# Patient Record
Sex: FEMALE | Race: WHITE | Employment: OTHER | URBAN - NONMETROPOLITAN AREA
[De-identification: names, ages, dates, MRNs, and addresses within clinical notes are randomized per-mention and may not be internally consistent; named-entity substitution may affect disease eponyms.]

---

## 2017-01-09 ENCOUNTER — OFFICE VISIT (OUTPATIENT)
Dept: OTHER | Age: 72
End: 2017-01-09

## 2017-01-09 VITALS — BODY MASS INDEX: 35.03 KG/M2 | WEIGHT: 218 LBS | HEIGHT: 66 IN

## 2017-01-09 DIAGNOSIS — I50.9 CONGESTIVE HEART FAILURE, UNSPECIFIED CONGESTIVE HEART FAILURE CHRONICITY, UNSPECIFIED CONGESTIVE HEART FAILURE TYPE: ICD-10-CM

## 2017-01-09 DIAGNOSIS — E78.5 HYPERLIPIDEMIA, UNSPECIFIED HYPERLIPIDEMIA TYPE: ICD-10-CM

## 2017-01-09 DIAGNOSIS — E88.81 DYSMETABOLIC SYNDROME: ICD-10-CM

## 2017-01-09 DIAGNOSIS — K75.81 NASH (NONALCOHOLIC STEATOHEPATITIS): Primary | ICD-10-CM

## 2017-01-09 DIAGNOSIS — E66.9 OBESITY, UNSPECIFIED OBESITY SEVERITY, UNSPECIFIED OBESITY TYPE: ICD-10-CM

## 2017-01-09 PROCEDURE — 97803 MED NUTRITION INDIV SUBSEQ: CPT | Performed by: DIETITIAN, REGISTERED

## 2017-01-12 ENCOUNTER — TELEPHONE (OUTPATIENT)
Dept: FAMILY MEDICINE CLINIC | Age: 72
End: 2017-01-12

## 2017-01-17 ENCOUNTER — OFFICE VISIT (OUTPATIENT)
Dept: FAMILY MEDICINE CLINIC | Age: 72
End: 2017-01-17

## 2017-01-17 VITALS
RESPIRATION RATE: 22 BRPM | SYSTOLIC BLOOD PRESSURE: 142 MMHG | BODY MASS INDEX: 34.53 KG/M2 | HEIGHT: 67 IN | OXYGEN SATURATION: 95 % | TEMPERATURE: 98.2 F | WEIGHT: 220 LBS | HEART RATE: 88 BPM | DIASTOLIC BLOOD PRESSURE: 84 MMHG
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
## 2017-01-17 DIAGNOSIS — R06.02 SOB (SHORTNESS OF BREATH): Primary | ICD-10-CM

## 2017-01-17 DIAGNOSIS — R42 DIZZINESS: ICD-10-CM

## 2017-01-17 DIAGNOSIS — R29.6 FREQUENT FALLS: ICD-10-CM

## 2017-01-17 DIAGNOSIS — M25.512 ACUTE PAIN OF LEFT SHOULDER: ICD-10-CM

## 2017-01-17 PROCEDURE — 1123F ACP DISCUSS/DSCN MKR DOCD: CPT | Performed by: NURSE PRACTITIONER

## 2017-01-17 PROCEDURE — G8417 CALC BMI ABV UP PARAM F/U: HCPCS | Performed by: NURSE PRACTITIONER

## 2017-01-17 PROCEDURE — 1036F TOBACCO NON-USER: CPT | Performed by: NURSE PRACTITIONER

## 2017-01-17 PROCEDURE — G8399 PT W/DXA RESULTS DOCUMENT: HCPCS | Performed by: NURSE PRACTITIONER

## 2017-01-17 PROCEDURE — 3017F COLORECTAL CA SCREEN DOC REV: CPT | Performed by: NURSE PRACTITIONER

## 2017-01-17 PROCEDURE — 99213 OFFICE O/P EST LOW 20 MIN: CPT | Performed by: NURSE PRACTITIONER

## 2017-01-17 PROCEDURE — 4040F PNEUMOC VAC/ADMIN/RCVD: CPT | Performed by: NURSE PRACTITIONER

## 2017-01-17 PROCEDURE — 3014F SCREEN MAMMO DOC REV: CPT | Performed by: NURSE PRACTITIONER

## 2017-01-17 PROCEDURE — G8484 FLU IMMUNIZE NO ADMIN: HCPCS | Performed by: NURSE PRACTITIONER

## 2017-01-17 PROCEDURE — G8598 ASA/ANTIPLAT THER USED: HCPCS | Performed by: NURSE PRACTITIONER

## 2017-01-17 PROCEDURE — G8427 DOCREV CUR MEDS BY ELIG CLIN: HCPCS | Performed by: NURSE PRACTITIONER

## 2017-01-17 PROCEDURE — 1090F PRES/ABSN URINE INCON ASSESS: CPT | Performed by: NURSE PRACTITIONER

## 2017-01-17 RX ORDER — HYDROCODONE BITARTRATE AND ACETAMINOPHEN 5; 325 MG/1; MG/1
1 TABLET ORAL EVERY 6 HOURS PRN
Status: ON HOLD | COMMUNITY
End: 2017-11-15 | Stop reason: ALTCHOICE

## 2017-01-17 ASSESSMENT — ENCOUNTER SYMPTOMS
SHORTNESS OF BREATH: 1
ABDOMINAL PAIN: 0
COUGH: 0
COLOR CHANGE: 1
ABDOMINAL DISTENTION: 0
WHEEZING: 0
CHOKING: 0

## 2017-01-18 ENCOUNTER — TELEPHONE (OUTPATIENT)
Dept: FAMILY MEDICINE CLINIC | Age: 72
End: 2017-01-18

## 2017-01-19 ENCOUNTER — TELEPHONE (OUTPATIENT)
Dept: FAMILY MEDICINE CLINIC | Age: 72
End: 2017-01-19

## 2017-01-19 DIAGNOSIS — I65.02 VERTEBRAL ARTERY OCCLUSION, LEFT: ICD-10-CM

## 2017-01-19 DIAGNOSIS — R42 DIZZINESS: Primary | ICD-10-CM

## 2017-01-20 ENCOUNTER — TELEPHONE (OUTPATIENT)
Dept: FAMILY MEDICINE CLINIC | Age: 72
End: 2017-01-20

## 2017-02-28 ENCOUNTER — OFFICE VISIT (OUTPATIENT)
Dept: CARDIOLOGY | Age: 72
End: 2017-02-28

## 2017-02-28 VITALS
HEIGHT: 67 IN | HEART RATE: 97 BPM | SYSTOLIC BLOOD PRESSURE: 225 MMHG | BODY MASS INDEX: 34.69 KG/M2 | WEIGHT: 221 LBS | DIASTOLIC BLOOD PRESSURE: 82 MMHG

## 2017-02-28 DIAGNOSIS — R07.2 PRECORDIAL PAIN: ICD-10-CM

## 2017-02-28 DIAGNOSIS — Z98.61 S/P PTCA (PERCUTANEOUS TRANSLUMINAL CORONARY ANGIOPLASTY): ICD-10-CM

## 2017-02-28 DIAGNOSIS — I10 ESSENTIAL HYPERTENSION: Primary | ICD-10-CM

## 2017-02-28 DIAGNOSIS — E88.81 METABOLIC SYNDROME: ICD-10-CM

## 2017-02-28 DIAGNOSIS — Z98.890 S/P CARDIAC CATHETERIZATION: ICD-10-CM

## 2017-02-28 DIAGNOSIS — R01.1 SYSTOLIC MURMUR: ICD-10-CM

## 2017-02-28 PROCEDURE — G8484 FLU IMMUNIZE NO ADMIN: HCPCS | Performed by: INTERNAL MEDICINE

## 2017-02-28 PROCEDURE — 3017F COLORECTAL CA SCREEN DOC REV: CPT | Performed by: INTERNAL MEDICINE

## 2017-02-28 PROCEDURE — 3014F SCREEN MAMMO DOC REV: CPT | Performed by: INTERNAL MEDICINE

## 2017-02-28 PROCEDURE — 1036F TOBACCO NON-USER: CPT | Performed by: INTERNAL MEDICINE

## 2017-02-28 PROCEDURE — 1123F ACP DISCUSS/DSCN MKR DOCD: CPT | Performed by: INTERNAL MEDICINE

## 2017-02-28 PROCEDURE — 4040F PNEUMOC VAC/ADMIN/RCVD: CPT | Performed by: INTERNAL MEDICINE

## 2017-02-28 PROCEDURE — G8598 ASA/ANTIPLAT THER USED: HCPCS | Performed by: INTERNAL MEDICINE

## 2017-02-28 PROCEDURE — 1090F PRES/ABSN URINE INCON ASSESS: CPT | Performed by: INTERNAL MEDICINE

## 2017-02-28 PROCEDURE — G8399 PT W/DXA RESULTS DOCUMENT: HCPCS | Performed by: INTERNAL MEDICINE

## 2017-02-28 PROCEDURE — G8427 DOCREV CUR MEDS BY ELIG CLIN: HCPCS | Performed by: INTERNAL MEDICINE

## 2017-02-28 PROCEDURE — 99213 OFFICE O/P EST LOW 20 MIN: CPT | Performed by: INTERNAL MEDICINE

## 2017-02-28 PROCEDURE — G8417 CALC BMI ABV UP PARAM F/U: HCPCS | Performed by: INTERNAL MEDICINE

## 2017-02-28 RX ORDER — CLOPIDOGREL BISULFATE 75 MG/1
TABLET ORAL
Qty: 90 TABLET | Refills: 1 | Status: SHIPPED | OUTPATIENT
Start: 2017-02-28 | End: 2017-08-23 | Stop reason: SDUPTHER

## 2017-02-28 RX ORDER — LISINOPRIL 10 MG/1
TABLET ORAL
Qty: 180 TABLET | Refills: 1 | Status: SHIPPED | OUTPATIENT
Start: 2017-02-28 | End: 2017-05-11 | Stop reason: DRUGHIGH

## 2017-02-28 RX ORDER — NITROGLYCERIN 0.4 MG/1
0.4 TABLET SUBLINGUAL EVERY 5 MIN PRN
Qty: 25 TABLET | Refills: 3 | Status: ON HOLD | OUTPATIENT
Start: 2017-02-28 | End: 2018-04-18 | Stop reason: HOSPADM

## 2017-02-28 RX ORDER — RANOLAZINE 500 MG/1
500 TABLET, EXTENDED RELEASE ORAL 2 TIMES DAILY
Qty: 180 TABLET | Refills: 1 | Status: SHIPPED | OUTPATIENT
Start: 2017-02-28 | End: 2017-08-25 | Stop reason: SDUPTHER

## 2017-02-28 RX ORDER — AMLODIPINE BESYLATE 5 MG/1
5 TABLET ORAL DAILY
Qty: 90 TABLET | Refills: 1 | Status: SHIPPED | OUTPATIENT
Start: 2017-02-28 | End: 2017-04-26 | Stop reason: DRUGHIGH

## 2017-02-28 RX ORDER — NITROGLYCERIN 40 MG/1
PATCH TRANSDERMAL
Qty: 90 PATCH | Refills: 1 | Status: SHIPPED | OUTPATIENT
Start: 2017-02-28 | End: 2018-01-20 | Stop reason: SDUPTHER

## 2017-02-28 ASSESSMENT — ENCOUNTER SYMPTOMS
GASTROINTESTINAL NEGATIVE: 1
RESPIRATORY NEGATIVE: 1
EYES NEGATIVE: 1

## 2017-03-13 ENCOUNTER — TELEPHONE (OUTPATIENT)
Dept: FAMILY MEDICINE CLINIC | Age: 72
End: 2017-03-13

## 2017-03-13 RX ORDER — FLUTICASONE FUROATE AND VILANTEROL 100; 25 UG/1; UG/1
1 POWDER RESPIRATORY (INHALATION) DAILY
Qty: 1 EACH | Refills: 3 | Status: SHIPPED | OUTPATIENT
Start: 2017-03-13 | End: 2017-05-01 | Stop reason: ALTCHOICE

## 2017-03-27 ENCOUNTER — OFFICE VISIT (OUTPATIENT)
Dept: PULMONOLOGY | Age: 72
End: 2017-03-27

## 2017-03-27 ENCOUNTER — OFFICE VISIT (OUTPATIENT)
Dept: AUDIOLOGY | Age: 72
End: 2017-03-27

## 2017-03-27 VITALS
HEIGHT: 66 IN | TEMPERATURE: 98.1 F | BODY MASS INDEX: 35.49 KG/M2 | DIASTOLIC BLOOD PRESSURE: 78 MMHG | SYSTOLIC BLOOD PRESSURE: 149 MMHG | WEIGHT: 220.85 LBS | OXYGEN SATURATION: 94 % | HEART RATE: 96 BPM

## 2017-03-27 DIAGNOSIS — H90.3 SENSORINEURAL HEARING LOSS, BILATERAL: Primary | ICD-10-CM

## 2017-03-27 DIAGNOSIS — G47.10 HYPERSOMNIA: ICD-10-CM

## 2017-03-27 DIAGNOSIS — I10 ESSENTIAL HYPERTENSION: ICD-10-CM

## 2017-03-27 DIAGNOSIS — R06.02 SHORTNESS OF BREATH: Primary | ICD-10-CM

## 2017-03-27 DIAGNOSIS — Z87.891 PERSONAL HISTORY OF TOBACCO USE: ICD-10-CM

## 2017-03-27 DIAGNOSIS — G47.33 OSA (OBSTRUCTIVE SLEEP APNEA): ICD-10-CM

## 2017-03-27 DIAGNOSIS — J45.40 MODERATE PERSISTENT ASTHMA WITHOUT COMPLICATION: ICD-10-CM

## 2017-03-27 PROCEDURE — 3017F COLORECTAL CA SCREEN DOC REV: CPT | Performed by: INTERNAL MEDICINE

## 2017-03-27 PROCEDURE — 1123F ACP DISCUSS/DSCN MKR DOCD: CPT | Performed by: INTERNAL MEDICINE

## 2017-03-27 PROCEDURE — 1090F PRES/ABSN URINE INCON ASSESS: CPT | Performed by: INTERNAL MEDICINE

## 2017-03-27 PROCEDURE — 3014F SCREEN MAMMO DOC REV: CPT | Performed by: INTERNAL MEDICINE

## 2017-03-27 PROCEDURE — 1036F TOBACCO NON-USER: CPT | Performed by: INTERNAL MEDICINE

## 2017-03-27 PROCEDURE — G8484 FLU IMMUNIZE NO ADMIN: HCPCS | Performed by: INTERNAL MEDICINE

## 2017-03-27 PROCEDURE — G0296 VISIT TO DETERM LDCT ELIG: HCPCS | Performed by: INTERNAL MEDICINE

## 2017-03-27 PROCEDURE — G8598 ASA/ANTIPLAT THER USED: HCPCS | Performed by: INTERNAL MEDICINE

## 2017-03-27 PROCEDURE — G8417 CALC BMI ABV UP PARAM F/U: HCPCS | Performed by: INTERNAL MEDICINE

## 2017-03-27 PROCEDURE — G8399 PT W/DXA RESULTS DOCUMENT: HCPCS | Performed by: INTERNAL MEDICINE

## 2017-03-27 PROCEDURE — G8427 DOCREV CUR MEDS BY ELIG CLIN: HCPCS | Performed by: INTERNAL MEDICINE

## 2017-03-27 PROCEDURE — 4040F PNEUMOC VAC/ADMIN/RCVD: CPT | Performed by: INTERNAL MEDICINE

## 2017-03-27 PROCEDURE — 99214 OFFICE O/P EST MOD 30 MIN: CPT | Performed by: INTERNAL MEDICINE

## 2017-04-03 PROBLEM — I70.8 LEFT SUBCLAVIAN ARTERY OCCLUSION: Status: ACTIVE | Noted: 2017-04-03

## 2017-04-04 ENCOUNTER — TELEPHONE (OUTPATIENT)
Dept: CARDIOLOGY | Age: 72
End: 2017-04-04

## 2017-04-05 ENCOUNTER — TELEPHONE (OUTPATIENT)
Dept: FAMILY MEDICINE CLINIC | Age: 72
End: 2017-04-05

## 2017-04-17 ENCOUNTER — OFFICE VISIT (OUTPATIENT)
Dept: FAMILY MEDICINE CLINIC | Age: 72
End: 2017-04-17

## 2017-04-17 VITALS
SYSTOLIC BLOOD PRESSURE: 176 MMHG | HEIGHT: 66 IN | WEIGHT: 226 LBS | TEMPERATURE: 98 F | HEART RATE: 92 BPM | BODY MASS INDEX: 36.32 KG/M2 | RESPIRATION RATE: 28 BRPM | DIASTOLIC BLOOD PRESSURE: 74 MMHG

## 2017-04-17 DIAGNOSIS — E78.5 DYSLIPIDEMIA: ICD-10-CM

## 2017-04-17 DIAGNOSIS — H61.22 HEARING LOSS OF LEFT EAR DUE TO CERUMEN IMPACTION: ICD-10-CM

## 2017-04-17 DIAGNOSIS — E03.9 HYPOTHYROIDISM, UNSPECIFIED TYPE: ICD-10-CM

## 2017-04-17 DIAGNOSIS — I10 ESSENTIAL HYPERTENSION: Primary | ICD-10-CM

## 2017-04-17 DIAGNOSIS — E11.9 CONTROLLED TYPE 2 DIABETES MELLITUS WITHOUT COMPLICATION, WITHOUT LONG-TERM CURRENT USE OF INSULIN (HCC): ICD-10-CM

## 2017-04-17 LAB — HBA1C MFR BLD: 5.9 %

## 2017-04-17 PROCEDURE — 3044F HG A1C LEVEL LT 7.0%: CPT | Performed by: NURSE PRACTITIONER

## 2017-04-17 PROCEDURE — 69209 REMOVE IMPACTED EAR WAX UNI: CPT | Performed by: NURSE PRACTITIONER

## 2017-04-17 PROCEDURE — 3017F COLORECTAL CA SCREEN DOC REV: CPT | Performed by: NURSE PRACTITIONER

## 2017-04-17 PROCEDURE — G8598 ASA/ANTIPLAT THER USED: HCPCS | Performed by: NURSE PRACTITIONER

## 2017-04-17 PROCEDURE — G8427 DOCREV CUR MEDS BY ELIG CLIN: HCPCS | Performed by: NURSE PRACTITIONER

## 2017-04-17 PROCEDURE — 99214 OFFICE O/P EST MOD 30 MIN: CPT | Performed by: NURSE PRACTITIONER

## 2017-04-17 PROCEDURE — 3014F SCREEN MAMMO DOC REV: CPT | Performed by: NURSE PRACTITIONER

## 2017-04-17 PROCEDURE — 1036F TOBACCO NON-USER: CPT | Performed by: NURSE PRACTITIONER

## 2017-04-17 PROCEDURE — 1123F ACP DISCUSS/DSCN MKR DOCD: CPT | Performed by: NURSE PRACTITIONER

## 2017-04-17 PROCEDURE — 4040F PNEUMOC VAC/ADMIN/RCVD: CPT | Performed by: NURSE PRACTITIONER

## 2017-04-17 PROCEDURE — 1090F PRES/ABSN URINE INCON ASSESS: CPT | Performed by: NURSE PRACTITIONER

## 2017-04-17 PROCEDURE — G8417 CALC BMI ABV UP PARAM F/U: HCPCS | Performed by: NURSE PRACTITIONER

## 2017-04-17 PROCEDURE — 83036 HEMOGLOBIN GLYCOSYLATED A1C: CPT | Performed by: NURSE PRACTITIONER

## 2017-04-17 PROCEDURE — G8399 PT W/DXA RESULTS DOCUMENT: HCPCS | Performed by: NURSE PRACTITIONER

## 2017-04-17 RX ORDER — LEVOTHYROXINE SODIUM 0.1 MG/1
TABLET ORAL
Qty: 90 TABLET | Refills: 1 | Status: SHIPPED | OUTPATIENT
Start: 2017-04-17 | End: 2017-07-18 | Stop reason: SDUPTHER

## 2017-04-17 RX ORDER — ROSUVASTATIN CALCIUM 20 MG/1
TABLET, COATED ORAL
Qty: 30 TABLET | Refills: 6 | Status: SHIPPED | OUTPATIENT
Start: 2017-04-17 | End: 2017-07-18 | Stop reason: SDUPTHER

## 2017-04-17 ASSESSMENT — ENCOUNTER SYMPTOMS
TROUBLE SWALLOWING: 0
VOICE CHANGE: 0
SHORTNESS OF BREATH: 0
GASTROINTESTINAL NEGATIVE: 1
CHOKING: 0
COUGH: 1
CHEST TIGHTNESS: 0
WHEEZING: 0

## 2017-04-26 ENCOUNTER — TELEPHONE (OUTPATIENT)
Dept: FAMILY MEDICINE CLINIC | Age: 72
End: 2017-04-26

## 2017-04-26 ENCOUNTER — OFFICE VISIT (OUTPATIENT)
Dept: CARDIOLOGY | Age: 72
End: 2017-04-26

## 2017-04-26 VITALS
SYSTOLIC BLOOD PRESSURE: 154 MMHG | HEIGHT: 66 IN | DIASTOLIC BLOOD PRESSURE: 58 MMHG | HEART RATE: 104 BPM | BODY MASS INDEX: 36 KG/M2 | WEIGHT: 224 LBS

## 2017-04-26 DIAGNOSIS — Z98.61 S/P PTCA (PERCUTANEOUS TRANSLUMINAL CORONARY ANGIOPLASTY): Primary | ICD-10-CM

## 2017-04-26 DIAGNOSIS — Z98.890 S/P CARDIAC CATHETERIZATION: ICD-10-CM

## 2017-04-26 DIAGNOSIS — E78.5 DYSLIPIDEMIA: ICD-10-CM

## 2017-04-26 DIAGNOSIS — I70.8 LEFT SUBCLAVIAN ARTERY OCCLUSION: ICD-10-CM

## 2017-04-26 PROCEDURE — G8598 ASA/ANTIPLAT THER USED: HCPCS | Performed by: INTERNAL MEDICINE

## 2017-04-26 PROCEDURE — 1123F ACP DISCUSS/DSCN MKR DOCD: CPT | Performed by: INTERNAL MEDICINE

## 2017-04-26 PROCEDURE — 3014F SCREEN MAMMO DOC REV: CPT | Performed by: INTERNAL MEDICINE

## 2017-04-26 PROCEDURE — G8399 PT W/DXA RESULTS DOCUMENT: HCPCS | Performed by: INTERNAL MEDICINE

## 2017-04-26 PROCEDURE — 99213 OFFICE O/P EST LOW 20 MIN: CPT | Performed by: INTERNAL MEDICINE

## 2017-04-26 PROCEDURE — G8417 CALC BMI ABV UP PARAM F/U: HCPCS | Performed by: INTERNAL MEDICINE

## 2017-04-26 PROCEDURE — G8427 DOCREV CUR MEDS BY ELIG CLIN: HCPCS | Performed by: INTERNAL MEDICINE

## 2017-04-26 PROCEDURE — 1090F PRES/ABSN URINE INCON ASSESS: CPT | Performed by: INTERNAL MEDICINE

## 2017-04-26 PROCEDURE — 3017F COLORECTAL CA SCREEN DOC REV: CPT | Performed by: INTERNAL MEDICINE

## 2017-04-26 PROCEDURE — 4040F PNEUMOC VAC/ADMIN/RCVD: CPT | Performed by: INTERNAL MEDICINE

## 2017-04-26 PROCEDURE — 1036F TOBACCO NON-USER: CPT | Performed by: INTERNAL MEDICINE

## 2017-04-26 RX ORDER — AMLODIPINE BESYLATE 10 MG/1
10 TABLET ORAL DAILY
Qty: 90 TABLET | Refills: 0 | Status: SHIPPED | OUTPATIENT
Start: 2017-04-26 | End: 2017-09-01 | Stop reason: SDUPTHER

## 2017-04-26 RX ORDER — AMLODIPINE BESYLATE 10 MG/1
10 TABLET ORAL DAILY
COMMUNITY
End: 2017-04-26 | Stop reason: SDUPTHER

## 2017-04-26 ASSESSMENT — ENCOUNTER SYMPTOMS
RESPIRATORY NEGATIVE: 1
GASTROINTESTINAL NEGATIVE: 1
EYES NEGATIVE: 1

## 2017-05-01 ENCOUNTER — OFFICE VISIT (OUTPATIENT)
Dept: PULMONOLOGY | Age: 72
End: 2017-05-01

## 2017-05-01 VITALS
SYSTOLIC BLOOD PRESSURE: 154 MMHG | OXYGEN SATURATION: 94 % | DIASTOLIC BLOOD PRESSURE: 82 MMHG | TEMPERATURE: 97.4 F | WEIGHT: 223.6 LBS | HEIGHT: 66 IN | BODY MASS INDEX: 35.93 KG/M2 | HEART RATE: 97 BPM

## 2017-05-01 DIAGNOSIS — R91.8 LUNG NODULES: ICD-10-CM

## 2017-05-01 DIAGNOSIS — G47.33 OSA (OBSTRUCTIVE SLEEP APNEA): ICD-10-CM

## 2017-05-01 DIAGNOSIS — Z87.891 PERSONAL HISTORY OF TOBACCO USE: ICD-10-CM

## 2017-05-01 DIAGNOSIS — R93.89 ABNORMAL CT SCAN, CHEST: ICD-10-CM

## 2017-05-01 DIAGNOSIS — J45.40 MODERATE PERSISTENT ASTHMA WITHOUT COMPLICATION: Primary | ICD-10-CM

## 2017-05-01 DIAGNOSIS — I25.10 CORONARY ARTERY DISEASE INVOLVING NATIVE CORONARY ARTERY OF NATIVE HEART WITHOUT ANGINA PECTORIS: ICD-10-CM

## 2017-05-01 DIAGNOSIS — I10 ESSENTIAL HYPERTENSION: ICD-10-CM

## 2017-05-01 PROCEDURE — 1123F ACP DISCUSS/DSCN MKR DOCD: CPT | Performed by: INTERNAL MEDICINE

## 2017-05-01 PROCEDURE — 4040F PNEUMOC VAC/ADMIN/RCVD: CPT | Performed by: INTERNAL MEDICINE

## 2017-05-01 PROCEDURE — G8598 ASA/ANTIPLAT THER USED: HCPCS | Performed by: INTERNAL MEDICINE

## 2017-05-01 PROCEDURE — G0296 VISIT TO DETERM LDCT ELIG: HCPCS | Performed by: INTERNAL MEDICINE

## 2017-05-01 PROCEDURE — 99215 OFFICE O/P EST HI 40 MIN: CPT | Performed by: INTERNAL MEDICINE

## 2017-05-01 PROCEDURE — 1090F PRES/ABSN URINE INCON ASSESS: CPT | Performed by: INTERNAL MEDICINE

## 2017-05-01 PROCEDURE — 1036F TOBACCO NON-USER: CPT | Performed by: INTERNAL MEDICINE

## 2017-05-01 PROCEDURE — 3014F SCREEN MAMMO DOC REV: CPT | Performed by: INTERNAL MEDICINE

## 2017-05-01 PROCEDURE — G8427 DOCREV CUR MEDS BY ELIG CLIN: HCPCS | Performed by: INTERNAL MEDICINE

## 2017-05-01 PROCEDURE — G8417 CALC BMI ABV UP PARAM F/U: HCPCS | Performed by: INTERNAL MEDICINE

## 2017-05-01 PROCEDURE — G8399 PT W/DXA RESULTS DOCUMENT: HCPCS | Performed by: INTERNAL MEDICINE

## 2017-05-01 PROCEDURE — 3017F COLORECTAL CA SCREEN DOC REV: CPT | Performed by: INTERNAL MEDICINE

## 2017-05-01 RX ORDER — MONTELUKAST SODIUM 10 MG/1
10 TABLET, FILM COATED ORAL NIGHTLY
Qty: 30 TABLET | Refills: 11 | Status: ON HOLD
Start: 2017-05-01 | End: 2018-07-07

## 2017-05-03 ENCOUNTER — TELEPHONE (OUTPATIENT)
Dept: PULMONOLOGY | Age: 72
End: 2017-05-03

## 2017-05-08 RX ORDER — METOPROLOL TARTRATE 50 MG/1
TABLET, FILM COATED ORAL
Qty: 180 TABLET | Refills: 1 | Status: SHIPPED | OUTPATIENT
Start: 2017-05-08 | End: 2017-09-06 | Stop reason: SDUPTHER

## 2017-05-09 ENCOUNTER — TELEPHONE (OUTPATIENT)
Dept: CARDIOLOGY | Age: 72
End: 2017-05-09

## 2017-05-11 RX ORDER — LISINOPRIL 20 MG/1
20 TABLET ORAL 2 TIMES DAILY
COMMUNITY
End: 2017-06-07 | Stop reason: SDUPTHER

## 2017-05-16 ENCOUNTER — TELEPHONE (OUTPATIENT)
Dept: OTHER | Age: 72
End: 2017-05-16

## 2017-06-08 RX ORDER — LISINOPRIL 20 MG/1
20 TABLET ORAL 2 TIMES DAILY
Qty: 180 TABLET | Refills: 0 | Status: SHIPPED | OUTPATIENT
Start: 2017-06-08 | End: 2017-09-06 | Stop reason: SDUPTHER

## 2017-06-12 ENCOUNTER — OFFICE VISIT (OUTPATIENT)
Dept: OTHER | Age: 72
End: 2017-06-12

## 2017-06-12 VITALS — BODY MASS INDEX: 36.13 KG/M2 | WEIGHT: 224.8 LBS | HEIGHT: 66 IN

## 2017-06-12 DIAGNOSIS — Z71.3 DIETARY COUNSELING AND SURVEILLANCE: ICD-10-CM

## 2017-06-12 DIAGNOSIS — E78.2 COMBINED HYPERLIPIDEMIA: ICD-10-CM

## 2017-06-12 DIAGNOSIS — K75.81 NASH (NONALCOHOLIC STEATOHEPATITIS): Primary | ICD-10-CM

## 2017-06-12 DIAGNOSIS — E88.81 DYSMETABOLIC SYNDROME: ICD-10-CM

## 2017-06-12 DIAGNOSIS — E66.9 OBESITY (BMI 30.0-34.9): ICD-10-CM

## 2017-07-03 RX ORDER — ASPIRIN 81 MG
TABLET, DELAYED RELEASE (ENTERIC COATED) ORAL
Qty: 30 TABLET | Refills: 11 | Status: SHIPPED | OUTPATIENT
Start: 2017-07-03 | End: 2018-03-07 | Stop reason: SDUPTHER

## 2017-07-03 RX ORDER — B-COMPLEX WITH VITAMIN C
TABLET ORAL
Qty: 60 TABLET | Refills: 5 | Status: SHIPPED | OUTPATIENT
Start: 2017-07-03 | End: 2017-12-23 | Stop reason: SDUPTHER

## 2017-07-12 ENCOUNTER — OFFICE VISIT (OUTPATIENT)
Dept: PULMONOLOGY | Age: 72
End: 2017-07-12

## 2017-07-12 VITALS
OXYGEN SATURATION: 95 % | BODY MASS INDEX: 36.45 KG/M2 | DIASTOLIC BLOOD PRESSURE: 60 MMHG | WEIGHT: 226.8 LBS | SYSTOLIC BLOOD PRESSURE: 150 MMHG | HEIGHT: 66 IN | HEART RATE: 91 BPM

## 2017-07-12 DIAGNOSIS — Z99.89 OSA ON CPAP: ICD-10-CM

## 2017-07-12 DIAGNOSIS — G47.33 OSA ON CPAP: ICD-10-CM

## 2017-07-12 PROCEDURE — 4040F PNEUMOC VAC/ADMIN/RCVD: CPT | Performed by: PHYSICIAN ASSISTANT

## 2017-07-12 PROCEDURE — 3014F SCREEN MAMMO DOC REV: CPT | Performed by: PHYSICIAN ASSISTANT

## 2017-07-12 PROCEDURE — 1090F PRES/ABSN URINE INCON ASSESS: CPT | Performed by: PHYSICIAN ASSISTANT

## 2017-07-12 PROCEDURE — G8399 PT W/DXA RESULTS DOCUMENT: HCPCS | Performed by: PHYSICIAN ASSISTANT

## 2017-07-12 PROCEDURE — 1123F ACP DISCUSS/DSCN MKR DOCD: CPT | Performed by: PHYSICIAN ASSISTANT

## 2017-07-12 PROCEDURE — 1036F TOBACCO NON-USER: CPT | Performed by: PHYSICIAN ASSISTANT

## 2017-07-12 PROCEDURE — G8417 CALC BMI ABV UP PARAM F/U: HCPCS | Performed by: PHYSICIAN ASSISTANT

## 2017-07-12 PROCEDURE — 99213 OFFICE O/P EST LOW 20 MIN: CPT | Performed by: PHYSICIAN ASSISTANT

## 2017-07-12 PROCEDURE — G8427 DOCREV CUR MEDS BY ELIG CLIN: HCPCS | Performed by: PHYSICIAN ASSISTANT

## 2017-07-12 PROCEDURE — G8598 ASA/ANTIPLAT THER USED: HCPCS | Performed by: PHYSICIAN ASSISTANT

## 2017-07-12 PROCEDURE — 3017F COLORECTAL CA SCREEN DOC REV: CPT | Performed by: PHYSICIAN ASSISTANT

## 2017-07-18 ENCOUNTER — OFFICE VISIT (OUTPATIENT)
Dept: FAMILY MEDICINE CLINIC | Age: 72
End: 2017-07-18
Payer: MEDICARE

## 2017-07-18 VITALS
WEIGHT: 225 LBS | BODY MASS INDEX: 36.16 KG/M2 | HEIGHT: 66 IN | SYSTOLIC BLOOD PRESSURE: 158 MMHG | TEMPERATURE: 97.8 F | HEART RATE: 104 BPM | RESPIRATION RATE: 24 BRPM | DIASTOLIC BLOOD PRESSURE: 60 MMHG

## 2017-07-18 DIAGNOSIS — J41.0 SIMPLE CHRONIC BRONCHITIS (HCC): Primary | ICD-10-CM

## 2017-07-18 DIAGNOSIS — E78.5 DYSLIPIDEMIA: ICD-10-CM

## 2017-07-18 DIAGNOSIS — K08.89 ILL-FITTING DENTURES: ICD-10-CM

## 2017-07-18 DIAGNOSIS — K12.0 ORAL APHTHOUS ULCER: ICD-10-CM

## 2017-07-18 DIAGNOSIS — R01.1 SYSTOLIC MURMUR: ICD-10-CM

## 2017-07-18 DIAGNOSIS — Z97.2 ILL-FITTING DENTURES: ICD-10-CM

## 2017-07-18 DIAGNOSIS — E03.9 HYPOTHYROIDISM, UNSPECIFIED TYPE: ICD-10-CM

## 2017-07-18 DIAGNOSIS — J44.9 OBSTRUCTIVE CHRONIC BRONCHITIS WITHOUT EXACERBATION (HCC): ICD-10-CM

## 2017-07-18 PROCEDURE — 4040F PNEUMOC VAC/ADMIN/RCVD: CPT | Performed by: NURSE PRACTITIONER

## 2017-07-18 PROCEDURE — 1036F TOBACCO NON-USER: CPT | Performed by: NURSE PRACTITIONER

## 2017-07-18 PROCEDURE — 3023F SPIROM DOC REV: CPT | Performed by: NURSE PRACTITIONER

## 2017-07-18 PROCEDURE — G8427 DOCREV CUR MEDS BY ELIG CLIN: HCPCS | Performed by: NURSE PRACTITIONER

## 2017-07-18 PROCEDURE — G8399 PT W/DXA RESULTS DOCUMENT: HCPCS | Performed by: NURSE PRACTITIONER

## 2017-07-18 PROCEDURE — 1090F PRES/ABSN URINE INCON ASSESS: CPT | Performed by: NURSE PRACTITIONER

## 2017-07-18 PROCEDURE — 1123F ACP DISCUSS/DSCN MKR DOCD: CPT | Performed by: NURSE PRACTITIONER

## 2017-07-18 PROCEDURE — G8417 CALC BMI ABV UP PARAM F/U: HCPCS | Performed by: NURSE PRACTITIONER

## 2017-07-18 PROCEDURE — 99213 OFFICE O/P EST LOW 20 MIN: CPT | Performed by: NURSE PRACTITIONER

## 2017-07-18 PROCEDURE — 3017F COLORECTAL CA SCREEN DOC REV: CPT | Performed by: NURSE PRACTITIONER

## 2017-07-18 PROCEDURE — G8598 ASA/ANTIPLAT THER USED: HCPCS | Performed by: NURSE PRACTITIONER

## 2017-07-18 PROCEDURE — G8926 SPIRO NO PERF OR DOC: HCPCS | Performed by: NURSE PRACTITIONER

## 2017-07-18 PROCEDURE — 3014F SCREEN MAMMO DOC REV: CPT | Performed by: NURSE PRACTITIONER

## 2017-07-18 RX ORDER — LEVOTHYROXINE SODIUM 0.1 MG/1
TABLET ORAL
Qty: 90 TABLET | Refills: 1 | Status: SHIPPED | OUTPATIENT
Start: 2017-07-18 | End: 2017-12-27 | Stop reason: SDUPTHER

## 2017-07-18 RX ORDER — ROSUVASTATIN CALCIUM 20 MG/1
TABLET, COATED ORAL
Qty: 90 TABLET | Refills: 1 | Status: SHIPPED | OUTPATIENT
Start: 2017-07-18 | End: 2017-09-06 | Stop reason: SDUPTHER

## 2017-07-18 ASSESSMENT — ENCOUNTER SYMPTOMS
DIARRHEA: 0
NAUSEA: 0
VOICE CHANGE: 0
TROUBLE SWALLOWING: 0
VOMITING: 0
ABDOMINAL PAIN: 0
BACK PAIN: 0
RESPIRATORY NEGATIVE: 1
CONSTIPATION: 0
ABDOMINAL DISTENTION: 0
SINUS PRESSURE: 0

## 2017-07-18 ASSESSMENT — PATIENT HEALTH QUESTIONNAIRE - PHQ9
1. LITTLE INTEREST OR PLEASURE IN DOING THINGS: 0
2. FEELING DOWN, DEPRESSED OR HOPELESS: 1
SUM OF ALL RESPONSES TO PHQ QUESTIONS 1-9: 1
SUM OF ALL RESPONSES TO PHQ9 QUESTIONS 1 & 2: 1

## 2017-08-17 RX ORDER — NYSTATIN 100000 U/G
CREAM TOPICAL
Qty: 60 G | Refills: 2 | Status: SHIPPED | OUTPATIENT
Start: 2017-08-17 | End: 2019-01-01

## 2017-08-24 RX ORDER — CLOPIDOGREL BISULFATE 75 MG/1
TABLET ORAL
Qty: 90 TABLET | Refills: 1 | Status: SHIPPED | OUTPATIENT
Start: 2017-08-24 | End: 2017-09-06 | Stop reason: SDUPTHER

## 2017-08-25 RX ORDER — RANOLAZINE 500 MG/1
TABLET, FILM COATED, EXTENDED RELEASE ORAL
Qty: 180 TABLET | Refills: 0 | Status: SHIPPED | OUTPATIENT
Start: 2017-08-25 | End: 2017-09-06 | Stop reason: SDUPTHER

## 2017-09-01 RX ORDER — AMLODIPINE BESYLATE 10 MG/1
10 TABLET ORAL DAILY
Qty: 90 TABLET | Refills: 0 | Status: SHIPPED | OUTPATIENT
Start: 2017-09-01 | End: 2017-09-06 | Stop reason: SDUPTHER

## 2017-09-06 ENCOUNTER — OFFICE VISIT (OUTPATIENT)
Dept: CARDIOLOGY CLINIC | Age: 72
End: 2017-09-06
Payer: MEDICARE

## 2017-09-06 VITALS
BODY MASS INDEX: 37.09 KG/M2 | HEART RATE: 90 BPM | HEIGHT: 66 IN | SYSTOLIC BLOOD PRESSURE: 158 MMHG | DIASTOLIC BLOOD PRESSURE: 80 MMHG | WEIGHT: 230.8 LBS

## 2017-09-06 DIAGNOSIS — I35.0 NONRHEUMATIC AORTIC VALVE STENOSIS: ICD-10-CM

## 2017-09-06 DIAGNOSIS — Z98.890 S/P CARDIAC CATHETERIZATION: ICD-10-CM

## 2017-09-06 DIAGNOSIS — I25.10 ASCVD (ARTERIOSCLEROTIC CARDIOVASCULAR DISEASE): Primary | ICD-10-CM

## 2017-09-06 DIAGNOSIS — I10 ESSENTIAL HYPERTENSION: ICD-10-CM

## 2017-09-06 DIAGNOSIS — I70.8 LEFT SUBCLAVIAN ARTERY OCCLUSION: ICD-10-CM

## 2017-09-06 DIAGNOSIS — I25.10 CORONARY ARTERY DISEASE INVOLVING NATIVE CORONARY ARTERY OF NATIVE HEART WITHOUT ANGINA PECTORIS: ICD-10-CM

## 2017-09-06 DIAGNOSIS — E78.5 DYSLIPIDEMIA: ICD-10-CM

## 2017-09-06 DIAGNOSIS — Z98.61 S/P PTCA (PERCUTANEOUS TRANSLUMINAL CORONARY ANGIOPLASTY): ICD-10-CM

## 2017-09-06 PROCEDURE — 3017F COLORECTAL CA SCREEN DOC REV: CPT | Performed by: INTERNAL MEDICINE

## 2017-09-06 PROCEDURE — 4040F PNEUMOC VAC/ADMIN/RCVD: CPT | Performed by: INTERNAL MEDICINE

## 2017-09-06 PROCEDURE — 1123F ACP DISCUSS/DSCN MKR DOCD: CPT | Performed by: INTERNAL MEDICINE

## 2017-09-06 PROCEDURE — G8399 PT W/DXA RESULTS DOCUMENT: HCPCS | Performed by: INTERNAL MEDICINE

## 2017-09-06 PROCEDURE — G8598 ASA/ANTIPLAT THER USED: HCPCS | Performed by: INTERNAL MEDICINE

## 2017-09-06 PROCEDURE — 3014F SCREEN MAMMO DOC REV: CPT | Performed by: INTERNAL MEDICINE

## 2017-09-06 PROCEDURE — G8417 CALC BMI ABV UP PARAM F/U: HCPCS | Performed by: INTERNAL MEDICINE

## 2017-09-06 PROCEDURE — 1036F TOBACCO NON-USER: CPT | Performed by: INTERNAL MEDICINE

## 2017-09-06 PROCEDURE — 99213 OFFICE O/P EST LOW 20 MIN: CPT | Performed by: INTERNAL MEDICINE

## 2017-09-06 PROCEDURE — 1090F PRES/ABSN URINE INCON ASSESS: CPT | Performed by: INTERNAL MEDICINE

## 2017-09-06 PROCEDURE — G8427 DOCREV CUR MEDS BY ELIG CLIN: HCPCS | Performed by: INTERNAL MEDICINE

## 2017-09-06 PROCEDURE — 93000 ELECTROCARDIOGRAM COMPLETE: CPT | Performed by: INTERNAL MEDICINE

## 2017-09-06 RX ORDER — METOPROLOL TARTRATE 50 MG/1
TABLET, FILM COATED ORAL
Qty: 180 TABLET | Refills: 1 | Status: SHIPPED | OUTPATIENT
Start: 2017-09-06 | End: 2017-12-28 | Stop reason: SDUPTHER

## 2017-09-06 RX ORDER — RANOLAZINE 500 MG/1
TABLET, EXTENDED RELEASE ORAL
Qty: 180 TABLET | Refills: 1 | Status: SHIPPED | OUTPATIENT
Start: 2017-09-06 | End: 2018-03-07 | Stop reason: SDUPTHER

## 2017-09-06 RX ORDER — LISINOPRIL 20 MG/1
20 TABLET ORAL 2 TIMES DAILY
Qty: 180 TABLET | Refills: 1 | Status: SHIPPED | OUTPATIENT
Start: 2017-09-06 | End: 2018-01-16 | Stop reason: ALTCHOICE

## 2017-09-06 RX ORDER — CLOPIDOGREL BISULFATE 75 MG/1
TABLET ORAL
Qty: 90 TABLET | Refills: 1 | Status: SHIPPED | OUTPATIENT
Start: 2017-09-06 | End: 2018-01-01 | Stop reason: SDUPTHER

## 2017-09-06 RX ORDER — ROSUVASTATIN CALCIUM 20 MG/1
TABLET, COATED ORAL
Qty: 90 TABLET | Refills: 1 | Status: ON HOLD | OUTPATIENT
Start: 2017-09-06 | End: 2017-11-17 | Stop reason: SDUPTHER

## 2017-09-06 RX ORDER — AMLODIPINE BESYLATE 10 MG/1
10 TABLET ORAL DAILY
Qty: 90 TABLET | Refills: 1 | Status: SHIPPED | OUTPATIENT
Start: 2017-09-06 | End: 2018-03-07 | Stop reason: SDUPTHER

## 2017-09-06 ASSESSMENT — ENCOUNTER SYMPTOMS
RESPIRATORY NEGATIVE: 1
EYES NEGATIVE: 1
GASTROINTESTINAL NEGATIVE: 1

## 2017-09-21 ENCOUNTER — OFFICE VISIT (OUTPATIENT)
Dept: FAMILY MEDICINE CLINIC | Age: 72
End: 2017-09-21
Payer: MEDICARE

## 2017-09-21 ENCOUNTER — TELEPHONE (OUTPATIENT)
Dept: FAMILY MEDICINE CLINIC | Age: 72
End: 2017-09-21

## 2017-09-21 ENCOUNTER — TELEPHONE (OUTPATIENT)
Dept: CARDIOLOGY CLINIC | Age: 72
End: 2017-09-21

## 2017-09-21 VITALS
HEIGHT: 66 IN | BODY MASS INDEX: 36.32 KG/M2 | SYSTOLIC BLOOD PRESSURE: 146 MMHG | TEMPERATURE: 98 F | WEIGHT: 226 LBS | HEART RATE: 68 BPM | RESPIRATION RATE: 16 BRPM | DIASTOLIC BLOOD PRESSURE: 74 MMHG

## 2017-09-21 DIAGNOSIS — Z23 NEEDS FLU SHOT: ICD-10-CM

## 2017-09-21 DIAGNOSIS — Z01.818 PREOPERATIVE CLEARANCE: Primary | ICD-10-CM

## 2017-09-21 PROCEDURE — G8598 ASA/ANTIPLAT THER USED: HCPCS | Performed by: NURSE PRACTITIONER

## 2017-09-21 PROCEDURE — 4040F PNEUMOC VAC/ADMIN/RCVD: CPT | Performed by: NURSE PRACTITIONER

## 2017-09-21 PROCEDURE — 1036F TOBACCO NON-USER: CPT | Performed by: NURSE PRACTITIONER

## 2017-09-21 PROCEDURE — 99214 OFFICE O/P EST MOD 30 MIN: CPT | Performed by: NURSE PRACTITIONER

## 2017-09-21 PROCEDURE — 1090F PRES/ABSN URINE INCON ASSESS: CPT | Performed by: NURSE PRACTITIONER

## 2017-09-21 PROCEDURE — G8427 DOCREV CUR MEDS BY ELIG CLIN: HCPCS | Performed by: NURSE PRACTITIONER

## 2017-09-21 PROCEDURE — 3017F COLORECTAL CA SCREEN DOC REV: CPT | Performed by: NURSE PRACTITIONER

## 2017-09-21 PROCEDURE — 3014F SCREEN MAMMO DOC REV: CPT | Performed by: NURSE PRACTITIONER

## 2017-09-21 PROCEDURE — 90688 IIV4 VACCINE SPLT 0.5 ML IM: CPT | Performed by: NURSE PRACTITIONER

## 2017-09-21 PROCEDURE — G0008 ADMIN INFLUENZA VIRUS VAC: HCPCS | Performed by: NURSE PRACTITIONER

## 2017-09-21 PROCEDURE — G8417 CALC BMI ABV UP PARAM F/U: HCPCS | Performed by: NURSE PRACTITIONER

## 2017-09-22 ENCOUNTER — HOSPITAL ENCOUNTER (OUTPATIENT)
Dept: GENERAL RADIOLOGY | Age: 72
Discharge: HOME | End: 2017-09-22
Payer: MEDICARE

## 2017-09-22 ENCOUNTER — HOSPITAL ENCOUNTER (OUTPATIENT)
Age: 72
Discharge: HOME | End: 2017-09-22
Payer: MEDICARE

## 2017-09-22 ENCOUNTER — HOSPITAL ENCOUNTER (OUTPATIENT)
Dept: WOMENS IMAGING | Age: 72
Discharge: HOME | End: 2017-09-22
Payer: MEDICARE

## 2017-09-22 DIAGNOSIS — Z12.31 VISIT FOR SCREENING MAMMOGRAM: ICD-10-CM

## 2017-09-22 DIAGNOSIS — Z01.818 PREOPERATIVE CLEARANCE: ICD-10-CM

## 2017-09-22 LAB
ANION GAP SERPL CALCULATED.3IONS-SCNC: 13 MEQ/L (ref 8–16)
BASOPHILS # BLD: 1 %
BASOPHILS ABSOLUTE: 0 THOU/MM3 (ref 0–0.1)
BUN BLDV-MCNC: 10 MG/DL (ref 7–22)
CALCIUM SERPL-MCNC: 9.4 MG/DL (ref 8.5–10.5)
CHLORIDE BLD-SCNC: 93 MEQ/L (ref 98–111)
CO2: 25 MEQ/L (ref 23–33)
CREAT SERPL-MCNC: 0.8 MG/DL (ref 0.4–1.2)
EOSINOPHIL # BLD: 2.7 %
EOSINOPHILS ABSOLUTE: 0.1 THOU/MM3 (ref 0–0.4)
GFR SERPL CREATININE-BSD FRML MDRD: 70 ML/MIN/1.73M2
GLUCOSE BLD-MCNC: 124 MG/DL (ref 70–108)
HCT VFR BLD CALC: 38 % (ref 37–47)
HEMOGLOBIN: 12.6 GM/DL (ref 12–16)
LYMPHOCYTES # BLD: 25 %
LYMPHOCYTES ABSOLUTE: 1.2 THOU/MM3 (ref 1–4.8)
MCH RBC QN AUTO: 27.9 PG (ref 27–31)
MCHC RBC AUTO-ENTMCNC: 33.2 GM/DL (ref 33–37)
MCV RBC AUTO: 84 FL (ref 81–99)
MONOCYTES # BLD: 10.3 %
MONOCYTES ABSOLUTE: 0.5 THOU/MM3 (ref 0.4–1.3)
NUCLEATED RED BLOOD CELLS: 0 /100 WBC
PDW BLD-RTO: 13.9 % (ref 11.5–14.5)
PLATELET # BLD: 195 THOU/MM3 (ref 130–400)
PMV BLD AUTO: 7.7 MCM (ref 7.4–10.4)
POTASSIUM SERPL-SCNC: 4.6 MEQ/L (ref 3.5–5.2)
RBC # BLD: 4.53 MILL/MM3 (ref 4.2–5.4)
RBC # BLD: NORMAL 10*6/UL
SEG NEUTROPHILS: 61 %
SEGMENTED NEUTROPHILS ABSOLUTE COUNT: 2.9 THOU/MM3 (ref 1.8–7.7)
SODIUM BLD-SCNC: 131 MEQ/L (ref 135–145)
WBC # BLD: 4.8 THOU/MM3 (ref 4.8–10.8)

## 2017-09-22 PROCEDURE — G0202 SCR MAMMO BI INCL CAD: HCPCS

## 2017-09-22 PROCEDURE — 36415 COLL VENOUS BLD VENIPUNCTURE: CPT

## 2017-09-22 PROCEDURE — 80048 BASIC METABOLIC PNL TOTAL CA: CPT

## 2017-09-22 PROCEDURE — 85025 COMPLETE CBC W/AUTO DIFF WBC: CPT

## 2017-09-22 PROCEDURE — 71020 XR CHEST STANDARD TWO VW: CPT

## 2017-09-25 ENCOUNTER — HOSPITAL ENCOUNTER (OUTPATIENT)
Dept: CT IMAGING | Age: 72
Discharge: HOME | End: 2017-09-25
Payer: MEDICARE

## 2017-09-25 ENCOUNTER — OFFICE VISIT (OUTPATIENT)
Dept: PULMONOLOGY | Age: 72
End: 2017-09-25
Payer: MEDICARE

## 2017-09-25 VITALS
HEART RATE: 84 BPM | HEIGHT: 66 IN | TEMPERATURE: 98 F | BODY MASS INDEX: 36.83 KG/M2 | WEIGHT: 229.2 LBS | SYSTOLIC BLOOD PRESSURE: 155 MMHG | DIASTOLIC BLOOD PRESSURE: 78 MMHG | OXYGEN SATURATION: 94 %

## 2017-09-25 DIAGNOSIS — I10 ESSENTIAL HYPERTENSION: ICD-10-CM

## 2017-09-25 DIAGNOSIS — G47.33 OSA (OBSTRUCTIVE SLEEP APNEA): ICD-10-CM

## 2017-09-25 DIAGNOSIS — J45.40 MODERATE PERSISTENT ASTHMA WITHOUT COMPLICATION: ICD-10-CM

## 2017-09-25 DIAGNOSIS — Z01.818 PRE-OP EVALUATION: ICD-10-CM

## 2017-09-25 DIAGNOSIS — Z87.891 PERSONAL HISTORY OF TOBACCO USE: ICD-10-CM

## 2017-09-25 DIAGNOSIS — I25.10 CORONARY ARTERY DISEASE INVOLVING NATIVE CORONARY ARTERY OF NATIVE HEART WITHOUT ANGINA PECTORIS: ICD-10-CM

## 2017-09-25 DIAGNOSIS — R93.89 ABNORMAL CT SCAN, CHEST: ICD-10-CM

## 2017-09-25 DIAGNOSIS — R91.1 INCIDENTAL LUNG NODULE, > 3MM AND < 8MM: ICD-10-CM

## 2017-09-25 DIAGNOSIS — F32.A DEPRESSION, UNSPECIFIED DEPRESSION TYPE: ICD-10-CM

## 2017-09-25 DIAGNOSIS — R91.8 LUNG NODULES: ICD-10-CM

## 2017-09-25 DIAGNOSIS — Z87.891 PERSONAL HISTORY OF TOBACCO USE: Primary | ICD-10-CM

## 2017-09-25 PROCEDURE — G8427 DOCREV CUR MEDS BY ELIG CLIN: HCPCS | Performed by: INTERNAL MEDICINE

## 2017-09-25 PROCEDURE — 99999 PR VISIT TO DISCUSS LUNG CA SCREEN W LDCT: CPT | Performed by: INTERNAL MEDICINE

## 2017-09-25 PROCEDURE — 1036F TOBACCO NON-USER: CPT | Performed by: INTERNAL MEDICINE

## 2017-09-25 PROCEDURE — 4040F PNEUMOC VAC/ADMIN/RCVD: CPT | Performed by: INTERNAL MEDICINE

## 2017-09-25 PROCEDURE — 99215 OFFICE O/P EST HI 40 MIN: CPT | Performed by: INTERNAL MEDICINE

## 2017-09-25 PROCEDURE — G8417 CALC BMI ABV UP PARAM F/U: HCPCS | Performed by: INTERNAL MEDICINE

## 2017-09-25 PROCEDURE — 1123F ACP DISCUSS/DSCN MKR DOCD: CPT | Performed by: INTERNAL MEDICINE

## 2017-09-25 PROCEDURE — 3017F COLORECTAL CA SCREEN DOC REV: CPT | Performed by: INTERNAL MEDICINE

## 2017-09-25 PROCEDURE — 1090F PRES/ABSN URINE INCON ASSESS: CPT | Performed by: INTERNAL MEDICINE

## 2017-09-25 PROCEDURE — G8598 ASA/ANTIPLAT THER USED: HCPCS | Performed by: INTERNAL MEDICINE

## 2017-09-25 PROCEDURE — 3014F SCREEN MAMMO DOC REV: CPT | Performed by: INTERNAL MEDICINE

## 2017-09-25 PROCEDURE — G8399 PT W/DXA RESULTS DOCUMENT: HCPCS | Performed by: INTERNAL MEDICINE

## 2017-09-25 PROCEDURE — 71250 CT THORAX DX C-: CPT

## 2017-09-26 ENCOUNTER — TELEPHONE (OUTPATIENT)
Dept: FAMILY MEDICINE CLINIC | Age: 72
End: 2017-09-26

## 2017-09-27 RX ORDER — ASPIRIN 81 MG
TABLET, DELAYED RELEASE (ENTERIC COATED) ORAL
Qty: 90 TABLET | Refills: 3 | Status: SHIPPED | OUTPATIENT
Start: 2017-09-27 | End: 2018-01-01 | Stop reason: SDUPTHER

## 2017-09-28 ENCOUNTER — HOSPITAL ENCOUNTER (OUTPATIENT)
Dept: WOMENS IMAGING | Age: 72
Discharge: HOME | End: 2017-09-28
Payer: MEDICARE

## 2017-09-28 DIAGNOSIS — R92.1 BREAST CALCIFICATIONS: ICD-10-CM

## 2017-09-28 PROCEDURE — G0206 DX MAMMO INCL CAD UNI: HCPCS

## 2017-10-09 ENCOUNTER — TELEPHONE (OUTPATIENT)
Dept: FAMILY MEDICINE CLINIC | Age: 72
End: 2017-10-09

## 2017-10-09 NOTE — TELEPHONE ENCOUNTER
Pt called asking to speak to Mariah Beltran CNP immediately. Pt was told Rian is currently seeing pt's & was asked what she needed help with & the pt stated she needed to know ASAP why Rian canceled her surgery. Pt stated Dr Swanson's ofc called & told her [facility] would not clear her for surgery & told them they would need to cancel. Pt is very upset by this & wants to know ASAP why her surgery had to be canceled as she states she really \"needs to have the surgery done. \"  Please advise.

## 2017-10-09 NOTE — TELEPHONE ENCOUNTER
Please let pt know I did NOT cancel her surgery I cleared her medically. Cardiology needed to clear her cardiac wise, did they submit their information to Dr. Brandon Aguilar office? Also Cardiology stated she could not stop her blood thinner for surgery is that why Dr Brandon Aguilar cancelled? I would call Dr. Brandon Aguilar office to verify why it was cancelled. If they are still waiting on cardiac clearance then Cardiology needs contacted. I believe there is confusion on someone's part. I did not cancel her surgery. Thanks!

## 2017-10-10 ENCOUNTER — HOSPITAL ENCOUNTER (OUTPATIENT)
Dept: WOMENS IMAGING | Age: 72
Discharge: HOME | End: 2017-10-10
Payer: MEDICARE

## 2017-10-10 ENCOUNTER — TELEPHONE (OUTPATIENT)
Dept: PHARMACY | Facility: CLINIC | Age: 72
End: 2017-10-10

## 2017-10-10 VITALS
HEART RATE: 97 BPM | SYSTOLIC BLOOD PRESSURE: 156 MMHG | RESPIRATION RATE: 20 BRPM | TEMPERATURE: 98.7 F | DIASTOLIC BLOOD PRESSURE: 69 MMHG

## 2017-10-10 DIAGNOSIS — R92.1 BREAST CALCIFICATIONS: ICD-10-CM

## 2017-10-10 PROCEDURE — A4648 IMPLANTABLE TISSUE MARKER: HCPCS

## 2017-10-10 PROCEDURE — 88305 TISSUE EXAM BY PATHOLOGIST: CPT

## 2017-10-10 PROCEDURE — 19081 BX BREAST 1ST LESION STRTCTC: CPT

## 2017-10-10 PROCEDURE — G0206 DX MAMMO INCL CAD UNI: HCPCS

## 2017-10-10 PROCEDURE — 2720000010 HC SURG SUPPLY STERILE

## 2017-10-10 ASSESSMENT — PAIN DESCRIPTION - ORIENTATION: ORIENTATION: POSTERIOR

## 2017-10-10 ASSESSMENT — PAIN DESCRIPTION - PAIN TYPE: TYPE: ACUTE PAIN

## 2017-10-10 ASSESSMENT — PAIN SCALES - GENERAL: PAINLEVEL_OUTOF10: 8

## 2017-10-10 ASSESSMENT — PAIN DESCRIPTION - DESCRIPTORS: DESCRIPTORS: CONSTANT

## 2017-10-10 ASSESSMENT — PAIN DESCRIPTION - LOCATION: LOCATION: NECK

## 2017-10-10 NOTE — PROGRESS NOTES
Breast Biopsy Flowsheet/Post-Operative Care    Date of Procedure: 10/10/2017  Physician: Dr. Lionel Childress  Technologist: MARI Mclean RT (R)(M)    Biopsy:stereotactic breast biopsy  Lesion type: [] Palpable     [x] Non-palpable  Breast: right    Clock face position: Site #1:  Lower outer, Anterior depth       Primary Method of Detection: [] Palpation    [x] Mammogram    [] Ultrasound   [] Mass:      [x] Microcalcifications:   [] Pleomorphic   [] Increasing Number   Distribution:  [x] Grouped [] Linear [] Regional    Asymmetry: n/a    Biopsy Method:   Mammotome:    Site #1     Gauge:  10     # of Passes: 6     Clip:   [] \"Ribbon\"   [] \"O\"     [] \"M\"      [] \"X\"      [x] \"tribell\"       [] \"Bowtie\"  [] \"U\"        Pre-Op Assessment: (BI-RADS)   [] 3. Probably Benign   [x] 4. Suspicious Abnormality   [] 5. Highly Suggestive of Malignancy      Patient Tolerated Procedure: good  Complications: none  Comments: none    Post Operative Care  Steri strips: Yes  Dressing: [] Pressure Dressing   [x] Gauze. Tape   Ice Applied to Site:  Yes  Evidence of Bleeding:  No    Pain Verbalized: No      Written Discharge Instructions: Yes  Condition at Discharge: good  Time of Discharge: 1400    Electronically signed by Caitie Jade RN on 10/10/2017 at 4:38 PM

## 2017-10-10 NOTE — TELEPHONE ENCOUNTER
Spoke with Prasad Tierney @ Dr Swanson's office she states pt stopped the Plavix herself. Dr Jericho Mccormick was informed and went ahead and cleared the pt for surgery. Pt's surgery was cancelled but they had a cancellation  Her surgery is scheduled for Friday now.  Prasad Tierney is faxing the clearance form from Dr Gio Vasquez office

## 2017-10-10 NOTE — TELEPHONE ENCOUNTER
CLINICAL PHARMACY NOTE - Medication Review  Patient outreach to review medications - Spoke with patient. SUBJECTIVE/OBJECTIVE:   Jimbo Starks is a 66 y.o. female referred to a clinical pharmacy specialist given their history of COPD and/or HF and number of home medications. Jonathan Bernabe declines PharmD medication review at this time. She states that a nurse comes to he house and sets up her medications for her. She denies any questions or concerns at this time and states that she will bring her current medication list to her next appointment with Panchito Li. No further outreach planned by PharmD at this time.     Sakshi Howard, AriellaD, [address], [facility]  Direct: [phone]  Department, toll free: [phone], option 7   =========================================  For Pharmacy Admin Tracking Only    PHSO: Yes  Time Spent (min): 5

## 2017-10-10 NOTE — PROGRESS NOTES
[facility] [address]  Pre-Biopsy Assessment      Patient Education    Written information about procedure Yes   [] Left        [x] Right       [] Bilateral   Procedural steps explained Yes   [x] Stereotactic Biopsy      [] Ultrasound Biopsy   Post-op potential: bruising, hematoma, pain Yes    Self-care: activity, care of dressing Yes    Patient verbalized understanding Yes    Consent signed and witnessed Yes      Hormone Therapy Status: none    Recent Medication: [x] Aspirin [] Ibuprofen  [] Coumadin [x] plavix   Last Dose: 10/8/2017    Emotional Status: [] Calm   [x] Nervous   [] Emotionally Upset    Language or Physical Barriers: none    Comments: none        Electronically signed by Alexandra Howard RN on 10/10/2017 at 4:37 PM

## 2017-10-12 ENCOUNTER — HOSPITAL ENCOUNTER (OUTPATIENT)
Age: 72
Discharge: HOME | End: 2017-10-12
Payer: MEDICARE

## 2017-10-12 LAB
AVERAGE GLUCOSE: 132 MG/DL (ref 70–126)
HBA1C MFR BLD: 6.4 % (ref 4.4–6.4)

## 2017-10-12 PROCEDURE — 36415 COLL VENOUS BLD VENIPUNCTURE: CPT

## 2017-10-12 PROCEDURE — 83036 HEMOGLOBIN GLYCOSYLATED A1C: CPT

## 2017-10-13 ENCOUNTER — TELEPHONE (OUTPATIENT)
Dept: CARDIOLOGY CLINIC | Age: 72
End: 2017-10-13

## 2017-10-13 NOTE — TELEPHONE ENCOUNTER
Call from Dr. Eric Levy office patient was in for surgery BP was 216/102, surgery cancelled. Called patient said she restarted her Plavix. Retook BP at home 192/95, feels okay no symtoms. Patient instructed to go to ER if symtoms or BP is higher. Any changes?

## 2017-10-18 ENCOUNTER — OFFICE VISIT (OUTPATIENT)
Dept: PULMONOLOGY | Age: 72
End: 2017-10-18
Payer: MEDICARE

## 2017-10-18 VITALS
DIASTOLIC BLOOD PRESSURE: 74 MMHG | OXYGEN SATURATION: 96 % | HEIGHT: 66 IN | HEART RATE: 96 BPM | WEIGHT: 230 LBS | BODY MASS INDEX: 36.96 KG/M2 | RESPIRATION RATE: 20 BRPM | SYSTOLIC BLOOD PRESSURE: 162 MMHG

## 2017-10-18 DIAGNOSIS — J41.0 SIMPLE CHRONIC BRONCHITIS (HCC): ICD-10-CM

## 2017-10-18 DIAGNOSIS — G47.33 OSA ON CPAP: Primary | ICD-10-CM

## 2017-10-18 DIAGNOSIS — Z99.89 OSA ON CPAP: Primary | ICD-10-CM

## 2017-10-18 PROCEDURE — G8598 ASA/ANTIPLAT THER USED: HCPCS | Performed by: PHYSICIAN ASSISTANT

## 2017-10-18 PROCEDURE — G8484 FLU IMMUNIZE NO ADMIN: HCPCS | Performed by: PHYSICIAN ASSISTANT

## 2017-10-18 PROCEDURE — 3023F SPIROM DOC REV: CPT | Performed by: PHYSICIAN ASSISTANT

## 2017-10-18 PROCEDURE — G8427 DOCREV CUR MEDS BY ELIG CLIN: HCPCS | Performed by: PHYSICIAN ASSISTANT

## 2017-10-18 PROCEDURE — 1090F PRES/ABSN URINE INCON ASSESS: CPT | Performed by: PHYSICIAN ASSISTANT

## 2017-10-18 PROCEDURE — G8399 PT W/DXA RESULTS DOCUMENT: HCPCS | Performed by: PHYSICIAN ASSISTANT

## 2017-10-18 PROCEDURE — 1036F TOBACCO NON-USER: CPT | Performed by: PHYSICIAN ASSISTANT

## 2017-10-18 PROCEDURE — 3017F COLORECTAL CA SCREEN DOC REV: CPT | Performed by: PHYSICIAN ASSISTANT

## 2017-10-18 PROCEDURE — 1123F ACP DISCUSS/DSCN MKR DOCD: CPT | Performed by: PHYSICIAN ASSISTANT

## 2017-10-18 PROCEDURE — 99213 OFFICE O/P EST LOW 20 MIN: CPT | Performed by: PHYSICIAN ASSISTANT

## 2017-10-18 PROCEDURE — G8417 CALC BMI ABV UP PARAM F/U: HCPCS | Performed by: PHYSICIAN ASSISTANT

## 2017-10-18 PROCEDURE — 3014F SCREEN MAMMO DOC REV: CPT | Performed by: PHYSICIAN ASSISTANT

## 2017-10-18 PROCEDURE — 4040F PNEUMOC VAC/ADMIN/RCVD: CPT | Performed by: PHYSICIAN ASSISTANT

## 2017-10-18 PROCEDURE — G8926 SPIRO NO PERF OR DOC: HCPCS | Performed by: PHYSICIAN ASSISTANT

## 2017-10-18 NOTE — PROGRESS NOTES
(coronary artery disease)     Chest pain     COPD (chronic obstructive pulmonary disease) (HCC)     Depression     DM (diabetes mellitus) ([facility] [address]     Dyspnea     FH: CAD (coronary artery disease)     GERD (gastroesophageal reflux disease)     Heart burn     History of tobacco abuse: Quit in 2009 10/28/2014    HLD (hyperlipidemia)     HTN (hypertension)     Irritable bowel syndrome     States has not had problem with this for years    Liver disease     fatty liver    Metabolic syndrome 12/60/9266    MI (myocardial infarction)     Nausea & vomiting     Obesity     CHET (obstructive sleep apnea)     S/P cardiac catheterization: 11/6/2014: 99% in-stent restenosis mid-RCA. LCx moderate LI's. LAD 85% mid-segment lesion. 11/6/2014 11/6/2014: 99% in-stent restenosis mid-RCA. LCx moderate LI's. LAD 85% mid-segment lesion. Dr. Ej Gomez S/P PTCA:  5/11/2004: Proximal and mid RCA  Taxus 3.5 X 20 mm, and Taxus 3.0 X 32 mm. 10/28/2014    5/11/2004: Proximal and mid RCA  Taxus 3.5 X 20 mm, and Taxus 3.0 X 32 mm. Dr. Rachel Jacome Systolic murmur 71/93/2694    Thyroid disease        Past Surgical History:   Procedure Laterality Date    BACK SURGERY  08/2016        BLADDER SUSPENSION      BREAST BIOPSY  10/10/2017    BREAST LUMPECTOMY      CARDIAC CATHETERIZATION  8-11-06    Mild nonobstructive CAD w/ diffuse 10-20% proximal and mid LAD stenosis and 10-20% proximal/ostial RCA stenosis. No obstructive lesions. RCA stents are patent w/o evidence of restenosis. Normal LV systolic function. EF 65%. Trace MR - catheter induced. Minimally elevated LVEDP - 13mmHg. Mild to moderate aortoiliac PVD w/o obstructive lesions.  CARDIAC CATHETERIZATION  5-11-04    Successful drug-eluting stent x 2 of proximal and mid RCA.  CARDIAC CATHETERIZATION  5-11-04    70-80% proximal RCA stenosis w/ 50-70% mid RCA stenosis. There is 50-60% lesion in mid PDA. Mild proximal and mid LAD disease. Mild circumflex disease. 500-200 MG-UNIT TABS take 1 tablet twice a day 60 tablet 5    budesonide-formoterol (SYMBICORT) 160-4.5 MCG/ACT AERO Inhale 2 puffs into the lungs 2 times daily 1 Inhaler 11    SINGULAIR 10 MG tablet Take 1 tablet by mouth nightly 30 tablet 11    glucose blood VI test strips (ONE TOUCH ULTRA TEST) strip TEST once daily Dx. Code E11.9 100 each 11    OXYGEN Inhale 2 L into the lungs nightly      doxepin (SINEQUAN) 100 MG capsule Take 150 mg by mouth nightly      OXcarbazepine (TRILEPTAL) 150 MG tablet Take 150 mg by mouth 2 times daily One tab q am. And two tabs q pm      nitroGLYCERIN (NITRODUR) 0.2 MG/HR apply 1 patch once daily (Patient taking differently: apply 1 patch once daily, off at hs) 90 patch 1    nitroGLYCERIN (NITROSTAT) 0.4 MG SL tablet Place 1 tablet under the tongue every 5 minutes as needed for Chest pain 25 tablet 3    HYDROcodone-acetaminophen (NORCO) 5-325 MG per tablet Take 1 tablet by mouth every 6 hours as needed for Pain .  polyethylene glycol (GLYCOLAX) powder take 17GM (DISSOLVED IN WATER) by mouth once daily 1 Bottle 2    albuterol sulfate HFA (PROAIR HFA) 108 (90 BASE) MCG/ACT inhaler inhale 2 puffs by mouth every 6 hours if needed for wheezing 1 Inhaler 2    docusate sodium (COLACE) 100 MG capsule Take 100 mg by mouth daily One to two capsules once daily      citalopram (CELEXA) 40 MG tablet Take 40 mg by mouth daily.  polyvinyl alcohol-povidone (HYPOTEARS) 1.4-0.6 % ophthalmic solution 1-2 drops 2 times daily as needed.  ONE TOUCH ULTRASOFT LANCETS MISC use as directed BEFORE BREAKFAST AND SUPPER 100 each 11    pantoprazole (PROTONIX) 40 MG tablet Take 40 mg by mouth daily       risperiDONE (RISPERDAL) 1 MG tablet Take 1 mg by mouth 2 times daily. No current facility-administered medications for this visit.         Family History   Problem Relation Age of Onset    Heart Disease Mother     Heart Disease Father     Kidney Disease Sister     Cancer Sister     Heart Disease Brother     Heart Disease Brother     Heart Disease Brother     Breast Cancer Child 36    Cancer Sister 36     rectal    Ovarian Cancer Other 25        Review of Systems -   General ROS: stable / unchanged  ENT ROS: negative for - nasal congestion, oral lesions or sore throat  Hematological and Lymphatic ROS: negative  Endocrine ROS: negative  Respiratory ROS: no cough, shortness of breath, or wheezing  Cardiovascular ROS: no chest pain or dyspnea on exertion  Gastrointestinal ROS: no abdominal pain, change in bowel habits, or black or bloody stools  Musculoskeletal ROS: negative  Neurological ROS: negative    Physical Exam:    BMI:  Body mass index is 37.12 kg/m². Wt Readings from Last 3 Encounters:   10/18/17 230 lb (104.3 kg)   09/25/17 229 lb 3.2 oz (104 kg)   09/21/17 226 lb (102.5 kg)     Vitals: BP (!) 162/74   Pulse 96   Resp 20   Ht 5' 6\" (1.676 m)   Wt 230 lb (104.3 kg)   SpO2 96% Comment: R/A at rest  BMI 37.12 kg/m²       General appearance: alert and oriented to person, place and time, well-developed and well-nourished, in no acute distress  Nose: Nares normal. Septum midline. Mucosa normal. No drainage or sinus tenderness. Oropharynx:  negative  Lungs: clear to auscultation bilaterally  Heart: regular rate and rhythm, S1, S2 normal, no murmur, click, rub or gallop  Extremities: extremities normal, atraumatic, no cyanosis or edema  Neurologic: Mental status: Alert, oriented, thought content appropriate      ASSESSMENT/DIAGNOSIS    1. CHET on CPAP  Pulse oximetry, overnight   2. Simple chronic bronchitis (HCC)  Pulse oximetry, overnight            Plan   - she is going to return PAP AMA  - Will get nocturnal pulse ox to see if qualifies for O2  - She call my office for earlier appointment if needed for worsening of sleep symptoms.   - She was instructed on weight loss  - Dunia Dove was educated about my impression and plan. Patient verbalizes understanding.   We will see

## 2017-10-19 ENCOUNTER — HOSPITAL ENCOUNTER (OUTPATIENT)
Dept: RESPIRATORY THERAPY | Age: 72
Discharge: HOME | End: 2017-10-19
Payer: MEDICARE

## 2017-10-19 PROCEDURE — 94762 N-INVAS EAR/PLS OXIMTRY CONT: CPT

## 2017-10-19 RX ORDER — HYDRALAZINE HYDROCHLORIDE 50 MG/1
50 TABLET, FILM COATED ORAL 2 TIMES DAILY PRN
COMMUNITY
End: 2017-10-19 | Stop reason: SDUPTHER

## 2017-10-19 RX ORDER — HYDRALAZINE HYDROCHLORIDE 50 MG/1
TABLET, FILM COATED ORAL
Qty: 60 TABLET | Refills: 1 | Status: SHIPPED | OUTPATIENT
Start: 2017-10-19 | End: 2017-12-11 | Stop reason: SDUPTHER

## 2017-10-19 NOTE — PROGRESS NOTES
Instructions were given for an overnight nocturnal pulse oximetry study. The serial number of the pulse oximetry was [id number]. A log sheet was completed. Patient was instructed on documenting any events that occurred throughout the night on the log sheet. The procedure was explained to the learner(s). Patient understanding of the procedure was good. Patient does have a mean of transportation to bring back the study the next day. A patient task was placed in the patients chart for the  to download the nocturnal study and fax the results to the ordering provider for interpretation. The pulse oximetrys memory was cleared. Patient had no questions and was sent home with the pulse oximeter.

## 2017-10-23 ENCOUNTER — TELEPHONE (OUTPATIENT)
Dept: PULMONOLOGY | Age: 72
End: 2017-10-23

## 2017-10-23 DIAGNOSIS — G47.34 NOCTURNAL HYPOXEMIA: ICD-10-CM

## 2017-10-23 DIAGNOSIS — Z99.89 OSA ON CPAP: Primary | ICD-10-CM

## 2017-10-23 DIAGNOSIS — G47.33 OSA ON CPAP: Primary | ICD-10-CM

## 2017-10-23 NOTE — TELEPHONE ENCOUNTER
Her nocturnal pulse ox shows significant nocturnal hypoxemia.   Will arrange for home O2 at night at 2 lnc  Will repeat nocturnal pulse ox study in 1 month on 2lnc

## 2017-11-02 ENCOUNTER — TELEPHONE (OUTPATIENT)
Dept: FAMILY MEDICINE CLINIC | Age: 72
End: 2017-11-02

## 2017-11-02 ENCOUNTER — HOSPITAL ENCOUNTER (OUTPATIENT)
Dept: PHARMACY | Age: 72
Setting detail: THERAPIES SERIES
Discharge: HOME | End: 2017-11-02
Payer: MEDICARE

## 2017-11-02 VITALS — HEIGHT: 66 IN | BODY MASS INDEX: 35.84 KG/M2 | WEIGHT: 223 LBS

## 2017-11-02 PROCEDURE — 97803 MED NUTRITION INDIV SUBSEQ: CPT | Performed by: DIETITIAN, REGISTERED

## 2017-11-02 NOTE — PROGRESS NOTES
daily 90 tablet 1    lisinopril (PRINIVIL;ZESTRIL) 20 MG tablet Take 1 tablet by mouth 2 times daily 180 tablet 1    metoprolol tartrate (LOPRESSOR) 50 MG tablet take 1 tablet by mouth twice a day 180 tablet 1    ranolazine (RANEXA) 500 MG extended release tablet take 1 tablet by mouth twice a day 180 tablet 1    rosuvastatin (CRESTOR) 20 MG tablet 1 po once day 90 tablet 1    nystatin (MYCOSTATIN) 195813 UNIT/GM cream Apply topically 2 times daily. 60 g 2    levothyroxine (SYNTHROID) 100 MCG tablet take 1 tablet by mouth once daily 90 tablet 1    benzocaine (ANBESOL) 10 % mucosal gel Take by mouth as needed. 9 g 1    ASPIRIN LOW DOSE 81 MG EC tablet take 1 tablet by mouth once daily 30 tablet 11    Calcium Carbonate-Vitamin D (OYSTER SHELL CALCIUM/D) 500-200 MG-UNIT TABS take 1 tablet twice a day 60 tablet 5    budesonide-formoterol (SYMBICORT) 160-4.5 MCG/ACT AERO Inhale 2 puffs into the lungs 2 times daily 1 Inhaler 11    SINGULAIR 10 MG tablet Take 1 tablet by mouth nightly 30 tablet 11    glucose blood VI test strips (ONE TOUCH ULTRA TEST) strip TEST once daily Dx. Code E11.9 100 each 11    OXYGEN Inhale 2 L into the lungs nightly      doxepin (SINEQUAN) 100 MG capsule Take 150 mg by mouth nightly      OXcarbazepine (TRILEPTAL) 150 MG tablet Take 150 mg by mouth 2 times daily One tab q am. And two tabs q pm      nitroGLYCERIN (NITRODUR) 0.2 MG/HR apply 1 patch once daily (Patient taking differently: apply 1 patch once daily, off at hs) 90 patch 1    nitroGLYCERIN (NITROSTAT) 0.4 MG SL tablet Place 1 tablet under the tongue every 5 minutes as needed for Chest pain 25 tablet 3    HYDROcodone-acetaminophen (NORCO) 5-325 MG per tablet Take 1 tablet by mouth every 6 hours as needed for Pain .       polyethylene glycol (GLYCOLAX) powder take 17GM (DISSOLVED IN WATER) by mouth once daily 1 Bottle 2    albuterol sulfate HFA (PROAIR HFA) 108 (90 BASE) MCG/ACT inhaler inhale 2 puffs by mouth every 6 hours if needed for wheezing 1 Inhaler 2    docusate sodium (COLACE) 100 MG capsule Take 100 mg by mouth daily One to two capsules once daily      citalopram (CELEXA) 40 MG tablet Take 40 mg by mouth daily.  polyvinyl alcohol-povidone (HYPOTEARS) 1.4-0.6 % ophthalmic solution 1-2 drops 2 times daily as needed.  ONE TOUCH ULTRASOFT LANCETS MISC use as directed BEFORE BREAKFAST AND SUPPER 100 each 11    pantoprazole (PROTONIX) 40 MG tablet Take 40 mg by mouth daily       risperiDONE (RISPERDAL) 1 MG tablet Take 1 mg by mouth 2 times daily. No current facility-administered medications on file prior to encounter. Vitals from current and previous visits:  5'6\"  Wt. 223#   -Body mass index is 35.99 kg/m². 35-39.9 - Obesity Grade II.   - Patient maintained.  -Weight goal: lose weight. Nutrition Diagnosis:   Limited adherence to nutrition-related recommendations related to cognitive limitations and currently undergoing MNT as evidenced by need for reinforcement of healthy meal planning guidelines. Intervention:  -Impression: Pt. Explained that she needs neck sugery and is scheduled on Nov. 15th. Of this month. Encouraged healthy low salt eating, eat more veggies and add fruit to diet for better nutritional status and helps with healing. Pt expressed that her doctor put her on lisinopril for her BP and   -Instructed the patient on: Meal Planning for Regular, Balanced Meals & Snacks, Plate Method, low sodium    -Handouts given for: simplified low sodium ho., 1500 calorie 5 day sample menus, spring clean your kitchen - showing a visual of a refrigerator of fresh fruits and veggies. Patient Instructions/goals:  1.)  If you have orange juice - only drink a 1/2 cup and add water or ice.  2.)  Show your aide the sample menus so she prepares lower calorie, lower sodium meals.   Example - have plain whole wheat macaroni and add a little shredded cheese instead of making high salt boxed mac

## 2017-11-02 NOTE — COMMUNICATION BODY
Needs reinforcement.  -Readiness to change: precontemplative- avoiding luncheables, and eating more cooked veggies with meals, having a healthy brkf (brkf ideas on the sample menus given). -Expected compliance: fair. Thank you for your referral of this patient.

## 2017-11-02 NOTE — TELEPHONE ENCOUNTER
Patient was cleared for surgery by dr Alisson Hill but the surgery was cancelled due to high bp. Surgery is r/s for nov 15. Will autumn still clear or does need new appt? Please advise anibal at [facility]. Thanks!

## 2017-11-02 NOTE — TELEPHONE ENCOUNTER
A friend answered and will have pt call us when she gets home. Form out for dr. Nely Balderas to sign.

## 2017-11-08 ENCOUNTER — TELEPHONE (OUTPATIENT)
Dept: PULMONOLOGY | Age: 72
End: 2017-11-08

## 2017-11-08 ENCOUNTER — TELEPHONE (OUTPATIENT)
Dept: FAMILY MEDICINE CLINIC | Age: 72
End: 2017-11-08

## 2017-11-08 NOTE — LETTER
[address]  [address] [address]  Phone: [phone]  Fax: [phone]    Trina Davila CNP        November 8, 2017     Patient: Eliazar Reyes   YOB: 1945   Date of Visit: 11/8/2017       To Whom It May Concern: It is my medical opinion that Minh Daniels is OK to proceed with her surgery,  with Dr. Steph Warren, on 11/15/17. .    If you have any questions or concerns, please don't hesitate to call.     Sincerely,        Trina Davila CNP

## 2017-11-09 ENCOUNTER — TELEPHONE (OUTPATIENT)
Dept: FAMILY MEDICINE CLINIC | Age: 72
End: 2017-11-09

## 2017-11-09 DIAGNOSIS — E11.69 TYPE 2 DIABETES MELLITUS WITH OTHER SPECIFIED COMPLICATION, WITHOUT LONG-TERM CURRENT USE OF INSULIN (HCC): Primary | ICD-10-CM

## 2017-11-09 RX ORDER — LANCETS 30 GAUGE
EACH MISCELLANEOUS
Qty: 100 EACH | Refills: 2 | Status: SHIPPED | OUTPATIENT
Start: 2017-11-09

## 2017-11-09 NOTE — TELEPHONE ENCOUNTER
Andres See calls stating her diabetic machine is extremely old and is \"acting up\" a lot lately. It is not giving true readings any longer. (She does not know the name of this machine). She is asking if Rian could prescribe a new one and the supplies for it? Please advise. She currently gets supplies at [facility] on [address].     DOLV  9/21/17

## 2017-11-13 ENCOUNTER — TELEPHONE (OUTPATIENT)
Dept: FAMILY MEDICINE CLINIC | Age: 72
End: 2017-11-13

## 2017-11-15 ENCOUNTER — APPOINTMENT (OUTPATIENT)
Dept: GENERAL RADIOLOGY | Age: 72
DRG: 471 | End: 2017-11-15
Attending: ORTHOPAEDIC SURGERY
Payer: MEDICARE

## 2017-11-15 ENCOUNTER — ANESTHESIA EVENT (OUTPATIENT)
Dept: OPERATING ROOM | Age: 72
DRG: 471 | End: 2017-11-15
Payer: MEDICARE

## 2017-11-15 ENCOUNTER — HOSPITAL ENCOUNTER (INPATIENT)
Age: 72
LOS: 6 days | Discharge: HOME HEALTH | DRG: 471 | End: 2017-11-21
Attending: ORTHOPAEDIC SURGERY | Admitting: ORTHOPAEDIC SURGERY
Payer: MEDICARE

## 2017-11-15 ENCOUNTER — ANESTHESIA (OUTPATIENT)
Dept: OPERATING ROOM | Age: 72
DRG: 471 | End: 2017-11-15
Payer: MEDICARE

## 2017-11-15 VITALS — TEMPERATURE: 99.3 F | DIASTOLIC BLOOD PRESSURE: 76 MMHG | SYSTOLIC BLOOD PRESSURE: 165 MMHG | OXYGEN SATURATION: 87 %

## 2017-11-15 DIAGNOSIS — E88.81 METABOLIC SYNDROME: ICD-10-CM

## 2017-11-15 DIAGNOSIS — D64.9 ANEMIA, UNSPECIFIED TYPE: Primary | ICD-10-CM

## 2017-11-15 PROBLEM — M48.02 CERVICAL SPINAL STENOSIS: Status: ACTIVE | Noted: 2017-11-15

## 2017-11-15 PROBLEM — M50.10 HERNIATION OF CERVICAL INTERVERTEBRAL DISC WITH RADICULOPATHY: Status: ACTIVE | Noted: 2017-11-15

## 2017-11-15 LAB
ABO: NORMAL
ALBUMIN SERPL-MCNC: 3.8 G/DL (ref 3.5–5.1)
ALBUMIN SERPL-MCNC: 4.2 G/DL (ref 3.5–5.1)
ALP BLD-CCNC: 50 U/L (ref 38–126)
ALP BLD-CCNC: 58 U/L (ref 38–126)
ALT SERPL-CCNC: 85 U/L (ref 11–66)
ALT SERPL-CCNC: 99 U/L (ref 11–66)
ANION GAP SERPL CALCULATED.3IONS-SCNC: 13 MEQ/L (ref 8–16)
ANION GAP SERPL CALCULATED.3IONS-SCNC: 15 MEQ/L (ref 8–16)
ANTIBODY SCREEN: NORMAL
AST SERPL-CCNC: 122 U/L (ref 5–40)
AST SERPL-CCNC: 136 U/L (ref 5–40)
AVERAGE GLUCOSE: 141 MG/DL (ref 70–126)
BILIRUB SERPL-MCNC: 0.2 MG/DL (ref 0.3–1.2)
BILIRUB SERPL-MCNC: 0.2 MG/DL (ref 0.3–1.2)
BUN BLDV-MCNC: 10 MG/DL (ref 7–22)
BUN BLDV-MCNC: 10 MG/DL (ref 7–22)
CALCIUM SERPL-MCNC: 8.3 MG/DL (ref 8.5–10.5)
CALCIUM SERPL-MCNC: 8.6 MG/DL (ref 8.5–10.5)
CHLORIDE BLD-SCNC: 89 MEQ/L (ref 98–111)
CHLORIDE BLD-SCNC: 92 MEQ/L (ref 98–111)
CO2: 23 MEQ/L (ref 23–33)
CO2: 26 MEQ/L (ref 23–33)
CREAT SERPL-MCNC: 0.8 MG/DL (ref 0.4–1.2)
CREAT SERPL-MCNC: 0.8 MG/DL (ref 0.4–1.2)
FOLATE: > 20 NG/ML (ref 4.8–24.2)
GFR SERPL CREATININE-BSD FRML MDRD: 70 ML/MIN/1.73M2
GFR SERPL CREATININE-BSD FRML MDRD: 70 ML/MIN/1.73M2
GLUCOSE BLD-MCNC: 142 MG/DL (ref 70–108)
GLUCOSE BLD-MCNC: 155 MG/DL (ref 70–108)
GLUCOSE BLD-MCNC: 159 MG/DL (ref 70–108)
GLUCOSE BLD-MCNC: 169 MG/DL (ref 70–108)
HBA1C MFR BLD: 6.7 % (ref 4.4–6.4)
HCT VFR BLD CALC: 36 % (ref 37–47)
HEMOGLOBIN: 11.7 GM/DL (ref 12–16)
MCH RBC QN AUTO: 27.5 PG (ref 27–31)
MCHC RBC AUTO-ENTMCNC: 32.5 GM/DL (ref 33–37)
MCV RBC AUTO: 84.5 FL (ref 81–99)
OSMOLALITY: 280 MOSMOL/KG (ref 275–295)
PDW BLD-RTO: 14.4 % (ref 11.5–14.5)
PLATELET # BLD: 203 THOU/MM3 (ref 130–400)
PMV BLD AUTO: 8.2 MCM (ref 7.4–10.4)
POTASSIUM SERPL-SCNC: 4.2 MEQ/L (ref 3.5–5.2)
POTASSIUM SERPL-SCNC: 4.6 MEQ/L (ref 3.5–5.2)
POTASSIUM SERPL-SCNC: 4.7 MEQ/L (ref 3.5–5.2)
RBC # BLD: 4.26 MILL/MM3 (ref 4.2–5.4)
RH FACTOR: NORMAL
SODIUM BLD-SCNC: 127 MEQ/L (ref 135–145)
SODIUM BLD-SCNC: 131 MEQ/L (ref 135–145)
T4 FREE: 0.88 NG/DL (ref 0.93–1.76)
TOTAL PROTEIN: 6.2 G/DL (ref 6.1–8)
TOTAL PROTEIN: 6.8 G/DL (ref 6.1–8)
TSH SERPL DL<=0.05 MIU/L-ACNC: 10.02 UIU/ML (ref 0.4–4.2)
VITAMIN B-12: 410 PG/ML (ref 211–911)
WBC # BLD: 7.5 THOU/MM3 (ref 4.8–10.8)

## 2017-11-15 PROCEDURE — 86900 BLOOD TYPING SEROLOGIC ABO: CPT

## 2017-11-15 PROCEDURE — 2720000010 HC SURG SUPPLY STERILE: Performed by: ORTHOPAEDIC SURGERY

## 2017-11-15 PROCEDURE — 86901 BLOOD TYPING SEROLOGIC RH(D): CPT

## 2017-11-15 PROCEDURE — 0RT30ZZ RESECTION OF CERVICAL VERTEBRAL DISC, OPEN APPROACH: ICD-10-PCS | Performed by: ORTHOPAEDIC SURGERY

## 2017-11-15 PROCEDURE — 2580000003 HC RX 258: Performed by: ORTHOPAEDIC SURGERY

## 2017-11-15 PROCEDURE — 3600000015 HC SURGERY LEVEL 5 ADDTL 15MIN: Performed by: ORTHOPAEDIC SURGERY

## 2017-11-15 PROCEDURE — 6370000000 HC RX 637 (ALT 250 FOR IP): Performed by: ORTHOPAEDIC SURGERY

## 2017-11-15 PROCEDURE — 6360000002 HC RX W HCPCS: Performed by: ANESTHESIOLOGY

## 2017-11-15 PROCEDURE — 3700000001 HC ADD 15 MINUTES (ANESTHESIA): Performed by: ORTHOPAEDIC SURGERY

## 2017-11-15 PROCEDURE — 72020 X-RAY EXAM OF SPINE 1 VIEW: CPT

## 2017-11-15 PROCEDURE — 2580000003 HC RX 258: Performed by: PHYSICIAN ASSISTANT

## 2017-11-15 PROCEDURE — 3700000000 HC ANESTHESIA ATTENDED CARE: Performed by: ORTHOPAEDIC SURGERY

## 2017-11-15 PROCEDURE — 2500000003 HC RX 250 WO HCPCS: Performed by: NURSE ANESTHETIST, CERTIFIED REGISTERED

## 2017-11-15 PROCEDURE — 6360000002 HC RX W HCPCS: Performed by: ORTHOPAEDIC SURGERY

## 2017-11-15 PROCEDURE — 6360000002 HC RX W HCPCS: Performed by: NURSE ANESTHETIST, CERTIFIED REGISTERED

## 2017-11-15 PROCEDURE — 1210000002 HC MED SURG R&B - NEUROSCIENCE

## 2017-11-15 PROCEDURE — 82746 ASSAY OF FOLIC ACID SERUM: CPT

## 2017-11-15 PROCEDURE — 86923 COMPATIBILITY TEST ELECTRIC: CPT

## 2017-11-15 PROCEDURE — 94660 CPAP INITIATION&MGMT: CPT

## 2017-11-15 PROCEDURE — C1713 ANCHOR/SCREW BN/BN,TIS/BN: HCPCS | Performed by: ORTHOPAEDIC SURGERY

## 2017-11-15 PROCEDURE — 84132 ASSAY OF SERUM POTASSIUM: CPT

## 2017-11-15 PROCEDURE — 2700000000 HC OXYGEN THERAPY PER DAY

## 2017-11-15 PROCEDURE — A6258 TRANSPARENT FILM >16<=48 IN: HCPCS | Performed by: ORTHOPAEDIC SURGERY

## 2017-11-15 PROCEDURE — C9359 IMPLNT,BON VOID FILLER-PUTTY: HCPCS | Performed by: ORTHOPAEDIC SURGERY

## 2017-11-15 PROCEDURE — 95940 IONM IN OPERATNG ROOM 15 MIN: CPT | Performed by: ORTHOPAEDIC SURGERY

## 2017-11-15 PROCEDURE — 2500000003 HC RX 250 WO HCPCS

## 2017-11-15 PROCEDURE — 83036 HEMOGLOBIN GLYCOSYLATED A1C: CPT

## 2017-11-15 PROCEDURE — 99232 SBSQ HOSP IP/OBS MODERATE 35: CPT | Performed by: HOSPITALIST

## 2017-11-15 PROCEDURE — A4450 NON-WATERPROOF TAPE: HCPCS | Performed by: ORTHOPAEDIC SURGERY

## 2017-11-15 PROCEDURE — 82948 REAGENT STRIP/BLOOD GLUCOSE: CPT

## 2017-11-15 PROCEDURE — 83930 ASSAY OF BLOOD OSMOLALITY: CPT

## 2017-11-15 PROCEDURE — 84443 ASSAY THYROID STIM HORMONE: CPT

## 2017-11-15 PROCEDURE — 2500000003 HC RX 250 WO HCPCS: Performed by: ORTHOPAEDIC SURGERY

## 2017-11-15 PROCEDURE — 85027 COMPLETE CBC AUTOMATED: CPT

## 2017-11-15 PROCEDURE — 0PP304Z REMOVAL OF INTERNAL FIXATION DEVICE FROM CERVICAL VERTEBRA, OPEN APPROACH: ICD-10-PCS | Performed by: ORTHOPAEDIC SURGERY

## 2017-11-15 PROCEDURE — 84439 ASSAY OF FREE THYROXINE: CPT

## 2017-11-15 PROCEDURE — 0RG20K0 FUSION OF 2 OR MORE CERVICAL VERTEBRAL JOINTS WITH NONAUTOLOGOUS TISSUE SUBSTITUTE, ANTERIOR APPROACH, ANTERIOR COLUMN, OPEN APPROACH: ICD-10-PCS | Performed by: ORTHOPAEDIC SURGERY

## 2017-11-15 PROCEDURE — 0RG20A0 FUSION OF 2 OR MORE CERVICAL VERTEBRAL JOINTS WITH INTERBODY FUSION DEVICE, ANTERIOR APPROACH, ANTERIOR COLUMN, OPEN APPROACH: ICD-10-PCS | Performed by: ORTHOPAEDIC SURGERY

## 2017-11-15 PROCEDURE — 7100000000 HC PACU RECOVERY - FIRST 15 MIN: Performed by: ORTHOPAEDIC SURGERY

## 2017-11-15 PROCEDURE — 82607 VITAMIN B-12: CPT

## 2017-11-15 PROCEDURE — 6370000000 HC RX 637 (ALT 250 FOR IP): Performed by: PHYSICIAN ASSISTANT

## 2017-11-15 PROCEDURE — 7100000001 HC PACU RECOVERY - ADDTL 15 MIN: Performed by: ORTHOPAEDIC SURGERY

## 2017-11-15 PROCEDURE — 2580000003 HC RX 258

## 2017-11-15 PROCEDURE — 3600000005 HC SURGERY LEVEL 5 BASE: Performed by: ORTHOPAEDIC SURGERY

## 2017-11-15 PROCEDURE — 94640 AIRWAY INHALATION TREATMENT: CPT

## 2017-11-15 PROCEDURE — 80053 COMPREHEN METABOLIC PANEL: CPT

## 2017-11-15 PROCEDURE — 6370000000 HC RX 637 (ALT 250 FOR IP): Performed by: NURSE ANESTHETIST, CERTIFIED REGISTERED

## 2017-11-15 PROCEDURE — 36415 COLL VENOUS BLD VENIPUNCTURE: CPT

## 2017-11-15 PROCEDURE — 86850 RBC ANTIBODY SCREEN: CPT

## 2017-11-15 DEVICE — DBM 005001 PROGENIX DBM 1CC SRVC FEE
Type: IMPLANTABLE DEVICE | Site: NECK | Status: FUNCTIONAL
Brand: PROGENIX® PUTTY AND PROGENIX® PLUS

## 2017-11-15 DEVICE — SCREW 3120514 4.0 X 14 SELF DRILL VAR
Type: IMPLANTABLE DEVICE | Site: NECK | Status: FUNCTIONAL
Brand: ATLANTIS® ANTERIOR CERVICAL PLATE SYSTEM

## 2017-11-15 DEVICE — GRAFT HUM TISS W11XH7XL11MM CORT INTBDY FUS FRZ DRY BLK: Type: IMPLANTABLE DEVICE | Site: NECK | Status: FUNCTIONAL

## 2017-11-15 RX ORDER — SODIUM CHLORIDE 9 MG/ML
INJECTION, SOLUTION INTRAVENOUS CONTINUOUS
Status: DISCONTINUED | OUTPATIENT
Start: 2017-11-15 | End: 2017-11-15

## 2017-11-15 RX ORDER — CYCLOBENZAPRINE HCL 10 MG
10 TABLET ORAL 3 TIMES DAILY PRN
Status: DISCONTINUED | OUTPATIENT
Start: 2017-11-15 | End: 2017-11-21 | Stop reason: HOSPADM

## 2017-11-15 RX ORDER — HYDRALAZINE HYDROCHLORIDE 50 MG/1
100 TABLET, FILM COATED ORAL DAILY PRN
Status: DISCONTINUED | OUTPATIENT
Start: 2017-11-15 | End: 2017-11-18

## 2017-11-15 RX ORDER — OYSTER SHELL CALCIUM WITH VITAMIN D 500; 200 MG/1; [IU]/1
1 TABLET, FILM COATED ORAL DAILY
Status: DISCONTINUED | OUTPATIENT
Start: 2017-11-15 | End: 2017-11-21 | Stop reason: HOSPADM

## 2017-11-15 RX ORDER — OXYCODONE HYDROCHLORIDE AND ACETAMINOPHEN 5; 325 MG/1; MG/1
1 TABLET ORAL EVERY 4 HOURS PRN
Status: ON HOLD | COMMUNITY
End: 2017-11-20 | Stop reason: HOSPADM

## 2017-11-15 RX ORDER — ALBUTEROL SULFATE 90 UG/1
1 AEROSOL, METERED RESPIRATORY (INHALATION) EVERY 4 HOURS PRN
Status: DISCONTINUED | OUTPATIENT
Start: 2017-11-15 | End: 2017-11-21 | Stop reason: HOSPADM

## 2017-11-15 RX ORDER — MORPHINE SULFATE 2 MG/ML
2 INJECTION, SOLUTION INTRAMUSCULAR; INTRAVENOUS
Status: DISPENSED | OUTPATIENT
Start: 2017-11-15 | End: 2017-11-18

## 2017-11-15 RX ORDER — GLYCOPYRROLATE 0.2 MG/ML
INJECTION INTRAMUSCULAR; INTRAVENOUS PRN
Status: DISCONTINUED | OUTPATIENT
Start: 2017-11-15 | End: 2017-11-15 | Stop reason: SDUPTHER

## 2017-11-15 RX ORDER — RANOLAZINE 500 MG/1
500 TABLET, EXTENDED RELEASE ORAL 2 TIMES DAILY
Status: DISCONTINUED | OUTPATIENT
Start: 2017-11-15 | End: 2017-11-21 | Stop reason: HOSPADM

## 2017-11-15 RX ORDER — LEVOTHYROXINE SODIUM 0.1 MG/1
100 TABLET ORAL
Status: DISCONTINUED | OUTPATIENT
Start: 2017-11-16 | End: 2017-11-21 | Stop reason: HOSPADM

## 2017-11-15 RX ORDER — AMLODIPINE BESYLATE 10 MG/1
10 TABLET ORAL DAILY
Status: DISCONTINUED | OUTPATIENT
Start: 2017-11-16 | End: 2017-11-21 | Stop reason: HOSPADM

## 2017-11-15 RX ORDER — POLYVINYL ALCOHOL 14 MG/ML
1 SOLUTION/ DROPS OPHTHALMIC 2 TIMES DAILY
Status: DISCONTINUED | OUTPATIENT
Start: 2017-11-15 | End: 2017-11-21 | Stop reason: HOSPADM

## 2017-11-15 RX ORDER — DEXTROSE MONOHYDRATE 50 MG/ML
100 INJECTION, SOLUTION INTRAVENOUS PRN
Status: DISCONTINUED | OUTPATIENT
Start: 2017-11-15 | End: 2017-11-21 | Stop reason: HOSPADM

## 2017-11-15 RX ORDER — SODIUM CHLORIDE 0.9 % (FLUSH) 0.9 %
10 SYRINGE (ML) INJECTION PRN
Status: DISCONTINUED | OUTPATIENT
Start: 2017-11-15 | End: 2017-11-21 | Stop reason: HOSPADM

## 2017-11-15 RX ORDER — ONDANSETRON 2 MG/ML
4 INJECTION INTRAMUSCULAR; INTRAVENOUS EVERY 6 HOURS PRN
Status: DISCONTINUED | OUTPATIENT
Start: 2017-11-15 | End: 2017-11-21 | Stop reason: HOSPADM

## 2017-11-15 RX ORDER — ONDANSETRON 2 MG/ML
INJECTION INTRAMUSCULAR; INTRAVENOUS PRN
Status: DISCONTINUED | OUTPATIENT
Start: 2017-11-15 | End: 2017-11-15 | Stop reason: SDUPTHER

## 2017-11-15 RX ORDER — PROPOFOL 10 MG/ML
INJECTION, EMULSION INTRAVENOUS PRN
Status: DISCONTINUED | OUTPATIENT
Start: 2017-11-15 | End: 2017-11-15 | Stop reason: SDUPTHER

## 2017-11-15 RX ORDER — ACETAMINOPHEN 650 MG/1
650 SUPPOSITORY RECTAL EVERY 4 HOURS PRN
Status: DISCONTINUED | OUTPATIENT
Start: 2017-11-15 | End: 2017-11-18

## 2017-11-15 RX ORDER — NITROGLYCERIN 0.4 MG/1
0.4 TABLET SUBLINGUAL EVERY 5 MIN PRN
Status: DISCONTINUED | OUTPATIENT
Start: 2017-11-15 | End: 2017-11-21 | Stop reason: HOSPADM

## 2017-11-15 RX ORDER — MORPHINE SULFATE 4 MG/ML
4 INJECTION, SOLUTION INTRAMUSCULAR; INTRAVENOUS
Status: DISPENSED | OUTPATIENT
Start: 2017-11-15 | End: 2017-11-18

## 2017-11-15 RX ORDER — OXCARBAZEPINE 300 MG/1
300 TABLET, FILM COATED ORAL NIGHTLY
Status: DISCONTINUED | OUTPATIENT
Start: 2017-11-15 | End: 2017-11-21 | Stop reason: HOSPADM

## 2017-11-15 RX ORDER — DOCUSATE SODIUM 100 MG/1
100 CAPSULE, LIQUID FILLED ORAL 2 TIMES DAILY
Status: DISCONTINUED | OUTPATIENT
Start: 2017-11-15 | End: 2017-11-17

## 2017-11-15 RX ORDER — ROCURONIUM BROMIDE 10 MG/ML
INJECTION, SOLUTION INTRAVENOUS PRN
Status: DISCONTINUED | OUTPATIENT
Start: 2017-11-15 | End: 2017-11-15 | Stop reason: SDUPTHER

## 2017-11-15 RX ORDER — ACETAMINOPHEN 325 MG/1
650 TABLET ORAL EVERY 4 HOURS PRN
Status: DISCONTINUED | OUTPATIENT
Start: 2017-11-15 | End: 2017-11-18

## 2017-11-15 RX ORDER — RISPERIDONE 1 MG/1
1 TABLET, FILM COATED ORAL 2 TIMES DAILY
Status: DISCONTINUED | OUTPATIENT
Start: 2017-11-15 | End: 2017-11-21 | Stop reason: HOSPADM

## 2017-11-15 RX ORDER — METOPROLOL TARTRATE 50 MG/1
50 TABLET, FILM COATED ORAL 2 TIMES DAILY
Status: DISCONTINUED | OUTPATIENT
Start: 2017-11-15 | End: 2017-11-19

## 2017-11-15 RX ORDER — LABETALOL HYDROCHLORIDE 5 MG/ML
10 INJECTION, SOLUTION INTRAVENOUS EVERY 10 MIN PRN
Status: DISCONTINUED | OUTPATIENT
Start: 2017-11-15 | End: 2017-11-15 | Stop reason: HOSPADM

## 2017-11-15 RX ORDER — VASOPRESSIN 20 U/ML
INJECTION PARENTERAL PRN
Status: DISCONTINUED | OUTPATIENT
Start: 2017-11-15 | End: 2017-11-15 | Stop reason: SDUPTHER

## 2017-11-15 RX ORDER — PROMETHAZINE HYDROCHLORIDE 25 MG/ML
12.5 INJECTION, SOLUTION INTRAMUSCULAR; INTRAVENOUS
Status: COMPLETED | OUTPATIENT
Start: 2017-11-15 | End: 2017-11-15

## 2017-11-15 RX ORDER — LIDOCAINE HYDROCHLORIDE AND EPINEPHRINE 10; 10 MG/ML; UG/ML
INJECTION, SOLUTION INFILTRATION; PERINEURAL PRN
Status: DISCONTINUED | OUTPATIENT
Start: 2017-11-15 | End: 2017-11-15 | Stop reason: HOSPADM

## 2017-11-15 RX ORDER — LISINOPRIL 20 MG/1
20 TABLET ORAL 2 TIMES DAILY
Status: DISCONTINUED | OUTPATIENT
Start: 2017-11-15 | End: 2017-11-21 | Stop reason: HOSPADM

## 2017-11-15 RX ORDER — NEOSTIGMINE METHYLSULFATE 1 MG/ML
INJECTION, SOLUTION INTRAVENOUS PRN
Status: DISCONTINUED | OUTPATIENT
Start: 2017-11-15 | End: 2017-11-15 | Stop reason: SDUPTHER

## 2017-11-15 RX ORDER — ALBUTEROL SULFATE 90 UG/1
AEROSOL, METERED RESPIRATORY (INHALATION) PRN
Status: DISCONTINUED | OUTPATIENT
Start: 2017-11-15 | End: 2017-11-15 | Stop reason: SDUPTHER

## 2017-11-15 RX ORDER — SODIUM CHLORIDE 9 MG/ML
INJECTION, SOLUTION INTRAVENOUS CONTINUOUS
Status: DISCONTINUED | OUTPATIENT
Start: 2017-11-15 | End: 2017-11-18

## 2017-11-15 RX ORDER — ROSUVASTATIN CALCIUM 10 MG/1
20 TABLET, COATED ORAL NIGHTLY
Status: DISCONTINUED | OUTPATIENT
Start: 2017-11-15 | End: 2017-11-15 | Stop reason: CLARIF

## 2017-11-15 RX ORDER — PANTOPRAZOLE SODIUM 40 MG/1
40 TABLET, DELAYED RELEASE ORAL
Status: DISCONTINUED | OUTPATIENT
Start: 2017-11-15 | End: 2017-11-21 | Stop reason: HOSPADM

## 2017-11-15 RX ORDER — NOREPINEPHRINE BITARTRATE 1 MG/ML
INJECTION, SOLUTION INTRAVENOUS PRN
Status: DISCONTINUED | OUTPATIENT
Start: 2017-11-15 | End: 2017-11-15 | Stop reason: SDUPTHER

## 2017-11-15 RX ORDER — EPINEPHRINE 1 MG/ML
INJECTION, SOLUTION, CONCENTRATE INTRAVENOUS PRN
Status: DISCONTINUED | OUTPATIENT
Start: 2017-11-15 | End: 2017-11-15 | Stop reason: SDUPTHER

## 2017-11-15 RX ORDER — FENTANYL CITRATE 50 UG/ML
50 INJECTION, SOLUTION INTRAMUSCULAR; INTRAVENOUS EVERY 5 MIN PRN
Status: DISCONTINUED | OUTPATIENT
Start: 2017-11-15 | End: 2017-11-15 | Stop reason: HOSPADM

## 2017-11-15 RX ORDER — BUDESONIDE AND FORMOTEROL FUMARATE DIHYDRATE 160; 4.5 UG/1; UG/1
2 AEROSOL RESPIRATORY (INHALATION) 2 TIMES DAILY
Status: DISCONTINUED | OUTPATIENT
Start: 2017-11-15 | End: 2017-11-15 | Stop reason: CLARIF

## 2017-11-15 RX ORDER — OXYCODONE HYDROCHLORIDE AND ACETAMINOPHEN 5; 325 MG/1; MG/1
2 TABLET ORAL EVERY 4 HOURS PRN
Status: DISCONTINUED | OUTPATIENT
Start: 2017-11-15 | End: 2017-11-18

## 2017-11-15 RX ORDER — NICOTINE POLACRILEX 4 MG
15 LOZENGE BUCCAL PRN
Status: DISCONTINUED | OUTPATIENT
Start: 2017-11-15 | End: 2017-11-21 | Stop reason: HOSPADM

## 2017-11-15 RX ORDER — FENTANYL CITRATE 50 UG/ML
INJECTION, SOLUTION INTRAMUSCULAR; INTRAVENOUS PRN
Status: DISCONTINUED | OUTPATIENT
Start: 2017-11-15 | End: 2017-11-15 | Stop reason: SDUPTHER

## 2017-11-15 RX ORDER — NITROGLYCERIN 40 MG/1
1 PATCH TRANSDERMAL DAILY
Status: DISCONTINUED | OUTPATIENT
Start: 2017-11-16 | End: 2017-11-21 | Stop reason: HOSPADM

## 2017-11-15 RX ORDER — HYDRALAZINE HYDROCHLORIDE 50 MG/1
50 TABLET, FILM COATED ORAL EVERY 6 HOURS SCHEDULED
Status: DISCONTINUED | OUTPATIENT
Start: 2017-11-15 | End: 2017-11-15 | Stop reason: ALTCHOICE

## 2017-11-15 RX ORDER — ATORVASTATIN CALCIUM 80 MG/1
80 TABLET, FILM COATED ORAL NIGHTLY
Status: DISCONTINUED | OUTPATIENT
Start: 2017-11-15 | End: 2017-11-21 | Stop reason: HOSPADM

## 2017-11-15 RX ORDER — MONTELUKAST SODIUM 10 MG/1
10 TABLET ORAL NIGHTLY
Status: DISCONTINUED | OUTPATIENT
Start: 2017-11-15 | End: 2017-11-21 | Stop reason: HOSPADM

## 2017-11-15 RX ORDER — CITALOPRAM 40 MG/1
40 TABLET ORAL DAILY
Status: DISCONTINUED | OUTPATIENT
Start: 2017-11-16 | End: 2017-11-21 | Stop reason: HOSPADM

## 2017-11-15 RX ORDER — DOCUSATE SODIUM 100 MG/1
100 CAPSULE, LIQUID FILLED ORAL DAILY
Status: DISCONTINUED | OUTPATIENT
Start: 2017-11-15 | End: 2017-11-15 | Stop reason: SDUPTHER

## 2017-11-15 RX ORDER — OXYCODONE HYDROCHLORIDE AND ACETAMINOPHEN 5; 325 MG/1; MG/1
1 TABLET ORAL EVERY 4 HOURS PRN
Status: DISCONTINUED | OUTPATIENT
Start: 2017-11-15 | End: 2017-11-18

## 2017-11-15 RX ORDER — OXCARBAZEPINE 300 MG/1
150 TABLET, FILM COATED ORAL DAILY
Status: DISCONTINUED | OUTPATIENT
Start: 2017-11-16 | End: 2017-11-21 | Stop reason: HOSPADM

## 2017-11-15 RX ORDER — SODIUM CHLORIDE 0.9 % (FLUSH) 0.9 %
10 SYRINGE (ML) INJECTION EVERY 12 HOURS SCHEDULED
Status: DISCONTINUED | OUTPATIENT
Start: 2017-11-15 | End: 2017-11-21 | Stop reason: HOSPADM

## 2017-11-15 RX ORDER — DEXTROSE MONOHYDRATE 25 G/50ML
12.5 INJECTION, SOLUTION INTRAVENOUS PRN
Status: DISCONTINUED | OUTPATIENT
Start: 2017-11-15 | End: 2017-11-21 | Stop reason: HOSPADM

## 2017-11-15 RX ORDER — DEXAMETHASONE SODIUM PHOSPHATE 4 MG/ML
INJECTION, SOLUTION INTRA-ARTICULAR; INTRALESIONAL; INTRAMUSCULAR; INTRAVENOUS; SOFT TISSUE PRN
Status: DISCONTINUED | OUTPATIENT
Start: 2017-11-15 | End: 2017-11-15 | Stop reason: SDUPTHER

## 2017-11-15 RX ORDER — DOXEPIN HYDROCHLORIDE 50 MG/1
150 CAPSULE ORAL NIGHTLY
Status: DISCONTINUED | OUTPATIENT
Start: 2017-11-15 | End: 2017-11-21 | Stop reason: HOSPADM

## 2017-11-15 RX ADMIN — FENTANYL CITRATE 50 MCG: 50 INJECTION INTRAMUSCULAR; INTRAVENOUS at 08:32

## 2017-11-15 RX ADMIN — VASOPRESSIN 2 UNITS: 20 INJECTION INTRAVENOUS at 09:45

## 2017-11-15 RX ADMIN — PHENYLEPHRINE HYDROCHLORIDE 100 MCG: 10 INJECTION INTRAMUSCULAR; INTRAVENOUS; SUBCUTANEOUS at 08:13

## 2017-11-15 RX ADMIN — PROMETHAZINE HYDROCHLORIDE 12.5 MG: 25 INJECTION INTRAMUSCULAR; INTRAVENOUS at 11:15

## 2017-11-15 RX ADMIN — PROPOFOL 20 MG: 10 INJECTION, EMULSION INTRAVENOUS at 10:05

## 2017-11-15 RX ADMIN — METOPROLOL TARTRATE 50 MG: 50 TABLET, FILM COATED ORAL at 22:00

## 2017-11-15 RX ADMIN — Medication 30 MG: at 07:51

## 2017-11-15 RX ADMIN — RISPERIDONE 1 MG: 1 TABLET ORAL at 22:00

## 2017-11-15 RX ADMIN — VASOPRESSIN 2 UNITS: 20 INJECTION INTRAVENOUS at 10:03

## 2017-11-15 RX ADMIN — WATER 2 G: 1 INJECTION INTRAMUSCULAR; INTRAVENOUS; SUBCUTANEOUS at 07:51

## 2017-11-15 RX ADMIN — PHENYLEPHRINE HYDROCHLORIDE 200 MCG: 10 INJECTION INTRAMUSCULAR; INTRAVENOUS; SUBCUTANEOUS at 08:52

## 2017-11-15 RX ADMIN — NOREPINEPHRINE BITARTRATE 16 MCG: 1 INJECTION INTRAVENOUS at 09:11

## 2017-11-15 RX ADMIN — PHENYLEPHRINE HYDROCHLORIDE 100 MCG: 10 INJECTION INTRAMUSCULAR; INTRAVENOUS; SUBCUTANEOUS at 08:48

## 2017-11-15 RX ADMIN — ALBUTEROL SULFATE 6 PUFF: 90 AEROSOL, METERED RESPIRATORY (INHALATION) at 10:09

## 2017-11-15 RX ADMIN — POLYVINYL ALCOHOL 1 DROP: 14 SOLUTION/ DROPS OPHTHALMIC at 22:00

## 2017-11-15 RX ADMIN — SODIUM CHLORIDE: 9 INJECTION, SOLUTION INTRAVENOUS at 22:57

## 2017-11-15 RX ADMIN — SODIUM CHLORIDE: 9 INJECTION, SOLUTION INTRAVENOUS at 08:05

## 2017-11-15 RX ADMIN — FENTANYL CITRATE 100 MCG: 50 INJECTION INTRAMUSCULAR; INTRAVENOUS at 07:32

## 2017-11-15 RX ADMIN — OXYCODONE HYDROCHLORIDE AND ACETAMINOPHEN 2 TABLET: 5; 325 TABLET ORAL at 15:52

## 2017-11-15 RX ADMIN — OXCARBAZEPINE 300 MG: 300 TABLET ORAL at 22:00

## 2017-11-15 RX ADMIN — PHENYLEPHRINE HYDROCHLORIDE 200 MCG: 10 INJECTION INTRAMUSCULAR; INTRAVENOUS; SUBCUTANEOUS at 08:57

## 2017-11-15 RX ADMIN — LISINOPRIL 20 MG: 20 TABLET ORAL at 22:00

## 2017-11-15 RX ADMIN — Medication 20 MG: at 07:35

## 2017-11-15 RX ADMIN — EPINEPHRINE 0.08 MG: 1 INJECTION, SOLUTION, CONCENTRATE INTRAVENOUS at 09:05

## 2017-11-15 RX ADMIN — DEXAMETHASONE SODIUM PHOSPHATE 10 MG: 4 INJECTION, SOLUTION INTRAMUSCULAR; INTRAVENOUS at 07:48

## 2017-11-15 RX ADMIN — RANOLAZINE 500 MG: 500 TABLET, FILM COATED, EXTENDED RELEASE ORAL at 22:00

## 2017-11-15 RX ADMIN — NEOSTIGMINE METHYLSULFATE 2.5 MG: 1 INJECTION, SOLUTION INTRAVENOUS at 09:55

## 2017-11-15 RX ADMIN — PHENYLEPHRINE HYDROCHLORIDE 100 MCG: 10 INJECTION INTRAMUSCULAR; INTRAVENOUS; SUBCUTANEOUS at 07:54

## 2017-11-15 RX ADMIN — ONDANSETRON 4 MG: 2 INJECTION INTRAMUSCULAR; INTRAVENOUS at 09:55

## 2017-11-15 RX ADMIN — Medication 10 MG: at 08:26

## 2017-11-15 RX ADMIN — DOXEPIN HYDROCHLORIDE 150 MG: 50 CAPSULE ORAL at 22:01

## 2017-11-15 RX ADMIN — SODIUM CHLORIDE: 9 INJECTION, SOLUTION INTRAVENOUS at 07:24

## 2017-11-15 RX ADMIN — WATER 2 G: 1 INJECTION INTRAMUSCULAR; INTRAVENOUS; SUBCUTANEOUS at 17:00

## 2017-11-15 RX ADMIN — CYCLOBENZAPRINE 10 MG: 10 TABLET, FILM COATED ORAL at 17:00

## 2017-11-15 RX ADMIN — MONTELUKAST SODIUM 10 MG: 10 TABLET, FILM COATED ORAL at 22:00

## 2017-11-15 RX ADMIN — ATORVASTATIN CALCIUM 80 MG: 80 TABLET, FILM COATED ORAL at 22:01

## 2017-11-15 RX ADMIN — NOREPINEPHRINE BITARTRATE 16 MCG: 1 INJECTION INTRAVENOUS at 09:07

## 2017-11-15 RX ADMIN — SODIUM CHLORIDE: 9 INJECTION, SOLUTION INTRAVENOUS at 06:59

## 2017-11-15 RX ADMIN — PROPOFOL 120 MG: 10 INJECTION, EMULSION INTRAVENOUS at 07:34

## 2017-11-15 RX ADMIN — EPINEPHRINE 0.06 MG: 1 INJECTION, SOLUTION, CONCENTRATE INTRAVENOUS at 09:30

## 2017-11-15 RX ADMIN — DOCUSATE SODIUM 100 MG: 100 CAPSULE ORAL at 22:01

## 2017-11-15 RX ADMIN — VASOPRESSIN 2 UNITS: 20 INJECTION INTRAVENOUS at 09:11

## 2017-11-15 RX ADMIN — Medication 2 PUFF: at 19:59

## 2017-11-15 RX ADMIN — Medication 10 MG: at 09:14

## 2017-11-15 RX ADMIN — VASOPRESSIN 1 UNITS: 20 INJECTION INTRAVENOUS at 09:20

## 2017-11-15 RX ADMIN — SODIUM CHLORIDE: 9 INJECTION, SOLUTION INTRAVENOUS at 14:00

## 2017-11-15 RX ADMIN — OXYCODONE HYDROCHLORIDE AND ACETAMINOPHEN 2 TABLET: 5; 325 TABLET ORAL at 22:00

## 2017-11-15 RX ADMIN — VASOPRESSIN 2 UNITS: 20 INJECTION INTRAVENOUS at 09:24

## 2017-11-15 RX ADMIN — GLYCOPYRROLATE 0.5 MG: 0.2 INJECTION, SOLUTION INTRAMUSCULAR; INTRAVENOUS at 09:55

## 2017-11-15 RX ADMIN — Medication 10 ML: at 22:01

## 2017-11-15 RX ADMIN — PROPOFOL 80 MG: 10 INJECTION, EMULSION INTRAVENOUS at 07:37

## 2017-11-15 ASSESSMENT — PULMONARY FUNCTION TESTS
PIF_VALUE: 15
PIF_VALUE: 21
PIF_VALUE: 26
PIF_VALUE: 25
PIF_VALUE: 26
PIF_VALUE: 9
PIF_VALUE: 27
PIF_VALUE: 29
PIF_VALUE: 26
PIF_VALUE: 25
PIF_VALUE: 34
PIF_VALUE: 25
PIF_VALUE: 1
PIF_VALUE: 26
PIF_VALUE: 34
PIF_VALUE: 5
PIF_VALUE: 26
PIF_VALUE: 25
PIF_VALUE: 26
PIF_VALUE: 29
PIF_VALUE: 27
PIF_VALUE: 35
PIF_VALUE: 28
PIF_VALUE: 27
PIF_VALUE: 11
PIF_VALUE: 29
PIF_VALUE: 26
PIF_VALUE: 25
PIF_VALUE: 25
PIF_VALUE: 28
PIF_VALUE: 27
PIF_VALUE: 2
PIF_VALUE: 28
PIF_VALUE: 29
PIF_VALUE: 5
PIF_VALUE: 28
PIF_VALUE: 7
PIF_VALUE: 26
PIF_VALUE: 27
PIF_VALUE: 25
PIF_VALUE: 26
PIF_VALUE: 28
PIF_VALUE: 21
PIF_VALUE: 2
PIF_VALUE: 27
PIF_VALUE: 26
PIF_VALUE: 27
PIF_VALUE: 25
PIF_VALUE: 30
PIF_VALUE: 26
PIF_VALUE: 26
PIF_VALUE: 25
PIF_VALUE: 28
PIF_VALUE: 32
PIF_VALUE: 28
PIF_VALUE: 27
PIF_VALUE: 31
PIF_VALUE: 4
PIF_VALUE: 27
PIF_VALUE: 7
PIF_VALUE: 21
PIF_VALUE: 25
PIF_VALUE: 14
PIF_VALUE: 26
PIF_VALUE: 27
PIF_VALUE: 30
PIF_VALUE: 27
PIF_VALUE: 34
PIF_VALUE: 1
PIF_VALUE: 26
PIF_VALUE: 28
PIF_VALUE: 49
PIF_VALUE: 24
PIF_VALUE: 30
PIF_VALUE: 28
PIF_VALUE: 31
PIF_VALUE: 26
PIF_VALUE: 31
PIF_VALUE: 30
PIF_VALUE: 26
PIF_VALUE: 27
PIF_VALUE: 1
PIF_VALUE: 31
PIF_VALUE: 13
PIF_VALUE: 25
PIF_VALUE: 27
PIF_VALUE: 28
PIF_VALUE: 27
PIF_VALUE: 27
PIF_VALUE: 26
PIF_VALUE: 30
PIF_VALUE: 26
PIF_VALUE: 34
PIF_VALUE: 0
PIF_VALUE: 26
PIF_VALUE: 7
PIF_VALUE: 26
PIF_VALUE: 26
PIF_VALUE: 27
PIF_VALUE: 31
PIF_VALUE: 26
PIF_VALUE: 19
PIF_VALUE: 28
PIF_VALUE: 26
PIF_VALUE: 27
PIF_VALUE: 33
PIF_VALUE: 26
PIF_VALUE: 26
PIF_VALUE: 33
PIF_VALUE: 26
PIF_VALUE: 29
PIF_VALUE: 26
PIF_VALUE: 28
PIF_VALUE: 27
PIF_VALUE: 3
PIF_VALUE: 30
PIF_VALUE: 22
PIF_VALUE: 35
PIF_VALUE: 26
PIF_VALUE: 25
PIF_VALUE: 29
PIF_VALUE: 0
PIF_VALUE: 26
PIF_VALUE: 10
PIF_VALUE: 29
PIF_VALUE: 28
PIF_VALUE: 26
PIF_VALUE: 32
PIF_VALUE: 25
PIF_VALUE: 25
PIF_VALUE: 6
PIF_VALUE: 28
PIF_VALUE: 9
PIF_VALUE: 25
PIF_VALUE: 27
PIF_VALUE: 26
PIF_VALUE: 23
PIF_VALUE: 24
PIF_VALUE: 26
PIF_VALUE: 5
PIF_VALUE: 28
PIF_VALUE: 29
PIF_VALUE: 27
PIF_VALUE: 28
PIF_VALUE: 28
PIF_VALUE: 23
PIF_VALUE: 26
PIF_VALUE: 6
PIF_VALUE: 25
PIF_VALUE: 29
PIF_VALUE: 25
PIF_VALUE: 25
PIF_VALUE: 28
PIF_VALUE: 10
PIF_VALUE: 27
PIF_VALUE: 28
PIF_VALUE: 35
PIF_VALUE: 28
PIF_VALUE: 22
PIF_VALUE: 5
PIF_VALUE: 31
PIF_VALUE: 31
PIF_VALUE: 26
PIF_VALUE: 27
PIF_VALUE: 28
PIF_VALUE: 29
PIF_VALUE: 26
PIF_VALUE: 28
PIF_VALUE: 27
PIF_VALUE: 19
PIF_VALUE: 26
PIF_VALUE: 26
PIF_VALUE: 25
PIF_VALUE: 29
PIF_VALUE: 26
PIF_VALUE: 0
PIF_VALUE: 26
PIF_VALUE: 1
PIF_VALUE: 26
PIF_VALUE: 1
PIF_VALUE: 26
PIF_VALUE: 25
PIF_VALUE: 26
PIF_VALUE: 30
PIF_VALUE: 29

## 2017-11-15 ASSESSMENT — PAIN DESCRIPTION - ORIENTATION
ORIENTATION: RIGHT;LEFT;ANTERIOR;POSTERIOR
ORIENTATION: LEFT

## 2017-11-15 ASSESSMENT — PAIN SCALES - GENERAL
PAINLEVEL_OUTOF10: 8
PAINLEVEL_OUTOF10: 8
PAINLEVEL_OUTOF10: 0
PAINLEVEL_OUTOF10: 9
PAINLEVEL_OUTOF10: 9

## 2017-11-15 ASSESSMENT — PAIN DESCRIPTION - LOCATION
LOCATION: NECK
LOCATION: NECK

## 2017-11-15 ASSESSMENT — PAIN DESCRIPTION - PAIN TYPE
TYPE: SURGICAL PAIN
TYPE: ACUTE PAIN;SURGICAL PAIN

## 2017-11-15 ASSESSMENT — PAIN DESCRIPTION - PROGRESSION: CLINICAL_PROGRESSION: GRADUALLY WORSENING

## 2017-11-15 ASSESSMENT — ENCOUNTER SYMPTOMS
COUGH: 0
NAUSEA: 0
VOMITING: 0
SHORTNESS OF BREATH: 1
DIARRHEA: 0
SHORTNESS OF BREATH: 0

## 2017-11-15 ASSESSMENT — PAIN DESCRIPTION - DESCRIPTORS: DESCRIPTORS: ACHING

## 2017-11-15 ASSESSMENT — PAIN DESCRIPTION - ONSET: ONSET: ON-GOING

## 2017-11-15 ASSESSMENT — PAIN DESCRIPTION - FREQUENCY: FREQUENCY: CONTINUOUS

## 2017-11-15 NOTE — PROGRESS NOTES
 rosuvastatin (CRESTOR) tablet 20 mg  20 mg Oral Nightly Oswaldo Vera MD        hydrALAZINE (APRESOLINE) tablet 50 mg  50 mg Oral 4 times per day Oswaldo Vera MD        glucose blood VI test strips (ASCENSIA AUTODISC VI;ONE TOUCH ULTRA TEST VI) strip 1 each  1 each In Vitro Daily Oswaldo Vera MD        0.9 % sodium chloride infusion   Intravenous Continuous Oswaldo Vera MD        sodium chloride flush 0.9 % injection 10 mL  10 mL Intravenous 2 times per day Oswaldo Vera MD        sodium chloride flush 0.9 % injection 10 mL  10 mL Intravenous PRN Oswaldo Vera MD        acetaminophen (TYLENOL) tablet 650 mg  650 mg Oral Q4H PRN Oswaldo Vera MD        acetaminophen (TYLENOL) suppository 650 mg  650 mg Rectal Q4H PRN Oswaldo Vera MD        cyclobenzaprine (FLEXERIL) tablet 10 mg  10 mg Oral TID PRN Oswaldo Vera MD        docusate sodium (COLACE) capsule 100 mg  100 mg Oral BID Oswaldo Vera MD        magnesium hydroxide (MILK OF MAGNESIA) 400 MG/5ML suspension 30 mL  30 mL Oral Daily PRN Oswaldo Vera MD        ondansetron [facility]) injection 4 mg  4 mg Intravenous Q6H PRN Oswaldo Vera MD        ceFAZolin (ANCEF) 2 g in sterile water 20 mL IV syringe  2 g Intravenous Carrie Blank MD        oxyCODONE-acetaminophen (PERCOCET) 5-325 MG per tablet 1 tablet  1 tablet Oral Q4H PRN Oswaldo Vera MD        Or    oxyCODONE-acetaminophen (PERCOCET) 5-325 MG per tablet 2 tablet  2 tablet Oral Q4H PRN Oswaldo Vera MD        HYDROmorphone (DILAUDID) injection 0.25 mg  0.25 mg Intravenous Q3H PRN Oswaldo Vera MD        Or    HYDROmorphone (DILAUDID) injection 0.5 mg  0.5 mg Intravenous Q3H PRLATOYA Vera MD        insulin lispro (HUMALOG) injection vial 0-12 Units  0-12 Units Subcutaneous TID WC Oswaldo Vera MD        insulin lispro (HUMALOG) injection vial 0-6 Units  0-6 Units Subcutaneous Nightly Oswaldo Vera MD        glucose (GLUTOSE) 40 % oral gel 15 g  15 g Oral PRN Oswaldo Vera MD        dextrose 50 % solution 12.5 g  12.5 g Intravenous PRN Oswaldo Vera MD        glucagon (rDNA) injection 1 mg  1 mg Intramuscular PRN Oswaldo Vera MD        dextrose 5 % solution  100 mL/hr Intravenous PRN Oswaldo Vera MD         Allergies   Allergen Reactions    Codeine Itching    Lipitor [Atorvastatin] Diarrhea    Motrin [Ibuprofen Micronized] Nausea Only     Principal Problem:    Herniation of cervical intervertebral disc with radiculopathy  Active Problems:    Cervical spinal stenosis    Blood pressure (!) 160/82, pulse 92, temperature 98.1 °F (36.7 °C), temperature source Axillary, resp. rate 16, height 5' 6\" (1.676 m), weight 230 lb (104.3 kg), SpO2 95 %, not currently breastfeeding. Subjective:  Symptoms:  Stable. She reports weakness and anorexia. No shortness of breath, malaise, cough, chest pain, headache, chest pressure, diarrhea or anxiety. Diet:  Poor intake. No nausea or vomiting. Activity level: Impaired due to weakness. Pain:  She complains of pain that is moderate. She reports pain is improving. Pain is partially controlled and requiring pain medication. Objective:  General Appearance:  Comfortable, ill-appearing, in no acute distress and not in pain. Vital signs: (most recent): Blood pressure (!) 160/82, pulse 92, temperature 98.1 °F (36.7 °C), temperature source Axillary, resp. rate 16, height 5' 6\" (1.676 m), weight 230 lb (104.3 kg), SpO2 95 %, not currently breastfeeding. Vital signs are normal.  No fever. Output: Producing urine. HEENT: Normal HEENT exam.    Lungs:  Normal effort and normal respiratory rate. Breath sounds clear to auscultation. Heart: Normal rate. Regular rhythm. Abdomen: Abdomen is soft, flat and non-distended. There are no signs of ascites. Bowel sounds are normal.   There is no abdominal tenderness. Extremities: Normal range of motion.     Pulses: Distal pulses are

## 2017-11-15 NOTE — PROGRESS NOTES
Pt complains of left arm weakness and pain intermittently in both arms. Also note that left arm blood pressure is lower than right arm.

## 2017-11-15 NOTE — H&P
[address] [place] [address]                         PREOPERATIVE HISTORY AND PHYSICAL    PATIENT NAME: Fide Tillman                     :        1945  MED REC NO:   [id number]                           ROOM:  ACCOUNT NO:   [de-identified]                           ADMIT DATE: 11/15/2017  PROVIDER:     Jorge Castro Pa-C    She will be undergoing a removal of plate from R8-S2 with anterior cervical  diskectomy and fusion of C3-C4 and C4-C5 with allograft bone and plating at  [facility] at 7:30 a.m. on 11/15/2017. CHIEF COMPLAINT:  Cervical pain. HISTORY OF PRESENT ILLNESS:  The patient is 67years of age presented to  the office today with symptoms of neck pain, loss of upper extremity  strength and difficulty due to neck pain and radicular symptoms, dull pain  to the shoulders. She notes that there has been loss of hand strength as  well. These symptoms have been progressing. She previously had a C5-C7  anterior cervical diskectomy and fusion on 2016. She is now noting  that the neck symptoms are worsening as well as her balance and the use of  her hands due to loss of strength and dexterity. ALLERGIES:  LIPITOR, CODEINE, and MOTRIN. PAST MEDICAL HISTORY:  Significant for myocardial infarction,  arteriosclerotic heart disease, dyslipidemia, coronary artery disease,  hypertension, obesity, lung disease, emphysema, liver disease, diabetes,  thyroid disease, stomach ulcers, GERD, irritable bowel syndrome, and sleep  apnea. PAST SURGICAL HISTORY:  She has a history of cholecystectomy, bowel and  bladder lift, bladder sling, hernia repair surgery, ganglion cyst excision,  bilateral foot surgeries, stents x15, hysterectomy, hemorrhoidectomy,  breast lumpectomy, previously mentioned C5-C7 ACDF on 2016 and  2016 L4-L5 laminectomy PSF. SOCIAL HISTORY:  She is currently nonsmoker.     REVIEW

## 2017-11-15 NOTE — ANESTHESIA PRE PROCEDURE
Department of Anesthesiology  Preprocedure Note       Name:  Ciarra Monreal   Age:  67 y.o.  :  1945                                          MRN:  [id number]         Date:  11/15/2017      Surgeon: Minnie Cagle):  Genaro Salinas MD    Procedure: Procedure(s):  REMOVAL OF PLATE X6-2, ACDF W2-4 W/ATLANTIS CORNERSTONE    Medications prior to admission:   Prior to Admission medications    Medication Sig Start Date End Date Taking? Authorizing Provider   oxyCODONE-acetaminophen (PERCOCET) 5-325 MG per tablet Take 1 tablet by mouth every 4 hours as needed for Pain . Yes Historical Provider, MD   hydrALAZINE (APRESOLINE) 50 MG tablet Take 2 tab daily prn if BP Systolic is Above 848 81/65/76  Yes Azra Deleon CNP   Multiple Vitamins-Minerals (MULTIVITAMIN-MINERALS) TABS tablet take 1 tablet by mouth once daily 17  Yes Kemar Lopez CNP   amLODIPine (NORVASC) 10 MG tablet Take 1 tablet by mouth daily 17  Yes Apurva Mckenna MD   lisinopril (PRINIVIL;ZESTRIL) 20 MG tablet Take 1 tablet by mouth 2 times daily 17  Yes Apurva Mckenna MD   metoprolol tartrate (LOPRESSOR) 50 MG tablet take 1 tablet by mouth twice a day 17  Yes Apurva Mckenna MD   ranolazine (RANEXA) 500 MG extended release tablet take 1 tablet by mouth twice a day 17  Yes Apurva Mckenna MD   rosuvastatin (CRESTOR) 20 MG tablet 1 po once day 17  Yes Apurva Mckenna MD   nystatin (MYCOSTATIN) 122076 UNIT/GM cream Apply topically 2 times daily.  17  Yes Kemar Lopez CNP   levothyroxine (SYNTHROID) 100 MCG tablet take 1 tablet by mouth once daily 17  Yes Kemar Lopez CNP   Calcium Carbonate-Vitamin D (OYSTER SHELL CALCIUM/D) 500-200 MG-UNIT TABS take 1 tablet twice a day 7/3/17  Yes Kemar Lopez CNP   budesonide-formoterol [facility]) 160-4.5 MCG/ACT AERO Inhale 2 puffs into the lungs 2 times daily 17  Yes Dasha Tee MD   SINGULAIR 10 MG tablet Take 1 tablet by mouth nightly 5/1/17  Yes Zunilda Adams MD   OXYGEN Inhale 2 L into the lungs nightly   Yes Historical Provider, MD   doxepin (SINEQUAN) 100 MG capsule Take 150 mg by mouth nightly   Yes Historical Provider, MD   OXcarbazepine (TRILEPTAL) 150 MG tablet Take 150 mg by mouth 2 times daily One tab q am. And two tabs q pm   Yes Historical Provider, MD   nitroGLYCERIN (NITRODUR) 0.2 MG/HR apply 1 patch once daily  Patient taking differently: apply 1 patch once daily, off at hs 2/28/17  Yes Effie Herbert MD   docusate sodium (COLACE) 100 MG capsule Take 100 mg by mouth daily One to two capsules once daily   Yes Historical Provider, MD   citalopram (CELEXA) 40 MG tablet Take 40 mg by mouth daily. Yes Historical Provider, MD   polyvinyl alcohol-povidone (HYPOTEARS) 1.4-0.6 % ophthalmic solution 1-2 drops 2 times daily as needed. Yes Historical Provider, MD   pantoprazole (PROTONIX) 40 MG tablet Take 40 mg by mouth daily    Yes Historical Provider, MD   risperiDONE (RISPERDAL) 1 MG tablet Take 1 mg by mouth 2 times daily. Yes Historical Provider, MD   glucose blood VI test strips (GLUCOSE METER TEST) strip 1 each by In Vitro route daily Check blood sugar q daily Dx E11.69 11/9/17   Hannah Condon DO   Lancets MISC Check blood sugar q daily Dx E11.69 11/9/17   Hannah Condon DO   Blood Glucose Monitoring Suppl [facility] Check blood sugar q daily Dx E11.69 11/9/17   Hannah Condon DO   clopidogrel (PLAVIX) 75 MG tablet take 1 tablet by mouth once daily 9/6/17   Effie Herbert MD   benzocaine (ANBESOL) 10 % mucosal gel Take by mouth as needed.  7/18/17   Jorge Farnsworth CNP   ASPIRIN LOW DOSE 81 MG EC tablet take 1 tablet by mouth once daily 7/3/17   Jorge Farnsworth CNP   nitroGLYCERIN (NITROSTAT) 0.4 MG SL tablet Place 1 tablet under the tongue every 5 minutes as needed for Chest pain 2/28/17   Effie Herbert MD   polyethylene glycol (GLYCOLAX) powder take 17GM (DISSOLVED IN WATER) by mouth once daily 8/29/16 09/22/2017    RDW 13.9 09/22/2017     09/22/2017       CMP:   Lab Results   Component Value Date     09/22/2017    K 4.6 09/22/2017    CL 93 09/22/2017    CO2 25 09/22/2017    BUN 10 09/22/2017    CREATININE 0.8 09/22/2017    LABGLOM 70 09/22/2017    GLUCOSE 124 09/22/2017    GLUCOSE 101 07/25/2016    PROT 6.2 10/05/2017    PROT 6.5 11/25/2016    CALCIUM 9.4 09/22/2017    BILITOT 0.3 10/05/2017    ALKPHOS 60 10/05/2017    ALKPHOS 61 05/15/2017    AST 73 10/05/2017    ALT 60 10/05/2017       POC Tests:   Recent Labs      11/15/17   0655   POCGLU  142*       Coags:   Lab Results   Component Value Date    PROTIME 1.04 08/13/2011    INR 0.97 02/27/2017    APTT 36.4 03/02/2017       HCG (If Applicable): No results found for: PREGTESTUR, PREGSERUM, HCG, HCGQUANT     ABGs: No results found for: PHART, PO2ART, MAM6TRW, WHU1AHQ, BEART, A5MSSGAA     Type & Screen (If Applicable):  Lab Results   Component Value Date    [address] POS 03/02/2017       Anesthesia Evaluation  Patient summary reviewed  Airway: Mallampati: III  TM distance: >3 FB   Neck ROM: full  Mouth opening: > = 3 FB Dental:    (+) edentulous      Pulmonary:   (+) COPD:  shortness of breath:  sleep apnea:                             Cardiovascular:    (+) hypertension:, past MI:, CAD:, CABG/stent:,       ECG reviewed      Echocardiogram reviewed    Cleared by cardiology              Neuro/Psych:   (+) psychiatric history:            GI/Hepatic/Renal:   (+) GERD:, liver disease:,           Endo/Other:    (+) hypothyroidism::., .                 Abdominal:           Vascular:                                        Anesthesia Plan      general     ASA 3       Induction: intravenous. MIPS: Postoperative opioids intended and Prophylactic antiemetics administered. Anesthetic plan and risks discussed with patient and child/children. Plan discussed with CRNA. Gillian Nageotte.  DO Pasha   11/15/2017

## 2017-11-15 NOTE — OP NOTE
M.D. ASSISTANT:  Talita Driver PA-C. ANESTHESIA:  General.    BLOOD LOSS:  100 mL. DRAINS:  Bert-Cee. COMPLICATIONS:  Zero. INDICATIONS:  The patient presents, 67years of age, with symptoms of  profound neck and arm pain, left worse than right but bilateral with a loss  of hand strength as well coming from multilevel cervical spondylosis and  stenosis of levels of C3-C4 and C4-C5. As a result of these symptoms, the  patient is in preference of pursuing definitive management because of her  severe symptoms of neck and arm pain with weakness. She is also developing  clumsiness of her hands consistent with cervical myelopathy. As a result  of these continued symptoms, the patient is in preference of pursuing  surgery on today's date. I have discussed with her the risks and benefits  of this operation, postop care as well, and have answered all of her  questions in preparation for this operation. Informed consent was obtained  as well prior to proceeding. DESCRIPTION OF PROCEDURE:  We brought the patient to the operating room and  upon entrance, time-out was observed. Her anesthetic was delivered, airway  secured, Son catheter would not be used. She was positioned over the  chest wall with gentle extension of the head and neck area as we then  prepped and draped the anterior neck with use of a soap scrubbed solution,  sterile toweling, and a ChloraPrep solution. Sterile sheeting was applied  as well as Ioban. Skin marked in the midline of the anterior neck at the  manubrium sterni in a curvilinear manner to the left angle of the mandible  over a 4-inch length of the marked incision. The skin would then be  injected with 6 mL of 1% lidocaine with epinephrine with hemostasis  maintained.   The neck was approached in the midline dividing the skin and  platysmal layer and retracting the esophagus and trachea rightward,  sternocleidomastoid muscle leftward, as we exposed and divided through the disk space of level of C4-C5, maintain disk  space distraction for soft tissue protection, and complete the interbody  surface preparation for fusion as the disk was taken down working anterior  to posterior along the disk space margin of C4-C5. This interbody surface  was then thoroughly cleared bilaterally for complete relief of stenosis  within this interbody surface which would fit very well. This interbody  surface which was all decompressed and ball-tipped probe confirmed this  with complete clearance of the canal, having used the small Kerrison  rongeurs would then accept a 7 x 14 x 11 mm tricortical bone graft which  fit very well and was filled with use of the Pro[employer]ix DBM bone graft putty. We then relaxed the distraction pins, would plate levels of C3, C4, and C5  with a 40-mm plate attached to the bone with a total of 6 screws, each  measuring 14 mm in length. Each locking mechanism would be engaged as  well. We then irrigated very thoroughly, engaged the locking mechanisms,  closed in layers over the drain which was the Bert-Cee drain. There  was no evidence of any bleeding. We closed in layers over this drain which  was sewn in and took x-rays that showed well-placed anterior plating and  screw fixation and grafting of levels of C3 through C5. Upon closure of  the wound and review of x-rays, we then took the patient back to recovery  post extubation and my first assistant was also The AMMY Johnson, as well. Kristine Moss M.D. Ramiro Dobbs PA-C, assisted throughout the procedure with positioning,  draping, retraction, wound closure, dressing, and splint application.     D: 11/15/2017 10:30:14       T: 11/15/2017 12:29:00     [email or web address]  Job#: [id number]     Doc#: [id number]

## 2017-11-16 ENCOUNTER — TELEPHONE (OUTPATIENT)
Dept: FAMILY MEDICINE CLINIC | Age: 72
End: 2017-11-16

## 2017-11-16 LAB
ANION GAP SERPL CALCULATED.3IONS-SCNC: 14 MEQ/L (ref 8–16)
BASOPHILS # BLD: 0.3 %
BASOPHILS ABSOLUTE: 0 THOU/MM3 (ref 0–0.1)
BUN BLDV-MCNC: 13 MG/DL (ref 7–22)
CALCIUM SERPL-MCNC: 8.3 MG/DL (ref 8.5–10.5)
CHLORIDE BLD-SCNC: 94 MEQ/L (ref 98–111)
CHOLESTEROL, TOTAL: 215 MG/DL (ref 100–199)
CO2: 24 MEQ/L (ref 23–33)
CREAT SERPL-MCNC: 0.7 MG/DL (ref 0.4–1.2)
EOSINOPHIL # BLD: 0.7 %
EOSINOPHILS ABSOLUTE: 0 THOU/MM3 (ref 0–0.4)
GFR SERPL CREATININE-BSD FRML MDRD: 82 ML/MIN/1.73M2
GLUCOSE BLD-MCNC: 143 MG/DL (ref 70–108)
GLUCOSE BLD-MCNC: 150 MG/DL (ref 70–108)
GLUCOSE BLD-MCNC: 168 MG/DL (ref 70–108)
GLUCOSE BLD-MCNC: 209 MG/DL (ref 70–108)
GLUCOSE BLD-MCNC: 210 MG/DL (ref 70–108)
HCT VFR BLD CALC: 31.8 % (ref 37–47)
HDLC SERPL-MCNC: 25 MG/DL
HEMOGLOBIN: 10.8 GM/DL (ref 12–16)
LDL CHOLESTEROL CALCULATED: ABNORMAL MG/DL
LYMPHOCYTES # BLD: 18.9 %
LYMPHOCYTES ABSOLUTE: 1 THOU/MM3 (ref 1–4.8)
MCH RBC QN AUTO: 28.8 PG (ref 27–31)
MCHC RBC AUTO-ENTMCNC: 34.1 GM/DL (ref 33–37)
MCV RBC AUTO: 84.4 FL (ref 81–99)
MONOCYTES # BLD: 11.5 %
MONOCYTES ABSOLUTE: 0.6 THOU/MM3 (ref 0.4–1.3)
NUCLEATED RED BLOOD CELLS: 0 /100 WBC
OSMOLALITY URINE: 382 MOSMOL/KG (ref 250–750)
PDW BLD-RTO: 14.4 % (ref 11.5–14.5)
PLATELET # BLD: 167 THOU/MM3 (ref 130–400)
PMV BLD AUTO: 7.8 MCM (ref 7.4–10.4)
POTASSIUM SERPL-SCNC: 4.3 MEQ/L (ref 3.5–5.2)
RBC # BLD: 3.76 MILL/MM3 (ref 4.2–5.4)
SEG NEUTROPHILS: 68.6 %
SEGMENTED NEUTROPHILS ABSOLUTE COUNT: 3.8 THOU/MM3 (ref 1.8–7.7)
SODIUM BLD-SCNC: 132 MEQ/L (ref 135–145)
SODIUM URINE: 73 MEQ/L
TRIGL SERPL-MCNC: 540 MG/DL (ref 0–199)
WBC # BLD: 5.5 THOU/MM3 (ref 4.8–10.8)

## 2017-11-16 PROCEDURE — G8987 SELF CARE CURRENT STATUS: HCPCS

## 2017-11-16 PROCEDURE — 80048 BASIC METABOLIC PNL TOTAL CA: CPT

## 2017-11-16 PROCEDURE — 6360000002 HC RX W HCPCS: Performed by: ORTHOPAEDIC SURGERY

## 2017-11-16 PROCEDURE — G8978 MOBILITY CURRENT STATUS: HCPCS

## 2017-11-16 PROCEDURE — 94640 AIRWAY INHALATION TREATMENT: CPT

## 2017-11-16 PROCEDURE — 82948 REAGENT STRIP/BLOOD GLUCOSE: CPT

## 2017-11-16 PROCEDURE — 83935 ASSAY OF URINE OSMOLALITY: CPT

## 2017-11-16 PROCEDURE — 1200000000 HC SEMI PRIVATE

## 2017-11-16 PROCEDURE — 84300 ASSAY OF URINE SODIUM: CPT

## 2017-11-16 PROCEDURE — 97110 THERAPEUTIC EXERCISES: CPT

## 2017-11-16 PROCEDURE — 6370000000 HC RX 637 (ALT 250 FOR IP): Performed by: ORTHOPAEDIC SURGERY

## 2017-11-16 PROCEDURE — 6370000000 HC RX 637 (ALT 250 FOR IP): Performed by: PHYSICIAN ASSISTANT

## 2017-11-16 PROCEDURE — 85025 COMPLETE CBC W/AUTO DIFF WBC: CPT

## 2017-11-16 PROCEDURE — G8988 SELF CARE GOAL STATUS: HCPCS

## 2017-11-16 PROCEDURE — 97166 OT EVAL MOD COMPLEX 45 MIN: CPT

## 2017-11-16 PROCEDURE — 2580000003 HC RX 258: Performed by: ORTHOPAEDIC SURGERY

## 2017-11-16 PROCEDURE — 36415 COLL VENOUS BLD VENIPUNCTURE: CPT

## 2017-11-16 PROCEDURE — G8979 MOBILITY GOAL STATUS: HCPCS

## 2017-11-16 PROCEDURE — 80061 LIPID PANEL: CPT

## 2017-11-16 PROCEDURE — 97162 PT EVAL MOD COMPLEX 30 MIN: CPT

## 2017-11-16 PROCEDURE — 97530 THERAPEUTIC ACTIVITIES: CPT

## 2017-11-16 RX ADMIN — WATER 2 G: 1 INJECTION INTRAMUSCULAR; INTRAVENOUS; SUBCUTANEOUS at 00:33

## 2017-11-16 RX ADMIN — Medication 1 PUFF: at 20:38

## 2017-11-16 RX ADMIN — POLYVINYL ALCOHOL 1 DROP: 14 SOLUTION/ DROPS OPHTHALMIC at 20:55

## 2017-11-16 RX ADMIN — ATORVASTATIN CALCIUM 80 MG: 80 TABLET, FILM COATED ORAL at 20:55

## 2017-11-16 RX ADMIN — RISPERIDONE 1 MG: 1 TABLET ORAL at 08:19

## 2017-11-16 RX ADMIN — DOCUSATE SODIUM 100 MG: 100 CAPSULE ORAL at 08:20

## 2017-11-16 RX ADMIN — DOCUSATE SODIUM 100 MG: 100 CAPSULE ORAL at 20:55

## 2017-11-16 RX ADMIN — CALCIUM CARBONATE-VITAMIN D TAB 500 MG-200 UNIT 1 TABLET: 500-200 TAB at 08:20

## 2017-11-16 RX ADMIN — PANTOPRAZOLE SODIUM 40 MG: 40 TABLET, DELAYED RELEASE ORAL at 08:19

## 2017-11-16 RX ADMIN — Medication 2 PUFF: at 09:10

## 2017-11-16 RX ADMIN — RANOLAZINE 500 MG: 500 TABLET, FILM COATED, EXTENDED RELEASE ORAL at 08:19

## 2017-11-16 RX ADMIN — INSULIN LISPRO 2 UNITS: 100 INJECTION, SOLUTION INTRAVENOUS; SUBCUTANEOUS at 21:03

## 2017-11-16 RX ADMIN — LEVOTHYROXINE SODIUM 100 MCG: 100 TABLET ORAL at 08:20

## 2017-11-16 RX ADMIN — OXYCODONE HYDROCHLORIDE AND ACETAMINOPHEN 2 TABLET: 5; 325 TABLET ORAL at 12:21

## 2017-11-16 RX ADMIN — OXCARBAZEPINE 300 MG: 300 TABLET ORAL at 20:55

## 2017-11-16 RX ADMIN — AMLODIPINE BESYLATE 10 MG: 10 TABLET ORAL at 08:20

## 2017-11-16 RX ADMIN — OXYCODONE HYDROCHLORIDE AND ACETAMINOPHEN 2 TABLET: 5; 325 TABLET ORAL at 22:07

## 2017-11-16 RX ADMIN — OXYCODONE HYDROCHLORIDE AND ACETAMINOPHEN 2 TABLET: 5; 325 TABLET ORAL at 04:10

## 2017-11-16 RX ADMIN — OXCARBAZEPINE 150 MG: 300 TABLET ORAL at 08:19

## 2017-11-16 RX ADMIN — POLYVINYL ALCOHOL 1 DROP: 14 SOLUTION/ DROPS OPHTHALMIC at 08:20

## 2017-11-16 RX ADMIN — METOPROLOL TARTRATE 50 MG: 50 TABLET, FILM COATED ORAL at 08:19

## 2017-11-16 RX ADMIN — RANOLAZINE 500 MG: 500 TABLET, FILM COATED, EXTENDED RELEASE ORAL at 20:55

## 2017-11-16 RX ADMIN — Medication 4 UNITS: at 18:17

## 2017-11-16 RX ADMIN — MONTELUKAST SODIUM 10 MG: 10 TABLET, FILM COATED ORAL at 20:55

## 2017-11-16 RX ADMIN — OXYCODONE HYDROCHLORIDE AND ACETAMINOPHEN 2 TABLET: 5; 325 TABLET ORAL at 17:40

## 2017-11-16 RX ADMIN — RISPERIDONE 1 MG: 1 TABLET ORAL at 20:55

## 2017-11-16 RX ADMIN — METOPROLOL TARTRATE 50 MG: 50 TABLET, FILM COATED ORAL at 20:55

## 2017-11-16 RX ADMIN — DOXEPIN HYDROCHLORIDE 150 MG: 50 CAPSULE ORAL at 20:55

## 2017-11-16 RX ADMIN — CITALOPRAM 40 MG: 40 TABLET ORAL at 08:20

## 2017-11-16 RX ADMIN — LISINOPRIL 20 MG: 20 TABLET ORAL at 08:19

## 2017-11-16 RX ADMIN — Medication 2 PUFF: at 20:31

## 2017-11-16 RX ADMIN — LISINOPRIL 20 MG: 20 TABLET ORAL at 20:55

## 2017-11-16 RX ADMIN — SODIUM CHLORIDE: 9 INJECTION, SOLUTION INTRAVENOUS at 20:13

## 2017-11-16 RX ADMIN — Medication 2 UNITS: at 13:09

## 2017-11-16 RX ADMIN — OXYCODONE HYDROCHLORIDE AND ACETAMINOPHEN 2 TABLET: 5; 325 TABLET ORAL at 08:03

## 2017-11-16 ASSESSMENT — PAIN DESCRIPTION - FREQUENCY
FREQUENCY: CONTINUOUS
FREQUENCY: CONTINUOUS
FREQUENCY: INTERMITTENT

## 2017-11-16 ASSESSMENT — PAIN SCALES - GENERAL
PAINLEVEL_OUTOF10: 7
PAINLEVEL_OUTOF10: 8
PAINLEVEL_OUTOF10: 8
PAINLEVEL_OUTOF10: 5
PAINLEVEL_OUTOF10: 0
PAINLEVEL_OUTOF10: 8
PAINLEVEL_OUTOF10: 9
PAINLEVEL_OUTOF10: 10
PAINLEVEL_OUTOF10: 10
PAINLEVEL_OUTOF10: 8
PAINLEVEL_OUTOF10: 8

## 2017-11-16 ASSESSMENT — PAIN DESCRIPTION - LOCATION
LOCATION: NECK

## 2017-11-16 ASSESSMENT — PAIN DESCRIPTION - ORIENTATION
ORIENTATION: LEFT
ORIENTATION: RIGHT;LEFT;ANTERIOR;POSTERIOR
ORIENTATION: ANTERIOR
ORIENTATION: ANTERIOR

## 2017-11-16 ASSESSMENT — PAIN DESCRIPTION - PROGRESSION
CLINICAL_PROGRESSION: GRADUALLY WORSENING
CLINICAL_PROGRESSION: GRADUALLY WORSENING

## 2017-11-16 ASSESSMENT — PAIN DESCRIPTION - DESCRIPTORS
DESCRIPTORS: ACHING

## 2017-11-16 ASSESSMENT — PAIN DESCRIPTION - DIRECTION: RADIATING_TOWARDS: LEFT SHOULDER

## 2017-11-16 ASSESSMENT — PAIN DESCRIPTION - ONSET
ONSET: ON-GOING
ONSET: ON-GOING

## 2017-11-16 ASSESSMENT — PAIN DESCRIPTION - PAIN TYPE
TYPE: SURGICAL PAIN

## 2017-11-16 NOTE — CARE COORDINATION
11/16/17, 10:11 AM      Emerald Lebron       Admitted from: PACU 11/15/2017/ Alex Ericabrenden Wong 74 day: 1   Location: [address] [place] [address]- Reason for admit: Cervical spinal stenosis [M48.02]  Herniation of cervical intervertebral disc with radiculopathy [M50.10] Status: Inpt. Admit order signed?: no, note on chart to Dr. Fiordaliza Null. PMH:  has a past medical history of Anemia; Anxiety; Arthritis; AS (aortic stenosis): mild to moderate by echo 4/2017; ASHD (arteriosclerotic heart disease); CAD (coronary artery disease); Chest pain; COPD (chronic obstructive pulmonary disease) ([facility] [address] Depression; DM (diabetes mellitus) ([facility] [address] Dyspnea; FH: CAD (coronary artery disease); GERD (gastroesophageal reflux disease); Heart burn; History of tobacco abuse: Quit in 2009; HLD (hyperlipidemia); HTN (hypertension); Irritable bowel syndrome; Liver disease; Metabolic syndrome; MI (myocardial infarction); Nausea & vomiting; Obesity; CHET (obstructive sleep apnea); S/P cardiac catheterization: 11/6/2014: 99% in-stent restenosis mid-RCA. LCx moderate LI's. LAD 85% mid-segment lesion.; S/P PTCA:  5/11/2004: Proximal and mid RCA  Taxus 3.5 X 20 mm, and Taxus 3.0 X 32 mm.; Systolic murmur; and Thyroid disease.   Procedure: REMOVAL OF PLATE J7-8, ACDF T7-1 W/ATLANTIS CORNERSTONE  Pertinent abnormal Imaging:X-ray of cervical spine  Medications:  Scheduled Meds:   risperiDONE  1 mg Oral BID    pantoprazole  40 mg Oral QAM AC    polyvinyl alcohol  1 drop Both Eyes BID    citalopram  40 mg Oral Daily    nitroGLYCERIN  1 patch Transdermal Daily    OXcarbazepine  150 mg Oral Daily    doxepin  150 mg Oral Nightly    montelukast  10 mg Oral Nightly    calcium-vitamin D  1 tablet Oral Daily    levothyroxine  100 mcg Oral QAM AC    amLODIPine  10 mg Oral Daily    lisinopril  20 mg Oral BID    metoprolol tartrate  50 mg Oral BID    ranolazine  500 mg Oral BID    sodium chloride flush  10 mL Intravenous 2 times per day    docusate sodium  100 mg Oral BID    insulin lispro  0-12 Units Subcutaneous TID WC    insulin lispro  0-6 Units Subcutaneous Nightly    atorvastatin  80 mg Oral Nightly    mometasone-formoterol  2 puff Inhalation BID    OXcarbazepine  300 mg Oral Nightly     Continuous Infusions:   sodium chloride 100 mL/hr at 11/15/17 2257    dextrose        Pertinent Info/Orders/Treatment Plan:  Consult to Hospitalist, DM management, IV fluids, prn Morphine, Percocet or Tylenol for pain, daily labs, Bi-pap, incision and drain care, cervical soft collar, oxygen, incentive spirometry, PT/OT to evaluate and treat. Diet: DIET CARB CONTROL;   DVT Prophylaxis: SCD's ordered  Smoking status:  reports that she quit smoking about 10 years ago. Her smoking use included Cigarettes. She has a 38.00 pack-year smoking history. She has never used smokeless tobacco.   Influenza Vaccination Screening Completed: yes  Pneumonia Vaccination Screening Completed: yes  Core measures: scip  SCIP core measures:  Surgery stop time:   BB within 24 hours of start time AND POD 1 or 2-Metoprolol  Atb last dose prior to 24 hours post-op (48 for CABG)-N/A  Correct DVT prophylaxis within 24 hours post-op-SCD's  Harper removed or reordered with reason by:No harper to address. PCP: Ronaldo Freitas CNP  Readmission:   No  Risk Score: 23.5   Discharge Plan: Nathalie Don is from home alone. Met with Nathalie Don, she is current with [facility] services. She has a nurse that comes weekly and aides come for two hours Mon-Fri., on Sat. And Sun. They come for an hour. Nathalie Don states her neighbor is her best friend. She comes over everyday and they help each other out. Her friend is staying at her house watching her dog for her.     Evaluation: yes

## 2017-11-16 NOTE — PROGRESS NOTES
[address] [address]  INPATIENT PHYSICAL THERAPY  EVALUATION  [facility] 4A    Time In: 827  Time Out: 9115  Timed Code Treatment Minutes: 10 Minutes  Minutes: 27        Date: 2017  Patient Name: Malini Harkins,  Gender:  female        MRN: [id number]  : 1945  (67 y.o.)      Referring Practitioner: Patria Jacinto MD  Diagnosis: Cervical spinal stenosis  Additional Pertinent Hx: Pt is a 67 yr old female admitted 11/15 s/p REMOVAL OF PLATE B5-2, ACDF O1-8 W/ATLANTIS CORNERSTONE. Past Medical History:   Diagnosis Date    Anemia     Anxiety     Arthritis     general    AS (aortic stenosis): mild to moderate by echo 2017    ASHD (arteriosclerotic heart disease)     CAD (coronary artery disease)     Chest pain     COPD (chronic obstructive pulmonary disease) (HCC)     Depression     DM (diabetes mellitus) ([facility] [address]     Dyspnea     FH: CAD (coronary artery disease)     GERD (gastroesophageal reflux disease)     Heart burn     History of tobacco abuse: Quit in 2009 10/28/2014    HLD (hyperlipidemia)     HTN (hypertension)     Irritable bowel syndrome     States has not had problem with this for years    Liver disease     fatty liver    Metabolic syndrome     MI (myocardial infarction)     Nausea & vomiting     Obesity     CHET (obstructive sleep apnea)     S/P cardiac catheterization: 2014: 99% in-stent restenosis mid-RCA. LCx moderate LI's. LAD 85% mid-segment lesion. 2014: 99% in-stent restenosis mid-RCA. LCx moderate LI's. LAD 85% mid-segment lesion. Dr. Telma Boykin S/P PTCA:  2004: Proximal and mid RCA  Taxus 3.5 X 20 mm, and Taxus 3.0 X 32 mm. 10/28/2014    2004: Proximal and mid RCA  Taxus 3.5 X 20 mm, and Taxus 3.0 X 32 mm.  Dr. Kemi Lombardo Systolic murmur     Thyroid disease      Past Surgical History:   Procedure Laterality Date    BACK SURGERY  2016        BLADDER SUSPENSION      BREAST History:    Lives With: Alone  Type of Home: Apartment  Home Layout: One level  Home Access: Level entry  Home Equipment: 4 wheeled walker, [address]: Independent  Transfer Assistance: Independent  Active : Yes  Additional Comments: Pt reports use of rollator prior to admission, neighbor comes over every morning and spends all day with pt, pt has aides 7x/week for 1-2 hours, assists with dressing, cleaning, showers. Pt has MOW's daily. Pt states her neighbor will be staying through the night for awhile upon DC home. Objective:  RLE PROM: WFL     LLE PROM: WFL    B LE's 4/5     RLE Tone: Normotonic  LLE Tone: Normotonic     Balance  Sitting - Static: Good  Sitting - Dynamic: Good  Standing - Static: Fair  Standing - Dynamic: Fair, -    Supine to Sit: Stand by assistance (HOB elevated, log rolling technique, use of bed rail)  Scooting: Stand by assistance    Transfers  Sit to Stand: Contact guard assistance  Stand to sit: Contact guard assistance     Ambulation 1  Surface: level tile  Device: Rolling Walker  Other Apparatus: O2  Assistance: Contact guard assistance  Quality of Gait: Pt amb with decreased jossy and step length, slightly unsteady with mild path deviations. Distance: 50 feet  Comments: Min-mod SOB after gait     Exercises:  Comments: Pt performs supine B LE AROM: ankle pumps, heel slides, hip abd/add and seated B LAQ AROM while edge of bed x 10 reps to increase strength for improved gait. Activity Tolerance:  Activity Tolerance: Patient Tolerated treatment well;Patient limited by endurance; Patient limited by fatigue    Treatment Initiated: See above exercises. Assessment: Body structures, Functions, Activity limitations: Decreased functional mobility , Decreased endurance, Decreased balance, Decreased strength  Assessment: Pt admitted s/p cervical surgery. Pt tolerates session fair, limited by impaired endurance, currently on 3 L O2.   Pt demonstrates impaired 3: Pt to ambulate 100 feet with RW SBA for household ambulation. Long term goals  Time Frame for Long term goals : NA due to short length of stay. Evaluation Complexity: Based on the findings of patient history, examination, clinical presentation, and decision making during this evaluation, the evaluation of Jacinta Victor  is of medium complexity. PT G-Codes  Functional Limitation: Mobility: Walking and moving around  Mobility: Walking and Moving Around Current Status (): At least 40 percent but less than 60 percent impaired, limited or restricted  Mobility: Walking and Moving Around Goal Status ():  At least 1 percent but less than 20 percent impaired, limited or restricted    AM-PAC Inpatient Mobility without Stair Climbing Raw Score : 15  AM-PAC Inpatient without Stair Climbing T-Scale Score : 43.03  Mobility Inpatient CMS 0-100% Score: 47.43  Mobility Inpatient without Stair CMS G-Code Modifier : CK

## 2017-11-16 NOTE — PROGRESS NOTES
[address]  INPATIENT OCCUPATIONAL THERAPY  [facility] 4A  EVALUATION    Time:  Time In: 9  Time Out: 1028  Timed Code Treatment Minutes: 11 Minutes  Minutes: 26          Date: 2017  Patient Name: Maria Elena Jesus,   Gender: female      MRN: [id number]  : 1945  (67 y.o.)  Referring Practitioner: Dr. Pruitt Sender  Diagnosis: cervical spinal stenosis  Additional Pertinent Hx: 67 yr old female admitted 11/15 s/p REMOVAL OF PLATE M8-1, ACDF E9-4 W/ATLANTIS CORNERSTONE. Symptoms of neck pain, loss of upper extremity strength,  radicular symptoms, & dull pain prior to admission. Restrictions/Precautions:  Restrictions/Precautions: General Precautions, Fall Risk, Surgical Protocols            Position Activity Restriction  Other position/activity restrictions: cervical precautions    Required Braces or Orthoses  Cervical: soft    Past Medical History:   Diagnosis Date    Anemia     Anxiety     Arthritis     general    AS (aortic stenosis): mild to moderate by echo 2017    ASHD (arteriosclerotic heart disease)     CAD (coronary artery disease)     Chest pain     COPD (chronic obstructive pulmonary disease) ([address]     Depression     DM (diabetes mellitus) ([address]     Dyspnea     FH: CAD (coronary artery disease)     GERD (gastroesophageal reflux disease)     Heart burn     History of tobacco abuse: Quit in 2009 10/28/2014    HLD (hyperlipidemia)     HTN (hypertension)     Irritable bowel syndrome     States has not had problem with this for years    Liver disease     fatty liver    Metabolic syndrome     MI (myocardial infarction)     Nausea & vomiting     Obesity     CHET (obstructive sleep apnea)     S/P cardiac catheterization: 2014: 99% in-stent restenosis mid-RCA. LCx moderate LI's. LAD 85% mid-segment lesion. 2014: 99% in-stent restenosis mid-RCA. LCx moderate LI's. LAD 85% mid-segment lesion.  Dr. Ej Gomez S/P PTCA:  5/11/2004: Proximal and mid RCA  Taxus 3.5 X 20 mm, and Taxus 3.0 X 32 mm. 10/28/2014    5/11/2004: Proximal and mid RCA  Taxus 3.5 X 20 mm, and Taxus 3.0 X 32 mm. Dr. Faustino Marx Systolic murmur 43/26/8314    Thyroid disease      Past Surgical History:   Procedure Laterality Date    BACK SURGERY  08/2016        BLADDER SUSPENSION      BREAST BIOPSY  10/10/2017    BREAST LUMPECTOMY      CARDIAC CATHETERIZATION  8-11-06    Mild nonobstructive CAD w/ diffuse 10-20% proximal and mid LAD stenosis and 10-20% proximal/ostial RCA stenosis. No obstructive lesions. RCA stents are patent w/o evidence of restenosis. Normal LV systolic function. EF 65%. Trace MR - catheter induced. Minimally elevated LVEDP - 13mmHg. Mild to moderate aortoiliac PVD w/o obstructive lesions.  CARDIAC CATHETERIZATION  5-11-04    Successful drug-eluting stent x 2 of proximal and mid RCA.  CARDIAC CATHETERIZATION  5-11-04    70-80% proximal RCA stenosis w/ 50-70% mid RCA stenosis. There is 50-60% lesion in mid PDA. Mild proximal and mid LAD disease. Mild circumflex disease. Normal LV size and systolic function. EF 60%.  CARDIOVASCULAR STRESS TEST  5-10-11    No evidence of stress induced ischemia. EF 75%.  CARDIOVASCULAR STRESS TEST  5-10-10    No evidence of stress induced ischemia, infarct or scar. EF 74%.     CERVICAL FUSION N/A 11/15/2017    REMOVAL OF PLATE H6-7, ACDF Q3-3 W/ATLANTIS CORNERSTONE performed by Boris Da Silva MD at [address] [place] [address], DIAGNOSTIC      FOOT SURGERY      HERNIA REPAIR      NECK SURGERY  FEB 2016    PARTIAL HYSTERECTOMY      UPPER GASTROINTESTINAL ENDOSCOPY      UPPER GASTROINTESTINAL ENDOSCOPY             Subjective  Chart Reviewed: Yes (orders, progress notes)  Patient assessed for rehabilitation services?: Yes  Family / Caregiver Present: No    Subjective: cooperative, requesting to get back to bed    General:   Vision: Within

## 2017-11-16 NOTE — TELEPHONE ENCOUNTER
----- Message from Michelle Boyd CNP sent at 11/15/2017  5:32 PM EST -----  Pt currently admitted for surgery but she will  Need called to let her know her tsh is elevated over 10. HEr last level was 4. Please ask her what dose of thyroid med she has been taking and if she is taking it one hour apart form her other meds and food.

## 2017-11-16 NOTE — CARE COORDINATION
DISCHARGE BARRIERS  11/16/17, 2:10 PM    Reason for Referral: Concepción Alfaro is current with [facility]. Mental Status: Answered all questions appropriately. Decision Making: Independent with decision making. Family/Social/Home Environment: Lives at home alone. She has a very supportive friend who is with her from the time she wakes up until she goes to bed. Current Services: [facility] - aid services 2 hours a day Monday- Friday and one hour a day on Saturdays. She has a nurse once a week. SULY verified services with Liz at [facility]. SULY called and left a message for patient's Passport DEM[facility] [phone]  Current Equipment:walker, wheelchair, cane, shower bench, lift chair, ERS  Payment Source:Medicare and Medicaid  Concerns or Barriers to Discharge: Patient does not do much at home and may need encouragement to do any therapy. Collabrative List of ECF/HH were provided:Current with [facility]    Teach Back Method used with Concepción Alfaro regarding care plan and discharge planning. Patient verbalize understanding of the plan of care and contribute to goal setting. Anticipated Needs/Discharge Plan: Concepción Alfaro plans to return home with help from her friend/ neighbor. She is agreeable to [facility] resuming including nursing and aid services. She is agreeable to adding therapies if recommended. She is also current with [employer]. Update 3:26pm: Spoke with Mark Aleman from [employer]\[facility]\"". She told  that Concepción Alfaro has [address] for meals, home delivered incontinence supplies and [facility]. Will call  when Concepción Alfaro is discharged. Electronically signed by PAKO Acosta on 11/16/2017 at 2:10 PM

## 2017-11-17 ENCOUNTER — APPOINTMENT (OUTPATIENT)
Dept: INTERVENTIONAL RADIOLOGY/VASCULAR | Age: 72
DRG: 471 | End: 2017-11-17
Attending: ORTHOPAEDIC SURGERY
Payer: MEDICARE

## 2017-11-17 ENCOUNTER — APPOINTMENT (OUTPATIENT)
Dept: CT IMAGING | Age: 72
DRG: 471 | End: 2017-11-17
Attending: ORTHOPAEDIC SURGERY
Payer: MEDICARE

## 2017-11-17 LAB
ALLEN TEST: POSITIVE
ALLEN TEST: POSITIVE
ANION GAP SERPL CALCULATED.3IONS-SCNC: 13 MEQ/L (ref 8–16)
ANISOCYTOSIS: ABNORMAL
BASE EXCESS (CALCULATED): -1.2 MMOL/L (ref -2.5–2.5)
BASE EXCESS (CALCULATED): -1.4 MMOL/L (ref -2.5–2.5)
BASOPHILS # BLD: 0.2 %
BASOPHILS ABSOLUTE: 0 THOU/MM3 (ref 0–0.1)
BUN BLDV-MCNC: 11 MG/DL (ref 7–22)
CALCIUM SERPL-MCNC: 8.4 MG/DL (ref 8.5–10.5)
CHLORIDE BLD-SCNC: 92 MEQ/L (ref 98–111)
CO2: 22 MEQ/L (ref 23–33)
COLLECTED BY:: ABNORMAL
COLLECTED BY:: ABNORMAL
CREAT SERPL-MCNC: 0.6 MG/DL (ref 0.4–1.2)
D-DIMER QUANTITATIVE: 394 NG/ML FEU (ref 0–500)
DEVICE: ABNORMAL
DEVICE: ABNORMAL
EKG ATRIAL RATE: 94 BPM
EKG P AXIS: 61 DEGREES
EKG P-R INTERVAL: 180 MS
EKG Q-T INTERVAL: 350 MS
EKG QRS DURATION: 82 MS
EKG QTC CALCULATION (BAZETT): 437 MS
EKG R AXIS: 14 DEGREES
EKG T AXIS: 38 DEGREES
EKG VENTRICULAR RATE: 94 BPM
EOSINOPHIL # BLD: 2.1 %
EOSINOPHILS ABSOLUTE: 0.2 THOU/MM3 (ref 0–0.4)
GFR SERPL CREATININE-BSD FRML MDRD: > 90 ML/MIN/1.73M2
GLUCOSE BLD-MCNC: 127 MG/DL (ref 70–108)
GLUCOSE BLD-MCNC: 138 MG/DL (ref 70–108)
GLUCOSE BLD-MCNC: 143 MG/DL (ref 70–108)
GLUCOSE BLD-MCNC: 150 MG/DL (ref 70–108)
GLUCOSE BLD-MCNC: 181 MG/DL (ref 70–108)
HCO3: 25 MMOL/L (ref 23–28)
HCO3: 25 MMOL/L (ref 23–28)
HCT VFR BLD CALC: 31.9 % (ref 37–47)
HEMOGLOBIN: 10.4 GM/DL (ref 12–16)
IFIO2: 4
IFIO2: 40
INR BLD: 1.05 (ref 0.85–1.13)
LYMPHOCYTES # BLD: 12.1 %
LYMPHOCYTES ABSOLUTE: 0.9 THOU/MM3 (ref 1–4.8)
MCH RBC QN AUTO: 27.6 PG (ref 27–31)
MCHC RBC AUTO-ENTMCNC: 32.7 GM/DL (ref 33–37)
MCV RBC AUTO: 84.4 FL (ref 81–99)
MONOCYTES # BLD: 8.7 %
MONOCYTES ABSOLUTE: 0.7 THOU/MM3 (ref 0.4–1.3)
MRSA SCREEN RT-PCR: NEGATIVE
NUCLEATED RED BLOOD CELLS: 0 /100 WBC
O2 SATURATION: 86 %
O2 SATURATION: 92 %
PCO2: 45 MMHG (ref 35–45)
PCO2: 46 MMHG (ref 35–45)
PDW BLD-RTO: 15.3 % (ref 11.5–14.5)
PH BLOOD GAS: 7.33 (ref 7.35–7.45)
PH BLOOD GAS: 7.34 (ref 7.35–7.45)
PLATELET # BLD: 185 THOU/MM3 (ref 130–400)
PMV BLD AUTO: 7.7 MCM (ref 7.4–10.4)
PO2: 55 MMHG (ref 71–104)
PO2: 68 MMHG (ref 71–104)
POTASSIUM SERPL-SCNC: 4.7 MEQ/L (ref 3.5–5.2)
PRO-BNP: 262.6 PG/ML (ref 0–900)
PROCALCITONIN: 0.14 NG/ML (ref 0.01–0.09)
RBC # BLD: 3.77 MILL/MM3 (ref 4.2–5.4)
SEG NEUTROPHILS: 76.9 %
SEGMENTED NEUTROPHILS ABSOLUTE COUNT: 6 THOU/MM3 (ref 1.8–7.7)
SODIUM BLD-SCNC: 127 MEQ/L (ref 135–145)
SODIUM BLD-SCNC: 130 MEQ/L (ref 135–145)
SOURCE, BLOOD GAS: ABNORMAL
SOURCE, BLOOD GAS: ABNORMAL
TROPONIN T: 0.01 NG/ML
TROPONIN T: 0.03 NG/ML
TROPONIN T: 0.06 NG/ML
WBC # BLD: 7.8 THOU/MM3 (ref 4.8–10.8)

## 2017-11-17 PROCEDURE — 6370000000 HC RX 637 (ALT 250 FOR IP): Performed by: PHYSICIAN ASSISTANT

## 2017-11-17 PROCEDURE — 80048 BASIC METABOLIC PNL TOTAL CA: CPT

## 2017-11-17 PROCEDURE — 82948 REAGENT STRIP/BLOOD GLUCOSE: CPT

## 2017-11-17 PROCEDURE — 82803 BLOOD GASES ANY COMBINATION: CPT

## 2017-11-17 PROCEDURE — 99291 CRITICAL CARE FIRST HOUR: CPT | Performed by: INTERNAL MEDICINE

## 2017-11-17 PROCEDURE — 84295 ASSAY OF SERUM SODIUM: CPT

## 2017-11-17 PROCEDURE — 2500000003 HC RX 250 WO HCPCS

## 2017-11-17 PROCEDURE — C1894 INTRO/SHEATH, NON-LASER: HCPCS

## 2017-11-17 PROCEDURE — 94761 N-INVAS EAR/PLS OXIMETRY MLT: CPT

## 2017-11-17 PROCEDURE — 85379 FIBRIN DEGRADATION QUANT: CPT

## 2017-11-17 PROCEDURE — 93970 EXTREMITY STUDY: CPT

## 2017-11-17 PROCEDURE — 94640 AIRWAY INHALATION TREATMENT: CPT

## 2017-11-17 PROCEDURE — 6370000000 HC RX 637 (ALT 250 FOR IP): Performed by: ORTHOPAEDIC SURGERY

## 2017-11-17 PROCEDURE — 93005 ELECTROCARDIOGRAM TRACING: CPT

## 2017-11-17 PROCEDURE — 2000000000 HC ICU R&B

## 2017-11-17 PROCEDURE — 2580000003 HC RX 258: Performed by: ORTHOPAEDIC SURGERY

## 2017-11-17 PROCEDURE — 6360000004 HC RX CONTRAST MEDICATION: Performed by: FAMILY MEDICINE

## 2017-11-17 PROCEDURE — 6360000004 HC RX CONTRAST MEDICATION: Performed by: RADIOLOGY

## 2017-11-17 PROCEDURE — 36415 COLL VENOUS BLD VENIPUNCTURE: CPT

## 2017-11-17 PROCEDURE — 06H03DZ INSERTION OF INTRALUMINAL DEVICE INTO INFERIOR VENA CAVA, PERCUTANEOUS APPROACH: ICD-10-PCS | Performed by: RADIOLOGY

## 2017-11-17 PROCEDURE — B32S1ZZ COMPUTERIZED TOMOGRAPHY (CT SCAN) OF RIGHT PULMONARY ARTERY USING LOW OSMOLAR CONTRAST: ICD-10-PCS | Performed by: RADIOLOGY

## 2017-11-17 PROCEDURE — 37191 INS ENDOVAS VENA CAVA FILTR: CPT | Performed by: RADIOLOGY

## 2017-11-17 PROCEDURE — 85610 PROTHROMBIN TIME: CPT

## 2017-11-17 PROCEDURE — 84484 ASSAY OF TROPONIN QUANT: CPT

## 2017-11-17 PROCEDURE — 87081 CULTURE SCREEN ONLY: CPT

## 2017-11-17 PROCEDURE — 83880 ASSAY OF NATRIURETIC PEPTIDE: CPT

## 2017-11-17 PROCEDURE — 2700000000 HC OXYGEN THERAPY PER DAY

## 2017-11-17 PROCEDURE — 6360000002 HC RX W HCPCS: Performed by: ORTHOPAEDIC SURGERY

## 2017-11-17 PROCEDURE — B32T1ZZ COMPUTERIZED TOMOGRAPHY (CT SCAN) OF LEFT PULMONARY ARTERY USING LOW OSMOLAR CONTRAST: ICD-10-PCS | Performed by: RADIOLOGY

## 2017-11-17 PROCEDURE — 6370000000 HC RX 637 (ALT 250 FOR IP): Performed by: INTERNAL MEDICINE

## 2017-11-17 PROCEDURE — 93307 TTE W/O DOPPLER COMPLETE: CPT

## 2017-11-17 PROCEDURE — 71275 CT ANGIOGRAPHY CHEST: CPT

## 2017-11-17 PROCEDURE — 2500000003 HC RX 250 WO HCPCS: Performed by: INTERNAL MEDICINE

## 2017-11-17 PROCEDURE — B3201ZZ COMPUTERIZED TOMOGRAPHY (CT SCAN) OF THORACIC AORTA USING LOW OSMOLAR CONTRAST: ICD-10-PCS | Performed by: RADIOLOGY

## 2017-11-17 PROCEDURE — 36600 WITHDRAWAL OF ARTERIAL BLOOD: CPT

## 2017-11-17 PROCEDURE — C1769 GUIDE WIRE: HCPCS

## 2017-11-17 PROCEDURE — 6360000002 HC RX W HCPCS

## 2017-11-17 PROCEDURE — C1880 VENA CAVA FILTER: HCPCS

## 2017-11-17 PROCEDURE — 84145 PROCALCITONIN (PCT): CPT

## 2017-11-17 PROCEDURE — 85025 COMPLETE CBC W/AUTO DIFF WBC: CPT

## 2017-11-17 PROCEDURE — 87641 MR-STAPH DNA AMP PROBE: CPT

## 2017-11-17 RX ORDER — CYCLOSPORINE 0.5 MG/ML
1 EMULSION OPHTHALMIC 2 TIMES DAILY
COMMUNITY
End: 2018-01-01 | Stop reason: ALTCHOICE

## 2017-11-17 RX ORDER — ROSUVASTATIN CALCIUM 20 MG/1
20 TABLET, COATED ORAL DAILY
COMMUNITY
End: 2018-01-01

## 2017-11-17 RX ORDER — HYDROXYZINE HYDROCHLORIDE 25 MG/1
25 TABLET, FILM COATED ORAL 3 TIMES DAILY
COMMUNITY

## 2017-11-17 RX ORDER — DOCUSATE SODIUM 100 MG/1
300 CAPSULE, LIQUID FILLED ORAL DAILY
Status: DISCONTINUED | OUTPATIENT
Start: 2017-11-17 | End: 2017-11-21 | Stop reason: HOSPADM

## 2017-11-17 RX ORDER — POLYVINYL ALCOHOL 14 MG/ML
1 SOLUTION/ DROPS OPHTHALMIC PRN
COMMUNITY
End: 2019-01-01

## 2017-11-17 RX ORDER — LABETALOL HYDROCHLORIDE 5 MG/ML
20 INJECTION, SOLUTION INTRAVENOUS EVERY 4 HOURS PRN
Status: DISCONTINUED | OUTPATIENT
Start: 2017-11-17 | End: 2017-11-18

## 2017-11-17 RX ORDER — ALPRAZOLAM 0.5 MG/1
0.5 TABLET ORAL 2 TIMES DAILY PRN
Status: DISCONTINUED | OUTPATIENT
Start: 2017-11-17 | End: 2017-11-18

## 2017-11-17 RX ORDER — PROPOFOL 10 MG/ML
INJECTION, EMULSION INTRAVENOUS
Status: DISCONTINUED
Start: 2017-11-17 | End: 2017-11-18

## 2017-11-17 RX ADMIN — LEVOTHYROXINE SODIUM 100 MCG: 100 TABLET ORAL at 05:08

## 2017-11-17 RX ADMIN — Medication 2 UNITS: at 16:46

## 2017-11-17 RX ADMIN — MONTELUKAST SODIUM 10 MG: 10 TABLET, FILM COATED ORAL at 20:56

## 2017-11-17 RX ADMIN — ATORVASTATIN CALCIUM 80 MG: 80 TABLET, FILM COATED ORAL at 20:57

## 2017-11-17 RX ADMIN — CYCLOBENZAPRINE 10 MG: 10 TABLET, FILM COATED ORAL at 16:59

## 2017-11-17 RX ADMIN — OXYCODONE HYDROCHLORIDE AND ACETAMINOPHEN 1 TABLET: 5; 325 TABLET ORAL at 16:59

## 2017-11-17 RX ADMIN — DOXEPIN HYDROCHLORIDE 150 MG: 50 CAPSULE ORAL at 20:57

## 2017-11-17 RX ADMIN — OXCARBAZEPINE 150 MG: 300 TABLET ORAL at 09:16

## 2017-11-17 RX ADMIN — METOPROLOL TARTRATE 50 MG: 50 TABLET, FILM COATED ORAL at 20:56

## 2017-11-17 RX ADMIN — CYCLOBENZAPRINE 10 MG: 10 TABLET, FILM COATED ORAL at 05:09

## 2017-11-17 RX ADMIN — LABETALOL HYDROCHLORIDE 20 MG: 5 INJECTION INTRAVENOUS at 08:56

## 2017-11-17 RX ADMIN — DOCUSATE SODIUM 100 MG: 100 CAPSULE ORAL at 09:16

## 2017-11-17 RX ADMIN — Medication 10 ML: at 09:17

## 2017-11-17 RX ADMIN — LISINOPRIL 20 MG: 20 TABLET ORAL at 09:16

## 2017-11-17 RX ADMIN — OXYCODONE HYDROCHLORIDE AND ACETAMINOPHEN 2 TABLET: 5; 325 TABLET ORAL at 07:41

## 2017-11-17 RX ADMIN — MORPHINE SULFATE 2 MG: 2 INJECTION, SOLUTION INTRAMUSCULAR; INTRAVENOUS at 12:22

## 2017-11-17 RX ADMIN — CALCIUM CARBONATE-VITAMIN D TAB 500 MG-200 UNIT 1 TABLET: 500-200 TAB at 09:16

## 2017-11-17 RX ADMIN — TAZOBACTAM SODIUM AND PIPERACILLIN SODIUM 3.38 G: 375; 3 INJECTION, SOLUTION INTRAVENOUS at 13:24

## 2017-11-17 RX ADMIN — RANOLAZINE 500 MG: 500 TABLET, FILM COATED, EXTENDED RELEASE ORAL at 09:16

## 2017-11-17 RX ADMIN — POLYVINYL ALCOHOL 1 DROP: 14 SOLUTION/ DROPS OPHTHALMIC at 20:58

## 2017-11-17 RX ADMIN — RISPERIDONE 1 MG: 1 TABLET ORAL at 09:16

## 2017-11-17 RX ADMIN — Medication 10 ML: at 21:34

## 2017-11-17 RX ADMIN — PANTOPRAZOLE SODIUM 40 MG: 40 TABLET, DELAYED RELEASE ORAL at 05:08

## 2017-11-17 RX ADMIN — AMLODIPINE BESYLATE 10 MG: 10 TABLET ORAL at 09:16

## 2017-11-17 RX ADMIN — Medication 2 PUFF: at 07:43

## 2017-11-17 RX ADMIN — RISPERIDONE 1 MG: 1 TABLET ORAL at 21:36

## 2017-11-17 RX ADMIN — Medication 2 PUFF: at 21:39

## 2017-11-17 RX ADMIN — OXYCODONE HYDROCHLORIDE AND ACETAMINOPHEN 1 TABLET: 5; 325 TABLET ORAL at 03:10

## 2017-11-17 RX ADMIN — CITALOPRAM 40 MG: 40 TABLET ORAL at 09:16

## 2017-11-17 RX ADMIN — RANOLAZINE 500 MG: 500 TABLET, FILM COATED, EXTENDED RELEASE ORAL at 20:57

## 2017-11-17 RX ADMIN — IOVERSOL 30 ML: 678 INJECTION INTRA-ARTERIAL; INTRAVENOUS at 11:50

## 2017-11-17 RX ADMIN — IOPAMIDOL 80 ML: 755 INJECTION, SOLUTION INTRAVENOUS at 03:33

## 2017-11-17 RX ADMIN — INSULIN LISPRO 1 UNITS: 100 INJECTION, SOLUTION INTRAVENOUS; SUBCUTANEOUS at 21:10

## 2017-11-17 RX ADMIN — OXCARBAZEPINE 300 MG: 300 TABLET ORAL at 20:57

## 2017-11-17 RX ADMIN — POLYVINYL ALCOHOL 1 DROP: 14 SOLUTION/ DROPS OPHTHALMIC at 09:16

## 2017-11-17 RX ADMIN — LISINOPRIL 20 MG: 20 TABLET ORAL at 20:56

## 2017-11-17 RX ADMIN — TAZOBACTAM SODIUM AND PIPERACILLIN SODIUM 3.38 G: 375; 3 INJECTION, SOLUTION INTRAVENOUS at 21:33

## 2017-11-17 RX ADMIN — HYDRALAZINE HYDROCHLORIDE 100 MG: 50 TABLET, FILM COATED ORAL at 05:07

## 2017-11-17 RX ADMIN — DOCUSATE SODIUM 200 MG: 100 CAPSULE, LIQUID FILLED ORAL at 13:27

## 2017-11-17 ASSESSMENT — PAIN DESCRIPTION - DESCRIPTORS
DESCRIPTORS: ACHING;DISCOMFORT
DESCRIPTORS: ACHING

## 2017-11-17 ASSESSMENT — PAIN DESCRIPTION - LOCATION
LOCATION: NECK
LOCATION: NECK

## 2017-11-17 ASSESSMENT — PAIN DESCRIPTION - PAIN TYPE
TYPE: SURGICAL PAIN
TYPE: SURGICAL PAIN

## 2017-11-17 ASSESSMENT — PAIN SCALES - GENERAL
PAINLEVEL_OUTOF10: 10
PAINLEVEL_OUTOF10: 0
PAINLEVEL_OUTOF10: 10
PAINLEVEL_OUTOF10: 0
PAINLEVEL_OUTOF10: 4
PAINLEVEL_OUTOF10: 7
PAINLEVEL_OUTOF10: 10
PAINLEVEL_OUTOF10: 8
PAINLEVEL_OUTOF10: 0
PAINLEVEL_OUTOF10: 10
PAINLEVEL_OUTOF10: 7
PAINLEVEL_OUTOF10: 0

## 2017-11-17 ASSESSMENT — PAIN DESCRIPTION - ORIENTATION
ORIENTATION: ANTERIOR
ORIENTATION: MID

## 2017-11-17 ASSESSMENT — PAIN DESCRIPTION - PROGRESSION: CLINICAL_PROGRESSION: GRADUALLY WORSENING

## 2017-11-17 ASSESSMENT — PAIN DESCRIPTION - ONSET: ONSET: ON-GOING

## 2017-11-17 ASSESSMENT — PAIN DESCRIPTION - FREQUENCY: FREQUENCY: CONTINUOUS

## 2017-11-17 NOTE — PROGRESS NOTES
12 Pt in specials radiology for IVC filter insertion. Explained procedure to pt and pt verbalizes understanding. Consent signed. 1110 Moved to table and attached to monitor. 1128 Right groin prepped and draped. 307 [facility] Dr Loly Black to speak to pt.  [address] filter deployed in [facility] per Dr Loly Black. Pt tolerated well. 1150 Sheath in right femoral vein pulled and manual pressure applied per J Hi RT. NO bleeding or hematoma noted. 1156 Manual pressure removed. No bleeding noted. Band-aid applied. 1157 Pt positioned on bed for comfort. Report to RN. 1158 Transferred to  per bed. Condition unchanged.

## 2017-11-17 NOTE — PROGRESS NOTES
[address] [facility] THERAPY MISSED TREATMENT NOTE  [facility] ICU 4D  4D-17/017-A      Date: 2017  Patient Name: Erin Clements        CSN: [id number]   : 1945  (67 y.o.)  Gender: female   Referring Practitioner: Dr. Maricruz Hdz  Diagnosis: cervical spinal stenosis         REASON FOR MISSED TREATMENT:  Hold treatment per nursing request.  Per Rn report Pt has PE and is actively being transferred to ICU.

## 2017-11-17 NOTE — CARE COORDINATION
11/17/17, 7:16 AM    DISCHARGE BARRIERS        Patient transferred to [facility]. Report given to unit Kateryna Johnson, regarding discharge plan for this patient.

## 2017-11-17 NOTE — CONSULTS
Years: 38.00     Types: Cigarettes     Quit date: 1/31/2007    Smokeless tobacco: Never Used    Alcohol use No        Scheduled Meds:   risperiDONE  1 mg Oral BID    pantoprazole  40 mg Oral QAM AC    polyvinyl alcohol  1 drop Both Eyes BID    citalopram  40 mg Oral Daily    nitroGLYCERIN  1 patch Transdermal Daily    OXcarbazepine  150 mg Oral Daily    doxepin  150 mg Oral Nightly    montelukast  10 mg Oral Nightly    calcium-vitamin D  1 tablet Oral Daily    levothyroxine  100 mcg Oral QAM AC    amLODIPine  10 mg Oral Daily    lisinopril  20 mg Oral BID    metoprolol tartrate  50 mg Oral BID    ranolazine  500 mg Oral BID    sodium chloride flush  10 mL Intravenous 2 times per day    docusate sodium  100 mg Oral BID    insulin lispro  0-12 Units Subcutaneous TID WC    insulin lispro  0-6 Units Subcutaneous Nightly    atorvastatin  80 mg Oral Nightly    mometasone-formoterol  2 puff Inhalation BID    OXcarbazepine  300 mg Oral Nightly       Continuous Infusions:   sodium chloride Stopped (11/17/17 0458)    dextrose         PRN Meds:  ALPRAZolam, labetalol, albuterol sulfate HFA, nitroGLYCERIN, benzocaine, sodium chloride flush, acetaminophen, acetaminophen, cyclobenzaprine, magnesium hydroxide, ondansetron, oxyCODONE-acetaminophen **OR** oxyCODONE-acetaminophen, glucose, dextrose, glucagon (rDNA), dextrose, morphine **OR** morphine, hydrALAZINE    ALLERGIES:  Patient is allergic to codeine; lipitor [atorvastatin]; and motrin [ibuprofen micronized]. REVIEW OF SYSTEMS:  Review of Systems   Unable to perform ROS: Severe respiratory distress       Objective:   PHYSICAL EXAM:  BP (!) 163/78   Pulse 86   Temp 97.9 °F (36.6 °C) (Oral)   Resp 20   Ht 5' 6\" (1.676 m)   Wt 238 lb 12.1 oz (108.3 kg)   SpO2 93%   BMI 38.54 kg/m²    Physical Exam   Constitutional: She appears well-developed and well-nourished. She appears distressed. HENT:   Head: Normocephalic and atraumatic. Mouth/Throat: Oropharynx is clear and moist. No oropharyngeal exudate. Eyes: EOM are normal. Pupils are equal, round, and reactive to light. Cardiovascular: Normal heart sounds. Exam reveals no gallop and no friction rub. No murmur heard. Pulmonary/Chest: She is in respiratory distress. She has no wheezes. She has rales. Equal chest rise and expansion bilaterally   Abdominal: Soft. Bowel sounds are normal. She exhibits no distension. There is no tenderness. Musculoskeletal: Normal range of motion. She exhibits no edema. Neurological: She is alert. No cranial nerve deficit. CN 2-12 grossly intact   Skin: Skin is warm and dry. No rash noted. She is not diaphoretic. Data Reviewed:   LABS:  CBC:   Recent Labs      11/15/17   1425  11/16/17   0529  11/17/17   0315   WBC  7.5  5.5  7.8   HGB  11.7*  10.8*  10.4*   HCT  36.0*  31.8*  31.9*   MCV  84.5  84.4  84.4   PLT  203  167  185     BMP:   Recent Labs      11/15/17   2033  11/16/17   0529  11/17/17   0315  11/17/17   0629   NA  131*  132*  127*  130*   K  4.6  4.3  4.7   --    CL  92*  94*  92*   --    CO2  26  24  22*   --    BUN  10  13  11   --    CREATININE  0.8  0.7  0.6   --      LIVER PROFILE:   Recent Labs      11/15/17   1425  11/15/17   2033   AST  136*  122*   ALT  99*  85*   BILITOT  0.2*  0.2*   ALKPHOS  58  50     PT/INR:   Recent Labs      11/17/17 0629   INR  1.05     APTT: No results for input(s): APTT in the last 72 hours. UA:No results for input(s): NITRITE, COLORU, PHUR, LABCAST, WBCUA, RBCUA, MUCUS, TRICHOMONAS, YEAST, BACTERIA, CLARITYU, SPECGRAV, LEUKOCYTESUR, UROBILINOGEN, BILIRUBINUR, BLOODU, GLUCOSEU, AMORPHOUS in the last 72 hours. Invalid input(s): KETONESU  No results for input(s): PHART, TPA3NGJ, PO2ART in the last 72 hours.     Vent Information  Vt Exhaled: 836 mL  FiO2 : 70 %     CTA chest personally reviewed, basilar atelectasis and right sided filling defect compatible with acute PE          Assessment:     1. Acute PE, provoked in the setting of recent surgery, submassive  2. Acute hypoxic respiratory failure  3. Basilar atelectasis vs new infiltrate  4. HTN poorly controlled    Plan:      -Agree with IVC filter, once surgically cleared can start anticoagulation and remove filter  -Lower extremity duplex and echo pending  -Will switch to high flow nasal cannula since she is unable to tolerate full face venti mask.  -Start on empiric Zosyn since her procalcitonin is elevated. -Continue ICU monitoring until hypoxia improves.      I spent over 30 mins of critical care time involved in this patient's care    Damian Crews MD

## 2017-11-17 NOTE — CARE COORDINATION
11/17/17, 8:06 AM      Ethan Vasquez day: 2  Location: [address] [place] [address]-A Reason for admit: Cervical spinal stenosis [M48.02]  Herniation of cervical intervertebral disc with radiculopathy [M50.10]   Procedure: 11/15/17 surgery by Dr Brandon Lozano:   Removal of anterior cervical spine plate levels of C5, C6, and C7      including plate and fixed bone screws. 2.  Assessment of cervical fusion of levels of C5-C6 and C6-C7 with finding      of some adhesions at each of these two levels. 3.  Anterior cervical diskectomies of levels of C3-C4 and C4-C5 with      complete uncus decompression and clearance of canals of each of these 2      levels. 4.  Anterior cervical interbody fusion of levels of C3-C4 and C4-C5. 5.  Use of allograft bone graft, tricortical in structure, for interbody      fusion of each of these levels, Medtronic Cornerstone. 6.  Anterior cervical spine plating of levels of C3, C4, and C5 with use of      a 40-mm Medtronic Atlantis Elite plate attached to bone with a total of 6      screws.     Treatment Plan of Care: Being transferred to ICU per hospitalist. Has PE. Cervical collar. N/V checks. I&O. Monitor wound drain output. Pain management. Monitor labs and VS.    Core Measures: vte  PCP: Jorge Farnsworth CNP  Discharge Plan: Lisa Mena plans to return home with help from friend/ neighbor. She has Passport services and is agreeable to Interim resuming nursing and aid services and adding therapies if needed. Gets home delivered meals. SW on case. Report called to Liz ICU CM.

## 2017-11-17 NOTE — PROGRESS NOTES
Pharmacy Medication History Note      List of current medications patient is taking is complete. Source of information: RxNT    Changes made to medication list:  Medications removed (include reason, ex. therapy complete or physician discontinued):  · None    Medications added/doses adjusted:  · Added Hydroxyzine 25 mg TID  · Added Restasis 0.05% 1 drop both eyes BID  · Changed docusate to 100 mg TID    Other notes (ex. Recent course of antibiotics, Coumadin dosing):  · Denies use of other OTC or herbal medications.       Allergies reviewed      Electronically signed by Jl [facility] on 11/17/2017 at 4:26 PM

## 2017-11-18 ENCOUNTER — APPOINTMENT (OUTPATIENT)
Dept: GENERAL RADIOLOGY | Age: 72
DRG: 471 | End: 2017-11-18
Attending: ORTHOPAEDIC SURGERY
Payer: MEDICARE

## 2017-11-18 LAB
ALLEN TEST: POSITIVE
ANION GAP SERPL CALCULATED.3IONS-SCNC: 10 MEQ/L (ref 8–16)
BASE EXCESS (CALCULATED): 3.8 MMOL/L (ref -2.5–2.5)
BASOPHILS # BLD: 0.5 %
BASOPHILS ABSOLUTE: 0 THOU/MM3 (ref 0–0.1)
BUN BLDV-MCNC: 9 MG/DL (ref 7–22)
CALCIUM SERPL-MCNC: 8.8 MG/DL (ref 8.5–10.5)
CHLORIDE BLD-SCNC: 92 MEQ/L (ref 98–111)
CO2: 26 MEQ/L (ref 23–33)
COLLECTED BY:: ABNORMAL
CREAT SERPL-MCNC: 0.6 MG/DL (ref 0.4–1.2)
DEVICE: ABNORMAL
EOSINOPHIL # BLD: 3.3 %
EOSINOPHILS ABSOLUTE: 0.2 THOU/MM3 (ref 0–0.4)
GFR SERPL CREATININE-BSD FRML MDRD: > 90 ML/MIN/1.73M2
GLUCOSE BLD-MCNC: 141 MG/DL (ref 70–108)
GLUCOSE BLD-MCNC: 171 MG/DL (ref 70–108)
GLUCOSE BLD-MCNC: 183 MG/DL (ref 70–108)
GLUCOSE BLD-MCNC: 184 MG/DL (ref 70–108)
GLUCOSE BLD-MCNC: 197 MG/DL (ref 70–108)
HCO3: 28 MMOL/L (ref 23–28)
HCT VFR BLD CALC: 31.1 % (ref 37–47)
HEMOGLOBIN: 10.3 GM/DL (ref 12–16)
IFIO2: 70
INR BLD: 1.14 (ref 0.85–1.13)
LYMPHOCYTES # BLD: 17.8 %
LYMPHOCYTES ABSOLUTE: 1 THOU/MM3 (ref 1–4.8)
MCH RBC QN AUTO: 27.5 PG (ref 27–31)
MCHC RBC AUTO-ENTMCNC: 33 GM/DL (ref 33–37)
MCV RBC AUTO: 83.3 FL (ref 81–99)
MONOCYTES # BLD: 9.3 %
MONOCYTES ABSOLUTE: 0.5 THOU/MM3 (ref 0.4–1.3)
NUCLEATED RED BLOOD CELLS: 0 /100 WBC
O2 SATURATION: 96 %
PCO2: 42 MMHG (ref 35–45)
PDW BLD-RTO: 14.4 % (ref 11.5–14.5)
PH BLOOD GAS: 7.44 (ref 7.35–7.45)
PLATELET # BLD: 191 THOU/MM3 (ref 130–400)
PMV BLD AUTO: 7.6 MCM (ref 7.4–10.4)
PO2: 81 MMHG (ref 71–104)
POTASSIUM SERPL-SCNC: 4.2 MEQ/L (ref 3.5–5.2)
RBC # BLD: 3.74 MILL/MM3 (ref 4.2–5.4)
SEG NEUTROPHILS: 69.1 %
SEGMENTED NEUTROPHILS ABSOLUTE COUNT: 4 THOU/MM3 (ref 1.8–7.7)
SODIUM BLD-SCNC: 128 MEQ/L (ref 135–145)
SOURCE, BLOOD GAS: ABNORMAL
WBC # BLD: 5.8 THOU/MM3 (ref 4.8–10.8)

## 2017-11-18 PROCEDURE — 82948 REAGENT STRIP/BLOOD GLUCOSE: CPT

## 2017-11-18 PROCEDURE — 2500000003 HC RX 250 WO HCPCS: Performed by: INTERNAL MEDICINE

## 2017-11-18 PROCEDURE — 2000000000 HC ICU R&B

## 2017-11-18 PROCEDURE — 82803 BLOOD GASES ANY COMBINATION: CPT

## 2017-11-18 PROCEDURE — 36415 COLL VENOUS BLD VENIPUNCTURE: CPT

## 2017-11-18 PROCEDURE — 94640 AIRWAY INHALATION TREATMENT: CPT

## 2017-11-18 PROCEDURE — 6360000002 HC RX W HCPCS: Performed by: ORTHOPAEDIC SURGERY

## 2017-11-18 PROCEDURE — 85025 COMPLETE CBC W/AUTO DIFF WBC: CPT

## 2017-11-18 PROCEDURE — 36600 WITHDRAWAL OF ARTERIAL BLOOD: CPT

## 2017-11-18 PROCEDURE — 6370000000 HC RX 637 (ALT 250 FOR IP): Performed by: INTERNAL MEDICINE

## 2017-11-18 PROCEDURE — 6370000000 HC RX 637 (ALT 250 FOR IP): Performed by: PHYSICIAN ASSISTANT

## 2017-11-18 PROCEDURE — 97110 THERAPEUTIC EXERCISES: CPT

## 2017-11-18 PROCEDURE — 2700000000 HC OXYGEN THERAPY PER DAY

## 2017-11-18 PROCEDURE — 80048 BASIC METABOLIC PNL TOTAL CA: CPT

## 2017-11-18 PROCEDURE — 6370000000 HC RX 637 (ALT 250 FOR IP): Performed by: ORTHOPAEDIC SURGERY

## 2017-11-18 PROCEDURE — 85610 PROTHROMBIN TIME: CPT

## 2017-11-18 PROCEDURE — 71010 XR CHEST PORTABLE: CPT

## 2017-11-18 PROCEDURE — 2580000003 HC RX 258: Performed by: ORTHOPAEDIC SURGERY

## 2017-11-18 PROCEDURE — 99291 CRITICAL CARE FIRST HOUR: CPT | Performed by: INTERNAL MEDICINE

## 2017-11-18 PROCEDURE — 6360000002 HC RX W HCPCS: Performed by: INTERNAL MEDICINE

## 2017-11-18 RX ORDER — LABETALOL HYDROCHLORIDE 5 MG/ML
40 INJECTION, SOLUTION INTRAVENOUS EVERY 4 HOURS PRN
Status: DISCONTINUED | OUTPATIENT
Start: 2017-11-18 | End: 2017-11-19

## 2017-11-18 RX ORDER — OXYCODONE HYDROCHLORIDE AND ACETAMINOPHEN 5; 325 MG/1; MG/1
2 TABLET ORAL EVERY 4 HOURS PRN
Status: DISCONTINUED | OUTPATIENT
Start: 2017-11-18 | End: 2017-11-18 | Stop reason: SDUPTHER

## 2017-11-18 RX ORDER — POLYETHYLENE GLYCOL 3350 17 G/17G
17 POWDER, FOR SOLUTION ORAL DAILY
Status: DISCONTINUED | OUTPATIENT
Start: 2017-11-18 | End: 2017-11-21 | Stop reason: HOSPADM

## 2017-11-18 RX ORDER — OXYCODONE HYDROCHLORIDE AND ACETAMINOPHEN 5; 325 MG/1; MG/1
1 TABLET ORAL EVERY 4 HOURS PRN
Status: DISCONTINUED | OUTPATIENT
Start: 2017-11-18 | End: 2017-11-21 | Stop reason: HOSPADM

## 2017-11-18 RX ORDER — HYDROXYZINE HYDROCHLORIDE 25 MG/1
25 TABLET, FILM COATED ORAL 3 TIMES DAILY PRN
Status: DISCONTINUED | OUTPATIENT
Start: 2017-11-18 | End: 2017-11-21 | Stop reason: HOSPADM

## 2017-11-18 RX ORDER — FUROSEMIDE 10 MG/ML
40 INJECTION INTRAMUSCULAR; INTRAVENOUS ONCE
Status: COMPLETED | OUTPATIENT
Start: 2017-11-18 | End: 2017-11-18

## 2017-11-18 RX ORDER — OXYCODONE HYDROCHLORIDE AND ACETAMINOPHEN 5; 325 MG/1; MG/1
1 TABLET ORAL EVERY 4 HOURS PRN
Status: DISCONTINUED | OUTPATIENT
Start: 2017-11-18 | End: 2017-11-18 | Stop reason: SDUPTHER

## 2017-11-18 RX ORDER — DOCUSATE SODIUM 100 MG/1
300 CAPSULE, LIQUID FILLED ORAL NIGHTLY PRN
Status: DISCONTINUED | OUTPATIENT
Start: 2017-11-18 | End: 2017-11-21 | Stop reason: HOSPADM

## 2017-11-18 RX ORDER — HYDRALAZINE HYDROCHLORIDE 20 MG/ML
20 INJECTION INTRAMUSCULAR; INTRAVENOUS EVERY 6 HOURS PRN
Status: DISCONTINUED | OUTPATIENT
Start: 2017-11-18 | End: 2017-11-19

## 2017-11-18 RX ORDER — ACETAMINOPHEN 325 MG/1
650 TABLET ORAL EVERY 4 HOURS PRN
Status: DISCONTINUED | OUTPATIENT
Start: 2017-11-18 | End: 2017-11-21 | Stop reason: HOSPADM

## 2017-11-18 RX ORDER — OXYCODONE HYDROCHLORIDE AND ACETAMINOPHEN 5; 325 MG/1; MG/1
2 TABLET ORAL EVERY 4 HOURS PRN
Status: DISCONTINUED | OUTPATIENT
Start: 2017-11-18 | End: 2017-11-21 | Stop reason: HOSPADM

## 2017-11-18 RX ADMIN — HYDRALAZINE HYDROCHLORIDE 20 MG: 20 INJECTION INTRAMUSCULAR; INTRAVENOUS at 09:58

## 2017-11-18 RX ADMIN — ALPRAZOLAM 0.5 MG: 0.5 TABLET ORAL at 08:10

## 2017-11-18 RX ADMIN — OXYCODONE HYDROCHLORIDE AND ACETAMINOPHEN 1 TABLET: 5; 325 TABLET ORAL at 19:39

## 2017-11-18 RX ADMIN — Medication 2 UNITS: at 11:29

## 2017-11-18 RX ADMIN — INSULIN LISPRO 1 UNITS: 100 INJECTION, SOLUTION INTRAVENOUS; SUBCUTANEOUS at 19:42

## 2017-11-18 RX ADMIN — DOCUSATE SODIUM 300 MG: 100 CAPSULE, LIQUID FILLED ORAL at 20:16

## 2017-11-18 RX ADMIN — MONTELUKAST SODIUM 10 MG: 10 TABLET, FILM COATED ORAL at 20:16

## 2017-11-18 RX ADMIN — LEVOTHYROXINE SODIUM 100 MCG: 100 TABLET ORAL at 11:28

## 2017-11-18 RX ADMIN — ATORVASTATIN CALCIUM 80 MG: 80 TABLET, FILM COATED ORAL at 20:14

## 2017-11-18 RX ADMIN — MORPHINE SULFATE 2 MG: 2 INJECTION, SOLUTION INTRAMUSCULAR; INTRAVENOUS at 08:10

## 2017-11-18 RX ADMIN — RISPERIDONE 1 MG: 1 TABLET ORAL at 20:15

## 2017-11-18 RX ADMIN — MAGNESIUM HYDROXIDE 30 ML: 400 SUSPENSION ORAL at 08:10

## 2017-11-18 RX ADMIN — RISPERIDONE 1 MG: 1 TABLET ORAL at 08:44

## 2017-11-18 RX ADMIN — AMLODIPINE BESYLATE 10 MG: 10 TABLET ORAL at 08:41

## 2017-11-18 RX ADMIN — DOXEPIN HYDROCHLORIDE 150 MG: 50 CAPSULE ORAL at 20:14

## 2017-11-18 RX ADMIN — OXCARBAZEPINE 300 MG: 300 TABLET ORAL at 20:14

## 2017-11-18 RX ADMIN — RANOLAZINE 500 MG: 500 TABLET, FILM COATED, EXTENDED RELEASE ORAL at 20:14

## 2017-11-18 RX ADMIN — POLYETHYLENE GLYCOL 3350 17 G: 17 POWDER, FOR SOLUTION ORAL at 10:29

## 2017-11-18 RX ADMIN — RANOLAZINE 500 MG: 500 TABLET, FILM COATED, EXTENDED RELEASE ORAL at 08:41

## 2017-11-18 RX ADMIN — TAZOBACTAM SODIUM AND PIPERACILLIN SODIUM 3.38 G: 375; 3 INJECTION, SOLUTION INTRAVENOUS at 04:59

## 2017-11-18 RX ADMIN — OXYCODONE HYDROCHLORIDE AND ACETAMINOPHEN 1 TABLET: 5; 325 TABLET ORAL at 11:16

## 2017-11-18 RX ADMIN — Medication 2 UNITS: at 16:28

## 2017-11-18 RX ADMIN — POLYVINYL ALCOHOL 1 DROP: 14 SOLUTION/ DROPS OPHTHALMIC at 08:42

## 2017-11-18 RX ADMIN — Medication 10 ML: at 13:00

## 2017-11-18 RX ADMIN — METOPROLOL TARTRATE 50 MG: 50 TABLET, FILM COATED ORAL at 20:14

## 2017-11-18 RX ADMIN — OXCARBAZEPINE 150 MG: 300 TABLET ORAL at 08:45

## 2017-11-18 RX ADMIN — POLYVINYL ALCOHOL 1 DROP: 14 SOLUTION/ DROPS OPHTHALMIC at 20:15

## 2017-11-18 RX ADMIN — TAZOBACTAM SODIUM AND PIPERACILLIN SODIUM 3.38 G: 375; 3 INJECTION, SOLUTION INTRAVENOUS at 19:42

## 2017-11-18 RX ADMIN — LABETALOL HYDROCHLORIDE 20 MG: 5 INJECTION INTRAVENOUS at 04:51

## 2017-11-18 RX ADMIN — LABETALOL HYDROCHLORIDE 40 MG: 5 INJECTION, SOLUTION INTRAVENOUS at 11:21

## 2017-11-18 RX ADMIN — Medication 2 PUFF: at 20:29

## 2017-11-18 RX ADMIN — LISINOPRIL 20 MG: 20 TABLET ORAL at 08:44

## 2017-11-18 RX ADMIN — MORPHINE SULFATE 2 MG: 2 INJECTION, SOLUTION INTRAMUSCULAR; INTRAVENOUS at 04:51

## 2017-11-18 RX ADMIN — TAZOBACTAM SODIUM AND PIPERACILLIN SODIUM 3.38 G: 375; 3 INJECTION, SOLUTION INTRAVENOUS at 13:00

## 2017-11-18 RX ADMIN — Medication 2 UNITS: at 08:16

## 2017-11-18 RX ADMIN — CALCIUM CARBONATE-VITAMIN D TAB 500 MG-200 UNIT 1 TABLET: 500-200 TAB at 08:43

## 2017-11-18 RX ADMIN — CITALOPRAM 40 MG: 40 TABLET ORAL at 08:41

## 2017-11-18 RX ADMIN — Medication 10 ML: at 20:16

## 2017-11-18 RX ADMIN — METOPROLOL TARTRATE 50 MG: 50 TABLET, FILM COATED ORAL at 08:41

## 2017-11-18 RX ADMIN — FUROSEMIDE 40 MG: 10 INJECTION, SOLUTION INTRAMUSCULAR; INTRAVENOUS at 10:29

## 2017-11-18 RX ADMIN — LISINOPRIL 20 MG: 20 TABLET ORAL at 20:14

## 2017-11-18 RX ADMIN — HYDROXYZINE HYDROCHLORIDE 25 MG: 25 TABLET ORAL at 13:00

## 2017-11-18 RX ADMIN — Medication 2 PUFF: at 10:15

## 2017-11-18 RX ADMIN — LABETALOL HYDROCHLORIDE 20 MG: 5 INJECTION INTRAVENOUS at 08:10

## 2017-11-18 RX ADMIN — ACETAMINOPHEN 650 MG: 325 TABLET ORAL at 16:53

## 2017-11-18 ASSESSMENT — PAIN SCALES - GENERAL
PAINLEVEL_OUTOF10: 2
PAINLEVEL_OUTOF10: 6
PAINLEVEL_OUTOF10: 4
PAINLEVEL_OUTOF10: 2
PAINLEVEL_OUTOF10: 3
PAINLEVEL_OUTOF10: 5
PAINLEVEL_OUTOF10: 6
PAINLEVEL_OUTOF10: 0
PAINLEVEL_OUTOF10: 0

## 2017-11-18 ASSESSMENT — PAIN DESCRIPTION - LOCATION
LOCATION: HEAD
LOCATION: NECK

## 2017-11-18 ASSESSMENT — PAIN DESCRIPTION - PAIN TYPE
TYPE: ACUTE PAIN;SURGICAL PAIN
TYPE: ACUTE PAIN
TYPE: ACUTE PAIN
TYPE: ACUTE PAIN;SURGICAL PAIN

## 2017-11-18 ASSESSMENT — PAIN DESCRIPTION - FREQUENCY
FREQUENCY: CONTINUOUS
FREQUENCY: CONTINUOUS

## 2017-11-18 ASSESSMENT — PAIN DESCRIPTION - DIRECTION: RADIATING_TOWARDS: LEFT SHOULDER

## 2017-11-18 ASSESSMENT — PAIN DESCRIPTION - ORIENTATION: ORIENTATION: ANTERIOR

## 2017-11-18 ASSESSMENT — PAIN DESCRIPTION - PROGRESSION: CLINICAL_PROGRESSION: NOT CHANGED

## 2017-11-18 ASSESSMENT — PAIN DESCRIPTION - ONSET
ONSET: ON-GOING
ONSET: ON-GOING

## 2017-11-18 ASSESSMENT — PAIN DESCRIPTION - DESCRIPTORS
DESCRIPTORS: ACHING
DESCRIPTORS: ACHING;DISCOMFORT
DESCRIPTORS: ACHING

## 2017-11-18 NOTE — PROGRESS NOTES
Department of Orthopedic Surgery  Spine Service  Humaira Oliver PA-C Progress Note        Subjective:  Pt lying in bed with c collar feeling well. Reports no radicular pain in her arms. Had IVC filter placed yesterday. Will defer to medicine for anticoagulation. No bowel movement yet. Vitals  VITALS:  BP (!) 166/78   Pulse 87   Temp 98.5 °F (36.9 °C) (Oral)   Resp 16   Ht 5' 6\" (1.676 m)   Wt 238 lb 12.1 oz (108.3 kg)   SpO2 94%   BMI 38.54 kg/m²   24HR INTAKE/OUTPUT:    Intake/Output Summary (Last 24 hours) at 11/18/17 0853  Last data filed at 11/18/17 4236   Gross per 24 hour   Intake             1078 ml   Output             2208 ml   Net            -1130 ml     URINARY CATHETER OUTPUT (Son):     DRAIN/TUBE OUTPUT:  Closed/Suction Drain Left; Anterior Neck Bulb 10 Bel[place]ian-Output (ml): 3 ml      PHYSICAL EXAM:    Orientation:  alert and oriented to person, place and time    Incision:  dressing in place, clean, dry, intact    Upper Extremity Motor :   Deltoid:  5/5  Biceps:  5/5  Triceps:  5/5  Wrist Flexion:  5/5  Wrist Extension:  5/5  Finger Flexion:  5/5  Finger Extension:  5/5    Upper Motor Neuron Signs:  None  Upper Extremity Sensory:  Intact C3-T1 distribution    Flatus:  positive    ABNORMAL EXAM FINDINGS:  none    LABS:    HgB:    Lab Results   Component Value Date    HGB 10.3 11/18/2017         ASSESSMENT AND PLAN:    Post operative day 3    1:  Monitor labs and drain output  2:  Activity Level:  Rest currently until anticoagulation  3:  Pain Control:  Controlled. Will add PO percocet  4:  Defer to medicine for anticoagulation  5:  Discharge Planning: To transfer to [facility] when anticoagulated  6:  Adding hydroxyzine and removing xanax. Adding colace and miralax.

## 2017-11-18 NOTE — PROGRESS NOTES
[address] [address]  PHYSICAL THERAPY MISSED TREATMENT NOTE  ACUTE CARE  [facility] ICU 4D          checked with nurse early AM, pt still on strict bedrest due to PE , nurse planning to check with doctors today and pt eager to get OOB    Missed Treatment  Maribell Gillespie PTA [id number]

## 2017-11-18 NOTE — PROGRESS NOTES
Pulmonary & Critical Care Inpatient Progress Note   Buffy Hilliard MD     REASON FOR TODAY'S VISIT:  Acute respiratory failure    SUBJECTIVE:   Remains on 70% high flow nasal cannula, BP still elevated    Scheduled Meds:   polyethylene glycol  17 g Oral Daily    furosemide  40 mg Intravenous Once    docusate sodium  300 mg Oral Daily    piperacillin-tazobactam  3.375 g Intravenous Q8H    risperiDONE  1 mg Oral BID    pantoprazole  40 mg Oral QAM AC    polyvinyl alcohol  1 drop Both Eyes BID    citalopram  40 mg Oral Daily    nitroGLYCERIN  1 patch Transdermal Daily    OXcarbazepine  150 mg Oral Daily    doxepin  150 mg Oral Nightly    montelukast  10 mg Oral Nightly    calcium-vitamin D  1 tablet Oral Daily    levothyroxine  100 mcg Oral QAM AC    amLODIPine  10 mg Oral Daily    lisinopril  20 mg Oral BID    metoprolol tartrate  50 mg Oral BID    ranolazine  500 mg Oral BID    sodium chloride flush  10 mL Intravenous 2 times per day    insulin lispro  0-12 Units Subcutaneous TID WC    insulin lispro  0-6 Units Subcutaneous Nightly    atorvastatin  80 mg Oral Nightly    mometasone-formoterol  2 puff Inhalation BID    OXcarbazepine  300 mg Oral Nightly       Continuous Infusions:   dextrose         PRN Meds:  docusate sodium, hydrOXYzine, oxyCODONE-acetaminophen, oxyCODONE-acetaminophen, acetaminophen, labetalol, hydrALAZINE, albuterol sulfate HFA, nitroGLYCERIN, benzocaine, sodium chloride flush, cyclobenzaprine, magnesium hydroxide, ondansetron, glucose, dextrose, glucagon (rDNA), dextrose, morphine **OR** morphine    ALLERGIES:  Patient is allergic to codeine; lipitor [atorvastatin]; and motrin [ibuprofen micronized]. Objective:   PHYSICAL EXAM:  BP (!) 166/78   Pulse 87   Temp 98.5 °F (36.9 °C) (Oral)   Resp 16   Ht 5' 6\" (1.676 m)   Wt 238 lb 12.1 oz (108.3 kg)   SpO2 94%   BMI 38.54 kg/m²    Physical Exam   Constitutional: She appears well-developed and well-nourished.  No Exhaled: 836 mL  FiO2 : 70 %             Assessment:     1. Acute PE, provoked in the setting of recent surgery, submassive  2. Acute hypoxic respiratory failure  3. Basilar atelectasis vs new infiltrate  4.  HTN poorly controlled    Plan:      -Due to high FIO2 requirements and elevated BP will keep in ICU today until more stable  -Wean FIO2 as tolerated, will check ABG and CXR  -Net positive 6L, will give a dose of lasix   -Increase PRN BP meds, add IV hydralazine and stop oral  -Echo reviewed, some minor RV dilation but normal pressure.   -Continue empiric zosyn for now    I spent over 30 mins of critical care time involved in this patient's care    Kamille Lopez MD

## 2017-11-18 NOTE — PROGRESS NOTES
[address] ICU 4D    Time In: 8390  Time Out: 1350  Timed Code Treatment Minutes: 15 Minutes  Minutes: 15          Date: 2017  Patient Name: Jose Guadalupe Lovelace,  Gender:  female        MRN: [id number]  : 1945  (67 y.o.)     Referring Practitioner: Alfa Reilly MD  Diagnosis: Cervical spinal stenosis  Additional Pertinent Hx: Pt is a 67 yr old female admitted 11/15 s/p REMOVAL OF PLATE V2-2, ACDF M7-1 W/ATLANTIS CORNERSTONE. Past Medical History:   Diagnosis Date    Anemia     Anxiety     Arthritis     general    AS (aortic stenosis): mild to moderate by echo 2017    ASHD (arteriosclerotic heart disease)     CAD (coronary artery disease)     Chest pain     COPD (chronic obstructive pulmonary disease) (HCC)     Depression     DM (diabetes mellitus) ([facility] [address]     Dyspnea     FH: CAD (coronary artery disease)     GERD (gastroesophageal reflux disease)     Heart burn     History of tobacco abuse: Quit in 2009 10/28/2014    HLD (hyperlipidemia)     HTN (hypertension)     Irritable bowel syndrome     States has not had problem with this for years    Liver disease     fatty liver    Metabolic syndrome     MI (myocardial infarction)     Nausea & vomiting     Obesity     CHET (obstructive sleep apnea)     S/P cardiac catheterization: 2014: 99% in-stent restenosis mid-RCA. LCx moderate LI's. LAD 85% mid-segment lesion. 2014: 99% in-stent restenosis mid-RCA. LCx moderate LI's. LAD 85% mid-segment lesion. Dr. Marie Barber S/P PTCA:  2004: Proximal and mid RCA  Taxus 3.5 X 20 mm, and Taxus 3.0 X 32 mm. 10/28/2014    2004: Proximal and mid RCA  Taxus 3.5 X 20 mm, and Taxus 3.0 X 32 mm.  Dr. Yue Prakash Systolic murmur     Thyroid disease      Past Surgical History:   Procedure Laterality Date    BACK SURGERY  2016        BLADDER SUSPENSION      BREAST BIOPSY Pain:   .      denies    Social/Functional:  Lives With: Alone  Type of Home: Apartment  Home Layout: One level  Home Access: Level entry  Home Equipment: 4 wheeled walker, Cane     Objective:       Transfers  Sit to Stand: Contact guard assistance (to and from bs chair )  Stand to sit: Contact guard assistance       Ambulation 1  Surface: level tile  Device: Hand-Held Assist  Assistance: Contact guard assistance  Quality of Gait: narrow ANGIE, mildly hesitant, pt with episode of SOB and lightheadedness after this distance , which almost immediately resolved with sitting - nurse observed- no LOB occurred  Distance: approx 6 steps forward and 6 steps backward and a pause with inital standing and before sitting of 30 -45 seconds static standing each                 Exercises:  Exercises  Comments: seated and reclined toby LE therex; LAQ< marches, AP,  hip ABD/ADD , glute sets and quad sets all x 10 reps each ; pt getting fatigued by end of session; all done to imporve gait endurance           Activity Tolerance:  Activity Tolerance: Patient Tolerated treatment well;Patient limited by endurance;Treatment limited secondary to medical complications (free text)    Assessment:   Body structures, Functions, Activity limitations: Decreased functional mobility , Decreased endurance, Decreased balance, Decreased strength  Assessment: pt pleasant and motivated, follows instrutctions well; pt with decreased strength and endurance and limited by ICU lines this date; would benefit from continued skilled therapy to improve gait safety and endurance   Prognosis: Good  REQUIRES PT FOLLOW UP: Yes  Discharge Recommendations: Continue to assess pending progress, Patient would benefit from continued therapy after discharge, [address] , 24 hour supervision or assist, Home with Home health PT, IP Rehab    Patient Education:  Patient Education: gait pattern     Equipment Recommendations:  Equipment Needed:

## 2017-11-19 ENCOUNTER — APPOINTMENT (OUTPATIENT)
Dept: GENERAL RADIOLOGY | Age: 72
DRG: 471 | End: 2017-11-19
Attending: ORTHOPAEDIC SURGERY
Payer: MEDICARE

## 2017-11-19 LAB
ANION GAP SERPL CALCULATED.3IONS-SCNC: 11 MEQ/L (ref 8–16)
ANISOCYTOSIS: ABNORMAL
BASOPHILS # BLD: 0.6 %
BASOPHILS ABSOLUTE: 0 THOU/MM3 (ref 0–0.1)
BUN BLDV-MCNC: 13 MG/DL (ref 7–22)
CALCIUM SERPL-MCNC: 9 MG/DL (ref 8.5–10.5)
CHLORIDE BLD-SCNC: 92 MEQ/L (ref 98–111)
CO2: 27 MEQ/L (ref 23–33)
CREAT SERPL-MCNC: 0.6 MG/DL (ref 0.4–1.2)
EOSINOPHIL # BLD: 3.9 %
EOSINOPHILS ABSOLUTE: 0.2 THOU/MM3 (ref 0–0.4)
GFR SERPL CREATININE-BSD FRML MDRD: > 90 ML/MIN/1.73M2
GLUCOSE BLD-MCNC: 138 MG/DL (ref 70–108)
GLUCOSE BLD-MCNC: 154 MG/DL (ref 70–108)
GLUCOSE BLD-MCNC: 164 MG/DL (ref 70–108)
GLUCOSE BLD-MCNC: 169 MG/DL (ref 70–108)
GLUCOSE BLD-MCNC: 216 MG/DL (ref 70–108)
HCT VFR BLD CALC: 30.2 % (ref 37–47)
HEMOGLOBIN: 9.9 GM/DL (ref 12–16)
INR BLD: 1.12 (ref 0.85–1.13)
LYMPHOCYTES # BLD: 21.3 %
LYMPHOCYTES ABSOLUTE: 1.1 THOU/MM3 (ref 1–4.8)
MCH RBC QN AUTO: 27.4 PG (ref 27–31)
MCHC RBC AUTO-ENTMCNC: 32.7 GM/DL (ref 33–37)
MCV RBC AUTO: 83.9 FL (ref 81–99)
MONOCYTES # BLD: 9.1 %
MONOCYTES ABSOLUTE: 0.5 THOU/MM3 (ref 0.4–1.3)
MRSA SCREEN: NORMAL
NUCLEATED RED BLOOD CELLS: 0 /100 WBC
PDW BLD-RTO: 14.9 % (ref 11.5–14.5)
PLATELET # BLD: 198 THOU/MM3 (ref 130–400)
PMV BLD AUTO: 7.8 MCM (ref 7.4–10.4)
POTASSIUM SERPL-SCNC: 3.8 MEQ/L (ref 3.5–5.2)
RBC # BLD: 3.6 MILL/MM3 (ref 4.2–5.4)
SEG NEUTROPHILS: 65.1 %
SEGMENTED NEUTROPHILS ABSOLUTE COUNT: 3.4 THOU/MM3 (ref 1.8–7.7)
SODIUM BLD-SCNC: 130 MEQ/L (ref 135–145)
VRE CULTURE: NORMAL
WBC # BLD: 5.2 THOU/MM3 (ref 4.8–10.8)

## 2017-11-19 PROCEDURE — 99233 SBSQ HOSP IP/OBS HIGH 50: CPT | Performed by: INTERNAL MEDICINE

## 2017-11-19 PROCEDURE — 6370000000 HC RX 637 (ALT 250 FOR IP): Performed by: PHYSICIAN ASSISTANT

## 2017-11-19 PROCEDURE — 2700000000 HC OXYGEN THERAPY PER DAY

## 2017-11-19 PROCEDURE — 80048 BASIC METABOLIC PNL TOTAL CA: CPT

## 2017-11-19 PROCEDURE — 97530 THERAPEUTIC ACTIVITIES: CPT

## 2017-11-19 PROCEDURE — 71010 XR CHEST PORTABLE: CPT

## 2017-11-19 PROCEDURE — 2500000003 HC RX 250 WO HCPCS: Performed by: INTERNAL MEDICINE

## 2017-11-19 PROCEDURE — 6370000000 HC RX 637 (ALT 250 FOR IP): Performed by: ORTHOPAEDIC SURGERY

## 2017-11-19 PROCEDURE — 1200000000 HC SEMI PRIVATE

## 2017-11-19 PROCEDURE — 6370000000 HC RX 637 (ALT 250 FOR IP): Performed by: INTERNAL MEDICINE

## 2017-11-19 PROCEDURE — 97535 SELF CARE MNGMENT TRAINING: CPT

## 2017-11-19 PROCEDURE — 85025 COMPLETE CBC W/AUTO DIFF WBC: CPT

## 2017-11-19 PROCEDURE — 82948 REAGENT STRIP/BLOOD GLUCOSE: CPT

## 2017-11-19 PROCEDURE — 6360000002 HC RX W HCPCS: Performed by: INTERNAL MEDICINE

## 2017-11-19 PROCEDURE — 2580000003 HC RX 258: Performed by: ORTHOPAEDIC SURGERY

## 2017-11-19 PROCEDURE — 6360000002 HC RX W HCPCS: Performed by: HOSPITALIST

## 2017-11-19 PROCEDURE — 1200000003 HC TELEMETRY R&B

## 2017-11-19 PROCEDURE — 85610 PROTHROMBIN TIME: CPT

## 2017-11-19 PROCEDURE — 94640 AIRWAY INHALATION TREATMENT: CPT

## 2017-11-19 PROCEDURE — 36415 COLL VENOUS BLD VENIPUNCTURE: CPT

## 2017-11-19 RX ORDER — HYDRALAZINE HYDROCHLORIDE 20 MG/ML
INJECTION INTRAMUSCULAR; INTRAVENOUS
Status: DISPENSED
Start: 2017-11-19 | End: 2017-11-20

## 2017-11-19 RX ORDER — HYDRALAZINE HYDROCHLORIDE 25 MG/1
25 TABLET, FILM COATED ORAL EVERY 6 HOURS PRN
Status: DISCONTINUED | OUTPATIENT
Start: 2017-11-19 | End: 2017-11-21 | Stop reason: HOSPADM

## 2017-11-19 RX ORDER — HYDRALAZINE HYDROCHLORIDE 20 MG/ML
10 INJECTION INTRAMUSCULAR; INTRAVENOUS EVERY 6 HOURS PRN
Status: DISCONTINUED | OUTPATIENT
Start: 2017-11-19 | End: 2017-11-21 | Stop reason: HOSPADM

## 2017-11-19 RX ORDER — METOPROLOL TARTRATE 100 MG/1
100 TABLET ORAL 2 TIMES DAILY
Status: DISCONTINUED | OUTPATIENT
Start: 2017-11-19 | End: 2017-11-21 | Stop reason: HOSPADM

## 2017-11-19 RX ADMIN — RANOLAZINE 500 MG: 500 TABLET, FILM COATED, EXTENDED RELEASE ORAL at 08:27

## 2017-11-19 RX ADMIN — OXCARBAZEPINE 300 MG: 300 TABLET ORAL at 20:29

## 2017-11-19 RX ADMIN — Medication 2 PUFF: at 20:59

## 2017-11-19 RX ADMIN — RISPERIDONE 1 MG: 1 TABLET ORAL at 21:45

## 2017-11-19 RX ADMIN — OXYCODONE HYDROCHLORIDE AND ACETAMINOPHEN 2 TABLET: 5; 325 TABLET ORAL at 19:51

## 2017-11-19 RX ADMIN — CALCIUM CARBONATE-VITAMIN D TAB 500 MG-200 UNIT 1 TABLET: 500-200 TAB at 08:27

## 2017-11-19 RX ADMIN — OXYCODONE HYDROCHLORIDE AND ACETAMINOPHEN 1 TABLET: 5; 325 TABLET ORAL at 08:25

## 2017-11-19 RX ADMIN — INSULIN LISPRO 1 UNITS: 100 INJECTION, SOLUTION INTRAVENOUS; SUBCUTANEOUS at 20:31

## 2017-11-19 RX ADMIN — PANTOPRAZOLE SODIUM 40 MG: 40 TABLET, DELAYED RELEASE ORAL at 07:09

## 2017-11-19 RX ADMIN — TAZOBACTAM SODIUM AND PIPERACILLIN SODIUM 3.38 G: 375; 3 INJECTION, SOLUTION INTRAVENOUS at 12:52

## 2017-11-19 RX ADMIN — POLYVINYL ALCOHOL 1 DROP: 14 SOLUTION/ DROPS OPHTHALMIC at 08:31

## 2017-11-19 RX ADMIN — CITALOPRAM 40 MG: 40 TABLET ORAL at 08:28

## 2017-11-19 RX ADMIN — TAZOBACTAM SODIUM AND PIPERACILLIN SODIUM 3.38 G: 375; 3 INJECTION, SOLUTION INTRAVENOUS at 03:55

## 2017-11-19 RX ADMIN — AMLODIPINE BESYLATE 10 MG: 10 TABLET ORAL at 10:17

## 2017-11-19 RX ADMIN — OXCARBAZEPINE 150 MG: 300 TABLET ORAL at 08:28

## 2017-11-19 RX ADMIN — DOCUSATE SODIUM 300 MG: 100 CAPSULE, LIQUID FILLED ORAL at 20:29

## 2017-11-19 RX ADMIN — LISINOPRIL 20 MG: 20 TABLET ORAL at 20:29

## 2017-11-19 RX ADMIN — HYDRALAZINE HYDROCHLORIDE 10 MG: 20 INJECTION INTRAMUSCULAR; INTRAVENOUS at 18:57

## 2017-11-19 RX ADMIN — ENOXAPARIN SODIUM 100 MG: 100 INJECTION SUBCUTANEOUS at 22:45

## 2017-11-19 RX ADMIN — Medication 10 ML: at 08:38

## 2017-11-19 RX ADMIN — LABETALOL HYDROCHLORIDE 40 MG: 5 INJECTION, SOLUTION INTRAVENOUS at 07:09

## 2017-11-19 RX ADMIN — MONTELUKAST SODIUM 10 MG: 10 TABLET, FILM COATED ORAL at 20:29

## 2017-11-19 RX ADMIN — RANOLAZINE 500 MG: 500 TABLET, FILM COATED, EXTENDED RELEASE ORAL at 20:29

## 2017-11-19 RX ADMIN — POLYVINYL ALCOHOL 1 DROP: 14 SOLUTION/ DROPS OPHTHALMIC at 20:29

## 2017-11-19 RX ADMIN — RISPERIDONE 1 MG: 1 TABLET ORAL at 08:28

## 2017-11-19 RX ADMIN — Medication 2 PUFF: at 09:51

## 2017-11-19 RX ADMIN — ENOXAPARIN SODIUM 100 MG: 100 INJECTION SUBCUTANEOUS at 12:56

## 2017-11-19 RX ADMIN — LEVOTHYROXINE SODIUM 100 MCG: 100 TABLET ORAL at 07:09

## 2017-11-19 RX ADMIN — OXYCODONE HYDROCHLORIDE AND ACETAMINOPHEN 1 TABLET: 5; 325 TABLET ORAL at 03:55

## 2017-11-19 RX ADMIN — HYDROXYZINE HYDROCHLORIDE 25 MG: 25 TABLET ORAL at 17:45

## 2017-11-19 RX ADMIN — DOXEPIN HYDROCHLORIDE 150 MG: 50 CAPSULE ORAL at 20:29

## 2017-11-19 RX ADMIN — TAZOBACTAM SODIUM AND PIPERACILLIN SODIUM 3.38 G: 375; 3 INJECTION, SOLUTION INTRAVENOUS at 20:28

## 2017-11-19 RX ADMIN — Medication 10 ML: at 20:28

## 2017-11-19 RX ADMIN — HYDROXYZINE HYDROCHLORIDE 25 MG: 25 TABLET ORAL at 08:27

## 2017-11-19 RX ADMIN — LABETALOL HYDROCHLORIDE 40 MG: 5 INJECTION, SOLUTION INTRAVENOUS at 01:05

## 2017-11-19 RX ADMIN — HYDRALAZINE HYDROCHLORIDE 25 MG: 25 TABLET, FILM COATED ORAL at 17:45

## 2017-11-19 RX ADMIN — ATORVASTATIN CALCIUM 80 MG: 80 TABLET, FILM COATED ORAL at 20:30

## 2017-11-19 RX ADMIN — POLYETHYLENE GLYCOL 3350 17 G: 17 POWDER, FOR SOLUTION ORAL at 08:27

## 2017-11-19 RX ADMIN — METOPROLOL TARTRATE 100 MG: 100 TABLET, FILM COATED ORAL at 20:29

## 2017-11-19 RX ADMIN — LISINOPRIL 20 MG: 20 TABLET ORAL at 08:27

## 2017-11-19 ASSESSMENT — PAIN DESCRIPTION - LOCATION
LOCATION: NECK;SHOULDER
LOCATION: NECK

## 2017-11-19 ASSESSMENT — PAIN DESCRIPTION - ONSET
ONSET: ON-GOING
ONSET: PROGRESSIVE
ONSET: ON-GOING

## 2017-11-19 ASSESSMENT — PAIN SCALES - GENERAL
PAINLEVEL_OUTOF10: 6
PAINLEVEL_OUTOF10: 0
PAINLEVEL_OUTOF10: 8
PAINLEVEL_OUTOF10: 5
PAINLEVEL_OUTOF10: 8
PAINLEVEL_OUTOF10: 3
PAINLEVEL_OUTOF10: 0
PAINLEVEL_OUTOF10: 2
PAINLEVEL_OUTOF10: 8

## 2017-11-19 ASSESSMENT — PAIN DESCRIPTION - DESCRIPTORS
DESCRIPTORS: ACHING
DESCRIPTORS: ACHING

## 2017-11-19 ASSESSMENT — PAIN DESCRIPTION - PROGRESSION
CLINICAL_PROGRESSION: RAPIDLY WORSENING
CLINICAL_PROGRESSION: GRADUALLY WORSENING

## 2017-11-19 ASSESSMENT — PAIN DESCRIPTION - FREQUENCY
FREQUENCY: CONTINUOUS
FREQUENCY: CONTINUOUS
FREQUENCY: INTERMITTENT

## 2017-11-19 ASSESSMENT — PAIN DESCRIPTION - ORIENTATION
ORIENTATION: ANTERIOR
ORIENTATION: MID

## 2017-11-19 ASSESSMENT — PAIN DESCRIPTION - PAIN TYPE
TYPE: SURGICAL PAIN
TYPE: ACUTE PAIN
TYPE: ACUTE PAIN;SURGICAL PAIN
TYPE: SURGICAL PAIN

## 2017-11-19 NOTE — PROGRESS NOTES
Pulmonary & Critical Care Inpatient Progress Note   Joycelyn Juarez MD     REASON FOR TODAY'S VISIT:  Acute respiratory failure    SUBJECTIVE:   Improved oxygen requirement with mobilization out of bed    Scheduled Meds:   enoxaparin  1 mg/kg Subcutaneous BID    metoprolol tartrate  100 mg Oral BID    polyethylene glycol  17 g Oral Daily    docusate sodium  300 mg Oral Daily    piperacillin-tazobactam  3.375 g Intravenous Q8H    risperiDONE  1 mg Oral BID    pantoprazole  40 mg Oral QAM AC    polyvinyl alcohol  1 drop Both Eyes BID    citalopram  40 mg Oral Daily    nitroGLYCERIN  1 patch Transdermal Daily    OXcarbazepine  150 mg Oral Daily    doxepin  150 mg Oral Nightly    montelukast  10 mg Oral Nightly    calcium-vitamin D  1 tablet Oral Daily    levothyroxine  100 mcg Oral QAM AC    amLODIPine  10 mg Oral Daily    lisinopril  20 mg Oral BID    ranolazine  500 mg Oral BID    sodium chloride flush  10 mL Intravenous 2 times per day    insulin lispro  0-12 Units Subcutaneous TID WC    insulin lispro  0-6 Units Subcutaneous Nightly    atorvastatin  80 mg Oral Nightly    mometasone-formoterol  2 puff Inhalation BID    OXcarbazepine  300 mg Oral Nightly       Continuous Infusions:   dextrose         PRN Meds:  hydrALAZINE, docusate sodium, hydrOXYzine, acetaminophen, oxyCODONE-acetaminophen **OR** oxyCODONE-acetaminophen, albuterol sulfate HFA, nitroGLYCERIN, benzocaine, sodium chloride flush, cyclobenzaprine, magnesium hydroxide, ondansetron, glucose, dextrose, glucagon (rDNA), dextrose    ALLERGIES:  Patient is allergic to codeine; lipitor [atorvastatin]; and motrin [ibuprofen micronized]. Objective:   PHYSICAL EXAM:  BP (!) 145/67   Pulse 92   Temp 97.8 °F (36.6 °C) (Oral)   Resp 20   Ht 5' 6\" (1.676 m)   Wt 229 lb 4.5 oz (104 kg)   SpO2 95%   BMI 37.01 kg/m²    Physical Exam   Constitutional: She appears well-developed and well-nourished. No distress.    HENT:   Head: Normocephalic mL  FiO2 : 40 %             Assessment:     1. Acute PE, provoked in the setting of recent surgery, submassive  2. Acute hypoxic respiratory failure  3. Basilar atelectasis vs new infiltrate  4. HTN poorly controlled    Plan:      -Wean FIO2 as tolerated  -Will start lovenox, pharmacy to adjust dose. Can check factor level if there is concern for efficacy given her weight  -Encourage mobilization out of bed, incentive jose a use  -Increase lopressor, add PRN oral hydralazine.  Stop IV labetolol  -Can stop Zosyn, was placed empirically when she was hypoxic but now more likely related to PE and atelectasis  -Transfer out of ICU      Stella So MD

## 2017-11-20 LAB
ANION GAP SERPL CALCULATED.3IONS-SCNC: 13 MEQ/L (ref 8–16)
ANISOCYTOSIS: ABNORMAL
BASOPHILS # BLD: 0.6 %
BASOPHILS ABSOLUTE: 0 THOU/MM3 (ref 0–0.1)
BUN BLDV-MCNC: 11 MG/DL (ref 7–22)
CALCIUM SERPL-MCNC: 9.3 MG/DL (ref 8.5–10.5)
CHLORIDE BLD-SCNC: 88 MEQ/L (ref 98–111)
CO2: 30 MEQ/L (ref 23–33)
CREAT SERPL-MCNC: 0.6 MG/DL (ref 0.4–1.2)
EOSINOPHIL # BLD: 4.2 %
EOSINOPHILS ABSOLUTE: 0.2 THOU/MM3 (ref 0–0.4)
GFR SERPL CREATININE-BSD FRML MDRD: > 90 ML/MIN/1.73M2
GLUCOSE BLD-MCNC: 128 MG/DL (ref 70–108)
GLUCOSE BLD-MCNC: 129 MG/DL (ref 70–108)
GLUCOSE BLD-MCNC: 137 MG/DL (ref 70–108)
GLUCOSE BLD-MCNC: 140 MG/DL (ref 70–108)
GLUCOSE BLD-MCNC: 152 MG/DL (ref 70–108)
HCT VFR BLD CALC: 32 % (ref 37–47)
HEMOGLOBIN: 10.7 GM/DL (ref 12–16)
INR BLD: 1.19 (ref 0.85–1.13)
LYMPHOCYTES # BLD: 20.5 %
LYMPHOCYTES ABSOLUTE: 1.1 THOU/MM3 (ref 1–4.8)
MCH RBC QN AUTO: 27.8 PG (ref 27–31)
MCHC RBC AUTO-ENTMCNC: 33.3 GM/DL (ref 33–37)
MCV RBC AUTO: 83.3 FL (ref 81–99)
MONOCYTES # BLD: 10.1 %
MONOCYTES ABSOLUTE: 0.6 THOU/MM3 (ref 0.4–1.3)
NUCLEATED RED BLOOD CELLS: 0 /100 WBC
PDW BLD-RTO: 14.9 % (ref 11.5–14.5)
PLATELET # BLD: 248 THOU/MM3 (ref 130–400)
PMV BLD AUTO: 7.7 MCM (ref 7.4–10.4)
POTASSIUM SERPL-SCNC: 4.4 MEQ/L (ref 3.5–5.2)
RBC # BLD: 3.85 MILL/MM3 (ref 4.2–5.4)
SEG NEUTROPHILS: 64.6 %
SEGMENTED NEUTROPHILS ABSOLUTE COUNT: 3.6 THOU/MM3 (ref 1.8–7.7)
SODIUM BLD-SCNC: 131 MEQ/L (ref 135–145)
WBC # BLD: 5.6 THOU/MM3 (ref 4.8–10.8)

## 2017-11-20 PROCEDURE — 94640 AIRWAY INHALATION TREATMENT: CPT

## 2017-11-20 PROCEDURE — 2700000000 HC OXYGEN THERAPY PER DAY

## 2017-11-20 PROCEDURE — 6370000000 HC RX 637 (ALT 250 FOR IP): Performed by: PHYSICIAN ASSISTANT

## 2017-11-20 PROCEDURE — 1200000003 HC TELEMETRY R&B

## 2017-11-20 PROCEDURE — 97530 THERAPEUTIC ACTIVITIES: CPT

## 2017-11-20 PROCEDURE — 85610 PROTHROMBIN TIME: CPT

## 2017-11-20 PROCEDURE — 80048 BASIC METABOLIC PNL TOTAL CA: CPT

## 2017-11-20 PROCEDURE — 36415 COLL VENOUS BLD VENIPUNCTURE: CPT

## 2017-11-20 PROCEDURE — 2500000003 HC RX 250 WO HCPCS: Performed by: INTERNAL MEDICINE

## 2017-11-20 PROCEDURE — 6370000000 HC RX 637 (ALT 250 FOR IP): Performed by: HOSPITALIST

## 2017-11-20 PROCEDURE — 82948 REAGENT STRIP/BLOOD GLUCOSE: CPT

## 2017-11-20 PROCEDURE — 97535 SELF CARE MNGMENT TRAINING: CPT

## 2017-11-20 PROCEDURE — 6370000000 HC RX 637 (ALT 250 FOR IP): Performed by: INTERNAL MEDICINE

## 2017-11-20 PROCEDURE — 85025 COMPLETE CBC W/AUTO DIFF WBC: CPT

## 2017-11-20 PROCEDURE — 99233 SBSQ HOSP IP/OBS HIGH 50: CPT | Performed by: HOSPITALIST

## 2017-11-20 PROCEDURE — 97110 THERAPEUTIC EXERCISES: CPT

## 2017-11-20 PROCEDURE — 1200000000 HC SEMI PRIVATE

## 2017-11-20 PROCEDURE — 6370000000 HC RX 637 (ALT 250 FOR IP): Performed by: ORTHOPAEDIC SURGERY

## 2017-11-20 RX ORDER — FUROSEMIDE 40 MG/1
40 TABLET ORAL DAILY
Status: DISCONTINUED | OUTPATIENT
Start: 2017-11-20 | End: 2017-11-21 | Stop reason: HOSPADM

## 2017-11-20 RX ORDER — OXYCODONE HYDROCHLORIDE AND ACETAMINOPHEN 5; 325 MG/1; MG/1
1 TABLET ORAL EVERY 4 HOURS PRN
Qty: 50 TABLET | Refills: 0 | Status: SHIPPED | OUTPATIENT
Start: 2017-11-20 | End: 2017-11-27

## 2017-11-20 RX ORDER — CYCLOBENZAPRINE HCL 10 MG
10 TABLET ORAL 3 TIMES DAILY PRN
Qty: 50 TABLET | Refills: 0 | Status: SHIPPED | OUTPATIENT
Start: 2017-11-20 | End: 2017-11-22

## 2017-11-20 RX ADMIN — ATORVASTATIN CALCIUM 80 MG: 80 TABLET, FILM COATED ORAL at 20:35

## 2017-11-20 RX ADMIN — LEVOTHYROXINE SODIUM 100 MCG: 100 TABLET ORAL at 05:35

## 2017-11-20 RX ADMIN — TAZOBACTAM SODIUM AND PIPERACILLIN SODIUM 3.38 G: 375; 3 INJECTION, SOLUTION INTRAVENOUS at 22:42

## 2017-11-20 RX ADMIN — RANOLAZINE 500 MG: 500 TABLET, FILM COATED, EXTENDED RELEASE ORAL at 09:09

## 2017-11-20 RX ADMIN — AMLODIPINE BESYLATE 10 MG: 10 TABLET ORAL at 09:09

## 2017-11-20 RX ADMIN — RISPERIDONE 1 MG: 1 TABLET ORAL at 20:36

## 2017-11-20 RX ADMIN — RIVAROXABAN 15 MG: 15 TABLET, FILM COATED ORAL at 14:13

## 2017-11-20 RX ADMIN — PANTOPRAZOLE SODIUM 40 MG: 40 TABLET, DELAYED RELEASE ORAL at 05:35

## 2017-11-20 RX ADMIN — TAZOBACTAM SODIUM AND PIPERACILLIN SODIUM 3.38 G: 375; 3 INJECTION, SOLUTION INTRAVENOUS at 14:13

## 2017-11-20 RX ADMIN — CYCLOBENZAPRINE 10 MG: 10 TABLET, FILM COATED ORAL at 20:36

## 2017-11-20 RX ADMIN — INSULIN LISPRO 1 UNITS: 100 INJECTION, SOLUTION INTRAVENOUS; SUBCUTANEOUS at 20:39

## 2017-11-20 RX ADMIN — POLYVINYL ALCOHOL 1 DROP: 14 SOLUTION/ DROPS OPHTHALMIC at 09:09

## 2017-11-20 RX ADMIN — METOPROLOL TARTRATE 100 MG: 100 TABLET, FILM COATED ORAL at 09:09

## 2017-11-20 RX ADMIN — DOCUSATE SODIUM 300 MG: 100 CAPSULE, LIQUID FILLED ORAL at 20:35

## 2017-11-20 RX ADMIN — HYDROXYZINE HYDROCHLORIDE 25 MG: 25 TABLET ORAL at 10:17

## 2017-11-20 RX ADMIN — OXYCODONE HYDROCHLORIDE AND ACETAMINOPHEN 2 TABLET: 5; 325 TABLET ORAL at 23:33

## 2017-11-20 RX ADMIN — LISINOPRIL 20 MG: 20 TABLET ORAL at 20:35

## 2017-11-20 RX ADMIN — METOPROLOL TARTRATE 100 MG: 100 TABLET, FILM COATED ORAL at 20:35

## 2017-11-20 RX ADMIN — TAZOBACTAM SODIUM AND PIPERACILLIN SODIUM 3.38 G: 375; 3 INJECTION, SOLUTION INTRAVENOUS at 04:25

## 2017-11-20 RX ADMIN — OXYCODONE HYDROCHLORIDE AND ACETAMINOPHEN 2 TABLET: 5; 325 TABLET ORAL at 14:14

## 2017-11-20 RX ADMIN — OXCARBAZEPINE 150 MG: 300 TABLET ORAL at 09:09

## 2017-11-20 RX ADMIN — Medication 2 PUFF: at 09:06

## 2017-11-20 RX ADMIN — OXYCODONE HYDROCHLORIDE AND ACETAMINOPHEN 2 TABLET: 5; 325 TABLET ORAL at 05:35

## 2017-11-20 RX ADMIN — FUROSEMIDE 40 MG: 40 TABLET ORAL at 14:13

## 2017-11-20 RX ADMIN — RANOLAZINE 500 MG: 500 TABLET, FILM COATED, EXTENDED RELEASE ORAL at 20:36

## 2017-11-20 RX ADMIN — MONTELUKAST SODIUM 10 MG: 10 TABLET, FILM COATED ORAL at 20:36

## 2017-11-20 RX ADMIN — Medication 2 PUFF: at 20:32

## 2017-11-20 RX ADMIN — OXYCODONE HYDROCHLORIDE AND ACETAMINOPHEN 2 TABLET: 5; 325 TABLET ORAL at 10:17

## 2017-11-20 RX ADMIN — LISINOPRIL 20 MG: 20 TABLET ORAL at 09:09

## 2017-11-20 RX ADMIN — OXCARBAZEPINE 300 MG: 300 TABLET ORAL at 20:35

## 2017-11-20 RX ADMIN — RIVAROXABAN 15 MG: 15 TABLET, FILM COATED ORAL at 18:24

## 2017-11-20 RX ADMIN — POLYVINYL ALCOHOL 1 DROP: 14 SOLUTION/ DROPS OPHTHALMIC at 20:38

## 2017-11-20 RX ADMIN — HYDROXYZINE HYDROCHLORIDE 25 MG: 25 TABLET ORAL at 23:39

## 2017-11-20 RX ADMIN — CITALOPRAM 40 MG: 40 TABLET ORAL at 09:09

## 2017-11-20 RX ADMIN — OXYCODONE HYDROCHLORIDE AND ACETAMINOPHEN 1 TABLET: 5; 325 TABLET ORAL at 19:33

## 2017-11-20 RX ADMIN — DOXEPIN HYDROCHLORIDE 150 MG: 50 CAPSULE ORAL at 20:36

## 2017-11-20 RX ADMIN — RISPERIDONE 1 MG: 1 TABLET ORAL at 09:09

## 2017-11-20 RX ADMIN — CALCIUM CARBONATE-VITAMIN D TAB 500 MG-200 UNIT 1 TABLET: 500-200 TAB at 09:09

## 2017-11-20 RX ADMIN — POLYETHYLENE GLYCOL 3350 17 G: 17 POWDER, FOR SOLUTION ORAL at 09:09

## 2017-11-20 ASSESSMENT — PAIN SCALES - GENERAL
PAINLEVEL_OUTOF10: 6
PAINLEVEL_OUTOF10: 8
PAINLEVEL_OUTOF10: 3
PAINLEVEL_OUTOF10: 7
PAINLEVEL_OUTOF10: 7
PAINLEVEL_OUTOF10: 8
PAINLEVEL_OUTOF10: 3
PAINLEVEL_OUTOF10: 7
PAINLEVEL_OUTOF10: 8
PAINLEVEL_OUTOF10: 0

## 2017-11-20 ASSESSMENT — PAIN DESCRIPTION - DESCRIPTORS
DESCRIPTORS: ACHING;SORE
DESCRIPTORS: ACHING

## 2017-11-20 ASSESSMENT — PAIN DESCRIPTION - ORIENTATION
ORIENTATION: ANTERIOR
ORIENTATION: ANTERIOR

## 2017-11-20 ASSESSMENT — PAIN DESCRIPTION - PAIN TYPE
TYPE: SURGICAL PAIN
TYPE: SURGICAL PAIN

## 2017-11-20 ASSESSMENT — PAIN DESCRIPTION - LOCATION
LOCATION: NECK
LOCATION: NECK

## 2017-11-20 ASSESSMENT — PAIN DESCRIPTION - FREQUENCY: FREQUENCY: INTERMITTENT

## 2017-11-20 ASSESSMENT — PAIN DESCRIPTION - ONSET: ONSET: ON-GOING

## 2017-11-20 ASSESSMENT — PAIN DESCRIPTION - PROGRESSION: CLINICAL_PROGRESSION: GRADUALLY WORSENING

## 2017-11-20 NOTE — PROGRESS NOTES
[facility]    Time In: 3340  Time Out: 0933  Timed Code Treatment Minutes: 23 Minutes  Minutes: 23          Date: 2017  Patient Name: Iraida Hernandez,  Gender:  female        MRN: [id number]  : 1945  (67 y.o.)     Referring Practitioner: Ted Maharaj MD  Diagnosis: Cervical spinal stenosis  Additional Pertinent Hx: Pt is a 67 yr old female admitted 11/15 s/p REMOVAL OF PLATE M5-8, ACDF U0-3 W/ATLANTIS CORNERSTONE. Past Medical History:   Diagnosis Date    Anemia     Anxiety     Arthritis     general    AS (aortic stenosis): mild to moderate by echo 2017    ASHD (arteriosclerotic heart disease)     CAD (coronary artery disease)     Chest pain     COPD (chronic obstructive pulmonary disease) (HCC)     Depression     DM (diabetes mellitus) ([address]     Dyspnea     FH: CAD (coronary artery disease)     GERD (gastroesophageal reflux disease)     Heart burn     History of tobacco abuse: Quit in 2009 10/28/2014    HLD (hyperlipidemia)     HTN (hypertension)     Irritable bowel syndrome     States has not had problem with this for years    Liver disease     fatty liver    Metabolic syndrome     MI (myocardial infarction)     Nausea & vomiting     Obesity     CHET (obstructive sleep apnea)     S/P cardiac catheterization: 2014: 99% in-stent restenosis mid-RCA. LCx moderate LI's. LAD 85% mid-segment lesion. 2014: 99% in-stent restenosis mid-RCA. LCx moderate LI's. LAD 85% mid-segment lesion. Dr. Matthew Watts S/P PTCA:  2004: Proximal and mid RCA  Taxus 3.5 X 20 mm, and Taxus 3.0 X 32 mm. 10/28/2014    2004: Proximal and mid RCA  Taxus 3.5 X 20 mm, and Taxus 3.0 X 32 mm.  Dr. Bill Strickland Systolic murmur     Thyroid disease      Past Surgical History:   Procedure Laterality Date    BACK SURGERY  2016        BLADDER SUSPENSION      BREAST Alone  Type of Home: Apartment  Home Layout: One level  Home Access: Level entry  Home Equipment: 4 wheeled walker, Cane     Objective:       Transfers  Sit to Stand: Stand by assistance (from chair, good hand placement)  Stand to sit: Stand by assistance (to chair. verbal cues for hand placement. fair+ eccentric llowering)       Ambulation 1  Surface: level tile  Device: Rolling Walker  Other Apparatus: O2 (3L)  Assistance: Stand by assistance  Quality of Gait: slight decrease in velocity and jossy, decreased but equal step length B, slight increase in labor of breathing, slight forward flexed trunk, no LOB  Distance: 225ft         Balance  Sitting - Static: Good  Sitting - Dynamic: Good  Standing - Static: Fair;+  Standing - Dynamic: Fair;+                                        Exercises:  Exercises  Comments: seated in chair ankle pumps, long arc quads, hip ABD, and marches x12 reps each B with verbal and visual cues for performance to increase strength and improve mobility          Activity Tolerance:  Activity Tolerance: Patient Tolerated treatment well;Patient limited by endurance    Assessment: Body structures, Functions, Activity limitations: Decreased functional mobility , Decreased endurance, Decreased balance, Decreased strength  Assessment: patient continues to show improved mobility and stability. Able to walk furthest distance this stay with therapy. Increased labor of breathing during ambulation which resolves as soon as patient is seated.  Will continue to benefit from skilled PT to improve above factors and functional tolerance to activity to increase independence and return to prior level of function  Prognosis: Good  REQUIRES PT FOLLOW UP: Yes  Discharge Recommendations: Continue to assess pending progress, Home with Home health PT    Patient Education:  Patient Education: POC, breathing technique, gait training    Equipment Recommendations:  Equipment Needed: No    Safety:  Type of devices: Left in chair, All fall risk precautions in place, Call light within reach, Nurse notified, Chair alarm in place  Restraints  Initially in place: No    Plan:  Times per week: 6 X O  Times per day: Daily  Specific instructions for Next Treatment: B LE strengthening, bed mobility, transfer training, gait training. Current Treatment Recommendations: Strengthening, Neuromuscular Re-education, Home Exercise Program, Safety Education & Training, Balance Training, Endurance Training, Patient/Caregiver Education & Training, Functional Mobility Training, Transfer Training, Gait Training    Goals:  Patient goals : To go home. Short term goals  Time Frame for Short term goals: 2 weeks  Short term goal 1: Pt to transfer supine <--> sit S to enable pt to get in/out of bed. Short term goal 2: Pt to transfer sit <--> stand S for increased functional mobility. Short term goal 3: Pt to ambulate 100 feet with RW SBA for household ambulation. Long term goals  Time Frame for Long term goals : NA due to short length of stay.

## 2017-11-20 NOTE — PLAN OF CARE
Problem: DISCHARGE BARRIERS  Goal: Patient's continuum of care needs are met  Outcome: Ongoing  Return home with [facility]. See SW note for details.
Problem: Falls - Risk of  Goal: Absence of falls  Outcome: Ongoing  No falls this shift    Problem: Pain:  Goal: Pain level will decrease  Pain level will decrease    Outcome: Ongoing  Pt pain controlled with percocet and morphine    Problem: Risk for Impaired Skin Integrity  Goal: Tissue integrity - skin and mucous membranes  Structural intactness and normal physiological function of skin and  mucous membranes. Outcome: Ongoing  No new break in skin integrity     Problem: Impaired respiratory status  Goal: Clear lung sounds  Outcome: Ongoing  On 60 percent high flow NC, lungs rhonchi, diuresed today    Comments: Magen Santiago Care plan reviewed with patient and no family present. Patient verbalize understanding of the plan of care and contribute to goal setting.
Problem: Falls - Risk of  Goal: Absence of falls  Outcome: Ongoing  No falls-pt compliant with fall precautions    Problem: Pain:  Goal: Pain level will decrease  Pain level will decrease    Outcome: Ongoing  Down to 1/10 with ordered pain medications. Pt goal met. Problem: Risk for Impaired Skin Integrity  Goal: Tissue integrity - skin and mucous membranes  Structural intactness and normal physiological function of skin and  mucous membranes. Outcome: Ongoing  No new breakdown. Surgical incision no redness or drainage. Problem: Discharge Planning:  Goal: Discharged to appropriate level of care  Discharged to appropriate level of care   Outcome: Not Met This Shift  High flow oxygen continues to be weaned. Problem: DISCHARGE BARRIERS  Goal: Patient's continuum of care needs are met  Outcome: Ongoing  Has family planning on assistance when pt continues to recover at home.     Comments: .Macey Floyd
Problem: Falls - Risk of  Goal: Absence of falls  Outcome: Ongoing  Patient alert and remind her to use her call light before getting up. Bed alarm on. Problem: Pain:  Goal: Pain level will decrease  Pain level will decrease   Outcome: Ongoing  Pain goal is 3/10. Neck pain improved with oral pain medication. Problem: Risk for Impaired Skin Integrity  Goal: Tissue integrity - skin and mucous membranes  Structural intactness and normal physiological function of skin and  mucous membranes. Outcome: Ongoing  Patient moving self in bed. Anterior neck incision with dressing in place. Problem: Discharge Planning:  Goal: Discharged to appropriate level of care  Discharged to appropriate level of care   Outcome: Ongoing  Planning home at discharge. Problem: DISCHARGE BARRIERS  Goal: Patient's continuum of care needs are met  Outcome: Ongoing  Planning home at discharge. Comments: Care plan reviewed with patient. Patient verbalize understanding of the plan of care and contribute to goal setting.
Problem: Falls - Risk of  Goal: Absence of falls  Outcome: Ongoing  Patient alert and using call light before getting up. Problem: Pain:  Goal: Pain level will decrease  Pain level will decrease    Outcome: Ongoing  Pain goal is 3/10. Oral pain medication  Given for pain and patient states that it has helped her. Problem: Risk for Impaired Skin Integrity  Goal: Tissue integrity - skin and mucous membranes  Structural intactness and normal physiological function of skin and  mucous membranes. Outcome: Ongoing  Patient moving self in bed. Anterior neck incision with dressing in place. Problem: Discharge Planning:  Goal: Discharged to appropriate level of care  Discharged to appropriate level of care   Outcome: Ongoing  Patient states she is going home at discharge. Problem: DISCHARGE BARRIERS  Goal: Patient's continuum of care needs are met  Outcome: Ongoing  Patient wanting to go home at discharge. Comments: Care plan reviewed with patient. Patient verbalize understanding of the plan of care and contribute to goal setting.
deficits noted. Neuro checks at least every 4 hours. Will continue to monitor and assess.

## 2017-11-20 NOTE — PROGRESS NOTES
sodium chloride flush, cyclobenzaprine, magnesium hydroxide, ondansetron, glucose, dextrose, glucagon (rDNA), dextrose      Intake/Output Summary (Last 24 hours) at 11/20/17 1320  Last data filed at 11/20/17 0453   Gross per 24 hour   Intake             1316 ml   Output             1000 ml   Net              316 ml       Diet:  DIET CARB CONTROL; Dietary Nutrition Supplements: Other Oral Supplement (see comment)    Exam:  BP (!) 191/76   Pulse 92   Temp 97.4 °F (36.3 °C) (Oral)   Resp 20   Ht 5' 6\" (1.676 m)   Wt 229 lb 4.5 oz (104 kg)   SpO2 94%   BMI 37.01 kg/m²     Gen: Not in distress. Alert. Obese  Head: Normocephalic. Atraumatic. Eyes: Conjunctivae/corneas clear. ENT: Oral mucosa moist  Neck:  Soft collar  CVS: Nml R3E6,  Systolic murmur, RRR  Pulmomary:  Slight reduction in air entry in the bases. Gastrointestinal: Soft, non tender, non distend,  Positive bowel sounds. Musculoskeletal:  1+ edema bilaterally  Neuro: No focal deficit. Moves extremity spontaneously. Psychiatry: Appropriate affect. Not agitated. Labs:   Recent Labs      11/18/17 0434 11/19/17 0411 11/20/17   0620   WBC  5.8  5.2  5.6   HGB  10.3*  9.9*  10.7*   HCT  31.1*  30.2*  32.0*   PLT  191  198  248     Recent Labs      11/18/17 0434 11/19/17 0411 11/20/17   0620   NA  128*  130*  131*   K  4.2  3.8  4.4   CL  92*  92*  88*   CO2  26  27  30   BUN  9  13  11   CREATININE  0.6  0.6  0.6   CALCIUM  8.8  9.0  9.3     No results for input(s): AST, ALT, BILIDIR, BILITOT, ALKPHOS in the last 72 hours. Recent Labs      11/18/17 0434 11/19/17 0411 11/20/17   0620   INR  1.14*  1.12  1.19*     No results for input(s): CKTOTAL, TROPONINI in the last 72 hours.     Urinalysis:    Lab Results   Component Value Date    NITRU NEGATIVE 02/02/2016    WBCUA 50-75 02/02/2016    BACTERIA NONE 02/02/2016    RBCUA 3-5 02/02/2016    BLOODU trace-lysed 07/28/2016    BLOODU SMALL 02/02/2016    SPECGRAV 1.010 07/28/2016 Chau Araya 1.006 02/02/2016    GLUCOSEU neg 07/28/2016       Radiology:  XR Chest Portable   Final Result      Persistent interstitial pulmonary edema versus viral-type pneumonia. Improving left lower lobe airspace disease. Tiny left pleural effusion. **This report has been created using voice recognition software. It may contain minor errors which are inherent in voice recognition technology. **      Final report electronically signed by Dr. Geraldine Coker on 11/19/2017 4:11 AM      XR Chest Portable   Final Result   1. New hazy appearance to the interstitium of the mid and lower lung zones. This can represent mild pulmonary edema versus atelectasis. 2. Foreign body over the left neck soft tissues may represent a surgical drain. **This report has been created using voice recognition software. It may contain minor errors which are inherent in voice recognition technology. **      Final report electronically signed by Dr. Derick Kemp on 11/18/2017 10:19 AM      IR GUIDED IVC FILTER PLACEMENT   Final Result      VL DUP LOWER EXTREMITY VENOUS BILATERAL   Final Result   1. No sonographic evidence of bilateral lower extremity DVT. **This report has been created using voice recognition software. It may contain minor errors which are inherent in voice recognition technology. **      Final report electronically signed by Dr. Hilary Bajwa on 11/17/2017 11:25 AM      CTA CHEST [address]   Final Result      Probable posterior right upper lobe pulmonary embolism. .   Right and possible lower lobe pneumonia with bilateral lower lobe atelectasis. Small right pleural effusion. .   Additional findings as detailed above. Results were discussed with the patient's nurse on 11/17/2017 at 4:15 AM.            **This report has been created using voice recognition software. It may contain minor errors which are inherent in voice recognition technology. **      Final report electronically signed by Dr. Ollie Watson on 11/17/2017 4:14 AM      XR Cervical Spine 1 VW   Final Result   Intraoperative appearance. Please see the surgical report for further details. **This report has been created using voice recognition software. It may contain minor errors which are inherent in voice recognition technology. **      Final report electronically signed by Dr. Marcos Santana on 11/15/2017 10:31 AM                  Electronically signed by Sherry Vargas MD on 11/20/2017 at 1:20 PM

## 2017-11-20 NOTE — PROGRESS NOTES
Proximal and mid RCA  Taxus 3.5 X 20 mm, and Taxus 3.0 X 32 mm. 10/28/2014    5/11/2004: Proximal and mid RCA  Taxus 3.5 X 20 mm, and Taxus 3.0 X 32 mm. Dr. Faustino Marx Systolic murmur 45/73/8339    Thyroid disease      Past Surgical History:   Procedure Laterality Date    BACK SURGERY  08/2016        BLADDER SUSPENSION      BREAST BIOPSY  10/10/2017    BREAST LUMPECTOMY      CARDIAC CATHETERIZATION  8-11-06    Mild nonobstructive CAD w/ diffuse 10-20% proximal and mid LAD stenosis and 10-20% proximal/ostial RCA stenosis. No obstructive lesions. RCA stents are patent w/o evidence of restenosis. Normal LV systolic function. EF 65%. Trace MR - catheter induced. Minimally elevated LVEDP - 13mmHg. Mild to moderate aortoiliac PVD w/o obstructive lesions.  CARDIAC CATHETERIZATION  5-11-04    Successful drug-eluting stent x 2 of proximal and mid RCA.  CARDIAC CATHETERIZATION  5-11-04    70-80% proximal RCA stenosis w/ 50-70% mid RCA stenosis. There is 50-60% lesion in mid PDA. Mild proximal and mid LAD disease. Mild circumflex disease. Normal LV size and systolic function. EF 60%.  CARDIOVASCULAR STRESS TEST  5-10-11    No evidence of stress induced ischemia. EF 75%.  CARDIOVASCULAR STRESS TEST  5-10-10    No evidence of stress induced ischemia, infarct or scar. EF 74%.  CERVICAL FUSION N/A 11/15/2017    REMOVAL OF PLATE J5-7, ACDF O5-0 W/ATLANTIS CORNERSTONE performed by Boris Da Silva MD at [facility] COLONOSCOPY      ENDOSCOPY, COLON, DIAGNOSTIC      FOOT SURGERY      HERNIA REPAIR      NECK SURGERY  FEB 2016    PARTIAL HYSTERECTOMY      UPPER GASTROINTESTINAL ENDOSCOPY      UPPER GASTROINTESTINAL ENDOSCOPY             Subjective   Subjective: Pt received in bedside chair, pleasant and cooperative throughout OT session.  RN ok'ed session    Pain:  Pain Assessment  Patient Currently in Pain: Denies     Objective  Overall Cognitive Status: WNL    ADL  Grooming: with min A & min vcs for adaptative strategies  Short term goal 4: Complete various t/fs including toilet with S & 0 vcs for safety  Long term goals  Time Frame for Long term goals : No LTG set d/t short ELOS         AM-PAC Inpatient Daily Activity Raw Score: 13  AM-PAC Inpatient ADL T-Scale Score : 32.03  ADL Inpatient CMS 0-100% Score: 63.03  ADL Inpatient CMS G-Code Modifier : CL

## 2017-11-21 ENCOUNTER — TELEPHONE (OUTPATIENT)
Dept: PULMONOLOGY | Age: 72
End: 2017-11-21

## 2017-11-21 VITALS
SYSTOLIC BLOOD PRESSURE: 115 MMHG | HEIGHT: 66 IN | TEMPERATURE: 97.5 F | DIASTOLIC BLOOD PRESSURE: 74 MMHG | OXYGEN SATURATION: 94 % | BODY MASS INDEX: 36.85 KG/M2 | RESPIRATION RATE: 18 BRPM | HEART RATE: 80 BPM | WEIGHT: 229.28 LBS

## 2017-11-21 LAB
ANION GAP SERPL CALCULATED.3IONS-SCNC: 11 MEQ/L (ref 8–16)
BASOPHILS # BLD: 0.6 %
BASOPHILS ABSOLUTE: 0 THOU/MM3 (ref 0–0.1)
BUN BLDV-MCNC: 15 MG/DL (ref 7–22)
CALCIUM SERPL-MCNC: 8.5 MG/DL (ref 8.5–10.5)
CHLORIDE BLD-SCNC: 89 MEQ/L (ref 98–111)
CO2: 29 MEQ/L (ref 23–33)
CREAT SERPL-MCNC: 0.8 MG/DL (ref 0.4–1.2)
EOSINOPHIL # BLD: 4.8 %
EOSINOPHILS ABSOLUTE: 0.2 THOU/MM3 (ref 0–0.4)
GFR SERPL CREATININE-BSD FRML MDRD: 70 ML/MIN/1.73M2
GLUCOSE BLD-MCNC: 106 MG/DL (ref 70–108)
GLUCOSE BLD-MCNC: 113 MG/DL (ref 70–108)
GLUCOSE BLD-MCNC: 143 MG/DL (ref 70–108)
HCT VFR BLD CALC: 29.4 % (ref 37–47)
HEMOGLOBIN: 9.7 GM/DL (ref 12–16)
INR BLD: 1.54 (ref 0.85–1.13)
LYMPHOCYTES # BLD: 23.7 %
LYMPHOCYTES ABSOLUTE: 1.2 THOU/MM3 (ref 1–4.8)
MCH RBC QN AUTO: 27.7 PG (ref 27–31)
MCHC RBC AUTO-ENTMCNC: 33.1 GM/DL (ref 33–37)
MCV RBC AUTO: 83.7 FL (ref 81–99)
MONOCYTES # BLD: 11.7 %
MONOCYTES ABSOLUTE: 0.6 THOU/MM3 (ref 0.4–1.3)
NUCLEATED RED BLOOD CELLS: 0 /100 WBC
PDW BLD-RTO: 14.4 % (ref 11.5–14.5)
PLATELET # BLD: 244 THOU/MM3 (ref 130–400)
PMV BLD AUTO: 7.2 MCM (ref 7.4–10.4)
POTASSIUM SERPL-SCNC: 4.3 MEQ/L (ref 3.5–5.2)
RBC # BLD: 3.51 MILL/MM3 (ref 4.2–5.4)
SEG NEUTROPHILS: 59.2 %
SEGMENTED NEUTROPHILS ABSOLUTE COUNT: 3 THOU/MM3 (ref 1.8–7.7)
SODIUM BLD-SCNC: 129 MEQ/L (ref 135–145)
WBC # BLD: 5 THOU/MM3 (ref 4.8–10.8)

## 2017-11-21 PROCEDURE — 97535 SELF CARE MNGMENT TRAINING: CPT

## 2017-11-21 PROCEDURE — 85610 PROTHROMBIN TIME: CPT

## 2017-11-21 PROCEDURE — 80048 BASIC METABOLIC PNL TOTAL CA: CPT

## 2017-11-21 PROCEDURE — 2500000003 HC RX 250 WO HCPCS: Performed by: INTERNAL MEDICINE

## 2017-11-21 PROCEDURE — 85025 COMPLETE CBC W/AUTO DIFF WBC: CPT

## 2017-11-21 PROCEDURE — 97110 THERAPEUTIC EXERCISES: CPT

## 2017-11-21 PROCEDURE — 94640 AIRWAY INHALATION TREATMENT: CPT

## 2017-11-21 PROCEDURE — 6370000000 HC RX 637 (ALT 250 FOR IP): Performed by: INTERNAL MEDICINE

## 2017-11-21 PROCEDURE — 82948 REAGENT STRIP/BLOOD GLUCOSE: CPT

## 2017-11-21 PROCEDURE — 6370000000 HC RX 637 (ALT 250 FOR IP): Performed by: HOSPITALIST

## 2017-11-21 PROCEDURE — 97116 GAIT TRAINING THERAPY: CPT

## 2017-11-21 PROCEDURE — 2700000000 HC OXYGEN THERAPY PER DAY

## 2017-11-21 PROCEDURE — 6370000000 HC RX 637 (ALT 250 FOR IP): Performed by: ORTHOPAEDIC SURGERY

## 2017-11-21 PROCEDURE — 36415 COLL VENOUS BLD VENIPUNCTURE: CPT

## 2017-11-21 PROCEDURE — 6370000000 HC RX 637 (ALT 250 FOR IP): Performed by: PHYSICIAN ASSISTANT

## 2017-11-21 RX ORDER — FUROSEMIDE 20 MG/1
20 TABLET ORAL DAILY
Qty: 30 TABLET | Refills: 0 | Status: SHIPPED | OUTPATIENT
Start: 2017-11-22 | End: 2018-03-07 | Stop reason: SDUPTHER

## 2017-11-21 RX ADMIN — HYDROXYZINE HYDROCHLORIDE 25 MG: 25 TABLET ORAL at 11:17

## 2017-11-21 RX ADMIN — OXYCODONE HYDROCHLORIDE AND ACETAMINOPHEN 2 TABLET: 5; 325 TABLET ORAL at 15:28

## 2017-11-21 RX ADMIN — TAZOBACTAM SODIUM AND PIPERACILLIN SODIUM 3.38 G: 375; 3 INJECTION, SOLUTION INTRAVENOUS at 05:38

## 2017-11-21 RX ADMIN — Medication 2 PUFF: at 08:07

## 2017-11-21 RX ADMIN — OXYCODONE HYDROCHLORIDE AND ACETAMINOPHEN 2 TABLET: 5; 325 TABLET ORAL at 11:13

## 2017-11-21 RX ADMIN — METOPROLOL TARTRATE 100 MG: 100 TABLET, FILM COATED ORAL at 08:26

## 2017-11-21 RX ADMIN — OXYCODONE HYDROCHLORIDE AND ACETAMINOPHEN 2 TABLET: 5; 325 TABLET ORAL at 03:30

## 2017-11-21 RX ADMIN — PANTOPRAZOLE SODIUM 40 MG: 40 TABLET, DELAYED RELEASE ORAL at 08:26

## 2017-11-21 RX ADMIN — RIVAROXABAN 15 MG: 15 TABLET, FILM COATED ORAL at 08:26

## 2017-11-21 RX ADMIN — AMLODIPINE BESYLATE 10 MG: 10 TABLET ORAL at 08:28

## 2017-11-21 RX ADMIN — LEVOTHYROXINE SODIUM 100 MCG: 100 TABLET ORAL at 08:27

## 2017-11-21 RX ADMIN — LISINOPRIL 20 MG: 20 TABLET ORAL at 08:26

## 2017-11-21 RX ADMIN — POLYVINYL ALCOHOL 1 DROP: 14 SOLUTION/ DROPS OPHTHALMIC at 08:28

## 2017-11-21 RX ADMIN — RANOLAZINE 500 MG: 500 TABLET, FILM COATED, EXTENDED RELEASE ORAL at 08:27

## 2017-11-21 RX ADMIN — FUROSEMIDE 40 MG: 40 TABLET ORAL at 08:27

## 2017-11-21 RX ADMIN — RISPERIDONE 1 MG: 1 TABLET ORAL at 08:27

## 2017-11-21 RX ADMIN — POLYETHYLENE GLYCOL 3350 17 G: 17 POWDER, FOR SOLUTION ORAL at 08:26

## 2017-11-21 RX ADMIN — CITALOPRAM 40 MG: 40 TABLET ORAL at 08:27

## 2017-11-21 RX ADMIN — OXCARBAZEPINE 150 MG: 300 TABLET ORAL at 08:26

## 2017-11-21 RX ADMIN — CALCIUM CARBONATE-VITAMIN D TAB 500 MG-200 UNIT 1 TABLET: 500-200 TAB at 08:27

## 2017-11-21 ASSESSMENT — PAIN SCALES - GENERAL
PAINLEVEL_OUTOF10: 6
PAINLEVEL_OUTOF10: 5
PAINLEVEL_OUTOF10: 4
PAINLEVEL_OUTOF10: 8
PAINLEVEL_OUTOF10: 10
PAINLEVEL_OUTOF10: 4
PAINLEVEL_OUTOF10: 7

## 2017-11-21 ASSESSMENT — PAIN DESCRIPTION - ORIENTATION: ORIENTATION: ANTERIOR

## 2017-11-21 ASSESSMENT — PAIN DESCRIPTION - PROGRESSION: CLINICAL_PROGRESSION: GRADUALLY WORSENING

## 2017-11-21 ASSESSMENT — PAIN DESCRIPTION - PAIN TYPE: TYPE: SURGICAL PAIN

## 2017-11-21 ASSESSMENT — PAIN DESCRIPTION - DESCRIPTORS: DESCRIPTORS: ACHING

## 2017-11-21 ASSESSMENT — PAIN DESCRIPTION - LOCATION: LOCATION: NECK

## 2017-11-21 ASSESSMENT — PAIN DESCRIPTION - FREQUENCY: FREQUENCY: INTERMITTENT

## 2017-11-21 ASSESSMENT — PAIN DESCRIPTION - ONSET: ONSET: ON-GOING

## 2017-11-21 NOTE — CARE COORDINATION
11/21/17, 3:00 PM    Discharge plan discussed by  and . Discharge plan reviewed with patient/ family. Patient/ family verbalize understanding of discharge plan and are in agreement with plan. Understanding was demonstrated using the teach back method. IMM Letter  IMM Letter given to Patient/Family/Significant other/Guardian/POA/by[de-identified] cm  IMM Letter date given[de-identified] 11/21/17  IMM Letter time given[de-identified] 0845     Patient discharge home. [facility] notified of discharge for today. AVS to be faxed once completed. Home oxygen order and evaluation faxed to [address] [place] [address], physician note to be faxed once completed.

## 2017-11-21 NOTE — PROGRESS NOTES
5/11/2004: Proximal and mid RCA  Taxus 3.5 X 20 mm, and Taxus 3.0 X 32 mm. 10/28/2014    5/11/2004: Proximal and mid RCA  Taxus 3.5 X 20 mm, and Taxus 3.0 X 32 mm. Dr. Mark Petersen Systolic murmur 58/61/0909    Thyroid disease      Past Surgical History:   Procedure Laterality Date    BACK SURGERY  08/2016        BLADDER SUSPENSION      BREAST BIOPSY  10/10/2017    BREAST LUMPECTOMY      CARDIAC CATHETERIZATION  8-11-06    Mild nonobstructive CAD w/ diffuse 10-20% proximal and mid LAD stenosis and 10-20% proximal/ostial RCA stenosis. No obstructive lesions. RCA stents are patent w/o evidence of restenosis. Normal LV systolic function. EF 65%. Trace MR - catheter induced. Minimally elevated LVEDP - 13mmHg. Mild to moderate aortoiliac PVD w/o obstructive lesions.  CARDIAC CATHETERIZATION  5-11-04    Successful drug-eluting stent x 2 of proximal and mid RCA.  CARDIAC CATHETERIZATION  5-11-04    70-80% proximal RCA stenosis w/ 50-70% mid RCA stenosis. There is 50-60% lesion in mid PDA. Mild proximal and mid LAD disease. Mild circumflex disease. Normal LV size and systolic function. EF 60%.  CARDIOVASCULAR STRESS TEST  5-10-11    No evidence of stress induced ischemia. EF 75%.  CARDIOVASCULAR STRESS TEST  5-10-10    No evidence of stress induced ischemia, infarct or scar. EF 74%.  CERVICAL FUSION N/A 11/15/2017    REMOVAL OF PLATE Z4-4, ACDF H6-4 W/ATLANTIS CORNERSTONE performed by Maryam Welch MD at [address] [place] [address], DIAGNOSTIC      FOOT SURGERY      HERNIA REPAIR      NECK SURGERY  FEB 2016    PARTIAL HYSTERECTOMY      UPPER GASTROINTESTINAL ENDOSCOPY      UPPER GASTROINTESTINAL ENDOSCOPY             Subjective  Patient assessed for rehabilitation services?: Yes    Subjective: RN ok'd session.  Pt. supine in bed upon arrival, agreeable to OT treatment              Pain:  Pain Assessment  Patient Currently in Pain: Denies Complete UB dressing with min A & min vcs for adaptative strategies  Short term goal 4: Complete various t/fs including toilet with S & 0 vcs for safety  Long term goals  Time Frame for Long term goals : No LTG set d/t short ELOS         AM-PAC Inpatient Daily Activity Raw Score: 17  AM-PAC Inpatient ADL T-Scale Score : 37.26  ADL Inpatient CMS 0-100% Score: 50.11  ADL Inpatient CMS G-Code Modifier : CK

## 2017-11-21 NOTE — TELEPHONE ENCOUNTER
Sean [facility] Transitions Initial Follow Up Call    Call within 2 business days of discharge: Yes    Patient: Isaiah Shook Patient : 1945   MRN: [id number]  Reason for Admission: There are no discharge diagnoses documented for the most recent discharge. Discharge Date: 17 RARS: Rosalnia GARCIA([id number])@     Spoke with: [address]: [unfilled]    Non-face-to-face services provided:  Obtained and reviewed discharge summary and/or continuity of care documents   Pt states her daughter is picking up her prescriptions that were given at discharge. Pt states she is doing well right now. Pt did not have a follow up appt with Johny Francois, so I scheduled pt an appt on 17 for a Hospital follow up. Requested pt to call if needing anything prior to this appt. Pt verbalized understanding.      Follow Up  Future Appointments  Date Time Provider Micheline Conteh   3/7/2018 3:15 PM Tahir Wright MD  [facility] [address]   3/20/2018 2:00 PM David Dominguez RD, LD STR MED [facility] [address]   2018 1:15 PM Chula Layton PA-C Pulm Med [facility] [place]   2018 8:00 AM STR CT IMAGING RM1  [facility] STRZ OUT EXP STR Radiolog   2018 8:30 AM STR PULMONARY FUNCTION ROOM 1 STRZ PFT None   2018 1:00 PM Zaina Witt MD Pulm Med [facility] - Roxanna Sanchez LPN

## 2017-11-21 NOTE — DISCHARGE SUMMARY
[address] [place] [address]                                 DISCHARGE SUMMARY    PATIENT NAME: Ros Murcia                     :        1945  MED REC NO:   [id number]                           ROOM:       [id number]  ACCOUNT NO:   [de-identified]                           ADMIT DATE: 11/15/2017  PROVIDER:     Jodie Levi [place] DATE:    DISCHARGE DIAGNOSES:  Cervical spondylosis and stenosis, resultant  radiculopathy, myelopathy of level C3-C4 and C4-C5, and previous spine  surgery level of C5-C6 and C6-C7 including an ACD treatment received,  removal anterior cervical spine plate levels of C5, C6 and C7 including  plate and fixed bone screws, assessment of cervical fusion of C5-C6 and  C6-C7, anterior cervical diskectomies of level C3-C4 and C4-C5, and  anterior cervical interbody fusion of levels of C3-C4 and C4-C5, anterior  cervical spine plating of levels C3, C4 and C5. HOSPITAL COURSE:  The patient presented 67years of age with symptoms of  profound neck and arm pain left worse than the right, bilateral with the  loss of hand strength as well as coming from multiple levels cervical  spondylosis and stenosis at level C3-C4 and C4-C5. As the result of these  symptoms, the patient is in preference of pursuing definitive management  because of her severe symptoms of neck and arm pain with weakness. She is  also developing clumsiness of her hands, consistent with cervical  myelopathy. As a result of these continued symptoms, the patient is in  preference of pursuing surgery. She was admitted to [address] [place] [address] for care following surgery on 11/15/2017. Postoperative day 1, the  patient was feeling well with resolution of her neck pain and arm  radiculopathy. Postoperative day 2, she was developing increasing hypoxia  and was poorly adherent with the full face mask oxygen or BiPAP support.    CTA

## 2017-11-21 NOTE — PROGRESS NOTES
Department of Orthopedic Surgery  Spine Service  Jl Stone PA-C Progress Note        Subjective:  No acute changes overnight. Waiting on medicine for clearance to go home. BP trending downward @ 0530 and 0815    Vitals  VITALS:  /74   Pulse 80   Temp 97.5 °F (36.4 °C) (Oral)   Resp 18   Ht 5' 6\" (1.676 m)   Wt 229 lb 4.5 oz (104 kg)   SpO2 94%   BMI 37.01 kg/m²   24HR INTAKE/OUTPUT:    Intake/Output Summary (Last 24 hours) at 11/21/17 0826  Last data filed at 11/21/17 0335   Gross per 24 hour   Intake          1529.91 ml   Output             1000 ml   Net           529.91 ml     URINARY CATHETER OUTPUT (Son):     DRAIN/TUBE OUTPUT:  [REMOVED] Closed/Suction Drain Left; Anterior Neck Bulb 10 Persian-Output (ml): 3 ml      PHYSICAL EXAM:    Orientation:  alert and oriented to person, place and time    Incision:  dressing in place, clean, dry, intact    Upper Extremity Motor :   Deltoid:  5/5  Biceps:  5/5  Triceps:  5/5  Wrist Flexion:  5/5  Wrist Extension:  5/5  Finger Flexion:  5/5  Finger Extension:  5/5    Upper Motor Neuron Signs:  None  Upper Extremity Sensory:  Intact C3-T1 distribution    Flatus:  positive    ABNORMAL EXAM FINDINGS:  none    LABS:    HgB:    Lab Results   Component Value Date    HGB 9.7 11/21/2017         ASSESSMENT AND PLAN:    Post operative day 6    1:  Monitor vitals  2:  Activity Level:  OOB  3:  Pain Control:  Controlled  4:  Discharge Planning:  Good once cleared by medicine

## 2017-11-21 NOTE — PROGRESS NOTES
Physical Therapy   [facility]    Time In: 0621  Time Out: 1034  Timed Code Treatment Minutes: 25 Minutes  Minutes: 25          Date: 2017  Patient Name: Maria Elena Jesus,  Gender:  female        MRN: [id number]  : 1945  (67 y.o.)     Referring Practitioner: Britt Le MD  Diagnosis: Cervical spinal stenosis  Additional Pertinent Hx: Pt is a 67 yr old female admitted 11/15 s/p REMOVAL OF PLATE J5-7, ACDF U4-7 W/ATLANTIS CORNERSTONE. Past Medical History:   Diagnosis Date    Anemia     Anxiety     Arthritis     general    AS (aortic stenosis): mild to moderate by echo 2017    ASHD (arteriosclerotic heart disease)     CAD (coronary artery disease)     Chest pain     COPD (chronic obstructive pulmonary disease) (HCC)     Depression     DM [facility]diabetes mellitus) ([facility] [address]     Dyspnea     FH: CAD (coronary artery disease)     GERD (gastroesophageal reflux disease)     Heart burn     History of tobacco abuse: Quit in 2009 10/28/2014    HLD (hyperlipidemia)     HTN (hypertension)     Irritable bowel syndrome     States has not had problem with this for years    Liver disease     fatty liver    Metabolic syndrome     MI (myocardial infarction)     Nausea & vomiting     Obesity     CHET (obstructive sleep apnea)     S/P cardiac catheterization: 2014: 99% in-stent restenosis mid-RCA. LCx moderate LI's. LAD 85% mid-segment lesion. 2014: 99% in-stent restenosis mid-RCA. LCx moderate LI's. LAD 85% mid-segment lesion. Dr. Ej Gomez S/P PTCA:  2004: Proximal and mid RCA  Taxus 3.5 X 20 mm, and Taxus 3.0 X 32 mm. 10/28/2014    2004: Proximal and mid RCA  Taxus 3.5 X 20 mm, and Taxus 3.0 X 32 mm.  Dr. Rachel Jacome Systolic murmur     Thyroid disease      Past Surgical History:   Procedure Laterality Date    BACK SURGERY  2016    Dr.Fumich Ramon MENES[address] Apartment  Home Layout: One level  Home Access: Level entry  Home Equipment: 4 wheeled walker, Cane     Objective:  Sit to Supine: Stand by assistance  Scooting: Stand by assistance    Transfers  Sit to Stand: Stand by assistance (from bedside chair)  Stand to sit: Stand by assistance (onto EOB and benches in [place])       Ambulation 1  Surface: level tile  Device: Rolling Walker  Other Apparatus: O2  Assistance: Stand by assistance  Quality of Gait: slight decrease in velocity and jossy, decreased but equal step length B, slight increase in labor of breathing, slight forward flexed trunk, no LOB  Distance: [id number], 470zva5, 911dcn6  Comments: pt needing seated restbreaks d/t pain in hips            Exercises:  Exercises  Comments: pt completed BLE seated ex x10;heel/toe raises, glut set, marches, hip abd/add, and long arch quads all to increase LE strength for improved functional mobility. Activity Tolerance:  Activity Tolerance: Patient Tolerated treatment well;Patient limited by endurance; Patient limited by pain  Activity Tolerance: pt c/o hip pain    Assessment: Body structures, Functions, Activity limitations: Decreased functional mobility , Decreased endurance, Decreased balance, Decreased strength  Assessment: Pt tolerated session fairly well with minor c/o increase pain in hips. RT was present throughout session to perform home O2 eval. Pt had minor c/o SOB, pt was 91% thoughout session until RT removed O2 pt then dropped to ~86. Pt in bed at end of session with RT still present. Prognosis: Good  REQUIRES PT FOLLOW UP: Yes  Discharge Recommendations: Continue to assess pending progress, Home with Home health PT    Patient Education:  Patient Education: ambulation, breathing tech. , POC    Equipment Recommendations:  Equipment Needed: No    Safety:  Type of devices: Left in chair, All fall risk precautions in place, Call light within reach, Nurse notified, Chair alarm in

## 2017-11-22 ENCOUNTER — TELEPHONE (OUTPATIENT)
Dept: PHARMACY | Age: 72
End: 2017-11-22

## 2017-11-22 ENCOUNTER — CARE COORDINATION (OUTPATIENT)
Dept: CASE MANAGEMENT | Age: 72
End: 2017-11-22

## 2017-11-22 ENCOUNTER — TELEPHONE (OUTPATIENT)
Dept: FAMILY MEDICINE CLINIC | Age: 72
End: 2017-11-22

## 2017-11-22 DIAGNOSIS — E03.9 HYPOTHYROIDISM, UNSPECIFIED TYPE: Primary | ICD-10-CM

## 2017-11-22 NOTE — TELEPHONE ENCOUNTER
[facility] from Dr. Argueta Keep office called to let Rian know that patient's TSH from last week is over 10. Last TSH was 4.

## 2017-11-22 NOTE — TELEPHONE ENCOUNTER
Spoke with patient at [phone]. She asked that I call back around 2pm today.     Yoli Erwin [facility], BCPS  11/22/2017     9:57 AM

## 2017-11-24 ENCOUNTER — CARE COORDINATION (OUTPATIENT)
Dept: CASE MANAGEMENT | Age: 72
End: 2017-11-24

## 2017-11-27 ENCOUNTER — CARE COORDINATION (OUTPATIENT)
Dept: CASE MANAGEMENT | Age: 72
End: 2017-11-27

## 2017-11-28 ENCOUNTER — TELEPHONE (OUTPATIENT)
Dept: FAMILY MEDICINE CLINIC | Age: 72
End: 2017-11-28

## 2017-11-28 NOTE — TELEPHONE ENCOUNTER
Placed call to [employer]. She states that the order was placed by Dr Thien Morley so they will contact his office for him to sign orders. We can disregard the fax that was sent to our office.

## 2017-11-29 ENCOUNTER — TELEPHONE (OUTPATIENT)
Dept: CARDIOLOGY CLINIC | Age: 72
End: 2017-11-29

## 2017-11-29 ENCOUNTER — OFFICE VISIT (OUTPATIENT)
Dept: FAMILY MEDICINE CLINIC | Age: 72
End: 2017-11-29
Payer: MEDICARE

## 2017-11-29 VITALS
BODY MASS INDEX: 35.81 KG/M2 | HEIGHT: 66 IN | WEIGHT: 222.8 LBS | RESPIRATION RATE: 20 BRPM | OXYGEN SATURATION: 97 % | DIASTOLIC BLOOD PRESSURE: 86 MMHG | SYSTOLIC BLOOD PRESSURE: 180 MMHG | TEMPERATURE: 97.8 F | HEART RATE: 98 BPM

## 2017-11-29 DIAGNOSIS — Z09 HOSPITAL DISCHARGE FOLLOW-UP: Primary | ICD-10-CM

## 2017-11-29 DIAGNOSIS — I26.99 OTHER PULMONARY EMBOLISM WITHOUT ACUTE COR PULMONALE, UNSPECIFIED CHRONICITY (HCC): ICD-10-CM

## 2017-11-29 DIAGNOSIS — J34.89 DRY NOSE: ICD-10-CM

## 2017-11-29 DIAGNOSIS — Z99.81 OXYGEN DEPENDENT: ICD-10-CM

## 2017-11-29 PROCEDURE — 1111F DSCHRG MED/CURRENT MED MERGE: CPT | Performed by: NURSE PRACTITIONER

## 2017-11-29 PROCEDURE — 99496 TRANSJ CARE MGMT HIGH F2F 7D: CPT | Performed by: NURSE PRACTITIONER

## 2017-11-29 RX ORDER — ECHINACEA PURPUREA EXTRACT 125 MG
1 TABLET ORAL PRN
Qty: 1 BOTTLE | Refills: 3 | Status: SHIPPED | OUTPATIENT
Start: 2017-11-29 | End: 2019-01-01 | Stop reason: SDUPTHER

## 2017-11-29 ASSESSMENT — ENCOUNTER SYMPTOMS
SINUS PRESSURE: 0
SHORTNESS OF BREATH: 1
SINUS PAIN: 0
DIARRHEA: 0
COUGH: 0
ABDOMINAL PAIN: 0
CONSTIPATION: 0
CHOKING: 0
RHINORRHEA: 0
NAUSEA: 0
CHEST TIGHTNESS: 0
VOMITING: 0
ABDOMINAL DISTENTION: 0
WHEEZING: 0

## 2017-11-29 NOTE — TELEPHONE ENCOUNTER
Per the office of Dr Zaheer Rogers there calling stating the blood pressure readings for this pt is 182/84 and 180/86 can someone please contact there office at [phone] due to this pts blood pressure.

## 2017-11-29 NOTE — PROGRESS NOTES
Visit Information    Have you changed or started any medications since your last visit including any over-the-counter medicines, vitamins, or herbal medicines? no   Are you having any side effects from any of your medications? -  no  Have you stopped taking any of your medications? Is so, why? -  no    Have you seen any other physician or provider since your last visit? No  Have you had any other diagnostic tests since your last visit? No  Have you been seen in the emergency room and/or had an admission to a hospital since we last saw you? Yes - Records Obtained  Have you had your routine dental cleaning in the past 6 months? no    Have you activated your [employer] account? If not, what are your barriers?  Yes     Patient Care Team:  Milagro Cochran CNP as PCP - General  Milagro Cochran CNP as PCP - S Attributed Provider  Wing Vu MD as Physician (Interventional Cardiology)  Fredis Montes De Oca RN as Care Transition    Medical History Review  Past Medical, Family, and Social History reviewed and does contribute to the patient presenting condition    Health Maintenance   Topic Date Due    Diabetic foot exam  10/31/2017    Diabetic microalbuminuria test  10/31/2017    Diabetic retinal exam  06/28/2018    Breast cancer screen  10/10/2018    Diabetic hemoglobin A1C test  11/15/2018    Lipid screen  11/16/2018    Smoker: low dose lung CT screening  11/17/2018    Colon cancer screen colonoscopy  10/04/2022    DTaP/Tdap/Td vaccine (2 - Td) 12/16/2022    Zostavax vaccine  Completed    DEXA (modify frequency per FRAX score)  Completed    Flu vaccine  Completed    Pneumococcal low/med risk  Completed

## 2017-11-29 NOTE — PROGRESS NOTES
nitroGLYCERIN (NITRODUR) 0.2 MG/HR  apply 1 patch once daily             nitroGLYCERIN (NITROSTAT) 0.4 MG SL tablet  Place 1 tablet under the tongue every 5 minutes as needed for Chest pain             nystatin (MYCOSTATIN) 030343 UNIT/GM cream  Apply topically 2 times daily. ONE TOUCH ULTRASOFT LANCETS MISC  use as directed BEFORE BREAKFAST AND SUPPER             OXcarbazepine (TRILEPTAL) 150 MG tablet  Take 150 mg by mouth 2 times daily One tab q am. And two tabs q pm             OXYGEN  Inhale 2 L into the lungs nightly             pantoprazole (PROTONIX) 40 MG tablet  Take 40 mg by mouth every morning (before breakfast)              polyethylene glycol (GLYCOLAX) powder  take 17GM (DISSOLVED IN WATER) by mouth once daily             polyvinyl alcohol (LIQUIFILM TEARS) 1.4 % ophthalmic solution  Place 1 drop into both eyes as needed 2-4 times daily             ranolazine (RANEXA) 500 MG extended release tablet  take 1 tablet by mouth twice a day             risperiDONE (RISPERDAL) 1 MG tablet  Take 1 mg by mouth 2 times daily.              rivaroxaban (XARELTO STARTER PACK) 15 & 20 MG Starter Pack  Take as directed (15mg BID initially, then 20mg daily)             rosuvastatin (CRESTOR) 20 MG tablet  Take 20 mg by mouth daily             SINGULAIR 10 MG tablet  Take 1 tablet by mouth nightly                   Medications marked \"taking\" at this time  Outpatient Prescriptions Marked as Taking for the 11/29/17 encounter (Office Visit) with Lorena Gibbons CNP   Medication Sig Dispense Refill    rivaroxaban (XARELTO STARTER PACK) 15 & 20 MG Starter Pack Take as directed (15mg BID initially, then 20mg daily) 1 Package 0    furosemide (LASIX) 20 MG tablet Take 1 tablet by mouth daily 30 tablet 0    rosuvastatin (CRESTOR) 20 MG tablet Take 20 mg by mouth daily      polyvinyl alcohol (LIQUIFILM TEARS) 1.4 % ophthalmic solution Place 1 drop into both eyes as needed 2-4 times daily      cycloSPORINE (RESTASIS) 0.05 % ophthalmic emulsion Place 1 drop into both eyes 2 times daily      hydrOXYzine (ATARAX) 25 MG tablet Take 25 mg by mouth 3 times daily      glucose blood VI test strips (GLUCOSE METER TEST) strip 1 each by In Vitro route daily Check blood sugar q daily Dx E11.69 100 strip 5    Lancets MISC Check blood sugar q daily Dx E11.69 100 each 2    Blood Glucose Monitoring Suppl HARVINDER Check blood sugar q daily Dx E11.69 1 Device 0    hydrALAZINE (APRESOLINE) 50 MG tablet Take 2 tab daily prn if BP Systolic is Above 793 60 tablet 1    Multiple Vitamins-Minerals (MULTIVITAMIN-MINERALS) TABS tablet take 1 tablet by mouth once daily 90 tablet 3    amLODIPine (NORVASC) 10 MG tablet Take 1 tablet by mouth daily 90 tablet 1    clopidogrel (PLAVIX) 75 MG tablet take 1 tablet by mouth once daily 90 tablet 1    lisinopril (PRINIVIL;ZESTRIL) 20 MG tablet Take 1 tablet by mouth 2 times daily 180 tablet 1    metoprolol tartrate (LOPRESSOR) 50 MG tablet take 1 tablet by mouth twice a day 180 tablet 1    ranolazine (RANEXA) 500 MG extended release tablet take 1 tablet by mouth twice a day 180 tablet 1    nystatin (MYCOSTATIN) 707162 UNIT/GM cream Apply topically 2 times daily. 60 g 2    levothyroxine (SYNTHROID) 100 MCG tablet take 1 tablet by mouth once daily 90 tablet 1    benzocaine (ANBESOL) 10 % mucosal gel Take by mouth as needed.  9 g 1    ASPIRIN LOW DOSE 81 MG EC tablet take 1 tablet by mouth once daily 30 tablet 11    Calcium Carbonate-Vitamin D (OYSTER SHELL CALCIUM/D) 500-200 MG-UNIT TABS take 1 tablet twice a day 60 tablet 5    budesonide-formoterol (SYMBICORT) 160-4.5 MCG/ACT AERO Inhale 2 puffs into the lungs 2 times daily 1 Inhaler 11    SINGULAIR 10 MG tablet Take 1 tablet by mouth nightly 30 tablet 11    OXYGEN Inhale 2 L into the lungs nightly      doxepin (SINEQUAN) 150 MG capsule Take 150 mg by mouth nightly      OXcarbazepine (TRILEPTAL) 150 MG tablet Take 150 mg by mouth 2 times daily One tab q am. And two tabs q pm      nitroGLYCERIN (NITRODUR) 0.2 MG/HR apply 1 patch once daily (Patient taking differently: apply 1 patch once daily, off at hs) 90 patch 1    nitroGLYCERIN (NITROSTAT) 0.4 MG SL tablet Place 1 tablet under the tongue every 5 minutes as needed for Chest pain 25 tablet 3    polyethylene glycol (GLYCOLAX) powder take 17GM (DISSOLVED IN WATER) by mouth once daily 1 Bottle 2    albuterol sulfate HFA (PROAIR HFA) 108 (90 BASE) MCG/ACT inhaler inhale 2 puffs by mouth every 6 hours if needed for wheezing 1 Inhaler 2    docusate sodium (COLACE) 100 MG capsule Take 100 mg by mouth 3 times daily       citalopram (CELEXA) 40 MG tablet Take 40 mg by mouth daily.  ONE TOUCH ULTRASOFT LANCETS MISC use as directed BEFORE BREAKFAST AND SUPPER 100 each 11    pantoprazole (PROTONIX) 40 MG tablet Take 40 mg by mouth every morning (before breakfast)       risperiDONE (RISPERDAL) 1 MG tablet Take 1 mg by mouth 2 times daily. Medications patient taking as of now reconciled against medications ordered at time of hospital discharge xarelto, flexeril, lasix    Vitals:    11/29/17 1552   BP: (!) 182/84   Site: Right Arm   Position: Sitting   Cuff Size: Medium Adult   Pulse: 98   Resp: 20   Temp: 97.8 °F (36.6 °C)   TempSrc: Oral   SpO2: 97%   Weight: 222 lb 12.8 oz (101.1 kg)   Height: 5' 5.98\" (1.676 m)     Body mass index is 35.98 kg/m². Wt Readings from Last 3 Encounters:   11/29/17 222 lb 12.8 oz (101.1 kg)   11/19/17 229 lb 4.5 oz (104 kg)   11/02/17 223 lb (101.2 kg)     BP Readings from Last 3 Encounters:   11/29/17 (!) 182/84   11/21/17 115/74   11/15/17 (!) 165/76        Inpatient course: Discharge summary reviewed- see chart. Chief Complaint   Patient presents with    Follow-Up from Hospital     Patient states [facility]. Pt was admitted for ACDF and plating of anterior approach.  Following the surgery she developed a pulmonary embolism and was transferred to ICU. She had an IVC filter placed with Pulmonary consult. She has been anticoagulated with xarelto. She is on home oxygen at 3 liters per nasal canula. She is current with Cardiology. She denies chest pain or pedal edema. She states she is doing well and eating and drinking well. Review of Systems   Constitutional: Positive for fatigue. Negative for chills and fever. HENT: Negative for congestion, rhinorrhea, sinus pain, sinus pressure and sneezing. Respiratory: Positive for shortness of breath. Negative for cough, choking, chest tightness and wheezing. Cardiovascular: Negative. Gastrointestinal: Negative for abdominal distention, abdominal pain, constipation, diarrhea, nausea and vomiting. Genitourinary: Negative for difficulty urinating, dysuria, flank pain and frequency. Musculoskeletal: Positive for arthralgias and myalgias. Neurological: Positive for weakness. Negative for dizziness and headaches. Psychiatric/Behavioral: Negative for self-injury, sleep disturbance and suicidal ideas. The patient is nervous/anxious. Non face to face  following discharge, date last encounter closed (first attempt may have been earlier): 11/24/2017  1:32 PM 11/24/2017  1:32 PM    Call initiated 2 business days of discharge: Yes     Interval history/Current status: Stable      Physical Exam   Constitutional: She appears well-developed and well-nourished. No distress. HENT:   Right Ear: External ear normal.   Left Ear: External ear normal.   Nose: Nose normal.   Mouth/Throat: Oropharynx is clear and moist.   Eyes: Pupils are equal, round, and reactive to light. Cardiovascular: Normal rate, regular rhythm and intact distal pulses. Murmur heard. Pulses:       Dorsalis pedis pulses are 2+ on the right side, and 2+ on the left side. Posterior tibial pulses are 2+ on the right side, and 2+ on the left side.    No pedal edema noted

## 2017-11-30 ENCOUNTER — CARE COORDINATION (OUTPATIENT)
Dept: CASE MANAGEMENT | Age: 72
End: 2017-11-30

## 2017-11-30 NOTE — CARE COORDINATION
[facility] Transitions Follow Up Call    2017    Patient: Jacinta Victor  Patient : 1945   MRN: [id number]  Reason for Admission: There are no discharge diagnoses documented for the most recent discharge. Discharge Date: 17 RARS: Risk Score: 23.5     Spoke with: [facility] Transitions Subsequent and Final Call    Subsequent and Final Calls  Do you have any ongoing symptoms?:  No  Have your medications changed?:  No  Do you have any questions related to your medications?:  No  Do you currently have any active services?:  Yes  Are you currently active with any services?:  Home Health  Do you have any needs or concerns that I can assist you with?:  No  Identified Barriers:  None  Care Transitions Interventions  No Identified Needs  Other Interventions:        Called pt for the sub transition of care call. Pt stated she doing okay. Pt denied any drainage from the incision. Pain has decreased pain, pain meds control the post op pain. Pt stated she is up & about. Pt denied any bowel or bladder problems. The [facility] nurse \"should be out today\"  Reminded pt she was to call Dr Sy Arm office, note on chart from yesterday, pt stated she left a message already this AM.  Pt denied any needs or concerns.  CTC will continue to follow    Follow Up  Future Appointments  Date Time Provider Micheline Conteh   2017 2:20 PM [facility] [place]   3/7/2018 3:15 PM Cisco June  [place] [facility] - [place]   3/20/2018 2:00 PM Delfina Owen, RD, LD [facility] [place]   2018 1:15 PM Silvia Arenas PA-C Pulm Med P - [place]   2018 8:00 AM STR CT IMAGING RM1  [facility] STRZ OUT EXP STR Radiolog   2018 8:30 AM STR PULMONARY FUNCTION ROOM 1 STRZ PFT None   2018 1:00 PM Marcelino Kanner, MD Pulm Med [facility] - [place]orene Jenkins RN  Care Transition Coordinator  [phone]

## 2017-12-01 ENCOUNTER — OFFICE VISIT (OUTPATIENT)
Dept: CARDIOLOGY CLINIC | Age: 72
End: 2017-12-01
Payer: MEDICARE

## 2017-12-01 VITALS
HEART RATE: 86 BPM | WEIGHT: 200.8 LBS | HEIGHT: 66 IN | BODY MASS INDEX: 32.27 KG/M2 | DIASTOLIC BLOOD PRESSURE: 72 MMHG | SYSTOLIC BLOOD PRESSURE: 143 MMHG

## 2017-12-01 DIAGNOSIS — I25.10 CORONARY ARTERY DISEASE INVOLVING NATIVE CORONARY ARTERY OF NATIVE HEART WITHOUT ANGINA PECTORIS: ICD-10-CM

## 2017-12-01 DIAGNOSIS — I10 HYPERTENSION, UNSPECIFIED TYPE: Primary | ICD-10-CM

## 2017-12-01 PROCEDURE — 1090F PRES/ABSN URINE INCON ASSESS: CPT | Performed by: INTERNAL MEDICINE

## 2017-12-01 PROCEDURE — G8399 PT W/DXA RESULTS DOCUMENT: HCPCS | Performed by: INTERNAL MEDICINE

## 2017-12-01 PROCEDURE — 1111F DSCHRG MED/CURRENT MED MERGE: CPT | Performed by: INTERNAL MEDICINE

## 2017-12-01 PROCEDURE — 3017F COLORECTAL CA SCREEN DOC REV: CPT | Performed by: INTERNAL MEDICINE

## 2017-12-01 PROCEDURE — G8417 CALC BMI ABV UP PARAM F/U: HCPCS | Performed by: INTERNAL MEDICINE

## 2017-12-01 PROCEDURE — 3014F SCREEN MAMMO DOC REV: CPT | Performed by: INTERNAL MEDICINE

## 2017-12-01 PROCEDURE — G8484 FLU IMMUNIZE NO ADMIN: HCPCS | Performed by: INTERNAL MEDICINE

## 2017-12-01 PROCEDURE — 4040F PNEUMOC VAC/ADMIN/RCVD: CPT | Performed by: INTERNAL MEDICINE

## 2017-12-01 PROCEDURE — 99214 OFFICE O/P EST MOD 30 MIN: CPT | Performed by: INTERNAL MEDICINE

## 2017-12-01 PROCEDURE — G8427 DOCREV CUR MEDS BY ELIG CLIN: HCPCS | Performed by: INTERNAL MEDICINE

## 2017-12-01 PROCEDURE — 1036F TOBACCO NON-USER: CPT | Performed by: INTERNAL MEDICINE

## 2017-12-01 PROCEDURE — G8598 ASA/ANTIPLAT THER USED: HCPCS | Performed by: INTERNAL MEDICINE

## 2017-12-01 PROCEDURE — 1123F ACP DISCUSS/DSCN MKR DOCD: CPT | Performed by: INTERNAL MEDICINE

## 2017-12-01 RX ORDER — ISOSORBIDE MONONITRATE 60 MG/1
60 TABLET, EXTENDED RELEASE ORAL DAILY
Qty: 30 TABLET | Refills: 3 | Status: CANCELLED | OUTPATIENT
Start: 2017-12-01

## 2017-12-01 ASSESSMENT — ENCOUNTER SYMPTOMS
CONSTIPATION: 0
EYE DISCHARGE: 0
ABDOMINAL DISTENTION: 0
BLOOD IN STOOL: 0
DIARRHEA: 0
EYE PAIN: 0
CHEST TIGHTNESS: 0
NAUSEA: 0
EYE ITCHING: 0
EYE REDNESS: 0
COUGH: 0
APNEA: 0
SHORTNESS OF BREATH: 0
BACK PAIN: 0
SORE THROAT: 0
SINUS PRESSURE: 0
ABDOMINAL PAIN: 0

## 2017-12-01 NOTE — PROGRESS NOTES
[facility] PROFESSIONAL SERVS  [facility] [address]  [address] [address]  Dept: [phone]  Dept Fax: [phone]  Loc: [phone]    Visit Date: 12/1/2017    Ms. Eliseo Serna is a 67 y.o. female  who presented for:  Chief Complaint   Patient presents with    Hypertension     referal from Dr Amilcar Pierson COPD    Coronary Artery Disease    Gastroesophageal Reflux       HPI:   HPI   68 yo F s/p neck surgery for cervical spondylosis and stenosis with hx of CAD, COPD, DM II, and PCI in 2004 to the RCA and then RCA ISR in 11/2014 and PCI of the LAD in 12/2014. She presents for management of her HTN. She has had labile blood pressures over the last couple days. She is on Lasix, Hydralazine, Amlodipine, Lisinopril, Metoprolol for her BP control. She says she found it to be > 200 mmHg at home (systolic). She takes DAPT. Cr 0.8. Started Lasix and K 4.3. TTE shows EF 70-09% with diastolic dysfunction. PASP 35 mmHg. She feels \"hot\" when her BP rises. No headaches, chest pain, or sob. No syncope. Of note, developed PE post-op. Received IVC filter and is now on Xarelto. IVF filter placed 11/17/2017.     Current Outpatient Prescriptions:     sodium chloride (ALTAMIST SPRAY) 0.65 % nasal spray, 1 spray by Nasal route as needed for Congestion, Disp: 1 Bottle, Rfl: 3    rivaroxaban (XARELTO STARTER PACK) 15 & 20 MG Starter Pack, Take as directed (15mg BID initially, then 20mg daily), Disp: 1 Package, Rfl: 0    furosemide (LASIX) 20 MG tablet, Take 1 tablet by mouth daily, Disp: 30 tablet, Rfl: 0    rosuvastatin (CRESTOR) 20 MG tablet, Take 20 mg by mouth daily, Disp: , Rfl:     polyvinyl alcohol (LIQUIFILM TEARS) 1.4 % ophthalmic solution, Place 1 drop into both eyes as needed 2-4 times daily, Disp: , Rfl:     cycloSPORINE (RESTASIS) 0.05 % ophthalmic emulsion, Place 1 drop into both eyes 2 times daily, Disp: , Rfl:     hydrOXYzine (ATARAX) 25 MG tablet, Take 25 mg by mouth 3 times daily, Disp: , Rfl:     glucose blood VI test strips (GLUCOSE METER TEST) strip, 1 each by In Vitro route daily Check blood sugar q daily Dx E11.69, Disp: 100 strip, Rfl: 5    Lancets MISC, Check blood sugar q daily Dx E11.69, Disp: 100 each, Rfl: 2    Blood Glucose Monitoring Suppl HARVINDER, Check blood sugar q daily Dx E11.69, Disp: 1 Device, Rfl: 0    hydrALAZINE (APRESOLINE) 50 MG tablet, Take 2 tab daily prn if BP Systolic is Above 978, Disp: 60 tablet, Rfl: 1    Multiple Vitamins-Minerals (MULTIVITAMIN-MINERALS) TABS tablet, take 1 tablet by mouth once daily, Disp: 90 tablet, Rfl: 3    amLODIPine (NORVASC) 10 MG tablet, Take 1 tablet by mouth daily, Disp: 90 tablet, Rfl: 1    clopidogrel (PLAVIX) 75 MG tablet, take 1 tablet by mouth once daily, Disp: 90 tablet, Rfl: 1    lisinopril (PRINIVIL;ZESTRIL) 20 MG tablet, Take 1 tablet by mouth 2 times daily, Disp: 180 tablet, Rfl: 1    metoprolol tartrate (LOPRESSOR) 50 MG tablet, take 1 tablet by mouth twice a day, Disp: 180 tablet, Rfl: 1    ranolazine (RANEXA) 500 MG extended release tablet, take 1 tablet by mouth twice a day, Disp: 180 tablet, Rfl: 1    nystatin (MYCOSTATIN) 600060 UNIT/GM cream, Apply topically 2 times daily. , Disp: 60 g, Rfl: 2    levothyroxine (SYNTHROID) 100 MCG tablet, take 1 tablet by mouth once daily, Disp: 90 tablet, Rfl: 1    benzocaine (ANBESOL) 10 % mucosal gel, Take by mouth as needed. , Disp: 9 g, Rfl: 1    ASPIRIN LOW DOSE 81 MG EC tablet, take 1 tablet by mouth once daily, Disp: 30 tablet, Rfl: 11    Calcium Carbonate-Vitamin D (OYSTER SHELL CALCIUM/D) 500-200 MG-UNIT TABS, take 1 tablet twice a day, Disp: 60 tablet, Rfl: 5    budesonide-formoterol (SYMBICORT) 160-4.5 MCG/ACT AERO, Inhale 2 puffs into the lungs 2 times daily, Disp: 1 Inhaler, Rfl: 11    SINGULAIR 10 MG tablet, Take 1 tablet by mouth nightly, Disp: 30 tablet, Rfl: 11    OXYGEN, Inhale 2 L into the lungs nightly, Disp: , Rfl:     doxepin in-stent restenosis mid-RCA. LCx moderate LI's. LAD 85% mid-segment lesion.; S/P PTCA:  5/11/2004: Proximal and mid RCA  Taxus 3.5 X 20 mm, and Taxus 3.0 X 32 mm.; Systolic murmur; and Thyroid disease. Social History  Deb Mcgarry  reports that she quit smoking about 10 years ago. Her smoking use included Cigarettes. She has a 38.00 pack-year smoking history. She has never used smokeless tobacco. She reports that she does not drink alcohol or use drugs. Family History  Deb Mcgarry family history includes Breast Cancer (age of onset: 36) in her child; Cancer in her sister; Cancer (age of onset: 36) in her sister; Heart Disease in her brother, brother, brother, father, and mother; Kidney Disease in her sister; Ovarian Cancer (age of onset: 22) in an other family member. There is no family history of bicuspid aortic valve, aneurysms, heart transplant, pacemakers, defibrillators, or sudden cardiac death. Past Surgical History   Past Surgical History:   Procedure Laterality Date    BACK SURGERY  08/2016        BLADDER SUSPENSION      BREAST BIOPSY  10/10/2017    BREAST LUMPECTOMY      CARDIAC CATHETERIZATION  8-11-06    Mild nonobstructive CAD w/ diffuse 10-20% proximal and mid LAD stenosis and 10-20% proximal/ostial RCA stenosis. No obstructive lesions. RCA stents are patent w/o evidence of restenosis. Normal LV systolic function. EF 65%. Trace MR - catheter induced. Minimally elevated LVEDP - 13mmHg. Mild to moderate aortoiliac PVD w/o obstructive lesions.  CARDIAC CATHETERIZATION  5-11-04    Successful drug-eluting stent x 2 of proximal and mid RCA.  CARDIAC CATHETERIZATION  5-11-04    70-80% proximal RCA stenosis w/ 50-70% mid RCA stenosis. There is 50-60% lesion in mid PDA. Mild proximal and mid LAD disease. Mild circumflex disease. Normal LV size and systolic function. EF 60%.  CARDIOVASCULAR STRESS TEST  5-10-11    No evidence of stress induced ischemia. EF 75%.     CARDIOVASCULAR STRESS TEST  5-10-10    No evidence of stress induced ischemia, infarct or scar. EF 74%.  CERVICAL FUSION N/A 11/15/2017    REMOVAL OF PLATE S3-1, ACDF B1-8 W/ATLANTIS CORNERSTONE performed by Sergo Boone [facility] at [facility]      ENDOSCOPY, COLON, DIAGNOSTIC      FOOT SURGERY      HERNIA REPAIR      NECK SURGERY  FEB 2016    PARTIAL HYSTERECTOMY      UPPER GASTROINTESTINAL ENDOSCOPY      UPPER GASTROINTESTINAL ENDOSCOPY           Review of Systems   Constitutional: Negative for activity change, appetite change, chills and fatigue. HENT: Negative for congestion, sinus pressure, sneezing and sore throat. Eyes: Negative for pain, discharge, redness and itching. Respiratory: Negative for apnea, cough, chest tightness and shortness of breath. Gastrointestinal: Negative for abdominal distention, abdominal pain, blood in stool, constipation, diarrhea and nausea. Endocrine: Negative for cold intolerance, heat intolerance, polydipsia and polyphagia. Genitourinary: Negative for dysuria, enuresis, flank pain and hematuria. Musculoskeletal: Negative for arthralgias, back pain, joint swelling and neck pain. Neurological: Negative for dizziness, syncope, numbness and headaches. Psychiatric/Behavioral: Negative for agitation, confusion, decreased concentration and dysphoric mood. Objective:     BP (!) 143/72   Pulse 86   Ht 5' 6\" (1.676 m)   Wt 200 lb 12.8 oz (91.1 kg)   BMI 32.41 kg/m²     Wt Readings from Last 3 Encounters:   12/01/17 200 lb 12.8 oz (91.1 kg)   11/29/17 222 lb 12.8 oz (101.1 kg)   11/19/17 229 lb 4.5 oz (104 kg)     BP Readings from Last 3 Encounters:   12/01/17 (!) 143/72   11/29/17 (!) 180/86   11/21/17 115/74     Physical Exam   Constitutional: She is oriented to person, place, and time. HENT:   Head: Normocephalic and atraumatic. Eyes: EOM are normal. Pupils are equal, round, and reactive to light. Neck: Normal range of motion.  Neck

## 2017-12-01 NOTE — PROGRESS NOTES
Patient in as a referral from Dr Jericho Mccormick, [facility]     Patient denies chest pain, palpitations, dizziness, edema    Patient c/o SOB, lightheadedness

## 2017-12-04 RX ORDER — ISOSORBIDE MONONITRATE 60 MG/1
60 TABLET, EXTENDED RELEASE ORAL DAILY
Qty: 30 TABLET | Refills: 3 | Status: SHIPPED | OUTPATIENT
Start: 2017-12-04 | End: 2018-01-10 | Stop reason: SDUPTHER

## 2017-12-05 ENCOUNTER — CARE COORDINATION (OUTPATIENT)
Dept: CASE MANAGEMENT | Age: 72
End: 2017-12-05

## 2017-12-06 ENCOUNTER — CARE COORDINATION (OUTPATIENT)
Dept: CASE MANAGEMENT | Age: 72
End: 2017-12-06

## 2017-12-07 ENCOUNTER — CARE COORDINATION (OUTPATIENT)
Dept: CASE MANAGEMENT | Age: 72
End: 2017-12-07

## 2017-12-07 NOTE — CARE COORDINATION
Sean 45 Transitions Follow Up Call    2017    Patient: Jacinta Victor  Patient : 1945   MRN: [id number]  Reason for Admission: There are no discharge diagnoses documented for the most recent discharge. Discharge Date: 17 RARS: Risk Score: 23.5       Spoke with: [facility] Transitions Subsequent and Final Call    Subsequent and Final Calls  Do you have any ongoing symptoms?:  No  Have your medications changed?:  No  Do you have any questions related to your medications?:  No  Do you currently have any active services?:  Yes  Are you currently active with any services?:  Home Health  Do you have any needs or concerns that I can assist you with?:  No  Identified Barriers:  None  Care Transitions Interventions  No Identified Needs  Other Interventions:        Called pt for the final transition of care call. Pt stated she saw Dr Flor Jung yesterday, Alison Lopes told me the incision is A1\"  Pt stated the pain meds reduce her pain form 810 to 3/10. Pt denied any problems with bowels or bladder. Pt thinks she is doing okay. Denied any needs or concerns. Informed pt to call PCP with any needs, this is the final CTC call, pt voiced understanding. Pt may benefit from care coordination, ACC lead notified. CTC to sign off.     Follow Up  Future Appointments  Date Time Provider Micheline Conteh   2017 11:00 AM STR ULTRASOUND RM 2 STRZ US STR Radiolog   2017 2:20 PM Flor Collier CNP Lesli Setting [facility]   2018 4:00 PM Cisco June MD [facility] Heart [facility]   3/7/2018 3:15 PM Cisco June MD [facility] Heart [facility]   3/20/2018 2:00 PM Delfina Owen RD, LD STR MED MGMT [facility]   2018 1:15 PM Silvia Arenas PA-C Pulm Med [facility]   2018 8:00 AM STR CT IMAGING RM1  OP [facility] STRZ OUT EXP STR Radiolog   2018 8:30 AM STR PULMONARY FUNCTION ROOM 1 STRZ PFT None   2018 1:00 PM Marcelino Kanner, MD Pulm Med [facility] [place]

## 2017-12-11 ENCOUNTER — CARE COORDINATION (OUTPATIENT)
Dept: CARE COORDINATION | Age: 72
End: 2017-12-11

## 2017-12-11 DIAGNOSIS — G47.33 OSA (OBSTRUCTIVE SLEEP APNEA): Primary | ICD-10-CM

## 2017-12-11 RX ORDER — HYDRALAZINE HYDROCHLORIDE 50 MG/1
TABLET, FILM COATED ORAL
Qty: 60 TABLET | Refills: 1 | Status: SHIPPED | OUTPATIENT
Start: 2017-12-11 | End: 2018-01-16 | Stop reason: SDUPTHER

## 2017-12-11 ASSESSMENT — ENCOUNTER SYMPTOMS: DYSPNEA ASSOCIATED WITH: EXERTION

## 2017-12-11 NOTE — CARE COORDINATION
Ambulatory Care Coordination Note  12/11/2017  CM Risk Score: 4  Mahendra Mortality Risk Score: 20.93    ACC: Annabelle Fothergill, RN    Summary Note: spoke with Allyn Castañeda. Introduced myself and explained Care Coordination program.  She was a referral from [facility] due to recent hospital admission with chronic conditions. Allyn Castañeda states she had a recent surgery for her back and developed a PE and was sent to ICU. She had an IVC filter placed and is now on O2 continuously. She is active with [facility] [employer]. She is following up with PCP and cardiologist.  States she checks her blood sugars and they are running 200 which is high for her. States they are normally 120's. States her recent hospital stay and not being on track is causing her blood sugars to rise. She is not currently checking her blood pressures. States she has a [place] aide daily to assist her. Discussed and mailed out Zone Management tools to use as resources. Will continue to work with Allyn Castañeda to provide support and education to help her manage her chronic conditions at home and using PCP office versus seeking ED treatment. COPD Assessment       Shortness of breath (worse than baseline)         Symptoms:      Symptom course:  improving  Breathlessness:  exertion  Increase use of rapid acting/rescue inhaled medications?:  No  Change in chronic cough?:  No/At Baseline  Change in sputum?:  No/At Baseline  Have you had a recent diagnosis of pneumonia either by PCP or at a hospital?:  No         Diabetes Assessment    Medic Alert ID:  No  Meal Planning:  None   How often do you test your blood sugar?:  Daily   Do you have barriers with adherence to non-pharmacologic self-management interventions?  (Nutrition/Exercise/Self-Monitoring):  Yes   Have you ever had to go to the ED for symptoms of low blood sugar?:  No       Do you have hyperglycemia symptoms?:  No   Do you have hypoglycemia symptoms?:  No   Last Blood Sugar Value:  200   Blood Sugar Monitoring

## 2017-12-13 ENCOUNTER — TELEPHONE (OUTPATIENT)
Dept: FAMILY MEDICINE CLINIC | Age: 72
End: 2017-12-13

## 2017-12-13 RX ORDER — GUAIFENESIN 600 MG/1
600 TABLET, EXTENDED RELEASE ORAL 2 TIMES DAILY
Qty: 20 TABLET | Refills: 0 | Status: ON HOLD | OUTPATIENT
Start: 2017-12-13 | End: 2018-07-07

## 2017-12-13 NOTE — TELEPHONE ENCOUNTER
DOLV 11/29/17. Sophie, from [facility], called. Baldo Scales has had a cough (sometimes productive) and chest congestion for about a week. She's wheezing and has a sore throat. Pulse 79, /85, T 97.5 oral, resp 18, and O2 is 97% on 2L. Uses [facility], [address]   Call Arline's number

## 2017-12-14 ENCOUNTER — HOSPITAL ENCOUNTER (OUTPATIENT)
Dept: ULTRASOUND IMAGING | Age: 72
Discharge: HOME | End: 2017-12-14
Payer: MEDICARE

## 2017-12-14 DIAGNOSIS — I10 HYPERTENSION, UNSPECIFIED TYPE: ICD-10-CM

## 2017-12-14 DIAGNOSIS — I25.10 CORONARY ARTERY DISEASE INVOLVING NATIVE CORONARY ARTERY OF NATIVE HEART WITHOUT ANGINA PECTORIS: ICD-10-CM

## 2017-12-14 PROCEDURE — 93975 VASCULAR STUDY: CPT

## 2017-12-26 RX ORDER — B-COMPLEX WITH VITAMIN C
TABLET ORAL
Qty: 60 TABLET | Refills: 5 | Status: SHIPPED | OUTPATIENT
Start: 2017-12-26 | End: 2018-06-09 | Stop reason: SDUPTHER

## 2017-12-27 ENCOUNTER — OFFICE VISIT (OUTPATIENT)
Dept: FAMILY MEDICINE CLINIC | Age: 72
End: 2017-12-27
Payer: MEDICARE

## 2017-12-27 ENCOUNTER — TELEPHONE (OUTPATIENT)
Dept: FAMILY MEDICINE CLINIC | Age: 72
End: 2017-12-27

## 2017-12-27 VITALS
BODY MASS INDEX: 35.84 KG/M2 | HEIGHT: 66 IN | DIASTOLIC BLOOD PRESSURE: 88 MMHG | RESPIRATION RATE: 16 BRPM | WEIGHT: 223 LBS | TEMPERATURE: 97.9 F | HEART RATE: 92 BPM | SYSTOLIC BLOOD PRESSURE: 160 MMHG

## 2017-12-27 DIAGNOSIS — E11.9 DIET-CONTROLLED DIABETES MELLITUS (HCC): ICD-10-CM

## 2017-12-27 DIAGNOSIS — I10 HYPERTENSION, UNSPECIFIED TYPE: ICD-10-CM

## 2017-12-27 DIAGNOSIS — F33.42 RECURRENT MAJOR DEPRESSIVE DISORDER, IN FULL REMISSION (HCC): Primary | ICD-10-CM

## 2017-12-27 DIAGNOSIS — E03.9 HYPOTHYROIDISM, UNSPECIFIED TYPE: ICD-10-CM

## 2017-12-27 DIAGNOSIS — F41.9 ANXIETY: ICD-10-CM

## 2017-12-27 LAB
CREATININE URINE POCT: NORMAL
MICROALBUMIN/CREAT 24H UR: NORMAL MG/G{CREAT}
MICROALBUMIN/CREAT UR-RTO: NORMAL

## 2017-12-27 PROCEDURE — G8417 CALC BMI ABV UP PARAM F/U: HCPCS | Performed by: NURSE PRACTITIONER

## 2017-12-27 PROCEDURE — 3014F SCREEN MAMMO DOC REV: CPT | Performed by: NURSE PRACTITIONER

## 2017-12-27 PROCEDURE — 3044F HG A1C LEVEL LT 7.0%: CPT | Performed by: NURSE PRACTITIONER

## 2017-12-27 PROCEDURE — 3017F COLORECTAL CA SCREEN DOC REV: CPT | Performed by: NURSE PRACTITIONER

## 2017-12-27 PROCEDURE — G8427 DOCREV CUR MEDS BY ELIG CLIN: HCPCS | Performed by: NURSE PRACTITIONER

## 2017-12-27 PROCEDURE — 1090F PRES/ABSN URINE INCON ASSESS: CPT | Performed by: NURSE PRACTITIONER

## 2017-12-27 PROCEDURE — G8484 FLU IMMUNIZE NO ADMIN: HCPCS | Performed by: NURSE PRACTITIONER

## 2017-12-27 PROCEDURE — G8399 PT W/DXA RESULTS DOCUMENT: HCPCS | Performed by: NURSE PRACTITIONER

## 2017-12-27 PROCEDURE — 99214 OFFICE O/P EST MOD 30 MIN: CPT | Performed by: NURSE PRACTITIONER

## 2017-12-27 PROCEDURE — G8598 ASA/ANTIPLAT THER USED: HCPCS | Performed by: NURSE PRACTITIONER

## 2017-12-27 PROCEDURE — 4040F PNEUMOC VAC/ADMIN/RCVD: CPT | Performed by: NURSE PRACTITIONER

## 2017-12-27 PROCEDURE — 1036F TOBACCO NON-USER: CPT | Performed by: NURSE PRACTITIONER

## 2017-12-27 PROCEDURE — 1123F ACP DISCUSS/DSCN MKR DOCD: CPT | Performed by: NURSE PRACTITIONER

## 2017-12-27 PROCEDURE — 82044 UR ALBUMIN SEMIQUANTITATIVE: CPT | Performed by: NURSE PRACTITIONER

## 2017-12-27 RX ORDER — LEVOTHYROXINE SODIUM 0.1 MG/1
TABLET ORAL
Qty: 90 TABLET | Refills: 1 | Status: SHIPPED | OUTPATIENT
Start: 2017-12-27 | End: 2018-02-19 | Stop reason: SDUPTHER

## 2017-12-27 RX ORDER — CITALOPRAM 40 MG/1
40 TABLET ORAL DAILY
Qty: 90 TABLET | Refills: 1 | Status: ON HOLD | OUTPATIENT
Start: 2017-12-27 | End: 2018-03-29

## 2017-12-27 ASSESSMENT — ENCOUNTER SYMPTOMS
CHOKING: 0
RHINORRHEA: 0
COUGH: 1
SHORTNESS OF BREATH: 0
GASTROINTESTINAL NEGATIVE: 1
WHEEZING: 0
CHEST TIGHTNESS: 0

## 2017-12-27 NOTE — PROGRESS NOTES
[address]  [address] [address]  Dept: [phone]  Dept Fax: [phone]  Loc: [phone]    Mike Tierney is a 67 y.o. female who presents today for her medical conditions/complaints as noted below. Mike Tierney is c/o of 1 Month Follow-Up (no concerns)      HPI:     Pt here for a check up. Pt suffered a PE postop last month. She has an IVC filter in place and is being anticoagulated. She does follow with Pulmonary and is weaning her oxygen down. She is only wearing her oxygen when she is at home, and she wears it at night. She does still cough and get sob with activity. She is on her inhalers. Her bp continues to be elevated. She has seen Cardiology for this last month. She does get headaches on occasion but denies chest pain or pedal edema. I reviewed her med list with her but she does not set up her own pill boxes. The pharmacy verified they sent her imdur to her but home health nurse does not think it is on her med ist or in her pill box. Will await med list form [facility]. Her bp is elevated today. She continues to take her thyroid meds. She takes the celexa for her anxiety and depression, she states as long as she takes this her moods and anxiety are controlled. She has diet controlled diabetes. She tries to follow a diabetic diet she does not exercise. She is recovering form her neck surgery.          Current Outpatient Prescriptions   Medication Sig Dispense Refill    levothyroxine (SYNTHROID) 100 MCG tablet take 1 tablet by mouth once daily 90 tablet 1    citalopram (CELEXA) 40 MG tablet Take 1 tablet by mouth daily 90 tablet 1    Calcium Carbonate-Vitamin D (OYSTER SHELL CALCIUM/D) 500-200 MG-UNIT TABS take 1 tablet by mouth twice a day 60 tablet 5    guaiFENesin (MUCINEX) 600 MG extended release tablet Take 1 tablet by mouth 2 times daily 20 tablet 0    hydrALAZINE (APRESOLINE) 50 MG tablet take 2 (hypertension)     Irritable bowel syndrome     States has not had problem with this for years    Liver disease     fatty liver    Metabolic syndrome 07/29/7829    MI (myocardial infarction)     Nausea & vomiting     Obesity     CHET (obstructive sleep apnea)     S/P cardiac catheterization: 11/6/2014: 99% in-stent restenosis mid-RCA. LCx moderate LI's. LAD 85% mid-segment lesion. 11/6/2014 11/6/2014: 99% in-stent restenosis mid-RCA. LCx moderate LI's. LAD 85% mid-segment lesion. Dr. Chantell Jameson S/P PTCA:  5/11/2004: Proximal and mid RCA  Taxus 3.5 X 20 mm, and Taxus 3.0 X 32 mm. 10/28/2014    5/11/2004: Proximal and mid RCA  Taxus 3.5 X 20 mm, and Taxus 3.0 X 32 mm. Dr. Carmen Snider Systolic murmur 45/57/3465    Thyroid disease       Past Surgical History:   Procedure Laterality Date    BACK SURGERY  08/2016        BLADDER SUSPENSION      BREAST BIOPSY  10/10/2017    BREAST LUMPECTOMY      CARDIAC CATHETERIZATION  8-11-06    Mild nonobstructive CAD w/ diffuse 10-20% proximal and mid LAD stenosis and 10-20% proximal/ostial RCA stenosis. No obstructive lesions. RCA stents are patent w/o evidence of restenosis. Normal LV systolic function. EF 65%. Trace MR - catheter induced. Minimally elevated LVEDP - 13mmHg. Mild to moderate aortoiliac PVD w/o obstructive lesions.  CARDIAC CATHETERIZATION  5-11-04    Successful drug-eluting stent x 2 of proximal and mid RCA.  CARDIAC CATHETERIZATION  5-11-04    70-80% proximal RCA stenosis w/ 50-70% mid RCA stenosis. There is 50-60% lesion in mid PDA. Mild proximal and mid LAD disease. Mild circumflex disease. Normal LV size and systolic function. EF 60%.  CARDIOVASCULAR STRESS TEST  5-10-11    No evidence of stress induced ischemia. EF 75%.  CARDIOVASCULAR STRESS TEST  5-10-10    No evidence of stress induced ischemia, infarct or scar. EF 74%.     CERVICAL FUSION N/A 11/15/2017    REMOVAL OF PLATE I7-6, ACDF T2-6 W/ATLANTIS CORNERSTONE performed by Nabeel Ascencio MD at [facility] COLONOSCOPY      ENDOSCOPY, COLON, DIAGNOSTIC      FOOT SURGERY      HERNIA REPAIR      NECK SURGERY  FEB 2016    PARTIAL HYSTERECTOMY      UPPER GASTROINTESTINAL ENDOSCOPY      UPPER GASTROINTESTINAL ENDOSCOPY       Family History   Problem Relation Age of Onset    Heart Disease Mother     Heart Disease Father     Kidney Disease Sister     Cancer Sister     Heart Disease Brother     Heart Disease Brother     Heart Disease Brother     Breast Cancer Child 36    Cancer Sister 36     rectal    Ovarian Cancer Other 25     Social History   Substance Use Topics    Smoking status: Former Smoker     Packs/day: 1.00     Years: 38.00     Types: Cigarettes     Quit date: 1/31/2007    Smokeless tobacco: Never Used    Alcohol use No        Allergies   Allergen Reactions    Codeine Itching    Lipitor [Atorvastatin] Diarrhea    Motrin [Ibuprofen Micronized] Nausea Only       Health Maintenance   Topic Date Due    Diabetic foot exam  10/31/2017    Diabetic microalbuminuria test  10/31/2017    Diabetic retinal exam  06/28/2018    Breast cancer screen  10/10/2018    Diabetic hemoglobin A1C test  11/15/2018    Lipid screen  11/16/2018    Smoker: low dose lung CT screening  11/17/2018    Colon cancer screen colonoscopy  10/04/2022    DTaP/Tdap/Td vaccine (2 - Td) 12/16/2022    Zostavax vaccine  Completed    DEXA (modify frequency per FRAX score)  Completed    Flu vaccine  Completed    Pneumococcal low/med risk  Completed       Subjective:      Review of Systems   Constitutional: Negative for chills, fatigue and fever. HENT: Positive for congestion. Negative for postnasal drip and rhinorrhea. Respiratory: Positive for cough. Negative for choking, chest tightness, shortness of breath and wheezing. Cardiovascular: Negative. Gastrointestinal: Negative. Genitourinary: Negative for difficulty urinating and dysuria.    Musculoskeletal: Positive for myalgias. Skin: Negative. Neurological: Positive for headaches. Negative for dizziness, weakness and light-headedness. Psychiatric/Behavioral: Negative for self-injury, sleep disturbance and suicidal ideas. Objective:     BP (!) 160/88   Pulse 92   Temp 97.9 °F (36.6 °C) (Oral)   Resp 16   Ht 5' 5.5\" (1.664 m)   Wt 223 lb (101.2 kg)   BMI 36.54 kg/m²     Physical Exam   Constitutional: She is oriented to person, place, and time. She appears well-developed and well-nourished. No distress. Cardiovascular: Regular rhythm. Bradycardia present. Murmur heard. Systolic murmur is present with a grade of 2/6   Pulmonary/Chest: Effort normal and breath sounds normal. No respiratory distress. She has no wheezes. Pt is breathing heavy but breath sounds are clear and not diminished   Abdominal: Soft. Bowel sounds are normal. She exhibits no distension. There is no tenderness. Neurological: She is alert and oriented to person, place, and time. Skin: Skin is warm and dry. No rash noted. No erythema. Psychiatric: She has a normal mood and affect. Her behavior is normal. Judgment and thought content normal.   Nursing note and vitals reviewed. Assessment/Plan:          1. Hypothyroidism, unspecified type    - levothyroxine (SYNTHROID) 100 MCG tablet; take 1 tablet by mouth once daily  Dispense: 90 tablet; Refill: 1    2. Anxiety    - citalopram (CELEXA) 40 MG tablet; Take 1 tablet by mouth daily  Dispense: 90 tablet; Refill: 1    3. Recurrent major depressive disorder, in full remission ([facility] [address]    - citalopram (CELEXA) 40 MG tablet; Take 1 tablet by mouth daily  Dispense: 90 tablet; Refill: 1    4. Diet-controlled diabetes mellitus ([facility] [address]    - POCT microalbumin  - HM DIABETES FOOT EXAM    5. Hypertension  Await med list from home health to verify if pt is taking imdur. Return in about 6 months (around 6/27/2018) for check up and med refill.     Reccommended tobacco cessation options including pharmacologic methods, counseled great than 3 minutes during this visit:  Yes  []  No  []       Patient given educational materials - see patient instructions. Discussed use, benefit, and side effects of prescribed medications. All patient questions answered. Pt voiced understanding. Reviewed health maintenance. Instructed to continue current medications, diet and exercise. Patient agreed with treatment plan. Follow up as directed.        Electronically signed by Rachel Vick CNP on 12/27/2017 at 3:00 PM

## 2017-12-27 NOTE — TELEPHONE ENCOUNTER
Pt was seen by Oskar Zaman today in the office. Pt's blood pressure was elevated, and pt was unfamiliar with the medications she is taking for HTN. Called [facility] to get a current medication list since they fill pt's medication box. Dr Gela Yee has recently started pt on Imdur on 12/4/17. Rian called pt's pharmacy [facility], and Imdur has been delivered to pt's home, but is not being put into the pill box. Per request from Oskar Zaman, I left voice mail for nurse at [facility] informing her that the Imdur has been delivered to pt's home per the pharmacy. Requested that they look around pt's home for the medication Imdur and let us know if it is found at pt's home.

## 2017-12-27 NOTE — PROGRESS NOTES
Visit Information    Have you changed or started any medications since your last visit including any over-the-counter medicines, vitamins, or herbal medicines? no   Are you having any side effects from any of your medications? -  no  Have you stopped taking any of your medications? Is so, why? -  no    Have you seen any other physician or provider since your last visit? No  Have you had any other diagnostic tests since your last visit? Yes - Records Obtained  Have you been seen in the emergency room and/or had an admission to a hospital since we last saw you? No  Have you had your routine dental cleaning in the past 6 months? no    Have you activated your [employer] account? If not, what are your barriers? Yes     Patient Care Team:  Manas Vazquez CNP as PCP - General  Manas Vazquez CNP as PCP - S Attributed Provider  Cinda La MD as Physician (Interventional Cardiology)  Alfa Faria RN as Care Coordinator    Medical History Review  Past Medical, Family, and Social History     Defer to provider.

## 2017-12-28 RX ORDER — METOPROLOL TARTRATE 75 MG/1
TABLET, FILM COATED ORAL
Qty: 180 TABLET | Refills: 1 | Status: SHIPPED | OUTPATIENT
Start: 2017-12-28 | End: 2018-01-11 | Stop reason: SDUPTHER

## 2017-12-28 NOTE — TELEPHONE ENCOUNTER
OK thanks, I am going to increase her metoprolol dose to 75 mg bid as her blood pressure continues to be elevated. I will send the increase dose to her pharmacy please let [facility] know this. Thanks! I would also like her to come in in two weeks for a nurse visit for a bp check.

## 2017-12-28 NOTE — TELEPHONE ENCOUNTER
John Paul with [facility] informed of increase dosage of Metoprolol to 75 mg BID. Called pt to schedule nurse B/P check. [place] Left [facility] with friend, Kevin Davis, requesting she have pt call our office at earliest convenience. Need to schedule pt a 2 week follow up nurse visit for b/p check.

## 2018-01-01 ENCOUNTER — HOSPITAL ENCOUNTER (OUTPATIENT)
Age: 73
Discharge: HOME | End: 2018-09-27
Payer: MEDICARE

## 2018-01-01 ENCOUNTER — APPOINTMENT (OUTPATIENT)
Dept: GENERAL RADIOLOGY | Age: 73
DRG: 291 | End: 2018-01-01
Payer: MEDICARE

## 2018-01-01 ENCOUNTER — TELEPHONE (OUTPATIENT)
Dept: FAMILY MEDICINE CLINIC | Age: 73
End: 2018-01-01

## 2018-01-01 ENCOUNTER — HOSPITAL ENCOUNTER (OUTPATIENT)
Dept: CT IMAGING | Age: 73
Discharge: HOME | DRG: 683 | End: 2018-12-19
Payer: MEDICARE

## 2018-01-01 ENCOUNTER — CARE COORDINATION (OUTPATIENT)
Dept: CASE MANAGEMENT | Age: 73
End: 2018-01-01

## 2018-01-01 ENCOUNTER — HOSPITAL ENCOUNTER (OUTPATIENT)
Dept: CT IMAGING | Age: 73
Discharge: HOME | End: 2018-11-15
Payer: MEDICARE

## 2018-01-01 ENCOUNTER — NURSE ONLY (OUTPATIENT)
Dept: LAB | Age: 73
End: 2018-01-01

## 2018-01-01 ENCOUNTER — HOSPITAL ENCOUNTER (OUTPATIENT)
Age: 73
Discharge: HOME | DRG: 683 | End: 2018-12-21
Payer: MEDICARE

## 2018-01-01 ENCOUNTER — APPOINTMENT (OUTPATIENT)
Dept: CT IMAGING | Age: 73
DRG: 689 | End: 2018-01-01
Payer: MEDICARE

## 2018-01-01 ENCOUNTER — TELEPHONE (OUTPATIENT)
Dept: CARDIOLOGY CLINIC | Age: 73
End: 2018-01-01

## 2018-01-01 ENCOUNTER — OFFICE VISIT (OUTPATIENT)
Dept: FAMILY MEDICINE CLINIC | Age: 73
End: 2018-01-01
Payer: MEDICARE

## 2018-01-01 ENCOUNTER — APPOINTMENT (OUTPATIENT)
Dept: MRI IMAGING | Age: 73
DRG: 689 | End: 2018-01-01
Payer: MEDICARE

## 2018-01-01 ENCOUNTER — TELEPHONE (OUTPATIENT)
Dept: PULMONOLOGY | Age: 73
End: 2018-01-01

## 2018-01-01 ENCOUNTER — OFFICE VISIT (OUTPATIENT)
Dept: NEPHROLOGY | Age: 73
End: 2018-01-01
Payer: MEDICARE

## 2018-01-01 ENCOUNTER — APPOINTMENT (OUTPATIENT)
Dept: GENERAL RADIOLOGY | Age: 73
DRG: 689 | End: 2018-01-01
Payer: MEDICARE

## 2018-01-01 ENCOUNTER — CARE COORDINATION (OUTPATIENT)
Dept: CARE COORDINATION | Age: 73
End: 2018-01-01

## 2018-01-01 ENCOUNTER — HOSPITAL ENCOUNTER (OUTPATIENT)
Dept: PULMONOLOGY | Age: 73
Discharge: HOME | End: 2018-10-23
Payer: MEDICARE

## 2018-01-01 ENCOUNTER — HOSPITAL ENCOUNTER (OUTPATIENT)
Dept: INTERVENTIONAL RADIOLOGY/VASCULAR | Age: 73
Discharge: HOME | End: 2018-12-27
Payer: MEDICARE

## 2018-01-01 ENCOUNTER — HOSPITAL ENCOUNTER (OUTPATIENT)
Dept: GENERAL RADIOLOGY | Age: 73
Discharge: HOME | End: 2018-09-21
Payer: MEDICARE

## 2018-01-01 ENCOUNTER — APPOINTMENT (OUTPATIENT)
Dept: GENERAL RADIOLOGY | Age: 73
DRG: 683 | End: 2018-01-01
Payer: MEDICARE

## 2018-01-01 ENCOUNTER — HOSPITAL ENCOUNTER (OUTPATIENT)
Age: 73
Discharge: HOME | End: 2018-11-15
Payer: MEDICARE

## 2018-01-01 ENCOUNTER — TELEPHONE (OUTPATIENT)
Dept: NEPHROLOGY | Age: 73
End: 2018-01-01

## 2018-01-01 ENCOUNTER — HOSPITAL ENCOUNTER (OUTPATIENT)
Dept: INTERVENTIONAL RADIOLOGY/VASCULAR | Age: 73
Discharge: HOME | End: 2018-09-05
Payer: MEDICARE

## 2018-01-01 ENCOUNTER — HOSPITAL ENCOUNTER (OUTPATIENT)
Dept: CT IMAGING | Age: 73
Discharge: HOME | End: 2018-09-21
Payer: MEDICARE

## 2018-01-01 ENCOUNTER — OFFICE VISIT (OUTPATIENT)
Dept: PULMONOLOGY | Age: 73
End: 2018-01-01
Payer: MEDICARE

## 2018-01-01 ENCOUNTER — APPOINTMENT (OUTPATIENT)
Dept: CT IMAGING | Age: 73
End: 2018-01-01
Payer: MEDICARE

## 2018-01-01 ENCOUNTER — HOSPITAL ENCOUNTER (OUTPATIENT)
Age: 73
Setting detail: OBSERVATION
Discharge: HOME | End: 2018-08-28
Attending: EMERGENCY MEDICINE | Admitting: FAMILY MEDICINE
Payer: MEDICARE

## 2018-01-01 ENCOUNTER — OFFICE VISIT (OUTPATIENT)
Dept: CARDIOLOGY CLINIC | Age: 73
End: 2018-01-01
Payer: MEDICARE

## 2018-01-01 ENCOUNTER — TELEPHONE (OUTPATIENT)
Dept: CARDIAC REHAB | Age: 73
End: 2018-01-01

## 2018-01-01 ENCOUNTER — HOSPITAL ENCOUNTER (OUTPATIENT)
Age: 73
Discharge: HOME | End: 2018-07-31
Payer: MEDICARE

## 2018-01-01 ENCOUNTER — OFFICE VISIT (OUTPATIENT)
Dept: NEUROLOGY | Age: 73
End: 2018-01-01
Payer: MEDICARE

## 2018-01-01 ENCOUNTER — OFFICE VISIT (OUTPATIENT)
Dept: INTERNAL MEDICINE CLINIC | Age: 73
End: 2018-01-01
Payer: MEDICARE

## 2018-01-01 ENCOUNTER — APPOINTMENT (OUTPATIENT)
Dept: NON INVASIVE DIAGNOSTICS | Age: 73
DRG: 689 | End: 2018-01-01
Payer: MEDICARE

## 2018-01-01 ENCOUNTER — APPOINTMENT (OUTPATIENT)
Dept: INTERVENTIONAL RADIOLOGY/VASCULAR | Age: 73
DRG: 291 | End: 2018-01-01
Payer: MEDICARE

## 2018-01-01 ENCOUNTER — HOSPITAL ENCOUNTER (OUTPATIENT)
Age: 73
Discharge: HOME | End: 2018-09-24
Payer: MEDICARE

## 2018-01-01 ENCOUNTER — HOSPITAL ENCOUNTER (OUTPATIENT)
Age: 73
Discharge: HOME | End: 2018-07-18
Payer: MEDICARE

## 2018-01-01 ENCOUNTER — TELEPHONE (OUTPATIENT)
Dept: PHARMACY | Facility: CLINIC | Age: 73
End: 2018-01-01

## 2018-01-01 ENCOUNTER — HOSPITAL ENCOUNTER (OUTPATIENT)
Age: 73
Discharge: HOME | End: 2018-10-25
Payer: MEDICARE

## 2018-01-01 ENCOUNTER — HOSPITAL ENCOUNTER (INPATIENT)
Age: 73
LOS: 4 days | Discharge: HOME | DRG: 291 | End: 2018-12-29
Attending: INTERNAL MEDICINE | Admitting: INTERNAL MEDICINE
Payer: MEDICARE

## 2018-01-01 ENCOUNTER — APPOINTMENT (OUTPATIENT)
Dept: GENERAL RADIOLOGY | Age: 73
End: 2018-01-01
Payer: MEDICARE

## 2018-01-01 ENCOUNTER — HOSPITAL ENCOUNTER (INPATIENT)
Age: 73
LOS: 3 days | Discharge: HOME HEALTH | DRG: 683 | End: 2018-12-24
Attending: INTERNAL MEDICINE | Admitting: INTERNAL MEDICINE
Payer: MEDICARE

## 2018-01-01 ENCOUNTER — APPOINTMENT (OUTPATIENT)
Dept: GENERAL RADIOLOGY | Age: 73
DRG: 389 | End: 2018-01-01
Payer: MEDICARE

## 2018-01-01 ENCOUNTER — HOSPITAL ENCOUNTER (INPATIENT)
Age: 73
LOS: 6 days | Discharge: HOME | DRG: 689 | End: 2018-12-04
Attending: EMERGENCY MEDICINE | Admitting: INTERNAL MEDICINE
Payer: MEDICARE

## 2018-01-01 ENCOUNTER — HOSPITAL ENCOUNTER (INPATIENT)
Age: 73
LOS: 3 days | Discharge: HOME | DRG: 291 | End: 2018-11-06
Attending: INTERNAL MEDICINE | Admitting: INTERNAL MEDICINE
Payer: MEDICARE

## 2018-01-01 ENCOUNTER — HOSPITAL ENCOUNTER (EMERGENCY)
Age: 73
Discharge: HOME | DRG: 689 | End: 2018-11-26
Attending: EMERGENCY MEDICINE
Payer: MEDICARE

## 2018-01-01 VITALS
BODY MASS INDEX: 35.4 KG/M2 | WEIGHT: 216 LBS | HEART RATE: 73 BPM | DIASTOLIC BLOOD PRESSURE: 70 MMHG | SYSTOLIC BLOOD PRESSURE: 110 MMHG | OXYGEN SATURATION: 95 %

## 2018-01-01 VITALS
BODY MASS INDEX: 38.35 KG/M2 | SYSTOLIC BLOOD PRESSURE: 146 MMHG | WEIGHT: 234 LBS | HEART RATE: 80 BPM | OXYGEN SATURATION: 93 % | DIASTOLIC BLOOD PRESSURE: 78 MMHG

## 2018-01-01 VITALS
SYSTOLIC BLOOD PRESSURE: 150 MMHG | HEIGHT: 66 IN | BODY MASS INDEX: 38.32 KG/M2 | HEIGHT: 65 IN | DIASTOLIC BLOOD PRESSURE: 78 MMHG | BODY MASS INDEX: 36.79 KG/M2 | TEMPERATURE: 98.6 F | DIASTOLIC BLOOD PRESSURE: 65 MMHG | OXYGEN SATURATION: 96 % | HEART RATE: 85 BPM | WEIGHT: 228.9 LBS | RESPIRATION RATE: 20 BRPM | SYSTOLIC BLOOD PRESSURE: 132 MMHG | WEIGHT: 230 LBS | HEART RATE: 84 BPM

## 2018-01-01 VITALS
WEIGHT: 224 LBS | RESPIRATION RATE: 16 BRPM | SYSTOLIC BLOOD PRESSURE: 122 MMHG | BODY MASS INDEX: 37.28 KG/M2 | TEMPERATURE: 98 F | DIASTOLIC BLOOD PRESSURE: 80 MMHG | HEART RATE: 83 BPM

## 2018-01-01 VITALS
OXYGEN SATURATION: 92 % | HEART RATE: 81 BPM | BODY MASS INDEX: 37.65 KG/M2 | SYSTOLIC BLOOD PRESSURE: 136 MMHG | TEMPERATURE: 97.5 F | DIASTOLIC BLOOD PRESSURE: 68 MMHG | HEIGHT: 65 IN | WEIGHT: 226 LBS

## 2018-01-01 VITALS
DIASTOLIC BLOOD PRESSURE: 68 MMHG | BODY MASS INDEX: 36.64 KG/M2 | WEIGHT: 228 LBS | RESPIRATION RATE: 22 BRPM | HEIGHT: 66 IN | SYSTOLIC BLOOD PRESSURE: 136 MMHG | OXYGEN SATURATION: 93 % | TEMPERATURE: 98.3 F | HEART RATE: 96 BPM

## 2018-01-01 VITALS
HEART RATE: 88 BPM | HEIGHT: 65 IN | BODY MASS INDEX: 38.99 KG/M2 | OXYGEN SATURATION: 91 % | DIASTOLIC BLOOD PRESSURE: 84 MMHG | SYSTOLIC BLOOD PRESSURE: 140 MMHG | WEIGHT: 234 LBS

## 2018-01-01 VITALS — BODY MASS INDEX: 38.25 KG/M2 | HEIGHT: 66 IN | WEIGHT: 238 LBS

## 2018-01-01 VITALS
HEIGHT: 66 IN | RESPIRATION RATE: 22 BRPM | OXYGEN SATURATION: 89 % | BODY MASS INDEX: 38.41 KG/M2 | HEART RATE: 76 BPM | SYSTOLIC BLOOD PRESSURE: 124 MMHG | WEIGHT: 239 LBS | TEMPERATURE: 97.8 F | DIASTOLIC BLOOD PRESSURE: 88 MMHG

## 2018-01-01 VITALS
SYSTOLIC BLOOD PRESSURE: 112 MMHG | OXYGEN SATURATION: 95 % | TEMPERATURE: 97.6 F | WEIGHT: 228 LBS | BODY MASS INDEX: 36.64 KG/M2 | HEART RATE: 76 BPM | DIASTOLIC BLOOD PRESSURE: 88 MMHG | HEIGHT: 66 IN | RESPIRATION RATE: 16 BRPM

## 2018-01-01 VITALS
SYSTOLIC BLOOD PRESSURE: 130 MMHG | WEIGHT: 232 LBS | BODY MASS INDEX: 38.65 KG/M2 | HEIGHT: 65 IN | OXYGEN SATURATION: 91 % | HEART RATE: 91 BPM | DIASTOLIC BLOOD PRESSURE: 64 MMHG

## 2018-01-01 VITALS
WEIGHT: 228.3 LBS | HEIGHT: 66 IN | OXYGEN SATURATION: 96 % | RESPIRATION RATE: 18 BRPM | BODY MASS INDEX: 36.69 KG/M2 | SYSTOLIC BLOOD PRESSURE: 116 MMHG | TEMPERATURE: 97.9 F | DIASTOLIC BLOOD PRESSURE: 56 MMHG | HEART RATE: 82 BPM

## 2018-01-01 VITALS
OXYGEN SATURATION: 93 % | WEIGHT: 228 LBS | SYSTOLIC BLOOD PRESSURE: 138 MMHG | HEART RATE: 94 BPM | RESPIRATION RATE: 20 BRPM | DIASTOLIC BLOOD PRESSURE: 80 MMHG | TEMPERATURE: 97.8 F | HEIGHT: 66 IN | BODY MASS INDEX: 36.64 KG/M2

## 2018-01-01 VITALS
SYSTOLIC BLOOD PRESSURE: 166 MMHG | BODY MASS INDEX: 36.29 KG/M2 | HEART RATE: 79 BPM | DIASTOLIC BLOOD PRESSURE: 71 MMHG | TEMPERATURE: 98.3 F | OXYGEN SATURATION: 93 % | RESPIRATION RATE: 18 BRPM | WEIGHT: 225.8 LBS | HEIGHT: 66 IN

## 2018-01-01 VITALS
SYSTOLIC BLOOD PRESSURE: 140 MMHG | RESPIRATION RATE: 18 BRPM | HEART RATE: 89 BPM | TEMPERATURE: 98.3 F | DIASTOLIC BLOOD PRESSURE: 84 MMHG | OXYGEN SATURATION: 94 %

## 2018-01-01 VITALS
WEIGHT: 235.2 LBS | BODY MASS INDEX: 39.18 KG/M2 | HEART RATE: 71 BPM | DIASTOLIC BLOOD PRESSURE: 70 MMHG | OXYGEN SATURATION: 90 % | RESPIRATION RATE: 16 BRPM | SYSTOLIC BLOOD PRESSURE: 138 MMHG | HEIGHT: 65 IN | TEMPERATURE: 98.1 F

## 2018-01-01 VITALS
DIASTOLIC BLOOD PRESSURE: 67 MMHG | WEIGHT: 225 LBS | HEIGHT: 66 IN | BODY MASS INDEX: 36.16 KG/M2 | HEART RATE: 76 BPM | SYSTOLIC BLOOD PRESSURE: 111 MMHG

## 2018-01-01 VITALS
TEMPERATURE: 98.3 F | BODY MASS INDEX: 37.55 KG/M2 | OXYGEN SATURATION: 97 % | SYSTOLIC BLOOD PRESSURE: 180 MMHG | RESPIRATION RATE: 18 BRPM | DIASTOLIC BLOOD PRESSURE: 80 MMHG | WEIGHT: 225.4 LBS | HEIGHT: 65 IN | HEART RATE: 84 BPM

## 2018-01-01 VITALS
HEIGHT: 66 IN | OXYGEN SATURATION: 96 % | TEMPERATURE: 98 F | SYSTOLIC BLOOD PRESSURE: 144 MMHG | BODY MASS INDEX: 38.44 KG/M2 | WEIGHT: 239.2 LBS | RESPIRATION RATE: 17 BRPM | DIASTOLIC BLOOD PRESSURE: 67 MMHG | HEART RATE: 65 BPM

## 2018-01-01 VITALS
TEMPERATURE: 96.5 F | OXYGEN SATURATION: 94 % | HEART RATE: 76 BPM | RESPIRATION RATE: 16 BRPM | HEIGHT: 66 IN | SYSTOLIC BLOOD PRESSURE: 136 MMHG | DIASTOLIC BLOOD PRESSURE: 60 MMHG | BODY MASS INDEX: 37.45 KG/M2 | WEIGHT: 233 LBS

## 2018-01-01 VITALS — HEART RATE: 84 BPM | DIASTOLIC BLOOD PRESSURE: 62 MMHG | RESPIRATION RATE: 18 BRPM | SYSTOLIC BLOOD PRESSURE: 138 MMHG

## 2018-01-01 VITALS
HEART RATE: 80 BPM | DIASTOLIC BLOOD PRESSURE: 60 MMHG | HEIGHT: 66 IN | WEIGHT: 233 LBS | SYSTOLIC BLOOD PRESSURE: 96 MMHG | BODY MASS INDEX: 37.45 KG/M2

## 2018-01-01 VITALS — SYSTOLIC BLOOD PRESSURE: 136 MMHG | DIASTOLIC BLOOD PRESSURE: 64 MMHG

## 2018-01-01 DIAGNOSIS — I50.32 CHF (CONGESTIVE HEART FAILURE), NYHA CLASS III, CHRONIC, DIASTOLIC (HCC): Primary | ICD-10-CM

## 2018-01-01 DIAGNOSIS — J44.1 COPD EXACERBATION (HCC): Primary | ICD-10-CM

## 2018-01-01 DIAGNOSIS — D64.9 ANEMIA, UNSPECIFIED TYPE: ICD-10-CM

## 2018-01-01 DIAGNOSIS — M53.2X2 CERVICAL SPINE INSTABILITY: Primary | ICD-10-CM

## 2018-01-01 DIAGNOSIS — E87.1 HYPONATREMIA: ICD-10-CM

## 2018-01-01 DIAGNOSIS — R41.0 CONFUSION: Primary | ICD-10-CM

## 2018-01-01 DIAGNOSIS — R60.0 PEDAL EDEMA: ICD-10-CM

## 2018-01-01 DIAGNOSIS — I65.23 BILATERAL CAROTID ARTERY STENOSIS: ICD-10-CM

## 2018-01-01 DIAGNOSIS — E78.2 MIXED HYPERLIPIDEMIA: ICD-10-CM

## 2018-01-01 DIAGNOSIS — R55 NEAR SYNCOPE: ICD-10-CM

## 2018-01-01 DIAGNOSIS — B37.2 CANDIDAL SKIN INFECTION: ICD-10-CM

## 2018-01-01 DIAGNOSIS — Z87.891 HISTORY OF TOBACCO ABUSE: ICD-10-CM

## 2018-01-01 DIAGNOSIS — K56.609 SMALL BOWEL OBSTRUCTION (HCC): ICD-10-CM

## 2018-01-01 DIAGNOSIS — I73.9 PVD (PERIPHERAL VASCULAR DISEASE) (HCC): ICD-10-CM

## 2018-01-01 DIAGNOSIS — M54.2 CERVICAL PAIN: ICD-10-CM

## 2018-01-01 DIAGNOSIS — R30.0 DYSURIA: Primary | ICD-10-CM

## 2018-01-01 DIAGNOSIS — I50.32 CHRONIC DIASTOLIC HEART FAILURE (HCC): ICD-10-CM

## 2018-01-01 DIAGNOSIS — R91.1 PULMONARY NODULE: ICD-10-CM

## 2018-01-01 DIAGNOSIS — R63.5 WEIGHT GAIN: ICD-10-CM

## 2018-01-01 DIAGNOSIS — K56.600 PARTIAL INTESTINAL OBSTRUCTION, UNSPECIFIED CAUSE (HCC): ICD-10-CM

## 2018-01-01 DIAGNOSIS — I10 ESSENTIAL HYPERTENSION: ICD-10-CM

## 2018-01-01 DIAGNOSIS — I95.9 HYPOTENSION, UNSPECIFIED HYPOTENSION TYPE: ICD-10-CM

## 2018-01-01 DIAGNOSIS — G47.34 NOCTURNAL HYPOXEMIA: ICD-10-CM

## 2018-01-01 DIAGNOSIS — F41.9 ANXIETY: ICD-10-CM

## 2018-01-01 DIAGNOSIS — Z98.61 S/P PTCA (PERCUTANEOUS TRANSLUMINAL CORONARY ANGIOPLASTY): ICD-10-CM

## 2018-01-01 DIAGNOSIS — J43.2 CENTRILOBULAR EMPHYSEMA (HCC): ICD-10-CM

## 2018-01-01 DIAGNOSIS — Z09 HOSPITAL DISCHARGE FOLLOW-UP: Primary | ICD-10-CM

## 2018-01-01 DIAGNOSIS — K56.600 PARTIAL SMALL BOWEL OBSTRUCTION (HCC): ICD-10-CM

## 2018-01-01 DIAGNOSIS — J44.9 STAGE 3 SEVERE COPD BY GOLD CLASSIFICATION (HCC): ICD-10-CM

## 2018-01-01 DIAGNOSIS — F07.81 POST CONCUSSIVE SYNDROME: Primary | ICD-10-CM

## 2018-01-01 DIAGNOSIS — I77.1 SUBCLAVIAN ARTERIAL STENOSIS (HCC): ICD-10-CM

## 2018-01-01 DIAGNOSIS — J44.9 CHRONIC OBSTRUCTIVE PULMONARY DISEASE, UNSPECIFIED COPD TYPE (HCC): Primary | ICD-10-CM

## 2018-01-01 DIAGNOSIS — I50.32 CHF (CONGESTIVE HEART FAILURE), NYHA CLASS II, CHRONIC, DIASTOLIC (HCC): Primary | ICD-10-CM

## 2018-01-01 DIAGNOSIS — E87.1 HYPONATREMIA: Primary | ICD-10-CM

## 2018-01-01 DIAGNOSIS — R60.0 PEDAL EDEMA: Primary | ICD-10-CM

## 2018-01-01 DIAGNOSIS — I50.9 CONGESTIVE HEART FAILURE, UNSPECIFIED HF CHRONICITY, UNSPECIFIED HEART FAILURE TYPE (HCC): ICD-10-CM

## 2018-01-01 DIAGNOSIS — J96.11 CHRONIC RESPIRATORY FAILURE WITH HYPOXIA (HCC): Primary | ICD-10-CM

## 2018-01-01 DIAGNOSIS — J42 CHRONIC BRONCHITIS, UNSPECIFIED CHRONIC BRONCHITIS TYPE (HCC): ICD-10-CM

## 2018-01-01 DIAGNOSIS — J96.02 ACUTE RESPIRATORY FAILURE WITH HYPOXIA AND HYPERCAPNIA (HCC): ICD-10-CM

## 2018-01-01 DIAGNOSIS — I10 ESSENTIAL HYPERTENSION: Primary | ICD-10-CM

## 2018-01-01 DIAGNOSIS — F07.81 POSTCONCUSSIVE SYNDROME: ICD-10-CM

## 2018-01-01 DIAGNOSIS — R41.0 CONFUSION: ICD-10-CM

## 2018-01-01 DIAGNOSIS — I50.43 ACUTE ON CHRONIC COMBINED SYSTOLIC AND DIASTOLIC CONGESTIVE HEART FAILURE (HCC): ICD-10-CM

## 2018-01-01 DIAGNOSIS — N39.0 URINARY TRACT INFECTION WITHOUT HEMATURIA, SITE UNSPECIFIED: ICD-10-CM

## 2018-01-01 DIAGNOSIS — K59.00 CONSTIPATION, UNSPECIFIED CONSTIPATION TYPE: ICD-10-CM

## 2018-01-01 DIAGNOSIS — I70.8 LEFT SUBCLAVIAN ARTERY OCCLUSION: ICD-10-CM

## 2018-01-01 DIAGNOSIS — J44.1 CHRONIC OBSTRUCTIVE PULMONARY DISEASE WITH ACUTE EXACERBATION (HCC): ICD-10-CM

## 2018-01-01 DIAGNOSIS — S00.03XA CONTUSION OF SCALP, INITIAL ENCOUNTER: Primary | ICD-10-CM

## 2018-01-01 DIAGNOSIS — M53.2X2 CERVICAL SPINE INSTABILITY: ICD-10-CM

## 2018-01-01 DIAGNOSIS — J44.9 COPD WITHOUT EXACERBATION (HCC): ICD-10-CM

## 2018-01-01 DIAGNOSIS — J44.1 COPD EXACERBATION (HCC): ICD-10-CM

## 2018-01-01 DIAGNOSIS — I25.83 CORONARY ARTERY DISEASE DUE TO LIPID RICH PLAQUE: Primary | ICD-10-CM

## 2018-01-01 DIAGNOSIS — I50.32 CHF (CONGESTIVE HEART FAILURE), NYHA CLASS II, CHRONIC, DIASTOLIC (HCC): ICD-10-CM

## 2018-01-01 DIAGNOSIS — D64.9 NORMOCYTIC ANEMIA: Primary | ICD-10-CM

## 2018-01-01 DIAGNOSIS — D50.9 IRON DEFICIENCY ANEMIA, UNSPECIFIED IRON DEFICIENCY ANEMIA TYPE: Primary | ICD-10-CM

## 2018-01-01 DIAGNOSIS — N17.9 AKI (ACUTE KIDNEY INJURY) (HCC): Primary | ICD-10-CM

## 2018-01-01 DIAGNOSIS — I65.23 BILATERAL CAROTID ARTERY STENOSIS: Primary | ICD-10-CM

## 2018-01-01 DIAGNOSIS — R53.1 WEAKNESS: Primary | ICD-10-CM

## 2018-01-01 DIAGNOSIS — R06.02 SOB (SHORTNESS OF BREATH): ICD-10-CM

## 2018-01-01 DIAGNOSIS — I25.10 CORONARY ARTERY DISEASE DUE TO LIPID RICH PLAQUE: Primary | ICD-10-CM

## 2018-01-01 DIAGNOSIS — E11.21 CONTROLLED TYPE 2 DIABETES MELLITUS WITH DIABETIC NEPHROPATHY, WITHOUT LONG-TERM CURRENT USE OF INSULIN (HCC): ICD-10-CM

## 2018-01-01 DIAGNOSIS — I35.0 MODERATE AORTIC STENOSIS: ICD-10-CM

## 2018-01-01 DIAGNOSIS — I31.39 PERICARDIAL EFFUSION: Primary | ICD-10-CM

## 2018-01-01 DIAGNOSIS — Z99.89 OSA ON CPAP: ICD-10-CM

## 2018-01-01 DIAGNOSIS — E78.5 DYSLIPIDEMIA: ICD-10-CM
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
## 2018-01-01 DIAGNOSIS — G47.33 OSA ON CPAP: ICD-10-CM

## 2018-01-01 DIAGNOSIS — I35.0 SEVERE AORTIC STENOSIS: ICD-10-CM

## 2018-01-01 DIAGNOSIS — G47.33 OSA (OBSTRUCTIVE SLEEP APNEA): Primary | ICD-10-CM

## 2018-01-01 DIAGNOSIS — J96.11 CHRONIC HYPOXEMIC RESPIRATORY FAILURE (HCC): ICD-10-CM

## 2018-01-01 DIAGNOSIS — E66.9 OBESITY (BMI 30-39.9): ICD-10-CM

## 2018-01-01 DIAGNOSIS — E88.81 DYSMETABOLIC SYNDROME: ICD-10-CM

## 2018-01-01 DIAGNOSIS — R11.0 NAUSEA: ICD-10-CM

## 2018-01-01 DIAGNOSIS — G47.33 OSA (OBSTRUCTIVE SLEEP APNEA): ICD-10-CM

## 2018-01-01 DIAGNOSIS — K75.81 NASH (NONALCOHOLIC STEATOHEPATITIS): ICD-10-CM

## 2018-01-01 DIAGNOSIS — J43.2 CENTRILOBULAR EMPHYSEMA (HCC): Primary | ICD-10-CM

## 2018-01-01 DIAGNOSIS — F41.9 ANXIETY: Primary | ICD-10-CM

## 2018-01-01 DIAGNOSIS — E03.9 HYPOTHYROIDISM, UNSPECIFIED TYPE: ICD-10-CM

## 2018-01-01 DIAGNOSIS — E66.9 OBESITY (BMI 35.0-39.9 WITHOUT COMORBIDITY): ICD-10-CM

## 2018-01-01 DIAGNOSIS — J96.01 ACUTE RESPIRATORY FAILURE WITH HYPOXIA AND HYPERCAPNIA (HCC): ICD-10-CM

## 2018-01-01 DIAGNOSIS — D64.9 NORMOCYTIC ANEMIA: ICD-10-CM

## 2018-01-01 DIAGNOSIS — E11.9 TYPE 2 DIABETES MELLITUS TREATED WITHOUT INSULIN (HCC): ICD-10-CM

## 2018-01-01 DIAGNOSIS — I50.32 CHF (CONGESTIVE HEART FAILURE), NYHA CLASS III, CHRONIC, DIASTOLIC (HCC): ICD-10-CM

## 2018-01-01 LAB
ABO: NORMAL
ALBUMIN SERPL-MCNC: 3.6 G/DL (ref 3.5–5.1)
ALBUMIN SERPL-MCNC: 3.9 G/DL (ref 3.5–5.1)
ALBUMIN SERPL-MCNC: 4.1 G/DL (ref 3.5–5.1)
ALBUMIN SERPL-MCNC: 4.1 G/DL (ref 3.5–5.1)
ALBUMIN SERPL-MCNC: 4.3 G/DL (ref 3.5–5.1)
ALLEN TEST: ABNORMAL
ALLEN TEST: POSITIVE
ALP BLD-CCNC: 51 U/L (ref 38–126)
ALP BLD-CCNC: 53 U/L (ref 38–126)
ALP BLD-CCNC: 59 U/L (ref 38–126)
ALP BLD-CCNC: 61 U/L (ref 38–126)
ALP BLD-CCNC: 61 U/L (ref 38–126)
ALT SERPL-CCNC: 20 U/L (ref 11–66)
ALT SERPL-CCNC: 20 U/L (ref 11–66)
ALT SERPL-CCNC: 22 U/L (ref 11–66)
ALT SERPL-CCNC: 27 U/L (ref 11–66)
ALT SERPL-CCNC: 27 U/L (ref 11–66)
AMORPHOUS: ABNORMAL
AMPHETAMINE+METHAMPHETAMINE URINE SCREEN: NEGATIVE
ANION GAP SERPL CALCULATED.3IONS-SCNC: 10 MEQ/L (ref 8–16)
ANION GAP SERPL CALCULATED.3IONS-SCNC: 10 MEQ/L (ref 8–16)
ANION GAP SERPL CALCULATED.3IONS-SCNC: 11 MEQ/L (ref 8–16)
ANION GAP SERPL CALCULATED.3IONS-SCNC: 12 MEQ/L (ref 8–16)
ANION GAP SERPL CALCULATED.3IONS-SCNC: 13 MEQ/L (ref 8–16)
ANION GAP SERPL CALCULATED.3IONS-SCNC: 13 MEQ/L (ref 8–16)
ANION GAP SERPL CALCULATED.3IONS-SCNC: 14 MEQ/L (ref 8–16)
ANION GAP SERPL CALCULATED.3IONS-SCNC: 15 MEQ/L (ref 8–16)
ANION GAP SERPL CALCULATED.3IONS-SCNC: 15 MEQ/L (ref 8–16)
ANION GAP SERPL CALCULATED.3IONS-SCNC: 16 MEQ/L (ref 8–16)
ANION GAP SERPL CALCULATED.3IONS-SCNC: 17 MEQ/L (ref 8–16)
ANION GAP SERPL CALCULATED.3IONS-SCNC: 17 MEQ/L (ref 8–16)
ANION GAP SERPL CALCULATED.3IONS-SCNC: 18 MEQ/L (ref 8–16)
ANTIBODY SCREEN: NORMAL
APTT: 40.8 SECONDS (ref 22–38)
AST SERPL-CCNC: 18 U/L (ref 5–40)
AST SERPL-CCNC: 20 U/L (ref 5–40)
AST SERPL-CCNC: 23 U/L (ref 5–40)
AST SERPL-CCNC: 24 U/L (ref 5–40)
AST SERPL-CCNC: 25 U/L (ref 5–40)
AVERAGE GLUCOSE: 102 MG/DL (ref 70–126)
AVERAGE GLUCOSE: 114 MG/DL (ref 70–126)
AVERAGE GLUCOSE: 120 MG/DL (ref 70–126)
AVERAGE GLUCOSE: 123 MG/DL (ref 70–126)
BACTERIA: ABNORMAL /HPF
BARBITURATE QUANTITATIVE URINE: NEGATIVE
BASE EXCESS (CALCULATED): 2.2 MMOL/L (ref -2.5–2.5)
BASE EXCESS (CALCULATED): 4.6 MMOL/L (ref -2.5–2.5)
BASE EXCESS (CALCULATED): 5.2 MMOL/L (ref -2.5–2.5)
BASOPHILS # BLD: 0.2 %
BASOPHILS # BLD: 0.2 %
BASOPHILS # BLD: 0.3 %
BASOPHILS # BLD: 0.3 %
BASOPHILS # BLD: 0.4 %
BASOPHILS # BLD: 0.5 %
BASOPHILS # BLD: 0.6 %
BASOPHILS # BLD: 0.6 %
BASOPHILS # BLD: 0.8 %
BASOPHILS # BLD: 0.8 %
BASOPHILS ABSOLUTE: 0 THOU/MM3 (ref 0–0.1)
BENZODIAZEPINE QUANTITATIVE URINE: NEGATIVE
BILIRUB SERPL-MCNC: 0.2 MG/DL (ref 0.3–1.2)
BILIRUB SERPL-MCNC: < 0.2 MG/DL (ref 0.3–1.2)
BILIRUBIN DIRECT: < 0.2 MG/DL (ref 0–0.3)
BILIRUBIN URINE: NEGATIVE
BLOOD CULTURE, ROUTINE: NORMAL
BLOOD CULTURE, ROUTINE: NORMAL
BLOOD, URINE: ABNORMAL
BUN BLDV-MCNC: 10 MG/DL (ref 7–22)
BUN BLDV-MCNC: 11 MG/DL (ref 7–22)
BUN BLDV-MCNC: 11 MG/DL (ref 7–22)
BUN BLDV-MCNC: 12 MG/DL (ref 7–22)
BUN BLDV-MCNC: 13 MG/DL (ref 7–22)
BUN BLDV-MCNC: 14 MG/DL (ref 7–22)
BUN BLDV-MCNC: 15 MG/DL (ref 7–22)
BUN BLDV-MCNC: 16 MG/DL (ref 7–22)
BUN BLDV-MCNC: 17 MG/DL (ref 7–22)
BUN BLDV-MCNC: 18 MG/DL (ref 7–22)
BUN BLDV-MCNC: 19 MG/DL (ref 7–22)
BUN BLDV-MCNC: 19 MG/DL (ref 7–22)
BUN BLDV-MCNC: 23 MG/DL (ref 7–22)
BUN BLDV-MCNC: 44 MG/DL (ref 7–22)
BUN BLDV-MCNC: 52 MG/DL (ref 7–22)
BUN BLDV-MCNC: 54 MG/DL (ref 7–22)
BUN BLDV-MCNC: 55 MG/DL (ref 7–22)
BUN BLDV-MCNC: 63 MG/DL (ref 7–22)
BUN BLDV-MCNC: 7 MG/DL (ref 7–22)
CALCIUM IONIZED: 0.89 MMOL/L (ref 1.12–1.32)
CALCIUM IONIZED: 1 MMOL/L (ref 1.12–1.32)
CALCIUM SERPL-MCNC: 10 MG/DL (ref 8.5–10.5)
CALCIUM SERPL-MCNC: 10 MG/DL (ref 8.5–10.5)
CALCIUM SERPL-MCNC: 11 MG/DL (ref 8.5–10.5)
CALCIUM SERPL-MCNC: 11.5 MG/DL (ref 8.5–10.5)
CALCIUM SERPL-MCNC: 7.4 MG/DL (ref 8.5–10.5)
CALCIUM SERPL-MCNC: 7.6 MG/DL (ref 8.5–10.5)
CALCIUM SERPL-MCNC: 8 MG/DL (ref 8.5–10.5)
CALCIUM SERPL-MCNC: 8.3 MG/DL (ref 8.5–10.5)
CALCIUM SERPL-MCNC: 8.8 MG/DL (ref 8.5–10.5)
CALCIUM SERPL-MCNC: 8.9 MG/DL (ref 8.5–10.5)
CALCIUM SERPL-MCNC: 9 MG/DL (ref 8.5–10.5)
CALCIUM SERPL-MCNC: 9 MG/DL (ref 8.5–10.5)
CALCIUM SERPL-MCNC: 9.1 MG/DL (ref 8.5–10.5)
CALCIUM SERPL-MCNC: 9.3 MG/DL (ref 8.5–10.5)
CALCIUM SERPL-MCNC: 9.4 MG/DL (ref 8.5–10.5)
CALCIUM SERPL-MCNC: 9.5 MG/DL (ref 8.5–10.5)
CALCIUM SERPL-MCNC: 9.6 MG/DL (ref 8.5–10.5)
CALCIUM SERPL-MCNC: 9.7 MG/DL (ref 8.5–10.5)
CALCIUM SERPL-MCNC: 9.7 MG/DL (ref 8.5–10.5)
CALCIUM SERPL-MCNC: 9.8 MG/DL (ref 8.5–10.5)
CALCIUM SERPL-MCNC: 9.9 MG/DL (ref 8.5–10.5)
CALCIUM SERPL-MCNC: 9.9 MG/DL (ref 8.5–10.5)
CANNABINOID QUANTITATIVE URINE: NEGATIVE
CASTS 2: ABNORMAL /LPF
CASTS 2: ABNORMAL /LPF
CASTS UA: ABNORMAL /LPF
CHARACTER, URINE: ABNORMAL
CHARACTER, URINE: ABNORMAL
CHARACTER, URINE: CLEAR
CHLORIDE BLD-SCNC: 82 MEQ/L (ref 98–111)
CHLORIDE BLD-SCNC: 82 MEQ/L (ref 98–111)
CHLORIDE BLD-SCNC: 83 MEQ/L (ref 98–111)
CHLORIDE BLD-SCNC: 83 MEQ/L (ref 98–111)
CHLORIDE BLD-SCNC: 84 MEQ/L (ref 98–111)
CHLORIDE BLD-SCNC: 86 MEQ/L (ref 98–111)
CHLORIDE BLD-SCNC: 86 MEQ/L (ref 98–111)
CHLORIDE BLD-SCNC: 87 MEQ/L (ref 98–111)
CHLORIDE BLD-SCNC: 87 MEQ/L (ref 98–111)
CHLORIDE BLD-SCNC: 88 MEQ/L (ref 98–111)
CHLORIDE BLD-SCNC: 89 MEQ/L (ref 98–111)
CHLORIDE BLD-SCNC: 89 MEQ/L (ref 98–111)
CHLORIDE BLD-SCNC: 90 MEQ/L (ref 98–111)
CHLORIDE BLD-SCNC: 91 MEQ/L (ref 98–111)
CHLORIDE BLD-SCNC: 92 MEQ/L (ref 98–111)
CHLORIDE BLD-SCNC: 93 MEQ/L (ref 98–111)
CHLORIDE BLD-SCNC: 95 MEQ/L (ref 98–111)
CHLORIDE BLD-SCNC: 96 MEQ/L (ref 98–111)
CHOLESTEROL, TOTAL: 120 MG/DL (ref 100–199)
CO2: 20 MEQ/L (ref 23–33)
CO2: 24 MEQ/L (ref 23–33)
CO2: 25 MEQ/L (ref 23–33)
CO2: 26 MEQ/L (ref 23–33)
CO2: 26 MEQ/L (ref 23–33)
CO2: 27 MEQ/L (ref 23–33)
CO2: 28 MEQ/L (ref 23–33)
CO2: 29 MEQ/L (ref 23–33)
CO2: 30 MEQ/L (ref 23–33)
CO2: 31 MEQ/L (ref 23–33)
CO2: 31 MEQ/L (ref 23–33)
CO2: 32 MEQ/L (ref 23–33)
CO2: 33 MEQ/L (ref 23–33)
CO2: 34 MEQ/L (ref 23–33)
COCAINE METABOLITE QUANTITATIVE URINE: NEGATIVE
COLLECTED BY:: ABNORMAL
COLOR: YELLOW
CREAT SERPL-MCNC: 0.7 MG/DL (ref 0.4–1.2)
CREAT SERPL-MCNC: 0.8 MG/DL (ref 0.4–1.2)
CREAT SERPL-MCNC: 0.9 MG/DL (ref 0.4–1.2)
CREAT SERPL-MCNC: 1.4 MG/DL (ref 0.4–1.2)
CREAT SERPL-MCNC: 1.5 MG/DL (ref 0.4–1.2)
CREAT SERPL-MCNC: 1.6 MG/DL (ref 0.4–1.2)
CREAT SERPL-MCNC: 1.7 MG/DL (ref 0.4–1.2)
CREAT SERPL-MCNC: 1.9 MG/DL (ref 0.4–1.2)
CRYSTALS, UA: ABNORMAL
DEVICE: ABNORMAL
EKG ATRIAL RATE: 110 BPM
EKG ATRIAL RATE: 70 BPM
EKG ATRIAL RATE: 72 BPM
EKG ATRIAL RATE: 86 BPM
EKG ATRIAL RATE: 96 BPM
EKG ATRIAL RATE: 98 BPM
EKG ATRIAL RATE: 98 BPM
EKG P AXIS: 51 DEGREES
EKG P AXIS: 56 DEGREES
EKG P AXIS: 57 DEGREES
EKG P AXIS: 58 DEGREES
EKG P AXIS: 61 DEGREES
EKG P AXIS: 63 DEGREES
EKG P AXIS: 67 DEGREES
EKG P-R INTERVAL: 168 MS
EKG P-R INTERVAL: 172 MS
EKG P-R INTERVAL: 184 MS
EKG P-R INTERVAL: 186 MS
EKG P-R INTERVAL: 192 MS
EKG P-R INTERVAL: 196 MS
EKG P-R INTERVAL: 208 MS
EKG Q-T INTERVAL: 338 MS
EKG Q-T INTERVAL: 342 MS
EKG Q-T INTERVAL: 348 MS
EKG Q-T INTERVAL: 364 MS
EKG Q-T INTERVAL: 386 MS
EKG Q-T INTERVAL: 406 MS
EKG Q-T INTERVAL: 408 MS
EKG QRS DURATION: 74 MS
EKG QRS DURATION: 78 MS
EKG QRS DURATION: 78 MS
EKG QRS DURATION: 80 MS
EKG QRS DURATION: 82 MS
EKG QRS DURATION: 82 MS
EKG QRS DURATION: 92 MS
EKG QTC CALCULATION (BAZETT): 436 MS
EKG QTC CALCULATION (BAZETT): 438 MS
EKG QTC CALCULATION (BAZETT): 439 MS
EKG QTC CALCULATION (BAZETT): 446 MS
EKG QTC CALCULATION (BAZETT): 457 MS
EKG QTC CALCULATION (BAZETT): 461 MS
EKG QTC CALCULATION (BAZETT): 464 MS
EKG R AXIS: -2 DEGREES
EKG R AXIS: 0 DEGREES
EKG R AXIS: 13 DEGREES
EKG R AXIS: 17 DEGREES
EKG R AXIS: 18 DEGREES
EKG R AXIS: 24 DEGREES
EKG R AXIS: 6 DEGREES
EKG T AXIS: 20 DEGREES
EKG T AXIS: 25 DEGREES
EKG T AXIS: 27 DEGREES
EKG T AXIS: 29 DEGREES
EKG T AXIS: 33 DEGREES
EKG T AXIS: 40 DEGREES
EKG T AXIS: 48 DEGREES
EKG VENTRICULAR RATE: 110 BPM
EKG VENTRICULAR RATE: 70 BPM
EKG VENTRICULAR RATE: 72 BPM
EKG VENTRICULAR RATE: 86 BPM
EKG VENTRICULAR RATE: 96 BPM
EKG VENTRICULAR RATE: 98 BPM
EKG VENTRICULAR RATE: 98 BPM
EOSINOPHIL # BLD: 0.3 %
EOSINOPHIL # BLD: 1.1 %
EOSINOPHIL # BLD: 1.2 %
EOSINOPHIL # BLD: 2.5 %
EOSINOPHIL # BLD: 2.5 %
EOSINOPHIL # BLD: 3.1 %
EOSINOPHIL # BLD: 3.4 %
EOSINOPHIL # BLD: 3.4 %
EOSINOPHIL # BLD: 3.7 %
EOSINOPHIL # BLD: 4.1 %
EOSINOPHIL # BLD: 4.3 %
EOSINOPHIL # BLD: 4.4 %
EOSINOPHIL # BLD: 4.4 %
EOSINOPHILS ABSOLUTE: 0 THOU/MM3 (ref 0–0.4)
EOSINOPHILS ABSOLUTE: 0.1 THOU/MM3 (ref 0–0.4)
EOSINOPHILS ABSOLUTE: 0.2 THOU/MM3 (ref 0–0.4)
EOSINOPHILS ABSOLUTE: 0.3 THOU/MM3 (ref 0–0.4)
EPITHELIAL CELLS, UA: ABNORMAL /HPF
ERYTHROCYTE [DISTWIDTH] IN BLOOD BY AUTOMATED COUNT: 13.3 % (ref 11.5–14.5)
ERYTHROCYTE [DISTWIDTH] IN BLOOD BY AUTOMATED COUNT: 13.4 % (ref 11.5–14.5)
ERYTHROCYTE [DISTWIDTH] IN BLOOD BY AUTOMATED COUNT: 13.5 % (ref 11.5–14.5)
ERYTHROCYTE [DISTWIDTH] IN BLOOD BY AUTOMATED COUNT: 13.6 % (ref 11.5–14.5)
ERYTHROCYTE [DISTWIDTH] IN BLOOD BY AUTOMATED COUNT: 13.7 % (ref 11.5–14.5)
ERYTHROCYTE [DISTWIDTH] IN BLOOD BY AUTOMATED COUNT: 13.8 % (ref 11.5–14.5)
ERYTHROCYTE [DISTWIDTH] IN BLOOD BY AUTOMATED COUNT: 13.8 % (ref 11.5–14.5)
ERYTHROCYTE [DISTWIDTH] IN BLOOD BY AUTOMATED COUNT: 13.9 % (ref 11.5–14.5)
ERYTHROCYTE [DISTWIDTH] IN BLOOD BY AUTOMATED COUNT: 14 % (ref 11.5–14.5)
ERYTHROCYTE [DISTWIDTH] IN BLOOD BY AUTOMATED COUNT: 14.6 % (ref 11.5–14.5)
ERYTHROCYTE [DISTWIDTH] IN BLOOD BY AUTOMATED COUNT: 14.8 % (ref 11.5–14.5)
ERYTHROCYTE [DISTWIDTH] IN BLOOD BY AUTOMATED COUNT: 16 % (ref 11.5–14.5)
ERYTHROCYTE [DISTWIDTH] IN BLOOD BY AUTOMATED COUNT: 16.2 % (ref 11.5–14.5)
ERYTHROCYTE [DISTWIDTH] IN BLOOD BY AUTOMATED COUNT: 17.1 % (ref 11.5–14.5)
ERYTHROCYTE [DISTWIDTH] IN BLOOD BY AUTOMATED COUNT: 17.2 % (ref 11.5–14.5)
ERYTHROCYTE [DISTWIDTH] IN BLOOD BY AUTOMATED COUNT: 41.6 FL (ref 35–45)
ERYTHROCYTE [DISTWIDTH] IN BLOOD BY AUTOMATED COUNT: 41.7 FL (ref 35–45)
ERYTHROCYTE [DISTWIDTH] IN BLOOD BY AUTOMATED COUNT: 42.3 FL (ref 35–45)
ERYTHROCYTE [DISTWIDTH] IN BLOOD BY AUTOMATED COUNT: 42.4 FL (ref 35–45)
ERYTHROCYTE [DISTWIDTH] IN BLOOD BY AUTOMATED COUNT: 42.5 FL (ref 35–45)
ERYTHROCYTE [DISTWIDTH] IN BLOOD BY AUTOMATED COUNT: 42.6 FL (ref 35–45)
ERYTHROCYTE [DISTWIDTH] IN BLOOD BY AUTOMATED COUNT: 42.8 FL (ref 35–45)
ERYTHROCYTE [DISTWIDTH] IN BLOOD BY AUTOMATED COUNT: 43.4 FL (ref 35–45)
ERYTHROCYTE [DISTWIDTH] IN BLOOD BY AUTOMATED COUNT: 43.6 FL (ref 35–45)
ERYTHROCYTE [DISTWIDTH] IN BLOOD BY AUTOMATED COUNT: 43.8 FL (ref 35–45)
ERYTHROCYTE [DISTWIDTH] IN BLOOD BY AUTOMATED COUNT: 43.9 FL (ref 35–45)
ERYTHROCYTE [DISTWIDTH] IN BLOOD BY AUTOMATED COUNT: 45.1 FL (ref 35–45)
ERYTHROCYTE [DISTWIDTH] IN BLOOD BY AUTOMATED COUNT: 45.6 FL (ref 35–45)
ERYTHROCYTE [DISTWIDTH] IN BLOOD BY AUTOMATED COUNT: 46.6 FL (ref 35–45)
ERYTHROCYTE [DISTWIDTH] IN BLOOD BY AUTOMATED COUNT: 49.1 FL (ref 35–45)
ERYTHROCYTE [DISTWIDTH] IN BLOOD BY AUTOMATED COUNT: 49.4 FL (ref 35–45)
ERYTHROCYTE [DISTWIDTH] IN BLOOD BY AUTOMATED COUNT: 50.4 FL (ref 35–45)
ERYTHROCYTE [DISTWIDTH] IN BLOOD BY AUTOMATED COUNT: 51 FL (ref 35–45)
FERRITIN: 116 NG/ML (ref 10–291)
FERRITIN: 51 NG/ML (ref 10–291)
FERRITIN: 78 NG/ML (ref 10–291)
FLU A ANTIGEN: NEGATIVE
FLU B ANTIGEN: NEGATIVE
FOLATE: > 20 NG/ML (ref 4.8–24.2)
GFR SERPL CREATININE-BSD FRML MDRD: 26 ML/MIN/1.73M2
GFR SERPL CREATININE-BSD FRML MDRD: 29 ML/MIN/1.73M2
GFR SERPL CREATININE-BSD FRML MDRD: 31 ML/MIN/1.73M2
GFR SERPL CREATININE-BSD FRML MDRD: 34 ML/MIN/1.73M2
GFR SERPL CREATININE-BSD FRML MDRD: 37 ML/MIN/1.73M2
GFR SERPL CREATININE-BSD FRML MDRD: 61 ML/MIN/1.73M2
GFR SERPL CREATININE-BSD FRML MDRD: 70 ML/MIN/1.73M2
GFR SERPL CREATININE-BSD FRML MDRD: 82 ML/MIN/1.73M2
GLUCOSE BLD-MCNC: 105 MG/DL (ref 70–108)
GLUCOSE BLD-MCNC: 106 MG/DL (ref 70–108)
GLUCOSE BLD-MCNC: 106 MG/DL (ref 70–108)
GLUCOSE BLD-MCNC: 108 MG/DL (ref 70–108)
GLUCOSE BLD-MCNC: 110 MG/DL (ref 70–108)
GLUCOSE BLD-MCNC: 111 MG/DL (ref 70–108)
GLUCOSE BLD-MCNC: 112 MG/DL (ref 70–108)
GLUCOSE BLD-MCNC: 112 MG/DL (ref 70–108)
GLUCOSE BLD-MCNC: 113 MG/DL (ref 70–108)
GLUCOSE BLD-MCNC: 114 MG/DL (ref 70–108)
GLUCOSE BLD-MCNC: 115 MG/DL (ref 70–108)
GLUCOSE BLD-MCNC: 116 MG/DL (ref 70–108)
GLUCOSE BLD-MCNC: 117 MG/DL (ref 70–108)
GLUCOSE BLD-MCNC: 117 MG/DL (ref 70–108)
GLUCOSE BLD-MCNC: 118 MG/DL (ref 70–108)
GLUCOSE BLD-MCNC: 118 MG/DL (ref 70–108)
GLUCOSE BLD-MCNC: 120 MG/DL (ref 70–108)
GLUCOSE BLD-MCNC: 120 MG/DL (ref 70–108)
GLUCOSE BLD-MCNC: 121 MG/DL (ref 70–108)
GLUCOSE BLD-MCNC: 121 MG/DL (ref 70–108)
GLUCOSE BLD-MCNC: 123 MG/DL (ref 70–108)
GLUCOSE BLD-MCNC: 123 MG/DL (ref 70–108)
GLUCOSE BLD-MCNC: 124 MG/DL (ref 70–108)
GLUCOSE BLD-MCNC: 125 MG/DL (ref 70–108)
GLUCOSE BLD-MCNC: 125 MG/DL (ref 70–108)
GLUCOSE BLD-MCNC: 126 MG/DL (ref 70–108)
GLUCOSE BLD-MCNC: 126 MG/DL (ref 70–108)
GLUCOSE BLD-MCNC: 127 MG/DL (ref 70–108)
GLUCOSE BLD-MCNC: 127 MG/DL (ref 70–108)
GLUCOSE BLD-MCNC: 129 MG/DL (ref 70–108)
GLUCOSE BLD-MCNC: 130 MG/DL (ref 70–108)
GLUCOSE BLD-MCNC: 130 MG/DL (ref 70–108)
GLUCOSE BLD-MCNC: 131 MG/DL (ref 70–108)
GLUCOSE BLD-MCNC: 131 MG/DL (ref 70–108)
GLUCOSE BLD-MCNC: 132 MG/DL (ref 70–108)
GLUCOSE BLD-MCNC: 133 MG/DL (ref 70–108)
GLUCOSE BLD-MCNC: 134 MG/DL (ref 70–108)
GLUCOSE BLD-MCNC: 135 MG/DL (ref 70–108)
GLUCOSE BLD-MCNC: 136 MG/DL (ref 70–108)
GLUCOSE BLD-MCNC: 136 MG/DL (ref 70–108)
GLUCOSE BLD-MCNC: 139 MG/DL (ref 70–108)
GLUCOSE BLD-MCNC: 141 MG/DL (ref 70–108)
GLUCOSE BLD-MCNC: 142 MG/DL (ref 70–108)
GLUCOSE BLD-MCNC: 143 MG/DL (ref 70–108)
GLUCOSE BLD-MCNC: 144 MG/DL (ref 70–108)
GLUCOSE BLD-MCNC: 145 MG/DL (ref 70–108)
GLUCOSE BLD-MCNC: 145 MG/DL (ref 70–108)
GLUCOSE BLD-MCNC: 146 MG/DL (ref 70–108)
GLUCOSE BLD-MCNC: 146 MG/DL (ref 70–108)
GLUCOSE BLD-MCNC: 147 MG/DL (ref 70–108)
GLUCOSE BLD-MCNC: 148 MG/DL (ref 70–108)
GLUCOSE BLD-MCNC: 148 MG/DL (ref 70–108)
GLUCOSE BLD-MCNC: 150 MG/DL (ref 70–108)
GLUCOSE BLD-MCNC: 155 MG/DL (ref 70–108)
GLUCOSE BLD-MCNC: 156 MG/DL (ref 70–108)
GLUCOSE BLD-MCNC: 157 MG/DL (ref 70–108)
GLUCOSE BLD-MCNC: 157 MG/DL (ref 70–108)
GLUCOSE BLD-MCNC: 158 MG/DL (ref 70–108)
GLUCOSE BLD-MCNC: 158 MG/DL (ref 70–108)
GLUCOSE BLD-MCNC: 160 MG/DL (ref 70–108)
GLUCOSE BLD-MCNC: 163 MG/DL (ref 70–108)
GLUCOSE BLD-MCNC: 164 MG/DL (ref 70–108)
GLUCOSE BLD-MCNC: 164 MG/DL (ref 70–108)
GLUCOSE BLD-MCNC: 165 MG/DL (ref 70–108)
GLUCOSE BLD-MCNC: 170 MG/DL (ref 70–108)
GLUCOSE BLD-MCNC: 172 MG/DL (ref 70–108)
GLUCOSE BLD-MCNC: 173 MG/DL (ref 70–108)
GLUCOSE BLD-MCNC: 175 MG/DL (ref 70–108)
GLUCOSE BLD-MCNC: 178 MG/DL (ref 70–108)
GLUCOSE BLD-MCNC: 178 MG/DL (ref 70–108)
GLUCOSE BLD-MCNC: 180 MG/DL (ref 70–108)
GLUCOSE BLD-MCNC: 181 MG/DL (ref 70–108)
GLUCOSE BLD-MCNC: 181 MG/DL (ref 70–108)
GLUCOSE BLD-MCNC: 183 MG/DL (ref 70–108)
GLUCOSE BLD-MCNC: 187 MG/DL (ref 70–108)
GLUCOSE BLD-MCNC: 207 MG/DL (ref 70–108)
GLUCOSE BLD-MCNC: 207 MG/DL (ref 70–108)
GLUCOSE BLD-MCNC: 216 MG/DL (ref 70–108)
GLUCOSE BLD-MCNC: 219 MG/DL (ref 70–108)
GLUCOSE BLD-MCNC: 227 MG/DL (ref 70–108)
GLUCOSE BLD-MCNC: 232 MG/DL (ref 70–108)
GLUCOSE BLD-MCNC: 250 MG/DL (ref 70–108)
GLUCOSE BLD-MCNC: 254 MG/DL (ref 70–108)
GLUCOSE URINE: NEGATIVE MG/DL
HAPTOGLOBIN: 312 MG/DL (ref 30–200)
HBA1C MFR BLD: 5.4 % (ref 4.4–6.4)
HBA1C MFR BLD: 5.8 % (ref 4.4–6.4)
HBA1C MFR BLD: 6 % (ref 4.4–6.4)
HBA1C MFR BLD: 6.1 % (ref 4.4–6.4)
HCO3: 29 MMOL/L (ref 23–28)
HCO3: 31 MMOL/L (ref 23–28)
HCO3: 31 MMOL/L (ref 23–28)
HCT VFR BLD CALC: 21 % (ref 37–47)
HCT VFR BLD CALC: 22.9 % (ref 37–47)
HCT VFR BLD CALC: 23.1 % (ref 37–47)
HCT VFR BLD CALC: 23.8 % (ref 37–47)
HCT VFR BLD CALC: 24 % (ref 37–47)
HCT VFR BLD CALC: 24.3 % (ref 37–47)
HCT VFR BLD CALC: 24.6 % (ref 37–47)
HCT VFR BLD CALC: 25.9 % (ref 37–47)
HCT VFR BLD CALC: 26.7 % (ref 37–47)
HCT VFR BLD CALC: 27 % (ref 37–47)
HCT VFR BLD CALC: 27.2 % (ref 37–47)
HCT VFR BLD CALC: 27.4 % (ref 37–47)
HCT VFR BLD CALC: 27.4 % (ref 37–47)
HCT VFR BLD CALC: 27.7 % (ref 37–47)
HCT VFR BLD CALC: 28.1 % (ref 37–47)
HCT VFR BLD CALC: 28.3 % (ref 37–47)
HCT VFR BLD CALC: 28.5 % (ref 37–47)
HCT VFR BLD CALC: 28.5 % (ref 37–47)
HCT VFR BLD CALC: 29.3 % (ref 37–47)
HCT VFR BLD CALC: 29.4 % (ref 37–47)
HCT VFR BLD CALC: 29.5 % (ref 37–47)
HCT VFR BLD CALC: 30.5 % (ref 37–47)
HCT VFR BLD CALC: 30.6 % (ref 37–47)
HCT VFR BLD CALC: 31.4 % (ref 37–47)
HCT VFR BLD CALC: 33.1 % (ref 37–47)
HCT VFR BLD CALC: 33.4 % (ref 37–47)
HCT VFR BLD CALC: 38.4 % (ref 37–47)
HDLC SERPL-MCNC: 29 MG/DL
HEMOCCULT STL QL: NEGATIVE
HEMOCCULT STL QL: NEGATIVE
HEMOGLOBIN: 10.4 GM/DL (ref 12–16)
HEMOGLOBIN: 10.5 GM/DL (ref 12–16)
HEMOGLOBIN: 12.2 GM/DL (ref 12–16)
HEMOGLOBIN: 6.6 GM/DL (ref 12–16)
HEMOGLOBIN: 7.1 GM/DL (ref 12–16)
HEMOGLOBIN: 7.2 GM/DL (ref 12–16)
HEMOGLOBIN: 7.3 GM/DL (ref 12–16)
HEMOGLOBIN: 7.4 GM/DL (ref 12–16)
HEMOGLOBIN: 7.4 GM/DL (ref 12–16)
HEMOGLOBIN: 7.6 GM/DL (ref 12–16)
HEMOGLOBIN: 7.7 GM/DL (ref 12–16)
HEMOGLOBIN: 8 GM/DL (ref 12–16)
HEMOGLOBIN: 8.2 GM/DL (ref 12–16)
HEMOGLOBIN: 8.4 GM/DL (ref 12–16)
HEMOGLOBIN: 8.5 GM/DL (ref 12–16)
HEMOGLOBIN: 8.5 GM/DL (ref 12–16)
HEMOGLOBIN: 8.6 GM/DL (ref 12–16)
HEMOGLOBIN: 8.7 GM/DL (ref 12–16)
HEMOGLOBIN: 8.8 GM/DL (ref 12–16)
HEMOGLOBIN: 8.9 GM/DL (ref 12–16)
HEMOGLOBIN: 9 GM/DL (ref 12–16)
HEMOGLOBIN: 9.1 GM/DL (ref 12–16)
HEMOGLOBIN: 9.2 GM/DL (ref 12–16)
HEMOGLOBIN: 9.4 GM/DL (ref 12–16)
HEMOGLOBIN: 9.6 GM/DL (ref 12–16)
HEMOGLOBIN: 9.8 GM/DL (ref 12–16)
IFIO2: 100
IFIO2: 2
IFIO2: 45
IMMATURE GRANS (ABS): 0.01 THOU/MM3 (ref 0–0.07)
IMMATURE GRANS (ABS): 0.02 THOU/MM3 (ref 0–0.07)
IMMATURE GRANS (ABS): 0.03 THOU/MM3 (ref 0–0.07)
IMMATURE GRANS (ABS): 0.04 THOU/MM3 (ref 0–0.07)
IMMATURE GRANS (ABS): 0.04 THOU/MM3 (ref 0–0.07)
IMMATURE GRANS (ABS): 0.05 THOU/MM3 (ref 0–0.07)
IMMATURE GRANS (ABS): 0.05 THOU/MM3 (ref 0–0.07)
IMMATURE GRANS (ABS): 0.06 THOU/MM3 (ref 0–0.07)
IMMATURE GRANULOCYTES: 0.2 %
IMMATURE GRANULOCYTES: 0.3 %
IMMATURE GRANULOCYTES: 0.4 %
IMMATURE GRANULOCYTES: 0.5 %
IMMATURE GRANULOCYTES: 0.6 %
IMMATURE GRANULOCYTES: 0.8 %
IMMATURE GRANULOCYTES: 0.8 %
IMMATURE GRANULOCYTES: 0.9 %
INR BLD: 1.08 (ref 0.85–1.13)
IRON SATURATION: 7 % (ref 20–50)
IRON: 18 UG/DL (ref 50–170)
IRON: 25 UG/DL (ref 50–170)
IRON: 41 UG/DL (ref 50–170)
IRON: 68 UG/DL (ref 50–170)
KETONES, URINE: NEGATIVE
LDL CHOLESTEROL CALCULATED: 55 MG/DL
LEUKOCYTE ESTERASE, URINE: ABNORMAL
LIPASE: 21.8 U/L (ref 5.6–51.3)
LIPASE: 25.2 U/L (ref 5.6–51.3)
LV EF: 63 %
LV EF: 70 %
LVEF MODALITY: NORMAL
LVEF MODALITY: NORMAL
LYMPHOCYTES # BLD: 12 %
LYMPHOCYTES # BLD: 12.5 %
LYMPHOCYTES # BLD: 13.4 %
LYMPHOCYTES # BLD: 14.4 %
LYMPHOCYTES # BLD: 15.2 %
LYMPHOCYTES # BLD: 16.9 %
LYMPHOCYTES # BLD: 17.3 %
LYMPHOCYTES # BLD: 18 %
LYMPHOCYTES # BLD: 18.3 %
LYMPHOCYTES # BLD: 21 %
LYMPHOCYTES # BLD: 22 %
LYMPHOCYTES # BLD: 25.4 %
LYMPHOCYTES # BLD: 7.9 %
LYMPHOCYTES # BLD: 9.1 %
LYMPHOCYTES # BLD: 9.2 %
LYMPHOCYTES ABSOLUTE: 0.5 THOU/MM3 (ref 1–4.8)
LYMPHOCYTES ABSOLUTE: 0.5 THOU/MM3 (ref 1–4.8)
LYMPHOCYTES ABSOLUTE: 0.6 THOU/MM3 (ref 1–4.8)
LYMPHOCYTES ABSOLUTE: 0.7 THOU/MM3 (ref 1–4.8)
LYMPHOCYTES ABSOLUTE: 0.8 THOU/MM3 (ref 1–4.8)
LYMPHOCYTES ABSOLUTE: 0.8 THOU/MM3 (ref 1–4.8)
LYMPHOCYTES ABSOLUTE: 1 THOU/MM3 (ref 1–4.8)
LYMPHOCYTES ABSOLUTE: 1.1 THOU/MM3 (ref 1–4.8)
LYMPHOCYTES ABSOLUTE: 1.2 THOU/MM3 (ref 1–4.8)
MAGNESIUM: 1.2 MG/DL (ref 1.6–2.4)
MAGNESIUM: 1.2 MG/DL (ref 1.6–2.4)
MAGNESIUM: 1.4 MG/DL (ref 1.6–2.4)
MAGNESIUM: 1.6 MG/DL (ref 1.6–2.4)
MAGNESIUM: 1.6 MG/DL (ref 1.6–2.4)
MAGNESIUM: 1.8 MG/DL (ref 1.6–2.4)
MAGNESIUM: 1.9 MG/DL (ref 1.6–2.4)
MCH RBC QN AUTO: 25.4 PG (ref 26–33)
MCH RBC QN AUTO: 25.8 PG (ref 26–33)
MCH RBC QN AUTO: 26.6 PG (ref 26–33)
MCH RBC QN AUTO: 26.7 PG (ref 26–33)
MCH RBC QN AUTO: 26.8 PG (ref 26–33)
MCH RBC QN AUTO: 26.9 PG (ref 26–33)
MCH RBC QN AUTO: 26.9 PG (ref 26–33)
MCH RBC QN AUTO: 27.1 PG (ref 26–33)
MCH RBC QN AUTO: 27.1 PG (ref 26–33)
MCH RBC QN AUTO: 27.2 PG (ref 26–33)
MCH RBC QN AUTO: 27.2 PG (ref 26–33)
MCH RBC QN AUTO: 27.3 PG (ref 26–33)
MCH RBC QN AUTO: 27.3 PG (ref 26–33)
MCH RBC QN AUTO: 27.4 PG (ref 26–33)
MCH RBC QN AUTO: 27.4 PG (ref 26–33)
MCH RBC QN AUTO: 27.5 PG (ref 26–33)
MCH RBC QN AUTO: 27.6 PG (ref 26–33)
MCH RBC QN AUTO: 27.7 PG (ref 26–33)
MCH RBC QN AUTO: 27.7 PG (ref 26–33)
MCH RBC QN AUTO: 27.8 PG (ref 26–33)
MCHC RBC AUTO-ENTMCNC: 30 GM/DL (ref 32.2–35.5)
MCHC RBC AUTO-ENTMCNC: 30.3 GM/DL (ref 32.2–35.5)
MCHC RBC AUTO-ENTMCNC: 30.5 GM/DL (ref 32.2–35.5)
MCHC RBC AUTO-ENTMCNC: 30.6 GM/DL (ref 32.2–35.5)
MCHC RBC AUTO-ENTMCNC: 30.7 GM/DL (ref 32.2–35.5)
MCHC RBC AUTO-ENTMCNC: 31 GM/DL (ref 32.2–35.5)
MCHC RBC AUTO-ENTMCNC: 31.1 GM/DL (ref 32.2–35.5)
MCHC RBC AUTO-ENTMCNC: 31.3 GM/DL (ref 32.2–35.5)
MCHC RBC AUTO-ENTMCNC: 31.4 GM/DL (ref 32.2–35.5)
MCHC RBC AUTO-ENTMCNC: 31.4 GM/DL (ref 32.2–35.5)
MCHC RBC AUTO-ENTMCNC: 31.5 GM/DL (ref 32.2–35.5)
MCHC RBC AUTO-ENTMCNC: 31.6 GM/DL (ref 32.2–35.5)
MCHC RBC AUTO-ENTMCNC: 31.7 GM/DL (ref 32.2–35.5)
MCHC RBC AUTO-ENTMCNC: 31.8 GM/DL (ref 32.2–35.5)
MCHC RBC AUTO-ENTMCNC: 32.1 GM/DL (ref 32.2–35.5)
MCHC RBC AUTO-ENTMCNC: 32.3 GM/DL (ref 32.2–35.5)
MCV RBC AUTO: 80.3 FL (ref 81–99)
MCV RBC AUTO: 81.7 FL (ref 81–99)
MCV RBC AUTO: 84.9 FL (ref 81–99)
MCV RBC AUTO: 85.1 FL (ref 81–99)
MCV RBC AUTO: 85.7 FL (ref 81–99)
MCV RBC AUTO: 85.8 FL (ref 81–99)
MCV RBC AUTO: 85.9 FL (ref 81–99)
MCV RBC AUTO: 86.3 FL (ref 81–99)
MCV RBC AUTO: 86.4 FL (ref 81–99)
MCV RBC AUTO: 86.6 FL (ref 81–99)
MCV RBC AUTO: 86.7 FL (ref 81–99)
MCV RBC AUTO: 86.9 FL (ref 81–99)
MCV RBC AUTO: 87 FL (ref 81–99)
MCV RBC AUTO: 87.1 FL (ref 81–99)
MCV RBC AUTO: 87.4 FL (ref 81–99)
MCV RBC AUTO: 87.5 FL (ref 81–99)
MCV RBC AUTO: 88.7 FL (ref 81–99)
MCV RBC AUTO: 88.8 FL (ref 81–99)
MCV RBC AUTO: 90.2 FL (ref 81–99)
MISCELLANEOUS 2: ABNORMAL
MISCELLANEOUS 2: ABNORMAL
MISCELLANEOUS LAB TEST RESULT: ABNORMAL
MONOCYTES # BLD: 1.2 %
MONOCYTES # BLD: 11 %
MONOCYTES # BLD: 6 %
MONOCYTES # BLD: 6.8 %
MONOCYTES # BLD: 7.6 %
MONOCYTES # BLD: 7.6 %
MONOCYTES # BLD: 8.1 %
MONOCYTES # BLD: 8.5 %
MONOCYTES # BLD: 8.9 %
MONOCYTES # BLD: 9 %
MONOCYTES # BLD: 9.2 %
MONOCYTES # BLD: 9.3 %
MONOCYTES # BLD: 9.3 %
MONOCYTES # BLD: 9.6 %
MONOCYTES # BLD: 9.8 %
MONOCYTES ABSOLUTE: 0.1 THOU/MM3 (ref 0.4–1.3)
MONOCYTES ABSOLUTE: 0.3 THOU/MM3 (ref 0.4–1.3)
MONOCYTES ABSOLUTE: 0.3 THOU/MM3 (ref 0.4–1.3)
MONOCYTES ABSOLUTE: 0.4 THOU/MM3 (ref 0.4–1.3)
MONOCYTES ABSOLUTE: 0.5 THOU/MM3 (ref 0.4–1.3)
MONOCYTES ABSOLUTE: 0.5 THOU/MM3 (ref 0.4–1.3)
MONOCYTES ABSOLUTE: 0.6 THOU/MM3 (ref 0.4–1.3)
MONOCYTES ABSOLUTE: 0.7 THOU/MM3 (ref 0.4–1.3)
NITRITE, URINE: NEGATIVE
NUCLEATED RED BLOOD CELLS: 0 /100 WBC
O2 SATURATION: 93 %
O2 SATURATION: 96 %
O2 SATURATION: 98 %
OPIATES, URINE: POSITIVE
ORGANISM: ABNORMAL
OSMOLALITY CALCULATION: 257.7 MOSMOL/KG (ref 275–300)
OSMOLALITY CALCULATION: 265.6 MOSMOL/KG (ref 275–300)
OSMOLALITY CALCULATION: 267.9 MOSMOL/KG (ref 275–300)
OSMOLALITY CALCULATION: 280.8 MOSMOL/KG (ref 275–300)
OSMOLALITY CALCULATION: 285.4 MOSMOL/KG (ref 275–300)
OXYCODONE: NEGATIVE
PATHOLOGIST REVIEW: ABNORMAL
PCO2: 48 MMHG (ref 35–45)
PCO2: 51 MMHG (ref 35–45)
PCO2: 52 MMHG (ref 35–45)
PH BLOOD GAS: 7.34 (ref 7.35–7.45)
PH BLOOD GAS: 7.39 (ref 7.35–7.45)
PH BLOOD GAS: 7.41 (ref 7.35–7.45)
PH UA: 6
PH UA: 6
PH UA: 6.5
PHENCYCLIDINE QUANTITATIVE URINE: NEGATIVE
PHOSPHORUS: 2 MG/DL (ref 2.4–4.7)
PHOSPHORUS: 3.9 MG/DL (ref 2.4–4.7)
PLATELET # BLD: 134 THOU/MM3 (ref 130–400)
PLATELET # BLD: 141 THOU/MM3 (ref 130–400)
PLATELET # BLD: 144 THOU/MM3 (ref 130–400)
PLATELET # BLD: 146 THOU/MM3 (ref 130–400)
PLATELET # BLD: 154 THOU/MM3 (ref 130–400)
PLATELET # BLD: 156 THOU/MM3 (ref 130–400)
PLATELET # BLD: 158 THOU/MM3 (ref 130–400)
PLATELET # BLD: 161 THOU/MM3 (ref 130–400)
PLATELET # BLD: 162 THOU/MM3 (ref 130–400)
PLATELET # BLD: 163 THOU/MM3 (ref 130–400)
PLATELET # BLD: 168 THOU/MM3 (ref 130–400)
PLATELET # BLD: 171 THOU/MM3 (ref 130–400)
PLATELET # BLD: 171 THOU/MM3 (ref 130–400)
PLATELET # BLD: 173 THOU/MM3 (ref 130–400)
PLATELET # BLD: 174 THOU/MM3 (ref 130–400)
PLATELET # BLD: 183 THOU/MM3 (ref 130–400)
PLATELET # BLD: 190 THOU/MM3 (ref 130–400)
PLATELET # BLD: 191 THOU/MM3 (ref 130–400)
PLATELET # BLD: 200 THOU/MM3 (ref 130–400)
PLATELET # BLD: 206 THOU/MM3 (ref 130–400)
PLATELET # BLD: 225 THOU/MM3 (ref 130–400)
PMV BLD AUTO: 8.8 FL (ref 9.4–12.4)
PMV BLD AUTO: 8.9 FL (ref 9.4–12.4)
PMV BLD AUTO: 9.1 FL (ref 9.4–12.4)
PMV BLD AUTO: 9.2 FL (ref 9.4–12.4)
PMV BLD AUTO: 9.3 FL (ref 9.4–12.4)
PMV BLD AUTO: 9.4 FL (ref 9.4–12.4)
PMV BLD AUTO: 9.4 FL (ref 9.4–12.4)
PMV BLD AUTO: 9.5 FL (ref 9.4–12.4)
PMV BLD AUTO: 9.6 FL (ref 9.4–12.4)
PMV BLD AUTO: 9.7 FL (ref 9.4–12.4)
PMV BLD AUTO: 9.8 FL (ref 9.4–12.4)
PMV BLD AUTO: 9.8 FL (ref 9.4–12.4)
PMV BLD AUTO: 9.9 FL (ref 9.4–12.4)
PMV BLD AUTO: 9.9 FL (ref 9.4–12.4)
PO2: 119 MMHG (ref 71–104)
PO2: 67 MMHG (ref 71–104)
PO2: 81 MMHG (ref 71–104)
POC CREATININE WHOLE BLOOD: 0.8 MG/DL (ref 0.5–1.2)
POC CREATININE WHOLE BLOOD: 1 MG/DL (ref 0.5–1.2)
POTASSIUM REFLEX MAGNESIUM: 3.4 MEQ/L (ref 3.5–5.2)
POTASSIUM REFLEX MAGNESIUM: 3.7 MEQ/L (ref 3.5–5.2)
POTASSIUM REFLEX MAGNESIUM: 3.7 MEQ/L (ref 3.5–5.2)
POTASSIUM REFLEX MAGNESIUM: 3.8 MEQ/L (ref 3.5–5.2)
POTASSIUM REFLEX MAGNESIUM: 4 MEQ/L (ref 3.5–5.2)
POTASSIUM REFLEX MAGNESIUM: 4.2 MEQ/L (ref 3.5–5.2)
POTASSIUM SERPL-SCNC: 3.5 MEQ/L (ref 3.5–5.2)
POTASSIUM SERPL-SCNC: 3.6 MEQ/L (ref 3.5–5.2)
POTASSIUM SERPL-SCNC: 3.7 MEQ/L (ref 3.5–5.2)
POTASSIUM SERPL-SCNC: 3.7 MEQ/L (ref 3.5–5.2)
POTASSIUM SERPL-SCNC: 3.8 MEQ/L (ref 3.5–5.2)
POTASSIUM SERPL-SCNC: 3.9 MEQ/L (ref 3.5–5.2)
POTASSIUM SERPL-SCNC: 4 MEQ/L (ref 3.5–5.2)
POTASSIUM SERPL-SCNC: 4 MEQ/L (ref 3.5–5.2)
POTASSIUM SERPL-SCNC: 4.1 MEQ/L (ref 3.5–5.2)
POTASSIUM SERPL-SCNC: 4.2 MEQ/L (ref 3.5–5.2)
POTASSIUM SERPL-SCNC: 4.2 MEQ/L (ref 3.5–5.2)
POTASSIUM SERPL-SCNC: 4.3 MEQ/L (ref 3.5–5.2)
POTASSIUM SERPL-SCNC: 4.3 MEQ/L (ref 3.5–5.2)
POTASSIUM SERPL-SCNC: 4.4 MEQ/L (ref 3.5–5.2)
POTASSIUM SERPL-SCNC: 4.4 MEQ/L (ref 3.5–5.2)
POTASSIUM SERPL-SCNC: 4.5 MEQ/L (ref 3.5–5.2)
POTASSIUM SERPL-SCNC: 4.5 MEQ/L (ref 3.5–5.2)
PRO-BNP: 189.8 PG/ML (ref 0–900)
PRO-BNP: 2053 PG/ML (ref 0–900)
PRO-BNP: 389.1 PG/ML (ref 0–900)
PRO-BNP: 506.8 PG/ML (ref 0–900)
PROCALCITONIN: 0.05 NG/ML (ref 0.01–0.09)
PROCALCITONIN: 0.06 NG/ML (ref 0.01–0.09)
PROCALCITONIN: 0.08 NG/ML (ref 0.01–0.09)
PROCALCITONIN: 0.08 NG/ML (ref 0.01–0.09)
PROTEIN UA: 30
PROTEIN UA: 30
PROTEIN UA: 300
RBC # BLD: 2.45 MILL/MM3 (ref 4.2–5.4)
RBC # BLD: 2.64 MILL/MM3 (ref 4.2–5.4)
RBC # BLD: 2.67 MILL/MM3 (ref 4.2–5.4)
RBC # BLD: 2.77 MILL/MM3 (ref 4.2–5.4)
RBC # BLD: 2.78 MILL/MM3 (ref 4.2–5.4)
RBC # BLD: 2.83 MILL/MM3 (ref 4.2–5.4)
RBC # BLD: 3.01 MILL/MM3 (ref 4.2–5.4)
RBC # BLD: 3.05 MILL/MM3 (ref 4.2–5.4)
RBC # BLD: 3.13 MILL/MM3 (ref 4.2–5.4)
RBC # BLD: 3.16 MILL/MM3 (ref 4.2–5.4)
RBC # BLD: 3.17 MILL/MM3 (ref 4.2–5.4)
RBC # BLD: 3.18 MILL/MM3 (ref 4.2–5.4)
RBC # BLD: 3.19 MILL/MM3 (ref 4.2–5.4)
RBC # BLD: 3.23 MILL/MM3 (ref 4.2–5.4)
RBC # BLD: 3.29 MILL/MM3 (ref 4.2–5.4)
RBC # BLD: 3.31 MILL/MM3 (ref 4.2–5.4)
RBC # BLD: 3.32 MILL/MM3 (ref 4.2–5.4)
RBC # BLD: 3.39 MILL/MM3 (ref 4.2–5.4)
RBC # BLD: 3.59 MILL/MM3 (ref 4.2–5.4)
RBC # BLD: 3.8 MILL/MM3 (ref 4.2–5.4)
RBC # BLD: 3.82 MILL/MM3 (ref 4.2–5.4)
RBC # BLD: 4.09 MILL/MM3 (ref 4.2–5.4)
RBC # BLD: 4.51 MILL/MM3 (ref 4.2–5.4)
RBC URINE: ABNORMAL /HPF
RENAL EPITHELIAL, UA: ABNORMAL
RH FACTOR: NORMAL
SEG NEUTROPHILS: 59.8 %
SEG NEUTROPHILS: 65.1 %
SEG NEUTROPHILS: 65.8 %
SEG NEUTROPHILS: 67.2 %
SEG NEUTROPHILS: 69 %
SEG NEUTROPHILS: 69.1 %
SEG NEUTROPHILS: 69.5 %
SEG NEUTROPHILS: 71.8 %
SEG NEUTROPHILS: 72.1 %
SEG NEUTROPHILS: 73.4 %
SEG NEUTROPHILS: 73.8 %
SEG NEUTROPHILS: 78.5 %
SEG NEUTROPHILS: 81.1 %
SEG NEUTROPHILS: 82 %
SEG NEUTROPHILS: 88.2 %
SEGMENTED NEUTROPHILS ABSOLUTE COUNT: 2.9 THOU/MM3 (ref 1.8–7.7)
SEGMENTED NEUTROPHILS ABSOLUTE COUNT: 3.1 THOU/MM3 (ref 1.8–7.7)
SEGMENTED NEUTROPHILS ABSOLUTE COUNT: 3.1 THOU/MM3 (ref 1.8–7.7)
SEGMENTED NEUTROPHILS ABSOLUTE COUNT: 3.2 THOU/MM3 (ref 1.8–7.7)
SEGMENTED NEUTROPHILS ABSOLUTE COUNT: 3.9 THOU/MM3 (ref 1.8–7.7)
SEGMENTED NEUTROPHILS ABSOLUTE COUNT: 4 THOU/MM3 (ref 1.8–7.7)
SEGMENTED NEUTROPHILS ABSOLUTE COUNT: 4.5 THOU/MM3 (ref 1.8–7.7)
SEGMENTED NEUTROPHILS ABSOLUTE COUNT: 4.6 THOU/MM3 (ref 1.8–7.7)
SEGMENTED NEUTROPHILS ABSOLUTE COUNT: 4.7 THOU/MM3 (ref 1.8–7.7)
SEGMENTED NEUTROPHILS ABSOLUTE COUNT: 4.9 THOU/MM3 (ref 1.8–7.7)
SEGMENTED NEUTROPHILS ABSOLUTE COUNT: 5 THOU/MM3 (ref 1.8–7.7)
SEGMENTED NEUTROPHILS ABSOLUTE COUNT: 5.2 THOU/MM3 (ref 1.8–7.7)
SEGMENTED NEUTROPHILS ABSOLUTE COUNT: 5.4 THOU/MM3 (ref 1.8–7.7)
SEGMENTED NEUTROPHILS ABSOLUTE COUNT: 5.5 THOU/MM3 (ref 1.8–7.7)
SEGMENTED NEUTROPHILS ABSOLUTE COUNT: 7.5 THOU/MM3 (ref 1.8–7.7)
SET PEEP: 5 MMHG
SODIUM BLD-SCNC: 125 MEQ/L (ref 135–145)
SODIUM BLD-SCNC: 126 MEQ/L (ref 135–145)
SODIUM BLD-SCNC: 127 MEQ/L (ref 135–145)
SODIUM BLD-SCNC: 129 MEQ/L (ref 135–145)
SODIUM BLD-SCNC: 132 MEQ/L (ref 135–145)
SODIUM BLD-SCNC: 133 MEQ/L (ref 135–145)
SODIUM BLD-SCNC: 134 MEQ/L (ref 135–145)
SODIUM BLD-SCNC: 135 MEQ/L (ref 135–145)
SODIUM BLD-SCNC: 136 MEQ/L (ref 135–145)
SODIUM BLD-SCNC: 137 MEQ/L (ref 135–145)
SODIUM BLD-SCNC: 137 MEQ/L (ref 135–145)
SOURCE, BLOOD GAS: ABNORMAL
SPECIFIC GRAVITY, URINE: 1.01 (ref 1–1.03)
T4 FREE: 0.83 NG/DL (ref 0.93–1.76)
TOTAL CK: 144 U/L (ref 30–135)
TOTAL IRON BINDING CAPACITY: 243 UG/DL (ref 171–450)
TOTAL IRON BINDING CAPACITY: 319 UG/DL (ref 171–450)
TOTAL IRON BINDING CAPACITY: 364 UG/DL (ref 171–450)
TOTAL PROTEIN: 6.1 G/DL (ref 6.1–8)
TOTAL PROTEIN: 6.2 G/DL (ref 6.1–8)
TOTAL PROTEIN: 6.4 G/DL (ref 6.1–8)
TOTAL PROTEIN: 6.5 G/DL (ref 6.1–8)
TOTAL PROTEIN: 6.8 G/DL (ref 6.1–8)
TRIGL SERPL-MCNC: 179 MG/DL (ref 0–199)
TROPONIN T: < 0.01 NG/ML
TSH SERPL DL<=0.05 MIU/L-ACNC: 7.58 UIU/ML (ref 0.4–4.2)
URINE CULTURE REFLEX: ABNORMAL
URINE CULTURE, ROUTINE: ABNORMAL
URINE CULTURE, ROUTINE: ABNORMAL
UROBILINOGEN, URINE: 0.2 EU/DL
VITAMIN B-12: 281 PG/ML (ref 211–911)
VITAMIN B-12: 347 PG/ML (ref 211–911)
VITAMIN B-12: 380 PG/ML (ref 211–911)
VITAMIN B-12: 423 PG/ML (ref 211–911)
WBC # BLD: 4.4 THOU/MM3 (ref 4.8–10.8)
WBC # BLD: 4.5 THOU/MM3 (ref 4.8–10.8)
WBC # BLD: 4.5 THOU/MM3 (ref 4.8–10.8)
WBC # BLD: 4.7 THOU/MM3 (ref 4.8–10.8)
WBC # BLD: 4.8 THOU/MM3 (ref 4.8–10.8)
WBC # BLD: 5 THOU/MM3 (ref 4.8–10.8)
WBC # BLD: 5.5 THOU/MM3 (ref 4.8–10.8)
WBC # BLD: 5.7 THOU/MM3 (ref 4.8–10.8)
WBC # BLD: 5.8 THOU/MM3 (ref 4.8–10.8)
WBC # BLD: 6 THOU/MM3 (ref 4.8–10.8)
WBC # BLD: 6.2 THOU/MM3 (ref 4.8–10.8)
WBC # BLD: 6.3 THOU/MM3 (ref 4.8–10.8)
WBC # BLD: 6.3 THOU/MM3 (ref 4.8–10.8)
WBC # BLD: 6.5 THOU/MM3 (ref 4.8–10.8)
WBC # BLD: 6.7 THOU/MM3 (ref 4.8–10.8)
WBC # BLD: 6.8 THOU/MM3 (ref 4.8–10.8)
WBC # BLD: 6.8 THOU/MM3 (ref 4.8–10.8)
WBC # BLD: 7.7 THOU/MM3 (ref 4.8–10.8)
WBC # BLD: 9.6 THOU/MM3 (ref 4.8–10.8)
WBC UA: > 200 /HPF
WBC UA: ABNORMAL /HPF
WBC UA: ABNORMAL /HPF
YEAST: ABNORMAL

## 2018-01-01 PROCEDURE — 82565 ASSAY OF CREATININE: CPT

## 2018-01-01 PROCEDURE — 97110 THERAPEUTIC EXERCISES: CPT

## 2018-01-01 PROCEDURE — 97162 PT EVAL MOD COMPLEX 30 MIN: CPT

## 2018-01-01 PROCEDURE — 94640 AIRWAY INHALATION TREATMENT: CPT

## 2018-01-01 PROCEDURE — 6370000000 HC RX 637 (ALT 250 FOR IP): Performed by: FAMILY MEDICINE

## 2018-01-01 PROCEDURE — 72125 CT NECK SPINE W/O DYE: CPT

## 2018-01-01 PROCEDURE — 83550 IRON BINDING TEST: CPT

## 2018-01-01 PROCEDURE — 80061 LIPID PANEL: CPT

## 2018-01-01 PROCEDURE — 85025 COMPLETE CBC W/AUTO DIFF WBC: CPT

## 2018-01-01 PROCEDURE — 81001 URINALYSIS AUTO W/SCOPE: CPT

## 2018-01-01 PROCEDURE — 4040F PNEUMOC VAC/ADMIN/RCVD: CPT | Performed by: INTERNAL MEDICINE

## 2018-01-01 PROCEDURE — 36415 COLL VENOUS BLD VENIPUNCTURE: CPT

## 2018-01-01 PROCEDURE — 80048 BASIC METABOLIC PNL TOTAL CA: CPT

## 2018-01-01 PROCEDURE — 1090F PRES/ABSN URINE INCON ASSESS: CPT | Performed by: NURSE PRACTITIONER

## 2018-01-01 PROCEDURE — 6370000000 HC RX 637 (ALT 250 FOR IP): Performed by: INTERNAL MEDICINE

## 2018-01-01 PROCEDURE — G8987 SELF CARE CURRENT STATUS: HCPCS

## 2018-01-01 PROCEDURE — 2700000000 HC OXYGEN THERAPY PER DAY

## 2018-01-01 PROCEDURE — APPSS60 APP SPLIT SHARED TIME 46-60 MINUTES: Performed by: PHYSICIAN ASSISTANT

## 2018-01-01 PROCEDURE — 1101F PT FALLS ASSESS-DOCD LE1/YR: CPT | Performed by: NURSE PRACTITIONER

## 2018-01-01 PROCEDURE — 1090F PRES/ABSN URINE INCON ASSESS: CPT | Performed by: PHYSICIAN ASSISTANT

## 2018-01-01 PROCEDURE — 6360000002 HC RX W HCPCS: Performed by: INTERNAL MEDICINE

## 2018-01-01 PROCEDURE — 2580000003 HC RX 258: Performed by: PHYSICIAN ASSISTANT

## 2018-01-01 PROCEDURE — G8988 SELF CARE GOAL STATUS: HCPCS

## 2018-01-01 PROCEDURE — 2580000003 HC RX 258: Performed by: FAMILY MEDICINE

## 2018-01-01 PROCEDURE — G8417 CALC BMI ABV UP PARAM F/U: HCPCS | Performed by: NURSE PRACTITIONER

## 2018-01-01 PROCEDURE — P9016 RBC LEUKOCYTES REDUCED: HCPCS

## 2018-01-01 PROCEDURE — 2580000003 HC RX 258: Performed by: HOSPITALIST

## 2018-01-01 PROCEDURE — 99213 OFFICE O/P EST LOW 20 MIN: CPT | Performed by: NURSE PRACTITIONER

## 2018-01-01 PROCEDURE — G8926 SPIRO NO PERF OR DOC: HCPCS | Performed by: NURSE PRACTITIONER

## 2018-01-01 PROCEDURE — 82948 REAGENT STRIP/BLOOD GLUCOSE: CPT

## 2018-01-01 PROCEDURE — 84484 ASSAY OF TROPONIN QUANT: CPT

## 2018-01-01 PROCEDURE — 82272 OCCULT BLD FECES 1-3 TESTS: CPT

## 2018-01-01 PROCEDURE — 6360000002 HC RX W HCPCS: Performed by: NURSE PRACTITIONER

## 2018-01-01 PROCEDURE — 2709999900 HC NON-CHARGEABLE SUPPLY

## 2018-01-01 PROCEDURE — 82607 VITAMIN B-12: CPT

## 2018-01-01 PROCEDURE — G8598 ASA/ANTIPLAT THER USED: HCPCS | Performed by: NURSE PRACTITIONER

## 2018-01-01 PROCEDURE — 4040F PNEUMOC VAC/ADMIN/RCVD: CPT | Performed by: NURSE PRACTITIONER

## 2018-01-01 PROCEDURE — 99285 EMERGENCY DEPT VISIT HI MDM: CPT

## 2018-01-01 PROCEDURE — 97530 THERAPEUTIC ACTIVITIES: CPT

## 2018-01-01 PROCEDURE — 93005 ELECTROCARDIOGRAM TRACING: CPT | Performed by: INTERNAL MEDICINE

## 2018-01-01 PROCEDURE — 2500000003 HC RX 250 WO HCPCS: Performed by: FAMILY MEDICINE

## 2018-01-01 PROCEDURE — 74018 RADEX ABDOMEN 1 VIEW: CPT

## 2018-01-01 PROCEDURE — 71045 X-RAY EXAM CHEST 1 VIEW: CPT

## 2018-01-01 PROCEDURE — 3017F COLORECTAL CA SCREEN DOC REV: CPT | Performed by: NURSE PRACTITIONER

## 2018-01-01 PROCEDURE — 6370000000 HC RX 637 (ALT 250 FOR IP): Performed by: PHYSICIAN ASSISTANT

## 2018-01-01 PROCEDURE — G8399 PT W/DXA RESULTS DOCUMENT: HCPCS | Performed by: PHYSICIAN ASSISTANT

## 2018-01-01 PROCEDURE — G8399 PT W/DXA RESULTS DOCUMENT: HCPCS | Performed by: INTERNAL MEDICINE

## 2018-01-01 PROCEDURE — 85014 HEMATOCRIT: CPT

## 2018-01-01 PROCEDURE — 3023F SPIROM DOC REV: CPT | Performed by: PHYSICIAN ASSISTANT

## 2018-01-01 PROCEDURE — 85027 COMPLETE CBC AUTOMATED: CPT

## 2018-01-01 PROCEDURE — 1036F TOBACCO NON-USER: CPT | Performed by: NURSE PRACTITIONER

## 2018-01-01 PROCEDURE — 99233 SBSQ HOSP IP/OBS HIGH 50: CPT | Performed by: INTERNAL MEDICINE

## 2018-01-01 PROCEDURE — 87086 URINE CULTURE/COLONY COUNT: CPT

## 2018-01-01 PROCEDURE — 94760 N-INVAS EAR/PLS OXIMETRY 1: CPT

## 2018-01-01 PROCEDURE — 93010 ELECTROCARDIOGRAM REPORT: CPT | Performed by: NUCLEAR MEDICINE

## 2018-01-01 PROCEDURE — 1123F ACP DISCUSS/DSCN MKR DOCD: CPT | Performed by: INTERNAL MEDICINE

## 2018-01-01 PROCEDURE — G8417 CALC BMI ABV UP PARAM F/U: HCPCS | Performed by: INTERNAL MEDICINE

## 2018-01-01 PROCEDURE — 36600 WITHDRAWAL OF ARTERIAL BLOOD: CPT

## 2018-01-01 PROCEDURE — 94660 CPAP INITIATION&MGMT: CPT

## 2018-01-01 PROCEDURE — 99231 SBSQ HOSP IP/OBS SF/LOW 25: CPT | Performed by: NURSE PRACTITIONER

## 2018-01-01 PROCEDURE — G8427 DOCREV CUR MEDS BY ELIG CLIN: HCPCS | Performed by: NURSE PRACTITIONER

## 2018-01-01 PROCEDURE — 97166 OT EVAL MOD COMPLEX 45 MIN: CPT

## 2018-01-01 PROCEDURE — 97116 GAIT TRAINING THERAPY: CPT

## 2018-01-01 PROCEDURE — 1200000003 HC TELEMETRY R&B

## 2018-01-01 PROCEDURE — 95819 EEG AWAKE AND ASLEEP: CPT

## 2018-01-01 PROCEDURE — 84295 ASSAY OF SERUM SODIUM: CPT

## 2018-01-01 PROCEDURE — G8399 PT W/DXA RESULTS DOCUMENT: HCPCS | Performed by: NURSE PRACTITIONER

## 2018-01-01 PROCEDURE — 96361 HYDRATE IV INFUSION ADD-ON: CPT

## 2018-01-01 PROCEDURE — 94060 EVALUATION OF WHEEZING: CPT

## 2018-01-01 PROCEDURE — 2580000003 HC RX 258: Performed by: INTERNAL MEDICINE

## 2018-01-01 PROCEDURE — 1111F DSCHRG MED/CURRENT MED MERGE: CPT | Performed by: NURSE PRACTITIONER

## 2018-01-01 PROCEDURE — 85018 HEMOGLOBIN: CPT

## 2018-01-01 PROCEDURE — 99223 1ST HOSP IP/OBS HIGH 75: CPT | Performed by: NUCLEAR MEDICINE

## 2018-01-01 PROCEDURE — 6370000000 HC RX 637 (ALT 250 FOR IP): Performed by: NURSE PRACTITIONER

## 2018-01-01 PROCEDURE — G8979 MOBILITY GOAL STATUS: HCPCS

## 2018-01-01 PROCEDURE — G8428 CUR MEDS NOT DOCUMENT: HCPCS | Performed by: PHYSICIAN ASSISTANT

## 2018-01-01 PROCEDURE — G8482 FLU IMMUNIZE ORDER/ADMIN: HCPCS | Performed by: NURSE PRACTITIONER

## 2018-01-01 PROCEDURE — 3017F COLORECTAL CA SCREEN DOC REV: CPT | Performed by: PHYSICIAN ASSISTANT

## 2018-01-01 PROCEDURE — 93005 ELECTROCARDIOGRAM TRACING: CPT | Performed by: EMERGENCY MEDICINE

## 2018-01-01 PROCEDURE — 97161 PT EVAL LOW COMPLEX 20 MIN: CPT

## 2018-01-01 PROCEDURE — 99214 OFFICE O/P EST MOD 30 MIN: CPT | Performed by: NURSE PRACTITIONER

## 2018-01-01 PROCEDURE — 83036 HEMOGLOBIN GLYCOSYLATED A1C: CPT

## 2018-01-01 PROCEDURE — 99232 SBSQ HOSP IP/OBS MODERATE 35: CPT | Performed by: NURSE PRACTITIONER

## 2018-01-01 PROCEDURE — 36430 TRANSFUSION BLD/BLD COMPNT: CPT

## 2018-01-01 PROCEDURE — 93010 ELECTROCARDIOGRAM REPORT: CPT | Performed by: INTERNAL MEDICINE

## 2018-01-01 PROCEDURE — 84100 ASSAY OF PHOSPHORUS: CPT

## 2018-01-01 PROCEDURE — 96374 THER/PROPH/DIAG INJ IV PUSH: CPT

## 2018-01-01 PROCEDURE — 80307 DRUG TEST PRSMV CHEM ANLYZR: CPT

## 2018-01-01 PROCEDURE — G8598 ASA/ANTIPLAT THER USED: HCPCS | Performed by: INTERNAL MEDICINE

## 2018-01-01 PROCEDURE — 82803 BLOOD GASES ANY COMBINATION: CPT

## 2018-01-01 PROCEDURE — G8427 DOCREV CUR MEDS BY ELIG CLIN: HCPCS | Performed by: PHYSICIAN ASSISTANT

## 2018-01-01 PROCEDURE — 83690 ASSAY OF LIPASE: CPT

## 2018-01-01 PROCEDURE — 99213 OFFICE O/P EST LOW 20 MIN: CPT | Performed by: PHYSICIAN ASSISTANT

## 2018-01-01 PROCEDURE — 99223 1ST HOSP IP/OBS HIGH 75: CPT | Performed by: INTERNAL MEDICINE

## 2018-01-01 PROCEDURE — 76376 3D RENDER W/INTRP POSTPROCES: CPT

## 2018-01-01 PROCEDURE — 1123F ACP DISCUSS/DSCN MKR DOCD: CPT | Performed by: PHYSICIAN ASSISTANT

## 2018-01-01 PROCEDURE — 87040 BLOOD CULTURE FOR BACTERIA: CPT

## 2018-01-01 PROCEDURE — 99222 1ST HOSP IP/OBS MODERATE 55: CPT | Performed by: INTERNAL MEDICINE

## 2018-01-01 PROCEDURE — 93005 ELECTROCARDIOGRAM TRACING: CPT | Performed by: NURSE PRACTITIONER

## 2018-01-01 PROCEDURE — 87804 INFLUENZA ASSAY W/OPTIC: CPT

## 2018-01-01 PROCEDURE — 99226 PR SBSQ OBSERVATION CARE/DAY 35 MINUTES: CPT | Performed by: INTERNAL MEDICINE

## 2018-01-01 PROCEDURE — 85610 PROTHROMBIN TIME: CPT

## 2018-01-01 PROCEDURE — G8978 MOBILITY CURRENT STATUS: HCPCS

## 2018-01-01 PROCEDURE — 86900 BLOOD TYPING SEROLOGIC ABO: CPT

## 2018-01-01 PROCEDURE — 6360000002 HC RX W HCPCS: Performed by: PHYSICIAN ASSISTANT

## 2018-01-01 PROCEDURE — 1101F PT FALLS ASSESS-DOCD LE1/YR: CPT | Performed by: INTERNAL MEDICINE

## 2018-01-01 PROCEDURE — 84145 PROCALCITONIN (PCT): CPT

## 2018-01-01 PROCEDURE — 74019 RADEX ABDOMEN 2 VIEWS: CPT

## 2018-01-01 PROCEDURE — 99239 HOSP IP/OBS DSCHRG MGMT >30: CPT | Performed by: INTERNAL MEDICINE

## 2018-01-01 PROCEDURE — 4A03XB1 MEASUREMENT OF ARTERIAL PRESSURE, PERIPHERAL, EXTERNAL APPROACH: ICD-10-PCS | Performed by: INTERNAL MEDICINE

## 2018-01-01 PROCEDURE — 73130 X-RAY EXAM OF HAND: CPT

## 2018-01-01 PROCEDURE — 86923 COMPATIBILITY TEST ELECTRIC: CPT

## 2018-01-01 PROCEDURE — 93880 EXTRACRANIAL BILAT STUDY: CPT

## 2018-01-01 PROCEDURE — 99213 OFFICE O/P EST LOW 20 MIN: CPT | Performed by: INTERNAL MEDICINE

## 2018-01-01 PROCEDURE — 3209999900

## 2018-01-01 PROCEDURE — 83880 ASSAY OF NATRIURETIC PEPTIDE: CPT

## 2018-01-01 PROCEDURE — 1036F TOBACCO NON-USER: CPT | Performed by: PHYSICIAN ASSISTANT

## 2018-01-01 PROCEDURE — 99496 TRANSJ CARE MGMT HIGH F2F 7D: CPT | Performed by: NURSE PRACTITIONER

## 2018-01-01 PROCEDURE — 93306 TTE W/DOPPLER COMPLETE: CPT

## 2018-01-01 PROCEDURE — 4040F PNEUMOC VAC/ADMIN/RCVD: CPT | Performed by: PHYSICIAN ASSISTANT

## 2018-01-01 PROCEDURE — L0120 CERV FLEX N/ADJ FOAM PRE OTS: HCPCS

## 2018-01-01 PROCEDURE — 99232 SBSQ HOSP IP/OBS MODERATE 35: CPT | Performed by: INTERNAL MEDICINE

## 2018-01-01 PROCEDURE — 94761 N-INVAS EAR/PLS OXIMETRY MLT: CPT

## 2018-01-01 PROCEDURE — 99495 TRANSJ CARE MGMT MOD F2F 14D: CPT | Performed by: NURSE PRACTITIONER

## 2018-01-01 PROCEDURE — 96360 HYDRATION IV INFUSION INIT: CPT

## 2018-01-01 PROCEDURE — 6360000002 HC RX W HCPCS: Performed by: HOSPITALIST

## 2018-01-01 PROCEDURE — G0378 HOSPITAL OBSERVATION PER HR: HCPCS

## 2018-01-01 PROCEDURE — G8926 SPIRO NO PERF OR DOC: HCPCS | Performed by: PHYSICIAN ASSISTANT

## 2018-01-01 PROCEDURE — G8484 FLU IMMUNIZE NO ADMIN: HCPCS | Performed by: INTERNAL MEDICINE

## 2018-01-01 PROCEDURE — G8980 MOBILITY D/C STATUS: HCPCS

## 2018-01-01 PROCEDURE — 1101F PT FALLS ASSESS-DOCD LE1/YR: CPT | Performed by: PHYSICIAN ASSISTANT

## 2018-01-01 PROCEDURE — 82746 ASSAY OF FOLIC ACID SERUM: CPT

## 2018-01-01 PROCEDURE — 82550 ASSAY OF CK (CPK): CPT

## 2018-01-01 PROCEDURE — 2060000000 HC ICU INTERMEDIATE R&B

## 2018-01-01 PROCEDURE — 80053 COMPREHEN METABOLIC PANEL: CPT

## 2018-01-01 PROCEDURE — 74177 CT ABD & PELVIS W/CONTRAST: CPT

## 2018-01-01 PROCEDURE — 1123F ACP DISCUSS/DSCN MKR DOCD: CPT | Performed by: NURSE PRACTITIONER

## 2018-01-01 PROCEDURE — 99221 1ST HOSP IP/OBS SF/LOW 40: CPT | Performed by: INTERNAL MEDICINE

## 2018-01-01 PROCEDURE — 82728 ASSAY OF FERRITIN: CPT

## 2018-01-01 PROCEDURE — 70496 CT ANGIOGRAPHY HEAD: CPT

## 2018-01-01 PROCEDURE — 4A02XFZ MEASUREMENT OF CARDIAC RHYTHM, EXTERNAL APPROACH: ICD-10-PCS | Performed by: INTERNAL MEDICINE

## 2018-01-01 PROCEDURE — 6370000000 HC RX 637 (ALT 250 FOR IP): Performed by: HOSPITALIST

## 2018-01-01 PROCEDURE — 99214 OFFICE O/P EST MOD 30 MIN: CPT | Performed by: PHYSICIAN ASSISTANT

## 2018-01-01 PROCEDURE — 99233 SBSQ HOSP IP/OBS HIGH 50: CPT | Performed by: FAMILY MEDICINE

## 2018-01-01 PROCEDURE — 99220 PR INITIAL OBSERVATION CARE/DAY 70 MINUTES: CPT | Performed by: INTERNAL MEDICINE

## 2018-01-01 PROCEDURE — 84443 ASSAY THYROID STIM HORMONE: CPT

## 2018-01-01 PROCEDURE — 99214 OFFICE O/P EST MOD 30 MIN: CPT | Performed by: INTERNAL MEDICINE

## 2018-01-01 PROCEDURE — 84520 ASSAY OF UREA NITROGEN: CPT

## 2018-01-01 PROCEDURE — 99232 SBSQ HOSP IP/OBS MODERATE 35: CPT | Performed by: SURGERY

## 2018-01-01 PROCEDURE — 83735 ASSAY OF MAGNESIUM: CPT

## 2018-01-01 PROCEDURE — 99239 HOSP IP/OBS DSCHRG MGMT >30: CPT | Performed by: FAMILY MEDICINE

## 2018-01-01 PROCEDURE — G8427 DOCREV CUR MEDS BY ELIG CLIN: HCPCS | Performed by: INTERNAL MEDICINE

## 2018-01-01 PROCEDURE — 97803 MED NUTRITION INDIV SUBSEQ: CPT | Performed by: DIETITIAN, REGISTERED

## 2018-01-01 PROCEDURE — 70450 CT HEAD/BRAIN W/O DYE: CPT

## 2018-01-01 PROCEDURE — 99215 OFFICE O/P EST HI 40 MIN: CPT | Performed by: NURSE PRACTITIONER

## 2018-01-01 PROCEDURE — 82248 BILIRUBIN DIRECT: CPT

## 2018-01-01 PROCEDURE — 71046 X-RAY EXAM CHEST 2 VIEWS: CPT

## 2018-01-01 PROCEDURE — 83540 ASSAY OF IRON: CPT

## 2018-01-01 PROCEDURE — 6360000002 HC RX W HCPCS: Performed by: EMERGENCY MEDICINE

## 2018-01-01 PROCEDURE — 3023F SPIROM DOC REV: CPT | Performed by: NURSE PRACTITIONER

## 2018-01-01 PROCEDURE — 3017F COLORECTAL CA SCREEN DOC REV: CPT | Performed by: INTERNAL MEDICINE

## 2018-01-01 PROCEDURE — 6360000004 HC RX CONTRAST MEDICATION: Performed by: NURSE PRACTITIONER

## 2018-01-01 PROCEDURE — 99284 EMERGENCY DEPT VISIT MOD MDM: CPT

## 2018-01-01 PROCEDURE — 93923 UPR/LXTR ART STDY 3+ LVLS: CPT

## 2018-01-01 PROCEDURE — 70498 CT ANGIOGRAPHY NECK: CPT

## 2018-01-01 PROCEDURE — 86850 RBC ANTIBODY SCREEN: CPT

## 2018-01-01 PROCEDURE — 82330 ASSAY OF CALCIUM: CPT

## 2018-01-01 PROCEDURE — 1200000000 HC SEMI PRIVATE

## 2018-01-01 PROCEDURE — 99222 1ST HOSP IP/OBS MODERATE 55: CPT | Performed by: NURSE PRACTITIONER

## 2018-01-01 PROCEDURE — 99217 PR OBSERVATION CARE DISCHARGE MANAGEMENT: CPT | Performed by: INTERNAL MEDICINE

## 2018-01-01 PROCEDURE — 1090F PRES/ABSN URINE INCON ASSESS: CPT | Performed by: INTERNAL MEDICINE

## 2018-01-01 PROCEDURE — 86901 BLOOD TYPING SEROLOGIC RH(D): CPT

## 2018-01-01 PROCEDURE — 1036F TOBACCO NON-USER: CPT | Performed by: INTERNAL MEDICINE

## 2018-01-01 PROCEDURE — 87077 CULTURE AEROBIC IDENTIFY: CPT

## 2018-01-01 PROCEDURE — 99232 SBSQ HOSP IP/OBS MODERATE 35: CPT | Performed by: FAMILY MEDICINE

## 2018-01-01 PROCEDURE — 99222 1ST HOSP IP/OBS MODERATE 55: CPT | Performed by: PSYCHIATRY & NEUROLOGY

## 2018-01-01 PROCEDURE — 6360000002 HC RX W HCPCS: Performed by: FAMILY MEDICINE

## 2018-01-01 PROCEDURE — 6360000004 HC RX CONTRAST MEDICATION: Performed by: EMERGENCY MEDICINE

## 2018-01-01 PROCEDURE — 90686 IIV4 VACC NO PRSV 0.5 ML IM: CPT | Performed by: INTERNAL MEDICINE

## 2018-01-01 PROCEDURE — G8417 CALC BMI ABV UP PARAM F/U: HCPCS | Performed by: PHYSICIAN ASSISTANT

## 2018-01-01 PROCEDURE — 1111F DSCHRG MED/CURRENT MED MERGE: CPT | Performed by: INTERNAL MEDICINE

## 2018-01-01 PROCEDURE — G8598 ASA/ANTIPLAT THER USED: HCPCS | Performed by: PHYSICIAN ASSISTANT

## 2018-01-01 PROCEDURE — 97535 SELF CARE MNGMENT TRAINING: CPT

## 2018-01-01 PROCEDURE — 85730 THROMBOPLASTIN TIME PARTIAL: CPT

## 2018-01-01 PROCEDURE — 99239 HOSP IP/OBS DSCHRG MGMT >30: CPT | Performed by: NURSE PRACTITIONER

## 2018-01-01 PROCEDURE — 83010 ASSAY OF HAPTOGLOBIN QUANT: CPT

## 2018-01-01 PROCEDURE — 99203 OFFICE O/P NEW LOW 30 MIN: CPT | Performed by: NEUROLOGICAL SURGERY

## 2018-01-01 PROCEDURE — 6370000000 HC RX 637 (ALT 250 FOR IP): Performed by: EMERGENCY MEDICINE

## 2018-01-01 PROCEDURE — 99999 PR OFFICE/OUTPT VISIT,PROCEDURE ONLY: CPT | Performed by: DIETITIAN, REGISTERED

## 2018-01-01 PROCEDURE — 80076 HEPATIC FUNCTION PANEL: CPT

## 2018-01-01 PROCEDURE — 71275 CT ANGIOGRAPHY CHEST: CPT

## 2018-01-01 PROCEDURE — 76705 ECHO EXAM OF ABDOMEN: CPT

## 2018-01-01 PROCEDURE — 70551 MRI BRAIN STEM W/O DYE: CPT

## 2018-01-01 PROCEDURE — 84439 ASSAY OF FREE THYROXINE: CPT

## 2018-01-01 PROCEDURE — 71260 CT THORAX DX C+: CPT

## 2018-01-01 PROCEDURE — 93005 ELECTROCARDIOGRAM TRACING: CPT | Performed by: PHYSICIAN ASSISTANT

## 2018-01-01 PROCEDURE — 94640 AIRWAY INHALATION TREATMENT: CPT | Performed by: NURSE PRACTITIONER

## 2018-01-01 PROCEDURE — 87186 SC STD MICRODIL/AGAR DIL: CPT

## 2018-01-01 PROCEDURE — 87184 SC STD DISK METHOD PER PLATE: CPT

## 2018-01-01 PROCEDURE — G8484 FLU IMMUNIZE NO ADMIN: HCPCS | Performed by: NURSE PRACTITIONER

## 2018-01-01 PROCEDURE — 99225 PR SBSQ OBSERVATION CARE/DAY 25 MINUTES: CPT | Performed by: INTERNAL MEDICINE

## 2018-01-01 PROCEDURE — 99220 PR INITIAL OBSERVATION CARE/DAY 70 MINUTES: CPT | Performed by: FAMILY MEDICINE

## 2018-01-01 PROCEDURE — 93660 TILT TABLE EVALUATION: CPT

## 2018-01-01 PROCEDURE — G0008 ADMIN INFLUENZA VIRUS VAC: HCPCS | Performed by: INTERNAL MEDICINE

## 2018-01-01 PROCEDURE — 99231 SBSQ HOSP IP/OBS SF/LOW 25: CPT | Performed by: PHYSICIAN ASSISTANT

## 2018-01-01 PROCEDURE — 99233 SBSQ HOSP IP/OBS HIGH 50: CPT | Performed by: SURGERY

## 2018-01-01 RX ORDER — CANDESARTAN 16 MG/1
32 TABLET ORAL DAILY
Status: DISCONTINUED | OUTPATIENT
Start: 2018-01-01 | End: 2018-01-01 | Stop reason: HOSPADM

## 2018-01-01 RX ORDER — DOCUSATE SODIUM 100 MG/1
100 CAPSULE, LIQUID FILLED ORAL 2 TIMES DAILY
Status: DISCONTINUED | OUTPATIENT
Start: 2018-01-01 | End: 2018-01-01 | Stop reason: HOSPADM

## 2018-01-01 RX ORDER — RANOLAZINE 500 MG/1
500 TABLET, EXTENDED RELEASE ORAL 2 TIMES DAILY
Status: DISCONTINUED | OUTPATIENT
Start: 2018-01-01 | End: 2018-01-01 | Stop reason: HOSPADM

## 2018-01-01 RX ORDER — RISPERIDONE 1 MG/1
1.5 TABLET, FILM COATED ORAL EVERY EVENING
Status: ON HOLD | COMMUNITY
End: 2018-01-01 | Stop reason: HOSPADM

## 2018-01-01 RX ORDER — HYDRALAZINE HYDROCHLORIDE 25 MG/1
25 TABLET, FILM COATED ORAL EVERY 8 HOURS SCHEDULED
Status: DISCONTINUED | OUTPATIENT
Start: 2018-01-01 | End: 2018-01-01 | Stop reason: ALTCHOICE

## 2018-01-01 RX ORDER — POTASSIUM CHLORIDE 7.45 MG/ML
10 INJECTION INTRAVENOUS PRN
Status: DISCONTINUED | OUTPATIENT
Start: 2018-01-01 | End: 2018-01-01 | Stop reason: HOSPADM

## 2018-01-01 RX ORDER — SPIRONOLACTONE 25 MG/1
25 TABLET ORAL DAILY
Status: DISCONTINUED | OUTPATIENT
Start: 2018-01-01 | End: 2018-01-01 | Stop reason: HOSPADM

## 2018-01-01 RX ORDER — HYDROCODONE BITARTRATE AND ACETAMINOPHEN 5; 325 MG/1; MG/1
1 TABLET ORAL EVERY 8 HOURS PRN
Status: DISCONTINUED | OUTPATIENT
Start: 2018-01-01 | End: 2018-01-01 | Stop reason: HOSPADM

## 2018-01-01 RX ORDER — POTASSIUM CHLORIDE 7.45 MG/ML
10 INJECTION INTRAVENOUS PRN
Status: DISCONTINUED | OUTPATIENT
Start: 2018-01-01 | End: 2018-01-01 | Stop reason: SDUPTHER

## 2018-01-01 RX ORDER — IPRATROPIUM BROMIDE AND ALBUTEROL SULFATE 2.5; .5 MG/3ML; MG/3ML
1 SOLUTION RESPIRATORY (INHALATION)
Status: DISCONTINUED | OUTPATIENT
Start: 2018-01-01 | End: 2018-01-01

## 2018-01-01 RX ORDER — METOPROLOL TARTRATE 5 MG/5ML
2.5 INJECTION INTRAVENOUS ONCE
Status: COMPLETED | OUTPATIENT
Start: 2018-01-01 | End: 2018-01-01

## 2018-01-01 RX ORDER — OXCARBAZEPINE 300 MG/1
150 TABLET, FILM COATED ORAL EVERY MORNING
Status: DISCONTINUED | OUTPATIENT
Start: 2018-01-01 | End: 2018-01-01 | Stop reason: HOSPADM

## 2018-01-01 RX ORDER — 0.9 % SODIUM CHLORIDE 0.9 %
250 INTRAVENOUS SOLUTION INTRAVENOUS ONCE
Status: DISCONTINUED | OUTPATIENT
Start: 2018-01-01 | End: 2018-01-01

## 2018-01-01 RX ORDER — AMLODIPINE BESYLATE 5 MG/1
5 TABLET ORAL DAILY
Qty: 30 TABLET | Refills: 3 | Status: ON HOLD | OUTPATIENT
Start: 2018-01-01 | End: 2018-01-01 | Stop reason: HOSPADM

## 2018-01-01 RX ORDER — ISOSORBIDE MONONITRATE 60 MG/1
60 TABLET, EXTENDED RELEASE ORAL 2 TIMES DAILY
Status: DISCONTINUED | OUTPATIENT
Start: 2018-01-01 | End: 2018-01-01

## 2018-01-01 RX ORDER — POLYVINYL ALCOHOL 14 MG/ML
1 SOLUTION/ DROPS OPHTHALMIC EVERY 6 HOURS PRN
Status: DISCONTINUED | OUTPATIENT
Start: 2018-01-01 | End: 2018-01-01 | Stop reason: HOSPADM

## 2018-01-01 RX ORDER — FLUTICASONE PROPIONATE 50 MCG
1 SPRAY, SUSPENSION (ML) NASAL DAILY
Status: DISCONTINUED | OUTPATIENT
Start: 2018-01-01 | End: 2018-01-01 | Stop reason: HOSPADM

## 2018-01-01 RX ORDER — METOPROLOL TARTRATE 50 MG/1
50 TABLET, FILM COATED ORAL 2 TIMES DAILY
Status: DISCONTINUED | OUTPATIENT
Start: 2018-01-01 | End: 2018-01-01

## 2018-01-01 RX ORDER — ESCITALOPRAM OXALATE 10 MG/1
10 TABLET ORAL EVERY MORNING
Status: DISCONTINUED | OUTPATIENT
Start: 2018-01-01 | End: 2018-01-01 | Stop reason: HOSPADM

## 2018-01-01 RX ORDER — POLYVINYL ALCOHOL 14 MG/ML
1 SOLUTION/ DROPS OPHTHALMIC PRN
Status: DISCONTINUED | OUTPATIENT
Start: 2018-01-01 | End: 2018-01-01 | Stop reason: HOSPADM

## 2018-01-01 RX ORDER — OXCARBAZEPINE 300 MG/1
300 TABLET, FILM COATED ORAL NIGHTLY
Status: DISCONTINUED | OUTPATIENT
Start: 2018-01-01 | End: 2018-01-01 | Stop reason: HOSPADM

## 2018-01-01 RX ORDER — DEXTROSE MONOHYDRATE 25 G/50ML
12.5 INJECTION, SOLUTION INTRAVENOUS PRN
Status: DISCONTINUED | OUTPATIENT
Start: 2018-01-01 | End: 2018-01-01 | Stop reason: HOSPADM

## 2018-01-01 RX ORDER — ONDANSETRON 4 MG/1
4 TABLET, FILM COATED ORAL EVERY 6 HOURS PRN
Status: DISCONTINUED | OUTPATIENT
Start: 2018-01-01 | End: 2018-01-01 | Stop reason: HOSPADM

## 2018-01-01 RX ORDER — HYDROCODONE BITARTRATE AND ACETAMINOPHEN 5; 325 MG/1; MG/1
1 TABLET ORAL EVERY 4 HOURS PRN
Status: DISCONTINUED | OUTPATIENT
Start: 2018-01-01 | End: 2018-01-01 | Stop reason: HOSPADM

## 2018-01-01 RX ORDER — FUROSEMIDE 20 MG/1
TABLET ORAL
Qty: 60 TABLET | Refills: 3 | OUTPATIENT
Start: 2018-01-01

## 2018-01-01 RX ORDER — ESCITALOPRAM OXALATE 10 MG/1
10 TABLET ORAL EVERY MORNING
Qty: 30 TABLET | Refills: 0 | Status: ON HOLD | OUTPATIENT
Start: 2018-01-01 | End: 2019-01-01 | Stop reason: HOSPADM

## 2018-01-01 RX ORDER — DOCUSATE SODIUM 100 MG/1
CAPSULE, LIQUID FILLED ORAL
Qty: 90 CAPSULE | Refills: 1 | Status: SHIPPED | OUTPATIENT
Start: 2018-01-01 | End: 2018-01-01 | Stop reason: SDUPTHER

## 2018-01-01 RX ORDER — FERROUS SULFATE 325(65) MG
325 TABLET ORAL 3 TIMES DAILY
COMMUNITY
End: 2018-01-01 | Stop reason: ALTCHOICE

## 2018-01-01 RX ORDER — HYDRALAZINE HYDROCHLORIDE 50 MG/1
100 TABLET, FILM COATED ORAL EVERY 8 HOURS
Status: DISCONTINUED | OUTPATIENT
Start: 2018-01-01 | End: 2018-01-01 | Stop reason: HOSPADM

## 2018-01-01 RX ORDER — CLOPIDOGREL BISULFATE 75 MG/1
75 TABLET ORAL DAILY
Status: DISCONTINUED | OUTPATIENT
Start: 2018-01-01 | End: 2018-01-01 | Stop reason: HOSPADM

## 2018-01-01 RX ORDER — HYDRALAZINE HYDROCHLORIDE 100 MG/1
100 TABLET, FILM COATED ORAL 3 TIMES DAILY
Qty: 90 TABLET | Refills: 3 | Status: SHIPPED | OUTPATIENT
Start: 2018-01-01 | End: 2018-01-01 | Stop reason: DRUGHIGH

## 2018-01-01 RX ORDER — FUROSEMIDE 20 MG/1
20 TABLET ORAL 2 TIMES DAILY
Status: DISCONTINUED | OUTPATIENT
Start: 2018-01-01 | End: 2018-01-01 | Stop reason: HOSPADM

## 2018-01-01 RX ORDER — IPRATROPIUM BROMIDE AND ALBUTEROL SULFATE 2.5; .5 MG/3ML; MG/3ML
1 SOLUTION RESPIRATORY (INHALATION) EVERY 4 HOURS PRN
Status: DISCONTINUED | OUTPATIENT
Start: 2018-01-01 | End: 2018-01-01 | Stop reason: HOSPADM

## 2018-01-01 RX ORDER — ALBUTEROL SULFATE 90 UG/1
2 AEROSOL, METERED RESPIRATORY (INHALATION) EVERY 6 HOURS PRN
Qty: 1 INHALER | Refills: 3 | Status: ON HOLD | OUTPATIENT
Start: 2018-01-01 | End: 2019-01-01 | Stop reason: SDUPTHER

## 2018-01-01 RX ORDER — HYDROCODONE BITARTRATE AND ACETAMINOPHEN 5; 325 MG/1; MG/1
1 TABLET ORAL EVERY 6 HOURS PRN
Status: DISCONTINUED | OUTPATIENT
Start: 2018-01-01 | End: 2018-01-01 | Stop reason: HOSPADM

## 2018-01-01 RX ORDER — PANTOPRAZOLE SODIUM 40 MG/1
40 TABLET, DELAYED RELEASE ORAL
Status: DISCONTINUED | OUTPATIENT
Start: 2018-01-01 | End: 2018-01-01 | Stop reason: HOSPADM

## 2018-01-01 RX ORDER — HYDROCORTISONE ACETATE 25 MG/1
25 SUPPOSITORY RECTAL 2 TIMES DAILY
Status: DISCONTINUED | OUTPATIENT
Start: 2018-01-01 | End: 2018-01-01 | Stop reason: HOSPADM

## 2018-01-01 RX ORDER — ALBUTEROL SULFATE 90 UG/1
2 AEROSOL, METERED RESPIRATORY (INHALATION) EVERY 6 HOURS PRN
Status: DISCONTINUED | OUTPATIENT
Start: 2018-01-01 | End: 2018-01-01 | Stop reason: HOSPADM

## 2018-01-01 RX ORDER — LANOLIN ALCOHOL/MO/W.PET/CERES
325 CREAM (GRAM) TOPICAL
Qty: 90 TABLET | Refills: 1 | Status: SHIPPED | OUTPATIENT
Start: 2018-01-01 | End: 2019-01-01 | Stop reason: ALTCHOICE

## 2018-01-01 RX ORDER — AMLODIPINE BESYLATE 10 MG/1
10 TABLET ORAL DAILY
Status: DISCONTINUED | OUTPATIENT
Start: 2018-01-01 | End: 2018-01-01 | Stop reason: HOSPADM

## 2018-01-01 RX ORDER — CLOTRIMAZOLE AND BETAMETHASONE DIPROPIONATE 10; .64 MG/G; MG/G
CREAM TOPICAL 3 TIMES DAILY
Status: DISCONTINUED | OUTPATIENT
Start: 2018-01-01 | End: 2018-01-01 | Stop reason: HOSPADM

## 2018-01-01 RX ORDER — LEVOTHYROXINE SODIUM 0.1 MG/1
TABLET ORAL
Qty: 90 TABLET | Refills: 1 | Status: ON HOLD | OUTPATIENT
Start: 2018-01-01 | End: 2019-01-01 | Stop reason: HOSPADM

## 2018-01-01 RX ORDER — LOSARTAN POTASSIUM 100 MG/1
100 TABLET ORAL DAILY
Status: DISCONTINUED | OUTPATIENT
Start: 2018-01-01 | End: 2018-01-01 | Stop reason: HOSPADM

## 2018-01-01 RX ORDER — DEXTROSE MONOHYDRATE 50 MG/ML
100 INJECTION, SOLUTION INTRAVENOUS PRN
Status: DISCONTINUED | OUTPATIENT
Start: 2018-01-01 | End: 2018-01-01 | Stop reason: HOSPADM

## 2018-01-01 RX ORDER — LACTULOSE 10 G/15ML
30 SOLUTION ORAL DAILY
Status: DISCONTINUED | OUTPATIENT
Start: 2018-01-01 | End: 2018-01-01 | Stop reason: HOSPADM

## 2018-01-01 RX ORDER — POLYVINYL ALCOHOL 14 MG/ML
1 SOLUTION/ DROPS OPHTHALMIC PRN
Status: DISCONTINUED | OUTPATIENT
Start: 2018-01-01 | End: 2018-01-01 | Stop reason: SDUPTHER

## 2018-01-01 RX ORDER — RANOLAZINE 500 MG/1
TABLET, EXTENDED RELEASE ORAL
Qty: 180 TABLET | Refills: 3 | Status: SHIPPED | OUTPATIENT
Start: 2018-01-01

## 2018-01-01 RX ORDER — HYDRALAZINE HYDROCHLORIDE 50 MG/1
100 TABLET, FILM COATED ORAL 3 TIMES DAILY
Qty: 135 TABLET | Refills: 2
Start: 2018-01-01 | End: 2018-01-01 | Stop reason: SDUPTHER

## 2018-01-01 RX ORDER — BACLOFEN 10 MG/1
10 TABLET ORAL 2 TIMES DAILY
Status: DISCONTINUED | OUTPATIENT
Start: 2018-01-01 | End: 2018-01-01 | Stop reason: HOSPADM

## 2018-01-01 RX ORDER — SODIUM CHLORIDE 0.9 % (FLUSH) 0.9 %
10 SYRINGE (ML) INJECTION EVERY 12 HOURS SCHEDULED
Status: DISCONTINUED | OUTPATIENT
Start: 2018-01-01 | End: 2018-01-01 | Stop reason: HOSPADM

## 2018-01-01 RX ORDER — SODIUM PHOSPHATE,MONO-DIBASIC 19G-7G/118
1 ENEMA (ML) RECTAL DAILY PRN
Status: DISCONTINUED | OUTPATIENT
Start: 2018-01-01 | End: 2018-01-01 | Stop reason: HOSPADM

## 2018-01-01 RX ORDER — LEVOTHYROXINE SODIUM 0.1 MG/1
100 TABLET ORAL DAILY
Status: DISCONTINUED | OUTPATIENT
Start: 2018-01-01 | End: 2018-01-01

## 2018-01-01 RX ORDER — ONDANSETRON 2 MG/ML
4 INJECTION INTRAMUSCULAR; INTRAVENOUS EVERY 6 HOURS PRN
Status: DISCONTINUED | OUTPATIENT
Start: 2018-01-01 | End: 2018-01-01 | Stop reason: CLARIF

## 2018-01-01 RX ORDER — LEVOTHYROXINE SODIUM 0.1 MG/1
100 TABLET ORAL DAILY
Status: DISCONTINUED | OUTPATIENT
Start: 2018-01-01 | End: 2018-01-01 | Stop reason: HOSPADM

## 2018-01-01 RX ORDER — FUROSEMIDE 20 MG/1
20 TABLET ORAL DAILY
Status: DISCONTINUED | OUTPATIENT
Start: 2018-01-01 | End: 2018-01-01

## 2018-01-01 RX ORDER — ASPIRIN 81 MG/1
81 TABLET ORAL DAILY
Status: DISCONTINUED | OUTPATIENT
Start: 2018-01-01 | End: 2018-01-01 | Stop reason: HOSPADM

## 2018-01-01 RX ORDER — OXCARBAZEPINE 300 MG/1
300 TABLET, FILM COATED ORAL EVERY EVENING
Status: DISCONTINUED | OUTPATIENT
Start: 2018-01-01 | End: 2018-01-01 | Stop reason: HOSPADM

## 2018-01-01 RX ORDER — DOXEPIN HYDROCHLORIDE 50 MG/1
150 CAPSULE ORAL NIGHTLY
Status: DISCONTINUED | OUTPATIENT
Start: 2018-01-01 | End: 2018-01-01 | Stop reason: HOSPADM

## 2018-01-01 RX ORDER — ALBUTEROL SULFATE 2.5 MG/3ML
2.5 SOLUTION RESPIRATORY (INHALATION) ONCE
Status: COMPLETED | OUTPATIENT
Start: 2018-01-01 | End: 2018-01-01

## 2018-01-01 RX ORDER — OYSTER SHELL CALCIUM WITH VITAMIN D 500; 200 MG/1; [IU]/1
1 TABLET, FILM COATED ORAL DAILY
Status: DISCONTINUED | OUTPATIENT
Start: 2018-01-01 | End: 2018-01-01

## 2018-01-01 RX ORDER — CLOTRIMAZOLE AND BETAMETHASONE DIPROPIONATE 10; .64 MG/G; MG/G
CREAM TOPICAL 2 TIMES DAILY
Status: DISCONTINUED | OUTPATIENT
Start: 2018-01-01 | End: 2018-01-01 | Stop reason: HOSPADM

## 2018-01-01 RX ORDER — PREDNISONE 20 MG/1
40 TABLET ORAL DAILY
Qty: 10 TABLET | Refills: 0 | Status: SHIPPED | OUTPATIENT
Start: 2018-01-01 | End: 2018-01-01

## 2018-01-01 RX ORDER — ONDANSETRON 2 MG/ML
4 INJECTION INTRAMUSCULAR; INTRAVENOUS EVERY 4 HOURS PRN
Status: DISCONTINUED | OUTPATIENT
Start: 2018-01-01 | End: 2018-01-01 | Stop reason: HOSPADM

## 2018-01-01 RX ORDER — ONDANSETRON 2 MG/ML
4 INJECTION INTRAMUSCULAR; INTRAVENOUS EVERY 6 HOURS PRN
Status: DISCONTINUED | OUTPATIENT
Start: 2018-01-01 | End: 2018-01-01 | Stop reason: HOSPADM

## 2018-01-01 RX ORDER — FERROUS SULFATE 325(65) MG
325 TABLET ORAL 3 TIMES DAILY
Status: DISCONTINUED | OUTPATIENT
Start: 2018-01-01 | End: 2018-01-01

## 2018-01-01 RX ORDER — LOSARTAN POTASSIUM 25 MG/1
25 TABLET ORAL DAILY
Status: DISCONTINUED | OUTPATIENT
Start: 2018-01-01 | End: 2018-01-01

## 2018-01-01 RX ORDER — CALCIUM CARBONATE 500(1250)
1000 TABLET ORAL EVERY 4 HOURS
Status: DISCONTINUED | OUTPATIENT
Start: 2018-01-01 | End: 2018-01-01

## 2018-01-01 RX ORDER — HYDRALAZINE HYDROCHLORIDE 50 MG/1
50 TABLET, FILM COATED ORAL 3 TIMES DAILY
Status: ON HOLD | COMMUNITY
End: 2018-01-01 | Stop reason: HOSPADM

## 2018-01-01 RX ORDER — M-VIT,TX,IRON,MINS/CALC/FOLIC 27MG-0.4MG
1 TABLET ORAL DAILY
Status: DISCONTINUED | OUTPATIENT
Start: 2018-01-01 | End: 2018-01-01 | Stop reason: HOSPADM

## 2018-01-01 RX ORDER — LEVOTHYROXINE SODIUM 0.1 MG/1
100 TABLET ORAL
Status: DISCONTINUED | OUTPATIENT
Start: 2018-01-01 | End: 2018-01-01 | Stop reason: HOSPADM

## 2018-01-01 RX ORDER — CYCLOSPORINE 0.5 MG/ML
1 EMULSION OPHTHALMIC 2 TIMES DAILY
Status: DISCONTINUED | OUTPATIENT
Start: 2018-01-01 | End: 2018-01-01 | Stop reason: CLARIF

## 2018-01-01 RX ORDER — NICOTINE POLACRILEX 4 MG
15 LOZENGE BUCCAL PRN
Status: DISCONTINUED | OUTPATIENT
Start: 2018-01-01 | End: 2018-01-01 | Stop reason: HOSPADM

## 2018-01-01 RX ORDER — SODIUM CHLORIDE 0.9 % (FLUSH) 0.9 %
10 SYRINGE (ML) INJECTION PRN
Status: DISCONTINUED | OUTPATIENT
Start: 2018-01-01 | End: 2018-01-01 | Stop reason: HOSPADM

## 2018-01-01 RX ORDER — LEVOTHYROXINE SODIUM 0.1 MG/1
100 TABLET ORAL DAILY
Status: COMPLETED | OUTPATIENT
Start: 2018-01-01 | End: 2018-01-01

## 2018-01-01 RX ORDER — HYDROXYZINE HYDROCHLORIDE 25 MG/1
25 TABLET, FILM COATED ORAL 3 TIMES DAILY
Status: DISCONTINUED | OUTPATIENT
Start: 2018-01-01 | End: 2018-01-01 | Stop reason: HOSPADM

## 2018-01-01 RX ORDER — SODIUM CHLORIDE 9 MG/ML
INJECTION, SOLUTION INTRAVENOUS CONTINUOUS
Status: DISCONTINUED | OUTPATIENT
Start: 2018-01-01 | End: 2018-01-01 | Stop reason: HOSPADM

## 2018-01-01 RX ORDER — FUROSEMIDE 10 MG/ML
40 INJECTION INTRAMUSCULAR; INTRAVENOUS 2 TIMES DAILY
Status: DISCONTINUED | OUTPATIENT
Start: 2018-01-01 | End: 2018-01-01

## 2018-01-01 RX ORDER — SENNA PLUS 8.6 MG/1
2 TABLET ORAL NIGHTLY
Status: DISCONTINUED | OUTPATIENT
Start: 2018-01-01 | End: 2018-01-01 | Stop reason: HOSPADM

## 2018-01-01 RX ORDER — ACETAMINOPHEN 500 MG
500 TABLET ORAL EVERY 6 HOURS PRN
Status: DISCONTINUED | OUTPATIENT
Start: 2018-01-01 | End: 2018-01-01 | Stop reason: HOSPADM

## 2018-01-01 RX ORDER — IPRATROPIUM BROMIDE AND ALBUTEROL SULFATE 2.5; .5 MG/3ML; MG/3ML
1 SOLUTION RESPIRATORY (INHALATION) ONCE
Status: COMPLETED | OUTPATIENT
Start: 2018-01-01 | End: 2018-01-01

## 2018-01-01 RX ORDER — CLOPIDOGREL BISULFATE 75 MG/1
TABLET ORAL
Qty: 90 TABLET | Refills: 3 | Status: ON HOLD | OUTPATIENT
Start: 2018-01-01 | End: 2019-01-01 | Stop reason: SDUPTHER

## 2018-01-01 RX ORDER — HYDROCODONE BITARTRATE AND ACETAMINOPHEN 5; 325 MG/1; MG/1
2 TABLET ORAL EVERY 4 HOURS PRN
Status: DISCONTINUED | OUTPATIENT
Start: 2018-01-01 | End: 2018-01-01 | Stop reason: HOSPADM

## 2018-01-01 RX ORDER — ACETAMINOPHEN AND CODEINE PHOSPHATE 300; 30 MG/1; MG/1
1 TABLET ORAL EVERY 4 HOURS PRN
Status: DISCONTINUED | OUTPATIENT
Start: 2018-01-01 | End: 2018-01-01 | Stop reason: ALTCHOICE

## 2018-01-01 RX ORDER — ALBUTEROL SULFATE 2.5 MG/3ML
2.5 SOLUTION RESPIRATORY (INHALATION)
Status: DISCONTINUED | OUTPATIENT
Start: 2018-01-01 | End: 2018-01-01 | Stop reason: HOSPADM

## 2018-01-01 RX ORDER — RANOLAZINE 500 MG/1
TABLET, EXTENDED RELEASE ORAL
Qty: 180 TABLET | Refills: 1 | Status: CANCELLED | OUTPATIENT
Start: 2018-01-01

## 2018-01-01 RX ORDER — OXCARBAZEPINE 300 MG/1
150 TABLET, FILM COATED ORAL 2 TIMES DAILY
Status: DISCONTINUED | OUTPATIENT
Start: 2018-01-01 | End: 2018-01-01

## 2018-01-01 RX ORDER — SUCRALFATE 1 G/1
1 TABLET ORAL 4 TIMES DAILY
Status: DISCONTINUED | OUTPATIENT
Start: 2018-01-01 | End: 2018-01-01 | Stop reason: HOSPADM

## 2018-01-01 RX ORDER — PREDNISONE 20 MG/1
40 TABLET ORAL DAILY
Status: DISCONTINUED | OUTPATIENT
Start: 2018-01-01 | End: 2018-01-01 | Stop reason: HOSPADM

## 2018-01-01 RX ORDER — POTASSIUM CHLORIDE 20 MEQ/1
40 TABLET, EXTENDED RELEASE ORAL PRN
Status: DISCONTINUED | OUTPATIENT
Start: 2018-01-01 | End: 2018-01-01 | Stop reason: HOSPADM

## 2018-01-01 RX ORDER — NYSTATIN 100000 U/G
CREAM TOPICAL 2 TIMES DAILY
Status: DISCONTINUED | OUTPATIENT
Start: 2018-01-01 | End: 2018-01-01 | Stop reason: HOSPADM

## 2018-01-01 RX ORDER — HYDRALAZINE HYDROCHLORIDE 50 MG/1
50 TABLET, FILM COATED ORAL 3 TIMES DAILY
Status: CANCELLED | OUTPATIENT
Start: 2018-01-01

## 2018-01-01 RX ORDER — METOLAZONE 2.5 MG/1
2.5 TABLET ORAL DAILY
Status: DISCONTINUED | OUTPATIENT
Start: 2018-01-01 | End: 2018-01-01

## 2018-01-01 RX ORDER — HYDROCODONE BITARTRATE AND ACETAMINOPHEN 5; 325 MG/1; MG/1
1 TABLET ORAL EVERY 4 HOURS PRN
Qty: 20 TABLET | Refills: 0 | Status: SHIPPED | OUTPATIENT
Start: 2018-01-01 | End: 2018-01-01

## 2018-01-01 RX ORDER — METOLAZONE 5 MG/1
2.5 TABLET ORAL
Qty: 30 TABLET | Refills: 0 | Status: ON HOLD | OUTPATIENT
Start: 2018-01-01 | End: 2018-01-01 | Stop reason: HOSPADM

## 2018-01-01 RX ORDER — LACTULOSE 10 G/15ML
20 SOLUTION ORAL ONCE
Status: COMPLETED | OUTPATIENT
Start: 2018-01-01 | End: 2018-01-01

## 2018-01-01 RX ORDER — CIPROFLOXACIN 500 MG/1
500 TABLET, FILM COATED ORAL EVERY 12 HOURS SCHEDULED
Status: COMPLETED | OUTPATIENT
Start: 2018-01-01 | End: 2018-01-01

## 2018-01-01 RX ORDER — FERROUS SULFATE 325(65) MG
325 TABLET ORAL 2 TIMES DAILY
Status: DISCONTINUED | OUTPATIENT
Start: 2018-01-01 | End: 2018-01-01

## 2018-01-01 RX ORDER — CLOPIDOGREL BISULFATE 75 MG/1
75 TABLET ORAL DAILY
Status: DISCONTINUED | OUTPATIENT
Start: 2018-01-01 | End: 2018-01-01

## 2018-01-01 RX ORDER — DOCUSATE SODIUM 100 MG/1
300 CAPSULE, LIQUID FILLED ORAL NIGHTLY
Status: DISCONTINUED | OUTPATIENT
Start: 2018-01-01 | End: 2018-01-01

## 2018-01-01 RX ORDER — METOPROLOL TARTRATE 100 MG/1
100 TABLET ORAL 2 TIMES DAILY
Status: DISCONTINUED | OUTPATIENT
Start: 2018-01-01 | End: 2018-01-01 | Stop reason: HOSPADM

## 2018-01-01 RX ORDER — ROSUVASTATIN CALCIUM 20 MG/1
TABLET, COATED ORAL
Qty: 90 TABLET | Refills: 0 | Status: SHIPPED | OUTPATIENT
Start: 2018-01-01

## 2018-01-01 RX ORDER — AMLODIPINE BESYLATE 10 MG/1
5 TABLET ORAL DAILY
Qty: 30 TABLET | Refills: 3
Start: 2018-01-01 | End: 2018-01-01

## 2018-01-01 RX ORDER — ISOSORBIDE DINITRATE 20 MG/1
40 TABLET ORAL 3 TIMES DAILY
Status: DISCONTINUED | OUTPATIENT
Start: 2018-01-01 | End: 2018-01-01 | Stop reason: HOSPADM

## 2018-01-01 RX ORDER — FUROSEMIDE 10 MG/ML
20 INJECTION INTRAMUSCULAR; INTRAVENOUS DAILY
Status: DISCONTINUED | OUTPATIENT
Start: 2018-01-01 | End: 2018-01-01

## 2018-01-01 RX ORDER — POLYETHYLENE GLYCOL 3350 17 G/17G
17 POWDER, FOR SOLUTION ORAL DAILY
Status: DISCONTINUED | OUTPATIENT
Start: 2018-01-01 | End: 2018-01-01 | Stop reason: HOSPADM

## 2018-01-01 RX ORDER — CARBOXYMETHYLCELLULOSE SODIUM 5 MG/ML
1 SOLUTION/ DROPS OPHTHALMIC 2 TIMES DAILY
Status: DISCONTINUED | OUTPATIENT
Start: 2018-01-01 | End: 2018-01-01 | Stop reason: HOSPADM

## 2018-01-01 RX ORDER — NITROGLYCERIN 40 MG/1
1 PATCH TRANSDERMAL DAILY PRN
Status: ON HOLD | COMMUNITY
End: 2018-01-01 | Stop reason: HOSPADM

## 2018-01-01 RX ORDER — FUROSEMIDE 10 MG/ML
40 INJECTION INTRAMUSCULAR; INTRAVENOUS ONCE
Status: COMPLETED | OUTPATIENT
Start: 2018-01-01 | End: 2018-01-01

## 2018-01-01 RX ORDER — LEVOTHYROXINE SODIUM 0.1 MG/1
100 TABLET ORAL DAILY
Status: DISCONTINUED | OUTPATIENT
Start: 2018-01-01 | End: 2018-01-01 | Stop reason: SDUPTHER

## 2018-01-01 RX ORDER — BUDESONIDE AND FORMOTEROL FUMARATE DIHYDRATE 160; 4.5 UG/1; UG/1
2 AEROSOL RESPIRATORY (INHALATION) 2 TIMES DAILY
Status: DISCONTINUED | OUTPATIENT
Start: 2018-01-01 | End: 2018-01-01 | Stop reason: CLARIF

## 2018-01-01 RX ORDER — METOLAZONE 5 MG/1
5 TABLET ORAL
Qty: 30 TABLET | Refills: 0 | Status: SHIPPED | OUTPATIENT
Start: 2018-01-01 | End: 2018-01-01 | Stop reason: SDUPTHER

## 2018-01-01 RX ORDER — ASPIRIN 81 MG/1
TABLET ORAL
Qty: 90 TABLET | Refills: 1 | Status: SHIPPED | OUTPATIENT
Start: 2018-01-01 | End: 2019-01-01 | Stop reason: SDUPTHER

## 2018-01-01 RX ORDER — ACETAMINOPHEN 325 MG/1
650 TABLET ORAL EVERY 4 HOURS PRN
Status: DISCONTINUED | OUTPATIENT
Start: 2018-01-01 | End: 2018-01-01 | Stop reason: HOSPADM

## 2018-01-01 RX ORDER — METOLAZONE 5 MG/1
5 TABLET ORAL
Status: DISCONTINUED | OUTPATIENT
Start: 2018-01-01 | End: 2018-01-01 | Stop reason: HOSPADM

## 2018-01-01 RX ORDER — ESCITALOPRAM OXALATE 20 MG/1
20 TABLET ORAL EVERY MORNING
Status: DISCONTINUED | OUTPATIENT
Start: 2018-01-01 | End: 2018-01-01 | Stop reason: HOSPADM

## 2018-01-01 RX ORDER — FUROSEMIDE 20 MG/1
TABLET ORAL
Qty: 60 TABLET | Refills: 3 | Status: ON HOLD | OUTPATIENT
Start: 2018-01-01 | End: 2019-01-01 | Stop reason: HOSPADM

## 2018-01-01 RX ORDER — ROSUVASTATIN CALCIUM 20 MG/1
20 TABLET, COATED ORAL NIGHTLY
Status: DISCONTINUED | OUTPATIENT
Start: 2018-01-01 | End: 2018-01-01 | Stop reason: HOSPADM

## 2018-01-01 RX ORDER — ROSUVASTATIN CALCIUM 10 MG/1
20 TABLET, COATED ORAL DAILY
Status: DISCONTINUED | OUTPATIENT
Start: 2018-01-01 | End: 2018-01-01 | Stop reason: HOSPADM

## 2018-01-01 RX ORDER — AMLODIPINE BESYLATE 5 MG/1
5 TABLET ORAL DAILY
Status: DISCONTINUED | OUTPATIENT
Start: 2018-01-01 | End: 2018-01-01

## 2018-01-01 RX ORDER — CLOTRIMAZOLE AND BETAMETHASONE DIPROPIONATE 10; .64 MG/G; MG/G
CREAM TOPICAL
Qty: 45 G | Refills: 1 | Status: SHIPPED | OUTPATIENT
Start: 2018-01-01 | End: 2019-01-01

## 2018-01-01 RX ORDER — POTASSIUM CHLORIDE 20 MEQ/1
20 TABLET, EXTENDED RELEASE ORAL DAILY
Qty: 3 TABLET | Refills: 0 | Status: SHIPPED | OUTPATIENT
Start: 2018-01-01 | End: 2019-01-01

## 2018-01-01 RX ORDER — LEVOTHYROXINE SODIUM 0.12 MG/1
125 TABLET ORAL
Status: DISCONTINUED | OUTPATIENT
Start: 2018-01-01 | End: 2018-01-01 | Stop reason: HOSPADM

## 2018-01-01 RX ORDER — METOPROLOL TARTRATE 50 MG/1
50 TABLET, FILM COATED ORAL 2 TIMES DAILY
Status: DISCONTINUED | OUTPATIENT
Start: 2018-01-01 | End: 2018-01-01 | Stop reason: ALTCHOICE

## 2018-01-01 RX ORDER — SENNA PLUS 8.6 MG/1
1 TABLET ORAL NIGHTLY
Status: DISCONTINUED | OUTPATIENT
Start: 2018-01-01 | End: 2018-01-01

## 2018-01-01 RX ORDER — FUROSEMIDE 10 MG/ML
20 INJECTION INTRAMUSCULAR; INTRAVENOUS 2 TIMES DAILY
Status: DISCONTINUED | OUTPATIENT
Start: 2018-01-01 | End: 2018-01-01 | Stop reason: HOSPADM

## 2018-01-01 RX ORDER — FERROUS SULFATE 325(65) MG
325 TABLET ORAL
Status: DISCONTINUED | OUTPATIENT
Start: 2018-01-01 | End: 2018-01-01 | Stop reason: HOSPADM

## 2018-01-01 RX ORDER — MAGNESIUM SULFATE IN WATER 40 MG/ML
4 INJECTION, SOLUTION INTRAVENOUS ONCE
Status: COMPLETED | OUTPATIENT
Start: 2018-01-01 | End: 2018-01-01

## 2018-01-01 RX ORDER — LOSARTAN POTASSIUM 50 MG/1
50 TABLET ORAL DAILY
Status: DISCONTINUED | OUTPATIENT
Start: 2018-01-01 | End: 2018-01-01

## 2018-01-01 RX ORDER — LORAZEPAM 2 MG/ML
0.5 INJECTION INTRAMUSCULAR ONCE
Status: DISCONTINUED | OUTPATIENT
Start: 2018-01-01 | End: 2018-01-01

## 2018-01-01 RX ORDER — HYDRALAZINE HYDROCHLORIDE 50 MG/1
100 TABLET, FILM COATED ORAL 3 TIMES DAILY
Status: DISCONTINUED | OUTPATIENT
Start: 2018-01-01 | End: 2018-01-01 | Stop reason: HOSPADM

## 2018-01-01 RX ORDER — LABETALOL HYDROCHLORIDE 5 MG/ML
10 INJECTION, SOLUTION INTRAVENOUS EVERY 4 HOURS PRN
Status: DISCONTINUED | OUTPATIENT
Start: 2018-01-01 | End: 2018-01-01 | Stop reason: HOSPADM

## 2018-01-01 RX ORDER — METOPROLOL TARTRATE 100 MG/1
100 TABLET ORAL 2 TIMES DAILY
Qty: 60 TABLET | Refills: 0 | Status: ON HOLD | OUTPATIENT
Start: 2018-01-01 | End: 2019-01-01 | Stop reason: SDUPTHER

## 2018-01-01 RX ORDER — FERROUS SULFATE 325(65) MG
325 TABLET ORAL
Status: DISCONTINUED | OUTPATIENT
Start: 2018-01-01 | End: 2018-01-01 | Stop reason: SDUPTHER

## 2018-01-01 RX ORDER — POLYETHYLENE GLYCOL 3350 17 G/17G
17 POWDER, FOR SOLUTION ORAL DAILY PRN
Status: DISCONTINUED | OUTPATIENT
Start: 2018-01-01 | End: 2018-01-01 | Stop reason: CLARIF

## 2018-01-01 RX ORDER — FUROSEMIDE 40 MG/1
40 TABLET ORAL DAILY
Status: DISCONTINUED | OUTPATIENT
Start: 2018-01-01 | End: 2018-01-01 | Stop reason: HOSPADM

## 2018-01-01 RX ORDER — OYSTER SHELL CALCIUM WITH VITAMIN D 500; 200 MG/1; [IU]/1
1 TABLET, FILM COATED ORAL 2 TIMES DAILY
Status: DISCONTINUED | OUTPATIENT
Start: 2018-01-01 | End: 2018-01-01 | Stop reason: HOSPADM

## 2018-01-01 RX ORDER — AMLODIPINE BESYLATE 5 MG/1
5 TABLET ORAL DAILY
Status: DISCONTINUED | OUTPATIENT
Start: 2018-01-01 | End: 2018-01-01 | Stop reason: HOSPADM

## 2018-01-01 RX ORDER — ESCITALOPRAM OXALATE 20 MG/1
20 TABLET ORAL EVERY MORNING
Status: DISCONTINUED | OUTPATIENT
Start: 2018-01-01 | End: 2018-01-01

## 2018-01-01 RX ORDER — NITROGLYCERIN 40 MG/1
PATCH TRANSDERMAL
Qty: 90 PATCH | Refills: 1 | Status: SHIPPED | OUTPATIENT
Start: 2018-01-01 | End: 2018-01-01 | Stop reason: ALTCHOICE

## 2018-01-01 RX ORDER — ACETAMINOPHEN AND CODEINE PHOSPHATE 300; 30 MG/1; MG/1
1 TABLET ORAL EVERY 6 HOURS PRN
Status: ON HOLD | COMMUNITY
End: 2019-01-01 | Stop reason: HOSPADM

## 2018-01-01 RX ORDER — VALSARTAN 320 MG/1
320 TABLET ORAL DAILY
Status: DISCONTINUED | OUTPATIENT
Start: 2018-01-01 | End: 2018-01-01 | Stop reason: CLARIF

## 2018-01-01 RX ORDER — LOSARTAN POTASSIUM 50 MG/1
50 TABLET ORAL DAILY
Status: DISCONTINUED | OUTPATIENT
Start: 2018-01-01 | End: 2018-01-01 | Stop reason: ALTCHOICE

## 2018-01-01 RX ORDER — SODIUM CHLORIDE 1000 MG
1 TABLET, SOLUBLE MISCELLANEOUS
Status: DISCONTINUED | OUTPATIENT
Start: 2018-01-01 | End: 2018-01-01

## 2018-01-01 RX ORDER — FERROUS SULFATE 325(65) MG
325 TABLET ORAL
Qty: 180 TABLET | Refills: 1 | Status: CANCELLED | OUTPATIENT
Start: 2018-01-01 | End: 2019-01-01

## 2018-01-01 RX ORDER — VALSARTAN 160 MG/1
320 TABLET ORAL DAILY
Status: DISCONTINUED | OUTPATIENT
Start: 2018-01-01 | End: 2018-01-01 | Stop reason: CLARIF

## 2018-01-01 RX ORDER — POLYETHYLENE GLYCOL 3350 17 G/17G
17 POWDER, FOR SOLUTION ORAL DAILY PRN
Status: DISCONTINUED | OUTPATIENT
Start: 2018-01-01 | End: 2018-01-01 | Stop reason: HOSPADM

## 2018-01-01 RX ORDER — FUROSEMIDE 20 MG/1
TABLET ORAL
Qty: 60 TABLET | Refills: 3 | Status: SHIPPED | OUTPATIENT
Start: 2018-01-01 | End: 2018-01-01 | Stop reason: SDUPTHER

## 2018-01-01 RX ORDER — CALCIUM CARBONATE 200(500)MG
500 TABLET,CHEWABLE ORAL 3 TIMES DAILY PRN
Status: DISCONTINUED | OUTPATIENT
Start: 2018-01-01 | End: 2018-01-01 | Stop reason: HOSPADM

## 2018-01-01 RX ORDER — RISPERIDONE 1 MG/1
1 TABLET, FILM COATED ORAL 2 TIMES DAILY
Status: DISCONTINUED | OUTPATIENT
Start: 2018-01-01 | End: 2018-01-01 | Stop reason: HOSPADM

## 2018-01-01 RX ORDER — LEVOFLOXACIN 5 MG/ML
500 INJECTION, SOLUTION INTRAVENOUS ONCE
Status: COMPLETED | OUTPATIENT
Start: 2018-01-01 | End: 2018-01-01

## 2018-01-01 RX ORDER — RISPERIDONE 1 MG/1
1 TABLET, FILM COATED ORAL EVERY MORNING
Status: DISCONTINUED | OUTPATIENT
Start: 2018-01-01 | End: 2018-01-01 | Stop reason: HOSPADM

## 2018-01-01 RX ORDER — ASPIRIN 81 MG/1
81 TABLET ORAL DAILY
Status: DISCONTINUED | OUTPATIENT
Start: 2018-01-01 | End: 2018-01-01

## 2018-01-01 RX ORDER — ASPIRIN 81 MG
TABLET, DELAYED RELEASE (ENTERIC COATED) ORAL
Qty: 90 TABLET | Refills: 3 | Status: ON HOLD | OUTPATIENT
Start: 2018-01-01 | End: 2019-01-01 | Stop reason: HOSPADM

## 2018-01-01 RX ORDER — CYANOCOBALAMIN 1000 UG/ML
1000 INJECTION INTRAMUSCULAR; SUBCUTANEOUS ONCE
Status: COMPLETED | OUTPATIENT
Start: 2018-01-01 | End: 2018-01-01

## 2018-01-01 RX ORDER — POTASSIUM CHLORIDE 750 MG/1
10 TABLET, FILM COATED, EXTENDED RELEASE ORAL DAILY
Qty: 60 TABLET | Refills: 3 | Status: ON HOLD | OUTPATIENT
Start: 2018-01-01 | End: 2018-01-01 | Stop reason: HOSPADM

## 2018-01-01 RX ORDER — ACETAMINOPHEN 325 MG/1
650 TABLET ORAL ONCE
Status: COMPLETED | OUTPATIENT
Start: 2018-01-01 | End: 2018-01-01

## 2018-01-01 RX ORDER — SODIUM CHLORIDE 1000 MG
1 TABLET, SOLUBLE MISCELLANEOUS 2 TIMES DAILY WITH MEALS
Status: DISCONTINUED | OUTPATIENT
Start: 2018-01-01 | End: 2018-01-01 | Stop reason: HOSPADM

## 2018-01-01 RX ORDER — SODIUM CHLORIDE 1000 MG
1 TABLET, SOLUBLE MISCELLANEOUS 2 TIMES DAILY WITH MEALS
Status: DISCONTINUED | OUTPATIENT
Start: 2018-01-01 | End: 2018-01-01

## 2018-01-01 RX ORDER — ROSUVASTATIN CALCIUM 10 MG/1
20 TABLET, COATED ORAL NIGHTLY
Status: DISCONTINUED | OUTPATIENT
Start: 2018-01-01 | End: 2018-01-01 | Stop reason: HOSPADM

## 2018-01-01 RX ORDER — POTASSIUM CHLORIDE 20MEQ/15ML
40 LIQUID (ML) ORAL PRN
Status: DISCONTINUED | OUTPATIENT
Start: 2018-01-01 | End: 2018-01-01 | Stop reason: HOSPADM

## 2018-01-01 RX ORDER — CYCLOSPORINE 0.5 MG/ML
1 EMULSION OPHTHALMIC 2 TIMES DAILY
COMMUNITY
End: 2019-01-01

## 2018-01-01 RX ORDER — FERROUS SULFATE 325(65) MG
325 TABLET ORAL 2 TIMES DAILY
Qty: 180 TABLET | Refills: 1 | Status: SHIPPED | OUTPATIENT
Start: 2018-01-01 | End: 2019-01-01 | Stop reason: SDUPTHER

## 2018-01-01 RX ORDER — 0.9 % SODIUM CHLORIDE 0.9 %
1000 INTRAVENOUS SOLUTION INTRAVENOUS ONCE
Status: COMPLETED | OUTPATIENT
Start: 2018-01-01 | End: 2018-01-01

## 2018-01-01 RX ORDER — AMLODIPINE BESYLATE 10 MG/1
10 TABLET ORAL DAILY
Qty: 30 TABLET | Refills: 3 | Status: SHIPPED | OUTPATIENT
Start: 2018-01-01 | End: 2018-01-01 | Stop reason: SDUPTHER

## 2018-01-01 RX ORDER — ISOSORBIDE DINITRATE 40 MG/1
40 TABLET ORAL 3 TIMES DAILY
Qty: 90 TABLET | Refills: 3 | Status: ON HOLD | OUTPATIENT
Start: 2018-01-01 | End: 2019-01-01 | Stop reason: HOSPADM

## 2018-01-01 RX ORDER — DOCUSATE SODIUM 100 MG/1
CAPSULE, LIQUID FILLED ORAL
Qty: 90 CAPSULE | Refills: 1 | Status: SHIPPED | OUTPATIENT
Start: 2018-01-01 | End: 2019-01-01 | Stop reason: SDUPTHER

## 2018-01-01 RX ORDER — OXCARBAZEPINE 150 MG/1
300 TABLET, FILM COATED ORAL EVERY EVENING
COMMUNITY
End: 2019-01-01 | Stop reason: SDUPTHER

## 2018-01-01 RX ORDER — RISPERIDONE 1 MG/1
1 TABLET, FILM COATED ORAL DAILY
Status: DISCONTINUED | OUTPATIENT
Start: 2018-01-01 | End: 2018-01-01 | Stop reason: HOSPADM

## 2018-01-01 RX ORDER — VALSARTAN 80 MG/1
40 TABLET ORAL DAILY
Status: DISCONTINUED | OUTPATIENT
Start: 2018-01-01 | End: 2018-01-01 | Stop reason: CLARIF

## 2018-01-01 RX ORDER — POTASSIUM CHLORIDE 750 MG/1
10 TABLET, FILM COATED, EXTENDED RELEASE ORAL DAILY
Status: DISCONTINUED | OUTPATIENT
Start: 2018-01-01 | End: 2018-01-01 | Stop reason: HOSPADM

## 2018-01-01 RX ORDER — SPIRONOLACTONE 25 MG/1
25 TABLET ORAL DAILY
Qty: 30 TABLET | Refills: 0 | Status: SHIPPED | OUTPATIENT
Start: 2018-01-01 | End: 2019-01-01 | Stop reason: DRUGHIGH

## 2018-01-01 RX ORDER — HYDRALAZINE HYDROCHLORIDE 25 MG/1
25 TABLET, FILM COATED ORAL ONCE
Status: COMPLETED | OUTPATIENT
Start: 2018-01-01 | End: 2018-01-01

## 2018-01-01 RX ORDER — B-COMPLEX WITH VITAMIN C
TABLET ORAL
Qty: 60 TABLET | Refills: 5 | Status: SHIPPED | OUTPATIENT
Start: 2018-01-01

## 2018-01-01 RX ORDER — POLYVINYL ALCOHOL 14 MG/ML
1 SOLUTION/ DROPS OPHTHALMIC EVERY 6 HOURS PRN
Status: DISCONTINUED | OUTPATIENT
Start: 2018-01-01 | End: 2018-01-01 | Stop reason: SDUPTHER

## 2018-01-01 RX ORDER — SPIRONOLACTONE 25 MG/1
25 TABLET ORAL DAILY
Status: DISCONTINUED | OUTPATIENT
Start: 2018-01-01 | End: 2018-01-01

## 2018-01-01 RX ORDER — METHYLPREDNISOLONE SODIUM SUCCINATE 125 MG/2ML
125 INJECTION, POWDER, LYOPHILIZED, FOR SOLUTION INTRAMUSCULAR; INTRAVENOUS ONCE
Status: COMPLETED | OUTPATIENT
Start: 2018-01-01 | End: 2018-01-01

## 2018-01-01 RX ORDER — FUROSEMIDE 40 MG/1
40 TABLET ORAL 2 TIMES DAILY
Status: DISCONTINUED | OUTPATIENT
Start: 2018-01-01 | End: 2018-01-01 | Stop reason: HOSPADM

## 2018-01-01 RX ORDER — PENICILLIN V POTASSIUM 500 MG/1
500 TABLET ORAL 4 TIMES DAILY
Qty: 40 TABLET | Refills: 0 | Status: SHIPPED | OUTPATIENT
Start: 2018-01-01 | End: 2018-01-01

## 2018-01-01 RX ORDER — ACETAMINOPHEN AND CODEINE PHOSPHATE 300; 30 MG/1; MG/1
1 TABLET ORAL EVERY 6 HOURS PRN
Status: DISCONTINUED | OUTPATIENT
Start: 2018-01-01 | End: 2018-01-01 | Stop reason: RX

## 2018-01-01 RX ORDER — RANOLAZINE 500 MG/1
1000 TABLET, EXTENDED RELEASE ORAL 2 TIMES DAILY
Status: DISCONTINUED | OUTPATIENT
Start: 2018-01-01 | End: 2018-01-01 | Stop reason: HOSPADM

## 2018-01-01 RX ORDER — HYDROXYZINE HYDROCHLORIDE 25 MG/1
25 TABLET, FILM COATED ORAL 3 TIMES DAILY PRN
Status: DISCONTINUED | OUTPATIENT
Start: 2018-01-01 | End: 2018-01-01 | Stop reason: HOSPADM

## 2018-01-01 RX ORDER — POLYETHYLENE GLYCOL 3350 17 G/17G
17 POWDER, FOR SOLUTION ORAL DAILY PRN
Status: ON HOLD | COMMUNITY
End: 2019-01-01 | Stop reason: HOSPADM

## 2018-01-01 RX ORDER — ISOSORBIDE MONONITRATE 60 MG/1
60 TABLET, EXTENDED RELEASE ORAL 2 TIMES DAILY
Status: DISCONTINUED | OUTPATIENT
Start: 2018-01-01 | End: 2018-01-01 | Stop reason: HOSPADM

## 2018-01-01 RX ORDER — FUROSEMIDE 10 MG/ML
40 INJECTION INTRAMUSCULAR; INTRAVENOUS ONCE
Status: DISCONTINUED | OUTPATIENT
Start: 2018-01-01 | End: 2018-01-01

## 2018-01-01 RX ORDER — HYDROCODONE BITARTRATE AND ACETAMINOPHEN 5; 325 MG/1; MG/1
1 TABLET ORAL ONCE
Status: DISCONTINUED | OUTPATIENT
Start: 2018-01-01 | End: 2018-01-01 | Stop reason: HOSPADM

## 2018-01-01 RX ORDER — BUDESONIDE AND FORMOTEROL FUMARATE DIHYDRATE 160; 4.5 UG/1; UG/1
1 AEROSOL RESPIRATORY (INHALATION) 2 TIMES DAILY
Status: DISCONTINUED | OUTPATIENT
Start: 2018-01-01 | End: 2018-01-01 | Stop reason: CLARIF

## 2018-01-01 RX ORDER — METOPROLOL TARTRATE 50 MG/1
75 TABLET, FILM COATED ORAL 2 TIMES DAILY
Qty: 60 TABLET | Refills: 5 | Status: ON HOLD | OUTPATIENT
Start: 2018-01-01 | End: 2018-01-01 | Stop reason: HOSPADM

## 2018-01-01 RX ORDER — VALSARTAN 320 MG/1
TABLET ORAL
Qty: 90 TABLET | Refills: 0 | Status: ON HOLD | OUTPATIENT
Start: 2018-01-01 | End: 2018-01-01 | Stop reason: HOSPADM

## 2018-01-01 RX ORDER — POTASSIUM CHLORIDE 20 MEQ/1
40 TABLET, EXTENDED RELEASE ORAL ONCE
Status: COMPLETED | OUTPATIENT
Start: 2018-01-01 | End: 2018-01-01

## 2018-01-01 RX ORDER — FUROSEMIDE 10 MG/ML
20 INJECTION INTRAMUSCULAR; INTRAVENOUS 2 TIMES DAILY
Status: DISCONTINUED | OUTPATIENT
Start: 2018-01-01 | End: 2018-01-01

## 2018-01-01 RX ORDER — HYDROCODONE BITARTRATE AND ACETAMINOPHEN 5; 325 MG/1; MG/1
1 TABLET ORAL EVERY 6 HOURS PRN
Status: DISCONTINUED | OUTPATIENT
Start: 2018-01-01 | End: 2018-01-01

## 2018-01-01 RX ADMIN — MICONAZOLE NITRATE: 2 POWDER TOPICAL at 08:06

## 2018-01-01 RX ADMIN — ISOSORBIDE MONONITRATE 60 MG: 60 TABLET ORAL at 09:56

## 2018-01-01 RX ADMIN — MORPHINE SULFATE 2 MG: 2 INJECTION, SOLUTION INTRAMUSCULAR; INTRAVENOUS at 07:57

## 2018-01-01 RX ADMIN — RISPERIDONE 1 MG: 1 TABLET ORAL at 09:47

## 2018-01-01 RX ADMIN — LEVOTHYROXINE SODIUM 100 MCG: 100 TABLET ORAL at 06:36

## 2018-01-01 RX ADMIN — FLUTICASONE PROPIONATE 1 SPRAY: 50 SPRAY, METERED NASAL at 09:30

## 2018-01-01 RX ADMIN — HYDRALAZINE HYDROCHLORIDE 50 MG: 50 TABLET, FILM COATED ORAL at 13:24

## 2018-01-01 RX ADMIN — Medication 2 PUFF: at 10:27

## 2018-01-01 RX ADMIN — BACLOFEN 10 MG: 10 TABLET ORAL at 20:35

## 2018-01-01 RX ADMIN — HYDROCODONE BITARTRATE AND ACETAMINOPHEN 2 TABLET: 5; 325 TABLET ORAL at 20:24

## 2018-01-01 RX ADMIN — OXCARBAZEPINE 300 MG: 300 TABLET, FILM COATED ORAL at 17:34

## 2018-01-01 RX ADMIN — Medication 2 PUFF: at 17:46

## 2018-01-01 RX ADMIN — METOPROLOL TARTRATE 2.5 MG: 1 INJECTION, SOLUTION INTRAVENOUS at 05:29

## 2018-01-01 RX ADMIN — DOXEPIN HYDROCHLORIDE 150 MG: 50 CAPSULE ORAL at 00:34

## 2018-01-01 RX ADMIN — FUROSEMIDE 20 MG: 10 INJECTION, SOLUTION INTRAMUSCULAR; INTRAVENOUS at 09:03

## 2018-01-01 RX ADMIN — HYDROCODONE BITARTRATE AND ACETAMINOPHEN 1 TABLET: 5; 325 TABLET ORAL at 02:37

## 2018-01-01 RX ADMIN — Medication 2 PUFF: at 07:45

## 2018-01-01 RX ADMIN — LEVOTHYROXINE SODIUM 100 MCG: 100 TABLET ORAL at 06:06

## 2018-01-01 RX ADMIN — Medication 2 PUFF: at 19:40

## 2018-01-01 RX ADMIN — ROSUVASTATIN CALCIUM 20 MG: 20 TABLET, FILM COATED ORAL at 20:22

## 2018-01-01 RX ADMIN — ESCITALOPRAM OXALATE 10 MG: 10 TABLET, FILM COATED ORAL at 11:22

## 2018-01-01 RX ADMIN — FUROSEMIDE 20 MG: 20 TABLET ORAL at 16:51

## 2018-01-01 RX ADMIN — ALBUTEROL SULFATE 2.5 MG: 2.5 SOLUTION RESPIRATORY (INHALATION) at 19:52

## 2018-01-01 RX ADMIN — POLYETHYLENE GLYCOL 3350 17 G: 17 POWDER, FOR SOLUTION ORAL at 09:56

## 2018-01-01 RX ADMIN — HYDRALAZINE HYDROCHLORIDE 100 MG: 50 TABLET, FILM COATED ORAL at 08:09

## 2018-01-01 RX ADMIN — CLOPIDOGREL BISULFATE 75 MG: 75 TABLET ORAL at 09:47

## 2018-01-01 RX ADMIN — RANOLAZINE 500 MG: 500 TABLET, FILM COATED, EXTENDED RELEASE ORAL at 00:35

## 2018-01-01 RX ADMIN — HYDROCODONE BITARTRATE AND ACETAMINOPHEN 1 TABLET: 5; 325 TABLET ORAL at 13:55

## 2018-01-01 RX ADMIN — HYDROXYZINE HYDROCHLORIDE 25 MG: 25 TABLET ORAL at 21:40

## 2018-01-01 RX ADMIN — HYDRALAZINE HYDROCHLORIDE 100 MG: 50 TABLET, FILM COATED ORAL at 20:14

## 2018-01-01 RX ADMIN — DOCUSATE SODIUM 100 MG: 100 CAPSULE, LIQUID FILLED ORAL at 08:18

## 2018-01-01 RX ADMIN — DOCUSATE SODIUM 100 MG: 100 CAPSULE, LIQUID FILLED ORAL at 08:09

## 2018-01-01 RX ADMIN — SODIUM CHLORIDE: 9 INJECTION, SOLUTION INTRAVENOUS at 00:44

## 2018-01-01 RX ADMIN — NITROGLYCERIN 0.5 INCH: 20 OINTMENT TOPICAL at 06:40

## 2018-01-01 RX ADMIN — LEVOTHYROXINE SODIUM 100 MCG: 100 TABLET ORAL at 05:34

## 2018-01-01 RX ADMIN — HYDROCODONE BITARTRATE AND ACETAMINOPHEN 2 TABLET: 5; 325 TABLET ORAL at 04:51

## 2018-01-01 RX ADMIN — RANOLAZINE 500 MG: 500 TABLET, FILM COATED, EXTENDED RELEASE ORAL at 20:48

## 2018-01-01 RX ADMIN — SODIUM CHLORIDE TAB 1 GM 1 G: 1 TAB at 08:24

## 2018-01-01 RX ADMIN — ENOXAPARIN SODIUM 40 MG: 40 INJECTION SUBCUTANEOUS at 09:24

## 2018-01-01 RX ADMIN — ASPIRIN 81 MG: 81 TABLET, COATED ORAL at 08:09

## 2018-01-01 RX ADMIN — ISOSORBIDE DINITRATE 40 MG: 20 TABLET ORAL at 13:09

## 2018-01-01 RX ADMIN — ESCITALOPRAM OXALATE 10 MG: 10 TABLET, FILM COATED ORAL at 13:53

## 2018-01-01 RX ADMIN — SUCRALFATE 1 G: 1 TABLET ORAL at 09:56

## 2018-01-01 RX ADMIN — HYDRALAZINE HYDROCHLORIDE 100 MG: 50 TABLET, FILM COATED ORAL at 09:31

## 2018-01-01 RX ADMIN — ESCITALOPRAM OXALATE 10 MG: 10 TABLET, FILM COATED ORAL at 08:06

## 2018-01-01 RX ADMIN — ISOSORBIDE DINITRATE 40 MG: 20 TABLET ORAL at 13:14

## 2018-01-01 RX ADMIN — LOSARTAN POTASSIUM 50 MG: 50 TABLET, FILM COATED ORAL at 03:55

## 2018-01-01 RX ADMIN — Medication 1 UNITS: at 13:19

## 2018-01-01 RX ADMIN — Medication 10 ML: at 20:12

## 2018-01-01 RX ADMIN — RANOLAZINE 1000 MG: 500 TABLET, FILM COATED, EXTENDED RELEASE ORAL at 09:06

## 2018-01-01 RX ADMIN — HYDRALAZINE HYDROCHLORIDE 100 MG: 50 TABLET, FILM COATED ORAL at 09:04

## 2018-01-01 RX ADMIN — Medication 10 ML: at 20:58

## 2018-01-01 RX ADMIN — ASPIRIN 81 MG: 81 TABLET, COATED ORAL at 13:52

## 2018-01-01 RX ADMIN — RISPERIDONE 1 MG: 1 TABLET ORAL at 20:01

## 2018-01-01 RX ADMIN — HYDRALAZINE HYDROCHLORIDE 75 MG: 25 TABLET ORAL at 20:01

## 2018-01-01 RX ADMIN — Medication 2 PUFF: at 20:18

## 2018-01-01 RX ADMIN — HYDROXYZINE HYDROCHLORIDE 25 MG: 25 TABLET ORAL at 13:55

## 2018-01-01 RX ADMIN — POLYETHYLENE GLYCOL 3350 17 G: 17 POWDER, FOR SOLUTION ORAL at 09:30

## 2018-01-01 RX ADMIN — HYDRALAZINE HYDROCHLORIDE 100 MG: 50 TABLET, FILM COATED ORAL at 16:52

## 2018-01-01 RX ADMIN — BACLOFEN 10 MG: 10 TABLET ORAL at 20:26

## 2018-01-01 RX ADMIN — Medication 2 PUFF: at 21:02

## 2018-01-01 RX ADMIN — ENOXAPARIN SODIUM 40 MG: 40 INJECTION SUBCUTANEOUS at 09:21

## 2018-01-01 RX ADMIN — HYDROCODONE BITARTRATE AND ACETAMINOPHEN 2 TABLET: 5; 325 TABLET ORAL at 16:52

## 2018-01-01 RX ADMIN — BACLOFEN 10 MG: 10 TABLET ORAL at 09:36

## 2018-01-01 RX ADMIN — MICONAZOLE NITRATE: 2 POWDER TOPICAL at 08:18

## 2018-01-01 RX ADMIN — LEVOTHYROXINE SODIUM 100 MCG: 100 TABLET ORAL at 05:20

## 2018-01-01 RX ADMIN — LEVOTHYROXINE SODIUM 100 MCG: 100 TABLET ORAL at 07:01

## 2018-01-01 RX ADMIN — AMLODIPINE BESYLATE 10 MG: 10 TABLET ORAL at 16:52

## 2018-01-01 RX ADMIN — PANTOPRAZOLE SODIUM 40 MG: 40 TABLET, DELAYED RELEASE ORAL at 06:30

## 2018-01-01 RX ADMIN — HYDROCODONE BITARTRATE AND ACETAMINOPHEN 1 TABLET: 5; 325 TABLET ORAL at 03:55

## 2018-01-01 RX ADMIN — ESCITALOPRAM OXALATE 10 MG: 10 TABLET, FILM COATED ORAL at 09:26

## 2018-01-01 RX ADMIN — SODIUM CHLORIDE: 9 INJECTION, SOLUTION INTRAVENOUS at 17:47

## 2018-01-01 RX ADMIN — FLUTICASONE PROPIONATE 1 SPRAY: 50 SPRAY, METERED NASAL at 08:10

## 2018-01-01 RX ADMIN — DOCUSATE SODIUM 100 MG: 100 CAPSULE, LIQUID FILLED ORAL at 09:37

## 2018-01-01 RX ADMIN — FUROSEMIDE 40 MG: 40 TABLET ORAL at 13:03

## 2018-01-01 RX ADMIN — OXCARBAZEPINE 150 MG: 300 TABLET, FILM COATED ORAL at 09:20

## 2018-01-01 RX ADMIN — HYDROXYZINE HYDROCHLORIDE 25 MG: 25 TABLET ORAL at 08:10

## 2018-01-01 RX ADMIN — CLOPIDOGREL BISULFATE 75 MG: 75 TABLET ORAL at 11:23

## 2018-01-01 RX ADMIN — SUCRALFATE 1 G: 1 TABLET ORAL at 18:31

## 2018-01-01 RX ADMIN — BACLOFEN 10 MG: 10 TABLET ORAL at 21:40

## 2018-01-01 RX ADMIN — RANOLAZINE 500 MG: 500 TABLET, FILM COATED, EXTENDED RELEASE ORAL at 09:29

## 2018-01-01 RX ADMIN — POTASSIUM CHLORIDE 10 MEQ: 750 TABLET, EXTENDED RELEASE ORAL at 09:57

## 2018-01-01 RX ADMIN — POTASSIUM CHLORIDE 10 MEQ: 750 TABLET, FILM COATED, EXTENDED RELEASE ORAL at 08:33

## 2018-01-01 RX ADMIN — OXCARBAZEPINE 150 MG: 300 TABLET ORAL at 08:13

## 2018-01-01 RX ADMIN — HYDROCODONE BITARTRATE AND ACETAMINOPHEN 2 TABLET: 5; 325 TABLET ORAL at 21:04

## 2018-01-01 RX ADMIN — Medication 2 PUFF: at 09:07

## 2018-01-01 RX ADMIN — HYDROXYZINE HYDROCHLORIDE 25 MG: 25 TABLET ORAL at 20:14

## 2018-01-01 RX ADMIN — MICONAZOLE NITRATE: 2 POWDER TOPICAL at 22:05

## 2018-01-01 RX ADMIN — ISOSORBIDE DINITRATE 40 MG: 20 TABLET ORAL at 20:39

## 2018-01-01 RX ADMIN — RISPERIDONE 1 MG: 1 TABLET ORAL at 09:55

## 2018-01-01 RX ADMIN — HYDROCODONE BITARTRATE AND ACETAMINOPHEN 1 TABLET: 5; 325 TABLET ORAL at 20:35

## 2018-01-01 RX ADMIN — OXCARBAZEPINE 300 MG: 300 TABLET, FILM COATED ORAL at 18:08

## 2018-01-01 RX ADMIN — CANDESARTAN CILEXETIL 32 MG: 16 TABLET ORAL at 09:26

## 2018-01-01 RX ADMIN — AMLODIPINE BESYLATE 10 MG: 10 TABLET ORAL at 08:09

## 2018-01-01 RX ADMIN — ASPIRIN 81 MG: 81 TABLET, COATED ORAL at 08:08

## 2018-01-01 RX ADMIN — INSULIN LISPRO 1 UNITS: 100 INJECTION, SOLUTION INTRAVENOUS; SUBCUTANEOUS at 20:26

## 2018-01-01 RX ADMIN — HYDROXYZINE HYDROCHLORIDE 25 MG: 25 TABLET ORAL at 00:16

## 2018-01-01 RX ADMIN — ROSUVASTATIN CALCIUM 20 MG: 10 TABLET, FILM COATED ORAL at 20:01

## 2018-01-01 RX ADMIN — METOPROLOL TARTRATE 100 MG: 100 TABLET, FILM COATED ORAL at 06:39

## 2018-01-01 RX ADMIN — CLOPIDOGREL BISULFATE 75 MG: 75 TABLET ORAL at 09:23

## 2018-01-01 RX ADMIN — BACLOFEN 10 MG: 10 TABLET ORAL at 20:28

## 2018-01-01 RX ADMIN — HYDROCODONE BITARTRATE AND ACETAMINOPHEN 2 TABLET: 5; 325 TABLET ORAL at 12:41

## 2018-01-01 RX ADMIN — ISOSORBIDE DINITRATE 40 MG: 20 TABLET ORAL at 20:48

## 2018-01-01 RX ADMIN — RANOLAZINE 500 MG: 500 TABLET, FILM COATED, EXTENDED RELEASE ORAL at 08:23

## 2018-01-01 RX ADMIN — Medication 10 ML: at 20:27

## 2018-01-01 RX ADMIN — DOXEPIN HYDROCHLORIDE 150 MG: 50 CAPSULE ORAL at 20:40

## 2018-01-01 RX ADMIN — CLOPIDOGREL BISULFATE 75 MG: 75 TABLET ORAL at 09:43

## 2018-01-01 RX ADMIN — OXCARBAZEPINE 300 MG: 300 TABLET, FILM COATED ORAL at 17:36

## 2018-01-01 RX ADMIN — FUROSEMIDE 40 MG: 10 INJECTION, SOLUTION INTRAMUSCULAR; INTRAVENOUS at 17:41

## 2018-01-01 RX ADMIN — RANOLAZINE 500 MG: 500 TABLET, FILM COATED, EXTENDED RELEASE ORAL at 20:09

## 2018-01-01 RX ADMIN — HYDROXYZINE HYDROCHLORIDE 25 MG: 25 TABLET ORAL at 13:09

## 2018-01-01 RX ADMIN — ISOSORBIDE DINITRATE 40 MG: 20 TABLET ORAL at 16:52

## 2018-01-01 RX ADMIN — DOCUSATE SODIUM 100 MG: 100 CAPSULE, LIQUID FILLED ORAL at 20:01

## 2018-01-01 RX ADMIN — RANOLAZINE 500 MG: 500 TABLET, FILM COATED, EXTENDED RELEASE ORAL at 21:39

## 2018-01-01 RX ADMIN — ESCITALOPRAM OXALATE 20 MG: 20 TABLET, FILM COATED ORAL at 09:55

## 2018-01-01 RX ADMIN — Medication 10 ML: at 20:52

## 2018-01-01 RX ADMIN — ISOSORBIDE DINITRATE 40 MG: 20 TABLET ORAL at 09:26

## 2018-01-01 RX ADMIN — Medication 2 PUFF: at 08:43

## 2018-01-01 RX ADMIN — SODIUM CHLORIDE TAB 1 GM 1 G: 1 TAB at 17:01

## 2018-01-01 RX ADMIN — HYDROCODONE BITARTRATE AND ACETAMINOPHEN 2 TABLET: 5; 325 TABLET ORAL at 01:27

## 2018-01-01 RX ADMIN — NALOXEGOL OXALATE 12.5 MG: 12.5 TABLET, FILM COATED ORAL at 09:28

## 2018-01-01 RX ADMIN — LEVOTHYROXINE SODIUM 100 MCG: 100 TABLET ORAL at 05:15

## 2018-01-01 RX ADMIN — SODIUM CHLORIDE: 9 INJECTION, SOLUTION INTRAVENOUS at 13:47

## 2018-01-01 RX ADMIN — ACETAMINOPHEN 650 MG: 325 TABLET ORAL at 06:27

## 2018-01-01 RX ADMIN — OXCARBAZEPINE 150 MG: 300 TABLET, FILM COATED ORAL at 09:22

## 2018-01-01 RX ADMIN — HYDROXYZINE HYDROCHLORIDE 25 MG: 25 TABLET ORAL at 13:47

## 2018-01-01 RX ADMIN — FERROUS SULFATE TAB 325 MG (65 MG ELEMENTAL FE) 325 MG: 325 (65 FE) TAB at 20:37

## 2018-01-01 RX ADMIN — METOPROLOL TARTRATE 100 MG: 100 TABLET, FILM COATED ORAL at 08:17

## 2018-01-01 RX ADMIN — DOCUSATE SODIUM 300 MG: 100 CAPSULE, LIQUID FILLED ORAL at 22:08

## 2018-01-01 RX ADMIN — RANOLAZINE 500 MG: 500 TABLET, FILM COATED, EXTENDED RELEASE ORAL at 08:33

## 2018-01-01 RX ADMIN — RANOLAZINE 500 MG: 500 TABLET, FILM COATED, EXTENDED RELEASE ORAL at 09:21

## 2018-01-01 RX ADMIN — HYDROXYZINE HYDROCHLORIDE 25 MG: 25 TABLET ORAL at 09:47

## 2018-01-01 RX ADMIN — OXCARBAZEPINE 150 MG: 300 TABLET ORAL at 20:13

## 2018-01-01 RX ADMIN — NITROGLYCERIN 0.5 INCH: 20 OINTMENT TOPICAL at 12:56

## 2018-01-01 RX ADMIN — PANTOPRAZOLE SODIUM 40 MG: 40 TABLET, DELAYED RELEASE ORAL at 06:26

## 2018-01-01 RX ADMIN — RISPERIDONE 1 MG: 1 TABLET ORAL at 10:42

## 2018-01-01 RX ADMIN — OXCARBAZEPINE 150 MG: 300 TABLET ORAL at 08:09

## 2018-01-01 RX ADMIN — ACETAMINOPHEN 650 MG: 325 TABLET ORAL at 04:27

## 2018-01-01 RX ADMIN — HYDROCODONE BITARTRATE AND ACETAMINOPHEN 2 TABLET: 5; 325 TABLET ORAL at 11:11

## 2018-01-01 RX ADMIN — ISOSORBIDE DINITRATE 40 MG: 20 TABLET ORAL at 09:28

## 2018-01-01 RX ADMIN — SUCRALFATE 1 G: 1 TABLET ORAL at 16:51

## 2018-01-01 RX ADMIN — METOPROLOL TARTRATE 75 MG: 25 TABLET ORAL at 20:12

## 2018-01-01 RX ADMIN — CANDESARTAN CILEXETIL 32 MG: 16 TABLET ORAL at 09:03

## 2018-01-01 RX ADMIN — METOPROLOL TARTRATE 100 MG: 100 TABLET, FILM COATED ORAL at 09:26

## 2018-01-01 RX ADMIN — RISPERIDONE 1 MG: 1 TABLET ORAL at 13:52

## 2018-01-01 RX ADMIN — HYDROCODONE BITARTRATE AND ACETAMINOPHEN 1 TABLET: 5; 325 TABLET ORAL at 22:43

## 2018-01-01 RX ADMIN — HYDROXYZINE HYDROCHLORIDE 25 MG: 25 TABLET ORAL at 14:25

## 2018-01-01 RX ADMIN — DILTIAZEM HYDROCHLORIDE 5 MG/HR: 5 INJECTION INTRAVENOUS at 07:01

## 2018-01-01 RX ADMIN — SPIRONOLACTONE 25 MG: 25 TABLET, FILM COATED ORAL at 09:29

## 2018-01-01 RX ADMIN — PANTOPRAZOLE SODIUM 40 MG: 40 TABLET, DELAYED RELEASE ORAL at 05:34

## 2018-01-01 RX ADMIN — FERROUS SULFATE TAB 325 MG (65 MG ELEMENTAL FE) 325 MG: 325 (65 FE) TAB at 22:08

## 2018-01-01 RX ADMIN — FUROSEMIDE 20 MG: 10 INJECTION, SOLUTION INTRAMUSCULAR; INTRAVENOUS at 21:41

## 2018-01-01 RX ADMIN — METOPROLOL TARTRATE 100 MG: 100 TABLET, FILM COATED ORAL at 20:52

## 2018-01-01 RX ADMIN — CLOPIDOGREL BISULFATE 75 MG: 75 TABLET ORAL at 09:57

## 2018-01-01 RX ADMIN — SPIRONOLACTONE 25 MG: 25 TABLET, FILM COATED ORAL at 09:20

## 2018-01-01 RX ADMIN — ENOXAPARIN SODIUM 40 MG: 40 INJECTION SUBCUTANEOUS at 13:53

## 2018-01-01 RX ADMIN — HYDROXYZINE HYDROCHLORIDE 25 MG: 25 TABLET ORAL at 10:42

## 2018-01-01 RX ADMIN — ROSUVASTATIN CALCIUM 20 MG: 20 TABLET, FILM COATED ORAL at 20:09

## 2018-01-01 RX ADMIN — Medication 1 UNITS: at 20:14

## 2018-01-01 RX ADMIN — FLUTICASONE PROPIONATE 1 SPRAY: 50 SPRAY, METERED NASAL at 09:21

## 2018-01-01 RX ADMIN — DOCUSATE SODIUM 100 MG: 100 CAPSULE, LIQUID FILLED ORAL at 20:14

## 2018-01-01 RX ADMIN — RANOLAZINE 500 MG: 500 TABLET, FILM COATED, EXTENDED RELEASE ORAL at 20:39

## 2018-01-01 RX ADMIN — NYSTATIN: 100000 CREAM TOPICAL at 09:29

## 2018-01-01 RX ADMIN — NITROGLYCERIN 0.5 INCH: 20 OINTMENT TOPICAL at 00:54

## 2018-01-01 RX ADMIN — ACETAMINOPHEN 500 MG: 500 TABLET, FILM COATED ORAL at 23:34

## 2018-01-01 RX ADMIN — ISOSORBIDE DINITRATE 40 MG: 20 TABLET ORAL at 13:53

## 2018-01-01 RX ADMIN — Medication 10 ML: at 11:27

## 2018-01-01 RX ADMIN — OXCARBAZEPINE 300 MG: 300 TABLET, FILM COATED ORAL at 22:50

## 2018-01-01 RX ADMIN — Medication 2 PUFF: at 10:24

## 2018-01-01 RX ADMIN — Medication 2 PUFF: at 09:09

## 2018-01-01 RX ADMIN — RANOLAZINE 500 MG: 500 TABLET, FILM COATED, EXTENDED RELEASE ORAL at 08:29

## 2018-01-01 RX ADMIN — METOPROLOL TARTRATE 75 MG: 25 TABLET ORAL at 20:14

## 2018-01-01 RX ADMIN — METOPROLOL TARTRATE 75 MG: 25 TABLET ORAL at 09:05

## 2018-01-01 RX ADMIN — NYSTATIN: 100000 CREAM TOPICAL at 20:35

## 2018-01-01 RX ADMIN — NITROGLYCERIN 0.5 INCH: 20 OINTMENT TOPICAL at 13:15

## 2018-01-01 RX ADMIN — PANTOPRAZOLE SODIUM 40 MG: 40 TABLET, DELAYED RELEASE ORAL at 06:36

## 2018-01-01 RX ADMIN — HYDROXYZINE HYDROCHLORIDE 25 MG: 25 TABLET ORAL at 14:00

## 2018-01-01 RX ADMIN — Medication 1 UNITS: at 12:41

## 2018-01-01 RX ADMIN — Medication 2 PUFF: at 10:02

## 2018-01-01 RX ADMIN — ESCITALOPRAM OXALATE 10 MG: 10 TABLET, FILM COATED ORAL at 09:36

## 2018-01-01 RX ADMIN — METOPROLOL TARTRATE 100 MG: 100 TABLET, FILM COATED ORAL at 20:23

## 2018-01-01 RX ADMIN — NITROGLYCERIN 0.5 INCH: 20 OINTMENT TOPICAL at 18:40

## 2018-01-01 RX ADMIN — ROSUVASTATIN CALCIUM 20 MG: 20 TABLET, FILM COATED ORAL at 20:36

## 2018-01-01 RX ADMIN — IPRATROPIUM BROMIDE AND ALBUTEROL SULFATE 1 AMPULE: .5; 3 SOLUTION RESPIRATORY (INHALATION) at 16:50

## 2018-01-01 RX ADMIN — HYDROCODONE BITARTRATE AND ACETAMINOPHEN 2 TABLET: 5; 325 TABLET ORAL at 05:15

## 2018-01-01 RX ADMIN — OXCARBAZEPINE 150 MG: 300 TABLET, FILM COATED ORAL at 09:27

## 2018-01-01 RX ADMIN — CYANOCOBALAMIN 1000 MCG: 1000 INJECTION, SOLUTION INTRAMUSCULAR; SUBCUTANEOUS at 09:43

## 2018-01-01 RX ADMIN — CLOTRIMAZOLE AND BETAMETHASONE DIPROPIONATE: 10; .5 CREAM TOPICAL at 09:37

## 2018-01-01 RX ADMIN — HYDROCODONE BITARTRATE AND ACETAMINOPHEN 2 TABLET: 5; 325 TABLET ORAL at 08:18

## 2018-01-01 RX ADMIN — NYSTATIN: 100000 CREAM TOPICAL at 08:10

## 2018-01-01 RX ADMIN — HYDROXYZINE HYDROCHLORIDE 25 MG: 25 TABLET ORAL at 01:27

## 2018-01-01 RX ADMIN — RANOLAZINE 500 MG: 500 TABLET, FILM COATED, EXTENDED RELEASE ORAL at 08:10

## 2018-01-01 RX ADMIN — OXCARBAZEPINE 150 MG: 300 TABLET ORAL at 20:25

## 2018-01-01 RX ADMIN — DOCUSATE SODIUM 100 MG: 100 CAPSULE, LIQUID FILLED ORAL at 21:36

## 2018-01-01 RX ADMIN — ISOSORBIDE DINITRATE 40 MG: 20 TABLET ORAL at 13:47

## 2018-01-01 RX ADMIN — POTASSIUM CHLORIDE 10 MEQ: 750 TABLET, FILM COATED, EXTENDED RELEASE ORAL at 08:16

## 2018-01-01 RX ADMIN — HYDROXYZINE HYDROCHLORIDE 25 MG: 25 TABLET ORAL at 09:24

## 2018-01-01 RX ADMIN — LEVOTHYROXINE SODIUM 100 MCG: 100 TABLET ORAL at 05:51

## 2018-01-01 RX ADMIN — SODIUM CHLORIDE TAB 1 GM 1 G: 1 TAB at 08:04

## 2018-01-01 RX ADMIN — FERROUS SULFATE TAB 325 MG (65 MG ELEMENTAL FE) 325 MG: 325 (65 FE) TAB at 20:40

## 2018-01-01 RX ADMIN — INSULIN LISPRO 1 UNITS: 100 INJECTION, SOLUTION INTRAVENOUS; SUBCUTANEOUS at 22:06

## 2018-01-01 RX ADMIN — ISOSORBIDE DINITRATE 40 MG: 20 TABLET ORAL at 08:15

## 2018-01-01 RX ADMIN — OXCARBAZEPINE 150 MG: 300 TABLET ORAL at 08:11

## 2018-01-01 RX ADMIN — CARBOXYMETHYLCELLULOSE SODIUM 1 DROP: 5 SOLUTION/ DROPS OPHTHALMIC at 20:10

## 2018-01-01 RX ADMIN — CLOTRIMAZOLE AND BETAMETHASONE DIPROPIONATE: 10; .5 CREAM TOPICAL at 21:41

## 2018-01-01 RX ADMIN — CANDESARTAN CILEXETIL 32 MG: 16 TABLET ORAL at 08:17

## 2018-01-01 RX ADMIN — RANOLAZINE 500 MG: 500 TABLET, FILM COATED, EXTENDED RELEASE ORAL at 22:04

## 2018-01-01 RX ADMIN — LOSARTAN POTASSIUM 50 MG: 50 TABLET, FILM COATED ORAL at 09:05

## 2018-01-01 RX ADMIN — METOPROLOL TARTRATE 75 MG: 25 TABLET ORAL at 08:34

## 2018-01-01 RX ADMIN — HYDRALAZINE HYDROCHLORIDE 100 MG: 50 TABLET, FILM COATED ORAL at 20:28

## 2018-01-01 RX ADMIN — DOCUSATE SODIUM 100 MG: 100 CAPSULE, LIQUID FILLED ORAL at 22:24

## 2018-01-01 RX ADMIN — CALCIUM CARBONATE-VITAMIN D TAB 500 MG-200 UNIT 1 TABLET: 500-200 TAB at 09:33

## 2018-01-01 RX ADMIN — HYDRALAZINE HYDROCHLORIDE 100 MG: 50 TABLET, FILM COATED ORAL at 03:56

## 2018-01-01 RX ADMIN — ENOXAPARIN SODIUM 40 MG: 40 INJECTION SUBCUTANEOUS at 08:17

## 2018-01-01 RX ADMIN — RISPERIDONE 1 MG: 1 TABLET ORAL at 09:33

## 2018-01-01 RX ADMIN — Medication 10 ML: at 20:41

## 2018-01-01 RX ADMIN — ASPIRIN 81 MG: 81 TABLET ORAL at 08:16

## 2018-01-01 RX ADMIN — HYDROXYZINE HYDROCHLORIDE 25 MG: 25 TABLET ORAL at 09:21

## 2018-01-01 RX ADMIN — HYDROXYZINE HYDROCHLORIDE 25 MG: 25 TABLET ORAL at 14:32

## 2018-01-01 RX ADMIN — DOCUSATE SODIUM 300 MG: 100 CAPSULE, LIQUID FILLED ORAL at 20:36

## 2018-01-01 RX ADMIN — ATORVASTATIN CALCIUM 80 MG: 80 TABLET, FILM COATED ORAL at 20:12

## 2018-01-01 RX ADMIN — IOPAMIDOL 100 ML: 755 INJECTION, SOLUTION INTRAVENOUS at 15:24

## 2018-01-01 RX ADMIN — Medication 2 UNITS: at 09:30

## 2018-01-01 RX ADMIN — ISOSORBIDE DINITRATE 40 MG: 20 TABLET ORAL at 20:22

## 2018-01-01 RX ADMIN — DOCUSATE SODIUM 100 MG: 100 CAPSULE, LIQUID FILLED ORAL at 00:34

## 2018-01-01 RX ADMIN — MULTIPLE VITAMINS W/ MINERALS TAB 1 TABLET: TAB at 08:13

## 2018-01-01 RX ADMIN — SODIUM CHLORIDE: 9 INJECTION, SOLUTION INTRAVENOUS at 18:09

## 2018-01-01 RX ADMIN — ISOSORBIDE DINITRATE 40 MG: 20 TABLET ORAL at 09:49

## 2018-01-01 RX ADMIN — IOPAMIDOL 80 ML: 755 INJECTION, SOLUTION INTRAVENOUS at 18:00

## 2018-01-01 RX ADMIN — METOPROLOL TARTRATE 100 MG: 100 TABLET, FILM COATED ORAL at 09:21

## 2018-01-01 RX ADMIN — DOCUSATE SODIUM 100 MG: 100 CAPSULE, LIQUID FILLED ORAL at 09:02

## 2018-01-01 RX ADMIN — ASPIRIN 81 MG: 81 TABLET, COATED ORAL at 08:10

## 2018-01-01 RX ADMIN — NYSTATIN: 100000 CREAM TOPICAL at 20:29

## 2018-01-01 RX ADMIN — HYDRALAZINE HYDROCHLORIDE 100 MG: 50 TABLET, FILM COATED ORAL at 00:21

## 2018-01-01 RX ADMIN — Medication 1 UNITS: at 09:05

## 2018-01-01 RX ADMIN — FUROSEMIDE 40 MG: 10 INJECTION, SOLUTION INTRAMUSCULAR; INTRAVENOUS at 17:46

## 2018-01-01 RX ADMIN — INSULIN LISPRO 1 UNITS: 100 INJECTION, SOLUTION INTRAVENOUS; SUBCUTANEOUS at 22:15

## 2018-01-01 RX ADMIN — RISPERIDONE 1 MG: 1 TABLET ORAL at 09:20

## 2018-01-01 RX ADMIN — ASPIRIN 81 MG: 81 TABLET, COATED ORAL at 09:23

## 2018-01-01 RX ADMIN — ALBUTEROL SULFATE 2.5 MG: 2.5 SOLUTION RESPIRATORY (INHALATION) at 08:56

## 2018-01-01 RX ADMIN — HYDROXYZINE HYDROCHLORIDE 25 MG: 25 TABLET ORAL at 08:09

## 2018-01-01 RX ADMIN — DOCUSATE SODIUM 100 MG: 100 CAPSULE, LIQUID FILLED ORAL at 21:40

## 2018-01-01 RX ADMIN — ACETAMINOPHEN 650 MG: 325 TABLET ORAL at 17:33

## 2018-01-01 RX ADMIN — MICONAZOLE NITRATE: 2 POWDER TOPICAL at 08:13

## 2018-01-01 RX ADMIN — BACLOFEN 10 MG: 10 TABLET ORAL at 08:10

## 2018-01-01 RX ADMIN — HYDROCODONE BITARTRATE AND ACETAMINOPHEN 1 TABLET: 5; 325 TABLET ORAL at 09:49

## 2018-01-01 RX ADMIN — Medication 2 PUFF: at 20:14

## 2018-01-01 RX ADMIN — SODIUM CHLORIDE TAB 1 GM 1 G: 1 TAB at 08:48

## 2018-01-01 RX ADMIN — FLUTICASONE PROPIONATE 1 SPRAY: 50 SPRAY, METERED NASAL at 08:35

## 2018-01-01 RX ADMIN — PANTOPRAZOLE SODIUM 40 MG: 40 TABLET, DELAYED RELEASE ORAL at 08:54

## 2018-01-01 RX ADMIN — FERROUS SULFATE TAB 325 MG (65 MG ELEMENTAL FE) 325 MG: 325 (65 FE) TAB at 20:22

## 2018-01-01 RX ADMIN — ROSUVASTATIN CALCIUM 20 MG: 20 TABLET, FILM COATED ORAL at 22:23

## 2018-01-01 RX ADMIN — METOPROLOL TARTRATE 100 MG: 100 TABLET, FILM COATED ORAL at 09:04

## 2018-01-01 RX ADMIN — HYDROCODONE BITARTRATE AND ACETAMINOPHEN 2 TABLET: 5; 325 TABLET ORAL at 13:41

## 2018-01-01 RX ADMIN — SODIUM CHLORIDE TAB 1 GM 1 G: 1 TAB at 09:26

## 2018-01-01 RX ADMIN — FUROSEMIDE 20 MG: 20 TABLET ORAL at 10:41

## 2018-01-01 RX ADMIN — ESCITALOPRAM 20 MG: 20 TABLET, FILM COATED ORAL at 09:33

## 2018-01-01 RX ADMIN — ROSUVASTATIN CALCIUM 20 MG: 20 TABLET, FILM COATED ORAL at 20:40

## 2018-01-01 RX ADMIN — ASPIRIN 81 MG: 81 TABLET, COATED ORAL at 09:21

## 2018-01-01 RX ADMIN — NYSTATIN: 100000 CREAM TOPICAL at 20:51

## 2018-01-01 RX ADMIN — CIPROFLOXACIN 500 MG: 500 TABLET, FILM COATED ORAL at 08:29

## 2018-01-01 RX ADMIN — ENOXAPARIN SODIUM 40 MG: 40 INJECTION SUBCUTANEOUS at 09:29

## 2018-01-01 RX ADMIN — ESCITALOPRAM OXALATE 10 MG: 10 TABLET, FILM COATED ORAL at 09:28

## 2018-01-01 RX ADMIN — ACETAMINOPHEN 650 MG: 325 TABLET ORAL at 04:49

## 2018-01-01 RX ADMIN — OXCARBAZEPINE 150 MG: 300 TABLET, FILM COATED ORAL at 09:48

## 2018-01-01 RX ADMIN — MULTIPLE VITAMINS W/ MINERALS TAB 1 TABLET: TAB at 09:47

## 2018-01-01 RX ADMIN — CALCIUM GLUCONATE 2 G: 98 INJECTION, SOLUTION INTRAVENOUS at 12:07

## 2018-01-01 RX ADMIN — RISPERIDONE 1 MG: 1 TABLET ORAL at 08:23

## 2018-01-01 RX ADMIN — RANOLAZINE 500 MG: 500 TABLET, FILM COATED, EXTENDED RELEASE ORAL at 10:42

## 2018-01-01 RX ADMIN — BACLOFEN 10 MG: 10 TABLET ORAL at 09:21

## 2018-01-01 RX ADMIN — PANTOPRAZOLE SODIUM 40 MG: 40 TABLET, DELAYED RELEASE ORAL at 05:51

## 2018-01-01 RX ADMIN — HYDROCODONE BITARTRATE AND ACETAMINOPHEN 2 TABLET: 5; 325 TABLET ORAL at 09:29

## 2018-01-01 RX ADMIN — NITROGLYCERIN 0.5 INCH: 20 OINTMENT TOPICAL at 12:42

## 2018-01-01 RX ADMIN — FUROSEMIDE 40 MG: 10 INJECTION, SOLUTION INTRAMUSCULAR; INTRAVENOUS at 08:09

## 2018-01-01 RX ADMIN — RANOLAZINE 500 MG: 500 TABLET, FILM COATED, EXTENDED RELEASE ORAL at 20:52

## 2018-01-01 RX ADMIN — HYDROCODONE BITARTRATE AND ACETAMINOPHEN 1 TABLET: 5; 325 TABLET ORAL at 10:56

## 2018-01-01 RX ADMIN — PANTOPRAZOLE SODIUM 40 MG: 40 TABLET, DELAYED RELEASE ORAL at 05:15

## 2018-01-01 RX ADMIN — ISOSORBIDE DINITRATE 40 MG: 20 TABLET ORAL at 00:16

## 2018-01-01 RX ADMIN — Medication 2 PUFF: at 21:09

## 2018-01-01 RX ADMIN — ISOSORBIDE DINITRATE 40 MG: 20 TABLET ORAL at 13:02

## 2018-01-01 RX ADMIN — Medication 2 PUFF: at 20:49

## 2018-01-01 RX ADMIN — AMLODIPINE BESYLATE 10 MG: 10 TABLET ORAL at 08:48

## 2018-01-01 RX ADMIN — METOPROLOL TARTRATE 2.5 MG: 5 INJECTION, SOLUTION INTRAVENOUS at 01:21

## 2018-01-01 RX ADMIN — OXCARBAZEPINE 300 MG: 300 TABLET ORAL at 20:01

## 2018-01-01 RX ADMIN — CARBOXYMETHYLCELLULOSE SODIUM 1 DROP: 5 SOLUTION/ DROPS OPHTHALMIC at 20:18

## 2018-01-01 RX ADMIN — OXCARBAZEPINE 150 MG: 300 TABLET, FILM COATED ORAL at 08:24

## 2018-01-01 RX ADMIN — ISOSORBIDE DINITRATE 40 MG: 20 TABLET ORAL at 09:21

## 2018-01-01 RX ADMIN — CLOTRIMAZOLE AND BETAMETHASONE DIPROPIONATE: 10; .5 CREAM TOPICAL at 20:35

## 2018-01-01 RX ADMIN — Medication 2 PUFF: at 08:01

## 2018-01-01 RX ADMIN — CANDESARTAN CILEXETIL 32 MG: 16 TABLET ORAL at 08:23

## 2018-01-01 RX ADMIN — DOCUSATE SODIUM 100 MG: 100 CAPSULE, LIQUID FILLED ORAL at 20:19

## 2018-01-01 RX ADMIN — ISOSORBIDE MONONITRATE 60 MG: 60 TABLET ORAL at 20:26

## 2018-01-01 RX ADMIN — Medication 1 UNITS: at 18:13

## 2018-01-01 RX ADMIN — HYDROXYZINE HYDROCHLORIDE 25 MG: 25 TABLET ORAL at 03:55

## 2018-01-01 RX ADMIN — Medication 1 UNITS: at 09:12

## 2018-01-01 RX ADMIN — METOPROLOL TARTRATE 100 MG: 100 TABLET, FILM COATED ORAL at 09:36

## 2018-01-01 RX ADMIN — Medication 10 ML: at 22:23

## 2018-01-01 RX ADMIN — ISOSORBIDE MONONITRATE 60 MG: 60 TABLET ORAL at 20:14

## 2018-01-01 RX ADMIN — CALCIUM 1000 MG: 500 TABLET ORAL at 04:44

## 2018-01-01 RX ADMIN — MULTIPLE VITAMINS W/ MINERALS TAB 1 TABLET: TAB at 09:21

## 2018-01-01 RX ADMIN — HYDROXYZINE HYDROCHLORIDE 25 MG: 25 TABLET ORAL at 14:33

## 2018-01-01 RX ADMIN — NITROGLYCERIN 0.5 INCH: 20 OINTMENT TOPICAL at 01:20

## 2018-01-01 RX ADMIN — HYDRALAZINE HYDROCHLORIDE 100 MG: 50 TABLET, FILM COATED ORAL at 16:38

## 2018-01-01 RX ADMIN — HYDROXYZINE HYDROCHLORIDE 25 MG: 25 TABLET ORAL at 11:56

## 2018-01-01 RX ADMIN — OXCARBAZEPINE 300 MG: 300 TABLET, FILM COATED ORAL at 00:16

## 2018-01-01 RX ADMIN — HYDROCODONE BITARTRATE AND ACETAMINOPHEN 1 TABLET: 5; 325 TABLET ORAL at 14:17

## 2018-01-01 RX ADMIN — RISPERIDONE 1.5 MG: 1 TABLET ORAL at 20:12

## 2018-01-01 RX ADMIN — FLUTICASONE PROPIONATE 1 SPRAY: 50 SPRAY, METERED NASAL at 10:40

## 2018-01-01 RX ADMIN — DOCUSATE SODIUM 100 MG: 100 CAPSULE, LIQUID FILLED ORAL at 11:22

## 2018-01-01 RX ADMIN — Medication 2 PUFF: at 10:04

## 2018-01-01 RX ADMIN — IRON SUCROSE 300 MG: 20 INJECTION, SOLUTION INTRAVENOUS at 09:28

## 2018-01-01 RX ADMIN — HYDROXYZINE HYDROCHLORIDE 25 MG: 25 TABLET ORAL at 20:39

## 2018-01-01 RX ADMIN — HYDROCODONE BITARTRATE AND ACETAMINOPHEN 2 TABLET: 5; 325 TABLET ORAL at 15:30

## 2018-01-01 RX ADMIN — PANTOPRAZOLE SODIUM 40 MG: 40 TABLET, DELAYED RELEASE ORAL at 05:20

## 2018-01-01 RX ADMIN — ESCITALOPRAM OXALATE 10 MG: 10 TABLET, FILM COATED ORAL at 10:41

## 2018-01-01 RX ADMIN — Medication 2 PUFF: at 08:44

## 2018-01-01 RX ADMIN — CARBOXYMETHYLCELLULOSE SODIUM 1 DROP: 5 SOLUTION/ DROPS OPHTHALMIC at 08:04

## 2018-01-01 RX ADMIN — BACLOFEN 10 MG: 10 TABLET ORAL at 22:08

## 2018-01-01 RX ADMIN — HYDROCODONE BITARTRATE AND ACETAMINOPHEN 2 TABLET: 5; 325 TABLET ORAL at 10:31

## 2018-01-01 RX ADMIN — RISPERIDONE 1 MG: 1 TABLET ORAL at 08:33

## 2018-01-01 RX ADMIN — HYDRALAZINE HYDROCHLORIDE 100 MG: 50 TABLET, FILM COATED ORAL at 01:20

## 2018-01-01 RX ADMIN — SPIRONOLACTONE 25 MG: 25 TABLET, FILM COATED ORAL at 09:35

## 2018-01-01 RX ADMIN — METOPROLOL TARTRATE 75 MG: 25 TABLET ORAL at 20:30

## 2018-01-01 RX ADMIN — DOCUSATE SODIUM 100 MG: 100 CAPSULE, LIQUID FILLED ORAL at 20:23

## 2018-01-01 RX ADMIN — MICONAZOLE NITRATE: 2 POWDER TOPICAL at 22:24

## 2018-01-01 RX ADMIN — METOPROLOL TARTRATE 100 MG: 100 TABLET, FILM COATED ORAL at 09:49

## 2018-01-01 RX ADMIN — CLOPIDOGREL BISULFATE 75 MG: 75 TABLET ORAL at 09:28

## 2018-01-01 RX ADMIN — ACETAMINOPHEN 650 MG: 325 TABLET ORAL at 20:29

## 2018-01-01 RX ADMIN — MICONAZOLE NITRATE: 2 POWDER TOPICAL at 20:52

## 2018-01-01 RX ADMIN — AMLODIPINE BESYLATE 5 MG: 5 TABLET ORAL at 15:50

## 2018-01-01 RX ADMIN — RANOLAZINE 500 MG: 500 TABLET, FILM COATED, EXTENDED RELEASE ORAL at 22:09

## 2018-01-01 RX ADMIN — OXCARBAZEPINE 150 MG: 300 TABLET ORAL at 09:08

## 2018-01-01 RX ADMIN — RANOLAZINE 1000 MG: 500 TABLET, FILM COATED, EXTENDED RELEASE ORAL at 20:28

## 2018-01-01 RX ADMIN — INSULIN LISPRO 1 UNITS: 100 INJECTION, SOLUTION INTRAVENOUS; SUBCUTANEOUS at 09:54

## 2018-01-01 RX ADMIN — Medication 10 ML: at 09:24

## 2018-01-01 RX ADMIN — SUCRALFATE 1 G: 1 TABLET ORAL at 00:35

## 2018-01-01 RX ADMIN — IPRATROPIUM BROMIDE AND ALBUTEROL SULFATE 1 AMPULE: .5; 3 SOLUTION RESPIRATORY (INHALATION) at 17:04

## 2018-01-01 RX ADMIN — SODIUM CHLORIDE: 4.5 INJECTION, SOLUTION INTRAVENOUS at 04:01

## 2018-01-01 RX ADMIN — DOCUSATE SODIUM 100 MG: 100 CAPSULE, LIQUID FILLED ORAL at 20:35

## 2018-01-01 RX ADMIN — Medication 2 PUFF: at 21:07

## 2018-01-01 RX ADMIN — BACLOFEN 10 MG: 10 TABLET ORAL at 13:52

## 2018-01-01 RX ADMIN — PANTOPRAZOLE SODIUM 40 MG: 40 TABLET, DELAYED RELEASE ORAL at 06:04

## 2018-01-01 RX ADMIN — NYSTATIN: 100000 CREAM TOPICAL at 09:37

## 2018-01-01 RX ADMIN — FERROUS SULFATE TAB 325 MG (65 MG ELEMENTAL FE) 325 MG: 325 (65 FE) TAB at 11:38

## 2018-01-01 RX ADMIN — OXCARBAZEPINE 150 MG: 300 TABLET, FILM COATED ORAL at 08:48

## 2018-01-01 RX ADMIN — ISOSORBIDE DINITRATE 40 MG: 20 TABLET ORAL at 13:41

## 2018-01-01 RX ADMIN — HYDROCODONE BITARTRATE AND ACETAMINOPHEN 2 TABLET: 5; 325 TABLET ORAL at 16:08

## 2018-01-01 RX ADMIN — Medication 2 UNITS: at 09:11

## 2018-01-01 RX ADMIN — LEVOTHYROXINE SODIUM 100 MCG: 100 TABLET ORAL at 03:56

## 2018-01-01 RX ADMIN — MULTIPLE VITAMINS W/ MINERALS TAB 1 TABLET: TAB at 08:09

## 2018-01-01 RX ADMIN — RANOLAZINE 500 MG: 500 TABLET, FILM COATED, EXTENDED RELEASE ORAL at 09:36

## 2018-01-01 RX ADMIN — CIPROFLOXACIN 500 MG: 500 TABLET, FILM COATED ORAL at 11:22

## 2018-01-01 RX ADMIN — CALCIUM CARBONATE-VITAMIN D TAB 500 MG-200 UNIT 1 TABLET: 500-200 TAB at 20:28

## 2018-01-01 RX ADMIN — RISPERIDONE 1 MG: 1 TABLET ORAL at 09:35

## 2018-01-01 RX ADMIN — NYSTATIN: 100000 CREAM TOPICAL at 10:41

## 2018-01-01 RX ADMIN — ISOSORBIDE DINITRATE 40 MG: 20 TABLET ORAL at 10:41

## 2018-01-01 RX ADMIN — Medication 10 ML: at 09:54

## 2018-01-01 RX ADMIN — NYSTATIN: 100000 CREAM TOPICAL at 09:43

## 2018-01-01 RX ADMIN — HYDRALAZINE HYDROCHLORIDE 100 MG: 50 TABLET, FILM COATED ORAL at 09:05

## 2018-01-01 RX ADMIN — Medication 2 UNITS: at 17:30

## 2018-01-01 RX ADMIN — DOCUSATE SODIUM 100 MG: 100 CAPSULE, LIQUID FILLED ORAL at 08:04

## 2018-01-01 RX ADMIN — FLUTICASONE PROPIONATE 1 SPRAY: 50 SPRAY, METERED NASAL at 09:48

## 2018-01-01 RX ADMIN — ROSUVASTATIN CALCIUM 20 MG: 20 TABLET, FILM COATED ORAL at 22:04

## 2018-01-01 RX ADMIN — FERROUS SULFATE TAB 325 MG (65 MG ELEMENTAL FE) 325 MG: 325 (65 FE) TAB at 13:52

## 2018-01-01 RX ADMIN — RISPERIDONE 1 MG: 1 TABLET ORAL at 09:29

## 2018-01-01 RX ADMIN — OXCARBAZEPINE 300 MG: 300 TABLET, FILM COATED ORAL at 17:01

## 2018-01-01 RX ADMIN — HYDROCODONE BITARTRATE AND ACETAMINOPHEN 2 TABLET: 5; 325 TABLET ORAL at 18:23

## 2018-01-01 RX ADMIN — NYSTATIN: 100000 CREAM TOPICAL at 22:08

## 2018-01-01 RX ADMIN — PANTOPRAZOLE SODIUM 40 MG: 40 TABLET, DELAYED RELEASE ORAL at 09:58

## 2018-01-01 RX ADMIN — RISPERIDONE 1 MG: 1 TABLET ORAL at 08:04

## 2018-01-01 RX ADMIN — CALCIUM CARBONATE-VITAMIN D TAB 500 MG-200 UNIT 1 TABLET: 500-200 TAB at 08:09

## 2018-01-01 RX ADMIN — CARBOXYMETHYLCELLULOSE SODIUM 1 DROP: 10 GEL OPHTHALMIC at 09:37

## 2018-01-01 RX ADMIN — NITROGLYCERIN 0.5 INCH: 20 OINTMENT TOPICAL at 18:23

## 2018-01-01 RX ADMIN — Medication 10 ML: at 20:29

## 2018-01-01 RX ADMIN — METOPROLOL TARTRATE 100 MG: 100 TABLET, FILM COATED ORAL at 21:39

## 2018-01-01 RX ADMIN — SODIUM CHLORIDE TAB 1 GM 1 G: 1 TAB at 18:21

## 2018-01-01 RX ADMIN — CLOTRIMAZOLE AND BETAMETHASONE DIPROPIONATE: 10; .5 CREAM TOPICAL at 14:27

## 2018-01-01 RX ADMIN — POTASSIUM CHLORIDE 10 MEQ: 750 TABLET, FILM COATED, EXTENDED RELEASE ORAL at 09:43

## 2018-01-01 RX ADMIN — RANOLAZINE 500 MG: 500 TABLET, FILM COATED, EXTENDED RELEASE ORAL at 20:23

## 2018-01-01 RX ADMIN — NITROGLYCERIN 0.5 INCH: 20 OINTMENT TOPICAL at 14:18

## 2018-01-01 RX ADMIN — BACLOFEN 10 MG: 10 TABLET ORAL at 10:41

## 2018-01-01 RX ADMIN — HYDROCODONE BITARTRATE AND ACETAMINOPHEN 1 TABLET: 5; 325 TABLET ORAL at 11:22

## 2018-01-01 RX ADMIN — Medication 10 ML: at 08:24

## 2018-01-01 RX ADMIN — INSULIN LISPRO 2 UNITS: 100 INJECTION, SOLUTION INTRAVENOUS; SUBCUTANEOUS at 13:43

## 2018-01-01 RX ADMIN — FUROSEMIDE 20 MG: 10 INJECTION, SOLUTION INTRAMUSCULAR; INTRAVENOUS at 17:36

## 2018-01-01 RX ADMIN — HYDROCODONE BITARTRATE AND ACETAMINOPHEN 1 TABLET: 5; 325 TABLET ORAL at 21:49

## 2018-01-01 RX ADMIN — MONTELUKAST SODIUM 10 MG: 10 TABLET, FILM COATED ORAL at 20:12

## 2018-01-01 RX ADMIN — IRON SUCROSE 300 MG: 20 INJECTION, SOLUTION INTRAVENOUS at 09:48

## 2018-01-01 RX ADMIN — MAGNESIUM SULFATE HEPTAHYDRATE 1 G: 500 INJECTION, SOLUTION INTRAMUSCULAR; INTRAVENOUS at 20:38

## 2018-01-01 RX ADMIN — RANOLAZINE 500 MG: 500 TABLET, FILM COATED, EXTENDED RELEASE ORAL at 20:01

## 2018-01-01 RX ADMIN — LEVOTHYROXINE SODIUM 100 MCG: 100 TABLET ORAL at 05:49

## 2018-01-01 RX ADMIN — HYDRALAZINE HYDROCHLORIDE 100 MG: 50 TABLET, FILM COATED ORAL at 13:54

## 2018-01-01 RX ADMIN — HYDROCODONE BITARTRATE AND ACETAMINOPHEN 1 TABLET: 5; 325 TABLET ORAL at 10:24

## 2018-01-01 RX ADMIN — OXCARBAZEPINE 150 MG: 300 TABLET, FILM COATED ORAL at 09:35

## 2018-01-01 RX ADMIN — ROSUVASTATIN CALCIUM 20 MG: 20 TABLET, FILM COATED ORAL at 20:23

## 2018-01-01 RX ADMIN — METOPROLOL TARTRATE 100 MG: 100 TABLET, FILM COATED ORAL at 00:16

## 2018-01-01 RX ADMIN — OXCARBAZEPINE 150 MG: 300 TABLET, FILM COATED ORAL at 13:52

## 2018-01-01 RX ADMIN — HYDROCODONE BITARTRATE AND ACETAMINOPHEN 1 TABLET: 5; 325 TABLET ORAL at 09:21

## 2018-01-01 RX ADMIN — BACLOFEN 10 MG: 10 TABLET ORAL at 20:14

## 2018-01-01 RX ADMIN — FLUTICASONE PROPIONATE 1 SPRAY: 50 SPRAY, METERED NASAL at 08:11

## 2018-01-01 RX ADMIN — ASPIRIN 81 MG: 81 TABLET, COATED ORAL at 09:56

## 2018-01-01 RX ADMIN — HYDROCORTISONE ACETATE 25 MG: 25 SUPPOSITORY RECTAL at 08:22

## 2018-01-01 RX ADMIN — Medication 2 PUFF: at 08:13

## 2018-01-01 RX ADMIN — NITROGLYCERIN 0.5 INCH: 20 OINTMENT TOPICAL at 06:37

## 2018-01-01 RX ADMIN — RANOLAZINE 1000 MG: 500 TABLET, FILM COATED, EXTENDED RELEASE ORAL at 20:13

## 2018-01-01 RX ADMIN — PANTOPRAZOLE SODIUM 40 MG: 40 TABLET, DELAYED RELEASE ORAL at 06:27

## 2018-01-01 RX ADMIN — METOPROLOL TARTRATE 100 MG: 100 TABLET, FILM COATED ORAL at 20:09

## 2018-01-01 RX ADMIN — NITROGLYCERIN 0.5 INCH: 20 OINTMENT TOPICAL at 20:53

## 2018-01-01 RX ADMIN — HYDROCORTISONE ACETATE 25 MG: 25 SUPPOSITORY RECTAL at 20:07

## 2018-01-01 RX ADMIN — ASPIRIN 81 MG: 81 TABLET ORAL at 08:33

## 2018-01-01 RX ADMIN — IPRATROPIUM BROMIDE AND ALBUTEROL SULFATE 1 AMPULE: .5; 3 SOLUTION RESPIRATORY (INHALATION) at 07:45

## 2018-01-01 RX ADMIN — IOPAMIDOL 80 ML: 755 INJECTION, SOLUTION INTRAVENOUS at 14:38

## 2018-01-01 RX ADMIN — OXCARBAZEPINE 300 MG: 300 TABLET, FILM COATED ORAL at 20:12

## 2018-01-01 RX ADMIN — ROSUVASTATIN CALCIUM 20 MG: 20 TABLET, FILM COATED ORAL at 20:11

## 2018-01-01 RX ADMIN — NYSTATIN: 100000 CREAM TOPICAL at 20:14

## 2018-01-01 RX ADMIN — BACLOFEN 10 MG: 10 TABLET ORAL at 09:47

## 2018-01-01 RX ADMIN — INSULIN LISPRO 1 UNITS: 100 INJECTION, SOLUTION INTRAVENOUS; SUBCUTANEOUS at 20:46

## 2018-01-01 RX ADMIN — PANTOPRAZOLE SODIUM 40 MG: 40 TABLET, DELAYED RELEASE ORAL at 07:00

## 2018-01-01 RX ADMIN — HYDROXYZINE HYDROCHLORIDE 25 MG: 25 TABLET ORAL at 13:53

## 2018-01-01 RX ADMIN — HYDROCORTISONE ACETATE 25 MG: 25 SUPPOSITORY RECTAL at 22:03

## 2018-01-01 RX ADMIN — HYDRALAZINE HYDROCHLORIDE 50 MG: 50 TABLET, FILM COATED ORAL at 20:11

## 2018-01-01 RX ADMIN — ESCITALOPRAM OXALATE 10 MG: 10 TABLET, FILM COATED ORAL at 08:09

## 2018-01-01 RX ADMIN — IOPAMIDOL 80 ML: 755 INJECTION, SOLUTION INTRAVENOUS at 07:51

## 2018-01-01 RX ADMIN — FUROSEMIDE 20 MG: 10 INJECTION, SOLUTION INTRAMUSCULAR; INTRAVENOUS at 09:27

## 2018-01-01 RX ADMIN — Medication 2 UNITS: at 17:43

## 2018-01-01 RX ADMIN — CLOTRIMAZOLE AND BETAMETHASONE DIPROPIONATE: 10; .5 CREAM TOPICAL at 13:47

## 2018-01-01 RX ADMIN — HYDROCODONE BITARTRATE AND ACETAMINOPHEN 2 TABLET: 5; 325 TABLET ORAL at 12:49

## 2018-01-01 RX ADMIN — INSULIN LISPRO 1 UNITS: 100 INJECTION, SOLUTION INTRAVENOUS; SUBCUTANEOUS at 21:10

## 2018-01-01 RX ADMIN — CEFTRIAXONE SODIUM 2 G: 2 INJECTION, POWDER, FOR SOLUTION INTRAMUSCULAR; INTRAVENOUS at 15:14

## 2018-01-01 RX ADMIN — RANOLAZINE 500 MG: 500 TABLET, FILM COATED, EXTENDED RELEASE ORAL at 09:26

## 2018-01-01 RX ADMIN — Medication 2 PUFF: at 10:15

## 2018-01-01 RX ADMIN — HYDRALAZINE HYDROCHLORIDE 50 MG: 50 TABLET, FILM COATED ORAL at 15:07

## 2018-01-01 RX ADMIN — IPRATROPIUM BROMIDE AND ALBUTEROL SULFATE 1 AMPULE: .5; 3 SOLUTION RESPIRATORY (INHALATION) at 15:49

## 2018-01-01 RX ADMIN — CLOTRIMAZOLE AND BETAMETHASONE DIPROPIONATE: 10; .5 CREAM TOPICAL at 09:08

## 2018-01-01 RX ADMIN — Medication 2 PUFF: at 21:27

## 2018-01-01 RX ADMIN — CLOPIDOGREL BISULFATE 75 MG: 75 TABLET ORAL at 09:36

## 2018-01-01 RX ADMIN — RISPERIDONE 1 MG: 1 TABLET ORAL at 09:23

## 2018-01-01 RX ADMIN — HYDROXYZINE HYDROCHLORIDE 25 MG: 25 TABLET ORAL at 09:36

## 2018-01-01 RX ADMIN — HYDROCODONE BITARTRATE AND ACETAMINOPHEN 1 TABLET: 5; 325 TABLET ORAL at 04:18

## 2018-01-01 RX ADMIN — MULTIPLE VITAMINS W/ MINERALS TAB 1 TABLET: TAB at 09:23

## 2018-01-01 RX ADMIN — Medication 2 PUFF: at 08:38

## 2018-01-01 RX ADMIN — VALSARTAN 320 MG: 320 TABLET ORAL at 08:35

## 2018-01-01 RX ADMIN — Medication 10 ML: at 09:28

## 2018-01-01 RX ADMIN — DOCUSATE SODIUM 100 MG: 100 CAPSULE, LIQUID FILLED ORAL at 22:04

## 2018-01-01 RX ADMIN — HYDROXYZINE HYDROCHLORIDE 25 MG: 25 TABLET ORAL at 09:08

## 2018-01-01 RX ADMIN — ISOSORBIDE DINITRATE 40 MG: 20 TABLET ORAL at 15:05

## 2018-01-01 RX ADMIN — HYDROCODONE BITARTRATE AND ACETAMINOPHEN 1 TABLET: 5; 325 TABLET ORAL at 17:34

## 2018-01-01 RX ADMIN — MICONAZOLE NITRATE: 2 POWDER TOPICAL at 08:24

## 2018-01-01 RX ADMIN — METOPROLOL TARTRATE 75 MG: 25 TABLET ORAL at 08:12

## 2018-01-01 RX ADMIN — ENOXAPARIN SODIUM 40 MG: 40 INJECTION SUBCUTANEOUS at 08:10

## 2018-01-01 RX ADMIN — ISOSORBIDE MONONITRATE 60 MG: 60 TABLET ORAL at 20:01

## 2018-01-01 RX ADMIN — MULTIPLE VITAMINS W/ MINERALS TAB 1 TABLET: TAB at 09:56

## 2018-01-01 RX ADMIN — MICONAZOLE NITRATE: 2 POWDER TOPICAL at 08:49

## 2018-01-01 RX ADMIN — FUROSEMIDE 40 MG: 10 INJECTION, SOLUTION INTRAMUSCULAR; INTRAVENOUS at 09:43

## 2018-01-01 RX ADMIN — RANOLAZINE 500 MG: 500 TABLET, FILM COATED, EXTENDED RELEASE ORAL at 22:25

## 2018-01-01 RX ADMIN — FUROSEMIDE 40 MG: 10 INJECTION, SOLUTION INTRAMUSCULAR; INTRAVENOUS at 12:30

## 2018-01-01 RX ADMIN — HYDROCODONE BITARTRATE AND ACETAMINOPHEN 1 TABLET: 5; 325 TABLET ORAL at 22:24

## 2018-01-01 RX ADMIN — SODIUM CHLORIDE TAB 1 GM 1 G: 1 TAB at 18:43

## 2018-01-01 RX ADMIN — Medication 2 PUFF: at 08:32

## 2018-01-01 RX ADMIN — DOCUSATE SODIUM 100 MG: 100 CAPSULE, LIQUID FILLED ORAL at 08:48

## 2018-01-01 RX ADMIN — FUROSEMIDE 20 MG: 20 TABLET ORAL at 09:36

## 2018-01-01 RX ADMIN — CALCIUM CARBONATE-VITAMIN D TAB 500 MG-200 UNIT 1 TABLET: 500-200 TAB at 09:05

## 2018-01-01 RX ADMIN — HYDROCODONE BITARTRATE AND ACETAMINOPHEN 1 TABLET: 5; 325 TABLET ORAL at 10:32

## 2018-01-01 RX ADMIN — Medication 2 PUFF: at 08:41

## 2018-01-01 RX ADMIN — ISOSORBIDE DINITRATE 40 MG: 20 TABLET ORAL at 20:51

## 2018-01-01 RX ADMIN — LEVOTHYROXINE SODIUM 100 MCG: 100 TABLET ORAL at 09:47

## 2018-01-01 RX ADMIN — HYDRALAZINE HYDROCHLORIDE 100 MG: 50 TABLET, FILM COATED ORAL at 17:01

## 2018-01-01 RX ADMIN — LACTULOSE 30 G: 20 SOLUTION ORAL at 08:48

## 2018-01-01 RX ADMIN — ESCITALOPRAM OXALATE 10 MG: 10 TABLET, FILM COATED ORAL at 09:47

## 2018-01-01 RX ADMIN — IPRATROPIUM BROMIDE AND ALBUTEROL SULFATE 1 AMPULE: .5; 3 SOLUTION RESPIRATORY (INHALATION) at 11:57

## 2018-01-01 RX ADMIN — RANOLAZINE 1000 MG: 500 TABLET, FILM COATED, EXTENDED RELEASE ORAL at 20:25

## 2018-01-01 RX ADMIN — BACLOFEN 10 MG: 10 TABLET ORAL at 09:05

## 2018-01-01 RX ADMIN — CLOTRIMAZOLE AND BETAMETHASONE DIPROPIONATE: 10; .5 CREAM TOPICAL at 20:29

## 2018-01-01 RX ADMIN — OXCARBAZEPINE 300 MG: 300 TABLET, FILM COATED ORAL at 21:39

## 2018-01-01 RX ADMIN — CLOPIDOGREL BISULFATE 75 MG: 75 TABLET ORAL at 13:52

## 2018-01-01 RX ADMIN — INFLUENZA A VIRUS A/MICHIGAN/45/2015 X-275 (H1N1) ANTIGEN (FORMALDEHYDE INACTIVATED), INFLUENZA A VIRUS A/SINGAPORE/INFIMH-16-0019/2016 IVR-186 (H3N2) ANTIGEN (FORMALDEHYDE INACTIVATED), INFLUENZA B VIRUS B/PHUKET/3073/2013 ANTIGEN (FORMALDEHYDE INACTIVATED), AND INFLUENZA B VIRUS B/MARYLAND/15/2016 BX-69A ANTIGEN (FORMALDEHYDE INACTIVATED) 0.5 ML: 15; 15; 15; 15 INJECTION, SUSPENSION INTRAMUSCULAR at 09:29

## 2018-01-01 RX ADMIN — RANOLAZINE 500 MG: 500 TABLET, FILM COATED, EXTENDED RELEASE ORAL at 08:48

## 2018-01-01 RX ADMIN — RISPERIDONE 1 MG: 1 TABLET ORAL at 08:10

## 2018-01-01 RX ADMIN — HYDROXYZINE HYDROCHLORIDE 25 MG: 25 TABLET ORAL at 16:39

## 2018-01-01 RX ADMIN — ASPIRIN 81 MG: 81 TABLET, COATED ORAL at 09:43

## 2018-01-01 RX ADMIN — CLOTRIMAZOLE AND BETAMETHASONE DIPROPIONATE: 10; .5 CREAM TOPICAL at 10:41

## 2018-01-01 RX ADMIN — HYDRALAZINE HYDROCHLORIDE 75 MG: 25 TABLET ORAL at 14:37

## 2018-01-01 RX ADMIN — Medication 10 ML: at 20:24

## 2018-01-01 RX ADMIN — FUROSEMIDE 20 MG: 10 INJECTION, SOLUTION INTRAMUSCULAR; INTRAVENOUS at 18:23

## 2018-01-01 RX ADMIN — Medication 2 PUFF: at 09:17

## 2018-01-01 RX ADMIN — HYDROXYZINE HYDROCHLORIDE 25 MG: 25 TABLET ORAL at 20:26

## 2018-01-01 RX ADMIN — ROSUVASTATIN CALCIUM 20 MG: 20 TABLET, FILM COATED ORAL at 22:09

## 2018-01-01 RX ADMIN — DOXEPIN HYDROCHLORIDE 150 MG: 50 CAPSULE ORAL at 21:40

## 2018-01-01 RX ADMIN — ACETAMINOPHEN 650 MG: 325 TABLET ORAL at 00:22

## 2018-01-01 RX ADMIN — IPRATROPIUM BROMIDE AND ALBUTEROL SULFATE 1 AMPULE: .5; 3 SOLUTION RESPIRATORY (INHALATION) at 08:32

## 2018-01-01 RX ADMIN — DOCUSATE SODIUM 100 MG: 100 CAPSULE, LIQUID FILLED ORAL at 09:21

## 2018-01-01 RX ADMIN — CARBOXYMETHYLCELLULOSE SODIUM 1 DROP: 5 SOLUTION/ DROPS OPHTHALMIC at 21:36

## 2018-01-01 RX ADMIN — DOXEPIN HYDROCHLORIDE 150 MG: 50 CAPSULE ORAL at 00:15

## 2018-01-01 RX ADMIN — HYDRALAZINE HYDROCHLORIDE 25 MG: 25 TABLET, FILM COATED ORAL at 01:58

## 2018-01-01 RX ADMIN — DOXEPIN HYDROCHLORIDE 150 MG: 50 CAPSULE ORAL at 22:08

## 2018-01-01 RX ADMIN — PANTOPRAZOLE SODIUM 40 MG: 40 TABLET, DELAYED RELEASE ORAL at 05:59

## 2018-01-01 RX ADMIN — OXCARBAZEPINE 150 MG: 300 TABLET, FILM COATED ORAL at 10:42

## 2018-01-01 RX ADMIN — OXCARBAZEPINE 150 MG: 300 TABLET ORAL at 09:34

## 2018-01-01 RX ADMIN — PANTOPRAZOLE SODIUM 40 MG: 40 TABLET, DELAYED RELEASE ORAL at 03:56

## 2018-01-01 RX ADMIN — POTASSIUM CHLORIDE 40 MEQ: 20 TABLET, EXTENDED RELEASE ORAL at 11:23

## 2018-01-01 RX ADMIN — IPRATROPIUM BROMIDE AND ALBUTEROL SULFATE 1 AMPULE: .5; 3 SOLUTION RESPIRATORY (INHALATION) at 12:32

## 2018-01-01 RX ADMIN — OXCARBAZEPINE 150 MG: 300 TABLET, FILM COATED ORAL at 08:10

## 2018-01-01 RX ADMIN — ISOSORBIDE DINITRATE 40 MG: 20 TABLET ORAL at 08:22

## 2018-01-01 RX ADMIN — CLOTRIMAZOLE AND BETAMETHASONE DIPROPIONATE: 10; .5 CREAM TOPICAL at 09:43

## 2018-01-01 RX ADMIN — Medication 10 ML: at 09:43

## 2018-01-01 RX ADMIN — METOPROLOL TARTRATE 100 MG: 100 TABLET, FILM COATED ORAL at 22:07

## 2018-01-01 RX ADMIN — ACETAMINOPHEN 650 MG: 325 TABLET ORAL at 11:23

## 2018-01-01 RX ADMIN — NYSTATIN: 100000 CREAM TOPICAL at 09:11

## 2018-01-01 RX ADMIN — ASPIRIN 81 MG: 81 TABLET, COATED ORAL at 11:22

## 2018-01-01 RX ADMIN — CLOTRIMAZOLE AND BETAMETHASONE DIPROPIONATE: 10; .5 CREAM TOPICAL at 22:08

## 2018-01-01 RX ADMIN — HYDRALAZINE HYDROCHLORIDE 100 MG: 50 TABLET, FILM COATED ORAL at 18:21

## 2018-01-01 RX ADMIN — METOPROLOL TARTRATE 100 MG: 100 TABLET, FILM COATED ORAL at 22:24

## 2018-01-01 RX ADMIN — LEVOTHYROXINE SODIUM 100 MCG: 100 TABLET ORAL at 06:04

## 2018-01-01 RX ADMIN — CLOTRIMAZOLE AND BETAMETHASONE DIPROPIONATE: 10; .5 CREAM TOPICAL at 08:10

## 2018-01-01 RX ADMIN — ESCITALOPRAM 20 MG: 20 TABLET, FILM COATED ORAL at 08:35

## 2018-01-01 RX ADMIN — FUROSEMIDE 20 MG: 20 TABLET ORAL at 09:55

## 2018-01-01 RX ADMIN — Medication 2 PUFF: at 20:58

## 2018-01-01 RX ADMIN — HYDROCODONE BITARTRATE AND ACETAMINOPHEN 1 TABLET: 5; 325 TABLET ORAL at 20:40

## 2018-01-01 RX ADMIN — FLUTICASONE PROPIONATE 1 SPRAY: 50 SPRAY, METERED NASAL at 09:44

## 2018-01-01 RX ADMIN — CLOTRIMAZOLE AND BETAMETHASONE DIPROPIONATE: 10; .5 CREAM TOPICAL at 20:14

## 2018-01-01 RX ADMIN — DOXEPIN HYDROCHLORIDE 150 MG: 50 CAPSULE ORAL at 20:28

## 2018-01-01 RX ADMIN — INSULIN LISPRO 1 UNITS: 100 INJECTION, SOLUTION INTRAVENOUS; SUBCUTANEOUS at 20:45

## 2018-01-01 RX ADMIN — ISOSORBIDE DINITRATE 40 MG: 20 TABLET ORAL at 21:39

## 2018-01-01 RX ADMIN — Medication 2 UNITS: at 11:48

## 2018-01-01 RX ADMIN — LEVOTHYROXINE SODIUM 100 MCG: 100 TABLET ORAL at 13:56

## 2018-01-01 RX ADMIN — Medication 10 ML: at 08:49

## 2018-01-01 RX ADMIN — METOPROLOL TARTRATE 100 MG: 100 TABLET, FILM COATED ORAL at 20:48

## 2018-01-01 RX ADMIN — HYDROCODONE BITARTRATE AND ACETAMINOPHEN 1 TABLET: 5; 325 TABLET ORAL at 16:49

## 2018-01-01 RX ADMIN — INSULIN LISPRO 1 UNITS: 100 INJECTION, SOLUTION INTRAVENOUS; SUBCUTANEOUS at 20:42

## 2018-01-01 RX ADMIN — SODIUM CHLORIDE 250 ML: 9 INJECTION, SOLUTION INTRAVENOUS at 06:29

## 2018-01-01 RX ADMIN — HYDRALAZINE HYDROCHLORIDE 75 MG: 25 TABLET ORAL at 08:10

## 2018-01-01 RX ADMIN — Medication 2 PUFF: at 19:45

## 2018-01-01 RX ADMIN — CIPROFLOXACIN 500 MG: 500 TABLET, FILM COATED ORAL at 22:05

## 2018-01-01 RX ADMIN — ENOXAPARIN SODIUM 40 MG: 40 INJECTION SUBCUTANEOUS at 09:48

## 2018-01-01 RX ADMIN — OXCARBAZEPINE 300 MG: 300 TABLET, FILM COATED ORAL at 18:40

## 2018-01-01 RX ADMIN — CLOPIDOGREL BISULFATE 75 MG: 75 TABLET ORAL at 08:09

## 2018-01-01 RX ADMIN — MICONAZOLE NITRATE: 2 POWDER TOPICAL at 20:24

## 2018-01-01 RX ADMIN — Medication 2 PUFF: at 08:55

## 2018-01-01 RX ADMIN — ENOXAPARIN SODIUM 40 MG: 40 INJECTION SUBCUTANEOUS at 10:14

## 2018-01-01 RX ADMIN — RANOLAZINE 500 MG: 500 TABLET, FILM COATED, EXTENDED RELEASE ORAL at 20:12

## 2018-01-01 RX ADMIN — RANOLAZINE 500 MG: 500 TABLET, FILM COATED, EXTENDED RELEASE ORAL at 08:05

## 2018-01-01 RX ADMIN — ISOSORBIDE DINITRATE 40 MG: 20 TABLET ORAL at 14:18

## 2018-01-01 RX ADMIN — ESCITALOPRAM OXALATE 10 MG: 10 TABLET, FILM COATED ORAL at 09:20

## 2018-01-01 RX ADMIN — OXCARBAZEPINE 150 MG: 300 TABLET, FILM COATED ORAL at 08:28

## 2018-01-01 RX ADMIN — MORPHINE SULFATE 2 MG: 2 INJECTION, SOLUTION INTRAMUSCULAR; INTRAVENOUS at 20:12

## 2018-01-01 RX ADMIN — RANOLAZINE 1000 MG: 500 TABLET, FILM COATED, EXTENDED RELEASE ORAL at 09:31

## 2018-01-01 RX ADMIN — AMLODIPINE BESYLATE 10 MG: 10 TABLET ORAL at 09:31

## 2018-01-01 RX ADMIN — CIPROFLOXACIN 500 MG: 500 TABLET, FILM COATED ORAL at 08:06

## 2018-01-01 RX ADMIN — ISOSORBIDE DINITRATE 40 MG: 20 TABLET ORAL at 22:07

## 2018-01-01 RX ADMIN — RANOLAZINE 500 MG: 500 TABLET, FILM COATED, EXTENDED RELEASE ORAL at 08:16

## 2018-01-01 RX ADMIN — MULTIPLE VITAMINS W/ MINERALS TAB 1 TABLET: TAB at 09:35

## 2018-01-01 RX ADMIN — METOPROLOL TARTRATE 100 MG: 100 TABLET, FILM COATED ORAL at 13:52

## 2018-01-01 RX ADMIN — POTASSIUM CHLORIDE 10 MEQ: 750 TABLET, EXTENDED RELEASE ORAL at 08:10

## 2018-01-01 RX ADMIN — FUROSEMIDE 40 MG: 10 INJECTION, SOLUTION INTRAMUSCULAR; INTRAVENOUS at 13:50

## 2018-01-01 RX ADMIN — ISOSORBIDE MONONITRATE 60 MG: 60 TABLET ORAL at 09:05

## 2018-01-01 RX ADMIN — LEVOFLOXACIN 500 MG: 5 INJECTION, SOLUTION INTRAVENOUS at 11:19

## 2018-01-01 RX ADMIN — CLOPIDOGREL BISULFATE 75 MG: 75 TABLET ORAL at 08:04

## 2018-01-01 RX ADMIN — ISOSORBIDE DINITRATE 40 MG: 20 TABLET ORAL at 22:05

## 2018-01-01 RX ADMIN — AMLODIPINE BESYLATE 10 MG: 10 TABLET ORAL at 08:15

## 2018-01-01 RX ADMIN — INSULIN LISPRO 1 UNITS: 100 INJECTION, SOLUTION INTRAVENOUS; SUBCUTANEOUS at 20:22

## 2018-01-01 RX ADMIN — MORPHINE SULFATE 2 MG: 2 INJECTION, SOLUTION INTRAMUSCULAR; INTRAVENOUS at 13:33

## 2018-01-01 RX ADMIN — AMLODIPINE BESYLATE 5 MG: 5 TABLET ORAL at 11:22

## 2018-01-01 RX ADMIN — INSULIN LISPRO 1 UNITS: 100 INJECTION, SOLUTION INTRAVENOUS; SUBCUTANEOUS at 18:31

## 2018-01-01 RX ADMIN — HYDROXYZINE HYDROCHLORIDE 25 MG: 25 TABLET ORAL at 20:22

## 2018-01-01 RX ADMIN — Medication 10 ML: at 20:20

## 2018-01-01 RX ADMIN — DOCUSATE SODIUM 100 MG: 100 CAPSULE, LIQUID FILLED ORAL at 09:27

## 2018-01-01 RX ADMIN — ISOSORBIDE DINITRATE 40 MG: 20 TABLET ORAL at 09:22

## 2018-01-01 RX ADMIN — METOPROLOL TARTRATE 2.5 MG: 1 INJECTION, SOLUTION INTRAVENOUS at 03:36

## 2018-01-01 RX ADMIN — HYDROCODONE BITARTRATE AND ACETAMINOPHEN 1 TABLET: 5; 325 TABLET ORAL at 02:36

## 2018-01-01 RX ADMIN — HYDROCODONE BITARTRATE AND ACETAMINOPHEN 1 TABLET: 5; 325 TABLET ORAL at 03:42

## 2018-01-01 RX ADMIN — OXCARBAZEPINE 300 MG: 300 TABLET, FILM COATED ORAL at 21:47

## 2018-01-01 RX ADMIN — PREDNISONE 40 MG: 20 TABLET ORAL at 08:13

## 2018-01-01 RX ADMIN — MULTIPLE VITAMINS W/ MINERALS TAB 1 TABLET: TAB at 09:33

## 2018-01-01 RX ADMIN — OXCARBAZEPINE 300 MG: 300 TABLET ORAL at 20:28

## 2018-01-01 RX ADMIN — SODIUM CHLORIDE: 9 INJECTION, SOLUTION INTRAVENOUS at 08:18

## 2018-01-01 RX ADMIN — SENNOSIDES 17.2 MG: 8.6 TABLET, FILM COATED ORAL at 20:49

## 2018-01-01 RX ADMIN — ALBUTEROL SULFATE 2.5 MG: 2.5 SOLUTION RESPIRATORY (INHALATION) at 17:47

## 2018-01-01 RX ADMIN — CLOTRIMAZOLE AND BETAMETHASONE DIPROPIONATE: 10; .5 CREAM TOPICAL at 09:29

## 2018-01-01 RX ADMIN — Medication 10 ML: at 08:11

## 2018-01-01 RX ADMIN — HYDRALAZINE HYDROCHLORIDE 100 MG: 50 TABLET, FILM COATED ORAL at 08:23

## 2018-01-01 RX ADMIN — PANTOPRAZOLE SODIUM 40 MG: 40 TABLET, DELAYED RELEASE ORAL at 08:09

## 2018-01-01 RX ADMIN — POLYETHYLENE GLYCOL 3350 17 G: 17 POWDER, FOR SOLUTION ORAL at 09:20

## 2018-01-01 RX ADMIN — HYDROXYZINE HYDROCHLORIDE 25 MG: 25 TABLET ORAL at 22:46

## 2018-01-01 RX ADMIN — HYDRALAZINE HYDROCHLORIDE 50 MG: 50 TABLET, FILM COATED ORAL at 08:35

## 2018-01-01 RX ADMIN — HYDRALAZINE HYDROCHLORIDE 100 MG: 50 TABLET, FILM COATED ORAL at 00:54

## 2018-01-01 RX ADMIN — Medication 2 PUFF: at 09:25

## 2018-01-01 RX ADMIN — ACETAMINOPHEN 650 MG: 325 TABLET ORAL at 07:01

## 2018-01-01 RX ADMIN — PANTOPRAZOLE SODIUM 40 MG: 40 TABLET, DELAYED RELEASE ORAL at 03:55

## 2018-01-01 RX ADMIN — HYDRALAZINE HYDROCHLORIDE 100 MG: 50 TABLET, FILM COATED ORAL at 20:26

## 2018-01-01 RX ADMIN — HYDROCODONE BITARTRATE AND ACETAMINOPHEN 1 TABLET: 5; 325 TABLET ORAL at 03:54

## 2018-01-01 RX ADMIN — CEFTRIAXONE SODIUM 2 G: 2 INJECTION, POWDER, FOR SOLUTION INTRAMUSCULAR; INTRAVENOUS at 13:52

## 2018-01-01 RX ADMIN — FUROSEMIDE 20 MG: 10 INJECTION, SOLUTION INTRAMUSCULAR; INTRAVENOUS at 13:42

## 2018-01-01 RX ADMIN — MULTIPLE VITAMINS W/ MINERALS TAB 1 TABLET: TAB at 08:10

## 2018-01-01 RX ADMIN — HYDRALAZINE HYDROCHLORIDE 100 MG: 50 TABLET, FILM COATED ORAL at 16:08

## 2018-01-01 RX ADMIN — METOPROLOL TARTRATE 75 MG: 25 TABLET ORAL at 08:10

## 2018-01-01 RX ADMIN — ACETAMINOPHEN 650 MG: 325 TABLET ORAL at 00:59

## 2018-01-01 RX ADMIN — LOSARTAN POTASSIUM 100 MG: 100 TABLET, FILM COATED ORAL at 09:30

## 2018-01-01 RX ADMIN — OXCARBAZEPINE 150 MG: 300 TABLET ORAL at 08:33

## 2018-01-01 RX ADMIN — HYDROCODONE BITARTRATE AND ACETAMINOPHEN 1 TABLET: 5; 325 TABLET ORAL at 18:07

## 2018-01-01 RX ADMIN — LACTULOSE 30 G: 20 SOLUTION ORAL at 18:08

## 2018-01-01 RX ADMIN — MULTIPLE VITAMINS W/ MINERALS TAB 1 TABLET: TAB at 09:29

## 2018-01-01 RX ADMIN — Medication 2 PUFF: at 21:14

## 2018-01-01 RX ADMIN — ESCITALOPRAM OXALATE 10 MG: 10 TABLET, FILM COATED ORAL at 08:29

## 2018-01-01 RX ADMIN — NITROGLYCERIN 0.5 INCH: 20 OINTMENT TOPICAL at 01:30

## 2018-01-01 RX ADMIN — SODIUM CHLORIDE TAB 1 GM 1 G: 1 TAB at 08:29

## 2018-01-01 RX ADMIN — METHYLPREDNISOLONE SODIUM SUCCINATE 125 MG: 125 INJECTION, POWDER, FOR SOLUTION INTRAMUSCULAR; INTRAVENOUS at 19:05

## 2018-01-01 RX ADMIN — CARBOXYMETHYLCELLULOSE SODIUM 1 DROP: 10 GEL OPHTHALMIC at 13:03

## 2018-01-01 RX ADMIN — ACETAMINOPHEN 650 MG: 325 TABLET ORAL at 15:16

## 2018-01-01 RX ADMIN — ESCITALOPRAM OXALATE 10 MG: 10 TABLET, FILM COATED ORAL at 08:16

## 2018-01-01 RX ADMIN — FERROUS SULFATE TAB 325 MG (65 MG ELEMENTAL FE) 325 MG: 325 (65 FE) TAB at 09:47

## 2018-01-01 RX ADMIN — RANOLAZINE 500 MG: 500 TABLET, FILM COATED, EXTENDED RELEASE ORAL at 20:11

## 2018-01-01 RX ADMIN — Medication 10 ML: at 14:00

## 2018-01-01 RX ADMIN — PANTOPRAZOLE SODIUM 40 MG: 40 TABLET, DELAYED RELEASE ORAL at 06:51

## 2018-01-01 RX ADMIN — ESCITALOPRAM 20 MG: 20 TABLET, FILM COATED ORAL at 09:05

## 2018-01-01 RX ADMIN — SODIUM CHLORIDE: 9 INJECTION, SOLUTION INTRAVENOUS at 00:05

## 2018-01-01 RX ADMIN — NITROGLYCERIN 0.5 INCH: 20 OINTMENT TOPICAL at 08:48

## 2018-01-01 RX ADMIN — OXCARBAZEPINE 150 MG: 300 TABLET, FILM COATED ORAL at 08:05

## 2018-01-01 RX ADMIN — ACETAMINOPHEN 650 MG: 325 TABLET ORAL at 08:06

## 2018-01-01 RX ADMIN — RANOLAZINE 500 MG: 500 TABLET, FILM COATED, EXTENDED RELEASE ORAL at 20:43

## 2018-01-01 RX ADMIN — RANOLAZINE 1000 MG: 500 TABLET, FILM COATED, EXTENDED RELEASE ORAL at 08:10

## 2018-01-01 RX ADMIN — Medication 10 ML: at 08:18

## 2018-01-01 RX ADMIN — PANTOPRAZOLE SODIUM 40 MG: 40 TABLET, DELAYED RELEASE ORAL at 04:50

## 2018-01-01 RX ADMIN — BACLOFEN 10 MG: 10 TABLET ORAL at 20:49

## 2018-01-01 RX ADMIN — METOPROLOL TARTRATE 2.5 MG: 5 INJECTION, SOLUTION INTRAVENOUS at 22:46

## 2018-01-01 RX ADMIN — LEVOTHYROXINE SODIUM 100 MCG: 100 TABLET ORAL at 06:48

## 2018-01-01 RX ADMIN — HYDRALAZINE HYDROCHLORIDE 100 MG: 50 TABLET, FILM COATED ORAL at 08:48

## 2018-01-01 RX ADMIN — RANOLAZINE 500 MG: 500 TABLET, FILM COATED, EXTENDED RELEASE ORAL at 20:22

## 2018-01-01 RX ADMIN — AMLODIPINE BESYLATE 10 MG: 10 TABLET ORAL at 09:26

## 2018-01-01 RX ADMIN — Medication 2 PUFF: at 20:01

## 2018-01-01 RX ADMIN — RISPERIDONE 1 MG: 1 TABLET ORAL at 08:48

## 2018-01-01 RX ADMIN — CLOPIDOGREL BISULFATE 75 MG: 75 TABLET ORAL at 09:20

## 2018-01-01 RX ADMIN — RANOLAZINE 500 MG: 500 TABLET, FILM COATED, EXTENDED RELEASE ORAL at 09:56

## 2018-01-01 RX ADMIN — HYDROCODONE BITARTRATE AND ACETAMINOPHEN 2 TABLET: 5; 325 TABLET ORAL at 01:12

## 2018-01-01 RX ADMIN — HYDROCODONE BITARTRATE AND ACETAMINOPHEN 1 TABLET: 5; 325 TABLET ORAL at 04:44

## 2018-01-01 RX ADMIN — DOCUSATE SODIUM 100 MG: 100 CAPSULE, LIQUID FILLED ORAL at 20:31

## 2018-01-01 RX ADMIN — ESCITALOPRAM OXALATE 10 MG: 10 TABLET, FILM COATED ORAL at 08:48

## 2018-01-01 RX ADMIN — INSULIN LISPRO 1 UNITS: 100 INJECTION, SOLUTION INTRAVENOUS; SUBCUTANEOUS at 22:22

## 2018-01-01 RX ADMIN — Medication 10 ML: at 09:10

## 2018-01-01 RX ADMIN — PANTOPRAZOLE SODIUM 40 MG: 40 TABLET, DELAYED RELEASE ORAL at 06:48

## 2018-01-01 RX ADMIN — HYDROCODONE BITARTRATE AND ACETAMINOPHEN 1 TABLET: 5; 325 TABLET ORAL at 12:11

## 2018-01-01 RX ADMIN — RANOLAZINE 500 MG: 500 TABLET, FILM COATED, EXTENDED RELEASE ORAL at 08:12

## 2018-01-01 RX ADMIN — FERROUS SULFATE TAB 325 MG (65 MG ELEMENTAL FE) 325 MG: 325 (65 FE) TAB at 08:09

## 2018-01-01 RX ADMIN — MICONAZOLE NITRATE: 2 POWDER TOPICAL at 20:12

## 2018-01-01 RX ADMIN — HYDRALAZINE HYDROCHLORIDE 75 MG: 25 TABLET ORAL at 14:00

## 2018-01-01 RX ADMIN — SODIUM CHLORIDE TAB 1 GM 1 G: 1 TAB at 11:49

## 2018-01-01 RX ADMIN — HYDRALAZINE HYDROCHLORIDE 100 MG: 50 TABLET, FILM COATED ORAL at 06:39

## 2018-01-01 RX ADMIN — CEFTRIAXONE SODIUM 2 G: 2 INJECTION, POWDER, FOR SOLUTION INTRAMUSCULAR; INTRAVENOUS at 13:29

## 2018-01-01 RX ADMIN — BACLOFEN 10 MG: 10 TABLET ORAL at 09:20

## 2018-01-01 RX ADMIN — METOPROLOL TARTRATE 75 MG: 25 TABLET ORAL at 20:28

## 2018-01-01 RX ADMIN — ESCITALOPRAM OXALATE 20 MG: 20 TABLET, FILM COATED ORAL at 08:10

## 2018-01-01 RX ADMIN — INSULIN LISPRO 2 UNITS: 100 INJECTION, SOLUTION INTRAVENOUS; SUBCUTANEOUS at 13:53

## 2018-01-01 RX ADMIN — HYDROXYZINE HYDROCHLORIDE 25 MG: 25 TABLET ORAL at 20:49

## 2018-01-01 RX ADMIN — BACLOFEN 10 MG: 10 TABLET ORAL at 00:16

## 2018-01-01 RX ADMIN — ISOSORBIDE DINITRATE 40 MG: 20 TABLET ORAL at 14:00

## 2018-01-01 RX ADMIN — SENNOSIDES 17.2 MG: 8.6 TABLET, FILM COATED ORAL at 21:38

## 2018-01-01 RX ADMIN — ACETAMINOPHEN 650 MG: 325 TABLET ORAL at 23:51

## 2018-01-01 RX ADMIN — Medication 2 PUFF: at 21:34

## 2018-01-01 RX ADMIN — DOCUSATE SODIUM 100 MG: 100 CAPSULE, LIQUID FILLED ORAL at 09:57

## 2018-01-01 RX ADMIN — Medication 10 ML: at 09:44

## 2018-01-01 RX ADMIN — SODIUM CHLORIDE 1000 ML: 9 INJECTION, SOLUTION INTRAVENOUS at 22:44

## 2018-01-01 RX ADMIN — DOXEPIN HYDROCHLORIDE 150 MG: 50 CAPSULE ORAL at 20:11

## 2018-01-01 RX ADMIN — PANTOPRAZOLE SODIUM 40 MG: 40 TABLET, DELAYED RELEASE ORAL at 06:06

## 2018-01-01 RX ADMIN — NITROGLYCERIN 0.5 INCH: 20 OINTMENT TOPICAL at 08:18

## 2018-01-01 RX ADMIN — CLOTRIMAZOLE AND BETAMETHASONE DIPROPIONATE: 10; .5 CREAM TOPICAL at 13:03

## 2018-01-01 RX ADMIN — MORPHINE SULFATE 2 MG: 2 INJECTION, SOLUTION INTRAMUSCULAR; INTRAVENOUS at 01:54

## 2018-01-01 RX ADMIN — HYDROCORTISONE ACETATE 25 MG: 25 SUPPOSITORY RECTAL at 08:05

## 2018-01-01 RX ADMIN — RANOLAZINE 500 MG: 500 TABLET, FILM COATED, EXTENDED RELEASE ORAL at 08:13

## 2018-01-01 RX ADMIN — BACLOFEN 10 MG: 10 TABLET ORAL at 09:28

## 2018-01-01 RX ADMIN — METOPROLOL TARTRATE 100 MG: 100 TABLET, FILM COATED ORAL at 08:48

## 2018-01-01 RX ADMIN — METOPROLOL TARTRATE 75 MG: 25 TABLET ORAL at 09:31

## 2018-01-01 RX ADMIN — ISOSORBIDE DINITRATE 40 MG: 20 TABLET ORAL at 14:25

## 2018-01-01 RX ADMIN — LACTULOSE 20 G: 20 SOLUTION ORAL at 00:15

## 2018-01-01 RX ADMIN — SODIUM CHLORIDE: 9 INJECTION, SOLUTION INTRAVENOUS at 06:54

## 2018-01-01 RX ADMIN — Medication 10 ML: at 22:05

## 2018-01-01 RX ADMIN — DOXEPIN HYDROCHLORIDE 150 MG: 50 CAPSULE ORAL at 20:14

## 2018-01-01 RX ADMIN — SALINE NASAL SPRAY 1 SPRAY: 1.5 SOLUTION NASAL at 17:02

## 2018-01-01 RX ADMIN — ISOSORBIDE MONONITRATE 60 MG: 60 TABLET ORAL at 08:35

## 2018-01-01 RX ADMIN — RISPERIDONE 1 MG: 1 TABLET ORAL at 08:13

## 2018-01-01 RX ADMIN — Medication 2 UNITS: at 16:39

## 2018-01-01 RX ADMIN — HYDROCODONE BITARTRATE AND ACETAMINOPHEN 1 TABLET: 5; 325 TABLET ORAL at 01:35

## 2018-01-01 RX ADMIN — ACETAMINOPHEN 650 MG: 325 TABLET ORAL at 16:34

## 2018-01-01 RX ADMIN — ISOSORBIDE DINITRATE 40 MG: 20 TABLET ORAL at 09:36

## 2018-01-01 RX ADMIN — Medication 2 PUFF: at 09:20

## 2018-01-01 RX ADMIN — DOXEPIN HYDROCHLORIDE 150 MG: 50 CAPSULE ORAL at 20:50

## 2018-01-01 RX ADMIN — DOCUSATE SODIUM 100 MG: 100 CAPSULE, LIQUID FILLED ORAL at 08:22

## 2018-01-01 RX ADMIN — HYDROCODONE BITARTRATE AND ACETAMINOPHEN 2 TABLET: 5; 325 TABLET ORAL at 08:28

## 2018-01-01 RX ADMIN — Medication 6 UNITS: at 16:58

## 2018-01-01 RX ADMIN — RANOLAZINE 500 MG: 500 TABLET, FILM COATED, EXTENDED RELEASE ORAL at 09:48

## 2018-01-01 RX ADMIN — RANOLAZINE 500 MG: 500 TABLET, FILM COATED, EXTENDED RELEASE ORAL at 00:16

## 2018-01-01 RX ADMIN — ASPIRIN 81 MG: 81 TABLET, COATED ORAL at 09:02

## 2018-01-01 RX ADMIN — LEVOTHYROXINE SODIUM 100 MCG: 100 TABLET ORAL at 06:00

## 2018-01-01 RX ADMIN — SODIUM CHLORIDE: 9 INJECTION, SOLUTION INTRAVENOUS at 18:01

## 2018-01-01 RX ADMIN — VALSARTAN 320 MG: 320 TABLET ORAL at 08:12

## 2018-01-01 RX ADMIN — MAGNESIUM SULFATE HEPTAHYDRATE 4 G: 40 INJECTION, SOLUTION INTRAVENOUS at 15:06

## 2018-01-01 RX ADMIN — Medication 10 ML: at 09:08

## 2018-01-01 RX ADMIN — LEVOTHYROXINE SODIUM 100 MCG: 100 TABLET ORAL at 08:54

## 2018-01-01 RX ADMIN — RISPERIDONE 1 MG: 1 TABLET ORAL at 08:28

## 2018-01-01 RX ADMIN — CALCIUM CARBONATE-VITAMIN D TAB 500 MG-200 UNIT 1 TABLET: 500-200 TAB at 20:14

## 2018-01-01 RX ADMIN — DOCUSATE SODIUM 100 MG: 100 CAPSULE, LIQUID FILLED ORAL at 20:51

## 2018-01-01 RX ADMIN — LEVOTHYROXINE SODIUM 100 MCG: 100 TABLET ORAL at 06:26

## 2018-01-01 RX ADMIN — BACLOFEN 10 MG: 10 TABLET ORAL at 20:40

## 2018-01-01 RX ADMIN — METOPROLOL TARTRATE 100 MG: 100 TABLET, FILM COATED ORAL at 09:22

## 2018-01-01 RX ADMIN — RISPERIDONE 1 MG: 1 TABLET ORAL at 00:35

## 2018-01-01 RX ADMIN — HYDROXYZINE HYDROCHLORIDE 25 MG: 25 TABLET ORAL at 00:24

## 2018-01-01 RX ADMIN — HYDROXYZINE HYDROCHLORIDE 25 MG: 25 TABLET ORAL at 14:57

## 2018-01-01 RX ADMIN — HYDROCODONE BITARTRATE AND ACETAMINOPHEN 1 TABLET: 5; 325 TABLET ORAL at 05:51

## 2018-01-01 RX ADMIN — METOPROLOL TARTRATE 100 MG: 100 TABLET, FILM COATED ORAL at 09:28

## 2018-01-01 RX ADMIN — POTASSIUM CHLORIDE 10 MEQ: 750 TABLET, FILM COATED, EXTENDED RELEASE ORAL at 08:09

## 2018-01-01 RX ADMIN — FUROSEMIDE 20 MG: 10 INJECTION, SOLUTION INTRAMUSCULAR; INTRAVENOUS at 13:43

## 2018-01-01 RX ADMIN — SPIRONOLACTONE 25 MG: 25 TABLET ORAL at 08:09

## 2018-01-01 RX ADMIN — RANOLAZINE 500 MG: 500 TABLET, FILM COATED, EXTENDED RELEASE ORAL at 09:22

## 2018-01-01 RX ADMIN — SUCRALFATE 1 G: 1 TABLET ORAL at 08:10

## 2018-01-01 RX ADMIN — SPIRONOLACTONE 25 MG: 25 TABLET, FILM COATED ORAL at 10:42

## 2018-01-01 RX ADMIN — HYDROXYZINE HYDROCHLORIDE 25 MG: 25 TABLET ORAL at 10:41

## 2018-01-01 RX ADMIN — ISOSORBIDE DINITRATE 40 MG: 20 TABLET ORAL at 14:36

## 2018-01-01 RX ADMIN — CARBOXYMETHYLCELLULOSE SODIUM 1 DROP: 10 GEL OPHTHALMIC at 21:42

## 2018-01-01 RX ADMIN — RANOLAZINE 500 MG: 500 TABLET, FILM COATED, EXTENDED RELEASE ORAL at 13:52

## 2018-01-01 RX ADMIN — ISOSORBIDE DINITRATE 40 MG: 20 TABLET ORAL at 20:23

## 2018-01-01 RX ADMIN — ROSUVASTATIN CALCIUM 20 MG: 20 TABLET, FILM COATED ORAL at 00:15

## 2018-01-01 RX ADMIN — FLUTICASONE PROPIONATE 1 SPRAY: 50 SPRAY, METERED NASAL at 09:08

## 2018-01-01 RX ADMIN — NYSTATIN: 100000 CREAM TOPICAL at 21:41

## 2018-01-01 RX ADMIN — Medication 10 ML: at 08:28

## 2018-01-01 RX ADMIN — RISPERIDONE 1 MG: 1 TABLET ORAL at 09:11

## 2018-01-01 RX ADMIN — Medication 10 ML: at 20:18

## 2018-01-01 RX ADMIN — HYDROXYZINE HYDROCHLORIDE 25 MG: 25 TABLET ORAL at 20:40

## 2018-01-01 RX ADMIN — HYDROXYZINE HYDROCHLORIDE 25 MG: 25 TABLET ORAL at 20:31

## 2018-01-01 RX ADMIN — Medication 10 ML: at 22:09

## 2018-01-01 RX ADMIN — HYDROCODONE BITARTRATE AND ACETAMINOPHEN 2 TABLET: 5; 325 TABLET ORAL at 06:44

## 2018-01-01 RX ADMIN — CIPROFLOXACIN 500 MG: 500 TABLET, FILM COATED ORAL at 20:11

## 2018-01-01 RX ADMIN — SUCRALFATE 1 G: 1 TABLET ORAL at 14:00

## 2018-01-01 RX ADMIN — NYSTATIN: 100000 CREAM TOPICAL at 09:22

## 2018-01-01 RX ADMIN — SODIUM CHLORIDE: 9 INJECTION, SOLUTION INTRAVENOUS at 21:33

## 2018-01-01 RX ADMIN — CANDESARTAN CILEXETIL 32 MG: 16 TABLET ORAL at 08:48

## 2018-01-01 RX ADMIN — MICONAZOLE NITRATE: 2 POWDER TOPICAL at 08:35

## 2018-01-01 RX ADMIN — ISOSORBIDE DINITRATE 40 MG: 20 TABLET ORAL at 22:24

## 2018-01-01 RX ADMIN — CANDESARTAN CILEXETIL 32 MG: 16 TABLET ORAL at 08:28

## 2018-01-01 RX ADMIN — LOSARTAN POTASSIUM 100 MG: 100 TABLET, FILM COATED ORAL at 08:09

## 2018-01-01 RX ADMIN — CLOTRIMAZOLE AND BETAMETHASONE DIPROPIONATE: 10; .5 CREAM TOPICAL at 09:21

## 2018-01-01 RX ADMIN — HYDRALAZINE HYDROCHLORIDE 100 MG: 50 TABLET, FILM COATED ORAL at 14:32

## 2018-01-01 RX ADMIN — CALCIUM CARBONATE-VITAMIN D TAB 500 MG-200 UNIT 1 TABLET: 500-200 TAB at 20:26

## 2018-01-01 RX ADMIN — ALBUTEROL SULFATE 2.5 MG: 2.5 SOLUTION RESPIRATORY (INHALATION) at 15:02

## 2018-01-01 RX ADMIN — HYDRALAZINE HYDROCHLORIDE 75 MG: 25 TABLET ORAL at 00:35

## 2018-01-01 RX ADMIN — NITROGLYCERIN 0.5 INCH: 20 OINTMENT TOPICAL at 22:50

## 2018-01-01 RX ADMIN — HYDRALAZINE HYDROCHLORIDE 100 MG: 50 TABLET, FILM COATED ORAL at 09:26

## 2018-01-01 RX ADMIN — SODIUM CHLORIDE TAB 1 GM 1 G: 1 TAB at 16:10

## 2018-01-01 RX ADMIN — DOXEPIN HYDROCHLORIDE 150 MG: 50 CAPSULE ORAL at 20:22

## 2018-01-01 RX ADMIN — HYDROCODONE BITARTRATE AND ACETAMINOPHEN 1 TABLET: 5; 325 TABLET ORAL at 20:45

## 2018-01-01 RX ADMIN — MICONAZOLE NITRATE: 2 POWDER TOPICAL at 08:29

## 2018-01-01 RX ADMIN — MULTIPLE VITAMINS W/ MINERALS TAB 1 TABLET: TAB at 09:05

## 2018-01-01 RX ADMIN — Medication 10 ML: at 09:25

## 2018-01-01 RX ADMIN — HYDROXYZINE HYDROCHLORIDE 25 MG: 25 TABLET ORAL at 22:08

## 2018-01-01 RX ADMIN — MICONAZOLE NITRATE: 2 POWDER TOPICAL at 20:09

## 2018-01-01 RX ADMIN — OXCARBAZEPINE 150 MG: 300 TABLET, FILM COATED ORAL at 09:29

## 2018-01-01 RX ADMIN — OXCARBAZEPINE 300 MG: 300 TABLET ORAL at 20:12

## 2018-01-01 RX ADMIN — HYDROCODONE BITARTRATE AND ACETAMINOPHEN 1 TABLET: 5; 325 TABLET ORAL at 06:55

## 2018-01-01 RX ADMIN — HYDROCODONE BITARTRATE AND ACETAMINOPHEN 1 TABLET: 5; 325 TABLET ORAL at 15:12

## 2018-01-01 RX ADMIN — ESCITALOPRAM OXALATE 10 MG: 10 TABLET, FILM COATED ORAL at 09:22

## 2018-01-01 RX ADMIN — ASPIRIN 81 MG: 81 TABLET, COATED ORAL at 08:06

## 2018-01-01 RX ADMIN — HYDROXYZINE HYDROCHLORIDE 25 MG: 25 TABLET ORAL at 15:05

## 2018-01-01 RX ADMIN — MULTIPLE VITAMINS W/ MINERALS TAB 1 TABLET: TAB at 13:53

## 2018-01-01 RX ADMIN — BACLOFEN 10 MG: 10 TABLET ORAL at 08:09

## 2018-01-01 RX ADMIN — NITROGLYCERIN 0.5 INCH: 20 OINTMENT TOPICAL at 08:28

## 2018-01-01 RX ADMIN — CANDESARTAN CILEXETIL 32 MG: 16 TABLET ORAL at 08:13

## 2018-01-01 RX ADMIN — ROSUVASTATIN CALCIUM 20 MG: 20 TABLET, FILM COATED ORAL at 20:52

## 2018-01-01 RX ADMIN — ALBUTEROL SULFATE 2.5 MG: 2.5 SOLUTION RESPIRATORY (INHALATION) at 13:52

## 2018-01-01 RX ADMIN — Medication 10 ML: at 21:33

## 2018-01-01 RX ADMIN — Medication 10 ML: at 20:13

## 2018-01-01 RX ADMIN — ESCITALOPRAM OXALATE 10 MG: 10 TABLET, FILM COATED ORAL at 08:24

## 2018-01-01 RX ADMIN — METOPROLOL TARTRATE 100 MG: 100 TABLET, FILM COATED ORAL at 20:39

## 2018-01-01 RX ADMIN — LEVOTHYROXINE SODIUM 125 MCG: 125 TABLET ORAL at 06:51

## 2018-01-01 RX ADMIN — SUCRALFATE 1 G: 1 TABLET ORAL at 12:38

## 2018-01-01 RX ADMIN — Medication 2 PUFF: at 21:16

## 2018-01-01 RX ADMIN — HYDROXYZINE HYDROCHLORIDE 25 MG: 25 TABLET ORAL at 09:28

## 2018-01-01 RX ADMIN — ONDANSETRON 4 MG: 2 INJECTION INTRAMUSCULAR; INTRAVENOUS at 06:37

## 2018-01-01 RX ADMIN — ROSUVASTATIN CALCIUM 20 MG: 20 TABLET, FILM COATED ORAL at 21:38

## 2018-01-01 RX ADMIN — NITROGLYCERIN 0.5 INCH: 20 OINTMENT TOPICAL at 13:41

## 2018-01-01 RX ADMIN — HYDROCODONE BITARTRATE AND ACETAMINOPHEN 2 TABLET: 5; 325 TABLET ORAL at 22:50

## 2018-01-01 RX ADMIN — DOCUSATE SODIUM 100 MG: 100 CAPSULE, LIQUID FILLED ORAL at 21:04

## 2018-01-01 RX ADMIN — HYDRALAZINE HYDROCHLORIDE 75 MG: 25 TABLET ORAL at 09:56

## 2018-01-01 RX ADMIN — RANOLAZINE 500 MG: 500 TABLET, FILM COATED, EXTENDED RELEASE ORAL at 11:22

## 2018-01-01 RX ADMIN — HYDROCODONE BITARTRATE AND ACETAMINOPHEN 1 TABLET: 5; 325 TABLET ORAL at 09:58

## 2018-01-01 RX ADMIN — HYDROCORTISONE ACETATE 25 MG: 25 SUPPOSITORY RECTAL at 15:08

## 2018-01-01 RX ADMIN — RISPERIDONE 1 MG: 1 TABLET ORAL at 09:26

## 2018-01-01 RX ADMIN — POTASSIUM CHLORIDE 10 MEQ: 750 TABLET, FILM COATED, EXTENDED RELEASE ORAL at 09:02

## 2018-01-01 RX ADMIN — SODIUM CHLORIDE TAB 1 GM 1 G: 1 TAB at 16:52

## 2018-01-01 RX ADMIN — BACLOFEN 10 MG: 10 TABLET ORAL at 09:31

## 2018-01-01 RX ADMIN — ASPIRIN 81 MG: 81 TABLET, COATED ORAL at 12:00

## 2018-01-01 RX ADMIN — RANOLAZINE 500 MG: 500 TABLET, FILM COATED, EXTENDED RELEASE ORAL at 21:47

## 2018-01-01 RX ADMIN — Medication 10 ML: at 09:31

## 2018-01-01 RX ADMIN — LEVOTHYROXINE SODIUM 100 MCG: 100 TABLET ORAL at 06:27

## 2018-01-01 RX ADMIN — OXCARBAZEPINE 150 MG: 300 TABLET, FILM COATED ORAL at 11:21

## 2018-01-01 RX ADMIN — SODIUM CHLORIDE TAB 1 GM 1 G: 1 TAB at 08:18

## 2018-01-01 RX ADMIN — LEVOTHYROXINE SODIUM 100 MCG: 100 TABLET ORAL at 08:10

## 2018-01-01 RX ADMIN — ISOSORBIDE DINITRATE 40 MG: 20 TABLET ORAL at 20:40

## 2018-01-01 RX ADMIN — ISOSORBIDE MONONITRATE 60 MG: 60 TABLET ORAL at 08:12

## 2018-01-01 RX ADMIN — METOPROLOL TARTRATE 100 MG: 100 TABLET, FILM COATED ORAL at 20:40

## 2018-01-01 RX ADMIN — OXCARBAZEPINE 150 MG: 300 TABLET ORAL at 13:50

## 2018-01-01 RX ADMIN — ROSUVASTATIN CALCIUM 20 MG: 20 TABLET, FILM COATED ORAL at 21:47

## 2018-01-01 RX ADMIN — CLOPIDOGREL BISULFATE 75 MG: 75 TABLET ORAL at 09:02

## 2018-01-01 RX ADMIN — BACLOFEN 10 MG: 10 TABLET ORAL at 20:22

## 2018-01-01 RX ADMIN — DOCUSATE SODIUM 100 MG: 100 CAPSULE, LIQUID FILLED ORAL at 09:28

## 2018-01-01 RX ADMIN — LEVOTHYROXINE SODIUM 100 MCG: 100 TABLET ORAL at 04:49

## 2018-01-01 RX ADMIN — HYDRALAZINE HYDROCHLORIDE 100 MG: 50 TABLET, FILM COATED ORAL at 08:16

## 2018-01-01 RX ADMIN — SODIUM CHLORIDE TAB 1 GM 1 G: 1 TAB at 16:08

## 2018-01-01 RX ADMIN — CIPROFLOXACIN 500 MG: 500 TABLET, FILM COATED ORAL at 20:18

## 2018-01-01 RX ADMIN — DOXEPIN HYDROCHLORIDE 150 MG: 50 CAPSULE ORAL at 20:01

## 2018-01-01 RX ADMIN — OXCARBAZEPINE 150 MG: 300 TABLET, FILM COATED ORAL at 08:17

## 2018-01-01 RX ADMIN — HYDROCODONE BITARTRATE AND ACETAMINOPHEN 1 TABLET: 5; 325 TABLET ORAL at 13:50

## 2018-01-01 RX ADMIN — ASPIRIN 81 MG: 81 TABLET, COATED ORAL at 09:47

## 2018-01-01 RX ADMIN — ASPIRIN 81 MG: 81 TABLET, COATED ORAL at 09:36

## 2018-01-01 RX ADMIN — HYDRALAZINE HYDROCHLORIDE 100 MG: 50 TABLET, FILM COATED ORAL at 05:20

## 2018-01-01 RX ADMIN — ASPIRIN 81 MG: 81 TABLET, COATED ORAL at 09:28

## 2018-01-01 RX ADMIN — ISOSORBIDE MONONITRATE 60 MG: 60 TABLET ORAL at 00:35

## 2018-01-01 RX ADMIN — HYDROCODONE BITARTRATE AND ACETAMINOPHEN 1 TABLET: 5; 325 TABLET ORAL at 03:52

## 2018-01-01 RX ADMIN — Medication 2 PUFF: at 20:00

## 2018-01-01 RX ADMIN — Medication 10 ML: at 08:14

## 2018-01-01 RX ADMIN — SODIUM CHLORIDE: 9 INJECTION, SOLUTION INTRAVENOUS at 08:09

## 2018-01-01 RX ADMIN — FUROSEMIDE 20 MG: 20 TABLET ORAL at 08:10

## 2018-01-01 RX ADMIN — ISOSORBIDE DINITRATE 40 MG: 20 TABLET ORAL at 08:48

## 2018-01-01 RX ADMIN — ENOXAPARIN SODIUM 40 MG: 40 INJECTION SUBCUTANEOUS at 08:28

## 2018-01-01 RX ADMIN — CARBOXYMETHYLCELLULOSE SODIUM 1 DROP: 10 GEL OPHTHALMIC at 09:21

## 2018-01-01 RX ADMIN — Medication 1 UNITS: at 18:44

## 2018-01-01 RX ADMIN — ISOSORBIDE MONONITRATE 60 MG: 60 TABLET ORAL at 08:10

## 2018-01-01 RX ADMIN — METOPROLOL TARTRATE 100 MG: 100 TABLET, FILM COATED ORAL at 10:41

## 2018-01-01 RX ADMIN — SODIUM CHLORIDE: 9 INJECTION, SOLUTION INTRAVENOUS at 03:37

## 2018-01-01 RX ADMIN — ESCITALOPRAM 20 MG: 20 TABLET, FILM COATED ORAL at 08:11

## 2018-01-01 RX ADMIN — SODIUM CHLORIDE: 9 INJECTION, SOLUTION INTRAVENOUS at 22:48

## 2018-01-01 RX ADMIN — METOPROLOL TARTRATE 100 MG: 100 TABLET, FILM COATED ORAL at 22:05

## 2018-01-01 RX ADMIN — OXCARBAZEPINE 300 MG: 300 TABLET, FILM COATED ORAL at 18:42

## 2018-01-01 RX ADMIN — RISPERIDONE 1 MG: 1 TABLET ORAL at 08:17

## 2018-01-01 RX ADMIN — CLOTRIMAZOLE AND BETAMETHASONE DIPROPIONATE: 10; .5 CREAM TOPICAL at 20:51

## 2018-01-01 RX ADMIN — RISPERIDONE 1 MG: 1 TABLET ORAL at 11:21

## 2018-01-01 RX ADMIN — OXCARBAZEPINE 300 MG: 300 TABLET, FILM COATED ORAL at 16:49

## 2018-01-01 RX ADMIN — CLOPIDOGREL BISULFATE 75 MG: 75 TABLET ORAL at 08:10

## 2018-01-01 RX ADMIN — Medication 2 PUFF: at 09:46

## 2018-01-01 RX ADMIN — FLUTICASONE PROPIONATE 1 SPRAY: 50 SPRAY, METERED NASAL at 09:37

## 2018-01-01 RX ADMIN — SPIRONOLACTONE 25 MG: 25 TABLET ORAL at 09:33

## 2018-01-01 RX ADMIN — Medication 10 ML: at 21:41

## 2018-01-01 RX ADMIN — Medication 10 ML: at 00:37

## 2018-01-01 RX ADMIN — HYDRALAZINE HYDROCHLORIDE 25 MG: 50 TABLET, FILM COATED ORAL at 15:52

## 2018-01-01 RX ADMIN — HYDROXYZINE HYDROCHLORIDE 25 MG: 25 TABLET ORAL at 13:03

## 2018-01-01 RX ADMIN — METOPROLOL TARTRATE 100 MG: 100 TABLET, FILM COATED ORAL at 08:23

## 2018-01-01 RX ADMIN — Medication 10 ML: at 20:01

## 2018-01-01 RX ADMIN — ROSUVASTATIN CALCIUM 20 MG: 20 TABLET, FILM COATED ORAL at 20:49

## 2018-01-01 RX ADMIN — Medication 2 PUFF: at 22:12

## 2018-01-01 RX ADMIN — AMLODIPINE BESYLATE 5 MG: 5 TABLET ORAL at 09:10

## 2018-01-01 RX ADMIN — LEVOTHYROXINE SODIUM 100 MCG: 100 TABLET ORAL at 06:30

## 2018-01-01 RX ADMIN — METOPROLOL TARTRATE 75 MG: 50 TABLET, FILM COATED ORAL at 12:37

## 2018-01-01 RX ADMIN — POLYETHYLENE GLYCOL 3350 17 G: 17 POWDER, FOR SOLUTION ORAL at 11:23

## 2018-01-01 RX ADMIN — HYDRALAZINE HYDROCHLORIDE 50 MG: 50 TABLET, FILM COATED ORAL at 08:12

## 2018-01-01 RX ADMIN — HYDROXYZINE HYDROCHLORIDE 25 MG: 25 TABLET ORAL at 09:31

## 2018-01-01 RX ADMIN — DOCUSATE SODIUM 100 MG: 100 CAPSULE, LIQUID FILLED ORAL at 09:43

## 2018-01-01 RX ADMIN — LEVOTHYROXINE SODIUM 100 MCG: 100 TABLET ORAL at 13:53

## 2018-01-01 RX ADMIN — HYDROCODONE BITARTRATE AND ACETAMINOPHEN 1 TABLET: 5; 325 TABLET ORAL at 10:44

## 2018-01-01 RX ADMIN — MICONAZOLE NITRATE: 2 POWDER TOPICAL at 09:27

## 2018-01-01 RX ADMIN — AMLODIPINE BESYLATE 10 MG: 10 TABLET ORAL at 08:22

## 2018-01-01 RX ADMIN — HYDROXYZINE HYDROCHLORIDE 25 MG: 25 TABLET ORAL at 22:49

## 2018-01-01 RX ADMIN — OXCARBAZEPINE 300 MG: 300 TABLET, FILM COATED ORAL at 18:21

## 2018-01-01 RX ADMIN — FERROUS SULFATE TAB 325 MG (65 MG ELEMENTAL FE) 325 MG: 325 (65 FE) TAB at 10:41

## 2018-01-01 RX ADMIN — LEVOTHYROXINE SODIUM 100 MCG: 100 TABLET ORAL at 03:55

## 2018-01-01 RX ADMIN — CANDESARTAN CILEXETIL 32 MG: 16 TABLET ORAL at 09:55

## 2018-01-01 RX ADMIN — PANTOPRAZOLE SODIUM 40 MG: 40 TABLET, DELAYED RELEASE ORAL at 09:23

## 2018-01-01 RX ADMIN — HYDROCODONE BITARTRATE AND ACETAMINOPHEN 2 TABLET: 5; 325 TABLET ORAL at 17:01

## 2018-01-01 RX ADMIN — INSULIN LISPRO 1 UNITS: 100 INJECTION, SOLUTION INTRAVENOUS; SUBCUTANEOUS at 21:37

## 2018-01-01 RX ADMIN — DOXEPIN HYDROCHLORIDE 150 MG: 50 CAPSULE ORAL at 20:37

## 2018-01-01 RX ADMIN — METHYLPREDNISOLONE SODIUM SUCCINATE 125 MG: 125 INJECTION, POWDER, FOR SOLUTION INTRAMUSCULAR; INTRAVENOUS at 07:21

## 2018-01-01 RX ADMIN — ENOXAPARIN SODIUM 40 MG: 40 INJECTION SUBCUTANEOUS at 09:36

## 2018-01-01 RX ADMIN — Medication 2 PUFF: at 22:41

## 2018-01-01 RX ADMIN — HYDRALAZINE HYDROCHLORIDE 100 MG: 50 TABLET, FILM COATED ORAL at 17:52

## 2018-01-01 RX ADMIN — MULTIPLE VITAMINS W/ MINERALS TAB 1 TABLET: TAB at 10:42

## 2018-01-01 RX ADMIN — HYDROCORTISONE ACETATE 25 MG: 25 SUPPOSITORY RECTAL at 20:10

## 2018-01-01 RX ADMIN — IPRATROPIUM BROMIDE AND ALBUTEROL SULFATE 1 AMPULE: .5; 3 SOLUTION RESPIRATORY (INHALATION) at 08:12

## 2018-01-01 RX ADMIN — HYDROCODONE BITARTRATE AND ACETAMINOPHEN 1 TABLET: 5; 325 TABLET ORAL at 12:30

## 2018-01-01 ASSESSMENT — PAIN DESCRIPTION - ORIENTATION
ORIENTATION: LEFT
ORIENTATION: POSTERIOR
ORIENTATION: POSTERIOR
ORIENTATION: LOWER;OTHER (COMMENT)
ORIENTATION: LOWER;UPPER
ORIENTATION: POSTERIOR
ORIENTATION: LEFT
ORIENTATION: LOWER
ORIENTATION: POSTERIOR
ORIENTATION: LOWER;UPPER
ORIENTATION: POSTERIOR
ORIENTATION: LEFT
ORIENTATION: POSTERIOR
ORIENTATION: POSTERIOR
ORIENTATION: LOWER
ORIENTATION: LEFT
ORIENTATION: POSTERIOR
ORIENTATION: LOWER
ORIENTATION: POSTERIOR
ORIENTATION: LEFT
ORIENTATION: LEFT
ORIENTATION: POSTERIOR
ORIENTATION: LEFT
ORIENTATION: POSTERIOR

## 2018-01-01 ASSESSMENT — PAIN SCALES - GENERAL
PAINLEVEL_OUTOF10: 0
PAINLEVEL_OUTOF10: 10
PAINLEVEL_OUTOF10: 9
PAINLEVEL_OUTOF10: 9
PAINLEVEL_OUTOF10: 3
PAINLEVEL_OUTOF10: 6
PAINLEVEL_OUTOF10: 6
PAINLEVEL_OUTOF10: 0
PAINLEVEL_OUTOF10: 7
PAINLEVEL_OUTOF10: 9
PAINLEVEL_OUTOF10: 10
PAINLEVEL_OUTOF10: 8
PAINLEVEL_OUTOF10: 10
PAINLEVEL_OUTOF10: 6
PAINLEVEL_OUTOF10: 8
PAINLEVEL_OUTOF10: 3
PAINLEVEL_OUTOF10: 0
PAINLEVEL_OUTOF10: 8
PAINLEVEL_OUTOF10: 7
PAINLEVEL_OUTOF10: 6
PAINLEVEL_OUTOF10: 0
PAINLEVEL_OUTOF10: 6
PAINLEVEL_OUTOF10: 0
PAINLEVEL_OUTOF10: 8
PAINLEVEL_OUTOF10: 3
PAINLEVEL_OUTOF10: 0
PAINLEVEL_OUTOF10: 8
PAINLEVEL_OUTOF10: 6
PAINLEVEL_OUTOF10: 8
PAINLEVEL_OUTOF10: 10
PAINLEVEL_OUTOF10: 6
PAINLEVEL_OUTOF10: 8
PAINLEVEL_OUTOF10: 9
PAINLEVEL_OUTOF10: 8
PAINLEVEL_OUTOF10: 8
PAINLEVEL_OUTOF10: 9
PAINLEVEL_OUTOF10: 10
PAINLEVEL_OUTOF10: 5
PAINLEVEL_OUTOF10: 0
PAINLEVEL_OUTOF10: 8
PAINLEVEL_OUTOF10: 4
PAINLEVEL_OUTOF10: 6
PAINLEVEL_OUTOF10: 6
PAINLEVEL_OUTOF10: 7
PAINLEVEL_OUTOF10: 10
PAINLEVEL_OUTOF10: 6
PAINLEVEL_OUTOF10: 9
PAINLEVEL_OUTOF10: 6
PAINLEVEL_OUTOF10: 0
PAINLEVEL_OUTOF10: 8
PAINLEVEL_OUTOF10: 7
PAINLEVEL_OUTOF10: 6
PAINLEVEL_OUTOF10: 7
PAINLEVEL_OUTOF10: 10
PAINLEVEL_OUTOF10: 10
PAINLEVEL_OUTOF10: 0
PAINLEVEL_OUTOF10: 6
PAINLEVEL_OUTOF10: 8
PAINLEVEL_OUTOF10: 8
PAINLEVEL_OUTOF10: 9
PAINLEVEL_OUTOF10: 8
PAINLEVEL_OUTOF10: 9
PAINLEVEL_OUTOF10: 0
PAINLEVEL_OUTOF10: 8
PAINLEVEL_OUTOF10: 7
PAINLEVEL_OUTOF10: 8
PAINLEVEL_OUTOF10: 8
PAINLEVEL_OUTOF10: 5
PAINLEVEL_OUTOF10: 8
PAINLEVEL_OUTOF10: 3
PAINLEVEL_OUTOF10: 8
PAINLEVEL_OUTOF10: 6
PAINLEVEL_OUTOF10: 10
PAINLEVEL_OUTOF10: 8
PAINLEVEL_OUTOF10: 2
PAINLEVEL_OUTOF10: 0
PAINLEVEL_OUTOF10: 3
PAINLEVEL_OUTOF10: 5
PAINLEVEL_OUTOF10: 7
PAINLEVEL_OUTOF10: 8
PAINLEVEL_OUTOF10: 0
PAINLEVEL_OUTOF10: 8
PAINLEVEL_OUTOF10: 8
PAINLEVEL_OUTOF10: 6
PAINLEVEL_OUTOF10: 10
PAINLEVEL_OUTOF10: 4
PAINLEVEL_OUTOF10: 7
PAINLEVEL_OUTOF10: 4
PAINLEVEL_OUTOF10: 7
PAINLEVEL_OUTOF10: 10
PAINLEVEL_OUTOF10: 2
PAINLEVEL_OUTOF10: 6
PAINLEVEL_OUTOF10: 8
PAINLEVEL_OUTOF10: 0
PAINLEVEL_OUTOF10: 0
PAINLEVEL_OUTOF10: 8
PAINLEVEL_OUTOF10: 0
PAINLEVEL_OUTOF10: 10
PAINLEVEL_OUTOF10: 6
PAINLEVEL_OUTOF10: 0
PAINLEVEL_OUTOF10: 0
PAINLEVEL_OUTOF10: 7
PAINLEVEL_OUTOF10: 9
PAINLEVEL_OUTOF10: 6
PAINLEVEL_OUTOF10: 8
PAINLEVEL_OUTOF10: 0
PAINLEVEL_OUTOF10: 6
PAINLEVEL_OUTOF10: 8
PAINLEVEL_OUTOF10: 10
PAINLEVEL_OUTOF10: 8
PAINLEVEL_OUTOF10: 8
PAINLEVEL_OUTOF10: 6
PAINLEVEL_OUTOF10: 8
PAINLEVEL_OUTOF10: 6
PAINLEVEL_OUTOF10: 8
PAINLEVEL_OUTOF10: 0
PAINLEVEL_OUTOF10: 3
PAINLEVEL_OUTOF10: 8
PAINLEVEL_OUTOF10: 8
PAINLEVEL_OUTOF10: 0
PAINLEVEL_OUTOF10: 8
PAINLEVEL_OUTOF10: 10
PAINLEVEL_OUTOF10: 6
PAINLEVEL_OUTOF10: 0
PAINLEVEL_OUTOF10: 9
PAINLEVEL_OUTOF10: 6
PAINLEVEL_OUTOF10: 6
PAINLEVEL_OUTOF10: 10
PAINLEVEL_OUTOF10: 10
PAINLEVEL_OUTOF10: 6
PAINLEVEL_OUTOF10: 8
PAINLEVEL_OUTOF10: 6
PAINLEVEL_OUTOF10: 0
PAINLEVEL_OUTOF10: 8
PAINLEVEL_OUTOF10: 0
PAINLEVEL_OUTOF10: 10
PAINLEVEL_OUTOF10: 7
PAINLEVEL_OUTOF10: 10
PAINLEVEL_OUTOF10: 4
PAINLEVEL_OUTOF10: 10
PAINLEVEL_OUTOF10: 7
PAINLEVEL_OUTOF10: 8
PAINLEVEL_OUTOF10: 2
PAINLEVEL_OUTOF10: 0
PAINLEVEL_OUTOF10: 3
PAINLEVEL_OUTOF10: 10
PAINLEVEL_OUTOF10: 5
PAINLEVEL_OUTOF10: 8
PAINLEVEL_OUTOF10: 8
PAINLEVEL_OUTOF10: 5
PAINLEVEL_OUTOF10: 10
PAINLEVEL_OUTOF10: 9
PAINLEVEL_OUTOF10: 8
PAINLEVEL_OUTOF10: 6
PAINLEVEL_OUTOF10: 9
PAINLEVEL_OUTOF10: 10
PAINLEVEL_OUTOF10: 8
PAINLEVEL_OUTOF10: 5
PAINLEVEL_OUTOF10: 10
PAINLEVEL_OUTOF10: 7
PAINLEVEL_OUTOF10: 0
PAINLEVEL_OUTOF10: 10
PAINLEVEL_OUTOF10: 8
PAINLEVEL_OUTOF10: 9
PAINLEVEL_OUTOF10: 10

## 2018-01-01 ASSESSMENT — PAIN DESCRIPTION - LOCATION
LOCATION: BACK;NECK
LOCATION: BACK;NECK
LOCATION: BACK;ABDOMEN
LOCATION: BACK;NECK
LOCATION: BACK;SHOULDER
LOCATION: BACK
LOCATION: BACK;NECK
LOCATION: SHOULDER
LOCATION: BACK;NECK
LOCATION: BACK;NECK
LOCATION: ABDOMEN
LOCATION: SHOULDER
LOCATION: NECK;BACK
LOCATION: BACK;NECK
LOCATION: BACK
LOCATION: BACK;NECK
LOCATION: BACK
LOCATION: BACK
LOCATION: BACK;NECK
LOCATION: BACK;NECK
LOCATION: SHOULDER
LOCATION: NECK
LOCATION: NECK
LOCATION: BACK
LOCATION: NECK
LOCATION: BACK;NECK
LOCATION: NECK;BACK
LOCATION: BACK
LOCATION: NECK;BACK
LOCATION: HEAD
LOCATION: BACK;NECK
LOCATION: BACK;NECK
LOCATION: BACK;HEAD
LOCATION: BACK;NECK
LOCATION: BACK;NECK
LOCATION: BACK
LOCATION: BACK;NECK
LOCATION: ABDOMEN
LOCATION: BACK
LOCATION: SHOULDER
LOCATION: ABDOMEN;BACK
LOCATION: BACK;NECK
LOCATION: BACK
LOCATION: NECK
LOCATION: BACK
LOCATION: BACK;NECK
LOCATION: SHOULDER
LOCATION: NECK;BACK
LOCATION: NECK
LOCATION: BACK;NECK
LOCATION: BACK;NECK
LOCATION: ABDOMEN;BACK
LOCATION: BACK
LOCATION: ABDOMEN;BACK
LOCATION: RECTUM
LOCATION: SHOULDER
LOCATION: NECK

## 2018-01-01 ASSESSMENT — PAIN DESCRIPTION - DIRECTION
RADIATING_TOWARDS: BACK NECK
RADIATING_TOWARDS: BACK NECK
RADIATING_TOWARDS: LUE
RADIATING_TOWARDS: BACK

## 2018-01-01 ASSESSMENT — PAIN DESCRIPTION - ONSET
ONSET: ON-GOING
ONSET: GRADUAL
ONSET: ON-GOING

## 2018-01-01 ASSESSMENT — PAIN DESCRIPTION - PROGRESSION
CLINICAL_PROGRESSION: NOT CHANGED
CLINICAL_PROGRESSION: NOT CHANGED
CLINICAL_PROGRESSION: GRADUALLY WORSENING
CLINICAL_PROGRESSION: GRADUALLY IMPROVING
CLINICAL_PROGRESSION: GRADUALLY IMPROVING
CLINICAL_PROGRESSION: GRADUALLY WORSENING
CLINICAL_PROGRESSION: NOT CHANGED
CLINICAL_PROGRESSION: GRADUALLY WORSENING
CLINICAL_PROGRESSION: NOT CHANGED
CLINICAL_PROGRESSION: NOT CHANGED
CLINICAL_PROGRESSION: GRADUALLY IMPROVING
CLINICAL_PROGRESSION: GRADUALLY WORSENING
CLINICAL_PROGRESSION: NOT CHANGED
CLINICAL_PROGRESSION: GRADUALLY WORSENING
CLINICAL_PROGRESSION: NOT CHANGED
CLINICAL_PROGRESSION: GRADUALLY WORSENING
CLINICAL_PROGRESSION: NOT CHANGED

## 2018-01-01 ASSESSMENT — PAIN DESCRIPTION - PAIN TYPE
TYPE: CHRONIC PAIN
TYPE: ACUTE PAIN
TYPE: CHRONIC PAIN
TYPE: ACUTE PAIN
TYPE: CHRONIC PAIN
TYPE: CHRONIC PAIN
TYPE: ACUTE PAIN
TYPE: CHRONIC PAIN
TYPE: ACUTE PAIN;CHRONIC PAIN
TYPE: CHRONIC PAIN
TYPE: ACUTE PAIN
TYPE: CHRONIC PAIN
TYPE: ACUTE PAIN
TYPE: CHRONIC PAIN
TYPE: ACUTE PAIN
TYPE: CHRONIC PAIN
TYPE: ACUTE PAIN
TYPE: CHRONIC PAIN
TYPE: ACUTE PAIN
TYPE: CHRONIC PAIN
TYPE: CHRONIC PAIN
TYPE: ACUTE PAIN
TYPE: ACUTE PAIN
TYPE: CHRONIC PAIN
TYPE: ACUTE PAIN
TYPE: CHRONIC PAIN

## 2018-01-01 ASSESSMENT — PAIN DESCRIPTION - DESCRIPTORS
DESCRIPTORS: ACHING
DESCRIPTORS: SHARP
DESCRIPTORS: SHARP
DESCRIPTORS: SORE
DESCRIPTORS: ACHING
DESCRIPTORS: SHARP
DESCRIPTORS: ACHING
DESCRIPTORS: SHARP
DESCRIPTORS: SHARP
DESCRIPTORS: ACHING
DESCRIPTORS: SORE
DESCRIPTORS: ACHING
DESCRIPTORS: ACHING;HEADACHE
DESCRIPTORS: SORE
DESCRIPTORS: SHARP
DESCRIPTORS: ACHING
DESCRIPTORS: SORE
DESCRIPTORS: SORE
DESCRIPTORS: ACHING
DESCRIPTORS: SHARP;ACHING
DESCRIPTORS: SHARP;ACHING
DESCRIPTORS: CONSTANT;DISCOMFORT
DESCRIPTORS: ACHING
DESCRIPTORS: SORE
DESCRIPTORS: ACHING
DESCRIPTORS: SHARP
DESCRIPTORS: CONSTANT;CRAMPING;DISCOMFORT
DESCRIPTORS: SHARP
DESCRIPTORS: ACHING
DESCRIPTORS: SHARP
DESCRIPTORS: ACHING
DESCRIPTORS: DISCOMFORT;SHARP
DESCRIPTORS: ACHING
DESCRIPTORS: SHARP
DESCRIPTORS: CONSTANT;DISCOMFORT
DESCRIPTORS: ACHING

## 2018-01-01 ASSESSMENT — ENCOUNTER SYMPTOMS
BLOOD IN STOOL: 1
CONSTIPATION: 0
NAUSEA: 0
COLOR CHANGE: 0
WHEEZING: 0
CONSTIPATION: 0
DYSPNEA ASSOCIATED WITH: MINIMAL EXERTION
COUGH: 0
APNEA: 0
SHORTNESS OF BREATH: 0
EYES NEGATIVE: 1
GASTROINTESTINAL NEGATIVE: 1
COLOR CHANGE: 1
ORTHOPNEA: 0
NAUSEA: 0
COUGH: 1
COLOR CHANGE: 0
CHOKING: 0
CHEST TIGHTNESS: 0
COUGH: 1
EYE ITCHING: 0
BLOOD IN STOOL: 0
BACK PAIN: 0
SORE THROAT: 0
RESPIRATORY NEGATIVE: 1
HEARTBURN: 0
CONSTIPATION: 0
DIARRHEA: 0
FACIAL SWELLING: 0
COUGH: 1
ABDOMINAL DISTENTION: 0
COUGH: 1
ABDOMINAL PAIN: 0
CHEST TIGHTNESS: 0
APNEA: 0
WHEEZING: 0
SHORTNESS OF BREATH: 0
EYE ITCHING: 0
CONSTIPATION: 0
PHOTOPHOBIA: 0
SORE THROAT: 0
VOMITING: 0
SHORTNESS OF BREATH: 0
VOMITING: 0
COUGH: 0
COUGH: 1
VOMITING: 0
SINUS PAIN: 0
EYE PAIN: 0
STRIDOR: 0
COLOR CHANGE: 0
RHINORRHEA: 0
VOMITING: 0
WHEEZING: 0
ABDOMINAL DISTENTION: 0
GASTROINTESTINAL NEGATIVE: 1
NAUSEA: 0
APNEA: 0
EYE ITCHING: 0
BACK PAIN: 1
COUGH: 0
VOMITING: 0
DIARRHEA: 1
DYSPNEA ASSOCIATED WITH: EXERTION
WHEEZING: 0
WHEEZING: 0
VOICE CHANGE: 0
SINUS PRESSURE: 0
ABDOMINAL PAIN: 0
CHOKING: 0
BACK PAIN: 0
CONSTIPATION: 0
ABDOMINAL PAIN: 0
SHORTNESS OF BREATH: 0
SHORTNESS OF BREATH: 1
CHEST TIGHTNESS: 0
ABDOMINAL PAIN: 0
NAUSEA: 0
SHORTNESS OF BREATH: 1
ALLERGIC/IMMUNOLOGIC NEGATIVE: 1
CHEST TIGHTNESS: 0
EYES NEGATIVE: 1
WHEEZING: 0
WHEEZING: 0
COUGH: 0
RHINORRHEA: 0
CHEST TIGHTNESS: 0
VOMITING: 0
ABDOMINAL PAIN: 0
SORE THROAT: 0
SORE THROAT: 0
DIARRHEA: 0
NAUSEA: 0
SORE THROAT: 0
COUGH: 0
ABDOMINAL DISTENTION: 0
RHINORRHEA: 0
WHEEZING: 0
EYE DISCHARGE: 0
VOMITING: 0
NAUSEA: 0
RESPIRATORY NEGATIVE: 1
ABDOMINAL PAIN: 0
NAUSEA: 0
BACK PAIN: 0
PHOTOPHOBIA: 0
DIARRHEA: 0
BACK PAIN: 0
ABDOMINAL DISTENTION: 0
SHORTNESS OF BREATH: 1
SORE THROAT: 0
CHOKING: 0
NAUSEA: 0
COLOR CHANGE: 1
COLOR CHANGE: 0
ABDOMINAL PAIN: 0
COLOR CHANGE: 0
SINUS PAIN: 0
NAUSEA: 0
APNEA: 0
SHORTNESS OF BREATH: 1
ABDOMINAL PAIN: 0
ABDOMINAL PAIN: 1
NAUSEA: 0
RHINORRHEA: 0
DIARRHEA: 0
ABDOMINAL PAIN: 0
CHOKING: 0
EYE DISCHARGE: 0
CHOKING: 0
RHINORRHEA: 0
COUGH: 0
ABDOMINAL PAIN: 0
ABDOMINAL PAIN: 0
WHEEZING: 0
WHEEZING: 0
EYE REDNESS: 0
DIARRHEA: 0
ANAL BLEEDING: 0
EYE DISCHARGE: 0
ABDOMINAL DISTENTION: 0
VOMITING: 0
BACK PAIN: 1
GASTROINTESTINAL NEGATIVE: 1
TROUBLE SWALLOWING: 0
ABDOMINAL DISTENTION: 0
EYE REDNESS: 0
COUGH: 0
COUGH: 0
SHORTNESS OF BREATH: 1
DIARRHEA: 0
CHOKING: 0
BLOOD IN STOOL: 0
TROUBLE SWALLOWING: 0
DYSPNEA ASSOCIATED WITH: EXERTION
SORE THROAT: 0
SHORTNESS OF BREATH: 1
EYE REDNESS: 0
VOMITING: 0
RESPIRATORY NEGATIVE: 1
RHINORRHEA: 0
RECTAL PAIN: 0
ABDOMINAL DISTENTION: 1
BACK PAIN: 0
ABDOMINAL PAIN: 0
BACK PAIN: 1
EYE REDNESS: 0
WHEEZING: 0
SHORTNESS OF BREATH: 1
SHORTNESS OF BREATH: 0
NAUSEA: 0
DIARRHEA: 0
SHORTNESS OF BREATH: 0
DIARRHEA: 0
NAUSEA: 0
TROUBLE SWALLOWING: 0
DIARRHEA: 0
COUGH: 1
SORE THROAT: 0
SHORTNESS OF BREATH: 1
SHORTNESS OF BREATH: 0
CHEST TIGHTNESS: 0
SPUTUM PRODUCTION: 0
SHORTNESS OF BREATH: 1
EYE PAIN: 0
BACK PAIN: 0
ABDOMINAL DISTENTION: 0
WHEEZING: 0
COUGH: 0
ABDOMINAL PAIN: 0
SHORTNESS OF BREATH: 1
COUGH: 0
SHORTNESS OF BREATH: 1

## 2018-01-01 ASSESSMENT — PAIN DESCRIPTION - FREQUENCY
FREQUENCY: CONTINUOUS
FREQUENCY: INTERMITTENT
FREQUENCY: CONTINUOUS
FREQUENCY: INTERMITTENT
FREQUENCY: CONTINUOUS
FREQUENCY: INTERMITTENT
FREQUENCY: INTERMITTENT
FREQUENCY: CONTINUOUS
FREQUENCY: INTERMITTENT
FREQUENCY: INTERMITTENT
FREQUENCY: CONTINUOUS
FREQUENCY: INTERMITTENT
FREQUENCY: CONTINUOUS

## 2018-01-01 ASSESSMENT — PAIN SCALES - WONG BAKER
WONGBAKER_NUMERICALRESPONSE: 2
WONGBAKER_NUMERICALRESPONSE: 2

## 2018-01-04 ENCOUNTER — CARE COORDINATION (OUTPATIENT)
Dept: CARE COORDINATION | Age: 73
End: 2018-01-04

## 2018-01-04 NOTE — CARE COORDINATION
Ambulatory Care Coordination Note  1/4/2018  CM Risk Score: 4  Mahendra Mortality Risk Score: 20.93    ACC: David Martinez, JOHANNA    Summary Note: spoke with Stephanie Edgar today. States she is feeling better and stable. States she is taking her medications as ordered and [facility] sets them up for her. States they are monitoring her blood pressure but was not able to report readings during phone call. States blood sugars are improving with readings in the 160's which was down from the 200's she reported during last phone call. Discussed and educated on the importance of early symptom recognition and reporting to prevent hospital admissions and ED visits. Confirmed follow up nurse appt for next week for blood pressure check. COPD Assessment    Does the patient understand envrionmental exposure?:  No  Is the patient able to verbalize Rescue vs. Long Acting medications?:  No  Does the patient have a nebulizer?:  No  Does the patient use a space with inhaled medications?:  No     No patient-reported symptoms         Symptoms:      Have you had a recent diagnosis of pneumonia either by PCP or at a hospital?:  No         Diabetes Assessment    Medic Alert ID:  No  Meal Planning:  None   How often do you test your blood sugar?:  Daily   Do you have barriers with adherence to non-pharmacologic self-management interventions? (Nutrition/Exercise/Self-Monitoring):  Yes   Have you ever had to go to the ED for symptoms of low blood sugar?:  No       No patient-reported symptoms                Care Coordination Interventions    Program Enrollment:  Complex Care  Referral from Primary Care Provider:  Yes  Suggested Interventions and [facility] [address]:  Completed  Transportation Support:  Completed  Zone Management Tools:   In Process         Goals Addressed             Most Recent     Conditions and Symptoms   No change (1/4/2018)             I will follow my Zone Management tool to seek urgent or emergent [facility]

## 2018-01-10 DIAGNOSIS — I10 HYPERTENSION, UNSPECIFIED TYPE: ICD-10-CM

## 2018-01-11 ENCOUNTER — TELEPHONE (OUTPATIENT)
Dept: FAMILY MEDICINE CLINIC | Age: 73
End: 2018-01-11

## 2018-01-11 ENCOUNTER — NURSE ONLY (OUTPATIENT)
Dept: FAMILY MEDICINE CLINIC | Age: 73
End: 2018-01-11

## 2018-01-11 VITALS — SYSTOLIC BLOOD PRESSURE: 190 MMHG | DIASTOLIC BLOOD PRESSURE: 82 MMHG

## 2018-01-11 DIAGNOSIS — I10 HYPERTENSION, UNSPECIFIED TYPE: Primary | ICD-10-CM

## 2018-01-11 RX ORDER — METOPROLOL TARTRATE 100 MG/1
TABLET ORAL
Qty: 90 TABLET | Refills: 1 | Status: ON HOLD | OUTPATIENT
Start: 2018-01-11 | End: 2018-03-29

## 2018-01-11 RX ORDER — METOPROLOL TARTRATE 75 MG/1
TABLET, FILM COATED ORAL
Qty: 90 TABLET | Refills: 1 | Status: ON HOLD
Start: 2018-01-11 | End: 2018-03-29 | Stop reason: HOSPADM

## 2018-01-11 NOTE — TELEPHONE ENCOUNTER
Pt presents to office for blood pressure check. Blood pressure taken in right arm with large cuff. Reading was 190/82 after sitting for 5 minutes. Pt states she started taking Metoprolol tart 75 mg BID instead of 50 mg BID a week ago. Marla Rivas called from [facility] and requested that we fax an order to her from Kalvin Scheuermann with dosing changes. She states this note with Rian's order can be faxed to [phone].

## 2018-01-12 RX ORDER — ISOSORBIDE MONONITRATE 60 MG/1
TABLET, EXTENDED RELEASE ORAL
Qty: 90 TABLET | Refills: 0 | Status: SHIPPED | OUTPATIENT
Start: 2018-01-12 | End: 2018-01-18 | Stop reason: SDUPTHER

## 2018-01-16 ENCOUNTER — OFFICE VISIT (OUTPATIENT)
Dept: CARDIOLOGY CLINIC | Age: 73
End: 2018-01-16
Payer: MEDICARE

## 2018-01-16 ENCOUNTER — TELEPHONE (OUTPATIENT)
Dept: PULMONOLOGY | Age: 73
End: 2018-01-16

## 2018-01-16 VITALS
DIASTOLIC BLOOD PRESSURE: 90 MMHG | WEIGHT: 226 LBS | HEART RATE: 100 BPM | HEIGHT: 66 IN | BODY MASS INDEX: 36.32 KG/M2 | SYSTOLIC BLOOD PRESSURE: 192 MMHG

## 2018-01-16 DIAGNOSIS — Z09 FOLLOW-UP EXAM, 3-6 MONTHS SINCE PREVIOUS EXAM: Primary | ICD-10-CM

## 2018-01-16 DIAGNOSIS — F33.9 EPISODE OF RECURRENT MAJOR DEPRESSIVE DISORDER, UNSPECIFIED DEPRESSION EPISODE SEVERITY (HCC): ICD-10-CM

## 2018-01-16 PROCEDURE — G8598 ASA/ANTIPLAT THER USED: HCPCS | Performed by: INTERNAL MEDICINE

## 2018-01-16 PROCEDURE — 1123F ACP DISCUSS/DSCN MKR DOCD: CPT | Performed by: INTERNAL MEDICINE

## 2018-01-16 PROCEDURE — 1090F PRES/ABSN URINE INCON ASSESS: CPT | Performed by: INTERNAL MEDICINE

## 2018-01-16 PROCEDURE — 3017F COLORECTAL CA SCREEN DOC REV: CPT | Performed by: INTERNAL MEDICINE

## 2018-01-16 PROCEDURE — G8484 FLU IMMUNIZE NO ADMIN: HCPCS | Performed by: INTERNAL MEDICINE

## 2018-01-16 PROCEDURE — 1036F TOBACCO NON-USER: CPT | Performed by: INTERNAL MEDICINE

## 2018-01-16 PROCEDURE — G8427 DOCREV CUR MEDS BY ELIG CLIN: HCPCS | Performed by: INTERNAL MEDICINE

## 2018-01-16 PROCEDURE — 99213 OFFICE O/P EST LOW 20 MIN: CPT | Performed by: INTERNAL MEDICINE

## 2018-01-16 PROCEDURE — G8399 PT W/DXA RESULTS DOCUMENT: HCPCS | Performed by: INTERNAL MEDICINE

## 2018-01-16 PROCEDURE — 4040F PNEUMOC VAC/ADMIN/RCVD: CPT | Performed by: INTERNAL MEDICINE

## 2018-01-16 PROCEDURE — G8417 CALC BMI ABV UP PARAM F/U: HCPCS | Performed by: INTERNAL MEDICINE

## 2018-01-16 PROCEDURE — 3014F SCREEN MAMMO DOC REV: CPT | Performed by: INTERNAL MEDICINE

## 2018-01-16 RX ORDER — VALSARTAN 320 MG/1
320 TABLET ORAL DAILY
Qty: 30 TABLET | Refills: 3 | Status: SHIPPED | OUTPATIENT
Start: 2018-01-16 | End: 2018-03-07 | Stop reason: SDUPTHER

## 2018-01-16 RX ORDER — HYDRALAZINE HYDROCHLORIDE 50 MG/1
50 TABLET, FILM COATED ORAL 3 TIMES DAILY
Qty: 60 TABLET | Refills: 1 | Status: SHIPPED | OUTPATIENT
Start: 2018-01-16 | End: 2018-02-27 | Stop reason: SDUPTHER

## 2018-01-16 ASSESSMENT — ENCOUNTER SYMPTOMS
GASTROINTESTINAL NEGATIVE: 1
EYES NEGATIVE: 1
RESPIRATORY NEGATIVE: 1

## 2018-01-16 NOTE — PROGRESS NOTES
[facility]S  [facility] [place]  [address]  [address]  [address] [address]  Dept: [phone]  Dept Fax: [phone]  Loc: [phone]    Visit Date: 1/16/2018    Ms. Tash Kurtz A 67 y.o. who is here for follow up on CAD and hypertension. She is known to us with history of CAD, tobacco abuse, COPD, DM, and s/p PCI in 2004 (RCA stents). She is S/P PCI to the RCA for in-stent restenosis on 11/6/2014 and then PCI/stenting of the LAD in 12/2014. HPI:   Ms. Ermelinda Holly 67 y.o. female who is seen today for follow up on the cardiac problems mentioned below. MEDICAL DIAGNOSES  Patient Active Problem List    Diagnosis Date Noted    Herniation of cervical intervertebral disc with radiculopathy 11/15/2017     Priority: High    Stricture of artery (HCC)      Priority: High    Hypertension 01/29/2015     Priority: Medium    Obesity (BMI 30.0-34.9) 01/29/2015     Priority: Medium    COPD (chronic obstructive pulmonary disease) ([facility] [address]      Priority: Medium    S/P PTCA: 3/2/2017: PTCA RCA ISR. 12/11/2014: LAD Resolute 3.0X26. 11/6/2014: Stenting RCA Minnie Mustache 4.7Y64 and 3.5X18. 5/11/2004: Proximal & mid RCA Taxus 3.5X20 mm, and Taxus 3.0X32 mm. 10/28/2014     Priority: Low     Overview Note:     3/2/2017: PTCA RCA for ISR, using cutting balloon and 5.0 x 15 balloon. Radial.   Dr. Yosvany Millard    12/11/2014: Stenting 85% LAD using Resolute 3.0 X 26 mm, post-dilated to 3.78 mm. Dr. Yosvany Millard    11/6/2014: Stenting of 99% in-stent restenosis in the mid RCA using Xiance 3.5 X 38 mm and Xience 3.5 X 18 mm, post-dilated to 3.85 mm. Dr. Yosvany Millard    5/11/2004: Proximal and mid RCA  Taxus 3.5 X 20 mm, and Taxus 3.0 X 32 mm.   Dr. Eva Fulton Hypothyroid 01/15/2014     Priority: Low    Cervical spinal stenosis 11/15/2017    Nocturnal hypoxemia 10/23/2017    AS (aortic stenosis): mild to moderate by echo 4/2017 09/06/2017    CHET (obstructive sleep apnea) 07/12/2017    Left subclavian artery occlusion ([address] on the right side, and 2+ on the left side. Popliteal pulses are 2+ on the right side, and 2+ on the left side. Dorsalis pedis pulses are 2+ on the right side, and 2+ on the left side. Posterior tibial pulses are 2+ on the right side, and 2+ on the left side. Pulmonary/Chest: Effort normal and breath sounds normal.   Abdominal: Soft. Bowel sounds are normal. She exhibits no mass. There is no tenderness. Musculoskeletal: She exhibits no edema. Lymphadenopathy:     She has no cervical adenopathy. Neurological: She is alert and oriented to person, place, and time. She has normal reflexes. No cranial nerve deficit. Skin: Skin is warm. No rash noted. Psychiatric: She has a normal mood and affect.        BP (!) 210/92   Pulse 100   Ht 5' 6\" (1.676 m)   Wt 226 lb (102.5 kg)   BMI 36.48 kg/m²   Wt Readings from Last 3 Encounters:   01/16/18 226 lb (102.5 kg)   12/27/17 223 lb (101.2 kg)   12/01/17 200 lb 12.8 oz (91.1 kg)     BP Readings from Last 3 Encounters:   01/16/18 (!) 210/92   01/11/18 (!) 190/82   12/27/17 (!) 160/88       Lab Data  CBC:   Lab Results   Component Value Date    WBC 5.0 11/21/2017    RBC 3.51 11/21/2017    RBC 3.64 08/15/2011    HGB 9.7 11/21/2017    HGB 10.7 11/20/2017    HGB 9.9 11/19/2017    HCT 29.4 11/21/2017    MCV 83.7 11/21/2017    MCH 27.7 11/21/2017    MCHC 33.1 11/21/2017    RDW 14.4 11/21/2017     11/21/2017     11/20/2017     11/19/2017    MPV 7.2 11/21/2017     CMP:    Lab Results   Component Value Date     11/21/2017    K 4.3 11/21/2017    CL 89 11/21/2017    CO2 29 11/21/2017    BUN 15 11/21/2017    CREATININE 0.8 11/21/2017    CREATININE 0.6 11/20/2017    CREATININE 0.6 11/19/2017    LABGLOM 70 11/21/2017    GLUCOSE 113 11/21/2017    GLUCOSE 101 07/25/2016    PROT 6.2 11/15/2017    PROT 6.5 11/25/2016    LABALBU 3.8 11/15/2017    CALCIUM 8.5 11/21/2017    BILITOT 0.2 11/15/2017    ALKPHOS 50 11/15/2017     11/15/2017    ALT 85 11/15/2017     Hepatic Function Panel:    Lab Results   Component Value Date    ALKPHOS 50 11/15/2017    ALT 85 11/15/2017     11/15/2017    PROT 6.2 11/15/2017    PROT 6.5 11/25/2016    BILITOT 0.2 11/15/2017    BILIDIR 0.1 10/05/2017    LABALBU 3.8 11/15/2017     Magnesium:    Lab Results   Component Value Date    MG 1.8 04/20/2015     PT/INR:    Lab Results   Component Value Date    PROTIME 1.04 08/13/2011    INR 1.54 11/21/2017    INR 1.19 11/20/2017    INR 1.12 11/19/2017     HgBA1c:    Lab Results   Component Value Date    LABA1C 6.7 11/15/2017     FLP:    Lab Results   Component Value Date    TRIG 540 11/16/2017    TRIG 244 03/02/2017    TRIG 347 11/25/2016    HDL 25 11/16/2017    HDL 35 03/02/2017    HDL 36 11/25/2016    LDLCALC SEE BELOW 11/16/2017    LDLCALC 56 03/02/2017    LDLCALC 41 11/25/2016    LABVLDL 69 11/25/2016     TSH:    Lab Results   Component Value Date    TSH 10.020 11/15/2017     No results for input(s): CKTOTAL, CKMB, CKMBINDEX, TROPONINI in the last 72 hours. Assessment:   Wisam Garcia is a 67 y.o. female a former [facility] patient with known h/o CAD, tobacco abuse, COPD, DM, and s/p PCI in 2004 (RCA stents). She is S/P PCI to the RCA for in-stent restenosis on 11/6/2014 and then PCI/stenting of the LAD. Pt here for 1 month follow up  She is doing fine following PCI to RCA. EKG showed NSR. She had her neck surgery  And had DVT/PE in 11/2017. She had IVC filter placed. Quit smoking in 2007      Echo 4/2017:  Normal left ventricle size and systolic function. Ejection fraction was   estimated at 60 to 65 %. There were no regional left ventricular wall   motion abnormalities and wall thickness was within normal limits.  E/A flow reversal noted.  Suggestive of diastolic dysfunction.   Left atrial size was normal.   Leaflets exhibited mildly increased thickness and mildly reduced cuspal   separation of the aortic valve.   Mild to moderate aortic stenosis is present.  Samule Fern annular calcification.   No mitral regurgitation is present.   Trivial tricuspid regurgitation visualized.   Pulmonary systolic pressure At the upper normal limit. and is estimated at   35 mmHg. Cardiac cath 11/2014 showed:  1. Successful but very difficult percutaneous coronary  intervention/stenting of 99 percent in stent restenosis in the mid  right coronary artery as described above. 2. Left main with no significant disease. 3. Left anterior descending with mid segment eccentric lesion estimated  at 80-85 percent. 4. Nondominant left circumflex with moderate luminal irregularities in a  large OM branch. 5. Left anterior descending lesion will be addressed later on in a  staged percutaneous coronary intervention fashion and the importance  of compliance with dual antiplatelet therapy was emphasized. Echocardiogram 11/18/2014 showed:  Left ventricle:  Size was normal.  Systolic function was normal by visual assessment. Ejection fraction was   estimated in the range of 60 % to 65 %. There were no regional wall motion abnormalities. Doppler parameters were consistent with abnormal left ventricular   relaxation (grade 1 diastolic dysfunction). Left atrium:  Size was normal.    Mitral valve:  Valve structure was normal.  There was trivial regurgitation. Aortic valve: There was very mild stenosis. There was no regurgitation. Peak velocity estimated: 2.26 m/s. Mean gradient estimated: 10 mmHg. Valve area lower range: 1.83 cm2. Valve area upper range: 2 cm2. Peak gradient estimated: 20 mmHg. Pericardium:  There was no pericardial effusion. Pulmonary arteries:  Systolic pressure was within the normal range. The following diagnoses were addressed during this visit:  No diagnosis found. Plan:   Did fine after elective staged PCI to RCA. BP is very high. I am increasing hydralazine to 50 mg TID, and switching from lisinopril to diovan 320 mg daily.     I emphasized

## 2018-01-18 ENCOUNTER — TELEPHONE (OUTPATIENT)
Dept: FAMILY MEDICINE CLINIC | Age: 73
End: 2018-01-18

## 2018-01-18 DIAGNOSIS — I10 HYPERTENSION, UNSPECIFIED TYPE: ICD-10-CM

## 2018-01-18 RX ORDER — ISOSORBIDE MONONITRATE 60 MG/1
TABLET, EXTENDED RELEASE ORAL
Qty: 90 TABLET | Refills: 0 | Status: SHIPPED | OUTPATIENT
Start: 2018-01-18 | End: 2018-03-07 | Stop reason: DRUGHIGH

## 2018-01-22 RX ORDER — NITROGLYCERIN 40 MG/1
PATCH TRANSDERMAL
Qty: 90 PATCH | Refills: 1 | Status: ON HOLD | OUTPATIENT
Start: 2018-01-22 | End: 2018-04-18 | Stop reason: HOSPADM

## 2018-01-23 ENCOUNTER — HOSPITAL ENCOUNTER (OUTPATIENT)
Dept: GENERAL RADIOLOGY | Age: 73
Discharge: HOME | End: 2018-01-23
Payer: MEDICARE

## 2018-01-23 ENCOUNTER — HOSPITAL ENCOUNTER (OUTPATIENT)
Age: 73
Discharge: HOME | End: 2018-01-23
Payer: MEDICARE

## 2018-01-23 ENCOUNTER — OFFICE VISIT (OUTPATIENT)
Dept: PULMONOLOGY | Age: 73
End: 2018-01-23
Payer: MEDICARE

## 2018-01-23 VITALS
HEIGHT: 66 IN | WEIGHT: 228 LBS | HEART RATE: 90 BPM | SYSTOLIC BLOOD PRESSURE: 118 MMHG | DIASTOLIC BLOOD PRESSURE: 72 MMHG | TEMPERATURE: 98.1 F | OXYGEN SATURATION: 98 % | BODY MASS INDEX: 36.64 KG/M2

## 2018-01-23 DIAGNOSIS — J44.9 CHRONIC OBSTRUCTIVE PULMONARY DISEASE, UNSPECIFIED COPD TYPE (HCC): ICD-10-CM

## 2018-01-23 DIAGNOSIS — R91.1 LUNG NODULE: ICD-10-CM

## 2018-01-23 DIAGNOSIS — J96.11 CHRONIC RESPIRATORY FAILURE WITH HYPOXIA (HCC): Primary | ICD-10-CM

## 2018-01-23 DIAGNOSIS — J43.2 CENTRILOBULAR EMPHYSEMA (HCC): ICD-10-CM

## 2018-01-23 DIAGNOSIS — R06.02 SHORTNESS OF BREATH: ICD-10-CM

## 2018-01-23 PROCEDURE — 71046 X-RAY EXAM CHEST 2 VIEWS: CPT

## 2018-01-23 PROCEDURE — 99215 OFFICE O/P EST HI 40 MIN: CPT | Performed by: NURSE PRACTITIONER

## 2018-01-23 RX ORDER — OXYCODONE HYDROCHLORIDE AND ACETAMINOPHEN 5; 325 MG/1; MG/1
1 TABLET ORAL EVERY 4 HOURS PRN
Status: ON HOLD | COMMUNITY
End: 2018-03-29

## 2018-01-23 RX ORDER — FLUTICASONE PROPIONATE 50 MCG
1 SPRAY, SUSPENSION (ML) NASAL DAILY
Qty: 1 BOTTLE | Refills: 0 | Status: ON HOLD | OUTPATIENT
Start: 2018-01-23 | End: 2019-01-01 | Stop reason: SDUPTHER

## 2018-01-23 ASSESSMENT — ENCOUNTER SYMPTOMS
SINUS PAIN: 0
SORE THROAT: 1
SHORTNESS OF BREATH: 1
VOMITING: 0
WHEEZING: 1
ORTHOPNEA: 1
DIARRHEA: 0
SPUTUM PRODUCTION: 1
HEMOPTYSIS: 0
NAUSEA: 0
DOUBLE VISION: 0
BLURRED VISION: 0
COUGH: 1

## 2018-01-23 NOTE — PROGRESS NOTES
Take 20 mg by mouth daily      polyvinyl alcohol (LIQUIFILM TEARS) 1.4 % ophthalmic solution Place 1 drop into both eyes as needed 2-4 times daily      cycloSPORINE (RESTASIS) 0.05 % ophthalmic emulsion Place 1 drop into both eyes 2 times daily      hydrOXYzine (ATARAX) 25 MG tablet Take 25 mg by mouth 3 times daily      glucose blood VI test strips (GLUCOSE METER TEST) strip 1 each by In Vitro route daily Check blood sugar q daily Dx E11.69 100 strip 5    Lancets MISC Check blood sugar q daily Dx E11.69 100 each 2    Blood Glucose Monitoring Suppl HARVINDER Check blood sugar q daily Dx E11.69 1 Device 0    Multiple Vitamins-Minerals (MULTIVITAMIN-MINERALS) TABS tablet take 1 tablet by mouth once daily 90 tablet 3    amLODIPine (NORVASC) 10 MG tablet Take 1 tablet by mouth daily 90 tablet 1    clopidogrel (PLAVIX) 75 MG tablet take 1 tablet by mouth once daily 90 tablet 1    ranolazine (RANEXA) 500 MG extended release tablet take 1 tablet by mouth twice a day 180 tablet 1    nystatin (MYCOSTATIN) 217979 UNIT/GM cream Apply topically 2 times daily. 60 g 2    benzocaine (ANBESOL) 10 % mucosal gel Take by mouth as needed.  9 g 1    ASPIRIN LOW DOSE 81 MG EC tablet take 1 tablet by mouth once daily 30 tablet 11    budesonide-formoterol (SYMBICORT) 160-4.5 MCG/ACT AERO Inhale 2 puffs into the lungs 2 times daily 1 Inhaler 11    SINGULAIR 10 MG tablet Take 1 tablet by mouth nightly 30 tablet 11    OXYGEN Inhale 2 L into the lungs nightly      doxepin (SINEQUAN) 150 MG capsule Take 150 mg by mouth nightly      OXcarbazepine (TRILEPTAL) 150 MG tablet Take 150 mg by mouth 2 times daily One tab q am. And two tabs q pm      nitroGLYCERIN (NITROSTAT) 0.4 MG SL tablet Place 1 tablet under the tongue every 5 minutes as needed for Chest pain 25 tablet 3    polyethylene glycol (GLYCOLAX) powder take 17GM (DISSOLVED IN WATER) by mouth once daily 1 Bottle 2    albuterol sulfate HFA (PROAIR HFA) 108 (90 BASE) MCG/ACT normal and breath sounds normal. No respiratory distress. She has no wheezes. She has no rales. She exhibits no tenderness. Diminished bases   Abdominal: Soft. Bowel sounds are normal.   Musculoskeletal: Normal range of motion. Neurological: She is alert and oriented to person, place, and time. Gait normal. GCS score is 15. Skin: Skin is warm and dry. Psychiatric: Mood and affect normal.        Test results   Lung Nodule Screening     [x] Qualifies    [] Does not qualify   [] Declined    [x] Completed    CTA chest from 11/17/2017:  Lungs: There are mild centrilobular emphysematous changes in the lungs. There is bilateral upper and lower lobe dependent atelectasis. There is right and possibly left lower lobe pneumonia as well.     Pleura: There is a small right pleural effusion. There is no pneumothorax.     Heart: There is mild cardiomegaly. There are coronary artery calcifications and possible stents. There is a small pericardial effusion.     Pulmonary vasculature: There is adequate opacification of the pulmonary arteries. There is abrupt change in the density of the posterior segmental right upper lobe pulmonary artery (axial image 59, series 4, image 71, series 8), worrisome for pulmonary   embolism. Remainder of the pulmonary vessels appear patent without evidence of additional pulmonary emboli.     Nay and mediastinum: There is no hilar or mediastinal mass or adenopathy. There are small probably reactive mediastinal lymph nodes     Thoracic aorta/vascular:? There is no thoracic aortic aneurysm or dissection. 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 It is probably reconstituted. .     Imaged upper abdomen: There are calcified hepatic and splenic granulomas. There is possible fatty infiltration of the liver.  The wall the stomach appears thickened probably due to incomplete distention.     Musculoskeletal system: Unremarkable     Chest/body wall soft tissues: pulse ox Dec 2017:                                    6 minute walk test from 1/23/2018:           Assessment   1. Moderate persistent bronchial asthma  2. Chronic Hypoxic Respiratory Failure  3. Pulmonary Embolism probably 2nd to neck surgery   4. Exertional dyspnea due to Valvular heart disease Vs asthma  5. Mild obstructive sleep apnea on CPAP with pressure of 16cm H20-not on as mask would not fit  6.  Nocturnal Hypoxemia on 2L 02     Plan     -6 minute walk test~pt unable to complete test after 1 minute and 51 seconds  -add Flonase  -may need Zyrtec/Claritin OTC  -CXR PA and lat    Will see Maximilian Jason back in: 4 weeks    Electronically signed by Garett Hernandez CNP on 1/23/2018 at 3:07 PM  1/23/2018

## 2018-02-06 ENCOUNTER — CARE COORDINATION (OUTPATIENT)
Dept: CARE COORDINATION | Age: 73
End: 2018-02-06

## 2018-02-08 ENCOUNTER — CARE COORDINATION (OUTPATIENT)
Dept: CARE COORDINATION | Age: 73
End: 2018-02-08

## 2018-02-08 ASSESSMENT — ENCOUNTER SYMPTOMS: DYSPNEA ASSOCIATED WITH: EXERTION

## 2018-02-08 NOTE — CARE COORDINATION
Ambulatory Care Coordination Note  2/8/2018  CM Risk Score: 7  Mahendra Mortality Risk Score: 12.69    ACC: Cristal Wilkerson, RN    Summary Note: spoke with Harperkevin Hanna. States she has been feeling about the same. States she has been trying to get over a head cold for a few weeks. Offered to make her appt with PCP which she declined. Denies fever. Denies chest congestion. Denies changes in her breathing or sputum. States cough is still present. Discussed that she continues to have [address] and encouraged her to have [facility] listen to her lungs and assess her condition. Informed her that appt can be made with PCP office to prevent exacerbation of her condition. She verbalized understanding and states if it gets any worse, or does not continue to improve, she will seek PCP treatment. Educated her on the importance of early symptom recognition, reporting and treatment to prevent ED visits and hospital admissions. COPD Assessment    Does the patient understand envrionmental exposure?:  No  Is the patient able to verbalize Rescue vs. Long Acting medications?:  No  Does the patient have a nebulizer?:  No  Does the patient use a space with inhaled medications?:  No            Symptoms:   None:  Yes      Symptom course:  improving  Breathlessness:  exertion  Increase use of rapid acting/rescue inhaled medications?:  No  Change in chronic cough?:  Increased  Change in sputum?:  No/At Baseline  Sputum characteristics: Thin  Self Monitoring - SaO2:  No  Have you had a recent diagnosis of pneumonia either by PCP or at a hospital?:  No         Diabetes Assessment    Medic Alert ID:  No  Meal Planning:  None   How often do you test your blood sugar?:  Daily   Do you have barriers with adherence to non-pharmacologic self-management interventions?  (Nutrition/Exercise/Self-Monitoring):  Yes   Have you ever had to go to the ED for symptoms of low blood sugar?:  No                    Care Coordination Interventions    Program the am 1/11/18  Yes Leela Clemons CNP   levothyroxine (SYNTHROID) 100 MCG tablet take 1 tablet by mouth once daily 12/27/17  Yes Leela DeonteZIGGY hansen   citalopram (CELEXA) 40 MG tablet Take 1 tablet by mouth daily 12/27/17  Yes Leela DeonteZIGGY hansen   Calcium Carbonate-Vitamin D (OYSTER SHELL CALCIUM/D) 500-200 MG-UNIT TABS take 1 tablet by mouth twice a day 12/26/17  Yes Lelea DeonteZIGGY hansen   guaiFENesin [facility]) 600 MG extended release tablet Take 1 tablet by mouth 2 times daily 12/13/17  Yes Leela Clemons CNP   sodium chloride (ALTAMIST SPRAY) 0.65 % nasal spray 1 spray by Nasal route as needed for Congestion 11/29/17  Yes Leela Clemons CNP   rivaroxaban (XARELTO STARTER PACK) 15 & 20 MG Starter Pack Take as directed (15mg BID initially, then 20mg daily) 11/21/17  Yes Patrick Tillman DO   furosemide (LASIX) 20 MG tablet Take 1 tablet by mouth daily 11/22/17  Yes Patrick Tillman DO   rosuvastatin (CRESTOR) 20 MG tablet Take 20 mg by mouth daily   Yes Historical Provider, MD   polyvinyl alcohol (LIQUIFILM TEARS) 1.4 % ophthalmic solution Place 1 drop into both eyes as needed 2-4 times daily   Yes Historical Provider, MD   cycloSPORINE (RESTASIS) 0.05 % ophthalmic emulsion Place 1 drop into both eyes 2 times daily   Yes Historical Provider, MD   hydrOXYzine (ATARAX) 25 MG tablet Take 25 mg by mouth 3 times daily   Yes Historical Provider, MD   glucose blood VI test strips (GLUCOSE METER TEST) strip 1 each by In Vitro route daily Check blood sugar q daily Dx E11.69 11/9/17  Yes Valeria Tafoya, DO   Lancets MISC Check blood sugar q daily Dx E11.69 11/9/17  Yes Valeria Tafoya, DO   Blood Glucose Monitoring Suppl HARVINDER Check blood sugar q daily Dx E11.69 11/9/17  Yes Valeria Tafoya, DO   Multiple Vitamins-Minerals (MULTIVITAMIN-MINERALS) TABS tablet take 1 tablet by mouth once daily 9/27/17  Yes Leela Clemons CNP   amLODIPine (NORVASC) 10 MG tablet Take 1 tablet by mouth daily 9/6/17  Yes Rickie Gonzalez MD   clopidogrel (PLAVIX) 75 MG tablet take 1 tablet by mouth once daily 9/6/17  Yes Keyur Cantu MD   ranolazine (RANEXA) 500 MG extended release tablet take 1 tablet by mouth twice a day 9/6/17  Yes Keyur Cantu MD   nystatin (MYCOSTATIN) 210747 UNIT/GM cream Apply topically 2 times daily. 8/17/17  Yes Nelly Carmen CNP   benzocaine (ANBESOL) 10 % mucosal gel Take by mouth as needed. 7/18/17  Yes Nelly Carmen CNP   ASPIRIN LOW DOSE 81 MG EC tablet take 1 tablet by mouth once daily 7/3/17  Yes Nelly Carmen CNP   budesonide-formoterol [facility]) 160-4.5 MCG/ACT AERO Inhale 2 puffs into the lungs 2 times daily 5/2/17  Yes Linsey Glover MD   SINGULAIR 10 MG tablet Take 1 tablet by mouth nightly 5/1/17  Yes Linsey Glover MD   OXYGEN Inhale 2 L into the lungs nightly   Yes Historical Provider, MD   doxepin (SINEQUAN) 150 MG capsule Take 150 mg by mouth nightly   Yes Historical Provider, MD   OXcarbazepine (TRILEPTAL) 150 MG tablet Take 150 mg by mouth 2 times daily One tab q am. And two tabs q pm   Yes Historical Provider, MD   nitroGLYCERIN (NITROSTAT) 0.4 MG SL tablet Place 1 tablet under the tongue every 5 minutes as needed for Chest pain 2/28/17  Yes Keyur Cantu MD   polyethylene glycol (GLYCOLAX) powder take 17GM (DISSOLVED IN WATER) by mouth once daily 8/29/16  Yes Nelly Carmen CNP   albuterol sulfate HFA (PROAIR HFA) 108 (90 BASE) MCG/ACT inhaler inhale 2 puffs by mouth every 6 hours if needed for wheezing 4/4/16  Yes Nelly Carmen CNP   docusate sodium (COLACE) 100 MG capsule Take 100 mg by mouth 3 times daily    Yes Historical Provider, MD   ONE TOUCH ULTRASOFT LANCETS MISC use as directed BEFORE BREAKFAST AND SUPPER 10/15/14  Yes Nelly Carmen CNP   pantoprazole (PROTONIX) 40 MG tablet Take 40 mg by mouth every morning (before breakfast)    Yes Historical Provider, MD   risperiDONE (RISPERDAL) 1 MG tablet Take 1 mg by mouth 2 times daily.    Yes Historical Provider, MD

## 2018-02-16 ENCOUNTER — HOSPITAL ENCOUNTER (OUTPATIENT)
Dept: CT IMAGING | Age: 73
Discharge: HOME | End: 2018-02-16
Payer: MEDICARE

## 2018-02-16 ENCOUNTER — HOSPITAL ENCOUNTER (OUTPATIENT)
Age: 73
Discharge: HOME | End: 2018-02-16
Payer: MEDICARE

## 2018-02-16 DIAGNOSIS — M54.2 CERVICAL PAIN: ICD-10-CM

## 2018-02-16 LAB
T4 FREE: 1.05 NG/DL (ref 0.93–1.76)
TSH SERPL DL<=0.05 MIU/L-ACNC: 6.11 UIU/ML (ref 0.4–4.2)

## 2018-02-16 PROCEDURE — 36415 COLL VENOUS BLD VENIPUNCTURE: CPT

## 2018-02-16 PROCEDURE — 84443 ASSAY THYROID STIM HORMONE: CPT

## 2018-02-16 PROCEDURE — 72125 CT NECK SPINE W/O DYE: CPT

## 2018-02-16 PROCEDURE — 84439 ASSAY OF FREE THYROXINE: CPT

## 2018-02-19 ENCOUNTER — TELEPHONE (OUTPATIENT)
Dept: FAMILY MEDICINE CLINIC | Age: 73
End: 2018-02-19

## 2018-02-19 DIAGNOSIS — E03.9 HYPOTHYROIDISM, UNSPECIFIED TYPE: ICD-10-CM

## 2018-02-19 RX ORDER — LEVOTHYROXINE SODIUM 112 UG/1
TABLET ORAL
Qty: 90 TABLET | Refills: 1 | Status: SHIPPED | OUTPATIENT
Start: 2018-02-19 | End: 2018-06-22 | Stop reason: SDUPTHER

## 2018-02-19 NOTE — TELEPHONE ENCOUNTER
Pt informed. She requested to have the lab order mailed to her. Verified pt's address and mailed lab order to pt.

## 2018-02-19 NOTE — TELEPHONE ENCOUNTER
----- Message from Shan Oleary CNP sent at 2/19/2018  1:21 PM EST -----  Please let pt know her tsh has improved but still remains too high. I will increase her dose to 100 mcg per day and she will need it rechecked in 8 weeks.  I will place the orders

## 2018-02-21 ENCOUNTER — OFFICE VISIT (OUTPATIENT)
Dept: PULMONOLOGY | Age: 73
End: 2018-02-21
Payer: MEDICARE

## 2018-02-21 VITALS
TEMPERATURE: 97.9 F | OXYGEN SATURATION: 93 % | BODY MASS INDEX: 37.77 KG/M2 | HEIGHT: 66 IN | SYSTOLIC BLOOD PRESSURE: 132 MMHG | WEIGHT: 235 LBS | DIASTOLIC BLOOD PRESSURE: 70 MMHG | HEART RATE: 90 BPM

## 2018-02-21 DIAGNOSIS — J96.11 CHRONIC RESPIRATORY FAILURE WITH HYPOXIA (HCC): ICD-10-CM

## 2018-02-21 DIAGNOSIS — J44.9 CHRONIC OBSTRUCTIVE PULMONARY DISEASE, UNSPECIFIED COPD TYPE (HCC): Primary | ICD-10-CM

## 2018-02-21 PROCEDURE — 99214 OFFICE O/P EST MOD 30 MIN: CPT | Performed by: NURSE PRACTITIONER

## 2018-02-21 RX ORDER — ALBUTEROL SULFATE 90 UG/1
2 AEROSOL, METERED RESPIRATORY (INHALATION) EVERY 6 HOURS PRN
Qty: 3 INHALER | Refills: 4 | Status: ON HOLD | OUTPATIENT
Start: 2018-02-21 | End: 2018-07-07

## 2018-02-21 ASSESSMENT — ENCOUNTER SYMPTOMS
COUGH: 0
ORTHOPNEA: 0
BLURRED VISION: 0
SPUTUM PRODUCTION: 0
SHORTNESS OF BREATH: 1
WHEEZING: 0
SINUS PAIN: 0
DOUBLE VISION: 0
SORE THROAT: 0
VOMITING: 0
NAUSEA: 0
DIARRHEA: 0
HEMOPTYSIS: 0

## 2018-02-21 NOTE — PROGRESS NOTES
GASTROINTESTINAL ENDOSCOPY       SOCIAL HISTORY:  Social History   Substance Use Topics    Smoking status: Former Smoker     Packs/day: 1.00     Years: 38.00     Types: Cigarettes     Quit date: 1/31/2007    Smokeless tobacco: Never Used    Alcohol use No     ALLERGIES:  Allergies   Allergen Reactions    Codeine Itching    Lipitor [Atorvastatin] Diarrhea    Motrin [Ibuprofen Micronized] Nausea Only     FAMILY HISTORY:  Family History   Problem Relation Age of Onset    Heart Disease Mother     Heart Disease Father     Kidney Disease Sister     Cancer Sister     Heart Disease Brother     Heart Disease Brother     Heart Disease Brother     Breast Cancer Child 36    Cancer Sister 36     rectal    Ovarian Cancer Other 25     CURRENT MEDICATIONS:  Current Outpatient Prescriptions   Medication Sig Dispense Refill    levothyroxine (SYNTHROID) 112 MCG tablet take 1 tablet by mouth once daily 90 tablet 1    oxyCODONE-acetaminophen (PERCOCET) 5-325 MG per tablet Take 1 tablet by mouth every 4 hours as needed for Pain.       fluticasone (FLONASE) 50 MCG/ACT nasal spray 1 spray by Nasal route daily 1 Bottle 0    nitroGLYCERIN (NITRODUR) 0.2 MG/HR apply 1 patch once daily 90 patch 1    isosorbide mononitrate (IMDUR) 60 MG extended release tablet take 1 tablet by mouth once daily 90 tablet 0    hydrALAZINE (APRESOLINE) 50 MG tablet Take 1 tablet by mouth 3 times daily 60 tablet 1    valsartan (DIOVAN) 320 MG tablet Take 1 tablet by mouth daily 30 tablet 3    Metoprolol Tartrate 75 MG TABS take 1 tablet by mouth in the PM 90 tablet 1    metoprolol (LOPRESSOR) 100 MG tablet take 1 tablet by mouth in the am 90 tablet 1    citalopram (CELEXA) 40 MG tablet Take 1 tablet by mouth daily 90 tablet 1    Calcium Carbonate-Vitamin D (OYSTER SHELL CALCIUM/D) 500-200 MG-UNIT TABS take 1 tablet by mouth twice a day 60 tablet 5    guaiFENesin (MUCINEX) 600 MG extended release tablet Take 1 tablet by mouth 2 times daily tablet Take 150 mg by mouth 2 times daily One tab q am. And two tabs q pm      nitroGLYCERIN (NITROSTAT) 0.4 MG SL tablet Place 1 tablet under the tongue every 5 minutes as needed for Chest pain 25 tablet 3    polyethylene glycol (GLYCOLAX) powder take 17GM (DISSOLVED IN WATER) by mouth once daily 1 Bottle 2    albuterol sulfate HFA (PROAIR HFA) 108 (90 BASE) MCG/ACT inhaler inhale 2 puffs by mouth every 6 hours if needed for wheezing 1 Inhaler 2    docusate sodium (COLACE) 100 MG capsule Take 100 mg by mouth 3 times daily       ONE TOUCH ULTRASOFT LANCETS MISC use as directed BEFORE BREAKFAST AND SUPPER 100 each 11    pantoprazole (PROTONIX) 40 MG tablet Take 40 mg by mouth every morning (before breakfast)       risperiDONE (RISPERDAL) 1 MG tablet Take 1 mg by mouth 2 times daily. No current facility-administered medications for this visit. Iain GEE   Review of Systems   Constitutional: Negative for chills, diaphoresis, fever, malaise/fatigue and weight loss. HENT: Negative for congestion, sinus pain, sore throat and tinnitus. Eyes: Negative for blurred vision and double vision. Respiratory: Positive for shortness of breath. Negative for cough, hemoptysis, sputum production and wheezing. Cardiovascular: Negative for chest pain, palpitations, orthopnea, leg swelling and PND. Gastrointestinal: Negative for diarrhea, nausea and vomiting. Genitourinary: Negative for dysuria. Neurological: Negative for dizziness, weakness and headaches. Psychiatric/Behavioral: Negative for depression. Physical exam   Pulse 90   Ht 5' 6\" (1.676 m)   Wt 235 lb (106.6 kg)   SpO2 93% Comment: RA at rest  BMI 37.93 kg/m²    Wt Readings from Last 3 Encounters:   02/21/18 235 lb (106.6 kg)   01/23/18 228 lb (103.4 kg)   01/16/18 226 lb (102.5 kg)       Physical Exam   Constitutional: She is oriented to person, place, and time and well-developed, well-nourished, and in no distress.    HENT:   Head:

## 2018-02-28 RX ORDER — HYDRALAZINE HYDROCHLORIDE 50 MG/1
TABLET, FILM COATED ORAL
Qty: 60 TABLET | Refills: 0 | Status: SHIPPED | OUTPATIENT
Start: 2018-02-28 | End: 2018-03-07 | Stop reason: SDUPTHER

## 2018-02-28 RX ORDER — LISINOPRIL 20 MG/1
TABLET ORAL
Qty: 180 TABLET | Refills: 0 | Status: SHIPPED | OUTPATIENT
Start: 2018-02-28 | End: 2018-03-07 | Stop reason: SDUPTHER

## 2018-03-07 ENCOUNTER — OFFICE VISIT (OUTPATIENT)
Dept: CARDIOLOGY CLINIC | Age: 73
End: 2018-03-07
Payer: MEDICARE

## 2018-03-07 VITALS
HEART RATE: 90 BPM | BODY MASS INDEX: 37.77 KG/M2 | SYSTOLIC BLOOD PRESSURE: 156 MMHG | HEIGHT: 66 IN | DIASTOLIC BLOOD PRESSURE: 78 MMHG | WEIGHT: 235 LBS

## 2018-03-07 DIAGNOSIS — I70.8 LEFT SUBCLAVIAN ARTERY OCCLUSION: ICD-10-CM

## 2018-03-07 DIAGNOSIS — Z98.61 S/P PTCA (PERCUTANEOUS TRANSLUMINAL CORONARY ANGIOPLASTY): ICD-10-CM

## 2018-03-07 DIAGNOSIS — I10 HYPERTENSION, UNSPECIFIED TYPE: ICD-10-CM

## 2018-03-07 DIAGNOSIS — I35.0 NONRHEUMATIC AORTIC VALVE STENOSIS: Primary | ICD-10-CM

## 2018-03-07 DIAGNOSIS — Z98.890 S/P CARDIAC CATHETERIZATION: ICD-10-CM

## 2018-03-07 DIAGNOSIS — R07.2 PRECORDIAL PAIN: ICD-10-CM

## 2018-03-07 PROCEDURE — 1123F ACP DISCUSS/DSCN MKR DOCD: CPT | Performed by: INTERNAL MEDICINE

## 2018-03-07 PROCEDURE — G8399 PT W/DXA RESULTS DOCUMENT: HCPCS | Performed by: INTERNAL MEDICINE

## 2018-03-07 PROCEDURE — 99213 OFFICE O/P EST LOW 20 MIN: CPT | Performed by: INTERNAL MEDICINE

## 2018-03-07 PROCEDURE — 4040F PNEUMOC VAC/ADMIN/RCVD: CPT | Performed by: INTERNAL MEDICINE

## 2018-03-07 PROCEDURE — 1036F TOBACCO NON-USER: CPT | Performed by: INTERNAL MEDICINE

## 2018-03-07 PROCEDURE — 3017F COLORECTAL CA SCREEN DOC REV: CPT | Performed by: INTERNAL MEDICINE

## 2018-03-07 PROCEDURE — G8428 CUR MEDS NOT DOCUMENT: HCPCS | Performed by: INTERNAL MEDICINE

## 2018-03-07 PROCEDURE — G8417 CALC BMI ABV UP PARAM F/U: HCPCS | Performed by: INTERNAL MEDICINE

## 2018-03-07 PROCEDURE — G8482 FLU IMMUNIZE ORDER/ADMIN: HCPCS | Performed by: INTERNAL MEDICINE

## 2018-03-07 PROCEDURE — G8598 ASA/ANTIPLAT THER USED: HCPCS | Performed by: INTERNAL MEDICINE

## 2018-03-07 PROCEDURE — 3014F SCREEN MAMMO DOC REV: CPT | Performed by: INTERNAL MEDICINE

## 2018-03-07 PROCEDURE — 1090F PRES/ABSN URINE INCON ASSESS: CPT | Performed by: INTERNAL MEDICINE

## 2018-03-07 RX ORDER — RANOLAZINE 500 MG/1
TABLET, EXTENDED RELEASE ORAL
Qty: 180 TABLET | Refills: 1 | Status: SHIPPED | OUTPATIENT
Start: 2018-03-07 | End: 2018-01-01 | Stop reason: SDUPTHER

## 2018-03-07 RX ORDER — LISINOPRIL 20 MG/1
TABLET ORAL
Qty: 180 TABLET | Refills: 1 | Status: SHIPPED | OUTPATIENT
Start: 2018-03-07 | End: 2018-03-14 | Stop reason: ALTCHOICE

## 2018-03-07 RX ORDER — AMLODIPINE BESYLATE 10 MG/1
10 TABLET ORAL DAILY
Qty: 90 TABLET | Refills: 1 | Status: SHIPPED | OUTPATIENT
Start: 2018-03-07 | End: 2018-04-05 | Stop reason: ALTCHOICE

## 2018-03-07 RX ORDER — VALSARTAN 320 MG/1
320 TABLET ORAL DAILY
Qty: 90 TABLET | Refills: 1 | Status: SHIPPED | OUTPATIENT
Start: 2018-03-07 | End: 2018-01-01 | Stop reason: SDUPTHER

## 2018-03-07 RX ORDER — ASPIRIN 81 MG/1
TABLET ORAL
Qty: 90 TABLET | Refills: 1 | Status: SHIPPED | OUTPATIENT
Start: 2018-03-07 | End: 2018-01-01 | Stop reason: SDUPTHER

## 2018-03-07 RX ORDER — HYDRALAZINE HYDROCHLORIDE 50 MG/1
TABLET, FILM COATED ORAL
Qty: 270 TABLET | Refills: 1 | Status: SHIPPED | OUTPATIENT
Start: 2018-03-07 | End: 2018-05-16 | Stop reason: SDUPTHER

## 2018-03-07 RX ORDER — FUROSEMIDE 20 MG/1
20 TABLET ORAL DAILY
Qty: 90 TABLET | Refills: 1 | Status: SHIPPED | OUTPATIENT
Start: 2018-03-07 | End: 2018-03-14 | Stop reason: ALTCHOICE

## 2018-03-07 RX ORDER — ISOSORBIDE MONONITRATE 60 MG/1
TABLET, EXTENDED RELEASE ORAL
Qty: 60 TABLET | Refills: 3 | Status: SHIPPED | OUTPATIENT
Start: 2018-03-07 | End: 2018-03-15 | Stop reason: SDUPTHER

## 2018-03-07 ASSESSMENT — ENCOUNTER SYMPTOMS
EYES NEGATIVE: 1
GASTROINTESTINAL NEGATIVE: 1
RESPIRATORY NEGATIVE: 1

## 2018-03-07 NOTE — PROGRESS NOTES
Patient here for 6 month follow up. Patient states she has a panic attacked yesterday. Patient states she does get chest pain. Patient feel's it's from anxiety and hernia.

## 2018-03-07 NOTE — PROGRESS NOTES
[facility] PROFESSIONAL SERVS  [facility] OF [address] [address]  Dept: [phone]  Dept Fax: [phone]  Loc: [phone]    Visit Date: 3/7/2018    Ms. Lenny VIEYRA 67 y.o. who is here for follow up on CAD and hypertension. She is known to us with history of CAD, tobacco abuse, COPD, DM, and s/p PCI in 2004 (RCA stents). She is S/P PCI to the RCA for in-stent restenosis on 11/6/2014 and then PCI/stenting of the LAD in 12/2014. HPI:   Ms. Marcell Gooden 67 y.o.  who is seen today for follow up on the cardiac problems mentioned below. MEDICAL DIAGNOSES  Patient Active Problem List    Diagnosis Date Noted    Herniation of cervical intervertebral disc with radiculopathy 11/15/2017     Priority: High    Stricture of artery (HCC)      Priority: High    Hypertension 01/29/2015     Priority: Medium    Obesity (BMI 30.0-34.9) 01/29/2015     Priority: Medium    COPD (chronic obstructive pulmonary disease) ([facility] [address]      Priority: Medium    S/P PTCA: 3/2/2017: PTCA RCA ISR. 12/11/2014: LAD Resolute 3.0X26. 11/6/2014: Stenting RCA Linwood Motdimas 1.3N73 and 3.5X18. 5/11/2004: Proximal & mid RCA Taxus 3.5X20 mm, and Taxus 3.0X32 mm. 10/28/2014     Priority: Low     Overview Note:     3/2/2017: PTCA RCA for ISR, using cutting balloon and 5.0 x 15 balloon. Radial.   Dr. Bharat North    12/11/2014: Stenting 85% LAD using Resolute 3.0 X 26 mm, post-dilated to 3.78 mm. Dr. Bharat North    11/6/2014: Stenting of 99% in-stent restenosis in the mid RCA using Xiance 3.5 X 38 mm and Xience 3.5 X 18 mm, post-dilated to 3.85 mm. Dr. Bharat North    5/11/2004: Proximal and mid RCA  Taxus 3.5 X 20 mm, and Taxus 3.0 X 32 mm.   Dr. Oscar Jimenez Hypothyroid 01/15/2014     Priority: Low    Cervical spinal stenosis 11/15/2017    Nocturnal hypoxemia 10/23/2017    AS (aortic stenosis): mild to moderate by echo 4/2017 09/06/2017    CHET (obstructive sleep apnea) 07/12/2017    Left subclavian artery occlusion ([facility] [address] 04/03/2017    Combined hyperlipidemia 75/72/2525    Dysmetabolic syndrome 20/78/3413    Oxygen dependent 08/10/2015     Overview Note:     On oxygen while sleeping at night. Started July 2015      ASCVD (arteriosclerotic cardiovascular disease)     POND (nonalcoholic steatohepatitis) 11/18/2014    S/P cardiac catheterization: 11/6/2014: 99% in-stent restenosis mid-RCA. LCx moderate LI's. LAD 85% mid-segment lesion. 11/06/2014     Overview Note:     11/6/2014: 99% in-stent restenosis mid-RCA. LCx moderate LI's. LAD 85% mid-segment lesion. Dr. Morris Cabrera History of tobacco abuse: Quit in 2009 40/92/1887    Metabolic syndrome 85/07/2894    GERD (gastroesophageal reflux disease)     Anxiety     Depression     MI (myocardial infarction)     Dyslipidemia     CAD (coronary artery disease)     Anemia        Allergies   Allergen Reactions    Codeine Itching    Lipitor [Atorvastatin] Diarrhea    Motrin [Ibuprofen Micronized] Nausea Only       Current Outpatient Prescriptions   Medication Sig Dispense Refill    hydrALAZINE (APRESOLINE) 50 MG tablet take 1 tablet by mouth three times a day 60 tablet 0    lisinopril (PRINIVIL;ZESTRIL) 20 MG tablet take 1 tablet by mouth twice a day 180 tablet 0    albuterol sulfate HFA (VENTOLIN HFA) 108 (90 Base) MCG/ACT inhaler Inhale 2 puffs into the lungs every 6 hours as needed for Wheezing 3 Inhaler 4    levothyroxine (SYNTHROID) 112 MCG tablet take 1 tablet by mouth once daily 90 tablet 1    oxyCODONE-acetaminophen (PERCOCET) 5-325 MG per tablet Take 1 tablet by mouth every 4 hours as needed for Pain.       fluticasone (FLONASE) 50 MCG/ACT nasal spray 1 spray by Nasal route daily 1 Bottle 0    nitroGLYCERIN (NITRODUR) 0.2 MG/HR apply 1 patch once daily 90 patch 1    isosorbide mononitrate (IMDUR) 60 MG extended release tablet take 1 tablet by mouth once daily 90 tablet 0    valsartan (DIOVAN) 320 MG tablet Take 1 tablet by mouth daily 30 tablet 3    budesonide-formoterol (SYMBICORT) 160-4.5 MCG/ACT AERO Inhale 2 puffs into the lungs 2 times daily 1 Inhaler 11    SINGULAIR 10 MG tablet Take 1 tablet by mouth nightly 30 tablet 11    OXYGEN Inhale 2 L into the lungs nightly      doxepin (SINEQUAN) 150 MG capsule Take 150 mg by mouth nightly      OXcarbazepine (TRILEPTAL) 150 MG tablet Take 150 mg by mouth 2 times daily One tab q am. And two tabs q pm      nitroGLYCERIN (NITROSTAT) 0.4 MG SL tablet Place 1 tablet under the tongue every 5 minutes as needed for Chest pain 25 tablet 3    polyethylene glycol (GLYCOLAX) powder take 17GM (DISSOLVED IN WATER) by mouth once daily 1 Bottle 2    albuterol sulfate HFA (PROAIR HFA) 108 (90 BASE) MCG/ACT inhaler inhale 2 puffs by mouth every 6 hours if needed for wheezing 1 Inhaler 2    docusate sodium (COLACE) 100 MG capsule Take 100 mg by mouth 3 times daily       ONE TOUCH ULTRASOFT LANCETS MISC use as directed BEFORE BREAKFAST AND SUPPER 100 each 11    pantoprazole (PROTONIX) 40 MG tablet Take 40 mg by mouth every morning (before breakfast)       risperiDONE (RISPERDAL) 1 MG tablet Take 1 mg by mouth 2 times daily. No current facility-administered medications for this visit. EKG: NSR. Subjective:      Review of Systems   Constitutional: Negative. HENT: Negative. Eyes: Negative. Respiratory: Negative. Cardiovascular: Negative. Gastrointestinal: Negative. Genitourinary: Negative. Musculoskeletal: Negative. Skin: Negative. Neurological: Negative. Psychiatric/Behavioral: Negative. Objective:     Physical Exam   Constitutional: She is oriented to person, place, and time. She appears well-developed and well-nourished. No distress. HENT:   Head: Normocephalic and atraumatic. Mouth/Throat: Oropharynx is clear and moist.   Eyes: Conjunctivae and EOM are normal. Pupils are equal, round, and reactive to light. No scleral icterus. Neck: Normal range of motion.  Neck 11/19/2017    LABGLOM 70 11/21/2017    GLUCOSE 113 11/21/2017    GLUCOSE 101 07/25/2016    PROT 6.2 11/15/2017    PROT 6.5 11/25/2016    LABALBU 3.8 11/15/2017    CALCIUM 8.5 11/21/2017    BILITOT 0.2 11/15/2017    ALKPHOS 50 11/15/2017     11/15/2017    ALT 85 11/15/2017     Hepatic Function Panel:    Lab Results   Component Value Date    ALKPHOS 50 11/15/2017    ALT 85 11/15/2017     11/15/2017    PROT 6.2 11/15/2017    PROT 6.5 11/25/2016    BILITOT 0.2 11/15/2017    BILIDIR 0.1 10/05/2017    LABALBU 3.8 11/15/2017     Magnesium:    Lab Results   Component Value Date    MG 1.8 04/20/2015     PT/INR:    Lab Results   Component Value Date    PROTIME 1.04 08/13/2011    INR 1.54 11/21/2017    INR 1.19 11/20/2017    INR 1.12 11/19/2017     HgBA1c:    Lab Results   Component Value Date    LABA1C 6.7 11/15/2017     FLP:    Lab Results   Component Value Date    TRIG 540 11/16/2017    TRIG 244 03/02/2017    TRIG 347 11/25/2016    HDL 25 11/16/2017    HDL 35 03/02/2017    HDL 36 11/25/2016    LDLCALC SEE BELOW 11/16/2017    LDLCALC 56 03/02/2017    LDLCALC 41 11/25/2016    LABVLDL 69 11/25/2016     TSH:    Lab Results   Component Value Date    TSH 6.110 02/16/2018     No results for input(s): CKTOTAL, CKMB, CKMBINDEX, TROPONINI in the last 72 hours. Assessment:   Alcides Gallegos is a 67 y.o. female a former [facility] patient with known h/o CAD, tobacco abuse, COPD, DM, and s/p PCI in 2004 (RCA stents). She is S/P PCI to the RCA for in-stent restenosis on 11/6/2014 and then PCI/stenting of the LAD. Pt here for follow up  She is doing fine following PCI to RCA. EKG showed NSR. She had her neck surgery  And had DVT/PE in 11/2017. She had IVC filter placed. Quit smoking in 2007      Echo 4/2017:  Normal left ventricle size and systolic function. Ejection fraction was   estimated at 60 to 65 %.  There were no regional left ventricular wall   motion abnormalities and wall thickness was within normal limits.  E/A flow reversal noted. Suggestive of diastolic dysfunction.   Left atrial size was normal.   Leaflets exhibited mildly increased thickness and mildly reduced cuspal   separation of the aortic valve.   Mild to moderate aortic stenosis is present.   Mild annular calcification.   No mitral regurgitation is present.   Trivial tricuspid regurgitation visualized.   Pulmonary systolic pressure At the upper normal limit. and is estimated at   35 mmHg. Cardiac cath 11/2014 showed:  1. Successful but very difficult percutaneous coronary  intervention/stenting of 99 percent in stent restenosis in the mid  right coronary artery as described above. 2. Left main with no significant disease. 3. Left anterior descending with mid segment eccentric lesion estimated  at 80-85 percent. 4. Nondominant left circumflex with moderate luminal irregularities in a  large OM branch. 5. Left anterior descending lesion will be addressed later on in a  staged percutaneous coronary intervention fashion and the importance  of compliance with dual antiplatelet therapy was emphasized. Echocardiogram 11/18/2014 showed:  Left ventricle:  Size was normal.  Systolic function was normal by visual assessment. Ejection fraction was   estimated in the range of 60 % to 65 %. There were no regional wall motion abnormalities. Doppler parameters were consistent with abnormal left ventricular   relaxation (grade 1 diastolic dysfunction). Left atrium:  Size was normal.    Mitral valve:  Valve structure was normal.  There was trivial regurgitation. Aortic valve: There was very mild stenosis. There was no regurgitation. Peak velocity estimated: 2.26 m/s. Mean gradient estimated: 10 mmHg. Valve area lower range: 1.83 cm2. Valve area upper range: 2 cm2. Peak gradient estimated: 20 mmHg. Pericardium:  There was no pericardial effusion. Pulmonary arteries:  Systolic pressure was within the normal range.     The following

## 2018-03-08 ENCOUNTER — TELEPHONE (OUTPATIENT)
Dept: CARDIOLOGY CLINIC | Age: 73
End: 2018-03-08

## 2018-03-09 DIAGNOSIS — I10 HYPERTENSION, UNSPECIFIED TYPE: ICD-10-CM

## 2018-03-12 ENCOUNTER — CARE COORDINATION (OUTPATIENT)
Dept: CARE COORDINATION | Age: 73
End: 2018-03-12

## 2018-03-12 ASSESSMENT — ENCOUNTER SYMPTOMS: DYSPNEA ASSOCIATED WITH: EXERTION

## 2018-03-12 NOTE — CARE COORDINATION
Ambulatory Care Coordination Note  3/12/2018  CM Risk Score: 7  Mahendra Mortality Risk Score: 12.69    ACC: Nneka Rosales, RN    Summary Note: Spoke with Feliciano Bella. She remains stable for this review. States she is over her head cold and feeling better. Has had several follow up appts with specialist including pulmonology and cardiology. States she is scheduled to meet with dietician regarding possible weight loss and diabetic options. States her blood sugars are running slightly higher than normal with her highest reading of 210. Does admit to not following her diet as closely as she should. Denies changes in her breathing, cough or sputum. Educated on the importance of early symptom recognition and reporting to prevent hospital admissions and ED visits. COPD Assessment    Does the patient understand envrionmental exposure?:  No  Is the patient able to verbalize Rescue vs. Long Acting medications?:  No  Does the patient have a nebulizer?:  No  Does the patient use a space with inhaled medications?:  No            Symptoms:   None:  Yes      Symptom course:  stable  Breathlessness:  exertion  Increase use of rapid acting/rescue inhaled medications?:  No  Change in chronic cough?:  No/At Baseline  Change in sputum?:  No/At Baseline  Sputum characteristics: Thin  Self Monitoring - SaO2:  No  Have you had a recent diagnosis of pneumonia either by PCP or at a hospital?:  No         Diabetes Assessment    Medic Alert ID:  No  Meal Planning:  None   How often do you test your blood sugar?:  Daily   Do you have barriers with adherence to non-pharmacologic self-management interventions?  (Nutrition/Exercise/Self-Monitoring):  Yes   Have you ever had to go to the ED for symptoms of low blood sugar?:  No       Do you have hyperglycemia symptoms?:  No   Do you have hypoglycemia symptoms?:  No   Last Blood Sugar Value:  210                Care Coordination Interventions    Program Enrollment:  Complex Care  Referral from

## 2018-03-14 DIAGNOSIS — I10 HYPERTENSION, UNSPECIFIED TYPE: ICD-10-CM

## 2018-03-15 RX ORDER — ISOSORBIDE MONONITRATE 60 MG/1
60 TABLET, EXTENDED RELEASE ORAL 2 TIMES DAILY
Qty: 60 TABLET | Refills: 11 | Status: SHIPPED | OUTPATIENT
Start: 2018-03-15 | End: 2018-01-01 | Stop reason: HOSPADM

## 2018-03-20 ENCOUNTER — OFFICE VISIT (OUTPATIENT)
Dept: INTERNAL MEDICINE CLINIC | Age: 73
End: 2018-03-20
Payer: MEDICARE

## 2018-03-20 VITALS — WEIGHT: 234 LBS | HEIGHT: 66 IN | BODY MASS INDEX: 37.61 KG/M2

## 2018-03-20 DIAGNOSIS — K75.81 NONALCOHOLIC STEATOHEPATITIS (NASH): ICD-10-CM

## 2018-03-20 DIAGNOSIS — E78.2 COMBINED HYPERLIPIDEMIA: ICD-10-CM

## 2018-03-20 DIAGNOSIS — E88.81 DYSMETABOLIC SYNDROME: ICD-10-CM

## 2018-03-20 PROCEDURE — 99999 PR OFFICE/OUTPT VISIT,PROCEDURE ONLY: CPT | Performed by: DIETITIAN, REGISTERED

## 2018-03-20 PROCEDURE — 97803 MED NUTRITION INDIV SUBSEQ: CPT | Performed by: DIETITIAN, REGISTERED

## 2018-03-20 NOTE — PROGRESS NOTES
mouth 3 times daily       ONE TOUCH ULTRASOFT LANCETS MISC use as directed BEFORE BREAKFAST AND SUPPER 100 each 11    pantoprazole (PROTONIX) 40 MG tablet Take 40 mg by mouth every morning (before breakfast)       risperiDONE (RISPERDAL) 1 MG tablet Take 1 mg by mouth 2 times daily.  oxyCODONE-acetaminophen (PERCOCET) 5-325 MG per tablet Take 1 tablet by mouth every 4 hours as needed for Pain.  Metoprolol Tartrate 75 MG TABS take 1 tablet by mouth in the PM 90 tablet 1    polyethylene glycol (GLYCOLAX) powder take 17GM (DISSOLVED IN WATER) by mouth once daily 1 Bottle 2     No current facility-administered medications on file prior to visit. Vitals from current and previous visits:  5'6\"  Wt. 234#   -Body mass index is 37.77 kg/m². 35-39.9 - Obesity Grade II.   - Patient gained and 11 pounds over the last 3 mo. .  Pt c/o weight gain d/t water retention.  -Weight goal: lose weight. Nutrition Diagnosis:   Obesity related to Lack of social support for implementing changes as evidenced by Body mass index is 37.77 kg/m². .         Intervention:  -Impression: Pt no longer eats lunchables, which was one of her goals. Pt still eats other high sodium foods. Pt does not eat out or bring home take out as often. Pt arrived with oxygen tank and wearing cannula. Pt somewhat out of breath when arrived to dietitian office. Pt recovered and was able to continue the session without difficulty.      -Instructed the patient on: Meal Planning for Regular, Balanced Meals & Snacks and low sodium, strategies for excessive hunger.    -Handouts given for: healthy snacks and 1500 calorie 5 day sample menu. Patient Instructions   1.)  When still hungry - Fill up with small bites of raw veggies (avoid cauliflower) and small bites of fresh fruit (ex. slices of apple). 2.)  Instead of kraft mac & cheese - make plain macaroni and spinkel a little shredded cheddar cheese.   3.)  Good job staying away from

## 2018-03-20 NOTE — LETTER
[address] [place] [address].  Phone: [phone]  Fax: [phone] [facility], , [place]        March 20, 2018     No referring provider defined for this encounter. Patient: Gilberto Vaca   MR Number: [id number]   YOB: 1945   Date of Visit: 3/20/2018       Dear Dr. Kaylene Archibald ref. provider found: Thank you for referring Cuauhtemoc Barrera to me for evaluation. Below are the relevant portions of my assessment and plan of care. Assessment:     Vitals from current and previous visits:  5'6\"  Wt. 234#   -Body mass index is 37.77 kg/m². 35-39.9 - Obesity Grade II.   - Patient gained and 11 pounds over the last 3 mo. .  Pt c/o weight gain d/t water retention.  -Weight goal: lose weight. Nutrition Diagnosis:   Obesity related to Lack of social support for implementing changes as evidenced by Body mass index is 37.77 kg/m². Luisa Marianne Plan:     Intervention:  -Impression: Pt no longer eats lunchables, which was one of her goals. Pt still eats other high sodium foods. Pt does not eat out or bring home take out as often. Pt arrived with oxygen tank and wearing cannula. Pt somewhat out of breath when arrived to dietitian office. Pt recovered and was able to continue the session without difficulty.      -Instructed the patient on: Meal Planning for Regular, Balanced Meals & Snacks and low sodium, strategies for excessive hunger.    -Handouts given for: healthy snacks and 1500 calorie 5 day sample menu. Patient Instructions   1.)  When still hungry - Fill up with small bites of raw veggies (avoid cauliflower) and small bites of fresh fruit (ex. slices of apple). 2.)  Instead of kraft mac & cheese - make plain macaroni and spinkel a little shredded cheddar cheese. 3.)  Good job staying away from [employer]. Also I want to avoid canned meats - like linh sausage.     4.)  Refer to your \"Healthy snack\" handout for snack ideas in place of

## 2018-03-21 ENCOUNTER — HOSPITAL ENCOUNTER (OUTPATIENT)
Dept: NON INVASIVE DIAGNOSTICS | Age: 73
Discharge: HOME | End: 2018-03-21
Payer: MEDICARE

## 2018-03-21 DIAGNOSIS — R07.2 PRECORDIAL PAIN: ICD-10-CM

## 2018-03-21 PROCEDURE — 6360000002 HC RX W HCPCS

## 2018-03-21 PROCEDURE — 78452 HT MUSCLE IMAGE SPECT MULT: CPT

## 2018-03-21 PROCEDURE — 93017 CV STRESS TEST TRACING ONLY: CPT

## 2018-03-21 PROCEDURE — 3430000000 HC RX DIAGNOSTIC RADIOPHARMACEUTICAL: Performed by: INTERNAL MEDICINE

## 2018-03-21 PROCEDURE — A9500 TC99M SESTAMIBI: HCPCS | Performed by: INTERNAL MEDICINE

## 2018-03-21 RX ADMIN — Medication 34.1 MILLICURIE: at 12:19

## 2018-03-21 RX ADMIN — Medication 10 MILLICURIE: at 11:30

## 2018-03-26 ENCOUNTER — TELEPHONE (OUTPATIENT)
Dept: CARDIOLOGY CLINIC | Age: 73
End: 2018-03-26

## 2018-03-26 ENCOUNTER — APPOINTMENT (OUTPATIENT)
Dept: GENERAL RADIOLOGY | Age: 73
DRG: 292 | End: 2018-03-26
Payer: MEDICARE

## 2018-03-26 ENCOUNTER — HOSPITAL ENCOUNTER (INPATIENT)
Age: 73
LOS: 3 days | Discharge: HOME | DRG: 292 | End: 2018-03-29
Attending: EMERGENCY MEDICINE | Admitting: INTERNAL MEDICINE
Payer: MEDICARE

## 2018-03-26 ENCOUNTER — APPOINTMENT (OUTPATIENT)
Dept: INTERVENTIONAL RADIOLOGY/VASCULAR | Age: 73
DRG: 292 | End: 2018-03-26
Payer: MEDICARE

## 2018-03-26 ENCOUNTER — APPOINTMENT (OUTPATIENT)
Dept: CT IMAGING | Age: 73
DRG: 292 | End: 2018-03-26
Payer: MEDICARE

## 2018-03-26 DIAGNOSIS — F41.9 ANXIETY: ICD-10-CM

## 2018-03-26 DIAGNOSIS — I10 PRIMARY HYPERTENSION: ICD-10-CM

## 2018-03-26 DIAGNOSIS — E87.1 HYPONATREMIA: Primary | ICD-10-CM

## 2018-03-26 DIAGNOSIS — J44.1 COPD EXACERBATION (HCC): ICD-10-CM

## 2018-03-26 DIAGNOSIS — F33.42 RECURRENT MAJOR DEPRESSIVE DISORDER, IN FULL REMISSION (HCC): ICD-10-CM

## 2018-03-26 PROBLEM — R06.00 DYSPNEA: Status: ACTIVE | Noted: 2018-03-26

## 2018-03-26 LAB
ALBUMIN SERPL-MCNC: 4 G/DL (ref 3.5–5.1)
ALLEN TEST: POSITIVE
ALP BLD-CCNC: 50 U/L (ref 38–126)
ALT SERPL-CCNC: 28 U/L (ref 11–66)
ANION GAP SERPL CALCULATED.3IONS-SCNC: 9 MEQ/L (ref 8–16)
ANISOCYTOSIS: ABNORMAL
APTT: 34.5 SECONDS (ref 22–38)
AST SERPL-CCNC: 27 U/L (ref 5–40)
BASE EXCESS (CALCULATED): 5.1 MMOL/L (ref -2.5–2.5)
BASOPHILS # BLD: 0.5 %
BASOPHILS ABSOLUTE: 0 THOU/MM3 (ref 0–0.1)
BILIRUB SERPL-MCNC: 0.3 MG/DL (ref 0.3–1.2)
BILIRUBIN DIRECT: < 0.2 MG/DL (ref 0–0.3)
BUN BLDV-MCNC: 10 MG/DL (ref 7–22)
CALCIUM SERPL-MCNC: 9 MG/DL (ref 8.5–10.5)
CHLORIDE BLD-SCNC: 77 MEQ/L (ref 98–111)
CO2: 31 MEQ/L (ref 23–33)
COLLECTED BY:: ABNORMAL
CREAT SERPL-MCNC: 0.6 MG/DL (ref 0.4–1.2)
DEVICE: ABNORMAL
EKG ATRIAL RATE: 72 BPM
EKG P AXIS: 62 DEGREES
EKG P-R INTERVAL: 190 MS
EKG Q-T INTERVAL: 420 MS
EKG QRS DURATION: 80 MS
EKG QTC CALCULATION (BAZETT): 459 MS
EKG R AXIS: 5 DEGREES
EKG T AXIS: 33 DEGREES
EKG VENTRICULAR RATE: 72 BPM
EOSINOPHIL # BLD: 1.7 %
EOSINOPHILS ABSOLUTE: 0.1 THOU/MM3 (ref 0–0.4)
FERRITIN: 27 NG/ML (ref 10–291)
FLU A ANTIGEN: NEGATIVE
FLU B ANTIGEN: NEGATIVE
GFR SERPL CREATININE-BSD FRML MDRD: > 90 ML/MIN/1.73M2
GLUCOSE BLD-MCNC: 121 MG/DL (ref 70–108)
HCO3: 30 MMOL/L (ref 23–28)
HCT VFR BLD CALC: 25.6 % (ref 37–47)
HEMOGLOBIN: 8.2 GM/DL (ref 12–16)
HYPOCHROMIA: ABNORMAL
IFIO2: 4
INR BLD: 1.17 (ref 0.85–1.13)
IRON: 25 UG/DL (ref 50–170)
LACTIC ACID: 1.3 MMOL/L (ref 0.5–2.2)
LIPASE: 19.7 U/L (ref 5.6–51.3)
LYMPHOCYTES # BLD: 14.9 %
LYMPHOCYTES ABSOLUTE: 0.9 THOU/MM3 (ref 1–4.8)
MCH RBC QN AUTO: 24.1 PG (ref 27–31)
MCHC RBC AUTO-ENTMCNC: 32.1 GM/DL (ref 33–37)
MCV RBC AUTO: 75.2 FL (ref 81–99)
MICROCYTES: ABNORMAL
MONOCYTES # BLD: 10.3 %
MONOCYTES ABSOLUTE: 0.6 THOU/MM3 (ref 0.4–1.3)
NUCLEATED RED BLOOD CELLS: 0 /100 WBC
O2 SATURATION: 98 %
OSMOLALITY CALCULATION: 236.9 MOSMOL/KG (ref 275–300)
OSMOLALITY URINE: 250 MOSMOL/KG (ref 250–750)
OSMOLALITY: 251 MOSMOL/KG (ref 275–295)
PCO2: 47 MMHG (ref 35–45)
PDW BLD-RTO: 15 % (ref 11.5–14.5)
PH BLOOD GAS: 7.42 (ref 7.35–7.45)
PLATELET # BLD: 184 THOU/MM3 (ref 130–400)
PMV BLD AUTO: 7.3 FL (ref 7.4–10.4)
PO2: 100 MMHG (ref 71–104)
POTASSIUM SERPL-SCNC: 4.5 MEQ/L (ref 3.5–5.2)
PRO-BNP: 354.2 PG/ML (ref 0–900)
PROCALCITONIN: 0.06 NG/ML (ref 0.01–0.09)
RBC # BLD: 3.4 MILL/MM3 (ref 4.2–5.4)
SEG NEUTROPHILS: 72.6 %
SEGMENTED NEUTROPHILS ABSOLUTE COUNT: 4.3 THOU/MM3 (ref 1.8–7.7)
SODIUM BLD-SCNC: 117 MEQ/L (ref 135–145)
SODIUM BLD-SCNC: 121 MEQ/L (ref 135–145)
SODIUM URINE: 30 MEQ/L
SOURCE, BLOOD GAS: ABNORMAL
TOTAL IRON BINDING CAPACITY: 442 UG/DL (ref 171–450)
TOTAL PROTEIN: 6.6 G/DL (ref 6.1–8)
TROPONIN T: < 0.01 NG/ML
TSH SERPL DL<=0.05 MIU/L-ACNC: 4.6 UIU/ML (ref 0.4–4.2)
WBC # BLD: 5.9 THOU/MM3 (ref 4.8–10.8)

## 2018-03-26 PROCEDURE — 96374 THER/PROPH/DIAG INJ IV PUSH: CPT

## 2018-03-26 PROCEDURE — 93970 EXTREMITY STUDY: CPT

## 2018-03-26 PROCEDURE — 85610 PROTHROMBIN TIME: CPT

## 2018-03-26 PROCEDURE — 83690 ASSAY OF LIPASE: CPT

## 2018-03-26 PROCEDURE — 93005 ELECTROCARDIOGRAM TRACING: CPT | Performed by: EMERGENCY MEDICINE

## 2018-03-26 PROCEDURE — 6360000002 HC RX W HCPCS: Performed by: INTERNAL MEDICINE

## 2018-03-26 PROCEDURE — 85025 COMPLETE CBC W/AUTO DIFF WBC: CPT

## 2018-03-26 PROCEDURE — 36600 WITHDRAWAL OF ARTERIAL BLOOD: CPT

## 2018-03-26 PROCEDURE — 83550 IRON BINDING TEST: CPT

## 2018-03-26 PROCEDURE — 84295 ASSAY OF SERUM SODIUM: CPT

## 2018-03-26 PROCEDURE — 80053 COMPREHEN METABOLIC PANEL: CPT

## 2018-03-26 PROCEDURE — 2580000003 HC RX 258: Performed by: INTERNAL MEDICINE

## 2018-03-26 PROCEDURE — 6370000000 HC RX 637 (ALT 250 FOR IP): Performed by: INTERNAL MEDICINE

## 2018-03-26 PROCEDURE — 36415 COLL VENOUS BLD VENIPUNCTURE: CPT

## 2018-03-26 PROCEDURE — 85730 THROMBOPLASTIN TIME PARTIAL: CPT

## 2018-03-26 PROCEDURE — 82803 BLOOD GASES ANY COMBINATION: CPT

## 2018-03-26 PROCEDURE — 83880 ASSAY OF NATRIURETIC PEPTIDE: CPT

## 2018-03-26 PROCEDURE — 84443 ASSAY THYROID STIM HORMONE: CPT

## 2018-03-26 PROCEDURE — 83540 ASSAY OF IRON: CPT

## 2018-03-26 PROCEDURE — 84145 PROCALCITONIN (PCT): CPT

## 2018-03-26 PROCEDURE — 83935 ASSAY OF URINE OSMOLALITY: CPT

## 2018-03-26 PROCEDURE — 84300 ASSAY OF URINE SODIUM: CPT

## 2018-03-26 PROCEDURE — 99285 EMERGENCY DEPT VISIT HI MDM: CPT

## 2018-03-26 PROCEDURE — 83930 ASSAY OF BLOOD OSMOLALITY: CPT

## 2018-03-26 PROCEDURE — 87040 BLOOD CULTURE FOR BACTERIA: CPT

## 2018-03-26 PROCEDURE — 71275 CT ANGIOGRAPHY CHEST: CPT

## 2018-03-26 PROCEDURE — 6360000002 HC RX W HCPCS: Performed by: EMERGENCY MEDICINE

## 2018-03-26 PROCEDURE — 6360000004 HC RX CONTRAST MEDICATION: Performed by: INTERNAL MEDICINE

## 2018-03-26 PROCEDURE — 71045 X-RAY EXAM CHEST 1 VIEW: CPT

## 2018-03-26 PROCEDURE — 94640 AIRWAY INHALATION TREATMENT: CPT

## 2018-03-26 PROCEDURE — 84484 ASSAY OF TROPONIN QUANT: CPT

## 2018-03-26 PROCEDURE — A4614 HAND-HELD PEFR METER: HCPCS

## 2018-03-26 PROCEDURE — 82728 ASSAY OF FERRITIN: CPT

## 2018-03-26 PROCEDURE — 99221 1ST HOSP IP/OBS SF/LOW 40: CPT | Performed by: INTERNAL MEDICINE

## 2018-03-26 PROCEDURE — 83605 ASSAY OF LACTIC ACID: CPT

## 2018-03-26 PROCEDURE — 1200000003 HC TELEMETRY R&B

## 2018-03-26 PROCEDURE — 82248 BILIRUBIN DIRECT: CPT

## 2018-03-26 PROCEDURE — 87804 INFLUENZA ASSAY W/OPTIC: CPT

## 2018-03-26 PROCEDURE — 99223 1ST HOSP IP/OBS HIGH 75: CPT | Performed by: INTERNAL MEDICINE

## 2018-03-26 PROCEDURE — 6370000000 HC RX 637 (ALT 250 FOR IP): Performed by: EMERGENCY MEDICINE

## 2018-03-26 RX ORDER — IPRATROPIUM BROMIDE AND ALBUTEROL SULFATE 2.5; .5 MG/3ML; MG/3ML
1 SOLUTION RESPIRATORY (INHALATION) ONCE
Status: COMPLETED | OUTPATIENT
Start: 2018-03-26 | End: 2018-03-26

## 2018-03-26 RX ORDER — MONTELUKAST SODIUM 10 MG/1
10 TABLET ORAL NIGHTLY
Status: DISCONTINUED | OUTPATIENT
Start: 2018-03-26 | End: 2018-03-29 | Stop reason: HOSPADM

## 2018-03-26 RX ORDER — ASPIRIN 81 MG/1
81 TABLET ORAL DAILY
Status: DISCONTINUED | OUTPATIENT
Start: 2018-03-27 | End: 2018-03-29 | Stop reason: HOSPADM

## 2018-03-26 RX ORDER — OXYCODONE HYDROCHLORIDE AND ACETAMINOPHEN 5; 325 MG/1; MG/1
1 TABLET ORAL EVERY 4 HOURS PRN
Status: DISCONTINUED | OUTPATIENT
Start: 2018-03-26 | End: 2018-03-29 | Stop reason: HOSPADM

## 2018-03-26 RX ORDER — CITALOPRAM 20 MG/1
20 TABLET ORAL DAILY
Status: DISCONTINUED | OUTPATIENT
Start: 2018-03-27 | End: 2018-03-29 | Stop reason: HOSPADM

## 2018-03-26 RX ORDER — CITALOPRAM 40 MG/1
40 TABLET ORAL DAILY
Status: DISCONTINUED | OUTPATIENT
Start: 2018-03-27 | End: 2018-03-26

## 2018-03-26 RX ORDER — VALSARTAN 320 MG/1
320 TABLET ORAL DAILY
Status: DISCONTINUED | OUTPATIENT
Start: 2018-03-27 | End: 2018-03-29 | Stop reason: HOSPADM

## 2018-03-26 RX ORDER — METHYLPREDNISOLONE SODIUM SUCCINATE 125 MG/2ML
80 INJECTION, POWDER, LYOPHILIZED, FOR SOLUTION INTRAMUSCULAR; INTRAVENOUS ONCE
Status: COMPLETED | OUTPATIENT
Start: 2018-03-26 | End: 2018-03-26

## 2018-03-26 RX ORDER — ONDANSETRON 4 MG/1
4 TABLET, ORALLY DISINTEGRATING ORAL EVERY 6 HOURS PRN
Status: DISCONTINUED | OUTPATIENT
Start: 2018-03-26 | End: 2018-03-29 | Stop reason: HOSPADM

## 2018-03-26 RX ORDER — PANTOPRAZOLE SODIUM 40 MG/1
40 TABLET, DELAYED RELEASE ORAL
Status: DISCONTINUED | OUTPATIENT
Start: 2018-03-27 | End: 2018-03-29 | Stop reason: HOSPADM

## 2018-03-26 RX ORDER — OXCARBAZEPINE 300 MG/1
300 TABLET, FILM COATED ORAL 2 TIMES DAILY
Status: DISCONTINUED | OUTPATIENT
Start: 2018-03-26 | End: 2018-03-27

## 2018-03-26 RX ORDER — FLUTICASONE PROPIONATE 50 MCG
1 SPRAY, SUSPENSION (ML) NASAL DAILY
Status: DISCONTINUED | OUTPATIENT
Start: 2018-03-26 | End: 2018-03-29 | Stop reason: HOSPADM

## 2018-03-26 RX ORDER — SODIUM CHLORIDE 0.9 % (FLUSH) 0.9 %
10 SYRINGE (ML) INJECTION EVERY 12 HOURS SCHEDULED
Status: DISCONTINUED | OUTPATIENT
Start: 2018-03-26 | End: 2018-03-29 | Stop reason: HOSPADM

## 2018-03-26 RX ORDER — FUROSEMIDE 10 MG/ML
40 INJECTION INTRAMUSCULAR; INTRAVENOUS 2 TIMES DAILY
Status: DISCONTINUED | OUTPATIENT
Start: 2018-03-26 | End: 2018-03-28

## 2018-03-26 RX ORDER — HYDROXYZINE HYDROCHLORIDE 25 MG/1
25 TABLET, FILM COATED ORAL 3 TIMES DAILY
Status: DISCONTINUED | OUTPATIENT
Start: 2018-03-26 | End: 2018-03-29 | Stop reason: HOSPADM

## 2018-03-26 RX ORDER — CLOPIDOGREL BISULFATE 75 MG/1
75 TABLET ORAL DAILY
Status: DISCONTINUED | OUTPATIENT
Start: 2018-03-26 | End: 2018-03-29 | Stop reason: HOSPADM

## 2018-03-26 RX ORDER — OYSTER SHELL CALCIUM WITH VITAMIN D 500; 200 MG/1; [IU]/1
1 TABLET, FILM COATED ORAL 2 TIMES DAILY
Status: DISCONTINUED | OUTPATIENT
Start: 2018-03-26 | End: 2018-03-29 | Stop reason: HOSPADM

## 2018-03-26 RX ORDER — SODIUM CHLORIDE 0.9 % (FLUSH) 0.9 %
10 SYRINGE (ML) INJECTION PRN
Status: DISCONTINUED | OUTPATIENT
Start: 2018-03-26 | End: 2018-03-29 | Stop reason: HOSPADM

## 2018-03-26 RX ORDER — ISOSORBIDE MONONITRATE 60 MG/1
60 TABLET, EXTENDED RELEASE ORAL 2 TIMES DAILY
Status: DISCONTINUED | OUTPATIENT
Start: 2018-03-26 | End: 2018-03-29 | Stop reason: HOSPADM

## 2018-03-26 RX ORDER — OXCARBAZEPINE 300 MG/1
150 TABLET, FILM COATED ORAL DAILY
Status: DISCONTINUED | OUTPATIENT
Start: 2018-03-27 | End: 2018-03-29 | Stop reason: HOSPADM

## 2018-03-26 RX ORDER — AMLODIPINE BESYLATE 10 MG/1
10 TABLET ORAL DAILY
Status: DISCONTINUED | OUTPATIENT
Start: 2018-03-27 | End: 2018-03-29 | Stop reason: HOSPADM

## 2018-03-26 RX ORDER — NITROGLYCERIN 0.4 MG/1
0.4 TABLET SUBLINGUAL EVERY 5 MIN PRN
Status: DISCONTINUED | OUTPATIENT
Start: 2018-03-26 | End: 2018-03-29 | Stop reason: HOSPADM

## 2018-03-26 RX ORDER — LEVOTHYROXINE SODIUM 112 UG/1
112 TABLET ORAL DAILY
Status: DISCONTINUED | OUTPATIENT
Start: 2018-03-27 | End: 2018-03-29 | Stop reason: HOSPADM

## 2018-03-26 RX ORDER — DOCUSATE SODIUM 100 MG/1
100 CAPSULE, LIQUID FILLED ORAL NIGHTLY
Status: DISCONTINUED | OUTPATIENT
Start: 2018-03-26 | End: 2018-03-28

## 2018-03-26 RX ORDER — METOPROLOL TARTRATE 50 MG/1
50 TABLET, FILM COATED ORAL 2 TIMES DAILY
Status: DISCONTINUED | OUTPATIENT
Start: 2018-03-26 | End: 2018-03-29

## 2018-03-26 RX ORDER — POLYVINYL ALCOHOL 14 MG/ML
1 SOLUTION/ DROPS OPHTHALMIC PRN
Status: DISCONTINUED | OUTPATIENT
Start: 2018-03-26 | End: 2018-03-29 | Stop reason: HOSPADM

## 2018-03-26 RX ORDER — ACETAMINOPHEN 325 MG/1
650 TABLET ORAL EVERY 4 HOURS PRN
Status: DISCONTINUED | OUTPATIENT
Start: 2018-03-26 | End: 2018-03-29 | Stop reason: HOSPADM

## 2018-03-26 RX ORDER — DOXEPIN HYDROCHLORIDE 50 MG/1
150 CAPSULE ORAL NIGHTLY
Status: DISCONTINUED | OUTPATIENT
Start: 2018-03-26 | End: 2018-03-29 | Stop reason: HOSPADM

## 2018-03-26 RX ORDER — HYDRALAZINE HYDROCHLORIDE 50 MG/1
50 TABLET, FILM COATED ORAL EVERY 8 HOURS SCHEDULED
Status: DISCONTINUED | OUTPATIENT
Start: 2018-03-26 | End: 2018-03-29 | Stop reason: HOSPADM

## 2018-03-26 RX ORDER — ONDANSETRON 2 MG/ML
4 INJECTION INTRAMUSCULAR; INTRAVENOUS EVERY 6 HOURS PRN
Status: DISCONTINUED | OUTPATIENT
Start: 2018-03-26 | End: 2018-03-26

## 2018-03-26 RX ADMIN — FUROSEMIDE 40 MG: 10 INJECTION, SOLUTION INTRAMUSCULAR; INTRAVENOUS at 20:06

## 2018-03-26 RX ADMIN — METHYLPREDNISOLONE SODIUM SUCCINATE 80 MG: 125 INJECTION, POWDER, FOR SOLUTION INTRAMUSCULAR; INTRAVENOUS at 11:23

## 2018-03-26 RX ADMIN — OXYCODONE HYDROCHLORIDE AND ACETAMINOPHEN 1 TABLET: 5; 325 TABLET ORAL at 18:58

## 2018-03-26 RX ADMIN — HYDROXYZINE HYDROCHLORIDE 25 MG: 25 TABLET ORAL at 18:59

## 2018-03-26 RX ADMIN — HYDROXYZINE HYDROCHLORIDE 25 MG: 25 TABLET ORAL at 20:05

## 2018-03-26 RX ADMIN — OXCARBAZEPINE 300 MG: 300 TABLET ORAL at 20:05

## 2018-03-26 RX ADMIN — Medication 2 PUFF: at 21:47

## 2018-03-26 RX ADMIN — ISOSORBIDE MONONITRATE 60 MG: 60 TABLET ORAL at 20:05

## 2018-03-26 RX ADMIN — ENOXAPARIN SODIUM 40 MG: 40 INJECTION SUBCUTANEOUS at 20:11

## 2018-03-26 RX ADMIN — Medication 10 ML: at 20:15

## 2018-03-26 RX ADMIN — DOCUSATE SODIUM 100 MG: 100 CAPSULE ORAL at 20:06

## 2018-03-26 RX ADMIN — MONTELUKAST SODIUM 10 MG: 10 TABLET, FILM COATED ORAL at 20:05

## 2018-03-26 RX ADMIN — IPRATROPIUM BROMIDE AND ALBUTEROL SULFATE 1 AMPULE: .5; 3 SOLUTION RESPIRATORY (INHALATION) at 11:46

## 2018-03-26 RX ADMIN — IOPAMIDOL 80 ML: 755 INJECTION, SOLUTION INTRAVENOUS at 15:25

## 2018-03-26 RX ADMIN — CALCIUM CARBONATE-VITAMIN D TAB 500 MG-200 UNIT 1 TABLET: 500-200 TAB at 20:06

## 2018-03-26 RX ADMIN — HYDRALAZINE HYDROCHLORIDE 50 MG: 50 TABLET, FILM COATED ORAL at 23:14

## 2018-03-26 RX ADMIN — DOXEPIN HYDROCHLORIDE 150 MG: 50 CAPSULE ORAL at 20:06

## 2018-03-26 RX ADMIN — CLOPIDOGREL 75 MG: 75 TABLET, FILM COATED ORAL at 18:59

## 2018-03-26 RX ADMIN — FLUTICASONE PROPIONATE 1 SPRAY: 50 SPRAY, METERED NASAL at 18:59

## 2018-03-26 RX ADMIN — METOPROLOL TARTRATE 50 MG: 50 TABLET, FILM COATED ORAL at 20:05

## 2018-03-26 ASSESSMENT — ENCOUNTER SYMPTOMS
VOMITING: 0
RHINORRHEA: 0
ABDOMINAL PAIN: 0
EYE ITCHING: 0
SHORTNESS OF BREATH: 0
WHEEZING: 0
NAUSEA: 0
EYE DISCHARGE: 0
ABDOMINAL DISTENTION: 0
COUGH: 0
DIARRHEA: 0

## 2018-03-26 ASSESSMENT — PAIN DESCRIPTION - PAIN TYPE
TYPE: CHRONIC PAIN
TYPE_2: CHRONIC PAIN

## 2018-03-26 ASSESSMENT — PAIN DESCRIPTION - DESCRIPTORS
DESCRIPTORS_2: ACHING
DESCRIPTORS: ACHING

## 2018-03-26 ASSESSMENT — PAIN SCALES - GENERAL
PAINLEVEL_OUTOF10: 8

## 2018-03-26 ASSESSMENT — PAIN DESCRIPTION - ORIENTATION
ORIENTATION: POSTERIOR
ORIENTATION_2: LOWER

## 2018-03-26 ASSESSMENT — PAIN DESCRIPTION - LOCATION
LOCATION_2: BACK
LOCATION: NECK

## 2018-03-26 ASSESSMENT — PAIN DESCRIPTION - INTENSITY: RATING_2: 7

## 2018-03-26 NOTE — TELEPHONE ENCOUNTER
Stress test results. Anything needed before next appointment on 9-12-18? Summary  Joshua Knight was a small sized, mild in intensity, reversible myocardial   perfusion defect of the inferior wall.   Calculated gated LVEF 60 %.       Recommendation   Clinical correlation is recommended.      Signatures      ----------------------------------------------------------------   Electronically signed by Heidi Torres MD (Interpreting   Cardiologist) on 03/21/2018 at 18:23

## 2018-03-27 PROBLEM — I35.0 SEVERE AORTIC STENOSIS: Status: ACTIVE | Noted: 2018-03-26

## 2018-03-27 PROBLEM — I50.33 ACUTE ON CHRONIC DIASTOLIC HEART FAILURE (HCC): Status: ACTIVE | Noted: 2018-03-27

## 2018-03-27 LAB
ANION GAP SERPL CALCULATED.3IONS-SCNC: 11 MEQ/L (ref 8–16)
ANISOCYTOSIS: ABNORMAL
BASOPHILS # BLD: 0.4 %
BASOPHILS ABSOLUTE: 0 THOU/MM3 (ref 0–0.1)
BUN BLDV-MCNC: 10 MG/DL (ref 7–22)
CALCIUM SERPL-MCNC: 9.1 MG/DL (ref 8.5–10.5)
CHLORIDE BLD-SCNC: 81 MEQ/L (ref 98–111)
CO2: 33 MEQ/L (ref 23–33)
CREAT SERPL-MCNC: 0.8 MG/DL (ref 0.4–1.2)
EOSINOPHIL # BLD: 0.9 %
EOSINOPHILS ABSOLUTE: 0.1 THOU/MM3 (ref 0–0.4)
GFR SERPL CREATININE-BSD FRML MDRD: 70 ML/MIN/1.73M2
GLUCOSE BLD-MCNC: 112 MG/DL (ref 70–108)
HCT VFR BLD CALC: 26 % (ref 37–47)
HEMOGLOBIN: 8.6 GM/DL (ref 12–16)
HYPOCHROMIA: ABNORMAL
LYMPHOCYTES # BLD: 17.1 %
LYMPHOCYTES ABSOLUTE: 1 THOU/MM3 (ref 1–4.8)
MCH RBC QN AUTO: 24.7 PG (ref 27–31)
MCHC RBC AUTO-ENTMCNC: 33 GM/DL (ref 33–37)
MCV RBC AUTO: 74.8 FL (ref 81–99)
MICROCYTES: ABNORMAL
MONOCYTES # BLD: 11.6 %
MONOCYTES ABSOLUTE: 0.7 THOU/MM3 (ref 0.4–1.3)
NUCLEATED RED BLOOD CELLS: 0 /100 WBC
PDW BLD-RTO: 15.1 % (ref 11.5–14.5)
PLATELET # BLD: 209 THOU/MM3 (ref 130–400)
PMV BLD AUTO: 7.4 FL (ref 7.4–10.4)
POTASSIUM SERPL-SCNC: 3.9 MEQ/L (ref 3.5–5.2)
RBC # BLD: 3.47 MILL/MM3 (ref 4.2–5.4)
SEG NEUTROPHILS: 70 %
SEGMENTED NEUTROPHILS ABSOLUTE COUNT: 4 THOU/MM3 (ref 1.8–7.7)
SODIUM BLD-SCNC: 125 MEQ/L (ref 135–145)
SODIUM BLD-SCNC: 125 MEQ/L (ref 135–145)
SODIUM BLD-SCNC: 127 MEQ/L (ref 135–145)
WBC # BLD: 5.7 THOU/MM3 (ref 4.8–10.8)

## 2018-03-27 PROCEDURE — 6370000000 HC RX 637 (ALT 250 FOR IP): Performed by: INTERNAL MEDICINE

## 2018-03-27 PROCEDURE — 99232 SBSQ HOSP IP/OBS MODERATE 35: CPT | Performed by: NURSE PRACTITIONER

## 2018-03-27 PROCEDURE — 2580000003 HC RX 258: Performed by: INTERNAL MEDICINE

## 2018-03-27 PROCEDURE — 6360000002 HC RX W HCPCS: Performed by: INTERNAL MEDICINE

## 2018-03-27 PROCEDURE — G8987 SELF CARE CURRENT STATUS: HCPCS

## 2018-03-27 PROCEDURE — 80048 BASIC METABOLIC PNL TOTAL CA: CPT

## 2018-03-27 PROCEDURE — 97530 THERAPEUTIC ACTIVITIES: CPT

## 2018-03-27 PROCEDURE — 97110 THERAPEUTIC EXERCISES: CPT

## 2018-03-27 PROCEDURE — 84295 ASSAY OF SERUM SODIUM: CPT

## 2018-03-27 PROCEDURE — 94640 AIRWAY INHALATION TREATMENT: CPT

## 2018-03-27 PROCEDURE — 1200000003 HC TELEMETRY R&B

## 2018-03-27 PROCEDURE — G8978 MOBILITY CURRENT STATUS: HCPCS

## 2018-03-27 PROCEDURE — 85025 COMPLETE CBC W/AUTO DIFF WBC: CPT

## 2018-03-27 PROCEDURE — 99233 SBSQ HOSP IP/OBS HIGH 50: CPT | Performed by: INTERNAL MEDICINE

## 2018-03-27 PROCEDURE — 2700000000 HC OXYGEN THERAPY PER DAY

## 2018-03-27 PROCEDURE — 97161 PT EVAL LOW COMPLEX 20 MIN: CPT

## 2018-03-27 PROCEDURE — 36415 COLL VENOUS BLD VENIPUNCTURE: CPT

## 2018-03-27 PROCEDURE — G8979 MOBILITY GOAL STATUS: HCPCS

## 2018-03-27 PROCEDURE — G8988 SELF CARE GOAL STATUS: HCPCS

## 2018-03-27 PROCEDURE — 97165 OT EVAL LOW COMPLEX 30 MIN: CPT

## 2018-03-27 RX ORDER — POTASSIUM CHLORIDE 7.45 MG/ML
10 INJECTION INTRAVENOUS PRN
Status: DISCONTINUED | OUTPATIENT
Start: 2018-03-27 | End: 2018-03-29 | Stop reason: HOSPADM

## 2018-03-27 RX ORDER — DOCUSATE SODIUM 100 MG/1
100 CAPSULE, LIQUID FILLED ORAL 2 TIMES DAILY PRN
Status: DISCONTINUED | OUTPATIENT
Start: 2018-03-27 | End: 2018-03-29 | Stop reason: HOSPADM

## 2018-03-27 RX ORDER — OXCARBAZEPINE 300 MG/1
300 TABLET, FILM COATED ORAL NIGHTLY
Status: DISCONTINUED | OUTPATIENT
Start: 2018-03-28 | End: 2018-03-29 | Stop reason: HOSPADM

## 2018-03-27 RX ORDER — POLYETHYLENE GLYCOL 3350 17 G/17G
17 POWDER, FOR SOLUTION ORAL DAILY PRN
Status: DISCONTINUED | OUTPATIENT
Start: 2018-03-27 | End: 2018-03-28

## 2018-03-27 RX ORDER — POTASSIUM CHLORIDE 20MEQ/15ML
40 LIQUID (ML) ORAL PRN
Status: DISCONTINUED | OUTPATIENT
Start: 2018-03-27 | End: 2018-03-29 | Stop reason: HOSPADM

## 2018-03-27 RX ORDER — POTASSIUM CHLORIDE 20 MEQ/1
40 TABLET, EXTENDED RELEASE ORAL PRN
Status: DISCONTINUED | OUTPATIENT
Start: 2018-03-27 | End: 2018-03-29 | Stop reason: HOSPADM

## 2018-03-27 RX ORDER — LABETALOL HYDROCHLORIDE 5 MG/ML
10 INJECTION, SOLUTION INTRAVENOUS EVERY 4 HOURS PRN
Status: DISCONTINUED | OUTPATIENT
Start: 2018-03-27 | End: 2018-03-29 | Stop reason: HOSPADM

## 2018-03-27 RX ADMIN — HYDROXYZINE HYDROCHLORIDE 25 MG: 25 TABLET ORAL at 09:33

## 2018-03-27 RX ADMIN — FUROSEMIDE 40 MG: 10 INJECTION, SOLUTION INTRAMUSCULAR; INTRAVENOUS at 21:12

## 2018-03-27 RX ADMIN — CALCIUM CARBONATE-VITAMIN D TAB 500 MG-200 UNIT 1 TABLET: 500-200 TAB at 21:11

## 2018-03-27 RX ADMIN — DOCUSATE SODIUM 100 MG: 100 CAPSULE ORAL at 21:11

## 2018-03-27 RX ADMIN — Medication 2 PUFF: at 21:57

## 2018-03-27 RX ADMIN — FLUTICASONE PROPIONATE 1 SPRAY: 50 SPRAY, METERED NASAL at 09:35

## 2018-03-27 RX ADMIN — CALCIUM CARBONATE-VITAMIN D TAB 500 MG-200 UNIT 1 TABLET: 500-200 TAB at 09:32

## 2018-03-27 RX ADMIN — METOPROLOL TARTRATE 50 MG: 50 TABLET, FILM COATED ORAL at 21:11

## 2018-03-27 RX ADMIN — METOPROLOL TARTRATE 50 MG: 50 TABLET, FILM COATED ORAL at 09:34

## 2018-03-27 RX ADMIN — FUROSEMIDE 40 MG: 10 INJECTION, SOLUTION INTRAMUSCULAR; INTRAVENOUS at 09:33

## 2018-03-27 RX ADMIN — OXYCODONE HYDROCHLORIDE AND ACETAMINOPHEN 1 TABLET: 5; 325 TABLET ORAL at 04:13

## 2018-03-27 RX ADMIN — ENOXAPARIN SODIUM 40 MG: 40 INJECTION SUBCUTANEOUS at 18:01

## 2018-03-27 RX ADMIN — OXCARBAZEPINE 300 MG: 300 TABLET ORAL at 09:33

## 2018-03-27 RX ADMIN — Medication 10 ML: at 21:14

## 2018-03-27 RX ADMIN — MONTELUKAST SODIUM 10 MG: 10 TABLET, FILM COATED ORAL at 21:11

## 2018-03-27 RX ADMIN — POLYETHYLENE GLYCOL 3350 17 G: 17 POWDER, FOR SOLUTION ORAL at 12:40

## 2018-03-27 RX ADMIN — OXYCODONE HYDROCHLORIDE AND ACETAMINOPHEN 1 TABLET: 5; 325 TABLET ORAL at 13:53

## 2018-03-27 RX ADMIN — HYDRALAZINE HYDROCHLORIDE 50 MG: 50 TABLET, FILM COATED ORAL at 13:28

## 2018-03-27 RX ADMIN — LEVOTHYROXINE SODIUM 112 MCG: 112 TABLET ORAL at 05:39

## 2018-03-27 RX ADMIN — OXCARBAZEPINE 150 MG: 300 TABLET ORAL at 21:13

## 2018-03-27 RX ADMIN — ISOSORBIDE MONONITRATE 60 MG: 60 TABLET ORAL at 09:34

## 2018-03-27 RX ADMIN — HYDROXYZINE HYDROCHLORIDE 25 MG: 25 TABLET ORAL at 13:28

## 2018-03-27 RX ADMIN — POLYVINYL ALCOHOL 1 DROP: 14 SOLUTION/ DROPS OPHTHALMIC at 10:30

## 2018-03-27 RX ADMIN — OXYCODONE HYDROCHLORIDE AND ACETAMINOPHEN 1 TABLET: 5; 325 TABLET ORAL at 18:05

## 2018-03-27 RX ADMIN — HYDROXYZINE HYDROCHLORIDE 25 MG: 25 TABLET ORAL at 21:11

## 2018-03-27 RX ADMIN — CITALOPRAM 20 MG: 20 TABLET, FILM COATED ORAL at 09:32

## 2018-03-27 RX ADMIN — AMLODIPINE BESYLATE 10 MG: 10 TABLET ORAL at 09:31

## 2018-03-27 RX ADMIN — Medication 10 ML: at 09:36

## 2018-03-27 RX ADMIN — PANTOPRAZOLE SODIUM 40 MG: 40 TABLET, DELAYED RELEASE ORAL at 05:39

## 2018-03-27 RX ADMIN — VALSARTAN 320 MG: 320 TABLET ORAL at 09:34

## 2018-03-27 RX ADMIN — CLOPIDOGREL 75 MG: 75 TABLET, FILM COATED ORAL at 09:32

## 2018-03-27 RX ADMIN — HYDRALAZINE HYDROCHLORIDE 50 MG: 50 TABLET, FILM COATED ORAL at 05:39

## 2018-03-27 RX ADMIN — OXYCODONE HYDROCHLORIDE AND ACETAMINOPHEN 1 TABLET: 5; 325 TABLET ORAL at 09:36

## 2018-03-27 RX ADMIN — ISOSORBIDE MONONITRATE 60 MG: 60 TABLET ORAL at 21:11

## 2018-03-27 RX ADMIN — HYDRALAZINE HYDROCHLORIDE 50 MG: 50 TABLET, FILM COATED ORAL at 21:11

## 2018-03-27 RX ADMIN — Medication 2 PUFF: at 08:45

## 2018-03-27 RX ADMIN — ASPIRIN 81 MG: 81 TABLET, COATED ORAL at 09:31

## 2018-03-27 RX ADMIN — DOXEPIN HYDROCHLORIDE 150 MG: 50 CAPSULE ORAL at 21:11

## 2018-03-27 ASSESSMENT — PAIN SCALES - GENERAL
PAINLEVEL_OUTOF10: 3
PAINLEVEL_OUTOF10: 8
PAINLEVEL_OUTOF10: 7
PAINLEVEL_OUTOF10: 7
PAINLEVEL_OUTOF10: 9
PAINLEVEL_OUTOF10: 3
PAINLEVEL_OUTOF10: 7
PAINLEVEL_OUTOF10: 8
PAINLEVEL_OUTOF10: 3
PAINLEVEL_OUTOF10: 5

## 2018-03-27 ASSESSMENT — PAIN DESCRIPTION - LOCATION
LOCATION: BACK;NECK

## 2018-03-27 ASSESSMENT — PAIN DESCRIPTION - DESCRIPTORS
DESCRIPTORS: ACHING

## 2018-03-27 ASSESSMENT — PAIN DESCRIPTION - PAIN TYPE
TYPE: CHRONIC PAIN

## 2018-03-28 LAB
ANION GAP SERPL CALCULATED.3IONS-SCNC: 12 MEQ/L (ref 8–16)
BUN BLDV-MCNC: 13 MG/DL (ref 7–22)
CALCIUM SERPL-MCNC: 9 MG/DL (ref 8.5–10.5)
CHLORIDE BLD-SCNC: 83 MEQ/L (ref 98–111)
CO2: 32 MEQ/L (ref 23–33)
CREAT SERPL-MCNC: 0.8 MG/DL (ref 0.4–1.2)
GFR SERPL CREATININE-BSD FRML MDRD: 70 ML/MIN/1.73M2
GLUCOSE BLD-MCNC: 114 MG/DL (ref 70–108)
HCT VFR BLD CALC: 26.1 % (ref 37–47)
HCT VFR BLD CALC: 28.1 % (ref 37–47)
HEMOGLOBIN: 8.6 GM/DL (ref 12–16)
HEMOGLOBIN: 8.9 GM/DL (ref 12–16)
MAGNESIUM: 1.6 MG/DL (ref 1.6–2.4)
PHOSPHORUS: 3.9 MG/DL (ref 2.4–4.7)
POTASSIUM SERPL-SCNC: 3.7 MEQ/L (ref 3.5–5.2)
SODIUM BLD-SCNC: 126 MEQ/L (ref 135–145)
SODIUM BLD-SCNC: 127 MEQ/L (ref 135–145)

## 2018-03-28 PROCEDURE — 97110 THERAPEUTIC EXERCISES: CPT

## 2018-03-28 PROCEDURE — 6370000000 HC RX 637 (ALT 250 FOR IP): Performed by: INTERNAL MEDICINE

## 2018-03-28 PROCEDURE — 83735 ASSAY OF MAGNESIUM: CPT

## 2018-03-28 PROCEDURE — 85018 HEMOGLOBIN: CPT

## 2018-03-28 PROCEDURE — 1200000003 HC TELEMETRY R&B

## 2018-03-28 PROCEDURE — 84295 ASSAY OF SERUM SODIUM: CPT

## 2018-03-28 PROCEDURE — 2580000003 HC RX 258: Performed by: INTERNAL MEDICINE

## 2018-03-28 PROCEDURE — 80048 BASIC METABOLIC PNL TOTAL CA: CPT

## 2018-03-28 PROCEDURE — 2700000000 HC OXYGEN THERAPY PER DAY

## 2018-03-28 PROCEDURE — 85014 HEMATOCRIT: CPT

## 2018-03-28 PROCEDURE — 99232 SBSQ HOSP IP/OBS MODERATE 35: CPT | Performed by: INTERNAL MEDICINE

## 2018-03-28 PROCEDURE — 99233 SBSQ HOSP IP/OBS HIGH 50: CPT | Performed by: INTERNAL MEDICINE

## 2018-03-28 PROCEDURE — 84100 ASSAY OF PHOSPHORUS: CPT

## 2018-03-28 PROCEDURE — 94640 AIRWAY INHALATION TREATMENT: CPT

## 2018-03-28 PROCEDURE — 36415 COLL VENOUS BLD VENIPUNCTURE: CPT

## 2018-03-28 RX ORDER — BISACODYL 10 MG
10 SUPPOSITORY, RECTAL RECTAL ONCE
Status: COMPLETED | OUTPATIENT
Start: 2018-03-28 | End: 2018-03-28

## 2018-03-28 RX ORDER — POLYETHYLENE GLYCOL 3350 17 G/17G
17 POWDER, FOR SOLUTION ORAL DAILY
Status: DISCONTINUED | OUTPATIENT
Start: 2018-03-28 | End: 2018-03-29 | Stop reason: HOSPADM

## 2018-03-28 RX ORDER — DOCUSATE SODIUM 100 MG/1
200 CAPSULE, LIQUID FILLED ORAL NIGHTLY
Status: DISCONTINUED | OUTPATIENT
Start: 2018-03-28 | End: 2018-03-29 | Stop reason: HOSPADM

## 2018-03-28 RX ORDER — FUROSEMIDE 40 MG/1
40 TABLET ORAL 2 TIMES DAILY
Status: DISCONTINUED | OUTPATIENT
Start: 2018-03-28 | End: 2018-03-29

## 2018-03-28 RX ADMIN — OXYCODONE HYDROCHLORIDE AND ACETAMINOPHEN 1 TABLET: 5; 325 TABLET ORAL at 23:08

## 2018-03-28 RX ADMIN — OXCARBAZEPINE 300 MG: 300 TABLET ORAL at 21:47

## 2018-03-28 RX ADMIN — DOXEPIN HYDROCHLORIDE 150 MG: 50 CAPSULE ORAL at 21:47

## 2018-03-28 RX ADMIN — OXYCODONE HYDROCHLORIDE AND ACETAMINOPHEN 1 TABLET: 5; 325 TABLET ORAL at 02:29

## 2018-03-28 RX ADMIN — HYDRALAZINE HYDROCHLORIDE 50 MG: 50 TABLET, FILM COATED ORAL at 13:21

## 2018-03-28 RX ADMIN — Medication 2 PUFF: at 20:06

## 2018-03-28 RX ADMIN — BISACODYL 10 MG: 10 SUPPOSITORY RECTAL at 11:17

## 2018-03-28 RX ADMIN — CLOPIDOGREL 75 MG: 75 TABLET, FILM COATED ORAL at 09:04

## 2018-03-28 RX ADMIN — HYDROXYZINE HYDROCHLORIDE 25 MG: 25 TABLET ORAL at 09:04

## 2018-03-28 RX ADMIN — VALSARTAN 320 MG: 320 TABLET ORAL at 09:04

## 2018-03-28 RX ADMIN — LEVOTHYROXINE SODIUM 112 MCG: 112 TABLET ORAL at 05:10

## 2018-03-28 RX ADMIN — DOCUSATE SODIUM 200 MG: 100 CAPSULE ORAL at 21:46

## 2018-03-28 RX ADMIN — AMLODIPINE BESYLATE 10 MG: 10 TABLET ORAL at 09:04

## 2018-03-28 RX ADMIN — OXYCODONE HYDROCHLORIDE AND ACETAMINOPHEN 1 TABLET: 5; 325 TABLET ORAL at 19:17

## 2018-03-28 RX ADMIN — CALCIUM CARBONATE-VITAMIN D TAB 500 MG-200 UNIT 1 TABLET: 500-200 TAB at 21:46

## 2018-03-28 RX ADMIN — Medication 2 PUFF: at 10:16

## 2018-03-28 RX ADMIN — METOPROLOL TARTRATE 50 MG: 50 TABLET, FILM COATED ORAL at 21:46

## 2018-03-28 RX ADMIN — ISOSORBIDE MONONITRATE 60 MG: 60 TABLET ORAL at 21:47

## 2018-03-28 RX ADMIN — ISOSORBIDE MONONITRATE 60 MG: 60 TABLET ORAL at 09:04

## 2018-03-28 RX ADMIN — FUROSEMIDE 40 MG: 40 TABLET ORAL at 16:56

## 2018-03-28 RX ADMIN — HYDRALAZINE HYDROCHLORIDE 50 MG: 50 TABLET, FILM COATED ORAL at 05:10

## 2018-03-28 RX ADMIN — METOPROLOL TARTRATE 50 MG: 50 TABLET, FILM COATED ORAL at 09:04

## 2018-03-28 RX ADMIN — POLYVINYL ALCOHOL 1 DROP: 14 SOLUTION/ DROPS OPHTHALMIC at 12:11

## 2018-03-28 RX ADMIN — HYDRALAZINE HYDROCHLORIDE 50 MG: 50 TABLET, FILM COATED ORAL at 21:47

## 2018-03-28 RX ADMIN — POLYETHYLENE GLYCOL 3350 17 G: 17 POWDER, FOR SOLUTION ORAL at 09:04

## 2018-03-28 RX ADMIN — Medication 10 ML: at 09:11

## 2018-03-28 RX ADMIN — FLUTICASONE PROPIONATE 1 SPRAY: 50 SPRAY, METERED NASAL at 09:09

## 2018-03-28 RX ADMIN — Medication 10 ML: at 22:01

## 2018-03-28 RX ADMIN — MONTELUKAST SODIUM 10 MG: 10 TABLET, FILM COATED ORAL at 21:46

## 2018-03-28 RX ADMIN — CALCIUM CARBONATE-VITAMIN D TAB 500 MG-200 UNIT 1 TABLET: 500-200 TAB at 09:04

## 2018-03-28 RX ADMIN — PANTOPRAZOLE SODIUM 40 MG: 40 TABLET, DELAYED RELEASE ORAL at 05:10

## 2018-03-28 RX ADMIN — CITALOPRAM 20 MG: 20 TABLET, FILM COATED ORAL at 09:04

## 2018-03-28 RX ADMIN — ASPIRIN 81 MG: 81 TABLET, COATED ORAL at 09:04

## 2018-03-28 RX ADMIN — FUROSEMIDE 40 MG: 40 TABLET ORAL at 09:08

## 2018-03-28 RX ADMIN — HYDROXYZINE HYDROCHLORIDE 25 MG: 25 TABLET ORAL at 13:21

## 2018-03-28 RX ADMIN — OXYCODONE HYDROCHLORIDE AND ACETAMINOPHEN 1 TABLET: 5; 325 TABLET ORAL at 07:25

## 2018-03-28 RX ADMIN — HYDROXYZINE HYDROCHLORIDE 25 MG: 25 TABLET ORAL at 21:46

## 2018-03-28 RX ADMIN — OXCARBAZEPINE 150 MG: 300 TABLET ORAL at 09:04

## 2018-03-28 ASSESSMENT — PAIN SCALES - GENERAL
PAINLEVEL_OUTOF10: 9
PAINLEVEL_OUTOF10: 6
PAINLEVEL_OUTOF10: 8
PAINLEVEL_OUTOF10: 4
PAINLEVEL_OUTOF10: 4
PAINLEVEL_OUTOF10: 6
PAINLEVEL_OUTOF10: 9

## 2018-03-28 ASSESSMENT — PAIN DESCRIPTION - LOCATION
LOCATION: BACK;NECK
LOCATION: BACK;NECK

## 2018-03-28 ASSESSMENT — PAIN DESCRIPTION - PAIN TYPE: TYPE: CHRONIC PAIN

## 2018-03-28 ASSESSMENT — PAIN DESCRIPTION - DESCRIPTORS: DESCRIPTORS: ACHING

## 2018-03-29 ENCOUNTER — TELEPHONE (OUTPATIENT)
Dept: FAMILY MEDICINE CLINIC | Age: 73
End: 2018-03-29

## 2018-03-29 VITALS
OXYGEN SATURATION: 93 % | DIASTOLIC BLOOD PRESSURE: 64 MMHG | SYSTOLIC BLOOD PRESSURE: 142 MMHG | BODY MASS INDEX: 36.96 KG/M2 | RESPIRATION RATE: 18 BRPM | HEIGHT: 66 IN | WEIGHT: 230 LBS | TEMPERATURE: 97.8 F | HEART RATE: 92 BPM

## 2018-03-29 LAB
ANION GAP SERPL CALCULATED.3IONS-SCNC: 13 MEQ/L (ref 8–16)
BUN BLDV-MCNC: 16 MG/DL (ref 7–22)
CALCIUM SERPL-MCNC: 9.1 MG/DL (ref 8.5–10.5)
CHLORIDE BLD-SCNC: 84 MEQ/L (ref 98–111)
CO2: 31 MEQ/L (ref 23–33)
CREAT SERPL-MCNC: 1.1 MG/DL (ref 0.4–1.2)
GFR SERPL CREATININE-BSD FRML MDRD: 49 ML/MIN/1.73M2
GLUCOSE BLD-MCNC: 120 MG/DL (ref 70–108)
GLUCOSE BLD-MCNC: 136 MG/DL (ref 70–108)
HCT VFR BLD CALC: 27.7 % (ref 37–47)
HEMOGLOBIN: 8.9 GM/DL (ref 12–16)
MAGNESIUM: 1.7 MG/DL (ref 1.6–2.4)
PHOSPHORUS: 4.6 MG/DL (ref 2.4–4.7)
POTASSIUM SERPL-SCNC: 3.9 MEQ/L (ref 3.5–5.2)
SODIUM BLD-SCNC: 128 MEQ/L (ref 135–145)

## 2018-03-29 PROCEDURE — 85014 HEMATOCRIT: CPT

## 2018-03-29 PROCEDURE — 84100 ASSAY OF PHOSPHORUS: CPT

## 2018-03-29 PROCEDURE — 99232 SBSQ HOSP IP/OBS MODERATE 35: CPT | Performed by: NURSE PRACTITIONER

## 2018-03-29 PROCEDURE — 83735 ASSAY OF MAGNESIUM: CPT

## 2018-03-29 PROCEDURE — 6370000000 HC RX 637 (ALT 250 FOR IP): Performed by: INTERNAL MEDICINE

## 2018-03-29 PROCEDURE — 85018 HEMOGLOBIN: CPT

## 2018-03-29 PROCEDURE — 6370000000 HC RX 637 (ALT 250 FOR IP): Performed by: NURSE PRACTITIONER

## 2018-03-29 PROCEDURE — 80048 BASIC METABOLIC PNL TOTAL CA: CPT

## 2018-03-29 PROCEDURE — 36415 COLL VENOUS BLD VENIPUNCTURE: CPT

## 2018-03-29 PROCEDURE — 99239 HOSP IP/OBS DSCHRG MGMT >30: CPT | Performed by: INTERNAL MEDICINE

## 2018-03-29 PROCEDURE — 82948 REAGENT STRIP/BLOOD GLUCOSE: CPT

## 2018-03-29 PROCEDURE — 94640 AIRWAY INHALATION TREATMENT: CPT

## 2018-03-29 RX ORDER — FUROSEMIDE 40 MG/1
40 TABLET ORAL DAILY
Status: DISCONTINUED | OUTPATIENT
Start: 2018-03-30 | End: 2018-03-29

## 2018-03-29 RX ORDER — FUROSEMIDE 40 MG/1
40 TABLET ORAL DAILY
Status: DISCONTINUED | OUTPATIENT
Start: 2018-03-29 | End: 2018-03-29 | Stop reason: HOSPADM

## 2018-03-29 RX ORDER — FUROSEMIDE 40 MG/1
40 TABLET ORAL DAILY
Qty: 60 TABLET | Refills: 0 | Status: ON HOLD | OUTPATIENT
Start: 2018-03-29 | End: 2018-04-18 | Stop reason: HOSPADM

## 2018-03-29 RX ORDER — CITALOPRAM 40 MG/1
20 TABLET ORAL DAILY
Qty: 30 TABLET | Refills: 0
Start: 2018-03-29 | End: 2018-04-26 | Stop reason: ALTCHOICE

## 2018-03-29 RX ORDER — METOPROLOL TARTRATE 75 MG/1
75 TABLET, FILM COATED ORAL 2 TIMES DAILY
Qty: 60 TABLET | Refills: 0 | Status: ON HOLD | OUTPATIENT
Start: 2018-03-29 | End: 2018-04-18 | Stop reason: HOSPADM

## 2018-03-29 RX ORDER — POTASSIUM CHLORIDE 750 MG/1
20 TABLET, FILM COATED, EXTENDED RELEASE ORAL ONCE
Status: COMPLETED | OUTPATIENT
Start: 2018-03-29 | End: 2018-03-29

## 2018-03-29 RX ADMIN — AMLODIPINE BESYLATE 10 MG: 10 TABLET ORAL at 09:44

## 2018-03-29 RX ADMIN — LEVOTHYROXINE SODIUM 112 MCG: 112 TABLET ORAL at 05:37

## 2018-03-29 RX ADMIN — OXYCODONE HYDROCHLORIDE AND ACETAMINOPHEN 1 TABLET: 5; 325 TABLET ORAL at 09:44

## 2018-03-29 RX ADMIN — CLOPIDOGREL 75 MG: 75 TABLET, FILM COATED ORAL at 09:44

## 2018-03-29 RX ADMIN — PANTOPRAZOLE SODIUM 40 MG: 40 TABLET, DELAYED RELEASE ORAL at 05:37

## 2018-03-29 RX ADMIN — HYDRALAZINE HYDROCHLORIDE 50 MG: 50 TABLET, FILM COATED ORAL at 05:37

## 2018-03-29 RX ADMIN — Medication 2 PUFF: at 10:34

## 2018-03-29 RX ADMIN — FLUTICASONE PROPIONATE 1 SPRAY: 50 SPRAY, METERED NASAL at 09:49

## 2018-03-29 RX ADMIN — CITALOPRAM 20 MG: 20 TABLET, FILM COATED ORAL at 09:44

## 2018-03-29 RX ADMIN — CALCIUM CARBONATE-VITAMIN D TAB 500 MG-200 UNIT 1 TABLET: 500-200 TAB at 09:44

## 2018-03-29 RX ADMIN — POTASSIUM CHLORIDE 20 MEQ: 750 TABLET, FILM COATED, EXTENDED RELEASE ORAL at 09:49

## 2018-03-29 RX ADMIN — ISOSORBIDE MONONITRATE 60 MG: 60 TABLET ORAL at 09:44

## 2018-03-29 RX ADMIN — OXYCODONE HYDROCHLORIDE AND ACETAMINOPHEN 1 TABLET: 5; 325 TABLET ORAL at 02:59

## 2018-03-29 RX ADMIN — HYDROXYZINE HYDROCHLORIDE 25 MG: 25 TABLET ORAL at 09:44

## 2018-03-29 RX ADMIN — METOPROLOL TARTRATE 75 MG: 50 TABLET, FILM COATED ORAL at 09:44

## 2018-03-29 RX ADMIN — POLYVINYL ALCOHOL 1 DROP: 14 SOLUTION/ DROPS OPHTHALMIC at 09:49

## 2018-03-29 RX ADMIN — FUROSEMIDE 40 MG: 40 TABLET ORAL at 09:43

## 2018-03-29 RX ADMIN — OXCARBAZEPINE 150 MG: 300 TABLET ORAL at 09:43

## 2018-03-29 RX ADMIN — ASPIRIN 81 MG: 81 TABLET, COATED ORAL at 09:44

## 2018-03-29 RX ADMIN — VALSARTAN 320 MG: 320 TABLET ORAL at 09:44

## 2018-03-29 ASSESSMENT — PAIN SCALES - GENERAL
PAINLEVEL_OUTOF10: 6
PAINLEVEL_OUTOF10: 6
PAINLEVEL_OUTOF10: 4

## 2018-03-30 ENCOUNTER — CARE COORDINATION (OUTPATIENT)
Dept: CASE MANAGEMENT | Age: 73
End: 2018-03-30

## 2018-04-01 LAB
BLOOD CULTURE, ROUTINE: NORMAL
BLOOD CULTURE, ROUTINE: NORMAL

## 2018-04-02 ENCOUNTER — CARE COORDINATION (OUTPATIENT)
Dept: CASE MANAGEMENT | Age: 73
End: 2018-04-02

## 2018-04-05 ENCOUNTER — OFFICE VISIT (OUTPATIENT)
Dept: FAMILY MEDICINE CLINIC | Age: 73
End: 2018-04-05
Payer: MEDICARE

## 2018-04-05 ENCOUNTER — CARE COORDINATION (OUTPATIENT)
Dept: CASE MANAGEMENT | Age: 73
End: 2018-04-05

## 2018-04-05 VITALS
SYSTOLIC BLOOD PRESSURE: 138 MMHG | TEMPERATURE: 98.4 F | RESPIRATION RATE: 20 BRPM | WEIGHT: 228 LBS | HEIGHT: 67 IN | DIASTOLIC BLOOD PRESSURE: 60 MMHG | HEART RATE: 83 BPM | BODY MASS INDEX: 35.79 KG/M2

## 2018-04-05 DIAGNOSIS — F41.9 ANXIETY: ICD-10-CM

## 2018-04-05 DIAGNOSIS — R53.81 PHYSICAL DECONDITIONING: ICD-10-CM

## 2018-04-05 DIAGNOSIS — F33.42 RECURRENT MAJOR DEPRESSIVE DISORDER, IN FULL REMISSION (HCC): ICD-10-CM

## 2018-04-05 DIAGNOSIS — I50.33 ACUTE ON CHRONIC DIASTOLIC HEART FAILURE (HCC): Primary | ICD-10-CM

## 2018-04-05 DIAGNOSIS — K59.00 CONSTIPATION, UNSPECIFIED CONSTIPATION TYPE: ICD-10-CM

## 2018-04-05 DIAGNOSIS — F32.5 MAJOR DEPRESSIVE DISORDER WITH SINGLE EPISODE, IN FULL REMISSION (HCC): ICD-10-CM

## 2018-04-05 PROBLEM — M48.02 CERVICAL SPINAL STENOSIS: Status: RESOLVED | Noted: 2017-11-15 | Resolved: 2018-04-05

## 2018-04-05 PROBLEM — M50.10 HERNIATION OF CERVICAL INTERVERTEBRAL DISC WITH RADICULOPATHY: Status: RESOLVED | Noted: 2017-11-15 | Resolved: 2018-04-05

## 2018-04-05 PROCEDURE — 99496 TRANSJ CARE MGMT HIGH F2F 7D: CPT | Performed by: NURSE PRACTITIONER

## 2018-04-05 RX ORDER — ACETAMINOPHEN 500 MG
500 TABLET ORAL EVERY 6 HOURS PRN
Status: ON HOLD | COMMUNITY
End: 2018-01-01 | Stop reason: HOSPADM

## 2018-04-05 RX ORDER — POLYETHYLENE GLYCOL 3350 17 G/17G
17 POWDER, FOR SOLUTION ORAL DAILY PRN
Qty: 527 G | Refills: 1 | Status: ON HOLD | OUTPATIENT
Start: 2018-04-05 | End: 2018-04-16 | Stop reason: SDUPTHER

## 2018-04-05 RX ORDER — CITALOPRAM 20 MG/1
20 TABLET ORAL DAILY
Qty: 90 TABLET | Refills: 1 | Status: SHIPPED | OUTPATIENT
Start: 2018-04-05 | End: 2018-04-16 | Stop reason: SDUPTHER

## 2018-04-05 RX ORDER — DOCUSATE SODIUM 100 MG/1
300 CAPSULE, LIQUID FILLED ORAL NIGHTLY
Qty: 90 CAPSULE | Refills: 1 | Status: SHIPPED | OUTPATIENT
Start: 2018-04-05 | End: 2018-05-28 | Stop reason: SDUPTHER

## 2018-04-05 ASSESSMENT — ENCOUNTER SYMPTOMS
CHOKING: 0
COUGH: 1
ABDOMINAL PAIN: 0
SHORTNESS OF BREATH: 1
SINUS PAIN: 0
WHEEZING: 0
ABDOMINAL DISTENTION: 0
CHEST TIGHTNESS: 0
RHINORRHEA: 0
CONSTIPATION: 1

## 2018-04-09 ENCOUNTER — CARE COORDINATION (OUTPATIENT)
Dept: CASE MANAGEMENT | Age: 73
End: 2018-04-09

## 2018-04-12 ENCOUNTER — CARE COORDINATION (OUTPATIENT)
Dept: CASE MANAGEMENT | Age: 73
End: 2018-04-12

## 2018-04-13 LAB
BUN BLDV-MCNC: 11 MG/DL
CALCIUM SERPL-MCNC: 9.8 MG/DL
CHLORIDE BLD-SCNC: 83 MMOL/L
CO2: 27 MMOL/L
CREAT SERPL-MCNC: 0.8 MG/DL
GFR CALCULATED: 73.1
GLUCOSE BLD-MCNC: 142 MG/DL
POTASSIUM SERPL-SCNC: 4.3 MMOL/L
SODIUM BLD-SCNC: 128 MMOL/L

## 2018-04-16 ENCOUNTER — APPOINTMENT (OUTPATIENT)
Dept: GENERAL RADIOLOGY | Age: 73
DRG: 312 | End: 2018-04-16
Payer: MEDICARE

## 2018-04-16 ENCOUNTER — OFFICE VISIT (OUTPATIENT)
Dept: NEPHROLOGY | Age: 73
End: 2018-04-16
Payer: MEDICARE

## 2018-04-16 ENCOUNTER — HOSPITAL ENCOUNTER (INPATIENT)
Age: 73
LOS: 1 days | Discharge: HOME | DRG: 312 | End: 2018-04-18
Attending: FAMILY MEDICINE | Admitting: INTERNAL MEDICINE
Payer: MEDICARE

## 2018-04-16 VITALS — SYSTOLIC BLOOD PRESSURE: 80 MMHG | BODY MASS INDEX: 36.88 KG/M2 | WEIGHT: 232 LBS | DIASTOLIC BLOOD PRESSURE: 58 MMHG

## 2018-04-16 DIAGNOSIS — D50.8 OTHER IRON DEFICIENCY ANEMIA: ICD-10-CM

## 2018-04-16 DIAGNOSIS — K75.81 NASH (NONALCOHOLIC STEATOHEPATITIS): ICD-10-CM

## 2018-04-16 DIAGNOSIS — E87.1 HYPONATREMIA: ICD-10-CM

## 2018-04-16 DIAGNOSIS — I10 ESSENTIAL HYPERTENSION: ICD-10-CM

## 2018-04-16 DIAGNOSIS — I95.1 ORTHOSTATIC HYPOTENSION: Primary | ICD-10-CM

## 2018-04-16 DIAGNOSIS — E87.1 HYPONATREMIA: Primary | ICD-10-CM

## 2018-04-16 DIAGNOSIS — F41.9 ANXIETY: ICD-10-CM

## 2018-04-16 LAB
ALBUMIN SERPL-MCNC: 4.1 G/DL (ref 3.5–5.1)
ALP BLD-CCNC: 53 U/L (ref 38–126)
ALT SERPL-CCNC: 26 U/L (ref 11–66)
ANION GAP SERPL CALCULATED.3IONS-SCNC: 14 MEQ/L (ref 8–16)
ANISOCYTOSIS: ABNORMAL
APTT: 22.6 SECONDS (ref 22–38)
AST SERPL-CCNC: 35 U/L (ref 5–40)
BACTERIA: ABNORMAL /HPF
BASOPHILS # BLD: 0.9 %
BASOPHILS ABSOLUTE: 0 THOU/MM3 (ref 0–0.1)
BILIRUB SERPL-MCNC: 0.2 MG/DL (ref 0.3–1.2)
BILIRUBIN URINE: NEGATIVE
BLOOD, URINE: NEGATIVE
BUN BLDV-MCNC: 13 MG/DL (ref 7–22)
CALCIUM SERPL-MCNC: 10 MG/DL (ref 8.5–10.5)
CASTS 2: ABNORMAL /LPF
CASTS UA: ABNORMAL /LPF
CHARACTER, URINE: ABNORMAL
CHLORIDE BLD-SCNC: 87 MEQ/L (ref 98–111)
CO2: 28 MEQ/L (ref 23–33)
COLOR: YELLOW
CREAT SERPL-MCNC: 0.8 MG/DL (ref 0.4–1.2)
CRYSTALS, UA: ABNORMAL
EKG ATRIAL RATE: 73 BPM
EKG P AXIS: 57 DEGREES
EKG P-R INTERVAL: 190 MS
EKG Q-T INTERVAL: 410 MS
EKG QRS DURATION: 78 MS
EKG QTC CALCULATION (BAZETT): 451 MS
EKG R AXIS: 8 DEGREES
EKG T AXIS: 34 DEGREES
EKG VENTRICULAR RATE: 73 BPM
EOSINOPHIL # BLD: 3.4 %
EOSINOPHILS ABSOLUTE: 0.2 THOU/MM3 (ref 0–0.4)
EPITHELIAL CELLS, UA: ABNORMAL /HPF
GFR SERPL CREATININE-BSD FRML MDRD: 70 ML/MIN/1.73M2
GLUCOSE BLD-MCNC: 129 MG/DL (ref 70–108)
GLUCOSE URINE: NEGATIVE MG/DL
HCT VFR BLD CALC: 25.6 % (ref 37–47)
HEMOGLOBIN: 8.2 GM/DL (ref 12–16)
HYPOCHROMIA: ABNORMAL
INR BLD: 1.11 (ref 0.85–1.13)
KETONES, URINE: NEGATIVE
LACTIC ACID: 1.8 MMOL/L (ref 0.5–2.2)
LEUKOCYTE ESTERASE, URINE: ABNORMAL
LIPASE: 23.4 U/L (ref 5.6–51.3)
LV EF: 65 %
LVEF MODALITY: NORMAL
LYMPHOCYTES # BLD: 16.6 %
LYMPHOCYTES ABSOLUTE: 0.9 THOU/MM3 (ref 1–4.8)
MCH RBC QN AUTO: 23.8 PG (ref 27–31)
MCHC RBC AUTO-ENTMCNC: 32.1 GM/DL (ref 33–37)
MCV RBC AUTO: 74 FL (ref 81–99)
MICROCYTES: ABNORMAL
MISCELLANEOUS 2: ABNORMAL
MONOCYTES # BLD: 10.7 %
MONOCYTES ABSOLUTE: 0.6 THOU/MM3 (ref 0.4–1.3)
NITRITE, URINE: POSITIVE
NUCLEATED RED BLOOD CELLS: 0 /100 WBC
OSMOLALITY CALCULATION: 260.7 MOSMOL/KG (ref 275–300)
OVALOCYTES: ABNORMAL
PDW BLD-RTO: 16 % (ref 11.5–14.5)
PH UA: 6.5
PLATELET # BLD: 211 THOU/MM3 (ref 130–400)
PLATELET ESTIMATE: ADEQUATE
PMV BLD AUTO: 7.5 FL (ref 7.4–10.4)
POTASSIUM REFLEX MAGNESIUM: 4.3 MEQ/L (ref 3.5–5.2)
PRO-BNP: 269.7 PG/ML (ref 0–900)
PROTEIN UA: 30
RBC # BLD: 3.46 MILL/MM3 (ref 4.2–5.4)
RBC URINE: ABNORMAL /HPF
RENAL EPITHELIAL, UA: ABNORMAL
SCAN OF BLOOD SMEAR: NORMAL
SEG NEUTROPHILS: 68.4 %
SEGMENTED NEUTROPHILS ABSOLUTE COUNT: 3.6 THOU/MM3 (ref 1.8–7.7)
SODIUM BLD-SCNC: 129 MEQ/L (ref 135–145)
SPECIFIC GRAVITY, URINE: 1 (ref 1–1.03)
TOTAL PROTEIN: 6.7 G/DL (ref 6.1–8)
TROPONIN T: < 0.01 NG/ML
UROBILINOGEN, URINE: 0.2 EU/DL
WBC # BLD: 5.3 THOU/MM3 (ref 4.8–10.8)
WBC UA: > 100 /HPF
YEAST: ABNORMAL

## 2018-04-16 PROCEDURE — 2580000003 HC RX 258: Performed by: INTERNAL MEDICINE

## 2018-04-16 PROCEDURE — 6370000000 HC RX 637 (ALT 250 FOR IP): Performed by: INTERNAL MEDICINE

## 2018-04-16 PROCEDURE — 96360 HYDRATION IV INFUSION INIT: CPT

## 2018-04-16 PROCEDURE — 83605 ASSAY OF LACTIC ACID: CPT

## 2018-04-16 PROCEDURE — G8417 CALC BMI ABV UP PARAM F/U: HCPCS | Performed by: INTERNAL MEDICINE

## 2018-04-16 PROCEDURE — 1111F DSCHRG MED/CURRENT MED MERGE: CPT | Performed by: INTERNAL MEDICINE

## 2018-04-16 PROCEDURE — 2580000003 HC RX 258: Performed by: FAMILY MEDICINE

## 2018-04-16 PROCEDURE — 6360000002 HC RX W HCPCS: Performed by: INTERNAL MEDICINE

## 2018-04-16 PROCEDURE — G0378 HOSPITAL OBSERVATION PER HR: HCPCS

## 2018-04-16 PROCEDURE — 85730 THROMBOPLASTIN TIME PARTIAL: CPT

## 2018-04-16 PROCEDURE — 83880 ASSAY OF NATRIURETIC PEPTIDE: CPT

## 2018-04-16 PROCEDURE — 93306 TTE W/DOPPLER COMPLETE: CPT

## 2018-04-16 PROCEDURE — G8427 DOCREV CUR MEDS BY ELIG CLIN: HCPCS | Performed by: INTERNAL MEDICINE

## 2018-04-16 PROCEDURE — 99214 OFFICE O/P EST MOD 30 MIN: CPT | Performed by: INTERNAL MEDICINE

## 2018-04-16 PROCEDURE — 85025 COMPLETE CBC W/AUTO DIFF WBC: CPT

## 2018-04-16 PROCEDURE — 99285 EMERGENCY DEPT VISIT HI MDM: CPT

## 2018-04-16 PROCEDURE — 87086 URINE CULTURE/COLONY COUNT: CPT

## 2018-04-16 PROCEDURE — 94640 AIRWAY INHALATION TREATMENT: CPT

## 2018-04-16 PROCEDURE — 96366 THER/PROPH/DIAG IV INF ADDON: CPT

## 2018-04-16 PROCEDURE — 71046 X-RAY EXAM CHEST 2 VIEWS: CPT

## 2018-04-16 PROCEDURE — G8598 ASA/ANTIPLAT THER USED: HCPCS | Performed by: INTERNAL MEDICINE

## 2018-04-16 PROCEDURE — 93005 ELECTROCARDIOGRAM TRACING: CPT | Performed by: FAMILY MEDICINE

## 2018-04-16 PROCEDURE — 4040F PNEUMOC VAC/ADMIN/RCVD: CPT | Performed by: INTERNAL MEDICINE

## 2018-04-16 PROCEDURE — G8399 PT W/DXA RESULTS DOCUMENT: HCPCS | Performed by: INTERNAL MEDICINE

## 2018-04-16 PROCEDURE — 85610 PROTHROMBIN TIME: CPT

## 2018-04-16 PROCEDURE — 87077 CULTURE AEROBIC IDENTIFY: CPT

## 2018-04-16 PROCEDURE — 96361 HYDRATE IV INFUSION ADD-ON: CPT

## 2018-04-16 PROCEDURE — 81001 URINALYSIS AUTO W/SCOPE: CPT

## 2018-04-16 PROCEDURE — 1036F TOBACCO NON-USER: CPT | Performed by: INTERNAL MEDICINE

## 2018-04-16 PROCEDURE — 80053 COMPREHEN METABOLIC PANEL: CPT

## 2018-04-16 PROCEDURE — 3017F COLORECTAL CA SCREEN DOC REV: CPT | Performed by: INTERNAL MEDICINE

## 2018-04-16 PROCEDURE — 36415 COLL VENOUS BLD VENIPUNCTURE: CPT

## 2018-04-16 PROCEDURE — 87184 SC STD DISK METHOD PER PLATE: CPT

## 2018-04-16 PROCEDURE — 83690 ASSAY OF LIPASE: CPT

## 2018-04-16 PROCEDURE — 96365 THER/PROPH/DIAG IV INF INIT: CPT

## 2018-04-16 PROCEDURE — 2500000003 HC RX 250 WO HCPCS: Performed by: INTERNAL MEDICINE

## 2018-04-16 PROCEDURE — 99220 PR INITIAL OBSERVATION CARE/DAY 70 MINUTES: CPT | Performed by: INTERNAL MEDICINE

## 2018-04-16 PROCEDURE — 87186 SC STD MICRODIL/AGAR DIL: CPT

## 2018-04-16 PROCEDURE — 3014F SCREEN MAMMO DOC REV: CPT | Performed by: INTERNAL MEDICINE

## 2018-04-16 PROCEDURE — 1090F PRES/ABSN URINE INCON ASSESS: CPT | Performed by: INTERNAL MEDICINE

## 2018-04-16 PROCEDURE — 1123F ACP DISCUSS/DSCN MKR DOCD: CPT | Performed by: INTERNAL MEDICINE

## 2018-04-16 PROCEDURE — 84484 ASSAY OF TROPONIN QUANT: CPT

## 2018-04-16 RX ORDER — HYDROXYZINE HYDROCHLORIDE 25 MG/1
25 TABLET, FILM COATED ORAL 3 TIMES DAILY
Status: DISCONTINUED | OUTPATIENT
Start: 2018-04-16 | End: 2018-04-18 | Stop reason: HOSPADM

## 2018-04-16 RX ORDER — ONDANSETRON 2 MG/ML
4 INJECTION INTRAMUSCULAR; INTRAVENOUS EVERY 6 HOURS PRN
Status: DISCONTINUED | OUTPATIENT
Start: 2018-04-16 | End: 2018-04-18 | Stop reason: HOSPADM

## 2018-04-16 RX ORDER — SODIUM CHLORIDE 9 MG/ML
INJECTION, SOLUTION INTRAVENOUS CONTINUOUS
Status: DISCONTINUED | OUTPATIENT
Start: 2018-04-16 | End: 2018-04-16

## 2018-04-16 RX ORDER — POLYETHYLENE GLYCOL 3350 17 G/17G
17 POWDER, FOR SOLUTION ORAL DAILY
Status: DISCONTINUED | OUTPATIENT
Start: 2018-04-16 | End: 2018-04-18 | Stop reason: HOSPADM

## 2018-04-16 RX ORDER — OYSTER SHELL CALCIUM WITH VITAMIN D 500; 200 MG/1; [IU]/1
1 TABLET, FILM COATED ORAL 2 TIMES DAILY
Status: DISCONTINUED | OUTPATIENT
Start: 2018-04-16 | End: 2018-04-18 | Stop reason: HOSPADM

## 2018-04-16 RX ORDER — NITROGLYCERIN 0.4 MG/1
0.4 TABLET SUBLINGUAL EVERY 5 MIN PRN
Status: DISCONTINUED | OUTPATIENT
Start: 2018-04-16 | End: 2018-04-18 | Stop reason: HOSPADM

## 2018-04-16 RX ORDER — FLUTICASONE PROPIONATE 50 MCG
1 SPRAY, SUSPENSION (ML) NASAL DAILY
Status: DISCONTINUED | OUTPATIENT
Start: 2018-04-17 | End: 2018-04-18 | Stop reason: HOSPADM

## 2018-04-16 RX ORDER — POTASSIUM CHLORIDE 7.45 MG/ML
10 INJECTION INTRAVENOUS PRN
Status: DISCONTINUED | OUTPATIENT
Start: 2018-04-16 | End: 2018-04-18 | Stop reason: HOSPADM

## 2018-04-16 RX ORDER — TRAMADOL HYDROCHLORIDE 50 MG/1
50 TABLET ORAL EVERY 6 HOURS PRN
Status: DISCONTINUED | OUTPATIENT
Start: 2018-04-16 | End: 2018-04-18 | Stop reason: HOSPADM

## 2018-04-16 RX ORDER — SODIUM CHLORIDE 0.9 % (FLUSH) 0.9 %
10 SYRINGE (ML) INJECTION PRN
Status: DISCONTINUED | OUTPATIENT
Start: 2018-04-16 | End: 2018-04-18 | Stop reason: HOSPADM

## 2018-04-16 RX ORDER — 0.9 % SODIUM CHLORIDE 0.9 %
1000 INTRAVENOUS SOLUTION INTRAVENOUS ONCE
Status: COMPLETED | OUTPATIENT
Start: 2018-04-16 | End: 2018-04-16

## 2018-04-16 RX ORDER — GUAIFENESIN 600 MG/1
600 TABLET, EXTENDED RELEASE ORAL 2 TIMES DAILY
Status: DISCONTINUED | OUTPATIENT
Start: 2018-04-16 | End: 2018-04-18 | Stop reason: HOSPADM

## 2018-04-16 RX ORDER — SODIUM CHLORIDE 9 MG/ML
INJECTION, SOLUTION INTRAVENOUS CONTINUOUS
Status: ACTIVE | OUTPATIENT
Start: 2018-04-16 | End: 2018-04-17

## 2018-04-16 RX ORDER — FUROSEMIDE 40 MG/1
40 TABLET ORAL DAILY
Status: DISCONTINUED | OUTPATIENT
Start: 2018-04-16 | End: 2018-04-16

## 2018-04-16 RX ORDER — LEVOTHYROXINE SODIUM 112 UG/1
112 TABLET ORAL DAILY
Status: DISCONTINUED | OUTPATIENT
Start: 2018-04-17 | End: 2018-04-18 | Stop reason: HOSPADM

## 2018-04-16 RX ORDER — ACETAMINOPHEN 325 MG/1
650 TABLET ORAL EVERY 4 HOURS PRN
Status: DISCONTINUED | OUTPATIENT
Start: 2018-04-16 | End: 2018-04-18 | Stop reason: HOSPADM

## 2018-04-16 RX ORDER — PANTOPRAZOLE SODIUM 40 MG/1
40 TABLET, DELAYED RELEASE ORAL
Status: DISCONTINUED | OUTPATIENT
Start: 2018-04-17 | End: 2018-04-18 | Stop reason: HOSPADM

## 2018-04-16 RX ORDER — ISOSORBIDE MONONITRATE 60 MG/1
60 TABLET, EXTENDED RELEASE ORAL 2 TIMES DAILY
Status: DISCONTINUED | OUTPATIENT
Start: 2018-04-16 | End: 2018-04-18 | Stop reason: HOSPADM

## 2018-04-16 RX ORDER — VALSARTAN 320 MG/1
160 TABLET ORAL DAILY
Status: DISCONTINUED | OUTPATIENT
Start: 2018-04-17 | End: 2018-04-18 | Stop reason: HOSPADM

## 2018-04-16 RX ORDER — CITALOPRAM 20 MG/1
20 TABLET ORAL DAILY
Status: DISCONTINUED | OUTPATIENT
Start: 2018-04-17 | End: 2018-04-18 | Stop reason: HOSPADM

## 2018-04-16 RX ORDER — POLYVINYL ALCOHOL 14 MG/ML
1 SOLUTION/ DROPS OPHTHALMIC PRN
Status: DISCONTINUED | OUTPATIENT
Start: 2018-04-16 | End: 2018-04-18 | Stop reason: HOSPADM

## 2018-04-16 RX ORDER — POTASSIUM CHLORIDE 20MEQ/15ML
40 LIQUID (ML) ORAL PRN
Status: DISCONTINUED | OUTPATIENT
Start: 2018-04-16 | End: 2018-04-18 | Stop reason: HOSPADM

## 2018-04-16 RX ORDER — DOCUSATE SODIUM 100 MG/1
300 CAPSULE, LIQUID FILLED ORAL NIGHTLY
Status: DISCONTINUED | OUTPATIENT
Start: 2018-04-16 | End: 2018-04-18 | Stop reason: HOSPADM

## 2018-04-16 RX ORDER — RANOLAZINE 500 MG/1
500 TABLET, EXTENDED RELEASE ORAL 2 TIMES DAILY
Status: DISCONTINUED | OUTPATIENT
Start: 2018-04-16 | End: 2018-04-18 | Stop reason: HOSPADM

## 2018-04-16 RX ORDER — NYSTATIN 100000 U/G
CREAM TOPICAL 2 TIMES DAILY
Status: DISCONTINUED | OUTPATIENT
Start: 2018-04-16 | End: 2018-04-18 | Stop reason: HOSPADM

## 2018-04-16 RX ORDER — DOXEPIN HYDROCHLORIDE 50 MG/1
150 CAPSULE ORAL NIGHTLY
Status: DISCONTINUED | OUTPATIENT
Start: 2018-04-16 | End: 2018-04-18 | Stop reason: HOSPADM

## 2018-04-16 RX ORDER — SODIUM CHLORIDE 0.9 % (FLUSH) 0.9 %
10 SYRINGE (ML) INJECTION EVERY 12 HOURS SCHEDULED
Status: DISCONTINUED | OUTPATIENT
Start: 2018-04-16 | End: 2018-04-18 | Stop reason: HOSPADM

## 2018-04-16 RX ORDER — TRAMADOL HYDROCHLORIDE 50 MG/1
TABLET ORAL
Status: DISPENSED
Start: 2018-04-16 | End: 2018-04-17

## 2018-04-16 RX ORDER — MONTELUKAST SODIUM 10 MG/1
10 TABLET ORAL NIGHTLY
Status: DISCONTINUED | OUTPATIENT
Start: 2018-04-16 | End: 2018-04-18 | Stop reason: HOSPADM

## 2018-04-16 RX ORDER — M-VIT,TX,IRON,MINS/CALC/FOLIC 27MG-0.4MG
1 TABLET ORAL DAILY
Status: DISCONTINUED | OUTPATIENT
Start: 2018-04-17 | End: 2018-04-18 | Stop reason: HOSPADM

## 2018-04-16 RX ORDER — TRAMADOL HYDROCHLORIDE 50 MG/1
50 TABLET ORAL EVERY 6 HOURS PRN
Status: ON HOLD | COMMUNITY
End: 2018-07-07

## 2018-04-16 RX ORDER — CLOPIDOGREL BISULFATE 75 MG/1
75 TABLET ORAL DAILY
Status: DISCONTINUED | OUTPATIENT
Start: 2018-04-17 | End: 2018-04-18 | Stop reason: HOSPADM

## 2018-04-16 RX ORDER — FUROSEMIDE 20 MG/1
20 TABLET ORAL DAILY
Status: DISCONTINUED | OUTPATIENT
Start: 2018-04-17 | End: 2018-04-18 | Stop reason: HOSPADM

## 2018-04-16 RX ORDER — RISPERIDONE 1 MG/1
1 TABLET, FILM COATED ORAL 2 TIMES DAILY
Status: DISCONTINUED | OUTPATIENT
Start: 2018-04-16 | End: 2018-04-18 | Stop reason: HOSPADM

## 2018-04-16 RX ORDER — ALBUTEROL SULFATE 90 UG/1
2 AEROSOL, METERED RESPIRATORY (INHALATION) EVERY 6 HOURS PRN
Status: DISCONTINUED | OUTPATIENT
Start: 2018-04-16 | End: 2018-04-18 | Stop reason: HOSPADM

## 2018-04-16 RX ORDER — OXCARBAZEPINE 300 MG/1
150 TABLET, FILM COATED ORAL 2 TIMES DAILY
Status: DISCONTINUED | OUTPATIENT
Start: 2018-04-16 | End: 2018-04-17

## 2018-04-16 RX ORDER — ASPIRIN 81 MG/1
81 TABLET ORAL DAILY
Status: DISCONTINUED | OUTPATIENT
Start: 2018-04-17 | End: 2018-04-18 | Stop reason: HOSPADM

## 2018-04-16 RX ORDER — HYDRALAZINE HYDROCHLORIDE 50 MG/1
50 TABLET, FILM COATED ORAL EVERY 8 HOURS SCHEDULED
Status: DISCONTINUED | OUTPATIENT
Start: 2018-04-16 | End: 2018-04-18 | Stop reason: HOSPADM

## 2018-04-16 RX ORDER — POTASSIUM CHLORIDE 20 MEQ/1
40 TABLET, EXTENDED RELEASE ORAL PRN
Status: DISCONTINUED | OUTPATIENT
Start: 2018-04-16 | End: 2018-04-18 | Stop reason: HOSPADM

## 2018-04-16 RX ORDER — ATORVASTATIN CALCIUM 80 MG/1
80 TABLET, FILM COATED ORAL NIGHTLY
Status: DISCONTINUED | OUTPATIENT
Start: 2018-04-16 | End: 2018-04-18 | Stop reason: HOSPADM

## 2018-04-16 RX ADMIN — MICONAZOLE NITRATE: 2 POWDER TOPICAL at 20:31

## 2018-04-16 RX ADMIN — TRAMADOL HYDROCHLORIDE 50 MG: 50 TABLET, FILM COATED ORAL at 12:24

## 2018-04-16 RX ADMIN — POLYETHYLENE GLYCOL 3350 17 G: 17 POWDER, FOR SOLUTION ORAL at 15:38

## 2018-04-16 RX ADMIN — ACETAMINOPHEN 650 MG: 325 TABLET ORAL at 18:13

## 2018-04-16 RX ADMIN — HYDROXYZINE HYDROCHLORIDE 25 MG: 25 TABLET ORAL at 20:25

## 2018-04-16 RX ADMIN — METOPROLOL TARTRATE 25 MG: 25 TABLET ORAL at 20:25

## 2018-04-16 RX ADMIN — NYSTATIN: 100000 CREAM TOPICAL at 20:26

## 2018-04-16 RX ADMIN — SODIUM CHLORIDE: 9 INJECTION, SOLUTION INTRAVENOUS at 10:57

## 2018-04-16 RX ADMIN — DOXEPIN HYDROCHLORIDE 150 MG: 50 CAPSULE ORAL at 20:30

## 2018-04-16 RX ADMIN — TRAMADOL HYDROCHLORIDE 50 MG: 50 TABLET, FILM COATED ORAL at 20:26

## 2018-04-16 RX ADMIN — RISPERIDONE 1 MG: 1 TABLET ORAL at 20:25

## 2018-04-16 RX ADMIN — OXCARBAZEPINE 150 MG: 300 TABLET ORAL at 22:16

## 2018-04-16 RX ADMIN — ATORVASTATIN CALCIUM 80 MG: 80 TABLET, FILM COATED ORAL at 20:24

## 2018-04-16 RX ADMIN — RANOLAZINE 500 MG: 500 TABLET, FILM COATED, EXTENDED RELEASE ORAL at 20:25

## 2018-04-16 RX ADMIN — MONTELUKAST SODIUM 10 MG: 10 TABLET, FILM COATED ORAL at 20:25

## 2018-04-16 RX ADMIN — Medication 2 PUFF: at 20:22

## 2018-04-16 RX ADMIN — CALCIUM CARBONATE-VITAMIN D TAB 500 MG-200 UNIT 1 TABLET: 500-200 TAB at 20:25

## 2018-04-16 RX ADMIN — HYDROXYZINE HYDROCHLORIDE 25 MG: 25 TABLET ORAL at 15:38

## 2018-04-16 RX ADMIN — DOCUSATE SODIUM 300 MG: 100 CAPSULE ORAL at 20:25

## 2018-04-16 RX ADMIN — ISOSORBIDE MONONITRATE 60 MG: 60 TABLET ORAL at 20:25

## 2018-04-16 RX ADMIN — GUAIFENESIN 600 MG: 600 TABLET, EXTENDED RELEASE ORAL at 20:25

## 2018-04-16 RX ADMIN — HYDRALAZINE HYDROCHLORIDE 50 MG: 50 TABLET, FILM COATED ORAL at 15:38

## 2018-04-16 RX ADMIN — CEFTRIAXONE SODIUM 1 G: 1 INJECTION, POWDER, FOR SOLUTION INTRAMUSCULAR; INTRAVENOUS at 20:30

## 2018-04-16 RX ADMIN — HYDRALAZINE HYDROCHLORIDE 50 MG: 50 TABLET, FILM COATED ORAL at 22:11

## 2018-04-16 RX ADMIN — SODIUM CHLORIDE 1000 ML: 9 INJECTION, SOLUTION INTRAVENOUS at 09:40

## 2018-04-16 ASSESSMENT — PAIN DESCRIPTION - LOCATION
LOCATION: BACK;NECK
LOCATION: NECK
LOCATION: BACK;NECK
LOCATION: BACK;NECK
LOCATION: NECK

## 2018-04-16 ASSESSMENT — PAIN DESCRIPTION - PAIN TYPE
TYPE: CHRONIC PAIN

## 2018-04-16 ASSESSMENT — PAIN DESCRIPTION - PROGRESSION
CLINICAL_PROGRESSION: NOT CHANGED

## 2018-04-16 ASSESSMENT — ENCOUNTER SYMPTOMS
EYE DISCHARGE: 0
NAUSEA: 0
SHORTNESS OF BREATH: 0
VOMITING: 0
RHINORRHEA: 0
EYE PAIN: 0
ABDOMINAL PAIN: 1
WHEEZING: 0
BACK PAIN: 0
DIARRHEA: 0
SORE THROAT: 0
COUGH: 0

## 2018-04-16 ASSESSMENT — PAIN SCALES - GENERAL
PAINLEVEL_OUTOF10: 7
PAINLEVEL_OUTOF10: 7
PAINLEVEL_OUTOF10: 6
PAINLEVEL_OUTOF10: 4
PAINLEVEL_OUTOF10: 7
PAINLEVEL_OUTOF10: 6
PAINLEVEL_OUTOF10: 7
PAINLEVEL_OUTOF10: 8

## 2018-04-16 ASSESSMENT — PAIN DESCRIPTION - ONSET
ONSET: ON-GOING

## 2018-04-16 ASSESSMENT — PAIN DESCRIPTION - DESCRIPTORS
DESCRIPTORS: ACHING

## 2018-04-16 ASSESSMENT — PAIN DESCRIPTION - FREQUENCY
FREQUENCY: CONTINUOUS

## 2018-04-17 PROBLEM — N39.0 UTI (URINARY TRACT INFECTION): Status: ACTIVE | Noted: 2018-04-17

## 2018-04-17 PROBLEM — F41.9 ANXIETY AND DEPRESSION: Status: ACTIVE | Noted: 2018-04-17

## 2018-04-17 PROBLEM — F32.A ANXIETY AND DEPRESSION: Status: ACTIVE | Noted: 2018-04-17

## 2018-04-17 LAB
ANION GAP SERPL CALCULATED.3IONS-SCNC: 13 MEQ/L (ref 8–16)
BUN BLDV-MCNC: 11 MG/DL (ref 7–22)
CALCIUM SERPL-MCNC: 9.4 MG/DL (ref 8.5–10.5)
CHLORIDE BLD-SCNC: 93 MEQ/L (ref 98–111)
CO2: 25 MEQ/L (ref 23–33)
CREAT SERPL-MCNC: 0.8 MG/DL (ref 0.4–1.2)
FOLATE: > 20 NG/ML (ref 4.8–24.2)
GFR SERPL CREATININE-BSD FRML MDRD: 70 ML/MIN/1.73M2
GLUCOSE BLD-MCNC: 114 MG/DL (ref 70–108)
HCT VFR BLD CALC: 24.5 % (ref 37–47)
HEMOCCULT STL QL: NEGATIVE
HEMOGLOBIN: 7.8 GM/DL (ref 12–16)
IRON: 20 UG/DL (ref 50–170)
MCH RBC QN AUTO: 23.5 PG (ref 27–31)
MCHC RBC AUTO-ENTMCNC: 31.7 GM/DL (ref 33–37)
MCV RBC AUTO: 74.2 FL (ref 81–99)
PDW BLD-RTO: 16 % (ref 11.5–14.5)
PLATELET # BLD: 197 THOU/MM3 (ref 130–400)
PMV BLD AUTO: 7.4 FL (ref 7.4–10.4)
POTASSIUM REFLEX MAGNESIUM: 4 MEQ/L (ref 3.5–5.2)
RBC # BLD: 3.3 MILL/MM3 (ref 4.2–5.4)
SODIUM BLD-SCNC: 131 MEQ/L (ref 135–145)
TSH SERPL DL<=0.05 MIU/L-ACNC: 7.64 UIU/ML (ref 0.4–4.2)
VITAMIN B-12: 431 PG/ML (ref 211–911)
WBC # BLD: 3.8 THOU/MM3 (ref 4.8–10.8)

## 2018-04-17 PROCEDURE — 94640 AIRWAY INHALATION TREATMENT: CPT

## 2018-04-17 PROCEDURE — 6370000000 HC RX 637 (ALT 250 FOR IP): Performed by: INTERNAL MEDICINE

## 2018-04-17 PROCEDURE — 6360000002 HC RX W HCPCS: Performed by: INTERNAL MEDICINE

## 2018-04-17 PROCEDURE — G8979 MOBILITY GOAL STATUS: HCPCS

## 2018-04-17 PROCEDURE — 1200000003 HC TELEMETRY R&B

## 2018-04-17 PROCEDURE — 36415 COLL VENOUS BLD VENIPUNCTURE: CPT

## 2018-04-17 PROCEDURE — 83540 ASSAY OF IRON: CPT

## 2018-04-17 PROCEDURE — G8978 MOBILITY CURRENT STATUS: HCPCS

## 2018-04-17 PROCEDURE — 2580000003 HC RX 258: Performed by: INTERNAL MEDICINE

## 2018-04-17 PROCEDURE — 84443 ASSAY THYROID STIM HORMONE: CPT

## 2018-04-17 PROCEDURE — 80048 BASIC METABOLIC PNL TOTAL CA: CPT

## 2018-04-17 PROCEDURE — 82272 OCCULT BLD FECES 1-3 TESTS: CPT

## 2018-04-17 PROCEDURE — 97162 PT EVAL MOD COMPLEX 30 MIN: CPT

## 2018-04-17 PROCEDURE — 97110 THERAPEUTIC EXERCISES: CPT

## 2018-04-17 PROCEDURE — 99232 SBSQ HOSP IP/OBS MODERATE 35: CPT | Performed by: INTERNAL MEDICINE

## 2018-04-17 PROCEDURE — 85027 COMPLETE CBC AUTOMATED: CPT

## 2018-04-17 PROCEDURE — 82746 ASSAY OF FOLIC ACID SERUM: CPT

## 2018-04-17 PROCEDURE — 82607 VITAMIN B-12: CPT

## 2018-04-17 RX ORDER — OXCARBAZEPINE 300 MG/1
150 TABLET, FILM COATED ORAL EVERY MORNING
Status: DISCONTINUED | OUTPATIENT
Start: 2018-04-18 | End: 2018-04-18 | Stop reason: HOSPADM

## 2018-04-17 RX ORDER — OXCARBAZEPINE 300 MG/1
300 TABLET, FILM COATED ORAL NIGHTLY
Status: DISCONTINUED | OUTPATIENT
Start: 2018-04-17 | End: 2018-04-18 | Stop reason: HOSPADM

## 2018-04-17 RX ORDER — HYDRALAZINE HYDROCHLORIDE 50 MG/1
50 TABLET, FILM COATED ORAL ONCE
Status: DISCONTINUED | OUTPATIENT
Start: 2018-04-17 | End: 2018-04-18 | Stop reason: HOSPADM

## 2018-04-17 RX ORDER — HYDRALAZINE HYDROCHLORIDE 20 MG/ML
10 INJECTION INTRAMUSCULAR; INTRAVENOUS
Status: DISCONTINUED | OUTPATIENT
Start: 2018-04-17 | End: 2018-04-18 | Stop reason: HOSPADM

## 2018-04-17 RX ADMIN — OXCARBAZEPINE 150 MG: 300 TABLET ORAL at 11:08

## 2018-04-17 RX ADMIN — ISOSORBIDE MONONITRATE 60 MG: 60 TABLET ORAL at 11:09

## 2018-04-17 RX ADMIN — TRAMADOL HYDROCHLORIDE 50 MG: 50 TABLET, FILM COATED ORAL at 11:13

## 2018-04-17 RX ADMIN — HYDROXYZINE HYDROCHLORIDE 25 MG: 25 TABLET ORAL at 11:09

## 2018-04-17 RX ADMIN — MICONAZOLE NITRATE: 2 POWDER TOPICAL at 11:11

## 2018-04-17 RX ADMIN — CEFTRIAXONE SODIUM 1 G: 1 INJECTION, POWDER, FOR SOLUTION INTRAMUSCULAR; INTRAVENOUS at 20:09

## 2018-04-17 RX ADMIN — CALCIUM CARBONATE-VITAMIN D TAB 500 MG-200 UNIT 1 TABLET: 500-200 TAB at 20:08

## 2018-04-17 RX ADMIN — ASPIRIN 81 MG: 81 TABLET, COATED ORAL at 07:54

## 2018-04-17 RX ADMIN — RANOLAZINE 500 MG: 500 TABLET, FILM COATED, EXTENDED RELEASE ORAL at 11:10

## 2018-04-17 RX ADMIN — FLUTICASONE PROPIONATE 1 SPRAY: 50 SPRAY, METERED NASAL at 07:55

## 2018-04-17 RX ADMIN — LEVOTHYROXINE SODIUM 112 MCG: 112 TABLET ORAL at 06:09

## 2018-04-17 RX ADMIN — NYSTATIN: 100000 CREAM TOPICAL at 20:08

## 2018-04-17 RX ADMIN — METOPROLOL TARTRATE 25 MG: 25 TABLET ORAL at 20:08

## 2018-04-17 RX ADMIN — MULTIPLE VITAMINS W/ MINERALS TAB 1 TABLET: TAB at 07:54

## 2018-04-17 RX ADMIN — ACETAMINOPHEN 650 MG: 325 TABLET ORAL at 21:24

## 2018-04-17 RX ADMIN — DOXEPIN HYDROCHLORIDE 150 MG: 50 CAPSULE ORAL at 20:08

## 2018-04-17 RX ADMIN — ACETAMINOPHEN 650 MG: 325 TABLET ORAL at 07:57

## 2018-04-17 RX ADMIN — MONTELUKAST SODIUM 10 MG: 10 TABLET, FILM COATED ORAL at 20:08

## 2018-04-17 RX ADMIN — CALCIUM CARBONATE-VITAMIN D TAB 500 MG-200 UNIT 1 TABLET: 500-200 TAB at 11:09

## 2018-04-17 RX ADMIN — HYDRALAZINE HYDROCHLORIDE 50 MG: 50 TABLET, FILM COATED ORAL at 20:08

## 2018-04-17 RX ADMIN — Medication 2 PUFF: at 19:46

## 2018-04-17 RX ADMIN — GUAIFENESIN 600 MG: 600 TABLET, EXTENDED RELEASE ORAL at 11:09

## 2018-04-17 RX ADMIN — ACETAMINOPHEN 650 MG: 325 TABLET ORAL at 13:15

## 2018-04-17 RX ADMIN — IRON SUCROSE 300 MG: 20 INJECTION, SOLUTION INTRAVENOUS at 23:07

## 2018-04-17 RX ADMIN — SODIUM CHLORIDE: 9 INJECTION, SOLUTION INTRAVENOUS at 11:06

## 2018-04-17 RX ADMIN — VALSARTAN 160 MG: 320 TABLET ORAL at 07:54

## 2018-04-17 RX ADMIN — CITALOPRAM 20 MG: 20 TABLET, FILM COATED ORAL at 07:54

## 2018-04-17 RX ADMIN — TRAMADOL HYDROCHLORIDE 50 MG: 50 TABLET, FILM COATED ORAL at 17:33

## 2018-04-17 RX ADMIN — Medication 2 PUFF: at 09:48

## 2018-04-17 RX ADMIN — ATORVASTATIN CALCIUM 80 MG: 80 TABLET, FILM COATED ORAL at 20:08

## 2018-04-17 RX ADMIN — HYDRALAZINE HYDROCHLORIDE 50 MG: 50 TABLET, FILM COATED ORAL at 16:25

## 2018-04-17 RX ADMIN — DOCUSATE SODIUM 300 MG: 100 CAPSULE ORAL at 20:08

## 2018-04-17 RX ADMIN — HYDROXYZINE HYDROCHLORIDE 25 MG: 25 TABLET ORAL at 14:55

## 2018-04-17 RX ADMIN — RANOLAZINE 500 MG: 500 TABLET, FILM COATED, EXTENDED RELEASE ORAL at 20:08

## 2018-04-17 RX ADMIN — PANTOPRAZOLE SODIUM 40 MG: 40 TABLET, DELAYED RELEASE ORAL at 06:09

## 2018-04-17 RX ADMIN — METOPROLOL TARTRATE 25 MG: 25 TABLET ORAL at 11:09

## 2018-04-17 RX ADMIN — NYSTATIN: 100000 CREAM TOPICAL at 11:12

## 2018-04-17 RX ADMIN — GUAIFENESIN 600 MG: 600 TABLET, EXTENDED RELEASE ORAL at 20:08

## 2018-04-17 RX ADMIN — HYDROXYZINE HYDROCHLORIDE 25 MG: 25 TABLET ORAL at 20:08

## 2018-04-17 RX ADMIN — OXCARBAZEPINE 300 MG: 300 TABLET ORAL at 20:08

## 2018-04-17 RX ADMIN — MICONAZOLE NITRATE: 2 POWDER TOPICAL at 20:09

## 2018-04-17 RX ADMIN — FUROSEMIDE 20 MG: 20 TABLET ORAL at 07:54

## 2018-04-17 RX ADMIN — RISPERIDONE 1 MG: 1 TABLET ORAL at 11:08

## 2018-04-17 RX ADMIN — RISPERIDONE 1 MG: 1 TABLET ORAL at 20:08

## 2018-04-17 RX ADMIN — CLOPIDOGREL BISULFATE 75 MG: 75 TABLET ORAL at 07:54

## 2018-04-17 RX ADMIN — TRAMADOL HYDROCHLORIDE 50 MG: 50 TABLET, FILM COATED ORAL at 05:05

## 2018-04-17 RX ADMIN — ISOSORBIDE MONONITRATE 60 MG: 60 TABLET ORAL at 20:08

## 2018-04-17 RX ADMIN — HYDRALAZINE HYDROCHLORIDE 50 MG: 50 TABLET, FILM COATED ORAL at 14:55

## 2018-04-17 ASSESSMENT — ENCOUNTER SYMPTOMS
NAUSEA: 0
VOICE CHANGE: 0
FACIAL SWELLING: 0
SORE THROAT: 0
EYE ITCHING: 0
ABDOMINAL DISTENTION: 0
RHINORRHEA: 0
COLOR CHANGE: 0
TROUBLE SWALLOWING: 0
ANAL BLEEDING: 0
RECTAL PAIN: 0
VOMITING: 0
WHEEZING: 0
COUGH: 0
CONSTIPATION: 0
STRIDOR: 0
EYE REDNESS: 0
CHEST TIGHTNESS: 0
SINUS PRESSURE: 0

## 2018-04-17 ASSESSMENT — PAIN DESCRIPTION - PROGRESSION

## 2018-04-17 ASSESSMENT — PAIN DESCRIPTION - LOCATION
LOCATION: BACK;NECK
LOCATION: NECK
LOCATION: BACK;NECK

## 2018-04-17 ASSESSMENT — PAIN SCALES - GENERAL
PAINLEVEL_OUTOF10: 7
PAINLEVEL_OUTOF10: 4
PAINLEVEL_OUTOF10: 4
PAINLEVEL_OUTOF10: 0
PAINLEVEL_OUTOF10: 5
PAINLEVEL_OUTOF10: 3
PAINLEVEL_OUTOF10: 2
PAINLEVEL_OUTOF10: 8
PAINLEVEL_OUTOF10: 8
PAINLEVEL_OUTOF10: 0
PAINLEVEL_OUTOF10: 5
PAINLEVEL_OUTOF10: 5

## 2018-04-17 ASSESSMENT — PAIN DESCRIPTION - DESCRIPTORS
DESCRIPTORS: ACHING
DESCRIPTORS: ACHING

## 2018-04-17 ASSESSMENT — PAIN DESCRIPTION - PAIN TYPE
TYPE: CHRONIC PAIN

## 2018-04-17 ASSESSMENT — PAIN DESCRIPTION - FREQUENCY
FREQUENCY: CONTINUOUS
FREQUENCY: CONTINUOUS

## 2018-04-17 ASSESSMENT — PAIN DESCRIPTION - ONSET
ONSET: ON-GOING
ONSET: ON-GOING

## 2018-04-17 ASSESSMENT — PAIN DESCRIPTION - INTENSITY: RATING_2: 6

## 2018-04-18 ENCOUNTER — TELEPHONE (OUTPATIENT)
Dept: FAMILY MEDICINE CLINIC | Age: 73
End: 2018-04-18

## 2018-04-18 VITALS
RESPIRATION RATE: 16 BRPM | OXYGEN SATURATION: 96 % | SYSTOLIC BLOOD PRESSURE: 181 MMHG | BODY MASS INDEX: 36.77 KG/M2 | TEMPERATURE: 97.4 F | WEIGHT: 228.8 LBS | HEART RATE: 94 BPM | DIASTOLIC BLOOD PRESSURE: 79 MMHG | HEIGHT: 66 IN

## 2018-04-18 PROBLEM — N39.0 UTI DUE TO KLEBSIELLA SPECIES: Status: ACTIVE | Noted: 2018-04-18

## 2018-04-18 PROBLEM — I50.32 CHRONIC DIASTOLIC HEART FAILURE (HCC): Status: ACTIVE | Noted: 2018-03-27

## 2018-04-18 PROBLEM — B96.89 UTI DUE TO KLEBSIELLA SPECIES: Status: ACTIVE | Noted: 2018-04-18

## 2018-04-18 PROBLEM — R42 ORTHOSTATIC DIZZINESS: Status: ACTIVE | Noted: 2018-04-18

## 2018-04-18 LAB
ORGANISM: ABNORMAL
URINE CULTURE REFLEX: ABNORMAL
URINE CULTURE REFLEX: ABNORMAL

## 2018-04-18 PROCEDURE — G8987 SELF CARE CURRENT STATUS: HCPCS

## 2018-04-18 PROCEDURE — 2580000003 HC RX 258: Performed by: INTERNAL MEDICINE

## 2018-04-18 PROCEDURE — G8989 SELF CARE D/C STATUS: HCPCS

## 2018-04-18 PROCEDURE — 6370000000 HC RX 637 (ALT 250 FOR IP): Performed by: INTERNAL MEDICINE

## 2018-04-18 PROCEDURE — 2700000000 HC OXYGEN THERAPY PER DAY

## 2018-04-18 PROCEDURE — 97165 OT EVAL LOW COMPLEX 30 MIN: CPT

## 2018-04-18 PROCEDURE — 94640 AIRWAY INHALATION TREATMENT: CPT

## 2018-04-18 PROCEDURE — 99239 HOSP IP/OBS DSCHRG MGMT >30: CPT | Performed by: INTERNAL MEDICINE

## 2018-04-18 RX ORDER — SULFAMETHOXAZOLE AND TRIMETHOPRIM 400; 80 MG/1; MG/1
1 TABLET ORAL DAILY
Qty: 3 TABLET | Refills: 0 | Status: SHIPPED | OUTPATIENT
Start: 2018-04-18 | End: 2018-04-18 | Stop reason: HOSPADM

## 2018-04-18 RX ORDER — SULFAMETHOXAZOLE AND TRIMETHOPRIM 800; 160 MG/1; MG/1
1 TABLET ORAL 2 TIMES DAILY
Qty: 6 TABLET | Refills: 0 | Status: SHIPPED | OUTPATIENT
Start: 2018-04-18 | End: 2018-04-21

## 2018-04-18 RX ORDER — METOPROLOL TARTRATE 50 MG/1
50 TABLET, FILM COATED ORAL 2 TIMES DAILY
Qty: 60 TABLET | Refills: 2 | Status: SHIPPED | OUTPATIENT
Start: 2018-04-18 | End: 2018-01-01 | Stop reason: SDUPTHER

## 2018-04-18 RX ORDER — FUROSEMIDE 20 MG/1
20 TABLET ORAL DAILY
Qty: 60 TABLET | Refills: 3 | Status: SHIPPED | OUTPATIENT
Start: 2018-04-19 | End: 2018-04-26 | Stop reason: SDUPTHER

## 2018-04-18 RX ADMIN — CITALOPRAM 20 MG: 20 TABLET, FILM COATED ORAL at 08:05

## 2018-04-18 RX ADMIN — RANOLAZINE 500 MG: 500 TABLET, FILM COATED, EXTENDED RELEASE ORAL at 08:06

## 2018-04-18 RX ADMIN — HYDROXYZINE HYDROCHLORIDE 25 MG: 25 TABLET ORAL at 08:05

## 2018-04-18 RX ADMIN — TRAMADOL HYDROCHLORIDE 50 MG: 50 TABLET, FILM COATED ORAL at 10:24

## 2018-04-18 RX ADMIN — CALCIUM CARBONATE-VITAMIN D TAB 500 MG-200 UNIT 1 TABLET: 500-200 TAB at 08:05

## 2018-04-18 RX ADMIN — Medication 10 ML: at 08:08

## 2018-04-18 RX ADMIN — RISPERIDONE 1 MG: 1 TABLET ORAL at 08:05

## 2018-04-18 RX ADMIN — ISOSORBIDE MONONITRATE 60 MG: 60 TABLET ORAL at 08:05

## 2018-04-18 RX ADMIN — METOPROLOL TARTRATE 25 MG: 25 TABLET ORAL at 08:05

## 2018-04-18 RX ADMIN — FLUTICASONE PROPIONATE 1 SPRAY: 50 SPRAY, METERED NASAL at 08:06

## 2018-04-18 RX ADMIN — MULTIPLE VITAMINS W/ MINERALS TAB 1 TABLET: TAB at 08:05

## 2018-04-18 RX ADMIN — OXCARBAZEPINE 150 MG: 300 TABLET ORAL at 08:06

## 2018-04-18 RX ADMIN — ACETAMINOPHEN 650 MG: 325 TABLET ORAL at 08:14

## 2018-04-18 RX ADMIN — PANTOPRAZOLE SODIUM 40 MG: 40 TABLET, DELAYED RELEASE ORAL at 04:03

## 2018-04-18 RX ADMIN — FUROSEMIDE 20 MG: 20 TABLET ORAL at 08:05

## 2018-04-18 RX ADMIN — Medication 2 PUFF: at 09:01

## 2018-04-18 RX ADMIN — GUAIFENESIN 600 MG: 600 TABLET, EXTENDED RELEASE ORAL at 08:05

## 2018-04-18 RX ADMIN — CLOPIDOGREL BISULFATE 75 MG: 75 TABLET ORAL at 08:10

## 2018-04-18 RX ADMIN — LEVOTHYROXINE SODIUM 112 MCG: 112 TABLET ORAL at 04:03

## 2018-04-18 RX ADMIN — TRAMADOL HYDROCHLORIDE 50 MG: 50 TABLET, FILM COATED ORAL at 04:03

## 2018-04-18 RX ADMIN — ASPIRIN 81 MG: 81 TABLET, COATED ORAL at 08:05

## 2018-04-18 RX ADMIN — NYSTATIN: 100000 CREAM TOPICAL at 08:06

## 2018-04-18 RX ADMIN — VALSARTAN 160 MG: 320 TABLET ORAL at 08:06

## 2018-04-18 RX ADMIN — MICONAZOLE NITRATE: 2 POWDER TOPICAL at 08:06

## 2018-04-18 RX ADMIN — HYDRALAZINE HYDROCHLORIDE 50 MG: 50 TABLET, FILM COATED ORAL at 04:07

## 2018-04-18 ASSESSMENT — PAIN SCALES - GENERAL
PAINLEVEL_OUTOF10: 8
PAINLEVEL_OUTOF10: 5
PAINLEVEL_OUTOF10: 7
PAINLEVEL_OUTOF10: 5
PAINLEVEL_OUTOF10: 5
PAINLEVEL_OUTOF10: 3
PAINLEVEL_OUTOF10: 3
PAINLEVEL_OUTOF10: 5

## 2018-04-18 ASSESSMENT — PAIN DESCRIPTION - PROGRESSION: CLINICAL_PROGRESSION: NOT CHANGED

## 2018-04-18 ASSESSMENT — PAIN DESCRIPTION - FREQUENCY: FREQUENCY: CONTINUOUS

## 2018-04-18 ASSESSMENT — PAIN DESCRIPTION - LOCATION: LOCATION: NECK

## 2018-04-18 ASSESSMENT — PAIN DESCRIPTION - PAIN TYPE: TYPE: CHRONIC PAIN

## 2018-04-18 ASSESSMENT — PAIN DESCRIPTION - DESCRIPTORS: DESCRIPTORS: ACHING

## 2018-04-19 ENCOUNTER — CARE COORDINATION (OUTPATIENT)
Dept: CASE MANAGEMENT | Age: 73
End: 2018-04-19

## 2018-04-20 ENCOUNTER — TELEPHONE (OUTPATIENT)
Dept: FAMILY MEDICINE CLINIC | Age: 73
End: 2018-04-20

## 2018-04-20 DIAGNOSIS — J45.40 MODERATE PERSISTENT ASTHMA WITHOUT COMPLICATION: ICD-10-CM

## 2018-04-21 RX ORDER — BUDESONIDE AND FORMOTEROL FUMARATE DIHYDRATE 160; 4.5 UG/1; UG/1
AEROSOL RESPIRATORY (INHALATION)
Qty: 10.2 G | Refills: 11 | Status: ON HOLD | OUTPATIENT
Start: 2018-04-21 | End: 2019-01-01 | Stop reason: SDUPTHER

## 2018-04-23 ENCOUNTER — CARE COORDINATION (OUTPATIENT)
Dept: CASE MANAGEMENT | Age: 73
End: 2018-04-23

## 2018-04-24 ENCOUNTER — TELEPHONE (OUTPATIENT)
Dept: FAMILY MEDICINE CLINIC | Age: 73
End: 2018-04-24

## 2018-04-25 ENCOUNTER — TELEPHONE (OUTPATIENT)
Dept: FAMILY MEDICINE CLINIC | Age: 73
End: 2018-04-25

## 2018-04-25 ENCOUNTER — OFFICE VISIT (OUTPATIENT)
Dept: FAMILY MEDICINE CLINIC | Age: 73
End: 2018-04-25
Payer: MEDICARE

## 2018-04-25 VITALS
HEIGHT: 66 IN | SYSTOLIC BLOOD PRESSURE: 156 MMHG | BODY MASS INDEX: 36.96 KG/M2 | HEART RATE: 81 BPM | OXYGEN SATURATION: 99 % | DIASTOLIC BLOOD PRESSURE: 60 MMHG | WEIGHT: 230 LBS | TEMPERATURE: 98.5 F | RESPIRATION RATE: 18 BRPM

## 2018-04-25 DIAGNOSIS — I50.32 CHRONIC DIASTOLIC HEART FAILURE (HCC): ICD-10-CM

## 2018-04-25 DIAGNOSIS — E87.1 HYPONATREMIA: ICD-10-CM

## 2018-04-25 DIAGNOSIS — I95.1 ORTHOSTATIC HYPOTENSION: Primary | ICD-10-CM

## 2018-04-25 DIAGNOSIS — N30.00 ACUTE CYSTITIS WITHOUT HEMATURIA: ICD-10-CM

## 2018-04-25 DIAGNOSIS — F41.9 ANXIETY: ICD-10-CM

## 2018-04-25 PROCEDURE — 99495 TRANSJ CARE MGMT MOD F2F 14D: CPT | Performed by: NURSE PRACTITIONER

## 2018-04-26 ENCOUNTER — OFFICE VISIT (OUTPATIENT)
Dept: CARDIOLOGY CLINIC | Age: 73
End: 2018-04-26
Payer: MEDICARE

## 2018-04-26 VITALS
HEIGHT: 66 IN | BODY MASS INDEX: 36 KG/M2 | OXYGEN SATURATION: 92 % | SYSTOLIC BLOOD PRESSURE: 149 MMHG | DIASTOLIC BLOOD PRESSURE: 82 MMHG | WEIGHT: 224 LBS | HEART RATE: 82 BPM

## 2018-04-26 DIAGNOSIS — I50.32 CHF (CONGESTIVE HEART FAILURE), NYHA CLASS III, CHRONIC, DIASTOLIC (HCC): Primary | ICD-10-CM

## 2018-04-26 PROCEDURE — G8598 ASA/ANTIPLAT THER USED: HCPCS | Performed by: NURSE PRACTITIONER

## 2018-04-26 PROCEDURE — 1111F DSCHRG MED/CURRENT MED MERGE: CPT | Performed by: NURSE PRACTITIONER

## 2018-04-26 PROCEDURE — 1036F TOBACCO NON-USER: CPT | Performed by: NURSE PRACTITIONER

## 2018-04-26 PROCEDURE — 1090F PRES/ABSN URINE INCON ASSESS: CPT | Performed by: NURSE PRACTITIONER

## 2018-04-26 PROCEDURE — 99213 OFFICE O/P EST LOW 20 MIN: CPT | Performed by: NURSE PRACTITIONER

## 2018-04-26 PROCEDURE — G8417 CALC BMI ABV UP PARAM F/U: HCPCS | Performed by: NURSE PRACTITIONER

## 2018-04-26 PROCEDURE — 4040F PNEUMOC VAC/ADMIN/RCVD: CPT | Performed by: NURSE PRACTITIONER

## 2018-04-26 PROCEDURE — G8427 DOCREV CUR MEDS BY ELIG CLIN: HCPCS | Performed by: NURSE PRACTITIONER

## 2018-04-26 PROCEDURE — 3017F COLORECTAL CA SCREEN DOC REV: CPT | Performed by: NURSE PRACTITIONER

## 2018-04-26 PROCEDURE — G8399 PT W/DXA RESULTS DOCUMENT: HCPCS | Performed by: NURSE PRACTITIONER

## 2018-04-26 PROCEDURE — 1123F ACP DISCUSS/DSCN MKR DOCD: CPT | Performed by: NURSE PRACTITIONER

## 2018-04-26 RX ORDER — FUROSEMIDE 20 MG/1
20 TABLET ORAL 2 TIMES DAILY
Qty: 60 TABLET | Refills: 3 | Status: SHIPPED | OUTPATIENT
Start: 2018-04-26 | End: 2018-01-01 | Stop reason: SDUPTHER

## 2018-04-26 RX ORDER — ESCITALOPRAM OXALATE 20 MG/1
20 TABLET ORAL EVERY MORNING
Status: ON HOLD | COMMUNITY
End: 2018-01-01 | Stop reason: HOSPADM

## 2018-04-26 RX ORDER — POTASSIUM CHLORIDE 750 MG/1
10 TABLET, FILM COATED, EXTENDED RELEASE ORAL DAILY
Qty: 60 TABLET | Refills: 3 | Status: SHIPPED | OUTPATIENT
Start: 2018-04-26 | End: 2018-01-01 | Stop reason: SDUPTHER

## 2018-04-26 ASSESSMENT — ENCOUNTER SYMPTOMS
NAUSEA: 0
COLOR CHANGE: 0
COUGH: 0
SHORTNESS OF BREATH: 1
APNEA: 0
WHEEZING: 0
ABDOMINAL PAIN: 0
ABDOMINAL DISTENTION: 1
CHEST TIGHTNESS: 0

## 2018-04-27 ENCOUNTER — CARE COORDINATION (OUTPATIENT)
Dept: CASE MANAGEMENT | Age: 73
End: 2018-04-27

## 2018-04-30 ENCOUNTER — HOSPITAL ENCOUNTER (OUTPATIENT)
Dept: GENERAL RADIOLOGY | Age: 73
Discharge: HOME | End: 2018-04-30
Payer: COMMERCIAL

## 2018-04-30 DIAGNOSIS — D64.9 ANEMIA DUE TO UNKNOWN MECHANISM: ICD-10-CM

## 2018-04-30 DIAGNOSIS — R13.10 DYSPHAGIA, UNSPECIFIED TYPE: ICD-10-CM

## 2018-04-30 PROCEDURE — 74230 X-RAY XM SWLNG FUNCJ C+: CPT

## 2018-04-30 PROCEDURE — G8997 SWALLOW GOAL STATUS: HCPCS | Performed by: SPEECH-LANGUAGE PATHOLOGIST

## 2018-04-30 PROCEDURE — 92611 MOTION FLUOROSCOPY/SWALLOW: CPT | Performed by: SPEECH-LANGUAGE PATHOLOGIST

## 2018-04-30 PROCEDURE — 2500000003 HC RX 250 WO HCPCS: Performed by: NURSE PRACTITIONER

## 2018-04-30 PROCEDURE — G8996 SWALLOW CURRENT STATUS: HCPCS | Performed by: SPEECH-LANGUAGE PATHOLOGIST

## 2018-04-30 PROCEDURE — G8998 SWALLOW D/C STATUS: HCPCS | Performed by: SPEECH-LANGUAGE PATHOLOGIST

## 2018-04-30 RX ADMIN — BARIUM SULFATE 70 ML: 0.81 POWDER, FOR SUSPENSION ORAL at 08:29

## 2018-04-30 RX ADMIN — BARIUM SULFATE 20 ML: 400 PASTE ORAL at 08:29

## 2018-05-01 ENCOUNTER — OFFICE VISIT (OUTPATIENT)
Dept: NEPHROLOGY | Age: 73
End: 2018-05-01
Payer: MEDICARE

## 2018-05-01 VITALS — BODY MASS INDEX: 36.8 KG/M2 | WEIGHT: 228 LBS | SYSTOLIC BLOOD PRESSURE: 98 MMHG | DIASTOLIC BLOOD PRESSURE: 70 MMHG

## 2018-05-01 DIAGNOSIS — E87.1 HYPONATREMIA: Primary | ICD-10-CM

## 2018-05-01 DIAGNOSIS — F41.9 ANXIETY AND DEPRESSION: ICD-10-CM

## 2018-05-01 DIAGNOSIS — I10 ESSENTIAL HYPERTENSION: ICD-10-CM

## 2018-05-01 DIAGNOSIS — F32.A ANXIETY AND DEPRESSION: ICD-10-CM

## 2018-05-01 PROCEDURE — 1123F ACP DISCUSS/DSCN MKR DOCD: CPT | Performed by: INTERNAL MEDICINE

## 2018-05-01 PROCEDURE — G8417 CALC BMI ABV UP PARAM F/U: HCPCS | Performed by: INTERNAL MEDICINE

## 2018-05-01 PROCEDURE — 1111F DSCHRG MED/CURRENT MED MERGE: CPT | Performed by: INTERNAL MEDICINE

## 2018-05-01 PROCEDURE — 3017F COLORECTAL CA SCREEN DOC REV: CPT | Performed by: INTERNAL MEDICINE

## 2018-05-01 PROCEDURE — G8598 ASA/ANTIPLAT THER USED: HCPCS | Performed by: INTERNAL MEDICINE

## 2018-05-01 PROCEDURE — G8427 DOCREV CUR MEDS BY ELIG CLIN: HCPCS | Performed by: INTERNAL MEDICINE

## 2018-05-01 PROCEDURE — 4040F PNEUMOC VAC/ADMIN/RCVD: CPT | Performed by: INTERNAL MEDICINE

## 2018-05-01 PROCEDURE — G8399 PT W/DXA RESULTS DOCUMENT: HCPCS | Performed by: INTERNAL MEDICINE

## 2018-05-01 PROCEDURE — 1090F PRES/ABSN URINE INCON ASSESS: CPT | Performed by: INTERNAL MEDICINE

## 2018-05-01 PROCEDURE — 99213 OFFICE O/P EST LOW 20 MIN: CPT | Performed by: INTERNAL MEDICINE

## 2018-05-01 PROCEDURE — 1036F TOBACCO NON-USER: CPT | Performed by: INTERNAL MEDICINE

## 2018-05-03 ENCOUNTER — CARE COORDINATION (OUTPATIENT)
Dept: CASE MANAGEMENT | Age: 73
End: 2018-05-03

## 2018-05-07 ENCOUNTER — CARE COORDINATION (OUTPATIENT)
Dept: CASE MANAGEMENT | Age: 73
End: 2018-05-07

## 2018-05-07 LAB
BUN BLDV-MCNC: 20 MG/DL
CALCIUM SERPL-MCNC: 9.7 MG/DL
CHLORIDE BLD-SCNC: 87 MMOL/L
CO2: 27 MMOL/L
CREAT SERPL-MCNC: 0.8 MG/DL
GFR CALCULATED: 73.1
GLUCOSE BLD-MCNC: 126 MG/DL
POTASSIUM SERPL-SCNC: 4.8 MMOL/L
SODIUM BLD-SCNC: 130 MMOL/L

## 2018-05-08 ENCOUNTER — TELEPHONE (OUTPATIENT)
Dept: NEPHROLOGY | Age: 73
End: 2018-05-08

## 2018-05-08 ENCOUNTER — TELEPHONE (OUTPATIENT)
Dept: FAMILY MEDICINE CLINIC | Age: 73
End: 2018-05-08

## 2018-05-16 ENCOUNTER — OFFICE VISIT (OUTPATIENT)
Dept: CARDIOLOGY CLINIC | Age: 73
End: 2018-05-16
Payer: MEDICARE

## 2018-05-16 VITALS
HEART RATE: 91 BPM | HEIGHT: 66 IN | SYSTOLIC BLOOD PRESSURE: 158 MMHG | DIASTOLIC BLOOD PRESSURE: 72 MMHG | OXYGEN SATURATION: 94 % | BODY MASS INDEX: 37.12 KG/M2 | WEIGHT: 231 LBS

## 2018-05-16 DIAGNOSIS — I50.32 CHF (CONGESTIVE HEART FAILURE), NYHA CLASS III, CHRONIC, DIASTOLIC (HCC): Primary | ICD-10-CM

## 2018-05-16 PROCEDURE — G8399 PT W/DXA RESULTS DOCUMENT: HCPCS | Performed by: NURSE PRACTITIONER

## 2018-05-16 PROCEDURE — 99213 OFFICE O/P EST LOW 20 MIN: CPT | Performed by: NURSE PRACTITIONER

## 2018-05-16 PROCEDURE — 1090F PRES/ABSN URINE INCON ASSESS: CPT | Performed by: NURSE PRACTITIONER

## 2018-05-16 PROCEDURE — G8427 DOCREV CUR MEDS BY ELIG CLIN: HCPCS | Performed by: NURSE PRACTITIONER

## 2018-05-16 PROCEDURE — 4040F PNEUMOC VAC/ADMIN/RCVD: CPT | Performed by: NURSE PRACTITIONER

## 2018-05-16 PROCEDURE — 1123F ACP DISCUSS/DSCN MKR DOCD: CPT | Performed by: NURSE PRACTITIONER

## 2018-05-16 PROCEDURE — G8417 CALC BMI ABV UP PARAM F/U: HCPCS | Performed by: NURSE PRACTITIONER

## 2018-05-16 PROCEDURE — 1036F TOBACCO NON-USER: CPT | Performed by: NURSE PRACTITIONER

## 2018-05-16 PROCEDURE — 1111F DSCHRG MED/CURRENT MED MERGE: CPT | Performed by: NURSE PRACTITIONER

## 2018-05-16 PROCEDURE — 3017F COLORECTAL CA SCREEN DOC REV: CPT | Performed by: NURSE PRACTITIONER

## 2018-05-16 PROCEDURE — G8598 ASA/ANTIPLAT THER USED: HCPCS | Performed by: NURSE PRACTITIONER

## 2018-05-16 RX ORDER — HYDRALAZINE HYDROCHLORIDE 50 MG/1
75 TABLET, FILM COATED ORAL 3 TIMES DAILY
Qty: 135 TABLET | Refills: 2 | Status: ON HOLD | OUTPATIENT
Start: 2018-05-16 | End: 2018-01-01

## 2018-05-16 ASSESSMENT — ENCOUNTER SYMPTOMS
ABDOMINAL PAIN: 0
WHEEZING: 0
APNEA: 0
NAUSEA: 0
SHORTNESS OF BREATH: 1
COLOR CHANGE: 0
ABDOMINAL DISTENTION: 0
COUGH: 0
CHEST TIGHTNESS: 0

## 2018-05-18 ENCOUNTER — TELEPHONE (OUTPATIENT)
Dept: FAMILY MEDICINE CLINIC | Age: 73
End: 2018-05-18

## 2018-05-21 ENCOUNTER — OFFICE VISIT (OUTPATIENT)
Dept: PULMONOLOGY | Age: 73
End: 2018-05-21
Payer: MEDICARE

## 2018-05-21 VITALS
DIASTOLIC BLOOD PRESSURE: 76 MMHG | OXYGEN SATURATION: 92 % | WEIGHT: 226 LBS | BODY MASS INDEX: 35.47 KG/M2 | HEART RATE: 87 BPM | SYSTOLIC BLOOD PRESSURE: 132 MMHG | HEIGHT: 67 IN

## 2018-05-21 DIAGNOSIS — J96.11 CHRONIC RESPIRATORY FAILURE WITH HYPOXIA (HCC): ICD-10-CM

## 2018-05-21 DIAGNOSIS — J42 CHRONIC BRONCHITIS, UNSPECIFIED CHRONIC BRONCHITIS TYPE (HCC): ICD-10-CM

## 2018-05-21 DIAGNOSIS — G47.33 OSA (OBSTRUCTIVE SLEEP APNEA): Primary | ICD-10-CM

## 2018-05-21 DIAGNOSIS — E66.9 OBESITY (BMI 30-39.9): ICD-10-CM

## 2018-05-21 PROCEDURE — 3023F SPIROM DOC REV: CPT | Performed by: PHYSICIAN ASSISTANT

## 2018-05-21 PROCEDURE — 3017F COLORECTAL CA SCREEN DOC REV: CPT | Performed by: PHYSICIAN ASSISTANT

## 2018-05-21 PROCEDURE — G8399 PT W/DXA RESULTS DOCUMENT: HCPCS | Performed by: PHYSICIAN ASSISTANT

## 2018-05-21 PROCEDURE — 1123F ACP DISCUSS/DSCN MKR DOCD: CPT | Performed by: PHYSICIAN ASSISTANT

## 2018-05-21 PROCEDURE — G8926 SPIRO NO PERF OR DOC: HCPCS | Performed by: PHYSICIAN ASSISTANT

## 2018-05-21 PROCEDURE — 1090F PRES/ABSN URINE INCON ASSESS: CPT | Performed by: PHYSICIAN ASSISTANT

## 2018-05-21 PROCEDURE — G8417 CALC BMI ABV UP PARAM F/U: HCPCS | Performed by: PHYSICIAN ASSISTANT

## 2018-05-21 PROCEDURE — G8427 DOCREV CUR MEDS BY ELIG CLIN: HCPCS | Performed by: PHYSICIAN ASSISTANT

## 2018-05-21 PROCEDURE — 99214 OFFICE O/P EST MOD 30 MIN: CPT | Performed by: PHYSICIAN ASSISTANT

## 2018-05-21 PROCEDURE — 1036F TOBACCO NON-USER: CPT | Performed by: PHYSICIAN ASSISTANT

## 2018-05-21 PROCEDURE — G8598 ASA/ANTIPLAT THER USED: HCPCS | Performed by: PHYSICIAN ASSISTANT

## 2018-05-21 PROCEDURE — 4040F PNEUMOC VAC/ADMIN/RCVD: CPT | Performed by: PHYSICIAN ASSISTANT

## 2018-05-21 ASSESSMENT — ENCOUNTER SYMPTOMS
SHORTNESS OF BREATH: 1
NAUSEA: 0
SORE THROAT: 0
EYES NEGATIVE: 1
SPUTUM PRODUCTION: 0
HEARTBURN: 0
WHEEZING: 1
SINUS PAIN: 0
GASTROINTESTINAL NEGATIVE: 1
ORTHOPNEA: 0
COUGH: 0

## 2018-05-23 ENCOUNTER — CARE COORDINATION (OUTPATIENT)
Dept: CARE COORDINATION | Age: 73
End: 2018-05-23

## 2018-05-23 RX ORDER — HYDROCODONE BITARTRATE AND ACETAMINOPHEN 5; 325 MG/1; MG/1
1 TABLET ORAL EVERY 8 HOURS PRN
Status: ON HOLD | COMMUNITY
End: 2018-01-01 | Stop reason: ALTCHOICE

## 2018-05-23 ASSESSMENT — ENCOUNTER SYMPTOMS: DYSPNEA ASSOCIATED WITH: EXERTION

## 2018-05-28 DIAGNOSIS — K59.00 CONSTIPATION, UNSPECIFIED CONSTIPATION TYPE: ICD-10-CM

## 2018-05-29 RX ORDER — DOCUSATE SODIUM 100 MG/1
CAPSULE, LIQUID FILLED ORAL
Qty: 90 CAPSULE | Refills: 1 | Status: SHIPPED | OUTPATIENT
Start: 2018-05-29 | End: 2018-01-01 | Stop reason: SDUPTHER

## 2018-06-04 ENCOUNTER — TELEPHONE (OUTPATIENT)
Dept: PULMONOLOGY | Age: 73
End: 2018-06-04

## 2018-06-04 DIAGNOSIS — G47.33 OSA (OBSTRUCTIVE SLEEP APNEA): Primary | ICD-10-CM

## 2018-06-07 ENCOUNTER — HOSPITAL ENCOUNTER (OUTPATIENT)
Dept: GENERAL RADIOLOGY | Age: 73
Discharge: HOME | End: 2018-06-07
Payer: MEDICARE

## 2018-06-07 DIAGNOSIS — K21.00 GASTROESOPHAGEAL REFLUX DISEASE WITH ESOPHAGITIS: ICD-10-CM

## 2018-06-07 DIAGNOSIS — D50.0 IRON DEFICIENCY ANEMIA DUE TO CHRONIC BLOOD LOSS: ICD-10-CM

## 2018-06-07 PROCEDURE — 6370000000 HC RX 637 (ALT 250 FOR IP): Performed by: NURSE PRACTITIONER

## 2018-06-07 PROCEDURE — A4641 RADIOPHARM DX AGENT NOC: HCPCS | Performed by: NURSE PRACTITIONER

## 2018-06-07 PROCEDURE — 2500000003 HC RX 250 WO HCPCS: Performed by: NURSE PRACTITIONER

## 2018-06-07 PROCEDURE — 74249 FL UGI W SMALL BOWEL W DOUBLE CONTRAST: CPT

## 2018-06-07 PROCEDURE — 6360000004 HC RX CONTRAST MEDICATION: Performed by: NURSE PRACTITIONER

## 2018-06-07 RX ADMIN — BARIUM SULFATE 140 ML: 0.6 SUSPENSION ORAL at 09:06

## 2018-06-07 RX ADMIN — ANTACID/ANTIFLATULENT 1 EACH: 380; 550; 10; 10 GRANULE, EFFERVESCENT ORAL at 09:06

## 2018-06-07 RX ADMIN — BARIUM SULFATE 100 ML: 980 POWDER, FOR SUSPENSION ORAL at 09:06

## 2018-06-11 RX ORDER — B-COMPLEX WITH VITAMIN C
TABLET ORAL
Qty: 60 TABLET | Refills: 5 | Status: SHIPPED | OUTPATIENT
Start: 2018-06-11 | End: 2018-01-01 | Stop reason: SDUPTHER

## 2018-06-17 ENCOUNTER — APPOINTMENT (OUTPATIENT)
Dept: GENERAL RADIOLOGY | Age: 73
End: 2018-06-17
Payer: MEDICARE

## 2018-06-17 ENCOUNTER — HOSPITAL ENCOUNTER (EMERGENCY)
Age: 73
Discharge: HOME | End: 2018-06-17
Payer: MEDICARE

## 2018-06-17 VITALS
WEIGHT: 226 LBS | BODY MASS INDEX: 36.32 KG/M2 | DIASTOLIC BLOOD PRESSURE: 70 MMHG | RESPIRATION RATE: 24 BRPM | HEIGHT: 66 IN | TEMPERATURE: 98.1 F | OXYGEN SATURATION: 97 % | SYSTOLIC BLOOD PRESSURE: 146 MMHG | HEART RATE: 84 BPM

## 2018-06-17 DIAGNOSIS — E87.1 CHRONIC HYPONATREMIA: ICD-10-CM

## 2018-06-17 DIAGNOSIS — T14.8XXA BLEEDING FROM WOUND: Primary | ICD-10-CM

## 2018-06-17 DIAGNOSIS — J44.9 CHRONIC OBSTRUCTIVE PULMONARY DISEASE, UNSPECIFIED COPD TYPE (HCC): ICD-10-CM

## 2018-06-17 LAB
ANION GAP SERPL CALCULATED.3IONS-SCNC: 13 MEQ/L (ref 8–16)
ANISOCYTOSIS: ABNORMAL
BASOPHILS # BLD: 1 %
BASOPHILS ABSOLUTE: 0.1 THOU/MM3 (ref 0–0.1)
BUN BLDV-MCNC: 16 MG/DL (ref 7–22)
CALCIUM SERPL-MCNC: 8.8 MG/DL (ref 8.5–10.5)
CHLORIDE BLD-SCNC: 85 MEQ/L (ref 98–111)
CO2: 27 MEQ/L (ref 23–33)
CREAT SERPL-MCNC: 0.7 MG/DL (ref 0.4–1.2)
EOSINOPHIL # BLD: 2.9 %
EOSINOPHILS ABSOLUTE: 0.1 THOU/MM3 (ref 0–0.4)
GFR SERPL CREATININE-BSD FRML MDRD: 82 ML/MIN/1.73M2
GLUCOSE BLD-MCNC: 117 MG/DL (ref 70–108)
HCT VFR BLD CALC: 31.6 % (ref 37–47)
HEMOGLOBIN: 9.8 GM/DL (ref 12–16)
HYPOCHROMIA: ABNORMAL
LYMPHOCYTES # BLD: 28.3 %
LYMPHOCYTES ABSOLUTE: 1.4 THOU/MM3 (ref 1–4.8)
MCH RBC QN AUTO: 23.7 PG (ref 27–31)
MCHC RBC AUTO-ENTMCNC: 31 GM/DL (ref 33–37)
MCV RBC AUTO: 76.4 FL (ref 81–99)
MICROCYTES: ABNORMAL
MONOCYTES # BLD: 9.1 %
MONOCYTES ABSOLUTE: 0.5 THOU/MM3 (ref 0.4–1.3)
NUCLEATED RED BLOOD CELLS: 0 /100 WBC
OSMOLALITY CALCULATION: 253.7 MOSMOL/KG (ref 275–300)
PDW BLD-RTO: 18.4 % (ref 11.5–14.5)
PLATELET # BLD: 180 THOU/MM3 (ref 130–400)
PLATELET ESTIMATE: ADEQUATE
PMV BLD AUTO: 7.5 FL (ref 7.4–10.4)
POTASSIUM SERPL-SCNC: 4.5 MEQ/L (ref 3.5–5.2)
PRO-BNP: 245.8 PG/ML (ref 0–900)
RBC # BLD: 4.14 MILL/MM3 (ref 4.2–5.4)
SCAN OF BLOOD SMEAR: NORMAL
SEG NEUTROPHILS: 58.7 %
SEGMENTED NEUTROPHILS ABSOLUTE COUNT: 2.9 THOU/MM3 (ref 1.8–7.7)
SODIUM BLD-SCNC: 125 MEQ/L (ref 135–145)
WBC # BLD: 5 THOU/MM3 (ref 4.8–10.8)

## 2018-06-17 PROCEDURE — 99284 EMERGENCY DEPT VISIT MOD MDM: CPT

## 2018-06-17 PROCEDURE — 85025 COMPLETE CBC W/AUTO DIFF WBC: CPT

## 2018-06-17 PROCEDURE — 83880 ASSAY OF NATRIURETIC PEPTIDE: CPT

## 2018-06-17 PROCEDURE — 80048 BASIC METABOLIC PNL TOTAL CA: CPT

## 2018-06-17 PROCEDURE — 71046 X-RAY EXAM CHEST 2 VIEWS: CPT

## 2018-06-17 PROCEDURE — 6370000000 HC RX 637 (ALT 250 FOR IP): Performed by: PHYSICIAN ASSISTANT

## 2018-06-17 PROCEDURE — 36415 COLL VENOUS BLD VENIPUNCTURE: CPT

## 2018-06-17 RX ORDER — SODIUM CHLORIDE 1000 MG
1 TABLET, SOLUBLE MISCELLANEOUS ONCE
Status: COMPLETED | OUTPATIENT
Start: 2018-06-17 | End: 2018-06-17

## 2018-06-17 RX ORDER — SODIUM CHLORIDE 1000 MG
1 TABLET, SOLUBLE MISCELLANEOUS 3 TIMES DAILY
Qty: 15 TABLET | Refills: 0 | Status: SHIPPED | OUTPATIENT
Start: 2018-06-17 | End: 2018-01-01 | Stop reason: ALTCHOICE

## 2018-06-17 RX ADMIN — SODIUM CHLORIDE TAB 1 GM 1 G: 1 TAB at 15:23

## 2018-06-17 RX ADMIN — GELATIN ABSORBABLE SPONGE 12-7 MM 1 EACH: 12-7 MISC at 15:26

## 2018-06-17 ASSESSMENT — ENCOUNTER SYMPTOMS
COUGH: 0
RHINORRHEA: 0
VOMITING: 0
DIARRHEA: 0
WHEEZING: 0
SORE THROAT: 0
BACK PAIN: 0
ABDOMINAL PAIN: 0
SHORTNESS OF BREATH: 1
NAUSEA: 0
EYE PAIN: 0
EYE DISCHARGE: 0

## 2018-06-18 ENCOUNTER — CARE COORDINATION (OUTPATIENT)
Dept: CARE COORDINATION | Age: 73
End: 2018-06-18

## 2018-06-20 ENCOUNTER — TELEPHONE (OUTPATIENT)
Dept: FAMILY MEDICINE CLINIC | Age: 73
End: 2018-06-20

## 2018-06-20 ENCOUNTER — OFFICE VISIT (OUTPATIENT)
Dept: FAMILY MEDICINE CLINIC | Age: 73
End: 2018-06-20
Payer: MEDICARE

## 2018-06-20 VITALS
RESPIRATION RATE: 22 BRPM | SYSTOLIC BLOOD PRESSURE: 150 MMHG | DIASTOLIC BLOOD PRESSURE: 70 MMHG | HEART RATE: 92 BPM | TEMPERATURE: 98.2 F

## 2018-06-20 DIAGNOSIS — R73.01 ELEVATED FASTING BLOOD SUGAR: ICD-10-CM

## 2018-06-20 DIAGNOSIS — J44.9 CHRONIC OBSTRUCTIVE PULMONARY DISEASE, UNSPECIFIED COPD TYPE (HCC): Primary | ICD-10-CM

## 2018-06-20 DIAGNOSIS — Z91.81 AT HIGH RISK FOR FALLS: ICD-10-CM

## 2018-06-20 DIAGNOSIS — E03.9 HYPOTHYROIDISM, UNSPECIFIED TYPE: ICD-10-CM

## 2018-06-20 DIAGNOSIS — E87.1 HYPONATREMIA: Primary | ICD-10-CM

## 2018-06-20 DIAGNOSIS — R42 VERTIGO: ICD-10-CM

## 2018-06-20 DIAGNOSIS — S41.101D WOUND OF RIGHT UPPER EXTREMITY, SUBSEQUENT ENCOUNTER: ICD-10-CM

## 2018-06-20 LAB — HBA1C MFR BLD: 6 %

## 2018-06-20 PROCEDURE — G8427 DOCREV CUR MEDS BY ELIG CLIN: HCPCS | Performed by: NURSE PRACTITIONER

## 2018-06-20 PROCEDURE — 1123F ACP DISCUSS/DSCN MKR DOCD: CPT | Performed by: NURSE PRACTITIONER

## 2018-06-20 PROCEDURE — G8598 ASA/ANTIPLAT THER USED: HCPCS | Performed by: NURSE PRACTITIONER

## 2018-06-20 PROCEDURE — G8417 CALC BMI ABV UP PARAM F/U: HCPCS | Performed by: NURSE PRACTITIONER

## 2018-06-20 PROCEDURE — G8399 PT W/DXA RESULTS DOCUMENT: HCPCS | Performed by: NURSE PRACTITIONER

## 2018-06-20 PROCEDURE — 1036F TOBACCO NON-USER: CPT | Performed by: NURSE PRACTITIONER

## 2018-06-20 PROCEDURE — 4040F PNEUMOC VAC/ADMIN/RCVD: CPT | Performed by: NURSE PRACTITIONER

## 2018-06-20 PROCEDURE — 99214 OFFICE O/P EST MOD 30 MIN: CPT | Performed by: NURSE PRACTITIONER

## 2018-06-20 PROCEDURE — 3017F COLORECTAL CA SCREEN DOC REV: CPT | Performed by: NURSE PRACTITIONER

## 2018-06-20 PROCEDURE — 83036 HEMOGLOBIN GLYCOSYLATED A1C: CPT | Performed by: NURSE PRACTITIONER

## 2018-06-20 PROCEDURE — 1090F PRES/ABSN URINE INCON ASSESS: CPT | Performed by: NURSE PRACTITIONER

## 2018-06-20 ASSESSMENT — ENCOUNTER SYMPTOMS
ABDOMINAL PAIN: 0
SINUS PRESSURE: 0
ABDOMINAL DISTENTION: 0
SINUS PAIN: 0
WHEEZING: 0
BACK PAIN: 0
SHORTNESS OF BREATH: 1
CHOKING: 0
CHEST TIGHTNESS: 0
STRIDOR: 0
RHINORRHEA: 0
COUGH: 0

## 2018-06-21 ENCOUNTER — HOSPITAL ENCOUNTER (OUTPATIENT)
Age: 73
Discharge: HOME | End: 2018-06-21
Payer: MEDICARE

## 2018-06-21 DIAGNOSIS — E87.1 CHRONIC HYPONATREMIA: ICD-10-CM

## 2018-06-21 LAB
ANION GAP SERPL CALCULATED.3IONS-SCNC: 15 MEQ/L (ref 8–16)
BUN BLDV-MCNC: 16 MG/DL (ref 7–22)
CALCIUM SERPL-MCNC: 10.1 MG/DL (ref 8.5–10.5)
CHLORIDE BLD-SCNC: 89 MEQ/L (ref 98–111)
CO2: 25 MEQ/L (ref 23–33)
CREAT SERPL-MCNC: 0.9 MG/DL (ref 0.4–1.2)
GFR SERPL CREATININE-BSD FRML MDRD: 61 ML/MIN/1.73M2
GLUCOSE BLD-MCNC: 108 MG/DL (ref 70–108)
POTASSIUM SERPL-SCNC: 4.5 MEQ/L (ref 3.5–5.2)
SODIUM BLD-SCNC: 129 MEQ/L (ref 135–145)

## 2018-06-21 PROCEDURE — 80048 BASIC METABOLIC PNL TOTAL CA: CPT

## 2018-06-21 PROCEDURE — 36415 COLL VENOUS BLD VENIPUNCTURE: CPT

## 2018-06-22 ENCOUNTER — TELEPHONE (OUTPATIENT)
Dept: FAMILY MEDICINE CLINIC | Age: 73
End: 2018-06-22

## 2018-06-22 DIAGNOSIS — E03.9 HYPOTHYROIDISM, UNSPECIFIED TYPE: ICD-10-CM

## 2018-06-22 DIAGNOSIS — D64.9 ANEMIA, UNSPECIFIED TYPE: Primary | ICD-10-CM

## 2018-06-22 DIAGNOSIS — T14.8XXA BLEEDING FROM WOUND: ICD-10-CM

## 2018-06-22 RX ORDER — LANOLIN ALCOHOL/MO/W.PET/CERES
325 CREAM (GRAM) TOPICAL
Qty: 90 TABLET | Refills: 3 | Status: ON HOLD | OUTPATIENT
Start: 2018-06-22 | End: 2018-07-07

## 2018-06-22 RX ORDER — LEVOTHYROXINE SODIUM 0.12 MG/1
TABLET ORAL
Qty: 90 TABLET | Refills: 1 | Status: ON HOLD | OUTPATIENT
Start: 2018-06-22 | End: 2018-07-07 | Stop reason: SDUPTHER

## 2018-06-25 ENCOUNTER — TELEPHONE (OUTPATIENT)
Dept: FAMILY MEDICINE CLINIC | Age: 73
End: 2018-06-25

## 2018-06-26 ENCOUNTER — CARE COORDINATION (OUTPATIENT)
Dept: CARE COORDINATION | Age: 73
End: 2018-06-26

## 2018-06-26 ASSESSMENT — ENCOUNTER SYMPTOMS: DYSPNEA ASSOCIATED WITH: EXERTION

## 2018-06-28 DIAGNOSIS — E03.9 HYPOTHYROIDISM, UNSPECIFIED TYPE: ICD-10-CM

## 2018-06-28 RX ORDER — LEVOTHYROXINE SODIUM 112 UG/1
TABLET ORAL
Qty: 90 TABLET | Refills: 1 | OUTPATIENT
Start: 2018-06-28

## 2018-07-02 ENCOUNTER — TELEPHONE (OUTPATIENT)
Dept: FAMILY MEDICINE CLINIC | Age: 73
End: 2018-07-02

## 2018-07-02 RX ORDER — METOPROLOL TARTRATE 100 MG/1
TABLET ORAL
Qty: 90 TABLET | Refills: 1 | OUTPATIENT
Start: 2018-07-02

## 2018-07-02 NOTE — TELEPHONE ENCOUNTER
Spoke with [facility]. They state pt was seen on 6/29/18 by Tonya Goodwin, Dr Mcclure's NP. They state the dictation is not available yet, but they will fax the note once available. Gabi Flores CNP informed.

## 2018-07-02 NOTE — TELEPHONE ENCOUNTER
Pt asking for refills of this medication. She needs to check with Cardiology as they fill her heart meds.  Thanks

## 2018-07-03 RX ORDER — LEVOTHYROXINE SODIUM 0.1 MG/1
TABLET ORAL
Qty: 90 TABLET | Refills: 1 | Status: SHIPPED | OUTPATIENT
Start: 2018-07-03 | End: 2018-01-01 | Stop reason: SDUPTHER

## 2018-07-07 ENCOUNTER — APPOINTMENT (OUTPATIENT)
Dept: GENERAL RADIOLOGY | Age: 73
DRG: 389 | End: 2018-07-07
Payer: MEDICARE

## 2018-07-07 ENCOUNTER — APPOINTMENT (OUTPATIENT)
Dept: CT IMAGING | Age: 73
DRG: 389 | End: 2018-07-07
Payer: MEDICARE

## 2018-07-07 ENCOUNTER — HOSPITAL ENCOUNTER (INPATIENT)
Age: 73
LOS: 2 days | Discharge: HOME | DRG: 389 | End: 2018-07-09
Attending: INTERNAL MEDICINE | Admitting: INTERNAL MEDICINE
Payer: MEDICARE

## 2018-07-07 DIAGNOSIS — K56.609 SBO (SMALL BOWEL OBSTRUCTION) (HCC): Primary | ICD-10-CM

## 2018-07-07 DIAGNOSIS — E87.1 HYPONATREMIA: ICD-10-CM

## 2018-07-07 LAB
ALBUMIN SERPL-MCNC: 4.9 G/DL (ref 3.5–5.1)
ALP BLD-CCNC: 57 U/L (ref 38–126)
ALT SERPL-CCNC: 46 U/L (ref 11–66)
ANION GAP SERPL CALCULATED.3IONS-SCNC: 16 MEQ/L (ref 8–16)
ANISOCYTOSIS: PRESENT
AST SERPL-CCNC: 39 U/L (ref 5–40)
BACTERIA: ABNORMAL /HPF
BASOPHILS # BLD: 0.2 %
BASOPHILS ABSOLUTE: 0 THOU/MM3 (ref 0–0.1)
BILIRUB SERPL-MCNC: 0.3 MG/DL (ref 0.3–1.2)
BILIRUBIN URINE: NEGATIVE
BLOOD, URINE: NEGATIVE
BUN BLDV-MCNC: 12 MG/DL (ref 7–22)
CALCIUM SERPL-MCNC: 10.3 MG/DL (ref 8.5–10.5)
CASTS 2: ABNORMAL /LPF
CASTS UA: ABNORMAL /LPF
CHARACTER, URINE: CLEAR
CHLORIDE BLD-SCNC: 81 MEQ/L (ref 98–111)
CO2: 25 MEQ/L (ref 23–33)
COLOR: YELLOW
CREAT SERPL-MCNC: 0.7 MG/DL (ref 0.4–1.2)
CRYSTALS, UA: ABNORMAL
EKG ATRIAL RATE: 92 BPM
EKG P AXIS: 60 DEGREES
EKG P-R INTERVAL: 182 MS
EKG Q-T INTERVAL: 364 MS
EKG QRS DURATION: 84 MS
EKG QTC CALCULATION (BAZETT): 450 MS
EKG R AXIS: 5 DEGREES
EKG T AXIS: 40 DEGREES
EKG VENTRICULAR RATE: 92 BPM
EOSINOPHIL # BLD: 0.4 %
EOSINOPHILS ABSOLUTE: 0 THOU/MM3 (ref 0–0.4)
EPITHELIAL CELLS, UA: ABNORMAL /HPF
ERYTHROCYTE [DISTWIDTH] IN BLOOD BY AUTOMATED COUNT: 16.5 % (ref 11.5–14.5)
ERYTHROCYTE [DISTWIDTH] IN BLOOD BY AUTOMATED COUNT: 45.4 FL (ref 35–45)
GFR SERPL CREATININE-BSD FRML MDRD: 82 ML/MIN/1.73M2
GLUCOSE BLD-MCNC: 129 MG/DL (ref 70–108)
GLUCOSE BLD-MCNC: 168 MG/DL (ref 70–108)
GLUCOSE URINE: NEGATIVE MG/DL
HCT VFR BLD CALC: 35.7 % (ref 37–47)
HEMOGLOBIN: 11.5 GM/DL (ref 12–16)
IMMATURE GRANS (ABS): 0.07 THOU/MM3 (ref 0–0.07)
IMMATURE GRANULOCYTES: 0.7 %
KETONES, URINE: NEGATIVE
LEUKOCYTE ESTERASE, URINE: NEGATIVE
LIPASE: 34.7 U/L (ref 5.6–51.3)
LYMPHOCYTES # BLD: 5.4 %
LYMPHOCYTES ABSOLUTE: 0.5 THOU/MM3 (ref 1–4.8)
MCH RBC QN AUTO: 24.8 PG (ref 26–33)
MCHC RBC AUTO-ENTMCNC: 32.2 GM/DL (ref 32.2–35.5)
MCV RBC AUTO: 76.9 FL (ref 81–99)
MISCELLANEOUS 2: ABNORMAL
MONOCYTES # BLD: 4.8 %
MONOCYTES ABSOLUTE: 0.5 THOU/MM3 (ref 0.4–1.3)
NITRITE, URINE: NEGATIVE
NUCLEATED RED BLOOD CELLS: 0 /100 WBC
OSMOLALITY CALCULATION: 249.5 MOSMOL/KG (ref 275–300)
PH UA: 6
PLATELET # BLD: 201 THOU/MM3 (ref 130–400)
PLATELET ESTIMATE: ADEQUATE
PMV BLD AUTO: 9 FL (ref 9.4–12.4)
POTASSIUM SERPL-SCNC: 4.5 MEQ/L (ref 3.5–5.2)
PROTEIN UA: 300
RBC # BLD: 4.64 MILL/MM3 (ref 4.2–5.4)
RBC URINE: ABNORMAL /HPF
RENAL EPITHELIAL, UA: ABNORMAL
SCAN OF BLOOD SMEAR: NORMAL
SEG NEUTROPHILS: 88.5 %
SEGMENTED NEUTROPHILS ABSOLUTE COUNT: 8.5 THOU/MM3 (ref 1.8–7.7)
SODIUM BLD-SCNC: 122 MEQ/L (ref 135–145)
SODIUM BLD-SCNC: 124 MEQ/L (ref 135–145)
SPECIFIC GRAVITY, URINE: 1.02 (ref 1–1.03)
TOTAL PROTEIN: 7.7 G/DL (ref 6.1–8)
TROPONIN T: < 0.01 NG/ML
TSH SERPL DL<=0.05 MIU/L-ACNC: 8.54 UIU/ML (ref 0.4–4.2)
UROBILINOGEN, URINE: 0.2 EU/DL
WBC # BLD: 9.6 THOU/MM3 (ref 4.8–10.8)
WBC UA: ABNORMAL /HPF
YEAST: ABNORMAL

## 2018-07-07 PROCEDURE — 85025 COMPLETE CBC W/AUTO DIFF WBC: CPT

## 2018-07-07 PROCEDURE — 74177 CT ABD & PELVIS W/CONTRAST: CPT

## 2018-07-07 PROCEDURE — 80053 COMPREHEN METABOLIC PANEL: CPT

## 2018-07-07 PROCEDURE — 84443 ASSAY THYROID STIM HORMONE: CPT

## 2018-07-07 PROCEDURE — 99222 1ST HOSP IP/OBS MODERATE 55: CPT | Performed by: INTERNAL MEDICINE

## 2018-07-07 PROCEDURE — 2700000000 HC OXYGEN THERAPY PER DAY

## 2018-07-07 PROCEDURE — 83690 ASSAY OF LIPASE: CPT

## 2018-07-07 PROCEDURE — 6370000000 HC RX 637 (ALT 250 FOR IP): Performed by: INTERNAL MEDICINE

## 2018-07-07 PROCEDURE — 84295 ASSAY OF SERUM SODIUM: CPT

## 2018-07-07 PROCEDURE — 6360000002 HC RX W HCPCS: Performed by: EMERGENCY MEDICINE

## 2018-07-07 PROCEDURE — 84484 ASSAY OF TROPONIN QUANT: CPT

## 2018-07-07 PROCEDURE — 1200000000 HC SEMI PRIVATE

## 2018-07-07 PROCEDURE — 94640 AIRWAY INHALATION TREATMENT: CPT

## 2018-07-07 PROCEDURE — 2500000003 HC RX 250 WO HCPCS: Performed by: NURSE PRACTITIONER

## 2018-07-07 PROCEDURE — 93005 ELECTROCARDIOGRAM TRACING: CPT | Performed by: EMERGENCY MEDICINE

## 2018-07-07 PROCEDURE — 82948 REAGENT STRIP/BLOOD GLUCOSE: CPT

## 2018-07-07 PROCEDURE — 36415 COLL VENOUS BLD VENIPUNCTURE: CPT

## 2018-07-07 PROCEDURE — 99223 1ST HOSP IP/OBS HIGH 75: CPT | Performed by: SURGERY

## 2018-07-07 PROCEDURE — 81001 URINALYSIS AUTO W/SCOPE: CPT

## 2018-07-07 PROCEDURE — 74018 RADEX ABDOMEN 1 VIEW: CPT

## 2018-07-07 PROCEDURE — 6360000002 HC RX W HCPCS: Performed by: INTERNAL MEDICINE

## 2018-07-07 PROCEDURE — 2580000003 HC RX 258: Performed by: INTERNAL MEDICINE

## 2018-07-07 PROCEDURE — 6360000004 HC RX CONTRAST MEDICATION: Performed by: EMERGENCY MEDICINE

## 2018-07-07 PROCEDURE — 96374 THER/PROPH/DIAG INJ IV PUSH: CPT

## 2018-07-07 PROCEDURE — 99285 EMERGENCY DEPT VISIT HI MDM: CPT

## 2018-07-07 PROCEDURE — 96375 TX/PRO/DX INJ NEW DRUG ADDON: CPT

## 2018-07-07 RX ORDER — POLYVINYL ALCOHOL 14 MG/ML
1 SOLUTION/ DROPS OPHTHALMIC PRN
Status: DISCONTINUED | OUTPATIENT
Start: 2018-07-07 | End: 2018-01-01 | Stop reason: HOSPADM

## 2018-07-07 RX ORDER — POTASSIUM CHLORIDE 7.45 MG/ML
10 INJECTION INTRAVENOUS PRN
Status: DISCONTINUED | OUTPATIENT
Start: 2018-07-07 | End: 2018-01-01 | Stop reason: HOSPADM

## 2018-07-07 RX ORDER — SODIUM CHLORIDE 0.9 % (FLUSH) 0.9 %
10 SYRINGE (ML) INJECTION PRN
Status: DISCONTINUED | OUTPATIENT
Start: 2018-07-07 | End: 2018-01-01 | Stop reason: HOSPADM

## 2018-07-07 RX ORDER — DOXEPIN HYDROCHLORIDE 50 MG/1
150 CAPSULE ORAL NIGHTLY
Status: DISCONTINUED | OUTPATIENT
Start: 2018-07-07 | End: 2018-01-01 | Stop reason: HOSPADM

## 2018-07-07 RX ORDER — OXCARBAZEPINE 300 MG/1
300 TABLET, FILM COATED ORAL NIGHTLY
Status: DISCONTINUED | OUTPATIENT
Start: 2018-07-07 | End: 2018-01-01 | Stop reason: HOSPADM

## 2018-07-07 RX ORDER — MONTELUKAST SODIUM 10 MG/1
10 TABLET ORAL NIGHTLY
Status: DISCONTINUED | OUTPATIENT
Start: 2018-07-07 | End: 2018-01-01 | Stop reason: HOSPADM

## 2018-07-07 RX ORDER — MORPHINE SULFATE 2 MG/ML
2 INJECTION, SOLUTION INTRAMUSCULAR; INTRAVENOUS EVERY 4 HOURS PRN
Status: DISCONTINUED | OUTPATIENT
Start: 2018-07-07 | End: 2018-01-01 | Stop reason: HOSPADM

## 2018-07-07 RX ORDER — DEXTROSE MONOHYDRATE 50 MG/ML
100 INJECTION, SOLUTION INTRAVENOUS PRN
Status: DISCONTINUED | OUTPATIENT
Start: 2018-07-07 | End: 2018-01-01 | Stop reason: HOSPADM

## 2018-07-07 RX ORDER — OXCARBAZEPINE 300 MG/1
150 TABLET, FILM COATED ORAL DAILY
Status: DISCONTINUED | OUTPATIENT
Start: 2018-07-07 | End: 2018-01-01 | Stop reason: HOSPADM

## 2018-07-07 RX ORDER — RANOLAZINE 500 MG/1
500 TABLET, EXTENDED RELEASE ORAL 2 TIMES DAILY
Status: DISCONTINUED | OUTPATIENT
Start: 2018-07-07 | End: 2018-01-01 | Stop reason: HOSPADM

## 2018-07-07 RX ORDER — RISPERIDONE 1 MG/1
1 TABLET, FILM COATED ORAL DAILY
Status: DISCONTINUED | OUTPATIENT
Start: 2018-07-07 | End: 2018-01-01 | Stop reason: HOSPADM

## 2018-07-07 RX ORDER — POLYVINYL ALCOHOL 14 MG/ML
1 SOLUTION/ DROPS OPHTHALMIC PRN
Status: DISCONTINUED | OUTPATIENT
Start: 2018-07-07 | End: 2018-07-07

## 2018-07-07 RX ORDER — VALSARTAN 320 MG/1
320 TABLET ORAL DAILY
Status: DISCONTINUED | OUTPATIENT
Start: 2018-07-07 | End: 2018-01-01 | Stop reason: HOSPADM

## 2018-07-07 RX ORDER — ONDANSETRON 2 MG/ML
4 INJECTION INTRAMUSCULAR; INTRAVENOUS EVERY 6 HOURS PRN
Status: DISCONTINUED | OUTPATIENT
Start: 2018-07-07 | End: 2018-01-01 | Stop reason: HOSPADM

## 2018-07-07 RX ORDER — POTASSIUM CHLORIDE 20MEQ/15ML
40 LIQUID (ML) ORAL PRN
Status: DISCONTINUED | OUTPATIENT
Start: 2018-07-07 | End: 2018-01-01 | Stop reason: HOSPADM

## 2018-07-07 RX ORDER — POTASSIUM CHLORIDE 750 MG/1
10 TABLET, FILM COATED, EXTENDED RELEASE ORAL DAILY
Status: DISCONTINUED | OUTPATIENT
Start: 2018-01-01 | End: 2018-01-01 | Stop reason: HOSPADM

## 2018-07-07 RX ORDER — ACETAMINOPHEN 500 MG
500 TABLET ORAL EVERY 6 HOURS PRN
Status: DISCONTINUED | OUTPATIENT
Start: 2018-07-07 | End: 2018-01-01 | Stop reason: HOSPADM

## 2018-07-07 RX ORDER — LEVOTHYROXINE SODIUM 0.1 MG/1
100 TABLET ORAL DAILY
Status: DISCONTINUED | OUTPATIENT
Start: 2018-07-07 | End: 2018-01-01 | Stop reason: HOSPADM

## 2018-07-07 RX ORDER — NICOTINE POLACRILEX 4 MG
15 LOZENGE BUCCAL PRN
Status: DISCONTINUED | OUTPATIENT
Start: 2018-07-07 | End: 2018-01-01 | Stop reason: HOSPADM

## 2018-07-07 RX ORDER — SODIUM CHLORIDE 0.9 % (FLUSH) 0.9 %
10 SYRINGE (ML) INJECTION EVERY 12 HOURS SCHEDULED
Status: DISCONTINUED | OUTPATIENT
Start: 2018-07-07 | End: 2018-01-01 | Stop reason: HOSPADM

## 2018-07-07 RX ORDER — ALBUTEROL SULFATE 90 UG/1
2 AEROSOL, METERED RESPIRATORY (INHALATION) EVERY 6 HOURS PRN
Status: DISCONTINUED | OUTPATIENT
Start: 2018-07-07 | End: 2018-01-01 | Stop reason: HOSPADM

## 2018-07-07 RX ORDER — MORPHINE SULFATE 2 MG/ML
2 INJECTION, SOLUTION INTRAMUSCULAR; INTRAVENOUS ONCE
Status: COMPLETED | OUTPATIENT
Start: 2018-07-07 | End: 2018-07-07

## 2018-07-07 RX ORDER — BACLOFEN 10 MG/1
10 TABLET ORAL 2 TIMES DAILY
Status: ON HOLD | COMMUNITY
End: 2019-01-01 | Stop reason: SDUPTHER

## 2018-07-07 RX ORDER — HYDRALAZINE HYDROCHLORIDE 50 MG/1
50 TABLET, FILM COATED ORAL 3 TIMES DAILY
Status: DISCONTINUED | OUTPATIENT
Start: 2018-07-07 | End: 2018-01-01 | Stop reason: HOSPADM

## 2018-07-07 RX ORDER — ASPIRIN 81 MG/1
81 TABLET ORAL DAILY
Status: DISCONTINUED | OUTPATIENT
Start: 2018-01-01 | End: 2018-01-01 | Stop reason: HOSPADM

## 2018-07-07 RX ORDER — ONDANSETRON 2 MG/ML
4 INJECTION INTRAMUSCULAR; INTRAVENOUS ONCE
Status: COMPLETED | OUTPATIENT
Start: 2018-07-07 | End: 2018-07-07

## 2018-07-07 RX ORDER — ISOSORBIDE MONONITRATE 60 MG/1
60 TABLET, EXTENDED RELEASE ORAL DAILY
Status: DISCONTINUED | OUTPATIENT
Start: 2018-01-01 | End: 2018-01-01 | Stop reason: HOSPADM

## 2018-07-07 RX ORDER — SUCRALFATE 1 G/1
1 TABLET ORAL 4 TIMES DAILY
COMMUNITY
End: 2018-01-01 | Stop reason: ALTCHOICE

## 2018-07-07 RX ORDER — ATORVASTATIN CALCIUM 80 MG/1
80 TABLET, FILM COATED ORAL NIGHTLY
Status: DISCONTINUED | OUTPATIENT
Start: 2018-01-01 | End: 2018-01-01 | Stop reason: HOSPADM

## 2018-07-07 RX ORDER — POTASSIUM CHLORIDE 20 MEQ/1
40 TABLET, EXTENDED RELEASE ORAL PRN
Status: DISCONTINUED | OUTPATIENT
Start: 2018-07-07 | End: 2018-01-01 | Stop reason: HOSPADM

## 2018-07-07 RX ORDER — FLUTICASONE PROPIONATE 50 MCG
1 SPRAY, SUSPENSION (ML) NASAL DAILY
Status: DISCONTINUED | OUTPATIENT
Start: 2018-07-07 | End: 2018-01-01 | Stop reason: HOSPADM

## 2018-07-07 RX ORDER — SODIUM CHLORIDE 450 MG/100ML
INJECTION, SOLUTION INTRAVENOUS CONTINUOUS
Status: DISCONTINUED | OUTPATIENT
Start: 2018-07-07 | End: 2018-01-01

## 2018-07-07 RX ORDER — DEXTROSE MONOHYDRATE 25 G/50ML
12.5 INJECTION, SOLUTION INTRAVENOUS PRN
Status: DISCONTINUED | OUTPATIENT
Start: 2018-07-07 | End: 2018-01-01 | Stop reason: HOSPADM

## 2018-07-07 RX ORDER — PANTOPRAZOLE SODIUM 40 MG/1
40 TABLET, DELAYED RELEASE ORAL
Status: DISCONTINUED | OUTPATIENT
Start: 2018-01-01 | End: 2018-01-01 | Stop reason: HOSPADM

## 2018-07-07 RX ORDER — ESCITALOPRAM OXALATE 20 MG/1
20 TABLET ORAL EVERY MORNING
Status: DISCONTINUED | OUTPATIENT
Start: 2018-01-01 | End: 2018-01-01 | Stop reason: HOSPADM

## 2018-07-07 RX ORDER — ACETAMINOPHEN AND CODEINE PHOSPHATE 300; 30 MG/1; MG/1
1 TABLET ORAL 2 TIMES DAILY PRN
Status: ON HOLD | COMMUNITY
End: 2018-01-01 | Stop reason: HOSPADM

## 2018-07-07 RX ADMIN — DOXEPIN HYDROCHLORIDE 150 MG: 50 CAPSULE ORAL at 20:38

## 2018-07-07 RX ADMIN — RISPERIDONE 1 MG: 1 TABLET ORAL at 15:54

## 2018-07-07 RX ADMIN — RANOLAZINE 500 MG: 500 TABLET, FILM COATED, EXTENDED RELEASE ORAL at 20:38

## 2018-07-07 RX ADMIN — METOPROLOL TARTRATE 75 MG: 25 TABLET ORAL at 20:38

## 2018-07-07 RX ADMIN — Medication 2 PUFF: at 19:53

## 2018-07-07 RX ADMIN — MORPHINE SULFATE 2 MG: 2 INJECTION, SOLUTION INTRAMUSCULAR; INTRAVENOUS at 15:55

## 2018-07-07 RX ADMIN — LEVOTHYROXINE SODIUM 100 MCG: 100 TABLET ORAL at 15:55

## 2018-07-07 RX ADMIN — MICONAZOLE NITRATE: 2 POWDER TOPICAL at 23:13

## 2018-07-07 RX ADMIN — MORPHINE SULFATE 2 MG: 2 INJECTION, SOLUTION INTRAMUSCULAR; INTRAVENOUS at 06:44

## 2018-07-07 RX ADMIN — OXCARBAZEPINE 300 MG: 300 TABLET ORAL at 20:38

## 2018-07-07 RX ADMIN — SODIUM CHLORIDE: 4.5 INJECTION, SOLUTION INTRAVENOUS at 15:55

## 2018-07-07 RX ADMIN — ONDANSETRON 4 MG: 2 INJECTION INTRAMUSCULAR; INTRAVENOUS at 06:44

## 2018-07-07 RX ADMIN — RISPERIDONE 1.5 MG: 1 TABLET ORAL at 20:38

## 2018-07-07 RX ADMIN — HYDRALAZINE HYDROCHLORIDE 50 MG: 50 TABLET, FILM COATED ORAL at 20:38

## 2018-07-07 RX ADMIN — MORPHINE SULFATE 2 MG: 2 INJECTION, SOLUTION INTRAMUSCULAR; INTRAVENOUS at 15:58

## 2018-07-07 RX ADMIN — MONTELUKAST SODIUM 10 MG: 10 TABLET, FILM COATED ORAL at 20:38

## 2018-07-07 RX ADMIN — OXCARBAZEPINE 150 MG: 300 TABLET ORAL at 15:38

## 2018-07-07 RX ADMIN — FLUTICASONE PROPIONATE 1 SPRAY: 50 SPRAY, METERED NASAL at 15:55

## 2018-07-07 RX ADMIN — VALSARTAN 320 MG: 320 TABLET ORAL at 15:54

## 2018-07-07 RX ADMIN — IOPAMIDOL 80 ML: 755 INJECTION, SOLUTION INTRAVENOUS at 09:35

## 2018-07-07 ASSESSMENT — ENCOUNTER SYMPTOMS
ABDOMINAL PAIN: 1
ABDOMINAL DISTENTION: 1
CHOKING: 0
SHORTNESS OF BREATH: 1
COLOR CHANGE: 0
EYE REDNESS: 0
CHEST TIGHTNESS: 0
VOMITING: 1
RHINORRHEA: 0
BLOOD IN STOOL: 0
SINUS PRESSURE: 0
DIARRHEA: 0
COUGH: 0
SHORTNESS OF BREATH: 0
EYE DISCHARGE: 0
NAUSEA: 1
TROUBLE SWALLOWING: 0
SINUS PAIN: 0
EYE PAIN: 0
BACK PAIN: 1
BACK PAIN: 0
WHEEZING: 0
SORE THROAT: 0
CONSTIPATION: 0

## 2018-07-07 ASSESSMENT — PAIN DESCRIPTION - PAIN TYPE
TYPE: ACUTE PAIN
TYPE: ACUTE PAIN

## 2018-07-07 ASSESSMENT — PAIN DESCRIPTION - LOCATION
LOCATION: ABDOMEN
LOCATION: ABDOMEN

## 2018-07-07 ASSESSMENT — PAIN DESCRIPTION - ONSET: ONSET: ON-GOING

## 2018-07-07 ASSESSMENT — PAIN SCALES - GENERAL
PAINLEVEL_OUTOF10: 7
PAINLEVEL_OUTOF10: 0
PAINLEVEL_OUTOF10: 7
PAINLEVEL_OUTOF10: 8
PAINLEVEL_OUTOF10: 3
PAINLEVEL_OUTOF10: 9
PAINLEVEL_OUTOF10: 7
PAINLEVEL_OUTOF10: 6

## 2018-07-07 ASSESSMENT — PAIN DESCRIPTION - DESCRIPTORS
DESCRIPTORS: PRESSURE
DESCRIPTORS: ACHING;CRAMPING

## 2018-07-07 ASSESSMENT — PAIN DESCRIPTION - FREQUENCY
FREQUENCY: INTERMITTENT
FREQUENCY: CONTINUOUS

## 2018-07-07 ASSESSMENT — PAIN DESCRIPTION - ORIENTATION: ORIENTATION: LOWER;MID

## 2018-07-07 NOTE — PROGRESS NOTES
Intake vs Estimated Needs: Intake Less Than Needs    Nutrition Risk Level: High    Nutrition Interventions:   Continue NPO  Continued Inpatient Monitoring, Education Not Indicated    Nutrition Evaluation:   · Evaluation: Goals set   · Goals: Pt will receive adequate nutrition in 1-4 days. · Monitoring: NPO Status, Diet Progression, Skin Integrity, Ascites/Edema, Weight, Pertinent Labs, Nausea or Vomiting, Diarrhea, Chewing/Swallowing    See Adult Nutrition Doc Flowsheet for more detail.      Electronically signed by Tanner Garcia RD, LD on 7/7/18 at 2:04 PM    Contact Number: [phone]

## 2018-07-07 NOTE — H&P
History & Physical      PCP: ASHLEY Maurice Ra, CNP    Date of Admission: 7/7/2018    Date of Service: 7/7/18    Chief Complaint:  Abd pain    History Of Present Illness:    History obtained from chart review and the patient. 67 y.o. female who presented to [facility] with complaint of abd pain with nausea and vomiting that began last night around 2200. She felt fine until after dinner when she started to feel sick. She cited the pain as being lower abd constant and achy. When seen she notes feeling better, says she is hungry and hoping to get to eat soon. Past Medical History:          Diagnosis Date    Anemia     Anxiety     Arthritis     general    AS (aortic stenosis): mild to moderate by echo 4/2017 9/6/2017    ASHD (arteriosclerotic heart disease)     Asthma     CAD (coronary artery disease)     Chest pain     CHF (congestive heart failure) (HCC)     COPD (chronic obstructive pulmonary disease) (HCC)     Depression     DM (diabetes mellitus) ([address]     Dyspnea     FH: CAD (coronary artery disease)     GERD (gastroesophageal reflux disease)     Heart burn     History of blood transfusion     1973    History of tobacco abuse: Quit in 2009 10/28/2014    HLD (hyperlipidemia)     HTN (hypertension)     Irritable bowel syndrome     States has not had problem with this for years    Liver disease     fatty liver    Metabolic syndrome 22/01/2017    MI (myocardial infarction)     Nausea & vomiting     Obesity     CHET (obstructive sleep apnea)     S/P cardiac catheterization: 11/6/2014: 99% in-stent restenosis mid-RCA. LCx moderate LI's. LAD 85% mid-segment lesion. 11/6/2014 11/6/2014: 99% in-stent restenosis mid-RCA. LCx moderate LI's. LAD 85% mid-segment lesion. Dr. Sumi Guzman S/P PTCA:  5/11/2004: Proximal and mid RCA  Taxus 3.5 X 20 mm, and Taxus 3.0 X 32 mm. 10/28/2014    5/11/2004: Proximal and mid RCA  Taxus 3.5 X 20 mm, and Taxus 3.0 X 32 mm.  Dr. Pipo Tipton  Systolic murmur 50/55/3355    Thyroid disease        Past Surgical History:          Procedure Laterality Date    BACK SURGERY  08/2016        BLADDER SUSPENSION      BREAST BIOPSY  10/10/2017    BREAST LUMPECTOMY      CARDIAC CATHETERIZATION  8-11-06    Mild nonobstructive CAD w/ diffuse 10-20% proximal and mid LAD stenosis and 10-20% proximal/ostial RCA stenosis. No obstructive lesions. RCA stents are patent w/o evidence of restenosis. Normal LV systolic function. EF 65%. Trace MR - catheter induced. Minimally elevated LVEDP - 13mmHg. Mild to moderate aortoiliac PVD w/o obstructive lesions.  CARDIAC CATHETERIZATION  5-11-04    Successful drug-eluting stent x 2 of proximal and mid RCA.  CARDIAC CATHETERIZATION  5-11-04    70-80% proximal RCA stenosis w/ 50-70% mid RCA stenosis. There is 50-60% lesion in mid PDA. Mild proximal and mid LAD disease. Mild circumflex disease. Normal LV size and systolic function. EF 60%.  CARDIOVASCULAR STRESS TEST  5-10-11    No evidence of stress induced ischemia. EF 75%.  CARDIOVASCULAR STRESS TEST  5-10-10    No evidence of stress induced ischemia, infarct or scar. EF 74%.  CERVICAL FUSION N/A 11/15/2017    REMOVAL OF PLATE O4-3, ACDF H7-8 W/ATLANTIS CORNERSTONE performed by Kike Lamas MD at [facility]      ENDOSCOPY, COLON, DIAGNOSTIC      FOOT SURGERY      HERNIA REPAIR      HYSTERECTOMY      NECK SURGERY  FEB 2016    PARTIAL HYSTERECTOMY      UPPER GASTROINTESTINAL ENDOSCOPY      UPPER GASTROINTESTINAL ENDOSCOPY         Medications Prior to Admission:      Prior to Admission medications    Medication Sig Start Date End Date Taking?  Authorizing Provider   levothyroxine (SYNTHROID) 100 MCG tablet take 1 tablet by mouth once daily 7/3/18   Verner Few, APRN - CNP   ferrous sulfate (FE TABS) 325 (65 Fe) MG EC tablet Take 1 tablet by mouth 3 times daily (with meals) 6/22/18   Verner Few, APRN - CNP levothyroxine (SYNTHROID) 125 MCG tablet take 1 tablet by mouth once daily 6/22/18   ASHLEY Eli CNP   Misc. Devices MISC Pt needs an oxygen tank carrier for her wheelchair 6/20/18   ASHLEY Eli CNP   sodium chloride 1 g tablet Take 1 tablet by mouth 3 times daily for 5 days 6/17/18 6/22/18  Jamie Ellis PA-C   Calcium Carbonate-Vitamin D (OYSTER SHELL CALCIUM/D) 500-200 MG-UNIT TABS take 1 tablet by mouth twice a day 6/11/18   ASHLEY Eli CNP   RA COL-RITE 100 MG capsule take 3 capsules by mouth every evening 5/29/18   ASHLEY Eli CNP   HYDROcodone-acetaminophen (NORCO) 5-325 MG per tablet Take 1 tablet by mouth every 6 hours as needed for Pain. Meli Joyce Historical Provider, MD   hydrALAZINE (APRESOLINE) 50 MG tablet Take 1.5 tablets by mouth 3 times daily take 1.5 tablets by mouth three times a day 5/16/18   ASHLEY Prado CNP   escitalopram (LEXAPRO) 10 MG tablet Take 10 mg by mouth every morning    Historical Provider, MD   furosemide (LASIX) 20 MG tablet Take 1 tablet by mouth 2 times daily 4/26/18   ASHLEY Prado CNP   potassium chloride (KLOR-CON 10) 10 MEQ extended release tablet Take 1 tablet by mouth daily 4/26/18   ASHLEY Prado CNP   SYMBICORT 160-4.5 MCG/ACT AERO inhale 2 puffs by mouth twice a day 4/21/18   Hazle Litten, APRN - CNP   metoprolol tartrate (LOPRESSOR) 50 MG tablet Take 1 tablet by mouth 2 times daily 4/18/18 5/18/18  Kwaku Hough MD   traMADol (ULTRAM) 50 MG tablet Take 50 mg by mouth every 6 hours as needed for Pain. Meli Joyce     Historical Provider, MD   acetaminophen (TYLENOL) 500 MG tablet Take 500 mg by mouth every 6 hours as needed for Pain    Historical Provider, MD   isosorbide mononitrate (IMDUR) 60 MG extended release tablet Take 1 tablet by mouth 2 times daily 3/15/18   Cami Nesbitt MD   aspirin (ASPIRIN LOW DOSE) 81 MG EC tablet take 1 tablet by mouth once daily 3/7/18   Cami Nesbitt MD ranolazine (RANEXA) 500 MG extended release tablet take 1 tablet by mouth twice a day 3/7/18   Soledad Oliveira MD   valsartan (DIOVAN) 320 MG tablet Take 1 tablet by mouth daily 3/7/18   Soledad Oliveira MD   albuterol sulfate HFA (VENTOLIN HFA) 108 (90 Base) MCG/ACT inhaler Inhale 2 puffs into the lungs every 6 hours as needed for Wheezing 2/21/18   ASHLEY Zapata CNP   fluticasone (FLONASE) 50 MCG/ACT nasal spray 1 spray by Nasal route daily 1/23/18   ASHLEY Zapata CNP   guaiFENesin (MUCINEX) 600 MG extended release tablet Take 1 tablet by mouth 2 times daily 12/13/17   ASHLEY Nicholson CNP   sodium chloride (ALTAMIST SPRAY) 0.65 % nasal spray 1 spray by Nasal route as needed for Congestion 11/29/17   ASHLEY Nicholson CNP   rosuvastatin (CRESTOR) 20 MG tablet Take 20 mg by mouth daily    Historical Provider, MD   polyvinyl alcohol (LIQUIFILM TEARS) 1.4 % ophthalmic solution Place 1 drop into both eyes as needed 2-4 times daily    Historical Provider, MD   cycloSPORINE (RESTASIS) 0.05 % ophthalmic emulsion Place 1 drop into both eyes 2 times daily    Historical Provider, MD   hydrOXYzine (ATARAX) 25 MG tablet Take 25 mg by mouth 3 times daily    Historical Provider, MD   glucose blood VI test strips (GLUCOSE METER TEST) strip 1 each by In Vitro route daily Check blood sugar q daily Dx E11.69 11/9/17   Casandra Arboleda DO   Lancets MISC Check blood sugar q daily Dx E11.69 11/9/17   Casandra Arboleda DO   Blood Glucose Monitoring Suppl HARVINDER Check blood sugar q daily Dx E11.69 11/9/17   Casandra Arboleda,    Multiple Vitamins-Minerals (MULTIVITAMIN-MINERALS) TABS tablet take 1 tablet by mouth once daily 9/27/17   ASHLEY Nicholson CNP   clopidogrel (PLAVIX) 75 MG tablet take 1 tablet by mouth once daily 9/6/17   Soledad Oliveira MD   nystatin (MYCOSTATIN) 200065 UNIT/GM cream Apply topically 2 times daily.  8/17/17   Ramer Ryan, APRN - CNP   benzocaine (ANBESOL) 10 % mucosal gel Take by mouth as needed. 7/18/17   ASHLEY Taveras CNP   SINGULAIR 10 MG tablet Take 1 tablet by mouth nightly 5/1/17   Barry Lorenz MD   OXYGEN Inhale 2 L into the lungs continuous     Historical Provider, MD   doxepin (SINEQUAN) 150 MG capsule Take 150 mg by mouth nightly    Historical Provider, MD   OXcarbazepine (TRILEPTAL) 150 MG tablet Take 150 mg by mouth 2 times daily One tab q am. And two tabs q pm    Historical Provider, MD   polyethylene glycol (GLYCOLAX) powder take 17GM (DISSOLVED IN WATER) by mouth once daily 8/29/16   ASHLEY Taveras CNP   ONE TOUCH ULTRASOFT LANCETS MISC use as directed BEFORE BREAKFAST AND SUPPER 10/15/14   ASHLEY Taveras CNP   pantoprazole (PROTONIX) 40 MG tablet Take 20 mg by mouth every morning (before breakfast)     Historical Provider, MD   risperiDONE (RISPERDAL) 1 MG tablet Take 1 mg by mouth 2 times daily Take 1 tab every morning and 1.5 tabs every night    Historical Provider, MD       Allergies:  Codeine; Lipitor [atorvastatin]; and Motrin [ibuprofen micronized]    Social History:      The patient currently lives at home    TOBACCO:   reports that she quit smoking about 11 years ago. Her smoking use included Cigarettes. She has a 38.00 pack-year smoking history. She has never used smokeless tobacco.  ETOH:   reports that she does not drink alcohol. Family History:      Reviewed in detail. Positive as follows:    Family History   Problem Relation Age of Onset    Heart Disease Mother     Heart Disease Father     Kidney Disease Sister     Cancer Sister     Heart Disease Brother     Heart Disease Brother     Heart Disease Brother     Breast Cancer Child 36    Cancer Sister 36        rectal    Ovarian Cancer Other 25       REVIEW OF SYSTEMS:   10 point review of system performed, pertinent positives as noted in the HPI, all other systems negative/at baseline.     PHYSICAL EXAM:    BP (!) 123/56   Pulse 100   Temp 98.1 °F (36.7 °C) (Oral)   Resp 17   Ht 5' 6\" (1.676 m)   Wt 230 lb (104.3 kg)   SpO2 95%   BMI 37.12 kg/m²       General appearance:  No apparent distress, appears stated age and cooperative. HEENT:  Normal cephalic, atraumatic without obvious deformity. Pupils equal, round, and reactive to light. Extra ocular muscles intact. Conjunctivae/corneas clear. Neck: Supple, with full range of motion. No jugular venous distention. Trachea midline. Respiratory:  Normal respiratory effort. Clear to auscultation, bilaterally without Rales/Wheezes/Rhonchi. Cardiovascular:  Regular rate and rhythm with normal S1/S2 without  rubs or gallops. + 3/6 sys murmur  Abdomen: Soft, non-tender, non-distended with NGT to LIWS. Musculoskeletal:  No clubbing, cyanosis or edema bilaterally. Full range of motion without deformity. Skin: Skin color, texture, turgor normal.  No rashes or lesions. Neurologic:  Neurovascularly intact without any focal sensory/motor deficits. Cranial nerves: II-XII intact, grossly non-focal.  Psychiatric:  Alert and oriented, thought content appropriate, normal insight  Capillary Refill: Brisk,< 3 seconds   Peripheral Pulses: +2 palpable, equal bilaterally       Labs:     Recent Labs      07/07/18   0607   WBC  9.6   HGB  11.5*   HCT  35.7*   PLT  201     Recent Labs      07/07/18   0607   NA  122*   K  4.5   CL  81*   CO2  25   BUN  12   CREATININE  0.7   CALCIUM  10.3     Recent Labs      07/07/18   0607   AST  39   ALT  46   BILITOT  0.3   ALKPHOS  57     No results for input(s): INR in the last 72 hours. No results for input(s): Ellender Titus in the last 72 hours.     Urinalysis:      Lab Results   Component Value Date    NITRU NEGATIVE 07/07/2018    WBCUA NONE SEEN 07/07/2018    BACTERIA NONE 07/07/2018    RBCUA 0-2 07/07/2018    BLOODU NEGATIVE 07/07/2018    SPECGRAV 1.010 07/28/2016    SPECGRAV 1.006 02/02/2016    GLUCOSEU NEGATIVE 07/07/2018       Radiology:   I have reviewed the imaging studies with the following interpretation:  CT ABDOMEN PELVIS W IV CONTRAST Additional Contrast? Oral (4 dose if tolerates)   Final Result       1. Small bowel obstruction. The stomach and proximal small bowel loops are distended with gas. The transition point is difficult to identify. 2. Diverticulosis. 3. Small pericardial effusion. 4. Stable enlargement of the left ovary. **This report has been created using voice recognition software. It may contain minor errors which are inherent in voice recognition technology. **      Final report electronically signed by Dr. Gianni Turcios on 7/7/2018 10:38 AM      XR ABDOMEN (KUB) (SINGLE AP VIEW)    (Results Pending)       EKG:  I have reviewed the EKG with the following interpretation: Normal sinus rhythm  Biatrial enlargement  Cannot rule out Anterior infarct , age undetermined  Abnormal ECG  When compared with ECG of 16-APR-2018 11:05,  No significant change was found  Confirmed by Mukesh Jones MD, Stevie Cogan ([id number]) on 7/8/2018 12:56:41 AM    Diet:  Diet NPO Effective Now    DVT prophylaxis: [] Lovenox                                 [x] SCDs                                 [] SQ Heparin                                 [] Encourage ambulation           [] Already on Anticoagulation    Code Status: Full Code      PT/OT Eval Status: no    Disposition:    [x] Home       [] TCU       [] Rehab       [] Psych       [] SNF       [] [place]       [] Other-       Assessment and Plan:    Principal Problem:    SBO (small bowel obstruction)  Active Problems:    COPD (chronic obstructive pulmonary disease) ([facility] [address]    Essential hypertension    Hypothyroid    CAD (coronary artery disease)    GERD (gastroesophageal reflux disease)    S/P PTCA: 3/2/2017: PTCA RCA ISR. 12/11/2014: LAD Resolute 3.0X26. 11/6/2014: Stenting RCA Brenda Aw 1.3J86 and 3.5X18. 5/11/2004: Proximal & mid RCA Taxus 3.5X20 mm, and Taxus 3.0X32 mm.     ASCVD (arteriosclerotic cardiovascular disease)    AS (aortic stenosis): mild to moderate by echo 4/2017    Chronic diastolic heart failure (HCC)    Anxiety and depression  Resolved Problems:    * No resolved hospital problems. *        · Admit for SBO  · NGT to LIWS  · Repeat KUB in the AM  · IVF 1/2NS at 75mL/hr, recheck sodium level at 1500 - patient has noted chronic hyponatremia so not trying to rapidly change this, can consider salt tablets once tolerating PO again  · Continue some home medications as able to tolerate, can consider stopping PO medications if KUB worse in the AM      Thank you ASHLEY Hunter CNP for the opportunity to be involved in this patient's care.     Electronically signed by Kortney Burch DO on 7/7/2018 at 11:02 AM

## 2018-07-07 NOTE — ED NOTES
NG was placed. Pt tolerated it OK. Pt request to call family and let them know she is getting admitted.  Will attempt     Saadia Bella RN  07/07/18 [id number]

## 2018-07-07 NOTE — PROGRESS NOTES
Patient stated she was told in the past that she has a blockage in her left arm. She stated no blood pressures are aloud to be taken. Limb alert band placed on left arm.

## 2018-07-07 NOTE — ED PROVIDER NOTES
[facility]  eMERGENCY dEPARTMENT eNCOUnter          CHIEF COMPLAINT       Chief Complaint   Patient presents with    Abdominal Pain    Emesis       Nurses Notes reviewed and I agree except as noted in the HPI. HISTORY OF PRESENT ILLNESS    Xavi Pretty is a 67 y.o. female who presents to the Emergency Department via EMS for the evaluation of abdominal pain. The patient states she started having nausea and vomiting last night around 10:00PM. She states she felt fine yesterday afternoon, and started feeling sick after dinner. She states her abdominal pain is in her lower abdomen. Patient rates her pain as a 9/10 in severity. She describes her constant pain as achy in character. Patient states her pain occasionally radiates to her back, but denies it radiating now. Patient states she called EMS last night, but decided not to go hoping it would pass. She denies diarrhea, dysuria, or flank pain. The patient states she wears depends due to being incontinence, which is normal. She states she takes Tylenol with Codeine for her chronic neck and back pain. She has had surgery on both, and states her last one was in November of 2017. The patient has a history of fatty liver disease. She is ion blood thinners due to her heart disease. Patient states she has 5 stents. She states she has had a hysterectomy, but denies an appendectomy. No further complaints at the time of the initial encounter. The HPI was provided by the patient. REVIEW OF SYSTEMS     Review of Systems   Constitutional: Negative for appetite change, chills, fatigue and fever. HENT: Negative for congestion, ear pain, rhinorrhea and sore throat. Eyes: Negative for pain, discharge and visual disturbance. Respiratory: Negative for cough, shortness of breath and wheezing. Cardiovascular: Negative for chest pain, palpitations and leg swelling. Gastrointestinal: Positive for abdominal pain, nausea and vomiting.  Negative for diarrhea. Genitourinary: Negative for difficulty urinating, dysuria, hematuria and vaginal discharge. Musculoskeletal: Negative for arthralgias, back pain, joint swelling and neck pain. Skin: Negative for pallor and rash. Neurological: Negative for dizziness, syncope, weakness, light-headedness, numbness and headaches. Hematological: Negative for adenopathy. Psychiatric/Behavioral: Negative for confusion and suicidal ideas. The patient is not nervous/anxious. PAST MEDICAL HISTORY    has a past medical history of Anemia; Anxiety; Arthritis; AS (aortic stenosis): mild to moderate by echo 4/2017; ASHD (arteriosclerotic heart disease); Asthma; CAD (coronary artery disease); Chest pain; CHF (congestive heart failure) ([facility] [address] COPD (chronic obstructive pulmonary disease) ([facility] [address] Depression; DM (diabetes mellitus) ([facility] [address] Dyspnea; FH: CAD (coronary artery disease); GERD (gastroesophageal reflux disease); Heart burn; History of blood transfusion; History of tobacco abuse: Quit in 2009; HLD (hyperlipidemia); HTN (hypertension); Irritable bowel syndrome; Liver disease; Metabolic syndrome; MI (myocardial infarction); Nausea & vomiting; Obesity; CHET (obstructive sleep apnea); S/P cardiac catheterization: 11/6/2014: 99% in-stent restenosis mid-RCA. LCx moderate LI's. LAD 85% mid-segment lesion.; S/P PTCA:  5/11/2004: Proximal and mid RCA  Taxus 3.5 X 20 mm, and Taxus 3.0 X 32 mm.; Systolic murmur; and Thyroid disease. SURGICAL HISTORY      has a past surgical history that includes cardiovascular stress test (5-10-11); cardiovascular stress test (5-10-10); Cardiac catheterization (8-11-06); Cardiac catheterization (5-11-04); Cardiac catheterization (5-11-04); bladder suspension; hernia repair; Breast lumpectomy; Foot surgery; Cholecystectomy; Colonoscopy; Upper gastrointestinal endoscopy; Endoscopy, colon, diagnostic; partial hysterectomy (cervix not removed); Upper gastrointestinal endoscopy;  Neck surgery (FEB 2016); back surgery (08/2016); Breast biopsy (10/10/2017); cervical fusion (N/A, 11/15/2017); and Hysterectomy. CURRENT MEDICATIONS       Previous Medications    ACETAMINOPHEN (TYLENOL) 500 MG TABLET    Take 500 mg by mouth every 6 hours as needed for Pain    ALBUTEROL SULFATE HFA (VENTOLIN HFA) 108 (90 BASE) MCG/ACT INHALER    Inhale 2 puffs into the lungs every 6 hours as needed for Wheezing    ASPIRIN (ASPIRIN LOW DOSE) 81 MG EC TABLET    take 1 tablet by mouth once daily    BENZOCAINE (ANBESOL) 10 % MUCOSAL GEL    Take by mouth as needed. BLOOD GLUCOSE MONITORING SUPPL HARVINDER    Check blood sugar q daily Dx E11.69    CALCIUM CARBONATE-VITAMIN D (OYSTER SHELL CALCIUM/D) 500-200 MG-UNIT TABS    take 1 tablet by mouth twice a day    CLOPIDOGREL (PLAVIX) 75 MG TABLET    take 1 tablet by mouth once daily    CYCLOSPORINE (RESTASIS) 0.05 % OPHTHALMIC EMULSION    Place 1 drop into both eyes 2 times daily    DOXEPIN (SINEQUAN) 150 MG CAPSULE    Take 150 mg by mouth nightly    ESCITALOPRAM (LEXAPRO) 10 MG TABLET    Take 10 mg by mouth every morning    FERROUS SULFATE (FE TABS) 325 (65 FE) MG EC TABLET    Take 1 tablet by mouth 3 times daily (with meals)    FLUTICASONE (FLONASE) 50 MCG/ACT NASAL SPRAY    1 spray by Nasal route daily    FUROSEMIDE (LASIX) 20 MG TABLET    Take 1 tablet by mouth 2 times daily    GLUCOSE BLOOD VI TEST STRIPS (GLUCOSE METER TEST) STRIP    1 each by In Vitro route daily Check blood sugar q daily Dx E11.69    GUAIFENESIN (MUCINEX) 600 MG EXTENDED RELEASE TABLET    Take 1 tablet by mouth 2 times daily    HYDRALAZINE (APRESOLINE) 50 MG TABLET    Take 1.5 tablets by mouth 3 times daily take 1.5 tablets by mouth three times a day    HYDROCODONE-ACETAMINOPHEN (NORCO) 5-325 MG PER TABLET    Take 1 tablet by mouth every 6 hours as needed for Pain. Dewane Newness     HYDROXYZINE (ATARAX) 25 MG TABLET    Take 25 mg by mouth 3 times daily    ISOSORBIDE MONONITRATE (IMDUR) 60 MG EXTENDED RELEASE TABLET    Take 1 tablet by mouth 2 times daily    LANCETS MISC    Check blood sugar q daily Dx E11.69    LEVOTHYROXINE (SYNTHROID) 100 MCG TABLET    take 1 tablet by mouth once daily    LEVOTHYROXINE (SYNTHROID) 125 MCG TABLET    take 1 tablet by mouth once daily    METOPROLOL TARTRATE (LOPRESSOR) 50 MG TABLET    Take 1 tablet by mouth 2 times daily    MISC. DEVICES MISC    Pt needs an oxygen tank carrier for her wheelchair    MULTIPLE VITAMINS-MINERALS (MULTIVITAMIN-MINERALS) TABS TABLET    take 1 tablet by mouth once daily    NYSTATIN (MYCOSTATIN) 183390 UNIT/GM CREAM    Apply topically 2 times daily.     ONE TOUCH ULTRASOFT LANCETS MISC    use as directed BEFORE BREAKFAST AND SUPPER    OXCARBAZEPINE (TRILEPTAL) 150 MG TABLET    Take 150 mg by mouth 2 times daily One tab q am. And two tabs q pm    OXYGEN    Inhale 2 L into the lungs continuous     PANTOPRAZOLE (PROTONIX) 40 MG TABLET    Take 20 mg by mouth every morning (before breakfast)     POLYETHYLENE GLYCOL (GLYCOLAX) POWDER    take 17GM (DISSOLVED IN WATER) by mouth once daily    POLYVINYL ALCOHOL (LIQUIFILM TEARS) 1.4 % OPHTHALMIC SOLUTION    Place 1 drop into both eyes as needed 2-4 times daily    POTASSIUM CHLORIDE (KLOR-CON 10) 10 MEQ EXTENDED RELEASE TABLET    Take 1 tablet by mouth daily    RA COL-RITE 100 MG CAPSULE    take 3 capsules by mouth every evening    RANOLAZINE (RANEXA) 500 MG EXTENDED RELEASE TABLET    take 1 tablet by mouth twice a day    RISPERIDONE (RISPERDAL) 1 MG TABLET    Take 1 mg by mouth 2 times daily Take 1 tab every morning and 1.5 tabs every night    ROSUVASTATIN (CRESTOR) 20 MG TABLET    Take 20 mg by mouth daily    SINGULAIR 10 MG TABLET    Take 1 tablet by mouth nightly    SODIUM CHLORIDE (ALTAMIST SPRAY) 0.65 % NASAL SPRAY    1 spray by Nasal route as needed for Congestion    SODIUM CHLORIDE 1 G TABLET    Take 1 tablet by mouth 3 times daily for 5 days    SYMBICORT 160-4.5 MCG/ACT AERO    inhale 2 puffs by mouth twice a day    TRAMADOL (ULTRAM) 50 MG TABLET    Take 50 mg by mouth every 6 hours as needed for Pain.  VALSARTAN (DIOVAN) 320 MG TABLET    Take 1 tablet by mouth daily       ALLERGIES     is allergic to codeine; lipitor [atorvastatin]; and motrin [ibuprofen micronized]. FAMILY HISTORY     indicated that her mother is . She indicated that her father is . She indicated that one of her two sisters is . She indicated that only one of her five brothers is alive. She indicated that the status of her child is unknown. She indicated that her other is alive. family history includes Breast Cancer (age of onset: 36) in her child; Cancer in her sister; Cancer (age of onset: 36) in her sister; Heart Disease in her brother, brother, brother, father, and mother; Kidney Disease in her sister; Ovarian Cancer (age of onset: 22) in an other family member. SOCIAL HISTORY      reports that she quit smoking about 11 years ago. Her smoking use included Cigarettes. She has a 38.00 pack-year smoking history. She has never used smokeless tobacco. She reports that she does not drink alcohol or use drugs. PHYSICAL EXAM     INITIAL VITALS:  height is 5' 6\" (1.676 m) and weight is 230 lb (104.3 kg). Her oral temperature is 98.1 °F (36.7 °C). Her blood pressure is 134/71 and her pulse is 101. Her respiration is 16 and oxygen saturation is 96%. Physical Exam   Constitutional: She is oriented to person, place, and time. She appears well-developed and well-nourished. No distress. HENT:   Head: Normocephalic and atraumatic. Right Ear: External ear normal.   Left Ear: External ear normal.   Eyes: Conjunctivae are normal. Pupils are equal, round, and reactive to light. Neck: Normal range of motion. Neck supple. No thyromegaly present. Cardiovascular: Normal rate and regular rhythm. Murmur heard. Systolic murmur is present with a grade of 5/6   Pulmonary/Chest: Effort normal. No respiratory distress.  She has decreased breath sounds. She has no wheezes. She has no rhonchi. She has no rales. She exhibits no tenderness. Diminished lung sounds. Abdominal: Soft. Bowel sounds are normal. She exhibits distension. There is no tenderness. There is no rebound and no guarding. Active bowel sounds in the upper abdomen, and hypoactive bowel sounds in the lower abdomen. No tenderness to palpation. Musculoskeletal: Normal range of motion. She exhibits no edema. Neurological: She is alert and oriented to person, place, and time. She has normal strength. No cranial nerve deficit or sensory deficit. Skin: Skin is warm and dry. No rash noted. She is not diaphoretic. No erythema. No pallor. Psychiatric: She has a normal mood and affect. Her behavior is normal. Judgment and thought content normal.   Nursing note and vitals reviewed. DIFFERENTIAL DIAGNOSIS:   UTI, bowel obstruction, constipation, hernia, appendicitis    DIAGNOSTIC RESULTS     EKG: All EKG's are interpreted by the Emergency Department Physician who either signs or Co-signs this chart in the absence of a cardiologist.    Kaz Hotter. Rate: 92 bpm  NY interval: 182 ms  QRS duration: 84 ms  QTc: 450 ms  P-R-T axes: 60, 5, 40  Normal sinus rhythm  Bilateral enlargement   No STEMI  Compared to old EKG on 16-APR-2018    RADIOLOGY: non-plain film images(s) such as CT, Ultrasound and MRI are read by the radiologist.    CT ABDOMEN PELVIS W IV CONTRAST Additional Contrast? Oral (4 dose if tolerates)   Final Result       1. Small bowel obstruction. The stomach and proximal small bowel loops are distended with gas. The transition point is difficult to identify. 2. Diverticulosis. 3. Small pericardial effusion. 4. Stable enlargement of the left ovary. **This report has been created using voice recognition software. It may contain minor errors which are inherent in voice recognition technology. **      Final report electronically signed by Dr. Vicky Giles on 7/7/2018 10:38 AM      XR ABDOMEN (KUB) (SINGLE AP VIEW)    (Results Pending)       LABS:     Labs Reviewed   CBC WITH AUTO DIFFERENTIAL - Abnormal; Notable for the following:        Result Value    Hemoglobin 11.5 (*)     Hematocrit 35.7 (*)     MCV 76.9 (*)     MCH 24.8 (*)     RDW-CV 16.5 (*)     RDW-SD 45.4 (*)     MPV 9.0 (*)     Segs Absolute 8.5 (*)     Lymphocytes # 0.5 (*)     All other components within normal limits   COMPREHENSIVE METABOLIC PANEL - Abnormal; Notable for the following:     Glucose 168 (*)     Sodium 122 (*)     Chloride 81 (*)     All other components within normal limits   TSH WITHOUT REFLEX - Abnormal; Notable for the following:     TSH 8.540 (*)     All other components within normal limits   GLOMERULAR FILTRATION RATE, ESTIMATED - Abnormal; Notable for the following:     Est, Glom Filt Rate 82 (*)     All other components within normal limits   OSMOLALITY - Abnormal; Notable for the following:     Osmolality Calc 249.5 (*)     All other components within normal limits   URINE WITH REFLEXED MICRO - Abnormal; Notable for the following:     Protein,  (*)     All other components within normal limits   LIPASE   TROPONIN   ANION GAP   SCAN OF BLOOD SMEAR       EMERGENCY DEPARTMENT COURSE:   Vitals:    Vitals:    07/07/18 0802 07/07/18 0845 07/07/18 0941 07/07/18 1026   BP: 115/63 119/64 (!) 123/56 134/71   Pulse: 94 95 100 101   Resp: 16 17 17 16   Temp:       TempSrc:       SpO2:   95% 96%   Weight:       Height:           6:34 AM: The patient was seen and evaluated. MDM:  The patient presents to the ED with complaints of abdominal pain. The patient was not in any acute distress. The patient's physical exam showed active bowel sounds in the upper abdomen, and hypoactive bowel sounds in the lower abdomen. No tenderness to palpation. Distended abdomen. Grade 5/6 systolic murmur and decreased breath sounds. Within the department, the patient was treated with morphine and Zofran.  Patient's [address] [facility]

## 2018-07-07 NOTE — PLAN OF CARE
Problem: RESPIRATORY  Goal: STG - patient can administer MDI's  Outcome: Ongoing  M in the box started with patient.  Told her we are substitiuting her home symbicort with our dulera

## 2018-07-07 NOTE — ED NOTES
Spoke Austen Ko about mom and being admitted      [facility], [facility]  07/07/18 2399

## 2018-07-07 NOTE — ED TRIAGE NOTES
Pt presents to the ED room 3 by EMS for lower abdominal pain and vomiting that started last evening. Pt states that she had a \"hamburger and have been burping up onions all night\" Pt denies any chest pain and does wear oxygen at home. PT's abdomen appears distended and is semi firm, pt denies tenderness and has active bowel sounds throughout.

## 2018-07-07 NOTE — PROGRESS NOTES
Patient admitted to [address] [place] [address] room 56. Fall and allergy bands placed. Patient oriented to room and call light. NG tube hooked up immed to LIWS. 500ml brown fluid drained out. Bed alarm on.

## 2018-07-07 NOTE — ED NOTES
Bed: 003A  Expected date: 7/7/18  Expected time:   Means of arrival: [employer] Dept  Comments:      Graciela Quintanilla RN  07/07/18 0600

## 2018-07-07 NOTE — PLAN OF CARE
Problem: Nutrition  Goal: Optimal nutrition therapy  Outcome: Ongoing  Nutrition Problem: Inadequate oral intake  Intervention: Food and/or Nutrient Delivery: Continue NPO  Nutritional Goals: Pt will receive adequate nutrition in 1-4 days.

## 2018-07-08 NOTE — PROGRESS NOTES
Galileo Purvis  Daily Progress Note  Pt Name: [facility] Record Number: [id number]  Date of Birth 1945   Today's Date: 7/8/2018    ASSESSMENT  1. Partial SBO  2. Clinically improved, denies abdominal pain    PLAN  1. Continue NG for now on bowel rest.  2. Check follow-up abdominal x-rays. Consider small bowel study. Ramy Aguilera has improved from yesterday. She denies any abdominal pain. However, she states she is passing no flatus. NG minimal out since placement and initial output of 500 mL's. REVIEW OF SYSTEMS:    Review of Systems   Constitutional: Negative for chills and fever. HENT: Negative for congestion and sore throat. Eyes: Negative for redness and itching. Respiratory: Positive for shortness of breath. Negative for cough and choking. Cardiovascular: Negative for chest pain. Gastrointestinal: Positive for abdominal distention. Negative for abdominal pain, constipation and diarrhea. Endocrine: Negative for polydipsia and polyuria. Genitourinary: Negative for dysuria, flank pain and hematuria. Musculoskeletal: Positive for arthralgias, back pain and gait problem. Skin: Negative for pallor and rash. Neurological: Negative for light-headedness and headaches. Hematological: Negative for adenopathy. Bruises/bleeds easily. Psychiatric/Behavioral: Negative for agitation. OBJECTIVE  VITALS:  height is 5' 6\" (1.676 m) and weight is 233 lb (105.7 kg). Her oral temperature is 96.8 °F (36 °C). Her blood pressure is 126/57 (abnormal) and her pulse is 75. Her respiration is 20 and oxygen saturation is 96%. INTAKE/OUTPUT:    Intake/Output Summary (Last 24 hours) at 07/08/18 0554  Last data filed at 07/07/18 1600   Gross per 24 hour   Intake                0 ml   Output              500 ml   Net             -500 ml     Physical Exam   Constitutional: She is oriented to person, place, and time.  She appears Component Value Date     07/08/2018    K 4.2 07/08/2018    CL 87 07/08/2018    CO2 25 07/08/2018    BUN 16 07/08/2018    CREATININE 0.9 07/08/2018    LABGLOM 61 07/08/2018    GLUCOSE 121 07/08/2018    GLUCOSE 101 07/25/2016    PROT 7.7 07/07/2018    PROT 6.5 11/25/2016    LABALBU 4.9 07/07/2018    CALCIUM 9.0 07/08/2018    BILITOT 0.3 07/07/2018    ALKPHOS 57 07/07/2018    AST 39 07/07/2018    ALT 46 07/07/2018         Ebony Clarke MD  Electronically signed 7/8/2018 at 3:17 AM

## 2018-07-08 NOTE — PROGRESS NOTES
Hospitalist Progress Note    Patient:  Nura Lorenzo      Unit/Bed:5E-56/056-A    YOB: 1945    MRN: [id number]       Acct: [de-identified]     PCP: ASHLEY Salmeron CNP    Date of Admission: 7/7/2018    Chief Complaint: abdominal pain    Hospital Course: 67 y.o. female who presented to [address] [place] [address] with complaint of abd pain with nausea and vomiting that began last night around 2200. She felt fine until after dinner when she started to feel sick. She cited the pain as being lower abd constant and achy. She was admitted for further management of SBO. Subjective: patient seen and examined, no complaints.       Medications:  Reviewed    Infusion Medications    dextrose       Scheduled Medications    enoxaparin  40 mg Subcutaneous Daily    valsartan  320 mg Oral Daily    mometasone-formoterol  2 puff Inhalation BID    montelukast  10 mg Oral Nightly    atorvastatin  80 mg Oral Nightly    risperiDONE  1 mg Oral Daily    ranolazine  500 mg Oral BID    potassium chloride  10 mEq Oral Daily    pantoprazole  40 mg Oral QAM AC    OXcarbazepine  150 mg Oral Daily    OXcarbazepine  300 mg Oral Nightly    risperiDONE  1.5 mg Oral Nightly    aspirin  81 mg Oral Daily    sodium chloride flush  10 mL Intravenous 2 times per day    metoprolol tartrate  75 mg Oral BID    levothyroxine  100 mcg Oral Daily    isosorbide mononitrate  60 mg Oral Daily    hydrALAZINE  50 mg Oral TID    fluticasone  1 spray Nasal Daily    escitalopram  20 mg Oral QAM    doxepin  150 mg Oral Nightly    insulin lispro  0-3 Units Subcutaneous Q6H    miconazole   Topical BID     PRN Meds: sodium chloride, polyvinyl alcohol, albuterol sulfate HFA, acetaminophen, morphine, sodium chloride flush, potassium chloride **OR** potassium chloride **OR** potassium chloride, magnesium hydroxide, ondansetron, glucose, dextrose, glucagon (rDNA), dextrose      Intake/Output Summary (Last 24 hours) at 07/08/18 [address] [facility] filed at 07/08/18 0600   Gross per 24 hour   Intake             1148 ml   Output              875 ml   Net              273 ml       Diet:  DIET CLEAR LIQUID;    Exam:  BP (!) 128/59   Pulse 77   Temp 98.9 °F (37.2 °C) (Oral)   Resp 20   Ht 5' 6\" (1.676 m)   Wt 233 lb (105.7 kg)   SpO2 94%   BMI 37.61 kg/m²     General appearance: No apparent distress, appears stated age and cooperative. HEENT: Pupils equal, round, and reactive to light. Conjunctivae/corneas clear. Neck: Supple, with full range of motion. No jugular venous distention. Trachea midline. Respiratory:  Normal respiratory effort. Clear to auscultation, bilaterally without Rales/Wheezes/Rhonchi. Cardiovascular: Regular rate and rhythm with normal S1/S2 without murmurs, rubs or gallops. Abdomen: Soft, non-tender, non-distended with normal bowel sounds. Musculoskeletal: passive and active ROM x 4 extremities. Skin: Skin color, texture, turgor normal.  No rashes or lesions. Neurologic:  Neurovascularly intact without any focal sensory/motor deficits. Cranial nerves: II-XII intact, grossly non-focal.  Psychiatric: Alert and oriented, thought content appropriate, normal insight  Capillary Refill: Brisk,< 3 seconds   Peripheral Pulses: +2 palpable, equal bilaterally       Labs:   Recent Labs      07/07/18   0607  07/08/18   0505   WBC  9.6  6.3   HGB  11.5*  9.8*   HCT  35.7*  30.5*   PLT  201  154     Recent Labs      07/07/18   0607  07/07/18   1508  07/08/18   0505   NA  122*  124*  125*   K  4.5   --   4.2   CL  81*   --   87*   CO2  25   --   25   BUN  12   --   16   CREATININE  0.7   --   0.9   CALCIUM  10.3   --   9.0     Recent Labs      07/07/18   0607   AST  39   ALT  46   BILITOT  0.3   ALKPHOS  57     No results for input(s): INR in the last 72 hours. No results for input(s): [place] in the last 72 hours.     Urinalysis:    Lab Results   Component Value Date    NITRU NEGATIVE 07/07/2018    WBCUA NONE SEEN 07/07/2018    BACTERIA NONE 07/07/2018    RBCUA 0-2 07/07/2018    BLOODU NEGATIVE 07/07/2018    SPECGRAV 1.010 07/28/2016    SPECGRAV 1.006 02/02/2016    GLUCOSEU NEGATIVE 07/07/2018       Radiology:  XR ABDOMEN (2 VIEWS)   Final Result   1. Oral contrast has passed to the rectum. 2. Esophageal route tube in place. The tip is in the stomach. 3. No dilated small bowel loops. **This report has been created using voice recognition software. It may contain minor errors which are inherent in voice recognition technology. **      Final report electronically signed by Dr. Gumaro Glass on 7/8/2018 7:48 AM      XR Abdomen G Tube Placement   Final Result   1. Esophageal route tube tip in the stomach. **This report has been created using voice recognition software. It may contain minor errors which are inherent in voice recognition technology. **      Final report electronically signed by Dr. Gumaro Glass on 7/7/2018 12:09 PM      CT ABDOMEN PELVIS W IV CONTRAST Additional Contrast? Oral (4 dose if tolerates)   Final Result       1. Small bowel obstruction. The stomach and proximal small bowel loops are distended with gas. The transition point is difficult to identify. 2. Diverticulosis. 3. Small pericardial effusion. 4. Stable enlargement of the left ovary. **This report has been created using voice recognition software. It may contain minor errors which are inherent in voice recognition technology. **      Final report electronically signed by Dr. Gumaro Glass on 7/7/2018 10:38 AM          Diet: DIET CLEAR LIQUID;    DVT prophylaxis: [x] Lovenox                                 [] SCDs                                 [] SQ Heparin                                 [] Encourage ambulation           [] Already on Anticoagulation     Disposition:    [x] Home       [] TCU       [] Rehab       [] Psych       [] SNF       [] [facility]       [] Other-    Code Status: Full Code      Assessment/Plan:    Anticipated Discharge in : in 24 hours    C/Camryn Brown 1106 Problems    Diagnosis Date Noted    Essential hypertension [I10] 01/29/2015     Priority: Medium    COPD (chronic obstructive pulmonary disease) ([address] [J44.9]      Priority: Medium    Hypothyroid [E03.9] 01/15/2014     Priority: Low    SBO (small bowel obstruction) [K56.609] 07/07/2018    Anxiety and depression [F41.8] 04/17/2018    Chronic diastolic heart failure (HCC) [I50.32] 03/27/2018    AS (aortic stenosis): mild to moderate by echo 4/2017 [I35.0] 09/06/2017    ASCVD (arteriosclerotic cardiovascular disease) [I25.10]     S/P PTCA: 3/2/2017: PTCA RCA ISR. 12/11/2014: LAD Resolute 3.0X26. 11/6/2014: Stenting RCA Dillon Oliveira 7.6Z44 and 3.5X18. 5/11/2004: Proximal & mid RCA Taxus 3.5X20 mm, and Taxus 3.0X32 mm. [Z98.61] 10/28/2014    GERD (gastroesophageal reflux disease) [K21.9]     CAD (coronary artery disease) [I25.10]        1. SBO  -Resolved  -Will D/C NGT ans start clears    2. Hyponatremia  -Chronic, probably in part due to Lexapro? will d/c IVF's with D5   -Repeat BMP in am    3. Chronic medical problems: HTN, Anxiety/depression, CAD  -Resume home meds    4.  Anemia  -H/H down from initial lab work done in ED, no overt bleeding  -repeat CBC in am      Electronically signed by Cesar Lucia MD on 7/8/2018 at 11:43 AM

## 2018-07-08 NOTE — PLAN OF CARE
Problem: Falls - Risk of:  Goal: Will remain free from falls  Will remain free from falls   Outcome: Ongoing  Up with one assist and walker    Problem: Pain:  Goal: Pain level will decrease  Pain level will decrease   Outcome: Ongoing  Pain controlled with morphine    Problem: Risk for Impaired Skin Integrity  Goal: Tissue integrity - skin and mucous membranes  Structural intactness and normal physiological function of skin and  mucous membranes. Outcome: Ongoing  No skin breakdown    Problem: Nutrition  Goal: Optimal nutrition therapy  Outcome: Ongoing  Taking and retaining cardiac diet, loose stools    Comments: Care plan reviewed with patient. Patient  verbalizes understanding of the plan of care and contribute to goal setting.

## 2018-07-08 NOTE — PLAN OF CARE
Problem: RESPIRATORY  Goal: STG - patient can administer MDI's  Outcome: Ongoing  Dulera MDI continued BID to help maintain open airways.

## 2018-07-08 NOTE — DISCHARGE SUMMARY
Hospital Medicine Discharge Summary      Patient Identification:   Sarah Weiss   :   MRN: [id number]   Account: [de-identified]      Patient's PCP: ASHLEY Medina CNP    Admit Date: 2018     Discharge Date:       Admitting Physician: Mary Reis DO     Discharge Physician: Tej Schultz MD     Discharge Diagnoses: Active Hospital Problems    Diagnosis Date Noted    Essential hypertension [I10] 2015     Priority: Medium    COPD (chronic obstructive pulmonary disease) ([address] [J44.9]      Priority: Medium    Hypothyroid [E03.9] 01/15/2014     Priority: Low    SBO (small bowel obstruction) [K56.609] 2018    Anxiety and depression [F41.8] 2018    Chronic diastolic heart failure (HCC) [I50.32] 2018    AS (aortic stenosis): mild to moderate by echo 2017 [I35.0] 2017    ASCVD (arteriosclerotic cardiovascular disease) [I25.10]     S/P PTCA: 3/2/2017: PTCA RCA ISR. 2014: LAD Resolute 3.0X26. 2014: Stenting RCA Simi Grist 7.6N12 and 3.5X18. 2004: Proximal & mid RCA Taxus 3.5X20 mm, and Taxus 3.0X32 mm. [Z98.61] 10/28/2014    GERD (gastroesophageal reflux disease) [K21.9]     CAD (coronary artery disease) [I25.10]        The patient was seen and examined on day of discharge and this discharge summary is in conjunction with any daily progress note from day of discharge. Hospital Course:     67 y. o. female who presented to [facility] with complaint of abd pain with nausea and vomiting that began last night around 2200.  She felt fine until after dinner when she started to feel sick. Mamadou Morelos cited the pain as being lower abd constant and achy. She was admitted for further management of SBO. KUB showed passing of contrast material to rectum, diet was advanced and she tolerated it well.  Sodium levels were decreased on admission, and this corrected some, I believe this is chronic, asymptomatic and probably secondary to medications (Lexapro). She is to f/u with nephrology for possible further work up. A BMP was ordered. Exam:     Vitals:  Vitals:    07/09/18 0000 07/09/18 0400 07/09/18 0804 07/09/18 1151   BP: (!) 145/73 109/60 (!) 191/79 136/60   Pulse: 88 73 82 76   Resp: 20 20 20 16   Temp: 98.5 °F (36.9 °C) 98.8 °F (37.1 °C) 97.9 °F (36.6 °C) 96.5 °F (35.8 °C)   TempSrc: Oral Oral Oral Oral   SpO2: 95% 95% 97% 94%   Weight:       Height:         Weight: Weight: 233 lb (105.7 kg)     24 hour intake/output:    Intake/Output Summary (Last 24 hours) at 07/09/18 1343  Last data filed at 07/09/18 1322   Gross per 24 hour   Intake             2466 ml   Output                0 ml   Net             2466 ml         General appearance:  No apparent distress, appears stated age and cooperative. HEENT:  Normal cephalic, atraumatic without obvious deformity. Pupils equal, round, and reactive to light. Extra ocular muscles intact. Conjunctivae/corneas clear. Neck: Supple, with full range of motion. No jugular venous distention. Trachea midline. Respiratory:  Normal respiratory effort. Clear to auscultation, bilaterally without Rales/Wheezes/Rhonchi. Cardiovascular:  Regular rate and rhythm with normal S1/S2 without murmurs, rubs or gallops. Abdomen: Soft, non-tender, non-distended with normal bowel sounds. Musculoskeletal:  No clubbing, cyanosis or edema bilaterally. Full range of motion without deformity. Skin: Skin color, texture, turgor normal.  No rashes or lesions. Neurologic:  Neurovascularly intact without any focal sensory/motor deficits. Cranial nerves: II-XII intact, grossly non-focal.  Psychiatric:  Alert and oriented, thought content appropriate, normal insight  Capillary Refill: Brisk,< 3 seconds   Peripheral Pulses: +2 palpable, equal bilaterally       Labs:  For convenience and continuity at follow-up the following most recent labs are provided:      CBC:    Lab Results   Component Value Date    WBC 6.3 07/08/2018    HGB 9.8 07/08/2018    HCT 30.5 07/08/2018     07/08/2018       Renal:    Lab Results   Component Value Date     07/09/2018    K 3.9 07/09/2018    K 4.2 07/08/2018    CL 87 07/09/2018    CO2 27 07/09/2018    BUN 12 07/09/2018    CREATININE 0.7 07/09/2018    CALCIUM 9.1 07/09/2018    PHOS 4.6 03/29/2018         Significant Diagnostic Studies    Radiology:   XR ABDOMEN (2 VIEWS)   Final Result   1. Oral contrast has passed to the rectum. 2. Esophageal route tube in place. The tip is in the stomach. 3. No dilated small bowel loops. **This report has been created using voice recognition software. It may contain minor errors which are inherent in voice recognition technology. **      Final report electronically signed by Dr. Gianni Turcios on 7/8/2018 7:48 AM      XR Abdomen G Tube Placement   Final Result   1. Esophageal route tube tip in the stomach. **This report has been created using voice recognition software. It may contain minor errors which are inherent in voice recognition technology. **      Final report electronically signed by Dr. Gianni Turcios on 7/7/2018 12:09 PM      CT ABDOMEN PELVIS W IV CONTRAST Additional Contrast? Oral (4 dose if tolerates)   Final Result       1. Small bowel obstruction. The stomach and proximal small bowel loops are distended with gas. The transition point is difficult to identify. 2. Diverticulosis. 3. Small pericardial effusion. 4. Stable enlargement of the left ovary. **This report has been created using voice recognition software. It may contain minor errors which are inherent in voice recognition technology. **      Final report electronically signed by Dr. Gianni Turcios on 7/7/2018 10:38 AM             Consults:     IP CONSULT TO RESPIRATORY CARE  IP CONSULT TO SOCIAL WORK    Disposition:    [x] Home       [] TCU       [] Rehab       [] Psych       [] SNF       [] [facility]       [] Other-    Condition at Discharge: Stable    Code Status:  Full Code     Patient Instructions:    Discharge lab work: None  Activity: activity as tolerated  Diet: DIET CARDIAC; Follow-up visits:   ASHLEY Blanchard - CNP  [address] [address]          Regis Pearson MD  [address] [facility]  [phone]    In 1 week  chronic hyponatremia, asymptomatic         Discharge Medications:      Chanel Johansen   Home Medication Instructions OIS:[id number]    Printed on:07/09/18 6640   Medication Information                      acetaminophen (TYLENOL) 500 MG tablet  Take 500 mg by mouth every 6 hours as needed for Pain             acetaminophen-codeine (TYLENOL #3) 300-30 MG per tablet  Take 1 tablet by mouth every 8 hours as needed for Pain. Dewane Newness aspirin (ASPIRIN LOW DOSE) 81 MG EC tablet  take 1 tablet by mouth once daily             baclofen (LIORESAL) 10 MG tablet  Take 10 mg by mouth 2 times daily             benzocaine (ANBESOL) 10 % mucosal gel  Take by mouth as needed.              Blood Glucose Monitoring Suppl HARVINDER  Check blood sugar q daily Dx E11.69             Calcium Carbonate-Vitamin D (OYSTER SHELL CALCIUM/D) 500-200 MG-UNIT TABS  take 1 tablet by mouth twice a day             clopidogrel (PLAVIX) 75 MG tablet  take 1 tablet by mouth once daily             cycloSPORINE (RESTASIS) 0.05 % ophthalmic emulsion  Place 1 drop into both eyes 2 times daily             doxepin (SINEQUAN) 150 MG capsule  Take 150 mg by mouth nightly             escitalopram (LEXAPRO) 10 MG tablet  Take 20 mg by mouth every morning              fluticasone (FLONASE) 50 MCG/ACT nasal spray  1 spray by Nasal route daily             furosemide (LASIX) 20 MG tablet  Take 1 tablet by mouth 2 times daily             glucose blood VI test strips (GLUCOSE METER TEST) strip  1 each by In Vitro route daily Check blood sugar q daily Dx E11.69             hydrALAZINE (APRESOLINE) 50 MG tablet  Take 1.5 tablets by mouth 3 times daily take 1.5 tablets by mouth three times a day             HYDROcodone-acetaminophen (NORCO) 5-325 MG per tablet  Take 1 tablet by mouth every 8 hours as needed for Pain. .             hydrOXYzine (ATARAX) 25 MG tablet  Take 25 mg by mouth 3 times daily             isosorbide mononitrate (IMDUR) 60 MG extended release tablet  Take 1 tablet by mouth 2 times daily             Lancets MISC  Check blood sugar q daily Dx E11.69             levothyroxine (SYNTHROID) 100 MCG tablet  take 1 tablet by mouth once daily             metoprolol tartrate (LOPRESSOR) 50 MG tablet  Take 1 tablet by mouth 2 times daily             Misc. Devices MISC  Pt needs an oxygen tank carrier for her wheelchair             Multiple Vitamins-Minerals (MULTIVITAMIN-MINERALS) TABS tablet  take 1 tablet by mouth once daily             nystatin (MYCOSTATIN) 680907 UNIT/GM cream  Apply topically 2 times daily.              ONE TOUCH ULTRASOFT LANCETS MISC  use as directed BEFORE BREAKFAST AND SUPPER             OXcarbazepine (TRILEPTAL) 150 MG tablet  Take 150 mg by mouth 2 times daily One tab q am. And two tabs q pm             OXYGEN  Inhale 2 L into the lungs continuous              pantoprazole (PROTONIX) 40 MG tablet  Take 40 mg by mouth every morning (before breakfast)              polyethylene glycol (GLYCOLAX) powder  take 17GM (DISSOLVED IN WATER) by mouth once daily             polyvinyl alcohol (LIQUIFILM TEARS) 1.4 % ophthalmic solution  Place 1 drop into both eyes as needed 2-4 times daily             potassium chloride (KLOR-CON 10) 10 MEQ extended release tablet  Take 1 tablet by mouth daily             RA COL-RITE 100 MG capsule  take 3 capsules by mouth every evening             ranolazine (RANEXA) 500 MG extended release tablet  take 1 tablet by mouth twice a day             risperiDONE (RISPERDAL) 1 MG tablet  Take 1 mg by mouth 2 times daily Take 1 tab every morning and 1.5 tabs every night             rosuvastatin (CRESTOR) 20 MG tablet  Take 20 mg by mouth daily             sodium chloride (ALTAMIST SPRAY) 0.65 % nasal spray  1 spray by Nasal route as needed for Congestion             sodium chloride 1 g tablet  Take 1 tablet by mouth 3 times daily for 5 days             sucralfate (CARAFATE) 1 GM tablet  Take 1 g by mouth 4 times daily             SYMBICORT 160-4.5 MCG/ACT AERO  inhale 2 puffs by mouth twice a day             valsartan (DIOVAN) 320 MG tablet  Take 1 tablet by mouth daily                 Time Spent on discharge is more than 30 minutes in the examination, evaluation, counseling and review of medications and discharge plan. Signed: Thank you ASHLEY Colvin - CNP for the opportunity to be involved in this patient's care.     Electronically signed by Asha Luna MD on 7/9/2018 at 1:43 PM

## 2018-07-08 NOTE — PLAN OF CARE
Problem: Falls - Risk of:  Goal: Will remain free from falls  Will remain free from falls   Outcome: Ongoing  Patient free from falls this shift, up with skid proof footwear and walker, pivot to bedside commode, oxygen in place  Goal: Absence of physical injury  Absence of physical injury   Outcome: Ongoing  Patient free of physical injury, up with assistance, skid proof footwear on, bed alarm on, bed in lowest position    Problem: Pain:  Goal: Pain level will decrease  Pain level will decrease   Outcome: Ongoing  Patient denies pain currently  Goal: Control of acute pain  Control of acute pain   Outcome: Ongoing  Patient is resting with eyes closed, denies pain  Goal: Control of chronic pain  Control of chronic pain   Outcome: Completed Date Met: 07/08/18      Problem: Risk for Impaired Skin Integrity  Goal: Tissue integrity - skin and mucous membranes  Structural intactness and normal physiological function of skin and  mucous membranes. Outcome: Ongoing  Patient has abrasions on left cheek and left leg, scattered bruising, and excoriation in the skin folds    Problem: Nutrition  Goal: Optimal nutrition therapy  Outcome: Ongoing  Patient is currently NPO    Comments: Care plan reviewed with patient. Patient verbalize understanding of the plan of care and contribute to goal setting.

## 2018-07-09 NOTE — CARE COORDINATION
7/9/18, 3:21 PM    Discharge plan discussed by  and . Discharge plan reviewed with patient/ family. Patient/ family verbalize understanding of discharge plan and are in agreement with plan. Understanding was demonstrated using the teach back method. IMM Letter  IMM Letter date given[de-identified] 07/07/18  IMM Letter time given[de-identified] 2006       Patient discharged to home alone and resume [facility] nursing and aide. Nj at [facility] notified of patient discharge, RN will fax AVS when completed.

## 2018-07-09 NOTE — PROGRESS NOTES
Ramonita Apple  Daily Progress Note  Pt Name: Saran [facility] Record Number: [id number]  Date of Birth 1945   Today's Date: 7/9/2018    ASSESSMENT  1. Partial SBO clinically resolved  2. Tolerating oral intake    PLAN  1. Diet as tolerated  2. Called surgery as needed    SUBJECTIVE  Yesy Sine has improved from yesterday. NG out yesterday. She is tolerating liquid diet and his ordered a regular diet for breakfast.  She denies any abdominal pain. She states she has had at least 3 bowel movements. REVIEW OF SYSTEMS:    Review of Systems   Constitutional: Negative for chills and fever. HENT: Negative for congestion and sore throat. Eyes: Negative for redness and itching. Respiratory: Negative for cough, choking and shortness of breath. Cardiovascular: Negative for chest pain. Gastrointestinal: Negative for abdominal distention, abdominal pain, constipation and diarrhea. Endocrine: Negative for polydipsia and polyuria. Genitourinary: Negative for dysuria, flank pain and hematuria. Musculoskeletal: Positive for arthralgias, back pain and gait problem. Skin: Negative for pallor and rash. Neurological: Negative for light-headedness and headaches. Hematological: Negative for adenopathy. Bruises/bleeds easily. Psychiatric/Behavioral: Negative for agitation. OBJECTIVE  VITALS:  height is 5' 6\" (1.676 m) and weight is 233 lb (105.7 kg). Her oral temperature is 98.8 °F (37.1 °C). Her blood pressure is 109/60 and her pulse is 73. Her respiration is 20 and oxygen saturation is 95%. INTAKE/OUTPUT:      Intake/Output Summary (Last 24 hours) at 07/09/18 0745  Last data filed at 07/08/18 1707   Gross per 24 hour   Intake             1695 ml   Output              750 ml   Net              945 ml     Physical Exam   Constitutional: She is oriented to person, place, and time. She appears well-nourished. No distress.    HENT:   Head: Normocephalic and atraumatic. Eyes: Left eye exhibits no discharge. Cardiovascular: Normal rate. Pulmonary/Chest: Effort normal and breath sounds normal. No respiratory distress. She has no wheezes. Abdominal: Soft. She exhibits no distension. There is no tenderness. There is no rebound. Obese   Musculoskeletal: She exhibits no edema or deformity. Lymphadenopathy:     She has no cervical adenopathy. Neurological: She is alert and oriented to person, place, and time. Skin: Skin is warm. She is not diaphoretic. No erythema. No pallor. Psychiatric: She has a normal mood and affect. Her behavior is normal.   Nursing note and vitals reviewed. I/O last 3 completed shifts: In: 2572 [P.O.:1050; I.V.:645]  Out: 750 [Urine:500; Emesis/NG output:250]  No intake/output data recorded. LABS  Recent Labs      07/07/18   0607  07/07/18   0908   07/08/18   0505  07/09/18   0544   WBC  9.6   --    --   6.3   --    HGB  11.5*   --    --   9.8*   --    HCT  35.7*   --    --   30.5*   --    PLT  201   --    --   154   --    NA  122*   --    < >  125*  126*   K  4.5   --    --   4.2  3.9   CL  81*   --    --   87*  87*   CO2  25   --    --   25  27   BUN  12   --    --   16  12   CREATININE  0.7   --    --   0.9  0.7   CALCIUM  10.3   --    --   9.0  9.1   AST  39   --    --    --    --    ALT  46   --    --    --    --    BILITOT  0.3   --    --    --    --    NITRU   --   NEGATIVE   --    --    --    COLORU   --   YELLOW   --    --    --    BACTERIA   --   NONE   --    --    --     < > = values in this interval not displayed.      CBC with Differential:    Lab Results   Component Value Date    WBC 6.3 07/08/2018    RBC 3.80 07/08/2018    RBC 3.64 08/15/2011    HGB 9.8 07/08/2018    HCT 30.5 07/08/2018     07/08/2018    MCV 80.3 07/08/2018    MCH 25.8 07/08/2018    MCHC 32.1 07/08/2018    RDW 18.4 06/17/2018    NRBC 0 07/07/2018    NRBC 0 08/15/2011    SEGSPCT 88.5 07/07/2018    LYMPHOPCT 42.3 07/25/2016    MONOPCT 4.8 07/07/2018    MONOPCT 8.8 07/25/2016    EOSPCT 3.4 07/25/2016    BASOPCT 1.3 07/25/2016    MONOSABS 0.5 07/07/2018    LYMPHSABS 0.5 07/07/2018    EOSABS 0.0 07/07/2018    BASOSABS 0.0 07/07/2018     CMP:    Lab Results   Component Value Date     07/09/2018    K 3.9 07/09/2018    K 4.2 07/08/2018    CL 87 07/09/2018    CO2 27 07/09/2018    BUN 12 07/09/2018    CREATININE 0.7 07/09/2018    LABGLOM 82 07/09/2018    GLUCOSE 121 07/09/2018    GLUCOSE 101 07/25/2016    PROT 7.7 07/07/2018    PROT 6.5 11/25/2016    LABALBU 4.9 07/07/2018    CALCIUM 9.1 07/09/2018    BILITOT 0.3 07/07/2018    ALKPHOS 57 07/07/2018    AST 39 07/07/2018    ALT 46 07/07/2018        Narrative   PROCEDURE: XR ABDOMEN (2 VIEWS)       CLINICAL INFORMATION: sbo, . Small bowel obstruction.       COMPARISON: Abdominal x-ray 7/7/2018.       TECHNIQUE: AP supine and upright views of the abdomen were obtained.  3 films total.       FINDINGS:       There is an esophageal route tube in place. The tip is in the stomach. There is an IVC filter. The patient has had prior lumbar laminectomy and fusion at the L4-5 level.        There are no distended bowel loops. There are no displaced bowel loops. No air-fluid levels are present. There is some oral contrast in the rectum. There is no free air under the hemidiaphragms.   The lung bases are clear. There are surgical clips from    prior cholecystectomy. There are calcified granulomas in the spleen. The lung bases are clear.  No suspicious osseous lesions are present.                   Impression   1. Oral contrast has passed to the rectum. 2. Esophageal route tube in place. The tip is in the stomach.    3. No dilated small bowel loops.                 Anu Clarke MD  Electronically signed 7/9/2018 at 7:45 AM

## 2018-07-09 NOTE — PLAN OF CARE
Problem: RESPIRATORY  Goal: STG - patient can administer MDI's  Outcome: Ongoing  Pt had no questions on purpose or side effects of medication  Will continue with maintenance medication

## 2018-07-09 NOTE — PLAN OF CARE
Problem: Falls - Risk of:  Goal: Will remain free from falls  Will remain free from falls   Outcome: Ongoing  Patient free from falls this shift, up with skid proof footwear and walker, pivot to bedside commode, oxygen in place  Goal: Absence of physical injury  Absence of physical injury   Outcome: Ongoing  Patient free of physical injury, up with assistance, skid proof footwear on, bed alarm on, bed in lowest position     Problem: Pain:  Goal: Pain level will decrease  Pain level will decrease   Outcome: Ongoing  Patient states pain is 8 out of 10 on chip scale. Pain medication given, patient resting with eyes closed  Goal: Control of acute pain  Control of acute pain   Outcome: Ongoing  Patient is resting with eyes closed, has neck and back pain  Goal: Control of chronic pain  Control of chronic pain   Outcome: Completed Date Met: 07/08/18        Problem: Risk for Impaired Skin Integrity  Goal: Tissue integrity - skin and mucous membranes  Structural intactness and normal physiological function of skin and  mucous membranes. Outcome: Ongoing  Patient has abrasions on left cheek and left leg, scattered bruising, and excoriation in the skin folds     Problem: Nutrition  Goal: Optimal nutrition therapy  Outcome: Ongoing  Patient is currently taking in general diet without difficulty     Comments: Care plan reviewed with patient. Patient verbalize understanding of the plan of care and contribute to goal setting.

## 2018-07-09 NOTE — PLAN OF CARE
Problem: DISCHARGE BARRIERS  Goal: Patient's continuum of care needs are met  Outcome: Ongoing  Patient plans to return home alone [facility] and aide, see sw note dated 7/9.

## 2018-07-09 NOTE — PROGRESS NOTES
[address] [address]  INPATIENT PHYSICAL THERAPY  EVALUATION  [facility] SURGICAL 5E - 5E-56/056-A    Time In: 6663  Time Out: 9731  Timed Code Treatment Minutes: 8 Minutes  Minutes: 23          Date: 2018  Patient Name: Cindy Hernandez,  Gender:  female        MRN: [id number]  : 1945  (67 y.o.)      Referring Practitioner: Kami Calderon MD  Diagnosis: SBO  Additional Pertinent Hx: Per admitting notes, pt is a 76-year-old female with multiple medical issues. She describes acute onset of abdominal pain last evening. She stated she is passing flatus. She had a normal bowel movement 2 days ago. Past Medical History:   Diagnosis Date    Anemia     Anxiety     Arthritis     general    AS (aortic stenosis): mild to moderate by echo 2017    ASHD (arteriosclerotic heart disease)     Asthma     CAD (coronary artery disease)     Chest pain     CHF (congestive heart failure) (HCC)     COPD (chronic obstructive pulmonary disease) (HCC)     Depression     DM (diabetes mellitus) ([facility] [address]     Dyspnea     FH: CAD (coronary artery disease)     GERD (gastroesophageal reflux disease)     Heart burn     History of blood transfusion         History of tobacco abuse: Quit in 2009 10/28/2014    HLD (hyperlipidemia)     HTN (hypertension)     Irritable bowel syndrome     States has not had problem with this for years    Liver disease     fatty liver    Metabolic syndrome     MI (myocardial infarction)     Nausea & vomiting     Obesity     CHET (obstructive sleep apnea)     S/P cardiac catheterization: 2014: 99% in-stent restenosis mid-RCA. LCx moderate LI's. LAD 85% mid-segment lesion. 2014: 99% in-stent restenosis mid-RCA. LCx moderate LI's. LAD 85% mid-segment lesion. Dr. Fartun Ball S/P PTCA:  2004: Proximal and mid RCA  Taxus 3.5 X 20 mm, and Taxus 3.0 X 32 mm.  10/28/2014    2004: Proximal and mid RCA  Taxus 3.5 X 20 mm, and Taxus 3.0 X 32 mm. Dr. Gerhard Zee Systolic murmur 75/31/2051    Thyroid disease      Past Surgical History:   Procedure Laterality Date    BACK SURGERY  08/2016        BLADDER SUSPENSION      BREAST BIOPSY  10/10/2017    BREAST LUMPECTOMY      CARDIAC CATHETERIZATION  8-11-06    Mild nonobstructive CAD w/ diffuse 10-20% proximal and mid LAD stenosis and 10-20% proximal/ostial RCA stenosis. No obstructive lesions. RCA stents are patent w/o evidence of restenosis. Normal LV systolic function. EF 65%. Trace MR - catheter induced. Minimally elevated LVEDP - 13mmHg. Mild to moderate aortoiliac PVD w/o obstructive lesions.  CARDIAC CATHETERIZATION  5-11-04    Successful drug-eluting stent x 2 of proximal and mid RCA.  CARDIAC CATHETERIZATION  5-11-04    70-80% proximal RCA stenosis w/ 50-70% mid RCA stenosis. There is 50-60% lesion in mid PDA. Mild proximal and mid LAD disease. Mild circumflex disease. Normal LV size and systolic function. EF 60%.  CARDIOVASCULAR STRESS TEST  5-10-11    No evidence of stress induced ischemia. EF 75%.  CARDIOVASCULAR STRESS TEST  5-10-10    No evidence of stress induced ischemia, infarct or scar. EF 74%.  CERVICAL FUSION N/A 11/15/2017    REMOVAL OF PLATE N1-8, ACDF X4-3 W/ATLANTIS CORNERSTONE performed by Tj Lee MD at [address] [place] [address], DIAGNOSTIC      FOOT SURGERY      HERNIA REPAIR      HYSTERECTOMY      NECK SURGERY  FEB 2016    PARTIAL HYSTERECTOMY      UPPER GASTROINTESTINAL ENDOSCOPY      UPPER GASTROINTESTINAL ENDOSCOPY         Restrictions/Precautions:  General Precautions, Fall Risk         Other position/activity restrictions: uses O2 all the time       Subjective:  Chart Reviewed: Yes  Patient assessed for rehabilitation services?: Yes  Subjective: Pt resting in bed and agrees to therapy and to return to bed when done.     General:  Overall Orientation Status: Within Functional Activity Tolerance:  Activity Tolerance: Patient Tolerated treatment well;Patient limited by endurance    Treatment Initiated: See exercises above     Assessment: Body structures, Functions, Activity limitations: Decreased functional mobility , Decreased endurance, Decreased strength  Assessment: Pt is a 67 y.o. female that was having small bowel issues and is cooperative with therapy for session. Pt is hoping to go home soon. Pt is mildly deconditioned and would benefit from continued skilled PT to address deficits noted. Prognosis: Excellent    Clinical Presentation: Low - Stable and Uncomplicated:      Decision Making: Moderate Complexity based on patient assessment and decision making process of determining plan of care and establishing reasonable expectations for measurable functional outcomes    REQUIRES PT FOLLOW UP: Yes  Discharge Recommendations: Continue to assess pending progress, Home with Home health PT, Patient would benefit from continued therapy after discharge    Patient Education:  Patient Education: plan of care and initial LE exercises    Equipment Recommendations:  Equipment Needed: No    Safety:  Type of devices:  All fall risk precautions in place, Gait belt, Call light within reach, Left in bed, Nurse notified, Bed alarm in place    Plan:  Times per week: 5x GM  Times per day: Daily  Specific instructions for Next Treatment: ther ex, ther act, gait trng  Current Treatment Recommendations: Strengthening, Functional Mobility Training, Gait Training, Endurance Training, Home Exercise Program, Safety Education & Training, Patient/Caregiver Education & Training    Goals:  Patient goals : go home  Short term goals  Time Frame for Short term goals: 3 days  Short term goal 1: Pt to be Mod I for supine <> sit to get in/out of bed  Short term goal 2: Pt to be Mod I for sit <> stand to get up to ambulate  Short term goal 3: Pt to ambulate > 80 ft with RW with supervision for household distances  Long term goals  Time Frame for Long term goals : not set due to short ELOS    Evaluation Complexity: Based on the findings of patient history, examination, clinical presentation, and decision making during this evaluation, the evaluation of Rupesh Coffey is of low complexity. PT G-Codes  Functional Limitation: Mobility: Walking and moving around  Mobility: Walking and Moving Around Current Status (): At least 40 percent but less than 60 percent impaired, limited or restricted  Mobility: Walking and Moving Around Goal Status (): At least 40 percent but less than 60 percent impaired, limited or restricted       AM-PAC Inpatient Mobility without Stair Climbing Raw Score : 16  AM-PAC Inpatient without Stair Climbing T-Scale Score : 45.54  Mobility Inpatient CMS 0-100% Score: 40.64  Mobility Inpatient without Stair CMS G-Code Modifier : CK Rebecca R. Merlin Kingfisher, [place] [address]

## 2018-07-09 NOTE — PROGRESS NOTES
Contacted home health agency to notify of discharge today. They will set up for nurse to see tomorrow.

## 2018-07-09 NOTE — CARE COORDINATION
OXcarbazepine  300 mg Oral Nightly    risperiDONE  1.5 mg Oral Nightly    aspirin  81 mg Oral Daily    sodium chloride flush  10 mL Intravenous 2 times per day    metoprolol tartrate  75 mg Oral BID    levothyroxine  100 mcg Oral Daily    isosorbide mononitrate  60 mg Oral Daily    hydrALAZINE  50 mg Oral TID    fluticasone  1 spray Nasal Daily    escitalopram  20 mg Oral QAM    doxepin  150 mg Oral Nightly    miconazole   Topical BID     Continuous Infusions:   sodium chloride      dextrose        Pertinent Info/Orders/Treatment Plan:  VS. I&O. IVF. Follow labs. Manage blood glucose. Monitor and manage NG. Follow imaging. Resp Care consulted. PT/OT following. Gen Surgery following. Diet: DIET CARDIAC;   DVT Prophylaxis: Lovenox with SCD's ordered  Smoking status:  reports that she quit smoking about 11 years ago. Her smoking use included Cigarettes. She has a 38.00 pack-year smoking history. She has never used smokeless tobacco.   Influenza Vaccination Screening Completed: n/a  Pneumonia Vaccination Screening Completed: yes  PCP: ASHLEY Sanchez CNP  Readmission: No  Readmission Risk Score: 37%    Discharge Planning  Current Residence:  Private Residence  Living Arrangements:  Alone   Support Systems:  Family Members  Current Services PTA:     Potential Assistance Needed:  N/A  Potential Assistance Purchasing Medications:  Yes  Does patient want to participate in local refill/ meds to beds program?  No  Type of Home Care Services:  [address] [facility]  Patient expects to be discharged to:  7-9-18  Expected Discharge date:  07/09/18  Follow Up Appointment: Best Day/ Time:      Discharge Plan: Visited with pt this morning. From home alone but has current services of [facility] . She has a crutch, a walker and a wheelchair. She also has O2 that she wears at 2L/NC around the clock. She has a PCP, no issues getting meds and she uses [facility] for transportation.  SW is consulted to follow for continuity of services.      Evaluation: yes

## 2018-07-09 NOTE — PROGRESS NOTES
[address]  INPATIENT OCCUPATIONAL THERAPY  [facility] SURGICAL 5E  EVALUATION    Time:  Time In: 8438  Time Out: 3316  Timed Code Treatment Minutes: 15 Minutes  Minutes: 27          Date: 2018  Patient Name: June Jaeger,   Gender: female      MRN: [id number]  : 1945  (67 y.o.)  Referring Practitioner: Yariel Cevallos MD  Diagnosis: SBO  Additional Pertinent Hx: Kojo Sanchez is a 76-year-old female with multiple medical issues. She describes acute onset of abdominal pain last evening. She stated she is passing flatus. She had a normal bowel movement 2 days ago. Restrictions/Precautions:  General Precautions, Fall Risk                    Other position/activity restrictions: NG Tube       Past Medical History:   Diagnosis Date    Anemia     Anxiety     Arthritis     general    AS (aortic stenosis): mild to moderate by echo 2017    ASHD (arteriosclerotic heart disease)     Asthma     CAD (coronary artery disease)     Chest pain     CHF (congestive heart failure) (HCC)     COPD (chronic obstructive pulmonary disease) (HCC)     Depression     DM (diabetes mellitus) ([facility] [address]     Dyspnea     FH: CAD (coronary artery disease)     GERD (gastroesophageal reflux disease)     Heart burn     History of blood transfusion         History of tobacco abuse: Quit in 2009 10/28/2014    HLD (hyperlipidemia)     HTN (hypertension)     Irritable bowel syndrome     States has not had problem with this for years    Liver disease     fatty liver    Metabolic syndrome     MI (myocardial infarction)     Nausea & vomiting     Obesity     CHET (obstructive sleep apnea)     S/P cardiac catheterization: 2014: 99% in-stent restenosis mid-RCA. LCx moderate LI's. LAD 85% mid-segment lesion. 2014: 99% in-stent restenosis mid-RCA. LCx moderate LI's. LAD 85% mid-segment lesion.  Dr. Devi Michaud S/P PTCA:  2004: Proximal and mid RCA  Taxus 3.5 X 20 Wears glasses at all times    Hearing: Exceptions to [facility]  Hearing Exceptions: Hard of hearing/hearing concerns         Pain:  Pain Assessment  Patient Currently in Pain: Yes  Pain Assessment: 0-10  Pain Level: 8  Pain Type: Chronic pain  Pain Location: Back;Neck       Social/Functional History:  Lives With: Alone (Dog)  Type of Home: House  Home Layout: Able to Live on Main level with bedroom/bathroom, One level (laundry upstairs, with elevator access)  Home Access: Level entry, Elevator  Home Equipment: 4 wheeled walker     Bathroom Shower/Tub: Tub/Shower unit  Bathroom Toilet: Handicap height  Bathroom Equipment: Tub transfer bench, Grab bars in shower, Grab bars around toilet  IADL Comments: Has lock [employer] services for 2 meals/day. Receives Help From: Personal care attendant  ADL Assistance: Needs assistance  Homemaking Assistance: Needs assistance  Homemaking Responsibilities: Yes    Ambulation Assistance: Independent  Transfer Assistance: Independent    Active : No     Additional Comments: [facility] assists with homemaking, bathing and dressing.  Ambulates with 4WW    Objective        Overall Cognitive Status: WFL                                     LUE AROM (degrees)  LUE AROM : WFL  LUE General AROM: reports that she will pass out if she completes ROM over 90 degrees          RUE AROM (degrees)  RUE AROM : WFL       LUE Strength  LUE Strength Comment: 4/5                RUE Strength  RUE Strength Comment: 4/5              ADL  Toileting: Contact guard assistance          Transfers  Sit to stand: Contact guard assistance  Stand to sit: Contact guard assistance  Toilet Transfers  Toilet - Technique: Ambulating  Equipment Used: Raised toilet seat with rails  Toilet Transfer: Contact guard assistance    Balance  Sitting Balance: Supervision  Standing Balance: Contact guard assistance     Time: x 1 min during hygiene     Functional Mobility  Functional - Mobility Device: Rolling Walker  Activity: To/from bathroom, Other  Assist Level: Contact guard assistance  Functional Mobility Comments: short distance in unit, limited d/t pt report of fatigue and weakness  Apparatus Needs: O2 (2L)     Type of ROM/Therapeutic Exercise  Type of ROM/Therapeutic Exercise: AROM  Comment: Pt completed B UE exs to increase strength required to complete ADL routine, x 1 set, x 10 reps, no resistance: shoulder flexion (within tolerable ROM), chest press, bicep curls, horizontal AB/ADduction. Fair pace, exhibits min fatigue, requires min RBs during exs. Exs written on board for increased carry-over to complete during the day. Activity Tolerance:  Activity Tolerance: Patient Tolerated treatment well, Patient limited by fatigue  Activity Tolerance: Treatment initiated: Pt completed toileting and exs as detailed above. Fatigue reported with activity. Assessment:  Assessment: Pt exhibiting above deficits requiring additional OT intervention to increase indep with self care. Performance deficits / Impairments: Decreased functional mobility , Decreased strength, Decreased endurance, Decreased ADL status, Decreased balance  Prognosis: Good  Discharge Recommendations: Continue to assess pending progress ([facility] services)    Clinical Decision Making: Clinical Decision making was of Moderate Complexity as the result of analysis of data from a detailed assessment, a consideration of several treatment options, the presence of comorbidities affecting the plan of care and the need for minimal to moderate modifications or assistance required to complete the evaluation.     Patient Education:  Patient Education: OT POC, importance of therapy, HEP    Equipment Recommendations:  Equipment Needed: No    Safety:  Safety Devices in place: Yes  Type of devices: Patient at risk for falls, Call light within reach, Left in chair, Chair alarm in place    Plan:  Times per week: 3-5x  Current Treatment Recommendations: Strengthening, Balance Training, Safety Education & Training, Self-Care / ADL    Goals:       Short term goals  Time Frame for Short term goals: 2 weeks  Short term goal 1: Pt will complete functional mobility to/from BR and around obstacles with S to increase indep with accessing environment during ADLs. Short term goal 2: Pt will complete LB ADL with SBA to increase indep with self care. Short term goal 3: Pt will complete UB HEP with min cues tech to increase strength required during UB ADLs. Short term goal 4: Pt will complete dynamic standing task x 5 min with B hand release with S to increase balance required for grooming. Long term goals  Time Frame for Long term goals : No LTG d/t short ELOS. Evaluation Complexity: Based on the findings of patient history, examination, clinical presentation, and decision making during this evaluation, this patient is of medium complexity. OT G-codes  Functional Limitation: Self care  Self Care Current Status (): At least 40 percent but less than 60 percent impaired, limited or restricted  Self Care Goal Status ():  At least 20 percent but less than 40 percent impaired, limited or restricted  AM-[facility] Inpatient Daily Activity Raw Score: 19  AM-PAC Inpatient ADL T-Scale Score : 40.22  ADL Inpatient CMS 0-100% Score: 42.8  ADL Inpatient CMS G-Code Modifier : CK

## 2018-07-09 NOTE — CARE COORDINATION
DISCHARGE BARRIERS  7/9/18, 2:10 PM    Reason for Referral: \"Current with [facility] and [facility]\"  Mental Status: Patient is alert and oriented  Decision Making: Patient makes own decisions  Family/Social/Home Environment:  Spoke with patient, assessment completed. Patient lives alone in a one story apartment. Patient has [facility], nurse and aide 2 hours 5 days per week and I hour on Saturday and Sunday. Aide assist with personal care and housekeeping. Patient states she also has a friend that assists with cooking at times. Patient has home delivered meals 2 X 7 days. Patient is current with [facility]. Current Services: see above  Current Equipment:walker, cane and wheel chair  Payment Source:Medicare and Medicarid  Concerns or Barriers to Discharge: none at this time  Collabrative List of ECF/HH were provided:no, current with [facility]    Teach Back Method used with patient regarding care plan and discharge planning. Patient verbalize understanding of the plan of care and contribute to goal setting. Anticipated Needs/Discharge Plan: Patient plans to return home alone and resume [facility] nursing and aide. Spoke with mal Kebede cm, would like notified at discharge. Spoke with Liz at [facility], notified of patient admission, confirmed above stated services.     Electronically signed by PAKO Aguirre on 7/9/2018 at 2:10 PM

## 2018-07-10 NOTE — CARE COORDINATION
Sean 45 Transitions Initial Follow Up Call    Call within 2 business days of discharge: Yes    Patient: Joseph Ba Patient : 1945   MRN: [id number]  Reason for Admission: There are no discharge diagnoses documented for the most recent discharge. Discharge Date: 18 RARS: Readmission Risk Score: 45     Spoke with: Josue Conti [facility]: [facility]    Non-face-to-face services provided:  Obtained and reviewed discharge summary and/or continuity of care documents  Communication with home health agencies or other community services the patient is currently using-notitifed [facility], left message @ [facility] for [employer]    Care Transitions 24 Hour Call    Do you have any ongoing symptoms?:  No  Do you have a copy of your discharge instructions?:  Yes  Do you have all of your prescriptions and are they filled?:  Yes  Have you scheduled your follow up appointment?:  Yes  How are you going to get to your appointment?:  Other (Comment: CMT)  Were you discharged with any Home Care or Post Acute Services:  Yes  Post Acute Services:  Home Health, Transportation Services (Comment: [facility])  Patient DME:  Lee Ask  Patient Home Equipment:  CPAP, Oxygen  Do you have support at home?:  Alone  Do you feel like you have everything you need to keep you well at home?:  Yes  Are you an active caregiver in your home?:  No  Care Transitions Interventions  No Identified Needs     Called for the care transition initial call. Pt was admitted through the ER on 18 for a SBO. Pt stated she is feeling much better. Pt stated she is eating without any nausea and vomiting, appetite not back to her usual yet. Denied any abdominal pain. Pt denied any diarrhea or constipation. Notified [facility] & [facility] of discharge to home. Reminded pt of future appt, pt stated she is able to go to both appt on 18. Pt stated she will call [facility] today for transportation.   Unable to review meds with the pt, pt stated she does not know

## 2018-07-16 NOTE — TELEPHONE ENCOUNTER
Patient called RD back and stated [facility] with the dietitian and does not desire to attend the nutrition workshops at this time. Encouraged to call back if changes her mind in the future as classes will be on going every Monday. Also encouraged to attend her appointment with the [facility].

## 2018-07-17 NOTE — PROGRESS NOTES
benzocaine (ANBESOL) 10 % mucosal gel Take by mouth as needed. 9 g 1    OXYGEN Inhale 2 L into the lungs continuous       doxepin (SINEQUAN) 150 MG capsule Take 150 mg by mouth nightly      OXcarbazepine (TRILEPTAL) 150 MG tablet Take 150 mg by mouth 2 times daily One tab q am. And two tabs q pm      polyethylene glycol (GLYCOLAX) powder take 17GM (DISSOLVED IN WATER) by mouth once daily 1 Bottle 2    ONE TOUCH ULTRASOFT LANCETS MISC use as directed BEFORE BREAKFAST AND SUPPER 100 each 11    pantoprazole (PROTONIX) 40 MG tablet Take 40 mg by mouth every morning (before breakfast)       risperiDONE (RISPERDAL) 1 MG tablet Take 1 mg by mouth 2 times daily Take 1 tab every morning and 1.5 tabs every night      sodium chloride 1 g tablet Take 1 tablet by mouth 3 times daily for 5 days 15 tablet 0     No current facility-administered medications for this visit.         Lab Results:    CBC:   Lab Results   Component Value Date    WBC 6.3 07/08/2018    HGB 9.8 (L) 07/08/2018    HCT 30.5 (L) 07/08/2018    MCV 80.3 (L) 07/08/2018     07/08/2018     BMP:    Lab Results   Component Value Date     (L) 07/09/2018     (L) 07/09/2018     (L) 07/08/2018    K 3.9 07/09/2018    K 4.2 07/08/2018    K 4.5 07/07/2018    CL 87 (L) 07/09/2018    CL 87 (L) 07/08/2018    CL 81 (L) 07/07/2018    CO2 27 07/09/2018    CO2 25 07/08/2018    CO2 25 07/07/2018    BUN 12 07/09/2018    BUN 16 07/08/2018    BUN 12 07/07/2018    CREATININE 0.7 07/09/2018    CREATININE 0.9 07/08/2018    CREATININE 0.7 07/07/2018    GLUCOSE 121 (H) 07/09/2018    GLUCOSE 121 (H) 07/08/2018    GLUCOSE 168 (H) 07/07/2018      Hepatic:   Lab Results   Component Value Date    AST 39 07/07/2018    AST 35 04/16/2018    AST 27 03/26/2018    ALT 46 07/07/2018    ALT 26 04/16/2018    ALT 28 03/26/2018    BILITOT 0.3 07/07/2018    BILITOT 0.2 (L) 04/16/2018    BILITOT 0.3 03/26/2018    ALKPHOS 57 07/07/2018    ALKPHOS 53 04/16/2018    ALKPHOS 50

## 2018-07-17 NOTE — PROGRESS NOTES
Visit Information    Have you changed or started any medications since your last visit including any over-the-counter medicines, vitamins, or herbal medicines? no   Are you having any side effects from any of your medications? -  no  Have you stopped taking any of your medications? Is so, why? -  yes - see med list    Have you seen any other physician or provider since your last visit? Yes - Records Obtained  Have you had any other diagnostic tests since your last visit? Yes - Records Obtained  Have you been seen in the emergency room and/or had an admission to a hospital since we last saw you? Yes - Records Obtained  Have you had your routine dental cleaning in the past 6 months? no    Have you activated your [employer] account? If not, what are your barriers?  Yes     Patient Care Team:  ASHLEY Harris CNP as PCP - General  ASHLEY Harris CNP as PCP - S Attributed Provider  Vincent Tony MD as Physician (Interventional Cardiology)  Carson Crawley RN as Care Coordinator    Medical History Review  Past Medical, Family, and Social History reviewed and does contribute to the patient presenting condition    Health Maintenance   Topic Date Due    Shingles Vaccine (1 of 2 - 2 Dose Series) 10/15/1994    Flu vaccine (1) 09/01/2018    Breast cancer screen  10/10/2018    Lipid screen  11/16/2018    A1C test (Diabetic or Prediabetic)  12/20/2018    Diabetic foot exam  12/27/2018    Diabetic microalbuminuria test  12/27/2018    Diabetic retinal exam  01/24/2019    Low dose CT lung screening  03/26/2019    TSH testing  07/07/2019    Potassium monitoring  07/09/2019    Creatinine monitoring  07/09/2019    Colon cancer screen colonoscopy  10/04/2022    DTaP/Tdap/Td vaccine (2 - Td) 12/16/2022    DEXA (modify frequency per FRAX score)  Completed    Pneumococcal low/med risk  Completed
follow-up from hospital and rash. Diagnoses and all orders for this visit:    Hospital discharge follow-up    Small bowel obstruction  monitor  Candidal skin infection  -     clotrimazole-betamethasone (LOTRISONE) 1-0.05 % cream; Apply topically 3 times daily.   Keep area clean and dry  Anemia, unspecified type  -     CBC Auto Differential; Future  Monitor  Continue to take iron tablets bid  May need to consider Hematology referral if no improvement        Diagnostic test results reviewed: inpatient labs, EKG, CT-abdomen and pelvis and kub    Patient risk of morbidity and mortality: moderate    Medical Decision Making: moderate complexity

## 2018-07-18 NOTE — TELEPHONE ENCOUNTER
Pt informed. Pt states she will go to [facility] ([facility]) to have labs completed in 3 months. Orders in EPIC for them.

## 2018-07-30 NOTE — PROGRESS NOTES
syndrome 10/28/2014    MI (myocardial infarction)     Nausea & vomiting     Obesity     CHET (obstructive sleep apnea)     S/P cardiac catheterization: 11/6/2014: 99% in-stent restenosis mid-RCA. LCx moderate LI's. LAD 85% mid-segment lesion. 11/6/2014 11/6/2014: 99% in-stent restenosis mid-RCA. LCx moderate LI's. LAD 85% mid-segment lesion. Dr. Charan Degroot S/P PTCA:  5/11/2004: Proximal and mid RCA  Taxus 3.5 X 20 mm, and Taxus 3.0 X 32 mm. 10/28/2014    5/11/2004: Proximal and mid RCA  Taxus 3.5 X 20 mm, and Taxus 3.0 X 32 mm. Dr. Oriana Killian Systolic murmur 67/19/1978    Thyroid disease      Past Surgical History:   Procedure Laterality Date    BACK SURGERY  08/2016        BLADDER SUSPENSION      BREAST BIOPSY  10/10/2017    BREAST LUMPECTOMY      CARDIAC CATHETERIZATION  8-11-06    Mild nonobstructive CAD w/ diffuse 10-20% proximal and mid LAD stenosis and 10-20% proximal/ostial RCA stenosis. No obstructive lesions. RCA stents are patent w/o evidence of restenosis. Normal LV systolic function. EF 65%. Trace MR - catheter induced. Minimally elevated LVEDP - 13mmHg. Mild to moderate aortoiliac PVD w/o obstructive lesions.  CARDIAC CATHETERIZATION  5-11-04    Successful drug-eluting stent x 2 of proximal and mid RCA.  CARDIAC CATHETERIZATION  5-11-04    70-80% proximal RCA stenosis w/ 50-70% mid RCA stenosis. There is 50-60% lesion in mid PDA. Mild proximal and mid LAD disease. Mild circumflex disease. Normal LV size and systolic function. EF 60%.  CARDIOVASCULAR STRESS TEST  5-10-11    No evidence of stress induced ischemia. EF 75%.  CARDIOVASCULAR STRESS TEST  5-10-10    No evidence of stress induced ischemia, infarct or scar. EF 74%.     CERVICAL FUSION N/A 11/15/2017    REMOVAL OF PLATE C2-6, ACDF N9-3 W/ATLANTIS CORNERSTONE performed by Miguel Mclean MD at [facility] COLONOSCOPY      ENDOSCOPY, COLON, DIAGNOSTIC      FOOT SURGERY      HERNIA REPAIR  HYSTERECTOMY      NECK SURGERY  FEB 2016    PARTIAL HYSTERECTOMY      UPPER GASTROINTESTINAL ENDOSCOPY      UPPER GASTROINTESTINAL ENDOSCOPY       Family History   Problem Relation Age of Onset    Heart Disease Mother     Heart Disease Father     Kidney Disease Sister     Cancer Sister     Heart Disease Brother     Heart Disease Brother     Heart Disease Brother     Breast Cancer Child 36    Cancer Sister 36        rectal    Ovarian Cancer Other 25     Social History   Substance Use Topics    Smoking status: Former Smoker     Packs/day: 1.00     Years: 38.00     Types: Cigarettes     Quit date: 1/31/2007    Smokeless tobacco: Never Used    Alcohol use No     Current Outpatient Prescriptions   Medication Sig Dispense Refill    clopidogrel (PLAVIX) 75 MG tablet take 1 tablet by mouth once daily 90 tablet 3    ferrous sulfate 325 (65 Fe) MG tablet Take 325 mg by mouth 3 times daily      clotrimazole-betamethasone (LOTRISONE) 1-0.05 % cream Apply topically 3 times daily. 45 g 1    metoprolol tartrate (LOPRESSOR) 50 MG tablet Take 1.5 tablets by mouth 2 times daily (Patient taking differently: Take 50 mg by mouth 2 times daily ) 60 tablet 5    nitroGLYCERIN (NITRODUR) 0.2 MG/HR apply 1 patch once daily 90 patch 1    acetaminophen-codeine (TYLENOL #3) 300-30 MG per tablet Take 1 tablet by mouth every 8 hours as needed for Pain. Brice Devoid baclofen (LIORESAL) 10 MG tablet Take 10 mg by mouth 2 times daily      sucralfate (CARAFATE) 1 GM tablet Take 1 g by mouth 4 times daily      levothyroxine (SYNTHROID) 100 MCG tablet take 1 tablet by mouth once daily 90 tablet 1    Misc.  Devices MISC Pt needs an oxygen tank carrier for her wheelchair 1 Device 0    Calcium Carbonate-Vitamin D (OYSTER SHELL CALCIUM/D) 500-200 MG-UNIT TABS take 1 tablet by mouth twice a day 60 tablet 5    RA COL-RITE 100 MG capsule take 3 capsules by mouth every evening 90 capsule 1    hydrALAZINE (APRESOLINE) 50 MG tablet Take separation of the aortic valve. No evidence of   aortic valve regurgitation . There is moderate aortic stenosis with valve   area of 1.2 sq cm. The maximum aortic valve gradient is 42 mmHg, the mean   gradient is 24 mmHg, and the peak velocity is 3.3 m/s.  Faye Elizabeth is a small anterior, posterior pericardial effusion with no evidence   of hemodynamic compromise.      Signature      ----------------------------------------------------------------   Electronically signed by Stefan Forrester MD (Interpreting   physician) on 04/17/2018 at 07:22 PM   ----------------------------------------------------------------      Findings      Mitral Valve   Moderate annular calcification. Mild calcification of the anterior,   posterior leaflet of the mitral valve. No evidence of mitral   regurgitation. Mild mitral stenosis. Mitral valve area by by other method   1.8 Volumetric method .      Aortic Valve   Aortic valve appears tricuspid. Aortic valve leaflets are Moderately   calcified. Leaflets exhibited moderately increased thickness and   moderately reduced cuspal separation of the aortic valve. No evidence of   aortic valve regurgitation . There is moderate aortic stenosis with valve   area of 1.2 sq cm. The maximum aortic valve gradient is 42 mmHg, the mean   gradient is 24 mmHg, and the peak velocity is 3.3 m/s.      Tricuspid Valve   The tricuspid valve structure was normal with normal leaflet separation.   DOPPLER: There was no evidence of tricuspid stenosis. There was no   evidence of tricuspid regurgitation.      Pulmonic Valve   The pulmonic valve leaflets exhibited normal thickness, no calcification,   and normal cuspal separation. DOPPLER: The transpulmonic velocity was   within the normal range with no evidence for regurgitation.      Left Atrium   The left atrium is Mildly dilated.      Left Ventricle   Normal left ventricle size and systolic function. Ejection fraction was   estimated at 65 %.  There were no regional left ventricular wall motion   abnormalities and wall thickness was within normal limits.   Doppler parameters were consistent with abnormal left ventricular   relaxation (grade 1 diastolic dysfunction).      Right Atrium   Right atrial size was normal.      Right Ventricle   The right ventricular size was normal with normal systolic function and   wall thickness.      Pericardial Effusion   There is a small anterior, posterior pericardial effusion with no evidence   of hemodynamic compromise.      Pleural Effusion   No evidence of pleural effusion.      Aorta / Great Vessels   -Aortic root dimension within normal limits.   -The Pulmonary artery is within normal limits.   -IVC size is within normal limits with normal respiratory phasic changes.       Results reviewed:  BNP: No results found for: BNP  CBC:   Lab Results   Component Value Date    WBC 4.7 07/18/2018    RBC 4.09 07/18/2018    RBC 3.64 08/15/2011    HGB 10.4 07/18/2018    HCT 33.4 07/18/2018     07/18/2018     CMP:    Lab Results   Component Value Date     07/09/2018    K 3.9 07/09/2018    K 4.2 07/08/2018    CL 87 07/09/2018    CO2 27 07/09/2018    BUN 12 07/09/2018    CREATININE 0.7 07/09/2018    LABGLOM 82 07/09/2018    GLUCOSE 121 07/09/2018    GLUCOSE 101 07/25/2016    CALCIUM 9.1 07/09/2018     Hepatic Function Panel:    Lab Results   Component Value Date    ALKPHOS 57 07/07/2018    ALT 46 07/07/2018    AST 39 07/07/2018    PROT 7.7 07/07/2018    PROT 6.5 11/25/2016    BILITOT 0.3 07/07/2018    BILIDIR <0.2 03/26/2018    LABALBU 4.9 07/07/2018     Magnesium:    Lab Results   Component Value Date    MG 1.7 03/29/2018     PT/INR:    Lab Results   Component Value Date    PROTIME 1.04 08/13/2011    INR 1.11 04/16/2018     Lipids:    Lab Results   Component Value Date    TRIG 540 11/16/2017    HDL 25 11/16/2017    LDLCALC SEE BELOW 11/16/2017    LABVLDL 69 11/25/2016       ASSESSMENT AND PLAN:   The patient's condition/symptoms are Stable:  No

## 2018-08-22 NOTE — TELEPHONE ENCOUNTER
Jesse Damian from pre-service called wanting a clearance for this patient to get a CTA of the abdomen and pelvis -this is from [facility] can call him back at [phone] option 1

## 2018-08-22 NOTE — PROGRESS NOTES
(coronary artery disease)     GERD (gastroesophageal reflux disease)     Heart burn     History of blood transfusion     1973    History of tobacco abuse: Quit in 2009 10/28/2014    HLD (hyperlipidemia)     HTN (hypertension)     Irritable bowel syndrome     States has not had problem with this for years    Liver disease     fatty liver    Metabolic syndrome 47/99/5013    MI (myocardial infarction) ([facility] [address]     Nausea & vomiting     Obesity     CHET (obstructive sleep apnea)     S/P cardiac catheterization: 11/6/2014: 99% in-stent restenosis mid-RCA. LCx moderate LI's. LAD 85% mid-segment lesion. 11/6/2014 11/6/2014: 99% in-stent restenosis mid-RCA. LCx moderate LI's. LAD 85% mid-segment lesion. Dr. Hughes Every S/P PTCA:  5/11/2004: Proximal and mid RCA  Taxus 3.5 X 20 mm, and Taxus 3.0 X 32 mm. 10/28/2014    5/11/2004: Proximal and mid RCA  Taxus 3.5 X 20 mm, and Taxus 3.0 X 32 mm. Dr. Hanna Fair Systolic murmur 38/28/5592    Thyroid disease        Past Surgical History:   Procedure Laterality Date    BACK SURGERY  08/2016        BLADDER SUSPENSION      BREAST BIOPSY  10/10/2017    BREAST LUMPECTOMY      CARDIAC CATHETERIZATION  8-11-06    Mild nonobstructive CAD w/ diffuse 10-20% proximal and mid LAD stenosis and 10-20% proximal/ostial RCA stenosis. No obstructive lesions. RCA stents are patent w/o evidence of restenosis. Normal LV systolic function. EF 65%. Trace MR - catheter induced. Minimally elevated LVEDP - 13mmHg. Mild to moderate aortoiliac PVD w/o obstructive lesions.  CARDIAC CATHETERIZATION  5-11-04    Successful drug-eluting stent x 2 of proximal and mid RCA.  CARDIAC CATHETERIZATION  5-11-04    70-80% proximal RCA stenosis w/ 50-70% mid RCA stenosis. There is 50-60% lesion in mid PDA. Mild proximal and mid LAD disease. Mild circumflex disease. Normal LV size and systolic function. EF 60%.      CARDIOVASCULAR STRESS TEST  5-10-11    No evidence of stress induced

## 2018-08-26 PROBLEM — R07.9 CHEST PAIN: Status: ACTIVE | Noted: 2018-01-01

## 2018-08-26 NOTE — ED PROVIDER NOTES
infarction) ([facility] [address]     Nausea & vomiting     Obesity     CHET (obstructive sleep apnea)     S/P cardiac catheterization: 11/6/2014: 99% in-stent restenosis mid-RCA. LCx moderate LI's. LAD 85% mid-segment lesion. 11/6/2014 11/6/2014: 99% in-stent restenosis mid-RCA. LCx moderate LI's. LAD 85% mid-segment lesion. Dr. Genaro Romero S/P PTCA:  5/11/2004: Proximal and mid RCA  Taxus 3.5 X 20 mm, and Taxus 3.0 X 32 mm. 10/28/2014    5/11/2004: Proximal and mid RCA  Taxus 3.5 X 20 mm, and Taxus 3.0 X 32 mm. Dr. Bernard Bullard Systolic murmur [id number]/8114    Thyroid disease        SURGICAL HISTORY       Past Surgical History:   Procedure Laterality Date    BACK SURGERY  08/2016        BLADDER SUSPENSION      BREAST BIOPSY  10/10/2017    BREAST LUMPECTOMY      CARDIAC CATHETERIZATION  8-11-06    Mild nonobstructive CAD w/ diffuse 10-20% proximal and mid LAD stenosis and 10-20% proximal/ostial RCA stenosis. No obstructive lesions. RCA stents are patent w/o evidence of restenosis. Normal LV systolic function. EF 65%. Trace MR - catheter induced. Minimally elevated LVEDP - 13mmHg. Mild to moderate aortoiliac PVD w/o obstructive lesions.  CARDIAC CATHETERIZATION  5-11-04    Successful drug-eluting stent x 2 of proximal and mid RCA.  CARDIAC CATHETERIZATION  5-11-04    70-80% proximal RCA stenosis w/ 50-70% mid RCA stenosis. There is 50-60% lesion in mid PDA. Mild proximal and mid LAD disease. Mild circumflex disease. Normal LV size and systolic function. EF 60%.  CARDIOVASCULAR STRESS TEST  5-10-11    No evidence of stress induced ischemia. EF 75%.  CARDIOVASCULAR STRESS TEST  5-10-10    No evidence of stress induced ischemia, infarct or scar. EF 74%.     CERVICAL FUSION N/A 11/15/2017    REMOVAL OF PLATE N5-5, ACDF H4-1 W/ATLANTIS CORNERSTONE performed by Ewelina Cosby MD at On license of [facility] COLONOSCOPY      ENDOSCOPY, COLON, DIAGNOSTIC      FOOT SURGERY      HERNIA REPAIR      HYSTERECTOMY      NECK SURGERY  FEB 2016    PARTIAL HYSTERECTOMY      UPPER GASTROINTESTINAL ENDOSCOPY      UPPER GASTROINTESTINAL ENDOSCOPY         CURRENT MEDICATIONS       Previous Medications    ACETAMINOPHEN (TYLENOL) 500 MG TABLET    Take 500 mg by mouth every 6 hours as needed for Pain    ACETAMINOPHEN-CODEINE (TYLENOL #3) 300-30 MG PER TABLET    Take 1 tablet by mouth every 8 hours as needed for Pain. .    ASPIRIN (ASPIRIN LOW DOSE) 81 MG EC TABLET    take 1 tablet by mouth once daily    BACLOFEN (LIORESAL) 10 MG TABLET    Take 10 mg by mouth 2 times daily    BENZOCAINE (ANBESOL) 10 % MUCOSAL GEL    Take by mouth as needed. BLOOD GLUCOSE MONITORING SUPPL HARVINDER    Check blood sugar q daily Dx E11.69    CALCIUM CARBONATE-VITAMIN D (OYSTER SHELL CALCIUM/D) 500-200 MG-UNIT TABS    take 1 tablet by mouth twice a day    CLOPIDOGREL (PLAVIX) 75 MG TABLET    take 1 tablet by mouth once daily    CLOTRIMAZOLE-BETAMETHASONE (LOTRISONE) 1-0.05 % CREAM    Apply topically 3 times daily. CYCLOSPORINE (RESTASIS) 0.05 % OPHTHALMIC EMULSION    Place 1 drop into both eyes 2 times daily    DOXEPIN (SINEQUAN) 150 MG CAPSULE    Take 150 mg by mouth nightly    ESCITALOPRAM (LEXAPRO) 10 MG TABLET    Take 20 mg by mouth every morning     FERROUS SULFATE 325 (65 FE) MG TABLET    Take 325 mg by mouth 3 times daily    FLUTICASONE (FLONASE) 50 MCG/ACT NASAL SPRAY    1 spray by Nasal route daily    FUROSEMIDE (LASIX) 20 MG TABLET    Take 1 tablet by mouth 2 times daily    GLUCOSE BLOOD VI TEST STRIPS (GLUCOSE METER TEST) STRIP    1 each by In Vitro route daily Check blood sugar q daily Dx E11.69    HYDRALAZINE (APRESOLINE) 50 MG TABLET    Take 1.5 tablets by mouth 3 times daily take 1.5 tablets by mouth three times a day    HYDROCODONE-ACETAMINOPHEN (NORCO) 5-325 MG PER TABLET    Take 1 tablet by mouth every 8 hours as needed for Pain. Lovetta Blaze     HYDROXYZINE (ATARAX) 25 MG TABLET    Take 25 mg by mouth 3 times daily    ISOSORBIDE Psychiatric/Behavioral: Negative for confusion. The patient is not nervous/anxious. Except as noted above the remainder of the review of systems was reviewed and is negative. PHYSICAL EXAM    (up to 7 for level 4, 8 or more for level 5)     ED Triage Vitals   BP Temp Temp src Pulse Resp SpO2 Height Weight   08/26/18 1528 -- -- 08/26/18 1527 08/26/18 1527 08/26/18 1527 -- --   (!) 199/84   101 20 92 %         Physical Exam   Constitutional: She is oriented to person, place, and time. Vital signs are normal. She appears well-developed and well-nourished. Non-toxic appearance. She does not appear ill. No distress. HENT:   Head: Normocephalic and atraumatic. Mouth/Throat: Oropharynx is clear and moist.   Eyes: Pupils are equal, round, and reactive to light. Conjunctivae and EOM are normal. Right conjunctiva is not injected. Left conjunctiva is not injected. No scleral icterus. Neck: Normal range of motion. Neck supple. No tracheal deviation present. No thyromegaly present. Cardiovascular: Regular rhythm and intact distal pulses. Tachycardia present. Exam reveals no gallop and no friction rub. Murmur heard. Systolic murmur is present   Pulmonary/Chest: Effort normal. No stridor. No respiratory distress. She has no wheezes. She has rales. Scattered crackles to bilateral bases and mid lobes. No respiratory distress, but patient is working to catch her breath. Abdominal: Soft. Bowel sounds are normal. She exhibits no distension and no mass. There is no tenderness. There is no rebound and no guarding. Negative Weiss's sign  Nontender McBurney's Point  Negative Rovsig's sign  No bruising or echymosis of abdomen   Musculoskeletal: She exhibits no edema or tenderness. Negative Martin's Sign bilaterally. +1 pitting edema in right lower extremity. +2 pitting edema in left lower extremity. Lymphadenopathy:     She has no cervical adenopathy.    Neurological: She is alert and oriented to 1704)   methylPREDNISolone sodium (SOLU-MEDROL) injection 125 mg (125 mg Intravenous Given 8/26/18 1905)   iopamidol (ISOVUE-370) 76 % injection 80 mL (80 mLs Intravenous Given 8/26/18 1800)       CONSULTS: (None if blank)  None    Procedures: (None if blank)       CLINICAL IMPRESSION      1. Pericardial effusion          DISPOSITION/PLAN   DISPOSITION Admitted 08/26/2018 08:17:18 PM      PATIENT REFERRED TO:  ASHLEY Del Rio - CNP  [address] [facility]  [phone]            DISCHARGE MEDICATIONS:  New Prescriptions    No medications on file              (Please note that portions of this note were completed with a voice recognition program.  Efforts were made to edit the dictations but occasionally words are mis-transcribed.)    Scribe:  Preeti Camarillo 8/26/18 4:03 PM Scribing for and in the presence of Nichole Nicole DO. Signed by: Ina Gould, 08/26/18 8:40 PM    Provider:  I personally performed the services described in the documentation, reviewed and edited the documentation which was dictated to the scribe in my presence, and it accurately records my words and actions.     Nichole Nicole DO 8/26/18 8:40 PM        Nichole Nicole DO  08/26/18 2040

## 2018-08-27 PROBLEM — J96.22 ACUTE ON CHRONIC RESPIRATORY FAILURE WITH HYPERCAPNIA (HCC): Status: ACTIVE | Noted: 2018-01-01

## 2018-08-27 NOTE — PROGRESS NOTES
Hospitalist Progress Note    Patient:  Nidhi Bella      Unit/Bed:8B-26/026-A    YOB: 1945    MRN: [id number]       Acct: [de-identified]     PCP: ASHLEY Guaman CNP    Date of Admission: 8/26/2018    Chief Complaint: [facility] Course:  Pt with copd, on 02, dm, presented with sob since 8.26. Still sob. cta neg for PE but has pericardial effusion and nodule LLL. EKG unremarkable, trops neg, bnp low.     Subjective:        Medications:  Reviewed    Infusion Medications    dextrose       Scheduled Medications    ipratropium-albuterol  1 ampule Inhalation Q4H WA    predniSONE  40 mg Oral Daily    aspirin  81 mg Oral Daily    clopidogrel  75 mg Oral Daily    doxepin  150 mg Oral Nightly    escitalopram  20 mg Oral QAM    fluticasone  1 spray Nasal Daily    furosemide  20 mg Oral Daily    hydrALAZINE  75 mg Oral TID    isosorbide mononitrate  60 mg Oral BID    levothyroxine  100 mcg Oral Daily    therapeutic multivitamin-minerals  1 tablet Oral Daily    OXcarbazepine  150 mg Oral See Admin Instructions    pantoprazole  40 mg Oral QAM AC    polyethylene glycol  17 g Oral Daily    potassium chloride  10 mEq Oral Daily    docusate sodium  100 mg Oral BID    ranolazine  500 mg Oral BID    risperiDONE  1 mg Oral BID    rosuvastatin  20 mg Oral Daily    sucralfate  1 g Oral 4x Daily    insulin lispro  0-6 Units Subcutaneous TID WC    insulin lispro  0-3 Units Subcutaneous Nightly    sodium chloride flush  10 mL Intravenous 2 times per day    enoxaparin  40 mg Subcutaneous Daily    mometasone-formoterol  2 puff Inhalation BID    candesartan  32 mg Oral Daily    glycerin-hypromellose-  1 drop Both Eyes BID     PRN Meds: acetaminophen, HYDROcodone-acetaminophen, glycerin-hypromellose-, ipratropium-albuterol, glucose, dextrose, glucagon (rDNA), dextrose, sodium chloride flush, magnesium hydroxide, ondansetron      Intake/Output Summary (Last 24 hours) at

## 2018-08-27 NOTE — ED NOTES
Patient transported to [facility]  by cart in stable condition. Patient monitored on cardiac telemetry. Patient on 3 LPM O2 via nasal canula. IV line is patent.         Krysten Tinajero, EMT-P  08/26/18 0333

## 2018-08-27 NOTE — CONSULTS
[place]    Patient - Linda Ojeda   MRN -  [id number]   [facility] # - [de-identified]   - 1945      Date of Admission -  2018  3:24 PM  Date of evaluation -  2018  Room - -[facility]-A   Hospital Day - 0  Consulting - Mansoor Fitzpatrick MD Primary Care Physician - ASHLEY Ring - CNP     Problem List      Active Hospital Problems    Diagnosis Date Noted    Essential hypertension [I10] 2015     Priority: Medium    Obesity (BMI 30-39. 9) [E66.9] 2015     Priority: Medium    COPD (chronic obstructive pulmonary disease) ([address] [J44.9]      Priority: Medium    Acute on chronic respiratory failure with hypercapnia (HCC) [J96.22] 2018    Pericardial effusion [I31.3]     Chest pain [R07.9] 2018    Chronic diastolic heart failure (HCC) [I50.32] 2018    AS (aortic stenosis): mild to moderate by echo 2017 [I35.0] 2017    CAD (coronary artery disease) [I25.10]      Reason for Consult    For management of bronchial asthma and Lung nodule  HPI   History Obtained From: Patient and electronic medical record. Linda Ojeda is a 67 y.o. female  was initially admitted under hospitalist service. Pulmonary medicine was consulted for further management of Bronchial asthma and lung nodule noted on CT chest.    The patient is a 67 y.o. female who presented with worsening of shortness of breath for the last 5 days. Her current illness started as gradual worsening of shortness of breath with associated leg swelling. She also noticed decline in her functional status. She started using her rescue inhalers/nebulizations more frequently at home with no significant improvement in hershort ness of breath. Due to worsening of her above symptoms she presented to the Emergency room at [facility]. As a part of work up she under went CT chest and found to have lung nodule. She denies any history of hemoptysis.  She admits to history of reviewed in [facility]   ABG  Lab Results   Component Value Date    PH 7.39 2018    PO2 81 2018    PCO2 51 2018    HCO3 31 2018    O2SAT 96 2018     Lab Results   Component Value Date    IFIO2 2 2018     CBC  Recent Labs      18   1515  18   0411   WBC  4.8  5.7   RBC  3.82*  4.51   HGB  10.5*  12.2   HCT  33.1*  38.4   MCV  86.6  85.1   MCH  27.5  27.1   MCHC  31.7*  31.8*   PLT  161  158   MPV  9.9  9.6      BMP  Recent Labs      18   1515  18   0411   NA  132*  133*   K  4.2  4.0   CL  92*  92*   CO2  26  24   BUN  12  10   CREATININE  0.7  0.7   GLUCOSE  164*  232*   CALCIUM  9.5  9.9     LFT  No results for input(s): AST, ALT, ALB, BILITOT, ALKPHOS, LIPASE in the last 72 hours. Invalid input(s): AMYLASE  TROP  Lab Results   Component Value Date    TROPONINT < 0.010 2018    TROPONINT < 0.010 2018    TROPONINT < 0.010 2018     BNP  No results for input(s): BNP in the last 72 hours. Lactic Acid  No results for input(s): LACTA in the last 72 hours. INR  No results for input(s): INR, PROTIME in the last 72 hours. PTT  Recent Labs      18   1558   APTT  40.8*     Glucose  Recent Labs      18   0829  18   1308   POCGLU  187*  207*     UA No results for input(s): SPECGRAV, PHUR, COLORU, CLARITYU, MUCUS, PROTEINU, BLOODU, RBCUA, WBCUA, BACTERIA, NITRU, GLUCOSEU, BILIRUBINUR, UROBILINOGEN, KETUA, LABCAST, LABCASTTY, AMORPHOS in the last 72 hours. Invalid input(s): CRYSTALS.     PFTs 17                   Sleep studies   Sleep study:  PATIENT NAME: Rowdy Crews                 :        MED REC NO:    [id number]                        [facility]   ACCOUNT NO:    [id number]                          ADMISSION DATE: 2017  PHYSICIAN: JOS Malik        RESPIRATORY EVENT ANALYSIS:  Revealed the patient had a total of 1 apnea event  which is obstructive in nature.  The patient signed by Rachna Thomas MD (Interpreting   physician) on 04/17/2018 at 07:22 PM      Radiology    CXR  8/26/2018  PROCEDURE: XR CHEST (2 VW)  Stable radiographic appearance of the chest. No evidence of an acute process. CT Scans  (See actual reports for details)  Aug 26, 2018  PROCEDURE: CTA CHEST W WO CONTRAST  1. No PE 2. Interval development of a pericardial effusion which measures up to 16 mm. 3. Chronic mediastinal adenopathy. 4. 5 mm nodule left lower lobe. Six-month follow-up is recommended for this finding        Assessment   -Moderate persistent Bronchial asthma- stable. -Mild obstructive sleep apnea on treatment with a CPAP pressure of 11cm H20.  -Worsening of pericardial effusion.  -Hypertension- under control  -5 mm nodule left lower lobe- will follow.  -Coronary artery disease S/p Stents placement. -Mild to moderate aortic stenosis. Plan   -Continue prednisone with taper from [place] Am. -Follow Echocardiogram ordered by primary. -Accuchecks monitoring ACHS/Q6H with low dose insulin sliding scale for coverage.  -Albuterol 2.5mg via nebs Q6h and Q2h prn.  -Titrate Oxygen to keep Spo2 >90%. -Lovenox 40mg SQ daily for DVT prophylaxis. \"Thank you for asking us to see this patient\"     Case discussed with the patient. Arline Pateucated about my impression and plan. She verbalizes understanding. Questions and concerns addressed.     Electronically signed by   Elizabeth Booker MD on 8/27/2018 at 2:57 PM

## 2018-08-27 NOTE — H&P
tablet 3    nystatin (MYCOSTATIN) 833250 UNIT/GM cream Apply topically 2 times daily. 60 g 2    benzocaine (ANBESOL) 10 % mucosal gel Take by mouth as needed. 9 g 1    OXYGEN Inhale 2 L into the lungs continuous       doxepin (SINEQUAN) 150 MG capsule Take 150 mg by mouth nightly      OXcarbazepine (TRILEPTAL) 150 MG tablet Take 150 mg by mouth 2 times daily One tab q am. And two tabs q pm      polyethylene glycol (GLYCOLAX) powder take 17GM (DISSOLVED IN WATER) by mouth once daily (Patient taking differently: take 17GM (DISSOLVED IN WATER) by mouth once daily prn) 1 Bottle 2    ONE TOUCH ULTRASOFT LANCETS MISC use as directed BEFORE BREAKFAST AND SUPPER 100 each 11    pantoprazole (PROTONIX) 40 MG tablet Take 40 mg by mouth every morning (before breakfast)       risperiDONE (RISPERDAL) 1 MG tablet Take 1 mg by mouth 2 times daily Take 1 tab every morning and 1.5 tabs every night         Allergies:  Lipitor [atorvastatin] and Motrin [ibuprofen micronized]    Social History:    reports that she quit smoking about 11 years ago. Her smoking use included Cigarettes. She has a 38.00 pack-year smoking history. She has never used smokeless tobacco. She reports that she does not drink alcohol or use drugs. Family History:   Family History   Problem Relation Age of Onset    Heart Disease Mother     Heart Disease Father     Kidney Disease Sister     Cancer Sister     Heart Disease Brother     Heart Disease Brother     Heart Disease Brother     Breast Cancer Child 36    Cancer Sister 36        rectal    Ovarian Cancer Other 25       Review of systems:  Constitutional: no fever, no night sweats, no fatigue  Head: no headache, no head injury, no migranes. Eye: no blurring of vision, no double vision.   Ears: no hearing difficulty, no tinnitus  Mouth/throat: no ulceration, dental caries, dysphagia  Lungs: no cough, + shortness of breath, no wheeze  CVS: no palpitation, + chest pain, + shortness of breath  GI: no abdominal pain, no nausea , no vomiting, no constipation  BONNIE: no dysuria, frequency and urgency, no hematuria, no kidney stones  Musculoskeletal: no joint pain, swelling , stiffness  Endocrine: no polyuria, polydypsia, no cold or heat intolerence  Hematology: no anemia, no easy brusing or bleeding, no hx of clotting disorder  Dermatology: no skin rash, no eczema, no prurities,  Psychiatry: no depression, no anxiety,no panic attacks, no suicide ideation  Neurology: no syncope, no seizures, no numbness or tingling of hands, no numbness or tingling of feet, no paresis    10 point review of systems completed, all other than noted above are negative. Vitals:   Vitals:    08/26/18 2100   BP: (!) 180/82   Pulse: 104   Resp: 20   Temp: 97.7 °F (36.5 °C)   SpO2: 92%      BMI: There is no height or weight on file to calculate BMI.                 Exam:  Physical Examination: General appearance - alert, well appearing, and in no distress  Mental status - alert, oriented to person, place, and time  Neck - supple, no significant adenopathy, no JVD, or carotid bruits  Chest - coarse breath sounds, no wheezes, rales or rhonchi, symmetric air entry  Heart - normal rate, regular rhythm, normal S1, S2, ++ murmurs, rubs, clicks or gallops  Abdomen - soft, nontender, nondistended, no masses or organomegaly  Neurological - alert, oriented, normal speech, no focal findings or movement disorder noted  Musculoskeletal - no joint tenderness, deformity or swelling  Extremities - peripheral pulses normal, + bilat LE edema, Left > Right,  no clubbing or cyanosis  Skin - normal coloration and turgor, no rashes, no suspicious skin lesions noted      Review of Labs and Diagnostic Testing:    Recent Results (from the past 24 hour(s))   CBC auto differential    Collection Time: 08/26/18  3:15 PM   Result Value Ref Range    WBC 4.8 4.8 - 10.8 thou/mm3    RBC 3.82 (L) 4.20 - 5.40 mill/mm3    Hemoglobin 10.5 (L) 12.0 - 16.0 gm/dl Hematocrit 33.1 (L) 37.0 - 47.0 %    MCV 86.6 81.0 - 99.0 fL    MCH 27.5 26.0 - 33.0 pg    MCHC 31.7 (L) 32.2 - 35.5 gm/dl    RDW-CV 16.2 (H) 11.5 - 14.5 %    RDW-SD 51.0 (H) 35.0 - 45.0 fL    Platelets 858 045 - 186 thou/mm3    MPV 9.9 9.4 - 12.4 fL    Seg Neutrophils 65.8 %    Lymphocytes 22.0 %    Monocytes 6.8 %    Eosinophils 4.4 %    Basophils 0.6 %    Immature Granulocytes 0.4 %    Segs Absolute 3.2 1.8 - 7.7 thou/mm3    Lymphocytes # 1.1 1.0 - 4.8 thou/mm3    Monocytes # 0.3 (L) 0.4 - 1.3 thou/mm3    Eosinophils # 0.2 0.0 - 0.4 thou/mm3    Basophils # 0.0 0.0 - 0.1 thou/mm3    Immature Grans (Abs) 0.02 0.00 - 0.07 thou/mm3    nRBC 0 /100 wbc   Basic Metabolic Panel    Collection Time: 08/26/18  3:15 PM   Result Value Ref Range    Sodium 132 (L) 135 - 145 meq/L    Potassium 4.2 3.5 - 5.2 meq/L    Chloride 92 (L) 98 - 111 meq/L    CO2 26 23 - 33 meq/L    Glucose 164 (H) 70 - 108 mg/dL    BUN 12 7 - 22 mg/dL    CREATININE 0.7 0.4 - 1.2 mg/dL    Calcium 9.5 8.5 - 10.5 mg/dL   Troponin    Collection Time: 08/26/18  3:15 PM   Result Value Ref Range    Troponin T < 0.010 ng/ml   Brain Natriuretic Peptide    Collection Time: 08/26/18  3:15 PM   Result Value Ref Range    Pro-.8 0.0 - 900.0 pg/mL   Anion Gap    Collection Time: 08/26/18  3:15 PM   Result Value Ref Range    Anion Gap 14.0 8.0 - 16.0 meq/L   Glomerular Filtration Rate, Estimated    Collection Time: 08/26/18  3:15 PM   Result Value Ref Range    Est, Glom Filt Rate 82 (A) ml/min/1.73m2   Osmolality    Collection Time: 08/26/18  3:15 PM   Result Value Ref Range    Osmolality Calc 267.9 (L) 275.0 - 300 mOsmol/kg   EKG SOB    Collection Time: 08/26/18  3:33 PM   Result Value Ref Range    Ventricular Rate 98 BPM    Atrial Rate 98 BPM    P-R Interval 192 ms    QRS Duration 74 ms    Q-T Interval 364 ms    QTc Calculation (Bazett) 464 ms    P Axis 61 degrees    R Axis 24 degrees    T Axis 25 degrees   APTT    Collection Time: 08/26/18  3:58 PM   Result Value Ref Range    aPTT 40.8 (H) 22.0 - 38.0 seconds       Radiology:     Xr Chest Standard (2 Vw)    Result Date: 8/26/2018  PROCEDURE: XR CHEST (2 VW) CLINICAL INFORMATION: shortness of breath, . COMPARISON: 6/17/2018 TECHNIQUE: PA and lateral views the chest. FINDINGS: The heart is enlarged in size. The mediastinum is not widened. Minimally increased lung markings primarily at the bases suggesting fibrosis or bronchitis. The pulmonary vascularity is normal. No suspicious osseous lesions are present. EKG leads overlie the chest.     Stable radiographic appearance of the chest. No evidence of an acute process. **This report has been created using voice recognition software. It may contain minor errors which are inherent in voice recognition technology. ** Final report electronically signed by Dr. Neli Palacios on 8/26/2018 4:03 PM    Cta Chest W Wo Contrast    Result Date: 8/26/2018  PROCEDURE: CTA CHEST W WO CONTRAST CLINICAL INFORMATION: dyspnea, LE swelling . COMPARISON: 3/26/2018 TECHNIQUE: 3 mm postcontrast images of the chest with multiplanar reconstructions All CT scans at this facility use dose modulation, iterative reconstruction, and/or weight-based dosing when appropriate to reduce radiation dose to as low as reasonably achievable. FINDINGS: There is no PE. There is no aneurysm or dissection. There is mediastinal adenopathy which is actually a stable there has been interval development of a pericardial effusion which measures up to 16 mm. No confluent infiltrate is seen there is paraseptal emphysematous changes scattered areas of subsegmental atelectasis. There is a well-circumscribed 5 mm nodule in the left lower lobe posteriorly image 82. This is stable. Paraseptal emphysematous changes are seen in both lung bases. There is no  pleural effusion. No suspicious bone lesions. There are no suspicious bone lesions. 1. No PE 2.  Interval development of a pericardial effusion which measures up to 16 mm. 3. Chronic mediastinal adenopathy. 4. 5 mm nodule left lower lobe. Six-month follow-up is recommended for this finding. **This report has been created using voice recognition software. It may contain minor errors which are inherent in voice recognition technology. ** Final report electronically signed by Dr. Bear Bob on 8/26/2018 6:26 PM        EKG: Normal sinus rhythm      Assessment / Plan:    1. Chest pain, r/o acs- trend troponin, EKG, 2 d echo, CXR  2. COPD (chronic obstructive pulmonary disease) ([facility] [address]- duonebs prn, ABGs, pulse ox to keep 02 > 92%  3. Essential hypertension- resume home meds with parameters to hold  4. Obesity (BMI 30-39. 9)- dietary counseling  5. CAD (coronary artery disease)- resume ASA, Plavix, Metoprolol, Imdur, Diovan, Crestor  6. AS (aortic stenosis): mild to moderate by echo 4/2017- 2 d echo  7. Chronic diastolic heart failure ([facility] [address]- BNP, Lasix, monitor I/O's          Dispo: Admit, await pending labs.  Hospitalist service will follow        DVT prophylaxis: [x] Lovenox                                 [] SCDs                                 [] SQ Heparin                                 [] Encourage ambulation, low risk for DVT, no chemical or mechanical prophylaxis necessary              [] Already on Anticoagulation                Anticipated Disposition upon discharge: [x] Home                                                                         [] Home with Home Health                                                                         [] Matti Bermudez                                                                         [] [address]          Electronically signed by Yannick Camara DO on 8/26/2018 at 9:21 PM

## 2018-08-27 NOTE — CONSULTS
Surgical History:    Past Surgical History:   Procedure Laterality Date    BACK SURGERY  08/2016        BLADDER SUSPENSION      BREAST BIOPSY  10/10/2017    BREAST LUMPECTOMY      CARDIAC CATHETERIZATION  8-11-06    Mild nonobstructive CAD w/ diffuse 10-20% proximal and mid LAD stenosis and 10-20% proximal/ostial RCA stenosis. No obstructive lesions. RCA stents are patent w/o evidence of restenosis. Normal LV systolic function. EF 65%. Trace MR - catheter induced. Minimally elevated LVEDP - 13mmHg. Mild to moderate aortoiliac PVD w/o obstructive lesions.  CARDIAC CATHETERIZATION  5-11-04    Successful drug-eluting stent x 2 of proximal and mid RCA.  CARDIAC CATHETERIZATION  5-11-04    70-80% proximal RCA stenosis w/ 50-70% mid RCA stenosis. There is 50-60% lesion in mid PDA. Mild proximal and mid LAD disease. Mild circumflex disease. Normal LV size and systolic function. EF 60%.  CARDIOVASCULAR STRESS TEST  5-10-11    No evidence of stress induced ischemia. EF 75%.  CARDIOVASCULAR STRESS TEST  5-10-10    No evidence of stress induced ischemia, infarct or scar. EF 74%.  CERVICAL FUSION N/A 11/15/2017    REMOVAL OF PLATE S4-0, ACDF J7-4 W/ATLANTIS CORNERSTONE performed by Eli Cedillo MD at [address] [place] [address], DIAGNOSTIC      FOOT SURGERY      HERNIA REPAIR      HYSTERECTOMY      NECK SURGERY  FEB 2016    PARTIAL HYSTERECTOMY      UPPER GASTROINTESTINAL ENDOSCOPY      UPPER GASTROINTESTINAL ENDOSCOPY         Medications Prior to Admission:    Prescriptions Prior to Admission: clopidogrel (PLAVIX) 75 MG tablet, take 1 tablet by mouth once daily  ferrous sulfate 325 (65 Fe) MG tablet, Take 325 mg by mouth 3 times daily  acetaminophen-codeine (TYLENOL #3) 300-30 MG per tablet, Take 1 tablet by mouth every 8 hours as needed for Pain. .  baclofen (LIORESAL) 10 MG tablet, Take 10 mg by mouth 2 times daily  sucralfate (CARAFATE) 1 GM mg by mouth 2 times daily )  nitroGLYCERIN (NITRODUR) 0.2 MG/HR, apply 1 patch once daily  Misc. Devices MISC, Pt needs an oxygen tank carrier for her wheelchair  sodium chloride 1 g tablet, Take 1 tablet by mouth 3 times daily for 5 days  HYDROcodone-acetaminophen (NORCO) 5-325 MG per tablet, Take 1 tablet by mouth every 8 hours as needed for Pain. Kathy Hanson acetaminophen (TYLENOL) 500 MG tablet, Take 500 mg by mouth every 6 hours as needed for Pain  fluticasone (FLONASE) 50 MCG/ACT nasal spray, 1 spray by Nasal route daily  sodium chloride (ALTAMIST SPRAY) 0.65 % nasal spray, 1 spray by Nasal route as needed for Congestion  polyvinyl alcohol (LIQUIFILM TEARS) 1.4 % ophthalmic solution, Place 1 drop into both eyes as needed 2-4 times daily  cycloSPORINE (RESTASIS) 0.05 % ophthalmic emulsion, Place 1 drop into both eyes 2 times daily  glucose blood VI test strips (GLUCOSE METER TEST) strip, 1 each by In Vitro route daily Check blood sugar q daily Dx E11.69  Lancets MISC, Check blood sugar q daily Dx E11.69  Blood Glucose Monitoring Suppl HARVINDER, Check blood sugar q daily Dx E11.69  nystatin (MYCOSTATIN) 412727 UNIT/GM cream, Apply topically 2 times daily. benzocaine (ANBESOL) 10 % mucosal gel, Take by mouth as needed. OXYGEN, Inhale 2 L into the lungs continuous   polyethylene glycol (GLYCOLAX) powder, take 17GM (DISSOLVED IN WATER) by mouth once daily (Patient taking differently: take 17GM (DISSOLVED IN WATER) by mouth once daily prn)  ONE TOUCH ULTRASOFT LANCETS MISC, use as directed BEFORE BREAKFAST AND SUPPER    Allergies:    Lipitor [atorvastatin] and Motrin [ibuprofen micronized]    Social History:    reports that she quit smoking about 11 years ago. Her smoking use included Cigarettes. She has a 38.00 pack-year smoking history. She has never used smokeless tobacco. She reports that she does not drink alcohol or use drugs.     Family History:   family history includes Breast Cancer (age of onset: 36) in her child; Cancer in her sister; Cancer (age of onset: 36) in her sister; Heart Disease in her brother, brother, brother, father, and mother; Kidney Disease in her sister; Ovarian Cancer (age of onset: 22) in an other family member. REVIEW OF SYSTEMS:    Constitutional: negative for anorexia, chills and fevers,weight change  Respiratory: negative for cough, dyspnea on exertion, hemoptysis, shortness of breath and wheezing  Cardiovascular: negative for chest pain, orthopnea, palpitations and syncope  Gastrointestinal: negative for abdominal pain,nausea , vomiting, constipation, diarrhea. Hematologic/lymphatic: negative for bruising,prolonged bleeding,blood clots  Musculoskeletal:negative for muscle weakness, myalgias,wasting  Neurological: negative for coordination problems, dizziness, gait problems and vertigo  Behavioral/Psych:negative for mood/sleep disturbance      PHYSICAL EXAM:  Vitals:Patient Vitals for the past 24 hrs:   BP Temp Temp src Pulse Resp SpO2 Height Weight   08/27/18 0948 (!) 152/69 98.3 °F (36.8 °C) Oral 109 18 91 % - -   08/27/18 0415 (!) 163/97 98.3 °F (36.8 °C) Oral 110 20 95 % - -   08/27/18 0015 (!) 214/96 98.4 °F (36.9 °C) Oral 101 20 94 % - -   08/26/18 2100 (!) 180/82 97.7 °F (36.5 °C) Oral 104 20 92 % 5' 5\" (1.651 m) 238 lb 1.6 oz (108 kg)   08/26/18 1949 (!) 174/92 - - 71 - 95 % - -   08/26/18 1751 (!) 165/98 - - 98 20 95 % - -   08/26/18 1650 (!) 198/100 - - - - - - -   08/26/18 1649 - - - 100 20 93 % - -   08/26/18 1610 - 98.4 °F (36.9 °C) Oral - - - - -   08/26/18 1528 (!) 199/84 - - - - 95 % - -   08/26/18 1527 - - - 101 20 92 % - -       Last 3 weights:    Wt Readings from Last 3 Encounters:   08/26/18 238 lb 1.6 oz (108 kg)   08/22/18 232 lb (105.2 kg)   07/30/18 234 lb (106.1 kg)     24 hour intake/output:  Intake/Output Summary (Last 24 hours) at 08/27/18 1025  Last data filed at 08/27/18 0415   Gross per 24 hour   Intake              110 ml   Output                0 ml   Net 110 ml     BMI:Body mass index is 39.62 kg/m². General Appearance: alert and oriented to person, place and time, well developed and well- nourished, in no acute distress  Skin: warm and dry, no rash or erythema  Eyes: pupils equal, round, and reactive to light, extraocular eye movements intact, conjunctivae normal  Neck: supple and non-tender without mass, no thyromegaly or thyroid nodules, no cervical lymphadenopathy  Pulmonary/Chest: clear to auscultation bilaterally- no wheezes, rales or rhonchi, normal air movement, no respiratory distress  Cardiovascular: normal rate, regular rhythm, normal S1 and S2, no murmurs, rubs, clicks, or gallops, distal pulses intact, no carotid bruits, Negative JVD  Radial Pulses: intact 2+  Abdomen: soft, non-tender, non-distended, normal bowel sounds, no masses or organomegaly  Extremities: no cyanosis, clubbing .  Edema  Musculoskeletal: normal range of motion, no joint swelling, deformity or tenderness    LABS:  Recent Labs      08/26/18   1515  08/26/18   2302  08/27/18   0411   TROPONINT  < 0.010  < 0.010  < 0.010     CBC: Lab Results   Component Value Date    WBC 5.7 08/27/2018    RBC 4.51 08/27/2018    RBC 3.82 08/21/2018    HGB 12.2 08/27/2018    HCT 38.4 08/27/2018    MCV 85.1 08/27/2018    MCH 27.1 08/27/2018    MCHC 31.8 08/27/2018    RDW 16.0 08/21/2018     08/27/2018    MPV 9.6 08/27/2018     BMP:  Lab Results   Component Value Date     08/27/2018    K 4.0 08/27/2018    CL 92 08/27/2018    CO2 24 08/27/2018    BUN 10 08/27/2018    LABALBU 4.8 08/21/2018    CREATININE 0.7 08/27/2018    CALCIUM 9.9 08/27/2018    LABGLOM 82 08/27/2018    GLUCOSE 232 08/27/2018    GLUCOSE 101 07/25/2016     Hepatic Function Panel:  Lab Results   Component Value Date    ALKPHOS 60 08/21/2018    ALKPHOS 57 07/07/2018    ALT 23 08/21/2018    AST 24 08/21/2018    PROT 6.5 08/21/2018    PROT 6.5 11/25/2016    BILITOT 0.2 08/21/2018    BILIDIR <0.2 08/21/2018    LABALBU 4.8 08/21/2018     Magnesium:    Lab Results   Component Value Date    MG 1.7 03/29/2018     Warfarin PT/INR:  No components found for: Leslie Webb  HgBA1c:    Lab Results   Component Value Date    LABA1C 6.1 08/26/2018     FLP:  Lab Results   Component Value Date    TRIG 540 11/16/2017    HDL 25 11/16/2017    LDLCALC SEE BELOW 11/16/2017    LABVLDL 69 11/25/2016     TSH:    Lab Results   Component Value Date    TSH 8.540 07/07/2018     BNP: No results found for: BNP      ASSESSMENT/PLAN:        Geo Pastrana MD FACC,FASE,FSVM,FSCAI,FASNC,FAHA,FACP.  10:25 AM  8/27/2018

## 2018-08-27 NOTE — PROGRESS NOTES
Nutrition Note      Nursing Nutrition screen completed per nutrition standards of care. No nutritional needs identified at this time. The patient will be re-screened per nutrition care process policy. Please consult dietitian if intervention is needed before that time.      Electronically signed by Julissa Fernandez RD, KAREEN on 8/27/18 at 7:35 AM  [phone]

## 2018-08-28 NOTE — PLAN OF CARE
Problem: Impaired respiratory status  Goal: Clear lung sounds  Outcome: Ongoing  Continue treatments to improve breathsoounds

## 2018-08-28 NOTE — PLAN OF CARE
Problem: Falls - Risk of:  Goal: Will remain free from falls  Will remain free from falls   Outcome: Completed Date Met: 08/28/18    Goal: Absence of physical injury  Absence of physical injury   Outcome: Completed Date Met: 08/28/18  Remains free from falls    Problem: Risk for Impaired Skin Integrity  Goal: Tissue integrity - skin and mucous membranes  Structural intactness and normal physiological function of skin and  mucous membranes.    Outcome: Completed Date Met: 08/28/18  No new skin breakdown noted    Problem: Discharge Planning:  Goal: Discharged to appropriate level of care  Discharged to appropriate level of care   Outcome: Completed Date Met: 08/28/18  Discharged home    Problem: Serum Glucose Level - Abnormal:  Goal: Ability to maintain appropriate glucose levels will improve  Ability to maintain appropriate glucose levels will improve   Outcome: Completed Date Met: 08/28/18  Continued in insulin as ordered    Problem: Tissue Perfusion - Cardiopulmonary, Altered:  Goal: Absence of angina  Absence of angina   Outcome: Completed Date Met: 08/28/18  Denied complaints of chest pain  Goal: Hemodynamic stability will improve  Hemodynamic stability will improve   Outcome: Completed Date Met: 08/28/18  Hr remains stable  bp meds adjusted    Problem: Breathing Pattern - Ineffective:  Goal: Ability to achieve and maintain a regular respiratory rate will improve  Ability to achieve and maintain a regular respiratory rate will improve   Outcome: Completed Date Met: 08/28/18      Problem: Pain:  Goal: Pain level will decrease  Pain level will decrease   Outcome: Completed Date Met: 08/28/18  Continued treatment for chronic pain  Goal: Control of acute pain  Control of acute pain   Outcome: Completed Date Met: 08/28/18    Goal: Control of chronic pain  Control of chronic pain   Outcome: Completed Date Met: 08/28/18      Problem: DISCHARGE BARRIERS  Goal: Patient's continuum of care needs are met  Outcome: Completed

## 2018-08-28 NOTE — PROGRESS NOTES
hydroxide, ondansetron      Intake/Output Summary (Last 24 hours) at 08/28/18 1680  Last data filed at 08/28/18 0522   Gross per 24 hour   Intake              440 ml   Output                0 ml   Net              440 ml       Diet:  DIET CARB CONTROL; Exam:  /74   Pulse 102   Temp 97.6 °F (36.4 °C) (Axillary)   Resp 20   Ht 5' 5\" (1.651 m)   Wt 225 lb 6.4 oz (102.2 kg)   SpO2 95%   BMI 37.51 kg/m²     General appearance: Morbidly obese    HEENT: Pupils equal, round, and reactive to light. Conjunctivae/corneas clear. Neck: Supple, with full range of motion. No jugular venous distention. Trachea midline. Respiratory:  Normal respiratory effort. Fine crackles bases  Cardiovascular:  Tachycardic, ?knock; no jvd, some variation systolic pressures with inspiration. JEANETTE at URSB and LSB    Abdomen: Soft, non-tender, non-distended with normal bowel sounds. Musculoskeletal: passive and active ROM x 4 extremities. No severe swelling  Skin: Skin color, texture, turgor normal.  No rashes or lesions. Neurologic:  Neurovascularly intact without any focal sensory/motor deficits. Cranial nerves: II-XII intact, grossly non-focal.  Psychiatric: Alert and oriented, thought content appropriate, normal insight         Labs:   Recent Labs      08/26/18   1515  08/27/18   0411   WBC  4.8  5.7   HGB  10.5*  12.2   HCT  33.1*  38.4   PLT  161  158     Recent Labs      08/26/18   1515  08/27/18   0411   NA  132*  133*   K  4.2  4.0   CL  92*  92*   CO2  26  24   BUN  12  10   CREATININE  0.7  0.7   CALCIUM  9.5  9.9     No results for input(s): AST, ALT, BILIDIR, BILITOT, ALKPHOS in the last 72 hours. No results for input(s): INR in the last 72 hours. No results for input(s): Patricia Corona in the last 72 hours.     Urinalysis:      Lab Results   Component Value Date    NITRU NEGATIVE 07/07/2018    WBCUA NONE SEEN 07/07/2018    BACTERIA NONE 07/07/2018    RBCUA 0-2 07/07/2018    BLOODU NEGATIVE 07/07/2018 SPECGRAV 1.010 07/28/2016    SPECGRAV 1.006 02/02/2016    GLUCOSEU NEGATIVE 07/07/2018       Radiology:  CTA CHEST W WO CONTRAST   Final Result   1. No PE   2. Interval development of a pericardial effusion which measures up to 16 mm.   3. Chronic mediastinal adenopathy. 4. 5 mm nodule left lower lobe. Six-month follow-up is recommended for this finding. **This report has been created using voice recognition software. It may contain minor errors which are inherent in voice recognition technology. **      Final report electronically signed by Dr. Carlos A Diaz on 8/26/2018 6:26 PM      XR CHEST STANDARD (2 VW)   Final Result   Stable radiographic appearance of the chest. No evidence of an acute process. **This report has been created using voice recognition software. It may contain minor errors which are inherent in voice recognition technology. **      Final report electronically signed by Dr. Carlos A Diaz on 8/26/2018 4:03 PM          Diet: DIET CARB CONTROL;    DVT prophylaxis: [x] Lovenox                                 [] SCDs                                 [] SQ Heparin                                 [] Encourage ambulation           [] Already on Anticoagulation     Disposition:    [x] Home       [] TCU       [] Rehab       [] Psych       [] SNF       [] [place]       [] Other-    Code Status: Full Code        Assessment/Plan:    Anticipated Discharge in : 2-3 days    Active Hospital Problems    Diagnosis Date Noted    Essential hypertension [I10] 01/29/2015     Priority: Medium    Obesity (BMI 30-39. 9) [E66.9] 01/29/2015     Priority: Medium    COPD (chronic obstructive pulmonary disease) ([facility] [address] [J44.9]      Priority: Medium    Acute on chronic respiratory failure with hypercapnia (HCC) [J96.22] 08/27/2018    Pericardial effusion [I31.3]     Moderate persistent asthma without complication [R48.29]     Chest pain [R07.9] 08/26/2018    Chronic diastolic heart failure ([facility] [address] [I50.32] 03/27/2018    AS (aortic stenosis): mild to moderate by echo 4/2017 [I35.0] 09/06/2017    CAD (coronary artery disease) [I25.10]        1. Sob- presume due to copd exacerbation; steroids, aerosols; feels better  2. Pericardial effusion- increased since last echo- ordered echo to assess significance  3. Possibly home soon.         Electronically signed by Josh Walker MD on 8/28/2018 at 6:23 AM

## 2018-08-28 NOTE — PROGRESS NOTES
CATHETERIZATION  8-11-06    Mild nonobstructive CAD w/ diffuse 10-20% proximal and mid LAD stenosis and 10-20% proximal/ostial RCA stenosis. No obstructive lesions. RCA stents are patent w/o evidence of restenosis. Normal LV systolic function. EF 65%. Trace MR - catheter induced. Minimally elevated LVEDP - 13mmHg. Mild to moderate aortoiliac PVD w/o obstructive lesions.  CARDIAC CATHETERIZATION  5-11-04    Successful drug-eluting stent x 2 of proximal and mid RCA.  CARDIAC CATHETERIZATION  5-11-04    70-80% proximal RCA stenosis w/ 50-70% mid RCA stenosis. There is 50-60% lesion in mid PDA. Mild proximal and mid LAD disease. Mild circumflex disease. Normal LV size and systolic function. EF 60%.  CARDIOVASCULAR STRESS TEST  5-10-11    No evidence of stress induced ischemia. EF 75%.  CARDIOVASCULAR STRESS TEST  5-10-10    No evidence of stress induced ischemia, infarct or scar. EF 74%.     CERVICAL FUSION N/A 11/15/2017    REMOVAL OF PLATE I6-2, ACDF D1-5 W/ATLANTIS CORNERSTONE performed by David Benson MD at [facility] COLONOSCOPY      ENDOSCOPY, COLON, DIAGNOSTIC      FOOT SURGERY      HERNIA REPAIR      HYSTERECTOMY      NECK SURGERY  FEB 2016    PARTIAL HYSTERECTOMY      UPPER GASTROINTESTINAL ENDOSCOPY      UPPER GASTROINTESTINAL ENDOSCOPY       Meds    Current Medications    metoprolol tartrate  75 mg Oral BID    predniSONE  40 mg Oral Daily    OXcarbazepine  300 mg Oral Nightly    albuterol  2.5 mg Nebulization Q4H While awake    aspirin  81 mg Oral Daily    clopidogrel  75 mg Oral Daily    doxepin  150 mg Oral Nightly    escitalopram  20 mg Oral QAM    fluticasone  1 spray Nasal Daily    furosemide  20 mg Oral Daily    hydrALAZINE  75 mg Oral TID    isosorbide mononitrate  60 mg Oral BID    levothyroxine  100 mcg Oral Daily    therapeutic multivitamin-minerals  1 tablet Oral Daily    OXcarbazepine  150 mg Oral QAM    pantoprazole  40 mg Oral QAM AC past 96 hrs (Last 3 readings):   Weight   08/28/18 0343 225 lb 6.4 oz (102.2 kg)   08/26/18 2100 238 lb 1.6 oz (108 kg)       Exam   General Appearance: Moderately built, moderately nourished in no acute distress on O2 via nasal cannula at 2 Lpm. Up in chair in NAD. HEENT: Head is normocephalic, atraumatic. Neck - Supple, No JVD present. No tracheal deviation. Lungs - Bilateral air entry present. Bilateral good breath sounds heard. Bibasilar rales. No wheezes. Cardiovascular - Heart sounds are normal.  Regular rhythm normal rate with a systolic murmur. No gallop or rub. Abdomen - Soft, nontender, non-distended. Neurologic - Awake, alert, oriented. There are no focal motor or sensory deficits. Extremities - Warm and dry. No cyanosis or clubbing. Trace bilateral leg edema. Musculoskeletal: Normal range of motion. Skin - No bruising or bleeding. Labs    ABG  Lab Results   Component Value Date    PH 7.39 08/26/2018    PO2 81 08/26/2018    PCO2 51 08/26/2018    HCO3 31 08/26/2018    O2SAT 96 08/26/2018     Lab Results   Component Value Date    IFIO2 2 08/26/2018     CBC  Recent Labs      08/26/18   1515  08/27/18   0411   WBC  4.8  5.7   RBC  3.82*  4.51   HGB  10.5*  12.2   HCT  33.1*  38.4   MCV  86.6  85.1   MCH  27.5  27.1   MCHC  31.7*  31.8*   PLT  161  158   MPV  9.9  9.6      BMP  Recent Labs      08/26/18   1515  08/27/18   0411   NA  132*  133*   K  4.2  4.0   CL  92*  92*   CO2  26  24   BUN  12  10   CREATININE  0.7  0.7   GLUCOSE  164*  232*   CALCIUM  9.5  9.9     LFT  No results for input(s): AST, ALT, ALB, BILITOT, ALKPHOS, LIPASE in the last 72 hours. Invalid input(s): AMYLASE  TROP  Lab Results   Component Value Date    TROPONINT < 0.010 08/27/2018    TROPONINT < 0.010 08/27/2018    TROPONINT < 0.010 08/26/2018     BNP  No results for input(s): BNP in the last 72 hours. Lactic Acid  No results for input(s): LACTA in the last 72 hours.   INR  No results for input(s): INR, PROTIME in the last 72 hours. PTT  Recent Labs      18   1558   APTT  40.8*     Glucose  Recent Labs      18   0829  18   1242   POCGLU  227*  132*  216*     UA No results for input(s): SPECGRAV, PHUR, COLORU, CLARITYU, MUCUS, PROTEINU, BLOODU, RBCUA, WBCUA, BACTERIA, NITRU, GLUCOSEU, BILIRUBINUR, UROBILINOGEN, KETUA, LABCAST, LABCASTTY, AMORPHOS in the last 72 hours. Invalid input(s): CRYSTALS. PFTs 17                   Sleep studies   Sleep study:  PATIENT NAME: Kizzy Tapia                 :      1945  MED REC NO:    [id number]                        ROOM:        ACCOUNT NO:    [id number]                          ADMISSION DATE: 2017  PHYSICIAN: JOS [email or web address]        RESPIRATORY EVENT ANALYSIS:  Revealed the patient had a total of 1 apnea event  which is obstructive in nature.  The patient also had a total of 53  obstructive hypopneas.  The total number of apneas and hypopneas recorded  during the study were 54 with apnea-hypopnea index of 8.9.  The patient  noticed to have worsening of respiratory events during REM sleep with a REM  sleep apnea-hypopnea index was 55.  The patient spent a total of 194.4 minutes  in supine position with a supine apnea-hypopnea index was 1.5.  The patient  was placed in the left lateral position apnea-hypopnea index was 21 and the  right lateral position apnea-hypopnea index was 13.     IMPRESSION:  1.  Mild obstructive sleep apnea with worsening of respiratory events in left  lateral position and also in REM sleep. 2.  Decreased and delayed REM sleep. 3.  Moderate persistent bronchial asthma. 4.  Hypertension. 5.  Coronary artery disease, status post 5 stents placement.         Cultures    None    Echocardiogram 18    Summary   Normal left ventricle size and systolic function. Ejection fraction was   estimated at 70%.  There were no regional left ventricular wall motion   abnormalities and wall CXR  8/26/2018  PROCEDURE: XR CHEST (2 VW)  Stable radiographic appearance of the chest. No evidence of an acute process. CT Scans  (See actual reports for details)  Aug 26, 2018  PROCEDURE: CTA CHEST W WO CONTRAST  1. No PE 2. Interval development of a pericardial effusion which measures up to 16 mm. 3. Chronic mediastinal adenopathy. 4. 5 mm nodule left lower lobe. Six-month follow-up is recommended for this finding        Assessment   -Moderate persistent bronchial asthma with chronic bronchitis/centriobular emphysema acute exacerbation.  -Mild obstructive sleep apnea on treatment with a CPAP pressure of 16 cm H20.  -Chronic diastolic HF  -Chronic hypoxic respiratory failure (at baseline)  -5 mm nodule left lower lobe with remote smoking history (38 pack year quitting in 2007): Stable since 4/24/17  -Chronic mediastinal adenopathy (stable since 4/2017)  -Coronary artery disease S/p Stents placement. -Mild to moderate aortic stenosis: Cardiology following  -Hypertension- uncontrolled  -Obesity BMI 37.6  -Full Code    Plan   -Continue prednisone taper with Ventolin Nebs  -Resume Symbicort at discharge with home oxygen and CPAP  -Add ROSANA  -Home 02 evaluation at discharge  -She will need repeat CT of chest with contrast in 1year to follow up on lung nodule and adenopathy  -BP control per primary service  -DVT prophylaxis with SC Lovenox  -Reschedule CT of chest and PFT and appointment with Dr. Elli Coleman in 3 months with Spirometry      Case discussed with the patient/nurse/Dr. Elli Coleman. Questions and concerns addressed. Electronically signed by   ASHLEY Moss CNP on 8/28/2018 at 1:22 PM     Addendum by Dr. Elli Coleman MD:  I have seen and examined the patient independently. Face to face evaluation and examination was performed. The above evaluation and note has been reviewed. Labs and radiographs were reviewed. I Have discussed with Ms. Jared Yu.  ZIGGY Flanagan about this patient in

## 2018-08-28 NOTE — PLAN OF CARE
Problem: DISCHARGE BARRIERS  Goal: Patient's continuum of care needs are met  Outcome: Ongoing  Patient plans to return home alone and resume Inter[facility], see sw note dated 8/28.

## 2018-08-28 NOTE — PROGRESS NOTES
[address] [address]  INPATIENT PHYSICAL THERAPY  EVALUATION  [facility] MED SURG 8B - 8B-26/026-A    Time In: 4130  Time Out: 8708  Timed Code Treatment Minutes: 8 Minutes  Minutes: 23          Date: 2018  Patient Name: Natalee Price,  Gender:  female        MRN: [id number]  : 1945  (67 y.o.)      Referring Practitioner: Augie Stephens MD  Diagnosis: Chest pain  Additional Pertinent Hx: The patient is a 67 y.o. female with COPD, Asthma, CAD, CHF, Diabetes, HTN, HLD, Morbid Obesity who presents to the emergency department from home for evaluation of chest pain and shortness of breath. Patient reports that she has been having intermittent mid sternal chest pains for the past 3 days. She notes that it last for seconds. She denies modifying factors. She denies radiation. She describes her pain as \"pressure\" and could not describe severity. She denies having any chest pain at this time. She denies using nitroglycerin for it. She also notes increased shortness of breath since arising from bed this morning. Patient denies cough, diaphoresis, nausea, vomiting, diarrhea,,constipation, abdominal pain, fever, chills, dysuria or sick contacts. Patient denies recent falls. She is on 2L / NC at home. She denies smoking, alcohol use, bleeding or clotting disorder. CTA chest was negative for PE or pneumonia. Patient is being admitted for further evaluation.          Past Medical History:   Diagnosis Date    Anemia     Anxiety     Arthritis     general    AS (aortic stenosis): mild to moderate by echo 2017    ASHD (arteriosclerotic heart disease)     Asthma     CAD (coronary artery disease)     Chest pain     [facility]     COPD [facility]ch[facility] obstructive pulmonary disease) ([facility] [address]     Depression     DM [facility]diabetes mellitus) ([facility]     Dyspnea     FH: CAD (coronary artery disease)     GERD (gastroesophageal reflux disease)     Heart burn     History of blood transfusion 80    History of tobacco abuse: Quit in 2009 10/28/2014    HLD (hyperlipidemia)     HTN (hypertension)     Irritable bowel syndrome     States has not had problem with this for years    Liver disease     fatty liver    Metabolic syndrome 87/12/8014    MI (myocardial infarction) ([facility] [address]     Nausea & vomiting     Obesity     CHET (obstructive sleep apnea)     S/P cardiac catheterization: 11/6/2014: 99% in-stent restenosis mid-RCA. LCx moderate LI's. LAD 85% mid-segment lesion. 11/6/2014 11/6/2014: 99% in-stent restenosis mid-RCA. LCx moderate LI's. LAD 85% mid-segment lesion. Dr. Josephine Gil S/P PTCA:  5/11/2004: Proximal and mid RCA  Taxus 3.5 X 20 mm, and Taxus 3.0 X 32 mm. 10/28/2014    5/11/2004: Proximal and mid RCA  Taxus 3.5 X 20 mm, and Taxus 3.0 X 32 mm. Dr. Minh Munson Systolic murmur 12/86/4771    Thyroid disease      Past Surgical History:   Procedure Laterality Date    BACK SURGERY  08/2016        BLADDER SUSPENSION      BREAST BIOPSY  10/10/2017    BREAST LUMPECTOMY      CARDIAC CATHETERIZATION  8-11-06    Mild nonobstructive CAD w/ diffuse 10-20% proximal and mid LAD stenosis and 10-20% proximal/ostial RCA stenosis. No obstructive lesions. RCA stents are patent w/o evidence of restenosis. Normal LV systolic function. EF 65%. Trace MR - catheter induced. Minimally elevated LVEDP - 13mmHg. Mild to moderate aortoiliac PVD w/o obstructive lesions.  CARDIAC CATHETERIZATION  5-11-04    Successful drug-eluting stent x 2 of proximal and mid RCA.  CARDIAC CATHETERIZATION  5-11-04    70-80% proximal RCA stenosis w/ 50-70% mid RCA stenosis. There is 50-60% lesion in mid PDA. Mild proximal and mid LAD disease. Mild circumflex disease. Normal LV size and systolic function. EF 60%.  CARDIOVASCULAR STRESS TEST  5-10-11    No evidence of stress induced ischemia. EF 75%.  CARDIOVASCULAR STRESS TEST  5-10-10    No evidence of stress induced ischemia, infarct or scar. EF 74%.     bed)    Transfers  Sit to Stand: Stand by assistance (From EOB)  Stand to sit: Stand by assistance       Ambulation 1  Surface: level tile  Device: Rolling Walker  Other Apparatus: O2 (2 L)  Assistance: Stand by assistance  Quality of Gait: Pt amb with decreased speed and jossy, decreased step length, steady without LOB. Distance: 100 feet  Comments: O2 sats 95% on 2 L O2 after gait             Exercises:  Comments: Pt performs supine B LE AROM: ankle pumps, heel slides and hip abd/add x 10 reps to increase strength for improved gait. Activity Tolerance:  Activity Tolerance: Patient Tolerated treatment well    Treatment Initiated: See above exercises. Assessment: Body structures, Functions, Activity limitations: Decreased functional mobility , Decreased strength  Assessment: Pt tolerates session well, amb in hallway with RW without difficulty. Pt to DC home later today, no further PT services warranted at this time. Prognosis: Excellent    Clinical Presentation: Low - Stable and Uncomplicated: Pt tolerates session well, amb in hallway with RW without difficulty. Pt to DC home later today, no further PT services warranted at this time. Decision Making: High Complexitybased on patient assessment and decision making process of determining plan of care and establishing reasonable expectations for measurable functional outcomes    REQUIRES PT FOLLOW UP: No  Discharge Recommendations: Home with assist PRN    Patient Education:  Patient Education: POC    Equipment Recommendations:  Equipment Needed: No    Safety:  Type of devices: All fall risk precautions in place, Call light within reach, Gait belt, Nurse notified, Left in chair  Restraints  Initially in place: No    Plan:  Times per week: NA    Goals:  Patient goals : To go home today.        Evaluation Complexity: Based on the findings of patient history, examination, clinical presentation, and decision making during this evaluation, the evaluation

## 2018-08-28 NOTE — PROGRESS NOTES
Cardiology Progress Note      Patient:  Lottie Ugarte  YOB: 1945  MRN: [id number]   Acct: [de-identified]  [facility] Date:  8/26/2018  Primary Cardiologist: Ana Almeida MD  Seen by dr Darius Mcfarland: sob/pericardial effusion    Subjective (Events in last 24 hours): pt awake and alert. NAD. No cp or sob.   Pt states she feels much better as far as her breathing and hoping to go home  BB was not resumed upon admission and  at rest    Objective:   BP (!) 216/100   Pulse 101   Temp 98.3 °F (36.8 °C) (Oral)   Resp 20   Ht 5' 5\" (1.651 m)   Wt 225 lb 6.4 oz (102.2 kg)   SpO2 98%   BMI 37.51 kg/m²        TELEMETRY: s.t. 113 at rest    Physical Exam:  General Appearance: alert and oriented to person, place and time, in no acute distress  Cardiovascular: normal rate, regular rhythm, normal S1 and S2, +murmur  Pulmonary/Chest:bibasilar crackles  Abdomen: soft, non-tender, non-distended, normal bowel sounds, no masses Extremities: no cyanosis, clubbing or edema, pulse   Skin: warm and dry, no rash or erythema  Head: normocephalic and atraumatic  Eyes: pupils equal, round, and reactive to light  Neck: supple and non-tender without mass, no thyromegaly   Musculoskeletal: normal range of motion, no joint swelling, deformity or tenderness  Neurological: alert, oriented, normal speech, no focal findings or movement disorder noted    Medications:    predniSONE  40 mg Oral Daily    OXcarbazepine  300 mg Oral Nightly    albuterol  2.5 mg Nebulization Q4H While awake    aspirin  81 mg Oral Daily    clopidogrel  75 mg Oral Daily    doxepin  150 mg Oral Nightly    escitalopram  20 mg Oral QAM    fluticasone  1 spray Nasal Daily    furosemide  20 mg Oral Daily    hydrALAZINE  75 mg Oral TID    isosorbide mononitrate  60 mg Oral BID    levothyroxine  100 mcg Oral Daily    therapeutic multivitamin-minerals  1 tablet Oral Daily    OXcarbazepine  150 mg Oral QAM    pantoprazole  40 mg Oral 08/21/2018    HGB 12.2 08/27/2018    HCT 38.4 08/27/2018     08/27/2018       CMP:  Lab Results   Component Value Date     08/27/2018    K 4.0 08/27/2018    CL 92 08/27/2018    CO2 24 08/27/2018    BUN 10 08/27/2018    CREATININE 0.7 08/27/2018    LABGLOM 82 08/27/2018    GLUCOSE 232 08/27/2018    GLUCOSE 101 07/25/2016    CALCIUM 9.9 08/27/2018       Hepatic Function Panel:  Lab Results   Component Value Date    ALKPHOS 60 08/21/2018    ALKPHOS 57 07/07/2018    ALT 23 08/21/2018    AST 24 08/21/2018    PROT 6.5 08/21/2018    PROT 6.5 11/25/2016    BILITOT 0.2 08/21/2018    BILIDIR <0.2 08/21/2018    LABALBU 4.8 08/21/2018       Magnesium:    Lab Results   Component Value Date    MG 1.7 03/29/2018       PT/INR:    Lab Results   Component Value Date    PROTIME 1.04 08/13/2011    INR 1.11 04/16/2018       HgBA1c:    Lab Results   Component Value Date    LABA1C 6.1 08/26/2018       FLP:  Lab Results   Component Value Date    TRIG 540 11/16/2017    HDL 25 11/16/2017    LDLCALC SEE BELOW 11/16/2017    LABVLDL 69 11/25/2016       TSH:    Lab Results   Component Value Date    TSH 8.540 07/07/2018         Assessment:    Asthma - pulmonary following  Mod AS with BANDAR 1.0 cm2 per echo 8/27/18  Ef 70  No pericardial effusion on echo 8/27/18  Hx CAD with prior stents - trop neg x3 - pt denies any cp.   Sob has improved  HTN  DM    Plan:  · Cont asa/plavix/statin/imdur/ranexa/hydralazine/arb  · Restart home BB  · Will see prn  · F/up dr renteria 2-4 weeks         Electronically signed by Kerri Hartmann PA-C on 8/28/2018 at 9:12 AM

## 2018-08-28 NOTE — PLAN OF CARE
Problem: Impaired respiratory status  Goal: Able to breathe comfortably  Able to breathe comfortably     Outcome: Ongoing  Pt remains on cpap at this time, seems to be tolerating okay.

## 2018-08-28 NOTE — DISCHARGE SUMMARY
daily             isosorbide mononitrate (IMDUR) 60 MG extended release tablet  Take 1 tablet by mouth 2 times daily             Lancets MISC  Check blood sugar q daily Dx E11.69             levothyroxine (SYNTHROID) 100 MCG tablet  take 1 tablet by mouth once daily             metoprolol tartrate (LOPRESSOR) 50 MG tablet  Take 1.5 tablets by mouth 2 times daily             Misc. Devices MISC  Pt needs an oxygen tank carrier for her wheelchair             Multiple Vitamins-Minerals (MULTIVITAMIN-MINERALS) TABS tablet  take 1 tablet by mouth once daily             nitroGLYCERIN (NITRODUR) 0.2 MG/HR  apply 1 patch once daily             nystatin (MYCOSTATIN) 780821 UNIT/GM cream  Apply topically 2 times daily.              ONE TOUCH ULTRASOFT LANCETS MISC  use as directed BEFORE BREAKFAST AND SUPPER             OXcarbazepine (TRILEPTAL) 150 MG tablet  Take 150 mg by mouth 2 times daily One tab q am. And two tabs q pm             OXYGEN  Inhale 2 L into the lungs continuous              pantoprazole (PROTONIX) 40 MG tablet  Take 40 mg by mouth every morning (before breakfast)              polyethylene glycol (GLYCOLAX) powder  take 17GM (DISSOLVED IN WATER) by mouth once daily             polyvinyl alcohol (LIQUIFILM TEARS) 1.4 % ophthalmic solution  Place 1 drop into both eyes as needed 2-4 times daily             potassium chloride (KLOR-CON 10) 10 MEQ extended release tablet  Take 1 tablet by mouth daily             predniSONE (DELTASONE) 20 MG tablet  Take 2 tablets by mouth daily for 5 days             RA COL-RITE 100 MG capsule  take 3 capsules by mouth every evening             ranolazine (RANEXA) 500 MG extended release tablet  take 1 tablet by mouth twice a day             risperiDONE (RISPERDAL) 1 MG tablet  Take 1 mg by mouth 2 times daily Take 1 tab every morning and 1.5 tabs every night             rosuvastatin (CRESTOR) 20 MG tablet  Take 20 mg by mouth daily             sodium chloride (ALTAMIST SPRAY)

## 2018-08-29 NOTE — TELEPHONE ENCOUNTER
CLINICAL PHARMACY CONSULTATION: Transition of Care (JOSE)  -----------------------------------------------------------------------------------------------  Lizzy Rivera is a 67 y.o. female  who was discharged from [address] [place] [address] on 8/28/18 with a diagnosis of acute on chronic respiratory failure with hypercapnia. Attempt made to contact pt. When first tried calling the line was busy. Called back later and it rings a couple times and sounds as if the phone is being picked up and disconnected (hung up?) before I get a chance to speak. This happened twice. Will continue to attempt to contact pt as appropriate.     Tatiana Montero, PharmD, [facility] Pharmacist  Cell: [phone]  Toll free: [phone], option 7    For Pharmacy Admin Tracking Only  TCM Call Made?: Yes

## 2018-08-29 NOTE — CARE COORDINATION
call was ended shortly after. Since Carisa Fails has expressed no concerns today, care transition signing off, will provide warm handoff to [facility], [address], as she is active with this patient. Updated Alondra Nugent, Pharmacist, that CTC reached Carisa Fails but meds were not reviewed at this time.      Follow Up  Future Appointments  Date Time Provider Micheline Yady   8/30/2018 2:40 PM ASHLEY Herrera CNP [facility] [place]   9/4/2018 3:40 PM Dale Jimenez MD [facility] [place]   9/5/2018 10:00 AM STR VASCULAR LAB ROOM 1 STRZ VAS LAB STR Radiolog   9/5/2018 11:00 AM STR VASCULAR LAB ROOM 1 STRZ VAS LAB STR Radiolog   9/18/2018 8:30 AM STR PULMONARY FUNCTION ROOM 1 STRZ PFT None   9/18/2018 10:30 AM ASHLEY Hayden CNP [facility] [place]   9/19/2018 2:40 PM ASHLEY Herrera CNP [facility] [place]   10/2/2018 11:00 AM ASHLEY Soler CNP [facility] [place]   10/8/2018 1:00 PM Nicky Quinones RD, LD [facility]ic [facility] [place]   10/12/2018 10:00 AM Abner Aleman MD [address] [facility] [place]   11/20/2018 11:30 AM STR PULMONARY FUNCTION ROOM 1 STRZ PFT None   11/27/2018 10:00 AM ASHLEY Seaman CNP Pulm Med [facility] - [place]   2/18/2019 2:30 PM ASHLEY Seaman CNP Pulm Med [facility] [place]   2/27/2019 1:45 PM Velvet Javier, [address] Suggs RN

## 2018-08-29 NOTE — TELEPHONE ENCOUNTER
CLINICAL PHARMACY NOTE  Post-Discharge Transitions of Care (JOSE)    Subjective/Objective:  Linda Beach is a 67 y.o. female. Patient was discharged from [address] [place] [address] on 8/28/18 with a diagnosis of acute on chronic respiratory failure. Patient outreach to review discharge medications and provide medication review and management. Spoke with patient. Patient declines medication review. When asked she states that she received her new medications today, they were delivered from [facility]. Reviewed how and when to take prednisone and albuterol. I noticed that hydralazine is listed on patient's medication list twice. Medication Sig    hydrALAZINE (APRESOLINE) 50 MG tablet Take 2 tablets by mouth 3 times daily take 1.5 tablets by mouth three times a day    hydrALAZINE (APRESOLINE) 100 MG tablet Take 1 tablet by mouth 3 times daily     Patient used to be prescribed the 50 mg tablet and she took 1.5 tablets TID. I Spoke with pharmacist at [facility] who states they received prescription for 100 mg tablets, 100 mg TID. They did not get a new script for hydralazine 50 mg.  I have removed hydralazine 50 mg from the medication list.  I also called [facility] and left message for patient's nurse Tara Pascual to call back in case she has any questions about the medication. No further outreach planned at this time.      Kevin Kocher, PharmD, [facility], [facility] Pharmacist  Direct: [phone]  Department, toll free: [phone], option 7   ===============================================  For Pharmacy Admin Tracking Only  TCM Call Made?: Yes  [facility] Select Patient?: Yes  Total # of Interventions Recommended: 1 -   - Updated Order #: 1  Total # Interventions Accepted: 1  Intervention Severity:   - Level 1 Intervention Present?: No   - Level 2 #: 0   - Level 3 #: 1  Outreach Status: Patient Refused  Care Coordinator Outreach to Patient?: Yes  Provider Contacted?: No  Time Spent (min): 21

## 2018-08-30 PROBLEM — R91.1 PULMONARY NODULE: Status: ACTIVE | Noted: 2018-01-01

## 2018-08-30 NOTE — PROGRESS NOTES
Visit Information    Have you changed or started any medications since your last visit including any over-the-counter medicines, vitamins, or herbal medicines? no   Are you having any side effects from any of your medications? -  no  Have you stopped taking any of your medications? Is so, why? -  no    Have you seen any other physician or provider since your last visit? Yes - Records Obtained  Have you had any other diagnostic tests since your last visit? Yes - Records Obtained  Have you been seen in the emergency room and/or had an admission to a hospital since we last saw you? Yes - Records Obtained  Have you had your routine dental cleaning in the past 6 months? yes     Have you activated your [employer] account? If not, what are your barriers? Yes     Patient Care Team:  ASHLEY Jimenes CNP as PCP - General  ASHLEY Jimenes CNP as PCP - S Attributed Provider  Ruben Cueva MD as Physician (Interventional Cardiology)  Eri Lackey, RN as Care Coordinator    Medical History Review  Past Medical, Family, and Social History     Defer to provider.

## 2018-09-04 NOTE — PROGRESS NOTES
Mrs. Star Fraser who is here today for post hospital discharge appointment. Recently she was hospitalized at [address] [address] for shortness of breath. It was thought to be due to acute exacerbation of asthma. She was seen by pulmonary. She was discharged in good condition subsequently. Serum sodium was 133 mEq per liter on discharge. Doing well otherwise with no complaints. ROS:Constitutional: negative  Eyes: negative  Ears, nose, mouth, throat, and face: negative  Respiratory: negative  Cardiovascular: negative  Gastrointestinal: negative  Genitourinary:negative  Integument/breast: negative  Hematologic/lymphatic: negative  Musculoskeletal:positive for arthralgias and stiff joints  Neurological: positive for coordination problems and gait problems  Behavioral/Psych: negative  Endocrine: negative  Allergic/Immunologic: negative     Medications:     Current Outpatient Prescriptions   Medication Sig Dispense Refill    Multiple Vitamins-Minerals (MULTIVITAMIN-MINERALS) TABS tablet take 1 tablet by mouth once daily 90 tablet 3     MG capsule take 3 capsules by mouth every evening 90 capsule 1    albuterol sulfate HFA (VENTOLIN HFA) 108 (90 Base) MCG/ACT inhaler Inhale 2 puffs into the lungs every 6 hours as needed for Wheezing 1 Inhaler 3    hydrALAZINE (APRESOLINE) 100 MG tablet Take 1 tablet by mouth 3 times daily 90 tablet 3    amLODIPine (NORVASC) 5 MG tablet Take 1 tablet by mouth daily 30 tablet 3    clopidogrel (PLAVIX) 75 MG tablet take 1 tablet by mouth once daily 90 tablet 3    clotrimazole-betamethasone (LOTRISONE) 1-0.05 % cream Apply topically 3 times daily. 45 g 1    nitroGLYCERIN (NITRODUR) 0.2 MG/HR apply 1 patch once daily 90 patch 1    acetaminophen-codeine (TYLENOL #3) 300-30 MG per tablet Take 1 tablet by mouth every 8 hours as needed for Pain. Bedelia Sol baclofen (LIORESAL) 10 MG tablet Take 10 mg by mouth 2 times daily      sucralfate (CARAFATE) 1 GM tablet Take 1 g METER TEST) strip 1 each by In Vitro route daily Check blood sugar q daily Dx E11.69 100 strip 5    Lancets MISC Check blood sugar q daily Dx E11.69 100 each 2    Blood Glucose Monitoring Suppl HARVINDER Check blood sugar q daily Dx E11.69 1 Device 0    nystatin (MYCOSTATIN) 284532 UNIT/GM cream Apply topically 2 times daily. 60 g 2    benzocaine (ANBESOL) 10 % mucosal gel Take by mouth as needed. 9 g 1    OXYGEN Inhale 2 L into the lungs continuous       doxepin (SINEQUAN) 150 MG capsule Take 150 mg by mouth nightly      OXcarbazepine (TRILEPTAL) 150 MG tablet Take 150 mg by mouth 2 times daily One tab q am. And two tabs q pm      polyethylene glycol (GLYCOLAX) powder take 17GM (DISSOLVED IN WATER) by mouth once daily (Patient taking differently: take 17GM (DISSOLVED IN WATER) by mouth once daily prn) 1 Bottle 2    ONE TOUCH ULTRASOFT LANCETS MISC use as directed BEFORE BREAKFAST AND SUPPER 100 each 11    pantoprazole (PROTONIX) 40 MG tablet Take 40 mg by mouth every morning (before breakfast)       risperiDONE (RISPERDAL) 1 MG tablet Take 1 mg by mouth 2 times daily Take 1 tab every morning and 1.5 tabs every night      metoprolol tartrate (LOPRESSOR) 50 MG tablet Take 1.5 tablets by mouth 2 times daily (Patient taking differently: Take 50 mg by mouth 2 times daily ) 60 tablet 5    sodium chloride 1 g tablet Take 1 tablet by mouth 3 times daily for 5 days 15 tablet 0     No current facility-administered medications for this visit.         Lab Results:    CBC:   Lab Results   Component Value Date    WBC 5.7 08/27/2018    HGB 12.2 08/27/2018    HCT 38.4 08/27/2018    MCV 85.1 08/27/2018     08/27/2018     BMP:    Lab Results   Component Value Date     (L) 08/27/2018     (L) 08/26/2018     (L) 07/31/2018    K 4.0 08/27/2018    K 4.2 08/26/2018    K 4.3 07/31/2018    CL 92 (L) 08/27/2018    CL 92 (L) 08/26/2018    CL 88 (L) 07/31/2018    CO2 24 08/27/2018    CO2 26 08/26/2018    CO2 29

## 2018-09-06 NOTE — CARE COORDINATION
Ambulatory Care Coordination Note  9/6/2018  CM Risk Score: 12  Mahendra Mortality Risk Score: 20.93    ACC: Camila Mcfarland, RN    Summary Note: Spoke with Madhu Donahue. She states she is feeling better since being home from hospital.  States breathing is back at baseline. Madhu Donahue is able to answer questions regarding her chronic conditions but it not one for much education or information on how to identify possible exacerbation of her conditions. I have mailed out zone management tools but she doesn't know what they are, she is unable to report blood sugars or daily weights. [facility] coming in daily to help her out with her adl's. Confirmed date and time for PCP appt and offered to arrange transportation but she states she has it arranged. She has Meals on Wheels and Passport services. Was unable to complete Med Rec during phone call. Will continue to reach out to provide support due to high risk for admissions and ED visits.   Congestive Heart Failure Assessment    Are you currently restricting fluids?:  No Restriction  Do you understand a low sodium diet?:  Yes  Do you understand how to read food labels?:  Yes  How many restaurant meals do you eat per week?:  1-2  Do you salt your food before tasting it?:  No         Symptoms:   CHF associated angina: Neg, CHF associated dyspnea on exertion: Pos, CHF associated fatigue: Neg, CHF associated leg swelling: Neg, CHF associated orthostatic hypotension: Neg, CHF associated PND: Neg, CHF associated shortness of breath: Neg, CHF associated weakness: Neg      Symptom course:  stable  Salt intake watch compared to last visit:  stable         COPD Assessment    Does the patient understand envrionmental exposure?:  No  Is the patient able to verbalize Rescue vs. Long Acting medications?:  No  Does the patient have a nebulizer?:  No  Does the patient use a space with inhaled medications?:  No            Symptoms:   None:  Yes      Symptom course: Taking? Authorizing Provider   Multiple Vitamins-Minerals (MULTIVITAMIN-MINERALS) TABS tablet take 1 tablet by mouth once daily 9/4/18   ASHLEY Gill CNP    MG capsule take 3 capsules by mouth every evening 9/4/18   ASHLEY Gill CNP   albuterol sulfate HFA (VENTOLIN HFA) 108 (90 Base) MCG/ACT inhaler Inhale 2 puffs into the lungs every 6 hours as needed for Wheezing 8/28/18   ASHLEY Corona CNP   hydrALAZINE (APRESOLINE) 100 MG tablet Take 1 tablet by mouth 3 times daily 8/28/18   La Nena Welch MD   amLODIPine (NORVASC) 5 MG tablet Take 1 tablet by mouth daily 8/28/18   La Nena Welch MD   clopidogrel (PLAVIX) 75 MG tablet take 1 tablet by mouth once daily 7/30/18   Sam Velez PA-C   clotrimazole-betamethasone (LOTRISONE) 1-0.05 % cream Apply topically 3 times daily. 7/17/18   ASHLEY Gill CNP   metoprolol tartrate (LOPRESSOR) 50 MG tablet Take 1.5 tablets by mouth 2 times daily  Patient taking differently: Take 50 mg by mouth 2 times daily  7/11/18 8/10/18  Nae Chavez MD   nitroGLYCERIN (NITRODUR) 0.2 MG/HR apply 1 patch once daily 7/11/18   Norberto Closs, MD   acetaminophen-codeine (TYLENOL #3) 300-30 MG per tablet Take 1 tablet by mouth every 8 hours as needed for Pain. Keely Edwards Historical Provider, MD   baclofen (LIORESAL) 10 MG tablet Take 10 mg by mouth 2 times daily    Historical Provider, MD   sucralfate (CARAFATE) 1 GM tablet Take 1 g by mouth 4 times daily    Historical Provider, MD   levothyroxine (SYNTHROID) 100 MCG tablet take 1 tablet by mouth once daily 7/3/18   ASHLEY Gill CNP   Misc.  Devices MISC Pt needs an oxygen tank carrier for her wheelchair 6/20/18   ASHLEY Gill CNP   sodium chloride 1 g tablet Take 1 tablet by mouth 3 times daily for 5 days 6/17/18 6/22/18  Linford Fender, PA-C   Calcium Carbonate-Vitamin D (OYSTER SHELL CALCIUM/D) 500-200 MG-UNIT TABS take 1 tablet by mouth twice a day 6/11/18   Jenniffer Haney, APRN - CNP   HYDROcodone-acetaminophen (NORCO) 5-325 MG per tablet Take 1 tablet by mouth every 8 hours as needed for Pain. Fide Hall     Historical Provider, MD   escitalopram (LEXAPRO) 10 MG tablet Take 20 mg by mouth every morning     Historical Provider, MD   furosemide (LASIX) 20 MG tablet Take 1 tablet by mouth 2 times daily  Patient taking differently: Take 20 mg by mouth daily  4/26/18   ASHLEY Prado CNP   potassium chloride (KLOR-CON 10) 10 MEQ extended release tablet Take 1 tablet by mouth daily 4/26/18   ASHLEY Prado CNP   SYMBICORT 160-4.5 MCG/ACT AERO inhale 2 puffs by mouth twice a day 4/21/18   ASHLEY Mijares CNP   acetaminophen (TYLENOL) 500 MG tablet Take 500 mg by mouth every 6 hours as needed for Pain    Historical Provider, MD   isosorbide mononitrate (IMDUR) 60 MG extended release tablet Take 1 tablet by mouth 2 times daily 3/15/18   Zahira Whitmore MD   aspirin (ASPIRIN LOW DOSE) 81 MG EC tablet take 1 tablet by mouth once daily 3/7/18   Zahira Whitmore MD   ranolazine (RANEXA) 500 MG extended release tablet take 1 tablet by mouth twice a day 3/7/18   Zahira Whitmore MD   valsartan (DIOVAN) 320 MG tablet Take 1 tablet by mouth daily 3/7/18   Zahira Whitmore MD   fluticasone (FLONASE) 50 MCG/ACT nasal spray 1 spray by Nasal route daily 1/23/18   ASHLEY Mijares CNP   sodium chloride (ALTAMIST SPRAY) 0.65 % nasal spray 1 spray by Nasal route as needed for Congestion 11/29/17   ASHLEY Jain CNP   rosuvastatin (CRESTOR) 20 MG tablet Take 20 mg by mouth daily    Historical Provider, MD   polyvinyl alcohol (LIQUIFILM TEARS) 1.4 % ophthalmic solution Place 1 drop into both eyes as needed 2-4 times daily    Historical Provider, MD   cycloSPORINE (RESTASIS) 0.05 % ophthalmic emulsion Place 1 drop into both eyes 2 times daily    Historical Provider, MD   hydrOXYzine (ATARAX) 25 MG tablet Take 25 mg by mouth 3 times daily    Historical Provider, MD glucose blood VI test strips (GLUCOSE METER TEST) strip 1 each by In Vitro route daily Check blood sugar q daily Dx E11.69 11/9/17   Conchetta Spray, DO   Lancets MISC Check blood sugar q daily Dx E11.69 11/9/17   Conchetta Spray, DO   Blood Glucose Monitoring Suppl HARVINDER Check blood sugar q daily Dx E11.69 11/9/17   Conchetta Spray, DO   nystatin (MYCOSTATIN) 277046 UNIT/GM cream Apply topically 2 times daily. 8/17/17   ASHLEY Pulido CNP   benzocaine (ANBESOL) 10 % mucosal gel Take by mouth as needed.  7/18/17   ASHLEY Pulido CNP   OXYGEN Inhale 2 L into the lungs continuous     Historical Provider, MD   doxepin (SINEQUAN) 150 MG capsule Take 150 mg by mouth nightly    Historical Provider, MD   OXcarbazepine (TRILEPTAL) 150 MG tablet Take 150 mg by mouth 2 times daily One tab q am. And two tabs q pm    Historical Provider, MD   polyethylene glycol (GLYCOLAX) powder take 17GM (DISSOLVED IN WATER) by mouth once daily  Patient taking differently: take 17GM (DISSOLVED IN WATER) by mouth once daily prn 8/29/16   ASHLEY Pulido CNP   ONE TOUCH ULTRASOFT LANCETS MISC use as directed BEFORE BREAKFAST AND SUPPER 10/15/14   ASHLEY Pulido CNP   pantoprazole (PROTONIX) 40 MG tablet Take 40 mg by mouth every morning (before breakfast)     Historical Provider, MD   risperiDONE (RISPERDAL) 1 MG tablet Take 1 mg by mouth 2 times daily Take 1 tab every morning and 1.5 tabs every night    Historical Provider, MD       Future Appointments  Date Time Provider Micheline Conteh   9/18/2018 8:30 AM STR PULMONARY FUNCTION ROOM 1 STRZ PFT None   9/18/2018 10:30 AM ASHLEY Chapman CNP [address]   9/19/2018 2:40 PM ASHLEY Pulido - [id number] [address]   10/2/2018 11:00 AM ASHLEY Crowell CNP [facility] Heart [address]   10/8/2018 1:00 PM Verner Roes, RD, LD [facility] Physic P - [place]   10/12/2018 10:00 AM Breana Chandra MD [facility] Heart [facility] - [address]   11/20/2018 11:30 AM STR PULMONARY

## 2018-09-10 NOTE — TELEPHONE ENCOUNTER
Mikey Huddleston called [facility] to refill her oxygen tanks at home and her ryan tanks and they told her she needs a new prescription from someone who is certified. They told her [facility] is not certified so it needs to come from someone else that is. They are planning to bring her more oxygen on Friday 9/14/18 provided they have the prescription first. Patient is asking if someone can take care of this because she is getting low. Please advise.     Airam David  [phone]

## 2018-09-12 NOTE — TELEPHONE ENCOUNTER
[address] WANTS TO DO A CT SCAN ON INTESTINES WITH CONTRAST. NEEDS THIS APPROVED BY NEPHROLOGIST.  IS THIS OK WITH DR Boyer Hixton? [phone]

## 2018-09-14 NOTE — TELEPHONE ENCOUNTER
Patient called in she has had an 8 lb wt gain in the past week  Patient states she does not have increase in swelling but does have increase in SOB    Was recently in hospital 8/26-8/28 for   Acute on chronic respiratory [facility]

## 2018-09-17 NOTE — TELEPHONE ENCOUNTER
I  ( Dr. Eric Beck ) also Did not see patient .     Recommend patient should follow up ASAP with usual doctors and CHF clinic  - IF ENROLLED

## 2018-09-19 PROBLEM — J96.22 ACUTE ON CHRONIC RESPIRATORY FAILURE WITH HYPERCAPNIA (HCC): Status: RESOLVED | Noted: 2018-01-01 | Resolved: 2018-01-01

## 2018-09-19 NOTE — PROGRESS NOTES
tablet by mouth once daily 90 tablet 3     MG capsule take 3 capsules by mouth every evening 90 capsule 1    albuterol sulfate HFA (VENTOLIN HFA) 108 (90 Base) MCG/ACT inhaler Inhale 2 puffs into the lungs every 6 hours as needed for Wheezing 1 Inhaler 3    hydrALAZINE (APRESOLINE) 100 MG tablet Take 1 tablet by mouth 3 times daily 90 tablet 3    amLODIPine (NORVASC) 5 MG tablet Take 1 tablet by mouth daily 30 tablet 3    clopidogrel (PLAVIX) 75 MG tablet take 1 tablet by mouth once daily 90 tablet 3    clotrimazole-betamethasone (LOTRISONE) 1-0.05 % cream Apply topically 3 times daily. 45 g 1    nitroGLYCERIN (NITRODUR) 0.2 MG/HR apply 1 patch once daily 90 patch 1    acetaminophen-codeine (TYLENOL #3) 300-30 MG per tablet Take 1 tablet by mouth every 8 hours as needed for Pain. [place] baclofen (LIORESAL) 10 MG tablet Take 10 mg by mouth 2 times daily      Misc. Devices MISC Pt needs an oxygen tank carrier for her wheelchair 1 Device 0    HYDROcodone-acetaminophen (NORCO) 5-325 MG per tablet Take 1 tablet by mouth every 8 hours as needed for Pain.  [place] escitalopram (LEXAPRO) 10 MG tablet Take 20 mg by mouth every morning       furosemide (LASIX) 20 MG tablet Take 1 tablet by mouth 2 times daily 60 tablet 3    potassium chloride (KLOR-CON 10) 10 MEQ extended release tablet Take 1 tablet by mouth daily 60 tablet 3    SYMBICORT 160-4.5 MCG/ACT AERO inhale 2 puffs by mouth twice a day 10.2 g 11    acetaminophen (TYLENOL) 500 MG tablet Take 500 mg by mouth every 6 hours as needed for Pain      isosorbide mononitrate (IMDUR) 60 MG extended release tablet Take 1 tablet by mouth 2 times daily 60 tablet 11    ranolazine (RANEXA) 500 MG extended release tablet take 1 tablet by mouth twice a day 180 tablet 1    valsartan (DIOVAN) 320 MG tablet Take 1 tablet by mouth daily 90 tablet 1    fluticasone (FLONASE) 50 MCG/ACT nasal spray 1 spray by Nasal route daily 1 Bottle 0    sodium chloride albuterol (PROVENTIL) nebulizer solution 2.5 mg  2.5 mg Nebulization Once Rian Martinez, APRN - CNP           Past Medical History:   Diagnosis Date    Anemia     Anxiety     Arthritis     general    AS (aortic stenosis): mild to moderate by echo 4/2017 9/6/2017    ASHD (arteriosclerotic heart disease)     Asthma     CAD (coronary artery disease)     Chest pain     CHF (congestive heart failure) (HCC)     COPD (chronic obstructive pulmonary disease) (HCC)     Depression     [facility] [address]     Dyspnea     FH: CAD (coronary artery disease)     GERD (gastroesophageal reflux disease)     Heart burn     History of blood transfusion     1973    History of tobacco abuse: Quit in 2009 10/28/2014    HLD (hyperlipidemia)     HTN (hypertension)     Irritable bowel syndrome     States has not had problem with this for years    Liver disease     fatty liver    Metabolic syndrome 45/98/0428    MI [facility]myocardial infarction) [facility] [address]     Nausea & vomiting     Obesity     CHET (obstructive sleep apnea)     S/P cardiac catheterization: 11/6/2014: 99% in-stent restenosis mid-RCA. LCx moderate LI's. LAD 85% mid-segment lesion. 11/6/2014 11/6/2014: 99% in-stent restenosis mid-RCA. LCx moderate LI's. LAD 85% mid-segment lesion. Dr. Gage Madrid S/P PTCA:  5/11/2004: Proximal and mid RCA  Taxus 3.5 X 20 mm, and Taxus 3.0 X 32 mm. 10/28/2014    5/11/2004: Proximal and mid RCA  Taxus 3.5 X 20 mm, and Taxus 3.0 X 32 mm. Dr. Akin Cates Systolic murmur 10/42/7872    Thyroid disease       Past Surgical History:   Procedure Laterality Date    BACK SURGERY  08/2016        BLADDER SUSPENSION      BREAST BIOPSY  10/10/2017    BREAST LUMPECTOMY      CARDIAC CATHETERIZATION  8-11-06    Mild nonobstructive CAD w/ diffuse 10-20% proximal and mid LAD stenosis and 10-20% proximal/ostial RCA stenosis. No obstructive lesions. RCA stents are patent w/o evidence of restenosis. Normal LV systolic function.  EF  Diabetic microalbuminuria test  12/27/2018    Diabetic retinal exam  01/24/2019    A1C test (Diabetic or Prediabetic)  02/26/2019    TSH testing  07/07/2019    Low dose CT lung screening  08/26/2019    Potassium monitoring  08/27/2019    Creatinine monitoring  08/27/2019    Colon cancer screen colonoscopy  05/18/2022    DTaP/Tdap/Td vaccine (2 - Td) 12/16/2022    DEXA (modify frequency per FRAX score)  Completed    Pneumococcal low/med risk  Completed       Subjective:      Review of Systems   Constitutional: Negative for chills, fatigue and fever. HENT: Positive for congestion (chest). Negative for postnasal drip and rhinorrhea. Respiratory: Positive for cough. Negative for choking, shortness of breath and wheezing. Cardiovascular: Positive for leg swelling (left leg). Gastrointestinal: Negative. Genitourinary: Negative for difficulty urinating and dysuria. Skin: Negative. Neurological: Negative for dizziness, weakness and headaches. Psychiatric/Behavioral: Negative for self-injury, sleep disturbance and suicidal ideas. Objective:     /68   Pulse 96   Temp 98.3 °F (36.8 °C) (Oral)   Resp 22   Ht 5' 5.5\" (1.664 m)   Wt 228 lb (103.4 kg)   SpO2 92%   BMI 37.36 kg/m²     Physical Exam   Constitutional: She appears well-developed and well-nourished. Cardiovascular: Normal rate, regular rhythm and intact distal pulses. Murmur heard. Pulses:       Dorsalis pedis pulses are 2+ on the right side, and 2+ on the left side. Posterior tibial pulses are 2+ on the right side, and 2+ on the left side. Left lower leg with edema to mid calf 2+ pitting, no warmth with palpation, left lower leg with tight skin and shiny   Pulmonary/Chest: No accessory muscle usage. No respiratory distress. She has decreased breath sounds in the right lower field and the left lower field. She has no wheezes.    Oxygen in place at 2 liters per nasal canula    After albuterol aerosol

## 2018-09-19 NOTE — PROGRESS NOTES
Administrations This Visit     albuterol (PROVENTIL) nebulizer solution 2.5 mg     Admin Date  09/19/2018  15:02 Action  Given Dose  2.5 mg Route  Nebulization Site   Administered By  Josiane Mclean CMA ([address] [place] [address])    Ordering Provider:  ASHLEY Marie Cap, CNP    NDC:  8435-9619-03    Lot#:  [id number]    :  [id number] [facility]. Patient Supplied?:  No    Comments:  Exp.12/18                   Advised pt to report any adverse reactions.

## 2018-09-19 NOTE — PROGRESS NOTES
Pt here for check up     C/O increased SOB at rest and activity    C/O increased lower leg edema an abd edema    C/O \"a little\" chest pain. [facility]  [facility] [address] [place]  [place] .[facility] [address]   Dept: [phone]   Dept Fax: [phone]: [phone]      Chief Complaint   Patient presents with   Lamond Euler    Shortness of Breath     Patient presents with increasing shortness of breath. Patient was recently hospitalized with a asthma exacerbation. She also has a history of diastolic congestive heart failure. She states she's had an 8 pound weight gain over the last week or 2. She has some peripheral edema. She has some nonspecific chest discomfort. She denies any palpitations.   Cardiologist:  Dr. Lux Hanna:   No fever, no chills, No fatigue or weight loss  Pulmonary:   Intermittent shortness of breath  Cardiac:    Denies recent chest pain   GI:     No nausea or vomiting, no abdominal pain  Neuro:    No dizziness or light headedness  Musculoskeletal:  No recent active issues  Extremities:   + edema, good peripheral pulses      Past Medical History:   Diagnosis Date    Anemia     Anxiety     Arthritis     general    AS (aortic stenosis): mild to moderate by echo 4/2017 9/6/2017    ASHD (arteriosclerotic heart disease)     Asthma     CAD (coronary artery disease)     Chest pain     CHF (congestive heart failure) (HCC)     COPD (chronic obstructive pulmonary disease) (HCC)     Depression     [facility] [address]     Dyspnea     FH: CAD (coronary artery disease)     GERD (gastroesophageal reflux disease)     Heart burn     History of blood transfusion     1973    History of tobacco abuse: Quit in 2009 10/28/2014    HLD (hyperlipidemia)     HTN (hypertension)     Irritable bowel syndrome     States has not had problem with this for years    Liver disease     fatty liver    Metabolic syndrome 10/28/2014    MI ([facility] infarction) ([facility] [address]     Nausea & vomiting     Obesity     CHET (obstructive sleep apnea)     S/P cardiac catheterization: 11/6/2014: 99% in-stent restenosis mid-RCA. LCx moderate LI's. LAD 85% mid-segment lesion. 11/6/2014 11/6/2014: 99% in-stent restenosis mid-RCA. LCx moderate LI's. LAD 85% mid-segment lesion. Dr. Tan Luke S/P PTCA:  5/11/2004: Proximal and mid RCA  Taxus 3.5 X 20 mm, and Taxus 3.0 X 32 mm. 10/28/2014    5/11/2004: Proximal and mid RCA  Taxus 3.5 X 20 mm, and Taxus 3.0 X 32 mm. Dr. Georgie Amaral Systolic murmur 35/31/6453    Thyroid disease        Allergies   Allergen Reactions    Lipitor [Atorvastatin] Diarrhea    Motrin [Ibuprofen Micronized] Nausea Only       Current Outpatient Prescriptions   Medication Sig Dispense Refill    rosuvastatin (CRESTOR) 20 MG tablet take 1 tablet by mouth once daily 90 tablet 0    Multiple Vitamins-Minerals (MULTIVITAMIN-MINERALS) TABS tablet take 1 tablet by mouth once daily 90 tablet 3     MG capsule take 3 capsules by mouth every evening 90 capsule 1    albuterol sulfate HFA (VENTOLIN HFA) 108 (90 Base) MCG/ACT inhaler Inhale 2 puffs into the lungs every 6 hours as needed for Wheezing 1 Inhaler 3    hydrALAZINE (APRESOLINE) 100 MG tablet Take 1 tablet by mouth 3 times daily 90 tablet 3    amLODIPine (NORVASC) 5 MG tablet Take 1 tablet by mouth daily 30 tablet 3    clopidogrel (PLAVIX) 75 MG tablet take 1 tablet by mouth once daily 90 tablet 3    clotrimazole-betamethasone (LOTRISONE) 1-0.05 % cream Apply topically 3 times daily. 45 g 1    nitroGLYCERIN (NITRODUR) 0.2 MG/HR apply 1 patch once daily 90 patch 1    acetaminophen-codeine (TYLENOL #3) 300-30 MG per tablet Take 1 tablet by mouth every 8 hours as needed for Pain. Stanmarissa [place] baclofen (LIORESAL) 10 MG tablet Take 10 mg by mouth 2 times daily      sucralfate (CARAFATE) 1 GM tablet Take 1 g by mouth 4 times daily      levothyroxine (SYNTHROID) 100 MCG  Heart Disease Brother     Breast Cancer Child 36    Cancer Sister 36        rectal    Ovarian Cancer Other 25       Blood pressure 96/60, pulse 80, height 5' 5.5\" (1.664 m), weight 233 lb (105.7 kg), not currently breastfeeding. General:   Well developed, well nourished  Lungs:   Clear to auscultation  Heart:    Normal S1 S2, No murmur, rubs, or gallops  Abdomen:   Soft, non tender, no organomegalies, positive bowel sounds  Extremities:   No edema, no cyanosis, good peripheral pulses  Neurological:   Awake, alert, oriented. No obvious focal deficits  Musculoskeletal:  No obvious deformities    EKG:     Echo 8/28/2018  Conclusions      Summary   Normal left ventricle size and systolic function. Ejection fraction was   estimated at 70%. There were no regional left ventricular wall motion   abnormalities and wall thickness was moderate LVH.   The aortic valve was trileaflet with moderate calcification and reduced   separation. DOPPLER: Transaortic velocity was ~3.2 m/s with evidence of   moderate aortic stenosis. There was no evidence of aortic regurgitation.  BANDAR ~ 1.0 cm2 ; Max./mean PG ~ 40/24 mm Hg.   PREVIOUS 4/17/2018 : BANDAR 1.2 cm2 ; Max./mean PG 42/24 mm Hg     Diagnosis Orders   1. Essential hypertension  Basic Metabolic Panel   2. CHF (congestive heart failure), NYHA class III, chronic, diastolic (HCC)     3. Dyslipidemia     4. S/P PTCA: 3/2/2017: PTCA RCA ISR. 12/11/2014: LAD Resolute 3.0X26. 11/6/2014: Stenting RCA Sanchez Simlazaro 4.3R65 and 3.5X18. 5/11/2004: Proximal & mid RCA Taxus 3.5X20 mm, and Taxus 3.0X32 mm. 5. Hyponatremia         Orders Placed This Encounter   Procedures    Basic Metabolic Panel     Standing Status:   Future     Standing Expiration Date:   9/19/2019     Continue Dr David Love current treatment plan    Recommended we increase her Lasix to 40 mg in the morning and 20 mg in the evening. I would like her to do this for one week and then go back to 20 mg twice a day.     She

## 2018-10-02 NOTE — PROGRESS NOTES
(coronary artery disease)     GERD (gastroesophageal reflux disease)     Heart burn     History of blood transfusion     1973    History of tobacco abuse: Quit in 2009 10/28/2014    HLD (hyperlipidemia)     HTN (hypertension)     Irritable bowel syndrome     States has not had problem with this for years    Liver disease     fatty liver    Metabolic syndrome 68/29/9824    [facility] [address]     Nausea & vomiting     Obesity     CHET (obstructive sleep apnea)     S/P cardiac catheterization: 11/6/2014: 99% in-stent restenosis mid-RCA. LCx moderate LI's. LAD 85% mid-segment lesion. 11/6/2014 11/6/2014: 99% in-stent restenosis mid-RCA. LCx moderate LI's. LAD 85% mid-segment lesion. Dr. Laila Santana S/P PTCA:  5/11/2004: Proximal and mid RCA  Taxus 3.5 X 20 mm, and Taxus 3.0 X 32 mm. 10/28/2014    5/11/2004: Proximal and mid RCA  Taxus 3.5 X 20 mm, and Taxus 3.0 X 32 mm. Dr. Marina aMyen Systolic murmur 97/22/4378    Thyroid disease      Past Surgical History:   Procedure Laterality Date    BACK SURGERY  08/2016        BLADDER SUSPENSION      BREAST BIOPSY  10/10/2017    BREAST LUMPECTOMY      CARDIAC CATHETERIZATION  8-11-06    Mild nonobstructive CAD w/ diffuse 10-20% proximal and mid LAD stenosis and 10-20% proximal/ostial RCA stenosis. No obstructive lesions. RCA stents are patent w/o evidence of restenosis. Normal LV systolic function. EF 65%. Trace MR - catheter induced. Minimally elevated LVEDP - 13mmHg. Mild to moderate aortoiliac PVD w/o obstructive lesions.  CARDIAC CATHETERIZATION  5-11-04    Successful drug-eluting stent x 2 of proximal and mid RCA.  CARDIAC CATHETERIZATION  5-11-04    70-80% proximal RCA stenosis w/ 50-70% mid RCA stenosis. There is 50-60% lesion in mid PDA. Mild proximal and mid LAD disease. Mild circumflex disease. Normal LV size and systolic function. EF 60%.  CARDIOVASCULAR STRESS TEST  5-10-11    No evidence of stress induced ischemia. Genitourinary: Negative for difficulty urinating and dysuria. Musculoskeletal: Positive for gait problem (walker and WC). Negative for arthralgias. Skin: Negative for color change. Neurological: Negative for dizziness, numbness and headaches. Psychiatric/Behavioral: Negative for agitation, confusion and sleep disturbance. The patient is not nervous/anxious. OBJECTIVE:   Today's Vitals:  BP (!) 146/78   Pulse 80   Wt 234 lb (106.1 kg)   SpO2 93%   BMI 38.35 kg/m²     Physical Exam   Constitutional: She is oriented to person, place, and time. She appears well-developed and well-nourished. HENT:   Head: Normocephalic and atraumatic. Eyes: Pupils are equal, round, and reactive to light. Neck: Normal range of motion. No JVD present. Cardiovascular: Normal rate. Murmur heard. Pulmonary/Chest: Effort normal. No respiratory distress. She has no rales. Abdominal: Soft. She exhibits no distension. There is no tenderness. Musculoskeletal: She exhibits no edema or tenderness. Neurological: She is alert and oriented to person, place, and time. Skin: Skin is warm and dry. Psychiatric: She has a normal mood and affect. Her behavior is normal.   Vitals reviewed. Wt Readings from Last 3 Encounters:   10/02/18 234 lb (106.1 kg)   09/19/18 228 lb (103.4 kg)   09/19/18 233 lb (105.7 kg)     BP Readings from Last 3 Encounters:   10/02/18 (!) 146/78   09/19/18 136/68   09/19/18 96/60     Pulse Readings from Last 3 Encounters:   10/02/18 80   09/19/18 96   09/19/18 80     Body mass index is 38.35 kg/m². ECHO:   8/27/18   Summary   Normal left ventricle size and systolic function. Ejection fraction was   estimated at 70%. There were no regional left ventricular wall motion   abnormalities and wall thickness was moderate LVH.   The aortic valve was trileaflet with moderate calcification and reduced   separation.  DOPPLER: Transaortic velocity was ~3.2 m/s with evidence of   moderate aortic Effusion   No evidence of pleural effusion.      Aorta / Great Vessels   -Aortic root dimension within normal limits.   -The Pulmonary artery is within normal limits.   -IVC size is within normal limits with normal respiratory phasic changes. Results reviewed:  BNP: No results found for: BNP  CBC:   Lab Results   Component Value Date    WBC 5.7 08/27/2018    RBC 4.51 08/27/2018    RBC 3.82 08/21/2018    HGB 12.2 08/27/2018    HCT 38.4 08/27/2018     08/27/2018     CMP:    Lab Results   Component Value Date     09/27/2018    K 4.5 09/27/2018    K 4.0 08/27/2018    CL 93 09/27/2018    CO2 29 09/27/2018    BUN 15 09/27/2018    CREATININE 0.8 09/27/2018    LABGLOM 70 09/27/2018    GLUCOSE 143 09/27/2018    GLUCOSE 101 07/25/2016    CALCIUM 9.8 09/27/2018     Hepatic Function Panel:    Lab Results   Component Value Date    ALKPHOS 60 08/21/2018    ALKPHOS 57 07/07/2018    ALT 23 08/21/2018    AST 24 08/21/2018    PROT 6.5 08/21/2018    PROT 6.5 11/25/2016    BILITOT 0.2 08/21/2018    BILIDIR <0.2 08/21/2018    LABALBU 4.8 08/21/2018     Magnesium:    Lab Results   Component Value Date    MG 1.7 03/29/2018     PT/INR:    Lab Results   Component Value Date    PROTIME 1.04 08/13/2011    INR 1.11 04/16/2018     Lipids:    Lab Results   Component Value Date    TRIG 540 11/16/2017    HDL 25 11/16/2017    LDLCALC SEE BELOW 11/16/2017    LABVLDL 69 11/25/2016       ASSESSMENT AND PLAN:   The patient's condition/symptoms are Stable: No clinical evidence of fluid overload today. Continue current medical regimen without changes at present time.      Diagnosis Orders   1. CHF (congestive heart failure), NYHA class II, chronic, diastolic (HCC)  Basic Metabolic Panel       Continue:  · Continue current medications - Increase Lasix to 40 mg am-20 mg pm, Norvasc 5 mg daily, Imdur 60 mg bid, Potassium 10 mEq daily, Metoprolol 50 mg bid, Hydralazine 100  Mg tid, on Plavix. Check BMP in 2 weeks.   · Daily weights  · Fluid

## 2018-10-08 NOTE — COMMUNICATION BODY
Assessment:     Vitals from current and previous visits:  Ht 5' 6\" (1.676 m)   Wt 238 lb (108 kg)   BMI 38.41 kg/m²      -Body mass index is 38.41 kg/m². 35-39.9 - Obesity Grade II.   - Patient gained and 4 pounds over the last 7 mo. .  -Weight goal: lose weight. Nutrition Diagnosis:   Limited adherence to nutrition-related recommendations related to Lack of value or competing values for behavior change as evidenced by Conditions associated with a diagnosis or treatment: POND, combined hyperlipidemia, dysmetabolic syndrome, obesity, unspecified. Plan:     Intervention:  -Impression: Pt here in wheelchair. Pt states she gets SOB easily. Pt has oxygen and cannula with her. Pt with 2 hospitalizations since last dietitian appt. Pt is resistant from giving up her high sodium foods - such as [employer] nachos Bellgrande, hot dog with chili once a week, [employer] chicken meal (1-2 drumsticks, mac and cheese, mashed pot and gravy, coleslaw, biscuit and [employer] American Sub with cheesecake and cherries. Pt is measuring her water and diet pepsi consumption by using the water jugs from the hospital.    -Instructed the patient on: low sodium guidelines and fluid allowance guidelines. -Handouts given for:  Simplified sodium, sodium free seasoning blends, lower sodium snack choices. Patient Instructions   1.)  Do not eat salty snack chips. Instead of chips in the morning:     - 1/4 cup - 1/2 cup Cottage cheese & Fruit   - 3 nam crackers   2.)  Grocery list:  Lower sugar or plain oatmeal packets, low fat cottage cheese, no salt added canned beans. 3.)  Read the nutrition facts label and look for no salt added or reduced sodium foods. - if has sodium in the food, choose the foods with sodium content <300 mg/item.     4.)  When having a food that is high in sodium or salt - only have a small portion.  - Add fruit and vegetable to this meal.    Good job with staying away from [employer] and [employer]

## 2018-10-16 NOTE — CARE COORDINATION
Ambulatory Care Coordination Note  10/16/2018  CM Risk Score: 12  Mahendra Mortality Risk Score: 26.33    ACC: Shavon Mahmood, RN    Summary Note: spoke with Becky Castillo. CHF education on symptom management given. States she does have [facility] [place] [facility] checking her weight daily and it has been stable. States her edema in her left leg is still present but has improved. Educated her on Rian's instructions that were given during last PCP office visit. States she has not called CHF clinic yet because she hasn't had to. Instructed her again on monitoring for changes in her weight and when to report those changes to CHF clinic. [facility] is taking care of this for her. States her shortness of breath has also improved slightly. She has met with a registered dietician. States blood sugars are running around 140's. Continues to be active with [employer] services. Educated on the importance of early symptom recognition and reporting. Reminded several times during this phone call to call CHF clinic with any changes in her weight for adjustment of her medications. Unable to complete Med Rec with her during phone call but does states that she knows she is taking the diuretic medication as Rian ordered. Reminded of lab work needed. Encouraged her to call with any questions or changes in her condition. COPD Assessment    Does the patient understand envrionmental exposure?:  No  Is the patient able to verbalize Rescue vs. Long Acting medications?:  No  Does the patient have a nebulizer?:  No  Does the patient use a space with inhaled medications?:  No            Symptoms:   None:  Yes      Symptom course:  stable  Breathlessness:  minimal exertion  Increase use of rapid acting/rescue inhaled medications?:  No  Change in chronic cough?:  No/At Baseline  Change in sputum?:  No/At Baseline  Sputum characteristics:   Thin  Self Monitoring - SaO2:  Yes  Have you had a recent diagnosis of pneumonia either by PCP or at a hospital?:  No [place]   10/23/2018 9:00 AM STR PULMONARY FUNCTION ROOM 1 STR[facility] PFT None   10/26/2018 12:30 PM Shawn Gama MD [facility] [place]   11/20/2018 11:30 AM STR PULMONARY FUNCTION ROOM 1 STRZ PFT None   11/27/2018 10:00 AM Elizabeth Gaona APRN - CNP Pulm Med [facility] [place]   12/4/2018 1:40 PM Alize Bautista MD [facility] [place]   12/12/2018 10:30 AM ASHLEY Valadez - CNP [facility] [place]   12/19/2018 2:40 PM ASHLEY Blackburn - CNP [facility] [place]   2/11/2019 1:00 PM Anitha Moreno RD, LD [facility] Physic [facility] [place]   2/18/2019 2:30 PM ASHLEY Melendez - CNP Pulm Med [facility] [place]   2/27/2019 1:45 PM Christopher Patel, Tawanda Ortiz

## 2018-10-26 NOTE — PROGRESS NOTES
3 capsules by mouth every evening, Disp: 90 capsule, Rfl: 1    albuterol sulfate HFA (VENTOLIN HFA) 108 (90 Base) MCG/ACT inhaler, Inhale 2 puffs into the lungs every 6 hours as needed for Wheezing, Disp: 1 Inhaler, Rfl: 3    hydrALAZINE (APRESOLINE) 100 MG tablet, Take 1 tablet by mouth 3 times daily, Disp: 90 tablet, Rfl: 3    amLODIPine (NORVASC) 5 MG tablet, Take 1 tablet by mouth daily, Disp: 30 tablet, Rfl: 3    clopidogrel (PLAVIX) 75 MG tablet, take 1 tablet by mouth once daily, Disp: 90 tablet, Rfl: 3    clotrimazole-betamethasone (LOTRISONE) 1-0.05 % cream, Apply topically 3 times daily. , Disp: 45 g, Rfl: 1    acetaminophen-codeine (TYLENOL #3) 300-30 MG per tablet, Take 1 tablet by mouth every 8 hours as needed for Pain. ., Disp: , Rfl:     baclofen (LIORESAL) 10 MG tablet, Take 10 mg by mouth 2 times daily, Disp: , Rfl:     Misc. Devices MISC, Pt needs an oxygen tank carrier for her wheelchair, Disp: 1 Device, Rfl: 0    HYDROcodone-acetaminophen (NORCO) 5-325 MG per tablet, Take 1 tablet by mouth every 8 hours as needed for Pain.  ., Disp: , Rfl:     escitalopram (LEXAPRO) 10 MG tablet, Take 20 mg by mouth every morning , Disp: , Rfl:     SYMBICORT 160-4.5 MCG/ACT AERO, inhale 2 puffs by mouth twice a day, Disp: 10.2 g, Rfl: 11    acetaminophen (TYLENOL) 500 MG tablet, Take 500 mg by mouth every 6 hours as needed for Pain, Disp: , Rfl:     isosorbide mononitrate (IMDUR) 60 MG extended release tablet, Take 1 tablet by mouth 2 times daily, Disp: 60 tablet, Rfl: 11    ranolazine (RANEXA) 500 MG extended release tablet, take 1 tablet by mouth twice a day, Disp: 180 tablet, Rfl: 1    fluticasone (FLONASE) 50 MCG/ACT nasal spray, 1 spray by Nasal route daily, Disp: 1 Bottle, Rfl: 0    sodium chloride (ALTAMIST SPRAY) 0.65 % nasal spray, 1 spray by Nasal route as needed for Congestion, Disp: 1 Bottle, Rfl: 3    polyvinyl alcohol (LIQUIFILM TEARS) 1.4 % ophthalmic solution, Place 1 drop into both eyes as needed 2-4 times daily, Disp: , Rfl:     cycloSPORINE (RESTASIS) 0.05 % ophthalmic emulsion, Place 1 drop into both eyes 2 times daily, Disp: , Rfl:     hydrOXYzine (ATARAX) 25 MG tablet, Take 25 mg by mouth 3 times daily, Disp: , Rfl:     glucose blood VI test strips (GLUCOSE METER TEST) strip, 1 each by In Vitro route daily Check blood sugar q daily Dx E11.69, Disp: 100 strip, Rfl: 5    Lancets MISC, Check blood sugar q daily Dx E11.69, Disp: 100 each, Rfl: 2    Blood Glucose Monitoring Suppl HARVINDER, Check blood sugar q daily Dx E11.69, Disp: 1 Device, Rfl: 0    nystatin (MYCOSTATIN) 452655 UNIT/GM cream, Apply topically 2 times daily. , Disp: 60 g, Rfl: 2    benzocaine (ANBESOL) 10 % mucosal gel, Take by mouth as needed. , Disp: 9 g, Rfl: 1    OXYGEN, Inhale 2 L into the lungs continuous , Disp: , Rfl:     doxepin (SINEQUAN) 150 MG capsule, Take 150 mg by mouth nightly, Disp: , Rfl:     OXcarbazepine (TRILEPTAL) 150 MG tablet, Take 150 mg by mouth 2 times daily One tab q am. And two tabs q pm, Disp: , Rfl:     polyethylene glycol (GLYCOLAX) powder, take 17GM (DISSOLVED IN WATER) by mouth once daily (Patient taking differently: take 17GM (DISSOLVED IN WATER) by mouth once daily prn), Disp: 1 Bottle, Rfl: 2    ONE TOUCH ULTRASOFT LANCETS MISC, use as directed BEFORE BREAKFAST AND SUPPER, Disp: 100 each, Rfl: 11    pantoprazole (PROTONIX) 40 MG tablet, Take 40 mg by mouth every morning (before breakfast) , Disp: , Rfl:     risperiDONE (RISPERDAL) 1 MG tablet, Take 1 mg by mouth Take 1 tab every morning and 1.5 tabs every night, Disp: , Rfl:     metoprolol tartrate (LOPRESSOR) 50 MG tablet, Take 1.5 tablets by mouth 2 times daily, Disp: 60 tablet, Rfl: 5    Past Medical History  Kellen Chew  has a past medical history of Anemia; Anxiety; Arthritis; AS (aortic stenosis): mild to moderate by echo 4/2017; ASHD (arteriosclerotic heart disease); Asthma; CAD (coronary artery disease);  Chest pain; CHF 101 07/25/2016       Lab Results   Component Value Date    ALKPHOS 60 08/21/2018    ALKPHOS 57 07/07/2018    ALT 23 08/21/2018    AST 24 08/21/2018    PROT 6.5 08/21/2018    PROT 6.5 11/25/2016    BILITOT 0.2 08/21/2018    BILIDIR <0.2 08/21/2018    LABALBU 4.8 08/21/2018       Lab Results   Component Value Date    MG 1.7 03/29/2018       Lab Results   Component Value Date    INR 1.08 10/25/2018    INR 1.11 04/16/2018    INR 1.17 (H) 03/26/2018    PROTIME 1.04 08/13/2011         Lab Results   Component Value Date    LABA1C 6.1 08/26/2018       Lab Results   Component Value Date    TRIG 540 11/16/2017    HDL 25 11/16/2017    LDLCALC SEE BELOW 11/16/2017    LABVLDL 69 11/25/2016       Lab Results   Component Value Date    TSH 8.540 07/07/2018         Testing Reviewed:      I haveindividually reviewed the below cardiac tests    EKG:    ECHO:   Results for orders placed during the hospital encounter of 08/26/18   Echocardiogram 2D/ M-Mode/ Colorflow/ Do    Narrative Transthoracic Echocardiography Report (TTE)     Demographics      Patient Name  Geraldine Mari  Gender             Female                 C      MR #          [id number]     Race                                                 Ethnicity      Account #     [de-identified]     Room Number        5341      Accession     [id number]     Date of Study      08/27/2018   Number      Date of Birth 1945    Referring          Madeleine Velez MD                               Physician      Age           67 year(s)    Sonographer        SITA Vyas, RDCS,                                                  RDMS, RVT                                  Interpreting       Katlin Kaur MD                               Physician     Procedure    Type of Study      TTE procedure:ECHOCARDIOGRAM COMPLETE 2D W DOPPLER W COLOR.      Procedure Date  Date: 08/27/2018 Start: 11:15 AM    Study Location: Bedside  Technical Quality: Limited visualization due to body

## 2018-11-03 PROBLEM — J96.21 ACUTE ON CHRONIC RESPIRATORY FAILURE WITH HYPOXIA (HCC): Status: ACTIVE | Noted: 2018-01-01

## 2018-11-03 PROBLEM — I50.30 HEART FAILURE WITH PRESERVED EJECTION FRACTION (HCC): Status: ACTIVE | Noted: 2018-01-01

## 2018-11-03 NOTE — PROGRESS NOTES
Hospitalist note    Noted that Lasix was not given. Called to nurse and she stated that someone in the ED had instructed the nurse not to give it. Lasix is to be administered.     Electronically signed by Devora Santoro MD on 11/3/18 at 1:06 PM

## 2018-11-03 NOTE — PROGRESS NOTES
[facility]  INPATIENT PHYSICAL THERAPY  EVALUATION  STRZ RENAL TELEMETRY 6K - 6K-01/001-A    Time In: 0005  Time Out: 1443  Timed Code Treatment Minutes: 8 Minutes  Minutes: 23          Date: 11/3/2018  Patient Name: Marquis Bender,  Gender:  female        MRN: [id number]  : 1945  (75 y.o.)      Referring Practitioner: Moustapha Bishop MD  Diagnosis: Heart failure with preserved ejection fraction  Additional Pertinent Hx: The patient is a 75 y.o.  female with a history of chronic respiratory failure on 3 liters of oxygen, acquired hypothyroidism, heart failure with preserved EF with last EF of 70% and moderate aortic stenosis who presented to the hospital ssecondary to acute onset progressively worsening shortness of breath this morning. An echocardiogram was reviewed from  and showed an EF of 70% with no regional wall motion abnormalities and moderate LVH with moderate aortic stenosis with BANDAR of approximately 1.0 cm2. She states that she recently established with a cardiologist.  She is currently requiring up to 4 liters of oxygen for an O2 saturation  to 89% on arrival to the emergency room. A CXR showed increased pulmonary vascularity and bilateral opacities consistent with heart failure. At the moment, the patient is to be admitted as an inpatient with acute on chronic heart failure with preserved EF in the setting of moderate aortic stenosis.      Past Medical History:   Diagnosis Date    Anemia     Anxiety     Arthritis     general    AS (aortic stenosis): mild to moderate by echo 2017    ASHD (arteriosclerotic heart disease)     Asthma     CAD (coronary artery disease)     Chest pain     [facility]estive [facility] failure) [facility])     COPD [facility]ch[facility] obstructive [facility] [address]     Depression     DM (diabetes mellitus) ([facility])     Dyspnea     FH: CAD (coronary artery disease)     GERD (gastroesophageal reflux disease)     Heart burn     History of blood transfusion     1973    History of tobacco abuse: Quit in 2009 10/28/2014    HLD (hyperlipidemia)     HTN (hypertension)     Irritable bowel syndrome     States has not had problem with this for years    Liver disease     fatty liver    Metabolic syndrome 79/12/1684    MI (myocardial infarction) ([facility] [address]     Nausea & vomiting     Obesity     CHET (obstructive sleep apnea)     S/P cardiac catheterization: 11/6/2014: 99% in-stent restenosis mid-RCA. LCx moderate LI's. LAD 85% mid-segment lesion. 11/6/2014 11/6/2014: 99% in-stent restenosis mid-RCA. LCx moderate LI's. LAD 85% mid-segment lesion. Dr. Mahsa Lopez S/P PTCA:  5/11/2004: Proximal and mid RCA  Taxus 3.5 X 20 mm, and Taxus 3.0 X 32 mm. 10/28/2014    5/11/2004: Proximal and mid RCA  Taxus 3.5 X 20 mm, and Taxus 3.0 X 32 mm. Dr. Lizzy Tomlin Systolic murmur 09/89/8835    Thyroid disease      Past Surgical History:   Procedure Laterality Date    BACK SURGERY  08/2016        BLADDER SUSPENSION      BREAST BIOPSY  10/10/2017    BREAST LUMPECTOMY      CARDIAC CATHETERIZATION  8-11-06    Mild nonobstructive CAD w/ diffuse 10-20% proximal and mid LAD stenosis and 10-20% proximal/ostial RCA stenosis. No obstructive lesions. RCA stents are patent w/o evidence of restenosis. Normal LV systolic function. EF 65%. Trace MR - catheter induced. Minimally elevated LVEDP - 13mmHg. Mild to moderate aortoiliac PVD w/o obstructive lesions.  CARDIAC CATHETERIZATION  5-11-04    Successful drug-eluting stent x 2 of proximal and mid RCA.  CARDIAC CATHETERIZATION  5-11-04    70-80% proximal RCA stenosis w/ 50-70% mid RCA stenosis. There is 50-60% lesion in mid PDA. Mild proximal and mid LAD disease. Mild circumflex disease. Normal LV size and systolic function. EF 60%.  CARDIOVASCULAR STRESS TEST  5-10-11    No evidence of stress induced ischemia. EF 75%.  CARDIOVASCULAR STRESS TEST  5-10-10    No evidence of stress induced ischemia, infarct or scar. EF 74%.  CERVICAL FUSION N/A 11/15/2017    REMOVAL OF PLATE M2-3, ACDF I5-7 W/ATLANTIS CORNERSTONE performed by Jorge Cabrera MD at [address] [place] [address], DIAGNOSTIC      FOOT SURGERY      HERNIA REPAIR      HYSTERECTOMY      NECK SURGERY  FEB 2016    PARTIAL HYSTERECTOMY      UPPER GASTROINTESTINAL ENDOSCOPY      UPPER GASTROINTESTINAL ENDOSCOPY         Restrictions/Precautions:  General Precautions, Fall Risk           Subjective:  Chart Reviewed: Yes  Patient assessed for rehabilitation services?: Yes  Family / Caregiver Present: No  Subjective: RN approved session, pt is pleasant and agreeable, on 6 L O2 currently. General:  Overall Orientation Status: Within Functional Limits  Follows Commands: Within Functional Limits    Vision: Impaired  Vision Exceptions: Wears glasses at all times    Hearing: Within functional limits         Pain:   .      Pre Treatment Pain Screening  Pain at present: 7  Scale Used: Numeric Score  Intervention List: Patient able to continue with treatment  Comments / Details: Chronic neck and back pain    Social/Functional History:    Lives With: Alone  Type of Home: Apartment  Home Layout: One level (1st floor)  Home Access: Level entry  Home Equipment: Rolling walker, 4 wheeled walker, Lana Flight (3 L)        Receives Help From: Friend(s), Neighbor        Ambulation Assistance: Independent  Transfer Assistance: Independent          Additional Comments: Pt amb with RW or rollator depending on the day, w/c in community; have aides that come 7x/week, 2 hours during week, 1 hour on weekends; assist with laundry, showers, dishes, housekeeping; has MOW's; friend assists with groceries; has neighbor thats over most of the day      Objective:       RLE AROM: WFL       LLE AROM : WFL        B LE's grossly 4/5         RLE Tone: Normotonic  LLE Tone: Normotonic       Balance  Sitting - Static: Good  Standing - Static: Fair, +  Standing - Dynamic: Fair    Supine to Sit: Stand by assistance (HOB elevated, use of bed rail)  Sit to Supine: Stand by assistance    Transfers  Sit to Stand: Stand by assistance  Stand to sit: Stand by assistance       Ambulation 1  Surface: level tile  Device: Rolling Walker  Other Apparatus: O2 (6 L)  Assistance: Contact guard assistance  Quality of Gait: Pt amb with decreased speed and jossy, decreased step length, steady without LOB. Distance: 20 feet x 2              Exercises:  Comments: Pt performs B LE AROM while seated EOB: ankle pumps, marches, LAQ and hip abd/add x 10 reps to increase strength for improved gait. Activity Tolerance:  Activity Tolerance: Patient Tolerated treatment well;Patient limited by endurance  Activity Tolerance: On 6 L O2    Treatment Initiated: See above exercises. Assessment: Body structures, Functions, Activity limitations: Decreased functional mobility , Decreased endurance, Decreased balance, Decreased strength  Assessment: Pt tolerates session fair, limited by weakness and impaired endurance, currently on 6 L O2.  PT to continue to progress strength and functional mobility to ensure safe DC home. Prognosis: Good    Clinical Presentation: Low - Stable and Uncomplicated: Pt tolerates session fair, limited by weakness and impaired endurance, currently on 6 L O2.  PT to continue to progress strength and functional mobility to ensure safe DC home.     Decision Making: High Complexitybased on patient assessment and decision making process of determining plan of care and establishing reasonable expectations for measurable functional outcomes    REQUIRES PT FOLLOW UP: Yes    Discharge Recommendations:  Discharge Recommendations: Continue to assess pending progress, Patient would benefit from continued therapy after discharge (Anticipate home with [facility] PT)    Patient Education:  Patient Education: POC    Equipment Recommendations:  Equipment Needed: No    Safety:  Type of devices: All fall risk precautions in place, Call light within reach, Bed alarm in place, Gait belt, Patient at risk for falls, Left in bed  Restraints  Initially in place: No    Plan:  Times per week: 3-5 X GM  Times per day: Daily  Current Treatment Recommendations: Strengthening, Home Exercise Program, Safety Education & Training, Balance Training, Endurance Training, Functional Mobility Training, Transfer Training, Gait Training, Equipment Evaluation, Education, & procurement, Patient/Caregiver Education & Training    Goals:  Patient goals : To go home. Short term goals  Time Frame for Short term goals: 2 weeks  Short term goal 1: Pt to transfer supine <--> sit S to enable pt to get in/out of bed. Short term goal 2: Pt to transfer sit <--> stand S for increased functional mobility. Short term goal 3: Pt to ambulate >100 feet with RW SBA for household ambulation. Long term goals  Time Frame for Long term goals : NA due to short length of stay. Evaluation Complexity: Based on the findings of patient history, examination, clinical presentation, and decision making during this evaluation, the evaluation of Rupesh Coffey  is of low complexity. PT G-Codes  Functional Limitation: Mobility: Walking and moving around  Mobility: Walking and Moving Around Current Status (): At least 40 percent but less than 60 percent impaired, limited or restricted  Mobility: Walking and Moving Around Goal Status ():  At least 20 percent but less than 40 percent impaired, limited or restricted       AM-PAC Inpatient Mobility without Stair Climbing Raw Score : 15  AM-PAC Inpatient without Stair Climbing T-Scale Score : 43.03  Mobility Inpatient CMS 0-100% Score: 47.43  Mobility Inpatient without Stair CMS G-Code Modifier : CK

## 2018-11-03 NOTE — ED NOTES
Patient resting quietly watching television, audible wheezing noted.  Will continue to monitor for any changes     Cherri Berg RN  11/03/18 [id number]

## 2018-11-03 NOTE — ED NOTES
Patient transported to 01  in stable condition. Patient monitored on telemetry.            Jalil Beltran LPN  65/07/18 [id number]

## 2018-11-03 NOTE — H&P
History & Physical        Patient:  Azalee Buerger  YOB: 1945  MRN: [id number]   PCP: ASHLEY Fair CNP        Acct: [de-identified]    Date of Admission: 11/3/2018  Date of Service: Patient seenand examined on 11/3/2018      Chief Complaint     Shortness of breath    History of PresentIllness     The patient is a 75 y.o.  female with a history of chronic respiratory failure on 3 liters of oxygen, acquired hypothyroidism, heart failure with preserved EF with last EF of 70% and moderate aortic stenosis who presented to the hospital ssecondary to acute onset progressively worsening shortness of breath this morning. The patient otherwise denied fevers, chills, rashes, itching, vision changes, problems urinating, changes in her appetite, nausea, constipation, diarrhea, vomiting or lightheadedness. An echocardiogram was reviewed from 8/27 and showed an EF of 70% with no regional wall motion abnormalities and moderate LVH with moderate aortic stenosis with BANDAR of approximately 1.0 cm2. She states that she recently established with a cardiologist.    She is currently requiring up to 4 liters of oxygen for an O2 saturation  to 89% on arrival to the emergency room. A CXR showed increased pulmonary vascularity and bilateral opacities consistent with heart failure. EKG showed normal sinus rate and rhythm without signs of ischemia. At the moment, the patient is to be admitted as an inpatient with acute on chronic heart failure with preserved EF in the setting of moderate aortic stenosis.           Past Medical History         Diagnosis Date    Anemia     Anxiety     Arthritis     general    AS (aortic stenosis): mild to moderate by echo 4/2017 9/6/2017    ASHD (arteriosclerotic heart disease)     Asthma     CAD (coronary artery disease)     Chest pain     CHF (congestive heart failure) (HCC)     COPD (chronic obstructive pulmonary disease) (HCC)     Depression     [facility]di[facility] [address]     Dyspnea     FH: CAD (coronary artery disease)     GERD (gastroesophageal reflux disease)     Heart burn     History of blood transfusion     1973    History of tobacco abuse: Quit in 2009 10/28/2014    HLD (hyperlipidemia)     HTN (hypertension)     Irritable bowel syndrome     States has not had problem with this for years    Liver disease     fatty liver    Metabolic syndrome 22/32/6998    MI [facility]myocardial infarction) [facility] [address]     Nausea & vomiting     Obesity     CHET (obstructive sleep apnea)     S/P cardiac catheterization: 11/6/2014: 99% in-stent restenosis mid-RCA. LCx moderate LI's. LAD 85% mid-segment lesion. 11/6/2014 11/6/2014: 99% in-stent restenosis mid-RCA. LCx moderate LI's. LAD 85% mid-segment lesion. Dr. David Beatty S/P PTCA:  5/11/2004: Proximal and mid RCA  Taxus 3.5 X 20 mm, and Taxus 3.0 X 32 mm. 10/28/2014    5/11/2004: Proximal and mid RCA  Taxus 3.5 X 20 mm, and Taxus 3.0 X 32 mm. Dr. Mckeon Spittle Systolic murmur 36/88/4693    Thyroid disease          Past Surgical History         Procedure Laterality Date    BACK SURGERY  08/2016        BLADDER SUSPENSION      BREAST BIOPSY  10/10/2017    BREAST LUMPECTOMY      CARDIAC CATHETERIZATION  8-11-06    Mild nonobstructive CAD w/ diffuse 10-20% proximal and mid LAD stenosis and 10-20% proximal/ostial RCA stenosis. No obstructive lesions. RCA stents are patent w/o evidence of restenosis. Normal LV systolic function. EF 65%. Trace MR - catheter induced. Minimally elevated LVEDP - 13mmHg. Mild to moderate aortoiliac PVD w/o obstructive lesions.  CARDIAC CATHETERIZATION  5-11-04    Successful drug-eluting stent x 2 of proximal and mid RCA.  CARDIAC CATHETERIZATION  5-11-04    70-80% proximal RCA stenosis w/ 50-70% mid RCA stenosis. There is 50-60% lesion in mid PDA. Mild proximal and mid LAD disease. Mild circumflex disease. Normal LV size and systolic function. EF 60%.      CARDIOVASCULAR STRESS TEST 5-10-11    No evidence of stress induced ischemia. EF 75%.  CARDIOVASCULAR STRESS TEST  5-10-10    No evidence of stress induced ischemia, infarct or scar. EF 74%.  CERVICAL FUSION N/A 11/15/2017    REMOVAL OF PLATE V9-5, ACDF B0-3 W/ATLANTIS CORNERSTONE performed by Scott Wilson MD at [facility]      ENDOSCOPY, COLON, DIAGNOSTIC      FOOT SURGERY      HERNIA REPAIR      HYSTERECTOMY      NECK SURGERY  FEB 2016    PARTIAL HYSTERECTOMY      UPPER GASTROINTESTINAL ENDOSCOPY      UPPER GASTROINTESTINAL ENDOSCOPY           Medications prior to admission      Prior to Admission medications    Medication Sig Start Date End Date Taking?  Authorizing Provider   valsartan (DIOVAN) 320 MG tablet take 1 tablet by mouth once daily 10/22/18   Don Richardson MD   furosemide (LASIX) 20 MG tablet Take 40 mg (2 tabs) am and 40 mg (2 tab) pm 10/10/18   ASHLEY Call CNP   potassium chloride (KLOR-CON 10) 10 MEQ extended release tablet Take 1 tablet by mouth daily 10/10/18   ASHLEY Call CNP   levothyroxine (SYNTHROID) 100 MCG tablet take 1 tablet by mouth once daily 9/19/18   ASHLEY Call CNP   Calcium Carbonate-Vitamin D (OYSTER SHELL CALCIUM/D) 500-200 MG-UNIT TABS take 1 tablet by mouth twice a day 9/19/18   ASHLEY Call CNP   aspirin (ASPIRIN LOW DOSE) 81 MG EC tablet take 1 tablet by mouth once daily 9/19/18   ASHLEY Call CNP   rosuvastatin (CRESTOR) 20 MG tablet take 1 tablet by mouth once daily 9/18/18   Lee Graham PA-C   Multiple Vitamins-Minerals (MULTIVITAMIN-MINERALS) TABS tablet take 1 tablet by mouth once daily 9/4/18   ASHLEY Call CNP    MG capsule take 3 capsules by mouth every evening 9/4/18   ASHLEY Call CNP   albuterol sulfate HFA (VENTOLIN HFA) 108 (90 Base) MCG/ACT inhaler Inhale 2 puffs into the lungs every 6 hours as needed for Wheezing 8/28/18   ASHLEY Blair CNP hydrALAZINE (APRESOLINE) 100 MG tablet Take 1 tablet by mouth 3 times daily 8/28/18   Issa Love MD   [facility]) 5 MG tablet Take 1 tablet by mouth daily 8/28/18   Issa Love MD   clopidogrel (PLAVIX) 75 MG tablet take 1 tablet by mouth once daily 7/30/18   Winnie Velez PA-C   clotrimazole-betamethasone (LOTRISONE) 1-0.05 % cream Apply topically 3 times daily. 7/17/18   ASHLEY Velasquez CNP   metoprolol tartrate (LOPRESSOR) 50 MG tablet Take 1.5 tablets by mouth 2 times daily 7/11/18 10/2/18  Dennis Joya MD   acetaminophen-codeine (TYLENOL #3) 300-30 MG per tablet Take 1 tablet by mouth every 8 hours as needed for Pain. Kalani Rodríguez Historical Provider, MD   baclofen (LIORESAL) 10 MG tablet Take 10 mg by mouth 2 times daily    Historical Provider, MD   Misc. Devices MISC Pt needs an oxygen tank carrier for her wheelchair 6/20/18   ASHLEY Velasquez CNP   HYDROcodone-acetaminophen (NORCO) 5-325 MG per tablet Take 1 tablet by mouth every 8 hours as needed for Pain. Kalani Rodríguez     Historical Provider, MD   escitalopram (LEXAPRO) 10 MG tablet Take 20 mg by mouth every morning     Historical Provider, MD   SYMBICORT 160-4.5 MCG/ACT AERO inhale 2 puffs by mouth twice a day 4/21/18   ASHLEY Mcginnis CNP   acetaminophen (TYLENOL) 500 MG tablet Take 500 mg by mouth every 6 hours as needed for Pain    Historical Provider, MD   isosorbide mononitrate (IMDUR) 60 MG extended release tablet Take 1 tablet by mouth 2 times daily 3/15/18   Arik Man MD   ranolazine (RANEXA) 500 MG extended release tablet take 1 tablet by mouth twice a day 3/7/18   Arik Man MD   fluticasone (FLONASE) 50 MCG/ACT nasal spray 1 spray by Nasal route daily 1/23/18   ASHLEY Mcginnis CNP   sodium chloride (ALTAMIST SPRAY) 0.65 % nasal spray 1 spray by Nasal route as needed for Congestion 11/29/17   ASHLEY Velasquez CNP   polyvinyl alcohol (LIQUIFILM TEARS) 1.4 % ophthalmic solution Place 1 drop nerves: II-XII intact. Lymphatic: No cervical lymphadenopathy. Psychiatric:  Alert and oriented. Vascular: Dorsalis pedis and radial pulses palpable bilaterally 2+. Mild bilateral lower extremity edema. Genitourinary: Deferred. Skin: Lesions at the bilateral lower extremities at varying levels of healing    Labs and imaging     Recent Labs      11/03/18   0705   WBC  6.7   HGB  9.2*   HCT  28.5*   PLT  154     Recent Labs      11/03/18   0705   NA  126*   K  3.8   CL  83*   CO2  31   BUN  16   CREATININE  0.7   CALCIUM  9.3     Recent Labs      11/03/18   0705   AST  25   ALT  27   BILIDIR  <0.2   BILITOT  0.2*   ALKPHOS  61     No results for input(s): INR in the last 72 hours. No results for input(s): Sudhir Gal in the last 72 hours. Urinalysis:   Lab Results   Component Value Date    NITRU NEGATIVE 11/03/2018    WBCUA 0-2 11/03/2018    BACTERIA MODERATE 11/03/2018    RBCUA 3-5 11/03/2018    BLOODU TRACE 11/03/2018    SPECGRAV 1.010 07/28/2016    SPECGRAV 1.006 02/02/2016    GLUCOSEU NEGATIVE 11/03/2018       Radiology:     CXR: I have reviewed the CXR with the following interpretation: Increased pulmonary vascularity. Cardiomegaly. EKG:  I have reviewed the EKG with the following interpretation: Normal sinus rate and rhythm    Xr Chest Portable    Result Date: 11/3/2018  PROCEDURE: XR CHEST PORTABLE CLINICAL INFORMATION: SOB, . COMPARISON: Chest x-ray dated 8/26/2018 TECHNIQUE: AP Portable upright chest xray FINDINGS: Lungs/pleura: There are diffuse bilateral predominantly interstitial filtrates most consistent with pulmonary edema with patchy alveolar confluence at the right base consistent with moderate CHF or fluid overload. No pleural effusion. No pneumothorax. Heart: There is stable moderate cardiomegaly. Mediastinum/liz: No obvious mass or adenopathy. Skeleton: Patient is again status post lower cervical anterior fusion. There is mild bilateral AC joint DJD.  Lines/tubes/devices: none     Moderate cardiomegaly with moderate pulmonary edema consistent with CHF or fluid overload. **This report has been created using voice recognition software. It may contain minor errors which are inherent in voice recognition technology. ** Final report electronically signed by Dr. Shayla Tate on 11/3/2018 7:08 AM     Assessment/plan     ASSESSMENT:  1. Acute on chronic respiratory failure with hypoxia secondary to acute on chronic heart failure with preserved EF in the setting of moderate aortic stenosis (EF 70%)  2. Hyponatremia  3. Chronic co-morbidities  1. CAD s/p stent and MI  2. Essential hypertension  3. Hyperlipidemia  4. GERD  5. Diabetes mellitus Type 2  6. COPD  7. Chronic respiratory failure with hypoxia  8. Coronary artery disease  9. Moderate aortic stenosis  10. Osteoarthritis  11. Chronic anxiety and depression  12. Obesity    PLAN:  1. Lasix 40 mg IV to be administered in the emergency room. Continue to monitor for diuresis  2. Trend troponin  3. Monitor sodium levels. Seizure precautions. Salt tabs bid  4. Check TSH, has been trending up recently and TSH level needs to be checked again. 5. Inpatient consult to Cardiology to comment on whether patient needs aortic valve replacement in the setting of modearte aortic stenosis and heart failure  6. ISS  7. Continue to monitor. May need albumin as she also seems dry  8. Please note substituted valsartan with losartan as it has been recalled  9. Seizure precautions  10. Fall precautions  11.  Wound care consult for skin lesions      DVT prophylaxis: [x] Lovenox                                 [x] SCDs                                 [] SQ Heparin                                 [] Encourage ambulation           [] Already on Anticoagulation    Disposition:    [x] Home       [] TCU       [] Rehab       [] Psych       [] SNF       [] [place]       [] Other-    ADDENDUM: Patient's TSH was abnormal therefore her levothyroxine dose was increased as follows: 100 mcg every day from Monday to Saturday and 125 mcg on Sundays. Recommended recheck of TSH and free T4 in 3 weeks. PT/OT Eval Status: PT/OT ordered    Code Status: FULL    Admitted to Inpatient with expected LOS greater than two midnights due to medical therapy. Thank you ASHLEY Salguero CNP for theopportunity to be involved in this patient's care.       Electronically signed by Emmy Daniel MD on 11/3/2018 at 9:31 AM

## 2018-11-03 NOTE — ED PROVIDER NOTES
[facility] Renal Telemetry 6K      CHIEF COMPLAINT       Chief Complaint   Patient presents with    Anxiety       Nurses Notes reviewed and I agreeexcept as noted in the HPI. HISTORY OF PRESENT ILLNESS    Cindy Hernandez is a 75 y.o. female who presents to the emergency department for evaluation of shortness of breath. The patient has a history of COPD and anxiety which she states she believes both of these are causing her shortness of breath. She is on oxygen at home. She reports that last night she was unable to sleep. She was watching television this morning and that is when her shortness of breath began. She is diabetic and is on medications for anxiety which she took this morning. Patient denies any fevers, chills, nausea, vomiting, diarrhea, urinary symptoms, or chest pain. She also has a history of CAD, depression, GERD, CHF, asthma, and thyroid disease. Patient has no other pertinent past medical history and she is allergic to lipitor and motrin. She has no further complaints during initial evaluation. REVIEW OF SYSTEMS     Review of Systems   Constitutional: Negative for appetite change, chills, fatigue and fever. HENT: Negative for congestion, ear pain, rhinorrhea and sore throat. Eyes: Negative for pain, discharge and visual disturbance. Respiratory: Positive for shortness of breath. Negative for cough and wheezing. Cardiovascular: Negative for chest pain, palpitations and leg swelling. Gastrointestinal: Negative for abdominal pain, diarrhea, nausea and vomiting. Genitourinary: Negative for difficulty urinating, dysuria, hematuria and vaginal discharge. Musculoskeletal: Negative for arthralgias, back pain, joint swelling and neck pain. Skin: Negative for pallor and rash. Neurological: Negative for dizziness, syncope, weakness, light-headedness, numbness and headaches. Hematological: Negative for adenopathy. Psychiatric/Behavioral: Negative for confusion and suicidal ideas.  The patient is nervous/anxious. PAST MEDICAL HISTORY    has a past medical history of Anemia; Anxiety; Arthritis; AS (aortic stenosis): mild to moderate by echo 4/2017; ASHD (arteriosclerotic heart disease); Asthma; CAD (coronary artery disease); Chest pain; CHF (congestive heart failure) ([facility] COPD (chronic obstructive pulmonary disease) ([facility] Depression; [facility]diabetes mellitus[facility] Dyspnea; FH: CAD (coronary artery disease); GERD (gastroesophageal reflux disease); Heart burn; History of blood transfusion; History of tobacco abuse: Quit in 2009; HLD (hyperlipidemia); HTN (hypertension); Irritable bowel syndrome; Liver disease; Metabolic syndrome; MI (myocardial infarction) ([facility] Nausea & vomiting; Obesity; CHET (obstructive sleep apnea); S/P cardiac catheterization: 11/6/2014: 99% in-stent restenosis mid-RCA. LCx moderate LI's. LAD 85% mid-segment lesion.; S/P PTCA:  5/11/2004: Proximal and mid RCA  Taxus 3.5 X 20 mm, and Taxus 3.0 X 32 mm.; Systolic murmur; and Thyroid disease. SURGICAL HISTORY      has a past surgical history that includes cardiovascular stress test (5-10-11); cardiovascular stress test (5-10-10); Cardiac catheterization (8-11-06); Cardiac catheterization (5-11-04); Cardiac catheterization (5-11-04); bladder suspension; hernia repair; Breast lumpectomy; Foot surgery; Cholecystectomy; Colonoscopy; Upper gastrointestinal endoscopy; Endoscopy, colon, diagnostic; partial hysterectomy (cervix not removed); Upper gastrointestinal endoscopy; Neck surgery (FEB 2016); back surgery (08/2016); Breast biopsy (10/10/2017); cervical fusion (N/A, 11/15/2017); and Hysterectomy.     CURRENT MEDICATIONS       Current Discharge Medication List      CONTINUE these medications which have NOT CHANGED    Details   valsartan (DIOVAN) 320 MG tablet take 1 tablet by mouth once daily  Qty: 90 tablet, Refills: 0      furosemide (LASIX) 20 MG tablet Take 40 mg (2 tabs) am and 40 mg (2 tab) pm  Qty: 60 tablet, Refills: 3    Associated Diagnoses: Pedal edema; Weight gain      potassium chloride (KLOR-CON 10) 10 MEQ extended release tablet Take 1 tablet by mouth daily  Qty: 60 tablet, Refills: 3      levothyroxine (SYNTHROID) 100 MCG tablet take 1 tablet by mouth once daily  Qty: 90 tablet, Refills: 1    Associated Diagnoses: Hypothyroidism, unspecified type      Calcium Carbonate-Vitamin D (OYSTER SHELL CALCIUM/D) 500-200 MG-UNIT TABS take 1 tablet by mouth twice a day  Qty: 60 tablet, Refills: 5      aspirin (ASPIRIN LOW DOSE) 81 MG EC tablet take 1 tablet by mouth once daily  Qty: 90 tablet, Refills: 1      rosuvastatin (CRESTOR) 20 MG tablet take 1 tablet by mouth once daily  Qty: 90 tablet, Refills: 0    Associated Diagnoses: Dyslipidemia      Multiple Vitamins-Minerals (MULTIVITAMIN-MINERALS) TABS tablet take 1 tablet by mouth once daily  Qty: 90 tablet, Refills: 3       MG capsule take 3 capsules by mouth every evening  Qty: 90 capsule, Refills: 1    Associated Diagnoses: Constipation, unspecified constipation type      albuterol sulfate HFA (VENTOLIN HFA) 108 (90 Base) MCG/ACT inhaler Inhale 2 puffs into the lungs every 6 hours as needed for Wheezing  Qty: 1 Inhaler, Refills: 3      hydrALAZINE (APRESOLINE) 100 MG tablet Take 1 tablet by mouth 3 times daily  Qty: 90 tablet, Refills: 3      amLODIPine (NORVASC) 5 MG tablet Take 1 tablet by mouth daily  Qty: 30 tablet, Refills: 3      clopidogrel (PLAVIX) 75 MG tablet take 1 tablet by mouth once daily  Qty: 90 tablet, Refills: 3      clotrimazole-betamethasone (LOTRISONE) 1-0.05 % cream Apply topically 3 times daily. Qty: 45 g, Refills: 1    Associated Diagnoses: Candidal skin infection      metoprolol tartrate (LOPRESSOR) 50 MG tablet Take 1.5 tablets by mouth 2 times daily  Qty: 60 tablet, Refills: 5      acetaminophen-codeine (TYLENOL #3) 300-30 MG per tablet Take 1 tablet by mouth every 8 hours as needed for Pain. .      baclofen (LIORESAL) 10 MG tablet with other specified complication, without long-term current use of insulin ([facility])      Blood Glucose Monitoring Suppl HARVINDER Check blood sugar q daily Dx E11.69  Qty: 1 Device, Refills: 0    Associated Diagnoses: Type 2 diabetes mellitus with other specified complication, without long-term current use of insulin ([facility])      nystatin (MYCOSTATIN) 712751 UNIT/GM cream Apply topically 2 times daily. Qty: 60 g, Refills: 2      benzocaine (ANBESOL) 10 % mucosal gel Take by mouth as needed. Qty: 9 g, Refills: 1    Associated Diagnoses: Oral aphthous ulcer      OXYGEN Inhale 2 L into the lungs continuous       doxepin (SINEQUAN) 150 MG capsule Take 150 mg by mouth nightly      OXcarbazepine (TRILEPTAL) 150 MG tablet Take 150 mg by mouth 2 times daily One tab q am. And two tabs q pm      polyethylene glycol (GLYCOLAX) powder take 17GM (DISSOLVED IN WATER) by mouth once daily  Qty: 1 Bottle, Refills: 2      !! ONE TOUCH ULTRASOFT LANCETS MISC use as directed BEFORE BREAKFAST AND SUPPER  Qty: 100 each, Refills: 11    Comments: Dx code 250.00      pantoprazole (PROTONIX) 40 MG tablet Take 40 mg by mouth every morning (before breakfast)       risperiDONE (RISPERDAL) 1 MG tablet Take 1 mg by mouth Take 1 tab every morning and 1.5 tabs every night       !! - Potential duplicate medications found. Please discuss with provider. ALLERGIES     is allergic to lipitor [atorvastatin] and motrin [ibuprofen micronized]. FAMILY HISTORY     indicated that her mother is . She indicated that her father is . She indicated that one of her two sisters is . She indicated that only one of her five brothers is alive. She indicated that the status of her child is unknown. She indicated that her other is alive. family history includes Breast Cancer (age of onset: 36) in her child; Cancer in her sister; Cancer (age of onset: 36) in her sister;  Heart Disease in her brother, brother, brother, father, and mother; Kidney Disease in her sister; Ovarian Cancer (age of onset: 22) in an other family member. SOCIAL HISTORY    reports that she quit smoking about 11 years ago. Her smoking use included Cigarettes. She has a 38.00 pack-year smoking history. She has never used smokeless tobacco. She reports that she does not drink alcohol or use drugs. PHYSICAL EXAM     INITIAL VITALS:  height is 5' 5.5\" (1.664 m) and weight is 237 lb (107.5 kg). Her oral temperature is 97.7 °F (36.5 °C). Her blood pressure is 177/77 (abnormal) and her pulse is 88. Her respiration is 20 and oxygen saturation is 92%. Physical Exam   Constitutional: She is oriented to person, place, and time. Vital signs are normal. She appears well-developed and well-nourished. Non-toxic appearance. No distress. HENT:   Head: Normocephalic and atraumatic. Right Ear: Hearing normal.   Left Ear: Hearing normal.   Nose: Nose normal. No rhinorrhea. Mouth/Throat: Uvula is midline, oropharynx is clear and moist and mucous membranes are normal. No oropharyngeal exudate. Eyes: Pupils are equal, round, and reactive to light. Conjunctivae, EOM and lids are normal. No scleral icterus. Neck: Normal range of motion. Neck supple. No neck rigidity. No tracheal deviation present. Cardiovascular: Normal rate, regular rhythm and normal heart sounds. No murmur heard. Pulmonary/Chest: Effort normal. No stridor. No respiratory distress. She has decreased breath sounds (throughout). She has wheezes (expiratory wheezes throughout). Abdominal: Soft. She exhibits no distension. There is no tenderness. There is no rigidity. Musculoskeletal: Normal range of motion. She exhibits no edema. Movement normal as observed   Lymphadenopathy:     She has no cervical adenopathy. Neurological: She is alert and oriented to person, place, and time. She has normal strength. Gait normal.   No gross deficits observed   Skin: Skin is warm, dry and intact. Rash noted.  She is not Bilirubin 0.2 (*)     All other components within normal limits   OSMOLALITY - Abnormal; Notable for the following:     Osmolality Calc 257.7 (*)     All other components within normal limits   GLOMERULAR FILTRATION RATE, ESTIMATED - Abnormal; Notable for the following:     Est, Glom Filt Rate 82 (*)     All other components within normal limits   TSH WITH REFLEX - Abnormal; Notable for the following:     TSH 7.580 (*)     All other components within normal limits   T4, FREE - Abnormal; Notable for the following:     T4 Free 0.83 (*)     All other components within normal limits   URINE CULTURE, REFLEXED    Narrative:     Source: urine, clean catch       Site:           Current Antibiotics: not stated   BRAIN NATRIURETIC PEPTIDE   TROPONIN   ANION GAP   BASIC METABOLIC PANEL   TROPONIN   TROPONIN       EMERGENCY DEPARTMENT COURSE:   Vitals:    Vitals:    11/03/18 0724 11/03/18 0814 11/03/18 1000 11/03/18 1217   BP: (!) 129/93  (!) 177/77    Pulse: 81  88    Resp: 20  22 20   Temp:   97.7 °F (36.5 °C)    TempSrc:   Oral    SpO2: 94% 92% 92% 92%   Weight: 237 lb (107.5 kg)      Height: 5' 5.5\" (1.664 m)        Patient was seen and evaluated by me at bedside for shortness of breath that she believed was brought on by her COPD and anxiety. History and physical exam were obtained which resulted in decrease breath sounds and expiratory wheezes throughout. Patient did not appear to be in any distress. Old records were reviewed. I ordered labs which showed a mildly low RBC count of 3.3, low hemoglobin of 9.2, and hematocrit of 28.5. Hyponatremia of 126 was noted. Her TSH was at 7.58, T4 at 0.83, but her ECG was normal. I then ordered a CXR which showed moderate cardiomegaly with moderate pulmonary edema consistent with CHF or fluid overload. I gave the patient lasix, duoneb, and solu-medrol here in the ED. The patient states she feels improved. All results were discussed with patient.  Based on the patient's presentation and workup here in the department, it was decided to bring the patient into the hospital for admission. We did discuss this plan with the patient and available family and they did agree to the plan of admission. The hospitalist service was consulted regarding this patient and did graciously accept this patient in the hospital for admission. Patient was admitted to the hospital in fair condition. CRITICAL CARE:   None    CONSULTS:  Dr. Mireya Nunn:  None    FINAL IMPRESSION      1. Anxiety    2. Chronic obstructive pulmonary disease with acute exacerbation ([address]    3. Congestive heart failure, unspecified HF chronicity, unspecified heart failure type ([address]    4. Hyponatremia          DISPOSITION/PLAN     1. Anxiety    2. Chronic obstructive pulmonary disease with acute exacerbation ([address]    3. Congestive heart failure, unspecified HF chronicity, unspecified heart failure type ([address]    4. Hyponatremia      Admit    (Please note that portions of this note were completed with a voice recognition program.  Efforts were made to edit the dictations but occasionally words aremis-transcribed.)    Scribe:  Mattie Dorman 11/3/18 7:11 AM Scribing for and in the presence of AMMY Clay. Signed by: Jose Enrique Phelan 11/03/18 12:45 PM    Provider:  I personally performed the services described in the documentation, reviewed and edited the documentation which was dictated to the scribe in my presence, and it accuratelyrecords my words and actions.     AMMY Clay 11/03/18 12:45 PM    AMMY Clay PA-C  11/03/18 1294

## 2018-11-03 NOTE — ED NOTES
Bed: 011A  Expected date: 11/3/18  Expected time: 6:20 AM  Means of arrival: [facility]  Comments:     Jennifer Martinez RN  11/03/18 5915

## 2018-11-04 NOTE — PROGRESS NOTES
Darylene Hotter RN spoke with Js Adams RN from [facility] services. Js Adams states that she is the pt's [facility] nurse and that she or another nurse or aide visits the pt once/day to assess her, take her vitals, and daily weight. Js Aadms states pt is non-compliant with her diet and activity. She also states that the pt is typically on 3 L NC at home, that her normal BP runs 654'[id number]-628'O systolic, and normal HR is mid 80's. Js Adams states that the pt has had the sores on her body and continually picks at them despite the RN's educational attempt. Js Adams stated that pt lives home alone with her dog and allows the dog to lick the open sores once she picks at them. Js Adams also stated that the pt does not seem interested in any education that she attempts to provide the pt. Js Adams states that she is on-call the rest of the weekend and if the hospital needs to get a hold of her they can do so by calling the [facility] number or by calling her personal cell phone at [phone].

## 2018-11-04 NOTE — PROGRESS NOTES
Hospitalist Progress Note      Patient:  Priscila All      Unit/Bed:6K-01/001-A    YOB: 1945    MRN: [id number]       Acct: [de-identified]     PCP: ASHLEY Taveras CNP    Date of Admission: 11/3/2018    Assessment/Plan:    1. Acute on chronic diastolic CHF -- ? Aortic stenosis contrib -- cardio c/s (p) -- cont IV lasix 20 mg IV bid and cont monitor I/O, wts - cont fluid and salt restriction;    -- Last echo 8/27/18 showed an EF of 70% with no regional wall motion abnormalities and moderate LVH with moderate aortic stenosis with BANDAR of approximately 1.0 cm2  2. Moderate aortic stenosis -- area per echo 8/2018 = 1.0 cm2 -- cardio c/s (p)  -- ?contrib to #1  3. Acute on chronic hypoxic respiratory failure -- due to CHF - no signs of COPD exac -- weaned to home 3L O2 as of 11/4 -- cont monitor   4. Hyponatremia -- acute on chronic -- down to 126 on admission - up to 132 on 11/4 --> was 137 9/27 -- due to hypervolemia from #1 -- improving with diuresis -- started on salt tabs 11/3 by Dr. Baptiste Records --> stopped 11/4 and monitor Na closely - if cont with issues c/s her nephrologist Dr. Lola Chamberlain --   5. CAD s/p stents and angioplasties -- cardio c/s (p) 11/4 -- trop (-) x 3  -- cont ASA, Plavix, ranexa, lopressor, cozaar, statin -- imdur held with AS  -- Last echo 8/27/18 showed an EF of 70% with no regional wall motion abnormalities and moderate LVH with moderate aortic stenosis with BANDAR of approximately 1.0 cm2  -- last cath 3/2017 Successful PTCA of the right coronary artery using cutting balloon and plain balloon angioplasty for ISR lesion estimated at 75-80%; LAD distal to stent 40-50% stenosis  -- last stress 3/2018 = There was a small sized, mild in intensity, reversible myocardial   perfusion defect of the inferior wall. EF 60%  6. Essential hypertension -- cont hydralazine, cozaar, lopressor, norvasc -- imdur held for AS -- cont monitor and adjust prn  7.  Type 2 DM -- cont SSI -- no meds - A1C BANDAR of approximately 1.0 cm2.  -- Na low on admission -> acute on chronic -> started on salt tabs on admission by Dr. Moses Jimenez:   -- 11/4 --> pt states she is feeling much better - breathing improved. Able to get up and get bath in bathroom today with less sob. Feels close to breathing baseline. Denies cp. Denies abd pain, n/v.  Denies f/c.  Bronson po. On 3L O2 which is her chronic at home. Afebrile. Ambulating without difficulty. Last BM 11/4. No weight or accurate I/O.       Medications:  Reviewed    Infusion Medications    dextrose       Scheduled Medications    furosemide  40 mg Intravenous Once    risperiDONE  1 mg Oral Daily    pantoprazole  40 mg Oral QAM AC    polyethylene glycol  17 g Oral Daily    OXcarbazepine  150 mg Oral BID    doxepin  150 mg Oral Nightly    nystatin   Topical BID    hydrOXYzine  25 mg Oral TID    fluticasone  1 spray Nasal Daily    ranolazine  1,000 mg Oral BID    isosorbide mononitrate  60 mg Oral BID    mometasone-formoterol  2 puff Inhalation BID    escitalopram  20 mg Oral QAM    baclofen  10 mg Oral BID    metoprolol tartrate  75 mg Oral BID    clotrimazole-betamethasone   Topical BID    clopidogrel  75 mg Oral Daily    hydrALAZINE  100 mg Oral TID    amLODIPine  5 mg Oral Daily    therapeutic multivitamin-minerals  1 tablet Oral Daily    docusate sodium  100 mg Oral BID    rosuvastatin  20 mg Oral Nightly    calcium-vitamin D  1 tablet Oral BID    aspirin  81 mg Oral Daily    potassium chloride  10 mEq Oral Daily    losartan  50 mg Oral Daily    sodium chloride flush  10 mL Intravenous 2 times per day    enoxaparin  40 mg Subcutaneous Daily    levothyroxine  125 mcg Oral Once per day on Sun    [START ON 11/5/2018] levothyroxine  100 mcg Oral Once per day on Mon Tue Wed Thu Fri Sat    insulin lispro  0-12 Units Subcutaneous TID WC    insulin lispro  0-6 Units Subcutaneous Nightly    furosemide  20 mg Intravenous Daily    influenza virus vaccine  0.5 mL Intramuscular Once     PRN Meds: benzocaine, polyvinyl alcohol, sodium chloride, acetaminophen, HYDROcodone-acetaminophen, albuterol sulfate HFA, sodium chloride flush, magnesium hydroxide, ondansetron, potassium chloride **OR** potassium chloride **OR** potassium chloride, magnesium sulfate, glucose, dextrose, glucagon (rDNA), dextrose      Intake/Output Summary (Last 24 hours) at 11/04/18 0816  Last data filed at 11/03/18 2245   Gross per 24 hour   Intake              540 ml   Output                0 ml   Net              540 ml       Diet:  DIET GENERAL; Carb Control: 5 carb choices (75 gms)/meal; No Added Salt (3-4 GM); Daily Fluid Restriction: 2000 ml    Exam:  BP (!) 185/81   Pulse 92   Temp 98 °F (36.7 °C) (Axillary)   Resp 20   Ht 5' 5.5\" (1.664 m)   Wt 237 lb (107.5 kg)   SpO2 94%   BMI 38.84 kg/m²     General appearance: No apparent distress, appears stated age and cooperative. HEENT: Pupils equal, round, and reactive to light. Conjunctivae/corneas clear. Neck: Supple, with full range of motion. Trachea midline. Respiratory:  Normal respiratory effort. +crackles bilateral bases, no wheezes, no rhonchi. No respiratory distress or accessory muscle use. Cardiovascular: Regular rate and rhythm with normal S1/S2 3/6 JEANETTE RUSB/LUSB, No rubs or gallops. Abdomen: Soft, non-tender, non-distended with normal bowel sounds. No rebound or guarding  Musculoskeletal: No clubbing, cyanosis or edema bilaterally. Full range of motion without deformity. No calf tenderness palpation  Skin: multiple old scabs on BLE and scarring - no induration/erythema or drainage  Neurologic:  Neurovascularly intact without any focal sensory/motor deficits.  Cranial nerves: II-XII intact, grossly non-focal.  Psychiatric: Alert and oriented, thought content appropriate, normal insight  Capillary Refill: Brisk,< 3 seconds   Peripheral Pulses: +1 palpable, equal bilaterally       Labs:   Recent Labs 11/03/18   0705  11/04/18   0540   WBC  6.7  6.2   HGB  9.2*  9.2*   HCT  28.5*  29.4*   PLT  154  174     Recent Labs      11/03/18   1236  11/03/18   1754  11/04/18   0540   NA  126*  129*  132*   K  3.7  3.6  3.7   CL  82*  83*  86*   CO2  30  34*  34*   BUN  14  14  15   CREATININE  0.8  0.8  0.7   CALCIUM  9.3  9.3  9.9     Recent Labs      11/03/18   0705   AST  25   ALT  27   BILIDIR  <0.2   BILITOT  0.2*   ALKPHOS  61     No results for input(s): INR in the last 72 hours. No results for input(s): Eward Pong in the last 72 hours. Urinalysis:    Lab Results   Component Value Date    NITRU NEGATIVE 11/03/2018    WBCUA 0-2 11/03/2018    BACTERIA MODERATE 11/03/2018    RBCUA 3-5 11/03/2018    BLOODU TRACE 11/03/2018    SPECGRAV 1.010 07/28/2016    SPECGRAV 1.006 02/02/2016    GLUCOSEU NEGATIVE 11/03/2018       Radiology:  XR CHEST PORTABLE   Final Result      Moderate cardiomegaly with moderate pulmonary edema consistent with CHF or fluid overload. **This report has been created using voice recognition software. It may contain minor errors which are inherent in voice recognition technology. **      Final report electronically signed by Dr. Mahogany Conn on 11/3/2018 7:08 AM          Diet: DIET GENERAL; Carb Control: 5 carb choices (75 gms)/meal; No Added Salt (3-4 GM);  Daily Fluid Restriction: 2000 ml    Son: no    Microbiology:  Urine cx 11/3 = mixed growth    Tele:  SR, no arrhythmias    DVT prophylaxis: [x] Lovenox                                 [] SCDs                                 [] SQ Heparin                                 [] Encourage ambulation           [] Already on Anticoagulation     Disposition:    [x] Home with [place]       [] TCU       [] Rehab       [] Psych       [] SNF       [] [place]       [] Other-    Code Status: Full Code    PT/OT Eval Status: consulted        Electronically signed by CIARA FRANKEL MD on 11/4/2018 at 8:16 AM when pt evaluated - final note filed late

## 2018-11-05 NOTE — CONSULTS
CT/CV Surgery Consult Note    2018 1:51 PM  Surgeon:  Dr. Abiel Cueva     Reason for Consult: Aortic Stenosis    HPI: This is a pleasant 75 y.o. WF with sxs of SOB/SHAH/orthopnea which has progressed over the last couple weeks. She is on chronic home O2 at 3 L. Hx of normal EF and moderate AS, BANDAR ~1.0 cm2. No fevers or cough. She denies CP, pressure, or tightness, but states her activity has been significantly limited due to her SHAH. CXR shows moderate pulmonary edema. ECG without acute changes. 3/2017 RCA ISR s/p cutting balloon angioplasty without any significant left system disease. No palpitations. Vital Signs: /60   Pulse 76   Temp 98 °F (36.7 °C) (Oral)   Resp 16   Ht 5' 5.5\" (1.664 m)   Wt 229 lb 12.8 oz (104.2 kg)   SpO2 96%   BMI 37.66 kg/m²    Temp (24hrs), Av.2 °F (36.8 °C), Min:98 °F (36.7 °C), Max:98.7 °F (37.1 °C)      PULSE OXIMETRY RANGE: SpO2  Av.9 %  Min: 94 %  Max: 96 %  SUPPLEMENTAL O2: O2 Flow Rate (L/min): 3 L/min     Labs:   CBC:     Recent Labs      18   0705  18   0540  18   0551   WBC  6.7  6.2   --    HGB  9.2*  9.2*  9.1*   HCT  28.5*  29.4*  29.5*   MCV  85.8  88.8   --    PLT  154  174   --      BMP: Recent Labs      18   1754  18   0540  18   1115  18   0551   NA  129*  132*  132*  129*   K  3.6  3.7   --   4.2   CL  83*  86*   --   84*   CO2  34*  34*   --   34*   BUN  14  15   --   18   CREATININE  0.8  0.7   --   0.8   MG   --    --    --   1.6     Last HgA1C:    Lab Results   Component Value Date    LABA1C 6.0 2018     Imaging:  Echo:   Summary   Ejection fraction was estimated at 60-65%.   Moderate biatrial dilation.   The aortic valve was trileaflet, thickened, calcified, and restricted in   mobility.  Transaortic velocity was 3.9 m/s, mean gradient of 33 mmHg, and   estimated BANDAR 0.8 cm2, consistent with severe aortic stenosis.   There was no evidence of aortic regurgitation.   The mitral valve structure demonstrated predominantly posterior leaflet   calcification.   The transmitral velocity was mildly elevated at 2.0 m/s, with a mean   gradient of 8 mmHg, and estimated MVA 2.7 cm2. There was trace mitral   regurgitation.   Ascending aorta = 3.4 cm.   IVC size is dilated with reduced respiratory phasic changes (CVP~5-10   mmHg). Intake/Output Summary (Last 24 hours) at 11/05/18 1351  Last data filed at 11/05/18 1211   Gross per 24 hour   Intake              490 ml   Output              400 ml   Net               90 ml       Scheduled Meds:    losartan  100 mg Oral Daily    furosemide  40 mg Intravenous BID    amLODIPine  10 mg Oral Daily    spironolactone  25 mg Oral Daily    risperiDONE  1 mg Oral Daily    pantoprazole  40 mg Oral QAM AC    polyethylene glycol  17 g Oral Daily    OXcarbazepine  150 mg Oral BID    doxepin  150 mg Oral Nightly    nystatin   Topical BID    hydrOXYzine  25 mg Oral TID    fluticasone  1 spray Nasal Daily    ranolazine  1,000 mg Oral BID    mometasone-formoterol  2 puff Inhalation BID    escitalopram  20 mg Oral QAM    baclofen  10 mg Oral BID    metoprolol tartrate  75 mg Oral BID    clotrimazole-betamethasone   Topical BID    clopidogrel  75 mg Oral Daily    hydrALAZINE  100 mg Oral TID    therapeutic multivitamin-minerals  1 tablet Oral Daily    docusate sodium  100 mg Oral BID    rosuvastatin  20 mg Oral Nightly    calcium-vitamin D  1 tablet Oral BID    aspirin  81 mg Oral Daily    potassium chloride  10 mEq Oral Daily    sodium chloride flush  10 mL Intravenous 2 times per day    enoxaparin  40 mg Subcutaneous Daily    levothyroxine  125 mcg Oral Once per day on Sun    levothyroxine  100 mcg Oral Once per day on Mon Tue Wed Thu Fri Sat    insulin lispro  0-12 Units Subcutaneous TID WC    insulin lispro  0-6 Units Subcutaneous Nightly       ROS:  Constitutional: Negative for chills, fatigue, fever and unexpected weight change.    HENT: Negative for congestion, drooling, facial swelling, nosebleeds, rhinorrhea, sinus pressure, sore throat, trouble swallowing and voice change. Eyes: Negative for photophobia, redness, itching and visual disturbance. Respiratory: Negative for apnea, choking, shortness of breath, wheezing and stridor. Cardiovascular: Negative for chest pain.  + BILAT LE EDEMA   Gastrointestinal: Negative for abdominal distention, anal bleeding, constipation, nausea and vomiting. Endocrine: Negative for cold intolerance, heat intolerance, polyphagia and polyuria. Genitourinary: Negative for decreased urine volume, difficulty urinating, enuresis, frequency, hematuria. Musculoskeletal: Negative for arthralgias, gait problem, myalgias, neck pain and neck stiffness. Skin: Negative for color change, rash and wound. Allergic/Immunologic: Negative for food allergies and immunocompromised state. Neurological: Mild LH with position change  Hematological: Negative for adenopathy. Does not bruise/bleed easily. Psychiatric/Behavioral: Negative for agitation, confusion, dysphoric mood, self-injury, sleep disturbance and suicidal ideas. The patient is not hyperactive. Physical Exam:   General appearance:  No apparent distress. HEENT:  Normal cephalic, atraumatic without obvious deformity. Pupils equal, round, and reactive to light. Extra ocular muscles intact. Conjunctivae/corneas clear. Neck: Supple, with full range of motion. No jugular venous distention. Trachea midline. Respiratory:  Normal respiratory effort. Clear to auscultation, bilaterally without Rales/Wheezes/Rhonch  Cardiovascular:  Regular rate and rhythm with grade III/VI JEANETTE heard best at LSB  Abdomen: Soft, non-tender, non-distended with normal bowel sounds. Musculoskeletal:  No clubbing, cyanosis or edema bilaterally. Full range of motion without deformity. Skin: Skin color, texture, turgor normal.  No rashes or lesions.   Neurologic:  Neurovascularly intact without any focal sensory/motor deficits. Cranial nerves: II-XII intact, grossly non-focal.  Psychiatric:  Alert and oriented, thought content appropriate, normal insight  Capillary Refill: Brisk,< 3 seconds   Peripheral Pulses: +2 palpable, equal bilaterally      Assessment:   Patient Active Problem List   Diagnosis    Anxiety    Depression    Type 2 diabetes mellitus with complication ([facility] [address]    MI (myocardial infarction) ([facility] [address]    Dyslipidemia    CAD (coronary artery disease)    COPD (chronic obstructive pulmonary disease) ([facility] [address]    GERD (gastroesophageal reflux disease)    Hypothyroid    History of tobacco abuse: Quit in 2009    S/P PTCA: 3/2/2017: PTCA RCA ISR. 12/11/2014: LAD Resolute 3.0X26. 11/6/2014: Stenting RCA Brenda Aw 4.8M16 and 3.5X18. 5/11/2004: Proximal & mid RCA Taxus 3.5X20 mm, and Taxus 3.0X32 mm.  Metabolic syndrome    S/P cardiac catheterization: 11/6/2014: 99% in-stent restenosis mid-RCA. LCx moderate LI's. LAD 85% mid-segment lesion.  POND (nonalcoholic steatohepatitis)    Essential hypertension    Obesity (BMI 30-39. 9)    ASCVD (arteriosclerotic cardiovascular disease)    Other hyperlipidemia    Dysmetabolic syndrome    Left subclavian artery occlusion    CHET on CPAP    Moderate aortic stenosis    Nocturnal hypoxemia    Hyponatremia    Chronic diastolic heart failure (HCC)    Sympathotonic orthostatic hypotension    Urinary tract infection without hematuria    Anxiety and depression    UTI due to Klebsiella species    Orthostatic dizziness    [facility]    Chest pain    Acute on chronic respiratory failure with hypoxia (HCC)    Pericardial effusion    Moderate persistent asthma without complication    Pulmonary nodule    Heart failure with preserved ejection fraction (HCC)    Osteoarthritis of multiple joints    Class 2 obesity due to excess calories with body mass index (BMI) of 38.0 to 38.9 in adult       Plan: 11/5/18  1. Due to her multiple comorbidities, age, and deconditioned status, she is at high risk of traditional AVR with sternotomy approach and would be recommended to be considered for TAVR procedure for her severe AS. The plan of care was discussed in detail with Dr. Monika Donahue. Issues discussed in detail with the patient, who understands and has no further questions. Time spent with patient: 60 minutes, of which more than 50% was spent counseling/coordinating the patient's care. Payton Hastings PA-C     ATTENDING ADDENDUM    I performed an independent history and physical examination, and personally reviewed all relevant imaging. I agree with the  physician assistant's findings, with the following comments. 75y.o. year-old female with severe symptomatic aortic stenosis. Patient clearly is at high risk for surgical aortic valve replacement, for reasons of severe peripheral vascular disease and advanced COPD. Recommend proceed with evaluation for TAVR.

## 2018-11-05 NOTE — FLOWSHEET NOTE
11/05/18 1400   Encounter Summary   Services provided to: Patient   Referral/Consult From: [address] [facility] [employer] [facility]   Continue Visiting No  (11/5)   Complexity of Encounter Moderate   Length of Encounter 30 minutes   Spiritual Assessment Completed Yes   Grief and Life Adjustment   Type Adjustment to illness   Assessment Approachable   Intervention Discussed illness/injury and it's impact; Discussed relationship with God;End of life care;Prayer;Explored feelings, thoughts, concerns   Outcome Receptive;Engaged in conversation;Expressed gratitude     Subjective:  Patient was approachable and laying in her bed when this staff entered her room. She stated that she was \"doing much better with the amount of Lasix they are giving me. \" She shared that she had to come in because she couldn't breathe. Objective: Per her report, patient had CHF and has been dealing with it for several years. She admitted not taking care of herself like she has been told to do. Assessment: Patient was receptive to the  and engaged in our conversation during our visit. She explained what her life is like now as compared to a while back. - Patient is sustained by support from her rodolfo community. She wasn't sure if they knew she was here so she asked this staff to call the [employer] and let them know. This call was placed to [facility]) let hem know she was here. She was very appreciative of the assistance. - Patient is not under 80 therefore her AD was not addressed during this visit. This staff did answer the questions that were answered. - Patient appears to be coping well with her situation. She has aftercare set up at home for daily visits from [facility]. Pt commented Onel Lopez is too lazy to do this work on her own so relies on the aides for assistance, which works out so much better. \"    Plan: No follow-up will be necessary since the patient will probably be discharged tomorrow.

## 2018-11-05 NOTE — CARE COORDINATION
11/5/18, 7:20 AM      Jasmina Jackson       Admitted from: ER 11/3/2018/ 6543 Hospital day: 2   Location: -01/001-A Reason for admit: Heart failure with preserved ejection fraction ([facility] [address] [I50.30] Status: IP   Admit order signed?: yes  PMH:  has a past medical history of Anemia; Anxiety; Arthritis; AS (aortic stenosis): mild to moderate by echo 4/2017; ASHD (arteriosclerotic heart disease); Asthma; CAD (coronary artery disease); Chest pain; CHF (congestive heart failure) ([address] COPD (chronic obstructive pulmonary disease) ([facility] [address] Depression; DM (diabetes mellitus) ([facility] [address] Dyspnea; FH: CAD (coronary artery disease); GERD (gastroesophageal reflux disease); Heart burn; History of blood transfusion; History of tobacco abuse: Quit in 2009; HLD (hyperlipidemia); HTN (hypertension); Irritable bowel syndrome; Liver disease; Metabolic syndrome; MI (myocardial infarction) ([address] Nausea & vomiting; Obesity; CHET (obstructive sleep apnea); S/P cardiac catheterization: 11/6/2014: 99% in-stent restenosis mid-RCA. LCx moderate LI's. LAD 85% mid-segment lesion.; S/P PTCA:  5/11/2004: Proximal and mid RCA  Taxus 3.5 X 20 mm, and Taxus 3.0 X 32 mm.; Systolic murmur; and Thyroid disease.   Medications:  Scheduled Meds:   losartan  100 mg Oral Daily    furosemide  40 mg Intravenous BID    amLODIPine  10 mg Oral Daily    spironolactone  25 mg Oral Daily    risperiDONE  1 mg Oral Daily    pantoprazole  40 mg Oral QAM AC    polyethylene glycol  17 g Oral Daily    OXcarbazepine  150 mg Oral BID    doxepin  150 mg Oral Nightly    nystatin   Topical BID    hydrOXYzine  25 mg Oral TID    fluticasone  1 spray Nasal Daily    ranolazine  1,000 mg Oral BID    mometasone-formoterol  2 puff Inhalation BID    escitalopram  20 mg Oral QAM    baclofen  10 mg Oral BID    metoprolol tartrate  75 mg Oral BID    clotrimazole-betamethasone   Topical BID    clopidogrel  75 mg Oral Daily    hydrALAZINE  100 mg Oral TID    therapeutic multivitamin-minerals  1 tablet Oral Daily    docusate sodium  100 mg Oral BID    rosuvastatin  20 mg Oral Nightly    calcium-vitamin D  1 tablet Oral BID    aspirin  81 mg Oral Daily    potassium chloride  10 mEq Oral Daily    sodium chloride flush  10 mL Intravenous 2 times per day    enoxaparin  40 mg Subcutaneous Daily    levothyroxine  125 mcg Oral Once per day on Sun    levothyroxine  100 mcg Oral Once per day on Mon Tue Wed Thu Fri Sat    insulin lispro  0-12 Units Subcutaneous TID WC    insulin lispro  0-6 Units Subcutaneous Nightly     Continuous Infusions:   dextrose        Pertinent Info/Orders/Treatment Plan:  Na+ 129. Cardiology consulted- IV lasix bid, echo ordered, Na+ restriction. Working with PT/OT, amb 20 ft x2. BP elev, 545 systolic. Diet: DIET GENERAL; Carb Control: 5 carb choices (75 gms)/meal; No Added Salt (3-4 GM); Daily Fluid Restriction: 2000 ml   Smoking status:  reports that she quit smoking about 11 years ago. Her smoking use included Cigarettes. She has a 38.00 pack-year smoking history. She has never used smokeless tobacco.   PCP: ASHLEY Salinas CNP  Readmission: no  Readmission Risk Score: 44%    Discharge Planning  Current Residence:  Private Residence  Living Arrangements:  Alone   Support Systems:  Children, Friends/Neighbors, Meals on Wheels, Home Care Staff  Current Services PTA:     Potential Assistance Needed:  Home Care, Meals On Wheels  Potential Assistance Purchasing Medications:  No  Does patient want to participate in local refill/ meds to beds program?  No  Type of Home Care Services:  Meals on Wheels, Nursing Services  Patient expects to be discharged to:  Home with home health  Expected Discharge date:  11/05/18  Follow Up Appointment: Best Day/ Time: Wednesday AM    Discharge Plan: Home with Interim HH services and home oxygen at 3L.       Evaluation: yes

## 2018-11-05 NOTE — PROGRESS NOTES
CHF core measure documentation:  1. Best Practice BB: metoprolol tartrate 75 mg twice daily  3. ACEI/ARB: losartan 100 mg once daily  4. EF: 70% (from Echo on 8/27/18)  5.   Best Practice aldosterone receptor antagonist: spironolactone 25 mg once daily    Ariella NaikD, BCPS  11/5/2018  7:05 AM

## 2018-11-05 NOTE — CONSULTS
Nephrology Consult Note  Patient's Name: Barney Carty  11:48 AM  11/5/2018    Nephrologist: Neena Guan    Reason for Consult:  Hyponatremia    Requesting Physician:  Tamara Parkinson MD  PCP: ASHLEY Cordero CNP    Chief Complaint:  Shortness of breath   Assessment  1. Hyponatremia dilutional from CHF and chronic  2. 2.CHF  3. DM 2  4. Hypomagnesemia   5. Normocytic anemia     Plan    1. I discussed my thought processes with the patient and she understood  2. I addressed her questions   3. Continue furosemide   4. Serum sodium will improve with  improvement of her fluid status. 5. No need for sodium chloride tablet   6. Continue furosemide 40 mg IV every 12 hours for now  7. Decrease the escitalopram from 20 mg a day to 10 mg a day. 8. AM labs  9. Magnesium sulfate 1000 mg IV piggyback once  10. Will follow  11. Discussed with the primary service      History Obtained From:  Staff,medical record  History of Present Ilness:    Barney Carty is a 75 y.o. female with history of chronic kidney disease, congestive heart failure, COPD, mental depression, chronic anxiety among other multiple medical entities was admitted through the emergency department after the patient presented there with shortness of breath. She was found to have  pulmonary edema. She was admitted for evaluation and management. I was asked to see her for fluid management and hyponatremia. .  She is awake and alert. She is breathing much better now. No chest pain. She has chronic hyponatremia going back to 2014 with serum sodium ranging between 122 mEq per liter of to 132 mEq per liter. It was 126 mEq per liter on admission. Today it is 129 mEq per liter.     Past Medical History:   Diagnosis Date    Anemia     Anxiety     Arthritis     general    AS (aortic stenosis): mild to moderate by echo 4/2017 9/6/2017    ASHD (arteriosclerotic heart disease)     Asthma     CAD (coronary artery disease)     Chest pain     CHF (congestive heart solution PRN       Review of Systems:   Pertinent positives stated above in HPI. All other systems were reviewed and were negative. ROS:Constitutional: negative  Eyes: negative  Ears, nose, mouth, throat, and face: negative  Respiratory: positive for dyspnea on exertion and shortness of breath  Cardiovascular: positive for lower extremity edema  Gastrointestinal: negative  Genitourinary:negative  Integument/breast: negative  Hematologic/lymphatic: negative  Musculoskeletal:positive for arthralgias and stiff joints  Neurological: negative  Behavioral/Psych: positive for anxiety and depression  Endocrine: negative  Allergic/Immunologic: negative    Physical exam:   Constitutional:  Well-developed but chronically ill-looking elderly lady in no distress.   Vitals:   Vitals:    11/05/18 0940   BP: (!) 182/84   Pulse: 94   Resp: 16   Temp: 98.7 °F (37.1 °C)   SpO2: 94%       Skin: no rash, turgor normal  Heent:  Pupils are reactive with moist oral mucosa  Neck: no bruits or jvd noted  Cardiovascular:  S1, S2 with 3/6 murmur  Respiratory: Bibasilar crackles  Abdomen:  +bs, soft, non tender  Ext: ++ lower extremity edema  Psychiatric: mood and affect appropriate  Musculoskeletal:  Rom, muscular strength intact  CNS:Very awake and alert,wellm oriented ,normal speech and non focal    Data:   Labs:  Lab Results   Component Value Date     (L) 11/05/2018     (L) 11/04/2018     (L) 11/04/2018    K 4.2 11/05/2018    K 3.7 11/04/2018    K 3.6 11/03/2018    CL 84 (L) 11/05/2018    CO2 34 (H) 11/05/2018    CO2 34 (H) 11/04/2018    CO2 34 (H) 11/03/2018    CREATININE 0.8 11/05/2018    CREATININE 0.7 11/04/2018    CREATININE 0.8 11/03/2018    BUN 18 11/05/2018    BUN 15 11/04/2018    BUN 14 11/03/2018    GLUCOSE 123 (H) 11/05/2018    GLUCOSE 131 (H) 11/04/2018    GLUCOSE 219 (H) 11/03/2018    PHOS 4.6 03/29/2018    PHOS 3.9 03/28/2018    WBC 6.2 11/04/2018    WBC 6.7 11/03/2018    WBC 4.8 10/25/2018    HGB 9.1 (L) 11/05/2018    HGB 9.2 (L) 11/04/2018    HGB 9.2 (L) 11/03/2018    HCT 29.5 (L) 11/05/2018    HCT 29.4 (L) 11/04/2018    HCT 28.5 (L) 11/03/2018    MCV 88.8 11/04/2018     11/04/2018     {Labs reviewed    Imaging:  CXR results: Report and images reviewed        Thank you Dr. Zunilda Palomino, APRN - CNP for allowing us to participate in care of Carey Mcdermott       Electronically signed by Marta Barreto MD on 11/5/2018 at 11:48 AM

## 2018-11-05 NOTE — PROGRESS NOTES
Hospitalist Progress Note      Patient:  Cindy Hernandez      Unit/Bed:6K-01/001-A    YOB: 1945    MRN: [id number]       Acct: [de-identified]     PCP: ASHLEY Velasquez CNP    Date of Admission: 11/3/2018    Assessment/Plan:    1. Acute on chronic diastolic CHF -- ? Aortic stenosis contrib -- cardio c/s apprec -- IV lasix increased to 40 mg IV bid 11/4 and aldactone added 11/5 per cardiology -- and cont monitor I/O, wts - cont fluid and salt restriction;    -- c/s renal Dr. Laureen Danielle with hyponatremia to assist with diuresis  -- CXR 11/3 with mod pulm edema  -- Last echo 8/27/18 showed an EF of 70% with no regional wall motion abnormalities and moderate LVH with moderate aortic stenosis with BANDAR of approximately 1.0 cm2  -- already follows at CHF clinic  2. severe aortic stenosis -- apprec cardio assist --> repeat echo 11/5/18 with prelim per Dr. Lexis Wright with worsening valve area to 0.8 cm2 from area per echo 8/2018 = 1.0 cm2  -- ?contrib to #1 -- per Dr. Elva Palma consulted for opinion surgical AVR vs TAVR  3. Acute on chronic hypoxic respiratory failure -- due to CHF, ? AS - no signs of COPD exac -- weaned to home 3L O2 as of 11/4 -- cont monitor   4. Hyponatremia -- acute on chronic -- due to hypervolemia --  down to 126 on admission - up to 132 on 11/4 and back down to 129 on 11/5 --> up and down again 11/5 thus c/s renal 11/5  -- on IV lasix 40 mg IV bid and aldactone 25 mg daily added by cardio 11/4   --> was 137 9/27 but has been 129 - 130's in past  --started on salt tabs 11/3 by Dr. Kristen Hardne --> stopped 11/4 and consult Dr. Laureen Danielle 11/5 --   5.  CAD s/p stents and angioplasties -- cardio c/s apprec -- trop (-) x 3  -- cont ASA, Plavix, ranexa, lopressor, cozaar, statin -- imdur held with AS  -- Last echo 8/27/18 showed an EF of 70% with no regional wall motion abnormalities and moderate LVH with moderate aortic stenosis with BANDAR of approximately 1.0 cm2  -- last cath 3/2017 Successful PTCA of the right nephrologist to see -- cont monitor lytes, BP, BS, h/h. Chief Complaint: sob    Hospital Course: Deny Nagel is a 75 y.o. female with chronic diastolic CHF, moderate aortic stenosis, DM2, HTN, CAD with hx stents, COPD on chronic 3L O2, GERD, HLD, fatty liver, hypothyroidism, anxiety/depression, CHET presenting with worsening sob. She normally wears 3L O2 at home and She was placed on 4 liters of oxygen for an O2 saturation  to 89% on arrival to the emergency room and was increased to 5L. A CXR showed increased pulmonary vascularity and bilateral opacities consistent with heart failure with normal BNP. She was admitted for CHF exac. -- started on IV lasix 20 mg IV bid and cardiology consulted (also follows at CHF clinic) --> aldactone added 11/4 and increased lasix to 40 mg IV bid 11/4. Last echo 8/27/18 showed an EF of 70% with no regional wall motion abnormalities and moderate LVH with moderate aortic stenosis with BANDAR of approximately 1.0 cm2. --> repeat echo ordered per Dr. Burke Bernheim cardio to re-eval valve  -- Na low on admission -> acute on chronic -> started on salt tabs on admission by Dr. Carpenter Ran -> stopped 11/4 - Na trending back down 11/5       Subjective:   -- 11/5 --> pt states she is feeling much better and wants to go home. Denies cp. Denies abd pain, n/v.  Denies f/c.  Bronson po. On 3L O2 which is her chronic at home. Afebrile. Ambulating without difficulty. Last BM 11/4. No weight or accurate I/O.       Medications:  Reviewed    Infusion Medications    dextrose       Scheduled Medications    [START ON 11/6/2018] OXcarbazepine  150 mg Oral QAM    OXcarbazepine  300 mg Oral QPM    losartan  100 mg Oral Daily    furosemide  40 mg Intravenous BID    amLODIPine  10 mg Oral Daily    spironolactone  25 mg Oral Daily    risperiDONE  1 mg Oral Daily    pantoprazole  40 mg Oral QAM AC    polyethylene glycol  17 g Oral Daily    doxepin  150 mg Oral Nightly    nystatin   Topical BID    hydrOXYzine  25 mg Oral TID    fluticasone  1 spray Nasal Daily    ranolazine  1,000 mg Oral BID    mometasone-formoterol  2 puff Inhalation BID    escitalopram  20 mg Oral QAM    baclofen  10 mg Oral BID    metoprolol tartrate  75 mg Oral BID    clotrimazole-betamethasone   Topical BID    clopidogrel  75 mg Oral Daily    hydrALAZINE  100 mg Oral TID    therapeutic multivitamin-minerals  1 tablet Oral Daily    docusate sodium  100 mg Oral BID    rosuvastatin  20 mg Oral Nightly    calcium-vitamin D  1 tablet Oral BID    aspirin  81 mg Oral Daily    potassium chloride  10 mEq Oral Daily    sodium chloride flush  10 mL Intravenous 2 times per day    enoxaparin  40 mg Subcutaneous Daily    levothyroxine  125 mcg Oral Once per day on Sun    levothyroxine  100 mcg Oral Once per day on Mon Tue Wed Thu Fri Sat    insulin lispro  0-12 Units Subcutaneous TID WC    insulin lispro  0-6 Units Subcutaneous Nightly     PRN Meds: benzocaine, polyvinyl alcohol, sodium chloride, acetaminophen, HYDROcodone-acetaminophen, albuterol sulfate HFA, sodium chloride flush, magnesium hydroxide, ondansetron, potassium chloride **OR** potassium chloride **OR** potassium chloride, magnesium sulfate, glucose, dextrose, glucagon (rDNA), dextrose      Intake/Output Summary (Last 24 hours) at 11/05/18 1614  Last data filed at 11/05/18 1405   Gross per 24 hour   Intake              850 ml   Output             1400 ml   Net             -550 ml       Diet:  DIET GENERAL; Carb Control: 5 carb choices (75 gms)/meal; No Added Salt (3-4 GM); Daily Fluid Restriction: 2000 ml    Exam:  BP (!) 157/68   Pulse 76   Temp 97.9 °F (36.6 °C) (Oral)   Resp 16   Ht 5' 5.5\" (1.664 m)   Wt 229 lb 12.8 oz (104.2 kg)   SpO2 93%   BMI 37.66 kg/m²     General appearance: No apparent distress, appears stated age and cooperative. HEENT: Pupils equal, round, and reactive to light. Conjunctivae/corneas clear.   Neck: Supple, with full range of cardiomegaly with moderate pulmonary edema consistent with CHF or fluid overload. **This report has been created using voice recognition software. It may contain minor errors which are inherent in voice recognition technology. **      Final report electronically signed by Dr. Manuel Currie on 11/3/2018 7:08 AM      VL DUP CAROTID BILATERAL    (Results Pending)       Diet: DIET GENERAL; Carb Control: 5 carb choices (75 gms)/meal; No Added Salt (3-4 GM);  Daily Fluid Restriction: 2000 ml    Son: no    Microbiology:  Urine 11/3 mixed growth    Tele:  SR, no arrhyhtmias    DVT prophylaxis: [x] Lovenox                                 [] SCDs                                 [] SQ Heparin                                 [] Encourage ambulation           [] Already on Anticoagulation     Disposition:    [x] Home with [facility]       [] TCU       [] Rehab       [] Psych       [] SNF       [] [place]       [] Other-    Code Status: Full Code    PT/OT Eval Status: following      Electronically signed by Aimee Bran MD on 11/5/2018 at 11:14AM

## 2018-11-05 NOTE — PROGRESS NOTES
[facility]  INPATIENT OCCUPATIONAL THERAPY  [facility] RENAL TELEMETRY 6K  EVALUATION    Time:  Time In: 6658  Time Out: 1416  Timed Code Treatment Minutes: 10 Minutes  Minutes: 23          Date: 2018  Patient Name: Deny Nagel,   Gender: female      MRN: [id number]  : 1945  (75 y.o.)  Referring Practitioner: Johanna Glover MD  Diagnosis: heart failure with preserved ejection fraction  Additional Pertinent Hx: The patient is a 75 y.o.  female with a history of chronic respiratory failure on 3 liters of oxygen, acquired hypothyroidism, heart failure with preserved EF with last EF of 70% and moderate aortic stenosis who presented to the hospital ssecondary to acute onset progressively worsening shortness of breath this morning. An echocardiogram was reviewed from  and showed an EF of 70% with no regional wall motion abnormalities and moderate LVH with moderate aortic stenosis with BANDAR of approximately 1.0 cm2. She states that she recently established with a cardiologist.  She is currently requiring up to 4 liters of oxygen for an O2 saturation  to 89% on arrival to the emergency room. A CXR showed increased pulmonary vascularity and bilateral opacities consistent with heart failure. At the moment, the patient is to be admitted as an inpatient with acute on chronic heart failure with preserved EF in the setting of moderate aortic stenosis.      Restrictions/Precautions:  General Precautions, Fall Risk                    Other position/activity restrictions: O2       Past Medical History:   Diagnosis Date    Anemia     Anxiety     Arthritis     general    AS (aortic stenosis): mild to moderate by echo 2017    ASHD (arteriosclerotic heart disease)     Asthma     CAD (coronary artery disease)     Chest pain     [facility]     COPD (chronic obstructive pulmonary disease) [facility]     Depression     DM (diabetes mellitus) ([facility])     Dyspnea     FH: CAD (coronary artery disease)     GERD (gastroesophageal reflux disease)     Heart burn     History of blood transfusion     1973    History of tobacco abuse: Quit in 2009 10/28/2014    HLD (hyperlipidemia)     HTN (hypertension)     Irritable bowel syndrome     States has not had problem with this for years    Liver disease     fatty liver    Metabolic syndrome 25/96/4817    MI [facility] infarction[facility] ([facility] [address]     Nausea & vomiting     Obesity     CHET (obstructive sleep apnea)     S/P cardiac catheterization: 11/6/2014: 99% in-stent restenosis mid-RCA. LCx moderate LI's. LAD 85% mid-segment lesion. 11/6/2014 11/6/2014: 99% in-stent restenosis mid-RCA. LCx moderate LI's. LAD 85% mid-segment lesion. Dr. Violeta Meyer S/P PTCA:  5/11/2004: Proximal and mid RCA  Taxus 3.5 X 20 mm, and Taxus 3.0 X 32 mm. 10/28/2014    5/11/2004: Proximal and mid RCA  Taxus 3.5 X 20 mm, and Taxus 3.0 X 32 mm. Dr. Celso Oswald Systolic murmur 03/97/7596    Thyroid disease      Past Surgical History:   Procedure Laterality Date    BACK SURGERY  08/2016        BLADDER SUSPENSION      BREAST BIOPSY  10/10/2017    BREAST LUMPECTOMY      CARDIAC CATHETERIZATION  8-11-06    Mild nonobstructive CAD w/ diffuse 10-20% proximal and mid LAD stenosis and 10-20% proximal/ostial RCA stenosis. No obstructive lesions. RCA stents are patent w/o evidence of restenosis. Normal LV systolic function. EF 65%. Trace MR - catheter induced. Minimally elevated LVEDP - 13mmHg. Mild to moderate aortoiliac PVD w/o obstructive lesions.  CARDIAC CATHETERIZATION  5-11-04    Successful drug-eluting stent x 2 of proximal and mid RCA.  CARDIAC CATHETERIZATION  5-11-04    70-80% proximal RCA stenosis w/ 50-70% mid RCA stenosis. There is 50-60% lesion in mid PDA. Mild proximal and mid LAD disease. Mild circumflex disease. Normal LV size and systolic function. EF 60%.      CARDIOVASCULAR STRESS TEST  5-10-11    No evidence of stress induced these tasks to facilitate return to PLOF. Performance deficits / Impairments: Decreased functional mobility , Decreased ADL status, Decreased strength, Decreased endurance, Decreased balance  Prognosis: Fair    Clinical Decision Making: Clinical Decision making was of Moderate Complexity as the result of analysis of data from a detailed assessment, a consideration of several treatment options, the presence of comorbidities affecting the plan of care and the need for minimal to moderate modifications or assistance required to complete the evaluation. Discharge Recommendations:  Discharge Recommendations: Home with assist PRN, Home with Home health OT    Patient Education:  Patient Education: OT role, POC, discharge recommendations/options, increasing activity    Equipment Recommendations:  Equipment Needed: No    Safety:  Safety Devices in place: Yes  Type of devices: Call light within reach, Bed alarm in place, Gait belt, Left in chair, Nurse notified    Plan:  Times per week: 3-5x  Current Treatment Recommendations: Strengthening, Functional Mobility Training, Endurance Training, Safety Education & Training, Patient/Caregiver Education & Training, Equipment Evaluation, Education, & procurement, Self-Care / ADL    Goals:  Patient goals : \"go home\"    Short term goals  Time Frame for Short term goals: 2 weeks  Short term goal 1: Pt will complete functional mobility to/from bathroom and household distances with MI to increase activity tolerance needed for ADL completion. Short term goal 2: Pt will complete all functional transfers with MI in prep for toilet transfers. Short term goal 3: Pt will complete LB ADLs with minimal assistance with LHAE and ECT prn to increase independence with self care tasks.    Long term goals  Time Frame for Long term goals : not set due to ELOS    Evaluation Complexity: Based on the findings of patient history, examination, clinical presentation, and decision making during this evaluation, this patient is of medium complexity. OT G-codes  Functional Assessment Tool Used: [facility]  Functional Limitation: Self care  Self Care Current Status (): At least 40 percent but less than 60 percent impaired, limited or restricted  Self Care Goal Status ():  At least 20 percent but less than 40 percent impaired, limited or restricted  AM-PAC Inpatient Daily Activity Raw Score: 17  AM-PAC Inpatient ADL T-Scale Score : 37.26  ADL Inpatient CMS 0-100% Score: 50.11  ADL Inpatient CMS G-Code Modifier : CK

## 2018-11-05 NOTE — PROGRESS NOTES
Consulted to see patient for lesions to the lower extremities. Patient resting in bed. Patient states she develops bumps on her skin which she then picks and they then scab over. Multiple small white round raised lesions noted to the lower legs. Perhaps the lesions are warts. Patient also has dry flaky skin. Patient would benefit from a consult with dermatologist.  Wound ostomy signing off.

## 2018-11-05 NOTE — CARE COORDINATION
DISCHARGE BARRIERS  11/5/18, 10:34 AM    Reason for Referral: has [facility]  Mental Status: Patient is alert and oriented  Decision Making: Patient is making her own decisions. Family/Social/Home Environment: Assessment completed with Patient. She is from home alone in a one story apartment and has [facility] and [facility] [address] [facility] [address], [phone]. SW spoke with Mary Dudley - [facility], nurse and aide 2 hours 5 days per week and I hour on Saturday and Sunday. Mary Dudley stated that Patient is not home bound and they are only open for non-skilled services at this time. Per Patient aide assists with personal care and housekeeping. Patient states she also has a friend that assists with cooking at times. Patient has home delivered meals 2 X 7 days. Current Services: [facility], [facility], [employer] for [facility]. Current Equipment: walker, wheelchair, cane. Payment Source: Medicare, Medicaid  Concerns or Barriers to Discharge: not at this time  Collabrative List of ECF/HH were provided: current with [facility] and prefers to continue. Teach Back Method used with Patient regarding care plan and discharge plan. Patient verbalize understanding of the plan of care and contribute to goal setting. Anticipated Needs/Discharge Plan: Patient discharge home and is current [facility] for non-skilled services.      Electronically signed by SERA Barber LSW on 11/5/2018 at 10:34 AM

## 2018-11-05 NOTE — CONSULTS
regurgitation. Pulmonic Valve   The pulmonic valve leaflets exhibited normal thickness, no calcification,   and normal cuspal separation. DOPPLER: The transpulmonic velocity was   within the normal range with no evidence for regurgitation. Left Atrium   Left atrial size was normal.      Left Ventricle   Normal left ventricle size and systolic function. Ejection fraction was   estimated at 70%. There were no regional left ventricular wall motion   abnormalities and wall thickness was moderate LVH. Right Atrium   Right atrial size was normal.      Right Ventricle   The right ventricular size was normal with normal systolic function and   wall thickness. Pericardial Effusion   The pericardium was normal in appearance with no evidence of a pericardial   effusion. Pleural Effusion   No evidence of pleural effusion. Aorta / Great Vessels   -Aortic root dimension within normal limits.   -The Pulmonary artery is within normal limits. -IVC size is within normal limits with normal respiratory phasic changes.      M-Mode/2D Measurements & Calculations      LV Diastolic    LV Systolic Dimension: 3.2  AV Cusp Separation: 1.1 cmLA   Dimension: 4.5  cm                          Dimension: 4.3 cmAO Root   cm              LV Volume Diastolic: 82.8   Dimension: 3.3 cm   LV FS:28.9 %    ml   LV PW           LV Volume Systolic: 41 ml   Diastolic: 1.5  LV EDV/LV EDV Index: 92.4   cm              ml/43 m^2LV ESV/LV ESV      RV Diastolic Dimension: 2.7 cm   Septum          Index: 41 YR/15 m^2   Diastolic: 1.2  EF Calculated: 55.6 %       LA/Aorta: 1.3   cm                                          Ascending Aorta: 3.2 cm                      LVOT: 1.9 cm     Doppler Measurements & Calculations      MV Peak E-Wave: 174 AV Peak Velocity: 317    LVOT Peak Velocity: 128 cm/s   cm/s                cm/s                     LVOT Mean Velocity: 97.9 cm/s                       AV Peak Gradient: 40.2   LVOT Peak cardiac catheterization: 11/6/2014: 99% in-stent restenosis mid-RCA. LCx moderate LI's. LAD 85% mid-segment lesion. 11/6/2014 11/6/2014: 99% in-stent restenosis mid-RCA. LCx moderate LI's. LAD 85% mid-segment lesion. Dr. Katie Contreras S/P PTCA:  5/11/2004: Proximal and mid RCA  Taxus 3.5 X 20 mm, and Taxus 3.0 X 32 mm. 10/28/2014    5/11/2004: Proximal and mid RCA  Taxus 3.5 X 20 mm, and Taxus 3.0 X 32 mm. Dr. Gerhard Zee Systolic murmur 24/28/6663    Thyroid disease      Past Surgical History:   Procedure Laterality Date    BACK SURGERY  08/2016        BLADDER SUSPENSION      BREAST BIOPSY  10/10/2017    BREAST LUMPECTOMY      CARDIAC CATHETERIZATION  8-11-06    Mild nonobstructive CAD w/ diffuse 10-20% proximal and mid LAD stenosis and 10-20% proximal/ostial RCA stenosis. No obstructive lesions. RCA stents are patent w/o evidence of restenosis. Normal LV systolic function. EF 65%. Trace MR - catheter induced. Minimally elevated LVEDP - 13mmHg. Mild to moderate aortoiliac PVD w/o obstructive lesions.  CARDIAC CATHETERIZATION  5-11-04    Successful drug-eluting stent x 2 of proximal and mid RCA.  CARDIAC CATHETERIZATION  5-11-04    70-80% proximal RCA stenosis w/ 50-70% mid RCA stenosis. There is 50-60% lesion in mid PDA. Mild proximal and mid LAD disease. Mild circumflex disease. Normal LV size and systolic function. EF 60%.  CARDIOVASCULAR STRESS TEST  5-10-11    No evidence of stress induced ischemia. EF 75%.  CARDIOVASCULAR STRESS TEST  5-10-10    No evidence of stress induced ischemia, infarct or scar. EF 74%.     CERVICAL FUSION N/A 11/15/2017    REMOVAL OF PLATE X1-7, ACDF T3-2 W/ATLANTIS CORNERSTONE performed by Tj Lee MD at [facility] COLONOSCOPY      ENDOSCOPY, COLON, DIAGNOSTIC      FOOT SURGERY      HERNIA REPAIR      HYSTERECTOMY      NECK SURGERY  FEB 2016    PARTIAL HYSTERECTOMY      UPPER GASTROINTESTINAL ENDOSCOPY      UPPER GASTROINTESTINAL ENDOSCOPY       Current Facility-Administered Medications   Medication Dose Route Frequency Provider Last Rate Last Dose    furosemide (LASIX) injection 40 mg  40 mg Intravenous Once AMMY Clay        risperiDONE (RISPERDAL) tablet 1 mg  1 mg Oral Daily Addison Jo MD   1 mg at 11/04/18 0911    pantoprazole (PROTONIX) tablet 40 mg  40 mg Oral QAM AC Addison Jo MD   40 mg at 11/04/18 0651    polyethylene glycol (GLYCOLAX) powder 17 g  17 g Oral Daily Addison Jo MD        OXcarbazepine (TRILEPTAL) tablet 150 mg  150 mg Oral BID Addison Jo MD   150 mg at 11/04/18 2025    doxepin (SINEQUAN) capsule 150 mg  150 mg Oral Nightly Addison Jo MD   150 mg at 11/04/18 2028    benzocaine (ORAJEL) 10 % mucosal gel   Mouth/Throat BID PRN Addison Jo MD        nystatin (MYCOSTATIN) cream   Topical BID Addison Jo MD        polyvinyl alcohol (LIQUIFILM TEARS) 1.4 % ophthalmic solution 1 drop  1 drop Both Eyes PRN Addison Jo MD        hydrOXYzine (ATARAX) tablet 25 mg  25 mg Oral TID Addison Jo MD   25 mg at 11/04/18 2026    sodium chloride (OCEAN, BABY AYR) 0.65 % nasal spray 1 spray  1 spray Nasal PRN Addison Jo MD        fluticasone (FLONASE) 50 MCG/ACT nasal spray 1 spray  1 spray Nasal Daily Addison Jo MD   1 spray at 11/04/18 0908    ranolazine (RANEXA) extended release tablet 1,000 mg  1,000 mg Oral BID Addison Jo MD   1,000 mg at 11/04/18 2025    isosorbide mononitrate (IMDUR) extended release tablet 60 mg  60 mg Oral BID Addison Jo MD   60 mg at 11/04/18 2026    acetaminophen (TYLENOL) tablet 500 mg  500 mg Oral Q6H PRN Addison Jo MD        mometasone-formoterol (DULERA) 100-5 MCG/ACT inhaler 2 puff  2 puff Inhalation BID Addison Jo MD   2 puff at 11/04/18 2109    escitalopram (LEXAPRO) tablet 20 mg  20 mg Oral QAM Addison Nicholason, MD   20 mg at 11/04/18 0905    HYDROcodone-acetaminophen (NORCO) 5-325 MG per tablet 1 tablet  1 tablet Oral Q6H PRN Tamara Fernandes MD   1 tablet at 11/04/18 1807    baclofen (LIORESAL) tablet 10 mg  10 mg Oral BID Tamara Fernandes MD   10 mg at 11/04/18 2026    metoprolol tartrate (LOPRESSOR) tablet 75 mg  75 mg Oral BID Tamara Fernandes MD   75 mg at 11/04/18 2030    clotrimazole-betamethasone (LOTRISONE) cream   Topical BID Tamara Fernandes MD        clopidogrel (PLAVIX) tablet 75 mg  75 mg Oral Daily Tamara Fernandes MD   75 mg at 11/04/18 0902    albuterol sulfate  (90 Base) MCG/ACT inhaler 2 puff  2 puff Inhalation Q6H PRN Tamara Fernandes MD        hydrALAZINE (APRESOLINE) tablet 100 mg  100 mg Oral TID Tamara Fernandes MD   100 mg at 11/04/18 2026    amLODIPine (NORVASC) tablet 5 mg  5 mg Oral Daily Tamara Fernandes MD   5 mg at 11/04/18 3457    therapeutic multivitamin-minerals 1 tablet  1 tablet Oral Daily Tamara Fernandes MD   1 tablet at 11/04/18 0905    docusate sodium (COLACE) capsule 100 mg  100 mg Oral BID Tamara Fernandes MD   100 mg at 11/04/18 2031    rosuvastatin (CRESTOR) tablet 20 mg  20 mg Oral Nightly Tamara Fernandes MD        calcium-vitamin D (OSCAL-500) 500-200 MG-UNIT per tablet 1 tablet  1 tablet Oral BID Tamara Fernandes MD   1 tablet at 11/04/18 2026    aspirin EC tablet 81 mg  81 mg Oral Daily Tamara Fernandes MD   81 mg at 11/04/18 0902    potassium chloride (KLOR-CON) extended release tablet 10 mEq  10 mEq Oral Daily Tamara Fernandes MD   10 mEq at 11/04/18 0902    losartan (COZAAR) tablet 50 mg  50 mg Oral Daily Tamara Fernandes MD   50 mg at 11/04/18 0905    sodium chloride flush 0.9 % injection 10 mL  10 mL Intravenous 2 times per day Tamara Fernandes MD   10 mL at 11/04/18 2027    sodium chloride flush 0.9 % injection 10 mL  10 mL Intravenous PRN Tamara Pounds, MD        magnesium hydroxide (MILK OF MAGNESIA) 400 MG/5ML suspension 30 mL  30 mL Oral Daily PRN Tamara Fernandes MD        ondansetron [facility]) injection 4 mg  4 mg Intravenous Q4H PRN Sage Greco MD        potassium chloride (KLOR-CON M) extended release tablet 40 mEq  40 mEq Oral PRN Sage Greco MD        Or    potassium chloride 20 MEQ/15ML (10%) oral solution 40 mEq  40 mEq Oral PRN Sage Greco MD        Or   [facility] potassium chloride 10 mEq/100 mL IVPB (Peripheral Line)  10 mEq Intravenous PRN Sage Greco MD        magnesium sulfate 1 g in dextrose 5 % 100 mL IVPB  1 g Intravenous PRN Sage Greco MD        enoxaparin (LOVENOX) injection 40 mg  40 mg Subcutaneous Daily Sage Greco MD        levothyroxine (SYNTHROID) tablet 125 mcg  125 mcg Oral Once per day on Roxana Montez MD   125 mcg at 11/04/18 0651    [START ON 11/5/2018] levothyroxine (SYNTHROID) tablet 100 mcg  100 mcg Oral Once per day on Mon Tue Wed Thu Fri Sat Sage Greco MD        insulin lispro (HUMALOG) injection vial 0-12 Units  0-12 Units Subcutaneous TID WC Sage Greco MD   2 Units at 11/04/18 1639    insulin lispro (HUMALOG) injection vial 0-6 Units  0-6 Units Subcutaneous Nightly Sage Greco MD   1 Units at 11/03/18 2110    glucose (GLUTOSE) 40 % oral gel 15 g  15 g Oral PRN Sage Greco MD        dextrose 50 % solution 12.5 g  12.5 g Intravenous PRN Sage Greco MD        glucagon (rDNA) injection 1 mg  1 mg Intramuscular PRN Sage Greco MD        dextrose 5 % solution  100 mL/hr Intravenous PRN Sage Greco MD        furosemide (LASIX) injection 20 mg  20 mg Intravenous Daily Sage Greco MD   20 mg at 11/04/18 5622     Prior to Admission medications    Medication Sig Start Date End Date Taking?  Authorizing Provider   valsartan (DIOVAN) 320 MG tablet take 1 tablet by mouth once daily 10/22/18  Yes Maricruz Lund MD   furosemide (LASIX) 20 MG tablet Take 40 mg (2 tabs) am and 40 mg (2 tab) pm 10/10/18  Yes ASHLEY eMad CNP   potassium chloride (KLOR-CON 10) 10 MEQ extended release tablet Take 1 tablet by mouth daily 10/10/18  Yes Rian David Hemphill CNP   levothyroxine (SYNTHROID) 100 MCG tablet take 1 tablet by mouth once daily 9/19/18  Yes ASHLEY Wan CNP   Calcium Carbonate-Vitamin D (OYSTER SHELL CALCIUM/D) 500-200 MG-UNIT TABS take 1 tablet by mouth twice a day 9/19/18  Yes ASHLEY Wan CNP   aspirin (ASPIRIN LOW DOSE) 81 MG EC tablet take 1 tablet by mouth once daily 9/19/18  Yes ASHLEY Wan CNP   rosuvastatin (CRESTOR) 20 MG tablet take 1 tablet by mouth once daily 9/18/18  Yes Alverto Conrad PA-C   Multiple Vitamins-Minerals (MULTIVITAMIN-MINERALS) TABS tablet take 1 tablet by mouth once daily 9/4/18  Yes ASHLEY Wan CNP    MG capsule take 3 capsules by mouth every evening 9/4/18  Yes ASHLEY Wan CNP   albuterol sulfate HFA (VENTOLIN HFA) 108 (90 Base) MCG/ACT inhaler Inhale 2 puffs into the lungs every 6 hours as needed for Wheezing 8/28/18  Yes Jetty ButterASHLEY CNP   hydrALAZINE (APRESOLINE) 100 MG tablet Take 1 tablet by mouth 3 times daily 8/28/18  Yes Meron Schaefer MD   amLODIPine (NORVASC) 5 MG tablet Take 1 tablet by mouth daily 8/28/18  Yes Meron Schaefer MD   clopidogrel (PLAVIX) 75 MG tablet take 1 tablet by mouth once daily 7/30/18  Yes Demarco Velez PA-C   clotrimazole-betamethasone (LOTRISONE) 1-0.05 % cream Apply topically 3 times daily. 7/17/18  Yes ASHLEY Wan CNP   metoprolol tartrate (LOPRESSOR) 50 MG tablet Take 1.5 tablets by mouth 2 times daily 7/11/18 11/3/18 Yes Corbin Lund MD   acetaminophen-codeine (TYLENOL #3) 300-30 MG per tablet Take 1 tablet by mouth every 8 hours as needed for Pain. Quyen Heard Historical MD Martha   Misc. Devices MISC Pt needs an oxygen tank carrier for her wheelchair 6/20/18  Yes ASHLEY Wan - CNP   HYDROcodone-acetaminophen (NORCO) 5-325 MG per tablet Take 1 tablet by mouth every 8 hours as needed for Pain.  .   Yes Historical Provider, MD   escitalopram (LEXAPRO) 10 MG tablet Take 20 mg by mouth every morning    Yes Historical Provider, MD   SYMBICORT 160-4.5 MCG/ACT AERO inhale 2 puffs by mouth twice a day 4/21/18  Yes ASHLEY Correa CNP   acetaminophen (TYLENOL) 500 MG tablet Take 500 mg by mouth every 6 hours as needed for Pain   Yes Historical Provider, MD   isosorbide mononitrate (IMDUR) 60 MG extended release tablet Take 1 tablet by mouth 2 times daily 3/15/18  Yes Agusto Hunter MD   ranolazine (RANEXA) 500 MG extended release tablet take 1 tablet by mouth twice a day 3/7/18  Yes Agusto Hunter MD   fluticasone (FLONASE) 50 MCG/ACT nasal spray 1 spray by Nasal route daily 1/23/18  Yes ASHLEY Correa CNP   sodium chloride (ALTAMIST SPRAY) 0.65 % nasal spray 1 spray by Nasal route as needed for Congestion 11/29/17  Yes ASHLEY Mohamud CNP   polyvinyl alcohol (LIQUIFILM TEARS) 1.4 % ophthalmic solution Place 1 drop into both eyes as needed 2-4 times daily   Yes Historical Provider, MD   cycloSPORINE (RESTASIS) 0.05 % ophthalmic emulsion Place 1 drop into both eyes 2 times daily   Yes Historical Provider, MD   hydrOXYzine (ATARAX) 25 MG tablet Take 25 mg by mouth 3 times daily   Yes Historical Provider, MD   glucose blood VI test strips (GLUCOSE METER TEST) strip 1 each by In Vitro route daily Check blood sugar q daily Dx E11.69 11/9/17  Yes Tiny Height, DO   Lancets MISC Check blood sugar q daily Dx E11.69 11/9/17  Yes Tiny Height, DO   Blood Glucose Monitoring Suppl [facility] Check blood sugar q daily Dx E11.69 11/9/17  Yes Demarco Ellison, DO   nystatin (MYCOSTATIN) 493292 UNIT/GM cream Apply topically 2 times daily. 8/17/17  Yes ASHLEY Mohamud CNP   benzocaine (ANBESOL) 10 % mucosal gel Take by mouth as needed.  7/18/17  Yes ASHLEY Mohamud CNP   OXYGEN Inhale 2 L into the lungs continuous    Yes Historical Provider, MD   doxepin (SINEQUAN) 150 MG capsule Take 150 mg by mouth nightly   Yes Historical Provider, MD   OXcarbazepine (TRILEPTAL) 150 MG tablet Take 150 mg by mouth 2 times daily One tab q am. And two tabs q pm   Yes Historical Provider, MD   polyethylene glycol (GLYCOLAX) powder take 17GM (DISSOLVED IN WATER) by mouth once daily  Patient taking differently: take 17GM (DISSOLVED IN WATER) by mouth once daily prn 8/29/16  Yes ASHLEY Deluna CNP   ONE TOUCH ULTRASOFT LANCETS MISC use as directed BEFORE BREAKFAST AND SUPPER 10/15/14  Yes ASHLEY Deluna CNP   pantoprazole (PROTONIX) 40 MG tablet Take 40 mg by mouth every morning (before breakfast)    Yes Historical Provider, MD   risperiDONE (RISPERDAL) 1 MG tablet Take 1 mg by mouth Take 1 tab every morning and 1.5 tabs every night   Yes Historical Provider, MD   baclofen (LIORESAL) 10 MG tablet Take 10 mg by mouth 2 times daily    Historical Provider, MD   Scheduled Meds:   furosemide  40 mg Intravenous Once    risperiDONE  1 mg Oral Daily    pantoprazole  40 mg Oral QAM AC    polyethylene glycol  17 g Oral Daily    OXcarbazepine  150 mg Oral BID    doxepin  150 mg Oral Nightly    nystatin   Topical BID    hydrOXYzine  25 mg Oral TID    fluticasone  1 spray Nasal Daily    ranolazine  1,000 mg Oral BID    isosorbide mononitrate  60 mg Oral BID    mometasone-formoterol  2 puff Inhalation BID    escitalopram  20 mg Oral QAM    baclofen  10 mg Oral BID    metoprolol tartrate  75 mg Oral BID    clotrimazole-betamethasone   Topical BID    clopidogrel  75 mg Oral Daily    hydrALAZINE  100 mg Oral TID    amLODIPine  5 mg Oral Daily    therapeutic multivitamin-minerals  1 tablet Oral Daily    docusate sodium  100 mg Oral BID    rosuvastatin  20 mg Oral Nightly    calcium-vitamin D  1 tablet Oral BID    aspirin  81 mg Oral Daily    potassium chloride  10 mEq Oral Daily    losartan  50 mg Oral Daily    sodium chloride flush  10 mL Intravenous 2 times per day    enoxaparin  40 mg Subcutaneous Daily    levothyroxine  125 mcg Oral Once per day on Sun    [START ON 11/5/2018] levothyroxine  100 mcg Oral Once per day on Mon Tue Wed Thu Fri Sat    insulin lispro  0-12 Units Subcutaneous TID WC    insulin lispro  0-6 Units Subcutaneous Nightly    furosemide  20 mg Intravenous Daily     Continuous Infusions:   dextrose       PRN Meds:.benzocaine, polyvinyl alcohol, sodium chloride, acetaminophen, HYDROcodone-acetaminophen, albuterol sulfate HFA, sodium chloride flush, magnesium hydroxide, ondansetron, potassium chloride **OR** potassium chloride **OR** potassium chloride, magnesium sulfate, glucose, dextrose, glucagon (rDNA), dextrose    Allergies   Allergen Reactions    Lipitor [Atorvastatin] Diarrhea    Motrin [Ibuprofen Micronized] Nausea Only     Family History   Problem Relation Age of Onset    Heart Disease Mother     Heart Disease Father     Kidney Disease Sister     Cancer Sister     Heart Disease Brother     Heart Disease Brother     Heart Disease Brother     Breast Cancer Child 36    Cancer Sister 36        rectal    Ovarian Cancer Other 25     Social History     Social History    Marital status:      Spouse name: N/A    Number of children: 5    Years of education: N/A     Occupational History    Not on file. Social History Main Topics    Smoking status: Former Smoker     Packs/day: 1.00     Years: 38.00     Types: Cigarettes     Quit date: 1/31/2007    Smokeless tobacco: Never Used    Alcohol use No    Drug use: No    Sexual activity: No     Other Topics Concern    Not on file     Social History Narrative    No narrative on file     ROS:   Constitutional: Denies any recent wt change. Eyes:  Denies any blurring or double vision, no glaucoma  Ears/Nose/Mouth/Throat:  Denies any chronic sinus/rhinitis, bleeding gums  Cardiovascular:  As described above.     Respiratory:  Denies any frequent cough, wheezing or coughing up blood  Genitourinary:  Denies difficulty with urination and kidney stones  Gastrointestinal:  Denies any chronic problems with BILIRUBINUR neg 07/28/2016    BLOODU TRACE 11/03/2018    GLUCOSEU NEGATIVE 11/03/2018         Physical Exam:  Vitals:    11/04/18 2015   BP: (!) 186/79   Pulse: 88   Resp: 18   Temp: 98 °F (36.7 °C)   SpO2: 94%      Intake/Output Summary (Last 24 hours) at 11/04/18 2158  Last data filed at 11/04/18 2024   Gross per 24 hour   Intake             1030 ml   Output                0 ml   Net             1030 ml      General:  No acute distress  Neck: Supple, no JVD, no carotid bruits  Heart: Regular rhythm normal S1, no rubs, 3/6 JEANETTE, soft S2  Lungs: clear to ascultation no rales, wheezes, or rhonchi  Abdomen: positive bowel sounds, soft, non-tender, non-distended, no bruits, no masses  Extremities:no clubbing, cyanosis or edema  Neurologic: alert and oriented x 3, cranial nerves 2-12 grossly intact, motor and sensory intact, moving all extremities  Skin: No rashes    Assessment:  SHAH  Acute on chronic diastolic failure, NYHA III  Preserved EF  Moderate Aortic Stenosis  Uncontrolled HTN  CAD s/p RCA ISR treated with cutting balloon angioplasty, 3/2017    Plan:  1. Lasix 40 mg IV BID  2. Wrap legs, salt restrict, fluid restrict, raise legs, daily weights. CHF KARSON 1 week post discharge. Follow labs. 3. Repeat TTE for AoV progression  4. Increase Amlodipine to 10 mg q day  5. Continue DAPT/Ranolazine/Crestor  6. Increase Losartan to 100 mg q day  7. Continue BB  8. Add Spironolactone 25 mg q day  9. Continue Hydralazine, await TTE to add nitrates    Thank you for allowing us to participate in the care of this patient. Please do not hesitate to call us with questions.     Electronically signed by Prashant Valentine MD on 11/4/2018 at 9:58 PM    Interventional Cardiology - [facility]

## 2018-11-05 NOTE — CONSULTS
Eyes: Negative for photophobia, redness, itching and visual disturbance. Respiratory: Negative for apnea, choking, shortness of breath, wheezing and stridor. Cardiovascular: Negative for chest pain, palpitations and leg swelling. Gastrointestinal: Negative for abdominal distention, anal bleeding, constipation, nausea and vomiting. Endocrine: Negative for cold intolerance, heat intolerance, polyphagia and polyuria. Musculoskeletal: Negative for arthralgias, gait problem, myalgias, neck pain and neck stiffness. Skin: Negative for color change, rash and wound. Allergic/Immunologic: Negative for food allergies and immunocompromised state. Neurological: Negative for dizziness, tremors, speech difficulty, weakness, numbness and headaches. Hematological: Negative for adenopathy. Does not bruise/bleed easily. Psychiatric/Behavioral: Negative for agitation, confusion, dysphoric mood, self-injury, sleep disturbance and suicidal ideas. The patient is not hyperactive. Physical Exam:   General appearance:  No apparent distress, appears stated age and cooperative. HEENT:  Normal cephalic, atraumatic without obvious deformity. Pupils equal, round, and reactive to light. Extra ocular muscles intact. Conjunctivae/corneas clear. Neck: Supple, with full range of motion. No jugular venous distention. Trachea midline. Respiratory:  Normal respiratory effort. Clear to auscultation, bilaterally without Rales/Wheezes/Rhonch  Cardiovascular:  Regular rate and rhythm with normal S1. 3/6 JEANETTE right sternal border. Abdomen: Soft, non-tender, non-distended with normal bowel sounds. Musculoskeletal:  No clubbing, cyanosis or edema bilaterally. Full range of motion without deformity. Skin: Skin color, texture, turgor normal.  No rashes or lesions. Neurologic:  Neurovascularly intact without any focal sensory/motor deficits. Cranial pairs 2-12 no gross deficit. Cerebellar intact  Psychiatric:  Alert and oriented, care was discussed in detail with Dr. Hubert Burton. Issues discussed in detail with the patient, who understands and has no further questions. Time spent with patient: 60 minutes, of which more than 50% was spent counseling/coordinating the patient's care. I examined patient independently from Tai Santos PA-C and spent 80 minutes evaluating and coordinating patient care. Explained Mrs. Annalise Rodarte about her need to undergo aortic valve replacement as a life saving procedure as well as potential risks benefits and alternatives and she is agreeable.     REINIER LindC

## 2018-11-05 NOTE — PROGRESS NOTES
Cardiology Progress Note      Patient:  Sarah Ross  YOB: 1945  MRN: [id number]   Acct: [de-identified]  Admit Date:  11/3/2018  Primary Cardiologist: Ashely  Seen by Dr. Stephanie Le     Per prior cardiology consult note-  CHIEF COMPLAINT:  SOB        HPI: This is a pleasant 75 y.o. female presents with worsening sob, cuevas, and orthopnea that has been getting worse over the last couple weeks. She is on chronic home O2 at 3 L. Hx of normal EF and moderate AS, BANDAR ~1.0 cm2. No fevers or cough. No sick contacts. She has worsening LE edema as well. No chest pain or syncope. Difficult for her to talk throughout the entire interview without gasping for air. She is now requiring 4-5L to maintain saturations up to 90%. CXR shows moderate pulmonary edema. ECG without acute changes. 3/2017 RCA ISR s/p cutting balloon angioplasty without any significant left system disease. No palpitations.       Subjective (Events in last 24 hours):     Pt without cardiac c/o  Denies chest pain or SOB   Sleeping well - eating well   She has HH to follow her CHF and follows with CHF clinic    She states her normal home weight is 230#  Today weight 229#        She asked about \"Dr. Jean Jerry tried to fix my arm - I have a blockage - I want a second opinion\"      Objective:   BP (!) 182/84   Pulse 94   Temp 98.7 °F (37.1 °C) (Oral)   Resp 16   Ht 5' 5.5\" (1.664 m)   Wt 229 lb 12.8 oz (104.2 kg)   SpO2 94%   BMI 37.66 kg/m²        TELEMETRY: SR    Physical Exam:  General Appearance: alert and oriented to person, place and time, in no acute distress  Cardiovascular: normal rate, regular rhythm, normal S1 and S2, no murmurs, rubs, clicks, or gallops, distal pulses intact,   Pulmonary/Chest: clear to auscultation bilaterally- no wheezes, rales or rhonchi, normal air movement, no respiratory distress  Abdomen: soft, non-tender, non-distended, normal bowel sounds, no masses Extremities: no cyanosis, clubbing or edema, pulses present  Skin: warm and dry, no rash or erythema  Musculoskeletal: normal range of motion, no joint swelling, deformity or tenderness  Neurological: alert, oriented, normal speech, no focal findings or movement disorder noted    Medications:    losartan  100 mg Oral Daily    furosemide  40 mg Intravenous BID    amLODIPine  10 mg Oral Daily    spironolactone  25 mg Oral Daily    risperiDONE  1 mg Oral Daily    pantoprazole  40 mg Oral QAM AC    polyethylene glycol  17 g Oral Daily    OXcarbazepine  150 mg Oral BID    doxepin  150 mg Oral Nightly    nystatin   Topical BID    hydrOXYzine  25 mg Oral TID    fluticasone  1 spray Nasal Daily    ranolazine  1,000 mg Oral BID    mometasone-formoterol  2 puff Inhalation BID    escitalopram  20 mg Oral QAM    baclofen  10 mg Oral BID    metoprolol tartrate  75 mg Oral BID    clotrimazole-betamethasone   Topical BID    clopidogrel  75 mg Oral Daily    hydrALAZINE  100 mg Oral TID    therapeutic multivitamin-minerals  1 tablet Oral Daily    docusate sodium  100 mg Oral BID    rosuvastatin  20 mg Oral Nightly    calcium-vitamin D  1 tablet Oral BID    aspirin  81 mg Oral Daily    potassium chloride  10 mEq Oral Daily    sodium chloride flush  10 mL Intravenous 2 times per day    enoxaparin  40 mg Subcutaneous Daily    levothyroxine  125 mcg Oral Once per day on Sun    levothyroxine  100 mcg Oral Once per day on Mon Tue Wed Thu Fri Sat    insulin lispro  0-12 Units Subcutaneous TID     insulin lispro  0-6 Units Subcutaneous Nightly      dextrose         benzocaine  BID PRN   polyvinyl alcohol 1 drop PRN   sodium chloride 1 spray PRN   acetaminophen 500 mg Q6H PRN   HYDROcodone-acetaminophen 1 tablet Q6H PRN   albuterol sulfate HFA 2 puff Q6H PRN   sodium chloride flush 10 mL PRN   magnesium hydroxide 30 mL Daily PRN   ondansetron 4 mg Q4H PRN   potassium chloride 40 mEq PRN   Or     potassium chloride 40 mEq PRN   Or     potassium chloride 10 mEq PRN   magnesium sulfate 1 g PRN   glucose 15 g PRN   dextrose 12.5 g PRN   glucagon (rDNA) 1 mg PRN   dextrose 100 mL/hr PRN       Diagnostics:  EK2018 06:46:44 [facility] ROUTINE RETRIEVAL  Normal sinus rhythm  Cannot rule out Anterior infarct , age undetermined  Abnormal ECG  When compared with ECG of 26-AUG-2018 15:33,  No significant change was found  Confirmed by Veronica Jay MD, Frank Gardner ([id number]) on 2018 1:27:28 AM    Echo: pending    Electronically signed by Anila Lay MD (Interpreting   physician) on 2018 at 07:02 AM   ----------------------------------------------------------------      Findings      Mitral Valve   The mitral valve structure was normal with normal leaflet separation.   DOPPLER: The transmitral velocity was within the normal range with no   evidence for mitral stenosis. There was no evidence of mitral   regurgitation.      Aortic Valve   The aortic valve was trileaflet with moderate calcification and reduced   separation. DOPPLER: Transaortic velocity was ~3.2 m/s with evidence of   moderate aortic stenosis. There was no evidence of aortic regurgitation.  BANDAR ~ 1.0 cm2 ; Max./mean PG ~ 40/24 mm Hg.      Tricuspid Valve   The tricuspid valve structure was normal with normal leaflet separation.   DOPPLER: There was no evidence of tricuspid stenosis. There was no   evidence of tricuspid regurgitation.      Pulmonic Valve   The pulmonic valve leaflets exhibited normal thickness, no calcification,   and normal cuspal separation. DOPPLER: The transpulmonic velocity was   within the normal range with no evidence for regurgitation.      Left Atrium   Left atrial size was normal.      Left Ventricle   Normal left ventricle size and systolic function. Ejection fraction was   estimated at 70%. There were no regional left ventricular wall motion   abnormalities and wall thickness was moderate LVH.       Right Atrium   Right atrial size was normal.      Right Ventricle   The right ventricular size was normal with normal systolic function and   wall thickness.      Pericardial Effusion   The pericardium was normal in appearance with no evidence of a pericardial   effusion.      Pleural Effusion   No evidence of pleural effusion.      Aorta / Great Vessels   -Aortic root dimension within normal limits.   -The Pulmonary artery is within normal limits.   -IVC size is within normal limits with normal respiratory phasic changes    Stress: Summary   There was a small sized, mild in intensity, reversible myocardial   perfusion defect of the inferior wall.   Calculated gated LVEF 60 %.     Recommendation   Clinical correlation is recommended.      Signatures      ----------------------------------------------------------------   Electronically signed by Hoda Bonilla MD (Interpreting   Cardiologist) on 03/21/2018       Left Heart Cath:   Procedure Summary  Successful PTCA of the right coronary artery using cutting balloon and plain balloon angioplasty for ISR lesion  estimated at 75-80%. Left main coronary artery is a 5.5 mm caliber vessel. It produces the LAD, the left circumflex artery. It shows No  angiographic atherosclerosis. The LAD is a 4 mm caliber vessel that courses along the anterior intraventricular groove all the way to the apex and  wraps around it . It shows patent stent in the proximal segment with 40-50% lesion in the mid-segment distal to the  stent that has not changed compared to previous cardiac cath. The left circumflex artery is a 3 mm , non-dominant vessel which shows no significant lesions. The RCA is dominant, 4.5 mm caliber vessel, which patent stents with significant ISR in the mid-segment estimated  at 75-80%. LVEF was estimated at 65%. Recommendations  Patient and family were informed about the findings and their questions were answered to their satisfaction.   The importance of lifestyle changes and cardiovascular risk factors modification was emphasized. Smoking cessation was emphasized. The importance of compliance with medications was emphasized. The patient will be on dual antiplatelet therapy for at least one year. The patient will be on optimal medical therapy for atherosclerotic disease, including beta blockers as tolerated,  statins, and ace inhibitors. Lab Data:    Cardiac Enzymes:  No results for input(s): CKTOTAL, CKMB, CKMBINDEX, TROPONINI in the last 72 hours.     CBC:   Lab Results   Component Value Date    WBC 6.2 11/04/2018    RBC 3.31 11/04/2018    RBC 3.82 08/21/2018    HGB 9.1 11/05/2018    HCT 29.5 11/05/2018     11/04/2018       CMP:  Lab Results   Component Value Date     11/05/2018    K 4.2 11/05/2018    K 3.7 11/04/2018    CL 84 11/05/2018    CO2 34 11/05/2018    BUN 18 11/05/2018    CREATININE 0.8 11/05/2018    LABGLOM 70 11/05/2018    GLUCOSE 123 11/05/2018    GLUCOSE 101 07/25/2016    CALCIUM 9.7 11/05/2018       Hepatic Function Panel:  Lab Results   Component Value Date    ALKPHOS 61 11/03/2018    ALT 27 11/03/2018    AST 25 11/03/2018    PROT 6.8 11/03/2018    PROT 6.5 11/25/2016    BILITOT 0.2 11/03/2018    BILIDIR <0.2 11/03/2018    LABALBU 4.3 11/03/2018       Magnesium:    Lab Results   Component Value Date    MG 1.6 11/05/2018       PT/INR:    Lab Results   Component Value Date    PROTIME 1.04 08/13/2011    INR 1.08 10/25/2018       HgBA1c:    Lab Results   Component Value Date    LABA1C 6.0 11/03/2018       FLP:  Lab Results   Component Value Date    TRIG 540 11/16/2017    HDL 25 11/16/2017    LDLCALC SEE BELOW 11/16/2017    LABVLDL 69 11/25/2016       TSH:    Lab Results   Component Value Date    TSH 7.580 11/03/2018         Assessment:  Uncontrolled HTN   Renal duplex without LUIS 12/2017    Acute on chronic diastolic chf with LE swelling     UTI    Hyponatremia    Chronic O2 use    Severe AS - valve area 0.8    Hx CAD with prior PCI  Last cath 2017:  Successful PTCA of the right

## 2018-11-05 NOTE — PROGRESS NOTES
Physical Therapy   [address] [address]  INPATIENT PHYSICAL THERAPY  DAILYNOTE  [facility] RENAL TELEMETRY 6K - 6K-01/001-A    Time In: 5236  Time Out: 1105  Timed Code Treatment Minutes: 23 Minutes  Minutes: 23          Date: 2018  Patient Name: Azalee Buerger,  Gender:  female        MRN: [id number]  : 1945  (75 y.o.)     Referring Practitioner: Kajal Matthews MD  Diagnosis: Heart failure with preserved ejection fraction  Additional Pertinent Hx: The patient is a 75 y.o.  female with a history of chronic respiratory failure on 3 liters of oxygen, acquired hypothyroidism, heart failure with preserved EF with last EF of 70% and moderate aortic stenosis who presented to the hospital ssecondary to acute onset progressively worsening shortness of breath this morning. An echocardiogram was reviewed from  and showed an EF of 70% with no regional wall motion abnormalities and moderate LVH with moderate aortic stenosis with BANDAR of approximately 1.0 cm2. She states that she recently established with a cardiologist.  She is currently requiring up to 4 liters of oxygen for an O2 saturation  to 89% on arrival to the emergency room. A CXR showed increased pulmonary vascularity and bilateral opacities consistent with heart failure. At the moment, the patient is to be admitted as an inpatient with acute on chronic heart failure with preserved EF in the setting of moderate aortic stenosis.      Past Medical History:   Diagnosis Date    Anemia     Anxiety     Arthritis     general    AS (aortic stenosis): mild to moderate by echo 2017    ASHD (arteriosclerotic heart disease)     Asthma     CAD (coronary artery disease)     Chest pain     [facility]     COPD [facility]chronic obstructive pulmonary disease[facility]     Depression     DM (diabetes mellitus) ([facility])     Dyspnea     FH: CAD (coronary artery disease)     GERD (gastroesophageal reflux disease)     Heart burn     infarct or scar. EF 74%.  CERVICAL FUSION N/A 11/15/2017    REMOVAL OF PLATE W1-2, ACDF K6-9 W/ATLANTIS CORNERSTONE performed by Juli Patel MD at [facility], COLON, DIAGNOSTIC      FOOT SURGERY      HERNIA REPAIR      HYSTERECTOMY      NECK SURGERY  FEB 2016    PARTIAL HYSTERECTOMY      UPPER GASTROINTESTINAL ENDOSCOPY      UPPER GASTROINTESTINAL ENDOSCOPY         Restrictions/Precautions:  General Precautions, Fall Risk                            Prior Level of Function:  Ambulation Assistance: Independent  Transfer Assistance: Independent  Additional Comments: Pt amb with RW or rollator depending on the day, w/c in community; have aides that come 7x/week, 2 hours during week, 1 hour on weekends; assist with laundry, showers, dishes, housekeeping; has MOW's; friend assists with groceries; has neighbor thats over most of the day    Subjective:  Chart Reviewed: Yes  Family / Caregiver Present: No  Subjective: RN approved session. Pt resting in bedside chair upon arrival, pleasant and agreeable to therapy. Requests to get into bed at end of session. Pain:   .  Pain Assessment  Pain Level: 6  Pain Type: Chronic pain  Pain Location: Back       Social/Functional:  Lives With: Alone  Type of Home: Apartment  Home Layout: One level (1st floor)  Home Access: Level entry  Home Equipment: Rolling walker, 4 wheeled walker, Wheelchair-manual, Oxygen (3 L)     Objective:  Sit to Supine: Stand by assistance    Transfers  Sit to Stand: Stand by assistance (From EOB)  Stand to sit: Stand by assistance       Ambulation 1  Surface: level tile  Device: Rolling Walker  Other Apparatus: O2  Assistance: Contact guard assistance  Quality of Gait: Slow jossy. Decreased B step lengths. Steady with no LOB  Distance: 65 feet x1          Balance  Comments: Dynamic sit at EOB to complete there ex at Stand by Assist for safety.        Exercises:  Exercises  Comments: Seated at EOB pt completed BLE ankle pumps, LAQ, marches, hip abd/add, glut set all x12 reps to increase strength for improved functional mobility. Activity Tolerance:  Activity Tolerance: Patient Tolerated treatment well;Patient limited by endurance    Assessment: Body structures, Functions, Activity limitations: Decreased functional mobility , Decreased endurance, Decreased balance, Decreased strength  Assessment: Pt tolerated session well. Limited by decreased strength, decreased endurance, decreased mobility. Would benefit from conitnued therapy at discharge. Prognosis: Good     REQUIRES PT FOLLOW UP: Yes    Discharge Recommendations:  Discharge Recommendations: Continue to assess pending progress, Patient would benefit from continued therapy after discharge    Patient Education:  Patient Education: Gait technique POC    Equipment Recommendations:  Equipment Needed: No    Safety:  Type of devices: All fall risk precautions in place, Call light within reach, Bed alarm in place, Gait belt, Patient at risk for falls, Left in bed  Restraints  Initially in place: No    Plan:  Times per week: 3-5 X GM  Times per day: Daily  Current Treatment Recommendations: Strengthening, Home Exercise Program, Safety Education & Training, Balance Training, Endurance Training, Functional Mobility Training, Transfer Training, Gait Training, Equipment Evaluation, Education, & procurement, Patient/Caregiver Education & Training    Goals:  Patient goals : To go home. Short term goals  Time Frame for Short term goals: 2 weeks  Short term goal 1: Pt to transfer supine <--> sit S to enable pt to get in/out of bed. Short term goal 2: Pt to transfer sit <--> stand S for increased functional mobility. Short term goal 3: Pt to ambulate >100 feet with RW SBA for household ambulation. Long term goals  Time Frame for Long term goals : NA due to short length of stay.

## 2018-11-06 NOTE — CARE COORDINATION
11/6/18, 1:17 PM    Discharge plan discussed by  and . Discharge plan reviewed with patient/ family. Patient/ family verbalize understanding of discharge plan and are in agreement with plan. Understanding was demonstrated using the teach back method. Services After Discharge  Services At/After Discharge: Nursing Services, [address], Meals on Wheels ([employer], [employer])         SW spoke with pt regarding discharge POC. Pts friend is here to transport. Pt denies any additional needs. SW notified pts [facility] Grayson of discharge. [facility], also advised of discharge, no need to fax, they are able to pull off of epic. RN Shelley Simmons aware.

## 2018-11-06 NOTE — CARE COORDINATION
11/6/18, 1:34 PM     IMM Letter  IMM Letter given to Patient/Family/Significant other/Guardian/POA/by[de-identified] Alysia Prado CM   IMM Letter date given[de-identified] 11/06/18  IMM Letter time given[de-identified] 9957

## 2018-11-06 NOTE — DISCHARGE SUMMARY
Hospital Medicine Discharge Summary      Patient Identification:   Xavi Pretty   :   MRN: [id number]   Account: [de-identified]      Patient's PCP: ASHLEY Colvin - CNP    Admit Date: 11/3/2018     Discharge Date: 2018      Admitting Physician: Mars Conte MD     Discharge Physician: Risa Morales MD     Discharge Diagnoses:    1. Acute on Chronic diastolic CHF, EF 66-04%  2. Severe aortic stenosis  3. Acute on chronic respiratory failure possibly from CHF exacerbation on top of severe aortic stenosis, improved  4. Acute on chronic hyponatremia, due to hypervolemia  5. Hx of CAD s/p stents and angioplasties   6. Hypertension  7. DM type 2  8. Hyperlipidemia  9. Chronic normocytic anemia  10. COPD, stable  11. Obesity  12. Hypothyroidism     The patient was seen and examined on day of discharge and this discharge summary is in conjunction with any daily progress note from day of discharge. Hospital Course:       Please see H/P for details. In summary, this is a 75 y.o. female with chronic diastolic CHF, moderate aortic stenosis, DM2, HTN, CAD with hx stents, COPD on chronic 3L O2, GERD, HLD, fatty liver, hypothyroidism, anxiety/depression, CHET presenting with worsening sob, admitted at [address] [place] [address] on 11/3/2018 for acute on chronic diastolic Heart Failure.  She normally wears 3L O2 at home and she was placed on 4 liters of oxygen for an O2 saturation  to 89% on arrival to the emergency room and was increased to 5L. A CXR showed increased pulmonary vascularity and bilateral opacities consistent with heart failure with normal BNP.  She was admitted for CHF exacerbation. She was started on IV lasix 20 mg IV bid and cardiology consulted (also follows at CHF clinic).  Pt was seen by Dr. Ryan Mcclendon ( Cardiology) who added aldactone 25 mg po q daily and increased lasix to 40 mg IV BID.  Dr. Ryan Mcclendon also increase Losartan to 100 mg po daily and amlodipine 10 mg po daily, continue hydralazine and await TTE, Last echo 8/27/18 showed an EF of 70% with no regional wall motion abnormalities and moderate LVH with moderate aortic stenosis with BANDAR of approximately 1.0 cm2. Repeat echo ordered on 11/5/18 per Dr. Holley Phelan cardio to re-eval valve. Repeat echo showed EF 60-65%, severe aortic stenosis. Cardiothoracic surgery was consulted for severe aortic stenosis evaluation. Pt was seen by Maryam Winchester PA-C/Dr. Kyle Montemayor, who recommend \"the pt is high risk to undergo a SAVR due to her many comorbities.  Therefore recommend to continue with TAVR evaluation\"  Pt also has hyponatremia on admission, sodium of 126, acute on chronic, her baseline looks like 130s, although on 9/27/18, her sodium was 137. She was started on salt tabs on admission by Dr. Kemar Smallwood, was stopped 11/4/18, sodium trending back down 11/5/18, Nephrology was consulted. Pt was seen by Dr. Darrell Tao ( nephrology), who said that hyponatremia dilutional from CHF, Dr. Darrell Tao recommend to continue lasix, decrease escitalopram from 20 mg to 10 mg po daily, no need for sodium tab, and serum sodium will improve with improvement of pt's fluid status. Cardiology recommend outpatient CTA neck, head, chest, abd for subclavian stenosis and TAVR work up as outpatient. Nerissa recommend 4 weeks follow-up w Dr. Chanel Dietz ( patient's Cardiologist). Pt was instructed to resume lasix 40 mg po BID, and Nephrology added Zaroxolyn 5 mg three times per week, and follow-up appt with Dr Darrell Tao ( Nephrology) in 4 weeks with BMP. Please see below for details for patient's instructions and patient's home medications.      Exam:     Vitals:  Vitals:    11/05/18 2015 11/06/18 0304 11/06/18 0745 11/06/18 1018   BP: (!) 154/69 (!) 151/66 (!) 166/71    Pulse: 74 78 79    Resp: 18 18 18    Temp: 97.9 °F (36.6 °C) 97.8 °F (36.6 °C) 98.3 °F (36.8 °C)    TempSrc: Oral Oral Oral    SpO2: 96% 92% 94% 93%   Weight:  225 lb 12.8 oz (102.4 kg)     Height:         Weight: Weight: 225 lb 12.8 oz (102.4 kg)     24 hour intake/output:    Intake/Output Summary (Last 24 hours) at 11/06/18 1752  Last data filed at 11/06/18 0304   Gross per 24 hour   Intake              480 ml   Output             1550 ml   Net            -1070 ml       General appearance: alert, not in acute distress, on 3L O2 via nasal cannula   HEENT: Pupils equal, round, and reactive to light. Conjunctivae clear. Clear oral mucosa, no oropharyngeal erythema   Neck: Supple, with full range of motion. No jugular venous distention. Trachea midline. Respiratory:  Normal respiratory effort. Clear to auscultation, bilaterally without Rales/Wheezes/Rhonchi. Cardiovascular: normal rate, regular rhythm, (+) grade 3/6 murmur best heard on aortic area   Abdomen: Soft, non-tender, non-distended with normal bowel sounds. Exam of extremities: peripheral pulses normal, no pedal edema, (+) trace pitting edema on distal legs, no clubbing or cyanosis      Labs: For convenience and continuity at follow-up the following most recent labs are provided:      CBC:    Lab Results   Component Value Date    WBC 6.2 11/04/2018    HGB 9.4 11/06/2018    HCT 30.6 11/06/2018     11/04/2018       Renal:    Lab Results   Component Value Date     11/06/2018    K 4.0 11/06/2018    K 3.7 11/04/2018    CL 82 11/06/2018    CO2 33 11/06/2018    BUN 19 11/06/2018    CREATININE 0.9 11/06/2018    CALCIUM 9.8 11/06/2018    PHOS 4.6 03/29/2018         Significant Diagnostic Studies    Radiology:    DUP CAROTID BILATERAL   Final Result   1. 50-69% stenosis by velocity criteria within the proximal right internal carotid artery. 2. There is moderate to marked partially calcified atherosclerotic disease demonstrated within the proximal left internal carotid artery. There is also mild elevation of peak systolic velocity within the proximal left internal carotid artery.  However the    left ICA to CCA ratio is 1.9 which is within the upper limits of normal. This may represent stenosis of slightly less than 50%. 3. Antegrade flow within the right vertebral artery. 4. Retrograde flow within the left vertebral artery suggesting possible proximal left subclavian artery stenosis. 5. Recommend further characterization with CTA of the neck. **This report has been created using voice recognition software. It may contain minor errors which are inherent in voice recognition technology. **      Final report electronically signed by Dr. Sheila Montez on 11/5/2018 5:32 PM      XR CHEST PORTABLE   Final Result      Moderate cardiomegaly with moderate pulmonary edema consistent with CHF or fluid overload. **This report has been created using voice recognition software. It may contain minor errors which are inherent in voice recognition technology. **      Final report electronically signed by Dr. Ramirez Riley on 11/3/2018 7:08 AM      CTA NECK W [address]    (Results Pending)   CTA CHEST W [address]    (Results Pending)   CTA HEAD W WO CONTRAST    (Results Pending)          Consults:     IP CONSULT TO SOCIAL WORK  IP CONSULT TO CARDIOTHORACIC SURGERY    Disposition:    [x] Home       [] TCU       [] Rehab       [] Psych       [] SNF       [] [facility]       [] Other-    Condition at Discharge: Stable    Code Status:  Prior     Patient Instructions:    Discharge lab work: BMP  Activity: activity as tolerated  Diet:        Follow-up visits:   Noemi Queen, APRN - CNP  [address] [place] [address]  Enoch Baldwin   [phone]    On 11/8/2018  [address], PA-C  [facility], [address]  [phone]    On 12/3/2018  11:15    [place] [id number] [phone]             Discharge Medications:      Donjulye Side   Home Medication Instructions [id number]    Printed on:11/06/18 1545   Medication Information                      albuterol sulfate HFA (VENTOLIN

## 2018-11-06 NOTE — DISCHARGE INSTR - OTHER ORDERS
Scheduled for November 15th, 2018 arrive at 7:10. Nothing to eat or drink four hours prior. Bring insurance cards, medication list, and ID. [facility].

## 2018-11-06 NOTE — PROGRESS NOTES
Hospitalist Progress Note    Patient:  Janice Grier      Unit/Bed:6K-01/001-A    YOB: 1945    MRN: [id number]       Acct: [de-identified]     PCP: ASHLEY Carrera CNP    Date of Admission: 11/3/2018    Chief Complaint: [facility] Course:     Please see H/P for details. In summary, this is a 75 y.o. female with chronic diastolic CHF, moderate aortic stenosis, DM2, HTN, CAD with hx stents, COPD on chronic 3L O2, GERD, HLD, fatty liver, hypothyroidism, anxiety/depression, CHET presenting with worsening sob. She normally wears 3L O2 at home and she was placed on 4 liters of oxygen for an O2 saturation  to 89% on arrival to the emergency room and was increased to 5L. A CXR showed increased pulmonary vascularity and bilateral opacities consistent with heart failure with normal BNP. She was admitted for CHF exacerbation. She was started on IV lasix 20 mg IV bid and cardiology consulted (also follows at CHF clinic). Pt was seen by Dr. Adeel Aranda ( Cardiology) who added aldactone 25 mg po q daily and increased lasix to 40 mg IV BID. Dr. Adeel Aranda also increase Losartan to 100 mg po daily and amlodipine 10 10 mg po daily, continue hydralazine and await TTE, Last echo 8/27/18 showed an EF of 70% with no regional wall motion abnormalities and moderate LVH with moderate aortic stenosis with BANDAR of approximately 1.0 cm2. Repeat echo ordered on 11/5/18 per Dr. Adeel Aranda cardio to re-eval valve. Repeat echo showed EF 60-65%, severe aortic stenosis. Cardiothoracic surgery was consulted for severe aortic stenosis evaluation. Pt was seen by Fransisca Clark PA-C/Dr. Kelley Grayson, who recommend \"the pt is high risk to undergo a SAVR due to her many comorbities. Therefore recommend to continue with TAVR evaluation\"  Pt also has hyponatremia on admission, sodium of 126, acute on chronic, her baseline looks like 130s, although on 9/27/18, her sodium was 137.  She was started on salt tabs on admission by Dr. Dottie Oh, was stopped 11/4/18 - Na trending back down 11/5/18, Nephrology was consulted. Pt was seen by Dr. Liliana Gaviria ( nephrology), who said that hyponatremia dilutional from CHF, Dr. Liliana Gaviria recommend to continue lasix, decrease escitalopram from 20 mg to 10 mg po daily, no need for sodium tab, and serum sodium will improve with improvement of pt's fluid status.        Subjective:     Pt seen and examined. Pt reports she feels 100% better today. Pt wants to go home today. She denies chest pain, sob, palpitations, HA, blurry vision, focal weakness, numbness, abd pain, leg swelling, orthopnea, melena, hematemesis, hematochezia. Had BM yesterday and was normal per pt.        Medications:  Reviewed    Infusion Medications    dextrose       Scheduled Medications    furosemide  40 mg Oral BID    OXcarbazepine  150 mg Oral QAM    OXcarbazepine  300 mg Oral QPM    escitalopram  10 mg Oral QAM    losartan  100 mg Oral Daily    amLODIPine  10 mg Oral Daily    spironolactone  25 mg Oral Daily    risperiDONE  1 mg Oral Daily    pantoprazole  40 mg Oral QAM AC    polyethylene glycol  17 g Oral Daily    doxepin  150 mg Oral Nightly    nystatin   Topical BID    hydrOXYzine  25 mg Oral TID    fluticasone  1 spray Nasal Daily    ranolazine  1,000 mg Oral BID    mometasone-formoterol  2 puff Inhalation BID    baclofen  10 mg Oral BID    metoprolol tartrate  75 mg Oral BID    clotrimazole-betamethasone   Topical BID    clopidogrel  75 mg Oral Daily    hydrALAZINE  100 mg Oral TID    therapeutic multivitamin-minerals  1 tablet Oral Daily    docusate sodium  100 mg Oral BID    rosuvastatin  20 mg Oral Nightly    calcium-vitamin D  1 tablet Oral BID    aspirin  81 mg Oral Daily    potassium chloride  10 mEq Oral Daily    sodium chloride flush  10 mL Intravenous 2 times per day    enoxaparin  40 mg Subcutaneous Daily    levothyroxine  125 mcg Oral Once per day on Sun    levothyroxine  100 mcg Oral Once per day on Mon Tue Wed Thu Fri Sat    insulin lispro  0-12 Units Subcutaneous TID WC    insulin lispro  0-6 Units Subcutaneous Nightly     PRN Meds: benzocaine, polyvinyl alcohol, sodium chloride, acetaminophen, HYDROcodone-acetaminophen, albuterol sulfate HFA, sodium chloride flush, magnesium hydroxide, ondansetron, potassium chloride **OR** potassium chloride **OR** potassium chloride, magnesium sulfate, glucose, dextrose, glucagon (rDNA), dextrose      Intake/Output Summary (Last 24 hours) at 11/06/18 1017  Last data filed at 11/06/18 0304   Gross per 24 hour   Intake              600 ml   Output             2950 ml   Net            -2350 ml       Diet:  DIET GENERAL; Carb Control: 5 carb choices (75 gms)/meal; No Added Salt (3-4 GM); Daily Fluid Restriction: 2000 ml    Exam:  BP (!) 166/71   Pulse 79   Temp 98.3 °F (36.8 °C) (Oral)   Resp 18   Ht 5' 5.5\" (1.664 m)   Wt 225 lb 12.8 oz (102.4 kg)   SpO2 94%   BMI 37.00 kg/m²     General appearance: alert, not in acute distress, on 3L O2 via nasal cannula   HEENT: Pupils equal, round, and reactive to light. Conjunctivae clear. Clear oral mucosa, no oropharyngeal erythema   Neck: Supple, with full range of motion. No jugular venous distention. Trachea midline. Respiratory:  Normal respiratory effort. Clear to auscultation, bilaterally without Rales/Wheezes/Rhonchi. Cardiovascular: normal rate, regular rhythm, (+) grade 3/6 murmur best heard on aortic area   Abdomen: Soft, non-tender, non-distended with normal bowel sounds.   Exam of extremities: peripheral pulses normal, no pedal edema, (+) trace pitting edema on distal legs, no clubbing or cyanosis        Labs:   Recent Labs      11/04/18   0540  11/05/18   0551  11/06/18   0502   WBC  6.2   --    --    HGB  9.2*  9.1*  9.4*   HCT  29.4*  29.5*  30.6*   PLT  174   --    --      Recent Labs      11/04/18   0540  11/04/18   1115  11/05/18   0551  11/06/18   0502   NA  132*  132*  129*  129*   K  3.7   --   4.2  4.0   CL  86*   -- signed by Dr. Abiodun Gibbs on 11/3/2018 7:08 AM          Diet: DIET GENERAL; Carb Control: 5 carb choices (75 gms)/meal; No Added Salt (3-4 GM); Daily Fluid Restriction: 2000 ml    DVT prophylaxis: [x] Lovenox                                 [] SCDs                                 [] SQ Heparin                                 [] Encourage ambulation           [] Already on Anticoagulation     Disposition:    [x] Home ( case discussed w Myles Estebans CNP)       [] TCU       [] Rehab       [] Psych       [] SNF       [] [facility]       [] Other-     Code Status: Full Code      Assessment/Plan: This is a 75 y.o. Female admitted for acute on chronic diastolic CHF exacerbation    1. Acute on chronic CHF exacerbation, improved    -pt back to baseline, she said she feels 100% better today.   -I called Dr. Violette Solis through [employer] and I spoke w Myles Richey CNP over the phone today 11/6/2018 around 10:26 am, who said okay for DC today per cardio stand point, Nerissa recommend outpatient CTA neck, head, chest, abd for subclavian stenosis and TAVR work up as outpatient. Nerissa recommend 4 weeks f/u w Dr. Darcy Greenberg ( pt's Cardiologist), asked nurse Nichelle Pedersen to schedule appointment w Dr. Darcy Greenberg in 4 weeks. Appreciate Cardio input.   -switch lasix 40 mg IV to po bid ( pt's home med)  -f/u     2. Severe aortic stenosis    -echo 11/5/18 EF 60-65%, severe aortic stenosis. -cardiothoracic on-board, appreciate Kristina Storm PA-C/Dr. Shah's input, who recommend TAVR eval as OP    3. Acute on chronic respiratory failure possibly from CHF exacerbation on top of severe aortic stenosis, improved  -saturating 92-96% on 3L O2 via nasal cannula  -cont O2 at 3 L via nasal cannula     4. Acute on chronic hyponatremia, due to hypervolemia     -sodium stable at 129  -pt was seen by ASHLEY Lewis CNP ( Nephrology). Appreciate Nephro input.    -Nephro okay for DC home today, cont lasix 40 mg po bid, add Zaroxolyn 5 mg three times per week, and FU appt with Dr Lopez Clark in 4 weeks with BMP. I asked nurse Kay to schedule appointment w Nephro. 5. CAD s/p stents and angioplasties      -cardio on-board  -cont ASA, Plavix, ranexa, lopressor, cozaar, statin     6. HTN    -cont hydralazine, cozaar, lopressor, norvasc, aldactone    7. DM type 2    -A1C 11/3/18: 6.0  -cont SSI  -cont accu-check     8. Hyperlipidemia    -cont statin     9. Chronic normocytic anemia    -Hgb stable at 9.4, better compare yesterday Hgb 9.1 on 11/5/18  -pt is asymptomatic     10. COPD, stable    -cont bronchodilator tx     11.  Obesity    Hypothyroidism    -cont levothyroxine        Electronically signed by Davey Catalan MD on 11/6/2018 at 10:17 AM

## 2018-11-06 NOTE — PLAN OF CARE
Problem: DISCHARGE BARRIERS  Goal: Patient's continuum of care needs are met  Outcome: Ongoing  Patient return home with home health. See SW notes 11/5/18.
Problem: Falls - Risk of:  Goal: Will remain free from falls  Will remain free from falls   Outcome: Ongoing  Falling star program in place. Bed alarm intact and functioning properly. Non-skid slippers on when out of bed. Side rails up x 2. Goal: Absence of physical injury  Absence of physical injury   Outcome: Ongoing  Patient free from injury so far this shift. Problem: Pain:  Goal: Pain level will decrease  Pain level will decrease   Outcome: Ongoing  Patient c/o pain rated 10/10. PRN pain medication given. Comments: Care plan reviewed with patient. Patient verbalizes understanding of the plan of care and contribute to goal setting.
Problem: Falls - Risk of:  Goal: Will remain free from falls  Will remain free from falls   Outcome: Ongoing  Falling star program in place. Call light within reach. Side rails up x2. Bed alarm on. Non-skid slippers on. Goal: Absence of physical injury  Absence of physical injury   Outcome: Ongoing  Patient free from injury this shift. Comments: Care plan reviewed with patient. Patient verbalizes understanding of the plan of care and contribute to goal setting.
Problem: Falls - Risk of:  Goal: Will remain free from falls  Will remain free from falls   Outcome: Ongoing  No falls this shift. Call light within reach. Bed and chair alarm in use. Up with walker, assist, and gaitbelt. Problem: Musculor/Skeletal Functional Status  Goal: Highest potential functional level  Outcome: Ongoing  Working with therapy. Attempting to be as independent as able. Problem: Pain:  Goal: Pain level will decrease  Pain level will decrease   Outcome: Ongoing  Complains of chronic continuous pain. Problem: Discharge Planning:  Goal: Discharged to appropriate level of care  Discharged to appropriate level of care   Outcome: Ongoing  Plans for discharge home with home health. Patient hopes to be discharged tomorrow. Problem: OXYGENATION/RESPIRATORY FUNCTION  Goal: Patient will maintain patent airway  Outcome: Ongoing  No complications. Remains on patient's baseline supplemental oxygen of 3 liters via nasal cannula. Problem: HEMODYNAMIC STATUS  Goal: Patient has stable vital signs and fluid balance  Outcome: Ongoing  BP elevated at initial check this morning then brought down nicely with medications. Problem: DISCHARGE BARRIERS  Goal: Patient's continuum of care needs are met  Outcome: Ongoing  Needs are met.  on case. Comments: Care plan reviewed with patient. Patient verbalize understanding of the plan of care and contribute to goal setting.
Problem: Impaired respiratory status  Goal: Clear lung sounds  Outcome: Ongoing
Problem: Impaired respiratory status  Goal: Clear lung sounds  Outcome: Ongoing  Pt had no questions on purpose or side effects of medication
Problem: Impaired respiratory status  Goal: Clear lung sounds  Outcome: Ongoing  mdi to maintain open airways
Zone management tool for CHF Diagnosis given to the patient as an education reference. Patient verbalizes understanding that the care team will be referencing this tool throughout their hospital stay and again on discharge. Nurse Manny Hicks notified of the reference tool being received by the patient.
with slightly elevated BP this shift. VSS otherwise. Telemetry: NSR. No edema noted. Diminished/clear lung sounds. No s/s of fluid overload or dehydration. Will continue to monitor VSS Q4H and PRN. Comments: Patient participated in plan of care and contributed to goal setting.

## 2018-11-06 NOTE — PROGRESS NOTES
History of blood transfusion     1973    History of tobacco abuse: Quit in 2009 10/28/2014    HLD (hyperlipidemia)     HTN (hypertension)     Irritable bowel syndrome     States has not had problem with this for years    Liver disease     fatty liver    Metabolic syndrome 64/80/8238    MI (myocardial infarction) ([facility] [address]     Nausea & vomiting     Obesity     CHET (obstructive sleep apnea)     S/P cardiac catheterization: 11/6/2014: 99% in-stent restenosis mid-RCA. LCx moderate LI's. LAD 85% mid-segment lesion. 11/6/2014 11/6/2014: 99% in-stent restenosis mid-RCA. LCx moderate LI's. LAD 85% mid-segment lesion. Dr. Michael Tony S/P PTCA:  5/11/2004: Proximal and mid RCA  Taxus 3.5 X 20 mm, and Taxus 3.0 X 32 mm. 10/28/2014    5/11/2004: Proximal and mid RCA  Taxus 3.5 X 20 mm, and Taxus 3.0 X 32 mm. Dr. Rodriguez Fleeting Systolic murmur 87/39/7046    Thyroid disease      Past Surgical History:   Procedure Laterality Date    BACK SURGERY  08/2016        BLADDER SUSPENSION      BREAST BIOPSY  10/10/2017    BREAST LUMPECTOMY      CARDIAC CATHETERIZATION  8-11-06    Mild nonobstructive CAD w/ diffuse 10-20% proximal and mid LAD stenosis and 10-20% proximal/ostial RCA stenosis. No obstructive lesions. RCA stents are patent w/o evidence of restenosis. Normal LV systolic function. EF 65%. Trace MR - catheter induced. Minimally elevated LVEDP - 13mmHg. Mild to moderate aortoiliac PVD w/o obstructive lesions.  CARDIAC CATHETERIZATION  5-11-04    Successful drug-eluting stent x 2 of proximal and mid RCA.  CARDIAC CATHETERIZATION  5-11-04    70-80% proximal RCA stenosis w/ 50-70% mid RCA stenosis. There is 50-60% lesion in mid PDA. Mild proximal and mid LAD disease. Mild circumflex disease. Normal LV size and systolic function. EF 60%.  CARDIOVASCULAR STRESS TEST  5-10-11    No evidence of stress induced ischemia. EF 75%.     CARDIOVASCULAR STRESS TEST  5-10-10    No evidence of stress induced ischemia, infarct or scar. EF 74%.  CERVICAL FUSION N/A 11/15/2017    REMOVAL OF PLATE L3-5, ACDF U2-1 W/ATLANTIS CORNERSTONE performed by Melly Saucedo MD at [facility], COLON, DIAGNOSTIC      FOOT SURGERY      HERNIA REPAIR      HYSTERECTOMY      NECK SURGERY  FEB 2016    PARTIAL HYSTERECTOMY      UPPER GASTROINTESTINAL ENDOSCOPY      UPPER GASTROINTESTINAL ENDOSCOPY         Restrictions/Precautions:  General Precautions, Fall Risk         Other position/activity restrictions: O2       Prior Level of Function:  ADL Assistance: Needs assistance  Homemaking Assistance: Needs assistance (HHA completes)  Ambulation Assistance: Independent  Transfer Assistance: Independent  Additional Comments: Pt amb with RW or rollator depending on the day, w/c in community; have aides that come 7x/week, 2 hours during week, 1 hour on weekends; assist with laundry, showers, dishes, housekeeping; has MOW's; friend assists with groceries; has neighbor thats over most of the day    Subjective:  Chart Reviewed: Yes  Family / Caregiver Present: No  Subjective: RN approved session. Pt resting in bed upon arrival, agreeable to therapy. Pt hoping to be dishcarged home later today. Pain:  Denies.           Social/Functional:  Lives With: Alone  Type of Home: Apartment  Home Layout: One level (1st floor)  Home Access: Level entry  Home Equipment: Rolling walker, 4 wheeled walker, Wheelchair-manual, Oxygen (3 L)     Objective:  Supine to Sit: Supervision    Transfers  Sit to Stand: Stand by assistance (From EOB)  Stand to sit: Stand by assistance       Ambulation 1  Surface: level tile  Device: Rolling Walker  Other Apparatus: O2  Assistance: Contact guard assistance  Quality of Gait: Slow jossy, decreased B step lengths, steady with no LOB  Distance: 125 feet x1      Exercises:  Exercises  Comments: Seated in bedside chair pt completed BLE ankle pumps, LAQ, marches, hip abd/add, glut set all x15 reps to increase strength for improved functional mobility. Activity Tolerance:  Activity Tolerance: Patient Tolerated treatment well;Patient limited by endurance    Assessment: Body structures, Functions, Activity limitations: Decreased functional mobility , Decreased endurance, Decreased balance, Decreased strength  Assessment: Pt tolerated session well. Limited by decreased strength and decreased endurance. Would benefit from continued therapy at discharge. Prognosis: Good     REQUIRES PT FOLLOW UP: Yes    Discharge Recommendations:  Discharge Recommendations: Continue to assess pending progress, Patient would benefit from continued therapy after discharge    Patient Education:  Patient Education: POC    Equipment Recommendations:  Equipment Needed: No    Safety:  Type of devices: All fall risk precautions in place, Call light within reach, Chair alarm in place, Left in chair, Gait belt  Restraints  Initially in place: No    Plan:  Times per week: 3-5 X GM  Times per day: Daily  Current Treatment Recommendations: Strengthening, Home Exercise Program, Safety Education & Training, Balance Training, Endurance Training, Functional Mobility Training, Transfer Training, Gait Training, Equipment Evaluation, Education, & procurement, Patient/Caregiver Education & Training    Goals:  Patient goals : To go home. Short term goals  Time Frame for Short term goals: 2 weeks  Short term goal 1: Pt to transfer supine <--> sit S to enable pt to get in/out of bed. Short term goal 2: Pt to transfer sit <--> stand S for increased functional mobility. Short term goal 3: Pt to ambulate >100 feet with RW SBA for household ambulation. Long term goals  Time Frame for Long term goals : NA due to short length of stay.

## 2018-11-06 NOTE — PROGRESS NOTES
Discharge teaching and instructions for diagnosis/procedure of Aortic stenosis/CHF completed with patient using teachback method. AVS reviewed. Printed prescriptions given to patient. Patient voiced understanding regarding prescriptions, follow up appointments, and care of self at home. Discharged ambulatory and in a wheelchair to  Home health with support per friend.

## 2018-11-06 NOTE — PROGRESS NOTES
Nephrology Progress Note    Patient - Libertad Garner   MRN -  [id number]   Acct # - [de-identified]      - 1945    75 y.o. Admit Date: 11/3/2018  Hospital Day: 3  Location: --A  Date of evaluation -  2018    Subjective:   CC: shortness of breath   Denies sob. 2 lpm at her home level  Feels good  Wants to go home to vote  Good UOP    Objective:   VITALS:  BP (!) 166/71   Pulse 79   Temp 98.3 °F (36.8 °C) (Oral)   Resp 18   Ht 5' 5.5\" (1.664 m)   Wt 225 lb 12.8 oz (102.4 kg)   SpO2 93%   BMI 37.00 kg/m²    Patient Vitals for the past 24 hrs:   BP Temp Temp src Pulse Resp SpO2 Weight   18 1018 - - - - - 93 % -   18 0745 (!) 166/71 98.3 °F (36.8 °C) Oral 79 18 94 % -   18 0304 (!) 151/66 97.8 °F (36.6 °C) Oral 78 18 92 % 225 lb 12.8 oz (102.4 kg)   18 2015 (!) 154/69 97.9 °F (36.6 °C) Oral 74 18 96 % -   18 1600 (!) 157/68 97.9 °F (36.6 °C) Oral 76 16 93 % -   18 1157 129/60 98 °F (36.7 °C) Oral 76 16 96 % -     3 L/min  Patient Vitals for the past 96 hrs (Last 3 readings):   Weight   18 0304 225 lb 12.8 oz (102.4 kg)   18 0350 229 lb 12.8 oz (104.2 kg)   18 0724 237 lb (107.5 kg)       Intake/Output Summary (Last 24 hours) at 18 1109  Last data filed at 18 0304   Gross per 24 hour   Intake              600 ml   Output             2950 ml   Net            -2350 ml       EXAM:  CONSTITUTIONAL:  No acute distress. Lying comfortable in bed. Pleasant  HEENT:  Head is normocephalic, Extraocular movement intact. Neck is supple. Voice is clear. CARDIOVASCULAR:  S1, S2  regular rate and rhythm. RESPIRATORY: Clear to ausculation bilaterally. Equal breath sounds. No wheezes. No shortness of breath noted at rest.  ABDOMEN: soft, non tender  NEUROLOGICAL: Patient is alert and oriented to person, place, and time. Recent and remote memory intact. Thought is coherant.   SKIN: no rash, No significant bruises on exposed

## 2018-11-07 NOTE — CARE COORDINATION
[facility] Transitions Initial Follow Up Call    Call within 2 business days of discharge: Yes    Patient: Ky Barth Patient : 1945   MRN: [id number]  Reason for Admission: There are no discharge diagnoses documented for the most recent discharge. Discharge Date: 18 RARS: Readmission Risk Score: 52     Spoke with: 2020  from [facility]    Facility: [facility]    Non-face-to-face services provided:  Obtained and reviewed discharge summary and/or continuity of care documents    Care Transitions 24 Hour Call    Do you have any ongoing symptoms?:  No  Do you have a copy of your discharge instructions?:  Yes  Do you have all of your prescriptions and are they filled?:  Yes  Have you scheduled your follow up appointment?:  Yes  How are you going to get to your appointment?:  Other (Comment: family or [place])  Were you discharged with any Home Care or Post Acute Services:  Yes  Post Acute Services:  Home Health, Transportation Services (Comment: [facility], [place])  Patient DME:  Walker, Straight cane, Wheelchair  Patient Home Equipment:  CPAP, Oxygen  Do you have support at home?:  Alone  Do you feel like you have everything you need to keep you well at home?:  Yes  Are you an active caregiver in your home?:  No  Care Transitions Interventions  No Identified Needs     Called pt for the care transition initial follow up call. Spoke with the pt & [facility] nurse from [facility]. Pt denied any SOB today, she has the O2 on @ 3L/M. Chelseamarissa Shree has not weighed the pt or checked her sugar yet this AM.  Pt will have a nurse daily for awhile after discharge. The pt meds are locked in a box at home, the nurse gave the pt a pain pill this AM, pt had C/O neck & back pain 10/10. In reviewing meds changes with the nurse, the Tylenol #3 was stopped, the Risperdal dose was decreased. Chelseamarissa Shree stated she will call [facility] about the pain med & Sakshi Boateng @ [facility] for the Risperdal dose decrease.   Reviewed the

## 2018-11-08 PROBLEM — B96.89 UTI DUE TO KLEBSIELLA SPECIES: Status: RESOLVED | Noted: 2018-04-18 | Resolved: 2018-01-01

## 2018-11-08 PROBLEM — K56.609 SBO (SMALL BOWEL OBSTRUCTION) (HCC): Status: RESOLVED | Noted: 2018-07-07 | Resolved: 2018-01-01

## 2018-11-08 PROBLEM — N39.0 UTI DUE TO KLEBSIELLA SPECIES: Status: RESOLVED | Noted: 2018-04-18 | Resolved: 2018-01-01

## 2018-11-08 PROBLEM — N39.0 URINARY TRACT INFECTION WITHOUT HEMATURIA: Status: RESOLVED | Noted: 2018-04-17 | Resolved: 2018-01-01

## 2018-11-08 PROBLEM — J96.21 ACUTE ON CHRONIC RESPIRATORY FAILURE WITH HYPOXIA (HCC): Status: RESOLVED | Noted: 2018-01-01 | Resolved: 2018-01-01

## 2018-11-08 NOTE — PROGRESS NOTES
[address]  [address] [address]  Dept: [phone]  Dept Fax: [phone]  Loc: [facility]      Patient Name:  Brian Delcid   YOB: 1945    Date of Visit:  11/8/2018  30 Day Post-Discharge Date: 12/6/18    Allergies   Allergen Reactions    Lipitor [Atorvastatin] Diarrhea    Motrin [Ibuprofen Micronized] Nausea Only     Outpatient Prescriptions Marked as Taking for the 11/8/18 encounter (Office Visit) with ASHLEY Rice CNP   Medication Sig Dispense Refill     MG capsule take 3 capsules by mouth every evening 90 capsule 1    escitalopram (LEXAPRO) 10 MG tablet Take 1 tablet by mouth every morning 30 tablet 0    metoprolol (LOPRESSOR) 100 MG tablet Take 1 tablet by mouth 2 times daily 60 tablet 0    metolazone (ZAROXOLYN) 5 MG tablet Take 1 tablet by mouth three times a week 30 tablet 0    spironolactone (ALDACTONE) 25 MG tablet Take 1 tablet by mouth daily 30 tablet 0    OXcarbazepine (TRILEPTAL) 150 MG tablet Take 300 mg by mouth every evening      polyethylene glycol (GLYCOLAX) powder Take 17 g by mouth daily as needed (Constipation)      nitroGLYCERIN (NITRODUR) 0.2 MG/HR Place 1 patch onto the skin daily as needed      valsartan (DIOVAN) 320 MG tablet take 1 tablet by mouth once daily 90 tablet 0    furosemide (LASIX) 20 MG tablet Take 40 mg (2 tabs) am and 40 mg (2 tab) pm 60 tablet 3    levothyroxine (SYNTHROID) 100 MCG tablet take 1 tablet by mouth once daily 90 tablet 1    Calcium Carbonate-Vitamin D (OYSTER SHELL CALCIUM/D) 500-200 MG-UNIT TABS take 1 tablet by mouth twice a day 60 tablet 5    aspirin (ASPIRIN LOW DOSE) 81 MG EC tablet take 1 tablet by mouth once daily 90 tablet 1    rosuvastatin (CRESTOR) 20 MG tablet take 1 tablet by mouth once daily 90 tablet 0    Multiple Vitamins-Minerals (MULTIVITAMIN-MINERALS) TABS mouth every morning       ONE TOUCH ULTRASOFT LANCETS MISC use as directed BEFORE BREAKFAST AND SUPPER 100 each 11    pantoprazole (PROTONIX) 40 MG tablet Take 40 mg by mouth every morning (before breakfast)       risperiDONE (RISPERDAL) 1 MG tablet Take 1 mg by mouth every morning            Vitals:    11/08/18 1006 11/08/18 1040 11/08/18 1042   BP: (!) 144/68 138/70    Pulse: 71     Resp: 16     Temp: 98.1 °F (36.7 °C)     TempSrc: Oral     SpO2:   90%   Weight: 235 lb 3.2 oz (106.7 kg)     Height: 5' 5\" (1.651 m)       Body mass index is 39.14 kg/m². Wt Readings from Last 3 Encounters:   11/08/18 235 lb 3.2 oz (106.7 kg)   11/06/18 225 lb 12.8 oz (102.4 kg)   10/26/18 230 lb (104.3 kg)     BP Readings from Last 3 Encounters:   11/08/18 138/70   11/06/18 (!) 166/71   10/26/18 132/65        Patient was admitted to [facility] from 11/3/18 to 11/6/18 for chf exacerbation. Inpatient course: Discharge summary reviewed- see chart. Current status: Good    HPI:     Please see H/P for details. In summary, this is a 75 y.o. female with chronic diastolic CHF, moderate aortic stenosis, DM2, HTN, CAD with hx stents, COPD on chronic 3L O2, GERD, HLD, fatty liver, hypothyroidism, anxiety/depression, CHET presenting with worsening sob, admitted at [address] [place] [address] on 11/3/2018 for acute on chronic diastolic Heart Failure.  She normally wears 3L O2 at home and she was placed on 4 liters of oxygen for an O2 saturation  to 89% on arrival to the emergency room and was increased to 5L. A CXR showed increased pulmonary vascularity and bilateral opacities consistent with heart failure with normal BNP.  She was admitted for CHF exacerbation. She was started on IV lasix 20 mg IV bid and cardiology consulted (also follows at CHF clinic).  Pt was seen by Dr. Kenna Homans ( Cardiology) who added aldactone 25 mg po q daily and increased lasix to 40 mg IV BID.  Dr. Kenna Homans also increase Losartan to 100 mg po daily and amlodipine 10 mg

## 2018-11-13 NOTE — CARE COORDINATION
Pos, CHF associated fatigue: Neg, CHF associated leg swelling: Neg, CHF associated orthostatic hypotension: Neg, CHF associated PND: Neg, CHF associated shortness of breath: Neg, CHF associated weakness: Neg      Symptom course:  improving  Salt intake watch compared to last visit:  stable         Diabetes Assessment    Medic Alert ID:  No  Meal Planning:  None   How often do you test your blood sugar?:  Daily   Do you have barriers with adherence to non-pharmacologic self-management interventions? (Nutrition/Exercise/Self-Monitoring):  Yes   Have you ever had to go to the ED for symptoms of low blood sugar?:  No       Do you have hyperglycemia symptoms?:  No   Do you have hypoglycemia symptoms?:  No   Last Blood Sugar Value:  178   Blood Sugar Monitoring Regimen:  Once a Day   Blood Sugar Trends:  No Change                Care Coordination Interventions    Program Enrollment:  Complex Care  Referral from Primary Care Provider:  Yes  Suggested Interventions and Community Resources  Diabetes Education:  Completed  Fall Risk Prevention:  Completed  Home Health Services:  Completed  Meals on Wheels:  Completed  Registered Dietician:  Completed  Other Services:  Completed  Transportation Support:  Completed  Zone Management Tools:  Completed  Other Services or Interventions:  DieticianLizzy         Goals Addressed             Most Recent     Conditions and Symptoms   On track (11/13/2018)             I will follow my Zone Management tool to seek urgent or emergent care. I will notify my provider of any symptoms that indicate a worsening of my condition. Barriers: impairment:  physical:   Plan for overcoming my barriers:  support  Confidence: 8/10  Anticipated Goal Completion Date: 3-11-18         Self Monitoring   On track (11/13/2018)             Self-Monitored Blood Glucose - I will notify my provider of any trends of increasing or decreasing blood sugars over a 1 month period.     None Recently APRN - CNP   albuterol sulfate HFA (VENTOLIN HFA) 108 (90 Base) MCG/ACT inhaler Inhale 2 puffs into the lungs every 6 hours as needed for Wheezing 8/28/18  Yes ASHLEY Fang CNP   hydrALAZINE (APRESOLINE) 100 MG tablet Take 1 tablet by mouth 3 times daily 8/28/18  Yes Patience Manning MD   amLODIPine (NORVASC) 5 MG tablet Take 1 tablet by mouth daily 8/28/18  Yes Patience Manning MD   clopidogrel (PLAVIX) 75 MG tablet take 1 tablet by mouth once daily 7/30/18  Yes Demarco Velez PA-C   clotrimazole-betamethasone (LOTRISONE) 1-0.05 % cream Apply topically 3 times daily. 7/17/18  Yes ASHLEY Melgar CNP   baclofen (LIORESAL) 10 MG tablet Take 10 mg by mouth 2 times daily   Yes Historical Provider, MD   Misc.  Devices MISC Pt needs an oxygen tank carrier for her wheelchair 6/20/18  Yes ASHLEY Melgar CNP   SYMBICORT 160-4.5 MCG/ACT AERO inhale 2 puffs by mouth twice a day 4/21/18  Yes ASHLEY Correa CNP   ranolazine (RANEXA) 500 MG extended release tablet take 1 tablet by mouth twice a day 3/7/18  Yes Pia Heard MD   fluticasone (FLONASE) 50 MCG/ACT nasal spray 1 spray by Nasal route daily 1/23/18  Yes ASHLEY Correa CNP   sodium chloride (ALTAMIST SPRAY) 0.65 % nasal spray 1 spray by Nasal route as needed for Congestion 11/29/17  Yes ASHLEY Melgar CNP   polyvinyl alcohol (LIQUIFILM TEARS) 1.4 % ophthalmic solution Place 1 drop into both eyes as needed 2-4 times daily   Yes Historical Provider, MD   cycloSPORINE (RESTASIS) 0.05 % ophthalmic emulsion Place 1 drop into both eyes 2 times daily   Yes Historical Provider, MD   hydrOXYzine (ATARAX) 25 MG tablet Take 25 mg by mouth 3 times daily   Yes Historical Provider, MD   glucose blood VI test strips (GLUCOSE METER TEST) strip 1 each by In Vitro route daily Check blood sugar q daily Dx E11.69 11/9/17  Yes Klarissa Public, DO   Lancets MISC Check blood sugar q daily Dx E11.69 11/9/17  Yes Klarissa Public, DO   Blood Glucose Monitoring Suppl HARVINDER Check blood sugar q daily Dx E11.69 11/9/17  Yes Demarco Ellison,    nystatin (MYCOSTATIN) 518200 UNIT/GM cream Apply topically 2 times daily.  8/17/17  Yes ASHLEY Godinez CNP   OXYGEN Inhale 2 L into the lungs continuous    Yes Historical Provider, MD   doxepin (SINEQUAN) 150 MG capsule Take 150 mg by mouth nightly   Yes Historical Provider, MD   OXcarbazepine (TRILEPTAL) 150 MG tablet Take 150 mg by mouth every morning    Yes Historical Provider, MD   ONE TOUCH ULTRASOFT LANCETS MISC use as directed BEFORE BREAKFAST AND SUPPER 10/15/14  Yes ASHLEY Godinez CNP   pantoprazole (PROTONIX) 40 MG tablet Take 40 mg by mouth every morning (before breakfast)    Yes Historical Provider, MD   risperiDONE (RISPERDAL) 1 MG tablet Take 1 mg by mouth every morning    Yes Historical Provider, MD       Future Appointments  Date Time Provider Michelnie Yady   11/15/2018 7:40 AM STR CT IMAGING RM1  [facility] EXP STR Radiolog   11/15/2018 8:00 AM STR CT IMAGING RM1  [facility] EXP STR Radiolog   11/15/2018 8:20 AM STR CT IMAGING RM1  [facility] EXP STR Radiolog   11/20/2018 11:30 AM STR PULMONARY FUNCTION ROOM 1 STRZ PFT None   11/27/2018 10:00 AM ASHLEY Padgett CNP Pulm Med [facility]   12/3/2018 11:15 AM Jonathan Norton PA-C [facility] Heart [facility]   12/4/2018 1:40 PM MD IFRAH Mackey KIDNEY [facility]   12/12/2018 10:30 AM ASHLEY Chavez CNP SRPNEGIN Heart [facility]   12/19/2018 2:40 PM ASHLEY Godinez - [address] [address] [facility]   2/11/2019 1:00 PM Lois Coyne RD, KAREEN SRPX Physic [facility]   2/18/2019 2:30 PM ASHLEY Padgett CNP Pulm Med [facility]   2/27/2019 1:45 PM Bulmaro Rudd, [address]

## 2018-11-15 NOTE — TELEPHONE ENCOUNTER
----- Message from ASHLEY Whittington CNP sent at 11/15/2018 11:25 AM EST -----  Please let pt know her  hgb and hct are lower than previous. I want her to take iron tablets bid, I will send to pharmacy. Let her know they may constipate her so she will need to increase her fiber in her diet. Also I want her to have repeat labs in 6 weeks to see if they iron is helping her numbers. Also ask if she has been having any stomach burning or pain or blood in her stools or black tarry stools thank you let her know her cholesterol is much better and to continue her cholesterol meds.

## 2018-11-26 NOTE — ED NOTES
Patient presents to the ED via EMS after falling at home. Patient states that she slipped off of her commode and struck her head on the bath tub. Patient states that she does not believe she lost consciousness. Patient states that she is having posterior head pain at a 9/10 in severity. Patient on telemetry. SR up x2. Respirations are easy and unlabored. Denies further complaints. Will monitor.       Ev Yesy  11/26/18 8675

## 2018-11-26 NOTE — ED NOTES
Bed: 004A  Expected date: 11/26/18  Expected time:   Means of arrival: [employer] Dept  Comments:      Araseli Scott RN  11/26/18 9549

## 2018-11-26 NOTE — ED PROVIDER NOTES
(LIORESAL) 10 MG TABLET    Take 10 mg by mouth 2 times daily    BLOOD GLUCOSE MONITORING SUPPL HARVINDER    Check blood sugar q daily Dx E11.69    CALCIUM CARBONATE-VITAMIN D (OYSTER SHELL CALCIUM/D) 500-200 MG-UNIT TABS    take 1 tablet by mouth twice a day    CLOPIDOGREL (PLAVIX) 75 MG TABLET    take 1 tablet by mouth once daily    CLOTRIMAZOLE-BETAMETHASONE (LOTRISONE) 1-0.05 % CREAM    Apply topically 3 times daily. CYCLOSPORINE (RESTASIS) 0.05 % OPHTHALMIC EMULSION    Place 1 drop into both eyes 2 times daily     MG CAPSULE    take 3 capsules by mouth every evening    DOXEPIN (SINEQUAN) 150 MG CAPSULE    Take 150 mg by mouth nightly    ESCITALOPRAM (LEXAPRO) 10 MG TABLET    Take 1 tablet by mouth every morning    FERROUS SULFATE 325 (65 FE) MG TABLET    Take 1 tablet by mouth 2 times daily    FLUTICASONE (FLONASE) 50 MCG/ACT NASAL SPRAY    1 spray by Nasal route daily    FUROSEMIDE (LASIX) 20 MG TABLET    Take 40 mg (2 tabs) am and 40 mg (2 tab) pm    GLUCOSE BLOOD VI TEST STRIPS (GLUCOSE METER TEST) STRIP    1 each by In Vitro route daily Check blood sugar q daily Dx E11.69    HYDRALAZINE (APRESOLINE) 100 MG TABLET    Take 1 tablet by mouth 3 times daily    HYDROXYZINE (ATARAX) 25 MG TABLET    Take 25 mg by mouth 3 times daily    LANCETS MISC    Check blood sugar q daily Dx E11.69    LEVOTHYROXINE (SYNTHROID) 100 MCG TABLET    take 1 tablet by mouth once daily    METOLAZONE (ZAROXOLYN) 5 MG TABLET    Take 1 tablet by mouth three times a week    METOPROLOL (LOPRESSOR) 100 MG TABLET    Take 1 tablet by mouth 2 times daily    MISC. DEVICES MISC    Pt needs an oxygen tank carrier for her wheelchair    MULTIPLE VITAMINS-MINERALS (MULTIVITAMIN-MINERALS) TABS TABLET    take 1 tablet by mouth once daily    NITROGLYCERIN (NITRODUR) 0.2 MG/HR    Place 1 patch onto the skin daily as needed    NYSTATIN (MYCOSTATIN) 339293 UNIT/GM CREAM    Apply topically 2 times daily.     ONE TOUCH ULTRASOFT LANCETS MISC    use as

## 2018-11-26 NOTE — ED NOTES
LAURYN called. States that it will be about 45 minutes to one hour before  for transporting pt home.      Katelynn Tan RN  11/26/18 [id number]

## 2018-11-27 PROBLEM — N18.4 CRF (CHRONIC RENAL FAILURE), STAGE 4 (SEVERE) (HCC): Status: ACTIVE | Noted: 2018-01-01

## 2018-11-27 PROBLEM — F07.81 POST CONCUSSION SYNDROME: Status: ACTIVE | Noted: 2018-01-01

## 2018-11-27 PROBLEM — I95.9 HYPOTENSION: Status: ACTIVE | Noted: 2018-01-01

## 2018-11-27 NOTE — TELEPHONE ENCOUNTER
I placed a urinalysis order. Please fax to where they would like it to go. Also have them keep me updated on her mental condition. If they feel her confusion is worsening or she is having a headache she should go back to ER.

## 2018-11-27 NOTE — TELEPHONE ENCOUNTER
Arnulfo Le from [address] [place] [address] calling in on this patient who she is currently with. She said the patient had a fall yesterday and she was seen at [address] [place] [address] and discharged home. Arnulfo Le said the patient seems to have increased confusion and altered mental status today and she was asking for an order for a urinalysis from [facility]. Please call Arnulfo Le back at [phone] to advise.

## 2018-11-27 NOTE — ED NOTES
Pt transported to [facility] on cart in stable condition. Floor contacted before transport. Spoke with Kirby Diego.      Angela Patterson  11/27/18 1957

## 2018-11-27 NOTE — ED NOTES
Bed: 024A  Expected date:   Expected time:   Means of arrival: [facility]  Comments:     Sammy Mckinnon RN  11/27/18 1157

## 2018-11-27 NOTE — ED PROVIDER NOTES
glucose blood VI test strips (GLUCOSE METER TEST) strip 1 each by In Vitro route daily Check blood sugar q daily Dx E11.69  Qty: 100 strip, Refills: 5    Associated Diagnoses: Type 2 diabetes mellitus with other specified complication, without long-term current use of insulin ([address]      ! ! Lancets MISC Check blood sugar q daily Dx E11.69  Qty: 100 each, Refills: 2    Associated Diagnoses: Type 2 diabetes mellitus with other specified complication, without long-term current use of insulin ([facility])      Blood Glucose Monitoring Suppl HARVINDER Check blood sugar q daily Dx E11.69  Qty: 1 Device, Refills: 0    Associated Diagnoses: Type 2 diabetes mellitus with other specified complication, without long-term current use of insulin ([facility])      nystatin (MYCOSTATIN) 549671 UNIT/GM cream Apply topically 2 times daily. Qty: 60 g, Refills: 2      OXYGEN Inhale 2 L into the lungs continuous       doxepin (SINEQUAN) 150 MG capsule Take 150 mg by mouth nightly      !! OXcarbazepine (TRILEPTAL) 150 MG tablet Take 150 mg by mouth every morning       !! ONE TOUCH ULTRASOFT LANCETS MISC use as directed BEFORE BREAKFAST AND SUPPER  Qty: 100 each, Refills: 11    Comments: Dx code 250.00      pantoprazole (PROTONIX) 40 MG tablet Take 40 mg by mouth every morning (before breakfast)       risperiDONE (RISPERDAL) 1 MG tablet Take 1 mg by mouth every morning        !! - Potential duplicate medications found. Please discuss with provider. ALLERGIES     is allergic to lipitor [atorvastatin] and motrin [ibuprofen micronized]. FAMILY HISTORY     indicated that her mother is . She indicated that her father is . She indicated that one of her two sisters is . She indicated that only one of her five brothers is alive. She indicated that the status of her child is unknown. She indicated that her other is alive. [unfilled]    SOCIAL HISTORY      reports that she quit smoking about 11 years ago.  Her smoking use 11/27/2018 3:36 PM      CT Cervical Spine WO Contrast   Final Result   Postsurgical changes with anterior fusion C3-4 and C5 with multilevel intervertebral disc spacers. There is lucency through the vertebral freighted screws at C3. There is slight lucency around the threaded screw at C4 as well. Findings could represent    loosening of surgical hardware in the setting of trauma This is seen on prior most recent study however is new or progressed from prior remote study dated February 16, 2018 correlation and follow-up as clinically warranted. These results were communicated directly with Alejandra Alejo on 11/27/2018 3:25 PM,  [ ]          **This report has been created using voice recognition software. It may contain minor errors which are inherent in voice recognition technology. **      Final report electronically signed by Dr. Anaya Griffiths on 11/27/2018 3:27 PM      CT Head WO Contrast   Final Result   No acute intracranial findings. **This report has been created using voice recognition software. It may contain minor errors which are inherent in voice recognition technology. **      Final report electronically signed by Dr. Anaya Griffiths on 11/27/2018 3:19 PM      US Ed Fast Abdomen Limited   Final Result      XR CHEST PORTABLE   Final Result   Cardiomegaly. No acute process otherwise            **This report has been created using voice recognition software. It may contain minor errors which are inherent in voice recognition technology. **      Final report electronically signed by Dr. Andrey Neves on 11/27/2018 1:47 PM      XR HAND RIGHT (MIN 3 VIEWS)   Final Result   No acute fracture or dislocation. **This report has been created using voice recognition software. It may contain minor errors which are inherent in voice recognition technology. **      Final report electronically signed by Dr. Anaya Griffiths on 11/27/2018 1:44 PM      XR HAND LEFT (MIN 3 VIEWS)   Final Result   No acute fracture or dislocation. **This report has been created using voice recognition software. It may contain minor errors which are inherent in voice recognition technology. **      Final report electronically signed by Dr. Luis Enrique Clifford on 11/27/2018 1:28 PM      US Ed Fast Abdomen Limited   Final Result      MRI BRAIN WO CONTRAST    (Results Pending)       LABS:   Labs Reviewed   CBC WITH AUTO DIFFERENTIAL - Abnormal; Notable for the following:        Result Value    RBC 3.19 (*)     Hemoglobin 8.7 (*)     Hematocrit 27.4 (*)     MCHC 31.8 (*)     All other components within normal limits   BASIC METABOLIC PANEL - Abnormal; Notable for the following:     Sodium 133 (*)     Chloride 89 (*)     Glucose 118 (*)     BUN 63 (*)     CREATININE 1.7 (*)     All other components within normal limits   HEPATIC FUNCTION PANEL - Abnormal; Notable for the following: Total Bilirubin 0.2 (*)     All other components within normal limits   URINE WITH REFLEXED MICRO - Abnormal; Notable for the following:     Blood, Urine TRACE (*)     Protein, UA 30 (*)     Leukocyte Esterase, Urine MODERATE (*)     Character, Urine TURBID (*)     All other components within normal limits   ANION GAP - Abnormal; Notable for the following: Anion Gap 18.0 (*)     All other components within normal limits   GLOMERULAR FILTRATION RATE, ESTIMATED - Abnormal; Notable for the following:     Est, Glom Filt Rate 29 (*)     All other components within normal limits   CK - Abnormal; Notable for the following:      Total  (*)     All other components within normal limits   URINE CULTURE, REFLEXED   URINE CULTURE   BRAIN NATRIURETIC PEPTIDE   LIPASE   TROPONIN   URINE DRUG SCREEN   OSMOLALITY   HEMOGLOBIN A1C       EMERGENCY DEPARTMENT COURSE:   Vitals:    Vitals:    11/27/18 1202 11/27/18 1239 11/27/18 1307 11/27/18 1517   BP: (!) 71/47 (!) 84/45 (!) 95/56 (!) 104/53   Pulse: 63 66 69 74   Resp: 16 16 17 16   SpO2: 100% 96% 97% 96%

## 2018-11-27 NOTE — ED PROVIDER NOTES
7:11 AM      CT ABDOMEN PELVIS W IV CONTRAST Additional Contrast? None   Final Result      1. Moderate stool in the colon with diverticulosis. No evidence for diverticulitis at this time. Correlate for constipation. 2. 1 cm left lower lobe pulmonary nodule which could represent a poorly calcified granuloma. Follow-up in    3 months time is advised. 3. Dominant left adnexa measuring 4.8 cm. Correlate with surgical history and pelvic ultrasound is clinically warranted. 4. No acute findings in the abdomen or pelvis. IMPRESSION:   No acute lumbar spine fracture or subluxation. **This report has been created using voice recognition software. It may contain minor errors which are inherent in voice recognition technology. **      Final report electronically signed by Dr. Linda Riddle on 11/27/2018 3:45 PM      CT LUMBAR RECONSTRUCTION WO POST PROCESS   Final Result      1. Moderate stool in the colon with diverticulosis. No evidence for diverticulitis at this time. Correlate for constipation. 2. 1 cm left lower lobe pulmonary nodule which could represent a poorly calcified granuloma. Follow-up in    3 months time is advised. 3. Dominant left adnexa measuring 4.8 cm. Correlate with surgical history and pelvic ultrasound is clinically warranted. 4. No acute findings in the abdomen or pelvis. IMPRESSION:   No acute lumbar spine fracture or subluxation. **This report has been created using voice recognition software. It may contain minor errors which are inherent in voice recognition technology. **      Final report electronically signed by Dr. Linda Riddle on 11/27/2018 3:45 PM      CT THORACIC RECONSTRUCTION WO POST PROCESS   Final Result    No acute intrathoracic findings. Cardiomegaly. Small pericardial effusion. IMPRESSION:   No acute fracture or subluxation thoracic spine         **This report has been created using voice recognition software.  It may contain minor errors which are normal limits   ANION GAP - Abnormal; Notable for the following: Anion Gap 18.0 (*)     All other components within normal limits   GLOMERULAR FILTRATION RATE, ESTIMATED - Abnormal; Notable for the following:     Est, Glom Filt Rate 29 (*)     All other components within normal limits   CK - Abnormal; Notable for the following:      Total  (*)     All other components within normal limits   BASIC METABOLIC PANEL W/ REFLEX TO MG FOR LOW K - Abnormal; Notable for the following:     Sodium 127 (*)     Potassium reflex Magnesium 3.4 (*)     Chloride 86 (*)     Glucose 147 (*)     BUN 52 (*)     CREATININE 1.5 (*)     All other components within normal limits   CBC WITH AUTO DIFFERENTIAL - Abnormal; Notable for the following:     RBC 3.17 (*)     Hemoglobin 8.5 (*)     Hematocrit 27.2 (*)     MCHC 31.3 (*)     All other components within normal limits   GLOMERULAR FILTRATION RATE, ESTIMATED - Abnormal; Notable for the following:     Est, Glom Filt Rate 34 (*)     All other components within normal limits   BASIC METABOLIC PANEL - Abnormal; Notable for the following:     Chloride 95 (*)     Glucose 129 (*)     All other components within normal limits   CBC WITH AUTO DIFFERENTIAL - Abnormal; Notable for the following:     RBC 3.39 (*)     Hemoglobin 9.2 (*)     Hematocrit 29.3 (*)     MCHC 31.4 (*)     MPV 9.2 (*)     Lymphocytes # 0.7 (*)     All other components within normal limits   GLOMERULAR FILTRATION RATE, ESTIMATED - Abnormal; Notable for the following:     Est, Glom Filt Rate 70 (*)     All other components within normal limits   POCT GLUCOSE - Abnormal; Notable for the following:     POC Glucose 175 (*)     All other components within normal limits   POCT GLUCOSE - Abnormal; Notable for the following:     POC Glucose 117 (*)     All other components within normal limits   POCT GLUCOSE - Abnormal; Notable for the following:     POC Glucose 125 (*)     All other components within normal limits   POCT

## 2018-11-27 NOTE — H&P
calcified atherosclerosis of the right carotid bulb. Using NASCET criteria and the distal right internal carotid artery as the reference, the stenosis is 65-70%. . There is no stenosis of the distal right internal carotid artery. Left common carotid artery/ICA: There is some mild scattered calcified atherosclerosis of the left common carotid artery. There is no stenosis. There is heavy calcified atherosclerosis of the left carotid bulb. Using NASCET criteria and the distal left internal carotid artery as the reference, there is an 80% stenosis at the origin the left internal carotid artery. There is no distal stenosis. Vertebral arteries: The vertebral arteries are codominant. There is decreased attenuation of the contrast in the left vertebral artery compared to the right. The flow in the left vertebral artery is most likely retrograde. The proximal contrast is less intense. There is no stenosis of either vertebral artery. CTA HEAD: Internal carotid arteries: There is calcified atherosclerosis of the cavernous segments of the internal carotid arteries bilaterally. There is no stenosis. The ophthalmic artery origins are normal. Middle cerebral arteries: Normal. The proximal branches are also normal. Anterior cerebral arteries: Normal. The proximal branches are normal. There is a normal small anterior communicating artery. Vertebral arteries: The vertebral arteries are normal. Basilar artery: Normal. Superior cerebellar arteries: Normal. Posterior cerebral arteries: Normal. The proximal branches are also normal. No aneurysms, stenoses or occlusions are noted. The superior sagittal sinus, vein of Yasmani, internal cerebral veins, straight sinus, transverse sinuses and sigmoid sinuses are patent. Axial source data: There are no suspicious findings in the lung apices. There is no cervical adenopathy. There are no gross abnormalities in the brain parenchyma. The paranasal sinuses are normally aerated.       1. Occluded

## 2018-11-28 PROBLEM — G93.41 METABOLIC ENCEPHALOPATHY: Status: ACTIVE | Noted: 2018-01-01

## 2018-11-28 PROBLEM — R55 SYNCOPE AND COLLAPSE: Status: ACTIVE | Noted: 2018-01-01

## 2018-11-28 NOTE — CONSULTS
BID   rosuvastatin (CRESTOR) tablet 20 mg Nightly   risperiDONE (RISPERDAL) tablet 1 mg QAM   glucose (GLUTOSE) 40 % oral gel 15 g PRN   dextrose 50 % solution 12.5 g PRN   glucagon (rDNA) injection 1 mg PRN   dextrose 5 % solution PRN   insulin lispro (HUMALOG) injection vial 0-6 Units TID WC   insulin lispro (HUMALOG) injection vial 0-3 Units Nightly   carboxymethylcellulose (REFRESH PLUS) 0.5 % ophthalmic solution 1 drop BID   mometasone-formoterol (DULERA) 200-5 MCG/ACT inhaler 2 puff BID        Social History:  History   Smoking Status    Former Smoker    Packs/day: 1.00    Years: 38.00    Types: Cigarettes    Quit date: 1/31/2007   Smokeless Tobacco    Never Used     History   Alcohol Use No     History   Drug Use No         Family History:   Family History   Problem Relation Age of Onset    Heart Disease Mother     Heart Disease Father     Kidney Disease Sister     Cancer Sister     Heart Disease Brother     Heart Disease Brother     Heart Disease Brother     Breast Cancer Child 36    Cancer Sister 36        rectal    Ovarian Cancer Other 25       Review of Systems:  All systems reviewed are negative except what is mentioned in history of present illness. Physical Exam:  /62   Pulse 89   Temp 98 °F (36.7 °C) (Oral)   Resp 18   Ht 5' 5.5\" (1.664 m)   Wt 222 lb 9 oz (101 kg)   SpO2 95%   BMI 36.47 kg/m²  I Body mass index is 36.47 kg/m². I Wt Readings from Last 1 Encounters:   11/28/18 222 lb 9 oz (101 kg)           General appearance - alert, well appearing, and in no distress, oriented to person, place, and time and overweight, she has no dentures on. Mental status -Level of Alertness: awake  Orientation: person, place, time  Memory: she has poor insight, she was circumstantial with her answers. She had poor abstraction, no apraxia noted.    Fund of Knowledge: poor  Attention/Concentration: normal  Language: normal. Mood is flat  Neck - supple, no significant 0.2*       Results for orders placed during the hospital encounter of 11/15/18   CTA HEAD W WO CONTRAST    Narrative PROCEDURE: CTA HEAD W WO CONTRAST, CTA [address] INFORMATION: Chronic diastolic heart failure ([address] Possible heart surgery. Lower blood pressure reading from her left arm. COMPARISON: No prior study. TECHNIQUE: 1 mm axial images were obtained through the head and neck after the fast bolus administration of contrast. A noncontrast localizer was obtained. 3-D reconstructions were performed on a dedicated 3-D workstation. These include multiplanar MPR   images and multiplanar MIP images. Centerline reconstructions were obtained of the carotid systems. Isovue intravenous contrast was given. All CT scans at this facility use dose modulation, iterative reconstruction, and/or weight-based dosing when appropriate to reduce radiation dose to as low as reasonably achievable. FINDINGS:      CTA NECK:    Aortic arch and branches: There is atherosclerosis of the aortic arch. There is mild stenosis at the origin of innominate artery and left common carotid artery. There is occlusion of the proximal left subclavian artery due to atherosclerosis. There is mild stenosis at the origin the right subclavian artery. Right common carotid artery/ICA: There is scattered calcified atheromatosis of the right common carotid artery. There is no stenosis. There is heavy calcified atherosclerosis of the right carotid bulb. Using NASCET criteria and the distal right internal   carotid artery as the reference, the stenosis is 65-70%. . There is no stenosis of the distal right internal carotid artery. Left common carotid artery/ICA: There is some mild scattered calcified atherosclerosis of the left common carotid artery. There is no stenosis. There is heavy calcified atherosclerosis of the left carotid bulb.  Using NASCET criteria and the distal left   internal carotid artery as the reference, there is an 80% stenosis at the origin the left internal carotid artery. There is no distal stenosis. Vertebral arteries: The vertebral arteries are codominant. There is decreased attenuation of the contrast in the left vertebral artery compared to the right. The flow in the left vertebral artery is most likely retrograde. The proximal contrast is less   intense. There is no stenosis of either vertebral artery. CTA HEAD:     Internal carotid arteries: There is calcified atherosclerosis of the cavernous segments of the internal carotid arteries bilaterally. There is no stenosis. The ophthalmic artery origins are normal.    Middle cerebral arteries: Normal. The proximal branches are also normal.    Anterior cerebral arteries: Normal. The proximal branches are normal. There is a normal small anterior communicating artery. Vertebral arteries: The vertebral arteries are normal.    Basilar artery: Normal.    Superior cerebellar arteries: Normal.    Posterior cerebral arteries: Normal. The proximal branches are also normal.        No aneurysms, stenoses or occlusions are noted. The superior sagittal sinus, vein of Yasmani, internal cerebral veins, straight sinus, transverse sinuses and sigmoid sinuses are patent. Axial source data: There are no suspicious findings in the lung apices. There is no cervical adenopathy. There are no gross abnormalities in the brain parenchyma. The paranasal sinuses are normally aerated. Impression    1. Occluded proximal left subclavian artery. 2. Presumed retrograde flow in the left vertebral artery. This is likely supplying the left subclavian artery. 3. 80% stenosis at the origin the left internal carotid artery. 4. 65-70% stenosis at the origin of the right internal carotid artery. **This report has been created using voice recognition software. It may contain minor errors which are inherent in voice recognition technology. **    Final report

## 2018-11-28 NOTE — PROGRESS NOTES
5/11/2004: Proximal and mid RCA  Taxus 3.5 X 20 mm, and Taxus 3.0 X 32 mm. 10/28/2014    5/11/2004: Proximal and mid RCA  Taxus 3.5 X 20 mm, and Taxus 3.0 X 32 mm.  Dr. Jose Bolivar Systolic murmur 74/37/5340    Thyroid disease        Scheduled Meds:   potassium replacement protocol   Other RX Placeholder    potassium chloride  40 mEq Oral Once    sodium chloride flush  10 mL Intravenous 2 times per day    enoxaparin  40 mg Subcutaneous Q24H    docusate sodium  100 mg Oral BID    amLODIPine  5 mg Oral Daily    aspirin  81 mg Oral Daily    clopidogrel  75 mg Oral Daily    escitalopram  10 mg Oral QAM    levothyroxine  100 mcg Oral Daily    OXcarbazepine  150 mg Oral QAM    OXcarbazepine  300 mg Oral QPM    pantoprazole  40 mg Oral QAM AC    ranolazine  500 mg Oral BID    rosuvastatin  20 mg Oral Nightly    risperiDONE  1 mg Oral QAM    insulin lispro  0-6 Units Subcutaneous TID WC    insulin lispro  0-3 Units Subcutaneous Nightly    carboxymethylcellulose  1 drop Both Eyes BID    mometasone-formoterol  2 puff Inhalation BID     Continuous Infusions:   sodium chloride 40 mL/hr at 11/27/18 2133    dextrose       PRN Meds:.sodium chloride flush, ondansetron, acetaminophen, polyethylene glycol, glucose, dextrose, glucagon (rDNA), dextrose    Technician: Perla Be 11/28/2018

## 2018-11-28 NOTE — PROGRESS NOTES
CONTROL;    DVT prophylaxis: [x] Lovenox                                 [] SCDs                                 [] SQ Heparin                                 [] Encourage ambulation           [] Already on Anticoagulation     Disposition:    [] Home       [] TCU       [] Rehab       [] Psych       [x] SNF       [] [place]       [] Other-    Code Status: Full Code    PT/OT Eval Status: pending     Assessment/Plan:    Anticipated Discharge in : 11/29     Active Hospital Problems    Diagnosis Date Noted    Benign essential HTN [I10] 01/29/2015     Priority: Medium    Obesity (BMI 30-39. 9) [E66.9] 01/29/2015     Priority: Medium    Type 2 diabetes mellitus treated without insulin ([facility] [E11.9]      Priority: Medium    Syncope and collapse [R55] 11/28/2018    Post concussion syndrome [F07.81] 11/27/2018    CRF (chronic renal failure), stage 4 (severe) ([facility] [N18.4] 11/27/2018    Hypotension [I95.9] 11/27/2018       Assessment:  1. Metabolic Encephalopathy  2. Acute Kidney Injury   3. Chronic Diastolic CHF   4. Chronic Resp. Failure on 3L NC   5. COPD, compensated   6. Hx of CAD s/p stents and angioplasties   7. Chronic hyponatremia  8. Essential HTN  9. DM2  10. Obesity       Plan:  1. Presenting with acute onset altered mental status. Likely 2/2 to UTI. Pending Ux. Start Ciprofloxacin x 3 days. MRI negative for acute stroke. CT head showing no bleeding. Pending tilt table test. Dementia? No concerns for cardiac given negative troponin, ECG changes. IV fluids 75 cc/hr. 2. CORBIN likely pre-renal from decreased PO intake and low BP from excessive BP medications. Cr baseline 0.9, presented 1.9 currently 1.5. Strict I/Os. Avoid Nephrotoxic agents. Daily weights. 3. Chronic diastolic CHF, EF 02-83% compensated. No evidence of exacerbation. Daily weights. Strict I/Os. 4. Chronic Resp. Failure on 3L NC, stable. No evidence of increased work of breathing. 5. COPD, compensated. Resume home inhalers. 6. Hx of CAD s/p stents and angioplasties. Resume ASA, Plavix, statin and BB  7. Chronic Hyponatremia, further exacerbated by giving IV fluids. Continue to monitor daily. If worsens consult nephrology. 8. Resume home BP medications. Monitor BP daily. 9. Resume home insulin regimen  10.  Educate on weight loss         Electronically signed by Robbie Hernandez MD on 11/28/2018 at 10:05 AM

## 2018-11-29 NOTE — PROGRESS NOTES
OXcarbazepine  150 mg Oral QAM    OXcarbazepine  300 mg Oral QPM    pantoprazole  40 mg Oral QAM AC    ranolazine  500 mg Oral BID    rosuvastatin  20 mg Oral Nightly    risperiDONE  1 mg Oral QAM    insulin lispro  0-6 Units Subcutaneous TID WC    insulin lispro  0-3 Units Subcutaneous Nightly    carboxymethylcellulose  1 drop Both Eyes BID    mometasone-formoterol  2 puff Inhalation BID     PRN Meds: hydrOXYzine, sodium phosphate, sodium chloride flush, ondansetron, acetaminophen, polyethylene glycol, glucose, dextrose, glucagon (rDNA), dextrose      Intake/Output Summary (Last 24 hours) at 11/29/18 1249  Last data filed at 11/29/18 0404   Gross per 24 hour   Intake          2156.93 ml   Output                0 ml   Net          2156.93 ml       Diet:  DIET CARB CONTROL; Exam:  BP (!) 169/68   Pulse 78   Temp 97.9 °F (36.6 °C) (Oral)   Resp 18   Ht 5' 5.5\" (1.664 m)   Wt 218 lb 4.8 oz (99 kg)   SpO2 95%   BMI 35.77 kg/m²     General appearance: No apparent distress, appears stated age and cooperative. HEENT: Pupils equal, round, and reactive to light. Conjunctivae/corneas clear. Neck: Supple, with full range of motion. No jugular venous distention. Trachea midline. Respiratory:  Normal respiratory effort. Clear to auscultation, bilaterally without Rales/Wheezes/Rhonchi. Cardiovascular: Regular rate and rhythm with normal S1/S2 without murmurs, rubs or gallops. Abdomen: Soft, non-tender, non-distended with normal bowel sounds. Musculoskeletal: passive and active ROM x 4 extremities. Skin: Skin color, texture, turgor normal.  No rashes or lesions. Neurologic:  Neurovascularly intact without any focal sensory/motor deficits.  Cranial nerves: II-XII intact, grossly non-focal.  Psychiatric: Alert and oriented, thought content appropriate, normal insight  Capillary Refill: Brisk,< 3 seconds   Peripheral Pulses: +2 palpable, equal bilaterally       Labs:   Recent Labs      11/27/18   1254 **This report has been created using voice recognition software. It may contain minor errors which are inherent in voice recognition technology. **      Final report electronically signed by Dr. Marli Pavon on 11/27/2018 1:28 PM      US Ed Fast Abdomen Limited   Final Result          Diet: DIET CARB CONTROL;    DVT prophylaxis: [x] Lovenox                                 [] SCDs                                 [] SQ Heparin                                 [] Encourage ambulation           [] Already on Anticoagulation     Disposition:    [x] Home       [] TCU       [] Rehab       [] Psych       [] SNF       [] [place]       [] Other-    Code Status: Full Code    PT/OT Eval Status: home     Assessment/Plan:    Anticipated Discharge in : 11/30     Active Hospital Problems    Diagnosis Date Noted    Benign essential HTN [I10] 01/29/2015     Priority: Medium    Obesity (BMI 30-39. 9) [E66.9] 01/29/2015     Priority: Medium    Type 2 diabetes mellitus treated without insulin ([facility] [address] [E11.9]      Priority: Medium    Syncope and collapse [R55] 62/43/4818    Metabolic encephalopathy [X67.30] 11/28/2018    Post concussion syndrome [F07.81] 11/27/2018    CRF (chronic renal failure), stage 4 (severe) ([facility] [address] [N18.4] 11/27/2018    Hypotension [I95.9] 11/27/2018       Assessment:  1. Metabolic Encephalopathy  2. UTI with Gram Negative Bacilli and Group B Strep   3. Acute Kidney Injury   4. C3-5 Cervical Fusion with hardware loosening  5. Chronic Diastolic CHF   6. Chronic Resp. Failure on 3L NC   7. COPD, compensated   8. Hx of CAD s/p stents and angioplasties   9. Chronic hyponatremia  10. Essential HTN  11. DM2  12. Obesity         Plan:  1. Presenting with acute onset altered mental status. Likely 2/2 to UTI. Ux growing Gram negative bacilli and Group B Strep. Start Ciprofloxacin x 5 days. MRI negative for acute stroke. CT head showing no bleeding. Component of progression of Dementia?  No concerns for

## 2018-11-29 NOTE — FLOWSHEET NOTE
[facility]  OCCUPATIONAL THERAPY MISSED TREATMENT NOTE  [facility] NEUROSCIENCES 4A  [id number]/[id number]      Date: 2018  Patient Name: Jamie Johnson        CSN: [id number]   : 1945  (70 y.o.)  Gender: female   Referring Practitioner: Dr. Ed Rios  Diagnosis: post concussion syndrome         REASON FOR MISSED TREATMENT:  Missed Treat. Cervical spine CT showed possible loosing of hardware. Per discussion with Dr. Wing Machado hold & await ortho consult.

## 2018-11-29 NOTE — CONSULTS
[facility]  Cardiology Consult      Patient:  Josué Garcia  YOB: 1945    MRN: [id number]   Acct: [de-identified]     Primary Care Physician: ASHLEY Becker CNP        REASON FOR CONSULT:         CHIEF COMPLAINT:    Altered Mental Status    HISTORY OF PRESENT ILLNESS:        Patient denies any change in cardiac status since tested    All labs, EKG's, diagnostic testing and images as well as cardiac cath, stress testing   were reviewed during this encounter    PREVIOUS CARDIAC TESTING    Ekg:     Echo:    Str. Test:    Cath:      Past Medical History:    Past Medical History:   Diagnosis Date    Anemia     Anxiety     Arthritis     general    AS (aortic stenosis): mild to moderate by echo 4/2017 9/6/2017    ASHD (arteriosclerotic heart disease)     Asthma     CAD (coronary artery disease)     Chest pain     CHF (congestive heart failure) (HCC)     COPD (chronic obstructive pulmonary disease) (HCC)     Depression     [facility] mellitus) [facility]     Dyspnea     FH: CAD (coronary artery disease)     GERD (gastroesophageal reflux disease)     Heart burn     History of blood transfusion     1973    History of tobacco abuse: Quit in 2009 10/28/2014    HLD (hyperlipidemia)     HTN (hypertension)     Irritable bowel syndrome     States has not had problem with this for years    Liver disease     fatty liver    Metabolic syndrome 95/03/2202    MI (myocardial infarction) [facility]     Nausea & vomiting     Obesity     CHET (obstructive sleep apnea)     S/P cardiac catheterization: 11/6/2014: 99% in-stent restenosis mid-RCA. LCx moderate LI's. LAD 85% mid-segment lesion. 11/6/2014 11/6/2014: 99% in-stent restenosis mid-RCA. LCx moderate LI's. LAD 85% mid-segment lesion. Dr. Lucila Aleman S/P PTCA:  5/11/2004: Proximal and mid RCA  Taxus 3.5 X 20 mm, and Taxus 3.0 X 32 mm.  10/28/2014    5/11/2004: Proximal and mid RCA  Taxus 3.5 X 20 mm, and Taxus 3.0 X 32 daily  metolazone (ZAROXOLYN) 5 MG tablet, Take 1 tablet by mouth three times a week  spironolactone (ALDACTONE) 25 MG tablet, Take 1 tablet by mouth daily  OXcarbazepine (TRILEPTAL) 150 MG tablet, Take 300 mg by mouth every evening  polyethylene glycol (GLYCOLAX) powder, Take 17 g by mouth daily as needed (Constipation)  nitroGLYCERIN (NITRODUR) 0.2 MG/HR, Place 1 patch onto the skin daily as needed  valsartan (DIOVAN) 320 MG tablet, take 1 tablet by mouth once daily  furosemide (LASIX) 20 MG tablet, Take 40 mg (2 tabs) am and 40 mg (2 tab) pm  levothyroxine (SYNTHROID) 100 MCG tablet, take 1 tablet by mouth once daily  Calcium Carbonate-Vitamin D (OYSTER SHELL CALCIUM/D) 500-200 MG-UNIT TABS, take 1 tablet by mouth twice a day  aspirin (ASPIRIN LOW DOSE) 81 MG EC tablet, take 1 tablet by mouth once daily  rosuvastatin (CRESTOR) 20 MG tablet, take 1 tablet by mouth once daily  Multiple Vitamins-Minerals (MULTIVITAMIN-MINERALS) TABS tablet, take 1 tablet by mouth once daily  albuterol sulfate HFA (VENTOLIN HFA) 108 (90 Base) MCG/ACT inhaler, Inhale 2 puffs into the lungs every 6 hours as needed for Wheezing  hydrALAZINE (APRESOLINE) 100 MG tablet, Take 1 tablet by mouth 3 times daily  amLODIPine (NORVASC) 5 MG tablet, Take 1 tablet by mouth daily  clopidogrel (PLAVIX) 75 MG tablet, take 1 tablet by mouth once daily  clotrimazole-betamethasone (LOTRISONE) 1-0.05 % cream, Apply topically 3 times daily. baclofen (LIORESAL) 10 MG tablet, Take 10 mg by mouth 2 times daily  Misc.  Devices MISC, Pt needs an oxygen tank carrier for her wheelchair  SYMBICORT 160-4.5 MCG/ACT AERO, inhale 2 puffs by mouth twice a day  ranolazine (RANEXA) 500 MG extended release tablet, take 1 tablet by mouth twice a day  fluticasone (FLONASE) 50 MCG/ACT nasal spray, 1 spray by Nasal route daily  sodium chloride (ALTAMIST SPRAY) 0.65 % nasal spray, 1 spray by Nasal route as needed for Congestion  polyvinyl alcohol (LIQUIFILM TEARS) 1.4 % ophthalmic solution, Place 1 drop into both eyes as needed 2-4 times daily  cycloSPORINE (RESTASIS) 0.05 % ophthalmic emulsion, Place 1 drop into both eyes 2 times daily  hydrOXYzine (ATARAX) 25 MG tablet, Take 25 mg by mouth 3 times daily  glucose blood VI test strips (GLUCOSE METER TEST) strip, 1 each by In Vitro route daily Check blood sugar q daily Dx E11.69  Lancets MISC, Check blood sugar q daily Dx E11.69  Blood Glucose Monitoring Suppl HARVINDER, Check blood sugar q daily Dx E11.69  nystatin (MYCOSTATIN) 115176 UNIT/GM cream, Apply topically 2 times daily. OXYGEN, Inhale 2 L into the lungs continuous   doxepin (SINEQUAN) 150 MG capsule, Take 150 mg by mouth nightly  OXcarbazepine (TRILEPTAL) 150 MG tablet, Take 150 mg by mouth every morning   ONE TOUCH ULTRASOFT LANCETS MISC, use as directed BEFORE BREAKFAST AND SUPPER  pantoprazole (PROTONIX) 40 MG tablet, Take 40 mg by mouth every morning (before breakfast)   risperiDONE (RISPERDAL) 1 MG tablet, Take 1 mg by mouth every morning     Allergies:    Lipitor [atorvastatin] and Motrin [ibuprofen micronized]    Social History:    reports that she quit smoking about 11 years ago. Her smoking use included Cigarettes. She has a 38.00 pack-year smoking history. She has never used smokeless tobacco. She reports that she does not drink alcohol or use drugs. Family History:   family history includes Breast Cancer (age of onset: 36) in her child; Cancer in her sister; Cancer (age of onset: 36) in her sister; Heart Disease in her brother, brother, brother, father, and mother; Kidney Disease in her sister; Ovarian Cancer (age of onset: 22) in an other family member. REVIEW OF SYSTEMS:    Constitutional: negative for anorexia, chills and fevers,weight change  Respiratory: negative for cough, dyspnea on exertion, hemoptysis, shortness of breath and wheezing  Cardiovascular: negative for  orthopnea, palpitations and syncope.   Gastrointestinal: negative for abdominal ALT 20 11/27/2018    AST 24 11/27/2018    PROT 6.1 11/27/2018    PROT 6.5 11/25/2016    BILITOT 0.2 11/27/2018    BILIDIR <0.2 11/27/2018    LABALBU 4.1 11/27/2018     Magnesium:    Lab Results   Component Value Date    MG 1.6 11/29/2018     Warfarin PT/INR:  No components found for: PTPATWAR, PTINRWAR  HgBA1c:    Lab Results   Component Value Date    LABA1C 5.8 11/27/2018     FLP:  Lab Results   Component Value Date    TRIG 179 11/15/2018    HDL 29 11/15/2018    LDLCALC 55 11/15/2018    LABVLDL 69 11/25/2016     TSH:    Lab Results   Component Value Date    TSH 7.580 11/03/2018     BNP: No results found for: BNP      ASSESSMENT/PLAN:  No cardiac contraindication to planned procedure      Stan Farias MD FACELY,LUCI,FSVM,FSCAI,FASNC,FAHA,FACP.  4:14 PM  11/29/2018

## 2018-11-30 NOTE — PROGRESS NOTES
restricted  AM-PAC Inpatient Daily Activity Raw Score: 14  AM-PAC Inpatient ADL T-Scale Score : 33.39  ADL Inpatient CMS 0-100% Score: 59.67  ADL Inpatient CMS G-Code Modifier : CK

## 2018-11-30 NOTE — PROGRESS NOTES
created using voice recognition software. It may contain minor errors which are inherent in voice recognition technology. **      Final report electronically signed by Dr. Cedrick Doan on 11/27/2018 3:36 PM      CT Cervical Spine WO Contrast   Final Result   Postsurgical changes with anterior fusion C3-4 and C5 with multilevel intervertebral disc spacers. There is lucency through the vertebral freighted screws at C3. There is slight lucency around the threaded screw at C4 as well. Findings could represent    loosening of surgical hardware in the setting of trauma This is seen on prior most recent study however is new or progressed from prior remote study dated February 16, 2018 correlation and follow-up as clinically warranted. These results were communicated directly with Doris Ramírez on 11/27/2018 3:25 PM,  [ ]          **This report has been created using voice recognition software. It may contain minor errors which are inherent in voice recognition technology. **      Final report electronically signed by Dr. Cedrick Doan on 11/27/2018 3:27 PM      CT Head WO Contrast   Final Result   No acute intracranial findings. **This report has been created using voice recognition software. It may contain minor errors which are inherent in voice recognition technology. **      Final report electronically signed by Dr. Cedrick Doan on 11/27/2018 3:19 PM      US Ed Fast Abdomen Limited   Final Result      XR CHEST PORTABLE   Final Result   Cardiomegaly. No acute process otherwise            **This report has been created using voice recognition software. It may contain minor errors which are inherent in voice recognition technology. **      Final report electronically signed by Dr. Zachary Molina on 11/27/2018 1:47 PM      XR HAND RIGHT (MIN 3 VIEWS)   Final Result   No acute fracture or dislocation. **This report has been created using voice recognition software.  It may contain minor errors which

## 2018-11-30 NOTE — PROGRESS NOTES
Training, Home Exercise Program, Pain Management, Equipment Evaluation, Education, & procurement, Patient/Caregiver Education & Training    Goals:  Patient goals : return home    Short term goals  Time Frame for Short term goals: 2 weeks  Short term goal 1: bed mobility with MOD I to get in/out of bed  Short term goal 2: transfer with MOD I to get in/out of chairs  Short term goal 3: amb 75'x1 with RW and MOD I to walk safely in apt    Long term goals  Time Frame for Long term goals : no LTGs set secondary to short ELOS

## 2018-12-01 NOTE — CONSULTS
heavy calcified atherosclerosis of the left carotid bulb. Using NASCET criteria and the distal left internal carotid artery as the reference, there is an 80% stenosis at the origin the left internal carotid artery. There is no distal stenosis. Vertebral arteries: The vertebral arteries are codominant. There is decreased attenuation of the contrast in the left vertebral artery compared to the right. The flow in the left vertebral artery is most likely retrograde. The proximal contrast is less intense. There is no stenosis of either vertebral artery. CTA HEAD: Internal carotid arteries: There is calcified atherosclerosis of the cavernous segments of the internal carotid arteries bilaterally. There is no stenosis. The ophthalmic artery origins are normal. Middle cerebral arteries: Normal. The proximal branches are also normal. Anterior cerebral arteries: Normal. The proximal branches are normal. There is a normal small anterior communicating artery. Vertebral arteries: The vertebral arteries are normal. Basilar artery: Normal. Superior cerebellar arteries: Normal. Posterior cerebral arteries: Normal. The proximal branches are also normal. No aneurysms, stenoses or occlusions are noted. The superior sagittal sinus, vein of Yasmani, internal cerebral veins, straight sinus, transverse sinuses and sigmoid sinuses are patent. Axial source data: There are no suspicious findings in the lung apices. There is no cervical adenopathy. There are no gross abnormalities in the brain parenchyma. The paranasal sinuses are normally aerated. 1. Occluded proximal left subclavian artery. 2. Presumed retrograde flow in the left vertebral artery. This is likely supplying the left subclavian artery. 3. 80% stenosis at the origin the left internal carotid artery. 4. 65-70% stenosis at the origin of the right internal carotid artery. **This report has been created using voice recognition software.  It may contain minor errors which are inherent in voice recognition technology. ** Final report electronically signed by Dr. Ramirez Nunn on 11/15/2018 1:36 PM    Xr Hand Left (min 3 Views)    Result Date: 11/27/2018  PROCEDURE: XR HAND LEFT (MIN 3 VIEWS) CLINICAL INFORMATION: fall, pain,  . COMPARISON:  No prior study. TECHNIQUE:  3 views of the left hand. FINDINGS: Osteopenia and advanced degenerative changes. Interphalangeal joint space narrowing. No acute fracture or dislocation. No acute intraosseous or soft tissue finding. No acute fracture or dislocation. **This report has been created using voice recognition software. It may contain minor errors which are inherent in voice recognition technology. ** Final report electronically signed by Dr. Johanna Puckett on 11/27/2018 1:28 PM    Xr Hand Right (min 3 Views)    Result Date: 11/27/2018  PROCEDURE: XR HAND RIGHT (MIN 3 VIEWS) CLINICAL INFORMATION: fall, pain,  . COMPARISON:  No prior study. TECHNIQUE:  3 views of the right hand. FINDINGS: Degenerative changes and osteopenia. Interphalangeal joint space narrowing. No acute fracture or dislocation. No intraosseous or soft tissue finding. No acute fracture or dislocation. **This report has been created using voice recognition software. It may contain minor errors which are inherent in voice recognition technology. ** Final report electronically signed by Dr. Johanna Puckett on 11/27/2018 1:44 PM    Ct Head Wo Contrast    Result Date: 11/27/2018  PROCEDURE: CT HEAD WO CONTRAST CLINICAL INFORMATION: Trauma - hit head yesterday after falling, confused. COMPARISON: No prior study. TECHNIQUE: Noncontrast 5 mm axial images were obtained through the brain. All CT scans at this facility use dose modulation, iterative reconstruction, and/or weight-based dosing when appropriate to reduce radiation dose to as low as reasonably achievable. FINDINGS: No acute intracranial findings. Gray-white differentiation is preserved. No acute hemorrhage or midline shift.  Old vertebral bodies are normally aligned. There is no fracture. There is no prevertebral soft tissue swelling. There is redemonstration moderate left-sided neural foramen narrowing at C3-4 and of mild bilateral foraminal narrowing at C5-6. There are no additional suspicious osseous lesions identified. There are no suspicious findings in the cervical soft tissues. There are no suspicious findings in the lung apices. 1. Postsurgical changes of the cervical spine again noted with no acute deformity related to trauma or abnormality of the surgical hardware. 2. Redemonstration of degenerative uncovertebral changes producing mild to moderate narrowing on the left at C3-4 and bilateral mild foramen narrowing at C5-6. **This report has been created using voice recognition software. It may contain minor errors which are inherent in voice recognition technology. ** Final report electronically signed by Dr. Deshawn Darnell on 11/26/2018 4:42 AM    Ct Abdomen Pelvis W Iv Contrast Additional Contrast? None    Result Date: 11/27/2018  PROCEDURE: CT LUMBAR RECONSTRUCTION WO POST PROCESS, CT ABDOMEN PELVIS W IV CONTRAST CLINICAL INFORMATION: Trauma - fall yesterday, AMS,  . COMPARISON: None. TECHNIQUE: 5 mm axial CT images were obtained through the abdomen and pelvis after the administration of intravenous and oral contrast. Coronal and sagittal reconstructions were obtained. Coned-down dedicated lumbar spine CT was performed with coronal and sagittal reconstruction images. All CT scans at this facility use dose modulation, iterative reconstruction, and/or weight-based dosing when appropriate to reduce radiation dose to as low as reasonably achievable. FINDINGS: Abdomen and pelvis: 1 cm left lower lobe rounded pulmonary nodule. Follow-up is advised in 3 months. Findings could represent a poorly calcified granuloma. Splenic granulomatous changes. Mild thickening of the left adrenal gland. Pancreas liver are unremarkable.  Mild

## 2018-12-01 NOTE — PROGRESS NOTES
count: 10,. .. Specific Gravity, Urine Latest Ref Range: 1.002 - 1.03  1.015       MICROBIOLOGY & HISTOPATHOLOGY. Source: cath urine       Site:           Current Antibiotics: Ciprofloxacin   Culture & Susceptibility     ESCHERICHIA COLI     Antibiotic Interpretation BOO Unit Status   cefOXitin Sensitive <=4 mcg/mL Final   cefTRIAXone Sensitive <=1 mcg/mL Final   cefuroxime Intermediate   Final   ciprofloxacin Sensitive =0.023 mcg/mL Final   gentamicin Sensitive <=1 mcg/mL Final   nitrofurantoin Sensitive <=16 mcg/mL Final   tetracycline Sensitive =2 mcg/mL Final   trimethoprim-sulfamethoxazole Sensitive <=20 mcg/mL Final          Organism  (Abnormal) 11/27/2018 12:29  [facility]   Streptococcus agalactiae - (Group B)     Urine Culture Reflex 11/27/2018 12:29  [facility]   Letart count: >100,000 CFU/mL   Group B streptococci are suspectible to ampicillin,   penicillin and cefazolin. TOXICOLOGY. None. ENDOSCOPE STUDIES. None. PROCEDURES.  EEG-11/29/2018. IMPRESSION:    This is an abnormal EEG due to the excessive slowing seen in the theta range   suggestive of cortical dysfunction such as seen with metabolic causes, medication effects,   or nonspecific encephalopathies. However, there was no evidence of epileptiform activity appreciated.     RADIOLOGY. BRAIN MRI-11/27/2018. No acute intracranial abnormality is identified. Senescent changes are present including moderate sequela of chronic microvascular ischemic angiopathy. There is also evidence of a small, remote infarct within the left cerebellar hemisphere. CT C-SPINE-11/27/2018. Postsurgical changes with anterior fusion C3-4 and C5 with multilevel intervertebral disc spacers. There is lucency through the vertebral freighted screws at C3. There is slight lucency around the threaded screw at C4 as well. Findings could represent  loosening of surgical hardware in the setting of trauma .   This

## 2018-12-02 PROBLEM — I51.89 DIASTOLIC DYSFUNCTION WITHOUT HEART FAILURE: Status: ACTIVE | Noted: 2018-01-01

## 2018-12-02 PROBLEM — R04.0 RIGHT-SIDED EPISTAXIS: Status: ACTIVE | Noted: 2018-01-01

## 2018-12-02 PROBLEM — B96.20 E-COLI UTI: Status: ACTIVE | Noted: 2018-04-17

## 2018-12-02 PROBLEM — M53.2X2 CERVICAL SPINE INSTABILITY: Status: ACTIVE | Noted: 2018-01-01

## 2018-12-03 NOTE — CARE COORDINATION
12/3/18, 3:22 PM    Discharge plan discussed by  and . Discharge plan reviewed with patient/ family. Patient/ family verbalize understanding of discharge plan and are in agreement with plan. Understanding was demonstrated using the teach back method. IMM Letter  IMM Letter given to Patient/Family/Significant other/Guardian/POA/by[de-identified] DEMI  IMM Letter date given[de-identified] 12/03/18  IMM Letter time given[de-identified] 1062     Liz Fowler is a potential discharge for today. She is refusing ECF or TCU. She is insisting on returning home at discharge where she lives alone. She does have a friend who stays with her all day every day. She is current with [facility] for nursing and aid services. PT and OT orders will be written. SULY called patients [facility] to make her aware of potential discharge. Discharge instructions for home health have been attached to the patients chart.
Orthopedic surgery consult placed for history cervical surgery. [place] collar placed. Cardiology consult placed for surgical clearance. Will follow.   Electronically signed by Susu Vuong RN on 11/29/2018 at 2:36 PM
warranted. 4. No acute findings in the abdomen or pelvis. Medications:  Scheduled Meds:   potassium replacement protocol   Other RX Placeholder    potassium chloride  40 mEq Oral Once    ciprofloxacin  500 mg Oral 2 times per day    sodium chloride flush  10 mL Intravenous 2 times per day    enoxaparin  40 mg Subcutaneous Q24H    docusate sodium  100 mg Oral BID    amLODIPine  5 mg Oral Daily    aspirin  81 mg Oral Daily    clopidogrel  75 mg Oral Daily    escitalopram  10 mg Oral QAM    levothyroxine  100 mcg Oral Daily    OXcarbazepine  150 mg Oral QAM    OXcarbazepine  300 mg Oral QPM    pantoprazole  40 mg Oral QAM AC    ranolazine  500 mg Oral BID    rosuvastatin  20 mg Oral Nightly    risperiDONE  1 mg Oral QAM    insulin lispro  0-6 Units Subcutaneous TID WC    insulin lispro  0-3 Units Subcutaneous Nightly    carboxymethylcellulose  1 drop Both Eyes BID    mometasone-formoterol  2 puff Inhalation BID     Continuous Infusions:   sodium chloride 40 mL/hr at 11/27/18 2133    dextrose        Pertinent Info/Orders/Treatment Plan:  IV fluids, diabetes management, Neurology consult, telemetry, PT/OT. Diet: DIET CARB CONTROL;   Smoking status:  reports that she quit smoking about 11 years ago. Her smoking use included Cigarettes. She has a 38.00 pack-year smoking history.  She has never used smokeless tobacco.   PCP: ASHLEY Da Silva CNP  Readmission: no  Readmission Risk Score: 0%    Discharge Planning  Current Residence:  Private Residence  Living Arrangements:  Alone   Support Systems:  Home Care Staff, Friends/Neighbors  Current Services PTA:     Potential Assistance Needed:  Home Care  Potential Assistance Purchasing Medications:  No  Does patient want to participate in local refill/ meds to beds program?  No  Type of Home Care Services:  Nursing Services  Patient expects to be discharged to:  home with home health  Expected Discharge date:  11/29/18  Follow Up Appointment:

## 2018-12-03 NOTE — FLOWSHEET NOTE
Margaret Leiva 59  OCCUPATIONAL THERAPY MISSED TREATMENT NOTE  [facility] 4A  4A-12/012-A      Date: 12/3/2018  Patient Name: Catracho Felton        CSN: [id number]   : 1945  (70 y.o.)  Gender: female   Referring Practitioner: Dr. Yelena Saini  Diagnosis: post concussion syndrome         REASON FOR MISSED TREATMENT:  Patient refused treatment despite encouragement. Reporting she only slept 3 hours last night and wished to continue to rest. Will check back at later date.

## 2018-12-03 NOTE — PROGRESS NOTES
Hospitalist Progress Note    Patient:  [facility]      Unit/Bed:4A-12/012-A    YOB: 1945    MRN: [id number]       Acct: [de-identified]     PCP: ASHLEY Padilla - CNP    Date of Admission: 11/27/2018    Assessment/Plan:  1) Fall resulting in loosening C3-C4 hardware  - Cardiology consulted - stating pt is high risk  - Ortho consulted as pt will need OR  - Continue with cervical collar  - Cardiology initially cleared pt for OR, but second cardiology opinion stated pt was high risk for OR, so OR has been held off until either TAVR and endartctomy. 2) CAD with severe AS/HTN/Chronic DCHF/HTN  - Cardiology started on TAVR  - (+) 8 liters since admission  - Continue with Norvasc 10 mg daily, candesartan, lasix 20 mg IV BID, hydralazine 100 mg Q 8hrs, Isordil 40 mg TID. Lopresor 100 mg BID and Crestor  - Pt needs TAVR     3) Hypothyroidism  - Continue with synthroid    4) Depression  - Continue with Lexapro    5) Metabolic Encephalopathy UTI - resolved  - Treated with Cipro  - UC 2/2 to E Coli  - EEG: no epileptiform activity  - MRI negative for acute CVA    6) Oxygen dependant COPD - Stable    7) Left ICA stenosis  - Need endartectomy     Expected discharge date: In 2-3 days    Disposition:    [] Home       [] TCU       [] Rehab       [] Psych       [] SNF       [] [place]       [] Other-    Chief Complaint: F/U hardware malfunctiong    Subjective (past 24 hours): Pt seen and examined bedside  Pt doing okay. States she wants to go home.       Medications:  Reviewed    Infusion Medications    sodium chloride 75 mL/hr at 11/29/18 2248    dextrose       Scheduled Medications    furosemide  20 mg Intravenous BID    nitroglycerin  0.5 inch Topical 4 times per day    isosorbide dinitrate  40 mg Oral TID    amLODIPine  10 mg Oral Daily    miconazole   Topical BID    hydrALAZINE  100 mg Oral Q8H    metoprolol tartrate  100 mg Oral BID    candesartan  32 mg Oral Daily    report electronically signed by Dr. Rhonda Mays on 11/27/2018 3:36 PM      CT Cervical Spine WO Contrast   Final Result   Postsurgical changes with anterior fusion C3-4 and C5 with multilevel intervertebral disc spacers. There is lucency through the vertebral freighted screws at C3. There is slight lucency around the threaded screw at C4 as well. Findings could represent    loosening of surgical hardware in the setting of trauma This is seen on prior most recent study however is new or progressed from prior remote study dated February 16, 2018 correlation and follow-up as clinically warranted. These results were communicated directly with Tamara Roach on 11/27/2018 3:25 PM,  [ ]          **This report has been created using voice recognition software. It may contain minor errors which are inherent in voice recognition technology. **      Final report electronically signed by Dr. Rhonda Mays on 11/27/2018 3:27 PM      CT Head WO Contrast   Final Result   No acute intracranial findings. **This report has been created using voice recognition software. It may contain minor errors which are inherent in voice recognition technology. **      Final report electronically signed by Dr. Rhonda Mays on 11/27/2018 3:19 PM      US Ed Fast Abdomen Limited   Final Result      XR CHEST PORTABLE   Final Result   Cardiomegaly. No acute process otherwise            **This report has been created using voice recognition software. It may contain minor errors which are inherent in voice recognition technology. **      Final report electronically signed by Dr. David Salinas on 11/27/2018 1:47 PM      XR HAND RIGHT (MIN 3 VIEWS)   Final Result   No acute fracture or dislocation. **This report has been created using voice recognition software. It may contain minor errors which are inherent in voice recognition technology. **      Final report electronically signed by Dr. Rhonda Mays on 11/27/2018 1:44 PM      XR

## 2018-12-03 NOTE — PROGRESS NOTES
Rectal BID    lactulose  30 g Oral Daily    sodium chloride flush  10 mL Intravenous 2 times per day    docusate sodium  100 mg Oral BID    escitalopram  10 mg Oral QAM    levothyroxine  100 mcg Oral Daily    OXcarbazepine  150 mg Oral QAM    OXcarbazepine  300 mg Oral QPM    pantoprazole  40 mg Oral QAM AC    ranolazine  500 mg Oral BID    rosuvastatin  20 mg Oral Nightly    risperiDONE  1 mg Oral QAM    insulin lispro  0-6 Units Subcutaneous TID WC    insulin lispro  0-3 Units Subcutaneous Nightly    carboxymethylcellulose  1 drop Both Eyes BID    mometasone-formoterol  2 puff Inhalation BID      sodium chloride 75 mL/hr at 11/29/18 2248    dextrose         sodium chloride 1 spray PRN   HYDROcodone 5 mg - acetaminophen 1 tablet Q4H PRN   Or     HYDROcodone 5 mg - acetaminophen 2 tablet Q4H PRN   hydrOXYzine 25 mg TID PRN   sodium phosphate 1 enema Daily PRN   sodium chloride flush 10 mL PRN   ondansetron 4 mg Q6H PRN   acetaminophen 650 mg Q4H PRN   polyethylene glycol 17 g Daily PRN   glucose 15 g PRN   dextrose 12.5 g PRN   glucagon (rDNA) 1 mg PRN   dextrose 100 mL/hr PRN       Diagnostics:  EKG:  none    Echo:   Electronically signed by Daisha Lake MD (Interpreting   physician) on 11/05/2018 at 12:35 PM   ----------------------------------------------------------------      Findings      Mitral Valve   The mitral valve structure demonstrated predominantly posterior leaflet   calcification. DOPPLER: The transmitral velocity was mildly elevated at   2.0 m/s, with a mean gradient of 8 mmHg, and estimated MVA 2.7 cm2. There   was trace mitral regurgitation.      Aortic Valve   The aortic valve was trileaflet, thickened, calcified, and restricted in   mobility. DOPPLER: Transaortic velocity was 3.9 m/s, mean gradient of 33   mmHg, and estimated BANDAR 0.8 cm2.  There was no evidence of aortic   regurgitation.      Tricuspid Valve   The tricuspid valve structure was normal with normal leaflet separation.   DOPPLER: There was no evidence of tricuspid stenosis. There was no   evidence of tricuspid regurgitation.      Pulmonic Valve   The pulmonic valve leaflets exhibited normal thickness, no calcification,   and normal cuspal separation. DOPPLER: The transpulmonic velocity was   within the normal range with no evidence for regurgitation.      Left Atrium   Left atrial size was moderately dilated.      Left Ventricle   Normal left ventricle size and systolic function. Ejection fraction was   estimated at 60-65%. There were no regional left ventricular wall motion   abnormalities. Mild concentric hypertrophy.      Right Atrium   Right atrial size was moderately dilated.      Right Ventricle   The right ventricular size was normal with normal systolic function and   wall thickness.      Pericardial Effusion   The pericardium was normal in appearance with a small, non-hemodynamically   significant posterior pericardial effusion.      Pleural Effusion   No evidence of pleural effusion.      Aorta / Great Vessels   -Ascending aorta = 3.4 cm.   -IVC size is dilated with reduced respiratory phasic changes (CVP~5-10   mmHg).      Stress: Conclusions      Summary   There was a small sized, mild in intensity, reversible myocardial   perfusion defect of the inferior wall.   Calculated gated LVEF 60 %.     Recommendation   Clinical correlation is recommended.      Signatures      ----------------------------------------------------------------   Electronically signed by Katharina Bernal MD (Interpreting   Cardiologist) on 03/21/2018        Left Heart Cath: 2017  Procedure Summary  Successful PTCA of the right coronary artery using cutting balloon and plain balloon angioplasty for ISR lesion  estimated at 75-80%. Left main coronary artery is a 5.5 mm caliber vessel. It produces the LAD, the left circumflex artery. It shows No  angiographic atherosclerosis.   The LAD is a 4 mm caliber vessel that courses along the MG 1.2 12/01/2018       PT/INR:    Lab Results   Component Value Date    PROTIME 1.04 08/13/2011    INR 1.08 10/25/2018       HgBA1c:    Lab Results   Component Value Date    LABA1C 5.8 11/27/2018       FLP:  Lab Results   Component Value Date    TRIG 179 11/15/2018    HDL 29 11/15/2018    LDLCALC 55 11/15/2018    LABVLDL 69 11/25/2016       TSH:    Lab Results   Component Value Date    TSH 7.580 11/03/2018         Assessment:  S\p fall DT AMS  Hardware loosening C3-4 s/p mechanical fall    Pre-op cardiac risk assessment for posterior spinal fusion C3-7 on 12/4/18   Hx CAD- severe AS - needs TAVR   ? CHF -- up 8L -- no accurate I\O   HTN not well controlled still   Hx CAD with ISR - off plavix -- needs restarted ASAP       Uncontrolled HTN              Renal duplex without LUIS 12/2017     chronic diastolic chf with LE swelling      Chronic O2 use     Severe AS - valve area 0.8     Hx CAD with prior PCI  Last cath 2017:  Successful PTCA of the right coronary artery using cutting balloon and plain balloon angioplasty for ISR lesion  estimated at 75-80%.      PVD- L arm claudication              Followed by Dr. Yue Briones - attempted angioplasty per pt x3 - she wants                        second opinion   CTA neck-   1. Occluded proximal left subclavian artery. 2. Presumed retrograde flow in the left vertebral artery. This is likely supplying the left subclavian artery. 3. 80% stenosis at the origin the left internal carotid artery.    4. 65-70% stenosis at the origin of the right internal carotid artery.          HLP  HTN  DM II        Plan:  · Pt is very high risk from a cardiac standpoint for any elective surgery   · If this is elective - I would wait DT cardiac issues  · Accurate I\O   · IV diuretics   · BP control   · We will see before surgery tomorrow - she appears in CHF          Electronically signed by ASHLEY Carrera CNP on 12/3/2018 at 10:29 AM

## 2018-12-03 NOTE — PROGRESS NOTES
mobility. Prognosis: Good     REQUIRES PT FOLLOW UP: Yes    Discharge Recommendations:  Discharge Recommendations: Continue to assess pending progress    Patient Education:  Patient Education: POC, ther ex, ambulation, transfers    Equipment Recommendations:  Equipment Needed: No    Safety:  Type of devices:  All fall risk precautions in place, Patient at risk for falls, Call light within reach, Left in chair, Chair alarm in place, Gait belt, Nurse notified    Plan:  Times per week: 5X O/GM  Times per day: Daily  Specific instructions for Next Treatment: therex and mobility with cervical precautions  Current Treatment Recommendations: Strengthening, Transfer Training, Endurance Training, Gait Training, Balance Training, Functional Mobility Training, Safety Education & Training, Home Exercise Program, Pain Management, Equipment Evaluation, Education, & procurement, Patient/Caregiver Education & Training    Goals:  Patient goals : return home    Short term goals  Time Frame for Short term goals: 2 weeks  Short term goal 1: bed mobility with MOD I to get in/out of bed  Short term goal 2: transfer with MOD I to get in/out of chairs  Short term goal 3: amb 75'x1 with RW and MOD I to walk safely in apt    Long term goals  Time Frame for Long term goals : no LTGs set secondary to short ELOS

## 2018-12-04 NOTE — PROGRESS NOTES
CLINICAL PHARMACY: DISCHARGE MED RECONCILIATION/REVIEW    [facility]) Select Patient?: Yes  Total # of Interventions Recommended: 0     Total # Interventions Accepted: 0  Intervention Severity:   - Level 1 Intervention Present?: No   - Level 2 #: 0   - Level 3 #: 0   Time Spent (min): 30    Additional Documentation:    Discharge medication reconciliation reviewed and complete.     Serg Berry, PharmD, BCPS  12/4/2018  3:48 PM

## 2018-12-04 NOTE — PROGRESS NOTES
Pt resting in room, no complaints of neck pain. Surgery canceled today due to cardiac clearance from aortic stenosis. Will follow as outpatient. See back in office in 1 week with c spine CT. Continue use of collar. Ok to d/c from spine standpoint if that is the plan.      NV intact x4, AAOx3, 2/2 pulses radial

## 2018-12-04 NOTE — PROGRESS NOTES
Needed: No    Safety:  Type of devices:  All fall risk precautions in place, Patient at risk for falls, Call light within reach, Left in chair, Chair alarm in place, Gait belt, Nurse notified    Plan:  Times per week: 5X O/GM  Times per day: Daily  Specific instructions for Next Treatment: therex and mobility with cervical precautions  Current Treatment Recommendations: Strengthening, Transfer Training, Endurance Training, Gait Training, Balance Training, Functional Mobility Training, Safety Education & Training, Home Exercise Program, Pain Management, Equipment Evaluation, Education, & procurement, Patient/Caregiver Education & Training    Goals:  Patient goals : return home    Short term goals  Time Frame for Short term goals: 2 weeks  Short term goal 1: bed mobility with MOD I to get in/out of bed  Short term goal 2: transfer with MOD I to get in/out of chairs  Short term goal 3: amb 75'x1 with RW and MOD I to walk safely in apt    Long term goals  Time Frame for Long term goals : no LTGs set secondary to short ELOS

## 2018-12-04 NOTE — PLAN OF CARE
Problem: Falls - Risk of:  Goal: Will remain free from falls  Will remain free from falls   Outcome: Ongoing  No falls this shift. Pt is up with walker, c-collar, and standby assist to ambulate in room. Gait is steady. Call light used appropriately for assistance. Tele sitter removed from room as it was not needed this shift or last.    Problem: Risk for Impaired Skin Integrity  Goal: Tissue integrity - skin and mucous membranes  Structural intactness and normal physiological function of skin and  mucous membranes. Outcome: Met This Shift      Problem: Pain:  Goal: Pain level will decrease  Pain level will decrease   Outcome: Ongoing  Continues with back and neck pain. PRN pain medication given with success     Problem: Discharge Planning:  Goal: Discharged to appropriate level of care  Discharged to appropriate level of care   Outcome: Ongoing  Awaiting planning for surgery of c-spine    Problem: HEMODYNAMIC STATUS  Goal: Patient has stable vital signs and fluid balance  Outcome: Ongoing  Continuing to monitor BP, scheduled meds given    Problem: COMMUNICATION IMPAIRMENT  Goal: Ability to express needs and understand communication  Outcome: Completed Date Met: 12/01/18      Comments: Care plan reviewed with patient. Patient verbalize understanding of the plan of care and contribute to goal setting.
Problem: Impaired respiratory status  Goal: Clear lung sounds  Outcome: Ongoing  Patient on home medication regiment.
Problem: Impaired respiratory status  Goal: Clear lung sounds  Outcome: Ongoing  Pt continues on MDI for COPD maintenance and pt does MDI at home.
[facility]  PHYSICAL THERAPY MISSED TREATMENT NOTE  ACUTE CARE    Date: 2018  Patient Name: Valentine Calix        MRN: [id number]   : 1945  (70 y.o.)  Gender: female   Referring Practitioner: Dr. Jessy Coombs  Diagnosis: post concussion syndrome         REASON FOR MISSED TREATMENT:  Missed Treat. Pt bedrest with ortho consult per Dr. Brooklyn Quintero due to cervical spine imaging results. Will check back as able.
98.2 °F (36.8 °C)   Temp Source Oral   Pulse 84   Heart Rate Source Monitor   Resp 17   BP (!) 184/74   BP Location Right lower arm   Oxygen Therapy   SpO2 96 %   O2 Device Nasal cannula   O2 Flow Rate (L/min) 3 L/min       Problem: ACTIVITY INTOLERANCE/IMPAIRED MOBILITY  Goal: Mobility/activity is maintained at optimum level for patient  Outcome: Ongoing  Patient is able to ambulate with one assist and a walker. Problem: Musculor/Skeletal Functional Status  Goal: Highest potential functional level  Outcome: Ongoing  Patient is able to ambulate with one assist and walker. Comments: Care plan reviewed with patient. Patient verbalizes understanding of the plan of care and contributed to goal setting.
oxygen at home and ambulates with walker. When up, ambulating with walker, gait belt, and hard collar. Problem: COMMUNICATION IMPAIRMENT  Goal: Ability to express needs and understand communication  Outcome: Met This Shift  Patient calls out when needing assistance. Patient expresses pain on a pain scale and answers questions appropriately. Comments: Care plan reviewed with patient. Patient verbalizes understanding of the plan of care and contribute to goal setting.

## 2018-12-04 NOTE — PROGRESS NOTES
Cardiology Progress Note      Patient:  Tremaine Sheth  YOB: 1945  MRN: [id number]   Acct: [de-identified]  Admit Date:  11/27/2018  Primary Cardiologist: Dr. Brayan Nice  Seen by Dr. Terence Mcwilliams     Per prior cardiology consult note-  REASON FOR CONSULT:           CHIEF COMPLAINT:    Altered Mental Status     HISTORY OF PRESENT ILLNESS:           Subjective (Events in last 24 hours):     Pt poor hx   She remembers me but hasnt followed cardio as OP    Severe AS  / SBCL stenosis  / CHF    She denies SOB or chest pains but appears to be SOB   No accurate I\O- weight up  BP elevated       12/4/2018  No cardiac c/o  elective surgery cancelled    Daughter at bedside     VSS  Tele SR    Pt agrees to TAVR    Objective:   BP (!) 141/84   Pulse 80   Temp 98.4 °F (36.9 °C) (Oral)   Resp 16   Ht 5' 5.5\" (1.664 m)   Wt 239 lb 3.2 oz (108.5 kg)   SpO2 97%   BMI 39.20 kg/m²        TELEMETRY: SR    Physical Exam:  General Appearance: alert and oriented to person, place and time, in no acute distress  Cardiovascular: normal rate, regular rhythm, normal S1 and S2, +  murmurs, rubs, clicks, or gallops, distal pulses intact  Pulmonary/Chest: clear upper - dim lower to auscultation bilaterally- no wheezes, rales or rhonchi, normal air movement, no respiratory distress  Abdomen: soft, non-tender, non-distended, normal bowel sounds, no masses Extremities: no cyanosis, clubbing or edema, pulses present   Skin: warm and dry, no rash or erythema  Head: normocephalic and atraumatic  Musculoskeletal: normal range of motion, no joint swelling, deformity or tenderness  Neurological: alert, oriented, normal speech, no focal findings or movement disorder noted    Medications:    furosemide  20 mg Intravenous BID    nitroglycerin  0.5 inch Topical 4 times per day    isosorbide dinitrate  40 mg Oral TID    amLODIPine  10 mg Oral Daily    miconazole   Topical BID    hydrALAZINE  100 mg Oral Q8H    metoprolol tartrate  100 mg Oral BID  candesartan  32 mg Oral Daily    sodium chloride  1 g Oral BID     potassium replacement protocol   Other RX Placeholder    hydrocortisone  25 mg Rectal BID    lactulose  30 g Oral Daily    sodium chloride flush  10 mL Intravenous 2 times per day    docusate sodium  100 mg Oral BID    escitalopram  10 mg Oral QAM    levothyroxine  100 mcg Oral Daily    OXcarbazepine  150 mg Oral QAM    OXcarbazepine  300 mg Oral QPM    pantoprazole  40 mg Oral QAM AC    ranolazine  500 mg Oral BID    rosuvastatin  20 mg Oral Nightly    risperiDONE  1 mg Oral QAM    insulin lispro  0-6 Units Subcutaneous TID WC    insulin lispro  0-3 Units Subcutaneous Nightly    carboxymethylcellulose  1 drop Both Eyes BID    mometasone-formoterol  2 puff Inhalation BID      sodium chloride 75 mL/hr at 11/29/18 2248    dextrose         sodium chloride 1 spray PRN   HYDROcodone 5 mg - acetaminophen 1 tablet Q4H PRN   Or     HYDROcodone 5 mg - acetaminophen 2 tablet Q4H PRN   hydrOXYzine 25 mg TID PRN   sodium phosphate 1 enema Daily PRN   sodium chloride flush 10 mL PRN   ondansetron 4 mg Q6H PRN   acetaminophen 650 mg Q4H PRN   polyethylene glycol 17 g Daily PRN   glucose 15 g PRN   dextrose 12.5 g PRN   glucagon (rDNA) 1 mg PRN   dextrose 100 mL/hr PRN       Diagnostics:  EKG:  none    Echo:   Electronically signed by Juana Dubois MD (Interpreting   physician) on 11/05/2018 at 12:35 PM   ----------------------------------------------------------------      Findings      Mitral Valve   The mitral valve structure demonstrated predominantly posterior leaflet   calcification. DOPPLER: The transmitral velocity was mildly elevated at   2.0 m/s, with a mean gradient of 8 mmHg, and estimated MVA 2.7 cm2. There   was trace mitral regurgitation.      Aortic Valve   The aortic valve was trileaflet, thickened, calcified, and restricted in   mobility.  DOPPLER: Transaortic velocity was 3.9 m/s, mean gradient of 33   mmHg, and PROT 6.5 11/25/2016    BILITOT 0.2 11/27/2018    BILIDIR <0.2 11/27/2018    LABALBU 4.1 11/27/2018       Magnesium:    Lab Results   Component Value Date    MG 1.2 12/01/2018       PT/INR:    Lab Results   Component Value Date    PROTIME 1.04 08/13/2011    INR 1.08 10/25/2018       HgBA1c:    Lab Results   Component Value Date    LABA1C 5.8 11/27/2018       FLP:    Lab Results   Component Value Date    TRIG 179 11/15/2018    HDL 29 11/15/2018    LDLCALC 55 11/15/2018    LABVLDL 69 11/25/2016       TSH:    Lab Results   Component Value Date    TSH 7.580 11/03/2018         Assessment:  S\p fall DT AMS  Hardware loosening C3-4 s/p mechanical fall    Pre-op cardiac risk assessment for posterior spinal fusion C3-7 on 12/4/18   Hx CAD- severe AS - needs TAVR   ? CHF -- up 8L -- no accurate I\O   HTN not well controlled still   Hx CAD with ISR - off plavix -- needs restarted ASAP       Uncontrolled HTN              Renal duplex without LUIS 12/2017     chronic diastolic chf with LE swelling      Chronic O2 use     Severe AS - valve area 0.8     Hx CAD with prior PCI  Last cath 2017:  Successful PTCA of the right coronary artery using cutting balloon and plain balloon angioplasty for ISR lesion  estimated at 75-80%.      PVD- L arm claudication              Followed by Dr. Holland Rose - attempted angioplasty per pt x3 - she wants                        second opinion   CTA neck-   1. Occluded proximal left subclavian artery. 2. Presumed retrograde flow in the left vertebral artery. This is likely supplying the left subclavian artery. 3. 80% stenosis at the origin the left internal carotid artery.    4. 65-70% stenosis at the origin of the right internal carotid artery.          HLP  HTN  DM II        Plan:  · Ok to DC and follow as OP- she agrees to TAVR - daughter at bedside and informed of cardio-vascular issues   · our office will call her with appointment times          Electronically signed by ASHLEY Loyola - CNP on 12/4/2018 at 11:35 AM

## 2018-12-05 NOTE — TELEPHONE ENCOUNTER
Juan José Engle w/[facility] called stating the pt recently fell & ended up breaking her raised toilet seat. Juan José Engle states [employer] will pay for the pt to get a new one if they can get a script for it. The script will need to be faxed to Lizzy @ [phone]. Please advise.

## 2018-12-05 NOTE — TELEPHONE ENCOUNTER
Sean [facility] Transitions Initial Follow Up Call    Outreach made within 2 business days of discharge: Yes    Patient: Boston Ivey Patient : 1945   MRN: [id number]  Reason for Admission: There are no discharge diagnoses documented for the most recent discharge. Discharge Date: 18       Spoke with: pt     Discharge department/facility: [facility]     TCM Interactive Patient Contact:  Was patient able to fill all prescriptions: Yes  Was patient instructed to bring all medications to the follow-up visit: Yes  Is patient taking all medications as directed in the discharge summary?  Yes  Does patient understand their discharge instructions: Yes  Does patient have questions or concerns that need addressed prior to 7-14 day follow up office visit: no    Scheduled appointment with PCP within 7-14 days    Follow Up  Future Appointments  Date Time Provider Micheline Conteh   2018 1:30 PM Mimi Tejeda, [address]   2018 3:20 PM Shannon Triplett APRN - [id number] [address] [facility] [place]   2018 2:00 PM Carmita Hernandez MD Neuro Endo [facility]   2018 10:30 AM Bhanu Malik APRN - CNP [facility] Heart [facility] [place]   2018 2:40 PM Shannon Triplett APRN - [id number] [address] [facility] [place]   2019 10:00 AM Salas Herrera MD SRPX CT/CV [facility] [place]   2019 1:00 PM Toi Walker RD, LD [facility] Physic [facility] [place]   2019 2:30 PM ASHLEY Vivas - CNP Pulm Med [facility] [place]   2019 1:45 PM AMMY Hassan LPN

## 2018-12-06 NOTE — PROGRESS NOTES
catheterization: 11/6/2014: 99% in-stent restenosis mid-RCA. LCx moderate LI's. LAD 85% mid-segment lesion. 11/6/2014 11/6/2014: 99% in-stent restenosis mid-RCA. LCx moderate LI's. LAD 85% mid-segment lesion. Dr. Baer Res S/P PTCA:  5/11/2004: Proximal and mid RCA  Taxus 3.5 X 20 mm, and Taxus 3.0 X 32 mm. 10/28/2014    5/11/2004: Proximal and mid RCA  Taxus 3.5 X 20 mm, and Taxus 3.0 X 32 mm. Dr. Mario Wiggins Systolic murmur 65/63/4431    Thyroid disease      SURGICAL HISTORY:  Past Surgical History:   Procedure Laterality Date    BACK SURGERY  08/2016        BLADDER SUSPENSION      BREAST BIOPSY  10/10/2017    BREAST LUMPECTOMY      CARDIAC CATHETERIZATION  8-11-06    Mild nonobstructive CAD w/ diffuse 10-20% proximal and mid LAD stenosis and 10-20% proximal/ostial RCA stenosis. No obstructive lesions. RCA stents are patent w/o evidence of restenosis. Normal LV systolic function. EF 65%. Trace MR - catheter induced. Minimally elevated LVEDP - 13mmHg. Mild to moderate aortoiliac PVD w/o obstructive lesions.  CARDIAC CATHETERIZATION  5-11-04    Successful drug-eluting stent x 2 of proximal and mid RCA.  CARDIAC CATHETERIZATION  5-11-04    70-80% proximal RCA stenosis w/ 50-70% mid RCA stenosis. There is 50-60% lesion in mid PDA. Mild proximal and mid LAD disease. Mild circumflex disease. Normal LV size and systolic function. EF 60%.  CARDIOVASCULAR STRESS TEST  5-10-11    No evidence of stress induced ischemia. EF 75%.  CARDIOVASCULAR STRESS TEST  5-10-10    No evidence of stress induced ischemia, infarct or scar. EF 74%.     CERVICAL FUSION N/A 11/15/2017    REMOVAL OF PLATE T7-8, ACDF E5-6 W/ATLANTIS CORNERSTONE performed by Danis Baum MD at [facility] COLONOSCOPY      ENDOSCOPY, COLON, DIAGNOSTIC      FOOT SURGERY      HERNIA REPAIR      HYSTERECTOMY      NECK SURGERY  FEB 2016    PARTIAL HYSTERECTOMY      UPPER GASTROINTESTINAL ENDOSCOPY      Blood Glucose Monitoring Suppl HARVINDER Check blood sugar q daily Dx E11.69 1 Device 0    nystatin (MYCOSTATIN) 917342 UNIT/GM cream Apply topically 2 times daily. 60 g 2    OXYGEN Inhale 3 L into the lungs continuous       doxepin (SINEQUAN) 150 MG capsule Take 150 mg by mouth nightly      OXcarbazepine (TRILEPTAL) 150 MG tablet Take 150 mg by mouth every morning       ONE TOUCH ULTRASOFT LANCETS MISC use as directed BEFORE BREAKFAST AND SUPPER 100 each 11    pantoprazole (PROTONIX) 40 MG tablet Take 40 mg by mouth every morning (before breakfast)       risperiDONE (RISPERDAL) 1 MG tablet Take 1 mg by mouth every morning        No current facility-administered medications for this visit. ROS   Review of Systems   Constitutional: Positive for activity change (decreased ). Negative for chills, fever and unexpected weight change. HENT: Negative. Eyes: Negative. Respiratory: Negative for cough, choking, chest tightness, shortness of breath and wheezing. Cardiovascular: Positive for leg swelling (occasional ). Negative for chest pain and palpitations. Gastrointestinal: Negative for abdominal pain, diarrhea, nausea and vomiting. Endocrine: Negative. Genitourinary: Negative. Musculoskeletal: Positive for back pain and neck pain. Skin: Negative. Allergic/Immunologic: Negative. Neurological: Negative. Hematological: Bruises/bleeds easily. Psychiatric/Behavioral: Negative. Negative for suicidal ideas. Physical exam   /68 (Site: Right Upper Arm, Position: Sitting, Cuff Size: Large Adult)   Pulse 81   Temp 97.5 °F (36.4 °C)   Ht 5' 5\" (1.651 m)   Wt 226 lb (102.5 kg)   SpO2 92% Comment: on 3 liters of O2  BMI 37.61 kg/m²        Physical Exam   Constitutional: She is oriented to person, place, and time. She appears well-developed. She appears ill (chronically ill appearance ). No distress. Cervical collar and nasal cannula in place.    HENT:   Mouth/Throat:

## 2018-12-06 NOTE — PROGRESS NOTES
[address]  [address] [address]  Dept: [phone]  Dept Fax: [phone]  Loc: [facility]      Patient Name:  Olivia Arce   YOB: 1945    Date of Visit:  12/6/2018  30 Day Post-Discharge Date: 1/4/19    Allergies   Allergen Reactions    Lipitor [Atorvastatin] Diarrhea    Motrin [Ibuprofen Micronized] Nausea Only     Outpatient Prescriptions Marked as Taking for the 12/6/18 encounter (Office Visit) with ASHLEY Gilbert CNP   Medication Sig Dispense Refill    amLODIPine (NORVASC) 10 MG tablet Take 1 tablet by mouth daily 30 tablet 3    HYDROcodone-acetaminophen (NORCO) 5-325 MG per tablet Take 1 tablet by mouth every 4 hours as needed for Pain for up to 7 days. . 20 tablet 0    ferrous sulfate 325 (65 Fe) MG tablet Take 1 tablet by mouth 2 times daily 180 tablet 1     MG capsule take 3 capsules by mouth every evening 90 capsule 1    escitalopram (LEXAPRO) 10 MG tablet Take 1 tablet by mouth every morning 30 tablet 0    metoprolol (LOPRESSOR) 100 MG tablet Take 1 tablet by mouth 2 times daily 60 tablet 0    metolazone (ZAROXOLYN) 5 MG tablet Take 1 tablet by mouth three times a week 30 tablet 0    spironolactone (ALDACTONE) 25 MG tablet Take 1 tablet by mouth daily 30 tablet 0    OXcarbazepine (TRILEPTAL) 150 MG tablet Take 300 mg by mouth every evening      polyethylene glycol (GLYCOLAX) powder Take 17 g by mouth daily as needed (Constipation)      valsartan (DIOVAN) 320 MG tablet take 1 tablet by mouth once daily 90 tablet 0    furosemide (LASIX) 20 MG tablet Take 40 mg (2 tabs) am and 40 mg (2 tab) pm 60 tablet 3    levothyroxine (SYNTHROID) 100 MCG tablet take 1 tablet by mouth once daily 90 tablet 1    Calcium Carbonate-Vitamin D (OYSTER SHELL CALCIUM/D) 500-200 MG-UNIT TABS take 1 tablet by mouth twice a day 60 tablet 5    Negative for chills, fatigue and fever. HENT: Negative for congestion. Respiratory: Negative. Cardiovascular: Negative. Gastrointestinal: Negative for abdominal distention and abdominal pain. Musculoskeletal: Positive for myalgias and neck pain. Skin: Negative. Neurological: Positive for headaches (mild headache, relieved with tylenol). Negative for dizziness. Psychiatric/Behavioral: Negative for self-injury, sleep disturbance and suicidal ideas. Physical Exam:  Physical Exam   Constitutional: She is oriented to person, place, and time. She appears well-developed and well-nourished. No distress. Cardiovascular:   Murmur heard. Pulmonary/Chest: Effort normal and breath sounds normal. No respiratory distress. She has no wheezes. Abdominal: Soft. Bowel sounds are normal. She exhibits no distension. There is no tenderness. Musculoskeletal:   Cervical collar in place. Bilateral hand grasp equal and strong   Neurological: She is alert and oriented to person, place, and time. Psychiatric: She has a normal mood and affect. Her behavior is normal. Thought content normal.   Nursing note and vitals reviewed. Initial post-discharge communication occurred between nurse and patient on 12/5/18- see documentation inchart: telephone encounter. Assessment/Plan:  Teresa West was seen today for follow-up from hospital.    Diagnoses and all orders for this visit:    Hospital discharge follow-up    Fall, sequela  Order written for new raised toilet seat  Postconcussive syndrome  Monitor  If condition worsens contact office  Watch for dizziness or vision changes  Controlled type 2 diabetes mellitus with diabetic nephropathy, without long-term current use of insulin ([address]      Continue meds as ordered  Keep appt in [place] tomorrow with vascular surgeon regarding artery stenosis.   Diagnostic test results reviewed: inpatient labs, EKG, chest x-ray, CT-head, neck, chest, abdomen adn pelvis and

## 2018-12-07 NOTE — TELEPHONE ENCOUNTER
----- Message from ASHLEY Melgar CNP sent at 12/6/2018 10:43 PM EST -----  Please let pt know her hgb levels are improving. I would like her to take 1 iron tablet a day. I will send a script to her pharmacy.

## 2018-12-10 NOTE — TELEPHONE ENCOUNTER
Orders faxed to [facility] medical equipment @ [phone] and [facility] @ [phone].

## 2018-12-12 NOTE — PROGRESS NOTES
History of blood transfusion     1973    History of tobacco abuse: Quit in 2009 10/28/2014    HLD (hyperlipidemia)     HTN (hypertension)     Irritable bowel syndrome     States has not had problem with this for years    Liver disease     fatty liver    Metabolic syndrome 68/13/7692    MI ([facility]ocardial infarction) [address] [address]     Nausea & vomiting     Obesity     CHET (obstructive sleep apnea)     S/P cardiac catheterization: 11/6/2014: 99% in-stent restenosis mid-RCA. LCx moderate LI's. LAD 85% mid-segment lesion. 11/6/2014 11/6/2014: 99% in-stent restenosis mid-RCA. LCx moderate LI's. LAD 85% mid-segment lesion. Dr. Johnson [place] S/P PTCA:  5/11/2004: Proximal and mid RCA  Taxus 3.5 X 20 mm, and Taxus 3.0 X 32 mm. 10/28/2014    5/11/2004: Proximal and mid RCA  Taxus 3.5 X 20 mm, and Taxus 3.0 X 32 mm. Dr. Mckayla Nguyen Systolic murmur 33/87/2487    Thyroid disease      Past Surgical History:   Procedure Laterality Date    BACK SURGERY  08/2016        BLADDER SUSPENSION      BREAST BIOPSY  10/10/2017    BREAST LUMPECTOMY      CARDIAC CATHETERIZATION  8-11-06    Mild nonobstructive CAD w/ diffuse 10-20% proximal and mid LAD stenosis and 10-20% proximal/ostial RCA stenosis. No obstructive lesions. RCA stents are patent w/o evidence of restenosis. Normal LV systolic function. EF 65%. Trace MR - catheter induced. Minimally elevated LVEDP - 13mmHg. Mild to moderate aortoiliac PVD w/o obstructive lesions.  CARDIAC CATHETERIZATION  5-11-04    Successful drug-eluting stent x 2 of proximal and mid RCA.  CARDIAC CATHETERIZATION  5-11-04    70-80% proximal RCA stenosis w/ 50-70% mid RCA stenosis. There is 50-60% lesion in mid PDA. Mild proximal and mid LAD disease. Mild circumflex disease. Normal LV size and systolic function. EF 60%.  CARDIOVASCULAR STRESS TEST  5-10-11    No evidence of stress induced ischemia. EF 75%.     CARDIOVASCULAR STRESS TEST  5-10-10    No evidence of stress induced ischemia, · Daily weights  · Fluid restriction of 2 Liters per day  · Limit sodium in diet to around 1902-4657 mg/day  · Monitor BP  · Activity as tolerated     Patient was instructed to call the [address] for changes in the following symptoms:   Weight gain of 3 pounds in 1 day or 5 pounds in 1 week  Increased shortness of breath  Shortness of breath while laying down  Cough  Chest pain  Swelling in feet, ankles or legs  Tenderness or bloating in the abdomen  Fatigue   Decreased appetite or nausea   Confusion      Return in about 3 months (around 3/12/2019). or sooner if needed     Patient given educational materials - see patient instructions. We discussed the importance of weighing oneself and recording daily. We also discussed the importance of a lowsodium diet, higher sodium foods to avoid and better low sodium food options. Discussed use, benefit, and side effects of prescribed medications. All patient questions answered. Patient verbalizes understanding of plan of care using teach back method, and is agreeable to the treatment plan.        Electronicallysigned by ASHLEY Felton CNP on 12/12/2018 at 11:10 AM

## 2018-12-18 NOTE — TELEPHONE ENCOUNTER
Unable to contact Bhaskar from [facility]. Isosorbide prescribed by ASHLEY Moore CNP on 12/4/18. NO VM set up. Pt also follows with cardiology. If Bhaskar returns call please advise that she contact cardiology regarding medication PA as well as pt's sxs.

## 2018-12-21 PROBLEM — E83.52 HYPERCALCEMIA: Status: ACTIVE | Noted: 2018-01-01

## 2018-12-21 PROBLEM — N17.9 ACUTE KIDNEY INJURY (HCC): Status: ACTIVE | Noted: 2018-01-01

## 2018-12-21 PROBLEM — Z91.89 AT RISK FOR POLYPHARMACY: Status: ACTIVE | Noted: 2018-01-01

## 2018-12-25 PROBLEM — J96.02 ACUTE RESPIRATORY FAILURE WITH HYPOXIA AND HYPERCARBIA (HCC): Status: ACTIVE | Noted: 2018-01-01

## 2018-12-25 PROBLEM — J96.01 ACUTE RESPIRATORY FAILURE WITH HYPOXIA AND HYPERCARBIA (HCC): Status: ACTIVE | Noted: 2018-01-01

## 2018-12-26 PROBLEM — I38 ACUTE ON CHRONIC DIASTOLIC CONGESTIVE HEART FAILURE DUE TO VALVULAR DISEASE (HCC): Status: ACTIVE | Noted: 2018-03-27

## 2019-01-01 ENCOUNTER — APPOINTMENT (OUTPATIENT)
Dept: GENERAL RADIOLOGY | Age: 74
DRG: 871 | End: 2019-01-01
Payer: MEDICARE

## 2019-01-01 ENCOUNTER — TELEPHONE (OUTPATIENT)
Dept: SLEEP CENTER | Age: 74
End: 2019-01-01

## 2019-01-01 ENCOUNTER — HOSPITAL ENCOUNTER (INPATIENT)
Age: 74
LOS: 3 days | Discharge: HOME | DRG: 644 | End: 2019-03-24
Attending: EMERGENCY MEDICINE | Admitting: INTERNAL MEDICINE
Payer: MEDICARE

## 2019-01-01 ENCOUNTER — HOSPITAL ENCOUNTER (OUTPATIENT)
Dept: INPATIENT UNIT | Age: 74
Discharge: HOME | End: 2019-03-29
Attending: INTERNAL MEDICINE | Admitting: INTERNAL MEDICINE
Payer: MEDICARE

## 2019-01-01 ENCOUNTER — CARE COORDINATION (OUTPATIENT)
Dept: CARE COORDINATION | Age: 74
End: 2019-01-01

## 2019-01-01 ENCOUNTER — APPOINTMENT (OUTPATIENT)
Dept: GENERAL RADIOLOGY | Age: 74
End: 2019-01-01
Attending: INTERNAL MEDICINE
Payer: COMMERCIAL

## 2019-01-01 ENCOUNTER — TELEPHONE (OUTPATIENT)
Dept: FAMILY MEDICINE CLINIC | Age: 74
End: 2019-01-01

## 2019-01-01 ENCOUNTER — HOSPITAL ENCOUNTER (OUTPATIENT)
Dept: INTERVENTIONAL RADIOLOGY/VASCULAR | Age: 74
Discharge: HOME | End: 2019-02-15
Payer: MEDICARE

## 2019-01-01 ENCOUNTER — HOSPITAL ENCOUNTER (OUTPATIENT)
Dept: INFUSION THERAPY | Age: 74
Discharge: HOME | End: 2019-02-18
Payer: MEDICARE

## 2019-01-01 ENCOUNTER — TELEPHONE (OUTPATIENT)
Dept: CARDIOLOGY CLINIC | Age: 74
End: 2019-01-01

## 2019-01-01 ENCOUNTER — TELEPHONE (OUTPATIENT)
Dept: NEPHROLOGY | Age: 74
End: 2019-01-01

## 2019-01-01 ENCOUNTER — OFFICE VISIT (OUTPATIENT)
Dept: ONCOLOGY | Age: 74
End: 2019-01-01
Payer: MEDICARE

## 2019-01-01 ENCOUNTER — OFFICE VISIT (OUTPATIENT)
Dept: CARDIOLOGY CLINIC | Age: 74
End: 2019-01-01
Payer: MEDICARE

## 2019-01-01 ENCOUNTER — APPOINTMENT (OUTPATIENT)
Dept: CARDIAC CATH/INVASIVE PROCEDURES | Age: 74
DRG: 267 | End: 2019-01-01
Payer: MEDICARE

## 2019-01-01 ENCOUNTER — APPOINTMENT (OUTPATIENT)
Dept: ULTRASOUND IMAGING | Age: 74
DRG: 871 | End: 2019-01-01
Payer: MEDICARE

## 2019-01-01 ENCOUNTER — HOSPITAL ENCOUNTER (OUTPATIENT)
Age: 74
Discharge: HOME | DRG: 644 | End: 2019-03-21
Payer: MEDICARE

## 2019-01-01 ENCOUNTER — HOSPITAL ENCOUNTER (OUTPATIENT)
Dept: CT IMAGING | Age: 74
Discharge: HOME | DRG: 871 | End: 2019-06-10
Payer: MEDICARE

## 2019-01-01 ENCOUNTER — PREP FOR PROCEDURE (OUTPATIENT)
Dept: CARDIOLOGY | Age: 74
End: 2019-01-01

## 2019-01-01 ENCOUNTER — OFFICE VISIT (OUTPATIENT)
Dept: FAMILY MEDICINE CLINIC | Age: 74
End: 2019-01-01
Payer: MEDICARE

## 2019-01-01 ENCOUNTER — CARE COORDINATION (OUTPATIENT)
Dept: CASE MANAGEMENT | Age: 74
End: 2019-01-01

## 2019-01-01 ENCOUNTER — HOSPITAL ENCOUNTER (OUTPATIENT)
Dept: SLEEP CENTER | Age: 74
Discharge: HOME | End: 2019-03-01
Payer: MEDICARE

## 2019-01-01 ENCOUNTER — HOSPITAL ENCOUNTER (INPATIENT)
Age: 74
LOS: 2 days | Discharge: NURSING, REHAB OR LONG-TERM CARE | DRG: 293 | End: 2019-04-12
Attending: EMERGENCY MEDICINE | Admitting: INTERNAL MEDICINE
Payer: MEDICARE

## 2019-01-01 ENCOUNTER — TELEPHONE (OUTPATIENT)
Dept: PULMONOLOGY | Age: 74
End: 2019-01-01

## 2019-01-01 ENCOUNTER — HOSPITAL ENCOUNTER (OUTPATIENT)
Age: 74
Discharge: HOME | End: 2019-01-28
Payer: MEDICARE

## 2019-01-01 ENCOUNTER — OFFICE VISIT (OUTPATIENT)
Dept: NEPHROLOGY | Age: 74
End: 2019-01-01
Payer: MEDICARE

## 2019-01-01 ENCOUNTER — APPOINTMENT (OUTPATIENT)
Dept: INTERVENTIONAL RADIOLOGY/VASCULAR | Age: 74
DRG: 871 | End: 2019-01-01
Payer: MEDICARE

## 2019-01-01 ENCOUNTER — HOSPITAL ENCOUNTER (OUTPATIENT)
Dept: INFUSION THERAPY | Age: 74
Discharge: HOME | End: 2019-05-15
Payer: MEDICARE

## 2019-01-01 ENCOUNTER — HOSPITAL ENCOUNTER (EMERGENCY)
Age: 74
Discharge: HOME | End: 2019-03-02
Attending: FAMILY MEDICINE
Payer: MEDICARE

## 2019-01-01 ENCOUNTER — HOSPITAL ENCOUNTER (OUTPATIENT)
Dept: CT IMAGING | Age: 74
Discharge: HOME | End: 2019-03-26
Payer: MEDICARE

## 2019-01-01 ENCOUNTER — APPOINTMENT (OUTPATIENT)
Dept: CT IMAGING | Age: 74
DRG: 871 | End: 2019-01-01
Payer: MEDICARE

## 2019-01-01 ENCOUNTER — HOSPITAL ENCOUNTER (OUTPATIENT)
Dept: CT IMAGING | Age: 74
Discharge: HOME | End: 2019-03-11
Payer: MEDICARE

## 2019-01-01 ENCOUNTER — APPOINTMENT (OUTPATIENT)
Dept: CT IMAGING | Age: 74
DRG: 644 | End: 2019-01-01
Payer: MEDICARE

## 2019-01-01 ENCOUNTER — HOSPITAL ENCOUNTER (INPATIENT)
Age: 74
LOS: 3 days | Discharge: HOME HEALTH | DRG: 644 | End: 2019-04-07
Attending: EMERGENCY MEDICINE | Admitting: INTERNAL MEDICINE
Payer: MEDICARE

## 2019-01-01 ENCOUNTER — OFFICE VISIT (OUTPATIENT)
Dept: PULMONOLOGY | Age: 74
End: 2019-01-01
Payer: MEDICARE

## 2019-01-01 ENCOUNTER — HOSPITAL ENCOUNTER (OUTPATIENT)
Age: 74
Discharge: HOME | End: 2019-05-20
Payer: MEDICARE

## 2019-01-01 ENCOUNTER — APPOINTMENT (OUTPATIENT)
Dept: MRI IMAGING | Age: 74
DRG: 871 | End: 2019-01-01
Payer: MEDICARE

## 2019-01-01 ENCOUNTER — OFFICE VISIT (OUTPATIENT)
Dept: NEUROLOGY | Age: 74
End: 2019-01-01
Payer: MEDICARE

## 2019-01-01 ENCOUNTER — HOSPITAL ENCOUNTER (OUTPATIENT)
Age: 74
Discharge: HOME | End: 2019-03-11
Payer: MEDICARE

## 2019-01-01 ENCOUNTER — APPOINTMENT (OUTPATIENT)
Dept: GENERAL RADIOLOGY | Age: 74
DRG: 293 | End: 2019-01-01
Payer: MEDICARE

## 2019-01-01 ENCOUNTER — TELEPHONE (OUTPATIENT)
Dept: INTERNAL MEDICINE CLINIC | Age: 74
End: 2019-01-01

## 2019-01-01 ENCOUNTER — ANESTHESIA EVENT (OUTPATIENT)
Dept: INTERVENTIONAL RADIOLOGY/VASCULAR | Age: 74
End: 2019-01-01

## 2019-01-01 ENCOUNTER — TELEPHONE (OUTPATIENT)
Dept: CARDIOTHORACIC SURGERY | Age: 74
End: 2019-01-01

## 2019-01-01 ENCOUNTER — APPOINTMENT (OUTPATIENT)
Dept: GENERAL RADIOLOGY | Age: 74
DRG: 190 | End: 2019-01-01
Payer: MEDICARE

## 2019-01-01 ENCOUNTER — TELEPHONE (OUTPATIENT)
Dept: NEUROLOGY | Age: 74
End: 2019-01-01

## 2019-01-01 ENCOUNTER — ANESTHESIA EVENT (OUTPATIENT)
Dept: CARDIAC CATH/INVASIVE PROCEDURES | Age: 74
DRG: 267 | End: 2019-01-01
Payer: MEDICARE

## 2019-01-01 ENCOUNTER — HOSPITAL ENCOUNTER (OUTPATIENT)
Dept: INFUSION THERAPY | Age: 74
Discharge: HOME | End: 2019-04-22
Payer: MEDICARE

## 2019-01-01 ENCOUNTER — APPOINTMENT (OUTPATIENT)
Dept: GENERAL RADIOLOGY | Age: 74
DRG: 091 | End: 2019-01-01
Payer: MEDICARE

## 2019-01-01 ENCOUNTER — HOSPITAL ENCOUNTER (INPATIENT)
Age: 74
LOS: 4 days | Discharge: HOME HEALTH | DRG: 091 | End: 2019-02-07
Attending: FAMILY MEDICINE | Admitting: INTERNAL MEDICINE
Payer: MEDICARE

## 2019-01-01 ENCOUNTER — OFFICE VISIT (OUTPATIENT)
Dept: CARDIOTHORACIC SURGERY | Age: 74
End: 2019-01-01

## 2019-01-01 ENCOUNTER — HOSPITAL ENCOUNTER (OUTPATIENT)
Dept: NON INVASIVE DIAGNOSTICS | Age: 74
Discharge: HOME | End: 2019-05-20
Payer: MEDICARE

## 2019-01-01 ENCOUNTER — OUTSIDE SERVICES (OUTPATIENT)
Dept: FAMILY MEDICINE CLINIC | Age: 74
End: 2019-01-01
Payer: MEDICARE

## 2019-01-01 ENCOUNTER — APPOINTMENT (OUTPATIENT)
Dept: GENERAL RADIOLOGY | Age: 74
DRG: 644 | End: 2019-01-01
Payer: MEDICARE

## 2019-01-01 ENCOUNTER — HOSPITAL ENCOUNTER (INPATIENT)
Dept: INPATIENT UNIT | Age: 74
LOS: 2 days | Discharge: NURSING, REHAB OR LONG-TERM CARE | DRG: 267 | End: 2019-05-02
Attending: INTERNAL MEDICINE | Admitting: INTERNAL MEDICINE
Payer: MEDICARE

## 2019-01-01 ENCOUNTER — HOSPITAL ENCOUNTER (OUTPATIENT)
Dept: INFUSION THERAPY | Age: 74
Discharge: HOME | End: 2019-01-21
Payer: MEDICARE

## 2019-01-01 ENCOUNTER — OFFICE VISIT (OUTPATIENT)
Dept: INTERNAL MEDICINE CLINIC | Age: 74
End: 2019-01-01
Payer: MEDICARE

## 2019-01-01 ENCOUNTER — APPOINTMENT (OUTPATIENT)
Dept: GENERAL RADIOLOGY | Age: 74
End: 2019-01-01
Payer: MEDICARE

## 2019-01-01 ENCOUNTER — ANESTHESIA (OUTPATIENT)
Dept: CARDIAC CATH/INVASIVE PROCEDURES | Age: 74
DRG: 267 | End: 2019-01-01
Payer: MEDICARE

## 2019-01-01 ENCOUNTER — ANESTHESIA (OUTPATIENT)
Dept: INTERVENTIONAL RADIOLOGY/VASCULAR | Age: 74
End: 2019-01-01

## 2019-01-01 ENCOUNTER — HOSPITAL ENCOUNTER (INPATIENT)
Age: 74
LOS: 7 days | Discharge: NURSING, REHAB OR LONG-TERM CARE | DRG: 871 | End: 2019-06-18
Attending: INTERNAL MEDICINE | Admitting: INTERNAL MEDICINE
Payer: MEDICARE

## 2019-01-01 ENCOUNTER — HOSPITAL ENCOUNTER (INPATIENT)
Age: 74
LOS: 3 days | Discharge: HOME | DRG: 190 | End: 2019-04-17
Attending: EMERGENCY MEDICINE | Admitting: HOSPITALIST
Payer: MEDICARE

## 2019-01-01 ENCOUNTER — APPOINTMENT (OUTPATIENT)
Dept: CT IMAGING | Age: 74
DRG: 091 | End: 2019-01-01
Payer: MEDICARE

## 2019-01-01 ENCOUNTER — HOSPITAL ENCOUNTER (OUTPATIENT)
Age: 74
Discharge: HOME | End: 2019-07-08
Attending: INTERNAL MEDICINE | Admitting: INTERNAL MEDICINE
Payer: COMMERCIAL

## 2019-01-01 VITALS
SYSTOLIC BLOOD PRESSURE: 108 MMHG | HEART RATE: 66 BPM | WEIGHT: 209.6 LBS | DIASTOLIC BLOOD PRESSURE: 58 MMHG | OXYGEN SATURATION: 96 % | HEIGHT: 65 IN | BODY MASS INDEX: 34.92 KG/M2

## 2019-01-01 VITALS
HEART RATE: 82 BPM | BODY MASS INDEX: 35.68 KG/M2 | OXYGEN SATURATION: 97 % | SYSTOLIC BLOOD PRESSURE: 196 MMHG | HEIGHT: 66 IN | WEIGHT: 222 LBS | DIASTOLIC BLOOD PRESSURE: 87 MMHG | TEMPERATURE: 97.7 F | RESPIRATION RATE: 18 BRPM

## 2019-01-01 VITALS
HEART RATE: 70 BPM | OXYGEN SATURATION: 90 % | BODY MASS INDEX: 41.27 KG/M2 | HEIGHT: 61 IN | WEIGHT: 218.6 LBS | DIASTOLIC BLOOD PRESSURE: 57 MMHG | SYSTOLIC BLOOD PRESSURE: 118 MMHG

## 2019-01-01 VITALS
DIASTOLIC BLOOD PRESSURE: 78 MMHG | SYSTOLIC BLOOD PRESSURE: 126 MMHG | WEIGHT: 218 LBS | OXYGEN SATURATION: 98 % | RESPIRATION RATE: 20 BRPM | HEART RATE: 96 BPM | BODY MASS INDEX: 35.03 KG/M2 | HEIGHT: 66 IN

## 2019-01-01 VITALS
HEIGHT: 66 IN | TEMPERATURE: 97.2 F | RESPIRATION RATE: 18 BRPM | HEART RATE: 83 BPM | OXYGEN SATURATION: 95 % | BODY MASS INDEX: 35.13 KG/M2 | DIASTOLIC BLOOD PRESSURE: 82 MMHG | WEIGHT: 218.6 LBS | SYSTOLIC BLOOD PRESSURE: 158 MMHG

## 2019-01-01 VITALS
SYSTOLIC BLOOD PRESSURE: 160 MMHG | BODY MASS INDEX: 34.07 KG/M2 | HEIGHT: 66 IN | WEIGHT: 212 LBS | HEART RATE: 82 BPM | OXYGEN SATURATION: 92 % | DIASTOLIC BLOOD PRESSURE: 70 MMHG

## 2019-01-01 VITALS
TEMPERATURE: 98.2 F | SYSTOLIC BLOOD PRESSURE: 116 MMHG | DIASTOLIC BLOOD PRESSURE: 84 MMHG | HEIGHT: 66 IN | BODY MASS INDEX: 33.91 KG/M2 | RESPIRATION RATE: 18 BRPM | WEIGHT: 211 LBS | OXYGEN SATURATION: 91 % | HEART RATE: 76 BPM

## 2019-01-01 VITALS
BODY MASS INDEX: 35.9 KG/M2 | DIASTOLIC BLOOD PRESSURE: 96 MMHG | TEMPERATURE: 98.2 F | WEIGHT: 223.4 LBS | SYSTOLIC BLOOD PRESSURE: 164 MMHG | OXYGEN SATURATION: 99 % | RESPIRATION RATE: 20 BRPM | HEIGHT: 66 IN | HEART RATE: 88 BPM

## 2019-01-01 VITALS
SYSTOLIC BLOOD PRESSURE: 107 MMHG | HEIGHT: 66 IN | WEIGHT: 221.4 LBS | WEIGHT: 211 LBS | BODY MASS INDEX: 36.02 KG/M2 | HEART RATE: 80 BPM | RESPIRATION RATE: 20 BRPM | DIASTOLIC BLOOD PRESSURE: 60 MMHG | TEMPERATURE: 98.2 F | OXYGEN SATURATION: 93 % | SYSTOLIC BLOOD PRESSURE: 110 MMHG | HEART RATE: 75 BPM | BODY MASS INDEX: 35.58 KG/M2 | OXYGEN SATURATION: 93 % | DIASTOLIC BLOOD PRESSURE: 57 MMHG | HEIGHT: 64 IN

## 2019-01-01 VITALS
SYSTOLIC BLOOD PRESSURE: 118 MMHG | TEMPERATURE: 98.3 F | RESPIRATION RATE: 18 BRPM | HEIGHT: 66 IN | WEIGHT: 208 LBS | DIASTOLIC BLOOD PRESSURE: 60 MMHG | BODY MASS INDEX: 33.43 KG/M2 | HEART RATE: 90 BPM | OXYGEN SATURATION: 94 %

## 2019-01-01 VITALS
HEIGHT: 65 IN | TEMPERATURE: 97.7 F | DIASTOLIC BLOOD PRESSURE: 66 MMHG | HEIGHT: 62 IN | SYSTOLIC BLOOD PRESSURE: 108 MMHG | HEART RATE: 68 BPM | BODY MASS INDEX: 34.99 KG/M2 | TEMPERATURE: 98 F | HEART RATE: 80 BPM | RESPIRATION RATE: 18 BRPM | WEIGHT: 210 LBS | DIASTOLIC BLOOD PRESSURE: 51 MMHG | BODY MASS INDEX: 39.42 KG/M2 | WEIGHT: 214.2 LBS | OXYGEN SATURATION: 97 % | SYSTOLIC BLOOD PRESSURE: 126 MMHG | RESPIRATION RATE: 16 BRPM

## 2019-01-01 VITALS
BODY MASS INDEX: 34.07 KG/M2 | SYSTOLIC BLOOD PRESSURE: 146 MMHG | OXYGEN SATURATION: 97 % | HEIGHT: 66 IN | DIASTOLIC BLOOD PRESSURE: 62 MMHG | RESPIRATION RATE: 31 BRPM | WEIGHT: 212 LBS | HEART RATE: 97 BPM | TEMPERATURE: 97.3 F

## 2019-01-01 VITALS
TEMPERATURE: 97.7 F | DIASTOLIC BLOOD PRESSURE: 72 MMHG | SYSTOLIC BLOOD PRESSURE: 126 MMHG | HEART RATE: 60 BPM | RESPIRATION RATE: 18 BRPM

## 2019-01-01 VITALS
BODY MASS INDEX: 35.17 KG/M2 | HEIGHT: 66 IN | DIASTOLIC BLOOD PRESSURE: 60 MMHG | OXYGEN SATURATION: 98 % | HEART RATE: 68 BPM | RESPIRATION RATE: 18 BRPM | TEMPERATURE: 97.9 F | WEIGHT: 218.8 LBS | SYSTOLIC BLOOD PRESSURE: 125 MMHG

## 2019-01-01 VITALS
RESPIRATION RATE: 20 BRPM | BODY MASS INDEX: 33.83 KG/M2 | SYSTOLIC BLOOD PRESSURE: 160 MMHG | OXYGEN SATURATION: 96 % | DIASTOLIC BLOOD PRESSURE: 72 MMHG | HEART RATE: 81 BPM | TEMPERATURE: 98.2 F | HEIGHT: 66 IN | WEIGHT: 210.5 LBS

## 2019-01-01 VITALS
DIASTOLIC BLOOD PRESSURE: 68 MMHG | HEIGHT: 64 IN | WEIGHT: 211 LBS | RESPIRATION RATE: 20 BRPM | SYSTOLIC BLOOD PRESSURE: 148 MMHG | TEMPERATURE: 98.4 F | BODY MASS INDEX: 36.02 KG/M2 | HEART RATE: 98 BPM

## 2019-01-01 VITALS
HEART RATE: 72 BPM | RESPIRATION RATE: 16 BRPM | WEIGHT: 206 LBS | BODY MASS INDEX: 35.17 KG/M2 | DIASTOLIC BLOOD PRESSURE: 48 MMHG | HEIGHT: 64 IN | SYSTOLIC BLOOD PRESSURE: 94 MMHG | TEMPERATURE: 98.3 F

## 2019-01-01 VITALS
HEART RATE: 75 BPM | SYSTOLIC BLOOD PRESSURE: 119 MMHG | HEIGHT: 64 IN | BODY MASS INDEX: 35.41 KG/M2 | WEIGHT: 207.4 LBS | DIASTOLIC BLOOD PRESSURE: 56 MMHG | TEMPERATURE: 97.9 F | RESPIRATION RATE: 16 BRPM | OXYGEN SATURATION: 92 %

## 2019-01-01 VITALS
DIASTOLIC BLOOD PRESSURE: 68 MMHG | SYSTOLIC BLOOD PRESSURE: 128 MMHG | BODY MASS INDEX: 33.56 KG/M2 | HEART RATE: 82 BPM | HEIGHT: 66 IN | WEIGHT: 208.8 LBS

## 2019-01-01 VITALS
DIASTOLIC BLOOD PRESSURE: 65 MMHG | SYSTOLIC BLOOD PRESSURE: 172 MMHG | OXYGEN SATURATION: 98 % | RESPIRATION RATE: 22 BRPM

## 2019-01-01 VITALS
SYSTOLIC BLOOD PRESSURE: 110 MMHG | BODY MASS INDEX: 33.41 KG/M2 | OXYGEN SATURATION: 96 % | HEART RATE: 71 BPM | TEMPERATURE: 98.4 F | DIASTOLIC BLOOD PRESSURE: 56 MMHG | RESPIRATION RATE: 18 BRPM | WEIGHT: 207.9 LBS | HEIGHT: 66 IN

## 2019-01-01 VITALS
WEIGHT: 221.56 LBS | HEART RATE: 73 BPM | SYSTOLIC BLOOD PRESSURE: 119 MMHG | TEMPERATURE: 98.2 F | OXYGEN SATURATION: 97 % | BODY MASS INDEX: 36.91 KG/M2 | HEIGHT: 65 IN | RESPIRATION RATE: 24 BRPM | DIASTOLIC BLOOD PRESSURE: 61 MMHG

## 2019-01-01 VITALS
BODY MASS INDEX: 35.96 KG/M2 | SYSTOLIC BLOOD PRESSURE: 101 MMHG | OXYGEN SATURATION: 100 % | HEIGHT: 65 IN | HEART RATE: 75 BPM | RESPIRATION RATE: 22 BRPM | WEIGHT: 215.8 LBS | TEMPERATURE: 97.9 F | DIASTOLIC BLOOD PRESSURE: 49 MMHG

## 2019-01-01 VITALS
RESPIRATION RATE: 20 BRPM | SYSTOLIC BLOOD PRESSURE: 172 MMHG | HEIGHT: 65 IN | WEIGHT: 214.6 LBS | TEMPERATURE: 97.8 F | BODY MASS INDEX: 35.75 KG/M2 | OXYGEN SATURATION: 98 % | DIASTOLIC BLOOD PRESSURE: 79 MMHG | HEART RATE: 80 BPM

## 2019-01-01 VITALS
RESPIRATION RATE: 18 BRPM | BODY MASS INDEX: 34.31 KG/M2 | DIASTOLIC BLOOD PRESSURE: 41 MMHG | WEIGHT: 205.91 LBS | SYSTOLIC BLOOD PRESSURE: 87 MMHG | OXYGEN SATURATION: 95 % | HEART RATE: 67 BPM | HEIGHT: 65 IN

## 2019-01-01 VITALS — DIASTOLIC BLOOD PRESSURE: 84 MMHG | HEART RATE: 73 BPM | OXYGEN SATURATION: 91 % | SYSTOLIC BLOOD PRESSURE: 148 MMHG

## 2019-01-01 VITALS
BODY MASS INDEX: 35 KG/M2 | HEART RATE: 56 BPM | TEMPERATURE: 97.8 F | WEIGHT: 204 LBS | DIASTOLIC BLOOD PRESSURE: 51 MMHG | RESPIRATION RATE: 16 BRPM | SYSTOLIC BLOOD PRESSURE: 109 MMHG

## 2019-01-01 VITALS — DIASTOLIC BLOOD PRESSURE: 55 MMHG | SYSTOLIC BLOOD PRESSURE: 111 MMHG | OXYGEN SATURATION: 95 %

## 2019-01-01 VITALS — DIASTOLIC BLOOD PRESSURE: 83 MMHG | SYSTOLIC BLOOD PRESSURE: 149 MMHG | HEART RATE: 96 BPM

## 2019-01-01 VITALS
OXYGEN SATURATION: 96 % | HEIGHT: 66 IN | SYSTOLIC BLOOD PRESSURE: 168 MMHG | DIASTOLIC BLOOD PRESSURE: 72 MMHG | WEIGHT: 216 LBS | BODY MASS INDEX: 34.72 KG/M2 | HEART RATE: 95 BPM

## 2019-01-01 VITALS
BODY MASS INDEX: 33.59 KG/M2 | RESPIRATION RATE: 16 BRPM | TEMPERATURE: 98.2 F | SYSTOLIC BLOOD PRESSURE: 125 MMHG | WEIGHT: 209 LBS | HEIGHT: 66 IN | DIASTOLIC BLOOD PRESSURE: 78 MMHG | HEART RATE: 79 BPM

## 2019-01-01 VITALS
WEIGHT: 212.6 LBS | DIASTOLIC BLOOD PRESSURE: 72 MMHG | HEART RATE: 73 BPM | BODY MASS INDEX: 34.17 KG/M2 | HEIGHT: 66 IN | SYSTOLIC BLOOD PRESSURE: 120 MMHG

## 2019-01-01 VITALS
OXYGEN SATURATION: 96 % | SYSTOLIC BLOOD PRESSURE: 134 MMHG | HEART RATE: 95 BPM | TEMPERATURE: 98.3 F | WEIGHT: 225 LBS | RESPIRATION RATE: 30 BRPM | BODY MASS INDEX: 36.16 KG/M2 | DIASTOLIC BLOOD PRESSURE: 84 MMHG | HEIGHT: 66 IN

## 2019-01-01 VITALS
SYSTOLIC BLOOD PRESSURE: 117 MMHG | BODY MASS INDEX: 35.39 KG/M2 | HEIGHT: 66 IN | HEART RATE: 78 BPM | WEIGHT: 220.2 LBS | RESPIRATION RATE: 20 BRPM | TEMPERATURE: 98.1 F | DIASTOLIC BLOOD PRESSURE: 58 MMHG | OXYGEN SATURATION: 96 %

## 2019-01-01 VITALS — BODY MASS INDEX: 34.31 KG/M2 | WEIGHT: 213.5 LBS | HEIGHT: 66 IN

## 2019-01-01 VITALS
HEART RATE: 80 BPM | TEMPERATURE: 98.1 F | DIASTOLIC BLOOD PRESSURE: 90 MMHG | RESPIRATION RATE: 16 BRPM | SYSTOLIC BLOOD PRESSURE: 210 MMHG

## 2019-01-01 DIAGNOSIS — B37.2 CANDIDAL SKIN INFECTION: ICD-10-CM

## 2019-01-01 DIAGNOSIS — G89.29 CHRONIC LOW BACK PAIN WITHOUT SCIATICA, UNSPECIFIED BACK PAIN LATERALITY: ICD-10-CM

## 2019-01-01 DIAGNOSIS — D50.9 IRON DEFICIENCY ANEMIA, UNSPECIFIED IRON DEFICIENCY ANEMIA TYPE: ICD-10-CM

## 2019-01-01 DIAGNOSIS — M54.50 CHRONIC LOW BACK PAIN WITHOUT SCIATICA, UNSPECIFIED BACK PAIN LATERALITY: ICD-10-CM

## 2019-01-01 DIAGNOSIS — Z09 HOSPITAL DISCHARGE FOLLOW-UP: Primary | ICD-10-CM

## 2019-01-01 DIAGNOSIS — E66.01 MORBID OBESITY WITH BMI OF 40.0-44.9, ADULT (HCC): ICD-10-CM

## 2019-01-01 DIAGNOSIS — J44.9 COPD WITHOUT EXACERBATION (HCC): ICD-10-CM

## 2019-01-01 DIAGNOSIS — J96.11 CHRONIC RESPIRATORY FAILURE WITH HYPOXIA (HCC): ICD-10-CM

## 2019-01-01 DIAGNOSIS — I35.0 NONRHEUMATIC AORTIC VALVE STENOSIS: ICD-10-CM

## 2019-01-01 DIAGNOSIS — D64.9 NORMOCYTIC ANEMIA: Primary | ICD-10-CM

## 2019-01-01 DIAGNOSIS — I35.0 AORTIC STENOSIS, SEVERE: ICD-10-CM

## 2019-01-01 DIAGNOSIS — J44.9 STAGE 3 SEVERE COPD BY GOLD CLASSIFICATION (HCC): ICD-10-CM

## 2019-01-01 DIAGNOSIS — J45.40 MODERATE PERSISTENT ASTHMA WITHOUT COMPLICATION: ICD-10-CM

## 2019-01-01 DIAGNOSIS — R07.9 CHEST PAIN, UNSPECIFIED TYPE: Primary | ICD-10-CM

## 2019-01-01 DIAGNOSIS — Z98.61 S/P PTCA (PERCUTANEOUS TRANSLUMINAL CORONARY ANGIOPLASTY): ICD-10-CM

## 2019-01-01 DIAGNOSIS — I35.0 SEVERE AORTIC STENOSIS: Primary | ICD-10-CM

## 2019-01-01 DIAGNOSIS — I74.5 BILATERAL ILIAC ARTERY OCCLUSION (HCC): Primary | ICD-10-CM

## 2019-01-01 DIAGNOSIS — J44.9 CHRONIC OBSTRUCTIVE PULMONARY DISEASE, UNSPECIFIED COPD TYPE (HCC): ICD-10-CM

## 2019-01-01 DIAGNOSIS — R53.81 PHYSICAL DECONDITIONING: Primary | ICD-10-CM

## 2019-01-01 DIAGNOSIS — Z51.81 MEDICATION MONITORING ENCOUNTER: ICD-10-CM

## 2019-01-01 DIAGNOSIS — E03.9 HYPOTHYROIDISM, UNSPECIFIED TYPE: ICD-10-CM

## 2019-01-01 DIAGNOSIS — I10 UNCONTROLLED HYPERTENSION: ICD-10-CM

## 2019-01-01 DIAGNOSIS — D64.9 ANEMIA REQUIRING TRANSFUSIONS: ICD-10-CM

## 2019-01-01 DIAGNOSIS — F51.04 PSYCHOPHYSIOLOGICAL INSOMNIA: ICD-10-CM

## 2019-01-01 DIAGNOSIS — G89.29 CHRONIC NECK PAIN: ICD-10-CM

## 2019-01-01 DIAGNOSIS — E78.5 DYSLIPIDEMIA: ICD-10-CM

## 2019-01-01 DIAGNOSIS — I10 ESSENTIAL HYPERTENSION: ICD-10-CM

## 2019-01-01 DIAGNOSIS — K75.81 NASH (NONALCOHOLIC STEATOHEPATITIS): ICD-10-CM

## 2019-01-01 DIAGNOSIS — I50.32 CHF (CONGESTIVE HEART FAILURE), NYHA CLASS II, CHRONIC, DIASTOLIC (HCC): Primary | ICD-10-CM

## 2019-01-01 DIAGNOSIS — R80.9 MICROALBUMINURIA: ICD-10-CM

## 2019-01-01 DIAGNOSIS — E87.1 HYPONATREMIA WITH DECREASED SERUM OSMOLALITY: Primary | ICD-10-CM

## 2019-01-01 DIAGNOSIS — E66.9 OBESITY (BMI 30-39.9): ICD-10-CM

## 2019-01-01 DIAGNOSIS — I70.209 FEMORAL ARTERY OCCLUSION (HCC): ICD-10-CM

## 2019-01-01 DIAGNOSIS — E83.52 HYPERCALCEMIA: ICD-10-CM

## 2019-01-01 DIAGNOSIS — I50.32 CHF (CONGESTIVE HEART FAILURE), NYHA CLASS II, CHRONIC, DIASTOLIC (HCC): ICD-10-CM

## 2019-01-01 DIAGNOSIS — N18.30 CKD (CHRONIC KIDNEY DISEASE) STAGE 3, GFR 30-59 ML/MIN (HCC): ICD-10-CM

## 2019-01-01 DIAGNOSIS — R94.31 ABNORMAL ELECTROCARDIOGRAM (ECG) (EKG): ICD-10-CM

## 2019-01-01 DIAGNOSIS — E61.1 IRON DEFICIENCY: ICD-10-CM

## 2019-01-01 DIAGNOSIS — Z71.2 ENCOUNTER TO DISCUSS TEST RESULTS: Primary | ICD-10-CM

## 2019-01-01 DIAGNOSIS — K59.00 CONSTIPATION, UNSPECIFIED CONSTIPATION TYPE: ICD-10-CM

## 2019-01-01 DIAGNOSIS — M54.2 CHRONIC NECK PAIN: Primary | ICD-10-CM

## 2019-01-01 DIAGNOSIS — Z95.2 S/P TAVR (TRANSCATHETER AORTIC VALVE REPLACEMENT): ICD-10-CM

## 2019-01-01 DIAGNOSIS — I50.21 ACUTE SYSTOLIC CONGESTIVE HEART FAILURE (HCC): Primary | ICD-10-CM

## 2019-01-01 DIAGNOSIS — I74.5 ILIAC ARTERY OCCLUSION, BILATERAL (HCC): Primary | ICD-10-CM

## 2019-01-01 DIAGNOSIS — D64.9 NORMOCYTIC ANEMIA: ICD-10-CM

## 2019-01-01 DIAGNOSIS — J96.12 CHRONIC HYPERCAPNIC RESPIRATORY FAILURE (HCC): ICD-10-CM

## 2019-01-01 DIAGNOSIS — S20.222A CONTUSION OF LEFT SIDE OF BACK, INITIAL ENCOUNTER: ICD-10-CM

## 2019-01-01 DIAGNOSIS — I50.9 CONGESTIVE HEART FAILURE, UNSPECIFIED HF CHRONICITY, UNSPECIFIED HEART FAILURE TYPE (HCC): ICD-10-CM

## 2019-01-01 DIAGNOSIS — I50.31 ACUTE DIASTOLIC HEART FAILURE (HCC): ICD-10-CM

## 2019-01-01 DIAGNOSIS — G93.41 METABOLIC ENCEPHALOPATHY: ICD-10-CM

## 2019-01-01 DIAGNOSIS — M54.2 NECK PAIN: ICD-10-CM

## 2019-01-01 DIAGNOSIS — G89.4 CHRONIC PAIN SYNDROME: ICD-10-CM

## 2019-01-01 DIAGNOSIS — F41.1 GAD (GENERALIZED ANXIETY DISORDER): ICD-10-CM

## 2019-01-01 DIAGNOSIS — I50.33 ACUTE ON CHRONIC DIASTOLIC CHF (CONGESTIVE HEART FAILURE), NYHA CLASS 3 (HCC): Primary | ICD-10-CM

## 2019-01-01 DIAGNOSIS — Z79.4 TYPE 2 DIABETES MELLITUS WITH COMPLICATION, WITH LONG-TERM CURRENT USE OF INSULIN (HCC): ICD-10-CM

## 2019-01-01 DIAGNOSIS — E53.8 B12 DEFICIENCY: ICD-10-CM

## 2019-01-01 DIAGNOSIS — I50.20 SYSTOLIC CONGESTIVE HEART FAILURE, UNSPECIFIED HF CHRONICITY (HCC): ICD-10-CM

## 2019-01-01 DIAGNOSIS — R91.1 LUNG NODULE: ICD-10-CM

## 2019-01-01 DIAGNOSIS — E87.1 HYPONATREMIA: Primary | ICD-10-CM

## 2019-01-01 DIAGNOSIS — R41.0 DISORIENTATION: Primary | ICD-10-CM

## 2019-01-01 DIAGNOSIS — I70.8 LEFT SUBCLAVIAN ARTERY OCCLUSION: ICD-10-CM

## 2019-01-01 DIAGNOSIS — E11.8 TYPE 2 DIABETES MELLITUS WITH COMPLICATION, WITH LONG-TERM CURRENT USE OF INSULIN (HCC): ICD-10-CM

## 2019-01-01 DIAGNOSIS — I25.10 ASHD (ARTERIOSCLEROTIC HEART DISEASE): ICD-10-CM

## 2019-01-01 DIAGNOSIS — S61.309A COMPLETE AVULSION OF FINGERNAIL, INITIAL ENCOUNTER: ICD-10-CM

## 2019-01-01 DIAGNOSIS — J44.1 COPD EXACERBATION (HCC): ICD-10-CM

## 2019-01-01 DIAGNOSIS — R60.0 PEDAL EDEMA: ICD-10-CM

## 2019-01-01 DIAGNOSIS — I35.0 AORTIC VALVE STENOSIS, ETIOLOGY OF CARDIAC VALVE DISEASE UNSPECIFIED: ICD-10-CM

## 2019-01-01 DIAGNOSIS — M15.9 PRIMARY OSTEOARTHRITIS INVOLVING MULTIPLE JOINTS: ICD-10-CM

## 2019-01-01 DIAGNOSIS — I65.23 BILATERAL CAROTID ARTERY STENOSIS: ICD-10-CM

## 2019-01-01 DIAGNOSIS — I50.33 CHF (CONGESTIVE HEART FAILURE), NYHA CLASS III, ACUTE ON CHRONIC, DIASTOLIC (HCC): Primary | ICD-10-CM

## 2019-01-01 DIAGNOSIS — G47.33 OSA (OBSTRUCTIVE SLEEP APNEA): Primary | ICD-10-CM

## 2019-01-01 DIAGNOSIS — R77.8 ELEVATED TROPONIN: ICD-10-CM

## 2019-01-01 DIAGNOSIS — R53.1 WEAKNESS: ICD-10-CM

## 2019-01-01 DIAGNOSIS — I50.9 ACUTE ON CHRONIC CONGESTIVE HEART FAILURE, UNSPECIFIED HEART FAILURE TYPE (HCC): ICD-10-CM

## 2019-01-01 DIAGNOSIS — F41.9 ANXIETY: Primary | ICD-10-CM

## 2019-01-01 DIAGNOSIS — M54.2 CHRONIC NECK PAIN: ICD-10-CM

## 2019-01-01 DIAGNOSIS — R63.5 WEIGHT GAIN: ICD-10-CM

## 2019-01-01 DIAGNOSIS — I50.33 ACUTE ON CHRONIC DIASTOLIC CHF (CONGESTIVE HEART FAILURE), NYHA CLASS 3 (HCC): ICD-10-CM

## 2019-01-01 DIAGNOSIS — R05.9 COUGH: ICD-10-CM

## 2019-01-01 DIAGNOSIS — E55.9 VITAMIN D DEFICIENCY: ICD-10-CM

## 2019-01-01 DIAGNOSIS — R07.9 CHEST PAIN, UNSPECIFIED TYPE: ICD-10-CM

## 2019-01-01 DIAGNOSIS — E87.6 HYPOKALEMIA: ICD-10-CM

## 2019-01-01 DIAGNOSIS — R11.2 NAUSEA AND VOMITING, INTRACTABILITY OF VOMITING NOT SPECIFIED, UNSPECIFIED VOMITING TYPE: ICD-10-CM

## 2019-01-01 DIAGNOSIS — Z98.890 S/P CARDIAC CATHETERIZATION: ICD-10-CM

## 2019-01-01 DIAGNOSIS — R91.1 INCIDENTAL PULMONARY NODULE, > 3MM AND < 8MM: ICD-10-CM

## 2019-01-01 DIAGNOSIS — A41.9 SEPTICEMIA (HCC): Primary | ICD-10-CM

## 2019-01-01 DIAGNOSIS — I50.30 HEART FAILURE WITH PRESERVED EJECTION FRACTION (HCC): ICD-10-CM

## 2019-01-01 DIAGNOSIS — E88.81 DYSMETABOLIC SYNDROME: ICD-10-CM

## 2019-01-01 DIAGNOSIS — G47.33 OSA (OBSTRUCTIVE SLEEP APNEA): ICD-10-CM

## 2019-01-01 DIAGNOSIS — D50.8 IRON DEFICIENCY ANEMIA DUE TO DIETARY CAUSES: ICD-10-CM

## 2019-01-01 DIAGNOSIS — E87.1 HYPONATREMIA: ICD-10-CM

## 2019-01-01 DIAGNOSIS — R53.81 OTHER MALAISE: ICD-10-CM

## 2019-01-01 DIAGNOSIS — F33.42 RECURRENT MAJOR DEPRESSIVE DISORDER, IN FULL REMISSION (HCC): ICD-10-CM

## 2019-01-01 DIAGNOSIS — D64.9 CHRONIC ANEMIA: ICD-10-CM

## 2019-01-01 DIAGNOSIS — G47.33 OBSTRUCTIVE SLEEP APNEA: Primary | ICD-10-CM

## 2019-01-01 DIAGNOSIS — I74.5 BILATERAL ILIAC ARTERY OCCLUSION (HCC): ICD-10-CM

## 2019-01-01 DIAGNOSIS — E66.9 OBESITY, UNSPECIFIED CLASSIFICATION, UNSPECIFIED OBESITY TYPE, UNSPECIFIED WHETHER SERIOUS COMORBIDITY PRESENT: ICD-10-CM

## 2019-01-01 DIAGNOSIS — I35.0 SEVERE AORTIC STENOSIS: ICD-10-CM

## 2019-01-01 DIAGNOSIS — R91.1 PULMONARY NODULE: ICD-10-CM

## 2019-01-01 DIAGNOSIS — F31.9 BIPOLAR 1 DISORDER (HCC): ICD-10-CM

## 2019-01-01 DIAGNOSIS — R79.89 AZOTEMIA: ICD-10-CM

## 2019-01-01 DIAGNOSIS — I50.31 ACUTE DIASTOLIC HEART FAILURE (HCC): Primary | ICD-10-CM

## 2019-01-01 DIAGNOSIS — E53.8 B12 DEFICIENCY DUE TO DIET: ICD-10-CM

## 2019-01-01 DIAGNOSIS — Z86.79 HISTORY OF AORTIC STENOSIS: ICD-10-CM

## 2019-01-01 DIAGNOSIS — Z95.2 S/P TAVR (TRANSCATHETER AORTIC VALVE REPLACEMENT): Primary | ICD-10-CM

## 2019-01-01 DIAGNOSIS — R91.1 INCIDENTAL PULMONARY NODULE, > 3MM AND < 8MM: Primary | ICD-10-CM

## 2019-01-01 DIAGNOSIS — J44.9 CHRONIC OBSTRUCTIVE PULMONARY DISEASE, UNSPECIFIED COPD TYPE (HCC): Primary | ICD-10-CM

## 2019-01-01 DIAGNOSIS — G89.29 CHRONIC NECK PAIN: Primary | ICD-10-CM

## 2019-01-01 DIAGNOSIS — K21.9 GASTROESOPHAGEAL REFLUX DISEASE, ESOPHAGITIS PRESENCE NOT SPECIFIED: ICD-10-CM
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
## 2019-01-01 DIAGNOSIS — Z71.3 DIETARY COUNSELING AND SURVEILLANCE: ICD-10-CM

## 2019-01-01 DIAGNOSIS — I50.33 CHF (CONGESTIVE HEART FAILURE), NYHA CLASS III, ACUTE ON CHRONIC, DIASTOLIC (HCC): ICD-10-CM

## 2019-01-01 DIAGNOSIS — I50.9 ACUTE ON CHRONIC CONGESTIVE HEART FAILURE, UNSPECIFIED HEART FAILURE TYPE (HCC): Primary | ICD-10-CM

## 2019-01-01 DIAGNOSIS — R82.81 PYURIA: ICD-10-CM

## 2019-01-01 DIAGNOSIS — E03.8 OTHER SPECIFIED HYPOTHYROIDISM: ICD-10-CM

## 2019-01-01 LAB
A/G RATIO: 1 (ref 1.5–2.5)
AB SCREEN: NEGATIVE
AB SCREEN: NEGATIVE
ABO/RH: NORMAL
ABO: NORMAL
ABSOLUTE BASO #: 0 /CMM (ref 0–200)
ABSOLUTE EOS #: 0 /CMM (ref 0–500)
ABSOLUTE EOS #: 0 /CMM (ref 0–500)
ABSOLUTE EOS #: 100 /CMM (ref 0–500)
ABSOLUTE EOS #: 200 /CMM (ref 0–500)
ABSOLUTE EOS #: 200 /CMM (ref 0–500)
ABSOLUTE LYMPH #: 400 /CMM (ref 1000–4800)
ABSOLUTE LYMPH #: 500 /CMM (ref 1000–4800)
ABSOLUTE LYMPH #: 600 /CMM (ref 1000–4800)
ABSOLUTE LYMPH #: 800 /CMM (ref 1000–4800)
ABSOLUTE MONO #: 300 /CMM (ref 0–800)
ABSOLUTE MONO #: 500 /CMM (ref 0–800)
ABSOLUTE MONO #: 700 /CMM (ref 0–800)
ABSOLUTE NEUT #: 2600 /CMM (ref 1800–7700)
ABSOLUTE NEUT #: 2700 /CMM (ref 1800–7700)
ABSOLUTE NEUT #: 2800 /CMM (ref 1800–7700)
ABSOLUTE NEUT #: 2900 /CMM (ref 1800–7700)
ABSOLUTE NEUT #: 3100 /CMM (ref 1800–7700)
ABSOLUTE NEUT #: 3500 /CMM (ref 1800–7700)
ABSOLUTE NEUT #: 5200 /CMM (ref 1800–7700)
ABSOLUTE NEUT #: 6000 /CMM (ref 1800–7700)
ACINETOBACTER BAUMANNII FILM ARRAY: NOT DETECTED
ACTIVATED CLOTTING TIME: 135 SEC (ref 79–149)
ACTIVATED CLOTTING TIME: 290 SEC (ref 79–149)
ADENOVIRUS F 40 41 PCR: NOT DETECTED
ADENOVIRUS F 40 41 PCR: NOT DETECTED
ALBUMIN SERPL-MCNC: 2.5 GM/DL (ref 3.5–5)
ALBUMIN SERPL-MCNC: 2.9 G/DL (ref 3.5–5.1)
ALBUMIN SERPL-MCNC: 3 G/DL (ref 3.5–5.1)
ALBUMIN SERPL-MCNC: 3.1 G/DL (ref 3.5–5.1)
ALBUMIN SERPL-MCNC: 4.1 G/DL (ref 3.5–5.1)
ALBUMIN SERPL-MCNC: 4.4 G/DL (ref 3.5–5.1)
ALBUMIN SERPL-MCNC: 4.6 G/DL (ref 3.5–5.1)
ALBUMIN SERPL-MCNC: 4.6 G/DL (ref 3.5–5.1)
ALLEN TEST: ABNORMAL
ALLEN TEST: POSITIVE
ALP BLD-CCNC: 34 U/L (ref 38–126)
ALP BLD-CCNC: 38 U/L (ref 38–126)
ALP BLD-CCNC: 42 U/L (ref 38–126)
ALP BLD-CCNC: 54 U/L (ref 38–126)
ALP BLD-CCNC: 55 U/L (ref 38–126)
ALP BLD-CCNC: 56 U/L (ref 38–126)
ALP BLD-CCNC: 58 U/L (ref 38–126)
ALP BLD-CCNC: 60 U/L (ref 38–126)
ALP BLD-CCNC: 61 IU/L (ref 39–118)
ALP BLD-CCNC: 62 U/L (ref 38–126)
ALP BLD-CCNC: 67 U/L (ref 38–126)
ALP BLD-CCNC: 70 U/L (ref 38–126)
ALP BLD-CCNC: 75 U/L (ref 38–126)
ALT SERPL-CCNC: 11 IU/L (ref 10–40)
ALT SERPL-CCNC: 12 U/L (ref 11–66)
ALT SERPL-CCNC: 15 U/L (ref 11–66)
ALT SERPL-CCNC: 15 U/L (ref 11–66)
ALT SERPL-CCNC: 16 U/L (ref 11–66)
ALT SERPL-CCNC: 17 U/L (ref 11–66)
ALT SERPL-CCNC: 18 U/L (ref 11–66)
ALT SERPL-CCNC: 19 U/L (ref 11–66)
ALT SERPL-CCNC: 23 U/L (ref 11–66)
ALT SERPL-CCNC: 24 U/L (ref 11–66)
ALT SERPL-CCNC: 25 U/L (ref 11–66)
ALT SERPL-CCNC: 25 U/L (ref 11–66)
ALT SERPL-CCNC: 31 U/L (ref 11–66)
AMMONIA: 20 UMOL/L (ref 11–60)
AMMONIA: 45 UMOL/L (ref 11–60)
AMORPHOUS: ABNORMAL
AMPHETAMINE+METHAMPHETAMINE URINE SCREEN: NEGATIVE
AMPHETAMINE+METHAMPHETAMINE URINE SCREEN: NEGATIVE
ANION GAP SERPL CALCULATED.3IONS-SCNC: 10 MEQ/L (ref 8–16)
ANION GAP SERPL CALCULATED.3IONS-SCNC: 11 MEQ/L (ref 8–16)
ANION GAP SERPL CALCULATED.3IONS-SCNC: 12 MEQ/L (ref 8–16)
ANION GAP SERPL CALCULATED.3IONS-SCNC: 13 MEQ/L (ref 8–16)
ANION GAP SERPL CALCULATED.3IONS-SCNC: 14 MEQ/L (ref 8–16)
ANION GAP SERPL CALCULATED.3IONS-SCNC: 15 MEQ/L (ref 8–16)
ANION GAP SERPL CALCULATED.3IONS-SCNC: 16 MEQ/L (ref 8–16)
ANION GAP SERPL CALCULATED.3IONS-SCNC: 16 MEQ/L (ref 8–16)
ANION GAP SERPL CALCULATED.3IONS-SCNC: 17 MEQ/L (ref 8–16)
ANION GAP SERPL CALCULATED.3IONS-SCNC: 18 MEQ/L (ref 8–16)
ANION GAP SERPL CALCULATED.3IONS-SCNC: 19 MEQ/L (ref 8–16)
ANION GAP SERPL CALCULATED.3IONS-SCNC: 2 MMOL/L (ref 4–12)
ANION GAP SERPL CALCULATED.3IONS-SCNC: 3 MMOL/L (ref 4–12)
ANION GAP SERPL CALCULATED.3IONS-SCNC: 3 MMOL/L (ref 4–12)
ANION GAP SERPL CALCULATED.3IONS-SCNC: 4 MMOL/L (ref 4–12)
ANION GAP SERPL CALCULATED.3IONS-SCNC: 5 MMOL/L (ref 4–12)
ANION GAP SERPL CALCULATED.3IONS-SCNC: 5 MMOL/L (ref 4–12)
ANION GAP SERPL CALCULATED.3IONS-SCNC: 6 MMOL/L (ref 4–12)
ANION GAP SERPL CALCULATED.3IONS-SCNC: 7 MMOL/L (ref 4–12)
ANION GAP SERPL CALCULATED.3IONS-SCNC: 8 MEQ/L (ref 8–16)
ANION GAP: 7 MMOL/L (ref 7–16)
ANISOCYTOSIS: ABNORMAL
ANTIBODY SCREEN: NEGATIVE
ANTIBODY SCREEN: NORMAL
APPEARANCE: ABNORMAL
APTT: 35.5 SECONDS (ref 22–38)
APTT: 37.7 SECONDS (ref 22–38)
APTT: 38.6 SECONDS (ref 22–38)
APTT: 39 SECONDS (ref 22–38)
APTT: 39 SECONDS (ref 22–38)
APTT: 41.3 SECONDS (ref 22–38)
APTT: 45 SECONDS (ref 22–38)
APTT: 50.2 SECONDS (ref 22–38)
ARM BAND NUMBER: NORMAL
AST SERPL-CCNC: 14 U/L (ref 5–40)
AST SERPL-CCNC: 14 U/L (ref 5–40)
AST SERPL-CCNC: 16 U/L (ref 5–40)
AST SERPL-CCNC: 17 IU/L (ref 15–41)
AST SERPL-CCNC: 17 U/L (ref 5–40)
AST SERPL-CCNC: 18 U/L (ref 5–40)
AST SERPL-CCNC: 20 U/L (ref 5–40)
AST SERPL-CCNC: 20 U/L (ref 5–40)
AST SERPL-CCNC: 22 U/L (ref 5–40)
AST SERPL-CCNC: 26 U/L (ref 5–40)
AST SERPL-CCNC: 36 U/L (ref 5–40)
AST SERPL-CCNC: 40 U/L (ref 5–40)
AST SERPL-CCNC: 44 U/L (ref 5–40)
ASTROVIRUS PCR: NOT DETECTED
ASTROVIRUS PCR: NOT DETECTED
AVERAGE GLUCOSE: 117 MG/DL (ref 70–126)
AVERAGE GLUCOSE: 117 MG/DL (ref 70–126)
BACTERIA: ABNORMAL
BACTERIA: ABNORMAL /HPF
BARBITURATE QUANTITATIVE URINE: NEGATIVE
BARBITURATE QUANTITATIVE URINE: NEGATIVE
BASE EXCESS (CALCULATED): 0.4 MMOL/L (ref -2.5–2.5)
BASE EXCESS (CALCULATED): 11.3 MMOL/L (ref -2.5–2.5)
BASE EXCESS (CALCULATED): 3.8 MMOL/L (ref -2.5–2.5)
BASE EXCESS (CALCULATED): 5.9 MMOL/L (ref -2.5–2.5)
BASE EXCESS (CALCULATED): 6.4 MMOL/L (ref -2.5–2.5)
BASE EXCESS (CALCULATED): 6.9 MMOL/L (ref -2.5–2.5)
BASE EXCESS (CALCULATED): 7 MMOL/L (ref -2.5–2.5)
BASE EXCESS (CALCULATED): 9.1 MMOL/L (ref -2.5–2.5)
BASE EXCESS ARTERIAL: 10 MMOL/L (ref -2–3)
BASE EXCESS ARTERIAL: 2 MMOL/L (ref -2–3)
BASE EXCESS ARTERIAL: 5 MMOL/L (ref -2–3)
BASE EXCESS ARTERIAL: 6 MMOL/L (ref -2–3)
BASE EXCESS ARTERIAL: 7 MMOL/L (ref -2–3)
BASE EXCESS ARTERIAL: 7 MMOL/L (ref -2–3)
BASE EXCESS ARTERIAL: 8 MMOL/L (ref -2–3)
BASE EXCESS ARTERIAL: 9 MMOL/L (ref -2–3)
BASINOPHIL, AUTOMATED: 0 % (ref 0–3)
BASOPHILS # BLD: 0.1 %
BASOPHILS # BLD: 0.2 %
BASOPHILS # BLD: 0.3 %
BASOPHILS # BLD: 0.4 %
BASOPHILS # BLD: 0.5 %
BASOPHILS # BLD: 0.6 %
BASOPHILS # BLD: 0.8 %
BASOPHILS ABSOLUTE: 0 THOU/MM3 (ref 0–0.1)
BASOPHILS RELATIVE PERCENT: 0.4 % (ref 0–2)
BASOPHILS RELATIVE PERCENT: 0.4 % (ref 0–2)
BASOPHILS RELATIVE PERCENT: 0.6 % (ref 0–2)
BASOPHILS RELATIVE PERCENT: 0.8 % (ref 0–2)
BASOPHILS RELATIVE PERCENT: 1 % (ref 0–2)
BASOPHILS RELATIVE PERCENT: 1.1 % (ref 0–2)
BASOPHILS RELATIVE PERCENT: 1.2 % (ref 0–2)
BASOPHILS RELATIVE PERCENT: 1.2 % (ref 0–2)
BENZODIAZEPINE QUANTITATIVE URINE: NEGATIVE
BENZODIAZEPINE QUANTITATIVE URINE: NEGATIVE
BILIRUB SERPL-MCNC: 0.2 MG/DL (ref 0.3–1.2)
BILIRUB SERPL-MCNC: 0.3 MG/DL (ref 0.3–1.2)
BILIRUB SERPL-MCNC: 0.3 MG/DL (ref 0.3–1.2)
BILIRUB SERPL-MCNC: 0.4 MG/DL (ref 0.3–1.2)
BILIRUB SERPL-MCNC: 0.5 MG/DL (ref 0.2–1)
BILIRUB SERPL-MCNC: 0.5 MG/DL (ref 0.3–1.2)
BILIRUB SERPL-MCNC: < 0.2 MG/DL (ref 0.3–1.2)
BILIRUBIN DIRECT: < 0.2 MG/DL (ref 0–0.3)
BILIRUBIN URINE: NEGATIVE
BLOOD BANK SPECIMEN: NORMAL
BLOOD CULTURE, ROUTINE: ABNORMAL
BLOOD CULTURE, ROUTINE: ABNORMAL
BLOOD CULTURE, ROUTINE: NORMAL
BLOOD TYPE: NORMAL
BLOOD TYPE: NORMAL
BLOOD, URINE: ABNORMAL
BLOOD, URINE: ABNORMAL
BLOOD, URINE: NEGATIVE
BOTTLE TYPE: ABNORMAL
BUN BLDV-MCNC: 12 MG/DL (ref 7–22)
BUN BLDV-MCNC: 13 MG/DL (ref 7–22)
BUN BLDV-MCNC: 13 MG/DL (ref 7–22)
BUN BLDV-MCNC: 14 MG/DL (ref 7–22)
BUN BLDV-MCNC: 15 MG/DL (ref 7–22)
BUN BLDV-MCNC: 16 MG/DL (ref 7–22)
BUN BLDV-MCNC: 16 MG/DL (ref 7–22)
BUN BLDV-MCNC: 17 MG/DL (ref 7–22)
BUN BLDV-MCNC: 18 MG/DL (ref 7–22)
BUN BLDV-MCNC: 19 MG/DL (ref 7–22)
BUN BLDV-MCNC: 19 MG/DL (ref 7–22)
BUN BLDV-MCNC: 21 MG/DL (ref 7–22)
BUN BLDV-MCNC: 22 MG/DL (ref 7–22)
BUN BLDV-MCNC: 25 MG/DL (ref 7–20)
BUN BLDV-MCNC: 25 MG/DL (ref 7–22)
BUN BLDV-MCNC: 26 MG/DL (ref 7–20)
BUN BLDV-MCNC: 26 MG/DL (ref 7–20)
BUN BLDV-MCNC: 27 MG/DL (ref 7–22)
BUN BLDV-MCNC: 28 MG/DL (ref 7–20)
BUN BLDV-MCNC: 28 MG/DL (ref 7–20)
BUN BLDV-MCNC: 28 MG/DL (ref 7–22)
BUN BLDV-MCNC: 29 MG/DL (ref 7–20)
BUN BLDV-MCNC: 31 MG/DL (ref 7–22)
BUN BLDV-MCNC: 31 MG/DL (ref 7–22)
BUN BLDV-MCNC: 35 MG/DL (ref 7–22)
BUN BLDV-MCNC: 36 MG/DL (ref 7–22)
BUN BLDV-MCNC: 36 MG/DL (ref 7–22)
BUN BLDV-MCNC: 37 MG/DL (ref 7–20)
BUN BLDV-MCNC: 39 MG/DL (ref 7–20)
BUN BLDV-MCNC: 39 MG/DL (ref 7–22)
BUN BLDV-MCNC: 42 MG/DL (ref 7–22)
BUN BLDV-MCNC: 48 MG/DL (ref 7–22)
BUN BLDV-MCNC: 51 MG/DL (ref 7–22)
CA 125: 37 U/ML
CA 19-9: 13 U/ML (ref 0–35)
CALCIUM IONIZED: 0.95 MMOL/L (ref 1.12–1.32)
CALCIUM IONIZED: 1.08 MMOL/L (ref 1.12–1.32)
CALCIUM IONIZED: 1.1 MMOL/L (ref 1.12–1.32)
CALCIUM SERPL-MCNC: 10 MG/DL (ref 8.5–10.5)
CALCIUM SERPL-MCNC: 10.1 MG/DL (ref 8.5–10.5)
CALCIUM SERPL-MCNC: 10.1 MG/DL (ref 8.5–10.5)
CALCIUM SERPL-MCNC: 10.3 MG/DL (ref 8.5–10.5)
CALCIUM SERPL-MCNC: 10.4 MG/DL (ref 8.5–10.5)
CALCIUM SERPL-MCNC: 10.6 MG/DL (ref 8.5–10.5)
CALCIUM SERPL-MCNC: 10.7 MG/DL (ref 8.5–10.5)
CALCIUM SERPL-MCNC: 10.8 MG/DL (ref 8.5–10.5)
CALCIUM SERPL-MCNC: 10.9 MG/DL (ref 8.5–10.5)
CALCIUM SERPL-MCNC: 11 MG/DL (ref 8.5–10.5)
CALCIUM SERPL-MCNC: 11.1 MG/DL (ref 8.5–10.5)
CALCIUM SERPL-MCNC: 11.6 MG/DL (ref 8.5–10.5)
CALCIUM SERPL-MCNC: 7.6 MG/DL (ref 8.8–10.5)
CALCIUM SERPL-MCNC: 7.9 MG/DL (ref 8.8–10.5)
CALCIUM SERPL-MCNC: 8 MG/DL (ref 8.8–10.5)
CALCIUM SERPL-MCNC: 8.3 MG/DL (ref 8.8–10.5)
CALCIUM SERPL-MCNC: 8.3 MG/DL (ref 8.8–10.5)
CALCIUM SERPL-MCNC: 8.4 MG/DL (ref 8.5–10.5)
CALCIUM SERPL-MCNC: 8.4 MG/DL (ref 8.8–10.5)
CALCIUM SERPL-MCNC: 8.5 MG/DL (ref 8.5–10.5)
CALCIUM SERPL-MCNC: 8.5 MG/DL (ref 8.5–10.5)
CALCIUM SERPL-MCNC: 8.6 MG/DL (ref 8.5–10.5)
CALCIUM SERPL-MCNC: 8.7 MG/DL (ref 8.5–10.5)
CALCIUM SERPL-MCNC: 8.8 MG/DL (ref 8.5–10.5)
CALCIUM SERPL-MCNC: 8.8 MG/DL (ref 8.5–10.5)
CALCIUM SERPL-MCNC: 8.9 MG/DL (ref 8.5–10.5)
CALCIUM SERPL-MCNC: 9 MG/DL (ref 8.5–10.5)
CALCIUM SERPL-MCNC: 9 MG/DL (ref 8.8–10.5)
CALCIUM SERPL-MCNC: 9.1 MG/DL (ref 8.5–10.5)
CALCIUM SERPL-MCNC: 9.1 MG/DL (ref 8.8–10.5)
CALCIUM SERPL-MCNC: 9.2 MG/DL (ref 8.5–10.5)
CALCIUM SERPL-MCNC: 9.2 MG/DL (ref 8.5–10.5)
CALCIUM SERPL-MCNC: 9.4 MG/DL (ref 8.5–10.5)
CALCIUM SERPL-MCNC: 9.5 MG/DL (ref 8.5–10.5)
CALCIUM SERPL-MCNC: 9.6 MG/DL (ref 8.5–10.5)
CALCIUM SERPL-MCNC: 9.6 MG/DL (ref 8.5–10.5)
CALCIUM SERPL-MCNC: 9.7 MG/DL (ref 8.5–10.5)
CALCIUM SERPL-MCNC: 9.8 MG/DL (ref 8.5–10.5)
CALCIUM SERPL-MCNC: 9.8 MG/DL (ref 8.5–10.5)
CALCIUM SERPL-MCNC: 9.9 MG/DL (ref 8.5–10.5)
CALCIUM SERPL-MCNC: 9.9 MG/DL (ref 8.5–10.5)
CAMPYLOBACTER PCR: NOT DETECTED
CAMPYLOBACTER PCR: NOT DETECTED
CANDIDA ALBICANS FILM ARRAY: NOT DETECTED
CANDIDA GLABRATA FILM ARRAY: NOT DETECTED
CANDIDA KRUSEI FILM ARRAY: NOT DETECTED
CANDIDA PARAPSILOSIS FILM ARRAY: NOT DETECTED
CANDIDA TROPICALIS FILM ARRAY: NOT DETECTED
CANNABINOID QUANTITATIVE URINE: NEGATIVE
CANNABINOID QUANTITATIVE URINE: NEGATIVE
CARBAPENEM RESITANT FILM ARRAY: ABNORMAL
CARBON MONOXIDE, BLOOD: 9.4 % CO SAT
CASTS 2: ABNORMAL /LPF
CASTS UA: ABNORMAL /LPF
CASTS: ABNORMAL /LPF
CEA: 2.3 NG/ML (ref 0–5)
CHARACTER, URINE: ABNORMAL
CHARACTER, URINE: CLEAR
CHLORIDE BLD-SCNC: 100 MEQ/L (ref 98–111)
CHLORIDE BLD-SCNC: 104 MEQ/L (ref 101–111)
CHLORIDE BLD-SCNC: 107 MEQ/L (ref 101–111)
CHLORIDE BLD-SCNC: 108 MEQ/L (ref 101–111)
CHLORIDE BLD-SCNC: 109 MEQ/L (ref 101–111)
CHLORIDE BLD-SCNC: 109 MEQ/L (ref 101–111)
CHLORIDE BLD-SCNC: 114 MEQ/L (ref 101–111)
CHLORIDE BLD-SCNC: 68 MEQ/L (ref 98–111)
CHLORIDE BLD-SCNC: 69 MEQ/L (ref 98–111)
CHLORIDE BLD-SCNC: 71 MEQ/L (ref 98–111)
CHLORIDE BLD-SCNC: 75 MEQ/L (ref 98–111)
CHLORIDE BLD-SCNC: 75 MEQ/L (ref 98–111)
CHLORIDE BLD-SCNC: 79 MEQ/L (ref 98–111)
CHLORIDE BLD-SCNC: 80 MEQ/L (ref 98–111)
CHLORIDE BLD-SCNC: 83 MEQ/L (ref 98–111)
CHLORIDE BLD-SCNC: 85 MEQ/L (ref 98–111)
CHLORIDE BLD-SCNC: 86 MEQ/L (ref 98–111)
CHLORIDE BLD-SCNC: 87 MEQ/L (ref 98–111)
CHLORIDE BLD-SCNC: 88 MEQ/L (ref 98–111)
CHLORIDE BLD-SCNC: 90 MEQ/L (ref 98–111)
CHLORIDE BLD-SCNC: 91 MEQ/L (ref 98–111)
CHLORIDE BLD-SCNC: 92 MEQ/L (ref 98–111)
CHLORIDE BLD-SCNC: 93 MEQ/L (ref 98–111)
CHLORIDE BLD-SCNC: 93 MEQ/L (ref 98–111)
CHLORIDE BLD-SCNC: 94 MEQ/L (ref 98–111)
CHLORIDE BLD-SCNC: 95 MEQ/L (ref 98–111)
CHLORIDE BLD-SCNC: 96 MEQ/L (ref 98–111)
CHLORIDE BLD-SCNC: 96 MEQ/L (ref 98–111)
CHLORIDE BLD-SCNC: 97 MEQ/L (ref 98–111)
CHLORIDE BLD-SCNC: 97 MEQ/L (ref 98–111)
CHLORIDE BLD-SCNC: 98 MEQ/L (ref 98–111)
CHLORIDE BLD-SCNC: 98 MEQ/L (ref 98–111)
CHOLESTEROL, TOTAL: 127 MG/DL (ref 100–199)
CHOLESTEROL, TOTAL: 154 MG/DL (ref 100–199)
CLOSTRIDIUM DIFFICILE, PCR: NEGATIVE
CLOSTRIDIUM DIFFICILE, PCR: NEGATIVE
CLOSTRIDIUM DIFFICILE, PCR: NOT DETECTED
CLOSTRIDIUM DIFFICILE, PCR: NOT DETECTED
CO2: 23 MEQ/L (ref 23–33)
CO2: 24 MEQ/L (ref 23–33)
CO2: 24 MEQ/L (ref 23–33)
CO2: 25 MEQ/L (ref 23–33)
CO2: 26 MEQ/L (ref 23–33)
CO2: 27 MEQ/L (ref 23–33)
CO2: 28 MEQ/L (ref 23–33)
CO2: 28 MEQ/L (ref 23–33)
CO2: 29 MEQ/L (ref 23–33)
CO2: 29 MEQ/L (ref 23–33)
CO2: 30 MEQ/L (ref 23–33)
CO2: 31 MEQ/L (ref 21–32)
CO2: 31 MEQ/L (ref 23–33)
CO2: 31 MEQ/L (ref 23–33)
CO2: 32 MEQ/L (ref 21–32)
CO2: 32 MEQ/L (ref 23–33)
CO2: 32 MEQ/L (ref 23–33)
CO2: 33 MEQ/L (ref 21–32)
CO2: 33 MEQ/L (ref 23–33)
CO2: 34 MEQ/L (ref 21–32)
CO2: 34 MEQ/L (ref 23–33)
CO2: 35 MEQ/L (ref 21–32)
CO2: 35 MEQ/L (ref 23–33)
CO2: 35 MEQ/L (ref 23–33)
CO2: 36 MEQ/L (ref 21–32)
CO2: 36 MEQ/L (ref 23–33)
CO2: 37 MEQ/L (ref 23–33)
COCAINE METABOLITE QUANTITATIVE URINE: NEGATIVE
COCAINE METABOLITE QUANTITATIVE URINE: NEGATIVE
COLLAGEN ADENOSINE-5'-DIPHOSPHATE (ADP) TIME: 130 SEC (ref 67–112)
COLLAGEN EPINEPHRINE TIME: >300 SEC (ref 85–172)
COLLECTED BY:: ABNORMAL
COLLECTED BY:: NORMAL
COLOR: ABNORMAL
COLOR: YELLOW
CPAP: 5
CREAT SERPL-MCNC: 0.7 MG/DL (ref 0.4–1.2)
CREAT SERPL-MCNC: 0.8 MG/DL (ref 0.4–1.2)
CREAT SERPL-MCNC: 0.9 MG/DL (ref 0.4–1.2)
CREAT SERPL-MCNC: 1 MG/DL (ref 0.4–1.2)
CREAT SERPL-MCNC: 1.01 MG/DL (ref 0.6–1.3)
CREAT SERPL-MCNC: 1.03 MG/DL (ref 0.6–1.3)
CREAT SERPL-MCNC: 1.05 MG/DL (ref 0.6–1.3)
CREAT SERPL-MCNC: 1.06 MG/DL (ref 0.6–1.3)
CREAT SERPL-MCNC: 1.07 MG/DL (ref 0.6–1.3)
CREAT SERPL-MCNC: 1.1 MG/DL (ref 0.4–1.2)
CREAT SERPL-MCNC: 1.2 MG/DL (ref 0.4–1.2)
CREAT SERPL-MCNC: 1.2 MG/DL (ref 0.4–1.2)
CREAT SERPL-MCNC: 1.2 MG/DL (ref 0.6–1.3)
CREAT SERPL-MCNC: 1.29 MG/DL (ref 0.6–1.3)
CREAT SERPL-MCNC: 1.29 MG/DL (ref 0.6–1.3)
CREAT SERPL-MCNC: 1.3 MG/DL (ref 0.4–1.2)
CREAT SERPL-MCNC: 1.4 MG/DL (ref 0.4–1.2)
CREAT SERPL-MCNC: 1.6 MG/DL (ref 0.4–1.2)
CREAT SERPL-MCNC: 1.9 MG/DL (ref 0.4–1.2)
CREAT SERPL-MCNC: 1.9 MG/DL (ref 0.4–1.2)
CREATININE CLEARANCE: 40
CREATININE CLEARANCE: 40
CREATININE CLEARANCE: 44
CREATININE CLEARANCE: 50
CREATININE CLEARANCE: 51
CREATININE CLEARANCE: 51
CREATININE CLEARANCE: 52
CREATININE CLEARANCE: 54
CREATININE URINE POCT: ABNORMAL
CREATININE URINE: 35.6 MG/DL
CREATININE, URINE: 132.6 MG/DL
CREATININE, URINE: 54.4 MG/DL
CRYPTOSPORIDIUM PCR: NOT DETECTED
CRYPTOSPORIDIUM PCR: NOT DETECTED
CRYSTALS, UA: ABNORMAL
CRYSTALS: ABNORMAL
CYCLOSPORA CAYETANENSIS PCR: NOT DETECTED
CYCLOSPORA CAYETANENSIS PCR: NOT DETECTED
D DIMER: 867 NG/ML
D-DIMER QUANTITATIVE: 1521 NG/ML FEU (ref 0–500)
DEVICE: ABNORMAL
DRAWN BY: ABNORMAL
E COLI 0157 PCR: NORMAL
E COLI 0157 PCR: NORMAL
E COLI ENTEROAGGREGATIVE PCR: NOT DETECTED
E COLI ENTEROAGGREGATIVE PCR: NOT DETECTED
E COLI ENTEROPATHOGENIC PCR: NOT DETECTED
E COLI ENTEROPATHOGENIC PCR: NOT DETECTED
E COLI ENTEROTOXIGENIC PCR: NOT DETECTED
E COLI ENTEROTOXIGENIC PCR: NOT DETECTED
E COLI SHIGA LIKE TOXIN PCR: NOT DETECTED
E COLI SHIGA LIKE TOXIN PCR: NOT DETECTED
E COLI SHIGELLA/ENTEROINVASIVE PCR: NOT DETECTED
E COLI SHIGELLA/ENTEROINVASIVE PCR: NOT DETECTED
E HISTOLYTICA GI FILM ARRAY: NOT DETECTED
E HISTOLYTICA GI FILM ARRAY: NOT DETECTED
EKG ATRIAL RATE: 104 BPM
EKG ATRIAL RATE: 104 BPM
EKG ATRIAL RATE: 107 BPM
EKG ATRIAL RATE: 107 BPM
EKG ATRIAL RATE: 113 BPM
EKG ATRIAL RATE: 125 BPM
EKG ATRIAL RATE: 140 BPM
EKG ATRIAL RATE: 170 BPM
EKG ATRIAL RATE: 70 BPM
EKG ATRIAL RATE: 74 BPM
EKG ATRIAL RATE: 76 BPM
EKG ATRIAL RATE: 79 BPM
EKG ATRIAL RATE: 80 BPM
EKG ATRIAL RATE: 82 BPM
EKG ATRIAL RATE: 83 BPM
EKG ATRIAL RATE: 86 BPM
EKG ATRIAL RATE: 90 BPM
EKG ATRIAL RATE: 90 BPM
EKG ATRIAL RATE: 91 BPM
EKG ATRIAL RATE: 93 BPM
EKG ATRIAL RATE: 95 BPM
EKG P AXIS: 51 DEGREES
EKG P AXIS: 53 DEGREES
EKG P AXIS: 54 DEGREES
EKG P AXIS: 54 DEGREES
EKG P AXIS: 55 DEGREES
EKG P AXIS: 58 DEGREES
EKG P AXIS: 58 DEGREES
EKG P AXIS: 60 DEGREES
EKG P AXIS: 61 DEGREES
EKG P AXIS: 61 DEGREES
EKG P AXIS: 62 DEGREES
EKG P AXIS: 63 DEGREES
EKG P AXIS: 64 DEGREES
EKG P AXIS: 64 DEGREES
EKG P AXIS: 66 DEGREES
EKG P AXIS: 69 DEGREES
EKG P AXIS: 69 DEGREES
EKG P AXIS: 70 DEGREES
EKG P AXIS: 73 DEGREES
EKG P AXIS: 76 DEGREES
EKG P-R INTERVAL: 154 MS
EKG P-R INTERVAL: 154 MS
EKG P-R INTERVAL: 160 MS
EKG P-R INTERVAL: 166 MS
EKG P-R INTERVAL: 170 MS
EKG P-R INTERVAL: 170 MS
EKG P-R INTERVAL: 178 MS
EKG P-R INTERVAL: 180 MS
EKG P-R INTERVAL: 182 MS
EKG P-R INTERVAL: 182 MS
EKG P-R INTERVAL: 184 MS
EKG P-R INTERVAL: 186 MS
EKG P-R INTERVAL: 188 MS
EKG P-R INTERVAL: 190 MS
EKG P-R INTERVAL: 206 MS
EKG P-R INTERVAL: 218 MS
EKG Q-T INTERVAL: 242 MS
EKG Q-T INTERVAL: 262 MS
EKG Q-T INTERVAL: 272 MS
EKG Q-T INTERVAL: 274 MS
EKG Q-T INTERVAL: 306 MS
EKG Q-T INTERVAL: 326 MS
EKG Q-T INTERVAL: 356 MS
EKG Q-T INTERVAL: 364 MS
EKG Q-T INTERVAL: 366 MS
EKG Q-T INTERVAL: 370 MS
EKG Q-T INTERVAL: 380 MS
EKG Q-T INTERVAL: 382 MS
EKG Q-T INTERVAL: 392 MS
EKG Q-T INTERVAL: 398 MS
EKG Q-T INTERVAL: 402 MS
EKG Q-T INTERVAL: 414 MS
EKG Q-T INTERVAL: 414 MS
EKG Q-T INTERVAL: 416 MS
EKG Q-T INTERVAL: 418 MS
EKG Q-T INTERVAL: 428 MS
EKG Q-T INTERVAL: 444 MS
EKG Q-T INTERVAL: 462 MS
EKG Q-T INTERVAL: 486 MS
EKG QRS DURATION: 100 MS
EKG QRS DURATION: 106 MS
EKG QRS DURATION: 150 MS
EKG QRS DURATION: 152 MS
EKG QRS DURATION: 70 MS
EKG QRS DURATION: 74 MS
EKG QRS DURATION: 76 MS
EKG QRS DURATION: 78 MS
EKG QRS DURATION: 80 MS
EKG QRS DURATION: 82 MS
EKG QRS DURATION: 82 MS
EKG QRS DURATION: 84 MS
EKG QRS DURATION: 86 MS
EKG QRS DURATION: 88 MS
EKG QRS DURATION: 94 MS
EKG QRS DURATION: 96 MS
EKG QTC CALCULATION (BAZETT): 408 MS
EKG QTC CALCULATION (BAZETT): 415 MS
EKG QTC CALCULATION (BAZETT): 420 MS
EKG QTC CALCULATION (BAZETT): 447 MS
EKG QTC CALCULATION (BAZETT): 447 MS
EKG QTC CALCULATION (BAZETT): 449 MS
EKG QTC CALCULATION (BAZETT): 460 MS
EKG QTC CALCULATION (BAZETT): 462 MS
EKG QTC CALCULATION (BAZETT): 464 MS
EKG QTC CALCULATION (BAZETT): 464 MS
EKG QTC CALCULATION (BAZETT): 469 MS
EKG QTC CALCULATION (BAZETT): 469 MS
EKG QTC CALCULATION (BAZETT): 470 MS
EKG QTC CALCULATION (BAZETT): 474 MS
EKG QTC CALCULATION (BAZETT): 475 MS
EKG QTC CALCULATION (BAZETT): 477 MS
EKG QTC CALCULATION (BAZETT): 478 MS
EKG QTC CALCULATION (BAZETT): 479 MS
EKG QTC CALCULATION (BAZETT): 492 MS
EKG QTC CALCULATION (BAZETT): 494 MS
EKG QTC CALCULATION (BAZETT): 512 MS
EKG QTC CALCULATION (BAZETT): 524 MS
EKG QTC CALCULATION (BAZETT): 532 MS
EKG R AXIS: -10 DEGREES
EKG R AXIS: -6 DEGREES
EKG R AXIS: -6 DEGREES
EKG R AXIS: 0 DEGREES
EKG R AXIS: 1 DEGREES
EKG R AXIS: 10 DEGREES
EKG R AXIS: 10 DEGREES
EKG R AXIS: 13 DEGREES
EKG R AXIS: 14 DEGREES
EKG R AXIS: 15 DEGREES
EKG R AXIS: 19 DEGREES
EKG R AXIS: 20 DEGREES
EKG R AXIS: 20 DEGREES
EKG R AXIS: 28 DEGREES
EKG R AXIS: 37 DEGREES
EKG R AXIS: 5 DEGREES
EKG R AXIS: 6 DEGREES
EKG R AXIS: 6 DEGREES
EKG R AXIS: 67 DEGREES
EKG R AXIS: 7 DEGREES
EKG R AXIS: 7 DEGREES
EKG R AXIS: 8 DEGREES
EKG R AXIS: 97 DEGREES
EKG T AXIS: -101 DEGREES
EKG T AXIS: -177 DEGREES
EKG T AXIS: -4 DEGREES
EKG T AXIS: -76 DEGREES
EKG T AXIS: 108 DEGREES
EKG T AXIS: 125 DEGREES
EKG T AXIS: 140 DEGREES
EKG T AXIS: 17 DEGREES
EKG T AXIS: 170 DEGREES
EKG T AXIS: 173 DEGREES
EKG T AXIS: 29 DEGREES
EKG T AXIS: 32 DEGREES
EKG T AXIS: 34 DEGREES
EKG T AXIS: 36 DEGREES
EKG T AXIS: 42 DEGREES
EKG T AXIS: 62 DEGREES
EKG T AXIS: 65 DEGREES
EKG T AXIS: 66 DEGREES
EKG T AXIS: 66 DEGREES
EKG T AXIS: 68 DEGREES
EKG T AXIS: 71 DEGREES
EKG T AXIS: 76 DEGREES
EKG T AXIS: 97 DEGREES
EKG VENTRICULAR RATE: 104 BPM
EKG VENTRICULAR RATE: 107 BPM
EKG VENTRICULAR RATE: 107 BPM
EKG VENTRICULAR RATE: 113 BPM
EKG VENTRICULAR RATE: 140 BPM
EKG VENTRICULAR RATE: 171 BPM
EKG VENTRICULAR RATE: 177 BPM
EKG VENTRICULAR RATE: 181 BPM
EKG VENTRICULAR RATE: 70 BPM
EKG VENTRICULAR RATE: 74 BPM
EKG VENTRICULAR RATE: 76 BPM
EKG VENTRICULAR RATE: 79 BPM
EKG VENTRICULAR RATE: 80 BPM
EKG VENTRICULAR RATE: 82 BPM
EKG VENTRICULAR RATE: 83 BPM
EKG VENTRICULAR RATE: 86 BPM
EKG VENTRICULAR RATE: 90 BPM
EKG VENTRICULAR RATE: 90 BPM
EKG VENTRICULAR RATE: 91 BPM
EKG VENTRICULAR RATE: 93 BPM
EKG VENTRICULAR RATE: 95 BPM
ENTERBACTER CLOACAE FILM ARRAY: NOT DETECTED
ENTERBACTERIACEAE FILM ARRAY: NOT DETECTED
ENTEROCOCCUS FILM ARRAY: NOT DETECTED
EOSINOPHIL # BLD: 0 %
EOSINOPHIL # BLD: 0 %
EOSINOPHIL # BLD: 0.3 %
EOSINOPHIL # BLD: 0.9 %
EOSINOPHIL # BLD: 1 %
EOSINOPHIL # BLD: 1 %
EOSINOPHIL # BLD: 1.2 %
EOSINOPHIL # BLD: 1.3 %
EOSINOPHIL # BLD: 1.6 %
EOSINOPHIL # BLD: 2.2 %
EOSINOPHIL # BLD: 2.5 %
EOSINOPHIL # BLD: 2.8 %
EOSINOPHIL # BLD: 4.3 %
EOSINOPHIL # BLD: 5.2 %
EOSINOPHIL SMEAR: NORMAL
EOSINOPHILS ABSOLUTE: 0 THOU/MM3 (ref 0–0.4)
EOSINOPHILS ABSOLUTE: 0.1 THOU/MM3 (ref 0–0.4)
EOSINOPHILS ABSOLUTE: 0.2 THOU/MM3 (ref 0–0.4)
EOSINOPHILS ABSOLUTE: 0.2 THOU/MM3 (ref 0–0.4)
EOSINOPHILS ABSOLUTE: 0.3 THOU/MM3 (ref 0–0.4)
EOSINOPHILS RELATIVE PERCENT: 0.5 % (ref 0–6)
EOSINOPHILS RELATIVE PERCENT: 0.7 % (ref 0–6)
EOSINOPHILS RELATIVE PERCENT: 2.8 % (ref 0–6)
EOSINOPHILS RELATIVE PERCENT: 2.9 % (ref 0–6)
EOSINOPHILS RELATIVE PERCENT: 3 % (ref 0–4)
EOSINOPHILS RELATIVE PERCENT: 3 % (ref 0–4)
EOSINOPHILS RELATIVE PERCENT: 3.1 % (ref 0–6)
EOSINOPHILS RELATIVE PERCENT: 3.2 % (ref 0–6)
EOSINOPHILS RELATIVE PERCENT: 4 % (ref 0–4)
EOSINOPHILS RELATIVE PERCENT: 4 % (ref 0–4)
EOSINOPHILS RELATIVE PERCENT: 4.2 % (ref 0–6)
EOSINOPHILS RELATIVE PERCENT: 4.3 % (ref 0–6)
EPITHELIAL CELLS, UA: ABNORMAL /HPF
ERYTHROCYTE [DISTWIDTH] IN BLOOD BY AUTOMATED COUNT: 12.8 % (ref 11.5–14.5)
ERYTHROCYTE [DISTWIDTH] IN BLOOD BY AUTOMATED COUNT: 12.8 % (ref 11.5–14.5)
ERYTHROCYTE [DISTWIDTH] IN BLOOD BY AUTOMATED COUNT: 13 % (ref 11.5–14.5)
ERYTHROCYTE [DISTWIDTH] IN BLOOD BY AUTOMATED COUNT: 13 % (ref 11.5–14.5)
ERYTHROCYTE [DISTWIDTH] IN BLOOD BY AUTOMATED COUNT: 13.2 % (ref 11.5–14.5)
ERYTHROCYTE [DISTWIDTH] IN BLOOD BY AUTOMATED COUNT: 13.2 % (ref 11.5–14.5)
ERYTHROCYTE [DISTWIDTH] IN BLOOD BY AUTOMATED COUNT: 13.7 % (ref 11.5–14.5)
ERYTHROCYTE [DISTWIDTH] IN BLOOD BY AUTOMATED COUNT: 13.7 % (ref 11.5–14.5)
ERYTHROCYTE [DISTWIDTH] IN BLOOD BY AUTOMATED COUNT: 13.8 % (ref 11.5–14.5)
ERYTHROCYTE [DISTWIDTH] IN BLOOD BY AUTOMATED COUNT: 13.8 % (ref 11.5–14.5)
ERYTHROCYTE [DISTWIDTH] IN BLOOD BY AUTOMATED COUNT: 13.9 % (ref 11.5–14.5)
ERYTHROCYTE [DISTWIDTH] IN BLOOD BY AUTOMATED COUNT: 13.9 % (ref 11.5–14.5)
ERYTHROCYTE [DISTWIDTH] IN BLOOD BY AUTOMATED COUNT: 14 % (ref 11.5–14.5)
ERYTHROCYTE [DISTWIDTH] IN BLOOD BY AUTOMATED COUNT: 14.1 % (ref 11.5–14.5)
ERYTHROCYTE [DISTWIDTH] IN BLOOD BY AUTOMATED COUNT: 14.2 % (ref 11.5–14.5)
ERYTHROCYTE [DISTWIDTH] IN BLOOD BY AUTOMATED COUNT: 14.2 % (ref 11.5–14.5)
ERYTHROCYTE [DISTWIDTH] IN BLOOD BY AUTOMATED COUNT: 14.3 % (ref 11.5–14.5)
ERYTHROCYTE [DISTWIDTH] IN BLOOD BY AUTOMATED COUNT: 14.3 % (ref 11.5–14.5)
ERYTHROCYTE [DISTWIDTH] IN BLOOD BY AUTOMATED COUNT: 14.5 % (ref 11.5–14.5)
ERYTHROCYTE [DISTWIDTH] IN BLOOD BY AUTOMATED COUNT: 14.5 % (ref 11.5–14.5)
ERYTHROCYTE [DISTWIDTH] IN BLOOD BY AUTOMATED COUNT: 14.8 % (ref 11.5–14.5)
ERYTHROCYTE [DISTWIDTH] IN BLOOD BY AUTOMATED COUNT: 15.2 % (ref 11.5–14.5)
ERYTHROCYTE [DISTWIDTH] IN BLOOD BY AUTOMATED COUNT: 15.4 % (ref 11.5–14.5)
ERYTHROCYTE [DISTWIDTH] IN BLOOD BY AUTOMATED COUNT: 15.5 % (ref 11.5–14.5)
ERYTHROCYTE [DISTWIDTH] IN BLOOD BY AUTOMATED COUNT: 15.7 % (ref 11.5–14.5)
ERYTHROCYTE [DISTWIDTH] IN BLOOD BY AUTOMATED COUNT: 15.8 % (ref 11.5–14.5)
ERYTHROCYTE [DISTWIDTH] IN BLOOD BY AUTOMATED COUNT: 15.9 % (ref 11.5–14.5)
ERYTHROCYTE [DISTWIDTH] IN BLOOD BY AUTOMATED COUNT: 15.9 % (ref 11.5–14.5)
ERYTHROCYTE [DISTWIDTH] IN BLOOD BY AUTOMATED COUNT: 36.6 FL (ref 35–45)
ERYTHROCYTE [DISTWIDTH] IN BLOOD BY AUTOMATED COUNT: 38.1 FL (ref 35–45)
ERYTHROCYTE [DISTWIDTH] IN BLOOD BY AUTOMATED COUNT: 39 FL (ref 35–45)
ERYTHROCYTE [DISTWIDTH] IN BLOOD BY AUTOMATED COUNT: 39.2 FL (ref 35–45)
ERYTHROCYTE [DISTWIDTH] IN BLOOD BY AUTOMATED COUNT: 39.8 FL (ref 35–45)
ERYTHROCYTE [DISTWIDTH] IN BLOOD BY AUTOMATED COUNT: 41.1 FL (ref 35–45)
ERYTHROCYTE [DISTWIDTH] IN BLOOD BY AUTOMATED COUNT: 41.4 FL (ref 35–45)
ERYTHROCYTE [DISTWIDTH] IN BLOOD BY AUTOMATED COUNT: 41.8 FL (ref 35–45)
ERYTHROCYTE [DISTWIDTH] IN BLOOD BY AUTOMATED COUNT: 42.7 FL (ref 35–45)
ERYTHROCYTE [DISTWIDTH] IN BLOOD BY AUTOMATED COUNT: 43.5 FL (ref 35–45)
ERYTHROCYTE [DISTWIDTH] IN BLOOD BY AUTOMATED COUNT: 43.7 FL (ref 35–45)
ERYTHROCYTE [DISTWIDTH] IN BLOOD BY AUTOMATED COUNT: 43.9 FL (ref 35–45)
ERYTHROCYTE [DISTWIDTH] IN BLOOD BY AUTOMATED COUNT: 44 FL (ref 35–45)
ERYTHROCYTE [DISTWIDTH] IN BLOOD BY AUTOMATED COUNT: 44.3 FL (ref 35–45)
ERYTHROCYTE [DISTWIDTH] IN BLOOD BY AUTOMATED COUNT: 44.8 FL (ref 35–45)
ERYTHROCYTE [DISTWIDTH] IN BLOOD BY AUTOMATED COUNT: 45.1 FL (ref 35–45)
ERYTHROCYTE [DISTWIDTH] IN BLOOD BY AUTOMATED COUNT: 45.5 FL (ref 35–45)
ERYTHROCYTE [DISTWIDTH] IN BLOOD BY AUTOMATED COUNT: 45.6 FL (ref 35–45)
ERYTHROCYTE [DISTWIDTH] IN BLOOD BY AUTOMATED COUNT: 46 FL (ref 35–45)
ERYTHROCYTE [DISTWIDTH] IN BLOOD BY AUTOMATED COUNT: 46.7 FL (ref 35–45)
ERYTHROCYTE [DISTWIDTH] IN BLOOD BY AUTOMATED COUNT: 46.8 FL (ref 35–45)
ERYTHROCYTE [DISTWIDTH] IN BLOOD BY AUTOMATED COUNT: 48.8 FL (ref 35–45)
ERYTHROCYTE [DISTWIDTH] IN BLOOD BY AUTOMATED COUNT: 48.9 FL (ref 35–45)
ERYTHROCYTE [DISTWIDTH] IN BLOOD BY AUTOMATED COUNT: 48.9 FL (ref 35–45)
ERYTHROCYTE [DISTWIDTH] IN BLOOD BY AUTOMATED COUNT: 49.1 FL (ref 35–45)
ERYTHROCYTE [DISTWIDTH] IN BLOOD BY AUTOMATED COUNT: 49.3 FL (ref 35–45)
ERYTHROCYTE [DISTWIDTH] IN BLOOD BY AUTOMATED COUNT: 50 FL (ref 35–45)
ERYTHROCYTE [DISTWIDTH] IN BLOOD BY AUTOMATED COUNT: 50.1 FL (ref 35–45)
ESCHERICHIA COLI FILM ARRAY: NOT DETECTED
ETHYL ALCOHOL, SERUM: < 0.01 %
ETHYL ALCOHOL, SERUM: < 0.01 %
EXPIRATION DATE: NORMAL
EXPIRATORY PRESSURE: 8
FECAL LEUKOCYTES: NORMAL
FERRITIN: 161 NG/ML (ref 10–291)
FERRITIN: 169 NG/ML (ref 10–291)
FERRITIN: 211 NG/ML (ref 10–291)
FERRITIN: 279 NG/ML (ref 10–291)
FERRITIN: 88 NG/ML (ref 10–291)
FIBRINOGEN: 642 MG/DL (ref 200–400)
FIO2: 0.1
FIO2: 0.1
FIO2: 0.5
FIO2: 40
FIO2: 50
FIO2: 55
FIO2: 60
FIO2: 80
FIO2: ABNORMAL
FLU A ANTIGEN: NEGATIVE
FLU B ANTIGEN: NEGATIVE
FOLATE: 15.5 NG/ML (ref 4.8–24.2)
FOLATE: > 20 NG/ML (ref 4.8–24.2)
GFR NON-AFRICAN AMERICAN: NORMAL ML/MIN
GFR SERPL CREATININE-BSD FRML MDRD: 26 ML/MIN/1.73M2
GFR SERPL CREATININE-BSD FRML MDRD: 26 ML/MIN/1.73M2
GFR SERPL CREATININE-BSD FRML MDRD: 31 ML/MIN/1.73M2
GFR SERPL CREATININE-BSD FRML MDRD: 37 ML/MIN/1.73M2
GFR SERPL CREATININE-BSD FRML MDRD: 40 ML/MIN/1.73M2
GFR SERPL CREATININE-BSD FRML MDRD: 44 ML/MIN/1.73M2
GFR SERPL CREATININE-BSD FRML MDRD: 44 ML/MIN/1.73M2
GFR SERPL CREATININE-BSD FRML MDRD: 48 ML/MIN/1.73M2
GFR SERPL CREATININE-BSD FRML MDRD: 54 ML/MIN/1.73M2
GFR SERPL CREATININE-BSD FRML MDRD: 61 ML/MIN/1.73M2
GFR SERPL CREATININE-BSD FRML MDRD: 70 ML/MIN/1.73M2
GFR SERPL CREATININE-BSD FRML MDRD: 82 ML/MIN/1.73M2
GFR SERPL CREATININE-BSD FRML MDRD: NORMAL ML/MIN
GFR SERPL CREATININE-BSD FRML MDRD: NORMAL ML/MIN/{1.73_M2}
GIARDIA LAMBLIA PCR: NOT DETECTED
GIARDIA LAMBLIA PCR: NOT DETECTED
GLUCOSE BLD-MCNC: 103 MG/DL (ref 70–108)
GLUCOSE BLD-MCNC: 105 MG/DL (ref 70–108)
GLUCOSE BLD-MCNC: 110 MG/DL (ref 70–108)
GLUCOSE BLD-MCNC: 113 MG/DL (ref 70–108)
GLUCOSE BLD-MCNC: 114 MG/DL (ref 70–108)
GLUCOSE BLD-MCNC: 115 MG/DL (ref 70–108)
GLUCOSE BLD-MCNC: 116 MG/DL (ref 70–108)
GLUCOSE BLD-MCNC: 116 MG/DL (ref 70–108)
GLUCOSE BLD-MCNC: 117 MG/DL (ref 70–108)
GLUCOSE BLD-MCNC: 118 MG/DL (ref 70–108)
GLUCOSE BLD-MCNC: 119 MG/DL (ref 70–108)
GLUCOSE BLD-MCNC: 120 MG/DL (ref 70–108)
GLUCOSE BLD-MCNC: 120 MG/DL (ref 70–108)
GLUCOSE BLD-MCNC: 123 MG/DL (ref 70–108)
GLUCOSE BLD-MCNC: 124 MG/DL (ref 70–108)
GLUCOSE BLD-MCNC: 125 MG/DL (ref 70–108)
GLUCOSE BLD-MCNC: 126 MG/DL (ref 70–108)
GLUCOSE BLD-MCNC: 127 MG/DL (ref 70–108)
GLUCOSE BLD-MCNC: 127 MG/DL (ref 74–100)
GLUCOSE BLD-MCNC: 128 MG/DL (ref 70–108)
GLUCOSE BLD-MCNC: 128 MG/DL (ref 70–108)
GLUCOSE BLD-MCNC: 129 MG/DL (ref 70–108)
GLUCOSE BLD-MCNC: 130 MG/DL (ref 70–108)
GLUCOSE BLD-MCNC: 132 MG/DL (ref 70–108)
GLUCOSE BLD-MCNC: 133 MG/DL (ref 70–108)
GLUCOSE BLD-MCNC: 133 MG/DL (ref 70–108)
GLUCOSE BLD-MCNC: 134 MG/DL (ref 70–108)
GLUCOSE BLD-MCNC: 135 MG/DL (ref 70–108)
GLUCOSE BLD-MCNC: 137 MG/DL (ref 70–108)
GLUCOSE BLD-MCNC: 138 MG/DL (ref 70–108)
GLUCOSE BLD-MCNC: 140 MG/DL (ref 70–108)
GLUCOSE BLD-MCNC: 143 MG/DL (ref 70–108)
GLUCOSE BLD-MCNC: 144 MG/DL (ref 70–108)
GLUCOSE BLD-MCNC: 145 MG/DL (ref 70–108)
GLUCOSE BLD-MCNC: 146 MG/DL (ref 70–108)
GLUCOSE BLD-MCNC: 147 MG/DL (ref 70–108)
GLUCOSE BLD-MCNC: 148 MG/DL (ref 70–108)
GLUCOSE BLD-MCNC: 148 MG/DL (ref 70–108)
GLUCOSE BLD-MCNC: 149 MG/DL (ref 70–108)
GLUCOSE BLD-MCNC: 150 MG/DL (ref 70–108)
GLUCOSE BLD-MCNC: 150 MG/DL (ref 70–108)
GLUCOSE BLD-MCNC: 152 MG/DL (ref 70–108)
GLUCOSE BLD-MCNC: 154 MG/DL (ref 70–108)
GLUCOSE BLD-MCNC: 157 MG/DL
GLUCOSE BLD-MCNC: 158 MG/DL (ref 70–108)
GLUCOSE BLD-MCNC: 158 MG/DL (ref 70–108)
GLUCOSE BLD-MCNC: 159 MG/DL (ref 70–108)
GLUCOSE BLD-MCNC: 160 MG/DL (ref 70–108)
GLUCOSE BLD-MCNC: 161 MG/DL (ref 70–108)
GLUCOSE BLD-MCNC: 161 MG/DL (ref 70–108)
GLUCOSE BLD-MCNC: 163 MG/DL (ref 70–108)
GLUCOSE BLD-MCNC: 164 MG/DL (ref 70–108)
GLUCOSE BLD-MCNC: 164 MG/DL (ref 70–108)
GLUCOSE BLD-MCNC: 165 MG/DL (ref 70–108)
GLUCOSE BLD-MCNC: 165 MG/DL (ref 70–108)
GLUCOSE BLD-MCNC: 167 MG/DL (ref 70–108)
GLUCOSE BLD-MCNC: 168 MG/DL (ref 70–108)
GLUCOSE BLD-MCNC: 168 MG/DL (ref 70–108)
GLUCOSE BLD-MCNC: 169 MG/DL (ref 70–108)
GLUCOSE BLD-MCNC: 169 MG/DL (ref 70–108)
GLUCOSE BLD-MCNC: 175 MG/DL (ref 70–108)
GLUCOSE BLD-MCNC: 176 MG/DL (ref 70–108)
GLUCOSE BLD-MCNC: 176 MG/DL (ref 70–108)
GLUCOSE BLD-MCNC: 177 MG/DL (ref 70–108)
GLUCOSE BLD-MCNC: 177 MG/DL (ref 70–108)
GLUCOSE BLD-MCNC: 178 MG/DL (ref 70–108)
GLUCOSE BLD-MCNC: 178 MG/DL (ref 70–108)
GLUCOSE BLD-MCNC: 179 MG/DL (ref 70–108)
GLUCOSE BLD-MCNC: 181 MG/DL (ref 70–108)
GLUCOSE BLD-MCNC: 181 MG/DL (ref 70–108)
GLUCOSE BLD-MCNC: 185 MG/DL (ref 70–108)
GLUCOSE BLD-MCNC: 186 MG/DL (ref 70–108)
GLUCOSE BLD-MCNC: 187 MG/DL (ref 70–108)
GLUCOSE BLD-MCNC: 189 MG/DL (ref 70–108)
GLUCOSE BLD-MCNC: 191 MG/DL (ref 70–108)
GLUCOSE BLD-MCNC: 196 MG/DL (ref 70–108)
GLUCOSE BLD-MCNC: 199 MG/DL (ref 70–108)
GLUCOSE BLD-MCNC: 199 MG/DL (ref 70–108)
GLUCOSE BLD-MCNC: 200 MG/DL (ref 70–108)
GLUCOSE BLD-MCNC: 201 MG/DL (ref 70–108)
GLUCOSE BLD-MCNC: 207 MG/DL (ref 70–108)
GLUCOSE BLD-MCNC: 212 MG/DL (ref 70–108)
GLUCOSE BLD-MCNC: 213 MG/DL (ref 70–108)
GLUCOSE BLD-MCNC: 219 MG/DL (ref 70–108)
GLUCOSE BLD-MCNC: 227 MG/DL (ref 70–108)
GLUCOSE BLD-MCNC: 227 MG/DL (ref 70–108)
GLUCOSE BLD-MCNC: 239 MG/DL (ref 70–108)
GLUCOSE BLD-MCNC: 239 MG/DL (ref 70–108)
GLUCOSE BLD-MCNC: 254 MG/DL (ref 70–108)
GLUCOSE BLD-MCNC: 255 MG/DL (ref 70–108)
GLUCOSE BLD-MCNC: 259 MG/DL (ref 70–108)
GLUCOSE BLD-MCNC: 260 MG/DL (ref 70–108)
GLUCOSE BLD-MCNC: 97 MG/DL (ref 70–108)
GLUCOSE BLD-MCNC: 98 MG/DL (ref 70–108)
GLUCOSE BLD-MCNC: 98 MG/DL (ref 70–108)
GLUCOSE URINE: NEGATIVE
GLUCOSE URINE: NEGATIVE MG/DL
GLUCOSE, URINE: 100 MG/DL
GLUCOSE, URINE: NEGATIVE MG/DL
GLUCOSE: 136 MG/DL (ref 70–110)
GLUCOSE: 144 MG/DL (ref 70–110)
GLUCOSE: 161 MG/DL (ref 70–110)
GLUCOSE: 174 MG/DL (ref 70–110)
GLUCOSE: 178 MG/DL (ref 70–110)
GLUCOSE: 186 MG/DL (ref 70–110)
GLUCOSE: 187 MG/DL (ref 70–110)
GLUCOSE: 187 MG/DL (ref 70–110)
HAEMOPHILUS INFLUENZA FILM ARRAY: NOT DETECTED
HBA1C MFR BLD: 5.9 % (ref 4.4–6.4)
HBA1C MFR BLD: 5.9 % (ref 4.4–6.4)
HCO3 VENOUS: 31.6 MMOL/L (ref 22–29)
HCO3: 25 MMOL/L (ref 23–28)
HCO3: 27.4 MMOL/L (ref 22–26)
HCO3: 30 MMOL/L (ref 23–28)
HCO3: 30.3 MMOL/L (ref 22–26)
HCO3: 31 MMOL/L (ref 23–28)
HCO3: 32.1 MMOL/L (ref 22–26)
HCO3: 32.5 MMOL/L (ref 22–26)
HCO3: 33 MMOL/L (ref 23–28)
HCO3: 33.2 MMOL/L (ref 22–26)
HCO3: 35 MMOL/L (ref 22–26)
HCO3: 35 MMOL/L (ref 23–28)
HCO3: 35.4 MMOL/L (ref 22–26)
HCO3: 36.5 MMOL/L (ref 22–26)
HCO3: 39 MMOL/L (ref 23–28)
HCT VFR BLD CALC: 19 % (ref 35–44)
HCT VFR BLD CALC: 19.9 % (ref 35–44)
HCT VFR BLD CALC: 21.5 % (ref 37–47)
HCT VFR BLD CALC: 22.7 % (ref 35–44)
HCT VFR BLD CALC: 23.1 % (ref 37–47)
HCT VFR BLD CALC: 23.2 % (ref 35–44)
HCT VFR BLD CALC: 23.2 % (ref 35–44)
HCT VFR BLD CALC: 23.2 % (ref 37–47)
HCT VFR BLD CALC: 23.5 % (ref 35–44)
HCT VFR BLD CALC: 23.5 % (ref 37–47)
HCT VFR BLD CALC: 23.6 % (ref 35–44)
HCT VFR BLD CALC: 23.7 % (ref 37–47)
HCT VFR BLD CALC: 23.9 % (ref 37–47)
HCT VFR BLD CALC: 23.9 % (ref 37–47)
HCT VFR BLD CALC: 24.2 % (ref 37–47)
HCT VFR BLD CALC: 24.6 % (ref 35–44)
HCT VFR BLD CALC: 25.7 % (ref 37–47)
HCT VFR BLD CALC: 25.7 % (ref 37–47)
HCT VFR BLD CALC: 26.1 % (ref 37–47)
HCT VFR BLD CALC: 26.4 % (ref 37–47)
HCT VFR BLD CALC: 26.6 % (ref 37–47)
HCT VFR BLD CALC: 27.5 % (ref 37–47)
HCT VFR BLD CALC: 27.5 % (ref 37–47)
HCT VFR BLD CALC: 27.7 % (ref 37–47)
HCT VFR BLD CALC: 27.9 % (ref 37–47)
HCT VFR BLD CALC: 28 % (ref 37–47)
HCT VFR BLD CALC: 28.2 % (ref 37–47)
HCT VFR BLD CALC: 28.5 % (ref 37–47)
HCT VFR BLD CALC: 28.9 % (ref 37–47)
HCT VFR BLD CALC: 29 % (ref 37–47)
HCT VFR BLD CALC: 29.2 % (ref 37–47)
HCT VFR BLD CALC: 29.7 % (ref 37–47)
HCT VFR BLD CALC: 29.7 % (ref 37–47)
HCT VFR BLD CALC: 29.8 % (ref 37–47)
HCT VFR BLD CALC: 29.8 % (ref 37–47)
HCT VFR BLD CALC: 30.8 % (ref 37–47)
HCT VFR BLD CALC: 31 % (ref 37–47)
HCT VFR BLD CALC: 31.9 % (ref 37–47)
HCT VFR BLD CALC: 32.1 % (ref 37–47)
HCT VFR BLD CALC: 32.1 % (ref 37–47)
HCT VFR BLD CALC: 33.6 % (ref 37–47)
HDLC SERPL-MCNC: 20 MG/DL
HDLC SERPL-MCNC: 42 MG/DL
HEMOCCULT STL QL: NEGATIVE
HEMOGLOBIN: 10.1 GM/DL (ref 12–16)
HEMOGLOBIN: 10.1 GM/DL (ref 12–16)
HEMOGLOBIN: 10.3 GM/DL (ref 12–16)
HEMOGLOBIN: 10.5 GM/DL (ref 12–16)
HEMOGLOBIN: 11 GM/DL (ref 12–16)
HEMOGLOBIN: 11.1 GM/DL (ref 12–16)
HEMOGLOBIN: 6.1 GM/DL (ref 12–15)
HEMOGLOBIN: 6.4 GM/DL (ref 12–15)
HEMOGLOBIN: 6.7 GM/DL (ref 12–16)
HEMOGLOBIN: 7.3 GM/DL (ref 12–16)
HEMOGLOBIN: 7.4 GM/DL (ref 12–15)
HEMOGLOBIN: 7.4 GM/DL (ref 12–15)
HEMOGLOBIN: 7.4 GM/DL (ref 12–16)
HEMOGLOBIN: 7.4 GM/DL (ref 12–16)
HEMOGLOBIN: 7.5 GM/DL (ref 12–15)
HEMOGLOBIN: 7.5 GM/DL (ref 12–16)
HEMOGLOBIN: 7.6 GM/DL (ref 12–15)
HEMOGLOBIN: 7.7 GM/DL (ref 12–15)
HEMOGLOBIN: 7.9 GM/DL (ref 12–15)
HEMOGLOBIN: 7.9 GM/DL (ref 12–16)
HEMOGLOBIN: 8.1 GM/DL (ref 12–16)
HEMOGLOBIN: 8.1 GM/DL (ref 12–16)
HEMOGLOBIN: 8.4 GM/DL (ref 12–16)
HEMOGLOBIN: 8.4 GM/DL (ref 12–16)
HEMOGLOBIN: 8.5 GM/DL (ref 12–16)
HEMOGLOBIN: 8.6 GM/DL (ref 12–16)
HEMOGLOBIN: 8.7 GM/DL (ref 12–16)
HEMOGLOBIN: 8.8 GM/DL (ref 12–16)
HEMOGLOBIN: 8.9 GM/DL (ref 12–16)
HEMOGLOBIN: 9 GM/DL (ref 12–16)
HEMOGLOBIN: 9.1 GM/DL (ref 12–16)
HEMOGLOBIN: 9.2 GM/DL (ref 12–16)
HEMOGLOBIN: 9.2 GM/DL (ref 12–16)
HEMOGLOBIN: 9.4 GM/DL (ref 12–16)
HEMOGLOBIN: 9.5 GM/DL (ref 12–16)
HEMOGLOBIN: 9.5 GM/DL (ref 12–16)
HEMOGLOBIN: 9.7 GM/DL (ref 12–16)
HEMOGLOBIN: 9.9 GM/DL (ref 12–16)
HEMOGLOBIN: 9.9 GM/DL (ref 12–16)
HEPARIN ASSOCIATED AB DETECTION: NORMAL
IFIO2: 2
IFIO2: 3
IFIO2: 3
IFIO2: 35
IFIO2: 35
IFIO2: 4
IFIO2: 6
IFIO2: 6
IMMATURE GRANS (ABS): 0.02 THOU/MM3 (ref 0–0.07)
IMMATURE GRANS (ABS): 0.03 THOU/MM3 (ref 0–0.07)
IMMATURE GRANS (ABS): 0.04 THOU/MM3 (ref 0–0.07)
IMMATURE GRANS (ABS): 0.05 THOU/MM3 (ref 0–0.07)
IMMATURE GRANS (ABS): 0.06 THOU/MM3 (ref 0–0.07)
IMMATURE GRANULOCYTES: 0.3 %
IMMATURE GRANULOCYTES: 0.4 %
IMMATURE GRANULOCYTES: 0.4 %
IMMATURE GRANULOCYTES: 0.5 %
IMMATURE GRANULOCYTES: 0.6 %
IMMATURE GRANULOCYTES: 0.7 %
IMMATURE GRANULOCYTES: 0.9 %
INR BLD: 1 (ref 0.85–1.13)
INR BLD: 1.02 (ref 0.85–1.13)
INR BLD: 1.08 (ref 0.85–1.13)
INR BLD: 1.08 (ref 0.85–1.13)
INR BLD: 1.1 (ref 0.85–1.13)
INR BLD: 1.11 (ref 0.85–1.13)
INR BLD: 1.11 (ref 0.85–1.13)
INR BLD: 1.12 (ref 0.85–1.13)
INR BLD: 1.18 (ref 0.85–1.13)
INR BLD: 1.29 (ref 0.9–1.2)
INR BLD: 1.38 (ref 0.85–1.13)
INR BLD: 1.41 (ref 0.85–1.13)
IRON SATURATION: 23 % (ref 20–50)
IRON: 24 UG/DL (ref 50–170)
IRON: 35 UG/DL (ref 50–170)
IRON: 40 UG/DL (ref 50–170)
IRON: 43 UG/DL (ref 50–170)
IRON: 68 UG/DL (ref 50–170)
KETONES, URINE: ABNORMAL
KETONES, URINE: NEGATIVE
KLEBSIELLA OXYTOCA FILM ARRAY: NOT DETECTED
KLEBSIELLA PNEUMONIAE FILM ARRAY: NOT DETECTED
LACTIC ACID, SEPSIS: 1.5 MMOL/L (ref 0.5–1.9)
LACTIC ACID, SEPSIS: 1.5 MMOL/L (ref 0.5–1.9)
LACTIC ACID, SEPSIS: 1.8 MMOL/L (ref 0.5–1.9)
LACTIC ACID, SEPSIS: 2.7 MMOL/L (ref 0.5–1.9)
LACTIC ACID, SEPSIS: 2.9 MMOL/L (ref 0.5–1.9)
LACTIC ACID, SEPSIS: 3.2 MMOL/L (ref 0.5–1.9)
LACTIC ACID: 0.7 MMOL/L (ref 0.5–2.2)
LACTIC ACID: 0.8 MMOL/L (ref 0.5–2.2)
LACTIC ACID: 1.2 MMOL/L (ref 0.5–2.2)
LACTIC ACID: 1.3 MMOL/L (ref 0.5–2.2)
LACTIC ACID: 1.4 MMOL/L (ref 0.5–2.2)
LACTIC ACID: 1.8 MMOL/L (ref 0.5–2.2)
LDH: 328 IU/L (ref 98–192)
LDL CHOLESTEROL CALCULATED: 31 MG/DL
LDL CHOLESTEROL CALCULATED: 61 MG/DL
LEGIONELLA URINARY AG: NEGATIVE
LEUKOCYTE EST, POC: ABNORMAL
LEUKOCYTE ESTERASE, URINE: ABNORMAL
LEUKOCYTE ESTERASE, URINE: NEGATIVE
LEUKOCYTE ESTERASE, URINE: NEGATIVE
LEUKOCYTES, UA: ABNORMAL
LIPASE: 15.1 U/L (ref 5.6–51.3)
LIPASE: 28.4 U/L (ref 5.6–51.3)
LIPASE: 29.2 U/L (ref 5.6–51.3)
LIPASE: 7.8 U/L (ref 5.6–51.3)
LISTERIA MONOCYTOGENES FILM ARRAY: NOT DETECTED
LITERS PER MINUTE: 15
LITERS PER MINUTE: 30
LV EF: 55 %
LV EF: 58 %
LV EF: 68 %
LVEF MODALITY: NORMAL
LYMPHOCYTES # BLD: 10.4 %
LYMPHOCYTES # BLD: 11.8 %
LYMPHOCYTES # BLD: 12.3 %
LYMPHOCYTES # BLD: 12.4 %
LYMPHOCYTES # BLD: 14 %
LYMPHOCYTES # BLD: 14 % (ref 15–47)
LYMPHOCYTES # BLD: 14.2 %
LYMPHOCYTES # BLD: 14.4 %
LYMPHOCYTES # BLD: 20.5 %
LYMPHOCYTES # BLD: 21.5 %
LYMPHOCYTES # BLD: 22 % (ref 15–47)
LYMPHOCYTES # BLD: 35.7 %
LYMPHOCYTES # BLD: 39 %
LYMPHOCYTES # BLD: 5.6 %
LYMPHOCYTES # BLD: 6.4 %
LYMPHOCYTES # BLD: 9.6 %
LYMPHOCYTES ABSOLUTE: 0.3 THOU/MM3 (ref 1–4.8)
LYMPHOCYTES ABSOLUTE: 0.4 THOU/MM3 (ref 1–4.8)
LYMPHOCYTES ABSOLUTE: 0.5 THOU/MM3 (ref 1–4.8)
LYMPHOCYTES ABSOLUTE: 0.6 THOU/MM3 (ref 1–4.8)
LYMPHOCYTES ABSOLUTE: 0.7 THOU/MM3 (ref 1–4.8)
LYMPHOCYTES ABSOLUTE: 0.7 THOU/MM3 (ref 1–4.8)
LYMPHOCYTES ABSOLUTE: 0.8 THOU/MM3 (ref 1–4.8)
LYMPHOCYTES ABSOLUTE: 0.8 THOU/MM3 (ref 1–4.8)
LYMPHOCYTES ABSOLUTE: 1 THOU/MM3 (ref 1–4.8)
LYMPHOCYTES ABSOLUTE: 1 THOU/MM3 (ref 1–4.8)
LYMPHOCYTES ABSOLUTE: 1.2 THOU/MM3 (ref 1–4.8)
LYMPHOCYTES ABSOLUTE: 1.6 THOU/MM3 (ref 1–4.8)
LYMPHOCYTES RELATIVE PERCENT: 10.5 % (ref 15–45)
LYMPHOCYTES RELATIVE PERCENT: 11.1 % (ref 15–45)
LYMPHOCYTES RELATIVE PERCENT: 11.9 % (ref 15–45)
LYMPHOCYTES RELATIVE PERCENT: 12.6 % (ref 15–45)
LYMPHOCYTES RELATIVE PERCENT: 13 % (ref 15–45)
LYMPHOCYTES RELATIVE PERCENT: 13.1 % (ref 15–45)
LYMPHOCYTES RELATIVE PERCENT: 13.7 % (ref 15–45)
LYMPHOCYTES RELATIVE PERCENT: 9.6 % (ref 15–45)
MAGNESIUM: 1.1 MG/DL (ref 1.6–2.4)
MAGNESIUM: 1.2 MG/DL (ref 1.6–2.4)
MAGNESIUM: 1.3 MG/DL (ref 1.6–2.4)
MAGNESIUM: 1.4 MG/DL (ref 1.6–2.4)
MAGNESIUM: 1.5 MG/DL (ref 1.6–2.4)
MAGNESIUM: 1.5 MG/DL (ref 1.6–2.4)
MAGNESIUM: 1.6 MG/DL (ref 1.6–2.4)
MAGNESIUM: 1.7 MG/DL (ref 1.6–2.4)
MAGNESIUM: 1.8 MG/DL (ref 1.6–2.4)
MAGNESIUM: 1.8 MG/DL (ref 1.6–2.4)
MAGNESIUM: 2.1 MG/DL (ref 1.6–2.4)
MAGNESIUM: 2.4 MG/DL (ref 1.6–2.4)
MAGNESIUM: 2.4 MG/DL (ref 1.6–2.4)
MCH RBC QN AUTO: 26.8 PG (ref 26–33)
MCH RBC QN AUTO: 26.9 PG (ref 26–33)
MCH RBC QN AUTO: 27 PG (ref 26–33)
MCH RBC QN AUTO: 27 PG (ref 27–31)
MCH RBC QN AUTO: 27.1 PG (ref 26–33)
MCH RBC QN AUTO: 27.1 PG (ref 27–31)
MCH RBC QN AUTO: 27.2 PG (ref 26–33)
MCH RBC QN AUTO: 27.2 PG (ref 26–33)
MCH RBC QN AUTO: 27.2 PG (ref 27–31)
MCH RBC QN AUTO: 27.3 PG (ref 26–33)
MCH RBC QN AUTO: 27.4 PG (ref 26–33)
MCH RBC QN AUTO: 27.5 PG (ref 26–33)
MCH RBC QN AUTO: 27.5 PG (ref 27–31)
MCH RBC QN AUTO: 27.6 PG (ref 26–33)
MCH RBC QN AUTO: 27.7 PG (ref 26–33)
MCH RBC QN AUTO: 27.7 PG (ref 26–33)
MCH RBC QN AUTO: 27.7 PG (ref 27.5–33)
MCH RBC QN AUTO: 27.8 PG (ref 26–33)
MCH RBC QN AUTO: 27.9 PG (ref 26–33)
MCH RBC QN AUTO: 28.1 PG (ref 26–33)
MCH RBC QN AUTO: 28.1 PG (ref 27.5–33)
MCH RBC QN AUTO: 28.2 PG (ref 27.5–33)
MCH RBC QN AUTO: 28.5 PG (ref 27.5–33)
MCH RBC QN AUTO: 28.6 PG (ref 27.5–33)
MCH RBC QN AUTO: 28.7 PG (ref 27.5–33)
MCH RBC QN AUTO: 28.9 PG (ref 27.5–33)
MCH RBC QN AUTO: 29 PG (ref 27.5–33)
MCHC RBC AUTO-ENTMCNC: 30.2 GM/DL (ref 32.2–35.5)
MCHC RBC AUTO-ENTMCNC: 30.5 GM/DL (ref 32.2–35.5)
MCHC RBC AUTO-ENTMCNC: 30.6 GM/DL (ref 32.2–35.5)
MCHC RBC AUTO-ENTMCNC: 30.7 GM/DL (ref 32.2–35.5)
MCHC RBC AUTO-ENTMCNC: 30.8 GM/DL (ref 32.2–35.5)
MCHC RBC AUTO-ENTMCNC: 30.8 GM/DL (ref 32.2–35.5)
MCHC RBC AUTO-ENTMCNC: 31 GM/DL (ref 32.2–35.5)
MCHC RBC AUTO-ENTMCNC: 31.2 GM/DL (ref 32.2–35.5)
MCHC RBC AUTO-ENTMCNC: 31.4 GM/DL (ref 32.2–35.5)
MCHC RBC AUTO-ENTMCNC: 31.5 GM/DL (ref 32.2–35.5)
MCHC RBC AUTO-ENTMCNC: 31.8 GM/DL (ref 32.2–35.5)
MCHC RBC AUTO-ENTMCNC: 31.8 GM/DL (ref 33–36)
MCHC RBC AUTO-ENTMCNC: 31.9 GM/DL (ref 32.2–35.5)
MCHC RBC AUTO-ENTMCNC: 31.9 GM/DL (ref 32.2–35.5)
MCHC RBC AUTO-ENTMCNC: 32 GM/DL (ref 32.2–35.5)
MCHC RBC AUTO-ENTMCNC: 32 GM/DL (ref 33–36)
MCHC RBC AUTO-ENTMCNC: 32.1 GM/DL (ref 33–36)
MCHC RBC AUTO-ENTMCNC: 32.1 GM/DL (ref 33–36)
MCHC RBC AUTO-ENTMCNC: 32.2 GM/DL (ref 33–36)
MCHC RBC AUTO-ENTMCNC: 32.3 GM/DL (ref 32.2–35.5)
MCHC RBC AUTO-ENTMCNC: 32.3 GM/DL (ref 33–36)
MCHC RBC AUTO-ENTMCNC: 32.7 GM/DL (ref 32.2–35.5)
MCHC RBC AUTO-ENTMCNC: 32.7 GM/DL (ref 32.2–35.5)
MCHC RBC AUTO-ENTMCNC: 32.7 GM/DL (ref 33–36)
MCHC RBC AUTO-ENTMCNC: 32.7 GM/DL (ref 33–36)
MCHC RBC AUTO-ENTMCNC: 32.9 GM/DL (ref 32.2–35.5)
MCHC RBC AUTO-ENTMCNC: 33 GM/DL (ref 32.2–35.5)
MCHC RBC AUTO-ENTMCNC: 33 GM/DL (ref 32.2–35.5)
MCHC RBC AUTO-ENTMCNC: 33.1 GM/DL (ref 32.2–35.5)
MCHC RBC AUTO-ENTMCNC: 33.4 GM/DL (ref 32.2–35.5)
MCHC RBC AUTO-ENTMCNC: 33.7 GM/DL (ref 33–37)
MCHC RBC AUTO-ENTMCNC: 33.8 GM/DL (ref 33–37)
MCHC RBC AUTO-ENTMCNC: 33.9 GM/DL (ref 33–37)
MCHC RBC AUTO-ENTMCNC: 34.1 GM/DL (ref 32.2–35.5)
MCHC RBC AUTO-ENTMCNC: 34.2 GM/DL (ref 33–37)
MCHC RBC AUTO-ENTMCNC: 34.6 GM/DL (ref 32.2–35.5)
MCV RBC AUTO: 79 FL (ref 81–99)
MCV RBC AUTO: 79.9 FL (ref 81–99)
MCV RBC AUTO: 80 FL (ref 81–99)
MCV RBC AUTO: 80 FL (ref 81–99)
MCV RBC AUTO: 81 FL (ref 81–99)
MCV RBC AUTO: 81.8 FL (ref 81–99)
MCV RBC AUTO: 82.4 FL (ref 81–99)
MCV RBC AUTO: 82.6 FL (ref 81–99)
MCV RBC AUTO: 82.9 FL (ref 81–99)
MCV RBC AUTO: 83.6 FL (ref 81–99)
MCV RBC AUTO: 83.8 FL (ref 81–99)
MCV RBC AUTO: 84.4 FL (ref 81–99)
MCV RBC AUTO: 84.6 FL (ref 81–99)
MCV RBC AUTO: 84.6 FL (ref 81–99)
MCV RBC AUTO: 84.9 FL (ref 81–99)
MCV RBC AUTO: 85 FL (ref 81–99)
MCV RBC AUTO: 85.1 FL (ref 81–99)
MCV RBC AUTO: 86 FL (ref 81–99)
MCV RBC AUTO: 86.1 CU MIC (ref 80–97)
MCV RBC AUTO: 86.3 FL (ref 81–99)
MCV RBC AUTO: 86.8 FL (ref 81–99)
MCV RBC AUTO: 86.9 FL (ref 81–99)
MCV RBC AUTO: 87.1 CU MIC (ref 80–97)
MCV RBC AUTO: 87.2 FL (ref 81–99)
MCV RBC AUTO: 87.4 FL (ref 81–99)
MCV RBC AUTO: 87.5 FL (ref 81–99)
MCV RBC AUTO: 87.7 FL (ref 81–99)
MCV RBC AUTO: 87.9 CU MIC (ref 80–97)
MCV RBC AUTO: 88.3 FL (ref 81–99)
MCV RBC AUTO: 88.5 CU MIC (ref 80–97)
MCV RBC AUTO: 88.5 CU MIC (ref 80–97)
MCV RBC AUTO: 88.9 FL (ref 81–99)
MCV RBC AUTO: 89 CU MIC (ref 80–97)
MCV RBC AUTO: 89 FL (ref 81–99)
MCV RBC AUTO: 89.2 FL (ref 81–99)
MCV RBC AUTO: 89.2 FL (ref 81–99)
MCV RBC AUTO: 89.4 CU MIC (ref 80–97)
MCV RBC AUTO: 89.9 CU MIC (ref 80–97)
MCV RBC AUTO: 89.9 FL (ref 81–99)
MCV RBC AUTO: 90 FL (ref 81–99)
METHICILLIN RESISTANT FILM ARRAY: NOT DETECTED
MICROALBUMIN UR-MCNC: 11.06 MG/DL
MICROALBUMIN UR-MCNC: 13.97 MG/DL
MICROALBUMIN/CREAT 24H UR: ABNORMAL MG/G{CREAT}
MICROALBUMIN/CREAT UR-RTO: 105 MG/G (ref 0–30)
MICROALBUMIN/CREAT UR-RTO: 203 MG/G (ref 0–30)
MICROALBUMIN/CREAT UR-RTO: ABNORMAL
MISCELLANEOUS 2: ABNORMAL
MISCELLANEOUS LAB TEST RESULT: ABNORMAL
MODE: ABNORMAL
MODE: AC
MODE: AC
MONOCYTES # BLD: 10.1 %
MONOCYTES # BLD: 12.3 %
MONOCYTES # BLD: 15.1 %
MONOCYTES # BLD: 5.5 %
MONOCYTES # BLD: 5.9 %
MONOCYTES # BLD: 6 %
MONOCYTES # BLD: 7 %
MONOCYTES # BLD: 7.4 %
MONOCYTES # BLD: 7.7 %
MONOCYTES # BLD: 8.9 %
MONOCYTES # BLD: 8.9 %
MONOCYTES # BLD: 9.3 %
MONOCYTES # BLD: 9.7 %
MONOCYTES # BLD: 9.7 %
MONOCYTES ABSOLUTE: 0.4 THOU/MM3 (ref 0.4–1.3)
MONOCYTES ABSOLUTE: 0.5 THOU/MM3 (ref 0.4–1.3)
MONOCYTES ABSOLUTE: 0.6 THOU/MM3 (ref 0.4–1.3)
MONOCYTES ABSOLUTE: 0.7 THOU/MM3 (ref 0.4–1.3)
MONOCYTES RELATIVE PERCENT: 7.4 % (ref 2–10)
MONOCYTES RELATIVE PERCENT: 7.7 % (ref 2–10)
MONOCYTES RELATIVE PERCENT: 7.8 % (ref 2–10)
MONOCYTES RELATIVE PERCENT: 7.9 % (ref 2–10)
MONOCYTES RELATIVE PERCENT: 8.2 % (ref 2–10)
MONOCYTES RELATIVE PERCENT: 8.5 % (ref 2–10)
MONOCYTES RELATIVE PERCENT: 8.7 % (ref 2–10)
MONOCYTES RELATIVE PERCENT: 8.9 % (ref 2–10)
MONOCYTES: 4 % (ref 0–12)
MONOCYTES: 7 % (ref 0–12)
MRSA SCREEN RT-PCR: NEGATIVE
MRSA SCREEN: ABNORMAL
MRSA SCREEN: NORMAL
MRSA SCREEN: NORMAL
MUCUS: ABNORMAL
MUCUS: PRESENT
NEGATIVE BASE EXCESS, VEN: ABNORMAL (ref 0–2)
NEISSERIA MENIGITIDIS FILM ARRAY: NOT DETECTED
NEUTROPHILS RELATIVE PERCENT: 72.9 % (ref 40–70)
NEUTROPHILS RELATIVE PERCENT: 74.1 % (ref 40–70)
NEUTROPHILS RELATIVE PERCENT: 74.2 % (ref 40–70)
NEUTROPHILS RELATIVE PERCENT: 74.2 % (ref 40–70)
NEUTROPHILS RELATIVE PERCENT: 76.7 % (ref 40–70)
NEUTROPHILS RELATIVE PERCENT: 77.9 % (ref 40–70)
NEUTROPHILS RELATIVE PERCENT: 79.9 % (ref 40–70)
NEUTROPHILS RELATIVE PERCENT: 81.1 % (ref 40–70)
NITRITE, URINE: NEGATIVE
NOROVIRUS GI GII PCR: NOT DETECTED
NOROVIRUS GI GII PCR: NOT DETECTED
NOTIFICATION: YES
NUCLEATED RBCS: 0.1 /100 WBC
NUCLEATED RBCS: 0.2 /100 WBC
NUCLEATED RBCS: 0.3 /100 WBC
NUCLEATED RED BLOOD CELLS: 0 /100 WBC
O2 DEVICE/FLOW/%: ABNORMAL
O2 SAT, ARTERIAL: 82 % (ref 95–98)
O2 SAT, ARTERIAL: 89 % (ref 95–98)
O2 SAT, ARTERIAL: 90 % (ref 95–98)
O2 SAT, ARTERIAL: 91 % (ref 95–98)
O2 SAT, ARTERIAL: 91 % (ref 95–98)
O2 SAT, ARTERIAL: 93 % (ref 95–98)
O2 SAT, ARTERIAL: 93 % (ref 95–98)
O2 SAT, ARTERIAL: 99 % (ref 95–98)
O2 SAT, VEN: 35 % (ref 60–85)
O2 SATURATION: 90 %
O2 SATURATION: 94 %
O2 SATURATION: 95 %
O2 SATURATION: 96 %
O2 SATURATION: 96 %
O2 SATURATION: 97 %
O2 SATURATION: 98 %
O2 SATURATION: 99 %
OCCULT BLOOD SCREENING: NORMAL
OPIATES, URINE: NEGATIVE
OPIATES, URINE: POSITIVE
ORGANISM: ABNORMAL
OSMOLALITY CALCULATION: 239 MOSMOL/KG (ref 275–300)
OSMOLALITY CALCULATION: 241.6 MOSMOL/KG (ref 275–300)
OSMOLALITY CALCULATION: 270.2 MOSMOL/KG (ref 275–300)
OSMOLALITY CALCULATION: 270.3 MOSMOL/KG (ref 275–300)
OSMOLALITY CALCULATION: 273.9 MOSMOL/KG (ref 275–300)
OSMOLALITY CALCULATION: 279 MOSMOL/KG (ref 275–300)
OSMOLALITY CALCULATION: 283.9 MOSMOL/KG (ref 275–300)
OSMOLALITY URINE: 296 MOSMOL/KG (ref 250–750)
OXYCODONE: NEGATIVE
OXYCODONE: NEGATIVE
PATHOLOGIST REVIEW: ABNORMAL
PATIENT TEMP: ABNORMAL
PCO2 (TEMP CORRECTED): 39 (ref 35–45)
PCO2, VEN: 55.8 MM HG (ref 41–51)
PCO2: 37 MMHG (ref 35–45)
PCO2: 42 MMHG (ref 35–45)
PCO2: 43 MMHG (ref 35–45)
PCO2: 46 MMHG (ref 35–45)
PCO2: 47 MMHG (ref 35–45)
PCO2: 53 MMHG (ref 35–45)
PCO2: 55 MMHG (ref 35–45)
PCO2: 55 MMHG (ref 35–45)
PCO2: 56 MMHG (ref 35–45)
PCO2: 60 MMHG (ref 35–45)
PCO2: 61 MMHG (ref 35–45)
PCO2: 66 MMHG (ref 35–45)
PCO2: 67 MMHG (ref 35–45)
PCO2: 69 MMHG (ref 35–45)
PCO2: 75 MMHG (ref 35–45)
PCO2: 84 MMHG (ref 35–45)
PDW BLD-RTO: 12.3 % (ref 11.5–14.5)
PDW BLD-RTO: 13.5 % (ref 11.5–14.5)
PDW BLD-RTO: 14 % (ref 11.5–14.5)
PDW BLD-RTO: 14.4 % (ref 11.5–14.5)
PDW BLD-RTO: 15.4 % (ref 12–16)
PDW BLD-RTO: 16.1 % (ref 12–16)
PDW BLD-RTO: 17.3 % (ref 12–16)
PDW BLD-RTO: 18.5 % (ref 12–16)
PDW BLD-RTO: 18.6 % (ref 12–16)
PDW BLD-RTO: 18.7 % (ref 12–16)
PDW BLD-RTO: 19.1 % (ref 12–16)
PDW BLD-RTO: 19.5 % (ref 12–16)
PEEP: 15
PEEP: 15
PH (TEMPERATURE CORRECTED): 7.41 (ref 7.35–7.45)
PH BLOOD GAS: 7.36 (ref 7.35–7.45)
PH BLOOD GAS: 7.36 (ref 7.35–7.45)
PH BLOOD GAS: 7.39 (ref 7.35–7.45)
PH BLOOD GAS: 7.41 (ref 7.35–7.45)
PH BLOOD GAS: 7.43 (ref 7.35–7.45)
PH BLOOD GAS: 7.44 (ref 7.35–7.45)
PH BLOOD GAS: 7.46 (ref 7.35–7.45)
PH BLOOD GAS: 7.48 (ref 7.35–7.45)
PH UA: 5.5
PH UA: 5.5 (ref 5–9)
PH UA: 6 (ref 5–9)
PH UA: 6.5
PH UA: 6.5 (ref 5–9)
PH UA: 7 (ref 5–9)
PH UA: 8 (ref 5–9)
PH VENOUS: 7.36 (ref 7.32–7.43)
PH, URINE: 5 (ref 5–8)
PH: 7.25 (ref 7.35–7.45)
PH: 7.28 (ref 7.35–7.45)
PH: 7.3 (ref 7.35–7.45)
PH: 7.33 (ref 7.35–7.45)
PH: 7.34 (ref 7.35–7.45)
PH: 7.34 (ref 7.35–7.45)
PH: 7.35 (ref 7.35–7.45)
PH: 7.38 (ref 7.35–7.45)
PHENCYCLIDINE QUANTITATIVE URINE: NEGATIVE
PHENCYCLIDINE QUANTITATIVE URINE: NEGATIVE
PHOSPHORUS: 2.5 MG/DL (ref 2.4–4.7)
PHOSPHORUS: 2.5 MG/DL (ref 2.4–4.7)
PHOSPHORUS: 2.6 MG/DL (ref 2.4–4.7)
PHOSPHORUS: 3.6 MG/DL (ref 2.4–4.7)
PHOSPHORUS: 4.5 MG/DL (ref 2.4–4.7)
PLATELET # BLD: 100 THOU/MM3 (ref 130–400)
PLATELET # BLD: 115 TH/CMM (ref 150–400)
PLATELET # BLD: 119 THOU/MM3 (ref 130–400)
PLATELET # BLD: 124 THOU/MM3 (ref 130–400)
PLATELET # BLD: 124 THOU/MM3 (ref 130–400)
PLATELET # BLD: 135 THOU/MM3 (ref 130–400)
PLATELET # BLD: 136 THOU/MM3 (ref 130–400)
PLATELET # BLD: 136 THOU/MM3 (ref 130–400)
PLATELET # BLD: 139 THOU/MM3 (ref 130–400)
PLATELET # BLD: 143 THOU/MM3 (ref 130–400)
PLATELET # BLD: 145 THOU/MM3 (ref 130–400)
PLATELET # BLD: 148 THOU/MM3 (ref 130–400)
PLATELET # BLD: 149 THOU/MM3 (ref 130–400)
PLATELET # BLD: 152 THOU/MM3 (ref 130–400)
PLATELET # BLD: 155 THOU/MM3 (ref 130–400)
PLATELET # BLD: 160 THOU/MM3 (ref 130–400)
PLATELET # BLD: 165 THOU/MM3 (ref 130–400)
PLATELET # BLD: 167 THOU/MM3 (ref 130–400)
PLATELET # BLD: 168 THOU/MM3 (ref 130–400)
PLATELET # BLD: 173 THOU/MM3 (ref 130–400)
PLATELET # BLD: 173 THOU/MM3 (ref 130–400)
PLATELET # BLD: 177 THOU/MM3 (ref 130–400)
PLATELET # BLD: 177 THOU/MM3 (ref 130–400)
PLATELET # BLD: 178 THOU/MM3 (ref 130–400)
PLATELET # BLD: 183 THOU/MM3 (ref 130–400)
PLATELET # BLD: 184 THOU/MM3 (ref 130–400)
PLATELET # BLD: 201 THOU/MM3 (ref 130–400)
PLATELET # BLD: 204 THOU/MM3 (ref 130–400)
PLATELET # BLD: 245 TH/CMM (ref 150–400)
PLATELET # BLD: 270 TH/CMM (ref 150–400)
PLATELET # BLD: 53 THOU/MM3 (ref 130–400)
PLATELET # BLD: 60 THOU/MM3 (ref 130–400)
PLATELET # BLD: 74 TH/CMM (ref 150–400)
PLATELET # BLD: 81 TH/CMM (ref 150–400)
PLATELET # BLD: 81 TH/CMM (ref 150–400)
PLATELET # BLD: 84 THOU/MM3 (ref 130–400)
PLATELET # BLD: 86 TH/CMM (ref 150–400)
PLATELET # BLD: 87 THOU/MM3 (ref 130–400)
PLATELET # BLD: 91 THOU/MM3 (ref 130–400)
PLATELET # BLD: 96 TH/CMM (ref 150–400)
PLATELET ESTIMATE: ABNORMAL
PLATELET ESTIMATE: ABNORMAL
PLATELET FUNCTION INTERP: ABNORMAL
PLESIOMONAS SHIGELLOIDES PCR: NOT DETECTED
PLESIOMONAS SHIGELLOIDES PCR: NOT DETECTED
PMV BLD AUTO: 10.2 FL (ref 9.4–12.4)
PMV BLD AUTO: 10.4 FL (ref 9.4–12.4)
PMV BLD AUTO: 10.6 FL (ref 9.4–12.4)
PMV BLD AUTO: 10.7 FL (ref 9.4–12.4)
PMV BLD AUTO: 10.9 FL (ref 9.4–12.4)
PMV BLD AUTO: 6.4 FL (ref 7.4–10.4)
PMV BLD AUTO: 6.8 FL (ref 7.4–10.4)
PMV BLD AUTO: 6.9 FL (ref 7.4–10.4)
PMV BLD AUTO: 7 FL (ref 7.4–10.4)
PMV BLD AUTO: 9.1 FL (ref 9.4–12.4)
PMV BLD AUTO: 9.2 FL (ref 9.4–12.4)
PMV BLD AUTO: 9.3 FL (ref 9.4–12.4)
PMV BLD AUTO: 9.4 FL (ref 9.4–12.4)
PMV BLD AUTO: 9.5 FL (ref 9.4–12.4)
PMV BLD AUTO: 9.6 FL (ref 9.4–12.4)
PMV BLD AUTO: 9.7 FL (ref 9.4–12.4)
PMV BLD AUTO: 9.8 FL (ref 9.4–12.4)
PMV BLD AUTO: 9.9 FL (ref 9.4–12.4)
PO2 (TEMP CORRECTED): 124 (ref 71–104)
PO2, VEN: 22.3 MM HG (ref 30–50)
PO2: 105 MMHG (ref 71–104)
PO2: 118 MMHG (ref 71–104)
PO2: 188 MMHG (ref 80–105)
PO2: 52 MMHG (ref 80–105)
PO2: 58 MMHG (ref 80–105)
PO2: 63 MMHG (ref 80–105)
PO2: 64 MMHG (ref 71–104)
PO2: 66 MMHG (ref 80–105)
PO2: 69 MMHG (ref 71–104)
PO2: 71 MMHG (ref 80–105)
PO2: 75 MMHG (ref 80–105)
PO2: 76 MMHG (ref 71–104)
PO2: 76 MMHG (ref 80–105)
PO2: 80 MMHG (ref 71–104)
PO2: 82 MMHG (ref 71–104)
PO2: 85 MMHG (ref 71–104)
POC ACTIVATED CLOTTING TIME KAOLIN: 351 SECONDS (ref 1–150)
POC CHLORIDE: 96 MMOL/L (ref 98–107)
POC CREATININE: 0.88 MG/DL (ref 0.51–1.19)
POC HEMATOCRIT: 34 % (ref 36–46)
POC HEMOGLOBIN: 11.5 G/DL (ref 12–16)
POC INR: 1.2
POC IONIZED CALCIUM: 1.28 MMOL/L (ref 1.15–1.33)
POC O2 SATURATION: 63 % (ref 94–97)
POC O2 SATURATION: 93 % (ref 94–97)
POC O2 SATURATION: 94 % (ref 94–97)
POC PCO2 TEMP: ABNORMAL MM HG
POC PH TEMP: ABNORMAL
POC PO2 TEMP: ABNORMAL MM HG
POC POTASSIUM: 4.3 MMOL/L (ref 3.5–4.5)
POC SODIUM: 135 MMOL/L (ref 138–146)
POSITIVE BASE EXCESS, VEN: 5 (ref 0–3)
POTASSIUM REFLEX MAGNESIUM: 3.3 MEQ/L (ref 3.5–5.2)
POTASSIUM REFLEX MAGNESIUM: 3.3 MEQ/L (ref 3.5–5.2)
POTASSIUM REFLEX MAGNESIUM: 3.6 MEQ/L (ref 3.5–5.2)
POTASSIUM REFLEX MAGNESIUM: 3.7 MEQ/L (ref 3.5–5.2)
POTASSIUM REFLEX MAGNESIUM: 3.8 MEQ/L (ref 3.5–5.2)
POTASSIUM REFLEX MAGNESIUM: 4 MEQ/L (ref 3.5–5.2)
POTASSIUM REFLEX MAGNESIUM: 4.2 MEQ/L (ref 3.5–5.2)
POTASSIUM SERPL-SCNC: 2.9 MEQ/L (ref 3.6–5)
POTASSIUM SERPL-SCNC: 3 MEQ/L (ref 3.5–5.2)
POTASSIUM SERPL-SCNC: 3.1 MEQ/L (ref 3.5–5.2)
POTASSIUM SERPL-SCNC: 3.1 MEQ/L (ref 3.5–5.2)
POTASSIUM SERPL-SCNC: 3.2 MEQ/L (ref 3.5–5.2)
POTASSIUM SERPL-SCNC: 3.3 MEQ/L (ref 3.5–5.2)
POTASSIUM SERPL-SCNC: 3.3 MEQ/L (ref 3.6–5)
POTASSIUM SERPL-SCNC: 3.4 MEQ/L (ref 3.5–5.2)
POTASSIUM SERPL-SCNC: 3.5 MEQ/L (ref 3.5–5.2)
POTASSIUM SERPL-SCNC: 3.6 MEQ/L (ref 3.5–5.2)
POTASSIUM SERPL-SCNC: 3.6 MEQ/L (ref 3.5–5.2)
POTASSIUM SERPL-SCNC: 3.6 MEQ/L (ref 3.6–5)
POTASSIUM SERPL-SCNC: 3.7 MEQ/L (ref 3.5–5.2)
POTASSIUM SERPL-SCNC: 3.8 MEQ/L (ref 3.5–5.2)
POTASSIUM SERPL-SCNC: 3.8 MEQ/L (ref 3.6–5)
POTASSIUM SERPL-SCNC: 3.9 MEQ/L (ref 3.5–5.2)
POTASSIUM SERPL-SCNC: 3.9 MEQ/L (ref 3.6–5)
POTASSIUM SERPL-SCNC: 4 MEQ/L (ref 3.5–5.2)
POTASSIUM SERPL-SCNC: 4 MEQ/L (ref 3.6–5)
POTASSIUM SERPL-SCNC: 4.1 MEQ/L (ref 3.5–5.2)
POTASSIUM SERPL-SCNC: 4.2 MEQ/L (ref 3.5–5.2)
POTASSIUM SERPL-SCNC: 4.2 MEQ/L (ref 3.5–5.2)
POTASSIUM SERPL-SCNC: 4.4 MEQ/L (ref 3.5–5.2)
POTASSIUM SERPL-SCNC: 4.5 MEQ/L (ref 3.5–5.2)
POTASSIUM SERPL-SCNC: 4.5 MEQ/L (ref 3.6–5)
POTASSIUM SERPL-SCNC: 4.6 MEQ/L (ref 3.5–5.2)
POTASSIUM SERPL-SCNC: 4.6 MEQ/L (ref 3.5–5.2)
POTASSIUM SERPL-SCNC: 4.7 MEQ/L (ref 3.5–5.2)
POTASSIUM SERPL-SCNC: 4.7 MEQ/L (ref 3.6–5)
POTASSIUM SERPL-SCNC: 5.1 MEQ/L (ref 3.5–5.2)
PRO-BNP: 1350 PG/ML (ref 0–900)
PRO-BNP: 1622 PG/ML (ref 0–900)
PRO-BNP: 1686 PG/ML (ref 0–900)
PRO-BNP: 2911 PG/ML (ref 0–900)
PRO-BNP: 3396 PG/ML (ref 0–900)
PRO-BNP: 5142 PG/ML (ref 0–900)
PRO-BNP: 577.5 PG/ML (ref 0–900)
PRO-BNP: 826.2 PG/ML (ref 0–900)
PRO-BNP: 8523 PG/ML (ref 0–900)
PRO-BNP: ABNORMAL PG/ML (ref 0–900)
PROCALCITONIN: 0.1 NG/ML (ref 0.01–0.09)
PROCALCITONIN: 1.42 NG/ML (ref 0.01–0.09)
PROTEIN UA: 100
PROTEIN UA: 30 MG/DL
PROTEIN UA: 30 MG/DL
PROTEIN UA: 300
PROTEIN UA: 300
PROTEIN UA: 300 MG/DL
PROTEIN UA: ABNORMAL MG/DL
PROTEIN, URINE: ABNORMAL
PROTEUS FILM ARRAY: NOT DETECTED
PROTHROMBIN TIME, POC: 13.8 SEC (ref 10.4–14.2)
PROTHROMBIN TIME: 14.9 SEC (ref 9.6–13.3)
PS: 10
PSEUDOMONAS AERUGINOSA FILM ARRAY: NOT DETECTED
PTH INTACT: 17.4 PG/ML (ref 15–65)
RBC # BLD: 2.13 MIL/CMM (ref 4–5.1)
RBC # BLD: 2.31 MIL/CMM (ref 4–5.1)
RBC # BLD: 2.49 MILL/MM3 (ref 4.2–5.4)
RBC # BLD: 2.56 MIL/CMM (ref 4–5.1)
RBC # BLD: 2.58 MIL/CMM (ref 4–5.1)
RBC # BLD: 2.64 MIL/CMM (ref 4–5.1)
RBC # BLD: 2.64 MIL/CMM (ref 4–5.1)
RBC # BLD: 2.66 MIL/CMM (ref 4–5.1)
RBC # BLD: 2.69 MILL/MM3 (ref 4.2–5.4)
RBC # BLD: 2.72 MILL/MM3 (ref 4.2–5.4)
RBC # BLD: 2.73 MILL/MM3 (ref 4.2–5.4)
RBC # BLD: 2.76 MILL/MM3 (ref 4.2–5.4)
RBC # BLD: 2.82 MIL/CMM (ref 4–5.1)
RBC # BLD: 2.94 MILL/MM3 (ref 4.2–5.4)
RBC # BLD: 2.99 MILL/MM3 (ref 4.2–5.4)
RBC # BLD: 3.06 MILL/MM3 (ref 4.2–5.4)
RBC # BLD: 3.12 MILL/MM3 (ref 4.2–5.4)
RBC # BLD: 3.15 MILL/MM3 (ref 4.2–5.4)
RBC # BLD: 3.16 MILL/MM3 (ref 4.2–5.4)
RBC # BLD: 3.16 MILL/MM3 (ref 4.2–5.4)
RBC # BLD: 3.21 MILL/MM3 (ref 4.2–5.4)
RBC # BLD: 3.28 MILL/MM3 (ref 4.2–5.4)
RBC # BLD: 3.28 MILL/MM3 (ref 4.2–5.4)
RBC # BLD: 3.29 MILL/MM3 (ref 4.2–5.4)
RBC # BLD: 3.33 MILL/MM3 (ref 4.2–5.4)
RBC # BLD: 3.37 MILL/MM3 (ref 4.2–5.4)
RBC # BLD: 3.41 MILL/MM3 (ref 4.2–5.4)
RBC # BLD: 3.43 MILL/MM3 (ref 4.2–5.4)
RBC # BLD: 3.51 MILL/MM3 (ref 4.2–5.4)
RBC # BLD: 3.52 MILL/MM3 (ref 4.2–5.4)
RBC # BLD: 3.6 MILL/MM3 (ref 4.2–5.4)
RBC # BLD: 3.61 MILL/MM3 (ref 4.2–5.4)
RBC # BLD: 3.65 MILL/MM3 (ref 4.2–5.4)
RBC # BLD: 3.71 MILL/MM3 (ref 4.2–5.4)
RBC # BLD: 3.73 MILL/MM3 (ref 4.2–5.4)
RBC # BLD: 3.85 MILL/MM3 (ref 4.2–5.4)
RBC # BLD: 4.02 MILL/MM3 (ref 4.2–5.4)
RBC # BLD: 4.02 MILL/MM3 (ref 4.2–5.4)
RBC URINE: ABNORMAL /HPF
RENAL EPITHELIAL, UA: ABNORMAL
RESPIRATORY RATE: 16
RESPIRATORY RATE: 20
RH FACTOR: NORMAL
ROTAVIRUS A PCR: NOT DETECTED
ROTAVIRUS A PCR: NOT DETECTED
SALMONELLA PCR: NOT DETECTED
SALMONELLA PCR: NOT DETECTED
SAMPLE SITE: ABNORMAL
SAPOVIRUS PCR: NOT DETECTED
SAPOVIRUS PCR: NOT DETECTED
SCAN OF BLOOD SMEAR: NORMAL
SCAN OF BLOOD SMEAR: NORMAL
SEG NEUTROPHILS: 47.4 %
SEG NEUTROPHILS: 49.8 %
SEG NEUTROPHILS: 63.2 %
SEG NEUTROPHILS: 66 % (ref 43–75)
SEG NEUTROPHILS: 66.3 %
SEG NEUTROPHILS: 67 % (ref 43–75)
SEG NEUTROPHILS: 69 % (ref 43–75)
SEG NEUTROPHILS: 71.5 %
SEG NEUTROPHILS: 72.7 %
SEG NEUTROPHILS: 74.6 %
SEG NEUTROPHILS: 76.6 %
SEG NEUTROPHILS: 77.4 %
SEG NEUTROPHILS: 78 % (ref 43–75)
SEG NEUTROPHILS: 78.6 %
SEG NEUTROPHILS: 79.1 %
SEG NEUTROPHILS: 81.6 %
SEG NEUTROPHILS: 86.2 %
SEG NEUTROPHILS: 86.6 %
SEGMENTED NEUTROPHILS ABSOLUTE COUNT: 1.7 THOU/MM3 (ref 1.8–7.7)
SEGMENTED NEUTROPHILS ABSOLUTE COUNT: 2 THOU/MM3 (ref 1.8–7.7)
SEGMENTED NEUTROPHILS ABSOLUTE COUNT: 2.7 THOU/MM3 (ref 1.8–7.7)
SEGMENTED NEUTROPHILS ABSOLUTE COUNT: 3.2 THOU/MM3 (ref 1.8–7.7)
SEGMENTED NEUTROPHILS ABSOLUTE COUNT: 3.5 THOU/MM3 (ref 1.8–7.7)
SEGMENTED NEUTROPHILS ABSOLUTE COUNT: 3.6 THOU/MM3 (ref 1.8–7.7)
SEGMENTED NEUTROPHILS ABSOLUTE COUNT: 4.3 THOU/MM3 (ref 1.8–7.7)
SEGMENTED NEUTROPHILS ABSOLUTE COUNT: 4.6 THOU/MM3 (ref 1.8–7.7)
SEGMENTED NEUTROPHILS ABSOLUTE COUNT: 4.7 THOU/MM3 (ref 1.8–7.7)
SEGMENTED NEUTROPHILS ABSOLUTE COUNT: 5.2 THOU/MM3 (ref 1.8–7.7)
SEGMENTED NEUTROPHILS ABSOLUTE COUNT: 5.3 THOU/MM3 (ref 1.8–7.7)
SEGMENTED NEUTROPHILS ABSOLUTE COUNT: 5.5 THOU/MM3 (ref 1.8–7.7)
SEGMENTED NEUTROPHILS ABSOLUTE COUNT: 6.4 THOU/MM3 (ref 1.8–7.7)
SEGMENTED NEUTROPHILS ABSOLUTE COUNT: 6.7 THOU/MM3 (ref 1.8–7.7)
SEROTONIN RELEASING ASSAY: NORMAL
SERRATIA MARCESCENS FILM ARRAY: NOT DETECTED
SET PEEP: 6 MMHG
SET PEEP: 6 MMHG
SET PRESS SUPP: 8 CMH2O
SET PRESS SUPP: 8 CMH2O
SITE: ABNORMAL
SODIUM BLD-SCNC: 116 MEQ/L (ref 135–145)
SODIUM BLD-SCNC: 117 MEQ/L (ref 135–145)
SODIUM BLD-SCNC: 119 MEQ/L (ref 135–145)
SODIUM BLD-SCNC: 120 MEQ/L (ref 135–145)
SODIUM BLD-SCNC: 121 MEQ/L (ref 135–145)
SODIUM BLD-SCNC: 121 MEQ/L (ref 135–145)
SODIUM BLD-SCNC: 122 MEQ/L (ref 135–145)
SODIUM BLD-SCNC: 123 MEQ/L (ref 135–145)
SODIUM BLD-SCNC: 124 MEQ/L (ref 135–145)
SODIUM BLD-SCNC: 125 MEQ/L (ref 135–145)
SODIUM BLD-SCNC: 127 MEQ/L (ref 135–145)
SODIUM BLD-SCNC: 128 MEQ/L (ref 135–145)
SODIUM BLD-SCNC: 128 MEQ/L (ref 135–145)
SODIUM BLD-SCNC: 129 MEQ/L (ref 135–145)
SODIUM BLD-SCNC: 131 MEQ/L (ref 135–145)
SODIUM BLD-SCNC: 132 MEQ/L (ref 135–145)
SODIUM BLD-SCNC: 133 MEQ/L (ref 135–145)
SODIUM BLD-SCNC: 134 MEQ/L (ref 135–145)
SODIUM BLD-SCNC: 134 MEQ/L (ref 135–145)
SODIUM BLD-SCNC: 135 MEQ/L (ref 135–145)
SODIUM BLD-SCNC: 136 MEQ/L (ref 135–145)
SODIUM BLD-SCNC: 137 MEQ/L (ref 135–145)
SODIUM BLD-SCNC: 137 MEQ/L (ref 135–145)
SODIUM BLD-SCNC: 138 MEQ/L (ref 135–145)
SODIUM BLD-SCNC: 138 MEQ/L (ref 135–145)
SODIUM BLD-SCNC: 139 MEQ/L (ref 135–145)
SODIUM BLD-SCNC: 141 MEQ/L (ref 135–145)
SODIUM BLD-SCNC: 141 MEQ/L (ref 135–145)
SODIUM BLD-SCNC: 143 MEQ/L (ref 135–145)
SODIUM BLD-SCNC: 143 MEQ/L (ref 135–145)
SODIUM BLD-SCNC: 145 MEQ/L (ref 135–145)
SODIUM BLD-SCNC: 145 MEQ/L (ref 135–145)
SODIUM BLD-SCNC: 147 MEQ/L (ref 135–145)
SODIUM BLD-SCNC: 150 MEQ/L (ref 135–145)
SODIUM BLD-SCNC: 151 MEQ/L (ref 135–145)
SODIUM BLD-SCNC: 154 MEQ/L (ref 135–145)
SODIUM BLD-SCNC: 155 MEQ/L (ref 135–145)
SODIUM URINE: 63 MEQ/L
SOURCE OF BLOOD CULTURE: ABNORMAL
SOURCE, BLOOD GAS: ABNORMAL
SOURCE, BLOOD GAS: NORMAL
SPECIFIC GRAVITY UA: 1.01 (ref 1–1.03)
SPECIFIC GRAVITY, URINE: 1.01 (ref 1–1.03)
SPECIMEN: NORMAL
SQUAMOUS EPITHELIAL: ABNORMAL /HPF
STAPH AUREUS FILM ARRAY: DETECTED
STAPHYLOCOCCUS FILM ARRAY: DETECTED
STATUS: NORMAL
STREP AGALACTIAE FILM ARRAY: NOT DETECTED
STREP PNEUMO AG, UR: NEGATIVE
STREP PNEUMONIAE FILM ARRAY: NOT DETECTED
STREP PYOCGENES FILM ARRAY: NOT DETECTED
STREPTOCOCCUS FILM ARRAY: NOT DETECTED
T4 FREE: 0.93 NG/DL (ref 0.93–1.76)
T4 FREE: 1.02 NG/DL (ref 0.93–1.76)
TEMPERATURE: 38
TOTAL CK: 53 U/L (ref 30–135)
TOTAL CO2, VENOUS: 33 MMOL/L (ref 23–30)
TOTAL IRON BINDING CAPACITY: 295 UG/DL (ref 171–450)
TOTAL IRON BINDING CAPACITY: 301 UG/DL (ref 171–450)
TOTAL IRON BINDING CAPACITY: 301 UG/DL (ref 171–450)
TOTAL IRON BINDING CAPACITY: 305 UG/DL (ref 171–450)
TOTAL PROTEIN: 5 G/DL (ref 6.2–8)
TOTAL PROTEIN: 5.6 G/DL (ref 6.1–8)
TOTAL PROTEIN: 5.8 G/DL (ref 6.1–8)
TOTAL PROTEIN: 6.5 G/DL (ref 6.1–8)
TOTAL PROTEIN: 6.5 G/DL (ref 6.1–8)
TOTAL PROTEIN: 6.9 G/DL (ref 6.1–8)
TOTAL PROTEIN: 7 G/DL (ref 6.1–8)
TOTAL PROTEIN: 7.2 G/DL (ref 6.1–8)
TOTAL PROTEIN: 7.4 G/DL (ref 6.1–8)
TOTAL PROTEIN: 7.4 G/DL (ref 6.1–8)
TOTAL PROTEIN: 7.8 G/DL (ref 6.1–8)
TOXOPLASMA GONDII AB IGG: < 3 IU/ML
TOXOPLASMA IGM ANTIBODY: < 3 AU/ML
TRIGL SERPL-MCNC: 256 MG/DL (ref 0–199)
TRIGL SERPL-MCNC: 378 MG/DL (ref 0–199)
TROPONIN T: 0.03 NG/ML
TROPONIN T: 0.04 NG/ML
TROPONIN T: 0.04 NG/ML
TROPONIN T: 0.16 NG/ML
TROPONIN T: 0.19 NG/ML
TROPONIN T: 0.2 NG/ML
TROPONIN T: 0.27 NG/ML
TROPONIN T: 0.28 NG/ML
TROPONIN T: 0.3 NG/ML
TROPONIN T: 0.32 NG/ML
TROPONIN T: 0.32 NG/ML
TROPONIN T: < 0.01 NG/ML
TSH SERPL DL<=0.05 MIU/L-ACNC: 1.72 UIU/ML (ref 0.4–4.2)
TSH SERPL DL<=0.05 MIU/L-ACNC: 7.12 UIU/ML (ref 0.4–4.2)
TSH SERPL DL<=0.05 MIU/L-ACNC: 8.25 UIU/ML (ref 0.4–4.2)
TSH SERPL DL<=0.05 MIU/L-ACNC: 9.11 UIU/ML (ref 0.4–4.2)
UREA NITROGEN, UR: 334 MG/DL
URINALYSIS REFLEX: YES
URINE CULTURE REFLEX: NORMAL
URINE CULTURE, ROUTINE: ABNORMAL
URINE CULTURE, ROUTINE: NORMAL
URINE HGB: ABNORMAL
UROBILINOGEN, URINE: 0.2 EU/DL
UROBILINOGEN, URINE: 0.2 EU/DL
UROBILINOGEN, URINE: 0.2 EU/DL (ref 0–1)
UROBILINOGEN, URINE: ABNORMAL (ref 0.2–1)
VANCOMYCIN RESISTANT ENTEROCOCCUS: NEGATIVE
VANCOMYCIN RESISTANT ENTEROCOCCUS: NEGATIVE
VANCOMYCIN RESISTANT ENTEROCOCCUS: POSITIVE
VANCOMYCIN RESISTANT FILM ARRAY: ABNORMAL
VIBRIO CHOLERAE PCR: NOT DETECTED
VIBRIO CHOLERAE PCR: NOT DETECTED
VIBRIO PCR: NOT DETECTED
VIBRIO PCR: NOT DETECTED
VIRAL CULTURE,RAPID,RESPIRATOR: NORMAL
VITAMIN B-12: 1282 PG/ML (ref 211–911)
VITAMIN B-12: 486 PG/ML (ref 211–911)
VITAMIN B-12: 587 PG/ML (ref 211–911)
VITAMIN B-12: 627 PG/ML (ref 211–911)
VITAMIN D 25-HYDROXY: 20 NG/ML (ref 30–100)
VT: 370
VT: 370
WBC # BLD: 3.5 TH/CMM (ref 4.4–10.5)
WBC # BLD: 3.5 THOU/MM3 (ref 4.8–10.8)
WBC # BLD: 3.7 TH/CMM (ref 4.4–10.5)
WBC # BLD: 3.7 THOU/MM3 (ref 4.8–10.8)
WBC # BLD: 3.8 TH/CMM (ref 4.4–10.5)
WBC # BLD: 3.9 TH/CMM (ref 4.4–10.5)
WBC # BLD: 3.9 TH/CMM (ref 4.4–10.5)
WBC # BLD: 4 THOU/MM3 (ref 4.8–10.8)
WBC # BLD: 4.1 THOU/MM3 (ref 4.8–10.8)
WBC # BLD: 4.2 THOU/MM3 (ref 4.8–10.8)
WBC # BLD: 4.5 THOU/MM3 (ref 4.8–10.8)
WBC # BLD: 4.5 THOU/MM3 (ref 4.8–10.8)
WBC # BLD: 4.6 TH/CMM (ref 4.4–10.5)
WBC # BLD: 4.8 THOU/MM3 (ref 4.8–10.8)
WBC # BLD: 4.9 THOU/MM3 (ref 4.8–10.8)
WBC # BLD: 5 THOU/MM3 (ref 4.8–10.8)
WBC # BLD: 5.1 THOU/MM3 (ref 4.8–10.8)
WBC # BLD: 5.6 THOU/MM3 (ref 4.8–10.8)
WBC # BLD: 5.6 THOU/MM3 (ref 4.8–10.8)
WBC # BLD: 5.7 THOU/MM3 (ref 4.8–10.8)
WBC # BLD: 5.8 THOU/MM3 (ref 4.8–10.8)
WBC # BLD: 5.8 THOU/MM3 (ref 4.8–10.8)
WBC # BLD: 5.9 THOU/MM3 (ref 4.8–10.8)
WBC # BLD: 6 THOU/MM3 (ref 4.8–10.8)
WBC # BLD: 6.1 THOU/MM3 (ref 4.8–10.8)
WBC # BLD: 6.1 THOU/MM3 (ref 4.8–10.8)
WBC # BLD: 6.4 THOU/MM3 (ref 4.8–10.8)
WBC # BLD: 6.5 TH/CMM (ref 4.4–10.5)
WBC # BLD: 6.7 THOU/MM3 (ref 4.8–10.8)
WBC # BLD: 6.9 THOU/MM3 (ref 4.8–10.8)
WBC # BLD: 7.5 TH/CMM (ref 4.4–10.5)
WBC # BLD: 8.3 THOU/MM3 (ref 4.8–10.8)
WBC # BLD: 8.7 THOU/MM3 (ref 4.8–10.8)
WBC UA: > 200 /HPF
WBC UA: ABNORMAL /HPF
WBC URINE: >100 /HPF
YEAST: ABNORMAL
YERSINIA ENTEROCOLITICA PCR: NOT DETECTED
YERSINIA ENTEROCOLITICA PCR: NOT DETECTED

## 2019-01-01 PROCEDURE — 6370000000 HC RX 637 (ALT 250 FOR IP): Performed by: INTERNAL MEDICINE

## 2019-01-01 PROCEDURE — 2580000003 HC RX 258: Performed by: INTERNAL MEDICINE

## 2019-01-01 PROCEDURE — 97162 PT EVAL MOD COMPLEX 30 MIN: CPT

## 2019-01-01 PROCEDURE — 97110 THERAPEUTIC EXERCISES: CPT

## 2019-01-01 PROCEDURE — 85027 COMPLETE CBC AUTOMATED: CPT

## 2019-01-01 PROCEDURE — 82330 ASSAY OF CALCIUM: CPT

## 2019-01-01 PROCEDURE — 84132 ASSAY OF SERUM POTASSIUM: CPT

## 2019-01-01 PROCEDURE — 82948 REAGENT STRIP/BLOOD GLUCOSE: CPT

## 2019-01-01 PROCEDURE — G8427 DOCREV CUR MEDS BY ELIG CLIN: HCPCS | Performed by: NURSE PRACTITIONER

## 2019-01-01 PROCEDURE — G8417 CALC BMI ABV UP PARAM F/U: HCPCS | Performed by: INTERNAL MEDICINE

## 2019-01-01 PROCEDURE — 6370000000 HC RX 637 (ALT 250 FOR IP): Performed by: PHYSICIAN ASSISTANT

## 2019-01-01 PROCEDURE — G8399 PT W/DXA RESULTS DOCUMENT: HCPCS | Performed by: PHYSICIAN ASSISTANT

## 2019-01-01 PROCEDURE — 3017F COLORECTAL CA SCREEN DOC REV: CPT | Performed by: NURSE PRACTITIONER

## 2019-01-01 PROCEDURE — G8417 CALC BMI ABV UP PARAM F/U: HCPCS | Performed by: PHYSICIAN ASSISTANT

## 2019-01-01 PROCEDURE — 99239 HOSP IP/OBS DSCHRG MGMT >30: CPT | Performed by: INTERNAL MEDICINE

## 2019-01-01 PROCEDURE — G8427 DOCREV CUR MEDS BY ELIG CLIN: HCPCS | Performed by: PHYSICIAN ASSISTANT

## 2019-01-01 PROCEDURE — 84484 ASSAY OF TROPONIN QUANT: CPT

## 2019-01-01 PROCEDURE — 80048 BASIC METABOLIC PNL TOTAL CA: CPT

## 2019-01-01 PROCEDURE — 2500000003 HC RX 250 WO HCPCS: Performed by: NURSE PRACTITIONER

## 2019-01-01 PROCEDURE — 02RF38Z REPLACEMENT OF AORTIC VALVE WITH ZOOPLASTIC TISSUE, PERCUTANEOUS APPROACH: ICD-10-PCS | Performed by: INTERNAL MEDICINE

## 2019-01-01 PROCEDURE — 93005 ELECTROCARDIOGRAM TRACING: CPT | Performed by: INTERNAL MEDICINE

## 2019-01-01 PROCEDURE — 82550 ASSAY OF CK (CPK): CPT

## 2019-01-01 PROCEDURE — 36415 COLL VENOUS BLD VENIPUNCTURE: CPT

## 2019-01-01 PROCEDURE — 94660 CPAP INITIATION&MGMT: CPT

## 2019-01-01 PROCEDURE — 36600 WITHDRAWAL OF ARTERIAL BLOOD: CPT

## 2019-01-01 PROCEDURE — 93970 EXTREMITY STUDY: CPT

## 2019-01-01 PROCEDURE — 97166 OT EVAL MOD COMPLEX 45 MIN: CPT

## 2019-01-01 PROCEDURE — 6360000002 HC RX W HCPCS: Performed by: NURSE PRACTITIONER

## 2019-01-01 PROCEDURE — 1111F DSCHRG MED/CURRENT MED MERGE: CPT | Performed by: NURSE PRACTITIONER

## 2019-01-01 PROCEDURE — 1036F TOBACCO NON-USER: CPT | Performed by: PHYSICIAN ASSISTANT

## 2019-01-01 PROCEDURE — 99232 SBSQ HOSP IP/OBS MODERATE 35: CPT | Performed by: PHYSICIAN ASSISTANT

## 2019-01-01 PROCEDURE — 6360000002 HC RX W HCPCS: Performed by: INTERNAL MEDICINE

## 2019-01-01 PROCEDURE — 1090F PRES/ABSN URINE INCON ASSESS: CPT | Performed by: NURSE PRACTITIONER

## 2019-01-01 PROCEDURE — 84100 ASSAY OF PHOSPHORUS: CPT

## 2019-01-01 PROCEDURE — 4040F PNEUMOC VAC/ADMIN/RCVD: CPT | Performed by: PHYSICIAN ASSISTANT

## 2019-01-01 PROCEDURE — 36556 INSERT NON-TUNNEL CV CATH: CPT | Performed by: INTERNAL MEDICINE

## 2019-01-01 PROCEDURE — 6370000000 HC RX 637 (ALT 250 FOR IP): Performed by: NURSE PRACTITIONER

## 2019-01-01 PROCEDURE — 2700000000 HC OXYGEN THERAPY PER DAY

## 2019-01-01 PROCEDURE — 94761 N-INVAS EAR/PLS OXIMETRY MLT: CPT

## 2019-01-01 PROCEDURE — 94640 AIRWAY INHALATION TREATMENT: CPT

## 2019-01-01 PROCEDURE — 93005 ELECTROCARDIOGRAM TRACING: CPT | Performed by: PHYSICIAN ASSISTANT

## 2019-01-01 PROCEDURE — 1101F PT FALLS ASSESS-DOCD LE1/YR: CPT | Performed by: PHYSICIAN ASSISTANT

## 2019-01-01 PROCEDURE — 6370000000 HC RX 637 (ALT 250 FOR IP): Performed by: FAMILY MEDICINE

## 2019-01-01 PROCEDURE — 96361 HYDRATE IV INFUSION ADD-ON: CPT

## 2019-01-01 PROCEDURE — 96374 THER/PROPH/DIAG INJ IV PUSH: CPT | Performed by: NURSE PRACTITIONER

## 2019-01-01 PROCEDURE — 99305 1ST NF CARE MODERATE MDM 35: CPT | Performed by: FAMILY MEDICINE

## 2019-01-01 PROCEDURE — 99291 CRITICAL CARE FIRST HOUR: CPT | Performed by: INTERNAL MEDICINE

## 2019-01-01 PROCEDURE — 2580000003 HC RX 258: Performed by: EMERGENCY MEDICINE

## 2019-01-01 PROCEDURE — C1894 INTRO/SHEATH, NON-LASER: HCPCS

## 2019-01-01 PROCEDURE — 82435 ASSAY OF BLOOD CHLORIDE: CPT

## 2019-01-01 PROCEDURE — 99496 TRANSJ CARE MGMT HIGH F2F 7D: CPT | Performed by: NURSE PRACTITIONER

## 2019-01-01 PROCEDURE — 99285 EMERGENCY DEPT VISIT HI MDM: CPT

## 2019-01-01 PROCEDURE — 1123F ACP DISCUSS/DSCN MKR DOCD: CPT | Performed by: INTERNAL MEDICINE

## 2019-01-01 PROCEDURE — 84295 ASSAY OF SERUM SODIUM: CPT

## 2019-01-01 PROCEDURE — 2060000000 HC ICU INTERMEDIATE R&B

## 2019-01-01 PROCEDURE — 80053 COMPREHEN METABOLIC PANEL: CPT

## 2019-01-01 PROCEDURE — 1090F PRES/ABSN URINE INCON ASSESS: CPT | Performed by: PHYSICIAN ASSISTANT

## 2019-01-01 PROCEDURE — 6360000002 HC RX W HCPCS: Performed by: NURSE ANESTHETIST, CERTIFIED REGISTERED

## 2019-01-01 PROCEDURE — 86923 COMPATIBILITY TEST ELECTRIC: CPT

## 2019-01-01 PROCEDURE — 6360000002 HC RX W HCPCS

## 2019-01-01 PROCEDURE — 70450 CT HEAD/BRAIN W/O DYE: CPT

## 2019-01-01 PROCEDURE — 93306 TTE W/DOPPLER COMPLETE: CPT

## 2019-01-01 PROCEDURE — 96374 THER/PROPH/DIAG INJ IV PUSH: CPT

## 2019-01-01 PROCEDURE — 6360000002 HC RX W HCPCS: Performed by: PHYSICIAN ASSISTANT

## 2019-01-01 PROCEDURE — 87040 BLOOD CULTURE FOR BACTERIA: CPT

## 2019-01-01 PROCEDURE — 6360000004 HC RX CONTRAST MEDICATION: Performed by: PSYCHIATRY & NEUROLOGY

## 2019-01-01 PROCEDURE — G8399 PT W/DXA RESULTS DOCUMENT: HCPCS | Performed by: NURSE PRACTITIONER

## 2019-01-01 PROCEDURE — 3046F HEMOGLOBIN A1C LEVEL >9.0%: CPT | Performed by: NURSE PRACTITIONER

## 2019-01-01 PROCEDURE — 2709999900 HC NON-CHARGEABLE SUPPLY

## 2019-01-01 PROCEDURE — 36591 DRAW BLOOD OFF VENOUS DEVICE: CPT

## 2019-01-01 PROCEDURE — 1101F PT FALLS ASSESS-DOCD LE1/YR: CPT | Performed by: NURSE PRACTITIONER

## 2019-01-01 PROCEDURE — 85610 PROTHROMBIN TIME: CPT

## 2019-01-01 PROCEDURE — 1123F ACP DISCUSS/DSCN MKR DOCD: CPT | Performed by: NURSE PRACTITIONER

## 2019-01-01 PROCEDURE — 84145 PROCALCITONIN (PCT): CPT

## 2019-01-01 PROCEDURE — 2500000003 HC RX 250 WO HCPCS: Performed by: NURSE ANESTHETIST, CERTIFIED REGISTERED

## 2019-01-01 PROCEDURE — APPSS30 APP SPLIT SHARED TIME 16-30 MINUTES: Performed by: PHYSICIAN ASSISTANT

## 2019-01-01 PROCEDURE — 80061 LIPID PANEL: CPT

## 2019-01-01 PROCEDURE — 85025 COMPLETE CBC W/AUTO DIFF WBC: CPT

## 2019-01-01 PROCEDURE — 87086 URINE CULTURE/COLONY COUNT: CPT

## 2019-01-01 PROCEDURE — 4040F PNEUMOC VAC/ADMIN/RCVD: CPT | Performed by: NURSE PRACTITIONER

## 2019-01-01 PROCEDURE — 1200000003 HC TELEMETRY R&B

## 2019-01-01 PROCEDURE — 87804 INFLUENZA ASSAY W/OPTIC: CPT

## 2019-01-01 PROCEDURE — 83735 ASSAY OF MAGNESIUM: CPT

## 2019-01-01 PROCEDURE — 93010 ELECTROCARDIOGRAM REPORT: CPT | Performed by: INTERNAL MEDICINE

## 2019-01-01 PROCEDURE — 81001 URINALYSIS AUTO W/SCOPE: CPT

## 2019-01-01 PROCEDURE — 1036F TOBACCO NON-USER: CPT | Performed by: NURSE PRACTITIONER

## 2019-01-01 PROCEDURE — 97530 THERAPEUTIC ACTIVITIES: CPT

## 2019-01-01 PROCEDURE — G0378 HOSPITAL OBSERVATION PER HR: HCPCS

## 2019-01-01 PROCEDURE — 99233 SBSQ HOSP IP/OBS HIGH 50: CPT | Performed by: HOSPITALIST

## 2019-01-01 PROCEDURE — 83605 ASSAY OF LACTIC ACID: CPT

## 2019-01-01 PROCEDURE — 83880 ASSAY OF NATRIURETIC PEPTIDE: CPT

## 2019-01-01 PROCEDURE — 2500000003 HC RX 250 WO HCPCS: Performed by: EMERGENCY MEDICINE

## 2019-01-01 PROCEDURE — 83036 HEMOGLOBIN GLYCOSYLATED A1C: CPT

## 2019-01-01 PROCEDURE — 82803 BLOOD GASES ANY COMBINATION: CPT

## 2019-01-01 PROCEDURE — 82565 ASSAY OF CREATININE: CPT

## 2019-01-01 PROCEDURE — 84540 ASSAY OF URINE/UREA-N: CPT

## 2019-01-01 PROCEDURE — 83970 ASSAY OF PARATHORMONE: CPT

## 2019-01-01 PROCEDURE — 6370000000 HC RX 637 (ALT 250 FOR IP): Performed by: HOSPITALIST

## 2019-01-01 PROCEDURE — 2500000003 HC RX 250 WO HCPCS: Performed by: PHYSICIAN ASSISTANT

## 2019-01-01 PROCEDURE — 97535 SELF CARE MNGMENT TRAINING: CPT

## 2019-01-01 PROCEDURE — 99223 1ST HOSP IP/OBS HIGH 75: CPT | Performed by: NUCLEAR MEDICINE

## 2019-01-01 PROCEDURE — 7100000000 HC PACU RECOVERY - FIRST 15 MIN

## 2019-01-01 PROCEDURE — 85347 COAGULATION TIME ACTIVATED: CPT

## 2019-01-01 PROCEDURE — 82248 BILIRUBIN DIRECT: CPT

## 2019-01-01 PROCEDURE — 93005 ELECTROCARDIOGRAM TRACING: CPT | Performed by: EMERGENCY MEDICINE

## 2019-01-01 PROCEDURE — 86900 BLOOD TYPING SEROLOGIC ABO: CPT

## 2019-01-01 PROCEDURE — 3017F COLORECTAL CA SCREEN DOC REV: CPT | Performed by: PHYSICIAN ASSISTANT

## 2019-01-01 PROCEDURE — 2580000003 HC RX 258: Performed by: NURSE PRACTITIONER

## 2019-01-01 PROCEDURE — 6360000004 HC RX CONTRAST MEDICATION: Performed by: EMERGENCY MEDICINE

## 2019-01-01 PROCEDURE — 82607 VITAMIN B-12: CPT

## 2019-01-01 PROCEDURE — 80076 HEPATIC FUNCTION PANEL: CPT

## 2019-01-01 PROCEDURE — 99232 SBSQ HOSP IP/OBS MODERATE 35: CPT | Performed by: INTERNAL MEDICINE

## 2019-01-01 PROCEDURE — 99213 OFFICE O/P EST LOW 20 MIN: CPT | Performed by: INTERNAL MEDICINE

## 2019-01-01 PROCEDURE — 80307 DRUG TEST PRSMV CHEM ANLYZR: CPT

## 2019-01-01 PROCEDURE — G8598 ASA/ANTIPLAT THER USED: HCPCS | Performed by: INTERNAL MEDICINE

## 2019-01-01 PROCEDURE — 99213 OFFICE O/P EST LOW 20 MIN: CPT | Performed by: NURSE PRACTITIONER

## 2019-01-01 PROCEDURE — 99233 SBSQ HOSP IP/OBS HIGH 50: CPT | Performed by: INTERNAL MEDICINE

## 2019-01-01 PROCEDURE — 89190 NASAL SMEAR FOR EOSINOPHILS: CPT

## 2019-01-01 PROCEDURE — 76937 US GUIDE VASCULAR ACCESS: CPT

## 2019-01-01 PROCEDURE — G8482 FLU IMMUNIZE ORDER/ADMIN: HCPCS | Performed by: NURSE PRACTITIONER

## 2019-01-01 PROCEDURE — 1123F ACP DISCUSS/DSCN MKR DOCD: CPT | Performed by: NEUROLOGICAL SURGERY

## 2019-01-01 PROCEDURE — 6360000002 HC RX W HCPCS: Performed by: EMERGENCY MEDICINE

## 2019-01-01 PROCEDURE — 2500000003 HC RX 250 WO HCPCS: Performed by: FAMILY MEDICINE

## 2019-01-01 PROCEDURE — 2140000000 HC CCU INTERMEDIATE R&B

## 2019-01-01 PROCEDURE — 93320 DOPPLER ECHO COMPLETE: CPT

## 2019-01-01 PROCEDURE — 71045 X-RAY EXAM CHEST 1 VIEW: CPT

## 2019-01-01 PROCEDURE — G8598 ASA/ANTIPLAT THER USED: HCPCS | Performed by: NURSE PRACTITIONER

## 2019-01-01 PROCEDURE — G8417 CALC BMI ABV UP PARAM F/U: HCPCS | Performed by: NURSE PRACTITIONER

## 2019-01-01 PROCEDURE — 36568 INSJ PICC <5 YR W/O IMAGING: CPT

## 2019-01-01 PROCEDURE — 86850 RBC ANTIBODY SCREEN: CPT

## 2019-01-01 PROCEDURE — 83550 IRON BINDING TEST: CPT

## 2019-01-01 PROCEDURE — 74174 CTA ABD&PLVS W/CONTRAST: CPT

## 2019-01-01 PROCEDURE — C1725 CATH, TRANSLUMIN NON-LASER: HCPCS

## 2019-01-01 PROCEDURE — 87255 GENET VIRUS ISOLATE HSV: CPT

## 2019-01-01 PROCEDURE — 87077 CULTURE AEROBIC IDENTIFY: CPT

## 2019-01-01 PROCEDURE — 3023F SPIROM DOC REV: CPT | Performed by: NURSE PRACTITIONER

## 2019-01-01 PROCEDURE — 82140 ASSAY OF AMMONIA: CPT

## 2019-01-01 PROCEDURE — 99214 OFFICE O/P EST MOD 30 MIN: CPT | Performed by: PHYSICIAN ASSISTANT

## 2019-01-01 PROCEDURE — 71250 CT THORAX DX C-: CPT

## 2019-01-01 PROCEDURE — 99214 OFFICE O/P EST MOD 30 MIN: CPT | Performed by: NURSE PRACTITIONER

## 2019-01-01 PROCEDURE — 2000000000 HC ICU R&B

## 2019-01-01 PROCEDURE — 93005 ELECTROCARDIOGRAM TRACING: CPT | Performed by: NURSE PRACTITIONER

## 2019-01-01 PROCEDURE — 99232 SBSQ HOSP IP/OBS MODERATE 35: CPT | Performed by: NURSE PRACTITIONER

## 2019-01-01 PROCEDURE — 94760 N-INVAS EAR/PLS OXIMETRY 1: CPT

## 2019-01-01 PROCEDURE — 2580000003 HC RX 258: Performed by: PHYSICIAN ASSISTANT

## 2019-01-01 PROCEDURE — 6360000004 HC RX CONTRAST MEDICATION: Performed by: INTERNAL MEDICINE

## 2019-01-01 PROCEDURE — 93005 ELECTROCARDIOGRAM TRACING: CPT | Performed by: FAMILY MEDICINE

## 2019-01-01 PROCEDURE — 83540 ASSAY OF IRON: CPT

## 2019-01-01 PROCEDURE — 75716 ARTERY X-RAYS ARMS/LEGS: CPT | Performed by: PSYCHIATRY & NEUROLOGY

## 2019-01-01 PROCEDURE — G8598 ASA/ANTIPLAT THER USED: HCPCS | Performed by: PHYSICIAN ASSISTANT

## 2019-01-01 PROCEDURE — 2500000003 HC RX 250 WO HCPCS: Performed by: HOSPITALIST

## 2019-01-01 PROCEDURE — 99221 1ST HOSP IP/OBS SF/LOW 40: CPT | Performed by: INTERNAL MEDICINE

## 2019-01-01 PROCEDURE — 84300 ASSAY OF URINE SODIUM: CPT

## 2019-01-01 PROCEDURE — B246ZZ4 ULTRASONOGRAPHY OF RIGHT AND LEFT HEART, TRANSESOPHAGEAL: ICD-10-PCS | Performed by: NUCLEAR MEDICINE

## 2019-01-01 PROCEDURE — 82728 ASSAY OF FERRITIN: CPT

## 2019-01-01 PROCEDURE — G0480 DRUG TEST DEF 1-7 CLASSES: HCPCS

## 2019-01-01 PROCEDURE — 99215 OFFICE O/P EST HI 40 MIN: CPT | Performed by: NURSE PRACTITIONER

## 2019-01-01 PROCEDURE — 93010 ELECTROCARDIOGRAM REPORT: CPT | Performed by: NUCLEAR MEDICINE

## 2019-01-01 PROCEDURE — 7100000011 HC PHASE II RECOVERY - ADDTL 15 MIN

## 2019-01-01 PROCEDURE — 6360000002 HC RX W HCPCS: Performed by: HOSPITALIST

## 2019-01-01 PROCEDURE — 99211 OFF/OP EST MAY X REQ PHY/QHP: CPT

## 2019-01-01 PROCEDURE — 87081 CULTURE SCREEN ONLY: CPT

## 2019-01-01 PROCEDURE — 99223 1ST HOSP IP/OBS HIGH 75: CPT | Performed by: INTERNAL MEDICINE

## 2019-01-01 PROCEDURE — 86301 IMMUNOASSAY TUMOR CA 19-9: CPT

## 2019-01-01 PROCEDURE — 4040F PNEUMOC VAC/ADMIN/RCVD: CPT | Performed by: INTERNAL MEDICINE

## 2019-01-01 PROCEDURE — 87493 C DIFF AMPLIFIED PROBE: CPT

## 2019-01-01 PROCEDURE — 2580000003 HC RX 258: Performed by: PSYCHIATRY & NEUROLOGY

## 2019-01-01 PROCEDURE — 83935 ASSAY OF URINE OSMOLALITY: CPT

## 2019-01-01 PROCEDURE — 6370000000 HC RX 637 (ALT 250 FOR IP): Performed by: EMERGENCY MEDICINE

## 2019-01-01 PROCEDURE — C1769 GUIDE WIRE: HCPCS

## 2019-01-01 PROCEDURE — 1111F DSCHRG MED/CURRENT MED MERGE: CPT | Performed by: NEUROLOGICAL SURGERY

## 2019-01-01 PROCEDURE — 2580000003 HC RX 258: Performed by: NURSE ANESTHETIST, CERTIFIED REGISTERED

## 2019-01-01 PROCEDURE — 2500000003 HC RX 250 WO HCPCS: Performed by: INTERNAL MEDICINE

## 2019-01-01 PROCEDURE — 2500000003 HC RX 250 WO HCPCS

## 2019-01-01 PROCEDURE — 99232 SBSQ HOSP IP/OBS MODERATE 35: CPT | Performed by: FAMILY MEDICINE

## 2019-01-01 PROCEDURE — 3017F COLORECTAL CA SCREEN DOC REV: CPT | Performed by: INTERNAL MEDICINE

## 2019-01-01 PROCEDURE — 99999 PR OFFICE/OUTPT VISIT,PROCEDURE ONLY: CPT | Performed by: PSYCHIATRY & NEUROLOGY

## 2019-01-01 PROCEDURE — 1090F PRES/ABSN URINE INCON ASSESS: CPT | Performed by: INTERNAL MEDICINE

## 2019-01-01 PROCEDURE — C1887 CATHETER, GUIDING: HCPCS

## 2019-01-01 PROCEDURE — 97803 MED NUTRITION INDIV SUBSEQ: CPT | Performed by: DIETITIAN, REGISTERED

## 2019-01-01 PROCEDURE — 6370000000 HC RX 637 (ALT 250 FOR IP)

## 2019-01-01 PROCEDURE — 36223 PLACE CATH CAROTID/INOM ART: CPT | Performed by: PSYCHIATRY & NEUROLOGY

## 2019-01-01 PROCEDURE — 93000 ELECTROCARDIOGRAM COMPLETE: CPT | Performed by: INTERNAL MEDICINE

## 2019-01-01 PROCEDURE — 87500 VANOMYCIN DNA AMP PROBE: CPT

## 2019-01-01 PROCEDURE — 84443 ASSAY THYROID STIM HORMONE: CPT

## 2019-01-01 PROCEDURE — 87641 MR-STAPH DNA AMP PROBE: CPT

## 2019-01-01 PROCEDURE — 74176 CT ABD & PELVIS W/O CONTRAST: CPT

## 2019-01-01 PROCEDURE — 93453 R&L HRT CATH W/VENTRICLGRPHY: CPT | Performed by: INTERNAL MEDICINE

## 2019-01-01 PROCEDURE — 1036F TOBACCO NON-USER: CPT | Performed by: INTERNAL MEDICINE

## 2019-01-01 PROCEDURE — 1111F DSCHRG MED/CURRENT MED MERGE: CPT | Performed by: PHYSICIAN ASSISTANT

## 2019-01-01 PROCEDURE — 87147 CULTURE TYPE IMMUNOLOGIC: CPT

## 2019-01-01 PROCEDURE — 86901 BLOOD TYPING SEROLOGIC RH(D): CPT

## 2019-01-01 PROCEDURE — 6370000000 HC RX 637 (ALT 250 FOR IP): Performed by: ANESTHESIOLOGY

## 2019-01-01 PROCEDURE — 87186 SC STD MICRODIL/AGAR DIL: CPT

## 2019-01-01 PROCEDURE — 85730 THROMBOPLASTIN TIME PARTIAL: CPT

## 2019-01-01 PROCEDURE — 1123F ACP DISCUSS/DSCN MKR DOCD: CPT | Performed by: PHYSICIAN ASSISTANT

## 2019-01-01 PROCEDURE — 83690 ASSAY OF LIPASE: CPT

## 2019-01-01 PROCEDURE — 36592 COLLECT BLOOD FROM PICC: CPT

## 2019-01-01 PROCEDURE — 70547 MR ANGIOGRAPHY NECK W/O DYE: CPT

## 2019-01-01 PROCEDURE — 36215 PLACE CATHETER IN ARTERY: CPT | Performed by: PSYCHIATRY & NEUROLOGY

## 2019-01-01 PROCEDURE — 1101F PT FALLS ASSESS-DOCD LE1/YR: CPT | Performed by: NEUROLOGICAL SURGERY

## 2019-01-01 PROCEDURE — 99213 OFFICE O/P EST LOW 20 MIN: CPT | Performed by: PHYSICIAN ASSISTANT

## 2019-01-01 PROCEDURE — 36415 COLL VENOUS BLD VENIPUNCTURE: CPT | Performed by: NURSE PRACTITIONER

## 2019-01-01 PROCEDURE — C1760 CLOSURE DEV, VASC: HCPCS

## 2019-01-01 PROCEDURE — G8926 SPIRO NO PERF OR DOC: HCPCS | Performed by: PHYSICIAN ASSISTANT

## 2019-01-01 PROCEDURE — 82810 BLOOD GASES O2 SAT ONLY: CPT

## 2019-01-01 PROCEDURE — 3700000001 HC ADD 15 MINUTES (ANESTHESIA)

## 2019-01-01 PROCEDURE — 2780000006 HC MISC HEART VALVE

## 2019-01-01 PROCEDURE — 70551 MRI BRAIN STEM W/O DYE: CPT

## 2019-01-01 PROCEDURE — 86777 TOXOPLASMA ANTIBODY: CPT

## 2019-01-01 PROCEDURE — 86304 IMMUNOASSAY TUMOR CA 125: CPT

## 2019-01-01 PROCEDURE — 74018 RADEX ABDOMEN 1 VIEW: CPT

## 2019-01-01 PROCEDURE — G8482 FLU IMMUNIZE ORDER/ADMIN: HCPCS | Performed by: INTERNAL MEDICINE

## 2019-01-01 PROCEDURE — 87507 IADNA-DNA/RNA PROBE TQ 12-25: CPT

## 2019-01-01 PROCEDURE — 5A2204Z RESTORATION OF CARDIAC RHYTHM, SINGLE: ICD-10-PCS | Performed by: INTERNAL MEDICINE

## 2019-01-01 PROCEDURE — G8427 DOCREV CUR MEDS BY ELIG CLIN: HCPCS | Performed by: INTERNAL MEDICINE

## 2019-01-01 PROCEDURE — 3700000000 HC ANESTHESIA ATTENDED CARE

## 2019-01-01 PROCEDURE — 82044 UR ALBUMIN SEMIQUANTITATIVE: CPT | Performed by: NURSE PRACTITIONER

## 2019-01-01 PROCEDURE — 96375 TX/PRO/DX INJ NEW DRUG ADDON: CPT

## 2019-01-01 PROCEDURE — 2022F DILAT RTA XM EVC RTNOPTHY: CPT | Performed by: NURSE PRACTITIONER

## 2019-01-01 PROCEDURE — 82272 OCCULT BLD FECES 1-3 TESTS: CPT

## 2019-01-01 PROCEDURE — 0238T TRLUML PERIP ATHRC ILIAC ART: CPT | Performed by: PSYCHIATRY & NEUROLOGY

## 2019-01-01 PROCEDURE — 75774 ARTERY X-RAY EACH VESSEL: CPT | Performed by: PSYCHIATRY & NEUROLOGY

## 2019-01-01 PROCEDURE — 76830 TRANSVAGINAL US NON-OB: CPT

## 2019-01-01 PROCEDURE — 99219 PR INITIAL OBSERVATION CARE/DAY 50 MINUTES: CPT | Performed by: PSYCHIATRY & NEUROLOGY

## 2019-01-01 PROCEDURE — 82746 ASSAY OF FOLIC ACID SERUM: CPT

## 2019-01-01 PROCEDURE — G8482 FLU IMMUNIZE ORDER/ADMIN: HCPCS | Performed by: PHYSICIAN ASSISTANT

## 2019-01-01 PROCEDURE — 74177 CT ABD & PELVIS W/CONTRAST: CPT

## 2019-01-01 PROCEDURE — 36556 INSERT NON-TUNNEL CV CATH: CPT

## 2019-01-01 PROCEDURE — 93325 DOPPLER ECHO COLOR FLOW MAPG: CPT

## 2019-01-01 PROCEDURE — 1090F PRES/ABSN URINE INCON ASSESS: CPT | Performed by: NEUROLOGICAL SURGERY

## 2019-01-01 PROCEDURE — 97163 PT EVAL HIGH COMPLEX 45 MIN: CPT

## 2019-01-01 PROCEDURE — APPSS60 APP SPLIT SHARED TIME 46-60 MINUTES: Performed by: PHYSICIAN ASSISTANT

## 2019-01-01 PROCEDURE — 33361 REPLACE AORTIC VALVE PERQ: CPT | Performed by: INTERNAL MEDICINE

## 2019-01-01 PROCEDURE — 92610 EVALUATE SWALLOWING FUNCTION: CPT

## 2019-01-01 PROCEDURE — 2580000003 HC RX 258: Performed by: HOSPITALIST

## 2019-01-01 PROCEDURE — G8926 SPIRO NO PERF OR DOC: HCPCS | Performed by: NURSE PRACTITIONER

## 2019-01-01 PROCEDURE — 96372 THER/PROPH/DIAG INJ SC/IM: CPT

## 2019-01-01 PROCEDURE — 99024 POSTOP FOLLOW-UP VISIT: CPT | Performed by: PHYSICIAN ASSISTANT

## 2019-01-01 PROCEDURE — 85379 FIBRIN DEGRADATION QUANT: CPT

## 2019-01-01 PROCEDURE — 7100000001 HC PACU RECOVERY - ADDTL 15 MIN

## 2019-01-01 PROCEDURE — 97116 GAIT TRAINING THERAPY: CPT

## 2019-01-01 PROCEDURE — G8399 PT W/DXA RESULTS DOCUMENT: HCPCS | Performed by: INTERNAL MEDICINE

## 2019-01-01 PROCEDURE — 99215 OFFICE O/P EST HI 40 MIN: CPT | Performed by: PHYSICIAN ASSISTANT

## 2019-01-01 PROCEDURE — C1751 CATH, INF, PER/CENT/MIDLINE: HCPCS

## 2019-01-01 PROCEDURE — 84439 ASSAY OF FREE THYROXINE: CPT

## 2019-01-01 PROCEDURE — 95810 POLYSOM 6/> YRS 4/> PARAM: CPT

## 2019-01-01 PROCEDURE — 7100000010 HC PHASE II RECOVERY - FIRST 15 MIN

## 2019-01-01 PROCEDURE — P9016 RBC LEUKOCYTES REDUCED: HCPCS

## 2019-01-01 PROCEDURE — 1036F TOBACCO NON-USER: CPT | Performed by: NEUROLOGICAL SURGERY

## 2019-01-01 PROCEDURE — 97161 PT EVAL LOW COMPLEX 20 MIN: CPT

## 2019-01-01 PROCEDURE — 92960 CARDIOVERSION ELECTRIC EXT: CPT | Performed by: INTERNAL MEDICINE

## 2019-01-01 PROCEDURE — 93456 R HRT CORONARY ARTERY ANGIO: CPT | Performed by: INTERNAL MEDICINE

## 2019-01-01 PROCEDURE — 76856 US EXAM PELVIC COMPLETE: CPT

## 2019-01-01 PROCEDURE — 71046 X-RAY EXAM CHEST 2 VIEWS: CPT

## 2019-01-01 PROCEDURE — 36430 TRANSFUSION BLD/BLD COMPNT: CPT

## 2019-01-01 PROCEDURE — 99213 OFFICE O/P EST LOW 20 MIN: CPT | Performed by: NEUROLOGICAL SURGERY

## 2019-01-01 PROCEDURE — G8399 PT W/DXA RESULTS DOCUMENT: HCPCS | Performed by: NEUROLOGICAL SURGERY

## 2019-01-01 PROCEDURE — 2580000003 HC RX 258: Performed by: ANESTHESIOLOGY

## 2019-01-01 PROCEDURE — 51702 INSERT TEMP BLADDER CATH: CPT

## 2019-01-01 PROCEDURE — 99222 1ST HOSP IP/OBS MODERATE 55: CPT | Performed by: INTERNAL MEDICINE

## 2019-01-01 PROCEDURE — 87801 DETECT AGNT MULT DNA AMPLI: CPT

## 2019-01-01 PROCEDURE — 99233 SBSQ HOSP IP/OBS HIGH 50: CPT | Performed by: PSYCHIATRY & NEUROLOGY

## 2019-01-01 PROCEDURE — 2500000003 HC RX 250 WO HCPCS: Performed by: ANESTHESIOLOGY

## 2019-01-01 PROCEDURE — G8427 DOCREV CUR MEDS BY ELIG CLIN: HCPCS | Performed by: NEUROLOGICAL SURGERY

## 2019-01-01 PROCEDURE — 1123F ACP DISCUSS/DSCN MKR DOCD: CPT | Performed by: FAMILY MEDICINE

## 2019-01-01 PROCEDURE — 85576 BLOOD PLATELET AGGREGATION: CPT

## 2019-01-01 PROCEDURE — 75710 ARTERY X-RAYS ARM/LEG: CPT | Performed by: PSYCHIATRY & NEUROLOGY

## 2019-01-01 PROCEDURE — 85014 HEMATOCRIT: CPT

## 2019-01-01 PROCEDURE — 70544 MR ANGIOGRAPHY HEAD W/O DYE: CPT

## 2019-01-01 PROCEDURE — 6360000002 HC RX W HCPCS: Performed by: NUCLEAR MEDICINE

## 2019-01-01 PROCEDURE — 6360000004 HC RX CONTRAST MEDICATION: Performed by: NURSE PRACTITIONER

## 2019-01-01 PROCEDURE — 82375 ASSAY CARBOXYHB QUANT: CPT

## 2019-01-01 PROCEDURE — 93312 ECHO TRANSESOPHAGEAL: CPT

## 2019-01-01 PROCEDURE — G8482 FLU IMMUNIZE ORDER/ADMIN: HCPCS | Performed by: NEUROLOGICAL SURGERY

## 2019-01-01 PROCEDURE — 82962 GLUCOSE BLOOD TEST: CPT | Performed by: NURSE PRACTITIONER

## 2019-01-01 PROCEDURE — G8598 ASA/ANTIPLAT THER USED: HCPCS | Performed by: NEUROLOGICAL SURGERY

## 2019-01-01 PROCEDURE — 36225 PLACE CATH SUBCLAVIAN ART: CPT | Performed by: PSYCHIATRY & NEUROLOGY

## 2019-01-01 PROCEDURE — C1884 EMBOLIZATION PROTECT SYST: HCPCS

## 2019-01-01 PROCEDURE — 4040F PNEUMOC VAC/ADMIN/RCVD: CPT | Performed by: NEUROLOGICAL SURGERY

## 2019-01-01 PROCEDURE — 02HV33Z INSERTION OF INFUSION DEVICE INTO SUPERIOR VENA CAVA, PERCUTANEOUS APPROACH: ICD-10-PCS | Performed by: INTERNAL MEDICINE

## 2019-01-01 PROCEDURE — 1101F PT FALLS ASSESS-DOCD LE1/YR: CPT | Performed by: INTERNAL MEDICINE

## 2019-01-01 PROCEDURE — 89055 LEUKOCYTE ASSESSMENT FECAL: CPT

## 2019-01-01 PROCEDURE — 85018 HEMOGLOBIN: CPT

## 2019-01-01 PROCEDURE — 87449 NOS EACH ORGANISM AG IA: CPT

## 2019-01-01 PROCEDURE — 97165 OT EVAL LOW COMPLEX 30 MIN: CPT

## 2019-01-01 PROCEDURE — 3023F SPIROM DOC REV: CPT | Performed by: PHYSICIAN ASSISTANT

## 2019-01-01 PROCEDURE — 05H633Z INSERTION OF INFUSION DEVICE INTO LEFT SUBCLAVIAN VEIN, PERCUTANEOUS APPROACH: ICD-10-PCS | Performed by: INTERNAL MEDICINE

## 2019-01-01 PROCEDURE — 82306 VITAMIN D 25 HYDROXY: CPT

## 2019-01-01 PROCEDURE — 82043 UR ALBUMIN QUANTITATIVE: CPT

## 2019-01-01 PROCEDURE — 86022 PLATELET ANTIBODIES: CPT

## 2019-01-01 PROCEDURE — G8417 CALC BMI ABV UP PARAM F/U: HCPCS | Performed by: NEUROLOGICAL SURGERY

## 2019-01-01 PROCEDURE — 71275 CT ANGIOGRAPHY CHEST: CPT

## 2019-01-01 PROCEDURE — 87899 AGENT NOS ASSAY W/OPTIC: CPT

## 2019-01-01 PROCEDURE — 82570 ASSAY OF URINE CREATININE: CPT

## 2019-01-01 PROCEDURE — 82947 ASSAY GLUCOSE BLOOD QUANT: CPT

## 2019-01-01 PROCEDURE — 2580000003 HC RX 258: Performed by: FAMILY MEDICINE

## 2019-01-01 PROCEDURE — 3017F COLORECTAL CA SCREEN DOC REV: CPT | Performed by: NEUROLOGICAL SURGERY

## 2019-01-01 PROCEDURE — 82378 CARCINOEMBRYONIC ANTIGEN: CPT

## 2019-01-01 PROCEDURE — 86778 TOXOPLASMA ANTIBODY IGM: CPT

## 2019-01-01 PROCEDURE — 99223 1ST HOSP IP/OBS HIGH 75: CPT | Performed by: HOSPITALIST

## 2019-01-01 PROCEDURE — 36140 INTRO NDL ICATH UPR/LXTR ART: CPT | Performed by: PSYCHIATRY & NEUROLOGY

## 2019-01-01 RX ORDER — CLOPIDOGREL BISULFATE 75 MG/1
75 TABLET ORAL DAILY
Qty: 90 TABLET | Refills: 3 | Status: ON HOLD | OUTPATIENT
Start: 2019-01-01 | End: 2019-01-01 | Stop reason: HOSPADM

## 2019-01-01 RX ORDER — POLYVINYL ALCOHOL 14 MG/ML
1 SOLUTION/ DROPS OPHTHALMIC 4 TIMES DAILY PRN
Status: DISCONTINUED | OUTPATIENT
Start: 2019-01-01 | End: 2019-01-01 | Stop reason: HOSPADM

## 2019-01-01 RX ORDER — SODIUM CHLORIDE 0.9 % (FLUSH) 0.9 %
10 SYRINGE (ML) INJECTION EVERY 12 HOURS SCHEDULED
Status: CANCELLED | OUTPATIENT
Start: 2019-01-01

## 2019-01-01 RX ORDER — POTASSIUM CHLORIDE 20 MEQ/1
40 TABLET, EXTENDED RELEASE ORAL PRN
Status: DISCONTINUED | OUTPATIENT
Start: 2019-01-01 | End: 2019-01-01 | Stop reason: HOSPADM

## 2019-01-01 RX ORDER — POTASSIUM CHLORIDE 20 MEQ/1
40 TABLET, EXTENDED RELEASE ORAL ONCE
Status: COMPLETED | OUTPATIENT
Start: 2019-01-01 | End: 2019-01-01

## 2019-01-01 RX ORDER — 0.9 % SODIUM CHLORIDE 0.9 %
500 INTRAVENOUS SOLUTION INTRAVENOUS ONCE
Status: COMPLETED | OUTPATIENT
Start: 2019-01-01 | End: 2019-01-01

## 2019-01-01 RX ORDER — MIDAZOLAM HYDROCHLORIDE 1 MG/ML
INJECTION INTRAMUSCULAR; INTRAVENOUS
Status: COMPLETED
Start: 2019-01-01 | End: 2019-01-01

## 2019-01-01 RX ORDER — CLOPIDOGREL BISULFATE 75 MG/1
75 TABLET ORAL DAILY
Status: DISCONTINUED | OUTPATIENT
Start: 2019-01-01 | End: 2019-01-01 | Stop reason: HOSPADM

## 2019-01-01 RX ORDER — LOSARTAN POTASSIUM 25 MG/1
25 TABLET ORAL DAILY
Status: DISCONTINUED | OUTPATIENT
Start: 2019-01-01 | End: 2019-01-01 | Stop reason: HOSPADM

## 2019-01-01 RX ORDER — FENTANYL CITRATE 50 UG/ML
50 INJECTION, SOLUTION INTRAMUSCULAR; INTRAVENOUS
Status: DISCONTINUED | OUTPATIENT
Start: 2019-01-01 | End: 2019-01-01

## 2019-01-01 RX ORDER — IPRATROPIUM BROMIDE AND ALBUTEROL SULFATE 2.5; .5 MG/3ML; MG/3ML
1 SOLUTION RESPIRATORY (INHALATION) EVERY 4 HOURS PRN
Status: DISCONTINUED | OUTPATIENT
Start: 2019-01-01 | End: 2019-01-01 | Stop reason: HOSPADM

## 2019-01-01 RX ORDER — POTASSIUM CHLORIDE 750 MG/1
40 TABLET, FILM COATED, EXTENDED RELEASE ORAL ONCE
Status: COMPLETED | OUTPATIENT
Start: 2019-01-01 | End: 2019-01-01

## 2019-01-01 RX ORDER — ROSUVASTATIN CALCIUM 20 MG/1
20 TABLET, COATED ORAL NIGHTLY
Status: DISCONTINUED | OUTPATIENT
Start: 2019-01-01 | End: 2019-01-01 | Stop reason: HOSPADM

## 2019-01-01 RX ORDER — HYDRALAZINE HYDROCHLORIDE 20 MG/ML
5 INJECTION INTRAMUSCULAR; INTRAVENOUS EVERY 4 HOURS PRN
Status: DISCONTINUED | OUTPATIENT
Start: 2019-01-01 | End: 2019-01-01 | Stop reason: HOSPADM

## 2019-01-01 RX ORDER — ASPIRIN 81 MG/1
81 TABLET ORAL DAILY
Status: DISCONTINUED | OUTPATIENT
Start: 2019-01-01 | End: 2019-01-01 | Stop reason: HOSPADM

## 2019-01-01 RX ORDER — PROTAMINE SULFATE 10 MG/ML
INJECTION, SOLUTION INTRAVENOUS PRN
Status: DISCONTINUED | OUTPATIENT
Start: 2019-01-01 | End: 2019-01-01 | Stop reason: SDUPTHER

## 2019-01-01 RX ORDER — FUROSEMIDE 40 MG/1
40 TABLET ORAL 2 TIMES DAILY
Status: DISCONTINUED | OUTPATIENT
Start: 2019-01-01 | End: 2019-01-01 | Stop reason: HOSPADM

## 2019-01-01 RX ORDER — FLUTICASONE PROPIONATE 50 MCG
1 SPRAY, SUSPENSION (ML) NASAL DAILY
Status: DISCONTINUED | OUTPATIENT
Start: 2019-01-01 | End: 2019-01-01 | Stop reason: HOSPADM

## 2019-01-01 RX ORDER — HYDROCODONE BITARTRATE AND ACETAMINOPHEN 5; 325 MG/1; MG/1
1 TABLET ORAL EVERY 6 HOURS PRN
Status: DISCONTINUED | OUTPATIENT
Start: 2019-01-01 | End: 2019-01-01 | Stop reason: HOSPADM

## 2019-01-01 RX ORDER — ASPIRIN 81 MG/1
81 TABLET ORAL DAILY
Status: DISCONTINUED | OUTPATIENT
Start: 2019-01-01 | End: 2019-01-01 | Stop reason: SDUPTHER

## 2019-01-01 RX ORDER — SODIUM CHLORIDE 9 MG/ML
INJECTION, SOLUTION INTRAVENOUS CONTINUOUS
Status: DISCONTINUED | OUTPATIENT
Start: 2019-01-01 | End: 2019-01-01 | Stop reason: SDUPTHER

## 2019-01-01 RX ORDER — IPRATROPIUM BROMIDE AND ALBUTEROL SULFATE 2.5; .5 MG/3ML; MG/3ML
1 SOLUTION RESPIRATORY (INHALATION) ONCE
Status: COMPLETED | OUTPATIENT
Start: 2019-01-01 | End: 2019-01-01

## 2019-01-01 RX ORDER — FUROSEMIDE 10 MG/ML
40 INJECTION INTRAMUSCULAR; INTRAVENOUS ONCE
Status: COMPLETED | OUTPATIENT
Start: 2019-01-01 | End: 2019-01-01

## 2019-01-01 RX ORDER — NICOTINE POLACRILEX 4 MG
15 LOZENGE BUCCAL PRN
Status: DISCONTINUED | OUTPATIENT
Start: 2019-01-01 | End: 2019-01-01 | Stop reason: HOSPADM

## 2019-01-01 RX ORDER — ISOSORBIDE MONONITRATE 30 MG/1
30 TABLET, EXTENDED RELEASE ORAL DAILY
Status: DISCONTINUED | OUTPATIENT
Start: 2019-01-01 | End: 2019-01-01 | Stop reason: HOSPADM

## 2019-01-01 RX ORDER — RISPERIDONE 1 MG/1
1 TABLET, FILM COATED ORAL EVERY MORNING
Status: DISCONTINUED | OUTPATIENT
Start: 2019-01-01 | End: 2019-01-01 | Stop reason: HOSPADM

## 2019-01-01 RX ORDER — POTASSIUM CHLORIDE 20 MEQ/1
40 TABLET, EXTENDED RELEASE ORAL DAILY
Qty: 60 TABLET | Refills: 1 | OUTPATIENT
Start: 2019-01-01 | End: 2019-01-01 | Stop reason: DRUGHIGH

## 2019-01-01 RX ORDER — CLOPIDOGREL BISULFATE 75 MG/1
75 TABLET ORAL DAILY
Status: DISCONTINUED | OUTPATIENT
Start: 2019-01-01 | End: 2019-01-01

## 2019-01-01 RX ORDER — BUMETANIDE 1 MG/1
1 TABLET ORAL 2 TIMES DAILY
Qty: 30 TABLET | Refills: 3 | DISCHARGE
Start: 2019-01-01

## 2019-01-01 RX ORDER — AMLODIPINE BESYLATE 10 MG/1
10 TABLET ORAL DAILY
Status: DISCONTINUED | OUTPATIENT
Start: 2019-01-01 | End: 2019-01-01

## 2019-01-01 RX ORDER — LORAZEPAM 2 MG/ML
INJECTION INTRAMUSCULAR
Status: COMPLETED
Start: 2019-01-01 | End: 2019-01-01

## 2019-01-01 RX ORDER — PROPOFOL 10 MG/ML
INJECTION, EMULSION INTRAVENOUS PRN
Status: DISCONTINUED | OUTPATIENT
Start: 2019-01-01 | End: 2019-01-01 | Stop reason: SDUPTHER

## 2019-01-01 RX ORDER — SODIUM CHLORIDE 0.9 % (FLUSH) 0.9 %
10 SYRINGE (ML) INJECTION EVERY 12 HOURS SCHEDULED
Status: DISCONTINUED | OUTPATIENT
Start: 2019-01-01 | End: 2019-01-01 | Stop reason: HOSPADM

## 2019-01-01 RX ORDER — AZITHROMYCIN 250 MG/1
250 TABLET, FILM COATED ORAL DAILY
Status: DISCONTINUED | OUTPATIENT
Start: 2019-01-01 | End: 2019-01-01

## 2019-01-01 RX ORDER — MODAFINIL 100 MG/1
200 TABLET ORAL DAILY
Status: DISCONTINUED | OUTPATIENT
Start: 2019-01-01 | End: 2019-01-01

## 2019-01-01 RX ORDER — MULTIVITAMIN WITH FOLIC ACID 400 MCG
1 TABLET ORAL DAILY
Status: DISCONTINUED | OUTPATIENT
Start: 2019-01-01 | End: 2019-01-01 | Stop reason: HOSPADM

## 2019-01-01 RX ORDER — DILTIAZEM HYDROCHLORIDE 5 MG/ML
INJECTION INTRAVENOUS
Status: DISPENSED
Start: 2019-01-01 | End: 2019-01-01

## 2019-01-01 RX ORDER — 0.9 % SODIUM CHLORIDE 0.9 %
250 INTRAVENOUS SOLUTION INTRAVENOUS ONCE
Status: COMPLETED | OUTPATIENT
Start: 2019-01-01 | End: 2019-01-01

## 2019-01-01 RX ORDER — FENTANYL CITRATE 50 UG/ML
25 INJECTION, SOLUTION INTRAMUSCULAR; INTRAVENOUS ONCE
Status: COMPLETED | OUTPATIENT
Start: 2019-01-01 | End: 2019-01-01

## 2019-01-01 RX ORDER — METOPROLOL TARTRATE 100 MG/1
100 TABLET ORAL 2 TIMES DAILY
Status: DISCONTINUED | OUTPATIENT
Start: 2019-01-01 | End: 2019-01-01 | Stop reason: HOSPADM

## 2019-01-01 RX ORDER — SODIUM CHLORIDE 0.9 % (FLUSH) 0.9 %
10 SYRINGE (ML) INJECTION PRN
Status: DISCONTINUED | OUTPATIENT
Start: 2019-01-01 | End: 2019-01-01 | Stop reason: HOSPADM

## 2019-01-01 RX ORDER — CLOPIDOGREL BISULFATE 75 MG/1
75 TABLET ORAL DAILY
Status: DISCONTINUED | OUTPATIENT
Start: 2019-01-01 | End: 2019-01-01 | Stop reason: SDUPTHER

## 2019-01-01 RX ORDER — VALSARTAN 320 MG/1
320 TABLET ORAL DAILY
COMMUNITY
End: 2019-01-01

## 2019-01-01 RX ORDER — ATROPINE SULFATE 0.4 MG/ML
0.5 AMPUL (ML) INJECTION
Status: ACTIVE | OUTPATIENT
Start: 2019-01-01 | End: 2019-01-01

## 2019-01-01 RX ORDER — FENTANYL CITRATE 50 UG/ML
INJECTION, SOLUTION INTRAMUSCULAR; INTRAVENOUS PRN
Status: DISCONTINUED | OUTPATIENT
Start: 2019-01-01 | End: 2019-01-01 | Stop reason: SDUPTHER

## 2019-01-01 RX ORDER — LANOLIN ALCOHOL/MO/W.PET/CERES
1000 CREAM (GRAM) TOPICAL DAILY
Status: DISCONTINUED | OUTPATIENT
Start: 2019-01-01 | End: 2019-01-01 | Stop reason: SDUPTHER

## 2019-01-01 RX ORDER — POTASSIUM CHLORIDE AND SODIUM CHLORIDE 900; 300 MG/100ML; MG/100ML
INJECTION, SOLUTION INTRAVENOUS CONTINUOUS
Status: DISCONTINUED | OUTPATIENT
Start: 2019-01-01 | End: 2019-01-01

## 2019-01-01 RX ORDER — SODIUM CHLORIDE 1000 MG
1 TABLET, SOLUBLE MISCELLANEOUS
Status: DISCONTINUED | OUTPATIENT
Start: 2019-01-01 | End: 2019-01-01 | Stop reason: HOSPADM

## 2019-01-01 RX ORDER — BUSPIRONE HYDROCHLORIDE 7.5 MG/1
7.5 TABLET ORAL 3 TIMES DAILY
Qty: 90 TABLET | Refills: 2 | Status: ON HOLD | OUTPATIENT
Start: 2019-01-01 | End: 2019-01-01

## 2019-01-01 RX ORDER — MAGNESIUM OXIDE 400 MG/1
400 TABLET ORAL DAILY
Qty: 30 TABLET | Refills: 1 | Status: SHIPPED | OUTPATIENT
Start: 2019-01-01 | End: 2019-01-01 | Stop reason: SDUPTHER

## 2019-01-01 RX ORDER — LOSARTAN POTASSIUM 25 MG/1
25 TABLET ORAL DAILY
Qty: 30 TABLET | Refills: 3 | Status: ON HOLD | OUTPATIENT
Start: 2019-01-01 | End: 2019-01-01 | Stop reason: HOSPADM

## 2019-01-01 RX ORDER — METOLAZONE 2.5 MG/1
TABLET ORAL
Qty: 10 TABLET | Refills: 3 | Status: ON HOLD | OUTPATIENT
Start: 2019-01-01 | End: 2019-01-01 | Stop reason: HOSPADM

## 2019-01-01 RX ORDER — DOCUSATE SODIUM 100 MG/1
100 CAPSULE, LIQUID FILLED ORAL 2 TIMES DAILY PRN
Status: DISCONTINUED | OUTPATIENT
Start: 2019-01-01 | End: 2019-01-01 | Stop reason: HOSPADM

## 2019-01-01 RX ORDER — ACETAMINOPHEN 325 MG/1
650 TABLET ORAL EVERY 4 HOURS PRN
Status: DISCONTINUED | OUTPATIENT
Start: 2019-01-01 | End: 2019-01-01 | Stop reason: HOSPADM

## 2019-01-01 RX ORDER — OXCARBAZEPINE 300 MG/1
150 TABLET, FILM COATED ORAL EVERY MORNING
Status: DISCONTINUED | OUTPATIENT
Start: 2019-01-01 | End: 2019-01-01 | Stop reason: HOSPADM

## 2019-01-01 RX ORDER — DILTIAZEM HYDROCHLORIDE 5 MG/ML
10 INJECTION INTRAVENOUS ONCE
Status: COMPLETED | OUTPATIENT
Start: 2019-01-01 | End: 2019-01-01

## 2019-01-01 RX ORDER — IPRATROPIUM BROMIDE AND ALBUTEROL SULFATE 2.5; .5 MG/3ML; MG/3ML
1 SOLUTION RESPIRATORY (INHALATION) EVERY 4 HOURS PRN
Qty: 360 ML | Refills: 1 | Status: ON HOLD | OUTPATIENT
Start: 2019-01-01 | End: 2019-01-01

## 2019-01-01 RX ORDER — NITROGLYCERIN 20 MG/100ML
5 INJECTION INTRAVENOUS CONTINUOUS
Status: DISCONTINUED | OUTPATIENT
Start: 2019-01-01 | End: 2019-01-01

## 2019-01-01 RX ORDER — GUAIFENESIN 600 MG/1
600 TABLET, EXTENDED RELEASE ORAL 2 TIMES DAILY
Status: DISCONTINUED | OUTPATIENT
Start: 2019-01-01 | End: 2019-01-01 | Stop reason: HOSPADM

## 2019-01-01 RX ORDER — SODIUM CHLORIDE 0.9 % (FLUSH) 0.9 %
10 SYRINGE (ML) INJECTION PRN
Status: DISCONTINUED | OUTPATIENT
Start: 2019-01-01 | End: 2019-01-01 | Stop reason: SDUPTHER

## 2019-01-01 RX ORDER — IPRATROPIUM BROMIDE AND ALBUTEROL SULFATE 2.5; .5 MG/3ML; MG/3ML
1 SOLUTION RESPIRATORY (INHALATION)
Status: DISCONTINUED | OUTPATIENT
Start: 2019-01-01 | End: 2019-01-01 | Stop reason: HOSPADM

## 2019-01-01 RX ORDER — HYDROCODONE BITARTRATE AND ACETAMINOPHEN 5; 325 MG/1; MG/1
1 TABLET ORAL EVERY 6 HOURS PRN
COMMUNITY

## 2019-01-01 RX ORDER — FUROSEMIDE 10 MG/ML
60 INJECTION INTRAMUSCULAR; INTRAVENOUS ONCE
Status: COMPLETED | OUTPATIENT
Start: 2019-01-01 | End: 2019-01-01

## 2019-01-01 RX ORDER — ADENOSINE 3 MG/ML
INJECTION, SOLUTION INTRAVENOUS
Status: DISPENSED
Start: 2019-01-01 | End: 2019-01-01

## 2019-01-01 RX ORDER — RANOLAZINE 500 MG/1
500 TABLET, EXTENDED RELEASE ORAL 2 TIMES DAILY
Status: DISCONTINUED | OUTPATIENT
Start: 2019-01-01 | End: 2019-01-01 | Stop reason: HOSPADM

## 2019-01-01 RX ORDER — LEVOTHYROXINE SODIUM 0.12 MG/1
125 TABLET ORAL DAILY
Status: DISCONTINUED | OUTPATIENT
Start: 2019-01-01 | End: 2019-01-01 | Stop reason: HOSPADM

## 2019-01-01 RX ORDER — HYDROXYZINE HYDROCHLORIDE 25 MG/1
25 TABLET, FILM COATED ORAL 3 TIMES DAILY
Status: DISCONTINUED | OUTPATIENT
Start: 2019-01-01 | End: 2019-01-01

## 2019-01-01 RX ORDER — POLYETHYLENE GLYCOL 3350 17 G/17G
17 POWDER, FOR SOLUTION ORAL DAILY PRN
Status: DISCONTINUED | OUTPATIENT
Start: 2019-01-01 | End: 2019-01-01 | Stop reason: SDUPTHER

## 2019-01-01 RX ORDER — SODIUM CHLORIDE 1000 MG
2 TABLET, SOLUBLE MISCELLANEOUS
Status: DISCONTINUED | OUTPATIENT
Start: 2019-01-01 | End: 2019-01-01

## 2019-01-01 RX ORDER — ONDANSETRON 4 MG/1
4 TABLET, FILM COATED ORAL DAILY PRN
Status: DISCONTINUED | OUTPATIENT
Start: 2019-01-01 | End: 2019-01-01 | Stop reason: HOSPADM

## 2019-01-01 RX ORDER — HYDROXYZINE HYDROCHLORIDE 25 MG/1
25 TABLET, FILM COATED ORAL 3 TIMES DAILY
Status: DISCONTINUED | OUTPATIENT
Start: 2019-01-01 | End: 2019-01-01 | Stop reason: HOSPADM

## 2019-01-01 RX ORDER — POTASSIUM CHLORIDE 7.45 MG/ML
10 INJECTION INTRAVENOUS PRN
Status: DISCONTINUED | OUTPATIENT
Start: 2019-01-01 | End: 2019-01-01 | Stop reason: HOSPADM

## 2019-01-01 RX ORDER — MIDAZOLAM HYDROCHLORIDE 1 MG/ML
1 INJECTION INTRAMUSCULAR; INTRAVENOUS ONCE
Status: COMPLETED | OUTPATIENT
Start: 2019-01-01 | End: 2019-01-01

## 2019-01-01 RX ORDER — ADENOSINE 3 MG/ML
6 INJECTION, SOLUTION INTRAVENOUS ONCE
Status: DISCONTINUED | OUTPATIENT
Start: 2019-01-01 | End: 2019-01-01

## 2019-01-01 RX ORDER — DIPHENHYDRAMINE HCL 25 MG
25 TABLET ORAL NIGHTLY PRN
Status: DISCONTINUED | OUTPATIENT
Start: 2019-01-01 | End: 2019-01-01 | Stop reason: HOSPADM

## 2019-01-01 RX ORDER — OYSTER SHELL CALCIUM WITH VITAMIN D 500; 200 MG/1; [IU]/1
1 TABLET, FILM COATED ORAL DAILY
Status: DISCONTINUED | OUTPATIENT
Start: 2019-01-01 | End: 2019-01-01 | Stop reason: HOSPADM

## 2019-01-01 RX ORDER — ERGOCALCIFEROL (VITAMIN D2) 1250 MCG
50000 CAPSULE ORAL WEEKLY
Status: DISCONTINUED | OUTPATIENT
Start: 2019-01-01 | End: 2019-01-01

## 2019-01-01 RX ORDER — METOPROLOL TARTRATE 100 MG/1
100 TABLET ORAL 2 TIMES DAILY
Status: DISCONTINUED | OUTPATIENT
Start: 2019-01-01 | End: 2019-01-01

## 2019-01-01 RX ORDER — METOLAZONE 2.5 MG/1
2.5 TABLET ORAL DAILY
Qty: 30 TABLET | Refills: 0 | Status: SHIPPED | OUTPATIENT
Start: 2019-01-01 | End: 2019-01-01

## 2019-01-01 RX ORDER — POTASSIUM CHLORIDE 20 MEQ/1
10 TABLET, EXTENDED RELEASE ORAL DAILY
Qty: 60 TABLET | Refills: 5
Start: 2019-01-01

## 2019-01-01 RX ORDER — CLOTRIMAZOLE AND BETAMETHASONE DIPROPIONATE 10; .64 MG/G; MG/G
CREAM TOPICAL 2 TIMES DAILY
Status: DISCONTINUED | OUTPATIENT
Start: 2019-01-01 | End: 2019-01-01 | Stop reason: HOSPADM

## 2019-01-01 RX ORDER — DOBUTAMINE HYDROCHLORIDE 200 MG/100ML
0.5 INJECTION INTRAVENOUS CONTINUOUS
Status: DISCONTINUED | OUTPATIENT
Start: 2019-01-01 | End: 2019-01-01

## 2019-01-01 RX ORDER — SODIUM CHLORIDE 0.65 %
AEROSOL, SPRAY (ML) NASAL
Qty: 45 ML | Refills: 3 | Status: SHIPPED | OUTPATIENT
Start: 2019-01-01

## 2019-01-01 RX ORDER — METOPROLOL TARTRATE 5 MG/5ML
5 INJECTION INTRAVENOUS ONCE
Status: COMPLETED | OUTPATIENT
Start: 2019-01-01 | End: 2019-01-01

## 2019-01-01 RX ORDER — SODIUM CHLORIDE 9 MG/ML
INJECTION, SOLUTION INTRAVENOUS CONTINUOUS
Status: ACTIVE | OUTPATIENT
Start: 2019-01-01 | End: 2019-01-01

## 2019-01-01 RX ORDER — HYDRALAZINE HYDROCHLORIDE 50 MG/1
50 TABLET, FILM COATED ORAL 3 TIMES DAILY
COMMUNITY
End: 2019-01-01

## 2019-01-01 RX ORDER — DOXEPIN HYDROCHLORIDE 50 MG/1
150 CAPSULE ORAL NIGHTLY
Status: DISCONTINUED | OUTPATIENT
Start: 2019-01-01 | End: 2019-01-01 | Stop reason: HOSPADM

## 2019-01-01 RX ORDER — IPRATROPIUM BROMIDE AND ALBUTEROL SULFATE 2.5; .5 MG/3ML; MG/3ML
1 SOLUTION RESPIRATORY (INHALATION) EVERY 4 HOURS PRN
Status: ON HOLD | COMMUNITY
End: 2019-01-01 | Stop reason: SDUPTHER

## 2019-01-01 RX ORDER — SODIUM CHLORIDE 9 MG/ML
INJECTION, SOLUTION INTRAVENOUS CONTINUOUS
Status: DISCONTINUED | OUTPATIENT
Start: 2019-01-01 | End: 2019-01-01 | Stop reason: HOSPADM

## 2019-01-01 RX ORDER — DEXTROSE MONOHYDRATE 25 G/50ML
12.5 INJECTION, SOLUTION INTRAVENOUS PRN
Status: DISCONTINUED | OUTPATIENT
Start: 2019-01-01 | End: 2019-01-01 | Stop reason: HOSPADM

## 2019-01-01 RX ORDER — POLYVINYL ALCOHOL 14 MG/ML
1 SOLUTION/ DROPS OPHTHALMIC 2 TIMES DAILY
Status: DISCONTINUED | OUTPATIENT
Start: 2019-01-01 | End: 2019-01-01 | Stop reason: HOSPADM

## 2019-01-01 RX ORDER — HYDROCODONE BITARTRATE AND ACETAMINOPHEN 5; 325 MG/1; MG/1
1 TABLET ORAL 2 TIMES DAILY PRN
Status: DISCONTINUED | OUTPATIENT
Start: 2019-01-01 | End: 2019-01-01 | Stop reason: HOSPADM

## 2019-01-01 RX ORDER — FERROUS SULFATE 325(65) MG
325 TABLET ORAL 2 TIMES DAILY
Status: DISCONTINUED | OUTPATIENT
Start: 2019-01-01 | End: 2019-01-01 | Stop reason: HOSPADM

## 2019-01-01 RX ORDER — POTASSIUM CHLORIDE 20 MEQ/1
20 TABLET, EXTENDED RELEASE ORAL DAILY
Refills: 0 | COMMUNITY
Start: 2019-01-01 | End: 2019-01-01 | Stop reason: SDUPTHER

## 2019-01-01 RX ORDER — OXCARBAZEPINE 150 MG/1
150 TABLET, FILM COATED ORAL EVERY MORNING
Qty: 14 TABLET | Refills: 0 | Status: SHIPPED | OUTPATIENT
Start: 2019-01-01

## 2019-01-01 RX ORDER — KETAMINE HCL IN NACL, ISO-OSM 100MG/10ML
SYRINGE (ML) INJECTION PRN
Status: DISCONTINUED | OUTPATIENT
Start: 2019-01-01 | End: 2019-01-01 | Stop reason: SDUPTHER

## 2019-01-01 RX ORDER — POLYETHYLENE GLYCOL 3350 17 G/17G
17 POWDER, FOR SOLUTION ORAL DAILY PRN
Status: DISCONTINUED | OUTPATIENT
Start: 2019-01-01 | End: 2019-01-01 | Stop reason: HOSPADM

## 2019-01-01 RX ORDER — OYSTER SHELL CALCIUM WITH VITAMIN D 500; 200 MG/1; [IU]/1
1 TABLET, FILM COATED ORAL 2 TIMES DAILY
Status: DISCONTINUED | OUTPATIENT
Start: 2019-01-01 | End: 2019-01-01 | Stop reason: HOSPADM

## 2019-01-01 RX ORDER — PANTOPRAZOLE SODIUM 40 MG/1
40 TABLET, DELAYED RELEASE ORAL
Status: DISCONTINUED | OUTPATIENT
Start: 2019-01-01 | End: 2019-01-01 | Stop reason: HOSPADM

## 2019-01-01 RX ORDER — BACLOFEN 10 MG/1
10 TABLET ORAL 2 TIMES DAILY
Status: DISCONTINUED | OUTPATIENT
Start: 2019-01-01 | End: 2019-01-01 | Stop reason: HOSPADM

## 2019-01-01 RX ORDER — CHLORHEXIDINE GLUCONATE 4 G/100ML
SOLUTION TOPICAL ONCE
Status: CANCELLED | OUTPATIENT
Start: 2019-01-01 | End: 2019-01-01

## 2019-01-01 RX ORDER — LEVOTHYROXINE SODIUM 0.1 MG/1
100 TABLET ORAL DAILY
Status: DISCONTINUED | OUTPATIENT
Start: 2019-01-01 | End: 2019-01-01 | Stop reason: HOSPADM

## 2019-01-01 RX ORDER — AZITHROMYCIN 250 MG/1
500 TABLET, FILM COATED ORAL DAILY
Status: DISCONTINUED | OUTPATIENT
Start: 2019-01-01 | End: 2019-01-01

## 2019-01-01 RX ORDER — POTASSIUM CHLORIDE 7.45 MG/ML
40 INJECTION INTRAVENOUS ONCE
Status: DISCONTINUED | OUTPATIENT
Start: 2019-01-01 | End: 2019-01-01 | Stop reason: SDUPTHER

## 2019-01-01 RX ORDER — HYDRALAZINE HYDROCHLORIDE 50 MG/1
50 TABLET, FILM COATED ORAL 3 TIMES DAILY
Status: DISCONTINUED | OUTPATIENT
Start: 2019-01-01 | End: 2019-01-01 | Stop reason: HOSPADM

## 2019-01-01 RX ORDER — BUSPIRONE HYDROCHLORIDE 7.5 MG/1
7.5 TABLET ORAL 3 TIMES DAILY
Status: DISCONTINUED | OUTPATIENT
Start: 2019-01-01 | End: 2019-01-01 | Stop reason: CLARIF

## 2019-01-01 RX ORDER — LEVOTHYROXINE SODIUM 0.1 MG/1
100 TABLET ORAL DAILY
Status: DISCONTINUED | OUTPATIENT
Start: 2019-01-01 | End: 2019-01-01 | Stop reason: SDUPTHER

## 2019-01-01 RX ORDER — DILTIAZEM HYDROCHLORIDE 120 MG/1
120 CAPSULE, COATED, EXTENDED RELEASE ORAL DAILY
Qty: 30 CAPSULE | Refills: 3 | DISCHARGE
Start: 2019-01-01

## 2019-01-01 RX ORDER — ISOSORBIDE DINITRATE 20 MG/1
40 TABLET ORAL 3 TIMES DAILY
Status: DISCONTINUED | OUTPATIENT
Start: 2019-01-01 | End: 2019-01-01 | Stop reason: HOSPADM

## 2019-01-01 RX ORDER — ONDANSETRON 2 MG/ML
4 INJECTION INTRAMUSCULAR; INTRAVENOUS EVERY 6 HOURS PRN
Status: DISCONTINUED | OUTPATIENT
Start: 2019-01-01 | End: 2019-01-01 | Stop reason: HOSPADM

## 2019-01-01 RX ORDER — POTASSIUM CHLORIDE 20MEQ/15ML
40 LIQUID (ML) ORAL PRN
Status: DISCONTINUED | OUTPATIENT
Start: 2019-01-01 | End: 2019-01-01 | Stop reason: HOSPADM

## 2019-01-01 RX ORDER — ERGOCALCIFEROL 1.25 MG/1
50000 CAPSULE ORAL
Status: DISCONTINUED | OUTPATIENT
Start: 2019-01-01 | End: 2019-01-01 | Stop reason: HOSPADM

## 2019-01-01 RX ORDER — DILTIAZEM HYDROCHLORIDE 5 MG/ML
10 INJECTION INTRAVENOUS ONCE
Status: DISCONTINUED | OUTPATIENT
Start: 2019-01-01 | End: 2019-01-01

## 2019-01-01 RX ORDER — LABETALOL HYDROCHLORIDE 5 MG/ML
20 INJECTION, SOLUTION INTRAVENOUS ONCE
Status: COMPLETED | OUTPATIENT
Start: 2019-01-01 | End: 2019-01-01

## 2019-01-01 RX ORDER — DEXTROSE MONOHYDRATE 50 MG/ML
100 INJECTION, SOLUTION INTRAVENOUS PRN
Status: DISCONTINUED | OUTPATIENT
Start: 2019-01-01 | End: 2019-01-01 | Stop reason: HOSPADM

## 2019-01-01 RX ORDER — IPRATROPIUM BROMIDE AND ALBUTEROL SULFATE 2.5; .5 MG/3ML; MG/3ML
1 SOLUTION RESPIRATORY (INHALATION) EVERY 4 HOURS PRN
Qty: 360 ML | Refills: 1 | DISCHARGE
Start: 2019-01-01

## 2019-01-01 RX ORDER — HYDRALAZINE HYDROCHLORIDE 50 MG/1
50 TABLET, FILM COATED ORAL ONCE
Status: COMPLETED | OUTPATIENT
Start: 2019-01-01 | End: 2019-01-01

## 2019-01-01 RX ORDER — HYDROCODONE BITARTRATE AND ACETAMINOPHEN 5; 325 MG/1; MG/1
0.5 TABLET ORAL 2 TIMES DAILY
Status: DISCONTINUED | OUTPATIENT
Start: 2019-01-01 | End: 2019-01-01 | Stop reason: HOSPADM

## 2019-01-01 RX ORDER — HEPARIN SODIUM 1000 [USP'U]/ML
INJECTION, SOLUTION INTRAVENOUS; SUBCUTANEOUS PRN
Status: DISCONTINUED | OUTPATIENT
Start: 2019-01-01 | End: 2019-01-01 | Stop reason: SDUPTHER

## 2019-01-01 RX ORDER — LIDOCAINE HYDROCHLORIDE 10 MG/ML
INJECTION, SOLUTION EPIDURAL; INFILTRATION; INTRACAUDAL; PERINEURAL PRN
Status: DISCONTINUED | OUTPATIENT
Start: 2019-01-01 | End: 2019-01-01 | Stop reason: SDUPTHER

## 2019-01-01 RX ORDER — MAGNESIUM SULFATE IN WATER 40 MG/ML
4 INJECTION, SOLUTION INTRAVENOUS ONCE
Status: COMPLETED | OUTPATIENT
Start: 2019-01-01 | End: 2019-01-01

## 2019-01-01 RX ORDER — MAGNESIUM SULFATE IN WATER 40 MG/ML
2 INJECTION, SOLUTION INTRAVENOUS ONCE
Status: COMPLETED | OUTPATIENT
Start: 2019-01-01 | End: 2019-01-01

## 2019-01-01 RX ORDER — BUMETANIDE 0.25 MG/ML
1 INJECTION, SOLUTION INTRAMUSCULAR; INTRAVENOUS ONCE
Status: COMPLETED | OUTPATIENT
Start: 2019-01-01 | End: 2019-01-01

## 2019-01-01 RX ORDER — NYSTATIN 100000 U/G
OINTMENT TOPICAL
Qty: 30 G | Refills: 2 | Status: ON HOLD | OUTPATIENT
Start: 2019-01-01 | End: 2019-01-01

## 2019-01-01 RX ORDER — FERROUS SULFATE 325(65) MG
325 TABLET ORAL
Status: DISCONTINUED | OUTPATIENT
Start: 2019-01-01 | End: 2019-01-01

## 2019-01-01 RX ORDER — 0.9 % SODIUM CHLORIDE 0.9 %
30 INTRAVENOUS SOLUTION INTRAVENOUS ONCE
Status: DISCONTINUED | OUTPATIENT
Start: 2019-01-01 | End: 2019-01-01

## 2019-01-01 RX ORDER — FUROSEMIDE 10 MG/ML
40 INJECTION INTRAMUSCULAR; INTRAVENOUS 2 TIMES DAILY
Status: DISCONTINUED | OUTPATIENT
Start: 2019-01-01 | End: 2019-01-01

## 2019-01-01 RX ORDER — ISOSORBIDE MONONITRATE 60 MG/1
60 TABLET, EXTENDED RELEASE ORAL DAILY
Status: DISCONTINUED | OUTPATIENT
Start: 2019-01-01 | End: 2019-01-01 | Stop reason: HOSPADM

## 2019-01-01 RX ORDER — FUROSEMIDE 40 MG/1
20 TABLET ORAL DAILY
Qty: 90 TABLET | Refills: 1 | Status: ON HOLD
Start: 2019-01-01 | End: 2019-01-01 | Stop reason: SDUPTHER

## 2019-01-01 RX ORDER — DIGOXIN 0.25 MG/ML
250 INJECTION INTRAMUSCULAR; INTRAVENOUS ONCE
Status: COMPLETED | OUTPATIENT
Start: 2019-01-01 | End: 2019-01-01

## 2019-01-01 RX ORDER — DOXEPIN HYDROCHLORIDE 50 MG/1
50 CAPSULE ORAL NIGHTLY
Qty: 30 CAPSULE | Refills: 3 | DISCHARGE
Start: 2019-01-01

## 2019-01-01 RX ORDER — METOLAZONE 2.5 MG/1
TABLET ORAL
Qty: 10 TABLET | Refills: 3 | Status: CANCELLED | OUTPATIENT
Start: 2019-01-01

## 2019-01-01 RX ORDER — POTASSIUM CHLORIDE 20 MEQ/1
40 TABLET, EXTENDED RELEASE ORAL 2 TIMES DAILY WITH MEALS
Status: DISCONTINUED | OUTPATIENT
Start: 2019-01-01 | End: 2019-01-01

## 2019-01-01 RX ORDER — POTASSIUM CHLORIDE 20 MEQ/1
10 TABLET, EXTENDED RELEASE ORAL DAILY
Status: DISCONTINUED | OUTPATIENT
Start: 2019-01-01 | End: 2019-01-01

## 2019-01-01 RX ORDER — HYDRALAZINE HYDROCHLORIDE 50 MG/1
50 TABLET, FILM COATED ORAL 3 TIMES DAILY
Qty: 90 TABLET | Refills: 1 | Status: ON HOLD | OUTPATIENT
Start: 2019-01-01 | End: 2019-01-01 | Stop reason: HOSPADM

## 2019-01-01 RX ORDER — FUROSEMIDE 40 MG/1
40 TABLET ORAL 2 TIMES DAILY
Qty: 90 TABLET | Refills: 1 | Status: ON HOLD | OUTPATIENT
Start: 2019-01-01 | End: 2019-01-01 | Stop reason: SDUPTHER

## 2019-01-01 RX ORDER — 0.9 % SODIUM CHLORIDE 0.9 %
1500 INTRAVENOUS SOLUTION INTRAVENOUS ONCE
Status: COMPLETED | OUTPATIENT
Start: 2019-01-01 | End: 2019-01-01

## 2019-01-01 RX ORDER — ERGOCALCIFEROL (VITAMIN D2) 1250 MCG
50000 CAPSULE ORAL WEEKLY
Qty: 5 CAPSULE | Refills: 1 | Status: SHIPPED | OUTPATIENT
Start: 2019-01-01

## 2019-01-01 RX ORDER — SODIUM CHLORIDE 9 MG/ML
INJECTION, SOLUTION INTRAVENOUS CONTINUOUS
Status: DISCONTINUED | OUTPATIENT
Start: 2019-01-01 | End: 2019-01-01

## 2019-01-01 RX ORDER — ALBUTEROL SULFATE 90 UG/1
2 AEROSOL, METERED RESPIRATORY (INHALATION) EVERY 6 HOURS PRN
Status: DISCONTINUED | OUTPATIENT
Start: 2019-01-01 | End: 2019-01-01 | Stop reason: HOSPADM

## 2019-01-01 RX ORDER — METHYLPREDNISOLONE SODIUM SUCCINATE 40 MG/ML
40 INJECTION, POWDER, LYOPHILIZED, FOR SOLUTION INTRAMUSCULAR; INTRAVENOUS EVERY 12 HOURS
Status: DISCONTINUED | OUTPATIENT
Start: 2019-01-01 | End: 2019-01-01

## 2019-01-01 RX ORDER — LEVOTHYROXINE SODIUM 0.1 MG/1
100 TABLET ORAL DAILY
Status: DISCONTINUED | OUTPATIENT
Start: 2019-01-01 | End: 2019-01-01

## 2019-01-01 RX ORDER — POTASSIUM CHLORIDE 20 MEQ/1
20 TABLET, EXTENDED RELEASE ORAL 2 TIMES DAILY WITH MEALS
Status: DISCONTINUED | OUTPATIENT
Start: 2019-01-01 | End: 2019-01-01 | Stop reason: HOSPADM

## 2019-01-01 RX ORDER — FUROSEMIDE 20 MG/1
20 TABLET ORAL DAILY
Status: DISCONTINUED | OUTPATIENT
Start: 2019-01-01 | End: 2019-01-01

## 2019-01-01 RX ORDER — HYDROCODONE BITARTRATE AND ACETAMINOPHEN 5; 325 MG/1; MG/1
1 TABLET ORAL SEE ADMIN INSTRUCTIONS
Status: DISCONTINUED | OUTPATIENT
Start: 2019-01-01 | End: 2019-01-01

## 2019-01-01 RX ORDER — CYCLOSPORINE 0.5 MG/ML
1 EMULSION OPHTHALMIC 2 TIMES DAILY
Status: DISCONTINUED | OUTPATIENT
Start: 2019-01-01 | End: 2019-01-01 | Stop reason: CLARIF

## 2019-01-01 RX ORDER — POTASSIUM CHLORIDE 29.8 MG/ML
20 INJECTION INTRAVENOUS
Status: COMPLETED | OUTPATIENT
Start: 2019-01-01 | End: 2019-01-01

## 2019-01-01 RX ORDER — B-COMPLEX WITH VITAMIN C
1 TABLET ORAL 2 TIMES DAILY
Status: DISCONTINUED | OUTPATIENT
Start: 2019-01-01 | End: 2019-01-01 | Stop reason: SDUPTHER

## 2019-01-01 RX ORDER — LOPERAMIDE HYDROCHLORIDE 2 MG/1
2 CAPSULE ORAL 4 TIMES DAILY PRN
DISCHARGE
Start: 2019-01-01 | End: 2019-01-01

## 2019-01-01 RX ORDER — POTASSIUM CHLORIDE 20 MEQ/1
20 TABLET, EXTENDED RELEASE ORAL DAILY
Status: DISCONTINUED | OUTPATIENT
Start: 2019-01-01 | End: 2019-01-01 | Stop reason: HOSPADM

## 2019-01-01 RX ORDER — HYDROCODONE BITARTRATE AND ACETAMINOPHEN 5; 325 MG/1; MG/1
1 TABLET ORAL EVERY 4 HOURS PRN
Qty: 30 TABLET | Refills: 0 | Status: SHIPPED | OUTPATIENT
Start: 2019-01-01 | End: 2019-01-01

## 2019-01-01 RX ORDER — METOPROLOL TARTRATE 100 MG/1
100 TABLET ORAL 2 TIMES DAILY
Qty: 60 TABLET | Refills: 0 | Status: SHIPPED | OUTPATIENT
Start: 2019-01-01

## 2019-01-01 RX ORDER — POLYETHYLENE GLYCOL 3350 17 G/17G
17 POWDER, FOR SOLUTION ORAL DAILY
Status: DISCONTINUED | OUTPATIENT
Start: 2019-01-01 | End: 2019-01-01 | Stop reason: SDUPTHER

## 2019-01-01 RX ORDER — SPIRONOLACTONE 25 MG/1
25 TABLET ORAL DAILY
Qty: 30 TABLET | Refills: 0 | DISCHARGE
Start: 2019-01-01

## 2019-01-01 RX ORDER — BISACODYL 10 MG
10 SUPPOSITORY, RECTAL RECTAL DAILY PRN
Status: DISCONTINUED | OUTPATIENT
Start: 2019-01-01 | End: 2019-01-01 | Stop reason: HOSPADM

## 2019-01-01 RX ORDER — FLUTICASONE PROPIONATE 50 MCG
1 SPRAY, SUSPENSION (ML) NASAL DAILY
Qty: 1 BOTTLE | Refills: 0 | Status: SHIPPED | OUTPATIENT
Start: 2019-01-01

## 2019-01-01 RX ORDER — FERROUS SULFATE 325(65) MG
TABLET ORAL
Qty: 180 TABLET | Refills: 1
Start: 2019-01-01

## 2019-01-01 RX ORDER — LOSARTAN POTASSIUM 50 MG/1
50 TABLET ORAL DAILY
Status: DISCONTINUED | OUTPATIENT
Start: 2019-01-01 | End: 2019-01-01 | Stop reason: HOSPADM

## 2019-01-01 RX ORDER — ERGOCALCIFEROL 1.25 MG/1
50000 CAPSULE ORAL WEEKLY
Status: DISCONTINUED | OUTPATIENT
Start: 2019-01-01 | End: 2019-01-01 | Stop reason: HOSPADM

## 2019-01-01 RX ORDER — HYDRALAZINE HYDROCHLORIDE 20 MG/ML
10 INJECTION INTRAMUSCULAR; INTRAVENOUS EVERY 10 MIN PRN
Status: DISCONTINUED | OUTPATIENT
Start: 2019-01-01 | End: 2019-01-01 | Stop reason: HOSPADM

## 2019-01-01 RX ORDER — ISOSORBIDE MONONITRATE 30 MG/1
60 TABLET, EXTENDED RELEASE ORAL DAILY
Qty: 30 TABLET | Refills: 1 | Status: ON HOLD | OUTPATIENT
Start: 2019-01-01 | End: 2019-01-01 | Stop reason: HOSPADM

## 2019-01-01 RX ORDER — SODIUM CHLORIDE 9 MG/ML
INJECTION, SOLUTION INTRAVENOUS CONTINUOUS
Status: CANCELLED | OUTPATIENT
Start: 2019-01-01

## 2019-01-01 RX ORDER — DILTIAZEM HYDROCHLORIDE 120 MG/1
120 CAPSULE, COATED, EXTENDED RELEASE ORAL DAILY
Status: DISCONTINUED | OUTPATIENT
Start: 2019-01-01 | End: 2019-01-01 | Stop reason: HOSPADM

## 2019-01-01 RX ORDER — HYDROCODONE BITARTRATE AND ACETAMINOPHEN 5; 325 MG/1; MG/1
1 TABLET ORAL EVERY 4 HOURS PRN
Status: DISCONTINUED | OUTPATIENT
Start: 2019-01-01 | End: 2019-01-01 | Stop reason: HOSPADM

## 2019-01-01 RX ORDER — FERROUS SULFATE 325(65) MG
325 TABLET ORAL EVERY OTHER DAY
Status: DISCONTINUED | OUTPATIENT
Start: 2019-01-01 | End: 2019-01-01 | Stop reason: HOSPADM

## 2019-01-01 RX ORDER — SPIRONOLACTONE 25 MG/1
25 TABLET ORAL DAILY
Status: DISCONTINUED | OUTPATIENT
Start: 2019-01-01 | End: 2019-01-01 | Stop reason: HOSPADM

## 2019-01-01 RX ORDER — ASPIRIN 81 MG
1 TABLET, DELAYED RELEASE (ENTERIC COATED) ORAL DAILY
Status: DISCONTINUED | OUTPATIENT
Start: 2019-01-01 | End: 2019-01-01 | Stop reason: SDUPTHER

## 2019-01-01 RX ORDER — BUMETANIDE 1 MG/1
1 TABLET ORAL 2 TIMES DAILY
Status: DISCONTINUED | OUTPATIENT
Start: 2019-01-01 | End: 2019-01-01 | Stop reason: HOSPADM

## 2019-01-01 RX ORDER — ISOSORBIDE MONONITRATE 30 MG/1
60 TABLET, EXTENDED RELEASE ORAL DAILY
Qty: 30 TABLET | Refills: 1
Start: 2019-01-01 | End: 2019-01-01 | Stop reason: SDUPTHER

## 2019-01-01 RX ORDER — FUROSEMIDE 10 MG/ML
INJECTION INTRAMUSCULAR; INTRAVENOUS
Status: COMPLETED
Start: 2019-01-01 | End: 2019-01-01

## 2019-01-01 RX ORDER — ASPIRIN 81 MG/1
81 TABLET ORAL DAILY
Status: DISCONTINUED | OUTPATIENT
Start: 2019-01-01 | End: 2019-01-01

## 2019-01-01 RX ORDER — SPIRONOLACTONE 25 MG/1
25 TABLET ORAL DAILY
Status: DISCONTINUED | OUTPATIENT
Start: 2019-01-01 | End: 2019-01-01

## 2019-01-01 RX ORDER — ASPIRIN 325 MG
325 TABLET ORAL ONCE
Status: DISCONTINUED | OUTPATIENT
Start: 2019-01-01 | End: 2019-01-01 | Stop reason: HOSPADM

## 2019-01-01 RX ORDER — METOLAZONE 2.5 MG/1
2.5 TABLET ORAL DAILY
COMMUNITY
End: 2019-01-01 | Stop reason: SDUPTHER

## 2019-01-01 RX ORDER — LOSARTAN POTASSIUM 25 MG/1
50 TABLET ORAL DAILY
Qty: 90 TABLET | Refills: 2 | Status: ON HOLD | OUTPATIENT
Start: 2019-01-01 | End: 2019-01-01 | Stop reason: SDUPTHER

## 2019-01-01 RX ORDER — MAGNESIUM OXIDE 400 MG/1
TABLET ORAL
Qty: 30 TABLET | Refills: 1 | Status: ON HOLD | OUTPATIENT
Start: 2019-01-01 | End: 2019-01-01 | Stop reason: HOSPADM

## 2019-01-01 RX ORDER — FUROSEMIDE 40 MG/1
40 TABLET ORAL DAILY
Status: DISCONTINUED | OUTPATIENT
Start: 2019-01-01 | End: 2019-01-01

## 2019-01-01 RX ORDER — 0.9 % SODIUM CHLORIDE 0.9 %
250 INTRAVENOUS SOLUTION INTRAVENOUS ONCE
Status: CANCELLED | OUTPATIENT
Start: 2019-01-01 | End: 2019-01-01

## 2019-01-01 RX ORDER — CHLORHEXIDINE GLUCONATE 4 G/100ML
SOLUTION TOPICAL ONCE
Status: COMPLETED | OUTPATIENT
Start: 2019-01-01 | End: 2019-01-01

## 2019-01-01 RX ORDER — ALBUTEROL SULFATE 90 UG/1
2 AEROSOL, METERED RESPIRATORY (INHALATION) EVERY 6 HOURS PRN
Qty: 1 INHALER | Refills: 3 | Status: SHIPPED | OUTPATIENT
Start: 2019-01-01

## 2019-01-01 RX ORDER — ASPIRIN 81 MG/1
81 TABLET, CHEWABLE ORAL DAILY
Status: DISCONTINUED | OUTPATIENT
Start: 2019-01-01 | End: 2019-01-01 | Stop reason: HOSPADM

## 2019-01-01 RX ORDER — DOCUSATE SODIUM 100 MG/1
100 CAPSULE, LIQUID FILLED ORAL 2 TIMES DAILY
Status: DISCONTINUED | OUTPATIENT
Start: 2019-01-01 | End: 2019-01-01 | Stop reason: HOSPADM

## 2019-01-01 RX ORDER — HYDROCODONE BITARTRATE AND ACETAMINOPHEN 5; 325 MG/1; MG/1
1 TABLET ORAL EVERY 6 HOURS PRN
Status: ON HOLD | COMMUNITY
End: 2019-01-01 | Stop reason: SDUPTHER

## 2019-01-01 RX ORDER — FUROSEMIDE 20 MG/1
TABLET ORAL
Qty: 60 TABLET | Refills: 3 | Status: ON HOLD | OUTPATIENT
Start: 2019-01-01 | End: 2019-01-01 | Stop reason: HOSPADM

## 2019-01-01 RX ORDER — ROSUVASTATIN CALCIUM 20 MG/1
20 TABLET, COATED ORAL DAILY
Status: DISCONTINUED | OUTPATIENT
Start: 2019-01-01 | End: 2019-01-01 | Stop reason: HOSPADM

## 2019-01-01 RX ORDER — FENTANYL CITRATE 50 UG/ML
INJECTION, SOLUTION INTRAMUSCULAR; INTRAVENOUS
Status: COMPLETED
Start: 2019-01-01 | End: 2019-01-01

## 2019-01-01 RX ORDER — SODIUM CHLORIDE 1000 MG
1 TABLET, SOLUBLE MISCELLANEOUS
Status: DISCONTINUED | OUTPATIENT
Start: 2019-01-01 | End: 2019-01-01 | Stop reason: ALTCHOICE

## 2019-01-01 RX ORDER — BUSPIRONE HYDROCHLORIDE 7.5 MG/1
7.5 TABLET ORAL 3 TIMES DAILY
Qty: 90 TABLET | Refills: 2 | DISCHARGE
Start: 2019-01-01 | End: 2019-09-16

## 2019-01-01 RX ORDER — BUDESONIDE AND FORMOTEROL FUMARATE DIHYDRATE 160; 4.5 UG/1; UG/1
1 AEROSOL RESPIRATORY (INHALATION) 2 TIMES DAILY
Status: DISCONTINUED | OUTPATIENT
Start: 2019-01-01 | End: 2019-01-01 | Stop reason: CLARIF

## 2019-01-01 RX ORDER — SPIRONOLACTONE 25 MG/1
25 TABLET ORAL DAILY
Qty: 30 TABLET | Refills: 0 | Status: ON HOLD
Start: 2019-01-01 | End: 2019-01-01 | Stop reason: HOSPADM

## 2019-01-01 RX ORDER — AMLODIPINE BESYLATE 5 MG/1
5 TABLET ORAL DAILY
Status: DISCONTINUED | OUTPATIENT
Start: 2019-01-01 | End: 2019-01-01 | Stop reason: HOSPADM

## 2019-01-01 RX ORDER — ESCITALOPRAM OXALATE 20 MG/1
20 TABLET ORAL EVERY MORNING
Refills: 0 | COMMUNITY
Start: 2019-01-01

## 2019-01-01 RX ORDER — DILTIAZEM HYDROCHLORIDE 60 MG/1
120 CAPSULE, EXTENDED RELEASE ORAL DAILY
Status: DISCONTINUED | OUTPATIENT
Start: 2019-01-01 | End: 2019-01-01 | Stop reason: SDUPTHER

## 2019-01-01 RX ORDER — SODIUM CHLORIDE 9 MG/ML
INJECTION, SOLUTION INTRAVENOUS CONTINUOUS PRN
Status: DISCONTINUED | OUTPATIENT
Start: 2019-01-01 | End: 2019-01-01 | Stop reason: SDUPTHER

## 2019-01-01 RX ORDER — ISOSORBIDE MONONITRATE 30 MG/1
30 TABLET, EXTENDED RELEASE ORAL DAILY
Qty: 30 TABLET | Refills: 1 | Status: ON HOLD | OUTPATIENT
Start: 2019-01-01 | End: 2019-01-01 | Stop reason: SDUPTHER

## 2019-01-01 RX ORDER — ONDANSETRON 4 MG/1
4 TABLET, FILM COATED ORAL DAILY PRN
Qty: 30 TABLET | Refills: 0 | Status: SHIPPED | OUTPATIENT
Start: 2019-01-01

## 2019-01-01 RX ORDER — CYCLOSPORINE 0.5 MG/ML
1 EMULSION OPHTHALMIC 2 TIMES DAILY
Qty: 1 VIAL | Refills: 5 | Status: SHIPPED | OUTPATIENT
Start: 2019-01-01

## 2019-01-01 RX ORDER — HYDROXYZINE HYDROCHLORIDE 25 MG/1
25 TABLET, FILM COATED ORAL 3 TIMES DAILY PRN
Status: DISCONTINUED | OUTPATIENT
Start: 2019-01-01 | End: 2019-01-01 | Stop reason: HOSPADM

## 2019-01-01 RX ORDER — AMLODIPINE BESYLATE 5 MG/1
5 TABLET ORAL DAILY
Status: DISCONTINUED | OUTPATIENT
Start: 2019-01-01 | End: 2019-01-01

## 2019-01-01 RX ORDER — DIPHENHYDRAMINE HYDROCHLORIDE 50 MG/ML
50 INJECTION INTRAMUSCULAR; INTRAVENOUS ONCE
Status: DISCONTINUED | OUTPATIENT
Start: 2019-01-01 | End: 2019-01-01 | Stop reason: HOSPADM

## 2019-01-01 RX ORDER — HYDRALAZINE HYDROCHLORIDE 50 MG/1
50 TABLET, FILM COATED ORAL 3 TIMES DAILY
Qty: 90 TABLET | Refills: 1 | Status: SHIPPED | OUTPATIENT
Start: 2019-01-01 | End: 2019-01-01 | Stop reason: SDUPTHER

## 2019-01-01 RX ORDER — 0.9 % SODIUM CHLORIDE 0.9 %
1000 INTRAVENOUS SOLUTION INTRAVENOUS ONCE
Status: COMPLETED | OUTPATIENT
Start: 2019-01-01 | End: 2019-01-01

## 2019-01-01 RX ORDER — SODIUM CHLORIDE 0.9 % (FLUSH) 0.9 %
10 SYRINGE (ML) INJECTION PRN
Status: CANCELLED | OUTPATIENT
Start: 2019-01-01

## 2019-01-01 RX ORDER — ONDANSETRON 2 MG/ML
4 INJECTION INTRAMUSCULAR; INTRAVENOUS ONCE
Status: COMPLETED | OUTPATIENT
Start: 2019-01-01 | End: 2019-01-01

## 2019-01-01 RX ORDER — FUROSEMIDE 10 MG/ML
20 INJECTION INTRAMUSCULAR; INTRAVENOUS DAILY
Status: DISCONTINUED | OUTPATIENT
Start: 2019-01-01 | End: 2019-01-01

## 2019-01-01 RX ORDER — SODIUM CHLORIDE 0.9 % (FLUSH) 0.9 %
10 SYRINGE (ML) INJECTION EVERY 12 HOURS SCHEDULED
Status: DISCONTINUED | OUTPATIENT
Start: 2019-01-01 | End: 2019-01-01 | Stop reason: SDUPTHER

## 2019-01-01 RX ORDER — BACLOFEN 10 MG/1
10 TABLET ORAL 2 TIMES DAILY
Qty: 15 TABLET | Refills: 0 | Status: ON HOLD | OUTPATIENT
Start: 2019-01-01 | End: 2019-01-01 | Stop reason: HOSPADM

## 2019-01-01 RX ORDER — POTASSIUM CHLORIDE 7.45 MG/ML
10 INJECTION INTRAVENOUS
Status: COMPLETED | OUTPATIENT
Start: 2019-01-01 | End: 2019-01-01

## 2019-01-01 RX ORDER — LOSARTAN POTASSIUM 25 MG/1
25 TABLET ORAL DAILY
Qty: 90 TABLET | Refills: 1 | Status: SHIPPED | OUTPATIENT
Start: 2019-01-01 | End: 2019-01-01 | Stop reason: SDUPTHER

## 2019-01-01 RX ORDER — FUROSEMIDE 40 MG/1
40 TABLET ORAL 2 TIMES DAILY
Qty: 90 TABLET | Refills: 1 | Status: ON HOLD | OUTPATIENT
Start: 2019-01-01 | End: 2019-01-01 | Stop reason: HOSPADM

## 2019-01-01 RX ORDER — POLYETHYLENE GLYCOL 3350 17 G/17G
17 POWDER, FOR SOLUTION ORAL DAILY PRN
Status: DISCONTINUED | OUTPATIENT
Start: 2019-01-01 | End: 2019-01-01

## 2019-01-01 RX ORDER — IPRATROPIUM BROMIDE AND ALBUTEROL SULFATE 2.5; .5 MG/3ML; MG/3ML
1 SOLUTION RESPIRATORY (INHALATION) EVERY 4 HOURS
Status: DISCONTINUED | OUTPATIENT
Start: 2019-01-01 | End: 2019-01-01

## 2019-01-01 RX ORDER — ESCITALOPRAM OXALATE 20 MG/1
20 TABLET ORAL EVERY MORNING
Status: DISCONTINUED | OUTPATIENT
Start: 2019-01-01 | End: 2019-01-01 | Stop reason: HOSPADM

## 2019-01-01 RX ORDER — ISOSORBIDE MONONITRATE 30 MG/1
30 TABLET, EXTENDED RELEASE ORAL DAILY
Status: DISCONTINUED | OUTPATIENT
Start: 2019-01-01 | End: 2019-01-01

## 2019-01-01 RX ORDER — HYDRALAZINE HYDROCHLORIDE 50 MG/1
100 TABLET, FILM COATED ORAL 3 TIMES DAILY
Qty: 90 TABLET | Refills: 1 | Status: ON HOLD | OUTPATIENT
Start: 2019-01-01 | End: 2019-01-01 | Stop reason: SDUPTHER

## 2019-01-01 RX ORDER — SPIRONOLACTONE 25 MG/1
25 TABLET ORAL 2 TIMES DAILY
COMMUNITY
End: 2019-01-01 | Stop reason: SDUPTHER

## 2019-01-01 RX ORDER — ERGOCALCIFEROL (VITAMIN D2) 1250 MCG
50000 CAPSULE ORAL WEEKLY
Status: DISCONTINUED | OUTPATIENT
Start: 2019-01-01 | End: 2019-01-01 | Stop reason: HOSPADM

## 2019-01-01 RX ORDER — HYDRALAZINE HYDROCHLORIDE 25 MG/1
25 TABLET, FILM COATED ORAL 3 TIMES DAILY
Qty: 90 TABLET | Refills: 3 | Status: ON HOLD | OUTPATIENT
Start: 2019-01-01 | End: 2019-01-01 | Stop reason: HOSPADM

## 2019-01-01 RX ORDER — ECHINACEA PURPUREA EXTRACT 125 MG
1 TABLET ORAL PRN
Status: DISCONTINUED | OUTPATIENT
Start: 2019-01-01 | End: 2019-01-01 | Stop reason: SDUPTHER

## 2019-01-01 RX ORDER — DOXEPIN HYDROCHLORIDE 50 MG/1
1-2 CAPSULE ORAL NIGHTLY
Refills: 0 | Status: ON HOLD | COMMUNITY
Start: 2019-01-01 | End: 2019-01-01

## 2019-01-01 RX ORDER — POTASSIUM CHLORIDE 20 MEQ/1
20 TABLET, EXTENDED RELEASE ORAL ONCE
Status: COMPLETED | OUTPATIENT
Start: 2019-01-01 | End: 2019-01-01

## 2019-01-01 RX ORDER — FUROSEMIDE 20 MG/1
20 TABLET ORAL DAILY
Status: DISCONTINUED | OUTPATIENT
Start: 2019-01-01 | End: 2019-01-01 | Stop reason: HOSPADM

## 2019-01-01 RX ORDER — POLYETHYLENE GLYCOL 3350 17 G/17G
17 POWDER, FOR SOLUTION ORAL DAILY
Status: DISCONTINUED | OUTPATIENT
Start: 2019-01-01 | End: 2019-01-01 | Stop reason: HOSPADM

## 2019-01-01 RX ORDER — MORPHINE SULFATE 4 MG/ML
4 INJECTION, SOLUTION INTRAMUSCULAR; INTRAVENOUS ONCE
Status: COMPLETED | OUTPATIENT
Start: 2019-01-01 | End: 2019-01-01

## 2019-01-01 RX ORDER — LOSARTAN POTASSIUM 25 MG/1
25 TABLET ORAL 2 TIMES DAILY
Status: DISCONTINUED | OUTPATIENT
Start: 2019-01-01 | End: 2019-01-01 | Stop reason: HOSPADM

## 2019-01-01 RX ORDER — ACETAMINOPHEN 325 MG/1
650 TABLET ORAL ONCE
Status: COMPLETED | OUTPATIENT
Start: 2019-01-01 | End: 2019-01-01

## 2019-01-01 RX ORDER — HYDRALAZINE HYDROCHLORIDE 50 MG/1
50 TABLET, FILM COATED ORAL 3 TIMES DAILY
Qty: 90 TABLET | Refills: 3 | Status: ON HOLD | OUTPATIENT
Start: 2019-01-01 | End: 2019-01-01 | Stop reason: SDUPTHER

## 2019-01-01 RX ORDER — HYDRALAZINE HYDROCHLORIDE 20 MG/ML
10 INJECTION INTRAMUSCULAR; INTRAVENOUS ONCE
Status: COMPLETED | OUTPATIENT
Start: 2019-01-01 | End: 2019-01-01

## 2019-01-01 RX ORDER — LOSARTAN POTASSIUM 25 MG/1
25 TABLET ORAL 2 TIMES DAILY
COMMUNITY
End: 2019-01-01

## 2019-01-01 RX ORDER — SODIUM CHLORIDE 1000 MG
1 TABLET, SOLUBLE MISCELLANEOUS
Qty: 90 TABLET | Refills: 3 | Status: ON HOLD | OUTPATIENT
Start: 2019-01-01 | End: 2019-01-01 | Stop reason: HOSPADM

## 2019-01-01 RX ORDER — POTASSIUM CHLORIDE 20 MEQ/1
TABLET, EXTENDED RELEASE ORAL
Qty: 60 TABLET | Refills: 5 | Status: SHIPPED | OUTPATIENT
Start: 2019-01-01 | End: 2019-01-01

## 2019-01-01 RX ORDER — MULTIVITAMIN WITH FOLIC ACID 400 MCG
1 TABLET ORAL DAILY
Qty: 30 TABLET | Refills: 1 | Status: SHIPPED | OUTPATIENT
Start: 2019-01-01

## 2019-01-01 RX ORDER — METOLAZONE 2.5 MG/1
2.5 TABLET ORAL
Status: DISCONTINUED | OUTPATIENT
Start: 2019-01-01 | End: 2019-01-01

## 2019-01-01 RX ORDER — SPIRONOLACTONE 25 MG/1
25 TABLET ORAL DAILY
Refills: 0 | Status: ON HOLD | COMMUNITY
Start: 2019-01-01 | End: 2019-01-01

## 2019-01-01 RX ORDER — HYDROCODONE BITARTRATE AND ACETAMINOPHEN 5; 325 MG/1; MG/1
1 TABLET ORAL EVERY 6 HOURS PRN
Qty: 8 TABLET | Refills: 0 | Status: ON HOLD | OUTPATIENT
Start: 2019-01-01 | End: 2019-01-01

## 2019-01-01 RX ORDER — ACETYLCYSTEINE 200 MG/ML
600 SOLUTION ORAL; RESPIRATORY (INHALATION) 2 TIMES DAILY
Status: DISPENSED | OUTPATIENT
Start: 2019-01-01 | End: 2019-01-01

## 2019-01-01 RX ORDER — LANOLIN ALCOHOL/MO/W.PET/CERES
3 CREAM (GRAM) TOPICAL NIGHTLY PRN
Refills: 3 | DISCHARGE
Start: 2019-01-01

## 2019-01-01 RX ORDER — SODIUM CHLORIDE 1000 MG
1 TABLET, SOLUBLE MISCELLANEOUS
Status: DISCONTINUED | OUTPATIENT
Start: 2019-01-01 | End: 2019-01-01

## 2019-01-01 RX ORDER — HYDRALAZINE HYDROCHLORIDE 25 MG/1
25 TABLET, FILM COATED ORAL 3 TIMES DAILY
Status: DISCONTINUED | OUTPATIENT
Start: 2019-01-01 | End: 2019-01-01

## 2019-01-01 RX ORDER — HYDRALAZINE HYDROCHLORIDE 20 MG/ML
10 INJECTION INTRAMUSCULAR; INTRAVENOUS EVERY 4 HOURS PRN
Status: DISCONTINUED | OUTPATIENT
Start: 2019-01-01 | End: 2019-01-01

## 2019-01-01 RX ORDER — LEVOTHYROXINE SODIUM 0.12 MG/1
125 TABLET ORAL DAILY
Qty: 30 TABLET | Refills: 1 | Status: SHIPPED | OUTPATIENT
Start: 2019-01-01

## 2019-01-01 RX ORDER — LOPERAMIDE HYDROCHLORIDE 2 MG/1
2 CAPSULE ORAL 4 TIMES DAILY PRN
Status: DISCONTINUED | OUTPATIENT
Start: 2019-01-01 | End: 2019-01-01 | Stop reason: HOSPADM

## 2019-01-01 RX ORDER — DOCUSATE SODIUM 100 MG/1
300 CAPSULE, LIQUID FILLED ORAL NIGHTLY
Status: DISCONTINUED | OUTPATIENT
Start: 2019-01-01 | End: 2019-01-01 | Stop reason: HOSPADM

## 2019-01-01 RX ORDER — ISOSORBIDE MONONITRATE 60 MG/1
60 TABLET, EXTENDED RELEASE ORAL DAILY
Status: DISCONTINUED | OUTPATIENT
Start: 2019-01-01 | End: 2019-01-01

## 2019-01-01 RX ORDER — LANOLIN ALCOHOL/MO/W.PET/CERES
1000 CREAM (GRAM) TOPICAL DAILY
Status: DISCONTINUED | OUTPATIENT
Start: 2019-01-01 | End: 2019-01-01 | Stop reason: HOSPADM

## 2019-01-01 RX ORDER — MIDAZOLAM HYDROCHLORIDE 1 MG/ML
1 INJECTION INTRAMUSCULAR; INTRAVENOUS EVERY 6 HOURS PRN
Status: DISCONTINUED | OUTPATIENT
Start: 2019-01-01 | End: 2019-01-01 | Stop reason: HOSPADM

## 2019-01-01 RX ORDER — DILTIAZEM HYDROCHLORIDE 5 MG/ML
INJECTION INTRAVENOUS
Status: COMPLETED
Start: 2019-01-01 | End: 2019-01-01

## 2019-01-01 RX ORDER — AMIODARONE HYDROCHLORIDE 200 MG/1
200 TABLET ORAL DAILY
Qty: 30 TABLET | Refills: 3 | DISCHARGE
Start: 2019-01-01

## 2019-01-01 RX ORDER — DIGOXIN 0.25 MG/ML
INJECTION INTRAMUSCULAR; INTRAVENOUS
Status: COMPLETED
Start: 2019-01-01 | End: 2019-01-01

## 2019-01-01 RX ORDER — IPRATROPIUM BROMIDE AND ALBUTEROL SULFATE 2.5; .5 MG/3ML; MG/3ML
1 SOLUTION RESPIRATORY (INHALATION) EVERY 4 HOURS PRN
Qty: 360 ML | Refills: 1 | Status: SHIPPED | OUTPATIENT
Start: 2019-01-01 | End: 2019-01-01 | Stop reason: SDUPTHER

## 2019-01-01 RX ORDER — METOPROLOL TARTRATE 5 MG/5ML
5 INJECTION INTRAVENOUS EVERY 8 HOURS
Status: DISCONTINUED | OUTPATIENT
Start: 2019-01-01 | End: 2019-01-01

## 2019-01-01 RX ORDER — FERROUS SULFATE 325(65) MG
325 TABLET ORAL 2 TIMES DAILY
Qty: 180 TABLET | Refills: 1 | Status: ON HOLD | OUTPATIENT
Start: 2019-01-01 | End: 2019-01-01 | Stop reason: SDUPTHER

## 2019-01-01 RX ORDER — ACETAMINOPHEN 650 MG/1
SUPPOSITORY RECTAL
Status: DISPENSED
Start: 2019-01-01 | End: 2019-01-01

## 2019-01-01 RX ORDER — AMIODARONE HYDROCHLORIDE 200 MG/1
200 TABLET ORAL 2 TIMES DAILY
Status: DISCONTINUED | OUTPATIENT
Start: 2019-01-01 | End: 2019-01-01

## 2019-01-01 RX ORDER — HYDROCODONE BITARTRATE AND ACETAMINOPHEN 5; 325 MG/1; MG/1
1 TABLET ORAL DAILY
Qty: 30 TABLET | Refills: 0 | Status: SHIPPED | OUTPATIENT
Start: 2019-01-01 | End: 2019-01-01 | Stop reason: SDUPTHER

## 2019-01-01 RX ORDER — FUROSEMIDE 40 MG/1
40 TABLET ORAL DAILY
Qty: 90 TABLET | Refills: 1 | Status: ON HOLD | OUTPATIENT
Start: 2019-01-01 | End: 2019-01-01 | Stop reason: SDUPTHER

## 2019-01-01 RX ORDER — AMIODARONE HYDROCHLORIDE 200 MG/1
200 TABLET ORAL DAILY
Status: DISCONTINUED | OUTPATIENT
Start: 2019-01-01 | End: 2019-01-01 | Stop reason: HOSPADM

## 2019-01-01 RX ORDER — OXCARBAZEPINE 300 MG/1
300 TABLET, FILM COATED ORAL EVERY EVENING
Status: DISCONTINUED | OUTPATIENT
Start: 2019-01-01 | End: 2019-01-01 | Stop reason: HOSPADM

## 2019-01-01 RX ORDER — IODIXANOL 270 MG/ML
170 INJECTION, SOLUTION INTRAVASCULAR
Status: COMPLETED | OUTPATIENT
Start: 2019-01-01 | End: 2019-01-01

## 2019-01-01 RX ORDER — FAMOTIDINE 20 MG/1
20 TABLET, FILM COATED ORAL 2 TIMES DAILY
Status: DISCONTINUED | OUTPATIENT
Start: 2019-01-01 | End: 2019-01-01 | Stop reason: HOSPADM

## 2019-01-01 RX ORDER — LOSARTAN POTASSIUM 25 MG/1
50 TABLET ORAL DAILY
Qty: 90 TABLET | Refills: 2 | Status: ON HOLD | OUTPATIENT
Start: 2019-01-01 | End: 2019-01-01 | Stop reason: DRUGHIGH

## 2019-01-01 RX ORDER — HYDROCODONE BITARTRATE AND ACETAMINOPHEN 5; 325 MG/1; MG/1
1 TABLET ORAL SEE ADMIN INSTRUCTIONS
Qty: 30 TABLET | Refills: 0 | Status: ON HOLD | OUTPATIENT
Start: 2019-01-01 | End: 2019-01-01 | Stop reason: SDUPTHER

## 2019-01-01 RX ORDER — BUDESONIDE AND FORMOTEROL FUMARATE DIHYDRATE 160; 4.5 UG/1; UG/1
2 AEROSOL RESPIRATORY (INHALATION) 2 TIMES DAILY
Qty: 10.2 G | Refills: 11 | Status: SHIPPED | OUTPATIENT
Start: 2019-01-01

## 2019-01-01 RX ORDER — CEFAZOLIN SODIUM 1 G/3ML
INJECTION, POWDER, FOR SOLUTION INTRAMUSCULAR; INTRAVENOUS PRN
Status: DISCONTINUED | OUTPATIENT
Start: 2019-01-01 | End: 2019-01-01 | Stop reason: SDUPTHER

## 2019-01-01 RX ORDER — ACETAMINOPHEN 650 MG/1
650 SUPPOSITORY RECTAL EVERY 4 HOURS PRN
Status: DISCONTINUED | OUTPATIENT
Start: 2019-01-01 | End: 2019-01-01 | Stop reason: HOSPADM

## 2019-01-01 RX ORDER — HYDROCODONE BITARTRATE AND ACETAMINOPHEN 5; 325 MG/1; MG/1
1 TABLET ORAL SEE ADMIN INSTRUCTIONS
Qty: 10 TABLET | Refills: 0 | Status: SHIPPED | OUTPATIENT
Start: 2019-01-01 | End: 2019-01-01

## 2019-01-01 RX ORDER — ASPIRIN 81 MG/1
324 TABLET, CHEWABLE ORAL ONCE
Status: COMPLETED | OUTPATIENT
Start: 2019-01-01 | End: 2019-01-01

## 2019-01-01 RX ORDER — SODIUM CHLORIDE AND POTASSIUM CHLORIDE .9; .15 G/100ML; G/100ML
SOLUTION INTRAVENOUS CONTINUOUS
Status: DISPENSED | OUTPATIENT
Start: 2019-01-01 | End: 2019-01-01

## 2019-01-01 RX ORDER — DOCUSATE SODIUM 100 MG/1
CAPSULE, LIQUID FILLED ORAL
Qty: 90 CAPSULE | Refills: 1 | Status: SHIPPED | OUTPATIENT
Start: 2019-01-01 | End: 2019-01-01 | Stop reason: SDUPTHER

## 2019-01-01 RX ORDER — HYDRALAZINE HYDROCHLORIDE 25 MG/1
25 TABLET, FILM COATED ORAL EVERY 6 HOURS PRN
Status: DISCONTINUED | OUTPATIENT
Start: 2019-01-01 | End: 2019-01-01 | Stop reason: HOSPADM

## 2019-01-01 RX ORDER — DOXEPIN HYDROCHLORIDE 50 MG/1
50 CAPSULE ORAL NIGHTLY
Status: DISCONTINUED | OUTPATIENT
Start: 2019-01-01 | End: 2019-01-01 | Stop reason: HOSPADM

## 2019-01-01 RX ORDER — BUSPIRONE HYDROCHLORIDE 5 MG/1
7.5 TABLET ORAL 3 TIMES DAILY
Status: DISCONTINUED | OUTPATIENT
Start: 2019-01-01 | End: 2019-01-01 | Stop reason: HOSPADM

## 2019-01-01 RX ORDER — LEVOTHYROXINE SODIUM 0.12 MG/1
125 TABLET ORAL DAILY
Qty: 30 TABLET | Refills: 1 | Status: ON HOLD | OUTPATIENT
Start: 2019-01-01 | End: 2019-01-01 | Stop reason: SDUPTHER

## 2019-01-01 RX ORDER — FUROSEMIDE 20 MG/1
TABLET ORAL
Qty: 60 TABLET | Refills: 3 | OUTPATIENT
Start: 2019-01-01

## 2019-01-01 RX ORDER — NYSTATIN 100000 U/G
CREAM TOPICAL 2 TIMES DAILY
Status: DISCONTINUED | OUTPATIENT
Start: 2019-01-01 | End: 2019-01-01 | Stop reason: HOSPADM

## 2019-01-01 RX ORDER — METOLAZONE 2.5 MG/1
2.5 TABLET ORAL DAILY
Qty: 3 TABLET | Refills: 0 | OUTPATIENT
Start: 2019-01-01 | End: 2019-01-01 | Stop reason: SDUPTHER

## 2019-01-01 RX ORDER — HYDROCODONE BITARTRATE AND ACETAMINOPHEN 5; 325 MG/1; MG/1
1 TABLET ORAL DAILY
Qty: 30 TABLET | Refills: 0 | Status: ON HOLD | OUTPATIENT
Start: 2019-01-01 | End: 2019-01-01 | Stop reason: HOSPADM

## 2019-01-01 RX ORDER — LANOLIN ALCOHOL/MO/W.PET/CERES
3 CREAM (GRAM) TOPICAL NIGHTLY PRN
Status: DISCONTINUED | OUTPATIENT
Start: 2019-01-01 | End: 2019-01-01 | Stop reason: HOSPADM

## 2019-01-01 RX ORDER — METOPROLOL TARTRATE 5 MG/5ML
5 INJECTION INTRAVENOUS EVERY 6 HOURS
Status: DISCONTINUED | OUTPATIENT
Start: 2019-01-01 | End: 2019-01-01

## 2019-01-01 RX ORDER — GUAIFENESIN 600 MG/1
600 TABLET, EXTENDED RELEASE ORAL 2 TIMES DAILY
Qty: 30 TABLET | Refills: 0 | Status: SHIPPED | OUTPATIENT
Start: 2019-01-01 | End: 2019-01-01

## 2019-01-01 RX ORDER — ESCITALOPRAM OXALATE 10 MG/1
10 TABLET ORAL EVERY MORNING
Status: DISCONTINUED | OUTPATIENT
Start: 2019-01-01 | End: 2019-01-01 | Stop reason: HOSPADM

## 2019-01-01 RX ORDER — SPIRONOLACTONE 25 MG/1
25 TABLET ORAL DAILY
Qty: 30 TABLET | Refills: 0 | Status: CANCELLED | OUTPATIENT
Start: 2019-01-01

## 2019-01-01 RX ORDER — POTASSIUM CHLORIDE 29.8 MG/ML
20 INJECTION INTRAVENOUS ONCE
Status: COMPLETED | OUTPATIENT
Start: 2019-01-01 | End: 2019-01-01

## 2019-01-01 RX ORDER — PREDNISONE 20 MG/1
40 TABLET ORAL DAILY
Status: DISCONTINUED | OUTPATIENT
Start: 2019-01-01 | End: 2019-01-01 | Stop reason: HOSPADM

## 2019-01-01 RX ORDER — LABETALOL HYDROCHLORIDE 5 MG/ML
5 INJECTION, SOLUTION INTRAVENOUS EVERY 4 HOURS PRN
Status: DISCONTINUED | OUTPATIENT
Start: 2019-01-01 | End: 2019-01-01 | Stop reason: HOSPADM

## 2019-01-01 RX ORDER — CEFAZOLIN SODIUM 2 G/50ML
SOLUTION INTRAVENOUS PRN
Status: DISCONTINUED | OUTPATIENT
Start: 2019-01-01 | End: 2019-01-01 | Stop reason: SDUPTHER

## 2019-01-01 RX ORDER — POTASSIUM CHLORIDE 20 MEQ/1
20 TABLET, EXTENDED RELEASE ORAL DAILY
Status: ON HOLD | COMMUNITY
End: 2019-01-01 | Stop reason: HOSPADM

## 2019-01-01 RX ORDER — POLYVINYL ALCOHOL 14 MG/ML
1 SOLUTION/ DROPS OPHTHALMIC 2 TIMES DAILY
Status: DISCONTINUED | OUTPATIENT
Start: 2019-01-01 | End: 2019-01-01 | Stop reason: CLARIF

## 2019-01-01 RX ORDER — DIPHENHYDRAMINE HYDROCHLORIDE 50 MG/ML
50 INJECTION INTRAMUSCULAR; INTRAVENOUS ONCE
Status: CANCELLED | OUTPATIENT
Start: 2019-01-01 | End: 2019-01-01

## 2019-01-01 RX ORDER — MIDAZOLAM HYDROCHLORIDE 1 MG/ML
INJECTION INTRAMUSCULAR; INTRAVENOUS PRN
Status: DISCONTINUED | OUTPATIENT
Start: 2019-01-01 | End: 2019-01-01 | Stop reason: SDUPTHER

## 2019-01-01 RX ORDER — HYDRALAZINE HYDROCHLORIDE 50 MG/1
50 TABLET, FILM COATED ORAL 3 TIMES DAILY
Status: DISCONTINUED | OUTPATIENT
Start: 2019-01-01 | End: 2019-01-01

## 2019-01-01 RX ORDER — METOLAZONE 2.5 MG/1
2.5 TABLET ORAL WEEKLY
Qty: 15 TABLET | Refills: 0 | Status: ON HOLD | OUTPATIENT
Start: 2019-01-01 | End: 2019-01-01 | Stop reason: HOSPADM

## 2019-01-01 RX ORDER — AMLODIPINE BESYLATE 5 MG/1
5 TABLET ORAL DAILY
Qty: 30 TABLET | Refills: 3 | Status: SHIPPED | OUTPATIENT
Start: 2019-01-01 | End: 2019-01-01 | Stop reason: ALTCHOICE

## 2019-01-01 RX ORDER — METOLAZONE 2.5 MG/1
TABLET ORAL
Qty: 10 TABLET | Refills: 3 | Status: SHIPPED | OUTPATIENT
Start: 2019-01-01 | End: 2019-01-01 | Stop reason: SDUPTHER

## 2019-01-01 RX ORDER — ISOSORBIDE DINITRATE 40 MG/1
40 TABLET ORAL DAILY
Refills: 0 | COMMUNITY
Start: 2019-01-01 | End: 2019-01-01

## 2019-01-01 RX ORDER — METOPROLOL TARTRATE 100 MG/1
100 TABLET ORAL 2 TIMES DAILY
Qty: 60 TABLET | Refills: 0 | Status: SHIPPED | OUTPATIENT
Start: 2019-01-01 | End: 2019-01-01 | Stop reason: SDUPTHER

## 2019-01-01 RX ADMIN — IPRATROPIUM BROMIDE AND ALBUTEROL SULFATE 1 AMPULE: .5; 3 SOLUTION RESPIRATORY (INHALATION) at 14:44

## 2019-01-01 RX ADMIN — METOPROLOL TARTRATE 100 MG: 100 TABLET, FILM COATED ORAL at 22:50

## 2019-01-01 RX ADMIN — PREDNISONE 40 MG: 20 TABLET ORAL at 08:08

## 2019-01-01 RX ADMIN — FLUTICASONE PROPIONATE 1 SPRAY: 50 SPRAY, METERED NASAL at 10:35

## 2019-01-01 RX ADMIN — IODIXANOL 170 ML: 270 INJECTION, SOLUTION INTRAVASCULAR at 11:14

## 2019-01-01 RX ADMIN — ISOSORBIDE MONONITRATE 60 MG: 60 TABLET ORAL at 09:02

## 2019-01-01 RX ADMIN — OXCARBAZEPINE 150 MG: 300 TABLET, FILM COATED ORAL at 09:19

## 2019-01-01 RX ADMIN — FUROSEMIDE 40 MG: 40 TABLET ORAL at 16:57

## 2019-01-01 RX ADMIN — RISPERIDONE 1 MG: 1 TABLET ORAL at 09:19

## 2019-01-01 RX ADMIN — BUSPIRONE HYDROCHLORIDE 7.5 MG: 5 TABLET ORAL at 13:35

## 2019-01-01 RX ADMIN — Medication 2 PUFF: at 09:21

## 2019-01-01 RX ADMIN — METOPROLOL TARTRATE 100 MG: 100 TABLET, FILM COATED ORAL at 09:53

## 2019-01-01 RX ADMIN — MIDAZOLAM HYDROCHLORIDE 1 MG: 1 INJECTION INTRAMUSCULAR; INTRAVENOUS at 15:11

## 2019-01-01 RX ADMIN — CLOPIDOGREL BISULFATE 75 MG: 75 TABLET ORAL at 08:07

## 2019-01-01 RX ADMIN — SODIUM CHLORIDE: 9 INJECTION, SOLUTION INTRAVENOUS at 08:16

## 2019-01-01 RX ADMIN — DIPHENHYDRAMINE HCL 25 MG: 25 TABLET ORAL at 22:17

## 2019-01-01 RX ADMIN — LEVOTHYROXINE SODIUM 125 MCG: 125 TABLET ORAL at 08:07

## 2019-01-01 RX ADMIN — HYDROCODONE BITARTRATE AND ACETAMINOPHEN 1 TABLET: 5; 325 TABLET ORAL at 23:59

## 2019-01-01 RX ADMIN — HYDRALAZINE HYDROCHLORIDE 25 MG: 25 TABLET ORAL at 08:26

## 2019-01-01 RX ADMIN — LOSARTAN POTASSIUM 25 MG: 25 TABLET, FILM COATED ORAL at 09:37

## 2019-01-01 RX ADMIN — FAMOTIDINE 20 MG: 20 TABLET ORAL at 20:00

## 2019-01-01 RX ADMIN — BACLOFEN 10 MG: 10 TABLET ORAL at 07:57

## 2019-01-01 RX ADMIN — ISOSORBIDE DINITRATE 40 MG: 20 TABLET ORAL at 14:11

## 2019-01-01 RX ADMIN — METOPROLOL TARTRATE 100 MG: 100 TABLET, FILM COATED ORAL at 08:55

## 2019-01-01 RX ADMIN — HYDROCODONE BITARTRATE AND ACETAMINOPHEN 1 TABLET: 5; 325 TABLET ORAL at 03:58

## 2019-01-01 RX ADMIN — OXCARBAZEPINE 150 MG: 300 TABLET, FILM COATED ORAL at 09:34

## 2019-01-01 RX ADMIN — HYDROCODONE BITARTRATE AND ACETAMINOPHEN 1 TABLET: 5; 325 TABLET ORAL at 19:55

## 2019-01-01 RX ADMIN — ENOXAPARIN SODIUM 40 MG: 40 INJECTION SUBCUTANEOUS at 15:50

## 2019-01-01 RX ADMIN — HYDRALAZINE HYDROCHLORIDE 5 MG: 20 INJECTION INTRAMUSCULAR; INTRAVENOUS at 22:48

## 2019-01-01 RX ADMIN — METOPROLOL TARTRATE 5 MG: 5 INJECTION INTRAVENOUS at 05:58

## 2019-01-01 RX ADMIN — PHENYLEPHRINE HYDROCHLORIDE 100 MCG/MIN: 10 INJECTION INTRAVENOUS at 10:08

## 2019-01-01 RX ADMIN — ACETAMINOPHEN 650 MG: 650 SUPPOSITORY RECTAL at 08:28

## 2019-01-01 RX ADMIN — LOSARTAN POTASSIUM 25 MG: 25 TABLET, FILM COATED ORAL at 10:26

## 2019-01-01 RX ADMIN — METOPROLOL TARTRATE 5 MG: 5 INJECTION INTRAVENOUS at 01:01

## 2019-01-01 RX ADMIN — ROSUVASTATIN CALCIUM 20 MG: 20 TABLET, FILM COATED ORAL at 09:53

## 2019-01-01 RX ADMIN — HYDROXYZINE HYDROCHLORIDE 25 MG: 25 TABLET, FILM COATED ORAL at 21:17

## 2019-01-01 RX ADMIN — FAMOTIDINE 20 MG: 20 TABLET ORAL at 22:50

## 2019-01-01 RX ADMIN — ACETYLCYSTEINE 600 MG: 200 SOLUTION ORAL; RESPIRATORY (INHALATION) at 22:19

## 2019-01-01 RX ADMIN — CEFTRIAXONE SODIUM 1 G: 1 INJECTION, POWDER, FOR SOLUTION INTRAMUSCULAR; INTRAVENOUS at 18:48

## 2019-01-01 RX ADMIN — CLOPIDOGREL BISULFATE 75 MG: 75 TABLET ORAL at 10:48

## 2019-01-01 RX ADMIN — SODIUM CHLORIDE TAB 1 GM 2 G: 1 TAB at 18:29

## 2019-01-01 RX ADMIN — CEFTRIAXONE SODIUM 1 G: 1 INJECTION, POWDER, FOR SOLUTION INTRAMUSCULAR; INTRAVENOUS at 16:51

## 2019-01-01 RX ADMIN — IPRATROPIUM BROMIDE AND ALBUTEROL SULFATE 1 AMPULE: .5; 3 SOLUTION RESPIRATORY (INHALATION) at 13:35

## 2019-01-01 RX ADMIN — FUROSEMIDE 40 MG: 10 INJECTION INTRAMUSCULAR; INTRAVENOUS at 09:21

## 2019-01-01 RX ADMIN — Medication 1000 MCG: at 09:22

## 2019-01-01 RX ADMIN — ACETAMINOPHEN 650 MG: 650 SUPPOSITORY RECTAL at 17:07

## 2019-01-01 RX ADMIN — ASPIRIN 81 MG: 81 TABLET, COATED ORAL at 08:29

## 2019-01-01 RX ADMIN — HYDROXYZINE HYDROCHLORIDE 25 MG: 25 TABLET ORAL at 09:53

## 2019-01-01 RX ADMIN — IPRATROPIUM BROMIDE AND ALBUTEROL SULFATE 1 AMPULE: .5; 3 SOLUTION RESPIRATORY (INHALATION) at 14:21

## 2019-01-01 RX ADMIN — PANTOPRAZOLE SODIUM 40 MG: 40 TABLET, DELAYED RELEASE ORAL at 08:20

## 2019-01-01 RX ADMIN — BUSPIRONE HYDROCHLORIDE 7.5 MG: 5 TABLET ORAL at 20:58

## 2019-01-01 RX ADMIN — RANOLAZINE 500 MG: 500 TABLET, FILM COATED, EXTENDED RELEASE ORAL at 08:26

## 2019-01-01 RX ADMIN — HYDROXYZINE HYDROCHLORIDE 25 MG: 25 TABLET ORAL at 07:57

## 2019-01-01 RX ADMIN — FERROUS SULFATE TAB 325 MG (65 MG ELEMENTAL FE) 325 MG: 325 (65 FE) TAB at 08:17

## 2019-01-01 RX ADMIN — LOSARTAN POTASSIUM 50 MG: 50 TABLET, FILM COATED ORAL at 08:08

## 2019-01-01 RX ADMIN — HYDROCODONE BITARTRATE AND ACETAMINOPHEN 1 TABLET: 5; 325 TABLET ORAL at 00:38

## 2019-01-01 RX ADMIN — RANOLAZINE 500 MG: 500 TABLET, FILM COATED, EXTENDED RELEASE ORAL at 10:27

## 2019-01-01 RX ADMIN — SODIUM CHLORIDE 2886 ML: 9 INJECTION, SOLUTION INTRAVENOUS at 11:40

## 2019-01-01 RX ADMIN — PANTOPRAZOLE SODIUM 40 MG: 40 TABLET, DELAYED RELEASE ORAL at 05:41

## 2019-01-01 RX ADMIN — OXCARBAZEPINE 150 MG: 300 TABLET, FILM COATED ORAL at 08:32

## 2019-01-01 RX ADMIN — ACETAMINOPHEN 650 MG: 325 TABLET ORAL at 19:38

## 2019-01-01 RX ADMIN — RISPERIDONE 1 MG: 1 TABLET ORAL at 08:55

## 2019-01-01 RX ADMIN — PANTOPRAZOLE SODIUM 40 MG: 40 TABLET, DELAYED RELEASE ORAL at 06:11

## 2019-01-01 RX ADMIN — HYDROXYZINE HYDROCHLORIDE 25 MG: 25 TABLET ORAL at 14:50

## 2019-01-01 RX ADMIN — METOPROLOL TARTRATE 100 MG: 100 TABLET, FILM COATED ORAL at 10:26

## 2019-01-01 RX ADMIN — OXCARBAZEPINE 300 MG: 300 TABLET, FILM COATED ORAL at 17:44

## 2019-01-01 RX ADMIN — PANTOPRAZOLE SODIUM 40 MG: 40 TABLET, DELAYED RELEASE ORAL at 06:38

## 2019-01-01 RX ADMIN — HYDROXYZINE HYDROCHLORIDE 25 MG: 25 TABLET ORAL at 08:57

## 2019-01-01 RX ADMIN — ROSUVASTATIN CALCIUM 20 MG: 20 TABLET, FILM COATED ORAL at 07:57

## 2019-01-01 RX ADMIN — ESCITALOPRAM OXALATE 10 MG: 10 TABLET, FILM COATED ORAL at 09:29

## 2019-01-01 RX ADMIN — SODIUM CHLORIDE: 9 INJECTION, SOLUTION INTRAVENOUS at 22:20

## 2019-01-01 RX ADMIN — HYDRALAZINE HYDROCHLORIDE 25 MG: 25 TABLET ORAL at 08:24

## 2019-01-01 RX ADMIN — METOPROLOL TARTRATE 100 MG: 100 TABLET, FILM COATED ORAL at 08:26

## 2019-01-01 RX ADMIN — IPRATROPIUM BROMIDE AND ALBUTEROL SULFATE 1 AMPULE: .5; 3 SOLUTION RESPIRATORY (INHALATION) at 20:28

## 2019-01-01 RX ADMIN — ONDANSETRON 4 MG: 2 INJECTION INTRAMUSCULAR; INTRAVENOUS at 13:26

## 2019-01-01 RX ADMIN — IPRATROPIUM BROMIDE AND ALBUTEROL SULFATE 1 AMPULE: .5; 3 SOLUTION RESPIRATORY (INHALATION) at 07:44

## 2019-01-01 RX ADMIN — ACETAMINOPHEN 650 MG: 650 SUPPOSITORY RECTAL at 13:35

## 2019-01-01 RX ADMIN — GUAIFENESIN 600 MG: 600 TABLET, EXTENDED RELEASE ORAL at 09:37

## 2019-01-01 RX ADMIN — INSULIN LISPRO 1 UNITS: 100 INJECTION, SOLUTION INTRAVENOUS; SUBCUTANEOUS at 20:18

## 2019-01-01 RX ADMIN — Medication 10 ML: at 21:18

## 2019-01-01 RX ADMIN — ISOSORBIDE MONONITRATE 60 MG: 60 TABLET ORAL at 09:39

## 2019-01-01 RX ADMIN — PANTOPRAZOLE SODIUM 40 MG: 40 TABLET, DELAYED RELEASE ORAL at 06:19

## 2019-01-01 RX ADMIN — Medication 3 MG: at 01:33

## 2019-01-01 RX ADMIN — OXCARBAZEPINE 150 MG: 300 TABLET, FILM COATED ORAL at 08:26

## 2019-01-01 RX ADMIN — Medication 2 PUFF: at 09:00

## 2019-01-01 RX ADMIN — CALCIUM CARBONATE-VITAMIN D TAB 500 MG-200 UNIT 1 TABLET: 500-200 TAB at 21:16

## 2019-01-01 RX ADMIN — BACLOFEN 10 MG: 10 TABLET ORAL at 20:20

## 2019-01-01 RX ADMIN — SODIUM CHLORIDE: 9 INJECTION, SOLUTION INTRAVENOUS at 09:11

## 2019-01-01 RX ADMIN — ASPIRIN 81 MG: 81 TABLET ORAL at 10:47

## 2019-01-01 RX ADMIN — HYDRALAZINE HYDROCHLORIDE 50 MG: 50 TABLET, FILM COATED ORAL at 13:54

## 2019-01-01 RX ADMIN — Medication 1000 MCG: at 08:17

## 2019-01-01 RX ADMIN — OXCARBAZEPINE 150 MG: 300 TABLET, FILM COATED ORAL at 09:43

## 2019-01-01 RX ADMIN — CLOTRIMAZOLE AND BETAMETHASONE DIPROPIONATE: 10; .5 CREAM TOPICAL at 22:04

## 2019-01-01 RX ADMIN — RANOLAZINE 500 MG: 500 TABLET, FILM COATED, EXTENDED RELEASE ORAL at 20:52

## 2019-01-01 RX ADMIN — FAMOTIDINE 20 MG: 10 INJECTION, SOLUTION INTRAVENOUS at 13:26

## 2019-01-01 RX ADMIN — FENTANYL CITRATE 50 MCG: 50 INJECTION INTRAMUSCULAR; INTRAVENOUS at 14:16

## 2019-01-01 RX ADMIN — HYDRALAZINE HYDROCHLORIDE 25 MG: 25 TABLET ORAL at 22:50

## 2019-01-01 RX ADMIN — LEVOTHYROXINE SODIUM 125 MCG: 125 TABLET ORAL at 09:36

## 2019-01-01 RX ADMIN — POTASSIUM CHLORIDE 20 MEQ: 20 TABLET, EXTENDED RELEASE ORAL at 17:23

## 2019-01-01 RX ADMIN — METOPROLOL TARTRATE 100 MG: 100 TABLET, FILM COATED ORAL at 09:13

## 2019-01-01 RX ADMIN — Medication 10 ML: at 10:35

## 2019-01-01 RX ADMIN — DILTIAZEM HYDROCHLORIDE 5 MG/HR: 5 INJECTION INTRAVENOUS at 06:25

## 2019-01-01 RX ADMIN — BACLOFEN 10 MG: 10 TABLET ORAL at 21:59

## 2019-01-01 RX ADMIN — RISPERIDONE 1 MG: 1 TABLET ORAL at 08:41

## 2019-01-01 RX ADMIN — Medication 10 ML: at 10:25

## 2019-01-01 RX ADMIN — THERA TABS 1 TABLET: TAB at 08:28

## 2019-01-01 RX ADMIN — METOPROLOL TARTRATE 5 MG: 5 INJECTION, SOLUTION INTRAVENOUS at 14:55

## 2019-01-01 RX ADMIN — Medication 10 ML: at 09:39

## 2019-01-01 RX ADMIN — FUROSEMIDE 20 MG: 40 TABLET ORAL at 08:57

## 2019-01-01 RX ADMIN — HYDRALAZINE HYDROCHLORIDE 50 MG: 50 TABLET, FILM COATED ORAL at 20:54

## 2019-01-01 RX ADMIN — THERA TABS 1 TABLET: TAB at 08:08

## 2019-01-01 RX ADMIN — LOSARTAN POTASSIUM 25 MG: 25 TABLET, FILM COATED ORAL at 09:29

## 2019-01-01 RX ADMIN — POTASSIUM CHLORIDE 40 MEQ: 20 TABLET, EXTENDED RELEASE ORAL at 13:09

## 2019-01-01 RX ADMIN — METOPROLOL TARTRATE 100 MG: 100 TABLET, FILM COATED ORAL at 22:23

## 2019-01-01 RX ADMIN — BACLOFEN 10 MG: 10 TABLET ORAL at 08:56

## 2019-01-01 RX ADMIN — ROSUVASTATIN CALCIUM 20 MG: 20 TABLET, FILM COATED ORAL at 20:58

## 2019-01-01 RX ADMIN — ACETAMINOPHEN 650 MG: 325 TABLET ORAL at 06:17

## 2019-01-01 RX ADMIN — SODIUM CHLORIDE: 9 INJECTION, SOLUTION INTRAVENOUS at 17:57

## 2019-01-01 RX ADMIN — Medication 10 ML: at 20:47

## 2019-01-01 RX ADMIN — CLOTRIMAZOLE AND BETAMETHASONE DIPROPIONATE: 10; .5 CREAM TOPICAL at 09:30

## 2019-01-01 RX ADMIN — HYDROXYZINE HYDROCHLORIDE 25 MG: 25 TABLET ORAL at 08:09

## 2019-01-01 RX ADMIN — RANOLAZINE 500 MG: 500 TABLET, FILM COATED, EXTENDED RELEASE ORAL at 08:55

## 2019-01-01 RX ADMIN — ASPIRIN 81 MG: 81 TABLET, COATED ORAL at 09:52

## 2019-01-01 RX ADMIN — METOPROLOL TARTRATE 100 MG: 100 TABLET, FILM COATED ORAL at 09:00

## 2019-01-01 RX ADMIN — METOPROLOL TARTRATE 5 MG: 5 INJECTION INTRAVENOUS at 02:16

## 2019-01-01 RX ADMIN — METOPROLOL TARTRATE 100 MG: 100 TABLET, FILM COATED ORAL at 19:56

## 2019-01-01 RX ADMIN — INSULIN LISPRO 2 UNITS: 100 INJECTION, SOLUTION INTRAVENOUS; SUBCUTANEOUS at 18:46

## 2019-01-01 RX ADMIN — METOPROLOL TARTRATE 100 MG: 100 TABLET, FILM COATED ORAL at 23:38

## 2019-01-01 RX ADMIN — THERA TABS 1 TABLET: TAB at 10:25

## 2019-01-01 RX ADMIN — HYDROXYZINE HYDROCHLORIDE 25 MG: 25 TABLET ORAL at 16:40

## 2019-01-01 RX ADMIN — ASPIRIN 81 MG: 81 TABLET, COATED ORAL at 08:26

## 2019-01-01 RX ADMIN — POTASSIUM CHLORIDE 20 MEQ: 29.8 INJECTION, SOLUTION INTRAVENOUS at 22:51

## 2019-01-01 RX ADMIN — HYDRALAZINE HYDROCHLORIDE 50 MG: 50 TABLET, FILM COATED ORAL at 16:40

## 2019-01-01 RX ADMIN — ESCITALOPRAM 20 MG: 20 TABLET, FILM COATED ORAL at 09:35

## 2019-01-01 RX ADMIN — FAMOTIDINE 20 MG: 20 TABLET ORAL at 08:29

## 2019-01-01 RX ADMIN — RANOLAZINE 500 MG: 500 TABLET, FILM COATED, EXTENDED RELEASE ORAL at 20:58

## 2019-01-01 RX ADMIN — POTASSIUM CHLORIDE 20 MEQ: 20 TABLET, EXTENDED RELEASE ORAL at 16:40

## 2019-01-01 RX ADMIN — MAGNESIUM SULFATE HEPTAHYDRATE 4 G: 40 INJECTION, SOLUTION INTRAVENOUS at 03:49

## 2019-01-01 RX ADMIN — FENTANYL CITRATE 50 MCG: 50 INJECTION, SOLUTION INTRAMUSCULAR; INTRAVENOUS at 03:06

## 2019-01-01 RX ADMIN — LOSARTAN POTASSIUM 25 MG: 25 TABLET, FILM COATED ORAL at 09:43

## 2019-01-01 RX ADMIN — Medication 10 MG: at 13:55

## 2019-01-01 RX ADMIN — FUROSEMIDE 40 MG: 40 TABLET ORAL at 09:44

## 2019-01-01 RX ADMIN — HYDROCODONE BITARTRATE AND ACETAMINOPHEN 1 TABLET: 5; 325 TABLET ORAL at 06:10

## 2019-01-01 RX ADMIN — CEFAZOLIN SODIUM 2 G: 2 SOLUTION INTRAVENOUS at 09:22

## 2019-01-01 RX ADMIN — Medication 2 PUFF: at 20:34

## 2019-01-01 RX ADMIN — HYDRALAZINE HYDROCHLORIDE 50 MG: 50 TABLET, FILM COATED ORAL at 07:57

## 2019-01-01 RX ADMIN — MICONAZOLE NITRATE: 2 POWDER TOPICAL at 20:47

## 2019-01-01 RX ADMIN — RANOLAZINE 500 MG: 500 TABLET, FILM COATED, EXTENDED RELEASE ORAL at 09:04

## 2019-01-01 RX ADMIN — INSULIN LISPRO 1 UNITS: 100 INJECTION, SOLUTION INTRAVENOUS; SUBCUTANEOUS at 12:36

## 2019-01-01 RX ADMIN — RISPERIDONE 1 MG: 1 TABLET ORAL at 08:25

## 2019-01-01 RX ADMIN — CLOPIDOGREL 75 MG: 75 TABLET, FILM COATED ORAL at 09:29

## 2019-01-01 RX ADMIN — ENOXAPARIN SODIUM 100 MG: 100 INJECTION SUBCUTANEOUS at 21:22

## 2019-01-01 RX ADMIN — METOPROLOL TARTRATE 5 MG: 5 INJECTION INTRAVENOUS at 18:41

## 2019-01-01 RX ADMIN — DILTIAZEM HYDROCHLORIDE 20 MG/HR: 5 INJECTION INTRAVENOUS at 14:14

## 2019-01-01 RX ADMIN — IPRATROPIUM BROMIDE AND ALBUTEROL SULFATE 1 AMPULE: .5; 3 SOLUTION RESPIRATORY (INHALATION) at 07:03

## 2019-01-01 RX ADMIN — Medication 10 ML: at 09:00

## 2019-01-01 RX ADMIN — BACLOFEN 10 MG: 10 TABLET ORAL at 20:37

## 2019-01-01 RX ADMIN — BACLOFEN 10 MG: 10 TABLET ORAL at 08:29

## 2019-01-01 RX ADMIN — ASPIRIN 81 MG: 81 TABLET, COATED ORAL at 09:09

## 2019-01-01 RX ADMIN — Medication 2 PUFF: at 09:02

## 2019-01-01 RX ADMIN — METOPROLOL TARTRATE 100 MG: 100 TABLET, FILM COATED ORAL at 20:16

## 2019-01-01 RX ADMIN — ASPIRIN 81 MG: 81 TABLET, COATED ORAL at 09:44

## 2019-01-01 RX ADMIN — HYDROCODONE BITARTRATE AND ACETAMINOPHEN 1 TABLET: 5; 325 TABLET ORAL at 14:50

## 2019-01-01 RX ADMIN — Medication 2 PUFF: at 08:07

## 2019-01-01 RX ADMIN — IPRATROPIUM BROMIDE AND ALBUTEROL SULFATE 1 AMPULE: .5; 3 SOLUTION RESPIRATORY (INHALATION) at 17:49

## 2019-01-01 RX ADMIN — AMLODIPINE BESYLATE 5 MG: 5 TABLET ORAL at 17:45

## 2019-01-01 RX ADMIN — FAMOTIDINE 20 MG: 20 TABLET ORAL at 19:58

## 2019-01-01 RX ADMIN — IPRATROPIUM BROMIDE AND ALBUTEROL SULFATE 1 AMPULE: .5; 3 SOLUTION RESPIRATORY (INHALATION) at 07:24

## 2019-01-01 RX ADMIN — ENOXAPARIN SODIUM 40 MG: 40 INJECTION SUBCUTANEOUS at 19:59

## 2019-01-01 RX ADMIN — CLOPIDOGREL 75 MG: 75 TABLET, FILM COATED ORAL at 10:31

## 2019-01-01 RX ADMIN — FUROSEMIDE 40 MG: 40 TABLET ORAL at 17:53

## 2019-01-01 RX ADMIN — ACETAMINOPHEN 650 MG: 325 TABLET ORAL at 08:40

## 2019-01-01 RX ADMIN — POTASSIUM CHLORIDE 40 MEQ: 20 TABLET, EXTENDED RELEASE ORAL at 06:37

## 2019-01-01 RX ADMIN — AMLODIPINE BESYLATE 5 MG: 5 TABLET ORAL at 12:44

## 2019-01-01 RX ADMIN — OXCARBAZEPINE 150 MG: 300 TABLET, FILM COATED ORAL at 08:09

## 2019-01-01 RX ADMIN — INSULIN LISPRO 1 UNITS: 100 INJECTION, SOLUTION INTRAVENOUS; SUBCUTANEOUS at 21:04

## 2019-01-01 RX ADMIN — ASPIRIN 81 MG: 81 TABLET, COATED ORAL at 08:55

## 2019-01-01 RX ADMIN — RISPERIDONE 1 MG: 1 TABLET ORAL at 08:09

## 2019-01-01 RX ADMIN — INSULIN LISPRO 1 UNITS: 100 INJECTION, SOLUTION INTRAVENOUS; SUBCUTANEOUS at 13:10

## 2019-01-01 RX ADMIN — RANOLAZINE 500 MG: 500 TABLET, FILM COATED, EXTENDED RELEASE ORAL at 22:23

## 2019-01-01 RX ADMIN — IPRATROPIUM BROMIDE AND ALBUTEROL SULFATE 1 AMPULE: .5; 3 SOLUTION RESPIRATORY (INHALATION) at 14:24

## 2019-01-01 RX ADMIN — RANOLAZINE 500 MG: 500 TABLET, FILM COATED, EXTENDED RELEASE ORAL at 07:57

## 2019-01-01 RX ADMIN — ERGOCALCIFEROL 50000 UNITS: 1.25 CAPSULE ORAL at 08:29

## 2019-01-01 RX ADMIN — RANOLAZINE 500 MG: 500 TABLET, FILM COATED, EXTENDED RELEASE ORAL at 23:38

## 2019-01-01 RX ADMIN — RISPERIDONE 1 MG: 1 TABLET ORAL at 08:13

## 2019-01-01 RX ADMIN — LEVOTHYROXINE SODIUM 100 MCG: 100 TABLET ORAL at 06:22

## 2019-01-01 RX ADMIN — GUAIFENESIN 600 MG: 600 TABLET, EXTENDED RELEASE ORAL at 20:52

## 2019-01-01 RX ADMIN — ISOSORBIDE DINITRATE 40 MG: 20 TABLET ORAL at 14:50

## 2019-01-01 RX ADMIN — HYDROXYZINE HYDROCHLORIDE 25 MG: 25 TABLET ORAL at 14:31

## 2019-01-01 RX ADMIN — Medication 2 PUFF: at 22:46

## 2019-01-01 RX ADMIN — Medication 10 ML: at 08:29

## 2019-01-01 RX ADMIN — Medication 2 PUFF: at 09:06

## 2019-01-01 RX ADMIN — DIPHENHYDRAMINE HCL 25 MG: 25 TABLET ORAL at 21:22

## 2019-01-01 RX ADMIN — DOCUSATE SODIUM 300 MG: 100 CAPSULE, LIQUID FILLED ORAL at 21:19

## 2019-01-01 RX ADMIN — HYDROCODONE BITARTRATE AND ACETAMINOPHEN 1 TABLET: 5; 325 TABLET ORAL at 12:36

## 2019-01-01 RX ADMIN — RANOLAZINE 500 MG: 500 TABLET, FILM COATED, EXTENDED RELEASE ORAL at 09:30

## 2019-01-01 RX ADMIN — DILTIAZEM HYDROCHLORIDE 5 MG/HR: 5 INJECTION INTRAVENOUS at 17:36

## 2019-01-01 RX ADMIN — FENTANYL CITRATE 25 MCG: 50 INJECTION INTRAMUSCULAR; INTRAVENOUS at 13:46

## 2019-01-01 RX ADMIN — PROTAMINE SULFATE 50 MG: 10 INJECTION, SOLUTION INTRAVENOUS at 10:59

## 2019-01-01 RX ADMIN — ACETAMINOPHEN 650 MG: 325 TABLET ORAL at 18:28

## 2019-01-01 RX ADMIN — Medication 2 PUFF: at 07:50

## 2019-01-01 RX ADMIN — RANOLAZINE 500 MG: 500 TABLET, FILM COATED, EXTENDED RELEASE ORAL at 10:25

## 2019-01-01 RX ADMIN — Medication 1000 MCG: at 10:32

## 2019-01-01 RX ADMIN — INSULIN LISPRO 1 UNITS: 100 INJECTION, SOLUTION INTRAVENOUS; SUBCUTANEOUS at 18:30

## 2019-01-01 RX ADMIN — ROSUVASTATIN CALCIUM 20 MG: 20 TABLET, FILM COATED ORAL at 22:50

## 2019-01-01 RX ADMIN — MORPHINE SULFATE 4 MG: 4 INJECTION INTRAVENOUS at 12:14

## 2019-01-01 RX ADMIN — HYDROCODONE BITARTRATE AND ACETAMINOPHEN 1 TABLET: 5; 325 TABLET ORAL at 12:38

## 2019-01-01 RX ADMIN — POLYVINYL ALCOHOL 1 DROP: 14 SOLUTION/ DROPS OPHTHALMIC at 19:57

## 2019-01-01 RX ADMIN — CLOPIDOGREL BISULFATE 75 MG: 75 TABLET ORAL at 17:30

## 2019-01-01 RX ADMIN — BUMETANIDE 1 MG: 1 TABLET ORAL at 21:17

## 2019-01-01 RX ADMIN — HYDROCODONE BITARTRATE AND ACETAMINOPHEN 1 TABLET: 5; 325 TABLET ORAL at 02:45

## 2019-01-01 RX ADMIN — LEVOTHYROXINE SODIUM 125 MCG: 125 TABLET ORAL at 05:34

## 2019-01-01 RX ADMIN — IPRATROPIUM BROMIDE AND ALBUTEROL SULFATE 1 AMPULE: .5; 3 SOLUTION RESPIRATORY (INHALATION) at 11:30

## 2019-01-01 RX ADMIN — RANOLAZINE 500 MG: 500 TABLET, FILM COATED, EXTENDED RELEASE ORAL at 08:28

## 2019-01-01 RX ADMIN — OXCARBAZEPINE 150 MG: 300 TABLET, FILM COATED ORAL at 09:09

## 2019-01-01 RX ADMIN — HYDROXYZINE HYDROCHLORIDE 25 MG: 25 TABLET ORAL at 09:37

## 2019-01-01 RX ADMIN — HYDROCODONE BITARTRATE AND ACETAMINOPHEN 1 TABLET: 5; 325 TABLET ORAL at 11:31

## 2019-01-01 RX ADMIN — ISOSORBIDE DINITRATE 40 MG: 20 TABLET ORAL at 15:41

## 2019-01-01 RX ADMIN — CEFTRIAXONE SODIUM 1 G: 1 INJECTION, POWDER, FOR SOLUTION INTRAMUSCULAR; INTRAVENOUS at 18:20

## 2019-01-01 RX ADMIN — ENOXAPARIN SODIUM 40 MG: 40 INJECTION SUBCUTANEOUS at 09:40

## 2019-01-01 RX ADMIN — ISOSORBIDE MONONITRATE 60 MG: 60 TABLET ORAL at 16:40

## 2019-01-01 RX ADMIN — BUSPIRONE HYDROCHLORIDE 7.5 MG: 5 TABLET ORAL at 15:08

## 2019-01-01 RX ADMIN — HYDROXYZINE HYDROCHLORIDE 25 MG: 25 TABLET ORAL at 19:56

## 2019-01-01 RX ADMIN — HYDROCODONE BITARTRATE AND ACETAMINOPHEN 1 TABLET: 5; 325 TABLET ORAL at 06:40

## 2019-01-01 RX ADMIN — HYDROXYZINE HYDROCHLORIDE 25 MG: 25 TABLET ORAL at 21:16

## 2019-01-01 RX ADMIN — PANTOPRAZOLE SODIUM 40 MG: 40 TABLET, DELAYED RELEASE ORAL at 08:07

## 2019-01-01 RX ADMIN — RANOLAZINE 500 MG: 500 TABLET, FILM COATED, EXTENDED RELEASE ORAL at 20:00

## 2019-01-01 RX ADMIN — MODAFINIL 200 MG: 100 TABLET ORAL at 13:35

## 2019-01-01 RX ADMIN — IPRATROPIUM BROMIDE AND ALBUTEROL SULFATE 1 AMPULE: .5; 3 SOLUTION RESPIRATORY (INHALATION) at 12:16

## 2019-01-01 RX ADMIN — INSULIN LISPRO 2 UNITS: 100 INJECTION, SOLUTION INTRAVENOUS; SUBCUTANEOUS at 16:34

## 2019-01-01 RX ADMIN — FERROUS SULFATE TAB 325 MG (65 MG ELEMENTAL FE) 325 MG: 325 (65 FE) TAB at 16:57

## 2019-01-01 RX ADMIN — POTASSIUM CHLORIDE 20 MEQ: 29.8 INJECTION, SOLUTION INTRAVENOUS at 09:07

## 2019-01-01 RX ADMIN — IPRATROPIUM BROMIDE AND ALBUTEROL SULFATE 1 AMPULE: .5; 3 SOLUTION RESPIRATORY (INHALATION) at 20:16

## 2019-01-01 RX ADMIN — HYDROCODONE BITARTRATE AND ACETAMINOPHEN 1 TABLET: 5; 325 TABLET ORAL at 20:16

## 2019-01-01 RX ADMIN — IPRATROPIUM BROMIDE AND ALBUTEROL SULFATE 1 AMPULE: .5; 3 SOLUTION RESPIRATORY (INHALATION) at 09:00

## 2019-01-01 RX ADMIN — ESCITALOPRAM OXALATE 10 MG: 10 TABLET, FILM COATED ORAL at 08:29

## 2019-01-01 RX ADMIN — ASPIRIN 81 MG: 81 TABLET, COATED ORAL at 08:40

## 2019-01-01 RX ADMIN — Medication 2 PUFF: at 17:16

## 2019-01-01 RX ADMIN — HYDRALAZINE HYDROCHLORIDE 50 MG: 50 TABLET, FILM COATED ORAL at 19:56

## 2019-01-01 RX ADMIN — POTASSIUM CHLORIDE 10 MEQ: 7.46 INJECTION, SOLUTION INTRAVENOUS at 18:02

## 2019-01-01 RX ADMIN — CALCIUM CARBONATE-VITAMIN D TAB 500 MG-200 UNIT 1 TABLET: 500-200 TAB at 09:29

## 2019-01-01 RX ADMIN — ROSUVASTATIN CALCIUM 20 MG: 20 TABLET, FILM COATED ORAL at 20:57

## 2019-01-01 RX ADMIN — RANOLAZINE 500 MG: 500 TABLET, FILM COATED, EXTENDED RELEASE ORAL at 09:35

## 2019-01-01 RX ADMIN — IPRATROPIUM BROMIDE AND ALBUTEROL SULFATE 1 AMPULE: .5; 3 SOLUTION RESPIRATORY (INHALATION) at 20:42

## 2019-01-01 RX ADMIN — GUAIFENESIN 600 MG: 600 TABLET, EXTENDED RELEASE ORAL at 20:38

## 2019-01-01 RX ADMIN — PANTOPRAZOLE SODIUM 40 MG: 40 TABLET, DELAYED RELEASE ORAL at 06:52

## 2019-01-01 RX ADMIN — CEFTRIAXONE SODIUM 1 G: 1 INJECTION, POWDER, FOR SOLUTION INTRAMUSCULAR; INTRAVENOUS at 17:05

## 2019-01-01 RX ADMIN — HYDROXYZINE HYDROCHLORIDE 25 MG: 25 TABLET ORAL at 08:41

## 2019-01-01 RX ADMIN — THERA TABS 1 TABLET: TAB at 09:09

## 2019-01-01 RX ADMIN — INSULIN LISPRO 2 UNITS: 100 INJECTION, SOLUTION INTRAVENOUS; SUBCUTANEOUS at 22:21

## 2019-01-01 RX ADMIN — IPRATROPIUM BROMIDE AND ALBUTEROL SULFATE 1 AMPULE: .5; 3 SOLUTION RESPIRATORY (INHALATION) at 20:58

## 2019-01-01 RX ADMIN — ENOXAPARIN SODIUM 40 MG: 40 INJECTION SUBCUTANEOUS at 08:58

## 2019-01-01 RX ADMIN — THERA TABS 1 TABLET: TAB at 09:00

## 2019-01-01 RX ADMIN — LORAZEPAM 4 MG: 2 INJECTION INTRAMUSCULAR; INTRAVENOUS at 15:50

## 2019-01-01 RX ADMIN — CALCIUM CARBONATE-VITAMIN D TAB 500 MG-200 UNIT 1 TABLET: 500-200 TAB at 09:35

## 2019-01-01 RX ADMIN — POTASSIUM CHLORIDE 40 MEQ: 20 TABLET, EXTENDED RELEASE ORAL at 00:26

## 2019-01-01 RX ADMIN — NYSTATIN: 100000 CREAM TOPICAL at 08:29

## 2019-01-01 RX ADMIN — PHENYLEPHRINE HYDROCHLORIDE 100 MCG/MIN: 10 INJECTION INTRAVENOUS at 19:15

## 2019-01-01 RX ADMIN — ISOSORBIDE DINITRATE 40 MG: 20 TABLET ORAL at 20:54

## 2019-01-01 RX ADMIN — THERA TABS 1 TABLET: TAB at 09:29

## 2019-01-01 RX ADMIN — ERGOCALCIFEROL 50000 UNITS: 1.25 CAPSULE ORAL at 10:32

## 2019-01-01 RX ADMIN — Medication 2 PUFF: at 22:47

## 2019-01-01 RX ADMIN — PANTOPRAZOLE SODIUM 40 MG: 40 TABLET, DELAYED RELEASE ORAL at 05:08

## 2019-01-01 RX ADMIN — IPRATROPIUM BROMIDE AND ALBUTEROL SULFATE 1 AMPULE: .5; 3 SOLUTION RESPIRATORY (INHALATION) at 13:25

## 2019-01-01 RX ADMIN — Medication 2 PUFF: at 09:15

## 2019-01-01 RX ADMIN — ENOXAPARIN SODIUM 40 MG: 40 INJECTION SUBCUTANEOUS at 22:23

## 2019-01-01 RX ADMIN — SPIRONOLACTONE 25 MG: 25 TABLET, FILM COATED ORAL at 08:17

## 2019-01-01 RX ADMIN — ASPIRIN 81 MG 81 MG: 81 TABLET ORAL at 09:06

## 2019-01-01 RX ADMIN — NAFCILLIN SODIUM 12 G: 2 INJECTION, POWDER, LYOPHILIZED, FOR SOLUTION INTRAMUSCULAR; INTRAVENOUS at 21:22

## 2019-01-01 RX ADMIN — ROSUVASTATIN CALCIUM 20 MG: 20 TABLET, FILM COATED ORAL at 21:18

## 2019-01-01 RX ADMIN — POTASSIUM CHLORIDE 40 MEQ: 20 TABLET, EXTENDED RELEASE ORAL at 10:43

## 2019-01-01 RX ADMIN — ROSUVASTATIN CALCIUM 20 MG: 20 TABLET, FILM COATED ORAL at 20:16

## 2019-01-01 RX ADMIN — CLOPIDOGREL BISULFATE 75 MG: 75 TABLET ORAL at 09:13

## 2019-01-01 RX ADMIN — Medication 2 PUFF: at 10:28

## 2019-01-01 RX ADMIN — MODAFINIL 200 MG: 100 TABLET ORAL at 08:26

## 2019-01-01 RX ADMIN — Medication 10 ML: at 21:45

## 2019-01-01 RX ADMIN — IOPAMIDOL 80 ML: 755 INJECTION, SOLUTION INTRAVENOUS at 13:41

## 2019-01-01 RX ADMIN — BACLOFEN 10 MG: 10 TABLET ORAL at 22:22

## 2019-01-01 RX ADMIN — ISOSORBIDE DINITRATE 40 MG: 20 TABLET ORAL at 21:59

## 2019-01-01 RX ADMIN — LEVOTHYROXINE SODIUM 100 MCG: 100 TABLET ORAL at 05:10

## 2019-01-01 RX ADMIN — ESCITALOPRAM 20 MG: 20 TABLET, FILM COATED ORAL at 10:35

## 2019-01-01 RX ADMIN — HYDROXYZINE HYDROCHLORIDE 25 MG: 25 TABLET ORAL at 16:47

## 2019-01-01 RX ADMIN — IPRATROPIUM BROMIDE AND ALBUTEROL SULFATE 1 AMPULE: .5; 3 SOLUTION RESPIRATORY (INHALATION) at 15:26

## 2019-01-01 RX ADMIN — Medication 10 ML: at 20:53

## 2019-01-01 RX ADMIN — LEVOTHYROXINE SODIUM 100 MCG: 100 TABLET ORAL at 06:09

## 2019-01-01 RX ADMIN — CLOPIDOGREL BISULFATE 75 MG: 75 TABLET ORAL at 08:11

## 2019-01-01 RX ADMIN — FERROUS SULFATE TAB 325 MG (65 MG ELEMENTAL FE) 325 MG: 325 (65 FE) TAB at 09:01

## 2019-01-01 RX ADMIN — Medication 1000 MCG: at 08:41

## 2019-01-01 RX ADMIN — BUMETANIDE 1 MG: 1 TABLET ORAL at 09:35

## 2019-01-01 RX ADMIN — LOSARTAN POTASSIUM 50 MG: 50 TABLET, FILM COATED ORAL at 09:00

## 2019-01-01 RX ADMIN — INSULIN LISPRO 2 UNITS: 100 INJECTION, SOLUTION INTRAVENOUS; SUBCUTANEOUS at 09:22

## 2019-01-01 RX ADMIN — MIDAZOLAM 1 MG: 1 INJECTION INTRAMUSCULAR; INTRAVENOUS at 15:11

## 2019-01-01 RX ADMIN — HYDROXYZINE HYDROCHLORIDE 25 MG: 25 TABLET, FILM COATED ORAL at 08:26

## 2019-01-01 RX ADMIN — NYSTATIN: 100000 CREAM TOPICAL at 09:30

## 2019-01-01 RX ADMIN — FAMOTIDINE 20 MG: 20 TABLET ORAL at 08:26

## 2019-01-01 RX ADMIN — SODIUM CHLORIDE 0.3 MCG/KG/HR: 9 INJECTION, SOLUTION INTRAVENOUS at 13:10

## 2019-01-01 RX ADMIN — Medication 10 ML: at 20:16

## 2019-01-01 RX ADMIN — CLOPIDOGREL BISULFATE 75 MG: 75 TABLET ORAL at 08:29

## 2019-01-01 RX ADMIN — IPRATROPIUM BROMIDE AND ALBUTEROL SULFATE 1 AMPULE: .5; 3 SOLUTION RESPIRATORY (INHALATION) at 04:54

## 2019-01-01 RX ADMIN — PREDNISONE 40 MG: 20 TABLET ORAL at 09:01

## 2019-01-01 RX ADMIN — FUROSEMIDE 40 MG: 40 TABLET ORAL at 08:41

## 2019-01-01 RX ADMIN — PIPERACILLIN SODIUM,TAZOBACTAM SODIUM 3.38 G: 3; .375 INJECTION, POWDER, FOR SOLUTION INTRAVENOUS at 20:18

## 2019-01-01 RX ADMIN — IPRATROPIUM BROMIDE AND ALBUTEROL SULFATE 1 AMPULE: .5; 3 SOLUTION RESPIRATORY (INHALATION) at 04:00

## 2019-01-01 RX ADMIN — ROSUVASTATIN CALCIUM 20 MG: 20 TABLET, FILM COATED ORAL at 20:01

## 2019-01-01 RX ADMIN — RISPERIDONE 1 MG: 1 TABLET ORAL at 10:24

## 2019-01-01 RX ADMIN — FERROUS SULFATE TAB 325 MG (65 MG ELEMENTAL FE) 325 MG: 325 (65 FE) TAB at 08:07

## 2019-01-01 RX ADMIN — RANOLAZINE 500 MG: 500 TABLET, FILM COATED, EXTENDED RELEASE ORAL at 08:58

## 2019-01-01 RX ADMIN — OXCARBAZEPINE 150 MG: 300 TABLET, FILM COATED ORAL at 08:59

## 2019-01-01 RX ADMIN — IPRATROPIUM BROMIDE AND ALBUTEROL SULFATE 1 AMPULE: .5; 3 SOLUTION RESPIRATORY (INHALATION) at 12:23

## 2019-01-01 RX ADMIN — BACLOFEN 10 MG: 10 TABLET ORAL at 09:38

## 2019-01-01 RX ADMIN — SODIUM CHLORIDE TAB 1 GM 1 G: 1 TAB at 08:30

## 2019-01-01 RX ADMIN — ISOSORBIDE MONONITRATE 30 MG: 30 TABLET ORAL at 17:05

## 2019-01-01 RX ADMIN — HYDROCODONE BITARTRATE AND ACETAMINOPHEN 0.5 TABLET: 5; 325 TABLET ORAL at 09:13

## 2019-01-01 RX ADMIN — THERA TABS 1 TABLET: TAB at 17:53

## 2019-01-01 RX ADMIN — PHENYLEPHRINE HYDROCHLORIDE 100 MCG: 10 INJECTION INTRAVENOUS at 09:55

## 2019-01-01 RX ADMIN — INSULIN LISPRO 2 UNITS: 100 INJECTION, SOLUTION INTRAVENOUS; SUBCUTANEOUS at 21:28

## 2019-01-01 RX ADMIN — THERA TABS 1 TABLET: TAB at 08:23

## 2019-01-01 RX ADMIN — PHENYLEPHRINE HYDROCHLORIDE 50 MCG: 10 INJECTION INTRAVENOUS at 09:24

## 2019-01-01 RX ADMIN — SODIUM CHLORIDE TAB 1 GM 1 G: 1 TAB at 09:09

## 2019-01-01 RX ADMIN — LOSARTAN POTASSIUM 50 MG: 50 TABLET, FILM COATED ORAL at 08:55

## 2019-01-01 RX ADMIN — FENTANYL CITRATE 50 MCG: 50 INJECTION, SOLUTION INTRAMUSCULAR; INTRAVENOUS at 20:36

## 2019-01-01 RX ADMIN — RANOLAZINE 500 MG: 500 TABLET, FILM COATED, EXTENDED RELEASE ORAL at 09:44

## 2019-01-01 RX ADMIN — AMIODARONE HYDROCHLORIDE 150 MG: 1.5 INJECTION, SOLUTION INTRAVENOUS at 02:15

## 2019-01-01 RX ADMIN — FENTANYL CITRATE 25 MCG: 50 INJECTION INTRAMUSCULAR; INTRAVENOUS at 09:05

## 2019-01-01 RX ADMIN — FENTANYL CITRATE 25 MCG: 50 INJECTION INTRAMUSCULAR; INTRAVENOUS at 08:50

## 2019-01-01 RX ADMIN — IPRATROPIUM BROMIDE AND ALBUTEROL SULFATE 1 AMPULE: .5; 3 SOLUTION RESPIRATORY (INHALATION) at 22:07

## 2019-01-01 RX ADMIN — ROSUVASTATIN CALCIUM 20 MG: 20 TABLET, FILM COATED ORAL at 20:52

## 2019-01-01 RX ADMIN — ENOXAPARIN SODIUM 40 MG: 40 INJECTION SUBCUTANEOUS at 09:01

## 2019-01-01 RX ADMIN — LOSARTAN POTASSIUM 50 MG: 50 TABLET, FILM COATED ORAL at 09:09

## 2019-01-01 RX ADMIN — Medication 1000 MCG: at 08:29

## 2019-01-01 RX ADMIN — IPRATROPIUM BROMIDE AND ALBUTEROL SULFATE 1 AMPULE: .5; 3 SOLUTION RESPIRATORY (INHALATION) at 09:21

## 2019-01-01 RX ADMIN — IPRATROPIUM BROMIDE AND ALBUTEROL SULFATE 1 AMPULE: .5; 3 SOLUTION RESPIRATORY (INHALATION) at 07:39

## 2019-01-01 RX ADMIN — HYDROXYZINE HYDROCHLORIDE 25 MG: 25 TABLET ORAL at 13:54

## 2019-01-01 RX ADMIN — ISOSORBIDE DINITRATE 40 MG: 20 TABLET ORAL at 20:16

## 2019-01-01 RX ADMIN — AMIODARONE HYDROCHLORIDE 0.5 MG/MIN: 1.8 INJECTION, SOLUTION INTRAVENOUS at 08:06

## 2019-01-01 RX ADMIN — METOPROLOL TARTRATE 5 MG: 5 INJECTION INTRAVENOUS at 23:36

## 2019-01-01 RX ADMIN — INSULIN LISPRO 2 UNITS: 100 INJECTION, SOLUTION INTRAVENOUS; SUBCUTANEOUS at 08:22

## 2019-01-01 RX ADMIN — DOXEPIN HYDROCHLORIDE 150 MG: 50 CAPSULE ORAL at 23:38

## 2019-01-01 RX ADMIN — CALCIUM CARBONATE-VITAMIN D TAB 500 MG-200 UNIT 1 TABLET: 500-200 TAB at 10:26

## 2019-01-01 RX ADMIN — BUSPIRONE HYDROCHLORIDE 7.5 MG: 5 TABLET ORAL at 08:26

## 2019-01-01 RX ADMIN — IPRATROPIUM BROMIDE AND ALBUTEROL SULFATE 1 AMPULE: .5; 3 SOLUTION RESPIRATORY (INHALATION) at 08:06

## 2019-01-01 RX ADMIN — HYDROXYZINE HYDROCHLORIDE 25 MG: 25 TABLET ORAL at 20:01

## 2019-01-01 RX ADMIN — FLUTICASONE PROPIONATE 1 SPRAY: 50 SPRAY, METERED NASAL at 10:27

## 2019-01-01 RX ADMIN — HYDROCODONE BITARTRATE AND ACETAMINOPHEN 1 TABLET: 5; 325 TABLET ORAL at 06:23

## 2019-01-01 RX ADMIN — HYDROXYZINE HYDROCHLORIDE 25 MG: 25 TABLET, FILM COATED ORAL at 20:58

## 2019-01-01 RX ADMIN — POTASSIUM BICARBONATE 40 MEQ: 391 TABLET, EFFERVESCENT ORAL at 16:08

## 2019-01-01 RX ADMIN — BUSPIRONE HYDROCHLORIDE 7.5 MG: 5 TABLET ORAL at 21:17

## 2019-01-01 RX ADMIN — CLOPIDOGREL BISULFATE 75 MG: 75 TABLET ORAL at 08:26

## 2019-01-01 RX ADMIN — CLOPIDOGREL BISULFATE 75 MG: 75 TABLET ORAL at 09:44

## 2019-01-01 RX ADMIN — DILTIAZEM HYDROCHLORIDE 10 MG: 5 INJECTION INTRAVENOUS at 17:24

## 2019-01-01 RX ADMIN — MICONAZOLE NITRATE: 2 POWDER TOPICAL at 22:23

## 2019-01-01 RX ADMIN — BACLOFEN 10 MG: 10 TABLET ORAL at 09:00

## 2019-01-01 RX ADMIN — THERA TABS 1 TABLET: TAB at 09:37

## 2019-01-01 RX ADMIN — Medication 3 MG: at 22:17

## 2019-01-01 RX ADMIN — CLOPIDOGREL 75 MG: 75 TABLET, FILM COATED ORAL at 08:20

## 2019-01-01 RX ADMIN — AMIODARONE HYDROCHLORIDE 0.5 MG/MIN: 1.8 INJECTION, SOLUTION INTRAVENOUS at 01:34

## 2019-01-01 RX ADMIN — HYDRALAZINE HYDROCHLORIDE 25 MG: 25 TABLET ORAL at 19:58

## 2019-01-01 RX ADMIN — PANTOPRAZOLE SODIUM 40 MG: 40 TABLET, DELAYED RELEASE ORAL at 09:05

## 2019-01-01 RX ADMIN — RANOLAZINE 500 MG: 500 TABLET, FILM COATED, EXTENDED RELEASE ORAL at 09:00

## 2019-01-01 RX ADMIN — LEVOTHYROXINE SODIUM 100 MCG: 100 TABLET ORAL at 08:29

## 2019-01-01 RX ADMIN — SODIUM CHLORIDE TAB 1 GM 2 G: 1 TAB at 09:53

## 2019-01-01 RX ADMIN — ACETAMINOPHEN 650 MG: 325 TABLET ORAL at 05:41

## 2019-01-01 RX ADMIN — LEVOTHYROXINE SODIUM 125 MCG: 125 TABLET ORAL at 06:38

## 2019-01-01 RX ADMIN — PANTOPRAZOLE SODIUM 40 MG: 40 TABLET, DELAYED RELEASE ORAL at 07:50

## 2019-01-01 RX ADMIN — LOSARTAN POTASSIUM 25 MG: 25 TABLET, FILM COATED ORAL at 09:06

## 2019-01-01 RX ADMIN — SODIUM CHLORIDE: 9 INJECTION, SOLUTION INTRAVENOUS at 14:36

## 2019-01-01 RX ADMIN — Medication 10 ML: at 19:58

## 2019-01-01 RX ADMIN — SPIRONOLACTONE 25 MG: 25 TABLET, FILM COATED ORAL at 09:18

## 2019-01-01 RX ADMIN — ACETAMINOPHEN 650 MG: 325 TABLET ORAL at 19:58

## 2019-01-01 RX ADMIN — IPRATROPIUM BROMIDE AND ALBUTEROL SULFATE 1 AMPULE: .5; 3 SOLUTION RESPIRATORY (INHALATION) at 14:40

## 2019-01-01 RX ADMIN — RANOLAZINE 500 MG: 500 TABLET, FILM COATED, EXTENDED RELEASE ORAL at 08:08

## 2019-01-01 RX ADMIN — RANOLAZINE 500 MG: 500 TABLET, FILM COATED, EXTENDED RELEASE ORAL at 21:18

## 2019-01-01 RX ADMIN — Medication 10 ML: at 22:20

## 2019-01-01 RX ADMIN — Medication 10 ML: at 09:22

## 2019-01-01 RX ADMIN — IPRATROPIUM BROMIDE AND ALBUTEROL SULFATE 1 AMPULE: .5; 3 SOLUTION RESPIRATORY (INHALATION) at 16:25

## 2019-01-01 RX ADMIN — RANOLAZINE 500 MG: 500 TABLET, FILM COATED, EXTENDED RELEASE ORAL at 09:13

## 2019-01-01 RX ADMIN — DILTIAZEM HYDROCHLORIDE 120 MG: 120 CAPSULE, EXTENDED RELEASE ORAL at 11:52

## 2019-01-01 RX ADMIN — ASPIRIN 324 MG: 81 TABLET, CHEWABLE ORAL at 13:26

## 2019-01-01 RX ADMIN — DOXEPIN HYDROCHLORIDE 150 MG: 50 CAPSULE ORAL at 21:16

## 2019-01-01 RX ADMIN — RISPERIDONE 1 MG: 1 TABLET ORAL at 08:26

## 2019-01-01 RX ADMIN — Medication 2 PUFF: at 19:33

## 2019-01-01 RX ADMIN — METOPROLOL TARTRATE 100 MG: 100 TABLET, FILM COATED ORAL at 20:00

## 2019-01-01 RX ADMIN — RISPERIDONE 1 MG: 1 TABLET ORAL at 09:30

## 2019-01-01 RX ADMIN — CALCIUM CARBONATE-VITAMIN D TAB 500 MG-200 UNIT 1 TABLET: 500-200 TAB at 08:17

## 2019-01-01 RX ADMIN — OXCARBAZEPINE 300 MG: 300 TABLET, FILM COATED ORAL at 16:42

## 2019-01-01 RX ADMIN — Medication 2 PUFF: at 20:38

## 2019-01-01 RX ADMIN — FLUTICASONE PROPIONATE 1 SPRAY: 50 SPRAY, METERED NASAL at 08:29

## 2019-01-01 RX ADMIN — FLUTICASONE PROPIONATE 1 SPRAY: 50 SPRAY, METERED NASAL at 17:52

## 2019-01-01 RX ADMIN — HYDROCODONE BITARTRATE AND ACETAMINOPHEN 1 TABLET: 5; 325 TABLET ORAL at 20:52

## 2019-01-01 RX ADMIN — AMIODARONE HYDROCHLORIDE 0.5 MG/MIN: 1.8 INJECTION, SOLUTION INTRAVENOUS at 14:11

## 2019-01-01 RX ADMIN — ENOXAPARIN SODIUM 40 MG: 40 INJECTION SUBCUTANEOUS at 11:07

## 2019-01-01 RX ADMIN — IPRATROPIUM BROMIDE AND ALBUTEROL SULFATE 1 AMPULE: .5; 3 SOLUTION RESPIRATORY (INHALATION) at 21:44

## 2019-01-01 RX ADMIN — HYDROXYZINE HYDROCHLORIDE 25 MG: 25 TABLET, FILM COATED ORAL at 06:19

## 2019-01-01 RX ADMIN — LOPERAMIDE HYDROCHLORIDE 2 MG: 2 CAPSULE ORAL at 09:34

## 2019-01-01 RX ADMIN — HYDROXYZINE HYDROCHLORIDE 25 MG: 25 TABLET ORAL at 08:20

## 2019-01-01 RX ADMIN — OXCARBAZEPINE 150 MG: 300 TABLET, FILM COATED ORAL at 09:01

## 2019-01-01 RX ADMIN — INSULIN LISPRO 2 UNITS: 100 INJECTION, SOLUTION INTRAVENOUS; SUBCUTANEOUS at 12:25

## 2019-01-01 RX ADMIN — Medication 10 ML: at 18:03

## 2019-01-01 RX ADMIN — IPRATROPIUM BROMIDE AND ALBUTEROL SULFATE 1 AMPULE: .5; 3 SOLUTION RESPIRATORY (INHALATION) at 08:48

## 2019-01-01 RX ADMIN — LEVOTHYROXINE SODIUM 125 MCG: 125 TABLET ORAL at 05:47

## 2019-01-01 RX ADMIN — PANTOPRAZOLE SODIUM 40 MG: 40 TABLET, DELAYED RELEASE ORAL at 08:06

## 2019-01-01 RX ADMIN — FERROUS SULFATE TAB 325 MG (65 MG ELEMENTAL FE) 325 MG: 325 (65 FE) TAB at 08:41

## 2019-01-01 RX ADMIN — RISPERIDONE 1 MG: 1 TABLET ORAL at 08:24

## 2019-01-01 RX ADMIN — HYDRALAZINE HYDROCHLORIDE 50 MG: 25 TABLET, FILM COATED ORAL at 12:27

## 2019-01-01 RX ADMIN — HYDROXYZINE HYDROCHLORIDE 25 MG: 25 TABLET ORAL at 14:28

## 2019-01-01 RX ADMIN — ACETAMINOPHEN 650 MG: 650 SUPPOSITORY RECTAL at 22:58

## 2019-01-01 RX ADMIN — POTASSIUM CHLORIDE 20 MEQ: 29.8 INJECTION, SOLUTION INTRAVENOUS at 07:34

## 2019-01-01 RX ADMIN — FENTANYL CITRATE 50 MCG: 50 INJECTION, SOLUTION INTRAMUSCULAR; INTRAVENOUS at 00:10

## 2019-01-01 RX ADMIN — OXCARBAZEPINE 150 MG: 300 TABLET, FILM COATED ORAL at 09:30

## 2019-01-01 RX ADMIN — Medication 10 ML: at 20:58

## 2019-01-01 RX ADMIN — LEVOTHYROXINE SODIUM 125 MCG: 125 TABLET ORAL at 06:52

## 2019-01-01 RX ADMIN — LEVOTHYROXINE SODIUM 100 MCG: 100 TABLET ORAL at 09:30

## 2019-01-01 RX ADMIN — PANTOPRAZOLE SODIUM 40 MG: 40 TABLET, DELAYED RELEASE ORAL at 05:47

## 2019-01-01 RX ADMIN — PANTOPRAZOLE SODIUM 40 MG: 40 TABLET, DELAYED RELEASE ORAL at 09:29

## 2019-01-01 RX ADMIN — FENTANYL CITRATE 25 MCG: 50 INJECTION INTRAMUSCULAR; INTRAVENOUS at 09:30

## 2019-01-01 RX ADMIN — RANOLAZINE 500 MG: 500 TABLET, FILM COATED, EXTENDED RELEASE ORAL at 00:31

## 2019-01-01 RX ADMIN — Medication 2 PUFF: at 07:58

## 2019-01-01 RX ADMIN — POTASSIUM CHLORIDE 20 MEQ: 29.8 INJECTION, SOLUTION INTRAVENOUS at 17:01

## 2019-01-01 RX ADMIN — Medication 2 PUFF: at 21:45

## 2019-01-01 RX ADMIN — ASPIRIN 81 MG: 81 TABLET, COATED ORAL at 07:57

## 2019-01-01 RX ADMIN — SODIUM CHLORIDE 500 ML: 9 INJECTION, SOLUTION INTRAVENOUS at 01:20

## 2019-01-01 RX ADMIN — DIGOXIN 250 MCG: 0.25 INJECTION INTRAMUSCULAR; INTRAVENOUS at 15:30

## 2019-01-01 RX ADMIN — CALCIUM CARBONATE-VITAMIN D TAB 500 MG-200 UNIT 1 TABLET: 500-200 TAB at 09:00

## 2019-01-01 RX ADMIN — RANOLAZINE 500 MG: 500 TABLET, FILM COATED, EXTENDED RELEASE ORAL at 19:56

## 2019-01-01 RX ADMIN — IPRATROPIUM BROMIDE AND ALBUTEROL SULFATE 1 AMPULE: .5; 3 SOLUTION RESPIRATORY (INHALATION) at 12:47

## 2019-01-01 RX ADMIN — ROSUVASTATIN CALCIUM 20 MG: 20 TABLET, FILM COATED ORAL at 20:19

## 2019-01-01 RX ADMIN — HYDRALAZINE HYDROCHLORIDE 10 MG: 20 INJECTION INTRAMUSCULAR; INTRAVENOUS at 00:50

## 2019-01-01 RX ADMIN — THERA TABS 1 TABLET: TAB at 10:26

## 2019-01-01 RX ADMIN — METOPROLOL TARTRATE 100 MG: 100 TABLET, FILM COATED ORAL at 09:08

## 2019-01-01 RX ADMIN — ERGOCALCIFEROL 50000 UNITS: 1.25 CAPSULE ORAL at 17:39

## 2019-01-01 RX ADMIN — PANTOPRAZOLE SODIUM 40 MG: 40 TABLET, DELAYED RELEASE ORAL at 05:10

## 2019-01-01 RX ADMIN — SODIUM CHLORIDE 250 ML: 9 INJECTION, SOLUTION INTRAVENOUS at 13:27

## 2019-01-01 RX ADMIN — PANTOPRAZOLE SODIUM 40 MG: 40 TABLET, DELAYED RELEASE ORAL at 06:22

## 2019-01-01 RX ADMIN — LEVOTHYROXINE SODIUM 125 MCG: 125 TABLET ORAL at 05:44

## 2019-01-01 RX ADMIN — SODIUM CHLORIDE TAB 1 GM 1 G: 1 TAB at 11:26

## 2019-01-01 RX ADMIN — FUROSEMIDE 20 MG: 40 TABLET ORAL at 21:50

## 2019-01-01 RX ADMIN — NAFCILLIN SODIUM 12 G: 2 INJECTION, POWDER, LYOPHILIZED, FOR SOLUTION INTRAMUSCULAR; INTRAVENOUS at 21:10

## 2019-01-01 RX ADMIN — FERROUS SULFATE TAB 325 MG (65 MG ELEMENTAL FE) 325 MG: 325 (65 FE) TAB at 09:13

## 2019-01-01 RX ADMIN — MAGNESIUM SULFATE HEPTAHYDRATE 2 G: 40 INJECTION, SOLUTION INTRAVENOUS at 09:49

## 2019-01-01 RX ADMIN — FERROUS SULFATE TAB 325 MG (65 MG ELEMENTAL FE) 325 MG: 325 (65 FE) TAB at 18:03

## 2019-01-01 RX ADMIN — SODIUM CHLORIDE TAB 1 GM 1 G: 1 TAB at 17:44

## 2019-01-01 RX ADMIN — POTASSIUM CHLORIDE 20 MEQ: 29.8 INJECTION, SOLUTION INTRAVENOUS at 12:02

## 2019-01-01 RX ADMIN — ENOXAPARIN SODIUM 40 MG: 40 INJECTION SUBCUTANEOUS at 08:54

## 2019-01-01 RX ADMIN — ACETYLCYSTEINE 600 MG: 200 SOLUTION ORAL; RESPIRATORY (INHALATION) at 22:17

## 2019-01-01 RX ADMIN — DILTIAZEM HYDROCHLORIDE 10 MG/HR: 5 INJECTION INTRAVENOUS at 17:23

## 2019-01-01 RX ADMIN — HYDROXYZINE HYDROCHLORIDE 25 MG: 25 TABLET ORAL at 20:52

## 2019-01-01 RX ADMIN — Medication 2 PUFF: at 08:44

## 2019-01-01 RX ADMIN — FENTANYL CITRATE 50 MCG: 50 INJECTION INTRAMUSCULAR; INTRAVENOUS at 13:06

## 2019-01-01 RX ADMIN — MIDAZOLAM HYDROCHLORIDE 1 MG: 1 INJECTION, SOLUTION INTRAMUSCULAR; INTRAVENOUS at 15:13

## 2019-01-01 RX ADMIN — FENTANYL CITRATE 25 MCG: 50 INJECTION, SOLUTION INTRAMUSCULAR; INTRAVENOUS at 15:10

## 2019-01-01 RX ADMIN — SODIUM CHLORIDE TAB 1 GM 1 G: 1 TAB at 22:30

## 2019-01-01 RX ADMIN — IPRATROPIUM BROMIDE AND ALBUTEROL SULFATE 1 AMPULE: .5; 3 SOLUTION RESPIRATORY (INHALATION) at 11:27

## 2019-01-01 RX ADMIN — POTASSIUM CHLORIDE 40 MEQ: 750 TABLET, FILM COATED, EXTENDED RELEASE ORAL at 17:23

## 2019-01-01 RX ADMIN — PIPERACILLIN SODIUM,TAZOBACTAM SODIUM 3.38 G: 3; .375 INJECTION, POWDER, FOR SOLUTION INTRAVENOUS at 09:44

## 2019-01-01 RX ADMIN — POTASSIUM CHLORIDE 10 MEQ: 7.46 INJECTION, SOLUTION INTRAVENOUS at 17:07

## 2019-01-01 RX ADMIN — METOPROLOL TARTRATE 100 MG: 100 TABLET, FILM COATED ORAL at 20:01

## 2019-01-01 RX ADMIN — ENOXAPARIN SODIUM 40 MG: 40 INJECTION SUBCUTANEOUS at 08:42

## 2019-01-01 RX ADMIN — Medication 2 PUFF: at 07:25

## 2019-01-01 RX ADMIN — SODIUM CHLORIDE TAB 1 GM 1 G: 1 TAB at 17:05

## 2019-01-01 RX ADMIN — ENOXAPARIN SODIUM 100 MG: 100 INJECTION SUBCUTANEOUS at 14:01

## 2019-01-01 RX ADMIN — FENTANYL CITRATE 50 MCG: 50 INJECTION INTRAMUSCULAR; INTRAVENOUS at 14:01

## 2019-01-01 RX ADMIN — OXCARBAZEPINE 150 MG: 300 TABLET, FILM COATED ORAL at 08:29

## 2019-01-01 RX ADMIN — CLOPIDOGREL BISULFATE 75 MG: 75 TABLET ORAL at 08:56

## 2019-01-01 RX ADMIN — BACLOFEN 10 MG: 10 TABLET ORAL at 08:07

## 2019-01-01 RX ADMIN — LEVOTHYROXINE SODIUM 125 MCG: 125 TABLET ORAL at 06:32

## 2019-01-01 RX ADMIN — INSULIN LISPRO 1 UNITS: 100 INJECTION, SOLUTION INTRAVENOUS; SUBCUTANEOUS at 20:17

## 2019-01-01 RX ADMIN — ENOXAPARIN SODIUM 40 MG: 40 INJECTION SUBCUTANEOUS at 16:51

## 2019-01-01 RX ADMIN — POLYVINYL ALCOHOL 1 DROP: 14 SOLUTION/ DROPS OPHTHALMIC at 08:26

## 2019-01-01 RX ADMIN — SODIUM CHLORIDE 1000 ML: 9 INJECTION, SOLUTION INTRAVENOUS at 16:54

## 2019-01-01 RX ADMIN — METOPROLOL TARTRATE 5 MG: 5 INJECTION INTRAVENOUS at 11:57

## 2019-01-01 RX ADMIN — HYDRALAZINE HYDROCHLORIDE 50 MG: 50 TABLET, FILM COATED ORAL at 14:54

## 2019-01-01 RX ADMIN — NAFCILLIN SODIUM 12 G: 2 INJECTION, POWDER, LYOPHILIZED, FOR SOLUTION INTRAMUSCULAR; INTRAVENOUS at 00:22

## 2019-01-01 RX ADMIN — Medication 2 PUFF: at 18:32

## 2019-01-01 RX ADMIN — ISOSORBIDE DINITRATE 40 MG: 20 TABLET ORAL at 23:38

## 2019-01-01 RX ADMIN — LOSARTAN POTASSIUM 25 MG: 25 TABLET, FILM COATED ORAL at 08:28

## 2019-01-01 RX ADMIN — ASPIRIN 81 MG: 81 TABLET ORAL at 08:11

## 2019-01-01 RX ADMIN — RANOLAZINE 500 MG: 500 TABLET, FILM COATED, EXTENDED RELEASE ORAL at 09:19

## 2019-01-01 RX ADMIN — ACETAMINOPHEN 650 MG: 325 TABLET ORAL at 00:48

## 2019-01-01 RX ADMIN — Medication 2 PUFF: at 09:39

## 2019-01-01 RX ADMIN — Medication 2 PUFF: at 20:58

## 2019-01-01 RX ADMIN — SODIUM CHLORIDE 1500 ML: 9 INJECTION, SOLUTION INTRAVENOUS at 09:40

## 2019-01-01 RX ADMIN — SODIUM CHLORIDE: 9 INJECTION, SOLUTION INTRAVENOUS at 05:34

## 2019-01-01 RX ADMIN — METOPROLOL TARTRATE 5 MG: 5 INJECTION, SOLUTION INTRAVENOUS at 06:53

## 2019-01-01 RX ADMIN — MICONAZOLE NITRATE: 2 POWDER TOPICAL at 08:09

## 2019-01-01 RX ADMIN — FERROUS SULFATE TAB 325 MG (65 MG ELEMENTAL FE) 325 MG: 325 (65 FE) TAB at 09:09

## 2019-01-01 RX ADMIN — ISOSORBIDE DINITRATE 40 MG: 20 TABLET ORAL at 09:53

## 2019-01-01 RX ADMIN — ISOSORBIDE DINITRATE 40 MG: 20 TABLET ORAL at 21:16

## 2019-01-01 RX ADMIN — METOPROLOL TARTRATE 100 MG: 100 TABLET, FILM COATED ORAL at 21:58

## 2019-01-01 RX ADMIN — IPRATROPIUM BROMIDE AND ALBUTEROL SULFATE 1 AMPULE: .5; 3 SOLUTION RESPIRATORY (INHALATION) at 08:44

## 2019-01-01 RX ADMIN — BACLOFEN 10 MG: 10 TABLET ORAL at 09:13

## 2019-01-01 RX ADMIN — Medication 1000 MCG: at 09:37

## 2019-01-01 RX ADMIN — RANOLAZINE 500 MG: 500 TABLET, FILM COATED, EXTENDED RELEASE ORAL at 20:16

## 2019-01-01 RX ADMIN — VANCOMYCIN HYDROCHLORIDE 1000 MG: 1 INJECTION, POWDER, LYOPHILIZED, FOR SOLUTION INTRAVENOUS at 13:20

## 2019-01-01 RX ADMIN — ISOSORBIDE DINITRATE 40 MG: 20 TABLET ORAL at 07:57

## 2019-01-01 RX ADMIN — FUROSEMIDE 40 MG: 10 INJECTION, SOLUTION INTRAMUSCULAR; INTRAVENOUS at 10:47

## 2019-01-01 RX ADMIN — IPRATROPIUM BROMIDE AND ALBUTEROL SULFATE 1 AMPULE: .5; 3 SOLUTION RESPIRATORY (INHALATION) at 18:32

## 2019-01-01 RX ADMIN — OXCARBAZEPINE 150 MG: 300 TABLET, FILM COATED ORAL at 08:24

## 2019-01-01 RX ADMIN — SPIRONOLACTONE 25 MG: 25 TABLET ORAL at 09:43

## 2019-01-01 RX ADMIN — PHENYLEPHRINE HYDROCHLORIDE 100 MCG: 10 INJECTION INTRAVENOUS at 09:57

## 2019-01-01 RX ADMIN — CLOPIDOGREL BISULFATE 75 MG: 75 TABLET ORAL at 09:07

## 2019-01-01 RX ADMIN — ENOXAPARIN SODIUM 40 MG: 40 INJECTION SUBCUTANEOUS at 19:58

## 2019-01-01 RX ADMIN — Medication 1000 MCG: at 16:39

## 2019-01-01 RX ADMIN — Medication 2 PUFF: at 20:42

## 2019-01-01 RX ADMIN — Medication 2 G: at 14:31

## 2019-01-01 RX ADMIN — Medication 2 PUFF: at 21:33

## 2019-01-01 RX ADMIN — BACLOFEN 10 MG: 10 TABLET ORAL at 19:56

## 2019-01-01 RX ADMIN — HYDROCODONE BITARTRATE AND ACETAMINOPHEN 1 TABLET: 5; 325 TABLET ORAL at 07:38

## 2019-01-01 RX ADMIN — ACETAMINOPHEN 650 MG: 325 TABLET ORAL at 06:19

## 2019-01-01 RX ADMIN — OXCARBAZEPINE 150 MG: 300 TABLET, FILM COATED ORAL at 10:26

## 2019-01-01 RX ADMIN — Medication 10 ML: at 08:19

## 2019-01-01 RX ADMIN — IPRATROPIUM BROMIDE AND ALBUTEROL SULFATE 1 AMPULE: .5; 3 SOLUTION RESPIRATORY (INHALATION) at 09:20

## 2019-01-01 RX ADMIN — OXCARBAZEPINE 150 MG: 300 TABLET, FILM COATED ORAL at 08:15

## 2019-01-01 RX ADMIN — POLYETHYLENE GLYCOL 3350 17 G: 17 POWDER, FOR SOLUTION ORAL at 12:26

## 2019-01-01 RX ADMIN — BUSPIRONE HYDROCHLORIDE 7.5 MG: 5 TABLET ORAL at 22:18

## 2019-01-01 RX ADMIN — RANOLAZINE 500 MG: 500 TABLET, FILM COATED, EXTENDED RELEASE ORAL at 20:57

## 2019-01-01 RX ADMIN — POTASSIUM CHLORIDE 20 MEQ: 20 TABLET, EXTENDED RELEASE ORAL at 12:25

## 2019-01-01 RX ADMIN — POTASSIUM CHLORIDE 10 MEQ: 7.46 INJECTION, SOLUTION INTRAVENOUS at 19:07

## 2019-01-01 RX ADMIN — ROSUVASTATIN CALCIUM 20 MG: 20 TABLET, FILM COATED ORAL at 21:58

## 2019-01-01 RX ADMIN — FERROUS SULFATE TAB 325 MG (65 MG ELEMENTAL FE) 325 MG: 325 (65 FE) TAB at 23:38

## 2019-01-01 RX ADMIN — OXCARBAZEPINE 150 MG: 300 TABLET, FILM COATED ORAL at 08:27

## 2019-01-01 RX ADMIN — DOXEPIN HYDROCHLORIDE 50 MG: 50 CAPSULE ORAL at 21:17

## 2019-01-01 RX ADMIN — IPRATROPIUM BROMIDE AND ALBUTEROL SULFATE 1 AMPULE: .5; 3 SOLUTION RESPIRATORY (INHALATION) at 11:54

## 2019-01-01 RX ADMIN — Medication 10 ML: at 10:50

## 2019-01-01 RX ADMIN — LEVOTHYROXINE SODIUM 125 MCG: 125 TABLET ORAL at 08:13

## 2019-01-01 RX ADMIN — HYDROXYZINE HYDROCHLORIDE 25 MG: 25 TABLET ORAL at 09:00

## 2019-01-01 RX ADMIN — ENOXAPARIN SODIUM 40 MG: 40 INJECTION SUBCUTANEOUS at 17:14

## 2019-01-01 RX ADMIN — AMIODARONE HYDROCHLORIDE 1 MG/MIN: 1.8 INJECTION, SOLUTION INTRAVENOUS at 02:24

## 2019-01-01 RX ADMIN — CLOTRIMAZOLE AND BETAMETHASONE DIPROPIONATE: 10; .5 CREAM TOPICAL at 10:27

## 2019-01-01 RX ADMIN — NAFCILLIN SODIUM 12 G: 2 INJECTION, POWDER, LYOPHILIZED, FOR SOLUTION INTRAMUSCULAR; INTRAVENOUS at 22:17

## 2019-01-01 RX ADMIN — INSULIN LISPRO 2 UNITS: 100 INJECTION, SOLUTION INTRAVENOUS; SUBCUTANEOUS at 12:49

## 2019-01-01 RX ADMIN — AMIODARONE HYDROCHLORIDE 0.5 MG/MIN: 1.8 INJECTION, SOLUTION INTRAVENOUS at 14:17

## 2019-01-01 RX ADMIN — SODIUM CHLORIDE TAB 1 GM 1 G: 1 TAB at 13:08

## 2019-01-01 RX ADMIN — AMIODARONE HYDROCHLORIDE 0.5 MG/MIN: 1.8 INJECTION, SOLUTION INTRAVENOUS at 13:39

## 2019-01-01 RX ADMIN — CLOPIDOGREL BISULFATE 75 MG: 75 TABLET ORAL at 09:38

## 2019-01-01 RX ADMIN — METOPROLOL TARTRATE 100 MG: 100 TABLET, FILM COATED ORAL at 09:01

## 2019-01-01 RX ADMIN — PHENYLEPHRINE HYDROCHLORIDE 200 MCG: 10 INJECTION INTRAVENOUS at 10:46

## 2019-01-01 RX ADMIN — BACLOFEN 10 MG: 10 TABLET ORAL at 20:16

## 2019-01-01 RX ADMIN — Medication 10 ML: at 08:40

## 2019-01-01 RX ADMIN — PHENYLEPHRINE HYDROCHLORIDE 200 MCG: 10 INJECTION INTRAVENOUS at 10:03

## 2019-01-01 RX ADMIN — OXCARBAZEPINE 150 MG: 300 TABLET, FILM COATED ORAL at 09:00

## 2019-01-01 RX ADMIN — ISOSORBIDE DINITRATE 40 MG: 20 TABLET ORAL at 16:42

## 2019-01-01 RX ADMIN — NITROGLYCERIN 5 MCG/MIN: 20 INJECTION INTRAVENOUS at 23:16

## 2019-01-01 RX ADMIN — Medication 10 ML: at 08:42

## 2019-01-01 RX ADMIN — THERA TABS 1 TABLET: TAB at 09:44

## 2019-01-01 RX ADMIN — HYDRALAZINE HYDROCHLORIDE 50 MG: 50 TABLET, FILM COATED ORAL at 09:38

## 2019-01-01 RX ADMIN — ROSUVASTATIN CALCIUM 20 MG: 20 TABLET, FILM COATED ORAL at 08:55

## 2019-01-01 RX ADMIN — SODIUM CHLORIDE TAB 1 GM 1 G: 1 TAB at 08:41

## 2019-01-01 RX ADMIN — FERROUS SULFATE TAB 325 MG (65 MG ELEMENTAL FE) 325 MG: 325 (65 FE) TAB at 09:29

## 2019-01-01 RX ADMIN — CLOTRIMAZOLE AND BETAMETHASONE DIPROPIONATE: 10; .5 CREAM TOPICAL at 08:29

## 2019-01-01 RX ADMIN — FUROSEMIDE 40 MG: 40 TABLET ORAL at 18:03

## 2019-01-01 RX ADMIN — RANOLAZINE 500 MG: 500 TABLET, FILM COATED, EXTENDED RELEASE ORAL at 22:18

## 2019-01-01 RX ADMIN — MIDAZOLAM HYDROCHLORIDE 0.5 MG: 1 INJECTION, SOLUTION INTRAMUSCULAR; INTRAVENOUS at 13:15

## 2019-01-01 RX ADMIN — SODIUM CHLORIDE: 9 INJECTION, SOLUTION INTRAVENOUS at 12:58

## 2019-01-01 RX ADMIN — SODIUM CHLORIDE 500 ML: 9 INJECTION, SOLUTION INTRAVENOUS at 10:25

## 2019-01-01 RX ADMIN — ROSUVASTATIN CALCIUM 20 MG: 20 TABLET, FILM COATED ORAL at 08:58

## 2019-01-01 RX ADMIN — HYDROXYZINE HYDROCHLORIDE 25 MG: 25 TABLET ORAL at 20:46

## 2019-01-01 RX ADMIN — FUROSEMIDE 40 MG: 10 INJECTION, SOLUTION INTRAMUSCULAR; INTRAVENOUS at 17:14

## 2019-01-01 RX ADMIN — PHENYLEPHRINE HYDROCHLORIDE 150 MCG/MIN: 10 INJECTION INTRAVENOUS at 10:50

## 2019-01-01 RX ADMIN — IPRATROPIUM BROMIDE AND ALBUTEROL SULFATE 1 AMPULE: .5; 3 SOLUTION RESPIRATORY (INHALATION) at 00:37

## 2019-01-01 RX ADMIN — HYDRALAZINE HYDROCHLORIDE 25 MG: 50 TABLET, FILM COATED ORAL at 01:10

## 2019-01-01 RX ADMIN — FENTANYL CITRATE 25 MCG: 50 INJECTION INTRAMUSCULAR; INTRAVENOUS at 13:15

## 2019-01-01 RX ADMIN — HYDRALAZINE HYDROCHLORIDE 50 MG: 50 TABLET, FILM COATED ORAL at 17:56

## 2019-01-01 RX ADMIN — INSULIN LISPRO 3 UNITS: 100 INJECTION, SOLUTION INTRAVENOUS; SUBCUTANEOUS at 18:14

## 2019-01-01 RX ADMIN — HYDROCODONE BITARTRATE AND ACETAMINOPHEN 1 TABLET: 5; 325 TABLET ORAL at 11:04

## 2019-01-01 RX ADMIN — ENOXAPARIN SODIUM 40 MG: 40 INJECTION SUBCUTANEOUS at 22:50

## 2019-01-01 RX ADMIN — BUSPIRONE HYDROCHLORIDE 7.5 MG: 5 TABLET ORAL at 13:07

## 2019-01-01 RX ADMIN — FERROUS SULFATE TAB 325 MG (65 MG ELEMENTAL FE) 325 MG: 325 (65 FE) TAB at 20:00

## 2019-01-01 RX ADMIN — HYDROCODONE BITARTRATE AND ACETAMINOPHEN 0.5 TABLET: 5; 325 TABLET ORAL at 09:02

## 2019-01-01 RX ADMIN — FERROUS SULFATE TAB 325 MG (65 MG ELEMENTAL FE) 325 MG: 325 (65 FE) TAB at 20:16

## 2019-01-01 RX ADMIN — HYDROXYZINE HYDROCHLORIDE 25 MG: 25 TABLET ORAL at 23:38

## 2019-01-01 RX ADMIN — PANTOPRAZOLE SODIUM 40 MG: 40 TABLET, DELAYED RELEASE ORAL at 05:16

## 2019-01-01 RX ADMIN — IPRATROPIUM BROMIDE AND ALBUTEROL SULFATE 1 AMPULE: .5; 3 SOLUTION RESPIRATORY (INHALATION) at 09:07

## 2019-01-01 RX ADMIN — HYDROCODONE BITARTRATE AND ACETAMINOPHEN 0.5 TABLET: 5; 325 TABLET ORAL at 08:07

## 2019-01-01 RX ADMIN — FENTANYL CITRATE 50 MCG: 50 INJECTION, SOLUTION INTRAMUSCULAR; INTRAVENOUS at 06:27

## 2019-01-01 RX ADMIN — LEVOTHYROXINE SODIUM 125 MCG: 125 TABLET ORAL at 17:53

## 2019-01-01 RX ADMIN — PIPERACILLIN SODIUM,TAZOBACTAM SODIUM 3.38 G: 3; .375 INJECTION, POWDER, FOR SOLUTION INTRAVENOUS at 02:38

## 2019-01-01 RX ADMIN — MAGNESIUM SULFATE HEPTAHYDRATE 2 G: 40 INJECTION, SOLUTION INTRAVENOUS at 09:56

## 2019-01-01 RX ADMIN — POTASSIUM CHLORIDE 20 MEQ: 29.8 INJECTION, SOLUTION INTRAVENOUS at 10:36

## 2019-01-01 RX ADMIN — POLYVINYL ALCOHOL 1 DROP: 14 SOLUTION/ DROPS OPHTHALMIC at 22:51

## 2019-01-01 RX ADMIN — HYDRALAZINE HYDROCHLORIDE 50 MG: 50 TABLET, FILM COATED ORAL at 16:47

## 2019-01-01 RX ADMIN — ISOSORBIDE MONONITRATE 60 MG: 60 TABLET ORAL at 11:08

## 2019-01-01 RX ADMIN — DILTIAZEM HYDROCHLORIDE 20 MG/HR: 5 INJECTION INTRAVENOUS at 06:47

## 2019-01-01 RX ADMIN — RISPERIDONE 1 MG: 1 TABLET ORAL at 09:08

## 2019-01-01 RX ADMIN — OXCARBAZEPINE 150 MG: 300 TABLET, FILM COATED ORAL at 09:13

## 2019-01-01 RX ADMIN — FUROSEMIDE 40 MG: 40 TABLET ORAL at 09:01

## 2019-01-01 RX ADMIN — AMIODARONE HYDROCHLORIDE 200 MG: 200 TABLET ORAL at 12:39

## 2019-01-01 RX ADMIN — OXCARBAZEPINE 300 MG: 300 TABLET, FILM COATED ORAL at 18:00

## 2019-01-01 RX ADMIN — HYDRALAZINE HYDROCHLORIDE 50 MG: 50 TABLET, FILM COATED ORAL at 20:37

## 2019-01-01 RX ADMIN — ACETAMINOPHEN 650 MG: 325 TABLET ORAL at 10:34

## 2019-01-01 RX ADMIN — HYDROXYZINE HYDROCHLORIDE 25 MG: 25 TABLET ORAL at 17:56

## 2019-01-01 RX ADMIN — IOPAMIDOL 80 ML: 755 INJECTION, SOLUTION INTRAVENOUS at 20:34

## 2019-01-01 RX ADMIN — CALCIUM CARBONATE-VITAMIN D TAB 500 MG-200 UNIT 1 TABLET: 500-200 TAB at 08:26

## 2019-01-01 RX ADMIN — RANOLAZINE 500 MG: 500 TABLET, FILM COATED, EXTENDED RELEASE ORAL at 08:14

## 2019-01-01 RX ADMIN — FUROSEMIDE 40 MG: 40 TABLET ORAL at 20:52

## 2019-01-01 RX ADMIN — CALCIUM CARBONATE-VITAMIN D TAB 500 MG-200 UNIT 1 TABLET: 500-200 TAB at 08:41

## 2019-01-01 RX ADMIN — CLOPIDOGREL 75 MG: 75 TABLET, FILM COATED ORAL at 10:26

## 2019-01-01 RX ADMIN — RANOLAZINE 500 MG: 500 TABLET, FILM COATED, EXTENDED RELEASE ORAL at 09:08

## 2019-01-01 RX ADMIN — HYDROXYZINE HYDROCHLORIDE 25 MG: 25 TABLET, FILM COATED ORAL at 05:32

## 2019-01-01 RX ADMIN — RISPERIDONE 1 MG: 1 TABLET ORAL at 09:37

## 2019-01-01 RX ADMIN — CEFAZOLIN 2000 MG: 1 INJECTION, POWDER, FOR SOLUTION INTRAMUSCULAR; INTRAVENOUS; PARENTERAL at 13:20

## 2019-01-01 RX ADMIN — ACETAMINOPHEN 650 MG: 325 TABLET ORAL at 17:20

## 2019-01-01 RX ADMIN — SODIUM CHLORIDE TAB 1 GM 1 G: 1 TAB at 17:39

## 2019-01-01 RX ADMIN — ENOXAPARIN SODIUM 100 MG: 100 INJECTION SUBCUTANEOUS at 09:35

## 2019-01-01 RX ADMIN — RISPERIDONE 1 MG: 1 TABLET ORAL at 09:00

## 2019-01-01 RX ADMIN — IPRATROPIUM BROMIDE AND ALBUTEROL SULFATE 1 AMPULE: .5; 3 SOLUTION RESPIRATORY (INHALATION) at 07:31

## 2019-01-01 RX ADMIN — FLUTICASONE PROPIONATE 1 SPRAY: 50 SPRAY, METERED NASAL at 08:32

## 2019-01-01 RX ADMIN — SPIRONOLACTONE 25 MG: 25 TABLET ORAL at 16:39

## 2019-01-01 RX ADMIN — IPRATROPIUM BROMIDE AND ALBUTEROL SULFATE 1 AMPULE: .5; 3 SOLUTION RESPIRATORY (INHALATION) at 17:50

## 2019-01-01 RX ADMIN — HYDRALAZINE HYDROCHLORIDE 10 MG: 20 INJECTION INTRAMUSCULAR; INTRAVENOUS at 17:07

## 2019-01-01 RX ADMIN — Medication 1000 MCG: at 17:52

## 2019-01-01 RX ADMIN — PANTOPRAZOLE SODIUM 40 MG: 40 TABLET, DELAYED RELEASE ORAL at 05:45

## 2019-01-01 RX ADMIN — ENOXAPARIN SODIUM 40 MG: 40 INJECTION SUBCUTANEOUS at 20:47

## 2019-01-01 RX ADMIN — DILTIAZEM HYDROCHLORIDE 10 MG: 5 INJECTION INTRAVENOUS at 17:12

## 2019-01-01 RX ADMIN — RISPERIDONE 1 MG: 1 TABLET ORAL at 09:34

## 2019-01-01 RX ADMIN — HYDROCODONE BITARTRATE AND ACETAMINOPHEN 1 TABLET: 5; 325 TABLET ORAL at 05:50

## 2019-01-01 RX ADMIN — POTASSIUM CHLORIDE 20 MEQ: 29.8 INJECTION, SOLUTION INTRAVENOUS at 06:33

## 2019-01-01 RX ADMIN — CALCIUM CARBONATE-VITAMIN D TAB 500 MG-200 UNIT 1 TABLET: 500-200 TAB at 08:28

## 2019-01-01 RX ADMIN — Medication 2 PUFF: at 07:38

## 2019-01-01 RX ADMIN — Medication 2 G: at 05:28

## 2019-01-01 RX ADMIN — INSULIN LISPRO 1 UNITS: 100 INJECTION, SOLUTION INTRAVENOUS; SUBCUTANEOUS at 11:26

## 2019-01-01 RX ADMIN — METOPROLOL TARTRATE 100 MG: 100 TABLET, FILM COATED ORAL at 20:20

## 2019-01-01 RX ADMIN — AMIODARONE HYDROCHLORIDE 0.5 MG/MIN: 1.8 INJECTION, SOLUTION INTRAVENOUS at 02:24

## 2019-01-01 RX ADMIN — BUSPIRONE HYDROCHLORIDE 7.5 MG: 5 TABLET ORAL at 14:15

## 2019-01-01 RX ADMIN — HYDRALAZINE HYDROCHLORIDE 50 MG: 50 TABLET, FILM COATED ORAL at 13:10

## 2019-01-01 RX ADMIN — Medication 2 G: at 13:58

## 2019-01-01 RX ADMIN — HYDROXYZINE HYDROCHLORIDE 25 MG: 25 TABLET ORAL at 10:25

## 2019-01-01 RX ADMIN — FUROSEMIDE 40 MG: 10 INJECTION, SOLUTION INTRAMUSCULAR; INTRAVENOUS at 09:21

## 2019-01-01 RX ADMIN — HYDROCODONE BITARTRATE AND ACETAMINOPHEN 1 TABLET: 5; 325 TABLET ORAL at 03:44

## 2019-01-01 RX ADMIN — SODIUM CHLORIDE TAB 1 GM 1 G: 1 TAB at 17:18

## 2019-01-01 RX ADMIN — HYDRALAZINE HYDROCHLORIDE 50 MG: 50 TABLET, FILM COATED ORAL at 20:16

## 2019-01-01 RX ADMIN — Medication 10 MG: at 13:46

## 2019-01-01 RX ADMIN — ACETAMINOPHEN 650 MG: 325 TABLET ORAL at 05:55

## 2019-01-01 RX ADMIN — SODIUM CHLORIDE: 9 INJECTION, SOLUTION INTRAVENOUS at 12:46

## 2019-01-01 RX ADMIN — BACLOFEN 10 MG: 10 TABLET ORAL at 08:55

## 2019-01-01 RX ADMIN — POTASSIUM CHLORIDE 20 MEQ: 20 TABLET, EXTENDED RELEASE ORAL at 09:43

## 2019-01-01 RX ADMIN — IPRATROPIUM BROMIDE AND ALBUTEROL SULFATE 1 AMPULE: .5; 3 SOLUTION RESPIRATORY (INHALATION) at 12:54

## 2019-01-01 RX ADMIN — FENTANYL CITRATE 50 MCG: 50 INJECTION INTRAMUSCULAR; INTRAVENOUS at 11:10

## 2019-01-01 RX ADMIN — Medication 1000 MCG: at 09:13

## 2019-01-01 RX ADMIN — POTASSIUM CHLORIDE AND SODIUM CHLORIDE: 900; 150 INJECTION, SOLUTION INTRAVENOUS at 04:51

## 2019-01-01 RX ADMIN — ASPIRIN 81 MG: 81 TABLET, COATED ORAL at 08:07

## 2019-01-01 RX ADMIN — RISPERIDONE 1 MG: 1 TABLET ORAL at 09:01

## 2019-01-01 RX ADMIN — PHENYLEPHRINE HYDROCHLORIDE 50 MCG/MIN: 10 INJECTION INTRAVENOUS at 14:12

## 2019-01-01 RX ADMIN — BUMETANIDE 1 MG: 0.25 INJECTION INTRAMUSCULAR; INTRAVENOUS at 10:24

## 2019-01-01 RX ADMIN — METOPROLOL TARTRATE 5 MG: 5 INJECTION INTRAVENOUS at 18:16

## 2019-01-01 RX ADMIN — RISPERIDONE 1 MG: 1 TABLET ORAL at 07:57

## 2019-01-01 RX ADMIN — POTASSIUM CHLORIDE 10 MEQ: 7.46 INJECTION, SOLUTION INTRAVENOUS at 17:17

## 2019-01-01 RX ADMIN — HEPARIN SODIUM 6700 UNITS: 1000 INJECTION, SOLUTION INTRAVENOUS; SUBCUTANEOUS at 09:49

## 2019-01-01 RX ADMIN — HYDROCODONE BITARTRATE AND ACETAMINOPHEN 1 TABLET: 5; 325 TABLET ORAL at 13:06

## 2019-01-01 RX ADMIN — LOSARTAN POTASSIUM 25 MG: 25 TABLET, FILM COATED ORAL at 20:52

## 2019-01-01 RX ADMIN — Medication 10 ML: at 09:01

## 2019-01-01 RX ADMIN — LEVOTHYROXINE SODIUM 100 MCG: 100 TABLET ORAL at 05:51

## 2019-01-01 RX ADMIN — ACETAMINOPHEN 1000 MG: 650 SUPPOSITORY RECTAL at 11:36

## 2019-01-01 RX ADMIN — LOSARTAN POTASSIUM 25 MG: 25 TABLET, FILM COATED ORAL at 20:38

## 2019-01-01 RX ADMIN — RANOLAZINE 500 MG: 500 TABLET, FILM COATED, EXTENDED RELEASE ORAL at 19:58

## 2019-01-01 RX ADMIN — Medication 10 ML: at 09:36

## 2019-01-01 RX ADMIN — RANOLAZINE 500 MG: 500 TABLET, FILM COATED, EXTENDED RELEASE ORAL at 22:50

## 2019-01-01 RX ADMIN — HYDROCODONE BITARTRATE AND ACETAMINOPHEN 1 TABLET: 5; 325 TABLET ORAL at 15:51

## 2019-01-01 RX ADMIN — ACETAMINOPHEN 650 MG: 325 TABLET ORAL at 06:33

## 2019-01-01 RX ADMIN — HYDRALAZINE HYDROCHLORIDE 50 MG: 50 TABLET, FILM COATED ORAL at 14:28

## 2019-01-01 RX ADMIN — Medication 1000 MCG: at 08:26

## 2019-01-01 RX ADMIN — INSULIN LISPRO 2 UNITS: 100 INJECTION, SOLUTION INTRAVENOUS; SUBCUTANEOUS at 13:01

## 2019-01-01 RX ADMIN — Medication 10 ML: at 08:58

## 2019-01-01 RX ADMIN — METOPROLOL TARTRATE 100 MG: 100 TABLET, FILM COATED ORAL at 08:41

## 2019-01-01 RX ADMIN — ASPIRIN 81 MG: 81 TABLET ORAL at 11:07

## 2019-01-01 RX ADMIN — RANOLAZINE 500 MG: 500 TABLET, FILM COATED, EXTENDED RELEASE ORAL at 10:35

## 2019-01-01 RX ADMIN — POTASSIUM CHLORIDE 40 MEQ: 750 TABLET, EXTENDED RELEASE ORAL at 08:05

## 2019-01-01 RX ADMIN — Medication 1000 MCG: at 10:25

## 2019-01-01 RX ADMIN — HYDROXYZINE HYDROCHLORIDE 25 MG: 25 TABLET ORAL at 09:13

## 2019-01-01 RX ADMIN — POTASSIUM CHLORIDE 20 MEQ: 29.8 INJECTION, SOLUTION INTRAVENOUS at 21:52

## 2019-01-01 RX ADMIN — Medication 10 ML: at 20:19

## 2019-01-01 RX ADMIN — HYDROXYZINE HYDROCHLORIDE 25 MG: 25 TABLET ORAL at 09:29

## 2019-01-01 RX ADMIN — LEVOTHYROXINE SODIUM 125 MCG: 125 TABLET ORAL at 05:16

## 2019-01-01 RX ADMIN — RANOLAZINE 500 MG: 500 TABLET, FILM COATED, EXTENDED RELEASE ORAL at 20:54

## 2019-01-01 RX ADMIN — HYDRALAZINE HYDROCHLORIDE 50 MG: 50 TABLET, FILM COATED ORAL at 20:01

## 2019-01-01 RX ADMIN — LABETALOL 20 MG/4 ML (5 MG/ML) INTRAVENOUS SYRINGE 5 MG: at 08:30

## 2019-01-01 RX ADMIN — FERROUS SULFATE TAB 325 MG (65 MG ELEMENTAL FE) 325 MG: 325 (65 FE) TAB at 19:58

## 2019-01-01 RX ADMIN — ROSUVASTATIN CALCIUM 20 MG: 20 TABLET, FILM COATED ORAL at 21:16

## 2019-01-01 RX ADMIN — THERA TABS 1 TABLET: TAB at 09:35

## 2019-01-01 RX ADMIN — MIDAZOLAM HYDROCHLORIDE 1 MG: 1 INJECTION, SOLUTION INTRAMUSCULAR; INTRAVENOUS at 13:06

## 2019-01-01 RX ADMIN — SODIUM CHLORIDE 250 ML: 9 INJECTION, SOLUTION INTRAVENOUS at 13:58

## 2019-01-01 RX ADMIN — SODIUM CHLORIDE TAB 1 GM 1 G: 1 TAB at 08:55

## 2019-01-01 RX ADMIN — METHYLPREDNISOLONE SODIUM SUCCINATE 40 MG: 40 INJECTION, POWDER, FOR SOLUTION INTRAMUSCULAR; INTRAVENOUS at 11:29

## 2019-01-01 RX ADMIN — Medication 1000 MCG: at 09:00

## 2019-01-01 RX ADMIN — POTASSIUM CHLORIDE 20 MEQ: 29.8 INJECTION, SOLUTION INTRAVENOUS at 00:05

## 2019-01-01 RX ADMIN — IPRATROPIUM BROMIDE AND ALBUTEROL SULFATE 1 AMPULE: .5; 3 SOLUTION RESPIRATORY (INHALATION) at 12:12

## 2019-01-01 RX ADMIN — SODIUM CHLORIDE: 9 INJECTION, SOLUTION INTRAVENOUS at 15:14

## 2019-01-01 RX ADMIN — FUROSEMIDE 40 MG: 10 INJECTION INTRAMUSCULAR; INTRAVENOUS at 16:44

## 2019-01-01 RX ADMIN — BACLOFEN 10 MG: 10 TABLET ORAL at 09:29

## 2019-01-01 RX ADMIN — POTASSIUM CHLORIDE 20 MEQ: 20 TABLET, EXTENDED RELEASE ORAL at 09:37

## 2019-01-01 RX ADMIN — Medication 1000 MCG: at 08:08

## 2019-01-01 RX ADMIN — Medication 10 ML: at 08:56

## 2019-01-01 RX ADMIN — HYDRALAZINE HYDROCHLORIDE 50 MG: 50 TABLET, FILM COATED ORAL at 08:57

## 2019-01-01 RX ADMIN — PREDNISONE 40 MG: 20 TABLET ORAL at 09:13

## 2019-01-01 RX ADMIN — ASPIRIN 81 MG: 81 TABLET, COATED ORAL at 09:37

## 2019-01-01 RX ADMIN — ESCITALOPRAM OXALATE 20 MG: 20 TABLET, FILM COATED ORAL at 09:43

## 2019-01-01 RX ADMIN — ESCITALOPRAM 20 MG: 20 TABLET, FILM COATED ORAL at 09:18

## 2019-01-01 RX ADMIN — SODIUM CHLORIDE TAB 1 GM 1 G: 1 TAB at 12:37

## 2019-01-01 RX ADMIN — ENOXAPARIN SODIUM 40 MG: 40 INJECTION SUBCUTANEOUS at 20:00

## 2019-01-01 RX ADMIN — LEVOTHYROXINE SODIUM 125 MCG: 125 TABLET ORAL at 16:40

## 2019-01-01 RX ADMIN — PREDNISONE 40 MG: 20 TABLET ORAL at 17:52

## 2019-01-01 RX ADMIN — HYDROCODONE BITARTRATE AND ACETAMINOPHEN 1 TABLET: 5; 325 TABLET ORAL at 17:44

## 2019-01-01 RX ADMIN — BUSPIRONE HYDROCHLORIDE 7.5 MG: 5 TABLET ORAL at 14:01

## 2019-01-01 RX ADMIN — HYDROCODONE BITARTRATE AND ACETAMINOPHEN 0.5 TABLET: 5; 325 TABLET ORAL at 20:19

## 2019-01-01 RX ADMIN — RANOLAZINE 500 MG: 500 TABLET, FILM COATED, EXTENDED RELEASE ORAL at 20:01

## 2019-01-01 RX ADMIN — SODIUM CHLORIDE: 9 INJECTION, SOLUTION INTRAVENOUS at 11:15

## 2019-01-01 RX ADMIN — HYDROXYZINE HYDROCHLORIDE 25 MG: 25 TABLET ORAL at 15:02

## 2019-01-01 RX ADMIN — BUMETANIDE 1 MG: 0.25 INJECTION INTRAMUSCULAR; INTRAVENOUS at 12:47

## 2019-01-01 RX ADMIN — CEFTRIAXONE SODIUM 1 G: 1 INJECTION, POWDER, FOR SOLUTION INTRAMUSCULAR; INTRAVENOUS at 16:33

## 2019-01-01 RX ADMIN — Medication 2 PUFF: at 07:30

## 2019-01-01 RX ADMIN — IPRATROPIUM BROMIDE AND ALBUTEROL SULFATE 1 AMPULE: .5; 3 SOLUTION RESPIRATORY (INHALATION) at 22:06

## 2019-01-01 RX ADMIN — HYDRALAZINE HYDROCHLORIDE 50 MG: 50 TABLET, FILM COATED ORAL at 05:34

## 2019-01-01 RX ADMIN — HYDRALAZINE HYDROCHLORIDE 5 MG: 20 INJECTION INTRAMUSCULAR; INTRAVENOUS at 03:56

## 2019-01-01 RX ADMIN — ENOXAPARIN SODIUM 40 MG: 40 INJECTION SUBCUTANEOUS at 17:45

## 2019-01-01 RX ADMIN — BACLOFEN 10 MG: 10 TABLET ORAL at 20:01

## 2019-01-01 RX ADMIN — METOPROLOL TARTRATE 100 MG: 100 TABLET, FILM COATED ORAL at 09:05

## 2019-01-01 RX ADMIN — PROPOFOL 20 MG: 10 INJECTION, EMULSION INTRAVENOUS at 09:37

## 2019-01-01 RX ADMIN — OXCARBAZEPINE 150 MG: 300 TABLET, FILM COATED ORAL at 08:42

## 2019-01-01 RX ADMIN — ACETAMINOPHEN 650 MG: 325 TABLET ORAL at 08:05

## 2019-01-01 RX ADMIN — LEVOTHYROXINE SODIUM 125 MCG: 125 TABLET ORAL at 06:34

## 2019-01-01 RX ADMIN — FERROUS SULFATE TAB 325 MG (65 MG ELEMENTAL FE) 325 MG: 325 (65 FE) TAB at 09:00

## 2019-01-01 RX ADMIN — IPRATROPIUM BROMIDE AND ALBUTEROL SULFATE 1 AMPULE: .5; 3 SOLUTION RESPIRATORY (INHALATION) at 13:36

## 2019-01-01 RX ADMIN — FLUTICASONE PROPIONATE 1 SPRAY: 50 SPRAY, METERED NASAL at 08:09

## 2019-01-01 RX ADMIN — BACLOFEN 10 MG: 10 TABLET ORAL at 10:26

## 2019-01-01 RX ADMIN — HYDROXYZINE HYDROCHLORIDE 25 MG: 25 TABLET ORAL at 16:42

## 2019-01-01 RX ADMIN — RISPERIDONE 1 MG: 1 TABLET ORAL at 09:13

## 2019-01-01 RX ADMIN — SODIUM CHLORIDE: 9 INJECTION, SOLUTION INTRAVENOUS at 12:53

## 2019-01-01 RX ADMIN — METOPROLOL TARTRATE 100 MG: 100 TABLET, FILM COATED ORAL at 20:46

## 2019-01-01 RX ADMIN — ASPIRIN 81 MG 81 MG: 81 TABLET ORAL at 09:29

## 2019-01-01 RX ADMIN — BUSPIRONE HYDROCHLORIDE 7.5 MG: 5 TABLET ORAL at 08:12

## 2019-01-01 RX ADMIN — POTASSIUM CHLORIDE 20 MEQ: 29.8 INJECTION, SOLUTION INTRAVENOUS at 17:39

## 2019-01-01 RX ADMIN — FERROUS SULFATE TAB 325 MG (65 MG ELEMENTAL FE) 325 MG: 325 (65 FE) TAB at 21:59

## 2019-01-01 RX ADMIN — OXCARBAZEPINE 150 MG: 300 TABLET, FILM COATED ORAL at 10:27

## 2019-01-01 RX ADMIN — METOPROLOL TARTRATE 5 MG: 5 INJECTION INTRAVENOUS at 18:03

## 2019-01-01 RX ADMIN — ACETAMINOPHEN 650 MG: 325 TABLET ORAL at 00:26

## 2019-01-01 RX ADMIN — LEVOTHYROXINE SODIUM 100 MCG: 100 TABLET ORAL at 05:43

## 2019-01-01 RX ADMIN — ESCITALOPRAM 20 MG: 20 TABLET, FILM COATED ORAL at 08:29

## 2019-01-01 RX ADMIN — BUSPIRONE HYDROCHLORIDE 7.5 MG: 5 TABLET ORAL at 14:25

## 2019-01-01 RX ADMIN — Medication 2 PUFF: at 20:50

## 2019-01-01 RX ADMIN — PROTAMINE SULFATE 50 MG: 10 INJECTION, SOLUTION INTRAVENOUS at 14:33

## 2019-01-01 RX ADMIN — MAGNESIUM SULFATE HEPTAHYDRATE 2 G: 40 INJECTION, SOLUTION INTRAVENOUS at 17:05

## 2019-01-01 RX ADMIN — LOSARTAN POTASSIUM 50 MG: 50 TABLET, FILM COATED ORAL at 08:23

## 2019-01-01 RX ADMIN — IPRATROPIUM BROMIDE AND ALBUTEROL SULFATE 1 AMPULE: .5; 3 SOLUTION RESPIRATORY (INHALATION) at 20:39

## 2019-01-01 RX ADMIN — THERA TABS 1 TABLET: TAB at 16:40

## 2019-01-01 RX ADMIN — THERA TABS 1 TABLET: TAB at 09:01

## 2019-01-01 RX ADMIN — METOPROLOL TARTRATE 100 MG: 100 TABLET, FILM COATED ORAL at 21:17

## 2019-01-01 RX ADMIN — ISOSORBIDE DINITRATE 40 MG: 20 TABLET ORAL at 13:10

## 2019-01-01 RX ADMIN — FERROUS SULFATE TAB 325 MG (65 MG ELEMENTAL FE) 325 MG: 325 (65 FE) TAB at 22:50

## 2019-01-01 RX ADMIN — RANOLAZINE 500 MG: 500 TABLET, FILM COATED, EXTENDED RELEASE ORAL at 08:23

## 2019-01-01 RX ADMIN — THERA TABS 1 TABLET: TAB at 08:17

## 2019-01-01 RX ADMIN — BUSPIRONE HYDROCHLORIDE 7.5 MG: 5 TABLET ORAL at 08:29

## 2019-01-01 RX ADMIN — HYDROXYZINE HYDROCHLORIDE 25 MG: 25 TABLET ORAL at 08:55

## 2019-01-01 RX ADMIN — BACLOFEN 10 MG: 10 TABLET ORAL at 05:34

## 2019-01-01 RX ADMIN — Medication 10 ML: at 10:24

## 2019-01-01 RX ADMIN — SODIUM CHLORIDE: 9 INJECTION, SOLUTION INTRAVENOUS at 12:28

## 2019-01-01 RX ADMIN — CALCIUM CARBONATE-VITAMIN D TAB 500 MG-200 UNIT 1 TABLET: 500-200 TAB at 08:29

## 2019-01-01 RX ADMIN — HYDRALAZINE HYDROCHLORIDE 10 MG: 20 INJECTION INTRAMUSCULAR; INTRAVENOUS at 20:58

## 2019-01-01 RX ADMIN — ROSUVASTATIN CALCIUM 20 MG: 20 TABLET, FILM COATED ORAL at 22:23

## 2019-01-01 RX ADMIN — SODIUM CHLORIDE TAB 1 GM 2 G: 1 TAB at 13:24

## 2019-01-01 RX ADMIN — METOPROLOL TARTRATE 100 MG: 100 TABLET, FILM COATED ORAL at 09:37

## 2019-01-01 RX ADMIN — CALCIUM CARBONATE-VITAMIN D TAB 500 MG-200 UNIT 1 TABLET: 500-200 TAB at 10:35

## 2019-01-01 RX ADMIN — Medication 2 PUFF: at 09:34

## 2019-01-01 RX ADMIN — PROPOFOL 10 MG: 10 INJECTION, EMULSION INTRAVENOUS at 14:16

## 2019-01-01 RX ADMIN — BUSPIRONE HYDROCHLORIDE 7.5 MG: 5 TABLET ORAL at 20:57

## 2019-01-01 RX ADMIN — FERROUS SULFATE TAB 325 MG (65 MG ELEMENTAL FE) 325 MG: 325 (65 FE) TAB at 10:26

## 2019-01-01 RX ADMIN — FENTANYL CITRATE 25 MCG: 50 INJECTION INTRAMUSCULAR; INTRAVENOUS at 15:10

## 2019-01-01 RX ADMIN — HYDROCODONE BITARTRATE AND ACETAMINOPHEN 1 TABLET: 5; 325 TABLET ORAL at 13:54

## 2019-01-01 RX ADMIN — HYDROCODONE BITARTRATE AND ACETAMINOPHEN 1 TABLET: 5; 325 TABLET ORAL at 11:29

## 2019-01-01 RX ADMIN — HYDRALAZINE HYDROCHLORIDE 50 MG: 50 TABLET, FILM COATED ORAL at 08:41

## 2019-01-01 RX ADMIN — ACETAMINOPHEN 650 MG: 325 TABLET ORAL at 15:04

## 2019-01-01 RX ADMIN — PHENYLEPHRINE HYDROCHLORIDE 200 MCG: 10 INJECTION INTRAVENOUS at 10:08

## 2019-01-01 RX ADMIN — METOPROLOL TARTRATE 100 MG: 100 TABLET, FILM COATED ORAL at 07:57

## 2019-01-01 RX ADMIN — LEVOTHYROXINE SODIUM 100 MCG: 100 TABLET ORAL at 10:27

## 2019-01-01 RX ADMIN — Medication 2 PUFF: at 21:44

## 2019-01-01 RX ADMIN — HYDROXYZINE HYDROCHLORIDE 25 MG: 25 TABLET ORAL at 13:18

## 2019-01-01 RX ADMIN — OXCARBAZEPINE 300 MG: 300 TABLET, FILM COATED ORAL at 19:21

## 2019-01-01 RX ADMIN — Medication 2 PUFF: at 19:50

## 2019-01-01 RX ADMIN — ISOSORBIDE DINITRATE 40 MG: 20 TABLET ORAL at 09:29

## 2019-01-01 RX ADMIN — METOPROLOL TARTRATE 100 MG: 100 TABLET, FILM COATED ORAL at 08:23

## 2019-01-01 RX ADMIN — AMIODARONE HYDROCHLORIDE 0.5 MG/MIN: 1.8 INJECTION, SOLUTION INTRAVENOUS at 00:58

## 2019-01-01 RX ADMIN — DOXEPIN HYDROCHLORIDE 150 MG: 50 CAPSULE ORAL at 21:59

## 2019-01-01 RX ADMIN — FERROUS SULFATE TAB 325 MG (65 MG ELEMENTAL FE) 325 MG: 325 (65 FE) TAB at 08:29

## 2019-01-01 RX ADMIN — FERROUS SULFATE TAB 325 MG (65 MG ELEMENTAL FE) 325 MG: 325 (65 FE) TAB at 08:26

## 2019-01-01 RX ADMIN — HYDROCODONE BITARTRATE AND ACETAMINOPHEN 1 TABLET: 5; 325 TABLET ORAL at 13:26

## 2019-01-01 RX ADMIN — METOPROLOL TARTRATE 5 MG: 5 INJECTION, SOLUTION INTRAVENOUS at 06:34

## 2019-01-01 RX ADMIN — NAFCILLIN SODIUM 12 G: 2 INJECTION, POWDER, LYOPHILIZED, FOR SOLUTION INTRAMUSCULAR; INTRAVENOUS at 22:21

## 2019-01-01 RX ADMIN — ROSUVASTATIN CALCIUM 20 MG: 20 TABLET, FILM COATED ORAL at 22:44

## 2019-01-01 RX ADMIN — POTASSIUM CHLORIDE 20 MEQ: 20 TABLET, EXTENDED RELEASE ORAL at 09:34

## 2019-01-01 RX ADMIN — ISOSORBIDE MONONITRATE 30 MG: 30 TABLET ORAL at 08:42

## 2019-01-01 RX ADMIN — PANTOPRAZOLE SODIUM 40 MG: 40 TABLET, DELAYED RELEASE ORAL at 10:32

## 2019-01-01 RX ADMIN — CALCIUM CARBONATE-VITAMIN D TAB 500 MG-200 UNIT 1 TABLET: 500-200 TAB at 10:24

## 2019-01-01 RX ADMIN — BACLOFEN 10 MG: 10 TABLET ORAL at 20:46

## 2019-01-01 RX ADMIN — ASPIRIN 81 MG: 81 TABLET, COATED ORAL at 09:13

## 2019-01-01 RX ADMIN — BACLOFEN 10 MG: 10 TABLET ORAL at 09:01

## 2019-01-01 RX ADMIN — HYDROCODONE BITARTRATE AND ACETAMINOPHEN 1 TABLET: 5; 325 TABLET ORAL at 18:54

## 2019-01-01 RX ADMIN — SPIRONOLACTONE 25 MG: 25 TABLET ORAL at 09:36

## 2019-01-01 RX ADMIN — CLOPIDOGREL BISULFATE 75 MG: 75 TABLET ORAL at 08:55

## 2019-01-01 RX ADMIN — HYDROCODONE BITARTRATE AND ACETAMINOPHEN 1 TABLET: 5; 325 TABLET ORAL at 08:32

## 2019-01-01 RX ADMIN — IPRATROPIUM BROMIDE AND ALBUTEROL SULFATE 1 AMPULE: .5; 3 SOLUTION RESPIRATORY (INHALATION) at 12:53

## 2019-01-01 RX ADMIN — CEFTRIAXONE SODIUM 1 G: 1 INJECTION, POWDER, FOR SOLUTION INTRAMUSCULAR; INTRAVENOUS at 17:45

## 2019-01-01 RX ADMIN — METOPROLOL TARTRATE 100 MG: 100 TABLET, FILM COATED ORAL at 09:35

## 2019-01-01 RX ADMIN — HEPARIN SODIUM 15000 UNITS: 1000 INJECTION, SOLUTION INTRAVENOUS; SUBCUTANEOUS at 14:01

## 2019-01-01 RX ADMIN — AMIODARONE HYDROCHLORIDE 200 MG: 200 TABLET ORAL at 21:17

## 2019-01-01 RX ADMIN — RANOLAZINE 500 MG: 500 TABLET, FILM COATED, EXTENDED RELEASE ORAL at 08:29

## 2019-01-01 RX ADMIN — METOPROLOL TARTRATE 100 MG: 100 TABLET, FILM COATED ORAL at 08:58

## 2019-01-01 RX ADMIN — IPRATROPIUM BROMIDE AND ALBUTEROL SULFATE 1 AMPULE: .5; 3 SOLUTION RESPIRATORY (INHALATION) at 18:38

## 2019-01-01 RX ADMIN — POLYVINYL ALCOHOL 1 DROP: 14 SOLUTION/ DROPS OPHTHALMIC at 08:23

## 2019-01-01 RX ADMIN — Medication 2 PUFF: at 07:31

## 2019-01-01 RX ADMIN — METOPROLOL TARTRATE 100 MG: 100 TABLET, FILM COATED ORAL at 09:43

## 2019-01-01 RX ADMIN — IPRATROPIUM BROMIDE AND ALBUTEROL SULFATE 1 AMPULE: .5; 3 SOLUTION RESPIRATORY (INHALATION) at 04:33

## 2019-01-01 RX ADMIN — DOCUSATE SODIUM 300 MG: 100 CAPSULE, LIQUID FILLED ORAL at 00:32

## 2019-01-01 RX ADMIN — ENOXAPARIN SODIUM 40 MG: 40 INJECTION SUBCUTANEOUS at 07:58

## 2019-01-01 RX ADMIN — METOPROLOL TARTRATE 100 MG: 100 TABLET, FILM COATED ORAL at 20:52

## 2019-01-01 RX ADMIN — INSULIN LISPRO 1 UNITS: 100 INJECTION, SOLUTION INTRAVENOUS; SUBCUTANEOUS at 20:02

## 2019-01-01 RX ADMIN — RISPERIDONE 1 MG: 1 TABLET ORAL at 11:31

## 2019-01-01 RX ADMIN — LEVOTHYROXINE SODIUM 100 MCG: 100 TABLET ORAL at 09:05

## 2019-01-01 RX ADMIN — RANOLAZINE 500 MG: 500 TABLET, FILM COATED, EXTENDED RELEASE ORAL at 20:46

## 2019-01-01 RX ADMIN — SPIRONOLACTONE 25 MG: 25 TABLET, FILM COATED ORAL at 10:32

## 2019-01-01 RX ADMIN — METOPROLOL TARTRATE 100 MG: 100 TABLET, FILM COATED ORAL at 08:19

## 2019-01-01 RX ADMIN — METOPROLOL TARTRATE 5 MG: 5 INJECTION INTRAVENOUS at 02:36

## 2019-01-01 RX ADMIN — HYDRALAZINE HYDROCHLORIDE 50 MG: 50 TABLET, FILM COATED ORAL at 08:55

## 2019-01-01 RX ADMIN — SODIUM CHLORIDE: 9 INJECTION, SOLUTION INTRAVENOUS at 09:04

## 2019-01-01 RX ADMIN — FERROUS SULFATE TAB 325 MG (65 MG ELEMENTAL FE) 325 MG: 325 (65 FE) TAB at 09:05

## 2019-01-01 RX ADMIN — SODIUM CHLORIDE 500 ML: 9 INJECTION, SOLUTION INTRAVENOUS at 14:20

## 2019-01-01 RX ADMIN — POLYVINYL ALCOHOL 1 DROP: 14 SOLUTION/ DROPS OPHTHALMIC at 20:00

## 2019-01-01 RX ADMIN — HYDROCODONE BITARTRATE AND ACETAMINOPHEN 1 TABLET: 5; 325 TABLET ORAL at 05:09

## 2019-01-01 RX ADMIN — ROSUVASTATIN CALCIUM 20 MG: 20 TABLET, FILM COATED ORAL at 22:18

## 2019-01-01 RX ADMIN — BACLOFEN 10 MG: 10 TABLET ORAL at 21:16

## 2019-01-01 RX ADMIN — METOPROLOL TARTRATE 5 MG: 5 INJECTION INTRAVENOUS at 21:53

## 2019-01-01 RX ADMIN — CLOPIDOGREL BISULFATE 75 MG: 75 TABLET ORAL at 07:58

## 2019-01-01 RX ADMIN — LOSARTAN POTASSIUM 50 MG: 50 TABLET, FILM COATED ORAL at 07:57

## 2019-01-01 RX ADMIN — FUROSEMIDE 40 MG: 40 TABLET ORAL at 17:05

## 2019-01-01 RX ADMIN — IOPAMIDOL 125 ML: 755 INJECTION, SOLUTION INTRAVENOUS at 13:32

## 2019-01-01 RX ADMIN — ENOXAPARIN SODIUM 40 MG: 40 INJECTION SUBCUTANEOUS at 08:46

## 2019-01-01 RX ADMIN — ASPIRIN 81 MG: 81 TABLET, COATED ORAL at 17:30

## 2019-01-01 RX ADMIN — CALCIUM CARBONATE-VITAMIN D TAB 500 MG-200 UNIT 1 TABLET: 500-200 TAB at 00:26

## 2019-01-01 RX ADMIN — Medication 10 ML: at 06:27

## 2019-01-01 RX ADMIN — Medication 2 PUFF: at 20:28

## 2019-01-01 RX ADMIN — Medication 1000 MCG: at 09:01

## 2019-01-01 RX ADMIN — HYDRALAZINE HYDROCHLORIDE 50 MG: 50 TABLET, FILM COATED ORAL at 14:31

## 2019-01-01 RX ADMIN — INSULIN LISPRO 1 UNITS: 100 INJECTION, SOLUTION INTRAVENOUS; SUBCUTANEOUS at 22:43

## 2019-01-01 RX ADMIN — SODIUM CHLORIDE 1000 ML: 9 INJECTION, SOLUTION INTRAVENOUS at 16:00

## 2019-01-01 RX ADMIN — RANOLAZINE 500 MG: 500 TABLET, FILM COATED, EXTENDED RELEASE ORAL at 21:16

## 2019-01-01 RX ADMIN — DILTIAZEM HYDROCHLORIDE 20 MG/HR: 5 INJECTION INTRAVENOUS at 20:52

## 2019-01-01 RX ADMIN — IPRATROPIUM BROMIDE AND ALBUTEROL SULFATE 1 AMPULE: .5; 3 SOLUTION RESPIRATORY (INHALATION) at 00:41

## 2019-01-01 RX ADMIN — IPRATROPIUM BROMIDE AND ALBUTEROL SULFATE 1 AMPULE: .5; 3 SOLUTION RESPIRATORY (INHALATION) at 16:37

## 2019-01-01 RX ADMIN — LOSARTAN POTASSIUM 50 MG: 50 TABLET, FILM COATED ORAL at 09:01

## 2019-01-01 RX ADMIN — HYDRALAZINE HYDROCHLORIDE 50 MG: 50 TABLET, FILM COATED ORAL at 20:52

## 2019-01-01 RX ADMIN — VANCOMYCIN HYDROCHLORIDE 500 MG: 500 INJECTION, POWDER, LYOPHILIZED, FOR SOLUTION INTRAVENOUS at 18:21

## 2019-01-01 RX ADMIN — Medication 10 ML: at 21:58

## 2019-01-01 RX ADMIN — ASPIRIN 81 MG: 81 TABLET, COATED ORAL at 09:01

## 2019-01-01 RX ADMIN — NYSTATIN: 100000 CREAM TOPICAL at 22:05

## 2019-01-01 RX ADMIN — METOPROLOL TARTRATE 100 MG: 100 TABLET, FILM COATED ORAL at 08:07

## 2019-01-01 RX ADMIN — MAGNESIUM SULFATE HEPTAHYDRATE 2 G: 40 INJECTION, SOLUTION INTRAVENOUS at 11:26

## 2019-01-01 RX ADMIN — DILTIAZEM HYDROCHLORIDE 20 MG/HR: 5 INJECTION INTRAVENOUS at 23:52

## 2019-01-01 RX ADMIN — HYDROXYZINE HYDROCHLORIDE 25 MG: 25 TABLET ORAL at 21:59

## 2019-01-01 RX ADMIN — FERROUS SULFATE TAB 325 MG (65 MG ELEMENTAL FE) 325 MG: 325 (65 FE) TAB at 08:23

## 2019-01-01 RX ADMIN — PHENYLEPHRINE HYDROCHLORIDE 200 MCG: 10 INJECTION INTRAVENOUS at 10:41

## 2019-01-01 RX ADMIN — Medication 10 ML: at 09:18

## 2019-01-01 RX ADMIN — Medication 2 PUFF: at 20:04

## 2019-01-01 RX ADMIN — HYDROCODONE BITARTRATE AND ACETAMINOPHEN 1 TABLET: 5; 325 TABLET ORAL at 20:54

## 2019-01-01 RX ADMIN — FAMOTIDINE 20 MG: 20 TABLET ORAL at 09:07

## 2019-01-01 RX ADMIN — ISOSORBIDE DINITRATE 40 MG: 20 TABLET ORAL at 09:05

## 2019-01-01 RX ADMIN — MICONAZOLE NITRATE: 2 POWDER TOPICAL at 09:02

## 2019-01-01 RX ADMIN — Medication 10 ML: at 03:06

## 2019-01-01 RX ADMIN — AMIODARONE HYDROCHLORIDE 0.5 MG/MIN: 1.8 INJECTION, SOLUTION INTRAVENOUS at 20:58

## 2019-01-01 RX ADMIN — THERA TABS 1 TABLET: TAB at 09:13

## 2019-01-01 RX ADMIN — ASPIRIN 81 MG: 81 TABLET, COATED ORAL at 08:57

## 2019-01-01 RX ADMIN — LOSARTAN POTASSIUM 50 MG: 50 TABLET, FILM COATED ORAL at 08:42

## 2019-01-01 RX ADMIN — ESCITALOPRAM 20 MG: 20 TABLET, FILM COATED ORAL at 08:26

## 2019-01-01 RX ADMIN — OXCARBAZEPINE 150 MG: 300 TABLET, FILM COATED ORAL at 10:32

## 2019-01-01 RX ADMIN — FERROUS SULFATE TAB 325 MG (65 MG ELEMENTAL FE) 325 MG: 325 (65 FE) TAB at 21:16

## 2019-01-01 RX ADMIN — HYDROXYZINE HYDROCHLORIDE 25 MG: 25 TABLET, FILM COATED ORAL at 14:15

## 2019-01-01 RX ADMIN — NYSTATIN: 100000 CREAM TOPICAL at 10:27

## 2019-01-01 RX ADMIN — IPRATROPIUM BROMIDE AND ALBUTEROL SULFATE 1 AMPULE: .5; 3 SOLUTION RESPIRATORY (INHALATION) at 00:43

## 2019-01-01 RX ADMIN — Medication 10 ML: at 08:11

## 2019-01-01 RX ADMIN — HYDROXYZINE HYDROCHLORIDE 25 MG: 25 TABLET ORAL at 20:16

## 2019-01-01 RX ADMIN — IPRATROPIUM BROMIDE AND ALBUTEROL SULFATE 1 AMPULE: .5; 3 SOLUTION RESPIRATORY (INHALATION) at 19:50

## 2019-01-01 RX ADMIN — ISOSORBIDE DINITRATE 40 MG: 20 TABLET ORAL at 10:26

## 2019-01-01 RX ADMIN — CALCIUM CARBONATE-VITAMIN D TAB 500 MG-200 UNIT 1 TABLET: 500-200 TAB at 00:31

## 2019-01-01 RX ADMIN — CHLORHEXIDINE GLUCONATE: 4 SOLUTION TOPICAL at 13:58

## 2019-01-01 RX ADMIN — LABETALOL 20 MG/4 ML (5 MG/ML) INTRAVENOUS SYRINGE 20 MG: at 16:11

## 2019-01-01 RX ADMIN — FENTANYL CITRATE 25 MCG: 50 INJECTION INTRAMUSCULAR; INTRAVENOUS at 09:23

## 2019-01-01 RX ADMIN — POTASSIUM CHLORIDE 10 MEQ: 7.46 INJECTION, SOLUTION INTRAVENOUS at 20:15

## 2019-01-01 RX ADMIN — ISOSORBIDE DINITRATE 40 MG: 20 TABLET ORAL at 19:56

## 2019-01-01 RX ADMIN — Medication 2 PUFF: at 19:52

## 2019-01-01 RX ADMIN — ASPIRIN 81 MG 81 MG: 81 TABLET ORAL at 10:31

## 2019-01-01 RX ADMIN — SODIUM CHLORIDE 1000 ML: 9 INJECTION, SOLUTION INTRAVENOUS at 11:56

## 2019-01-01 RX ADMIN — SODIUM CHLORIDE 250 ML: 9 INJECTION, SOLUTION INTRAVENOUS at 09:00

## 2019-01-01 RX ADMIN — BACLOFEN 10 MG: 10 TABLET ORAL at 23:38

## 2019-01-01 RX ADMIN — HYDROXYZINE HYDROCHLORIDE 25 MG: 25 TABLET ORAL at 13:26

## 2019-01-01 RX ADMIN — POTASSIUM CHLORIDE AND SODIUM CHLORIDE: 900; 150 INJECTION, SOLUTION INTRAVENOUS at 09:40

## 2019-01-01 RX ADMIN — DILTIAZEM HYDROCHLORIDE 20 MG/HR: 5 INJECTION INTRAVENOUS at 12:21

## 2019-01-01 RX ADMIN — RANOLAZINE 500 MG: 500 TABLET, FILM COATED, EXTENDED RELEASE ORAL at 09:37

## 2019-01-01 RX ADMIN — OXCARBAZEPINE 150 MG: 300 TABLET, FILM COATED ORAL at 09:38

## 2019-01-01 RX ADMIN — INSULIN LISPRO 2 UNITS: 100 INJECTION, SOLUTION INTRAVENOUS; SUBCUTANEOUS at 17:47

## 2019-01-01 RX ADMIN — INSULIN LISPRO 2 UNITS: 100 INJECTION, SOLUTION INTRAVENOUS; SUBCUTANEOUS at 16:33

## 2019-01-01 RX ADMIN — DOCUSATE SODIUM 100 MG: 100 CAPSULE, LIQUID FILLED ORAL at 08:40

## 2019-01-01 RX ADMIN — ISOSORBIDE DINITRATE 40 MG: 20 TABLET ORAL at 17:45

## 2019-01-01 RX ADMIN — Medication 10 ML: at 08:32

## 2019-01-01 RX ADMIN — SODIUM CHLORIDE TAB 1 GM 2 G: 1 TAB at 14:27

## 2019-01-01 RX ADMIN — METOPROLOL TARTRATE 100 MG: 100 TABLET, FILM COATED ORAL at 21:41

## 2019-01-01 RX ADMIN — LEVOTHYROXINE SODIUM 100 MCG: 100 TABLET ORAL at 06:18

## 2019-01-01 RX ADMIN — Medication 2 G: at 21:41

## 2019-01-01 RX ADMIN — HYDROXYZINE HYDROCHLORIDE 25 MG: 25 TABLET ORAL at 15:41

## 2019-01-01 RX ADMIN — THERA TABS 1 TABLET: TAB at 08:41

## 2019-01-01 RX ADMIN — ENOXAPARIN SODIUM 40 MG: 40 INJECTION SUBCUTANEOUS at 20:19

## 2019-01-01 RX ADMIN — METOPROLOL TARTRATE 100 MG: 100 TABLET, FILM COATED ORAL at 12:39

## 2019-01-01 RX ADMIN — Medication 2 PUFF: at 08:14

## 2019-01-01 RX ADMIN — Medication 10 ML: at 20:37

## 2019-01-01 RX ADMIN — FUROSEMIDE 40 MG: 10 INJECTION, SOLUTION INTRAMUSCULAR; INTRAVENOUS at 12:44

## 2019-01-01 RX ADMIN — THERA TABS 1 TABLET: TAB at 08:26

## 2019-01-01 RX ADMIN — IPRATROPIUM BROMIDE AND ALBUTEROL SULFATE 1 AMPULE: .5; 3 SOLUTION RESPIRATORY (INHALATION) at 15:48

## 2019-01-01 RX ADMIN — Medication 1000 MCG: at 09:44

## 2019-01-01 RX ADMIN — HYDRALAZINE HYDROCHLORIDE 25 MG: 25 TABLET ORAL at 09:08

## 2019-01-01 RX ADMIN — BUSPIRONE HYDROCHLORIDE 7.5 MG: 5 TABLET ORAL at 10:35

## 2019-01-01 RX ADMIN — LOSARTAN POTASSIUM 50 MG: 50 TABLET, FILM COATED ORAL at 17:53

## 2019-01-01 RX ADMIN — ISOSORBIDE DINITRATE 40 MG: 20 TABLET ORAL at 08:20

## 2019-01-01 RX ADMIN — HYDROXYZINE HYDROCHLORIDE 25 MG: 25 TABLET, FILM COATED ORAL at 08:29

## 2019-01-01 RX ADMIN — FENTANYL CITRATE 50 MCG: 50 INJECTION, SOLUTION INTRAMUSCULAR; INTRAVENOUS at 04:45

## 2019-01-01 RX ADMIN — Medication 10 ML: at 22:49

## 2019-01-01 RX ADMIN — PANTOPRAZOLE SODIUM 40 MG: 40 TABLET, DELAYED RELEASE ORAL at 05:43

## 2019-01-01 RX ADMIN — HYDRALAZINE HYDROCHLORIDE 50 MG: 50 TABLET, FILM COATED ORAL at 09:00

## 2019-01-01 RX ADMIN — OXCARBAZEPINE 150 MG: 300 TABLET, FILM COATED ORAL at 07:57

## 2019-01-01 RX ADMIN — HYDROXYZINE HYDROCHLORIDE 25 MG: 25 TABLET, FILM COATED ORAL at 12:39

## 2019-01-01 RX ADMIN — ESCITALOPRAM OXALATE 10 MG: 10 TABLET, FILM COATED ORAL at 10:25

## 2019-01-01 RX ADMIN — SODIUM CHLORIDE 500 ML: 9 INJECTION, SOLUTION INTRAVENOUS at 01:25

## 2019-01-01 RX ADMIN — METOPROLOL TARTRATE 5 MG: 5 INJECTION INTRAVENOUS at 10:50

## 2019-01-01 RX ADMIN — INSULIN LISPRO 2 UNITS: 100 INJECTION, SOLUTION INTRAVENOUS; SUBCUTANEOUS at 09:04

## 2019-01-01 RX ADMIN — IPRATROPIUM BROMIDE AND ALBUTEROL SULFATE 1 AMPULE: .5; 3 SOLUTION RESPIRATORY (INHALATION) at 20:15

## 2019-01-01 RX ADMIN — IPRATROPIUM BROMIDE AND ALBUTEROL SULFATE 1 AMPULE: .5; 3 SOLUTION RESPIRATORY (INHALATION) at 12:25

## 2019-01-01 RX ADMIN — AMIODARONE HYDROCHLORIDE 200 MG: 200 TABLET ORAL at 09:35

## 2019-01-01 RX ADMIN — RISPERIDONE 1 MG: 1 TABLET ORAL at 10:32

## 2019-01-01 RX ADMIN — BACLOFEN 10 MG: 10 TABLET ORAL at 09:53

## 2019-01-01 RX ADMIN — ACETAMINOPHEN 650 MG: 325 TABLET ORAL at 03:43

## 2019-01-01 RX ADMIN — GUAIFENESIN 600 MG: 600 TABLET, EXTENDED RELEASE ORAL at 09:43

## 2019-01-01 RX ADMIN — ISOSORBIDE DINITRATE 40 MG: 20 TABLET ORAL at 20:01

## 2019-01-01 RX ADMIN — BACLOFEN 10 MG: 10 TABLET ORAL at 10:25

## 2019-01-01 RX ADMIN — HYDROXYZINE HYDROCHLORIDE 25 MG: 25 TABLET ORAL at 22:23

## 2019-01-01 RX ADMIN — SODIUM CHLORIDE TAB 1 GM 1 G: 1 TAB at 08:58

## 2019-01-01 RX ADMIN — HYDROCODONE BITARTRATE AND ACETAMINOPHEN 1 TABLET: 5; 325 TABLET ORAL at 17:51

## 2019-01-01 RX ADMIN — Medication 1000 MCG: at 09:34

## 2019-01-01 RX ADMIN — SODIUM CHLORIDE TAB 1 GM 1 G: 1 TAB at 12:04

## 2019-01-01 RX ADMIN — THERA TABS 1 TABLET: TAB at 08:29

## 2019-01-01 RX ADMIN — NAFCILLIN SODIUM 12 G: 2 INJECTION, POWDER, LYOPHILIZED, FOR SOLUTION INTRAMUSCULAR; INTRAVENOUS at 22:50

## 2019-01-01 RX ADMIN — Medication 2 PUFF: at 08:52

## 2019-01-01 RX ADMIN — Medication 10 ML: at 17:09

## 2019-01-01 RX ADMIN — FERROUS SULFATE TAB 325 MG (65 MG ELEMENTAL FE) 325 MG: 325 (65 FE) TAB at 17:52

## 2019-01-01 RX ADMIN — BUMETANIDE 1 MG: 0.25 INJECTION INTRAMUSCULAR; INTRAVENOUS at 17:06

## 2019-01-01 RX ADMIN — HYDROXYZINE HYDROCHLORIDE 25 MG: 25 TABLET ORAL at 10:26

## 2019-01-01 RX ADMIN — HYDROXYZINE HYDROCHLORIDE 25 MG: 25 TABLET ORAL at 15:03

## 2019-01-01 RX ADMIN — Medication 1000 MCG: at 09:29

## 2019-01-01 RX ADMIN — ASPIRIN 81 MG: 81 TABLET, COATED ORAL at 09:00

## 2019-01-01 RX ADMIN — HYDRALAZINE HYDROCHLORIDE 25 MG: 25 TABLET ORAL at 20:00

## 2019-01-01 RX ADMIN — FUROSEMIDE 60 MG: 10 INJECTION INTRAMUSCULAR; INTRAVENOUS at 12:25

## 2019-01-01 RX ADMIN — CLOPIDOGREL BISULFATE 75 MG: 75 TABLET ORAL at 09:00

## 2019-01-01 RX ADMIN — RANOLAZINE 500 MG: 500 TABLET, FILM COATED, EXTENDED RELEASE ORAL at 08:41

## 2019-01-01 RX ADMIN — FENTANYL CITRATE 50 MCG: 50 INJECTION, SOLUTION INTRAMUSCULAR; INTRAVENOUS at 22:42

## 2019-01-01 RX ADMIN — HYDROXYZINE HYDROCHLORIDE 25 MG: 25 TABLET ORAL at 20:20

## 2019-01-01 RX ADMIN — CEFTRIAXONE SODIUM 1 G: 1 INJECTION, POWDER, FOR SOLUTION INTRAMUSCULAR; INTRAVENOUS at 18:03

## 2019-01-01 RX ADMIN — ESCITALOPRAM OXALATE 10 MG: 10 TABLET, FILM COATED ORAL at 10:26

## 2019-01-01 RX ADMIN — IOPAMIDOL 30 ML: 755 INJECTION, SOLUTION INTRAVENOUS at 15:03

## 2019-01-01 RX ADMIN — HYDROXYZINE HYDROCHLORIDE 25 MG: 25 TABLET ORAL at 20:54

## 2019-01-01 RX ADMIN — HYDROCODONE BITARTRATE AND ACETAMINOPHEN 1 TABLET: 5; 325 TABLET ORAL at 03:38

## 2019-01-01 RX ADMIN — ISOSORBIDE MONONITRATE 30 MG: 30 TABLET ORAL at 17:53

## 2019-01-01 RX ADMIN — LOSARTAN POTASSIUM 50 MG: 50 TABLET, FILM COATED ORAL at 08:58

## 2019-01-01 RX ADMIN — Medication 10 ML: at 20:18

## 2019-01-01 RX ADMIN — INSULIN LISPRO 1 UNITS: 100 INJECTION, SOLUTION INTRAVENOUS; SUBCUTANEOUS at 09:02

## 2019-01-01 RX ADMIN — BUSPIRONE HYDROCHLORIDE 7.5 MG: 5 TABLET ORAL at 09:35

## 2019-01-01 RX ADMIN — ENOXAPARIN SODIUM 40 MG: 40 INJECTION SUBCUTANEOUS at 16:34

## 2019-01-01 RX ADMIN — THERA TABS 1 TABLET: TAB at 10:35

## 2019-01-01 RX ADMIN — POTASSIUM CHLORIDE 10 MEQ: 7.46 INJECTION, SOLUTION INTRAVENOUS at 14:59

## 2019-01-01 RX ADMIN — HYDROCODONE BITARTRATE AND ACETAMINOPHEN 1 TABLET: 5; 325 TABLET ORAL at 23:39

## 2019-01-01 RX ADMIN — OXCARBAZEPINE 150 MG: 300 TABLET, FILM COATED ORAL at 09:53

## 2019-01-01 RX ADMIN — SODIUM CHLORIDE TAB 1 GM 2 G: 1 TAB at 08:03

## 2019-01-01 RX ADMIN — RANOLAZINE 500 MG: 500 TABLET, FILM COATED, EXTENDED RELEASE ORAL at 20:19

## 2019-01-01 RX ADMIN — FUROSEMIDE 40 MG: 10 INJECTION, SOLUTION INTRAMUSCULAR; INTRAVENOUS at 16:44

## 2019-01-01 RX ADMIN — Medication 10 ML: at 10:33

## 2019-01-01 RX ADMIN — LEVOTHYROXINE SODIUM 125 MCG: 125 TABLET ORAL at 05:41

## 2019-01-01 RX ADMIN — PHENYLEPHRINE HYDROCHLORIDE 150 MCG/MIN: 10 INJECTION INTRAVENOUS at 01:14

## 2019-01-01 RX ADMIN — BUSPIRONE HYDROCHLORIDE 7.5 MG: 5 TABLET ORAL at 08:07

## 2019-01-01 RX ADMIN — ONDANSETRON 4 MG: 2 INJECTION INTRAMUSCULAR; INTRAVENOUS at 08:41

## 2019-01-01 RX ADMIN — HYDROXYZINE HYDROCHLORIDE 25 MG: 25 TABLET ORAL at 17:45

## 2019-01-01 RX ADMIN — IPRATROPIUM BROMIDE AND ALBUTEROL SULFATE 1 AMPULE: .5; 3 SOLUTION RESPIRATORY (INHALATION) at 09:15

## 2019-01-01 RX ADMIN — HYDROXYZINE HYDROCHLORIDE 25 MG: 25 TABLET ORAL at 09:04

## 2019-01-01 RX ADMIN — ISOSORBIDE DINITRATE 40 MG: 20 TABLET ORAL at 16:47

## 2019-01-01 RX ADMIN — ISOSORBIDE DINITRATE 40 MG: 20 TABLET ORAL at 17:56

## 2019-01-01 RX ADMIN — NAFCILLIN SODIUM 2 G: 2 INJECTION, POWDER, LYOPHILIZED, FOR SOLUTION INTRAMUSCULAR; INTRAVENOUS at 18:23

## 2019-01-01 RX ADMIN — Medication 10 ML: at 20:54

## 2019-01-01 RX ADMIN — FLUTICASONE PROPIONATE 1 SPRAY: 50 SPRAY, METERED NASAL at 09:18

## 2019-01-01 RX ADMIN — RISPERIDONE 1 MG: 1 TABLET ORAL at 08:28

## 2019-01-01 RX ADMIN — RANOLAZINE 500 MG: 500 TABLET, FILM COATED, EXTENDED RELEASE ORAL at 20:38

## 2019-01-01 RX ADMIN — RISPERIDONE 1 MG: 1 TABLET ORAL at 08:29

## 2019-01-01 RX ADMIN — CEFEPIME HYDROCHLORIDE 2 G: 2 INJECTION, POWDER, FOR SOLUTION INTRAVENOUS at 12:50

## 2019-01-01 RX ADMIN — Medication 2 PUFF: at 09:14

## 2019-01-01 RX ADMIN — HYDROCODONE BITARTRATE AND ACETAMINOPHEN 0.5 TABLET: 5; 325 TABLET ORAL at 22:23

## 2019-01-01 RX ADMIN — AMIODARONE HYDROCHLORIDE 1 MG/MIN: 1.8 INJECTION, SOLUTION INTRAVENOUS at 03:22

## 2019-01-01 RX ADMIN — METOPROLOL TARTRATE 100 MG: 100 TABLET, FILM COATED ORAL at 09:30

## 2019-01-01 RX ADMIN — Medication 10 MG: at 14:16

## 2019-01-01 RX ADMIN — Medication 10 ML: at 00:11

## 2019-01-01 RX ADMIN — Medication 2 PUFF: at 20:16

## 2019-01-01 RX ADMIN — IPRATROPIUM BROMIDE AND ALBUTEROL SULFATE 1 AMPULE: .5; 3 SOLUTION RESPIRATORY (INHALATION) at 00:06

## 2019-01-01 RX ADMIN — Medication 3 MG: at 21:17

## 2019-01-01 RX ADMIN — POTASSIUM CHLORIDE 40 MEQ: 20 TABLET, EXTENDED RELEASE ORAL at 12:03

## 2019-01-01 RX ADMIN — LABETALOL 20 MG/4 ML (5 MG/ML) INTRAVENOUS SYRINGE 5 MG: at 18:24

## 2019-01-01 RX ADMIN — LOSARTAN POTASSIUM 50 MG: 50 TABLET, FILM COATED ORAL at 08:26

## 2019-01-01 RX ADMIN — MAGNESIUM SULFATE HEPTAHYDRATE 3 G: 500 INJECTION, SOLUTION INTRAMUSCULAR; INTRAVENOUS at 10:26

## 2019-01-01 RX ADMIN — Medication 10 ML: at 22:23

## 2019-01-01 RX ADMIN — ACETAMINOPHEN 650 MG: 325 TABLET ORAL at 21:00

## 2019-01-01 RX ADMIN — SODIUM CHLORIDE: 9 INJECTION, SOLUTION INTRAVENOUS at 01:54

## 2019-01-01 RX ADMIN — FUROSEMIDE 20 MG: 40 TABLET ORAL at 07:57

## 2019-01-01 RX ADMIN — ISOSORBIDE DINITRATE 40 MG: 20 TABLET ORAL at 08:57

## 2019-01-01 RX ADMIN — CLOPIDOGREL BISULFATE 75 MG: 75 TABLET ORAL at 11:07

## 2019-01-01 RX ADMIN — Medication 2 PUFF: at 08:48

## 2019-01-01 RX ADMIN — ROSUVASTATIN CALCIUM 20 MG: 20 TABLET, FILM COATED ORAL at 19:57

## 2019-01-01 RX ADMIN — Medication 2 PUFF: at 20:15

## 2019-01-01 RX ADMIN — DILTIAZEM HYDROCHLORIDE 12.5 MG/HR: 5 INJECTION INTRAVENOUS at 05:08

## 2019-01-01 RX ADMIN — RISPERIDONE 1 MG: 1 TABLET ORAL at 09:52

## 2019-01-01 RX ADMIN — BACLOFEN 10 MG: 10 TABLET ORAL at 08:41

## 2019-01-01 RX ADMIN — FUROSEMIDE 40 MG: 40 TABLET ORAL at 09:13

## 2019-01-01 RX ADMIN — INSULIN LISPRO 2 UNITS: 100 INJECTION, SOLUTION INTRAVENOUS; SUBCUTANEOUS at 11:52

## 2019-01-01 RX ADMIN — MICONAZOLE NITRATE: 2 POWDER TOPICAL at 09:18

## 2019-01-01 RX ADMIN — FERROUS SULFATE TAB 325 MG (65 MG ELEMENTAL FE) 325 MG: 325 (65 FE) TAB at 10:35

## 2019-01-01 RX ADMIN — ACETAMINOPHEN 650 MG: 325 TABLET ORAL at 13:32

## 2019-01-01 RX ADMIN — METOPROLOL TARTRATE 5 MG: 5 INJECTION INTRAVENOUS at 12:41

## 2019-01-01 RX ADMIN — METOPROLOL TARTRATE 100 MG: 100 TABLET, FILM COATED ORAL at 21:16

## 2019-01-01 RX ADMIN — INSULIN LISPRO 1 UNITS: 100 INJECTION, SOLUTION INTRAVENOUS; SUBCUTANEOUS at 13:22

## 2019-01-01 RX ADMIN — METOPROLOL TARTRATE 5 MG: 5 INJECTION INTRAVENOUS at 22:40

## 2019-01-01 RX ADMIN — LEVOTHYROXINE SODIUM 125 MCG: 125 TABLET ORAL at 05:58

## 2019-01-01 RX ADMIN — MICONAZOLE NITRATE: 2 POWDER TOPICAL at 20:20

## 2019-01-01 RX ADMIN — FLUTICASONE PROPIONATE 1 SPRAY: 50 SPRAY, METERED NASAL at 09:01

## 2019-01-01 RX ADMIN — ACETYLCYSTEINE 600 MG: 200 SOLUTION ORAL; RESPIRATORY (INHALATION) at 09:18

## 2019-01-01 RX ADMIN — IPRATROPIUM BROMIDE AND ALBUTEROL SULFATE 1 AMPULE: .5; 3 SOLUTION RESPIRATORY (INHALATION) at 17:46

## 2019-01-01 RX ADMIN — ACETAMINOPHEN 650 MG: 650 SUPPOSITORY RECTAL at 03:23

## 2019-01-01 RX ADMIN — HYDRALAZINE HYDROCHLORIDE 10 MG: 20 INJECTION INTRAMUSCULAR; INTRAVENOUS at 07:04

## 2019-01-01 RX ADMIN — THERA TABS 1 TABLET: TAB at 09:21

## 2019-01-01 RX ADMIN — METOPROLOL TARTRATE 100 MG: 100 TABLET, FILM COATED ORAL at 20:54

## 2019-01-01 RX ADMIN — ROSUVASTATIN CALCIUM 20 MG: 20 TABLET, FILM COATED ORAL at 23:37

## 2019-01-01 RX ADMIN — BUSPIRONE HYDROCHLORIDE 7.5 MG: 5 TABLET ORAL at 22:44

## 2019-01-01 RX ADMIN — ACETAMINOPHEN 650 MG: 325 TABLET ORAL at 20:03

## 2019-01-01 RX ADMIN — LOSARTAN POTASSIUM 50 MG: 50 TABLET, FILM COATED ORAL at 09:13

## 2019-01-01 RX ADMIN — PANTOPRAZOLE SODIUM 40 MG: 40 TABLET, DELAYED RELEASE ORAL at 05:44

## 2019-01-01 RX ADMIN — FUROSEMIDE 40 MG: 40 TABLET ORAL at 11:23

## 2019-01-01 RX ADMIN — Medication 10 ML: at 07:58

## 2019-01-01 RX ADMIN — ESCITALOPRAM OXALATE 20 MG: 20 TABLET, FILM COATED ORAL at 09:37

## 2019-01-01 RX ADMIN — INSULIN LISPRO 1 UNITS: 100 INJECTION, SOLUTION INTRAVENOUS; SUBCUTANEOUS at 20:54

## 2019-01-01 RX ADMIN — POTASSIUM CHLORIDE 10 MEQ: 7.46 INJECTION, SOLUTION INTRAVENOUS at 15:53

## 2019-01-01 RX ADMIN — METOPROLOL TARTRATE 100 MG: 100 TABLET, FILM COATED ORAL at 19:58

## 2019-01-01 RX ADMIN — BACLOFEN 10 MG: 10 TABLET ORAL at 20:52

## 2019-01-01 RX ADMIN — ESCITALOPRAM 20 MG: 20 TABLET, FILM COATED ORAL at 08:14

## 2019-01-01 RX ADMIN — Medication 10 ML: at 20:02

## 2019-01-01 RX ADMIN — SPIRONOLACTONE 25 MG: 25 TABLET, FILM COATED ORAL at 09:35

## 2019-01-01 RX ADMIN — Medication 2 PUFF: at 08:54

## 2019-01-01 RX ADMIN — HYDRALAZINE HYDROCHLORIDE 50 MG: 50 TABLET, FILM COATED ORAL at 09:53

## 2019-01-01 RX ADMIN — DILTIAZEM HYDROCHLORIDE 15 MG/HR: 5 INJECTION INTRAVENOUS at 20:02

## 2019-01-01 RX ADMIN — ISOSORBIDE DINITRATE 40 MG: 20 TABLET ORAL at 14:28

## 2019-01-01 RX ADMIN — ISOSORBIDE DINITRATE 40 MG: 20 TABLET ORAL at 08:55

## 2019-01-01 RX ADMIN — POTASSIUM CHLORIDE 10 MEQ: 7.46 INJECTION, SOLUTION INTRAVENOUS at 15:05

## 2019-01-01 RX ADMIN — OXCARBAZEPINE 150 MG: 300 TABLET, FILM COATED ORAL at 08:56

## 2019-01-01 RX ADMIN — ASPIRIN 81 MG 81 MG: 81 TABLET ORAL at 08:20

## 2019-01-01 RX ADMIN — SODIUM CHLORIDE 500 ML: 9 INJECTION, SOLUTION INTRAVENOUS at 00:08

## 2019-01-01 RX ADMIN — Medication 2 PUFF: at 07:12

## 2019-01-01 RX ADMIN — AMIODARONE HYDROCHLORIDE: 1.5 INJECTION, SOLUTION INTRAVENOUS at 19:53

## 2019-01-01 RX ADMIN — Medication 10 ML: at 09:45

## 2019-01-01 RX ADMIN — FLUTICASONE PROPIONATE 1 SPRAY: 50 SPRAY, METERED NASAL at 09:30

## 2019-01-01 RX ADMIN — ISOSORBIDE MONONITRATE 60 MG: 60 TABLET ORAL at 09:43

## 2019-01-01 RX ADMIN — INSULIN LISPRO 1 UNITS: 100 INJECTION, SOLUTION INTRAVENOUS; SUBCUTANEOUS at 14:28

## 2019-01-01 RX ADMIN — ACETAMINOPHEN 650 MG: 325 TABLET ORAL at 09:07

## 2019-01-01 RX ADMIN — Medication 2 PUFF: at 08:16

## 2019-01-01 RX ADMIN — ROSUVASTATIN CALCIUM 20 MG: 20 TABLET, FILM COATED ORAL at 20:46

## 2019-01-01 RX ADMIN — HYDRALAZINE HYDROCHLORIDE 25 MG: 25 TABLET ORAL at 15:32

## 2019-01-01 RX ADMIN — CALCIUM CARBONATE-VITAMIN D TAB 500 MG-200 UNIT 1 TABLET: 500-200 TAB at 09:21

## 2019-01-01 RX ADMIN — LIDOCAINE HYDROCHLORIDE 20 MG: 10 INJECTION, SOLUTION EPIDURAL; INFILTRATION; INTRACAUDAL; PERINEURAL at 09:37

## 2019-01-01 RX ADMIN — ROSUVASTATIN CALCIUM 20 MG: 20 TABLET, FILM COATED ORAL at 20:38

## 2019-01-01 RX ADMIN — MIDAZOLAM HYDROCHLORIDE 0.5 MG: 1 INJECTION, SOLUTION INTRAMUSCULAR; INTRAVENOUS at 13:22

## 2019-01-01 RX ADMIN — SODIUM CHLORIDE: 9 INJECTION, SOLUTION INTRAVENOUS at 16:42

## 2019-01-01 RX ADMIN — HYDROCODONE BITARTRATE AND ACETAMINOPHEN 1 TABLET: 5; 325 TABLET ORAL at 11:09

## 2019-01-01 RX ADMIN — DOCUSATE SODIUM 100 MG: 100 CAPSULE, LIQUID FILLED ORAL at 20:37

## 2019-01-01 RX ADMIN — HYDROXYZINE HYDROCHLORIDE 25 MG: 25 TABLET ORAL at 20:38

## 2019-01-01 RX ADMIN — CLOPIDOGREL BISULFATE 75 MG: 75 TABLET ORAL at 08:40

## 2019-01-01 RX ADMIN — POTASSIUM CHLORIDE 20 MEQ: 29.8 INJECTION, SOLUTION INTRAVENOUS at 07:50

## 2019-01-01 RX ADMIN — IPRATROPIUM BROMIDE AND ALBUTEROL SULFATE 1 AMPULE: .5; 3 SOLUTION RESPIRATORY (INHALATION) at 21:04

## 2019-01-01 RX ADMIN — PHENYLEPHRINE HYDROCHLORIDE 100 MCG: 10 INJECTION INTRAVENOUS at 10:18

## 2019-01-01 RX ADMIN — CLOPIDOGREL BISULFATE 75 MG: 75 TABLET ORAL at 09:01

## 2019-01-01 RX ADMIN — Medication 10 ML: at 09:02

## 2019-01-01 RX ADMIN — HYDROXYZINE HYDROCHLORIDE 25 MG: 25 TABLET ORAL at 14:11

## 2019-01-01 RX ADMIN — HYDRALAZINE HYDROCHLORIDE 50 MG: 50 TABLET, FILM COATED ORAL at 14:11

## 2019-01-01 RX ADMIN — HYDROCODONE BITARTRATE AND ACETAMINOPHEN 1 TABLET: 5; 325 TABLET ORAL at 07:50

## 2019-01-01 RX ADMIN — LOSARTAN POTASSIUM 50 MG: 50 TABLET, FILM COATED ORAL at 09:57

## 2019-01-01 RX ADMIN — ISOSORBIDE MONONITRATE 60 MG: 60 TABLET ORAL at 08:08

## 2019-01-01 RX ADMIN — FUROSEMIDE 40 MG: 40 TABLET ORAL at 09:37

## 2019-01-01 RX ADMIN — HYDROXYZINE HYDROCHLORIDE 25 MG: 25 TABLET ORAL at 09:44

## 2019-01-01 RX ADMIN — CLOPIDOGREL BISULFATE 75 MG: 75 TABLET ORAL at 09:52

## 2019-01-01 RX ADMIN — PHENYLEPHRINE HYDROCHLORIDE 50 MCG: 10 INJECTION INTRAVENOUS at 09:18

## 2019-01-01 RX ADMIN — RANOLAZINE 500 MG: 500 TABLET, FILM COATED, EXTENDED RELEASE ORAL at 09:52

## 2019-01-01 RX ADMIN — FLUTICASONE PROPIONATE 1 SPRAY: 50 SPRAY, METERED NASAL at 09:34

## 2019-01-01 RX ADMIN — DILTIAZEM HYDROCHLORIDE 15 MG/ML: 5 INJECTION INTRAVENOUS at 03:48

## 2019-01-01 RX ADMIN — SODIUM CHLORIDE TAB 1 GM 1 G: 1 TAB at 09:00

## 2019-01-01 RX ADMIN — BACLOFEN 10 MG: 10 TABLET ORAL at 20:54

## 2019-01-01 RX ADMIN — FUROSEMIDE 40 MG: 40 TABLET ORAL at 08:07

## 2019-01-01 RX ADMIN — RISPERIDONE 1 MG: 1 TABLET ORAL at 10:26

## 2019-01-01 RX ADMIN — HYDRALAZINE HYDROCHLORIDE 25 MG: 25 TABLET ORAL at 15:03

## 2019-01-01 RX ADMIN — RANOLAZINE 500 MG: 500 TABLET, FILM COATED, EXTENDED RELEASE ORAL at 22:44

## 2019-01-01 RX ADMIN — SPIRONOLACTONE 25 MG: 25 TABLET, FILM COATED ORAL at 11:24

## 2019-01-01 RX ADMIN — PANTOPRAZOLE SODIUM 40 MG: 40 TABLET, DELAYED RELEASE ORAL at 06:33

## 2019-01-01 RX ADMIN — LEVOTHYROXINE SODIUM 125 MCG: 125 TABLET ORAL at 05:08

## 2019-01-01 ASSESSMENT — PAIN DESCRIPTION - PROGRESSION
CLINICAL_PROGRESSION: NOT CHANGED
CLINICAL_PROGRESSION: RAPIDLY WORSENING
CLINICAL_PROGRESSION: NOT CHANGED
CLINICAL_PROGRESSION: NOT CHANGED
CLINICAL_PROGRESSION_2: NOT CHANGED
CLINICAL_PROGRESSION: NOT CHANGED
CLINICAL_PROGRESSION: NOT CHANGED
CLINICAL_PROGRESSION: RAPIDLY IMPROVING
CLINICAL_PROGRESSION: GRADUALLY IMPROVING
CLINICAL_PROGRESSION_2: NOT CHANGED
CLINICAL_PROGRESSION_2: NOT CHANGED
CLINICAL_PROGRESSION: GRADUALLY WORSENING
CLINICAL_PROGRESSION: NOT CHANGED
CLINICAL_PROGRESSION: GRADUALLY WORSENING
CLINICAL_PROGRESSION: NOT CHANGED
CLINICAL_PROGRESSION: GRADUALLY WORSENING
CLINICAL_PROGRESSION: GRADUALLY IMPROVING
CLINICAL_PROGRESSION: NOT CHANGED
CLINICAL_PROGRESSION_2: NOT CHANGED
CLINICAL_PROGRESSION: NOT CHANGED
CLINICAL_PROGRESSION: GRADUALLY IMPROVING
CLINICAL_PROGRESSION: NOT CHANGED
CLINICAL_PROGRESSION: NOT CHANGED
CLINICAL_PROGRESSION: GRADUALLY WORSENING
CLINICAL_PROGRESSION: GRADUALLY WORSENING
CLINICAL_PROGRESSION: NOT CHANGED
CLINICAL_PROGRESSION: GRADUALLY IMPROVING
CLINICAL_PROGRESSION: NOT CHANGED
CLINICAL_PROGRESSION: NOT CHANGED
CLINICAL_PROGRESSION: GRADUALLY IMPROVING
CLINICAL_PROGRESSION: NOT CHANGED
CLINICAL_PROGRESSION: GRADUALLY IMPROVING
CLINICAL_PROGRESSION_2: NOT CHANGED
CLINICAL_PROGRESSION: NOT CHANGED
CLINICAL_PROGRESSION: NOT CHANGED
CLINICAL_PROGRESSION_2: NOT CHANGED
CLINICAL_PROGRESSION: GRADUALLY IMPROVING
CLINICAL_PROGRESSION: GRADUALLY IMPROVING
CLINICAL_PROGRESSION: NOT CHANGED
CLINICAL_PROGRESSION: GRADUALLY WORSENING
CLINICAL_PROGRESSION: NOT CHANGED
CLINICAL_PROGRESSION: GRADUALLY IMPROVING

## 2019-01-01 ASSESSMENT — PULMONARY FUNCTION TESTS
PIF_VALUE: 0
PIF_VALUE: 1
PIF_VALUE: 0
PIF_VALUE: 1
PIF_VALUE: 0
PIF_VALUE: 1
PIF_VALUE: 1
PIF_VALUE: 0
PIF_VALUE: 1
PIF_VALUE: 0
PIF_VALUE: 0
PIF_VALUE: 1
PIF_VALUE: 0
PIF_VALUE: 0
PIF_VALUE: 1
PIF_VALUE: 0
PIF_VALUE: 1
PIF_VALUE: 0
PIF_VALUE: 1
PIF_VALUE: 0
PIF_VALUE: 0
PIF_VALUE: 1
PIF_VALUE: 0
PIF_VALUE: 1
PIF_VALUE: 0
PIF_VALUE: 2
PIF_VALUE: 1
PIF_VALUE: 0
PIF_VALUE: 1
PIF_VALUE: 2
PIF_VALUE: 0
PIF_VALUE: 1
PIF_VALUE: 1
PIF_VALUE: 0
PIF_VALUE: 1
PIF_VALUE: 2
PIF_VALUE: 1
PIF_VALUE: 0
PIF_VALUE: 1
PIF_VALUE: 1
PIF_VALUE: 0
PIF_VALUE: 1
PIF_VALUE: 0
PIF_VALUE: 1
PIF_VALUE: 0
PIF_VALUE: 1
PIF_VALUE: 0
PIF_VALUE: 1
PIF_VALUE: 0
PIF_VALUE: 1
PIF_VALUE: 2
PIF_VALUE: 0
PIF_VALUE: 0
PIF_VALUE: 1
PIF_VALUE: 0
PIF_VALUE: 1
PIF_VALUE: 0
PIF_VALUE: 1
PIF_VALUE: 0
PIF_VALUE: 1
PIF_VALUE: 0
PIF_VALUE: 1
PIF_VALUE: 0
PIF_VALUE: 1
PIF_VALUE: 0
PIF_VALUE: 1
PIF_VALUE: 2
PIF_VALUE: 1
PIF_VALUE: 0
PIF_VALUE: 1
PIF_VALUE: 0
PIF_VALUE: 1
PIF_VALUE: 1
PIF_VALUE: 0
PIF_VALUE: 1
PIF_VALUE: 2
PIF_VALUE: 1
PIF_VALUE: 0
PIF_VALUE: 1
PIF_VALUE: 1
PIF_VALUE: 0
PIF_VALUE: 1
PIF_VALUE: 1
PIF_VALUE: 0
PIF_VALUE: 0
PIF_VALUE: 1
PIF_VALUE: 0
PIF_VALUE: 1
PIF_VALUE: 0
PIF_VALUE: 1
PIF_VALUE: 1
PIF_VALUE: 0
PIF_VALUE: 0
PIF_VALUE: 1
PIF_VALUE: 2
PIF_VALUE: 0
PIF_VALUE: 0
PIF_VALUE: 1
PIF_VALUE: 0
PIF_VALUE: 0
PIF_VALUE: 1
PIF_VALUE: 0
PEFR_L/MIN: 20
PIF_VALUE: 0
PIF_VALUE: 1
PIF_VALUE: 0
PIF_VALUE: 0
PIF_VALUE: 1
PIF_VALUE: 0
PIF_VALUE: 1
PIF_VALUE: 0
PIF_VALUE: 0
PIF_VALUE: 1
PIF_VALUE: 0
PIF_VALUE: 0
PIF_VALUE: 1
PIF_VALUE: 1
PIF_VALUE: 0
PIF_VALUE: 1
PIF_VALUE: 0
PIF_VALUE: 0
PIF_VALUE: 1
PIF_VALUE: 1
PIF_VALUE: 0
PIF_VALUE: 2
PIF_VALUE: 0
PIF_VALUE: 2
PIF_VALUE: 1
PIF_VALUE: 2
PIF_VALUE: 0
PIF_VALUE: 1
PIF_VALUE: 0
PIF_VALUE: 1
PIF_VALUE: 0
PIF_VALUE: 1
PIF_VALUE: 1
PIF_VALUE: 0
PIF_VALUE: 1
PIF_VALUE: 0
PIF_VALUE: 0
PIF_VALUE: 1
PIF_VALUE: 2
PIF_VALUE: 0
PIF_VALUE: 3
PIF_VALUE: 1
PIF_VALUE: 0
PIF_VALUE: 1
PIF_VALUE: 0
PIF_VALUE: 0
PIF_VALUE: 1
PIF_VALUE: 0
PIF_VALUE: 2
PIF_VALUE: 0
PIF_VALUE: 1
PIF_VALUE: 0
PIF_VALUE: 1
PIF_VALUE: 1
PIF_VALUE: 0
PIF_VALUE: 1
PIF_VALUE: 0
PIF_VALUE: 0
PIF_VALUE: 2
PIF_VALUE: 0
PIF_VALUE: 1
PIF_VALUE: 0
PIF_VALUE: 1
PIF_VALUE: 0
PIF_VALUE: 1
PIF_VALUE: 1
PIF_VALUE: 0
PIF_VALUE: 1
PIF_VALUE: 0
PIF_VALUE: 1
PIF_VALUE: 0
PIF_VALUE: 1
PIF_VALUE: 0
PIF_VALUE: 1

## 2019-01-01 ASSESSMENT — ENCOUNTER SYMPTOMS
COUGH: 0
WHEEZING: 0
NAUSEA: 0
ANAL BLEEDING: 0
EYE DISCHARGE: 0
NAUSEA: 0
EYE REDNESS: 0
ABDOMINAL DISTENTION: 0
DIARRHEA: 1
VOICE CHANGE: 0
RHINORRHEA: 0
NAUSEA: 0
TROUBLE SWALLOWING: 0
BACK PAIN: 0
EYE DISCHARGE: 0
APNEA: 0
COUGH: 0
RESPIRATORY NEGATIVE: 1
FACIAL SWELLING: 0
CHEST TIGHTNESS: 0
VOMITING: 0
PHOTOPHOBIA: 0
EYE DISCHARGE: 0
NAUSEA: 0
SORE THROAT: 0
ABDOMINAL DISTENTION: 0
ABDOMINAL PAIN: 0
EYE DISCHARGE: 0
ABDOMINAL PAIN: 0
WHEEZING: 0
COUGH: 0
SHORTNESS OF BREATH: 1
ABDOMINAL PAIN: 0
WHEEZING: 0
VOMITING: 1
RESPIRATORY NEGATIVE: 1
COUGH: 0
COUGH: 0
ABDOMINAL PAIN: 0
VOMITING: 0
SHORTNESS OF BREATH: 1
CHEST TIGHTNESS: 0
ABDOMINAL PAIN: 0
SHORTNESS OF BREATH: 1
NAUSEA: 1
TROUBLE SWALLOWING: 1
SHORTNESS OF BREATH: 1
DYSPNEA ASSOCIATED WITH: EXERTION
NAUSEA: 0
SHORTNESS OF BREATH: 1
RHINORRHEA: 0
COLOR CHANGE: 0
VOMITING: 0
BACK PAIN: 1
APNEA: 0
DIARRHEA: 0
DIARRHEA: 1
ABDOMINAL PAIN: 0
SHORTNESS OF BREATH: 0
WHEEZING: 0
CHEST TIGHTNESS: 0
NAUSEA: 0
ABDOMINAL PAIN: 0
APNEA: 0
EYE PAIN: 0
CONSTIPATION: 0
BACK PAIN: 0
CHEST TIGHTNESS: 0
CHEST TIGHTNESS: 0
WHEEZING: 0
CHEST TIGHTNESS: 0
EYE DISCHARGE: 0
SINUS PAIN: 0
ABDOMINAL DISTENTION: 0
EYE ITCHING: 0
SHORTNESS OF BREATH: 1
CONSTIPATION: 1
CONSTIPATION: 0
COUGH: 1
VOMITING: 0
DIARRHEA: 0
WHEEZING: 0
COUGH: 0
DIARRHEA: 0
EYE PAIN: 0
CHEST TIGHTNESS: 0
RESPIRATORY NEGATIVE: 1
STRIDOR: 0
BACK PAIN: 0
WHEEZING: 0
ABDOMINAL PAIN: 0
BACK PAIN: 0
SHORTNESS OF BREATH: 1
NAUSEA: 0
COLOR CHANGE: 0
SHORTNESS OF BREATH: 1
NAUSEA: 1
DIARRHEA: 1
ABDOMINAL PAIN: 0
COUGH: 1
SORE THROAT: 0
WHEEZING: 0
ABDOMINAL PAIN: 1
COUGH: 0
SHORTNESS OF BREATH: 1
ABDOMINAL DISTENTION: 0
ABDOMINAL DISTENTION: 0
ABDOMINAL PAIN: 0
DIARRHEA: 0
CHOKING: 0
ABDOMINAL PAIN: 0
ABDOMINAL PAIN: 0
RHINORRHEA: 0
VOMITING: 1
RHINORRHEA: 0
NAUSEA: 0
APNEA: 0
BLOOD IN STOOL: 0
COUGH: 0
SHORTNESS OF BREATH: 0
NAUSEA: 0
COLOR CHANGE: 0
DIARRHEA: 0
SHORTNESS OF BREATH: 1
COUGH: 0
CHEST TIGHTNESS: 0
ABDOMINAL DISTENTION: 0
SINUS PRESSURE: 0
CHEST TIGHTNESS: 1
SINUS PRESSURE: 0
COUGH: 0
BLOOD IN STOOL: 0
DYSPNEA ASSOCIATED WITH: EXERTION
CHEST TIGHTNESS: 0
EYE PAIN: 0
RHINORRHEA: 0
SINUS PAIN: 0
ABDOMINAL PAIN: 1
ABDOMINAL DISTENTION: 0
COLOR CHANGE: 0
CHEST TIGHTNESS: 0
RESPIRATORY NEGATIVE: 1
ALLERGIC/IMMUNOLOGIC NEGATIVE: 1
COUGH: 0
RESPIRATORY NEGATIVE: 1
RHINORRHEA: 0
SHORTNESS OF BREATH: 0
EYE PAIN: 0
SHORTNESS OF BREATH: 1
BLOOD IN STOOL: 0
SHORTNESS OF BREATH: 1
PHOTOPHOBIA: 0
WHEEZING: 0
SORE THROAT: 1
VOMITING: 1
EYES NEGATIVE: 1
DIARRHEA: 0
BACK PAIN: 0
SORE THROAT: 0
SORE THROAT: 0
NAUSEA: 0
ABDOMINAL PAIN: 0

## 2019-01-01 ASSESSMENT — PAIN SCALES - GENERAL
PAINLEVEL_OUTOF10: 0
PAINLEVEL_OUTOF10: 7
PAINLEVEL_OUTOF10: 10
PAINLEVEL_OUTOF10: 0
PAINLEVEL_OUTOF10: 9
PAINLEVEL_OUTOF10: 0
PAINLEVEL_OUTOF10: 8
PAINLEVEL_OUTOF10: 10
PAINLEVEL_OUTOF10: 9
PAINLEVEL_OUTOF10: 9
PAINLEVEL_OUTOF10: 0
PAINLEVEL_OUTOF10: 7
PAINLEVEL_OUTOF10: 4
PAINLEVEL_OUTOF10: 9
PAINLEVEL_OUTOF10: 7
PAINLEVEL_OUTOF10: 9
PAINLEVEL_OUTOF10: 9
PAINLEVEL_OUTOF10: 0
PAINLEVEL_OUTOF10: 4
PAINLEVEL_OUTOF10: 0
PAINLEVEL_OUTOF10: 0
PAINLEVEL_OUTOF10: 7
PAINLEVEL_OUTOF10: 10
PAINLEVEL_OUTOF10: 0
PAINLEVEL_OUTOF10: 7
PAINLEVEL_OUTOF10: 5
PAINLEVEL_OUTOF10: 0
PAINLEVEL_OUTOF10: 9
PAINLEVEL_OUTOF10: 2
PAINLEVEL_OUTOF10: 7
PAINLEVEL_OUTOF10: 3
PAINLEVEL_OUTOF10: 3
PAINLEVEL_OUTOF10: 0
PAINLEVEL_OUTOF10: 9
PAINLEVEL_OUTOF10: 0
PAINLEVEL_OUTOF10: 8
PAINLEVEL_OUTOF10: 7
PAINLEVEL_OUTOF10: 9
PAINLEVEL_OUTOF10: 6
PAINLEVEL_OUTOF10: 9
PAINLEVEL_OUTOF10: 0
PAINLEVEL_OUTOF10: 9
PAINLEVEL_OUTOF10: 0
PAINLEVEL_OUTOF10: 8
PAINLEVEL_OUTOF10: 0
PAINLEVEL_OUTOF10: 0
PAINLEVEL_OUTOF10: 9
PAINLEVEL_OUTOF10: 6
PAINLEVEL_OUTOF10: 8
PAINLEVEL_OUTOF10: 7
PAINLEVEL_OUTOF10: 0
PAINLEVEL_OUTOF10: 8
PAINLEVEL_OUTOF10: 0
PAINLEVEL_OUTOF10: 9
PAINLEVEL_OUTOF10: 8
PAINLEVEL_OUTOF10: 9
PAINLEVEL_OUTOF10: 9
PAINLEVEL_OUTOF10: 5
PAINLEVEL_OUTOF10: 4
PAINLEVEL_OUTOF10: 0
PAINLEVEL_OUTOF10: 9
PAINLEVEL_OUTOF10: 3
PAINLEVEL_OUTOF10: 3
PAINLEVEL_OUTOF10: 9
PAINLEVEL_OUTOF10: 9
PAINLEVEL_OUTOF10: 7
PAINLEVEL_OUTOF10: 9
PAINLEVEL_OUTOF10: 0
PAINLEVEL_OUTOF10: 2
PAINLEVEL_OUTOF10: 4
PAINLEVEL_OUTOF10: 10
PAINLEVEL_OUTOF10: 0
PAINLEVEL_OUTOF10: 8
PAINLEVEL_OUTOF10: 6
PAINLEVEL_OUTOF10: 0
PAINLEVEL_OUTOF10: 0
PAINLEVEL_OUTOF10: 9
PAINLEVEL_OUTOF10: 0
PAINLEVEL_OUTOF10: 10
PAINLEVEL_OUTOF10: 8
PAINLEVEL_OUTOF10: 2
PAINLEVEL_OUTOF10: 3
PAINLEVEL_OUTOF10: 9
PAINLEVEL_OUTOF10: 9
PAINLEVEL_OUTOF10: 7
PAINLEVEL_OUTOF10: 9
PAINLEVEL_OUTOF10: 0
PAINLEVEL_OUTOF10: 5
PAINLEVEL_OUTOF10: 0
PAINLEVEL_OUTOF10: 9
PAINLEVEL_OUTOF10: 0
PAINLEVEL_OUTOF10: 10
PAINLEVEL_OUTOF10: 6
PAINLEVEL_OUTOF10: 9
PAINLEVEL_OUTOF10: 0
PAINLEVEL_OUTOF10: 10
PAINLEVEL_OUTOF10: 9
PAINLEVEL_OUTOF10: 0
PAINLEVEL_OUTOF10: 9
PAINLEVEL_OUTOF10: 6
PAINLEVEL_OUTOF10: 8
PAINLEVEL_OUTOF10: 7
PAINLEVEL_OUTOF10: 9
PAINLEVEL_OUTOF10: 4
PAINLEVEL_OUTOF10: 0
PAINLEVEL_OUTOF10: 3
PAINLEVEL_OUTOF10: 10
PAINLEVEL_OUTOF10: 5
PAINLEVEL_OUTOF10: 0
PAINLEVEL_OUTOF10: 9
PAINLEVEL_OUTOF10: 7
PAINLEVEL_OUTOF10: 9
PAINLEVEL_OUTOF10: 8
PAINLEVEL_OUTOF10: 0
PAINLEVEL_OUTOF10: 0
PAINLEVEL_OUTOF10: 8
PAINLEVEL_OUTOF10: 0
PAINLEVEL_OUTOF10: 9
PAINLEVEL_OUTOF10: 2
PAINLEVEL_OUTOF10: 0
PAINLEVEL_OUTOF10: 0
PAINLEVEL_OUTOF10: 9
PAINLEVEL_OUTOF10: 8
PAINLEVEL_OUTOF10: 3
PAINLEVEL_OUTOF10: 6
PAINLEVEL_OUTOF10: 8
PAINLEVEL_OUTOF10: 0
PAINLEVEL_OUTOF10: 4
PAINLEVEL_OUTOF10: 4
PAINLEVEL_OUTOF10: 5
PAINLEVEL_OUTOF10: 9
PAINLEVEL_OUTOF10: 0
PAINLEVEL_OUTOF10: 0
PAINLEVEL_OUTOF10: 8
PAINLEVEL_OUTOF10: 10
PAINLEVEL_OUTOF10: 10
PAINLEVEL_OUTOF10: 6
PAINLEVEL_OUTOF10: 3
PAINLEVEL_OUTOF10: 8
PAINLEVEL_OUTOF10: 9
PAINLEVEL_OUTOF10: 3
PAINLEVEL_OUTOF10: 7
PAINLEVEL_OUTOF10: 9
PAINLEVEL_OUTOF10: 0
PAINLEVEL_OUTOF10: 9
PAINLEVEL_OUTOF10: 5
PAINLEVEL_OUTOF10: 9
PAINLEVEL_OUTOF10: 8
PAINLEVEL_OUTOF10: 0
PAINLEVEL_OUTOF10: 4
PAINLEVEL_OUTOF10: 0
PAINLEVEL_OUTOF10: 6
PAINLEVEL_OUTOF10: 9
PAINLEVEL_OUTOF10: 0
PAINLEVEL_OUTOF10: 3
PAINLEVEL_OUTOF10: 3
PAINLEVEL_OUTOF10: 6
PAINLEVEL_OUTOF10: 9
PAINLEVEL_OUTOF10: 7
PAINLEVEL_OUTOF10: 9
PAINLEVEL_OUTOF10: 8
PAINLEVEL_OUTOF10: 0
PAINLEVEL_OUTOF10: 0
PAINLEVEL_OUTOF10: 8
PAINLEVEL_OUTOF10: 8

## 2019-01-01 ASSESSMENT — PAIN - FUNCTIONAL ASSESSMENT
PAIN_FUNCTIONAL_ASSESSMENT: PREVENTS OR INTERFERES SOME ACTIVE ACTIVITIES AND ADLS
PAIN_FUNCTIONAL_ASSESSMENT: ACTIVITIES ARE NOT PREVENTED
PAIN_FUNCTIONAL_ASSESSMENT: ACTIVITIES ARE NOT PREVENTED
PAIN_FUNCTIONAL_ASSESSMENT: 0-10
PAIN_FUNCTIONAL_ASSESSMENT: ACTIVITIES ARE NOT PREVENTED
PAIN_FUNCTIONAL_ASSESSMENT: PREVENTS OR INTERFERES SOME ACTIVE ACTIVITIES AND ADLS
PAIN_FUNCTIONAL_ASSESSMENT: ACTIVITIES ARE NOT PREVENTED
PAIN_FUNCTIONAL_ASSESSMENT: ACTIVITIES ARE NOT PREVENTED
PAIN_FUNCTIONAL_ASSESSMENT: PREVENTS OR INTERFERES SOME ACTIVE ACTIVITIES AND ADLS
PAIN_FUNCTIONAL_ASSESSMENT: ACTIVITIES ARE NOT PREVENTED
PAIN_FUNCTIONAL_ASSESSMENT: PREVENTS OR INTERFERES SOME ACTIVE ACTIVITIES AND ADLS
PAIN_FUNCTIONAL_ASSESSMENT: ACTIVITIES ARE NOT PREVENTED
PAIN_FUNCTIONAL_ASSESSMENT: PREVENTS OR INTERFERES SOME ACTIVE ACTIVITIES AND ADLS
PAIN_FUNCTIONAL_ASSESSMENT: ACTIVITIES ARE NOT PREVENTED
PAIN_FUNCTIONAL_ASSESSMENT: PREVENTS OR INTERFERES SOME ACTIVE ACTIVITIES AND ADLS
PAIN_FUNCTIONAL_ASSESSMENT: PREVENTS OR INTERFERES SOME ACTIVE ACTIVITIES AND ADLS
PAIN_FUNCTIONAL_ASSESSMENT: ACTIVITIES ARE NOT PREVENTED

## 2019-01-01 ASSESSMENT — PAIN DESCRIPTION - ORIENTATION
ORIENTATION: POSTERIOR
ORIENTATION: RIGHT;LEFT;LOWER
ORIENTATION: MID
ORIENTATION: LOWER
ORIENTATION: MID
ORIENTATION: LEFT
ORIENTATION: RIGHT;LEFT
ORIENTATION: POSTERIOR
ORIENTATION: LOWER
ORIENTATION: ANTERIOR
ORIENTATION: RIGHT
ORIENTATION: LEFT;RIGHT
ORIENTATION: MID
ORIENTATION: POSTERIOR
ORIENTATION: LEFT;UPPER
ORIENTATION: LEFT
ORIENTATION: RIGHT;LEFT
ORIENTATION: LOWER
ORIENTATION: RIGHT;LEFT
ORIENTATION: RIGHT;LEFT
ORIENTATION: POSTERIOR
ORIENTATION: OTHER (COMMENT)
ORIENTATION: POSTERIOR
ORIENTATION: POSTERIOR
ORIENTATION: LOWER
ORIENTATION: MID
ORIENTATION: LEFT
ORIENTATION: RIGHT;LEFT
ORIENTATION: MID
ORIENTATION: RIGHT;LEFT
ORIENTATION: POSTERIOR
ORIENTATION: ANTERIOR
ORIENTATION: LOWER
ORIENTATION: LEFT
ORIENTATION: MID
ORIENTATION: RIGHT
ORIENTATION: POSTERIOR
ORIENTATION: ANTERIOR
ORIENTATION: RIGHT;LEFT
ORIENTATION: ANTERIOR
ORIENTATION: LOWER
ORIENTATION: RIGHT;LEFT

## 2019-01-01 ASSESSMENT — PAIN DESCRIPTION - DIRECTION
RADIATING_TOWARDS: NOT RADIATING

## 2019-01-01 ASSESSMENT — PAIN DESCRIPTION - ONSET
ONSET: ON-GOING
ONSET: GRADUAL
ONSET: ON-GOING
ONSET: GRADUAL
ONSET: ON-GOING
ONSET: GRADUAL
ONSET: GRADUAL
ONSET: ON-GOING
ONSET_2: ON-GOING
ONSET: PROGRESSIVE
ONSET: ON-GOING
ONSET_2: ON-GOING
ONSET_2: ON-GOING
ONSET: ON-GOING
ONSET_2: ON-GOING
ONSET: ON-GOING
ONSET_2: ON-GOING
ONSET: ON-GOING
ONSET: GRADUAL
ONSET: ON-GOING
ONSET_2: ON-GOING
ONSET: ON-GOING
ONSET: AWAKENED FROM SLEEP
ONSET_2: ON-GOING
ONSET: GRADUAL
ONSET_2: ON-GOING
ONSET: ON-GOING

## 2019-01-01 ASSESSMENT — PAIN DESCRIPTION - PAIN TYPE
TYPE: CHRONIC PAIN
TYPE_3: CHRONIC PAIN
TYPE: CHRONIC PAIN
TYPE_2: CHRONIC PAIN
TYPE_3: CHRONIC PAIN
TYPE: ACUTE PAIN
TYPE: ACUTE PAIN;SURGICAL PAIN
TYPE_2: CHRONIC PAIN
TYPE_3: CHRONIC PAIN
TYPE: ACUTE PAIN
TYPE: CHRONIC PAIN
TYPE: ACUTE PAIN
TYPE: CHRONIC PAIN
TYPE_2: CHRONIC PAIN
TYPE: CHRONIC PAIN
TYPE: ACUTE PAIN
TYPE_3: CHRONIC PAIN
TYPE_3: CHRONIC PAIN
TYPE: CHRONIC PAIN
TYPE: ACUTE PAIN;SURGICAL PAIN
TYPE: CHRONIC PAIN
TYPE: CHRONIC PAIN
TYPE_2: CHRONIC PAIN
TYPE: ACUTE PAIN
TYPE: CHRONIC PAIN
TYPE: CHRONIC PAIN
TYPE_2: CHRONIC PAIN
TYPE: CHRONIC PAIN
TYPE: CHRONIC PAIN
TYPE: ACUTE PAIN
TYPE_3: CHRONIC PAIN
TYPE: ACUTE PAIN
TYPE_3: CHRONIC PAIN
TYPE: CHRONIC PAIN
TYPE: ACUTE PAIN
TYPE_2: CHRONIC PAIN
TYPE: ACUTE PAIN
TYPE: CHRONIC PAIN
TYPE_2: CHRONIC PAIN
TYPE: ACUTE PAIN
TYPE_2: CHRONIC PAIN
TYPE: CHRONIC PAIN
TYPE: ACUTE PAIN
TYPE: CHRONIC PAIN
TYPE: ACUTE PAIN
TYPE: CHRONIC PAIN
TYPE_2: CHRONIC PAIN
TYPE_3: CHRONIC PAIN

## 2019-01-01 ASSESSMENT — PAIN DESCRIPTION - FREQUENCY
FREQUENCY: CONTINUOUS
FREQUENCY: INTERMITTENT
FREQUENCY: CONTINUOUS
FREQUENCY: INTERMITTENT
FREQUENCY: CONTINUOUS
FREQUENCY: INTERMITTENT
FREQUENCY: CONTINUOUS
FREQUENCY: INTERMITTENT
FREQUENCY: CONTINUOUS

## 2019-01-01 ASSESSMENT — PAIN DESCRIPTION - LOCATION
LOCATION: BACK;NECK
LOCATION_2: BACK
LOCATION: BACK
LOCATION: BACK;NECK
LOCATION: GENERALIZED
LOCATION: HEAD
LOCATION: BACK
LOCATION: BACK;NECK
LOCATION_3: NECK
LOCATION_2: BACK
LOCATION: BACK;NECK
LOCATION: CHEST
LOCATION: SHOULDER
LOCATION: BACK;NECK
LOCATION: BACK
LOCATION: BACK;NECK
LOCATION_3: NECK
LOCATION_2: BACK
LOCATION: BACK
LOCATION_3: NECK
LOCATION: BACK
LOCATION: BACK
LOCATION: GROIN
LOCATION_3: NECK
LOCATION: BACK;NECK
LOCATION_2: BACK
LOCATION: GROIN
LOCATION: BACK
LOCATION: CHEST;BACK
LOCATION: BACK;NECK
LOCATION: NECK;BACK
LOCATION: BACK;NECK
LOCATION: BACK;NECK
LOCATION_3: NECK
LOCATION: BACK;NECK
LOCATION: BACK
LOCATION_2: BACK
LOCATION: BACK
LOCATION_3: NECK
LOCATION: BACK;NECK
LOCATION: BACK
LOCATION: BACK
LOCATION: BACK;NECK
LOCATION: BACK
LOCATION: BACK;NECK
LOCATION: CHEST
LOCATION: BACK;NECK
LOCATION: BACK
LOCATION: BACK;NECK
LOCATION: BACK;NECK
LOCATION: GROIN
LOCATION: BACK;NECK
LOCATION_3: NECK
LOCATION: BACK;NECK
LOCATION: BACK
LOCATION: GROIN
LOCATION: BACK;NECK
LOCATION_3: NECK
LOCATION_2: BACK
LOCATION_2: BACK
LOCATION: BACK
LOCATION: BACK;NECK
LOCATION: HEAD
LOCATION: CHEST
LOCATION_3: NECK
LOCATION_2: BACK
LOCATION: BACK;NECK
LOCATION: BACK
LOCATION_2: BACK
LOCATION: HEAD

## 2019-01-01 ASSESSMENT — PAIN DESCRIPTION - DESCRIPTORS
DESCRIPTORS: ACHING
DESCRIPTORS: ACHING;CONSTANT;DISCOMFORT
DESCRIPTORS: ACHING;DISCOMFORT
DESCRIPTORS: ACHING
DESCRIPTORS: ACHING
DESCRIPTORS: ACHING;SORE
DESCRIPTORS: SHARP
DESCRIPTORS: DISCOMFORT
DESCRIPTORS: ACHING
DESCRIPTORS: ACHING
DESCRIPTORS: SHARP
DESCRIPTORS: ACHING
DESCRIPTORS: ACHING;DISCOMFORT
DESCRIPTORS: SHARP;SHOOTING
DESCRIPTORS: ACHING
DESCRIPTORS: ACHING
DESCRIPTORS: ACHING;CONSTANT
DESCRIPTORS: ACHING;CRAMPING
DESCRIPTORS: HEADACHE
DESCRIPTORS: ACHING;DISCOMFORT
DESCRIPTORS: ACHING;DISCOMFORT
DESCRIPTORS: ACHING
DESCRIPTORS: ACHING;DISCOMFORT
DESCRIPTORS: ACHING
DESCRIPTORS: SHOOTING;SHARP
DESCRIPTORS: ACHING
DESCRIPTORS: ACHING;DISCOMFORT
DESCRIPTORS: SHARP;SHOOTING
DESCRIPTORS: ACHING;CONSTANT
DESCRIPTORS: ACHING
DESCRIPTORS: DISCOMFORT
DESCRIPTORS: ACHING
DESCRIPTORS: ACHING;SORE
DESCRIPTORS: ACHING
DESCRIPTORS: ACHING;DISCOMFORT

## 2019-01-01 ASSESSMENT — PAIN DESCRIPTION - INTENSITY
RATING_3: 8
RATING_2: 8
RATING_3: 8
RATING_2: 8
RATING_3: 8
RATING_2: 8
RATING_3: 8
RATING_2: 8
RATING_2: 8
RATING_3: 8
RATING_2: 8
RATING_3: 8
RATING_3: 8
RATING_2: 8

## 2019-01-01 ASSESSMENT — PAIN DESCRIPTION - DURATION
DURATION_2: CONTINUOUS

## 2019-01-01 ASSESSMENT — COPD QUESTIONNAIRES: CAT_SEVERITY: MODERATE

## 2019-01-24 PROBLEM — E66.01 MORBID OBESITY WITH BMI OF 40.0-44.9, ADULT (HCC): Status: ACTIVE | Noted: 2019-01-01

## 2019-02-20 PROBLEM — I74.5 BILATERAL ILIAC ARTERY OCCLUSION (HCC): Status: ACTIVE | Noted: 2017-04-03

## 2019-03-21 PROBLEM — E87.1 HYPONATREMIA WITH DECREASED SERUM OSMOLALITY: Status: ACTIVE | Noted: 2019-01-01

## 2019-03-21 NOTE — ED NOTES
Pt transported to [facility] on cart in stable condition. Floor contacted before transport. Spoke with Edna Billy.      Ramiro Pacer  03/21/19 1931

## 2019-03-21 NOTE — ED NOTES
Bed: 002A  Expected date:   Expected time:   Means of arrival: [facility]  Comments:     Maribel Officer, RN  03/21/19 8430

## 2019-03-21 NOTE — ED NOTES
Report received at bedside from [facility]. Pt resting quietly in room no needs expressed. Side rails up x2 with call light in reach. Will continue to monitor. Pt updated on available inpatient bed.         Rin Hubbard, [facility]  03/21/19 4213

## 2019-03-21 NOTE — ED TRIAGE NOTES
Pt had routine lab work done this morning and was called this afternoon telling her that her sodium is too low and she needs to go to the hospital. Pt offers no complaints at this time. Pt alert and oriented at this time. EKG completed upon arrival. Pt hooked up to monitor and telemetry.

## 2019-03-21 NOTE — H&P
History & Physical        Patient:  Nura Lorenzo  YOB: 1945    MRN: [id number]     Acct: [de-identified]    PCP: ASHLEY Salmeron CNP    Date of Admission: 3/21/2019    Date of Service: Pt seen/examined on 3/21/2019 and Admitted to Inpatient with expected LOS greater than two midnights due to medical therapy. Chief Complaint:    Chief Complaint   Patient presents with    Other     low sodium         History Of Present Illness:      75 y.o. female who presented to [address] [place] [address] with \"evaluation of hyponatremia. The patient states to have had lab work completed today and reports that she received a call from her primary care physician who told her to come to the ED due to a sodium of 117. The patient states that she has been experiencing chest pain, vomiting, diarrhea, and a headache for the past two days. She states that she has committed four acts of both vomiting and diarrhea. Patient denies any fever, chills, cough, pharyngitis, rhinorrhea, or shortness of breath. She denies any melena, hematochezia, or hematemesis. The patient denies any numbness, weakness, or associated paresthesias. Patient denies any recent medication changes. After evaluation the patient, I determined that her presenting history is inaccurate with the information that she told EMS and the nursing staff. The EMS report and nursing staff both state that the patent denies any complaints until I evaluated her within the ED causing a discrepancy in her HPI and ROS. The patient reports no additional symptoms or complaints at this time. \"-per er note and confirmed by myself    Addendum, patient is a very poor historian, does not recall if any new meds have been started or what meds she is taking  Will ask pharmacy to correct med req sheet, will stop or decrease meds that are known agents that cause hyponatremia           Past Medical History:          Diagnosis Date    Anemia     Anxiety     Arthritis general    AS (aortic stenosis): mild to moderate by echo 4/2017 9/6/2017    ASHD (arteriosclerotic heart disease)     Asthma 2016    INHALER USE DAILY AND AS NEEDED    Bipolar disorder ([address]     Blood circulation, collateral     CAD (coronary artery disease) 1999    MI, 5 STENTS IN HEART NO SURE OF HEART [facility] AT [facility]    Cerebral artery occlusion with cerebral infarction ([address] 2018    SILENT-DX ON CT SCAN NO DEFICITS    Chest pain     CHF (congestive heart failure) (HCC)     COPD (chronic obstructive pulmonary disease) ([address] 2014    INHALER USE DAILY AND AS NEEDED    Depression     Disease of blood and blood forming organ     anemia per patient     DM (diabetes mellitus) ([address]     NO MEDS DIET CONTROLED    Dyspnea     FH: CAD (coronary artery disease)     Full dentures     UPPER AND LOWER    GERD (gastroesophageal reflux disease)     Headache     Heart burn     History of blood transfusion 1973    POST OP -PLASMA    History of blood transfusion 02/2019    1 UNIT LOW HGB    History of tobacco abuse: Quit in 2007     HLD (hyperlipidemia)     ON RX    HTN (hypertension)     ON RX    Irritable bowel syndrome     States has not had problem with this for years    Liver disease     fatty liver    Metabolic syndrome 43/17/5492    MI (myocardial infarction) (HCC)     Nausea & vomiting     Obesity     CHET (obstructive sleep apnea) 2016    NO MACHINE YET    S/P cardiac catheterization: 11/6/2014: 99% in-stent restenosis mid-RCA. LCx moderate LI's. LAD 85% mid-segment lesion. 11/6/2014 11/6/2014: 99% in-stent restenosis mid-RCA. LCx moderate LI's. LAD 85% mid-segment lesion. Dr. Denton Frame S/P PTCA:  5/11/2004: Proximal and mid RCA  Taxus 3.5 X 20 mm, and Taxus 3.0 X 32 mm. 10/28/2014    5/11/2004: Proximal and mid RCA  Taxus 3.5 X 20 mm, and Taxus 3.0 X 32 mm.  Dr. Neves Point Systolic murmur 68/19/1727    Thyroid disease     ON RX    Wears glasses        Past Surgical History:          Procedure Laterality Date    ABDOMEN SURGERY      BACK SURGERY  08/2016        BLADDER SUSPENSION      BREAST BIOPSY  10/10/2017    BREAST LUMPECTOMY      CARDIAC CATHETERIZATION  8-11-06    Mild nonobstructive CAD w/ diffuse 10-20% proximal and mid LAD stenosis and 10-20% proximal/ostial RCA stenosis. No obstructive lesions. RCA stents are patent w/o evidence of restenosis. Normal LV systolic function. EF 65%. Trace MR - catheter induced. Minimally elevated LVEDP - 13mmHg. Mild to moderate aortoiliac PVD w/o obstructive lesions.  CARDIAC CATHETERIZATION  5-11-04    Successful drug-eluting stent x 2 of proximal and mid RCA.  CARDIAC CATHETERIZATION  5-11-04    70-80% proximal RCA stenosis w/ 50-70% mid RCA stenosis. There is 50-60% lesion in mid PDA. Mild proximal and mid LAD disease. Mild circumflex disease. Normal LV size and systolic function. EF 60%.  CARDIOVASCULAR STRESS TEST  5-10-11    No evidence of stress induced ischemia. EF 75%.  CARDIOVASCULAR STRESS TEST  5-10-10    No evidence of stress induced ischemia, infarct or scar. EF 74%.  CAROTID ENDARTERECTOMY      CERVICAL FUSION N/A 11/15/2017    REMOVAL OF PLATE I0-4, ACDF H8-7 W/ATLANTIS CORNERSTONE performed by Darwin Webb [facility], DIAGNOSTIC      EYE SURGERY      cataract 2017   [address] [facility]    HERNIA REPAIR      HYSTERECTOMY  1973    NECK SURGERY  FEB 2016   [facility] OTHER SURGICAL HISTORY  02/13/2019    Arteriogram for diagnostics    PARTIAL HYSTERECTOMY      UPPER GASTROINTESTINAL ENDOSCOPY      UPPER GASTROINTESTINAL ENDOSCOPY         Medications Prior to Admission:      Prior to Admission medications    Medication Sig Start Date End Date Taking?  Authorizing Provider   magnesium oxide (MAG-OX) 400 MG tablet take 1 tablet by mouth once daily 3/19/19   Deena Dixon, APRN - CNP HYDROcodone-acetaminophen (NORCO) 5-325 MG per tablet Take 1 tablet by mouth daily for 30 days. Take 1/2 to 1 tablet daily for pain 3/18/19 4/17/19  ASHLEY Gordon CNP   potassium chloride (KLOR-CON M) 20 MEQ extended release tablet Take 20 mEq by mouth daily    Historical Provider, MD   metolazone (ZAROXOLYN) 2.5 MG tablet Take 1 tablet by mouth once a week Every Monday 3/4/19   ASHLEY Prado CNP    MG capsule take 3 capsules every evening 3/2/19   ASHLEY Gordon CNP   nystatin (MYCOSTATIN) 056788 UNIT/GM ointment Apply topically 2 times daily.  2/19/19   ASHLEY Gordon CNP   hydrALAZINE (APRESOLINE) 25 MG tablet Take 1 tablet by mouth 3 times daily 2/12/19   ASHLEY Prado CNP   losartan (COZAAR) 25 MG tablet Take 2 tablets by mouth daily 2/12/19   ASHLEY Prado CNP   furosemide (LASIX) 20 MG tablet take 2 tablets by mouth twice a day IN THE MORNING AND EVENING 2/7/19   ASHLEY Gordon CNP   Multiple Vitamin (MULTIVITAMIN) tablet Take 1 tablet by mouth daily 2/5/19   [address] [place], [place] [address]n Avenue, MD   RA SALINE NASAL SPRAY 0.65 % nasal spray instill 1 spray into each nostril if needed for congestion 1/7/19   ASHLEY Gordon CNP   cyanocobalamin ([facility] VITAMIN B12) 1000 MCG tablet Take 1 tablet by mouth daily 12/29/18   Astrid Huynh MD   cycloSPORINE (RESTASIS) 0.05 % ophthalmic emulsion Place 1 drop into both eyes 2 times daily    Historical Provider, MD   ranolazine (RANEXA) 500 MG extended release tablet take 1 tablet by mouth twice a day 12/18/18   Hoda Yang PA-C   isosorbide dinitrate (ISORDIL) 40 MG tablet Take 1 tablet by mouth 3 times daily 12/4/18   ASHLEY Barron CNP   ferrous sulfate 325 (65 Fe) MG tablet Take 1 tablet by mouth 2 times daily 11/15/18   Normie Formosa, APRN - CNP   escitalopram (LEXAPRO) 10 MG tablet Take 1 tablet by mouth every morning 11/7/18   Surendra Silva MD   metoprolol (LOPRESSOR) 100 MG tablet Take 1 tablet by mouth 2 times daily 11/6/18   Rocio Ricks MD   OXcarbazepine (TRILEPTAL) 150 MG tablet Take 300 mg by mouth every evening    Historical Provider, MD   polyethylene glycol (GLYCOLAX) powder Take 17 g by mouth daily as needed (Constipation)    Historical Provider, MD   levothyroxine (SYNTHROID) 100 MCG tablet take 1 tablet by mouth once daily 9/19/18   ASHLEY Maurice Ra, CNP   Calcium Carbonate-Vitamin D (OYSTER SHELL CALCIUM/D) 500-200 MG-UNIT TABS take 1 tablet by mouth twice a day 9/19/18   ASHLEY Maurice Ra, CNP   aspirin (ASPIRIN LOW DOSE) 81 MG EC tablet take 1 tablet by mouth once daily  Patient taking differently: Take 81 mg by mouth daily take 1 tablet by mouth once daily 9/19/18   ASHLEY Maurice Ra, CNP   rosuvastatin (CRESTOR) 20 MG tablet take 1 tablet by mouth once daily 9/18/18   Renata Coker PA-C   albuterol sulfate HFA (VENTOLIN HFA) 108 (90 Base) MCG/ACT inhaler Inhale 2 puffs into the lungs every 6 hours as needed for Wheezing 8/28/18   ASHLEY Peters CNP   clopidogrel (PLAVIX) 75 MG tablet take 1 tablet by mouth once daily 7/30/18   Angelica Velez PA-C   clotrimazole-betamethasone (LOTRISONE) 1-0.05 % cream Apply topically 3 times daily. Patient taking differently: Apply topically 2 times daily Apply topically 3 times daily. 7/17/18   ASHLEY Maurice Ra, CNP   baclofen (LIORESAL) 10 MG tablet Take 10 mg by mouth 2 times daily    Historical Provider, MD   Misc.  Devices MISC Pt needs an oxygen tank carrier for her wheelchair 6/20/18   ASHLEY Maurice Ra, CNP   SYMBICORT 160-4.5 MCG/ACT AERO inhale 2 puffs by mouth twice a day 4/21/18   ASHLEY Dyson CNP   fluticasone (FLONASE) 50 MCG/ACT nasal spray 1 spray by Nasal route daily 1/23/18   ASHLEY Dyson CNP   polyvinyl alcohol (LIQUIFILM TEARS) 1.4 % ophthalmic solution Place 1 drop into both eyes as needed 2-4 times daily    Historical Provider, MD hydrOXYzine (ATARAX) 25 MG tablet Take 25 mg by mouth 3 times daily    Historical Provider, MD   glucose blood VI test strips (GLUCOSE METER TEST) strip 1 each by In Vitro route daily Check blood sugar q daily Dx E11.69 11/9/17   Patrick Brooks, DO   Lancets MISC Check blood sugar q daily Dx E11.69 11/9/17   Patrick Puff, DO   Blood Glucose Monitoring Suppl HARVINDER Check blood sugar q daily Dx E11.69 11/9/17   Patrick Lanceff, DO   nystatin (MYCOSTATIN) 851299 UNIT/GM cream Apply topically 2 times daily. Patient taking differently: Apply topically Apply topically 2 times daily. UNDER BREAST 8/17/17   ASHLEY Colvin CNP   OXYGEN Inhale 3 L into the lungs continuous     Historical Provider, MD   doxepin (SINEQUAN) 150 MG capsule Take 150 mg by mouth nightly    Historical Provider, MD   OXcarbazepine (TRILEPTAL) 150 MG tablet Take 150 mg by mouth every morning     Historical Provider, MD   ONE TOUCH ULTRASOFT LANCETS MISC use as directed BEFORE BREAKFAST AND SUPPER 10/15/14   ASHLEY Colvin CNP   pantoprazole (PROTONIX) 40 MG tablet Take 40 mg by mouth every morning (before breakfast)     Historical Provider, MD   risperiDONE (RISPERDAL) 1 MG tablet Take 1 mg by mouth every morning     Historical Provider, MD       Allergies:  Lipitor [atorvastatin] and Motrin [ibuprofen micronized]    Social History:      The patient currently lives home    TOBACCO:   reports that she quit smoking about 12 years ago. Her smoking use included cigarettes. She has a 38.00 pack-year smoking history. She has never used smokeless tobacco.  ETOH:   reports that she does not drink alcohol. Family History:       Reviewed in detail and negative for DM, CAD, Cancer, CVA.  Positive as follows:        Problem Relation Age of Onset    Heart Disease Mother         CHF    Heart Disease Father         CAD    Heart Attack Father     Kidney Disease Sister         DIALYSIS    Cancer Sister         RECTUM    Heart Disease Sister    Tiffanie Yeboah Heart Disease Brother     Heart Disease Brother         CAD    Heart Disease Brother     Heart Disease Brother     Breast Cancer Child 36    Cancer Sister         UTERINE    Ovarian Cancer Other 22    Other Brother         SUICIDE       Diet:  No diet orders on file    REVIEW OF SYSTEMS:   Pertinent positives as noted in the HPI. All other systems reviewed and negative. PHYSICAL EXAM:    BP (!) 183/92   Pulse 85   Temp 98.2 °F (36.8 °C) (Oral)   Resp 22   Ht 5' 6\" (1.676 m)   Wt 218 lb (98.9 kg)   LMP 02/12/1973 (Approximate)   SpO2 100%   BMI 35.19 kg/m²     General appearance:  No apparent distress, appears stated age and cooperative. HEENT:  Normal cephalic, atraumatic without obvious deformity. Pupils equal, round, and reactive to light. Extra ocular muscles intact. Conjunctivae/corneas clear. Neck: Supple, with full range of motion. no jugular venous distention. Trachea midline. no carotid bruits difficult to evaluate 2/2 to obesity of neck  Respiratory:  Normal respiratory effort. Clear to auscultation, bilaterally without Rales/Wheezes/Rhonchi. Breath sounds equal bilaterally  Cardiovascular:  Regular rate and rhythm with normal S1/S2 with 3-4/6 murmur.   -ve rubs or gallops. PMI non displaced  Abdomen: Soft, non-tender, non-distended with normal bowel sounds. No guarding, rebound. Musculoskeletal:  No clubbing, cyanosis or edema bilaterally. Full range of motion without deformity. Skin: Skin color, texture, turgor normal.  No rashes or lesions, or suspicious lesions. Neurologic:  Neurovascularly intact without any focal sensory/motor deficits.  Cranial nerves: II-XII intact, grossly non-focal.  Psychiatric:  Alert and oriented, thought content appropriate, normal insight  Capillary Refill: Brisk,< 2 seconds   Peripheral Pulses: +2 palpable, equal bilaterally upper and lower extremities  Lymphatics: no lymphadenopathy      Labs:     Recent Labs     03/21/19  1646   WBC 8.3   HGB 11.1* HCT 32.1*        Recent Labs     03/21/19  1304 03/21/19  1646   * 117*   K 3.5 3.8   CL 68* 69*   CO2 33 35*   BUN 13 15   CREATININE 0.7 0.7   CALCIUM 10.8* 11.0*     Recent Labs     03/21/19  1646   AST 36   ALT 25   BILITOT 0.3   ALKPHOS 56     Recent Labs     03/21/19  1646   INR 1.02     No results for input(s): Santy Starks in the last 72 hours. Urinalysis:      Lab Results   Component Value Date    NITRU NEGATIVE 02/03/2019    WBCUA 25-50 02/03/2019    BACTERIA NONE 02/03/2019    RBCUA 0-2 02/03/2019    BLOODU NEGATIVE 02/03/2019    SPECGRAV 1.012 02/03/2019    GLUCOSEU NEGATIVE 12/25/2018       Radiology:     CXR: I have reviewed the CXR with the following interpretation: -ve chf  EKG:  I have reviewed the EKG with the following interpretation:no acute changes    Xr Chest Standard (2 Vw)    Result Date: 3/2/2019  PROCEDURE: XR CHEST (2 VW) CLINICAL INFORMATION: Shortness of breath. COMPARISON: 2/7/2018. TECHNIQUE: AP upright and lateral views of the chest performed. FINDINGS: POSTSURGICAL CHANGES: None. LINES/TUBES/MECHANICAL DEVICES: 1. Suspected caval filter, not well visualized. TRACHEA/HEART/MEDIASTINUM/HILUM: 1. Stable mild enlargement of the cardiac silhouette. 2. Stable atheromatous calcification thoracic aorta. LUNG FIELDS: 1. There is no consolidation or infiltrates. There is no pleural effusion or pulmonary vascular congestion. OTHER: None. PNEUMOTHORAX: None. OSSEOUS STRUCTURES: 1. There is no acute cardiopulmonary process. 1. Unremarkable PA and lateral views of the chest. **This report has been created using voice recognition software. It may contain minor errors which are inherent in voice recognition technology. ** Final report electronically signed by Dr. Tyler May on 3/2/2019 11:10 AM    Cta Chest W Wo Contrast    Result Date: 3/12/2019  PROCEDURE: CTA CHEST W WO CONTRAST, CTA ABDOMEN PELVIS W WO CONTRAST CLINICAL INFORMATION: Severe aortic stenosis . Pre-op TAVR. COMPARISON: CT chest 11/27/2018. CT abdomen and pelvis 11/27/2018. TECHNIQUE: Noncontrast 5 mm axial images were obtained through the chest, abdomen and pelvis. Intravenous Optiray contrast was given. ECG gated axial 0.6 mm axial images were attained of the entire heart. A CT of the entire chest, abdomen and pelvis was obtained at 0.6 mm increments. These are reconstructed into 3 x 3 axial images. Those are sent to PACS. 3-D reconstructions through the chest, abdomen and pelvis include sagittal and coronal MIP images performed on the scanner. Centerline reconstructions  were obtained. Vascular and valve measurements are made on a dedicated 3-D workstation. Measurements were made in systole with the aortic valve open. All CT scans at this facility use dose modulation, iterative reconstruction, and/or weight-based dosing when appropriate to reduce radiation dose to as low as reasonably achievable. FINDINGS: MEASUREMENTS: AORTIC VALVE: Calcium score: 1291 Trileaflet valve. There is mild to moderate calcification of the aortic valve leaflets. Valvular calcifications do not extend into the LVOT. Alvaro Motto AORTIC DIMENSIONS: Aortic annulus: 26 x 19 mm. Aortic annulus area: 3.3 cm2. Aortic annulus perimeter: 68 mm. Distance of right coronary ostia to the annulus: 28 mm. Distance of the left coronary ostia to the annulus: 17 mm. Maximum diameter of the aortic sinus: 34 mm. Maximum diameter of the sinotubular junction: 26 mm. Maximum diameter of the mid ascending aorta: 32 mm. Aortic root orientation: 3 cusp view: NATHALIE 14, CAU 15; Anterior view: SUMMERS 0, CAU 27 Coronary anatomy: The right coronary artery arises from the sinus of Valsalva on the right. The left main coronary artery arises from the sinus of Valsalva on the left. No anomalies are noted. There are coronary artery calcifications. Ascending aorta and arch: There is no calcified plaque of the ascending aorta. No soft plaque is noted.  There is calcified atherosclerosis of the aortic arch. Descending thoracic aorta: There is no significant tortuosity of the descending thoracic aorta. There is no aneurysmal dilation. There is calcified atherosclerosis of the descending thoracic aorta. Abdominal aorta: There is no significant tortuosity of the abdominal aorta. There is no aneurysmal dilation. There is moderate calcified atherosclerosis of the abdominal aorta. Iliofemoral access route: There is no significant tortuosity. Minimal diameter of the abdominal aorta: 12 mm. Minimal diameter of the right common iliac artery: 8 mm. Minimal diameter of the right external iliac artery: 8 mm. Minimal diameter of the right common femoral artery: 8 mm. Minimal diameter of the left common iliac artery: 7 mm. Minimal diameter of the left external iliac artery: 7 mm. Minimal diameter of the left common femoral artery: 6 mm. HEART: There is mild cardiomegaly. There are some calcifications of the mitral valve annulus. There is left ventricular hypertrophy. The left atrium and left atrial appendage are within appropriate limits. PERICARDIUM: There is a small pericardial effusion. CHEST: There is some mild scarring/atelectasis dependently in the lungs. There are emphysematous changes. There is a 7 mm pulmonary nodule in the superior segment of the left lower lobe, image 50 of series 4.. This has increased in size compared to 4/24/2017. At that time this measured 5 mm. There is a 3 mm left lower lobe pulmonary nodule on image 66 which is stable. The pulmonary artery is normal. No pulmonary emboli are noted. No mediastinal adenopathy is noted. ABDOMEN:  The liver is normal. The spleen is normal. The adrenals and pancreas are normal. There are surgical clips from a cholecystectomy. There is no hydronephrosis or stones of either kidney. No renal masses are noted. No abnormalities of the small bowel loops are noted. The IVC is grossly normal. There is an IVC filter. There is no adenopathy.   PELVIS: was obtained at 0.6 mm increments. These are reconstructed into 3 x 3 axial images. Those are sent to PACS. 3-D reconstructions through the chest, abdomen and pelvis include sagittal and coronal MIP images performed on the scanner. Centerline reconstructions  were obtained. Vascular and valve measurements are made on a dedicated 3-D workstation. Measurements were made in systole with the aortic valve open. All CT scans at this facility use dose modulation, iterative reconstruction, and/or weight-based dosing when appropriate to reduce radiation dose to as low as reasonably achievable. FINDINGS: MEASUREMENTS: AORTIC VALVE: Calcium score: 1291 Trileaflet valve. There is mild to moderate calcification of the aortic valve leaflets. Valvular calcifications do not extend into the LVOT. Judithe Paradise AORTIC DIMENSIONS: Aortic annulus: 26 x 19 mm. Aortic annulus area: 3.3 cm2. Aortic annulus perimeter: 68 mm. Distance of right coronary ostia to the annulus: 28 mm. Distance of the left coronary ostia to the annulus: 17 mm. Maximum diameter of the aortic sinus: 34 mm. Maximum diameter of the sinotubular junction: 26 mm. Maximum diameter of the mid ascending aorta: 32 mm. Aortic root orientation: 3 cusp view: [place] 14, CAU 15; Anterior view: SUMMERS 0, CAU 27 Coronary anatomy: The right coronary artery arises from the sinus of Valsalva on the right. The left main coronary artery arises from the sinus of Valsalva on the left. No anomalies are noted. There are coronary artery calcifications. Ascending aorta and arch: There is no calcified plaque of the ascending aorta. No soft plaque is noted. There is calcified atherosclerosis of the aortic arch. Descending thoracic aorta: There is no significant tortuosity of the descending thoracic aorta. There is no aneurysmal dilation. There is calcified atherosclerosis of the descending thoracic aorta. Abdominal aorta: There is no significant tortuosity of the abdominal aorta.   There is no aneurysmal dilation. There is moderate calcified atherosclerosis of the abdominal aorta. Iliofemoral access route: There is no significant tortuosity. Minimal diameter of the abdominal aorta: 12 mm. Minimal diameter of the right common iliac artery: 8 mm. Minimal diameter of the right external iliac artery: 8 mm. Minimal diameter of the right common femoral artery: 8 mm. Minimal diameter of the left common iliac artery: 7 mm. Minimal diameter of the left external iliac artery: 7 mm. Minimal diameter of the left common femoral artery: 6 mm. HEART: There is mild cardiomegaly. There are some calcifications of the mitral valve annulus. There is left ventricular hypertrophy. The left atrium and left atrial appendage are within appropriate limits. PERICARDIUM: There is a small pericardial effusion. CHEST: There is some mild scarring/atelectasis dependently in the lungs. There are emphysematous changes. There is a 7 mm pulmonary nodule in the superior segment of the left lower lobe, image 50 of series 4.. This has increased in size compared to 4/24/2017. At that time this measured 5 mm. There is a 3 mm left lower lobe pulmonary nodule on image 66 which is stable. The pulmonary artery is normal. No pulmonary emboli are noted. No mediastinal adenopathy is noted. ABDOMEN:  The liver is normal. The spleen is normal. The adrenals and pancreas are normal. There are surgical clips from a cholecystectomy. There is no hydronephrosis or stones of either kidney. No renal masses are noted. No abnormalities of the small bowel loops are noted. The IVC is grossly normal. There is an IVC filter. There is no adenopathy. PELVIS:  The urinary bladder is normal. There is no pelvic free fluid. There is diverticulosis of the sigmoid colon. The sigmoid colon is tortuous and redundant. The uterus and adnexa are grossly normal. BONES: No suspicious osseous lesions are present. 1. Preoperative TAVR measurements as listed above.  2. 8 mm pulmonary

## 2019-03-21 NOTE — ED PROVIDER NOTES
[facility]  eMERGENCY dEPARTMENT eNCOUnter          CHIEF COMPLAINT       Chief Complaint   Patient presents with    Other     low sodium       Nurses Notes reviewed and I agreeexcept as noted in the HPI. HISTORY OF PRESENT ILLNESS    Samira Ann is a 75 y.o. female who presents to the ED via EMS with a medical history of CHF, CAD, COPD, and DM for the evaluation of hyponatremia. The patient states to have had lab work completed today and reports that she received a call from her primary care physician who told her to come to the ED due to a sodium of 117. The patient states that she has been experiencing chest pain, vomiting, diarrhea, and a headache for the past two days. She states that she has committed four acts of both vomiting and diarrhea. Patient denies any fever, chills, cough, pharyngitis, rhinorrhea, or shortness of breath. She denies any melena, hematochezia, or hematemesis. The patient denies any numbness, weakness, or associated paresthesias. Patient denies any recent medication changes. After evaluation the patient, I determined that her presenting history is inconsistent with the information that she told EMS and the nursing staff. The EMS report and nursing staff both state that the patent denies any complaints until I evaluated her within the ED causing a discrepancy in her HPI and ROS. The patient reports no additional symptoms or complaints at this time. REVIEW OF SYSTEMS     Review of Systems   Constitutional: Negative for appetite change, chills, fatigue and fever. HENT: Negative for congestion, ear pain, rhinorrhea and sore throat. Eyes: Negative for pain, discharge and visual disturbance. Respiratory: Negative for cough, shortness of breath and wheezing. Cardiovascular: Positive for chest pain. Negative for palpitations and leg swelling. Gastrointestinal: Positive for diarrhea (x4) and vomiting (x4). Negative for abdominal pain and nausea. test (5-10-10); Cardiac catheterization (8-11-06); Cardiac catheterization (5-11-04); Cardiac catheterization (5-11-04); bladder suspension; hernia repair; Breast lumpectomy; Foot surgery; Cholecystectomy; Colonoscopy; Upper gastrointestinal endoscopy; Endoscopy, colon, diagnostic; partial hysterectomy (cervix not removed); Upper gastrointestinal endoscopy; Neck surgery (FEB 2016); back surgery (08/2016); Breast biopsy (10/10/2017); cervical fusion (N/A, 11/15/2017); eye surgery; Abdomen surgery; Hysterectomy (1973); Hemorrhoid surgery (1973); other surgical history (02/13/2019); and Carotid endarterectomy. CURRENT MEDICATIONS       Previous Medications    ALBUTEROL SULFATE HFA (VENTOLIN HFA) 108 (90 BASE) MCG/ACT INHALER    Inhale 2 puffs into the lungs every 6 hours as needed for Wheezing    ASPIRIN (ASPIRIN LOW DOSE) 81 MG EC TABLET    take 1 tablet by mouth once daily    BACLOFEN (LIORESAL) 10 MG TABLET    Take 10 mg by mouth 2 times daily    BLOOD GLUCOSE MONITORING SUPPL HARVINDER    Check blood sugar q daily Dx E11.69    CALCIUM CARBONATE-VITAMIN D (OYSTER SHELL CALCIUM/D) 500-200 MG-UNIT TABS    take 1 tablet by mouth twice a day    CLOPIDOGREL (PLAVIX) 75 MG TABLET    take 1 tablet by mouth once daily    CLOTRIMAZOLE-BETAMETHASONE (LOTRISONE) 1-0.05 % CREAM    Apply topically 3 times daily.     CYANOCOBALAMIN ([employer] VITAMIN B12) 1000 MCG TABLET    Take 1 tablet by mouth daily    CYCLOSPORINE (RESTASIS) 0.05 % OPHTHALMIC EMULSION    Place 1 drop into both eyes 2 times daily     MG CAPSULE    take 3 capsules every evening    DOXEPIN (SINEQUAN) 150 MG CAPSULE    Take 150 mg by mouth nightly    ESCITALOPRAM (LEXAPRO) 10 MG TABLET    Take 1 tablet by mouth every morning    FERROUS SULFATE 325 (65 FE) MG TABLET    Take 1 tablet by mouth 2 times daily    FLUTICASONE (FLONASE) 50 MCG/ACT NASAL SPRAY    1 spray by Nasal route daily    FUROSEMIDE (LASIX) 20 MG TABLET    take 2 tablets by mouth twice a day IN THE MORNING AND EVENING    GLUCOSE BLOOD VI TEST STRIPS (GLUCOSE METER TEST) STRIP    1 each by In Vitro route daily Check blood sugar q daily Dx E11.69    HYDRALAZINE (APRESOLINE) 25 MG TABLET    Take 1 tablet by mouth 3 times daily    HYDROCODONE-ACETAMINOPHEN (NORCO) 5-325 MG PER TABLET    Take 1 tablet by mouth daily for 30 days. Take 1/2 to 1 tablet daily for pain    HYDROXYZINE (ATARAX) 25 MG TABLET    Take 25 mg by mouth 3 times daily    ISOSORBIDE DINITRATE (ISORDIL) 40 MG TABLET    Take 1 tablet by mouth 3 times daily    LANCETS MISC    Check blood sugar q daily Dx E11.69    LEVOTHYROXINE (SYNTHROID) 100 MCG TABLET    take 1 tablet by mouth once daily    LOSARTAN (COZAAR) 25 MG TABLET    Take 2 tablets by mouth daily    MAGNESIUM OXIDE (MAG-OX) 400 MG TABLET    take 1 tablet by mouth once daily    METOLAZONE (ZAROXOLYN) 2.5 MG TABLET    Take 1 tablet by mouth once a week Every Monday    METOPROLOL (LOPRESSOR) 100 MG TABLET    Take 1 tablet by mouth 2 times daily    MISC. DEVICES MISC    Pt needs an oxygen tank carrier for her wheelchair    MULTIPLE VITAMIN (MULTIVITAMIN) TABLET    Take 1 tablet by mouth daily    NYSTATIN (MYCOSTATIN) 548650 UNIT/GM CREAM    Apply topically 2 times daily. NYSTATIN (MYCOSTATIN) 657839 UNIT/GM OINTMENT    Apply topically 2 times daily.     ONE TOUCH ULTRASOFT LANCETS MISC    use as directed BEFORE BREAKFAST AND SUPPER    OXCARBAZEPINE (TRILEPTAL) 150 MG TABLET    Take 150 mg by mouth every morning     OXCARBAZEPINE (TRILEPTAL) 150 MG TABLET    Take 300 mg by mouth every evening    OXYGEN    Inhale 3 L into the lungs continuous     PANTOPRAZOLE (PROTONIX) 40 MG TABLET    Take 40 mg by mouth every morning (before breakfast)     POLYETHYLENE GLYCOL (GLYCOLAX) POWDER    Take 17 g by mouth daily as needed (Constipation)    POLYVINYL ALCOHOL (LIQUIFILM TEARS) 1.4 % OPHTHALMIC SOLUTION    Place 1 drop into both eyes as needed 2-4 times daily    POTASSIUM CHLORIDE (KLOR-CON M) 20 MEQ EXTENDED RELEASE TABLET    Take 20 mEq by mouth daily    RA SALINE NASAL SPRAY 0.65 % NASAL SPRAY    instill 1 spray into each nostril if needed for congestion    RANOLAZINE (RANEXA) 500 MG EXTENDED RELEASE TABLET    take 1 tablet by mouth twice a day    RISPERIDONE (RISPERDAL) 1 MG TABLET    Take 1 mg by mouth every morning     ROSUVASTATIN (CRESTOR) 20 MG TABLET    take 1 tablet by mouth once daily    SYMBICORT 160-4.5 MCG/ACT AERO    inhale 2 puffs by mouth twice a day       ALLERGIES     is allergic to lipitor [atorvastatin] and motrin [ibuprofen micronized]. FAMILY HISTORY     indicated that her mother is . She indicated that her father is . She indicated that only one of her two sisters is alive. She indicated that two of her six brothers are alive. She indicated that her maternal grandmother is . She indicated that her maternal grandfather is . She indicated that her paternal grandmother is . She indicated that her paternal grandfather is . She indicated that the status of her child is unknown. She indicated that her other is alive. family history includes Breast Cancer (age of onset: 36) in her child; Cancer in her sister and sister; Heart Attack in her father; Heart Disease in her brother, brother, brother, brother, father, mother, and sister; Kidney Disease in her sister; Other in her brother; Ovarian Cancer (age of onset: 22) in an other family member. SOCIAL HISTORY      reports that she quit smoking about 12 years ago. Her smoking use included cigarettes. She has a 38.00 pack-year smoking history. She has never used smokeless tobacco. She reports that she does not drink alcohol or use drugs. PHYSICAL EXAM     INITIAL VITALS:  height is 5' 6\" (1.676 m) and weight is 218 lb (98.9 kg). Her oral temperature is 98.2 °F (36.8 °C). Her blood pressure is 163/91 (abnormal) and her pulse is 90. Her respiration is 20 and oxygen saturation is 98%. CONSTITUTIONAL: [Awake, alert, non toxic, well developed, well nourished, no acute distress other, patient is wearing a nasal canula]  HEAD: [Normocephalic, atraumatic]  EYES: [Pupils equal, round & reactive to light, extraocular movements intact, no nystagmus, clear conjunctiva, non-icteric sclera]  ENT: [External ear canal clear without evidence of cerumen impaction or foreign body, TM's clear without erythema or bulging. Nares patent without drainage, septum appears midline. Moist mucus membranes, oropharynx clear without exudate, erythema, or mass. Uvula midline]  NECK: [Nontender and supple. No meningismus, no appreciated lymphadenopathy. Intact full range of motion. C-spine midline without vertebral tenderness. Trachea midline.]  CHEST: [Inspection normal, no lesions, equal rise. No crepitus or tenderness upon palpation.]  CARDIOVASCULAR: [Regular rate, rhythm, normal S1 and S2. No appreciated rubs, or gallops. Patient has a 2/6 systolic murmur. No pulse deficits appreciated. Intact distal perfusion. JVD not appreciated.]  PULMONARY: [Respiratory distress absent. Respiratory effort normal. Breath sounds clear to auscultation without rhonchi, rales, or wheezing. No accessory muscle use. No stridor]  ABDOMEN: [Inspection normal, without surgical scars. Soft, non-tender, non-distended, with normoactive bowel sounds. No palpable masses, rebound, or guarding]  BACK: [Intact ROM. No midline vertebral tenderness, step off, or crepitus. No CVA tenderness.]  MUSCULOSKELETAL: [Extremities nontender to palpation. No gross deformity or evidence of external trauma. Intact range of motion. Sensation intact. No clubbing, cyanosis, or edema.]  SKIN: [Warm, dry. No jaundice, rash, urticaria, or petechiae]  NEUROLOGIC: [Alert and oriented x 3, GCS 15, normal mentation for age. Moves all four extremities. No gross sensory deficit.  Cerebellar function grossly normal.]  PSYCHIATRIC: [Normal mood and affect, thought process is clear and linear]     DIFFERENTIAL DIAGNOSIS:   Differential Dx Lists - I consider the following to be a partial list of the possible etiologies for the patient's symptoms and based on my clinical findings as well and are part of my medical decision making:    Abnormal Labs: medication causing hyponatremia, less likely fluid overload/chf, psychogenic polydipsia, spurious elevation, hemolysis with lab draw, acute/chronic renal failure;    DIAGNOSTIC RESULTS     EKG: All EKG's are interpreted by the Emergency Department Physician who either signs or Co-signsthis chart in the absence of a cardiologist.    Rich Patel. Rate: 86 bpm  SD interval: 190 ms  QRS duration: 106 ms  QTc: 512 ms  P-R-T axes: 61, 8, 17  No STEMI. EKG gives impression of NSR    Compared to old EKG on 2-Mar-2019    RADIOLOGY: non-plain film images(s) such as CT,Ultrasound and MRI are read by the radiologist.    XR CHEST PORTABLE   Final Result   Stable radiographic appearance of the chest. No evidence of an acute process. **This report has been created using voice recognition software. It may contain minor errors which are inherent in voice recognition technology. **      Final report electronically signed by Dr. Fabienne Cano MD on 3/21/2019 4:43 PM           [] Visualized and interpreted by me   [x] Radiologist's Wet Read Report Reviewed   [] Discussed withRadiologist.    LABS:   Labs Reviewed   CBC WITH AUTO DIFFERENTIAL - Abnormal; Notable for the following components:       Result Value    RBC 4.02 (*)     Hemoglobin 11.1 (*)     Hematocrit 32.1 (*)     MCV 79.9 (*)     All other components within normal limits   COMPREHENSIVE METABOLIC PANEL W/ REFLEX TO MG FOR LOW K - Abnormal; Notable for the following components:    Glucose 127 (*)     Sodium 117 (*)     Chloride 69 (*)     CO2 35 (*)     Calcium 11.0 (*)     All other components within normal limits   BRAIN NATRIURETIC PEPTIDE - Abnormal; Notable for the following components: REFERRED TO:  Mayper MackenzieASHLEY - CNP  [address]  [phone]            DISCHARGE MEDICATIONS:  New Prescriptions    No medications on file       (Please note that portions ofthis note were completed with a voice recognition program.  Efforts were made to edit the dictations but occasionally words are mis-transcribed.)    Scribe:  Garett Lobo 3/21/19 4:32 PM Scribing for and in the presence of Jorge Tatum MD.    Signed by: Ina Martinez, 03/21/19 7:28 PM    Provider:  I personally performed the services described in the documentation, reviewed and edited the documentation which was dictated to the scribe in my presence, and it accurately records my words and actions.     Jorge Tatum MD 3/21/19 7:28 PM         Jorge Tatum MD  03/21/19 [id number]

## 2019-03-21 NOTE — ED NOTES
Pt resting on cot at this time. Updated on POC. No concerns voiced.       Lyudmila Bashir RN  03/21/19 [id number]

## 2019-03-22 NOTE — PROGRESS NOTES
Pt admitted to  [address] [place] [address] from ED. Complaints: hyonatremia. IV normal saline infusing into the forearm left, condition patent and no redness at a rate of 125 mls/ hour with about 600 mls in the bag still. IV site free of s/s of infection or infiltration. Vital signs obtained. Assessment and data collection initiated. Two nurse skin assessment performed by Gabriela Singh and Tere Ruffin RN. Oriented to room. Policies and procedures for [facility]K explained. All questions answered with no further questions at this time. Fall prevention and safety brochure discussed with patient. Bed alarm on. Call light in reach.

## 2019-03-22 NOTE — PROGRESS NOTES
Pharmacy Consult      Ledy Ramesh is a 75 y.o. female for whom pharmacy has been consulted to review meds that may cause hyponatremia. Patient Active Problem List   Diagnosis    Anxiety    Depression    Type 2 diabetes mellitus with complication, with long-term current use of insulin (HCC)    MI (myocardial infarction) ([facility] [address]    Dyslipidemia    CAD (coronary artery disease)    COPD without exacerbation ([facility] [address]    GERD (gastroesophageal reflux disease)    Hypothyroid    History of tobacco abuse: Quit in 2009    S/P PTCA: 3/2/2017: PTCA RCA ISR. 12/11/2014: LAD Resolute 3.0X26. 11/6/2014: Stenting RCA Brenda Aw 5.4L26 and 3.5X18. 5/11/2004: Proximal & mid RCA Taxus 3.5X20 mm, and Taxus 3.0X32 mm. Metabolic syndrome    S/P cardiac catheterization: 11/6/2014: 99% in-stent restenosis mid-RCA. LCx moderate LI's. LAD 85% mid-segment lesion. POND (nonalcoholic steatohepatitis)    Hyperlipidemia    Essential hypertension    Obesity (BMI 30-39. 9)    Moderate dehydration    ASCVD (arteriosclerotic cardiovascular disease)    Chronic respiratory failure with hypoxia (HCC)    Other hyperlipidemia    Dysmetabolic syndrome    Bilateral iliac artery occlusion (HCC)    CHET (obstructive sleep apnea)    Severe aortic stenosis    Nocturnal hypoxemia    Hyponatremia    Acute on chronic diastolic congestive heart failure due to valvular disease (HCC)    Sympathotonic orthostatic hypotension    E-coli UTI    Anxiety and depression    Dizziness    Chest pain    Acute on chronic respiratory failure with hypoxia and hypercapnia (HCC)    Moderate persistent asthma without complication    Pulmonary nodule    Heart failure with preserved ejection fraction (HCC)    Osteoarthritis of multiple joints    Class 2 obesity due to excess calories with body mass index (BMI) of 38.0 to 38.9 in adult    Normocytic anemia    Subclavian vein stenosis    Post concussive syndrome    CRF (chronic renal failure), stage 4 (severe) ([facility])    Hypotension Syncope and collapse    Metabolic encephalopathy    Right-sided epistaxis    Diastolic dysfunction without heart failure    Cervical spine instability    Nonrheumatic aortic valve stenosis    Acute diastolic heart failure (HCC)    Acute kidney injury (HCC)    Hypercalcemia    At risk for polypharmacy    Acute respiratory failure with hypoxia and hypercapnia (HCC)    Acute pulmonary edema (HCC)    Abnormal urinalysis    Irritable bowel syndrome    Anemia requiring transfusions    Stage 3 severe COPD by GOLD classification ([facility] [address]    Bilateral carotid artery stenosis    Iron deficiency anemia due to dietary causes    B12 deficiency due to diet    Acute on chronic diastolic CHF (congestive heart failure), NYHA class 3 (HCC)    Anemia    Morbid obesity with BMI of 40.0-44.9, adult ([facility] [address]    Hyponatremia with decreased serum osmolality       Allergies:  Lipitor [atorvastatin] and Motrin [ibuprofen micronized]     Recent Labs     03/21/19  1304 03/21/19  1646   CREATININE 0.7 0.7       Ht/Wt:   Ht Readings from Last 1 Encounters:   03/21/19 5' 6\" (1.676 m)        Wt Readings from Last 1 Encounters:   03/21/19 217 lb 11.2 oz (98.7 kg)         Estimated Creatinine Clearance: 84 mL/min (based on SCr of 0.7 mg/dL). Assessment/Plan:    Meds currently ordered inpatient:  Trileptal  Cozaar (<1%)    Meds on home med rec:  Zaroxolyn  Doxepin  Lexapro      Thank you for the consult. Will continue to follow.    Warren Aleman, AriellaD

## 2019-03-22 NOTE — CARE COORDINATION
3/22/19, 12:28 PM    DISCHARGE BARRIERS        Reason for Referral: continued home health  Mental Status: Patient is alert and oriented to name and place but confused in conversation. Decision Making: Patient was making her own decisions. Family/Social/Home Environment: SW completed assessment with patient. Patient is from home alone with Donaldo. She has [facility] for aides (M-Friday for 2 hours, Saturday and Sunday for 1 hour) for personal care and housekeeping. She also has an RN daily. She receives 2 meals a day from [facility]. She cannot drive she uses [employer] to get everywhere. She lives on the first floor of an apartment alone with her dog. She is close with a neighbor and they help each other out. She has 2 daughters. She has a Passport [employer] ([phone]). She has an ERS necklace. SULY spoke with [place] and verified services above. Patient known to [facility] and refuses ECF placement. Current Services: [facility], Pass[employer], [employer], ERS necklace, [employer] home delivered meals. SULY spoke with [place] for [facility] to verify services. Current Equipment: Cane, walker, w/c, home oxygen. Payment Source: Medicare, medicaid  Concerns or Barriers to Discharge: not at this time  Collabrative List of ECF/HH were provided: not at this time     Teach Back Method used with Patient regarding care plan and discharge plan. Patient and daughter verbalize understanding of the plan of care and contribute to goal setting.      Electronically signed by SERA Clark, LICOW on 3/22/19 at 12:32 PM

## 2019-03-22 NOTE — CONSULTS
BREAST BIOPSY  10/10/2017    BREAST LUMPECTOMY      CARDIAC CATHETERIZATION  8-11-06    Mild nonobstructive CAD w/ diffuse 10-20% proximal and mid LAD stenosis and 10-20% proximal/ostial RCA stenosis. No obstructive lesions. RCA stents are patent w/o evidence of restenosis. Normal LV systolic function. EF 65%. Trace MR - catheter induced. Minimally elevated LVEDP - 13mmHg. Mild to moderate aortoiliac PVD w/o obstructive lesions.  CARDIAC CATHETERIZATION  5-11-04    Successful drug-eluting stent x 2 of proximal and mid RCA.  CARDIAC CATHETERIZATION  5-11-04    70-80% proximal RCA stenosis w/ 50-70% mid RCA stenosis. There is 50-60% lesion in mid PDA. Mild proximal and mid LAD disease. Mild circumflex disease. Normal LV size and systolic function. EF 60%.  CARDIOVASCULAR STRESS TEST  5-10-11    No evidence of stress induced ischemia. EF 75%.  CARDIOVASCULAR STRESS TEST  5-10-10    No evidence of stress induced ischemia, infarct or scar. EF 74%.     CAROTID ENDARTERECTOMY      CERVICAL FUSION N/A 11/15/2017    REMOVAL OF PLATE M5-5, ACDF V0-6 W/ATLANTIS CORNERSTONE performed by Eusebio Grove MD at [facility], DIAGNOSTIC      EYE SURGERY      cataract 2017   [facility]  FEB 2016    OTHER SURGICAL HISTORY  02/13/2019    Arteriogram for diagnostics    PARTIAL HYSTERECTOMY      UPPER GASTROINTESTINAL ENDOSCOPY      UPPER GASTROINTESTINAL ENDOSCOPY         Family History   Problem Relation Age of Onset    Heart Disease Mother         CHF    Heart Disease Father         CAD    Heart Attack Father     Kidney Disease Sister         DIALYSIS    Cancer Sister         RECTUM    Heart Disease Sister     Heart Disease Brother     Heart Disease Brother         CAD    Heart Disease Brother     Heart Disease Brother     Breast Cancer Child 36    Cancer Sister         UTERINE    Ovarian Cancer Other 22    Other Brother         SUICIDE        reports that she quit smoking about 12 years ago. Her smoking use included cigarettes. She has a 38.00 pack-year smoking history. She has never used smokeless tobacco. She reports that she does not drink alcohol or use drugs.     Allergies:  Lipitor [atorvastatin] and Motrin [ibuprofen micronized]    Current Medications:      sodium chloride tablet 1 g TID WC   aspirin EC tablet 81 mg Daily   clopidogrel (PLAVIX) tablet 75 mg Daily   ferrous sulfate tablet 325 mg BID   hydrALAZINE (APRESOLINE) tablet 25 mg TID   levothyroxine (SYNTHROID) tablet 100 mcg Daily   losartan (COZAAR) tablet 50 mg Daily   metoprolol (LOPRESSOR) tablet 100 mg BID   multivitamin 1 tablet Daily   mometasone-formoterol (DULERA) 200-5 MCG/ACT inhaler 2 puff BID   rosuvastatin (CRESTOR) tablet 20 mg Nightly   OXcarbazepine (TRILEPTAL) tablet 150 mg QAM   polyethylene glycol (GLYCOLAX) powder 17 g Daily   ranolazine (RANEXA) extended release tablet 500 mg BID   risperiDONE (RISPERDAL) tablet 1 mg QAM   sodium chloride flush 0.9 % injection 10 mL 2 times per day   sodium chloride flush 0.9 % injection 10 mL PRN   ondansetron (ZOFRAN) injection 4 mg Q6H PRN   enoxaparin (LOVENOX) injection 40 mg Q24H   glucose (GLUTOSE) 40 % oral gel 15 g PRN   dextrose 50 % solution 12.5 g PRN   glucagon (rDNA) injection 1 mg PRN   dextrose 5 % solution PRN   0.9 % sodium chloride infusion Continuous   potassium chloride (KLOR-CON M) extended release tablet 40 mEq PRN   Or    potassium bicarb-citric acid (EFFER-K) effervescent tablet 40 mEq PRN   Or    potassium chloride 10 mEq/100 mL IVPB (Peripheral Line) PRN   magnesium sulfate 1 g in dextrose 5 % 100 mL IVPB PRN   famotidine (PEPCID) tablet 20 mg BID   acetaminophen (TYLENOL) tablet 650 mg Q4H PRN   insulin lispro (HUMALOG) injection vial 0-6 Units TID WC   insulin lispro (HUMALOG) injection vial 0-3 Units Nightly polyvinyl alcohol (LIQUIFILM TEARS) 1.4 % ophthalmic solution 1 drop BID       Review of Systems:   Pertinent positives stated above in HPI. All other systems were reviewed and were negative. ROS:Constitutional: negative  Eyes: negative  Ears, nose, mouth, throat, and face: negative  Respiratory: negative  Cardiovascular: negative  Gastrointestinal: negative  Genitourinary:negative  Integument/breast: negative  Hematologic/lymphatic: negative  Musculoskeletal:positive for arthralgias and stiff joints  Neurological: negative  Behavioral/Psych: positive for anxiety and depression  Endocrine: negative  Allergic/Immunologic: negative    Physical exam:   Constitutional:  Elderly lady very sleepy this morning. Appears comfortable. Vitals:   Vitals:    03/22/19 0353   BP: (!) 142/69   Pulse: 99   Resp: 18   Temp: 98.4 °F (36.9 °C)   SpO2: 99%       Skin: no rash, turgor is normal.    Heent:  Pupils are reactive to light and accommodation. Throat is clear. Neck: no bruits or jvd noted  Cardiovascular:  S1, S2 without murmur  Respiratory: Clear to auscultation in the anterior   Abdomen:  +bs, soft, no palpable mass. No abdominal bruit.     Ext: No lower extremity edema  Psychiatric: mood and affect appropriate  Musculoskeletal:  Intact  CNS: Deferred     Data:   Labs:  Lab Results   Component Value Date     (L) 03/22/2019     (LL) 03/21/2019     (LL) 03/21/2019    K 3.3 (L) 03/22/2019    K 3.8 03/21/2019    K 3.5 03/21/2019    CL 75 (L) 03/22/2019    CO2 32 03/22/2019    CO2 35 (H) 03/21/2019    CO2 33 03/21/2019    CREATININE 0.9 03/22/2019    CREATININE 0.7 03/21/2019    CREATININE 0.7 03/21/2019    BUN 19 03/22/2019    BUN 15 03/21/2019    BUN 13 03/21/2019    GLUCOSE 189 (H) 03/22/2019    GLUCOSE 127 (H) 03/21/2019    GLUCOSE 145 (H) 03/21/2019    PHOS 2.0 (L) 12/24/2018    PHOS 3.9 12/22/2018    PHOS 4.6 03/29/2018    WBC 6.1 03/22/2019    WBC 8.3 03/21/2019    WBC 4.8 03/02/2019    HGB 10.3 (L) 03/22/2019    HGB 11.1 (L) 03/21/2019    HGB 9.5 (L) 03/02/2019    HCT 30.8 (L) 03/22/2019    HCT 32.1 (L) 03/21/2019    HCT 29.8 (L) 03/02/2019    MCV 82.6 03/22/2019     03/22/2019     {Labs reviewed    Imaging:  CXR results:         Thank you Dr. Mirna Saleh, APRN - CNP for allowing us to participate in care of Eliana Jones       Electronically signed by Iker Lagos MD on 3/22/2019 at 6:51 AM

## 2019-03-22 NOTE — PLAN OF CARE
Problem: Falls - Risk of:  Goal: Will remain free from falls  Description  Will remain free from falls  3/22/2019 1102 by aJlyn Dumont RN  Outcome: Ongoing  Note:   Bed alarm on, Falling star program in place. Call light within reach.    3/22/2019 3590 by Edil Thurman RN  Outcome: Ongoing  Note:   No falls noted this shift. Continue falling star program. Bed alarm on, bed in low position. Call light and personal belongings in reach. Patient uses call light appropriately. Goal: Absence of physical injury  Description  Absence of physical injury  3/22/2019 3962 by Edil Thurman RN  Outcome: Ongoing  Note:   No physical injury noted this shift. Safe environment provided. Will monitor. Problem: Risk for Impaired Skin Integrity  Goal: Tissue integrity - skin and mucous membranes  Description  Structural intactness and normal physiological function of skin and  mucous membranes. 3/22/2019 1102 by Jalyn Dumont RN  Outcome: Ongoing  Note:   Patient free from any new skin breakdown this shift. Patient turns self and makes frequent positional changes. Will continue to monitor. 3/22/2019 5500 by Edil Thurman RN  Outcome: Ongoing  Note:   No skin break down noted at this time. Encouraged patient to reposition self in bed. Problem: Tissue Perfusion - Peripheral, Altered:  Goal: Electrolytes within specified parameters  Description  Electrolytes within specified parameters  3/22/2019 1102 by Jalyn Dumont RN  Outcome: Ongoing  Note:   Patients sodium continues on the lower side today at 121 and getting sodium replacement tabs to help. Her potassium was 3.3 this morning as well and received 40mEqs of potassium per protocol. Patients Magnesium is on the lower side and 2.1 today and will be receiving IV Magnesium to help boost her levels. Will continue to monitor closely. 3/22/2019 3639 by Edil Thurman RN  Outcome: Ongoing  Note:   Sodium 117 this shift.  Rehydrating with NS. Will monitor. Goal: Hemodynamic stability will improve  Description  Hemodynamic stability will improve  3/22/2019 1102 by Daniel Jurado RN  Outcome: Ongoing  Note:   Patients vital signs are stable at this time. Patients tele shows NSR at a rate of 77. Capillary refill and skin turgor less than 3 seconds. Will continue to monitor closely. 3/22/2019 2682 by Angelina Rodriguez RN  Outcome: Ongoing  Note:   Vitals:    03/22/19 0353   BP: (!) 142/69   Pulse: 99   Resp: 18   Temp: 98.4 °F (36.9 °C)   SpO2: 99%          Problem: Discharge Planning:  Goal: Discharged to appropriate level of care  Description  Discharged to appropriate level of care  3/22/2019 1102 by Daniel Jurado RN  Outcome: Ongoing  Note:   Patient plans to be discharged home alone with home health when medically stable. 3/22/2019 4968 by Angelina Rodriguez RN  Outcome: Ongoing  Note:   Pt plans to return home alone at discharge. Will continue to assess discharge needs. Problem: Pain:  Goal: Recognizes and communicates pain  Description  Recognizes and communicates pain  3/22/2019 1102 by Daniel Jurado RN  Outcome: Ongoing  Note:   Patient free from pain this shift. Pain rated on 0-10 pain rating scale. Will continue to reassess. 3/22/2019 7856 by Angelina Rodriguez RN  Outcome: Ongoing  Note:   Pt complaining of pain this shift. Prn pain medication available. Will monitor. Care plan reviewed with patient. Patient verbalize understanding of the plan of care and contribute to goal setting.

## 2019-03-22 NOTE — CARE COORDINATION
3/22/19, 7:12 AM      Savita Rojas       Admitted from: ER 3/21/2019/ [place] day: 1   Location: [facility] Reason for admit: Hyponatremia with decreased serum osmolality [E87.1] Status: IP   Admit order signed?: yes  PMH:  has a past medical history of Anemia, Anxiety, Arthritis, AS (aortic stenosis): mild to moderate by echo 4/2017, ASHD (arteriosclerotic heart disease), Asthma, Bipolar disorder ([address] Blood circulation, collateral, CAD (coronary artery disease), Cerebral artery occlusion with cerebral infarction [facility]), Chest pain, CHF (congestive heart failure) ([address] COPD (chronic obstructive pulmonary disease) ([address] Depression, Disease of blood and blood forming organ, DM (diabetes mellitus) ([address] Dyspnea, FH: CAD (coronary artery disease), Full dentures, GERD (gastroesophageal reflux disease), Headache, Heart burn, History of blood transfusion, History of blood transfusion, History of tobacco abuse: Quit in 2007, HLD (hyperlipidemia), HTN (hypertension), Irritable bowel syndrome, Liver disease, Metabolic syndrome, MI (myocardial infarction) ([address] Nausea & vomiting, Obesity, CHET (obstructive sleep apnea), S/P cardiac catheterization: 11/6/2014: 99% in-stent restenosis mid-RCA. LCx moderate LI's. LAD 85% mid-segment lesion., S/P PTCA:  5/11/2004: Proximal and mid RCA  Taxus 3.5 X 20 mm, and Taxus 3.0 X 32 mm., Systolic murmur, Thyroid disease, and Wears glasses.     Medications:  Scheduled Meds:   sodium chloride  1 g Oral TID WC    aspirin  81 mg Oral Daily    clopidogrel  75 mg Oral Daily    ferrous sulfate  325 mg Oral BID    hydrALAZINE  25 mg Oral TID    levothyroxine  100 mcg Oral Daily    losartan  50 mg Oral Daily    metoprolol  100 mg Oral BID    multivitamin  1 tablet Oral Daily    mometasone-formoterol  2 puff Inhalation BID    rosuvastatin  20 mg Oral Nightly    OXcarbazepine  150 mg Oral QAM    polyethylene glycol  17 g Oral Daily    ranolazine  500 mg Oral BID    risperiDONE  1 mg Oral QAM    sodium chloride flush  10 mL Intravenous 2 times per day    enoxaparin  40 mg Subcutaneous Q24H    famotidine  20 mg Oral BID    insulin lispro  0-6 Units Subcutaneous TID WC    insulin lispro  0-3 Units Subcutaneous Nightly    polyvinyl alcohol  1 drop Both Eyes BID     Continuous Infusions:   dextrose      sodium chloride 75 mL/hr at 03/22/19 0154      Pertinent Info/Orders/Treatment Plan:  Na+ 121, K+ 3.3, Cl 75, BNP 2911. IVF, DM management, sodium chloride tablets tid. Nephrology following. Recheck sodium at noon. Diet: DIET CARB CONTROL;  Smoking status:  reports that she quit smoking about 12 years ago. Her smoking use included cigarettes. She has a 38.00 pack-year smoking history. She has never used smokeless tobacco.   PCP: ASHLEY Eli CNP  Readmission: no  Readmission Risk Score: 59%    Discharge Planning  Current Residence:  Private Residence  Living Arrangements:  Alone   Support Systems:  Friends/Neighbors  Current Services PTA:     Potential Assistance Needed:  N/A  Potential Assistance Purchasing Medications:  No  Does patient want to participate in local refill/ meds to beds program?  No  Type of Home Care Services:  None  Patient expects to be discharged to:  home  Expected Discharge date:  03/24/19  Follow Up Appointment: Best Day/ Time: Monday AM    Discharge Plan: Home alone. [facility].       Evaluation: yes

## 2019-03-22 NOTE — PROGRESS NOTES
PROGRESS      Patient:  Nan Mcclelland  YOB: 1945    MRN: [id number]     Acct: [de-identified]    PCP: ASHLEY Samuel CNP    Date of Admission: 3/21/2019    Date of Service: Pt seen/examined on 3/22/2019 and Admitted to Inpatient with expected LOS greater than two midnights due to medical therapy. Chief Complaint:    Chief Complaint   Patient presents with    Other     low sodium         History Of Present Illness:      75 y.o. female who presented to [address] [place] [address] with \"evaluation of hyponatremia. The patient states to have had lab work completed today and reports that she received a call from her primary care physician who told her to come to the ED due to a sodium of 117. The patient states that she has been experiencing chest pain, vomiting, diarrhea, and a headache for the past two days. She states that she has committed four acts of both vomiting and diarrhea. Patient denies any fever, chills, cough, pharyngitis, rhinorrhea, or shortness of breath. She denies any melena, hematochezia, or hematemesis. The patient denies any numbness, weakness, or associated paresthesias. Patient denies any recent medication changes. After evaluation the patient, I determined that her presenting history is inaccurate with the information that she told EMS and the nursing staff. The EMS report and nursing staff both state that the patent denies any complaints until I evaluated her within the ED causing a discrepancy in her HPI and ROS. The patient reports no additional symptoms or complaints at this time. \"-per er note and confirmed by myself    Addendum, patient is a very poor historian, does not recall if any new meds have been started or what meds she is taking  Will ask pharmacy to correct med req sheet, will stop or decrease meds that are known agents that cause hyponatremia           SUBJECTIVE:-    bp uncontrolled  Sodium levels improving    Scheduled Meds:   sodium chloride  1 g sounds equal bilaterally  Cardiovascular:  Regular rate and rhythm with normal S1/S2 with 3-4/6 murmur.   -ve rubs or gallops. PMI non displaced  Abdomen: Soft, non-tender, non-distended with normal bowel sounds. No guarding, rebound. Musculoskeletal:  No clubbing, cyanosis or edema bilaterally. Full range of motion without deformity. Skin: Skin color, texture, turgor normal.  No rashes or lesions, or suspicious lesions. Neurologic:  Neurovascularly intact without any focal sensory/motor deficits. Cranial nerves: II-XII intact, grossly non-focal.  Psychiatric:  Alert and oriented, thought content appropriate, normal insight  Capillary Refill: Brisk,< 2 seconds   Peripheral Pulses: +2 palpable, equal bilaterally upper and lower extremities  Lymphatics: no lymphadenopathy      Labs:     Recent Labs     03/21/19  1646 03/22/19  0550   WBC 8.3 6.1   HGB 11.1* 10.3*   HCT 32.1* 30.8*    145     Recent Labs     03/21/19  1304 03/21/19  1646 03/22/19  0550 03/22/19  1213   * 117* 121* 123*   K 3.5 3.8 3.3*  --    CL 68* 69* 75*  --    CO2 33 35* 32  --    BUN 13 15 19  --    CREATININE 0.7 0.7 0.9  --    CALCIUM 10.8* 11.0* 10.1  --      Recent Labs     03/21/19  1646   AST 36   ALT 25   BILITOT 0.3   ALKPHOS 56     Recent Labs     03/21/19  1646   INR 1.02     No results for input(s): CKTOTAL, TROPONINI in the last 72 hours. Urinalysis:      Lab Results   Component Value Date    NITRU NEGATIVE 03/22/2019    WBCUA 0-2 03/22/2019    BACTERIA NONE 03/22/2019    RBCUA 0-2 03/22/2019    BLOODU TRACE 03/22/2019    SPECGRAV 1.012 02/03/2019    GLUCOSEU NEGATIVE 03/22/2019       Radiology:     CXR: I have reviewed the CXR with the following interpretation: -ve chf  EKG:  I have reviewed the EKG with the following interpretation:no acute changes    Xr Chest Standard (2 Vw)    Result Date: 3/2/2019  PROCEDURE: XR CHEST (2 VW) CLINICAL INFORMATION: Shortness of breath. COMPARISON: 2/7/2018.  TECHNIQUE: AP upright Trileaflet valve. There is mild to moderate calcification of the aortic valve leaflets. Valvular calcifications do not extend into the LVOT. Guadalupe Corona AORTIC DIMENSIONS: Aortic annulus: 26 x 19 mm. Aortic annulus area: 3.3 cm2. Aortic annulus perimeter: 68 mm. Distance of right coronary ostia to the annulus: 28 mm. Distance of the left coronary ostia to the annulus: 17 mm. Maximum diameter of the aortic sinus: 34 mm. Maximum diameter of the sinotubular junction: 26 mm. Maximum diameter of the mid ascending aorta: 32 mm. Aortic root orientation: 3 cusp view: NATHALIE 14, CAU 15; Anterior view: SUMMERS 0, CAU 27 Coronary anatomy: The right coronary artery arises from the sinus of Valsalva on the right. The left main coronary artery arises from the sinus of Valsalva on the left. No anomalies are noted. There are coronary artery calcifications. Ascending aorta and arch: There is no calcified plaque of the ascending aorta. No soft plaque is noted. There is calcified atherosclerosis of the aortic arch. Descending thoracic aorta: There is no significant tortuosity of the descending thoracic aorta. There is no aneurysmal dilation. There is calcified atherosclerosis of the descending thoracic aorta. Abdominal aorta: There is no significant tortuosity of the abdominal aorta. There is no aneurysmal dilation. There is moderate calcified atherosclerosis of the abdominal aorta. Iliofemoral access route: There is no significant tortuosity. Minimal diameter of the abdominal aorta: 12 mm. Minimal diameter of the right common iliac artery: 8 mm. Minimal diameter of the right external iliac artery: 8 mm. Minimal diameter of the right common femoral artery: 8 mm. Minimal diameter of the left common iliac artery: 7 mm. Minimal diameter of the left external iliac artery: 7 mm. Minimal diameter of the left common femoral artery: 6 mm. HEART: There is mild cardiomegaly. There are some calcifications of the mitral valve annulus.   There is left ventricular hypertrophy. The left atrium and left atrial appendage are within appropriate limits. PERICARDIUM: There is a small pericardial effusion. CHEST: There is some mild scarring/atelectasis dependently in the lungs. There are emphysematous changes. There is a 7 mm pulmonary nodule in the superior segment of the left lower lobe, image 50 of series 4.. This has increased in size compared to 4/24/2017. At that time this measured 5 mm. There is a 3 mm left lower lobe pulmonary nodule on image 66 which is stable. The pulmonary artery is normal. No pulmonary emboli are noted. No mediastinal adenopathy is noted. ABDOMEN:  The liver is normal. The spleen is normal. The adrenals and pancreas are normal. There are surgical clips from a cholecystectomy. There is no hydronephrosis or stones of either kidney. No renal masses are noted. No abnormalities of the small bowel loops are noted. The IVC is grossly normal. There is an IVC filter. There is no adenopathy. PELVIS:  The urinary bladder is normal. There is no pelvic free fluid. There is diverticulosis of the sigmoid colon. The sigmoid colon is tortuous and redundant. The uterus and adnexa are grossly normal. BONES: No suspicious osseous lesions are present. 1. Preoperative TAVR measurements as listed above. 2. 8 mm pulmonary nodule in the superior segment of the left lower lobe. This is increased in size compared to 4/24/2017. A PET/CT is recommended for further evaluation. 3. Diverticulosis **This report has been created using voice recognition software. It may contain minor errors which are inherent in voice recognition technology. ** Final report electronically signed by Dr. Albert Chen on 3/12/2019 4:42 PM    Xr Chest Portable    Result Date: 3/21/2019  PROCEDURE: XR CHEST PORTABLE CLINICAL INFORMATION: fatigue. COMPARISON: 3/2/2019. TECHNIQUE: AP upright view of the chest. FINDINGS: The lungs appear grossly clear.  Pulmonary nodule seen on previous CT is not visualized on this exam. Pulmonary vasculature and cardiac-based also what are within normal limits. Visualized osseous structures appear grossly intact. Stable radiographic appearance of the chest. No evidence of an acute process. **This report has been created using voice recognition software. It may contain minor errors which are inherent in voice recognition technology. ** Final report electronically signed by Dr. Jael Richey MD on 3/21/2019 4:43 PM    Cta Abdomen Pelvis W Wo Contrast    Result Date: 3/12/2019  PROCEDURE: CTA CHEST W WO CONTRAST, CTA ABDOMEN PELVIS W WO CONTRAST CLINICAL INFORMATION: Severe aortic stenosis . Pre-op TAVR. COMPARISON: CT chest 11/27/2018. CT abdomen and pelvis 11/27/2018. TECHNIQUE: Noncontrast 5 mm axial images were obtained through the chest, abdomen and pelvis. Intravenous Optiray contrast was given. ECG gated axial 0.6 mm axial images were attained of the entire heart. A CT of the entire chest, abdomen and pelvis was obtained at 0.6 mm increments. These are reconstructed into 3 x 3 axial images. Those are sent to PACS. 3-D reconstructions through the chest, abdomen and pelvis include sagittal and coronal MIP images performed on the scanner. Centerline reconstructions  were obtained. Vascular and valve measurements are made on a dedicated 3-D workstation. Measurements were made in systole with the aortic valve open. All CT scans at this facility use dose modulation, iterative reconstruction, and/or weight-based dosing when appropriate to reduce radiation dose to as low as reasonably achievable. FINDINGS: MEASUREMENTS: AORTIC VALVE: Calcium score: 1291 Trileaflet valve. There is mild to moderate calcification of the aortic valve leaflets. Valvular calcifications do not extend into the LVOT. Ragini Faith AORTIC DIMENSIONS: Aortic annulus: 26 x 19 mm. Aortic annulus area: 3.3 cm2. Aortic annulus perimeter: 68 mm. Distance of right coronary ostia to the annulus: 28 mm.  Distance of the left coronary ostia to the annulus: 17 mm. Maximum diameter of the aortic sinus: 34 mm. Maximum diameter of the sinotubular junction: 26 mm. Maximum diameter of the mid ascending aorta: 32 mm. Aortic root orientation: 3 cusp view: [place] 14, CAU 15; Anterior view: SUMMERS 0, CAU 27 Coronary anatomy: The right coronary artery arises from the sinus of Valsalva on the right. The left main coronary artery arises from the sinus of Valsalva on the left. No anomalies are noted. There are coronary artery calcifications. Ascending aorta and arch: There is no calcified plaque of the ascending aorta. No soft plaque is noted. There is calcified atherosclerosis of the aortic arch. Descending thoracic aorta: There is no significant tortuosity of the descending thoracic aorta. There is no aneurysmal dilation. There is calcified atherosclerosis of the descending thoracic aorta. Abdominal aorta: There is no significant tortuosity of the abdominal aorta. There is no aneurysmal dilation. There is moderate calcified atherosclerosis of the abdominal aorta. Iliofemoral access route: There is no significant tortuosity. Minimal diameter of the abdominal aorta: 12 mm. Minimal diameter of the right common iliac artery: 8 mm. Minimal diameter of the right external iliac artery: 8 mm. Minimal diameter of the right common femoral artery: 8 mm. Minimal diameter of the left common iliac artery: 7 mm. Minimal diameter of the left external iliac artery: 7 mm. Minimal diameter of the left common femoral artery: 6 mm. HEART: There is mild cardiomegaly. There are some calcifications of the mitral valve annulus. There is left ventricular hypertrophy. The left atrium and left atrial appendage are within appropriate limits. PERICARDIUM: There is a small pericardial effusion. CHEST: There is some mild scarring/atelectasis dependently in the lungs. There are emphysematous changes.  There is a 7 mm pulmonary nodule in the superior segment of the left lower lobe, image 50 of series 4.. This has increased in size compared to 4/24/2017. At that time this measured 5 mm. There is a 3 mm left lower lobe pulmonary nodule on image 66 which is stable. The pulmonary artery is normal. No pulmonary emboli are noted. No mediastinal adenopathy is noted. ABDOMEN:  The liver is normal. The spleen is normal. The adrenals and pancreas are normal. There are surgical clips from a cholecystectomy. There is no hydronephrosis or stones of either kidney. No renal masses are noted. No abnormalities of the small bowel loops are noted. The IVC is grossly normal. There is an IVC filter. There is no adenopathy. PELVIS:  The urinary bladder is normal. There is no pelvic free fluid. There is diverticulosis of the sigmoid colon. The sigmoid colon is tortuous and redundant. The uterus and adnexa are grossly normal. BONES: No suspicious osseous lesions are present. 1. Preoperative TAVR measurements as listed above. 2. 8 mm pulmonary nodule in the superior segment of the left lower lobe. This is increased in size compared to 4/24/2017. A PET/CT is recommended for further evaluation. 3. Diverticulosis **This report has been created using voice recognition software. It may contain minor errors which are inherent in voice recognition technology. ** Final report electronically signed by Dr. Gumaro Glass on 3/12/2019 4:42 PM           DVT prophylaxis: [x] Lovenox                                 [] SCDs                                 [] SQ Heparin                                 [] Encourage ambulation           [] Already on Anticoagulation    Code Status: Full Code      PT/OT Eval Status:     Disposition:    [x] Home       [] TCU       [] Rehab       [] Psych       [x] SNF       [] [facility]       [] Other-    ASSESSMENT:    Active Hospital Problems    Diagnosis Date Noted    Obesity (BMI 30-39. 9) [E66.9] 01/29/2015     Priority: Medium    Essential hypertension [I10]

## 2019-03-22 NOTE — PLAN OF CARE
Problem: DISCHARGE BARRIERS  Goal: Patient's continuum of care needs are met  Note:   [facility] See  notes 3/22/19.

## 2019-03-22 NOTE — PLAN OF CARE
Problem: Falls - Risk of:  Goal: Will remain free from falls  Description  Will remain free from falls  Outcome: Ongoing  Note:   No falls noted this shift. Continue falling star program. Bed alarm on, bed in low position. Call light and personal belongings in reach. Patient uses call light appropriately. Goal: Absence of physical injury  Description  Absence of physical injury  Outcome: Ongoing  Note:   No physical injury noted this shift. Safe environment provided. Will monitor. Problem: Risk for Impaired Skin Integrity  Goal: Tissue integrity - skin and mucous membranes  Description  Structural intactness and normal physiological function of skin and  mucous membranes. Outcome: Ongoing  Note:   No skin break down noted at this time. Encouraged patient to reposition self in bed. Problem: Tissue Perfusion - Peripheral, Altered:  Goal: Electrolytes within specified parameters  Description  Electrolytes within specified parameters  Outcome: Ongoing  Note:   Sodium 117 this shift. Rehydrating with NS. Will monitor. Goal: Hemodynamic stability will improve  Description  Hemodynamic stability will improve  Outcome: Ongoing  Note:   Vitals:    03/22/19 0353   BP: (!) 142/69   Pulse: 99   Resp: 18   Temp: 98.4 °F (36.9 °C)   SpO2: 99%          Problem: Discharge Planning:  Goal: Discharged to appropriate level of care  Description  Discharged to appropriate level of care  Outcome: Ongoing  Note:   Pt plans to return home alone at discharge. Will continue to assess discharge needs. Problem: Pain:  Goal: Recognizes and communicates pain  Description  Recognizes and communicates pain  Outcome: Ongoing  Note:   Pt complaining of pain this shift. Prn pain medication available. Will monitor.

## 2019-03-22 NOTE — CARE COORDINATION
Equipment  Patient DME:  Anola Ham cane, Wheelchair  Patient Home Equipment:  CPAP, Oxygen  Functional Review  Ability to seek help/take action for Emergent/Urgent situations i.e. fire, crime, inclement weather or health crisis.:  Needs Assistance  Ability handle personal hygiene needs (bathing/dressing/grooming):  Needs Assistance  Ability to manage medications:  Needs Assistance  Ability to prepare food:  Needs Assistance  Ability to maintain home (clean home, laundry):  Needs Assistance  Ability to drive and/or has transportation:  Dependent  Ability to do shopping:  Needs Assistance  Ability to manage finances: Independent  Is patient able to live independently?:  Yes  Hearing and Vision  Visual Impairment:  Visual impairment (Glasses/contacts)  Hearing Impairment:  Wears hearing aids  Care Transitions Interventions  No Identified Needs       Patient presented to the ED on 03/21/2019 for further evaluation of abnormal lab work. Patient had OP labs 03/21/2019 and was advised by PCP to report to the ED for Na+ of 117 mEq/L. Patient reports chest pain, headache, emesis, and diarrhea x 2 days. PMH includes CHF, CAD, COPD, and DM. Patient admitted for hyponatremia. Consult with Nephrology. Case management and social work following. Patient plans to return home where she resides alone. Patient has a neighbor that helps out as needed. Patient is current with [facility] - RN daily and aides M-F for two hours. Patient is on continuous oxygen at 3L via [place] through [facility]. Patient is current through [employer], [address] [place] [address] Rd is [employer] [phone]. Patient has two meals provided daily by [facility]. Patient has a ERS necklace. Patient utilizes [employer] for transportation. Patient states she has a scale, glucometer, and blood pressure cuff at home and the [place] RN checks Sp02. Patient denies additional needs or concerns at this time. Business card with contact information for CTC provided.  Patient understands CTC will follow upon discharge. Gail Storm with Case Management notified of CTC inpatient interview. Monica, patient's established ACC, updated on patient admission and POC.      Follow Up  Future Appointments   Date Time Provider Micheline Yady   3/26/2019 10:30 AM ASHLEY Blas CNP SRPX [facility] [email or web address]   3/26/2019  3:20 PM STR CT IMAGING RM1  [facility] STRZ OUT EXP STR Radiolog   3/29/2019 12:00 PM STR CVI ROOM 2E12 STRZ 2E None   3/29/2019  2:00 PM STR CARDIAC CATHETERIZATION RM2 STRZ CATH LB None   4/15/2019  9:40 AM Kateryna Longoria PA-C Oncology [facility]   4/17/2019 10:00 AM Marva Mckenna PA-C Pul Med [facility]   5/20/2019  1:20 PM ASHLEY Forde CNP [facility] AM [email or web address]   6/10/2019  8:20 AM STR CT IMAGING RM1  [facility] STRZ OUT EXP STR Radiolog   6/17/2019 10:30 AM ASHLEY Barroso CNP [facility] [email or web address]   7/15/2019  1:00 PM Annita Kunz RD, LD SRPX Physic [facility] Maintenance  Health Maintenance Due   Topic Date Due    Breast cancer screen  10/10/2018       Asa Horton, RN  Care Transition Coordinator  [phone]  21

## 2019-03-23 NOTE — PLAN OF CARE
Problem: Impaired respiratory status  Goal: Clear lung sounds  3/23/2019 0925 by Kenrick Olvera RCP  Note:   Pt had no questions on purpose or side effects of medication   Will continue with treatments to  help improve aeration t/o lungs

## 2019-03-23 NOTE — PLAN OF CARE
Problem: Falls - Risk of:  Goal: Will remain free from falls  Description  Will remain free from falls  Outcome: Ongoing  Note:   No falls noted this shift. Continue falling star program. Bed alarm on, bed in low position. Call light and personal belongings in reach. Patient uses call light appropriately. Goal: Absence of physical injury  Description  Absence of physical injury  Outcome: Ongoing  Note:   No physical injury noted this shift. Safe environment provided. Will monitor. Problem: Risk for Impaired Skin Integrity  Goal: Tissue integrity - skin and mucous membranes  Description  Structural intactness and normal physiological function of skin and  mucous membranes. Outcome: Ongoing  Note:   No skin break down noted at this time. Encouraged patient to reposition self in bed. Problem: Tissue Perfusion - Peripheral, Altered:  Goal: Electrolytes within specified parameters  Description  Electrolytes within specified parameters  Outcome: Ongoing  Note:   Sodium 117 this shift. Rehydrating with NS. Will monitor. Goal: Hemodynamic stability will improve  Description  Hemodynamic stability will improve  Outcome: Ongoing  Note:   Vitals:    03/23/19 0310   BP: (!) 156/84   Pulse: 83   Resp: 16   Temp: 98.8 °F (37.1 °C)   SpO2: 93%            Problem: Discharge Planning:  Goal: Discharged to appropriate level of care  Description  Discharged to appropriate level of care  Outcome: Ongoing  Note:   Pt plans to return home alone at discharge. Will continue to assess discharge needs. Problem: Pain:  Goal: Recognizes and communicates pain  Description  Recognizes and communicates pain  Outcome: Ongoing  Note:   Pt complaining of pain this shift. Prn pain medication available. Will monitor. Care plan reviewed with patient. Patient verbalizes understanding of plan of care and contributes to goal setting.

## 2019-03-23 NOTE — PROGRESS NOTES
PROGRESS      Patient:  Nan Mcclelland  YOB: 1945    MRN: [id number]     Acct: [de-identified]    PCP: ASHLEY Samuel CNP    Date of Admission: 3/21/2019    Date of Service: Pt seen/examined on 3/23/2019 and Admitted to Inpatient with expected LOS greater than two midnights due to medical therapy. Chief Complaint:    Chief Complaint   Patient presents with    Other     low sodium         History Of Present Illness:      75 y.o. female who presented to [facility] with \"evaluation of hyponatremia. The patient states to have had lab work completed today and reports that she received a call from her primary care physician who told her to come to the ED due to a sodium of 117. The patient states that she has been experiencing chest pain, vomiting, diarrhea, and a headache for the past two days. She states that she has committed four acts of both vomiting and diarrhea. Patient denies any fever, chills, cough, pharyngitis, rhinorrhea, or shortness of breath. She denies any melena, hematochezia, or hematemesis. The patient denies any numbness, weakness, or associated paresthesias. Patient denies any recent medication changes. After evaluation the patient, I determined that her presenting history is inaccurate with the information that she told EMS and the nursing staff. The EMS report and nursing staff both state that the patent denies any complaints until I evaluated her within the ED causing a discrepancy in her HPI and ROS. The patient reports no additional symptoms or complaints at this time. \"-per er note and confirmed by myself    Addendum, patient is a very poor historian, does not recall if any new meds have been started or what meds she is taking  Will ask pharmacy to correct med req sheet, will stop or decrease meds that are known agents that cause hyponatremia           SUBJECTIVE:-    bp uncontrolled  Sodium levels improving  VANNA ANDERS     Scheduled Meds:   aspirin  81 mg Oral Daily    clopidogrel  75 mg Oral Daily    ferrous sulfate  325 mg Oral BID    hydrALAZINE  25 mg Oral TID    levothyroxine  100 mcg Oral Daily    losartan  50 mg Oral Daily    metoprolol  100 mg Oral BID    multivitamin  1 tablet Oral Daily    mometasone-formoterol  2 puff Inhalation BID    rosuvastatin  20 mg Oral Nightly    OXcarbazepine  150 mg Oral QAM    polyethylene glycol  17 g Oral Daily    ranolazine  500 mg Oral BID    risperiDONE  1 mg Oral QAM    sodium chloride flush  10 mL Intravenous 2 times per day    enoxaparin  40 mg Subcutaneous Q24H    famotidine  20 mg Oral BID    insulin lispro  0-6 Units Subcutaneous TID WC    insulin lispro  0-3 Units Subcutaneous Nightly    polyvinyl alcohol  1 drop Both Eyes BID     Continuous Infusions:   dextrose       PRN Meds:.labetalol, sodium chloride flush, ondansetron, glucose, dextrose, glucagon (rDNA), dextrose, potassium chloride **OR** potassium alternative oral replacement **OR** potassium chloride, magnesium sulfate, acetaminophen    Diet:  DIET CARB CONTROL;    REVIEW OF SYSTEMS:   Pertinent positives as noted in the HPI. All other systems reviewed and negative. PHYSICAL EXAM:    BP (!) 198/73   Pulse 80   Temp 98 °F (36.7 °C) (Oral)   Resp 17   Ht 5' 6\" (1.676 m)   Wt 217 lb 1.6 oz (98.5 kg)   LMP 02/12/1973 (Approximate)   SpO2 98%   BMI 35.04 kg/m²     General appearance:  No apparent distress, appears stated age and cooperative. HEENT:  Normal cephalic, atraumatic without obvious deformity. Pupils equal, round, and reactive to light. Extra ocular muscles intact. Conjunctivae/corneas clear. Neck: Supple, with full range of motion. no jugular venous distention. Trachea midline. no carotid bruits difficult to evaluate 2/2 to obesity of neck  Respiratory:  Normal respiratory effort. Clear to auscultation, bilaterally without Rales/Wheezes/Rhonchi.  Breath sounds equal bilaterally  Cardiovascular:  Regular rate and rhythm with normal S1/S2 with 3-4/6 murmur.   -ve rubs or gallops. PMI non displaced  Abdomen: Soft, non-tender, non-distended with normal bowel sounds. No guarding, rebound. Musculoskeletal:  No clubbing, cyanosis or edema bilaterally. Full range of motion without deformity. Skin: Skin color, texture, turgor normal.  No rashes or lesions, or suspicious lesions. Neurologic:  Neurovascularly intact without any focal sensory/motor deficits. Cranial nerves: II-XII intact, grossly non-focal.  Psychiatric:  Alert and oriented, thought content appropriate, normal insight  Capillary Refill: Brisk,< 2 seconds   Peripheral Pulses: +2 palpable, equal bilaterally upper and lower extremities  Lymphatics: no lymphadenopathy      Labs:     Recent Labs     03/21/19  1646 03/22/19  0550   WBC 8.3 6.1   HGB 11.1* 10.3*   HCT 32.1* 30.8*    145     Recent Labs     03/21/19  1646 03/22/19  0550 03/22/19  1213 03/23/19  1148   * 121* 123* 132*   K 3.8 3.3*  --  3.8   CL 69* 75*  --  87*   CO2 35* 32  --  31   BUN 15 19  --  21   CREATININE 0.7 0.9  --  1.0   CALCIUM 11.0* 10.1  --  10.0     Recent Labs     03/21/19  1646   AST 36   ALT 25   BILITOT 0.3   ALKPHOS 56     Recent Labs     03/21/19  1646   INR 1.02     No results for input(s): Candelariakellee Timothy in the last 72 hours. Urinalysis:      Lab Results   Component Value Date    NITRU NEGATIVE 03/22/2019    WBCUA 0-2 03/22/2019    BACTERIA NONE 03/22/2019    RBCUA 0-2 03/22/2019    BLOODU TRACE 03/22/2019    SPECGRAV 1.012 02/03/2019    GLUCOSEU NEGATIVE 03/22/2019       Radiology:     CXR: I have reviewed the CXR with the following interpretation: -ve chf  EKG:  I have reviewed the EKG with the following interpretation:no acute changes    Xr Chest Standard (2 Vw)    Result Date: 3/2/2019  PROCEDURE: XR CHEST (2 VW) CLINICAL INFORMATION: Shortness of breath. COMPARISON: 2/7/2018. TECHNIQUE: AP upright and lateral views of the chest performed.  FINDINGS: POSTSURGICAL CHANGES: None. LINES/TUBES/MECHANICAL DEVICES: 1. Suspected caval filter, not well visualized. TRACHEA/HEART/MEDIASTINUM/HILUM: 1. Stable mild enlargement of the cardiac silhouette. 2. Stable atheromatous calcification thoracic aorta. LUNG FIELDS: 1. There is no consolidation or infiltrates. There is no pleural effusion or pulmonary vascular congestion. OTHER: None. PNEUMOTHORAX: None. OSSEOUS STRUCTURES: 1. There is no acute cardiopulmonary process. 1. Unremarkable PA and lateral views of the chest. **This report has been created using voice recognition software. It may contain minor errors which are inherent in voice recognition technology. ** Final report electronically signed by Dr. Tae Doll on 3/2/2019 11:10 AM    Cta Chest W Wo Contrast    Result Date: 3/12/2019  PROCEDURE: CTA CHEST W WO CONTRAST, CTA ABDOMEN PELVIS W WO CONTRAST CLINICAL INFORMATION: Severe aortic stenosis . Pre-op TAVR. COMPARISON: CT chest 11/27/2018. CT abdomen and pelvis 11/27/2018. TECHNIQUE: Noncontrast 5 mm axial images were obtained through the chest, abdomen and pelvis. Intravenous Optiray contrast was given. ECG gated axial 0.6 mm axial images were attained of the entire heart. A CT of the entire chest, abdomen and pelvis was obtained at 0.6 mm increments. These are reconstructed into 3 x 3 axial images. Those are sent to PACS. 3-D reconstructions through the chest, abdomen and pelvis include sagittal and coronal MIP images performed on the scanner. Centerline reconstructions  were obtained. Vascular and valve measurements are made on a dedicated 3-D workstation. Measurements were made in systole with the aortic valve open. All CT scans at this facility use dose modulation, iterative reconstruction, and/or weight-based dosing when appropriate to reduce radiation dose to as low as reasonably achievable. FINDINGS: MEASUREMENTS: AORTIC VALVE: Calcium score: 1291 Trileaflet valve.  There is mild to moderate calcification of the aortic valve leaflets. Valvular calcifications do not extend into the LVOT. Ralene Ev AORTIC DIMENSIONS: Aortic annulus: 26 x 19 mm. Aortic annulus area: 3.3 cm2. Aortic annulus perimeter: 68 mm. Distance of right coronary ostia to the annulus: 28 mm. Distance of the left coronary ostia to the annulus: 17 mm. Maximum diameter of the aortic sinus: 34 mm. Maximum diameter of the sinotubular junction: 26 mm. Maximum diameter of the mid ascending aorta: 32 mm. Aortic root orientation: 3 cusp view: [place] 14, CAU 15; Anterior view: SUMM[place] 0, CAU 27 Coronary anatomy: The right coronary artery arises from the sinus of Valsalva on the right. The left main coronary artery arises from the sinus of Valsalva on the left. No anomalies are noted. There are coronary artery calcifications. Ascending aorta and arch: There is no calcified plaque of the ascending aorta. No soft plaque is noted. There is calcified atherosclerosis of the aortic arch. Descending thoracic aorta: There is no significant tortuosity of the descending thoracic aorta. There is no aneurysmal dilation. There is calcified atherosclerosis of the descending thoracic aorta. Abdominal aorta: There is no significant tortuosity of the abdominal aorta. There is no aneurysmal dilation. There is moderate calcified atherosclerosis of the abdominal aorta. Iliofemoral access route: There is no significant tortuosity. Minimal diameter of the abdominal aorta: 12 mm. Minimal diameter of the right common iliac artery: 8 mm. Minimal diameter of the right external iliac artery: 8 mm. Minimal diameter of the right common femoral artery: 8 mm. Minimal diameter of the left common iliac artery: 7 mm. Minimal diameter of the left external iliac artery: 7 mm. Minimal diameter of the left common femoral artery: 6 mm. HEART: There is mild cardiomegaly. There are some calcifications of the mitral valve annulus. There is left ventricular hypertrophy.  The left atrium and left annulus: 17 mm. Maximum diameter of the aortic sinus: 34 mm. Maximum diameter of the sinotubular junction: 26 mm. Maximum diameter of the mid ascending aorta: 32 mm. Aortic root orientation: 3 cusp view: Citizen of [place] 14, CAU 15; Anterior view: SUMMERS 0, CAU 27 Coronary anatomy: The right coronary artery arises from the sinus of Valsalva on the right. The left main coronary artery arises from the sinus of Valsalva on the left. No anomalies are noted. There are coronary artery calcifications. Ascending aorta and arch: There is no calcified plaque of the ascending aorta. No soft plaque is noted. There is calcified atherosclerosis of the aortic arch. Descending thoracic aorta: There is no significant tortuosity of the descending thoracic aorta. There is no aneurysmal dilation. There is calcified atherosclerosis of the descending thoracic aorta. Abdominal aorta: There is no significant tortuosity of the abdominal aorta. There is no aneurysmal dilation. There is moderate calcified atherosclerosis of the abdominal aorta. Iliofemoral access route: There is no significant tortuosity. Minimal diameter of the abdominal aorta: 12 mm. Minimal diameter of the right common iliac artery: 8 mm. Minimal diameter of the right external iliac artery: 8 mm. Minimal diameter of the right common femoral artery: 8 mm. Minimal diameter of the left common iliac artery: 7 mm. Minimal diameter of the left external iliac artery: 7 mm. Minimal diameter of the left common femoral artery: 6 mm. HEART: There is mild cardiomegaly. There are some calcifications of the mitral valve annulus. There is left ventricular hypertrophy. The left atrium and left atrial appendage are within appropriate limits. PERICARDIUM: There is a small pericardial effusion. CHEST: There is some mild scarring/atelectasis dependently in the lungs. There are emphysematous changes.  There is a 7 mm pulmonary nodule in the superior segment of the left lower lobe, image 50 of series 4.. This has increased in size compared to 4/24/2017. At that time this measured 5 mm. There is a 3 mm left lower lobe pulmonary nodule on image 66 which is stable. The pulmonary artery is normal. No pulmonary emboli are noted. No mediastinal adenopathy is noted. ABDOMEN:  The liver is normal. The spleen is normal. The adrenals and pancreas are normal. There are surgical clips from a cholecystectomy. There is no hydronephrosis or stones of either kidney. No renal masses are noted. No abnormalities of the small bowel loops are noted. The IVC is grossly normal. There is an IVC filter. There is no adenopathy. PELVIS:  The urinary bladder is normal. There is no pelvic free fluid. There is diverticulosis of the sigmoid colon. The sigmoid colon is tortuous and redundant. The uterus and adnexa are grossly normal. BONES: No suspicious osseous lesions are present. 1. Preoperative TAVR measurements as listed above. 2. 8 mm pulmonary nodule in the superior segment of the left lower lobe. This is increased in size compared to 4/24/2017. A PET/CT is recommended for further evaluation. 3. Diverticulosis **This report has been created using voice recognition software. It may contain minor errors which are inherent in voice recognition technology. ** Final report electronically signed by Dr. Mickie Jeffers on 3/12/2019 4:42 PM           DVT prophylaxis: [x] Lovenox                                 [] SCDs                                 [] SQ Heparin                                 [] Encourage ambulation           [] Already on Anticoagulation    Code Status: Full Code      PT/OT Eval Status:     Disposition:    [x] Home       [] TCU       [] Rehab       [] Psych       [x] SNF       [] [place]       [] Other-    ASSESSMENT:    Active Hospital Problems    Diagnosis Date Noted    Obesity (BMI 30-39. 9) [E66.9] 01/29/2015     Priority: Medium    Essential hypertension [I10] 01/29/2015     Priority: Medium    Type 2 diabetes mellitus with complication, with long-term current use of insulin (HCC) [E11.8, Z79.4]      Priority: Medium    Hypothyroid [E03.9] 01/15/2014     Priority: Low    Hyponatremia with decreased serum osmolality [E87.1] 03/21/2019    Hyponatremia [E87.1]     Severe aortic stenosis [I35.0] 09/06/2017       PLAN:    1. Hyponatremia - medication induced vs polydypsia- on iv fluid, and salt tabs added by ne[hro- watch for fluid overload and uncontrolled htn- likely will need to stop her sodium tablets given uncontrolled blood pressure  2. Uncontrolled blood pressure- will consult cardiology with history of severe aortic stenosis  3. Severe AS- cardiac cath on Friday for TAVR, sees Dr Chadd Espana as OP  4. Type 2 DM- insulin ss          Thank you ASHLEY Ramon - ZIGGY for the opportunity to be involved in this patient's care.     Electronically signed by Edilma Zayas MD on 3/23/2019 at 4:10 PM

## 2019-03-23 NOTE — PROGRESS NOTES
[facility]   Nephrology progress note  3/23/2019, 11:43 AM      Pt Name:    Marquis Bender  MRN:     [id number]     [place]:    1945  Admit Date:    3/21/2019  4:03 PM  Primary Care Physician:  ASHLEY Maurice Ra, CNP   Room number  6K-03/003-A    Chief Complaint: Nephrology following for hyponatremia    Subjective:  Patient seen and examined  No chest pain or shortness of breath  Feels okay  On NS IVF  No labs done since yesterday    Objective:  24HR INTAKE/OUTPUT:      Intake/Output Summary (Last 24 hours) at 3/23/2019 1143  Last data filed at 3/23/2019 0310  Gross per 24 hour   Intake 2150.09 ml   Output 1650 ml   Net 500.09 ml     I/O last 3 completed shifts: In: 2150.1 [P.O.:940; I.V.:1210.1]  Out: 1650 [Urine:1650]  No intake/output data recorded. Admission weight: 218 lb (98.9 kg)  Wt Readings from Last 3 Encounters:   03/23/19 217 lb 1.6 oz (98.5 kg)   03/13/19 208 lb 12.8 oz (94.7 kg)   03/02/19 222 lb (100.7 kg)     Body mass index is 35.04 kg/m².     Physical examination  VITALS:   /84  Vitals:    03/23/19 0924   BP:    Pulse:    Resp: 16   Temp:    SpO2: 95%     General Appearance: alert and cooperative with exam, appears comfortable, no distress  Mouth/Throat: Oral mucosa moist  Neck: No JVD  Lungs: Air entry B/L, no rales, no use of accessory muscles  Heart:  S1, S2 heard  GI: soft, non-tender, no guarding  Extremities: no LE edema      Lab Data  CBC:   Recent Labs     03/21/19  1646 03/22/19  0550   WBC 8.3 6.1   HGB 11.1* 10.3*   HCT 32.1* 30.8*    145     BMP:  Recent Labs     03/21/19  1304 03/21/19  1646 03/22/19  0550 03/22/19  1213   * 117* 121* 123*   K 3.5 3.8 3.3*  --    CL 68* 69* 75*  --    CO2 33 35* 32  --    BUN 13 15 19  --    CREATININE 0.7 0.7 0.9  --    GLUCOSE 145* 127* 189*  --    CALCIUM 10.8* 11.0* 10.1  --    MG  --   --  1.6  --      Hepatic:   Recent Labs     03/21/19  1646   LABALBU 4.4   AST 36   ALT 25   BILITOT 0.3 ALKPHOS 56         Meds:  Infusion:    dextrose       Meds:    sodium chloride  1 g Oral TID WC    aspirin  81 mg Oral Daily    clopidogrel  75 mg Oral Daily    ferrous sulfate  325 mg Oral BID    hydrALAZINE  25 mg Oral TID    levothyroxine  100 mcg Oral Daily    losartan  50 mg Oral Daily    metoprolol  100 mg Oral BID    multivitamin  1 tablet Oral Daily    mometasone-formoterol  2 puff Inhalation BID    rosuvastatin  20 mg Oral Nightly    OXcarbazepine  150 mg Oral QAM    polyethylene glycol  17 g Oral Daily    ranolazine  500 mg Oral BID    risperiDONE  1 mg Oral QAM    sodium chloride flush  10 mL Intravenous 2 times per day    enoxaparin  40 mg Subcutaneous Q24H    famotidine  20 mg Oral BID    insulin lispro  0-6 Units Subcutaneous TID WC    insulin lispro  0-3 Units Subcutaneous Nightly    polyvinyl alcohol  1 drop Both Eyes BID     Meds prn: sodium chloride flush, ondansetron, glucose, dextrose, glucagon (rDNA), dextrose, potassium chloride **OR** potassium alternative oral replacement **OR** potassium chloride, magnesium sulfate, acetaminophen       Impression and Plan:  1. Hyponatremia: improving but no labs done since yesterday afternoon, ordered STAT BMP  - stop IVF for now  - ?etiology unclear, no urine lytes available  - patient on salt chloride tablets    2. Hypokalemia: check K now  3.  HTN: on several medications, stable mostly in 140 range, some high but will monitor    D/W patient and RN  Will make further recommendations depending on STAT labs    Salas Abdi MD  [facility]

## 2019-03-23 NOTE — PLAN OF CARE
Problem: Impaired respiratory status  Goal: Clear lung sounds  Outcome: Ongoing   Breath sounds are clear and diminished at this time. Continue with mdi to help improve breath sounds.

## 2019-03-24 NOTE — PROGRESS NOTES
Discharge teaching and instructions for diagnosis/procedure of hyponatremia completed with patient using teachback method. AVS reviewed. Printed prescriptions given to patient. Patient voiced understanding regarding prescriptions, follow up appointments, and care of self at home. Discharged in a wheelchair to private residence per friend. AVS and BMP lab order were faxed to [facility]. Called and left message with Yosvany Shultz the [facility] nurse on call that patient was being discharged today.

## 2019-03-24 NOTE — CONSULTS
UNIT LOW HGB    History of tobacco abuse: Quit in 2007     HLD (hyperlipidemia)     ON RX    HTN (hypertension)     ON RX    Irritable bowel syndrome     States has not had problem with this for years    Liver disease     fatty liver    Metabolic syndrome 36/28/0374    MI (myocardial infarction) ([address]     Nausea & vomiting     Obesity     CHET (obstructive sleep apnea) 2016    NO MACHINE YET    S/P cardiac catheterization: 11/6/2014: 99% in-stent restenosis mid-RCA. LCx moderate LI's. LAD 85% mid-segment lesion. 11/6/2014 11/6/2014: 99% in-stent restenosis mid-RCA. LCx moderate LI's. LAD 85% mid-segment lesion. Dr. Simi Loco S/P PTCA:  5/11/2004: Proximal and mid RCA  Taxus 3.5 X 20 mm, and Taxus 3.0 X 32 mm. 10/28/2014    5/11/2004: Proximal and mid RCA  Taxus 3.5 X 20 mm, and Taxus 3.0 X 32 mm. Dr. Loren Rivas Systolic murmur 57/98/8861    Thyroid disease     ON RX    Wears glasses        Past Surgical History:          Procedure Laterality Date    ABDOMEN SURGERY      BACK SURGERY  08/2016        BLADDER SUSPENSION      BREAST BIOPSY  10/10/2017    BREAST LUMPECTOMY      CARDIAC CATHETERIZATION  8-11-06    Mild nonobstructive CAD w/ diffuse 10-20% proximal and mid LAD stenosis and 10-20% proximal/ostial RCA stenosis. No obstructive lesions. RCA stents are patent w/o evidence of restenosis. Normal LV systolic function. EF 65%. Trace MR - catheter induced. Minimally elevated LVEDP - 13mmHg. Mild to moderate aortoiliac PVD w/o obstructive lesions.  CARDIAC CATHETERIZATION  5-11-04    Successful drug-eluting stent x 2 of proximal and mid RCA.  CARDIAC CATHETERIZATION  5-11-04    70-80% proximal RCA stenosis w/ 50-70% mid RCA stenosis. There is 50-60% lesion in mid PDA. Mild proximal and mid LAD disease. Mild circumflex disease. Normal LV size and systolic function. EF 60%.  CARDIOVASCULAR STRESS TEST  5-10-11    No evidence of stress induced ischemia. EF 75%.     CARDIOVASCULAR STRESS TEST  5-10-10    No evidence of stress induced ischemia, infarct or scar. EF 74%.  CAROTID ENDARTERECTOMY      CERVICAL FUSION N/A 11/15/2017    REMOVAL OF PLATE E8-6, ACDF V0-2 W/ATLANTIS CORNERSTONE performed by Jose Mariano MD at [facility], DIAGNOSTIC      EYE SURGERY      cataract 2017   [address] [facility]    HERNIA REPAIR      HYSTERECTOMY  1973    NECK SURGERY  FEB 2016   [facility] OTHER SURGICAL HISTORY  02/13/2019    Arteriogram for diagnostics    PARTIAL HYSTERECTOMY      UPPER GASTROINTESTINAL ENDOSCOPY      UPPER GASTROINTESTINAL ENDOSCOPY         Medications Prior to Admission:      Prior to Admission medications    Medication Sig Start Date End Date Taking? Authorizing Provider   magnesium oxide (MAG-OX) 400 MG tablet take 1 tablet by mouth once daily 3/19/19  Yes ASHLEY Prado CNP   HYDROcodone-acetaminophen (NORCO) 5-325 MG per tablet Take 1 tablet by mouth daily for 30 days. Take 1/2 to 1 tablet daily for pain 3/18/19 4/17/19 Yes ASHLEY Duff CNP   potassium chloride (KLOR-CON M) 20 MEQ extended release tablet Take 20 mEq by mouth daily   Yes Historical Provider, MD   metolazone (ZAROXOLYN) 2.5 MG tablet Take 1 tablet by mouth once a week Every Monday 3/4/19  Yes ASHLEY Prado CNP    MG capsule take 3 capsules every evening 3/2/19  Yes ASHLEY Duff CNP   nystatin (MYCOSTATIN) 327852 UNIT/GM ointment Apply topically 2 times daily.  2/19/19  Yes ASHLEY Duff CNP   hydrALAZINE (APRESOLINE) 25 MG tablet Take 1 tablet by mouth 3 times daily 2/12/19  Yes ASHLEY Prado CNP   losartan (COZAAR) 25 MG tablet Take 2 tablets by mouth daily 2/12/19  Yes ASHLEY Prado CNP   furosemide (LASIX) 20 MG tablet take 2 tablets by mouth twice a day IN THE MORNING AND EVENING 2/7/19  Yes ASHLEY Duff CNP   RA SALINE NASAL SPRAY 0.65 % nasal spray instill 1 spray into each nostril if needed for congestion 1/7/19  Yes ASHLEY Cordero CNP   cyanocobalamin ([employer] VITAMIN B12) 1000 MCG tablet Take 1 tablet by mouth daily 12/29/18  Yes Mary Yusfu MD   cycloSPORINE (RESTASIS) 0.05 % ophthalmic emulsion Place 1 drop into both eyes 2 times daily   Yes Historical Provider, MD   ranolazine (RANEXA) 500 MG extended release tablet take 1 tablet by mouth twice a day 12/18/18  Yes Marylee Munda, PA-C   isosorbide dinitrate (ISORDIL) 40 MG tablet Take 1 tablet by mouth 3 times daily 12/4/18  Yes HayderASHLEY Alan CNP   ferrous sulfate 325 (65 Fe) MG tablet Take 1 tablet by mouth 2 times daily 11/15/18  Yes ASHLEY Cordero CNP   escitalopram (LEXAPRO) 10 MG tablet Take 1 tablet by mouth every morning 11/7/18  Yes Myron Etienne MD   metoprolol (LOPRESSOR) 100 MG tablet Take 1 tablet by mouth 2 times daily 11/6/18  Yes Myron Etienne MD   polyethylene glycol (GLYCOLAX) powder Take 17 g by mouth daily as needed (Constipation)   Yes Historical Provider, MD   levothyroxine (SYNTHROID) 100 MCG tablet take 1 tablet by mouth once daily 9/19/18  Yes ASHLEY Cordero CNP   Calcium Carbonate-Vitamin D (OYSTER SHELL CALCIUM/D) 500-200 MG-UNIT TABS take 1 tablet by mouth twice a day 9/19/18  Yes ASHLEY Cordero CNP   aspirin (ASPIRIN LOW DOSE) 81 MG EC tablet take 1 tablet by mouth once daily  Patient taking differently: Take 81 mg by mouth daily take 1 tablet by mouth once daily 9/19/18  Yes ASHLEY Cordero CNP   rosuvastatin (CRESTOR) 20 MG tablet take 1 tablet by mouth once daily 9/18/18  Yes Marylee Munda, PA-C   clopidogrel (PLAVIX) 75 MG tablet take 1 tablet by mouth once daily 7/30/18  Yes Demarco Velez PA-C   clotrimazole-betamethasone (LOTRISONE) 1-0.05 % cream Apply topically 3 times daily. Patient taking differently: Apply topically 2 times daily Apply topically 3 times daily.  7/17/18  Yes Tyler Higgins, APRN - CNP   baclofen (LIORESAL) 10 MG tablet Take 10 mg by mouth 2 times daily   Yes Historical Provider, MD   SYMBICORT 160-4.5 MCG/ACT AERO inhale 2 puffs by mouth twice a day 4/21/18  Yes ASHLEY Correa CNP   fluticasone (FLONASE) 50 MCG/ACT nasal spray 1 spray by Nasal route daily 1/23/18  Yes ASHLEY Correa CNP   polyvinyl alcohol (LIQUIFILM TEARS) 1.4 % ophthalmic solution Place 1 drop into both eyes as needed 2-4 times daily   Yes Historical Provider, MD   nystatin (MYCOSTATIN) 790461 UNIT/GM cream Apply topically 2 times daily. Patient taking differently: Apply topically Apply topically 2 times daily. UNDER BREAST 8/17/17  Yes ASHLEY Fair CNP   doxepin (SINEQUAN) 150 MG capsule Take 150 mg by mouth nightly   Yes Historical Provider, MD   OXcarbazepine (TRILEPTAL) 150 MG tablet Take 150 mg by mouth every morning    Yes Historical Provider, MD   pantoprazole (PROTONIX) 40 MG tablet Take 40 mg by mouth every morning (before breakfast)    Yes Historical Provider, MD   risperiDONE (RISPERDAL) 1 MG tablet Take 1 mg by mouth every morning    Yes Historical Provider, MD   Multiple Vitamin (MULTIVITAMIN) tablet Take 1 tablet by mouth daily 2/5/19   [address] [place] [address], MD   OXcarbazepine (TRILEPTAL) 150 MG tablet Take 300 mg by mouth every evening    Historical Provider, MD   albuterol sulfate HFA (VENTOLIN HFA) 108 (90 Base) MCG/ACT inhaler Inhale 2 puffs into the lungs every 6 hours as needed for Wheezing 8/28/18   ASHLEY Armando CNP   Misc.  Devices MISC Pt needs an oxygen tank carrier for her wheelchair 6/20/18   ASHLEY Fair CNP   hydrOXYzine (ATARAX) 25 MG tablet Take 25 mg by mouth 3 times daily    Historical Provider, MD   glucose blood VI test strips (GLUCOSE METER TEST) strip 1 each by In Vitro route daily Check blood sugar q daily Dx E11.69 11/9/17   Casimiro Both, DO   Lancets MISC Check blood sugar q daily Dx E11.69 11/9/17   Bartolo Lagos DO Scot   Blood Glucose Monitoring Suppl HARVINDER Check blood sugar q daily Dx E11.69 11/9/17   Laney Wells DO   OXYGEN Inhale 3 L into the lungs continuous     Historical Provider, MD   ONE TOUCH ULTRASOFT LANCETS MISC use as directed BEFORE BREAKFAST AND SUPPER 10/15/14   ASHLEY Velasquez - CNP       Current Facility-Administered Medications   Medication Dose Route Frequency Provider Last Rate Last Dose    labetalol (NORMODYNE;TRANDATE) injection 5 mg  5 mg Intravenous Q4H PRN Ligia Oconnor MD   5 mg at 03/23/19 1824    aspirin EC tablet 81 mg  81 mg Oral Daily [facility], DO   81 mg at 03/23/19 0529    clopidogrel (PLAVIX) tablet 75 mg  75 mg Oral Daily [facility], DO   75 mg at 03/23/19 5453    ferrous sulfate tablet 325 mg  325 mg Oral BID [facility], DO   325 mg at 03/23/19 1958    hydrALAZINE (APRESOLINE) tablet 25 mg  25 mg Oral TID [facility], DO   25 mg at 03/23/19 1958    levothyroxine (SYNTHROID) tablet 100 mcg  100 mcg Oral Daily [facility], DO   100 mcg at 03/24/19 5787    losartan (COZAAR) tablet 50 mg  50 mg Oral Daily [facility], DO   50 mg at 03/23/19 1692    metoprolol (LOPRESSOR) tablet 100 mg  100 mg Oral BID [facility], DO   100 mg at 03/23/19 1958    multivitamin 1 tablet  1 tablet Oral Daily [facility], DO   1 tablet at 03/23/19 0909    mometasone-formoterol (DULERA) 200-5 MCG/ACT inhaler 2 puff  2 puff Inhalation BID [facility], DO   2 puff at 03/23/19 1952    rosuvastatin (CRESTOR) tablet 20 mg  20 mg Oral Nightly Devendra Fox, DO   20 mg at 03/23/19 1957    OXcarbazepine (TRILEPTAL) tablet 150 mg  150 mg Oral QAM Devendra Fox, DO   150 mg at 03/23/19 8797    polyethylene glycol (GLYCOLAX) powder 17 g  17 g Oral Daily [facility],         ranolazine (RANEXA) extended release tablet 500 mg  500 mg Oral BID [facility]   500 mg at 03/23/19 1958    risperiDONE (RISPERDAL) tablet 1 mg  1 mg Oral QAM Jeison Camara, DO   1 mg at 03/23/19 0908    sodium chloride flush 0.9 % injection 10 mL  10 mL Intravenous 2 times per day Jeison Cherryr, DO   10 mL at 03/23/19 1958    sodium chloride flush 0.9 % injection 10 mL  10 mL Intravenous PRN Jeison Camara, DO        ondansetron [facility]) injection 4 mg  4 mg Intravenous Q6H PRN Jeison Camara, DO        enoxaparin (LOVENOX) injection 40 mg  40 mg Subcutaneous Q24H Devendra Fox DO   40 mg at 03/23/19 1958    glucose (GLUTOSE) 40 % oral gel 15 g  15 g Oral PRN Jeison Camara, DO        dextrose 50 % solution 12.5 g  12.5 g Intravenous PRN Jeison Cherryr, DO        glucagon (rDNA) injection 1 mg  1 mg Intramuscular PRN Jeison Camara, DO        dextrose 5 % solution  100 mL/hr Intravenous PRN Jeison Camara, DO        potassium chloride (KLOR-CON M) extended release tablet 40 mEq  40 mEq Oral PRN Jeison Cherryr, DO   40 mEq at 03/22/19 1120    Or    potassium bicarb-citric acid (EFFER-K) effervescent tablet 40 mEq  40 mEq Oral PRN Jeison Camara, DO        Or    potassium chloride 10 mEq/100 mL IVPB (Peripheral Line)  10 mEq Intravenous PRN Jeison Camara, DO        magnesium sulfate 1 g in dextrose 5 % 100 mL IVPB  1 g Intravenous PRN Jeison Camara, DO        famotidine (PEPCID) tablet 20 mg  20 mg Oral BID Jeison Cherryr, DO   20 mg at 03/23/19 1958    acetaminophen (TYLENOL) tablet 650 mg  650 mg Oral Q4H PRN Jeison Camara, DO   650 mg at 03/24/19 0617    insulin lispro (HUMALOG) injection vial 0-6 Units  0-6 Units Subcutaneous TID WC Jeison Camara, DO   1 Units at 03/22/19 1126    insulin lispro (HUMALOG) injection vial 0-3 Units  0-3 Units Subcutaneous Nightly Jeison Camara, DO        polyvinyl alcohol (LIQUIFILM TEARS) 1.4 % ophthalmic solution 1 drop  1 drop Both Eyes BID Jeison Camara, DO   1 drop at 03/23/19 1957       Allergies:  Lipitor [atorvastatin] and Motrin [ibuprofen micronized]    Social History:    TOBACCO:   reports that she quit smoking about 12 years ago. Her smoking use included cigarettes. She has a 38.00 pack-year smoking history. She has never used smokeless tobacco.  ETOH:   reports that she does not drink alcohol. Family History:        Problem Relation Age of Onset    Heart Disease Mother         CHF    Heart Disease Father         CAD    Heart Attack Father     Kidney Disease Sister         DIALYSIS    Cancer Sister         RECTUM    Heart Disease Sister     Heart Disease Brother     Heart Disease Brother         CAD    Heart Disease Brother     Heart Disease Brother     Breast Cancer Child 36    Cancer Sister         UTERINE    Ovarian Cancer Other 22    Other Brother         SUICIDE         Review of Systems -   General ROS: negative  Psychological ROS: negative  Hematological and Lymphatic ROS: No history of blood clots or bleeding disorder. Respiratory ROS: no cough, shortness of breath, or wheezing  Cardiovascular ROS: As per HPI  Gastrointestinal ROS: negative  Genito-Urinary ROS: no dysuria, trouble voiding, or hematuria  Musculoskeletal ROS: negative  Neurological ROS: no TIA or stroke symptoms  Dermatological ROS: negative    All others reviewed and are negative.        BP (!) 190/80 Comment: manual  Pulse 102   Temp 98.2 °F (36.8 °C) (Oral)   Resp 18   Ht 5' 6\" (1.676 m)   Wt 218 lb 9.6 oz (99.2 kg)   LMP 02/12/1973 (Approximate)   SpO2 96%   BMI 35.28 kg/m²       Physical Examination:   General appearance - alert, in no distress  Mental status - alert, oriented to person, place, and time  Neck - supple, no significant adenopathy, no JVD, or carotid bruits  Chest - clear to auscultation, no wheezes, rales or rhonchi, symmetric air entry  Heart - normal rate, regular rhythm, normal S1, S2, SM+, rubs, clicks or gallops  Abdomen - soft, nontender, nondistended, no masses or organomegaly  Neurological - alert, oriented, normal speech, no focal findings or movement disorder noted  Musculoskeletal - no joint tenderness, deformity or swelling  Extremities - peripheral pulses normal, no pedal edema, no clubbing or cyanosis  Skin - normal coloration and turgor, no rashes, no suspicious skin lesions noted      LABS:    Recent Labs     03/21/19  1646   TROPONINT < 0.010     CBC:   Lab Results   Component Value Date    WBC 6.1 03/22/2019    RBC 3.73 03/22/2019    RBC 3.82 08/21/2018    HGB 10.3 03/22/2019    HCT 30.8 03/22/2019    MCV 82.6 03/22/2019    MCH 27.6 03/22/2019    MCHC 33.4 03/22/2019    RDW 14.0 02/18/2019     03/22/2019    MPV 9.2 03/22/2019     BMP:    Lab Results   Component Value Date     03/24/2019    K 4.1 03/24/2019    K 3.3 03/22/2019    CL 88 03/24/2019    CO2 31 03/24/2019    BUN 18 03/24/2019    LABALBU 4.4 03/21/2019    CREATININE 1.0 03/24/2019    CALCIUM 9.9 03/24/2019    LABGLOM 54 03/24/2019    GLUCOSE 128 03/24/2019    GLUCOSE 101 07/25/2016     Hepatic Function Panel:    Lab Results   Component Value Date    ALKPHOS 56 03/21/2019    ALT 25 03/21/2019    AST 36 03/21/2019    PROT 7.2 03/21/2019    PROT 6.5 11/25/2016    BILITOT 0.3 03/21/2019    BILIDIR <0.2 03/02/2019    LABALBU 4.4 03/21/2019     Magnesium:    Lab Results   Component Value Date    MG 1.6 03/22/2019     Warfarin PT/INR:  No components found for: PTPATWAR, PTINRWAR  HgBA1c:    Lab Results   Component Value Date    LABA1C 5.9 03/21/2019     FLP:    Lab Results   Component Value Date    TRIG 179 11/15/2018    HDL 29 11/15/2018    LDLCALC 55 11/15/2018    LABVLDL 69 11/25/2016     TSH:    Lab Results   Component Value Date    TSH 8.250 03/21/2019     BNP: No components found for: PRO-BNP      Assessment/Plan:  Elevated BP  Hyponatremia  Severe AS  Chronic diastolic HF  Bipolar disorder    Telemetry  Aspirin, plavix, losartan, metoprolol  Will increase hydralazine  Continue ranexa and crestor  Monitor BP  Patient is scheduled to get LHC this upcoming week as part of TAVR workup  Continue

## 2019-03-24 NOTE — PROGRESS NOTES
[facility]   Nephrology progress note  3/24/2019, 12:43 PM      Pt Name:    German Fatima  MRN:     [id number]     [place]:    1945  Admit Date:    3/21/2019  4:03 PM  Primary Care Physician:  ASHLEY Blanchard CNP   Room number  6K-03/003-A    Chief Complaint: Nephrology following for hyponatremia    Subjective:  Patient seen and examined  No chest pain or shortness of breath  D/w RN in room  On chronic oxygen therapy    Objective:  24HR INTAKE/OUTPUT:      Intake/Output Summary (Last 24 hours) at 3/24/2019 1243  Last data filed at 3/24/2019 0829  Gross per 24 hour   Intake 1573.3 ml   Output 1825 ml   Net -251.7 ml     I/O last 3 completed shifts: In: 1563.3 [P.O.:1140; I.V.:423.3]  Out: 2500 [Urine:2500]  I/O this shift:  In: 10 [I.V.:10]  Out: -   Admission weight: 218 lb (98.9 kg)  Wt Readings from Last 3 Encounters:   03/24/19 218 lb 9.6 oz (99.2 kg)   03/13/19 208 lb 12.8 oz (94.7 kg)   03/02/19 222 lb (100.7 kg)     Body mass index is 35.28 kg/m². Physical examination  VITALS:   , R 18  Vitals:    03/24/19 1000   BP: (!) 148/91   Pulse:    Resp:    Temp:    SpO2:      General Appearance: alert and cooperative with exam, appears comfortable, no distress  Mouth/Throat: Oral mucosa moist  Neck: No JVD  Lungs: Air entry B/L, no rales, no use of accessory muscles  Heart:  S1, S2 heard  GI: soft, non-tender, no guarding  Extremities: no LE edema      Lab Data  CBC:   Recent Labs     03/21/19  1646 03/22/19  0550   WBC 8.3 6.1   HGB 11.1* 10.3*   HCT 32.1* 30.8*    145     BMP:  Recent Labs     03/22/19  0550  03/23/19  1148 03/23/19  1817 03/24/19  0537   *   < > 132* 132* 132*   K 3.3*  --  3.8  --  4.1   CL 75*  --  87*  --  88*   CO2 32  --  31  --  31   BUN 19  --  21  --  18   CREATININE 0.9  --  1.0  --  1.0   GLUCOSE 189*  --  130*  --  128*   CALCIUM 10.1  --  10.0  --  9.9   MG 1.6  --   --   --   --     < > = values in this interval not displayed.

## 2019-03-24 NOTE — PLAN OF CARE
Problem: Impaired respiratory status  Goal: Clear lung sounds  Outcome: Ongoing   Patient on her home medication regiment.

## 2019-03-24 NOTE — DISCHARGE SUMMARY
PROGRESS      Patient:  Neal Cast  YOB: 1945    MRN: [id number]     Acct: [de-identified]    PCP: ASHLEY Nicholson CNP    Date of Admission: 3/21/2019    Date of Service: Pt seen/examined on 3/24/2019 and Admitted to Inpatient with expected LOS greater than two midnights due to medical therapy. Chief Complaint:    Chief Complaint   Patient presents with    Other     low sodium         History Of Present Illness:      75 y.o. female who presented to [address] [place] [address] with \"evaluation of hyponatremia. The patient states to have had lab work completed today and reports that she received a call from her primary care physician who told her to come to the ED due to a sodium of 117. The patient states that she has been experiencing chest pain, vomiting, diarrhea, and a headache for the past two days. She states that she has committed four acts of both vomiting and diarrhea. Patient denies any fever, chills, cough, pharyngitis, rhinorrhea, or shortness of breath. She denies any melena, hematochezia, or hematemesis. The patient denies any numbness, weakness, or associated paresthesias. Patient denies any recent medication changes. After evaluation the patient, I determined that her presenting history is inaccurate with the information that she told EMS and the nursing staff. The EMS report and nursing staff both state that the patent denies any complaints until I evaluated her within the ED causing a discrepancy in her HPI and ROS. The patient reports no additional symptoms or complaints at this time. \"-per er note and confirmed by myself    Addendum, patient is a very poor historian, does not recall if any new meds have been started or what meds she is taking  Will ask pharmacy to correct med req sheet, will stop or decrease meds that are known agents that cause hyponatremia              Medication List      CHANGE how you take these medications    aspirin 81 MG EC tablet  Commonly known as:  ASPIRIN LOW DOSE  take 1 tablet by mouth once daily  What changed:    · how much to take  · how to take this  · when to take this  · additional instructions     clotrimazole-betamethasone 1-0.05 % cream  Commonly known as:  LOTRISONE  Apply topically 3 times daily. What changed:    · how to take this  · when to take this  · additional instructions     hydrALAZINE 50 MG tablet  Commonly known as:  APRESOLINE  Take 1 tablet by mouth 3 times daily  What changed:    · medication strength  · how much to take     * nystatin 875579 UNIT/GM cream  Commonly known as:  MYCOSTATIN  Apply topically 2 times daily. What changed:    · how to take this  · additional instructions     * nystatin [id number] UNIT/GM ointment  Commonly known as:  MYCOSTATIN  Apply topically 2 times daily. What changed:  Another medication with the same name was changed. Make sure you understand how and when to take each. * This list has 2 medication(s) that are the same as other medications prescribed for you. Read the directions carefully, and ask your doctor or other care provider to review them with you.             CONTINUE taking these medications    albuterol sulfate  (90 Base) MCG/ACT inhaler  Commonly known as:  VENTOLIN HFA  Inhale 2 puffs into the lungs every 6 hours as needed for Wheezing     baclofen 10 MG tablet  Commonly known as:  LIORESAL     blood glucose monitor kit and supplies  Check blood sugar q daily Dx E11.69     clopidogrel 75 MG tablet  Commonly known as:  PLAVIX  take 1 tablet by mouth once daily     cyanocobalamin 1000 MCG tablet  Commonly known as:  [employer] VITAMIN B12  Take 1 tablet by mouth daily     cycloSPORINE 0.05 % ophthalmic emulsion  Commonly known as:  RESTASIS      MG capsule  Generic drug:  docusate sodium  take 3 capsules every evening     doxepin 150 MG capsule  Commonly known as:  SINEQUAN     ferrous sulfate 325 (65 Fe) MG tablet  Take 1 tablet by mouth 2 times daily     fluticasone 50 MCG/ACT nasal spray  Commonly known as:  FLONASE  1 spray by Nasal route daily     hydrOXYzine 25 MG tablet  Commonly known as:  ATARAX     isosorbide dinitrate 40 MG tablet  Commonly known as:  ISORDIL  Take 1 tablet by mouth 3 times daily     levothyroxine 100 MCG tablet  Commonly known as:  SYNTHROID  take 1 tablet by mouth once daily     losartan 25 MG tablet  Commonly known as:  COZAAR  Take 2 tablets by mouth daily     magnesium oxide 400 MG tablet  Commonly known as:  MAG-OX  take 1 tablet by mouth once daily     metoprolol 100 MG tablet  Commonly known as:  LOPRESSOR  Take 1 tablet by mouth 2 times daily     multivitamin tablet  Take 1 tablet by mouth daily     * ONE TOUCH ULTRASOFT LANCETS Misc  use as directed BEFORE BREAKFAST AND SUPPER     * Lancets Misc  Check blood sugar q daily Dx E11.69     * OXcarbazepine 150 MG tablet  Commonly known as:  TRILEPTAL     * OXcarbazepine 150 MG tablet  Commonly known as:  TRILEPTAL     OXYGEN     Oyster Shell Calcium/D 500-200 MG-UNIT Tabs  take 1 tablet by mouth twice a day     pantoprazole 40 MG tablet  Commonly known as:  PROTONIX     polyethylene glycol powder  Commonly known as:  GLYCOLAX     polyvinyl alcohol 1.4 % ophthalmic solution  Commonly known as:  LIQUIFILM TEARS     RA SALINE NASAL SPRAY 0.65 % nasal spray  Generic drug:  sodium chloride  instill 1 spray into each nostril if needed for congestion     ranolazine 500 MG extended release tablet  Commonly known as:  RANEXA  take 1 tablet by mouth twice a day     risperiDONE 1 MG tablet  Commonly known as:  RISPERDAL     rosuvastatin 20 MG tablet  Commonly known as:  CRESTOR  take 1 tablet by mouth once daily     SYMBICORT 160-4.5 MCG/ACT Aero  Generic drug:  budesonide-formoterol  inhale 2 puffs by mouth twice a day         * This list has 4 medication(s) that are the same as other medications prescribed for you.  Read the directions carefully, and ask your doctor or other care provider to review them with you. STOP taking these medications    blood glucose test strips strip  Commonly known as:  GLUCOSE METER TEST     escitalopram 10 MG tablet  Commonly known as:  LEXAPRO     furosemide 20 MG tablet  Commonly known as:  LASIX     HYDROcodone-acetaminophen 5-325 MG per tablet  Commonly known as:  NORCO     metolazone 2.5 MG tablet  Commonly known as:  ZAROXOLYN     Misc. Devices Misc     potassium chloride 20 MEQ extended release tablet  Commonly known as:  KLOR-CON M           Where to Get Your Medications      These medications were sent to [address] [place] [address] [address] [address]    Phone:  [phone]   · hydrALAZINE 50 MG tablet     Diet:  DIET CARB CONTROL;    REVIEW OF SYSTEMS:   Pertinent positives as noted in the HPI. All other systems reviewed and negative. PHYSICAL EXAM:    BP (!) 158/82   Pulse 83   Temp 97.2 °F (36.2 °C) (Oral)   Resp 18   Ht 5' 6\" (1.676 m)   Wt 218 lb 9.6 oz (99.2 kg)   LMP 02/12/1973 (Approximate)   SpO2 95%   BMI 35.28 kg/m²     General appearance:  No apparent distress, appears stated age and cooperative. HEENT:  Normal cephalic, atraumatic without obvious deformity. Pupils equal, round, and reactive to light. Extra ocular muscles intact. Conjunctivae/corneas clear. Neck: Supple, with full range of motion. no jugular venous distention. Trachea midline. no carotid bruits difficult to evaluate 2/2 to obesity of neck  Respiratory:  Normal respiratory effort. Clear to auscultation, bilaterally without Rales/Wheezes/Rhonchi. Breath sounds equal bilaterally  Cardiovascular:  Regular rate and rhythm with normal S1/S2 with 3-4/6 murmur.   -ve rubs or gallops. PMI non displaced  Abdomen: Soft, non-tender, non-distended with normal bowel sounds. No guarding, rebound. Musculoskeletal:  No clubbing, cyanosis or edema bilaterally.   Full range of motion without deformity. Skin: Skin color, texture, turgor normal.  No rashes or lesions, or suspicious lesions. Neurologic:  Neurovascularly intact without any focal sensory/motor deficits. Cranial nerves: II-XII intact, grossly non-focal.  Psychiatric:  Alert and oriented, thought content appropriate, normal insight  Capillary Refill: Brisk,< 2 seconds   Peripheral Pulses: +2 palpable, equal bilaterally upper and lower extremities  Lymphatics: no lymphadenopathy      Labs:     Recent Labs     03/22/19  0550   WBC 6.1   HGB 10.3*   HCT 30.8*        Recent Labs     03/22/19  0550  03/23/19  1148 03/23/19  1817 03/24/19  0537   *   < > 132* 132* 132*   K 3.3*  --  3.8  --  4.1   CL 75*  --  87*  --  88*   CO2 32  --  31  --  31   BUN 19  --  21  --  18   CREATININE 0.9  --  1.0  --  1.0   CALCIUM 10.1  --  10.0  --  9.9    < > = values in this interval not displayed. No results for input(s): AST, ALT, BILIDIR, BILITOT, ALKPHOS in the last 72 hours. No results for input(s): INR in the last 72 hours. No results for input(s): Pascale Dark in the last 72 hours. Urinalysis:      Lab Results   Component Value Date    NITRU NEGATIVE 03/22/2019    WBCUA 0-2 03/22/2019    BACTERIA NONE 03/22/2019    RBCUA 0-2 03/22/2019    BLOODU TRACE 03/22/2019    SPECGRAV 1.012 02/03/2019    GLUCOSEU NEGATIVE 03/22/2019       Radiology:     CXR: I have reviewed the CXR with the following interpretation: -ve chf  EKG:  I have reviewed the EKG with the following interpretation:no acute changes    Xr Chest Standard (2 Vw)    Result Date: 3/2/2019  PROCEDURE: XR CHEST (2 VW) CLINICAL INFORMATION: Shortness of breath. COMPARISON: 2/7/2018. TECHNIQUE: AP upright and lateral views of the chest performed. FINDINGS: POSTSURGICAL CHANGES: None. LINES/TUBES/MECHANICAL DEVICES: 1. Suspected caval filter, not well visualized. TRACHEA/HEART/MEDIASTINUM/HILUM: 1. Stable mild enlargement of the cardiac silhouette.  2. Stable atheromatous coronary ostia to the annulus: 28 mm. Distance of the left coronary ostia to the annulus: 17 mm. Maximum diameter of the aortic sinus: 34 mm. Maximum diameter of the sinotubular junction: 26 mm. Maximum diameter of the mid ascending aorta: 32 mm. Aortic root orientation: 3 cusp view: Icelandic 14, CAU 15; Anterior view: SUMMERS 0, CAU 27 Coronary anatomy: The right coronary artery arises from the sinus of Valsalva on the right. The left main coronary artery arises from the sinus of Valsalva on the left. No anomalies are noted. There are coronary artery calcifications. Ascending aorta and arch: There is no calcified plaque of the ascending aorta. No soft plaque is noted. There is calcified atherosclerosis of the aortic arch. Descending thoracic aorta: There is no significant tortuosity of the descending thoracic aorta. There is no aneurysmal dilation. There is calcified atherosclerosis of the descending thoracic aorta. Abdominal aorta: There is no significant tortuosity of the abdominal aorta. There is no aneurysmal dilation. There is moderate calcified atherosclerosis of the abdominal aorta. Iliofemoral access route: There is no significant tortuosity. Minimal diameter of the abdominal aorta: 12 mm. Minimal diameter of the right common iliac artery: 8 mm. Minimal diameter of the right external iliac artery: 8 mm. Minimal diameter of the right common femoral artery: 8 mm. Minimal diameter of the left common iliac artery: 7 mm. Minimal diameter of the left external iliac artery: 7 mm. Minimal diameter of the left common femoral artery: 6 mm. HEART: There is mild cardiomegaly. There are some calcifications of the mitral valve annulus. There is left ventricular hypertrophy. The left atrium and left atrial appendage are within appropriate limits. PERICARDIUM: There is a small pericardial effusion. CHEST: There is some mild scarring/atelectasis dependently in the lungs. There are emphysematous changes.  There is a 7 mm pulmonary nodule in the superior segment of the left lower lobe, image 50 of series 4.. This has increased in size compared to 4/24/2017. At that time this measured 5 mm. There is a 3 mm left lower lobe pulmonary nodule on image 66 which is stable. The pulmonary artery is normal. No pulmonary emboli are noted. No mediastinal adenopathy is noted. ABDOMEN:  The liver is normal. The spleen is normal. The adrenals and pancreas are normal. There are surgical clips from a cholecystectomy. There is no hydronephrosis or stones of either kidney. No renal masses are noted. No abnormalities of the small bowel loops are noted. The IVC is grossly normal. There is an IVC filter. There is no adenopathy. PELVIS:  The urinary bladder is normal. There is no pelvic free fluid. There is diverticulosis of the sigmoid colon. The sigmoid colon is tortuous and redundant. The uterus and adnexa are grossly normal. BONES: No suspicious osseous lesions are present. 1. Preoperative TAVR measurements as listed above. 2. 8 mm pulmonary nodule in the superior segment of the left lower lobe. This is increased in size compared to 4/24/2017. A PET/CT is recommended for further evaluation. 3. Diverticulosis **This report has been created using voice recognition software. It may contain minor errors which are inherent in voice recognition technology. ** Final report electronically signed by Dr. Mickie Jeffers on 3/12/2019 4:42 PM    Xr Chest Portable    Result Date: 3/21/2019  PROCEDURE: XR CHEST PORTABLE CLINICAL INFORMATION: fatigue. COMPARISON: 3/2/2019. TECHNIQUE: AP upright view of the chest. FINDINGS: The lungs appear grossly clear. Pulmonary nodule seen on previous CT is not visualized on this exam. Pulmonary vasculature and cardiac-based also what are within normal limits. Visualized osseous structures appear grossly intact. Stable radiographic appearance of the chest. No evidence of an acute process.  **This report has been created using voice recognition software. It may contain minor errors which are inherent in voice recognition technology. ** Final report electronically signed by Dr. Brian Cuba MD on 3/21/2019 4:43 PM    Cta Abdomen Pelvis W Wo Contrast    Result Date: 3/12/2019  PROCEDURE: CTA CHEST W WO CONTRAST, CTA ABDOMEN PELVIS W WO CONTRAST CLINICAL INFORMATION: Severe aortic stenosis . Pre-op TAVR. COMPARISON: CT chest 11/27/2018. CT abdomen and pelvis 11/27/2018. TECHNIQUE: Noncontrast 5 mm axial images were obtained through the chest, abdomen and pelvis. Intravenous Optiray contrast was given. ECG gated axial 0.6 mm axial images were attained of the entire heart. A CT of the entire chest, abdomen and pelvis was obtained at 0.6 mm increments. These are reconstructed into 3 x 3 axial images. Those are sent to PACS. 3-D reconstructions through the chest, abdomen and pelvis include sagittal and coronal MIP images performed on the scanner. Centerline reconstructions  were obtained. Vascular and valve measurements are made on a dedicated 3-D workstation. Measurements were made in systole with the aortic valve open. All CT scans at this facility use dose modulation, iterative reconstruction, and/or weight-based dosing when appropriate to reduce radiation dose to as low as reasonably achievable. FINDINGS: MEASUREMENTS: AORTIC VALVE: Calcium score: 1291 Trileaflet valve. There is mild to moderate calcification of the aortic valve leaflets. Valvular calcifications do not extend into the LVOT. Velora Sis AORTIC DIMENSIONS: Aortic annulus: 26 x 19 mm. Aortic annulus area: 3.3 cm2. Aortic annulus perimeter: 68 mm. Distance of right coronary ostia to the annulus: 28 mm. Distance of the left coronary ostia to the annulus: 17 mm. Maximum diameter of the aortic sinus: 34 mm. Maximum diameter of the sinotubular junction: 26 mm. Maximum diameter of the mid ascending aorta: 32 mm. Aortic root orientation: 3 cusp view: [place]ese 14, CAU 15;   Anterior view: SUMMERS 0, CAU 27 Coronary anatomy: The right coronary artery arises from the sinus of Valsalva on the right. The left main coronary artery arises from the sinus of Valsalva on the left. No anomalies are noted. There are coronary artery calcifications. Ascending aorta and arch: There is no calcified plaque of the ascending aorta. No soft plaque is noted. There is calcified atherosclerosis of the aortic arch. Descending thoracic aorta: There is no significant tortuosity of the descending thoracic aorta. There is no aneurysmal dilation. There is calcified atherosclerosis of the descending thoracic aorta. Abdominal aorta: There is no significant tortuosity of the abdominal aorta. There is no aneurysmal dilation. There is moderate calcified atherosclerosis of the abdominal aorta. Iliofemoral access route: There is no significant tortuosity. Minimal diameter of the abdominal aorta: 12 mm. Minimal diameter of the right common iliac artery: 8 mm. Minimal diameter of the right external iliac artery: 8 mm. Minimal diameter of the right common femoral artery: 8 mm. Minimal diameter of the left common iliac artery: 7 mm. Minimal diameter of the left external iliac artery: 7 mm. Minimal diameter of the left common femoral artery: 6 mm. HEART: There is mild cardiomegaly. There are some calcifications of the mitral valve annulus. There is left ventricular hypertrophy. The left atrium and left atrial appendage are within appropriate limits. PERICARDIUM: There is a small pericardial effusion. CHEST: There is some mild scarring/atelectasis dependently in the lungs. There are emphysematous changes. There is a 7 mm pulmonary nodule in the superior segment of the left lower lobe, image 50 of series 4.. This has increased in size compared to 4/24/2017. At that time this measured 5 mm. There is a 3 mm left lower lobe pulmonary nodule on image 66 which is stable. The pulmonary artery is normal. No pulmonary emboli are noted.  No mediastinal adenopathy is noted. ABDOMEN:  The liver is normal. The spleen is normal. The adrenals and pancreas are normal. There are surgical clips from a cholecystectomy. There is no hydronephrosis or stones of either kidney. No renal masses are noted. No abnormalities of the small bowel loops are noted. The IVC is grossly normal. There is an IVC filter. There is no adenopathy. PELVIS:  The urinary bladder is normal. There is no pelvic free fluid. There is diverticulosis of the sigmoid colon. The sigmoid colon is tortuous and redundant. The uterus and adnexa are grossly normal. BONES: No suspicious osseous lesions are present. 1. Preoperative TAVR measurements as listed above. 2. 8 mm pulmonary nodule in the superior segment of the left lower lobe. This is increased in size compared to 4/24/2017. A PET/CT is recommended for further evaluation. 3. Diverticulosis **This report has been created using voice recognition software. It may contain minor errors which are inherent in voice recognition technology. ** Final report electronically signed by Dr. Lady Penn on 3/12/2019 4:42 PM           DVT prophylaxis: [x] Lovenox                                 [] SCDs                                 [] SQ Heparin                                 [] Encourage ambulation           [] Already on Anticoagulation    Code Status: Full Code      PT/OT Eval Status:     Disposition:    [x] Home       [] TCU       [] Rehab       [] Psych       [x] SNF       [] [facility]       [] Other-    ASSESSMENT:    Active Hospital Problems    Diagnosis Date Noted    Obesity (BMI 30-39. 9) [E66.9] 01/29/2015     Priority: Medium    Essential hypertension [I10] 01/29/2015     Priority: Medium    Type 2 diabetes mellitus with complication, with long-term current use of insulin (HCC) [E11.8, Z79.4]      Priority: Medium    Hypothyroid [E03.9] 01/15/2014     Priority: Low    Hyponatremia with decreased serum osmolality [E87.1] 03/21/2019    Hyponatremia [E87.1]     Severe aortic stenosis [I35.0] 09/06/2017       PLAN:    1. Hyponatremia - medication induced vs polydypsia-will stop lexapro at discharge, continue the rest of her psych medication PCP in 1 week  2. Uncontrolled blood pressure- cardiology seeing increased hydralazine and that has led to better control her blood pressure will be seeing cardiologist this week for cardiac cath prior to her TAVR  3. Severe AS- cardiac cath on Friday for TAVR, sees Dr Mihai Machado as OP  4. Type 2 DM- insulin ss    Stable for home per cardiology and nephrology  Cardiology this week as follow-up for cardiac cath  Nephrology in the week with BMP in 2-3 days  PCP in 1 week    Stable for home today    Discharge time 35 minutes        Thank you ASHLEY Salinas - ZIGGY for the opportunity to be involved in this patient's care.     Electronically signed by Vanessa Schulz MD on 3/24/2019 at 5:22 PM

## 2019-03-24 NOTE — PROGRESS NOTES
3/24/19 1940: Dale Cerna from [facility] called and was concerned that pt's BP was 200/110  Pulse 102 and but asymptomatic. She was informed that patient was 190's this a.m. And also asymptomatic. No headache, blurred vision/black spots, or dizziness. Her medications  did bring her down to the 140's. She was 158/82 with pulse of 82 on discharge. Dale Cerna was also informed that Dr. Corwin Chance is aware of the hypertension and increased the hydralazine to 50 mg t.i.d. And that pt is going to be worked up for a TAVR this week.  Dale Cerna stated that she appreciated the update and that the pt did not wish to return to the hospital.

## 2019-03-25 NOTE — PROGRESS NOTES
PAT call attempted patient unavailable left message left message with family to have patient call back if she has questions regarding cath procedure or needs instructions regarding procedure.   Call [phone] for any questions

## 2019-03-25 NOTE — CARE COORDINATION
Late Entry:    3/25/19, 7:16 AM    Discharge plan discussed by  and . Discharge plan reviewed with patient/ family. Patient/ family verbalize understanding of discharge plan and are in agreement with plan. Understanding was demonstrated using the teach back method. Services After Discharge  Services At/After Discharge: Nursing Services, Aide Services([facility])         Patient was discharged home this weekend, [facility] aware of discharge from RN.

## 2019-03-29 NOTE — PROGRESS NOTES
Sedation/Analgesia History & Physical      Pt Name: Kalin Perkins  MRN: [id number]  YOB: 1945  Provider Performing Procedure: Arun Mustafa MD, [facility]  Primary Care Physician: Adams Holter, APRN - CNP      PRE-PROCEDURE   DNR-CCA/DNR-CC []Yes [x]No      PLANNED PROCEDURE     [x]Cath  []PCI                []Pacemaker/AICD  []ALEX             []Cardioversion []Peripheral angiography/PTA  []Other:        Consent:   The indication, risks and benefits of the procedure and possible therapeutic consequences and alternatives were discussed with the patient. The patient was given the opportunity to ask questions and to have them answered to his/her satisfaction. Risks of the procedure include but are not limited to the following: Bleeding, hematoma including retroperitoneal hematoma, infection, pain and discomfort, injury to the aorta and other blood vessels, rhythm disturbance, low blood pressure, myocardial infarction, stroke, kidney damage/failure, myocardial perforation, allergic reactions to sedatives/contrast material, loss of pulse/vascular compromise requiring surgery, aneurysm/pseudoaneurysm formation, possible loss of a limb/hand/leg, death. Alternatives to and omission of the suggested procedure were discussed. The patient had no further questions and wished to proceed; the consent form was signed.       Indications for the Procedure:     Pre-TAVR workup    Plan: LHC +/- PCI          MEDICAL HISTORY        Past Medical History:   Diagnosis Date    Anemia     Anxiety     Aortic stenosis     Arthritis     general    AS (aortic stenosis): mild to moderate by echo 4/2017 9/6/2017    ASHD (arteriosclerotic heart disease)     Asthma 2016    INHALER USE DAILY AND AS NEEDED    Bipolar disorder ([facility]     Blood circulation, collateral     CAD (coronary artery disease) 1999    MI, 5 STENTS IN HEART NO SURE OF HEART DR NAME SEE[facility]    Cerebral artery occlusion with cerebral infarction ([facility] [address] 2018    SILENT-DX ON CT SCAN NO DEFICITS    Chest pain     CHF (congestive heart failure) (HCC)     COPD (chronic obstructive pulmonary disease) ([facility] [address] 2014    INHALER USE DAILY AND AS NEEDED    Depression     Disease of blood and blood forming organ     anemia per patient     DM (diabetes mellitus) ([facility] [address]     NO MEDS DIET CONTROLED    Dyspnea     FH: CAD (coronary artery disease)     Full dentures     UPPER AND LOWER    GERD (gastroesophageal reflux disease)     Headache     Heart burn     History of blood transfusion 1973    POST OP -PLASMA    History of blood transfusion 02/2019    1 UNIT LOW HGB    History of tobacco abuse: Quit in 2007     HLD (hyperlipidemia)     ON RX    HTN (hypertension)     ON RX    Irritable bowel syndrome     States has not had problem with this for years    Liver disease     fatty liver    Metabolic syndrome 11/10/9054    MI [facility] infarction) ([facility]     Nausea & vomiting     Obesity     CHET (obstructive sleep apnea) 2016    NO MACHINE YET    S/P cardiac catheterization: 11/6/2014: 99% in-stent restenosis mid-RCA. LCx moderate LI's. LAD 85% mid-segment lesion. 11/6/2014 11/6/2014: 99% in-stent restenosis mid-RCA. LCx moderate LI's. LAD 85% mid-segment lesion. Dr. Mark Danielle S/P PTCA:  5/11/2004: Proximal and mid RCA  Taxus 3.5 X 20 mm, and Taxus 3.0 X 32 mm. 10/28/2014    5/11/2004: Proximal and mid RCA  Taxus 3.5 X 20 mm, and Taxus 3.0 X 32 mm. Dr. Judi Colbert Systolic murmur 78/77/2684    Thyroid disease     ON RX    Wears glasses          Past Surgical History:   Procedure Laterality Date    ABDOMEN SURGERY      BACK SURGERY  08/2016        BLADDER SUSPENSION      BREAST BIOPSY  10/10/2017    BREAST LUMPECTOMY      CARDIAC CATHETERIZATION  8-11-06    Mild nonobstructive CAD w/ diffuse 10-20% proximal and mid LAD stenosis and 10-20% proximal/ostial RCA stenosis. No obstructive lesions.  RCA stents are patent w/o evidence of tablet 1    hydrALAZINE (APRESOLINE) 50 MG tablet Take 1 tablet by mouth 3 times daily 90 tablet 3    magnesium oxide (MAG-OX) 400 MG tablet take 1 tablet by mouth once daily 30 tablet 1     MG capsule take 3 capsules every evening 90 capsule 1    losartan (COZAAR) 25 MG tablet Take 2 tablets by mouth daily 90 tablet 2    Multiple Vitamin (MULTIVITAMIN) tablet Take 1 tablet by mouth daily 30 tablet 1    RA SALINE NASAL SPRAY 0.65 % nasal spray instill 1 spray into each nostril if needed for congestion 45 mL 3    cyanocobalamin ([employer] VITAMIN B12) 1000 MCG tablet Take 1 tablet by mouth daily 30 tablet 3    cycloSPORINE (RESTASIS) 0.05 % ophthalmic emulsion Place 1 drop into both eyes 2 times daily      ranolazine (RANEXA) 500 MG extended release tablet take 1 tablet by mouth twice a day 180 tablet 3    isosorbide dinitrate (ISORDIL) 40 MG tablet Take 1 tablet by mouth 3 times daily 90 tablet 3    ferrous sulfate 325 (65 Fe) MG tablet Take 1 tablet by mouth 2 times daily 180 tablet 1    metoprolol (LOPRESSOR) 100 MG tablet Take 1 tablet by mouth 2 times daily 60 tablet 0    OXcarbazepine (TRILEPTAL) 150 MG tablet Take 300 mg by mouth every evening      polyethylene glycol (GLYCOLAX) powder Take 17 g by mouth daily as needed (Constipation)      levothyroxine (SYNTHROID) 100 MCG tablet take 1 tablet by mouth once daily 90 tablet 1    Calcium Carbonate-Vitamin D (OYSTER SHELL CALCIUM/D) 500-200 MG-UNIT TABS take 1 tablet by mouth twice a day 60 tablet 5    aspirin (ASPIRIN LOW DOSE) 81 MG EC tablet take 1 tablet by mouth once daily (Patient taking differently: Take 81 mg by mouth daily take 1 tablet by mouth once daily) 90 tablet 1    rosuvastatin (CRESTOR) 20 MG tablet take 1 tablet by mouth once daily 90 tablet 0    albuterol sulfate HFA (VENTOLIN HFA) 108 (90 Base) MCG/ACT inhaler Inhale 2 puffs into the lungs every 6 hours as needed for Wheezing 1 Inhaler 3    clopidogrel (PLAVIX) 75 MG Robel Poe MD        aspirin tablet 325 mg  325 mg Oral Once Robel Poe MD                 PHYSICAL:     BP (!) 144/86   Pulse 83   Temp 98.3 °F (36.8 °C) (Oral)   Resp 18   Ht 5' 6\" (1.676 m)   Wt 225 lb (102.1 kg)   LMP 02/12/1973 (Approximate)   SpO2 100%   BMI 36.32 kg/m²     Heart:  [x]Regular rate and rhythm  []Other:    Lungs:  [x]Clear    []Other:    Abdomen: [x]Soft    []Other:    Mental Status: [x]Alert & Oriented  []Other:   Ext:                [x]No edema       []Other:         No results for input(s): CKTOTAL, CKMB, CKMBINDEX, TROPONINI in the last 72 hours.     Lab Results   Component Value Date    WBC 4.0 03/29/2019    RBC 3.16 03/29/2019    RBC 3.82 08/21/2018    HGB 8.8 03/29/2019    HCT 28.2 03/29/2019    MCV 89.2 03/29/2019    MCH 27.8 03/29/2019    MCHC 31.2 03/29/2019    RDW 14.0 02/18/2019     03/29/2019    MPV 9.3 03/29/2019       Lab Results   Component Value Date     03/29/2019    K 4.2 03/29/2019    CL 90 03/29/2019    CO2 34 03/29/2019    BUN 15 03/29/2019    LABALBU 4.1 03/29/2019    CREATININE 0.7 03/29/2019    CALCIUM 10.4 03/29/2019    LABGLOM 82 03/29/2019    GLUCOSE 98 03/29/2019    GLUCOSE 101 07/25/2016       Lab Results   Component Value Date    ALKPHOS 54 03/29/2019    ALT 16 03/29/2019    AST 14 03/29/2019    PROT 7.0 03/29/2019    PROT 6.5 11/25/2016    BILITOT 0.2 03/29/2019    BILIDIR <0.2 03/02/2019    LABALBU 4.1 03/29/2019       Lab Results   Component Value Date    MG 1.6 03/22/2019       No components found for: Edwin Plata    Lab Results   Component Value Date    LABA1C 5.9 03/21/2019       Lab Results   Component Value Date    TRIG 256 03/29/2019    HDL 42 03/29/2019    LDLCALC 61 03/29/2019    LABVLDL 69 11/25/2016       Lab Results   Component Value Date    TSH 8.250 03/21/2019            SEDATION  Planned agent:[x]Midazolam []Meperidine [x]Sublimaze []Morphine []Diazepam  []Other:         ASA Classification:  []1 [x]2 []3 []4 []5  Class 1: A normal healthy patient  Class 2: Pt with mild to moderate systemic disease  Class 3: Severe systemic disease or disturbance  Class 4: Severe systemic disorders that are already life threatening. Class 5: Moribund pt with little chances of survival, for more than 24 hours. Mallampati I Airway Classification:   []1 [x]2 []3 []4      [x]Pre-procedure diagnostic studies complete and results available. Comment:    [x]Previous sedation/anesthesia experiences assessed. Comment:    [x]The patient is an appropriate candidate to undergo the planned procedure sedation and anesthesia. (Refer to nursing sedation/analgesia documentation record)  [x]Formulation and discussion of sedation/procedure plan, risks, and expectations with patient and/or responsible adult completed. [x]Patient examined immediately prior to the procedure.  (Refer to nursing sedation/analgesia documentation record)        Radha Vasquez MD, Viviane Vega  Electronically signed 3/29/2019 at 2:32 PM

## 2019-03-29 NOTE — PROGRESS NOTES
Returned to 2e12. Monitor attached showing SR. Vasc-band in place to right wrist.  Hemostasis maintained. Dressing to right brachial intact.   0.9nss infusing with approx 800 ml remaining

## 2019-03-29 NOTE — FLOWSHEET NOTE
Iv fluids stopped. Iv catheter removed. Telemetry off. Right wrist stable. Right brachial site stable. Armboard cont. Discharge instructions with AVS review completed. Questions and concerns addressed. Preparing for discharge. dtr present.

## 2019-03-29 NOTE — OP NOTE
[address] [address]  Sedation/Analgesia Post Sedation Record        Pt Name: Ledy Ramesh  MRN: [id number]  YOB: 1945  Procedure Performed By: Yaima Leonardo MD, [facility] [place] [place]  Primary Care Physician: ASHLEY Eli - CNP        POST-PROCEDURE    Physicians/Assistants: Yaima Leonardo MD, Santiago Calero    Procedure Performed:  Left heart cath, Right heart cath                                  Sedation/Anesthesia:  Local Anesthesia and IV Conscious Sedation with continuous O2 monitoring    Estimated Blood Loss: 10 cc     Specimens Removed:  [x]None []Other:      Disposition of Specimen:  []Pathology []Other        Complications:   [x]None Immediate []Other:       Post Procedure Diagnosis/Findings:    Coronary Artery Disease   Mild ISR in RCA Stents  No significant CAD in rest of the coronaries    Elevated Right heart pressures  RA: 21  RV: 68/15; 23  PA: 65/19; 44  PCWP: 34    Elevated systolic BP throughout case  IV labetalol given         Recommendations:  Medical treatment and review films.                Yaima Leonardo MD, Santiago Calero  Electronically signed 3/29/2019 at 4:03 PM

## 2019-03-29 NOTE — PROGRESS NOTES
Patient admitted to 2E12 Ambulatory for cardiac cath. Patient NPO. Patient unaccompanied. Vital signs obtained. Assessment and data collection intiated. Oriented to room. Policies and procedures for 2E explained. All questions answered with no further questions at this time. Fall prevention and safety precautions discussed with patient.

## 2019-04-02 NOTE — PROCEDURES
[address] [id number]                            CARDIAC CATHETERIZATION    PATIENT NAME: Rin Villalta                     :        1945  MED REC NO:   [id number]                           ROOM:       0012  ACCOUNT NO:   [de-identified]                           ADMIT DATE: 2019  PROVIDER:     Dennis Joya MD    DATE OF PROCEDURE:  2019    PROCEDURES PERFORMED:  Right heart catheterization and left heart  catheterization. INDICATION FOR STUDY:  Severe aortic stenosis, dyspnea on exertion. DESCRIPTION OF PROCEDURE:  After written and informed consent was  obtained, the patient was brought into the cardiac catheterization  laboratory in a fasting, non-sedated, and in no acute distress. Her  right radial artery and right brachial vein were prepped in a usual  sterile fashion. Right radial artery access was obtained via modified  Seldinger technique. Once the access site was obtained, we placed a  6-[place] Slender arterial sheath and radial cocktail was given as per  cath lab protocol. The right brachial vein access was obtained with  ultrasound guidance and once access was obtained, we placed a 5-[place]  venous sheath. EQUIPMENTS USED:  5-[place] Meaghan Grand Marsh catheter, standard 5-[place] JR4,  5-[place] JL3.5 diagnostic catheter. RIGHT HEART CATHETERIZATION:  1.  RA is 21 mmHg. 2.  RV is 68/15, 23.  3.  PA is 65/19, 44.  4.  PCW was 44/36, 34 mmHg. 5.  Tee cardiac output was 5.71, cardiac index was 2.7. LEFT HEART CATHETERIZATION:  1. RCA is the dominant vessel. There is a prior interventional site in  the proximal, mid, and distal segments. There is mild luminal  irregularities and mild in-stent restenosis in the proximal RCA. Mid  RCA has a patent stent with 20% to 30% in-stent restenosis. The distal  segment has mild luminal irregularities.   There is a moderate-sized PL  and PDA branches; both have

## 2019-04-02 NOTE — PROGRESS NOTES
Xavi Pretty is a 75 y. o.oldfemale came for follow up regarding her Hospital FU for acute on chronic hyponatremia. She was admitted with Na at 117 on 3.21.19 and was discharged with Na 132 om 3.24.19. She has long history of hyponatremia 2nd to her psych meds. She is working on fluid restriction. She drinks about 1200 ml per pt report. Her TSH is up a little, but T4 is within normal limits. Her most recent creatinine is 0.7 and has been stable. She had cardiac cath 3.29.19 which revealed no significant stenosis and is treated with optimal medical therapy. States doing well. State compliance with meds and denies adverse effects. She is on Lasix 40 mg daily. Denies dysuria. denies edema. The pt denies use of NSAIDs. Has a good appetite and is remaining active. States wt increased due to good appetite. Has home O2 at 3 lpm.     Recent Wts and BP Noted and Reviewed  Wt Readings from Last 3 Encounters:   03/29/19 225 lb (102.1 kg)   03/26/19 221 lb 6.4 oz (100.4 kg)   03/24/19 218 lb 9.6 oz (99.2 kg)     BP Readings from Last 3 Encounters:   04/02/19 (!) 148/84   03/29/19 134/84   03/26/19 (!) 107/57       There is no height or weight on file to calculate BMI.   Diagnostic Data:  Lab Results   Component Value Date    CREATININE 0.7 03/29/2019    CREATININE 1.0 03/24/2019    CREATININE 1.0 03/23/2019    CREATININE 0.9 03/22/2019    CREATININE 0.7 03/21/2019    CREATININE 0.7 03/21/2019     Lab Results   Component Value Date     03/29/2019    K 4.2 03/29/2019    CL 90 03/29/2019    CO2 34 03/29/2019    BUN 15 03/29/2019    CREATININE 0.7 03/29/2019    LABGLOM 82 03/29/2019    GLUCOSE 98 03/29/2019    GLUCOSE 101 07/25/2016     Lab Results   Component Value Date    PHOS 2.0 12/24/2018    CALCIUM 10.4 03/29/2019     No results found for: PTH  No results found for: VITD25  Lab Results   Component Value Date    HGB 8.8 (L) 03/29/2019    HCT 28.2 (L) 03/29/2019     Lab Results   Component Value Date    FERRITIN 161 02/18/2019    IRON 35 02/18/2019    LABIRON 23 01/28/2019    [id number] 587 01/28/2019    FOLATE > 20.0 01/28/2019     No results found for: RETICABS  Lab Results   Component Value Date    LABA1C 5.9 03/21/2019           PAST MEDICAL HISTORY:       Diagnosis Date    Anemia     Anxiety     Aortic stenosis     Arthritis     general    AS (aortic stenosis): mild to moderate by echo 4/2017 9/6/2017    ASHD (arteriosclerotic heart disease)     Asthma 2016    INHALER USE DAILY AND AS NEEDED    Bipolar disorder ([address]     Blood circulation, collateral     CAD (coronary artery disease) 1999    MI, 5 STENTS IN HEART NO SURE OF HEART DR NAME ADOLFO'S [facility]    Cerebral artery occlusion with cerebral infarction ([address] 2018    SILENT-DX ON CT SCAN NO DEFICITS    Chest pain     CHF (congestive heart failure) (HCC)     COPD (chronic obstructive pulmonary disease) ([address] 2014    INHALER USE DAILY AND AS NEEDED    Depression     Disease of blood and blood forming organ     anemia per patient     DM (diabetes mellitus) ([address]     NO MEDS DIET CONTROLED    Dyspnea     FH: CAD (coronary artery disease)     Full dentures     UPPER AND LOWER    GERD (gastroesophageal reflux disease)     Headache     Heart burn     History of blood transfusion 1973    POST OP -PLASMA    History of blood transfusion 02/2019    1 UNIT LOW HGB    History of tobacco abuse: Quit in 2007     HLD (hyperlipidemia)     ON RX    HTN (hypertension)     ON RX    Irritable bowel syndrome     States has not had problem with this for years    Liver disease     fatty liver    Metabolic syndrome 87/59/0887    MI (myocardial infarction) (HCC)     Nausea & vomiting     Obesity     CHET (obstructive sleep apnea) 2016    NO MACHINE YET    S/P cardiac catheterization: 11/6/2014: 99% in-stent restenosis mid-RCA. LCx moderate LI's. LAD 85% mid-segment lesion. 11/6/2014 11/6/2014: 99% in-stent restenosis mid-RCA. LCx moderate LI's.  LAD 85% mid-segment lesion. Dr. Juana Stuart S/P PTCA:  5/11/2004: Proximal and mid RCA  Taxus 3.5 X 20 mm, and Taxus 3.0 X 32 mm. 10/28/2014    5/11/2004: Proximal and mid RCA  Taxus 3.5 X 20 mm, and Taxus 3.0 X 32 mm. Dr. Conchis Reza Systolic murmur 16/58/2878    Thyroid disease     ON RX    Wears glasses      SURGICAL HISTORY:    Past Surgical History:   Procedure Laterality Date    ABDOMEN SURGERY      BACK SURGERY  08/2016        BLADDER SUSPENSION      BREAST BIOPSY  10/10/2017    BREAST LUMPECTOMY      CARDIAC CATHETERIZATION  8-11-06    Mild nonobstructive CAD w/ diffuse 10-20% proximal and mid LAD stenosis and 10-20% proximal/ostial RCA stenosis. No obstructive lesions. RCA stents are patent w/o evidence of restenosis. Normal LV systolic function. EF 65%. Trace MR - catheter induced. Minimally elevated LVEDP - 13mmHg. Mild to moderate aortoiliac PVD w/o obstructive lesions.  CARDIAC CATHETERIZATION  5-11-04    Successful drug-eluting stent x 2 of proximal and mid RCA.  CARDIAC CATHETERIZATION  5-11-04    70-80% proximal RCA stenosis w/ 50-70% mid RCA stenosis. There is 50-60% lesion in mid PDA. Mild proximal and mid LAD disease. Mild circumflex disease. Normal LV size and systolic function. EF 60%.  CARDIOVASCULAR STRESS TEST  5-10-11    No evidence of stress induced ischemia. EF 75%.  CARDIOVASCULAR STRESS TEST  5-10-10    No evidence of stress induced ischemia, infarct or scar. EF 74%.     CAROTID ENDARTERECTOMY      CERVICAL FUSION N/A 11/15/2017    REMOVAL OF PLATE S3-7, ACDF D4-3 W/ATLANTIS CORNERSTONE performed by Grabiel Kendall MD at [address] [place] [address], DIAGNOSTIC      EYE SURGERY      cataract 2017   [address] [id number] EAR SURGERY      NECK SURGERY  FEB 2016   Yanely Pal OTHER SURGICAL HISTORY  02/13/2019    Arteriogram for diagnostics    PARTIAL HYSTERECTOMY      UPPER GASTROINTESTINAL ENDOSCOPY      UPPER GASTROINTESTINAL ENDOSCOPY       FAMILY HISTORY:       Problem Relation Age of Onset    Heart Disease Mother         CHF    Heart Disease Father         CAD    Heart Attack Father     Kidney Disease Sister         DIALYSIS    Cancer Sister         RECTUM    Heart Disease Sister     Heart Disease Brother     Heart Disease Brother         CAD    Heart Disease Brother     Heart Disease Brother     Breast Cancer Child 36    Cancer Sister         UTERINE    Ovarian Cancer Other 22    Other Brother         SUICIDE     ALLERGIES:    Allergies   Allergen Reactions    Lipitor [Atorvastatin] Diarrhea    Motrin [Ibuprofen Micronized] Nausea Only     Social and Occupational History:    TOBACCO:   reports that she quit smoking about 12 years ago. Her smoking use included cigarettes. She has a 38.00 pack-year smoking history. She has never used smokeless tobacco.  ETOH:   reports that she does not drink alcohol. REVIEW OF SYSTEMS:   GENERAL: Usual state of health. Stable weight. Pleasant  HEENT: Denies recent vision change, Denies Upper respiratory complaints  CARDIOVASCULAR: Denies chest pain/angina. RESPIRATORY:Denies sob, cough, wheezing  ABDOMEN: Denies nausea, vomiting, diarrhea  CNS:Denies headache, dizziness  MUSCULOSKELETAL: Reports joint arthralgia  SKIN: Denies rash, pruritis  HEMATOLOGIC: Denies bruising  ENDOCRINE:  Negative for heat or cold intolerance     EXAM:  VITALS:  BP (!) 148/84   Pulse 73   LMP 02/12/1973 (Approximate)   SpO2 91%    There is no height or weight on file to calculate BMI. CONSTITUTIONAL:  No acute distress. Sitting comfortable in chair  HEENT:  Head is normocephalic, Extraocular movement intact,  Neck is supple  CARDIOVASCULAR:  No lower extremity edema  RESPIRATORY: No shortness of breath. ABDOMEN: soft  NEUROLOGICAL: Patient is alert and oriented to person, place, and time.  Recent and remote memory is intact. Thought is coherant. SKIN: No rash on exposed surfaces  MUSCULOSKELETAL: Movement is coordinated. EXTREMITIES: Distal lower extremity temp is warm,   PSYCHIATRIC: mood and affect appropriate    Meds reviewed and discussed with pt    Assessment:    Diagnoses and all orders for this visit:    Hyponatremia with decreased serum osmolality. 2nd to SIADH from Psych meds. Na up to 136. Continue careful fluid restriction. Ok to continue Lasix    Nonrheumatic aortic valve stenosis. Planning TAVR     Essential hypertension, ok to continue current meds. Orders Placed This Encounter   Procedures    Basic Metabolic Panel       Pt asked to check home BP and record BP readings and bring to office appts  Avoid nonsteroidal anti inflammatory drugs such as Ibuprofen, Aleve, Advil, Motrin. Schedule for follow up appt in 6 months. Pt asked to bring pill bottles to next office visit. Please make early appointment if needed and to call office for questions, concerns, persistent, changing or worsening symptoms. Discussed with the patient and answered their questions     Discussed with Dr Diana Kurtz.   Devin RAMIREZ, CNP

## 2019-04-03 NOTE — CONSULTS
now resolved. No shortness of breath. She has chronic hyponatremia going back to 2014. It is due to her antidepressants. Attempts to decrease the dose in the past did not go well. She became crazy according to her. Her serum sodium has ranged anywhere between 117 mEq per liter to 133 mEq per liter. In the emergency department today it was 119 mEq per liter. As a result I was asked to see her.     Past Medical History:   Diagnosis Date    Anemia     Anxiety     Aortic stenosis     Arthritis     general    AS (aortic stenosis): mild to moderate by echo 4/2017 9/6/2017    ASHD (arteriosclerotic heart disease)     Asthma 2016    INHALER USE DAILY AND AS NEEDED    Bipolar disorder ([address]     Blood circulation, collateral     CAD (coronary artery disease) 1999    MI, 5 STENTS IN HEART NO SURE OF HEART DR NAME [facility]'S [facility]    Cerebral artery occlusion with cerebral infarction ([address] 2018    SILENT-DX ON CT SCAN NO DEFICITS    Chest pain     CHF (congestive heart failure) (HCC)     COPD (chronic obstructive pulmonary disease) ([address] 2014    INHALER USE DAILY AND AS NEEDED    Depression     Disease of blood and blood forming organ     anemia per patient     DM (diabetes mellitus) ([address]     NO MEDS DIET CONTROLED    Dyspnea     FH: CAD (coronary artery disease)     Full dentures     UPPER AND LOWER    GERD (gastroesophageal reflux disease)     Headache     Heart burn     History of blood transfusion 1973    POST OP -PLASMA    History of blood transfusion 02/2019    1 UNIT LOW HGB    History of tobacco abuse: Quit in 2007     HLD (hyperlipidemia)     ON RX    HTN (hypertension)     ON RX    Irritable bowel syndrome     States has not had problem with this for years    Liver disease     fatty liver    Metabolic syndrome 92/27/0206    MI (myocardial infarction) (HCC)     Nausea & vomiting     Obesity     CHET (obstructive sleep apnea) 2016    NO MACHINE YET    S/P cardiac catheterization: 11/6/2014: 99% in-stent restenosis mid-RCA. LCx moderate LI's. LAD 85% mid-segment lesion. 11/6/2014 11/6/2014: 99% in-stent restenosis mid-RCA. LCx moderate LI's. LAD 85% mid-segment lesion. Dr. David Beatty S/P PTCA:  5/11/2004: Proximal and mid RCA  Taxus 3.5 X 20 mm, and Taxus 3.0 X 32 mm. 10/28/2014    5/11/2004: Proximal and mid RCA  Taxus 3.5 X 20 mm, and Taxus 3.0 X 32 mm. Dr. Mckeon Spittle Systolic murmur 14/34/3435    Thyroid disease     ON RX    Wears glasses        Past Surgical History:   Procedure Laterality Date    ABDOMEN SURGERY      BACK SURGERY  08/2016        BLADDER SUSPENSION      BREAST BIOPSY  10/10/2017    BREAST LUMPECTOMY      CARDIAC CATHETERIZATION  8-11-06    Mild nonobstructive CAD w/ diffuse 10-20% proximal and mid LAD stenosis and 10-20% proximal/ostial RCA stenosis. No obstructive lesions. RCA stents are patent w/o evidence of restenosis. Normal LV systolic function. EF 65%. Trace MR - catheter induced. Minimally elevated LVEDP - 13mmHg. Mild to moderate aortoiliac PVD w/o obstructive lesions.  CARDIAC CATHETERIZATION  5-11-04    Successful drug-eluting stent x 2 of proximal and mid RCA.  CARDIAC CATHETERIZATION  5-11-04    70-80% proximal RCA stenosis w/ 50-70% mid RCA stenosis. There is 50-60% lesion in mid PDA. Mild proximal and mid LAD disease. Mild circumflex disease. Normal LV size and systolic function. EF 60%.  CARDIOVASCULAR STRESS TEST  5-10-11    No evidence of stress induced ischemia. EF 75%.  CARDIOVASCULAR STRESS TEST  5-10-10    No evidence of stress induced ischemia, infarct or scar. EF 74%.     CAROTID ENDARTERECTOMY      CERVICAL FUSION N/A 11/15/2017    REMOVAL OF PLATE J2-1, ACDF L8-9 W/ATLANTIS CORNERSTONE performed by Nubia Mead MD at [facility] COLONOSCOPY      ENDOSCOPY, COLON, DIAGNOSTIC      EYE SURGERY      cataract 2017   [address] [place] [address] REPAIR      HYSTERECTOMY  1973    INNER EAR SURGERY      NECK SURGERY  FEB 2016    OTHER SURGICAL HISTORY  02/13/2019    Arteriogram for diagnostics    PARTIAL HYSTERECTOMY      UPPER GASTROINTESTINAL ENDOSCOPY      UPPER GASTROINTESTINAL ENDOSCOPY         Family History   Problem Relation Age of Onset    Heart Disease Mother         CHF    Heart Disease Father         CAD    Heart Attack Father     Kidney Disease Sister         DIALYSIS    Cancer Sister         RECTUM    Heart Disease Sister     Heart Disease Brother     Heart Disease Brother         CAD    Heart Disease Brother     Heart Disease Brother     Breast Cancer Child 36    Cancer Sister         UTERINE    Ovarian Cancer Other 22    Other Brother         SUICIDE        reports that she quit smoking about 12 years ago. Her smoking use included cigarettes. She has a 38.00 pack-year smoking history. She has never used smokeless tobacco. She reports that she does not drink alcohol or use drugs. Allergies:  Lipitor [atorvastatin];  Motrin [ibuprofen micronized]; and Codeine    Current Medications:      [START ON 4/4/2019] aspirin EC tablet 81 mg Daily   [START ON 4/4/2019] clopidogrel (PLAVIX) tablet 75 mg Daily   baclofen (LIORESAL) tablet 10 mg BID   hydrALAZINE (APRESOLINE) tablet 50 mg TID   HYDROcodone-acetaminophen (NORCO) 5-325 MG per tablet 1 tablet Q6H PRN   hydrOXYzine (ATARAX) tablet 25 mg TID   isosorbide dinitrate (ISORDIL) tablet 40 mg TID   [START ON 4/4/2019] levothyroxine (SYNTHROID) tablet 100 mcg Daily   [START ON 4/4/2019] losartan (COZAAR) tablet 50 mg Daily   metoprolol (LOPRESSOR) tablet 100 mg BID   [START ON 4/4/2019] pantoprazole (PROTONIX) tablet 40 mg QAM AC   [START ON 4/4/2019] OXcarbazepine (TRILEPTAL) tablet 150 mg QAM   ranolazine (RANEXA) extended release tablet 500 mg BID   [START ON 4/4/2019] risperiDONE (RISPERDAL) tablet 1 mg QAM   [START ON 4/4/2019] rosuvastatin (CRESTOR) tablet 20 mg Daily mometasone-formoterol (DULERA) 200-5 MCG/ACT inhaler 2 puff BID   sodium chloride flush 0.9 % injection 10 mL 2 times per day   sodium chloride flush 0.9 % injection 10 mL PRN   ondansetron (ZOFRAN) injection 4 mg Q6H PRN   enoxaparin (LOVENOX) injection 40 mg Daily   0.9 % sodium chloride infusion Continuous   magnesium sulfate 1 g in dextrose 5 % 100 mL IVPB PRN   potassium chloride (KLOR-CON M) extended release tablet 40 mEq PRN   Or    potassium bicarb-citric acid (EFFER-K) effervescent tablet 40 mEq PRN   Or    potassium chloride 10 mEq/100 mL IVPB (Peripheral Line) PRN   acetaminophen (TYLENOL) tablet 650 mg Q4H PRN   glucose (GLUTOSE) 40 % oral gel 15 g PRN   dextrose 50 % solution 12.5 g PRN   glucagon (rDNA) injection 1 mg PRN   dextrose 5 % solution PRN   insulin lispro (HUMALOG) injection vial 0-6 Units TID WC   insulin lispro (HUMALOG) injection vial 0-3 Units Nightly   sodium chloride tablet 1 g TID WC       Review of Systems:   Pertinent positives stated above in HPI. All other systems were reviewed and were negative. ROS:Constitutional: negative  Eyes: negative  Ears, nose, mouth, throat, and face: negative  Respiratory: negative  Cardiovascular: negative  Gastrointestinal: positive for abdominal pain, nausea and vomiting  Genitourinary:negative  Integument/breast: negative  Hematologic/lymphatic: negative  Musculoskeletal:positive for arthralgias and stiff joints  Neurological: negative  Behavioral/Psych: positive for anxiety and depression  Endocrine: negative  Allergic/Immunologic: negative    Physical exam:   Constitutional:  Well-developed elderly lady in no distress. Vitals:   Vitals:    04/03/19 1945   BP: 139/74   Pulse: 80   Resp: 17   Temp: 99 °F (37.2 °C)   SpO2:        Skin: no rash, turgor is normal  Heent:  Pupils are reactive to light and accommodation. Throat is clear. Oral mucosa appears dry.   Neck: no bruits or jvd noted  Cardiovascular:  S1, S2 with 3 to 4/6 systolic murmur well appreciated on both sternal borders  Respiratory: Clear to auscultation without wheezes, rhonchi or rales. Abdomen:  Soft. Good bowel sounds. No palpable mass. Nontender. No abdominal bruit. Ext: No lower extremity edema  Psychiatric: mood and affect appropriate  Musculoskeletal:  Rom, muscular strength intact  CNS: Very awake and very alert. Well-oriented. Normal speech. Good motor strength. No focal deficit.     Data:   Labs:  Lab Results   Component Value Date     (LL) 04/03/2019     (LL) 04/03/2019     03/29/2019    K 3.6 04/03/2019    K 4.2 03/29/2019    K 4.1 03/24/2019    CL 71 (L) 04/03/2019    CO2 37 (H) 04/03/2019    CO2 34 (H) 03/29/2019    CO2 31 03/24/2019    CREATININE 0.7 04/03/2019    CREATININE 0.7 03/29/2019    CREATININE 1.0 03/24/2019    BUN 12 04/03/2019    BUN 15 03/29/2019    BUN 18 03/24/2019    GLUCOSE 126 (H) 04/03/2019    GLUCOSE 157 04/03/2019    GLUCOSE 98 03/29/2019    PHOS 2.0 (L) 12/24/2018    PHOS 3.9 12/22/2018    PHOS 4.6 03/29/2018    WBC 6.7 04/03/2019    WBC 4.0 (L) 03/29/2019    WBC 6.1 03/22/2019    HGB 9.9 (L) 04/03/2019    HGB 8.8 (L) 03/29/2019    HGB 10.3 (L) 03/22/2019    HCT 29.0 (L) 04/03/2019    HCT 28.2 (L) 03/29/2019    HCT 30.8 (L) 03/22/2019    MCV 82.4 04/03/2019     04/03/2019     {Labs reviewed    Imaging:  CXR results: Diagnostic images reports reviewed        Thank you Dr. Mary Anne Lizarraga APRN - CNP for allowing us to participate in care of Gary Narayan       Electronically signed by Tomas Perez MD on 4/3/2019 at 7:40 PM

## 2019-04-03 NOTE — ED PROVIDER NOTES
[facility]  eMERGENCY dEPARTMENT eNCOUnter          CHIEF COMPLAINT       Chief Complaint   Patient presents with    Chest Pain    Shortness of Breath       Nurses Notes reviewed and I agreeexcept as noted in the HPI. HISTORY OF PRESENT ILLNESS    Jose Frias is a 75 y.o. female who presents to the ED via EMS transport for the evaluation of chest pain and shortness of breath. The patient reports a history of CAD, MI, anemia for which she takes iron, a systolic murmur, cardiac catheterization with stent placement, COPD, CHF, hypertension, hyperlipidemia, diabetes, and a cholecystectomy. The patient reports that she developed nausea with emesis at 10:30 AM as well as abdominal pain. She reports that she then developed chest pain with some shortness of breath. She states that she has experiencing 4 total episodes of emesis, and that she went to her PCP today for the issue where she was advised to come into the ED. The patient rates her current pain at an 8/10 in severity and she describes the pain as a continuous sharp and shooting sensation. She reports that she takes Plavix, aspirin, and ferrous sulfate. She denies experiencing any urinary symptoms, melena, hematemesis, diarrhea, fever, or chills. The patient denies further complaints at initial encounter. REVIEW OF SYSTEMS     Review of Systems   Constitutional: Negative for appetite change, chills, diaphoresis, fatigue and fever. HENT: Negative for congestion, ear pain, rhinorrhea and sore throat. Eyes: Negative for photophobia, pain and visual disturbance. Respiratory: Positive for shortness of breath. Negative for cough, chest tightness and wheezing. Cardiovascular: Positive for chest pain. Negative for palpitations and leg swelling. Gastrointestinal: Positive for abdominal pain, nausea and vomiting (x4). Negative for blood in stool, constipation and diarrhea. Endocrine: Negative for polyuria.    Genitourinary: catheterization (5-11-04); Cardiac catheterization (5-11-04); bladder suspension; hernia repair; Breast lumpectomy; Foot surgery; Cholecystectomy; Colonoscopy; Upper gastrointestinal endoscopy; Endoscopy, colon, diagnostic; partial hysterectomy (cervix not removed); Upper gastrointestinal endoscopy; Neck surgery (FEB 2016); back surgery (08/2016); Breast biopsy (10/10/2017); cervical fusion (N/A, 11/15/2017); eye surgery; Abdomen surgery; Hysterectomy (1973); Hemorrhoid surgery (1973); other surgical history (02/13/2019); Carotid endarterectomy; and Inner ear surgery. CURRENT MEDICATIONS       Current Discharge Medication List      CONTINUE these medications which have NOT CHANGED    Details   HYDROcodone-acetaminophen (NORCO) 5-325 MG per tablet Take 1 tablet by mouth every 6 hours as needed for Pain.       furosemide (LASIX) 40 MG tablet Take 1 tablet by mouth daily  Qty: 90 tablet, Refills: 1      hydrALAZINE (APRESOLINE) 50 MG tablet Take 1 tablet by mouth 3 times daily  Qty: 90 tablet, Refills: 3       MG capsule take 3 capsules every evening  Qty: 90 capsule, Refills: 1    Associated Diagnoses: Constipation, unspecified constipation type      losartan (COZAAR) 25 MG tablet Take 2 tablets by mouth daily  Qty: 90 tablet, Refills: 2      Multiple Vitamin (MULTIVITAMIN) tablet Take 1 tablet by mouth daily  Qty: 30 tablet, Refills: 1      cyanocobalamin ([employer] VITAMIN B12) 1000 MCG tablet Take 1 tablet by mouth daily  Qty: 30 tablet, Refills: 3      ranolazine (RANEXA) 500 MG extended release tablet take 1 tablet by mouth twice a day  Qty: 180 tablet, Refills: 3      isosorbide dinitrate (ISORDIL) 40 MG tablet Take 1 tablet by mouth 3 times daily  Qty: 90 tablet, Refills: 3      ferrous sulfate 325 (65 Fe) MG tablet Take 1 tablet by mouth 2 times daily  Qty: 180 tablet, Refills: 1      metoprolol (LOPRESSOR) 100 MG tablet Take 1 tablet by mouth 2 times daily  Qty: 60 tablet, Refills: 0      polyethylene glycol (GLYCOLAX) powder Take 17 g by mouth daily as needed (Constipation)      levothyroxine (SYNTHROID) 100 MCG tablet take 1 tablet by mouth once daily  Qty: 90 tablet, Refills: 1    Associated Diagnoses: Hypothyroidism, unspecified type      Calcium Carbonate-Vitamin D (OYSTER SHELL CALCIUM/D) 500-200 MG-UNIT TABS take 1 tablet by mouth twice a day  Qty: 60 tablet, Refills: 5      aspirin (ASPIRIN LOW DOSE) 81 MG EC tablet take 1 tablet by mouth once daily  Qty: 90 tablet, Refills: 1      rosuvastatin (CRESTOR) 20 MG tablet take 1 tablet by mouth once daily  Qty: 90 tablet, Refills: 0    Associated Diagnoses: Dyslipidemia      albuterol sulfate HFA (VENTOLIN HFA) 108 (90 Base) MCG/ACT inhaler Inhale 2 puffs into the lungs every 6 hours as needed for Wheezing  Qty: 1 Inhaler, Refills: 3      clopidogrel (PLAVIX) 75 MG tablet take 1 tablet by mouth once daily  Qty: 90 tablet, Refills: 3      baclofen (LIORESAL) 10 MG tablet Take 10 mg by mouth 2 times daily      fluticasone (FLONASE) 50 MCG/ACT nasal spray 1 spray by Nasal route daily  Qty: 1 Bottle, Refills: 0      hydrOXYzine (ATARAX) 25 MG tablet Take 25 mg by mouth 3 times daily      OXYGEN Inhale 3 L into the lungs continuous       OXcarbazepine (TRILEPTAL) 150 MG tablet Take 150 mg by mouth every morning       pantoprazole (PROTONIX) 40 MG tablet Take 40 mg by mouth every morning (before breakfast)       risperiDONE (RISPERDAL) 1 MG tablet Take 1 mg by mouth every morning       ondansetron (ZOFRAN) 4 MG tablet Take 1 tablet by mouth daily as needed for Nausea or Vomiting  Qty: 30 tablet, Refills: 0      Handicap Placard MISC by Does not apply route Expires 25 MARCH 2014  Qty: 2 each, Refills: 0    Associated Diagnoses: Chronic neck pain; Chronic low back pain without sciatica, unspecified back pain laterality      magnesium oxide (MAG-OX) 400 MG tablet take 1 tablet by mouth once daily  Qty: 30 tablet, Refills: 1      RA SALINE NASAL SPRAY 0.65 % nasal spray instill 1 spray into each nostril if needed for congestion  Qty: 45 mL, Refills: 3      SYMBICORT 160-4.5 MCG/ACT AERO inhale 2 puffs by mouth twice a day  Qty: 10.2 g, Refills: 11    Associated Diagnoses: Moderate persistent asthma without complication      !! Lancets MISC Check blood sugar q daily Dx E11.69  Qty: 100 each, Refills: 2    Associated Diagnoses: Type 2 diabetes mellitus with other specified complication, without long-term current use of insulin ([facility])      Blood Glucose Monitoring Suppl HARVINDER Check blood sugar q daily Dx E11.69  Qty: 1 Device, Refills: 0    Associated Diagnoses: Type 2 diabetes mellitus with other specified complication, without long-term current use of insulin ([facility])      !! ONE TOUCH ULTRASOFT LANCETS MISC use as directed BEFORE BREAKFAST AND SUPPER  Qty: 100 each, Refills: 11    Comments: Dx code 250.00       !! - Potential duplicate medications found. Please discuss with provider. ALLERGIES     is allergic to lipitor [atorvastatin]; motrin [ibuprofen micronized]; and codeine. FAMILY HISTORY     indicated that her mother is . She indicated that her father is . She indicated that only one of her two sisters is alive. She indicated that two of her six brothers are alive. She indicated that her maternal grandmother is . She indicated that her maternal grandfather is . She indicated that her paternal grandmother is . She indicated that her paternal grandfather is . She indicated that the status of her child is unknown. She indicated that her other is alive. family history includes Breast Cancer (age of onset: 36) in her child; Cancer in her sister and sister; Heart Attack in her father; Heart Disease in her brother, brother, brother, brother, father, mother, and sister; Kidney Disease in her sister; Other in her brother; Ovarian Cancer (age of onset: 22) in an other family member.     SOCIAL HISTORY      reports that she quit smoking about 12 years ago. Her smoking use included cigarettes. She has a 38.00 pack-year smoking history. She has never used smokeless tobacco. She reports that she does not drink alcohol or use drugs. PHYSICAL EXAM     INITIAL VITALS:  height is 5' 6\" (1.676 m) and weight is 225 lb (102.1 kg). Her oral temperature is 98.1 °F (36.7 °C). Her blood pressure is 158/76 (abnormal) and her pulse is 72. Her respiration is 16 and oxygen saturation is 98%. CONSTITUTIONAL: [Awake, alert, non toxic, well developed, well nourished, no acute distress other- talking to nurses at bedside]  HEAD: [Normocephalic, atraumatic]  EYES: [Pupils equal, round & reactive to light, extraocular movements intact, no nystagmus, clear conjunctiva, non-icteric sclera]  ENT: [External ear canal clear without evidence of cerumen impaction or foreign body, TM's clear without erythema or bulging. Nares patent without drainage, septum appears midline. Moist mucus membranes, oropharynx clear without exudate, erythema, or mass. Uvula midline]  NECK: [Nontender and supple. No meningismus, no appreciated lymphadenopathy. Intact full range of motion. C-spine midline without vertebral tenderness. Trachea midline.]  CHEST: [Inspection normal, no lesions, equal rise. No crepitus or tenderness upon palpation.]  CARDIOVASCULAR: [Regular rate, rhythm, normal S1 and S2. 3/6 systolic murmur. No appreciated rubs, or gallops. No pulse deficits appreciated. Intact distal perfusion. JVD not appreciated.]  PULMONARY: [Respiratory distress absent. Respiratory effort normal. Breath sounds mildly diminished, but clear to auscultation without rhonchi, rales, or wheezing. No accessory muscle use. No stridor]  ABDOMEN: [Inspection normal, without surgical scars. Diffuse mid and upper tenderness. Soft, non-distended, with normoactive bowel sounds. No palpable masses, rebound, or guarding]  BACK: [Intact ROM. No midline vertebral tenderness, step off, or crepitus. No CVA tenderness.]  MUSCULOSKELETAL: [Extremities nontender to palpation. No gross deformity or evidence of external trauma. Intact range of motion. Sensation intact. No clubbing, cyanosis, or edema.]  SKIN: [Warm, dry. No jaundice, rash, urticaria, or petechiae]  NEUROLOGIC: [Alert and oriented x 3, GCS 15, normal mentation for age. Moves all four extremities. No gross sensory deficit. Cerebellar function grossly normal.]  PSYCHIATRIC: [Normal mood and affect, thought process is clear and linear]    DIFFERENTIAL DIAGNOSIS:   Differential Dx Lists - I consider the following to be a partial list of the possible etiologies for the patient's symptoms and based on my clinical findings as well and are part of my medical decision making:    Abdominal pain: appendicitis, AAA, gastroenteritis, enteritis, gastritis, hepatitis, pancreatitis, UTI, diverticulitis, SBO, bowel perforation, and others    DIAGNOSTIC RESULTS     EKG: All EKG's are interpreted by the Emergency Department Physician who either signs or Co-signsthis chart in the absence of a cardiologist.  Rich Patel. Rate: 80 bpm  OR interval: 188 ms  QRS duration: 78 ms  QTc: 479 ms  P-R-T axes: 54, 13, 66  Normal sinus rhythm  Possible left atrial enlargement  No STEMI  Compared to old EKG on March-    RADIOLOGY: non-plain film images(s) such as CT,Ultrasound and MRI are read by the radiologist.    CT ABDOMEN PELVIS W IV CONTRAST Additional Contrast? None   Final Result   1. Extensive diverticulosis coli. No evidence for diverticulitis. 2. Filter in the IVC. 3. Fibroemphysematous lungs. Small pericardial effusion. Minimal bibasilar atelectatic/fibrotic stranding. 4. Small focus of epiploic appendagitis proximal descending colon. This is a self limiting condition expected to resolve spontaneously. **This report has been created using voice recognition software. It may contain minor errors which are inherent in voice recognition technology. ** Sodium 120 (*)     All other components within normal limits   POCT GLUCOSE - Abnormal; Notable for the following components:    POC Glucose 123 (*)     All other components within normal limits   TROPONIN   LIPASE   PROTIME-INR   LACTIC ACID, PLASMA   ANION GAP   HEMOGLOBIN A1C   TROPONIN   TROPONIN   BASIC METABOLIC PANEL W/ REFLEX TO MG FOR LOW K   MAGNESIUM   CBC WITH AUTO DIFFERENTIAL   SODIUM       EMERGENCY DEPARTMENT COURSE:   Vitals:    Vitals:    04/03/19 1631 04/03/19 1700 04/03/19 1820 04/03/19 1853   BP: (!) 176/112 (!) 146/102 (!) 144/88 (!) 158/76   Pulse: 65 72     Resp: 16      Temp: 98.1 °F (36.7 °C)      TempSrc: Oral      SpO2: 98%      Weight:       Height:         The patient was seen and evaluated in the ED today following chest pain and shortness of breath. Within the department I observed the patient's vital signs to show hypertension and her EKG revealed normal sinus rhythm. On exam, I appreciated a 3/6 systolic murmur, mildly diminished lung sounds, and diffuse upper and mid abdominal tenderness. Radiologic studies within the department revealed no acute intrathoracic findings and extensive diverticulosis coli without diverticulitis. Laboratory results showed a negative troponin and sodium of 119. I consulted Dr. Kolby Nicole [facility]) who graciously agreed to admit the patient. Within the department, the patient was treated with Lopressor, hydralazine, Zofran, Pepcid, sodium chloride, and aspirin. I explained my proposed course of treatment to the patient, who was amenable to my decision, and I answered all questions that were asked. The patient was then admitted under Dr. Kolby Nicole [facility]). CRITICAL CARE:   None    CONSULTS:  Dr. Kolby Nicole (Hospitalist)    PROCEDURES:  None    FINAL IMPRESSION      1.  Hyponatremia          DISPOSITION/PLAN   Admit    PATIENT REFERRED TO:  ASHLEY Mehta CNP  [address]  [address] [address] [phone]            DISCHARGE MEDICATIONS:  Current Discharge Medication List          (Please note that portions ofthis note were completed with a voice recognition program.  Efforts were made to edit the dictations but occasionally words are mis-transcribed.)    Scribe:  Desi Jo 4/3/19 12:23 PM Scribing for and in the presence of Lincoln Roger MD.    Signed by: Ina Marino, 04/03/19 6:58 PM    Provider:  I personally performed the services described in the documentation, reviewed and edited the documentation which was dictated to the scribe in my presence, and it accurately records my words and actions.     Lincoln Roger MD 4/3/19 6:58 PM       Lincoln Roger MD  04/03/19 [id number]

## 2019-04-03 NOTE — CARE COORDINATION
ED Care Transition    4/3/2019    Patient Name: Savita Rojas   :   MRN: [id number]    EMILEE Score: 2  PCP: ASHLEY Barrow - CNP   Specialist: yes - Dr. Elisa Hogue, Dr. Dharmesh Thakkar, Linda Webb, Dr. Amaury Holloway, Dr. Saray Mckay, Dr. Kojo Wiggins, Dr. Ken Arana  Active ACC/CTC: yes [facility]            Utilization Review:  ED visits:  4  Admissions: 9 - 3/21/19-3/24/19 hyponatremia    Problem List:  Patient Active Problem List   Diagnosis    Anxiety    Depression    Type 2 diabetes mellitus with complication, with long-term current use of insulin ([facility]    MI (myocardial infarction) ([facility]    Dyslipidemia    CAD (coronary artery disease)    COPD without exacerbation ([facility]    GERD (gastroesophageal reflux disease)    Hypothyroid    History of tobacco abuse: Quit in     S/P PTCA: 3/2/2017: PTCA RCA ISR. 2014: LAD Resolute 3.0X26. 2014: Stenting RCA Johnnye Roof 0.6X05 and 3.5X18. 2004: Proximal & mid RCA Taxus 3.5X20 mm, and Taxus 3.0X32 mm.  Metabolic syndrome    S/P cardiac catheterization: 2014: 99% in-stent restenosis mid-RCA. LCx moderate LI's. LAD 85% mid-segment lesion.  POND (nonalcoholic steatohepatitis)    Hyperlipidemia    Essential hypertension    Obesity (BMI 30-39. 9)    Moderate dehydration    ASCVD (arteriosclerotic cardiovascular disease)    Chronic respiratory failure with hypoxia (HCC)    Other hyperlipidemia    Dysmetabolic syndrome    Bilateral iliac artery occlusion (HCC)    CHET (obstructive sleep apnea)    Aortic valve stenosis    Nocturnal hypoxemia    Hyponatremia    Acute on chronic diastolic congestive heart failure due to valvular disease (HCC)    Sympathotonic orthostatic hypotension    E-coli UTI    Anxiety and depression    Dizziness    Chest pain    Acute on chronic respiratory failure with hypoxia and hypercapnia (HCC)    Moderate persistent asthma without complication    Pulmonary nodule    Heart failure with preserved ejection fraction ([facility]    Osteoarthritis of multiple joints    Class 2 obesity due to excess calories with body mass index (BMI) of 38.0 to 38.9 in adult    Normocytic anemia    Subclavian vein stenosis    Post concussive syndrome    CRF (chronic renal failure), stage 4 (severe) ([facility]    Hypotension    Syncope and collapse    Metabolic encephalopathy    Right-sided epistaxis    Diastolic dysfunction without heart failure    Cervical spine instability    Nonrheumatic aortic valve stenosis    Acute diastolic heart failure (HCC)    Acute kidney injury ([facility]    Hypercalcemia    At risk for polypharmacy    Acute respiratory failure with hypoxia and hypercapnia (HCC)    Acute pulmonary edema (HCC)    Abnormal urinalysis    Irritable bowel syndrome    Anemia requiring transfusions    Stage 3 severe COPD by GOLD classification ([facility])    Bilateral carotid artery stenosis    Iron deficiency anemia due to dietary causes    B12 deficiency due to diet    Acute on chronic diastolic CHF (congestive heart failure), NYHA class 3 (HCC)    Anemia    Morbid obesity with BMI of 40.0-44.9, adult ([facility]    Hyponatremia with decreased serum osmolality     Summary:  Met with Arline, introdcued self/role. Presented to ED from PCP office for further evaluation of Chest pain/HTN. Patient had recent admission from 3/21/19-3/24/19 for hyponatremia. Had follow up with PCP, CHF clinic, and nephrology since discharge. Patient resides at home alone, current with [facility],  Vickie Day [phone] Ext [phone]. [facility] patient weekly [phone], Nursing daily Monday through Sunday - 1 hour per day, Nurse Aide Monday-Friday 2 hours per day. Receives meals 5 days a week, 2 meals/day. Transportation through [employer] or children. Uses walker, cane, wheelchair. Has home oxygen 3 L continuous and CPAP - [employer].     Plan is for admission, observation.  ACC informed of admission.         Follow up appointments:    Future Appointments   Date Time Provider Micheline Conteh   4/9/2019  8:40 AM Noemi Queen, APRN - [id number] [facility] [place]   4/11/2019  9:45 AM ASHLEY Crum - CNP SRPX CHF [facility] - [place]   4/17/2019 10:00 AM Gianna Fernandes PA-C Pulm Med P - [place]   4/22/2019  9:40 AM Cisco Recio PA-C Oncology [facility] [place]   5/20/2019  1:20 PM ASHLEY Wan CNP [facility] [place]   6/10/2019  8:20 AM STR CT IMAGING RM1  [facility] STR[facility] OUT EXP STR Radiolog   6/17/2019 10:30 AM ASHLEY Delaney - CNP Pul Med P - [place]   7/15/2019  1:00 PM Fátima Sarkar RD, LD SRPX Physic [facility] [place]   10/1/2019 10:40 AM ASHLEY James - CNP [facility] [place]       Review Due Health Maintenance:  Health Maintenance Due   Topic Date Due    Hepatitis B Vaccine (1 of 3 - Risk 3-dose series) 10/15/1964    Shingles Vaccine (1 of 2) 10/15/1994    Breast cancer screen  10/10/2018    Diabetic retinal exam  01/24/2019

## 2019-04-03 NOTE — PROGRESS NOTES
Pt presents to ED via EMS with c/o chestpain/SOB while at doctors appointment today. Patient rated pain 8/10. EKG complete. Call light within reach.

## 2019-04-03 NOTE — PROGRESS NOTES
tablet by mouth once daily 30 tablet 1     MG capsule take 3 capsules every evening 90 capsule 1    losartan (COZAAR) 25 MG tablet Take 2 tablets by mouth daily 90 tablet 2    Multiple Vitamin (MULTIVITAMIN) tablet Take 1 tablet by mouth daily 30 tablet 1    RA SALINE NASAL SPRAY 0.65 % nasal spray instill 1 spray into each nostril if needed for congestion 45 mL 3    cyanocobalamin ([employer] VITAMIN B12) 1000 MCG tablet Take 1 tablet by mouth daily 30 tablet 3    cycloSPORINE (RESTASIS) 0.05 % ophthalmic emulsion Place 1 drop into both eyes 2 times daily      ranolazine (RANEXA) 500 MG extended release tablet take 1 tablet by mouth twice a day 180 tablet 3    isosorbide dinitrate (ISORDIL) 40 MG tablet Take 1 tablet by mouth 3 times daily 90 tablet 3    ferrous sulfate 325 (65 Fe) MG tablet Take 1 tablet by mouth 2 times daily (Patient taking differently: Take 325 mg by mouth daily (with breakfast) ) 180 tablet 1    metoprolol (LOPRESSOR) 100 MG tablet Take 1 tablet by mouth 2 times daily 60 tablet 0    polyethylene glycol (GLYCOLAX) powder Take 17 g by mouth daily as needed (Constipation)      levothyroxine (SYNTHROID) 100 MCG tablet take 1 tablet by mouth once daily 90 tablet 1    Calcium Carbonate-Vitamin D (OYSTER SHELL CALCIUM/D) 500-200 MG-UNIT TABS take 1 tablet by mouth twice a day 60 tablet 5    aspirin (ASPIRIN LOW DOSE) 81 MG EC tablet take 1 tablet by mouth once daily (Patient taking differently: Take 81 mg by mouth daily take 1 tablet by mouth once daily) 90 tablet 1    rosuvastatin (CRESTOR) 20 MG tablet take 1 tablet by mouth once daily 90 tablet 0    albuterol sulfate HFA (VENTOLIN HFA) 108 (90 Base) MCG/ACT inhaler Inhale 2 puffs into the lungs every 6 hours as needed for Wheezing 1 Inhaler 3    clopidogrel (PLAVIX) 75 MG tablet take 1 tablet by mouth once daily 90 tablet 3    clotrimazole-betamethasone (LOTRISONE) 1-0.05 % cream Apply topically 3 times daily.  (Patient taking differently: Apply topically 2 times daily Apply topically 3 times daily.) 45 g 1    baclofen (LIORESAL) 10 MG tablet Take 10 mg by mouth 2 times daily      SYMBICORT 160-4.5 MCG/ACT AERO inhale 2 puffs by mouth twice a day 10.2 g 11    fluticasone (FLONASE) 50 MCG/ACT nasal spray 1 spray by Nasal route daily 1 Bottle 0    polyvinyl alcohol (LIQUIFILM TEARS) 1.4 % ophthalmic solution Place 1 drop into both eyes as needed 2-4 times daily      hydrOXYzine (ATARAX) 25 MG tablet Take 25 mg by mouth 3 times daily      Lancets MISC Check blood sugar q daily Dx E11.69 100 each 2    Blood Glucose Monitoring Suppl HARVINDER Check blood sugar q daily Dx E11.69 1 Device 0    nystatin (MYCOSTATIN) 459890 UNIT/GM cream Apply topically 2 times daily. (Patient taking differently: Apply topically Apply topically 2 times daily. UNDER BREAST) 60 g 2    OXYGEN Inhale 3 L into the lungs continuous       doxepin (SINEQUAN) 150 MG capsule Take 150 mg by mouth nightly      OXcarbazepine (TRILEPTAL) 150 MG tablet Take 150 mg by mouth every morning       ONE TOUCH ULTRASOFT LANCETS MISC use as directed BEFORE BREAKFAST AND SUPPER 100 each 11    pantoprazole (PROTONIX) 40 MG tablet Take 40 mg by mouth every morning (before breakfast)       risperiDONE (RISPERDAL) 1 MG tablet Take 1 mg by mouth every morning        No current facility-administered medications for this visit.       Facility-Administered Medications Ordered in Other Visits   Medication Dose Route Frequency Provider Last Rate Last Dose    iopamidol (ISOVUE-370) 76 % injection 80 mL  80 mL Intravenous ONCE PRN Dennis Joya MD              Past Medical History:   Diagnosis Date    Anemia     Anxiety     Aortic stenosis     Arthritis     general    AS (aortic stenosis): mild to moderate by echo 4/2017 9/6/2017    ASHD (arteriosclerotic heart disease)     Asthma 2016    INHALER USE DAILY AND AS NEEDED    Bipolar disorder ([facility]     Blood circulation, collateral  CAD (coronary artery disease) 1999    MI, 5 STENTS IN HEART NO SURE OF HEART DR [facility]    Cerebral artery occlusion with cerebral infarction ([facility] 2018    SILENT-DX ON CT SCAN NO DEFICITS    Chest pain     CHF (congestive heart failure) (HCC)     COPD (chronic obstructive pulmonary disease) ([facility] 2014    INHALER USE DAILY AND AS NEEDED    Depression     Disease of blood and blood forming organ     anemia per patient     DM (diabetes mellitus) ([facility]     NO MEDS DIET CONTROLED    Dyspnea     FH: CAD (coronary artery disease)     Full dentures     UPPER AND LOWER    GERD (gastroesophageal reflux disease)     Headache     Heart burn     History of blood transfusion 1973    POST OP -PLASMA    History of blood transfusion 02/2019    1 UNIT LOW HGB    History of tobacco abuse: Quit in 2007     HLD (hyperlipidemia)     ON RX    HTN (hypertension)     ON RX    Irritable bowel syndrome     States has not had problem with this for years    Liver disease     fatty liver    Metabolic syndrome 26/77/8901    MI (myocardial infarction) ([facility]     Nausea & vomiting     Obesity     CHET (obstructive sleep apnea) 2016    NO MACHINE YET    S/P cardiac catheterization: 11/6/2014: 99% in-stent restenosis mid-RCA. LCx moderate LI's. LAD 85% mid-segment lesion. 11/6/2014 11/6/2014: 99% in-stent restenosis mid-RCA. LCx moderate LI's. LAD 85% mid-segment lesion. Dr. Kwaku Tovar S/P PTCA:  5/11/2004: Proximal and mid RCA  Taxus 3.5 X 20 mm, and Taxus 3.0 X 32 mm. 10/28/2014    5/11/2004: Proximal and mid RCA  Taxus 3.5 X 20 mm, and Taxus 3.0 X 32 mm.  Dr. Sara Lewis Systolic murmur 02/96/8387    Thyroid disease     ON RX    Wears glasses       Past Surgical History:   Procedure Laterality Date    ABDOMEN SURGERY      BACK SURGERY  08/2016        BLADDER SUSPENSION      BREAST BIOPSY  10/10/2017    BREAST LUMPECTOMY      CARDIAC CATHETERIZATION  8-11-06    Mild nonobstructive CAD w/ diffuse 10-20% proximal and mid LAD stenosis and 10-20% proximal/ostial RCA stenosis. No obstructive lesions. RCA stents are patent w/o evidence of restenosis. Normal LV systolic function. EF 65%. Trace MR - catheter induced. Minimally elevated LVEDP - 13mmHg. Mild to moderate aortoiliac PVD w/o obstructive lesions.  CARDIAC CATHETERIZATION  5-11-04    Successful drug-eluting stent x 2 of proximal and mid RCA.  CARDIAC CATHETERIZATION  5-11-04    70-80% proximal RCA stenosis w/ 50-70% mid RCA stenosis. There is 50-60% lesion in mid PDA. Mild proximal and mid LAD disease. Mild circumflex disease. Normal LV size and systolic function. EF 60%.  CARDIOVASCULAR STRESS TEST  5-10-11    No evidence of stress induced ischemia. EF 75%.  CARDIOVASCULAR STRESS TEST  5-10-10    No evidence of stress induced ischemia, infarct or scar. EF 74%.     CAROTID ENDARTERECTOMY      CERVICAL FUSION N/A 11/15/2017    REMOVAL OF PLATE Q5-6, ACDF P4-9 W/ATLANTIS CORNERSTONE performed by Stewart Starks MD at [address] [place] [address], DIAGNOSTIC      EYE SURGERY      cataract 2017   [place] [id number] EAR SURGERY      NECK SURGERY  FEB 2016   [employer] OTHER SURGICAL HISTORY  02/13/2019    Arteriogram for diagnostics    PARTIAL HYSTERECTOMY      UPPER GASTROINTESTINAL ENDOSCOPY      UPPER GASTROINTESTINAL ENDOSCOPY       Family History   Problem Relation Age of Onset    Heart Disease Mother         CHF    Heart Disease Father         CAD    Heart Attack Father     Kidney Disease Sister         DIALYSIS    Cancer Sister         RECTUM    Heart Disease Sister     Heart Disease Brother     Heart Disease Brother         CAD    Heart Disease Brother     Heart Disease Brother     Breast Cancer Child 36    Cancer Sister         UTERINE    Ovarian Cancer Other 22    Other Brother         SUICIDE distress. Psychiatric: She has a normal mood and affect. Her behavior is normal. Judgment and thought content normal.   Nursing note and vitals reviewed. Assessment/Plan:           1. Encounter to discuss test results      2. Lung nodule  Pt to follow with Pulmonary for this. 3. Nausea and vomiting, intractability of vomiting not specified, unspecified vomiting type    - POCT Glucose    4. Other malaise     - POCT Glucose-125    5. Essential hypertension  Follow up with cardiology    6. Chest pain, unspecified type    - EKG 12 Lead-abnormal  Squad called, 4 baby ASA given, oxygen at 3 liters per nasal canula     Pt transferred to [facility] ER. Return if symptoms worsen or fail to improve. Reccommended tobaccocessation options including pharmacologic methods, counseled great than 3 minutesduring this visit:  Yes[]  No  []       Patient given educational materials -see patient instructions. Discussed use, benefit, and side effects of prescribedmedications. All patient questions answered. Pt voiced understanding. Reviewedhealth maintenance. Instructed to continue current medications, diet and exercise. Patient agreed with treatment plan. Follow up as directed.        Electronicallysigned by ASHLEY Graham CNP on 4/3/2019 at 11:41 AM

## 2019-04-03 NOTE — H&P
History & Physical        Patient:  Deny Nagel  YOB: 1945    MRN: [id number]     Acct: [de-identified]    PCP: ASHLEY Graham CNP    Date of Admission: 4/3/2019    Date of Service: Pt seen/examined on 4/3/2019 and Placed in Observation. Chief Complaint:    Chief Complaint   Patient presents with    Chest Pain    Shortness of Breath         History Of Present Illness:    75 y.o. female who presented to [address] [place] [address] with \"evaluation of chest pain and shortness of breath. The patient reports a history of CAD, MI, anemia for which she takes iron, a systolic murmur, cardiac catheterization with stent placement, COPD, CHF, hypertension, hyperlipidemia, diabetes, and a cholecystectomy. The patient reports that she developed nausea with emesis at 10:30 AM as well as abdominal pain. She reports that she then developed chest pain with some shortness of breath. She states that she has experiencing 4 total episodes of emesis, and that she went to her PCP today for the issue where she was advised to come into the ED. The patient rates her current pain at an 8/10 in severity and she describes the pain as a continuous sharp and shooting sensation. She reports that she takes Plavix, aspirin, and ferrous sulfate. She denies experiencing any urinary symptoms, melena, hematemesis, diarrhea, fever, or chills. The patient denies further complaints at initial encounter. \"-per er note and confirmed by myself    Addendum: had cp with \"funny breathing and sob\" no diaphoresis          Past Medical History:          Diagnosis Date    Anemia     Anxiety     Aortic stenosis     Arthritis     general    AS (aortic stenosis): mild to moderate by echo 4/2017 9/6/2017    ASHD (arteriosclerotic heart disease)     Asthma 2016    INHALER USE DAILY AND AS NEEDED    B[facility] disorder [facility]     Blood circulation, collateral     CAD (coronary artery disease) 1999    MI, 5 STENTS IN HEART NO SURE OF HEART [facility]    Cerebral artery occlusion with cerebral infarction ([facility] 2018    SILENT-DX ON CT SCAN NO DEFICITS    Chest pain     CHF (congestive heart failure) (HCC)     COPD (chronic obstructive pulmonary disease) ([facility] 2014    INHALER USE DAILY AND AS NEEDED    Depression     Disease of blood and blood forming organ     anemia per patient     DM (diabetes mellitus) ([facility]     NO MEDS DIET CONTROLED    Dyspnea     FH: CAD (coronary artery disease)     Full dentures     UPPER AND LOWER    GERD (gastroesophageal reflux disease)     Headache     Heart burn     History of blood transfusion 1973    POST OP -PLASMA    History of blood transfusion 02/2019    1 UNIT LOW HGB    History of tobacco abuse: Quit in 2007     HLD (hyperlipidemia)     ON RX    HTN (hypertension)     ON RX    Irritable bowel syndrome     States has not had problem with this for years    Liver disease     fatty liver    Metabolic syndrome 20/32/9604    [facility]     Nausea & vomiting     Obesity     CHET (obstructive sleep apnea) 2016    NO MACHINE YET    S/P cardiac catheterization: 11/6/2014: 99% in-stent restenosis mid-RCA. LCx moderate LI's. LAD 85% mid-segment lesion. 11/6/2014 11/6/2014: 99% in-stent restenosis mid-RCA. LCx moderate LI's. LAD 85% mid-segment lesion. Dr. Dang Greenfield S/P PTCA:  5/11/2004: Proximal and mid RCA  Taxus 3.5 X 20 mm, and Taxus 3.0 X 32 mm. 10/28/2014    5/11/2004: Proximal and mid RCA  Taxus 3.5 X 20 mm, and Taxus 3.0 X 32 mm.  Dr. Karol Nelson Systolic murmur 90/22/4281    Thyroid disease     ON RX    Wears glasses        Past Surgical History:          Procedure Laterality Date    ABDOMEN SURGERY      BACK SURGERY  08/2016        BLADDER SUSPENSION      BREAST BIOPSY  10/10/2017    BREAST LUMPECTOMY      CARDIAC CATHETERIZATION  8-11-06    Mild nonobstructive CAD w/ diffuse 10-20% proximal and mid LAD stenosis and 10-20% proximal/ostial RCA stenosis. No obstructive lesions. RCA stents are patent w/o evidence of restenosis. Normal LV systolic function. EF 65%. Trace MR - catheter induced. Minimally elevated LVEDP - 13mmHg. Mild to moderate aortoiliac PVD w/o obstructive lesions.  CARDIAC CATHETERIZATION  5-11-04    Successful drug-eluting stent x 2 of proximal and mid RCA.  CARDIAC CATHETERIZATION  5-11-04    70-80% proximal RCA stenosis w/ 50-70% mid RCA stenosis. There is 50-60% lesion in mid PDA. Mild proximal and mid LAD disease. Mild circumflex disease. Normal LV size and systolic function. EF 60%.  CARDIOVASCULAR STRESS TEST  5-10-11    No evidence of stress induced ischemia. EF 75%.  CARDIOVASCULAR STRESS TEST  5-10-10    No evidence of stress induced ischemia, infarct or scar. EF 74%.  CAROTID ENDARTERECTOMY      CERVICAL FUSION N/A 11/15/2017    REMOVAL OF PLATE F3-2, ACDF W8-3 W/ATLANTIS CORNERSTONE performed by Diamond Freid [facility] at [facility], DIAGNOSTIC      EYE SURGERY      cataract 2017   [place] [id number] EAR SURGERY      NECK SURGERY  FEB 2016   [facility] OTHER SURGICAL HISTORY  02/13/2019    Arteriogram for diagnostics    PARTIAL HYSTERECTOMY      UPPER GASTROINTESTINAL ENDOSCOPY      UPPER GASTROINTESTINAL ENDOSCOPY         Medications Prior to Admission:      Prior to Admission medications    Medication Sig Start Date End Date Taking? Authorizing Provider   HYDROcodone-acetaminophen (NORCO) 5-325 MG per tablet Take 1 tablet by mouth every 6 hours as needed for Pain.    Yes Historical Provider, MD   furosemide (LASIX) 40 MG tablet Take 1 tablet by mouth daily 3/26/19  Yes ASHLEY Prado - CNP   hydrALAZINE (APRESOLINE) 50 MG tablet Take 1 tablet by mouth 3 times daily 3/24/19  Yes [address], MD    MG capsule take 3 baclofen (LIORESAL) 10 MG tablet Take 10 mg by mouth 2 times daily   Yes Historical Provider, MD   fluticasone (FLONASE) 50 MCG/ACT nasal spray 1 spray by Nasal route daily 1/23/18  Yes ASHLEY Correa CNP   hydrOXYzine (ATARAX) 25 MG tablet Take 25 mg by mouth 3 times daily   Yes Historical Provider, MD   OXYGEN Inhale 3 L into the lungs continuous    Yes Historical Provider, MD   OXcarbazepine (TRILEPTAL) 150 MG tablet Take 150 mg by mouth every morning    Yes Historical Provider, MD   pantoprazole (PROTONIX) 40 MG tablet Take 40 mg by mouth every morning (before breakfast)    Yes Historical Provider, MD   risperiDONE (RISPERDAL) 1 MG tablet Take 1 mg by mouth every morning    Yes Historical Provider, MD   ondansetron (ZOFRAN) 4 MG tablet Take 1 tablet by mouth daily as needed for Nausea or Vomiting 4/3/19   Verner Few, APRN - CNP   Handicap Placard MISC by Does not apply route Expires 25 MARCH 2014 3/25/19   Bri Freeman,    magnesium oxide (MAG-OX) 400 MG tablet take 1 tablet by mouth once daily 3/19/19   ASHLEY Prado CNP   RA SALINE NASAL SPRAY 0.65 % nasal spray instill 1 spray into each nostril if needed for congestion 1/7/19   Verner Few, APRN - CNP   SYMBICORT 160-4.5 MCG/ACT AERO inhale 2 puffs by mouth twice a day 4/21/18   ASHLEY Jeffery CNP   Lancets MISC Check blood sugar q daily Dx E11.69 11/9/17   Honor Fast, DO   Blood Glucose Monitoring Suppl HARVINDER Check blood sugar q daily Dx E11.69 11/9/17   Honor Fast, DO   ONE TOUCH ULTRASOFT LANCETS MISC use as directed BEFORE BREAKFAST AND SUPPER 10/15/14   Verner Few, APRN - CNP       Allergies:  Lipitor [atorvastatin] and Motrin [ibuprofen micronized]    Social History:      The patient currently lives   TOBACCO:   reports that she quit smoking about 12 years ago. Her smoking use included cigarettes. She has a 38.00 pack-year smoking history.  She has never used smokeless tobacco.  ETOH:   reports that she does not drink alcohol. Family History:       Reviewed in detail and negative for DM, CAD, Cancer, CVA. Positive as follows:        Problem Relation Age of Onset    Heart Disease Mother         CHF    Heart Disease Father         CAD    Heart Attack Father     Kidney Disease Sister         DIALYSIS    Cancer Sister         RECTUM    Heart Disease Sister     Heart Disease Brother     Heart Disease Brother         CAD    Heart Disease Brother     Heart Disease Brother     Breast Cancer Child 36    Cancer Sister         UTERINE    Ovarian Cancer Other 22    Other Brother         SUICIDE       Diet:  No diet orders on file    REVIEW OF SYSTEMS:   Pertinent positives as noted in the HPI. All other systems reviewed and negative. PHYSICAL EXAM:    BP (!) 200/85   Pulse 84   Temp 97.8 °F (36.6 °C) (Oral)   Resp 19   Ht 5' 6\" (1.676 m)   Wt 225 lb (102.1 kg)   LMP 02/12/1973 (Approximate)   SpO2 94%   BMI 36.32 kg/m²     General appearance:  No apparent distress, appears stated age and cooperative. HEENT:  Normal cephalic, atraumatic without obvious deformity. Pupils equal, round, and reactive to light. Extra ocular muscles intact. Conjunctivae/corneas clear. Neck: Supple, with full range of motion. no jugular venous distention. Trachea midline. no carotid bruits  Respiratory:  Normal respiratory effort. Clear to auscultation, bilaterally without Rales/Wheezes/Rhonchi. Breath sounds equal bilaterally  Cardiovascular:  Regular rate and rhythm with normal S1/S2 without murmurs, rubs or gallops. PMI non displaced  Abdomen: Soft, non-tender, non-distended with normal bowel sounds. No guarding, rebound. Musculoskeletal:  No clubbing, cyanosis or edema bilaterally. Full range of motion without deformity. Skin: Skin color, texture, turgor normal.  No rashes or lesions, or suspicious lesions. Neurologic:  Neurovascularly intact without any focal sensory/motor deficits.  Cranial nerves: II-XII intact, grossly non-focal.  Psychiatric:  Alert and oriented, thought content appropriate, normal insight  Capillary Refill: Brisk,< 2 seconds   Peripheral Pulses: +2 palpable, equal bilaterally upper and lower extremities  Lymphatics: no lymphadenopathy      Labs:     Recent Labs     04/03/19  1216   WBC 6.7   HGB 9.9*   HCT 29.0*        Recent Labs     04/03/19  1216   *   K 3.6   CL 71*   CO2 37*   BUN 12   CREATININE 0.7   CALCIUM 10.7*     Recent Labs     04/03/19  1216   AST 22   ALT 19   BILITOT 0.4   ALKPHOS 62     Recent Labs     04/03/19  1216   INR 1.08     No results for input(s): CKTOTAL, TROPONINI in the last 72 hours. Urinalysis:      Lab Results   Component Value Date    NITRU NEGATIVE 04/03/2019    WBCUA 5-10 04/03/2019    BACTERIA NONE 04/03/2019    RBCUA 5-10 04/03/2019    BLOODU NEGATIVE 04/03/2019    SPECGRAV 1.012 02/03/2019    GLUCOSEU NEGATIVE 04/03/2019       Radiology:     CXR: I have reviewed the CXR with the following interpretation: lung nodule  EKG:  I have reviewed the EKG with the following interpretation:     Ct Chest Wo Contrast    Result Date: 3/26/2019  PROCEDURE: CT CHEST WO CONTRAST CLINICAL INFORMATION: Pulmonary nodule COMPARISON: 3/11/2019 TECHNIQUE:  Axial CT images were obtained through the chest without contrast. Coronal and sagittal reformatted images were rendered. All CT scans at this facility use dose modulation, iterative reconstruction, and/or weight-based dosing when appropriate to reduce radiation dose to as low as reasonably achievable. FINDINGS: There is a 7 x 8 mm pulmonary nodule within the left lower lobe peripherally on axial image 26. This is increased in size from prior lung screening CT dated 4/24/2017. This is compatible with a lung RADS category 4B lesion. The central airways appear patent. There is moderate bilateral centrilobular emphysema.  Mild dependent bibasilar ground glass opacities are present and may represent atelectasis or scarring. No pneumothorax is seen. There is mild bronchiectasis noted at the lung bases. No pneumothorax is seen. No other focal consolidation is demonstrated. No pleural effusions are seen. There is a 1.6 x 1.2 cm mediastinal lymph node is located anterior to the trachea on axial image 19. Previously this measured approximately 1.6 x 1.1 cm. This does not appear significantly changed from prior CT dated 4/24/2017. There is a small to moderate pericardial fluid present. Limited evaluation of the lung bases appears unremarkable. No acute osseous findings are demonstrated. 1. There is a 7 x 8 mm pulmonary nodule within the left lower lobe peripherally on axial image 26. This is increased in size from prior lung screening CT dated 4/24/2017. This is compatible with a lung RADS category 4B lesion given its interval increase in size. Consider further characterization with PET/CT. Otherwise, follow-up CT evaluation in 3 months is recommended to document stability. 2. Small to moderate pericardial fluid is present. **This report has been created using voice recognition software. It may contain minor errors which are inherent in voice recognition technology. ** Final report electronically signed by Dr. lApa Lacy on 3/26/2019 4:05 PM    Cta Chest W Wo Contrast    Result Date: 3/12/2019  PROCEDURE: CTA CHEST W WO CONTRAST, CTA ABDOMEN PELVIS W WO CONTRAST CLINICAL INFORMATION: Severe aortic stenosis . Pre-op TAVR. COMPARISON: CT chest 11/27/2018. CT abdomen and pelvis 11/27/2018. TECHNIQUE: Noncontrast 5 mm axial images were obtained through the chest, abdomen and pelvis. Intravenous Optiray contrast was given. ECG gated axial 0.6 mm axial images were attained of the entire heart. A CT of the entire chest, abdomen and pelvis was obtained at 0.6 mm increments. These are reconstructed into 3 x 3 axial images. Those are sent to PACS.  3-D reconstructions through the chest, abdomen and pelvis include sagittal and coronal MIP diameter of the right common iliac artery: 8 mm. Minimal diameter of the right external iliac artery: 8 mm. Minimal diameter of the right common femoral artery: 8 mm. Minimal diameter of the left common iliac artery: 7 mm. Minimal diameter of the left external iliac artery: 7 mm. Minimal diameter of the left common femoral artery: 6 mm. HEART: There is mild cardiomegaly. There are some calcifications of the mitral valve annulus. There is left ventricular hypertrophy. The left atrium and left atrial appendage are within appropriate limits. PERICARDIUM: There is a small pericardial effusion. CHEST: There is some mild scarring/atelectasis dependently in the lungs. There are emphysematous changes. There is a 7 mm pulmonary nodule in the superior segment of the left lower lobe, image 50 of series 4.. This has increased in size compared to 4/24/2017. At that time this measured 5 mm. There is a 3 mm left lower lobe pulmonary nodule on image 66 which is stable. The pulmonary artery is normal. No pulmonary emboli are noted. No mediastinal adenopathy is noted. ABDOMEN:  The liver is normal. The spleen is normal. The adrenals and pancreas are normal. There are surgical clips from a cholecystectomy. There is no hydronephrosis or stones of either kidney. No renal masses are noted. No abnormalities of the small bowel loops are noted. The IVC is grossly normal. There is an IVC filter. There is no adenopathy. PELVIS:  The urinary bladder is normal. There is no pelvic free fluid. There is diverticulosis of the sigmoid colon. The sigmoid colon is tortuous and redundant. The uterus and adnexa are grossly normal. BONES: No suspicious osseous lesions are present. 1. Preoperative TAVR measurements as listed above. 2. 8 mm pulmonary nodule in the superior segment of the left lower lobe. This is increased in size compared to 4/24/2017. A PET/CT is recommended for further evaluation.  3. Diverticulosis **This report has been created using voice recognition software. It may contain minor errors which are inherent in voice recognition technology. ** Final report electronically signed by Dr. Shawanda Dickerson on 3/12/2019 4:42 PM    Ct Abdomen Pelvis W Iv Contrast Additional Contrast? None    Result Date: 4/3/2019  CT ABDOMEN AND PELVIS WITH CONTRAST ENHANCEMENT: CLINICAL INFORMATION: ABDOMINAL PAIN, upper & L abd pain, vomiting sbo COMPARISON: 11/27/2018 TECHNIQUE: Multiple axial 5 mm images of the abdomen, pelvis, and lung bases were obtained following the administration of oral and intravenous contrast material (ISOVUE). Computer generated sagittal and coronal images of the abdomen and pelvis were also  reconstructed. ALL CT SCANS AT THIS FACILITY use dose modulation, iterative reconstruction, and/or weight-based dosing when appropriate to reduce radiation dose to as low as reasonably achievable. FINDINGS: Lung bases: Lung bases fibroemphysematous in appearance. Minimal atelectasis/fibrosis both posterior costophrenic angles. Tiny pericardial effusion, not appreciably changed from prior study. Small hiatus hernia. Liver: There is a poorly defined focus of diminished attenuation along the anterior/inferior aspect of liver right side, unchanged from prior study, likely representing a focus of volume averaging. Liver otherwise unremarkable. Prior cholecystectomy. 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 Kidneys otherwise unremarkable. No calculi. No hydronephrosis. Note that there is a filter in inferior vena cava. Densely calcified abdominal aorta. Bowel/Peritoneum: No abnormally dilated bowel loops are seen. There are multiple diverticula in the sigmoid and distal descending colon. No evidence for diverticulitis. Moderate mammography control the colon, consistent with constipation.  Note that there  is a small focus of soft tissue stranding Stable radiographic appearance of the chest. No evidence of an acute process. **This report has been created using voice recognition software. It may contain minor errors which are inherent in voice recognition technology. ** Final report electronically signed by Dr. Shar Grant MD on 3/21/2019 4:43 PM    Cta Abdomen Pelvis W Wo Contrast    Result Date: 3/12/2019  PROCEDURE: CTA CHEST W WO CONTRAST, CTA ABDOMEN PELVIS W WO CONTRAST CLINICAL INFORMATION: Severe aortic stenosis . Pre-op TAVR. COMPARISON: CT chest 11/27/2018. CT abdomen and pelvis 11/27/2018. TECHNIQUE: Noncontrast 5 mm axial images were obtained through the chest, abdomen and pelvis. Intravenous Optiray contrast was given. ECG gated axial 0.6 mm axial images were attained of the entire heart. A CT of the entire chest, abdomen and pelvis was obtained at 0.6 mm increments. These are reconstructed into 3 x 3 axial images. Those are sent to PACS. 3-D reconstructions through the chest, abdomen and pelvis include sagittal and coronal MIP images performed on the scanner. Centerline reconstructions  were obtained. Vascular and valve measurements are made on a dedicated 3-D workstation. Measurements were made in systole with the aortic valve open. All CT scans at this facility use dose modulation, iterative reconstruction, and/or weight-based dosing when appropriate to reduce radiation dose to as low as reasonably achievable. FINDINGS: MEASUREMENTS: AORTIC VALVE: Calcium score: 1291 Trileaflet valve. There is mild to moderate calcification of the aortic valve leaflets. Valvular calcifications do not extend into the LVOT. Dewane Newness AORTIC DIMENSIONS: Aortic annulus: 26 x 19 mm. Aortic annulus area: 3.3 cm2. Aortic annulus perimeter: 68 mm. Distance of right coronary ostia to the annulus: 28 mm. Distance of the left coronary ostia to the annulus: 17 mm. Maximum diameter of the aortic sinus: 34 mm. Maximum diameter of the sinotubular junction: 26 mm.  Maximum diameter of the mid ascending aorta: 32 mm. Aortic root orientation: 3 cusp view: [place] 14, CAU 15; Anterior view: SUMMERS 0, CAU 27 Coronary anatomy: The right coronary artery arises from the sinus of Valsalva on the right. The left main coronary artery arises from the sinus of Valsalva on the left. No anomalies are noted. There are coronary artery calcifications. Ascending aorta and arch: There is no calcified plaque of the ascending aorta. No soft plaque is noted. There is calcified atherosclerosis of the aortic arch. Descending thoracic aorta: There is no significant tortuosity of the descending thoracic aorta. There is no aneurysmal dilation. There is calcified atherosclerosis of the descending thoracic aorta. Abdominal aorta: There is no significant tortuosity of the abdominal aorta. There is no aneurysmal dilation. There is moderate calcified atherosclerosis of the abdominal aorta. Iliofemoral access route: There is no significant tortuosity. Minimal diameter of the abdominal aorta: 12 mm. Minimal diameter of the right common iliac artery: 8 mm. Minimal diameter of the right external iliac artery: 8 mm. Minimal diameter of the right common femoral artery: 8 mm. Minimal diameter of the left common iliac artery: 7 mm. Minimal diameter of the left external iliac artery: 7 mm. Minimal diameter of the left common femoral artery: 6 mm. HEART: There is mild cardiomegaly. There are some calcifications of the mitral valve annulus. There is left ventricular hypertrophy. The left atrium and left atrial appendage are within appropriate limits. PERICARDIUM: There is a small pericardial effusion. CHEST: There is some mild scarring/atelectasis dependently in the lungs. There are emphysematous changes. There is a 7 mm pulmonary nodule in the superior segment of the left lower lobe, image 50 of series 4.. This has increased in size compared to 4/24/2017. At that time this measured 5 mm.  There is a 3 mm left lower lobe pulmonary nodule on image 66 which is stable. The pulmonary artery is normal. No pulmonary emboli are noted. No mediastinal adenopathy is noted. ABDOMEN:  The liver is normal. The spleen is normal. The adrenals and pancreas are normal. There are surgical clips from a cholecystectomy. There is no hydronephrosis or stones of either kidney. No renal masses are noted. No abnormalities of the small bowel loops are noted. The IVC is grossly normal. There is an IVC filter. There is no adenopathy. PELVIS:  The urinary bladder is normal. There is no pelvic free fluid. There is diverticulosis of the sigmoid colon. The sigmoid colon is tortuous and redundant. The uterus and adnexa are grossly normal. BONES: No suspicious osseous lesions are present. 1. Preoperative TAVR measurements as listed above. 2. 8 mm pulmonary nodule in the superior segment of the left lower lobe. This is increased in size compared to 4/24/2017. A PET/CT is recommended for further evaluation. 3. Diverticulosis **This report has been created using voice recognition software. It may contain minor errors which are inherent in voice recognition technology. ** Final report electronically signed by Dr. Neymar Agosto on 3/12/2019 4:42 PM           DVT prophylaxis: [x] Lovenox                                 [] SCDs                                 [] SQ Heparin                                 [] Encourage ambulation           [] Already on Anticoagulation    Code Status: Prior      PT/OT Eval Status:     Disposition:    [x] Home       [] TCU       [] Rehab       [] Psych       [] SNF       [] [facility]       [] Other-    ASSESSMENT:    C/Camryn Brown 1106 Problems    Diagnosis Date Noted    Essential hypertension [I10] 01/29/2015     Priority: Medium    Obesity (BMI 30-39. 9) [E66.9] 01/29/2015     Priority: Medium    Type 2 diabetes mellitus with complication, with long-term current use of insulin (HCC) [E11.8, Z79.4]      Priority: Medium  Hypothyroid [E03.9] 01/15/2014     Priority: Low    Hyponatremia with decreased serum osmolality [E87.1] 03/21/2019    Chest pain [R07.9] 08/26/2018    Hyponatremia [E87.1]     Aortic valve stenosis [I35.0] 09/06/2017       PLAN:    1. Observation  2. ivf  3. Renal consult  4. Insulin sliding scale  5. Resume home meds  6. Psych meds causing hyponatremia? Thank you ASHLEY Mead CNP for the opportunity to be involved in this patient's care.     Electronically signed by Sanjuanita Toussaint DO on 4/3/2019 at 3:05 PM

## 2019-04-04 NOTE — PROGRESS NOTES
Renal Progress Note    Assessment and Plan:    1. Hyponatremia from drug induced SIADH improved  2. Hypercalcemia improved  3. Vitamin D deficiency  4. Hypokalemia  5. Normocytic anemia   6. Mental depression  7. Hypothyroidism  8. Metabolic alkalosis resolved  PLAN:   labs reviewed and results discussed with the patient  Check serum sodium every 6 hours  Potassium replacement therapy  Vitamin D3, 93515 international unit one tablet weekly  Check T4 level next blood draw  If T4 level is normal there will be no need to increase the dose of the levothyroxine despite her high TSH. Guideline recommends increasing the dose of the levothyroxine only when the TSH is 10 or greater. If however T4 level is low then we'll need to increase the dose of the levothyroxine  Monitor blood pressure for now  BMP in the morning  Will follow        Patient Active Problem List:     Anxiety     Depression     Type 2 diabetes mellitus with complication, with long-term current use of insulin (HCC)     MI (myocardial infarction) ([address]     Dyslipidemia     CAD (coronary artery disease)     COPD without exacerbation ([address]     GERD (gastroesophageal reflux disease)     Hypothyroid     History of tobacco abuse: Quit in 2009     S/P PTCA: 3/2/2017: PTCA RCA ISR. 12/11/2014: LAD Resolute 3.0X26. 11/6/2014: Stenting RCA Dillon Tee 9.6Y00 and 3.5X18. 5/11/2004: Proximal & mid RCA Taxus 3.5X20 mm, and Taxus 3.0X32 mm. Metabolic syndrome     S/P cardiac catheterization: 11/6/2014: 99% in-stent restenosis mid-RCA. LCx moderate LI's. LAD 85% mid-segment lesion. POND (nonalcoholic steatohepatitis)     Hyperlipidemia     Essential hypertension     Obesity (BMI 30-39. 9)     Moderate dehydration     ASCVD (arteriosclerotic cardiovascular disease)     Chronic respiratory failure with hypoxia (HCC)     Other hyperlipidemia     Dysmetabolic syndrome     Bilateral iliac artery occlusion (HCC)     CHET (obstructive sleep apnea)     Aortic valve stenosis     Nocturnal hypoxemia     Hyponatremia     Acute on chronic diastolic congestive heart failure due to valvular disease (HCC)     Sympathotonic orthostatic hypotension     E-coli UTI     Anxiety and depression     Dizziness     Chest pain     Acute on chronic respiratory failure with hypoxia and hypercapnia (HCC)     Moderate persistent asthma without complication     Pulmonary nodule     Heart failure with preserved ejection fraction (HCC)     Osteoarthritis of multiple joints     Class 2 obesity due to excess calories with body mass index (BMI) of 38.0 to 38.9 in adult     Normocytic anemia     Subclavian vein stenosis     Post concussive syndrome     CRF (chronic renal failure), stage 4 (severe) (HCC)     Hypotension     Syncope and collapse     Metabolic encephalopathy     Right-sided epistaxis     Diastolic dysfunction without heart failure     Cervical spine instability     Nonrheumatic aortic valve stenosis     Acute diastolic heart failure (HCC)     Acute kidney injury (HCC)     Hypercalcemia     At risk for polypharmacy     Acute respiratory failure with hypoxia and hypercapnia (HCC)     Acute pulmonary edema (HCC)     Abnormal urinalysis     Irritable bowel syndrome     Anemia requiring transfusions     Stage 3 severe COPD by GOLD classification ([address]     Bilateral carotid artery stenosis     Iron deficiency anemia due to dietary causes     B12 deficiency due to diet     Acute on chronic diastolic CHF (congestive heart failure), NYHA class 3 (HCC)     Anemia     Morbid obesity with BMI of 40.0-44.9, adult ([address]     Hyponatremia with decreased serum osmolality     Metabolic alkalosis      Subjective:   Admit Date: 4/3/2019    Interval History:   Seeing for hyponatremia. Awake and alert. No complaints. Updated by the staff. No new issues. Variable blood pressure. Urine output is not documented.     Medications:   Scheduled Meds:   aspirin  81 mg Oral Daily    clopidogrel  75 mg Oral Daily  baclofen  10 mg Oral BID    hydrALAZINE  50 mg Oral TID    hydrOXYzine  25 mg Oral TID    isosorbide dinitrate  40 mg Oral TID    levothyroxine  100 mcg Oral Daily    losartan  50 mg Oral Daily    metoprolol  100 mg Oral BID    pantoprazole  40 mg Oral QAM AC    OXcarbazepine  150 mg Oral QAM    ranolazine  500 mg Oral BID    risperiDONE  1 mg Oral QAM    rosuvastatin  20 mg Oral Daily    mometasone-formoterol  2 puff Inhalation BID    sodium chloride flush  10 mL Intravenous 2 times per day    enoxaparin  40 mg Subcutaneous Daily    insulin lispro  0-6 Units Subcutaneous TID WC    insulin lispro  0-3 Units Subcutaneous Nightly    sodium chloride  1 g Oral TID WC     Continuous Infusions:   dextrose         CBC:   Recent Labs     04/03/19  1216 04/04/19  0539   WBC 6.7 5.1   HGB 9.9* 9.1*    148     CMP:    Recent Labs     04/03/19  1057  04/03/19  1216 04/03/19  1617 04/03/19  1941 04/04/19  0539   NA  --    < > 119* 120* 116* 122*   K  --   --  3.6  --   --  3.1*   CL  --   --  71*  --   --  75*   CO2  --   --  37*  --   --  33   BUN  --   --  12  --   --  16   CREATININE  --   --  0.7  --   --  0.9   GLUCOSE 157  --  126*  --   --  103   CALCIUM  --   --  10.7*  --   --  10.3   LABGLOM  --   --  82*  --   --  61*    < > = values in this interval not displayed. Troponin: No results for input(s): TROPONINI in the last 72 hours. BNP: No results for input(s): BNP in the last 72 hours. INR:   Recent Labs     04/03/19  1216   INR 1.08     Lipids:   Recent Labs     04/03/19  1216   LIPASE 15.1     Liver:   Recent Labs     04/03/19  1216   AST 22   ALT 19   ALKPHOS 62   PROT 7.4   LABALBU 4.6   BILITOT 0.4     Iron:  No results for input(s): IRONS, FERRITIN in the last 72 hours.     Invalid input(s): LABIRONS    Objective:   Vitals: BP (!) 156/72   Pulse 74   Temp 98 °F (36.7 °C) (Oral)   Resp 16   Ht 5' 6\" (1.676 m)   Wt 225 lb (102.1 kg)   LMP 02/12/1973 (Approximate)   SpO2 95% BMI 36.32 kg/m²    Wt Readings from Last 3 Encounters:   04/03/19 225 lb (102.1 kg)   03/29/19 225 lb (102.1 kg)   03/26/19 221 lb 6.4 oz (100.4 kg)      24HR INTAKE/OUTPUT:      Intake/Output Summary (Last 24 hours) at 4/4/2019 0945  Last data filed at 4/3/2019 1945  Gross per 24 hour   Intake 705.94 ml   Output 0 ml   Net 705.94 ml       Constitutional:  Alert, awake, no apparent distress   Skin:normal   HEENT:Pupils are reactive . Throat is clear   Neck:supple with no thyromegaly  Cardiovascular:  S1, S2 without murmur or rubs  Respiratory: Clear to auscultation  Abdomen: +bs, soft, nontender  Ext: No LE edema  Musculoskeletal:Intact  Neuro:Alert and oriented with no deficit      Electronically signed by Loco Oneil MD on 4/4/2019 at 9:45 AM

## 2019-04-04 NOTE — PLAN OF CARE
Maintenance medication as at home with symbicort  Problem: RESPIRATORY  Goal: STG - patient can administer MDI's  Outcome: Ongoing

## 2019-04-04 NOTE — CARE COORDINATION
CASE MANAGEMENT OBSERVATION NOTE       4/4/19, 7:45 AM    Addy Aguero       Admitted from: ER 4/3/2019/ 1155   Location: [facility]/033-A Reason for admit: Chest pain [R07.9]   Admit order signed?: yes    Procedure: No    Pertinent Info/Orders/Treatment Plan:  Telemetry. Consult to Nephrology. Follow labs, TSH elevated. K+ 3.1 Chl . 75 Na+ 122. Hgb 9.1. BNP 1622. PCP: ASHLEY Gordon - CNP    Discharge Plan: Met with pt today as she is sitting up in chair following her breakfast. She is from home alone but states [facility] with nursing and health aides. She uses home O2, C-Pap, walker, cane and wheelchair. She ahs a PCP, she uses [employer] for transportation and no issues getting her medications. She has a good friend who lives next door. SW is consulted for continuity of services.

## 2019-04-04 NOTE — CARE COORDINATION
4/4/19  2:38 PM    Patient has been readmitted within 10 days. Patient went to f/u appointment? Yes. Hilton Davis  If yes, was it within 7 days? Yes  Patient was able to fill prescriptions? Yes  Patient is taking medications as prescribed? Yes  Cause for readmission? Chest pain.

## 2019-04-04 NOTE — PROGRESS NOTES
Hospitalist Progress Note    Patient:  Sarah Weiss      Unit/Bed:8B-33/033-A    YOB: 1945    MRN: [id number]       Acct: [de-identified]     PCP: ASHLEY Medina CNP    Date of Admission: 4/3/2019    Chief Complaint:  Chest pain; SOB    Hospital Course:  75 y.o. female with a history of CAD, MI, anemia for which she takes iron, a systolic murmur, cardiac catheterization with stent placement, COPD, CHF, hypertension, hyperlipidemia, diabetes, and a cholecystectomy, who presented to [address] [place] [address] for chest pain and shortness of breath. The patient developed nausea with emesis at 10:30 AM 4/3/2019, accompanied by chest pain and abdominal pain. She states that she has experiencing 4 total episodes of emesis, and was told by PCP to come into the ED. The patient rates her current pain at an 8/10 in severity and she describes the pain as a continuous sharp and shooting sensation. She reports that she takes Plavix, aspirin, and ferrous sulfate. She denies experiencing any urinary symptoms, melena, hematemesis, diarrhea, fever, or chills. The patient denies further complaints. CXR no acute findings. CT abdomen Extensive diverticulosis coli. No evidence for diverticulitis. Filter in the IVC. Fibroemphysematous lungs. Small pericardial effusion. Minimal bibasilar atelectatic/fibrotic stranding. Small focus of epiploic appendagitis proximal descending colon. Nephro consulted for hyponatremia. She was started on salt tabs    Subjective:   Patient states that chest pain and left sided abdominal pain are getting better.  Nausea is also getting better, did not vomit this morning after breakfast.      Medications:  Reviewed    Infusion Medications    dextrose       Scheduled Medications    vitamin D  50,000 Units Oral Weekly    aspirin  81 mg Oral Daily    clopidogrel  75 mg Oral Daily    baclofen  10 mg Oral BID    hydrALAZINE  50 mg Oral TID    hydrOXYzine  25 mg Oral TID   Kyle Jameson effusions are seen. **This report has been created using voice recognition software. It may contain minor errors which are inherent in voice recognition technology. **      Final report electronically signed by Dr. Pamela Goncalves on 4/3/2019 12:36 PM          Diet: DIET CARB CONTROL;    DVT prophylaxis: [] Lovenox                                 [] SCDs                                 [] SQ Heparin                                 [] Encourage ambulation           [] Already on Anticoagulation     Disposition:    [x] Home       [] TCU       [] Rehab       [] Psych       [] SNF       [] [place]       [] Other-    Code Status: Full Code    PT/OT Eval Status:   Assessment/Plan:    Anticipated Discharge in : 2 days    Active Hospital Problems    Diagnosis Date Noted    Essential hypertension [I10] 01/29/2015     Priority: Medium    Obesity (BMI 30-39. 9) [E66.9] 01/29/2015     Priority: Medium    Type 2 diabetes mellitus with complication, with long-term current use of insulin (HCC) [E11.8, Z79.4]      Priority: Medium    Hypothyroid [E03.9] 01/15/2014     Priority: Low    Metabolic alkalosis [O82.8]     Hyponatremia with decreased serum osmolality [E87.1] 03/21/2019    Chest pain [R07.9] 08/26/2018    Hyponatremia [E87.1]     Aortic valve stenosis [I35.0] 09/06/2017       Epiploic appendagitis  Conservative management  IVF, adequate pain control  Serial abdominal exam  Antiemetics prn    Hyponatremia, SIADH  119 on admission, improved 124  Check sodium q6  Nephro consulted    Hypokalemia  Potassium replacement protocol  BMP in AM    Hypochloremia  Due to emesis  hydration with NS    Hypothyroidism  Request for TSH, T4  continye synthroid    Chronic respiratory failure  baseline of 3lpm  Saturating well    Type 2 DM  HgbA1C 5.9  Sliding scale    Essential HTN/ HLD/ CAD s/p PTCA  Aspirin, plavix, losartan, metoprolol, hydralazine  Continue ranexa and crestor  Trops negative  Monitor BP    Chronic Diastolic HF  Not in acute exacerbation  Daily weight  I&O    Obesity  Diet and lifestyle changes    Hx of aortic valve stenosis   AVA0.8 sq cm, mean gradient across mitral valve of 8mm Hg  TAVR workup being done by Dr. Savage Nieto cath done 3/29 elevated right-sided pressures, Nonobstructive coronary artery disease. Patent stents in the RCA  and LAD with mild in-stent restenosis.             Electronically signed by Holden Johnson MD on 4/4/2019 at 1:58 PM

## 2019-04-04 NOTE — PLAN OF CARE
Problem: Falls - Risk of:  Goal: Will remain free from falls  Description  Will remain free from falls  Outcome: Ongoing  Note:   Call light within reach, bed in lowest position, non skid footwear on, room door open, bed alarm on . Pt using call light appropriately. Problem: Risk for Impaired Skin Integrity  Goal: Tissue integrity - skin and mucous membranes  Description  Structural intactness and normal physiological function of skin and  mucous membranes. Outcome: Ongoing  Note:   Patient being turned every 2 hours and prn. Pt aware importance of turning to prevent skin breakdown. Heels elevated off bed. Problem: Pain:  Goal: Pain level will decrease  Description  Pain level will decrease  Outcome: Ongoing  Note:   Patient has chronic back rates pain 7/10 with goal of 5/10. Patient has prn pain medications she is receiving to help alleviate pain. Problem: Fluid Volume:  Goal: Ability to achieve a balanced intake and output will improve  Description  Ability to achieve a balanced intake and output will improve  Outcome: Ongoing  Note:   Monitoring I&O. Serial sodium levels. Patient incontinent - ext. Cath. Problem: Neurological  Goal: Maximum potential motor/sensory/cognitive function  Outcome: Ongoing  Note:   A&Ox4. Following commands. Problem: Cardiovascular  Goal: Hemodynamic stability  Outcome: Ongoing  Note:   Denies chest pain. Vitals stable. Care plan reviewed with patient. Patient verbalize understanding of the plan of care and contribute to goal setting.

## 2019-04-05 NOTE — FLOWSHEET NOTE
Pt is a 75year old female on 8B. She was sitting up in her chair. Pt stated her  from the [facility] knows that she is here and was with her earlier. Pt welcomed prayer. She stated she lives alone except for her dog and she is hoping to go home soon. Follow up as needed.       04/05/19 1420   Encounter Summary   Services provided to: Patient   Referral/Consult From: Rounding   [facility] of [facility] Completed   Continue Visiting Yes  (4/5)   Complexity of Encounter Moderate   Length of Encounter 15 minutes   Spiritual/Mormon   Type Spiritual support   Assessment Approachable   Intervention Prayer;Nurtured hope   Outcome Expressed gratitude;Encouraged

## 2019-04-05 NOTE — PROGRESS NOTES
Hospitalist Progress Note    Patient:  Fanny Betancourt      Unit/Bed:8B-33/033-A    YOB: 1945    MRN: [id number]       Acct: [de-identified]     PCP: ASHLEY Dodd CNP    Date of Admission: 4/3/2019    Assessment/Plan:    1. Chest Pain with a history of Aortic Valve Stenosis and CAD s/p PCI    - Cardiology consulted due to chest pain and dizziness with known TAVR work up. - Pt had cardiac cath on 3/29 which showed nonobstructive CAD and elevated right sided pressures. - Pt made the comment that she thinks she is having this TAVR done soon or while she is here? I explained to her that I do not think that is the case but she assured me that Cardiology could answer her question.    - Continue home medication: ASA, Plavix    - EKG, troponin pending     2. Abdominal Pain with nausea & vomiting    - Pt has not had an episode of vomiting while being admitted. - IVF, Zofran PRN     3. HFpEF, chronic     - No signs fluid overload. Pt follows with [facility].    - Daily Weights, I&Os    - Continue home medication regimen     4. Hyponatremia, SIADH   - Sodium on arrival, 119. Last sodium check: 121    - Nephrology consulted     5. Hypokalemia, resolved    - Potassium on 4/4 was 3.1 and today (4/5) 3.8   - Continue to monitor      6. Hypochloremia, improving    - Chloride on admission 71. Today (4/5) 79   - Continue to monitor and IVF      7. Hypothyroidism    - TSH 7.120   - Continue home medication: Synthroid     - Encourage patient to see PCP to help manage Synthroid since TSH is high. 8. Chronic Respiratory Failure    - Pt's baseline is 3L NC. Pt is currently at baseline. 9. Type 2 DM    - HgbA1c: 5.9. Average Glucose: 117   - Low Dose SSI     10. Essential HTN    - Continue home medication: Losartan, Metoprolol, Hydralazine     11. HLD    - Continue home medication: Ranexa and Crestor     Expected discharge date:   Today or Tomorrow     Disposition:    [x] Home [] TCU       [] Rehab       [] Psych       [] SNF       [] [facility]       [] Other-    Chief Complaint: Chest Pain     Hospital Course: 75 y. o. female with a history of CAD, MI, anemia for which she takes iron, a systolic murmur, cardiac catheterization with stent placement, COPD, CHF, hypertension, hyperlipidemia, diabetes, and a cholecystectomy, who presented to [place] for chest pain and shortness of breath. The patient developed nausea with emesis at 10:30 AM 4/3/2019, accompanied by chest pain and abdominal pain. She states that she has experiencing 4 total episodes of emesis, and was told by PCP to come into the ED. The patient rates her current pain at an 8/10 in severity and she describes the pain as a continuous sharp and shooting sensation. She reports that she takes Plavix, aspirin, and ferrous sulfate. She denies experiencing any urinary symptoms, melena, hematemesis, diarrhea, fever, or chills. The patient denies further complaints. CXR no acute findings. CT abdomen Extensive diverticulosis coli. No evidence for diverticulitis. Filter in the IVC. Fibroemphysematous lungs. Small pericardial effusion. Minimal bibasilar atelectatic/fibrotic stranding. Small focus of epiploic appendagitis proximal descending colon. Nephro consulted for hyponatremia. She was started on salt tabs. On 4/5, Pt stated that she was still having chest pressure. Troponin and EKG ordered. Cardiology consulted for TAVR work up. Pt believed that she was having surgery soon? Subjective (past 24 hours): Pt states that she has chest pain and it feels like she has an elephant sitting on her chest. Pt denies SOB. Pt also states that she has aortic stenosis and is being worked up for a TAVR. Cardiology consulted.        Medications:  Reviewed    Infusion Medications    dextrose       Scheduled Medications    sodium chloride  2 g Oral TID WC    [START ON 4/6/2019] levothyroxine  125 mcg Oral Daily    lesions. Neurologic:  Neurovascularly intact without any focal sensory/motor deficits. Cranial nerves: II-XII intact, grossly non-focal.  Psychiatric: Alert and oriented, thought content appropriate, normal insight  Capillary Refill: Brisk,< 3 seconds   Peripheral Pulses: +2 palpable, equal bilaterally       Labs:   Recent Labs     04/03/19  1216 04/04/19  0539 04/05/19  0549   WBC 6.7 5.1 4.0*   HGB 9.9* 9.1* 8.7*   HCT 29.0* 27.5* 26.6*    148 119*     Recent Labs     04/03/19  1216  04/04/19  0539  04/04/19  1923 04/04/19  2338 04/05/19  0549   *   < > 122*   < > 124* 117* 121*   K 3.6  --  3.1*  --   --   --  3.8   CL 71*  --  75*  --   --   --  79*   CO2 37*  --  33  --   --   --  32   BUN 12  --  16  --   --   --  18   CREATININE 0.7  --  0.9  --   --   --  1.0   CALCIUM 10.7*  --  10.3  --   --   --  9.6    < > = values in this interval not displayed. Recent Labs     04/03/19  1216   AST 22   ALT 19   BILITOT 0.4   ALKPHOS 62     Recent Labs     04/03/19  1216   INR 1.08     No results for input(s): Butler Marshall in the last 72 hours. Microbiology:      Urinalysis:      Lab Results   Component Value Date    NITRU NEGATIVE 04/03/2019    WBCUA 5-10 04/03/2019    BACTERIA NONE 04/03/2019    RBCUA 5-10 04/03/2019    BLOODU NEGATIVE 04/03/2019    SPECGRAV 1.012 02/03/2019    GLUCOSEU NEGATIVE 04/03/2019       Radiology:  CT ABDOMEN PELVIS W IV CONTRAST Additional Contrast? None   Final Result   1. Extensive diverticulosis coli. No evidence for diverticulitis. 2. Filter in the IVC. 3. Fibroemphysematous lungs. Small pericardial effusion. Minimal bibasilar atelectatic/fibrotic stranding. 4. Small focus of epiploic appendagitis proximal descending colon. This is a self limiting condition expected to resolve spontaneously. **This report has been created using voice recognition software. It may contain minor errors which are inherent in voice recognition technology. ** Final report electronically signed by Dr. Cl Tejeda on 4/3/2019 2:15 PM      XR CHEST PORTABLE   Final Result   1. Mild cardiomegaly. Prior anterior cervical fusion. 2. Otherwise normal chest. No acute findings. No infiltrates or effusions are seen. **This report has been created using voice recognition software. It may contain minor errors which are inherent in voice recognition technology. **      Final report electronically signed by Dr. Cl Tejeda on 4/3/2019 12:36 PM          DVT prophylaxis: [x] Lovenox                                 [] SCDs                                 [] SQ Heparin                                 [] Encourage ambulation           [] Already on Anticoagulation     Code Status: Full Code    PT/OT Eval Status: Consulted     Tele:   [x] yes             [] no    Active Hospital Problems    Diagnosis Date Noted    Essential hypertension [I10] 01/29/2015     Priority: Medium    Obesity (BMI 30-39. 9) [E66.9] 01/29/2015     Priority: Medium    Type 2 diabetes mellitus with complication, with long-term current use of insulin (HCC) [E11.8, Z79.4]      Priority: Medium    Hypothyroid [E03.9] 01/15/2014     Priority: Low    Metabolic alkalosis [D51.8]     Hyponatremia with decreased serum osmolality [E87.1] 03/21/2019    Chest pain [R07.9] 08/26/2018    Hyponatremia [E87.1]     Aortic valve stenosis [I35.0] 09/06/2017       Electronically signed by STEPHANIE Alexander on 4/5/2019 at 8:44 AM

## 2019-04-05 NOTE — PLAN OF CARE
within normal limits for patient. Vitals:    04/04/19 2034   BP:    Pulse:    Resp: 16   Temp:    SpO2: 95%         Problem: Discharge Planning  Goal: Knowledge of discharge instructions  Description  Knowledge of discharge instructions     Outcome: Ongoing  Note:   Patient is in 8b33 from home. Plans to go home with home health care when ready, will continue to monitor. Care plan reviewed with patient. Patient verbalizes understanding of the plan of care and contribute to goal setting.

## 2019-04-05 NOTE — PROGRESS NOTES
Nephrology Progress Note    Patient - Libertad Garner   MRN -  [id number]   Acct # - [de-identified]      - 1945    75 y.o. Admit Date: 4/3/2019  Hospital Day: 1  Location: [address] [place] [address]  Date of evaluation -  2019    Subjective:   CC: nausea, chest pain  Denies sob. Room air  Nausea improved  BP Range: Systolic (82VIE), HNV:506 , Min:145 , MRN:[id number]      Diastolic (19GLW), CZZ:44, Min:59, Max:76    Objective:   VITALS:  BP (!) 155/68   Pulse 72   Temp 97.1 °F (36.2 °C) (Oral)   Resp 16   Ht 5' 6\" (1.676 m)   Wt 218 lb 12.8 oz (99.2 kg)   LMP 1973 (Approximate)   SpO2 95%   BMI 35.32 kg/m²    Patient Vitals for the past 24 hrs:   BP Temp Temp src Pulse Resp SpO2 Weight   19 0852 -- -- -- -- 16 95 % --   19 0829 (!) 155/68 97.1 °F (36.2 °C) Oral 72 16 97 % --   19 0316 (!) 147/64 97.9 °F (36.6 °C) Oral 68 16 96 % 218 lb 12.8 oz (99.2 kg)   19 -- -- -- -- 16 95 % --   19 (!) 175/70 98.5 °F (36.9 °C) Oral 81 16 95 % --   19 1624 (!) 145/59 97.5 °F (36.4 °C) Oral 80 16 97 % --   19 1153 (!) 156/76 98.1 °F (36.7 °C) Oral 66 16 95 % --     3 L/min    Intake/Output Summary (Last 24 hours) at 2019 1023  Last data filed at 2019 0841  Gross per 24 hour   Intake 1390 ml   Output 3850 ml   Net -2460 ml       Admission weight: 225 lb (102.1 kg)  Patient Vitals for the past 96 hrs (Last 3 readings):   Weight   19 0316 218 lb 12.8 oz (99.2 kg)   19 1203 225 lb (102.1 kg)     Wt Readings from Last 3 Encounters:   19 218 lb 12.8 oz (99.2 kg)   19 225 lb (102.1 kg)   19 221 lb 6.4 oz (100.4 kg)     Body mass index is 35.32 kg/m². EXAM:  CONSTITUTIONAL:  No acute distress. Lying comfortable in bed. Pleasant  HEENT:  Head is normocephalic, Extraocular movement intact. Neck is supple. Voice is clear. CARDIOVASCULAR:  S1, S2  regular rate and rhythm. RESPIRATORY: Clear to ausculation bilaterally. Equal breath sounds.  No wheezes. No shortness of breath noted at rest.  ABDOMEN: soft, non tender  NEUROLOGICAL: Patient is alert and oriented to person, place, and time. Recent and remote memory partially intact. Thought is coherant. SKIN: no rash, No significant bruises on exposed surfaces  MUSCULOSKELETAL: Movement is coordinated. Moves all extremities   EXTREMITIES: Distal lower extremity temp is warm, No lower extremity edema. PSYCHIATRIC: mood and affect appropriate. Medications:   Med reviewed  Scheduled Meds:   sodium chloride  2 g Oral TID WC    [START ON 4/6/2019] levothyroxine  125 mcg Oral Daily    vitamin D  50,000 Units Oral Weekly    aspirin  81 mg Oral Daily    clopidogrel  75 mg Oral Daily    baclofen  10 mg Oral BID    hydrALAZINE  50 mg Oral TID    hydrOXYzine  25 mg Oral TID    isosorbide dinitrate  40 mg Oral TID    losartan  50 mg Oral Daily    metoprolol  100 mg Oral BID    pantoprazole  40 mg Oral QAM AC    OXcarbazepine  150 mg Oral QAM    ranolazine  500 mg Oral BID    risperiDONE  1 mg Oral QAM    rosuvastatin  20 mg Oral Daily    mometasone-formoterol  2 puff Inhalation BID    sodium chloride flush  10 mL Intravenous 2 times per day    enoxaparin  40 mg Subcutaneous Daily    insulin lispro  0-6 Units Subcutaneous TID WC    insulin lispro  0-3 Units Subcutaneous Nightly     Labs:   Labs reviewed  Recent Labs     04/03/19  1216  04/04/19  0539  04/04/19  1923 04/04/19  2338 04/05/19  0549   *   < > 122*   < > 124* 117* 121*   K 3.6  --  3.1*  --   --   --  3.8   CL 71*  --  75*  --   --   --  79*   CO2 37*  --  33  --   --   --  32   BUN 12  --  16  --   --   --  18   CREATININE 0.7  --  0.9  --   --   --  1.0   LABGLOM 82*  --  61*  --   --   --  54*   GLUCOSE 126*  --  103  --   --   --  148*   MG  --   --  1.6  --   --   --  1.7   CALCIUM 10.7*  --  10.3  --   --   --  9.6    < > = values in this interval not displayed.      Results for Rin Villalta (MRN [id number]) as of 4/5/2019 10:30   Ref. Range 4/4/2019 05:39   Pth Intact Latest Ref Range: 15.0 - 65.0 pg/mL 17.4     Results for Marcelina Dumont (MRN [id number]) as of 4/5/2019 10:30   Ref. Range 4/4/2019 05:39   Vit D, 25-Hydroxy Latest Ref Range: 30 - 100 ng/ml 20 (L)   Results for Marcelina Dumont (MRN [id number]) as of 4/5/2019 10:30   Ref. Range 4/4/2019 05:39   TSH Latest Ref Range: 0.400 - 4.20 uIU/mL 7.120 (H)   T4 Free Latest Ref Range: 0.93 - 1.76 ng/dL 0.93       Summary 11/5/18    Ejection fraction was estimated at 60-65%.   Moderate biatrial dilation.   The aortic valve was trileaflet, thickened, calcified, and restricted in   mobility. Transaortic velocity was 3.9 m/s, mean gradient of 33 mmHg, and   estimated BANDAR 0.8 cm2, consistent with severe aortic stenosis.   There was no evidence of aortic regurgitation.   The mitral valve structure demonstrated predominantly posterior leaflet   calcification.   The transmitral velocity was mildly elevated at 2.0 m/s, with a mean   gradient of 8 mmHg, and estimated MVA 2.7 cm2. There was trace mitral   regurgitation.   Ascending aorta = 3.4 cm.   IVC size is dilated with reduced respiratory phasic changes (CVP~5-10   mmHg).       ASSESSMENT:  1. Acute on chronic hyponatremia. 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 Compounded by nausea. Rate of correction ok. Start salt tabs 2 gms 3x/d. Add 1500 fluid restriction. Continue Na q6h. BMP in AM  2. Nausea, vomiting, resolved  3. Aortic stenosis, planning TAVR  4. Ess HTN at control  5. Hypercalcemia with metabolic alkalosis likely 2nd to diuretic. Resolved. PTH ok   6. HypoVit D, Start Vit D 50,000 weekly  7. Hypothyroidism, TSH just above goal. Increase synthroid  8. Hx depression    BMP in AM  Active Problems:    Type 2 diabetes mellitus with complication, with long-term current use of insulin (HCC)    Essential hypertension    Obesity (BMI 30-39. 9)    Hypothyroid    Aortic valve stenosis    Hyponatremia    Chest pain    Hyponatremia with decreased serum osmolality    Metabolic alkalosis  Resolved Problems:    * No resolved hospital problems.  *     Case Discussed with Dr. Olesya Sosa, APRN - CNP 10:23 AM 4/5/2019

## 2019-04-05 NOTE — PLAN OF CARE
Care plan reviewed with patient and [facility] personnel. Patient verbalizes understanding of the plan of care and contribute to goal setting.

## 2019-04-05 NOTE — CARE COORDINATION
DISCHARGE BARRIERS  4/5/19, 1:50 PM    Reason for Referral: \"Current with HH\"  Mental Status: Patient is alert and oriented  Decision Making: Patient makes own decisions  Family/Social/Home Environment:  Spoke with patient, assessment completed. Patient lives alone in an apartment. Patient is current with [facility] and [facility]. Patient has nursing 7 days per week and Aides 5 days per week. Aides assist with ADL's, cleaning and laundry. Patient receives home delivered meals. Current Services: [facility] - see above  Current Equipment: wheelchair  Payment Source: Medicare and Medicaid  Concerns or Barriers to Discharge: Patient will need [facility] ambulate transport at discharge. Collabrative List of ECF/HH were provided: no, current with [facility]    Teach Back Method used with patient regarding care plan and discharge planning. Patient  verbalize understanding of the plan of care and contribute to goal setting. Anticipated Needs/Discharge Plan: Patient plans home alone and resume [facility] nursing and aide services. Spoke with Marcell Vazquez at [facility], confirmed services. [facility] provided. Return call gave update.     Electronically signed by PAKO Guillen on 4/5/2019 at 1:50 PM

## 2019-04-05 NOTE — CONSULTS
Inpatient consult to Cardiology  Consult performed by: Felicia Arechiga MD  Consult ordered by: STEPHANIE Guidry          Chest pain  N/V  HTN  Hyponatremia  CHF  chronic    Plan  Resume low dose lasix 20 instead of 40  Free water restriction  Cont salt tab  [id number]    Felicia Arechiga MD    SUMMARY:  1. Elevated right-sided pressures. 2.  Nonobstructive coronary artery disease. Patent stents in the RCA  and LAD with mild in-stent restenosis.     RECOMMENDATIONS:  1. Aggressive risk factor modification. 2.  Lipid-lowering therapy. 3.  Continue with TAVR workup.   4.  Followup in clinic in two to four weeks to evaluate symptoms  further.           Breanna Alberto MD     D: 04/02/2019 13:38:49

## 2019-04-05 NOTE — CARE COORDINATION
[address] Transitions Interview     2019    Patient: Ledy Ramesh Patient : 1945   MRN: [id number]  Reason for Admission: Chest pain [R07.9]  Hyponatremia [E87.1]  RARS: Readmission Risk Score: 67    Met with: Majo Rangel for inpatient Care Transition interview. Identified self/role. Explained CTC process, verbalized understanding and agreeable to one time CTC call before being transferred back to established [facility]. Readmission Risk  Patient Active Problem List   Diagnosis    Anxiety    Depression    Type 2 diabetes mellitus with complication, with long-term current use of insulin ([address]    MI [facility]ocardial infarction) [facility]    Dyslipidemia    CAD (coronary artery disease)    COPD without exacerbation ([facility]    GERD (gastroesophageal reflux disease)    Hypothyroid    History of tobacco abuse: Quit in     S/P PTCA: 3/2/2017: PTCA RCA ISR. 2014: LAD Resolute 3.0X26. 2014: Stenting RCA Brenda Aw 3.0E39 and 3.5X18. 2004: Proximal & mid RCA Taxus 3.5X20 mm, and Taxus 3.0X32 mm.  Metabolic syndrome    S/P cardiac catheterization: 2014: 99% in-stent restenosis mid-RCA. LCx moderate LI's. LAD 85% mid-segment lesion.  POND (nonalcoholic steatohepatitis)    Hyperlipidemia    Essential hypertension    Obesity (BMI 30-39. 9)    Moderate dehydration    ASCVD (arteriosclerotic cardiovascular disease)    Chronic respiratory failure with hypoxia (HCC)    Other hyperlipidemia    Dysmetabolic syndrome    Bilateral iliac artery occlusion (HCC)    CHET (obstructive sleep apnea)    Aortic valve stenosis    Nocturnal hypoxemia    Hyponatremia    Acute on chronic diastolic congestive heart failure due to valvular disease (HCC)    Sympathotonic orthostatic hypotension    E-coli UTI    Anxiety and depression    Dizziness    Chest pain    Acute on chronic respiratory failure with hypoxia and hypercapnia (HCC)    Moderate persistent asthma without complication  Pulmonary nodule    Heart failure with preserved ejection fraction (HCC)    Osteoarthritis of multiple joints    Class 2 obesity due to excess calories with body mass index (BMI) of 38.0 to 38.9 in adult    Normocytic anemia    Subclavian vein stenosis    Post concussive syndrome    CRF (chronic renal failure), stage 4 (severe) (HCC)    Hypotension    Syncope and collapse    Metabolic encephalopathy    Right-sided epistaxis    Diastolic dysfunction without heart failure    Cervical spine instability    Nonrheumatic aortic valve stenosis    Acute diastolic heart failure (HCC)    Acute kidney injury (HCC)    Hypercalcemia    At risk for polypharmacy    Acute respiratory failure with hypoxia and hypercapnia (HCC)    Acute pulmonary edema (HCC)    Abnormal urinalysis    Irritable bowel syndrome    Anemia requiring transfusions    Stage 3 severe COPD by GOLD classification (HCC)    Bilateral carotid artery stenosis    Iron deficiency anemia due to dietary causes    B12 deficiency due to diet    Acute on chronic diastolic CHF (congestive heart failure), NYHA class 3 (HCC)    Anemia    Morbid obesity with BMI of 40.0-44.9, adult (HCC)    Hyponatremia with decreased serum osmolality    Metabolic alkalosis    Chronic diastolic congestive heart failure [facility])       Inpatient Assessment  Care Transitions Summary    Care Transitions Inpatient Review  Medication Review  Do you have all of your prescriptions and are they filled?:  Yes   Barriers to Medication Adherence:  None  Are you able to afford your medications?:  Yes  How often do you have difficulty taking your medications?:  I always take them as prescribed. Housing Review  Who do you live with?:  Alone  Are you an active caregiver in your home?:  No  Social Support  Do you have a ?:  Yes   Name:  Barb Anaya    Contact Info:  [phone] ext.  [phone]  Do you have a Home Health Coordinator?:  Yes  [facility] AT [facility] Name:  [facility] [place]  [facility] Contact Info: [facility] [phone]  Durable Medical Equipment  Patient DME:  Frankie Iverson cane, Wheelchair, Other  Other Patient DME:  GUERLINE Yaredmarko  Patient Home Equipment:  CPAP, Oxygen  Functional Review  Ability to seek help/take action for Emergent/Urgent situations i.e. fire, crime, inclement weather or health crisis.:  Needs Assistance  Ability handle personal hygiene needs (bathing/dressing/grooming):  Needs Assistance  Ability to manage medications:  Needs Assistance  Ability to prepare food:  Needs Assistance  Ability to maintain home (clean home, laundry):  Needs Assistance  Ability to drive and/or has transportation:  Dependent  Ability to do shopping:  Needs Assistance  Ability to manage finances: Independent  Is patient able to live independently?:  Yes  Hearing and Vision  Visual Impairment:  Visual impairment (Glasses/contacts)  Hearing Impairment:  Wears hearing aids  Care Transitions Interventions  No Identified Needs       Patient presented to the ED on 04/03/2019 with report of chest pain and SOB. Patient sent from PCP office for further evaluation of symptoms. PMH includes CAD, MI, anemia, COPD, CHF, HTN, HLD, and DM. In ED, Na+ 119 mEq/L. Patient admitted for chest pain and hyponatremia. Patient is from home where she resides alone. Patient is current with CHF Clinic. Patient has scale, glucometer, and blood pressure cuff for chronic disease management. Patient states [place] nurse checks Sp02. Patient is current with [facility] nurse daily for one hour and aid M-F for two hours. [facility] [phone]. Patient is current with DEMI Ball [facility] [phone]. Kandace receives meals from [employer], 2 meals/day 5x per week. Patient is on 3L continuous oxygen through [facility]. Patient denies additional needs or concerns at this time. Business card with contact information for CTC provided. Patient understands CTC will follow upon discharge. Monica, patient's established ACC, updated on patient admission and POC by ED CTC.      Follow Up  Future Appointments   Date Time Provider Micheline Conteh   4/9/2019  8:40 AM ASHLEY Taveras - [id number] [facility] [address] [facility]   4/11/2019  9:45 AM ASHLEY Delong CNP SRPX CHF [facility]   4/17/2019 10:00 AM Presley Almeida PA-C Pulm Med [facility]   4/22/2019  9:40 AM Saraminnie Childs PA-C Oncology [facility]   5/20/2019  1:20 PM ASHLEY Taveras - [id number] [facility] [address] [facility]   6/10/2019  8:20 AM STR CT IMAGING RM1  [facility] STRZ OUT EXP STR Radiolog   6/17/2019 10:30 AM ASHLEY Winn CNP Pulm Med [facility]   7/15/2019  1:00 PM Julián Siddiqui RD, LD SRPX Physic [facility]   10/1/2019 10:40 AM Eli Knee, APRN - CNP [facility] Maintenance  Health Maintenance Due   Topic Date Due    Breast cancer screen  10/10/2018       Glenn Yap, RN  Care Transition Coordinator  [phone]  21

## 2019-04-05 NOTE — CARE COORDINATION
4/5/19  11:32 AM    Na+ 121 today. On salt tablets. Nephrology following. K+ normal today. Chloride still low but little bit of improvement today, up to 79. Pt on 3L/NC with O2 sat 97%. Lungs clear.

## 2019-04-06 NOTE — PLAN OF CARE
Problem: RESPIRATORY  Goal: STG - patient can administer MDI's  4/6/2019 0859 by Tia Andres RCP  Outcome: Ongoing

## 2019-04-06 NOTE — PROGRESS NOTES
Cardiology Progress Note      Patient:  Peewee Mcfarland  YOB: 1945  MRN: [id number]   Acct: [de-identified]  Admit Date:  4/3/2019  Primary Cardiologist: Milena Chambers MD    Per Dr. Shannan Zepeda consult report  REASON FOR CONSULTATION:  A 25-year-old female patient presented with  chest pain and shortness of breath     HISTORY OF PRESENT ILLNESS:  This is a 25-year-old  female  patient who with history of drug-induced hyponatremia, chronic anxiety,  severe aortic stenosis, hypertension, depression, congestive heart  failure, COPD, presented to the emergency room with two days of nausea  and vomiting on the morning of the presentation that is apparently two  days ago. She went to see the primary care doctor. She was advised to  go to the emergency room and in the emergency room, she had a CT which  showed diverticulosis without diverticulitis. She complains of also  some chest pain, pressure, retrosternal, resolved and she complains of  shortness of breath and she has chronic hyponatremia going back to 2014. It is considered to be from her antidepressant. Attempt to decrease the  dose in the past did not go well. Her sodium ranged from 117 to 133. On this admission, her sodium was 119, currently 124. Cardiology  evaluation was sought in view of history of chest pain and shortness of  breath and the patient was on diuretics before, at home on 40 mg daily  and that has been stopped on recent discharge and short of breath seems  to be worsened since then. Subjective (Events in last 24 hours): Alert in nad, sitting up in chair. Wants to go home. Denies chest pain, sob, or palpitations. States legs are much less swollen.  Sodium      Objective:   BP (!) 151/59   Pulse 67   Temp 99.1 °F (37.3 °C) (Oral)   Resp 16   Ht 5' 6\" (1.676 m)   Wt 218 lb 12.8 oz (99.2 kg)   LMP 02/12/1973 (Approximate)   SpO2 98%   BMI 35.32 kg/m²        TELEMETRY: SR    Physical Exam:  General Appearance: alert and oriented to person, place and time, in no acute distress  Cardiovascular: normal rate, regular rhythm, normal S1 and S2, + systolic murmur, no rubs, clicks, or gallops, distal pulses intact, no carotid bruits, no JVD  Pulmonary/Chest: clear to auscultation bilaterally- no wheezes, rales or rhonchi, normal air movement, no respiratory distress  Abdomen: soft, non-tender, non-distended, normal bowel sounds, no masses   Extremities: no cyanosis, clubbing or edema, pulse   Skin: warm and dry, no rash or erythema  Head: normocephalic and atraumatic  Eyes: pupils equal, round, and reactive to light  Neck: supple and non-tender without mass, no thyromegaly   Musculoskeletal: normal range of motion, no joint swelling, deformity or tenderness  Neurological: alert, oriented, normal speech, no focal findings or movement disorder noted    Medications:    sodium chloride  2 g Oral TID WC    levothyroxine  125 mcg Oral Daily    furosemide  20 mg Oral Daily    vitamin D  50,000 Units Oral Weekly    aspirin  81 mg Oral Daily    clopidogrel  75 mg Oral Daily    baclofen  10 mg Oral BID    hydrALAZINE  50 mg Oral TID    hydrOXYzine  25 mg Oral TID    isosorbide dinitrate  40 mg Oral TID    losartan  50 mg Oral Daily    metoprolol  100 mg Oral BID    pantoprazole  40 mg Oral QAM AC    OXcarbazepine  150 mg Oral QAM    ranolazine  500 mg Oral BID    risperiDONE  1 mg Oral QAM    rosuvastatin  20 mg Oral Daily    mometasone-formoterol  2 puff Inhalation BID    sodium chloride flush  10 mL Intravenous 2 times per day    enoxaparin  40 mg Subcutaneous Daily    insulin lispro  0-6 Units Subcutaneous TID     insulin lispro  0-3 Units Subcutaneous Nightly      dextrose         HYDROcodone-acetaminophen 1 tablet Q6H PRN   sodium chloride flush 10 mL PRN   ondansetron 4 mg Q6H PRN   magnesium sulfate 1 g PRN   potassium chloride 40 mEq PRN   Or     potassium alternative oral replacement 40 mEq PRN   Or potassium chloride 10 mEq PRN   acetaminophen 650 mg Q4H PRN   glucose 15 g PRN   dextrose 12.5 g PRN   glucagon (rDNA) 1 mg PRN   dextrose 100 mL/hr PRN       Diagnostics:  EK/3/19  Normal sinus rhythm  Possible Left atrial enlargement  Nonspecific T wave abnormality  Prolonged QT interval or tu fusion, consider myocardial disease, electrolyte imbalance, or drug effects  Abnormal ECG  When compared with ECG of 2019 11:59,  No significant change was found  Confirmed by Tracee Dooley ([id number]) on 2019 12:37:22 PM        Right heart cath / Left Heart Cath: 3/29/19  RIGHT HEART CATHETERIZATION:  1.  RA is 21 mmHg. 2.  RV is 68/15, 23.  3.  PA is 65/19, 44.  4.  PCW was 44/36, 34 mmHg. 5.  Tee cardiac output was 5.71, cardiac index was 2.7.     LEFT HEART CATHETERIZATION:  1. RCA is the dominant vessel. There is a prior interventional site in  the proximal, mid, and distal segments. There is mild luminal  irregularities and mild in-stent restenosis in the proximal RCA. Mid  RCA has a patent stent with 20% to 30% in-stent restenosis. The distal  segment has mild luminal irregularities. There is a moderate-sized PL  and PDA branches; both have mild luminal irregularities. 2.  Left main artery is patent. There is 10% to 20% distal left main  disease and it gives to LAD and left circumflex. 3.  Left circumflex is patent in proximal, mid, and distal segment. There is mild luminal irregularities in the proximal area. Mid and  distal segments have moderately diffuse disease. There is a large size  OM1 branch with mild luminal irregularities. 4.  Left anterior descending artery has a proximal stent patent. Mid  and distal segments have mild luminal irregularities.     The patient tolerated the procedure well without any significant issues. Right radial artery hemostasis was achieved with a Vasc band. Right  brachial vein hemostasis was achieved with manual pressure.   The patient  was transferred back into her room in stable condition without any  issues.     SUMMARY:  1. Elevated right-sided pressures. 2.  Nonobstructive coronary artery disease. Patent stents in the RCA  and LAD with mild in-stent restenosis. SUMMARY:  1. Elevated right-sided pressures. 2.  Nonobstructive coronary artery disease. Patent stents in the RCA  and LAD with mild in-stent restenosis.     RECOMMENDATIONS:  1. Aggressive risk factor modification. 2.  Lipid-lowering therapy. 3.  Continue with TAVR workup. 4.  Followup in clinic in two to four weeks to evaluate symptoms  further.           Maldonado Iyer MD       Echo: 11/3/18  Summary   Ejection fraction was estimated at 60-65%.   Moderate biatrial dilation.   The aortic valve was trileaflet, thickened, calcified, and restricted in   mobility. Transaortic velocity was 3.9 m/s, mean gradient of 33 mmHg, and   estimated BANDAR 0.8 cm2, consistent with severe aortic stenosis.   There was no evidence of aortic regurgitation.   The mitral valve structure demonstrated predominantly posterior leaflet   calcification.   The transmitral velocity was mildly elevated at 2.0 m/s, with a mean   gradient of 8 mmHg, and estimated MVA 2.7 cm2. There was trace mitral   regurgitation.   Ascending aorta = 3.4 cm.   IVC size is dilated with reduced respiratory phasic changes (CVP~5-10   mmHg).     Signature      ----------------------------------------------------------------   Electronically signed by Katt Solorio MD (Interpreting    Lab Data:    Cardiac Enzymes:  No results for input(s): CKTOTAL, CKMB, CKMBINDEX, TROPONINI in the last 72 hours.     CBC:   Lab Results   Component Value Date    WBC 4.0 04/05/2019    RBC 3.15 04/05/2019    RBC 3.82 08/21/2018    HGB 8.7 04/05/2019    HCT 26.6 04/05/2019     04/05/2019       CMP:  Lab Results   Component Value Date     04/06/2019    K 4.6 04/06/2019    K 3.6 04/03/2019    CL 86 04/06/2019    CO2 35 04/06/2019    BUN 17 04/06/2019    CREATININE 1.0 04/06/2019    LABGLOM 54 04/06/2019    GLUCOSE 97 04/06/2019    GLUCOSE 101 07/25/2016    CALCIUM 8.9 04/06/2019       Hepatic Function Panel:  Lab Results   Component Value Date    ALKPHOS 62 04/03/2019    ALT 19 04/03/2019    AST 22 04/03/2019    PROT 7.4 04/03/2019    PROT 6.5 11/25/2016    BILITOT 0.4 04/03/2019    BILIDIR <0.2 03/02/2019    LABALBU 4.6 04/03/2019       Magnesium:    Lab Results   Component Value Date    MG 1.7 04/05/2019       PT/INR:    Lab Results   Component Value Date    PROTIME 13.8 02/13/2019    INR 1.08 04/03/2019       HgBA1c:    Lab Results   Component Value Date    LABA1C 5.9 04/03/2019       FLP:  Lab Results   Component Value Date    TRIG 256 03/29/2019    HDL 42 03/29/2019    LDLCALC 61 03/29/2019    LABVLDL 69 11/25/2016       TSH:    Lab Results   Component Value Date    TSH 7.120 04/04/2019         Assessment:  · Chest pain, atypical: resolved   Troponin negative, EKG no acute abnormality  · Nausea / vomiting: resolved  · Severe AS: being worked up for TAVR with Dr. Elisa Hogue  · Chronic hyponatremia: nephrology following  · Chronic diastolic CHF  · EF 70 per 8/26/18  · CAD: s/p multiple stents, [facility] 3/29/19 Nonobstructive coronary artery disease, patent stents in the RCA and LAD with mild in-stent restenosis.   · HLP  · COPD on home O2 at 3L  · DM  · History of dietary non-compliance        Plan:  · Continue asa, plavix, hydralazine, isordil, ARB, BB, statin, lasix at lower dose  Daily weight, I&O  · 2 g sodium, 1500 ml fluid restriction  · F/U as scheduled with CHF clinic  · F/u and continue OP TAVR workup as scheduled with Dr. Elisa Hogue       Electronically signed by ASHLEY Becerra CNP on 4/6/2019 at 11:20 AM

## 2019-04-06 NOTE — PROGRESS NOTES
[facility]  INPATIENT PHYSICAL THERAPY  EVALUATION  STRZ MED SURG 8B - 8B-33/033-A    Time In: 4095  Time Out: 1210  Timed Code Treatment Minutes: 38 Minutes  Minutes: 42          Date: 2019  Patient Name: Addy Aguero,  Gender:  female        MRN: [id number]  : 1945  (75 y.o.)      Referring Practitioner: Dr Darrell Bradley  Diagnosis: Chest pain  Additional Pertinent Hx: Pt admitted 4-4 with above. Pt was having n and v for 2 days prior to admission. Pt with h/o anxiety,hyponatremia, depression, HTN, CHF,COPD     Past Medical History:   Diagnosis Date    Anemia     Anxiety     Aortic stenosis     Arthritis     general    AS (aortic stenosis): mild to moderate by echo 2017    ASHD (arteriosclerotic heart disease)     Asthma 2016    INHALER USE DAILY AND AS NEEDED    Bipolar disorder ([address]     Blood circulation, collateral     CAD (coronary artery disease)     MI, 5 STENTS IN HEART NO SURE OF HEART DR [facility]    Cerebral artery occlusion with cerebral infarction ([address] 2018    SILENT-DX ON CT SCAN NO DEFICITS    Chest pain     CHF (congestive heart failure) (HCC)     COPD (chronic obstructive pulmonary disease) ([address] 2014    INHALER USE DAILY AND AS NEEDED    Depression     Disease of blood and blood forming organ     anemia per patient     DM (diabetes mellitus) ([address]     NO MEDS DIET CONTROLED    Dyspnea     FH: CAD (coronary artery disease)     Full dentures     UPPER AND LOWER    GERD (gastroesophageal reflux disease)     Headache     Heart burn     History of blood transfusion 1973    POST OP -PLASMA    History of blood transfusion 2019    1 UNIT LOW HGB    History of tobacco abuse: Quit in      HLD (hyperlipidemia)     ON RX    HTN (hypertension)     ON RX    Irritable bowel syndrome     States has not had problem with this for years    Liver disease     fatty liver    Metabolic syndrome     MI (myocardial ENDOSCOPY, COLON, DIAGNOSTIC      EYE SURGERY      cataract 2017    FOOT SURGERY      HEMORRHOID SURGERY  1973    HERNIA REPAIR      HYSTERECTOMY  1973    INNER EAR SURGERY      NECK SURGERY  FEB 2016    OTHER SURGICAL HISTORY  02/13/2019    Arteriogram for diagnostics    PARTIAL HYSTERECTOMY      UPPER GASTROINTESTINAL ENDOSCOPY      UPPER GASTROINTESTINAL ENDOSCOPY         Restrictions/Precautions:  General Precautions                            Subjective:  Chart Reviewed: Yes  Patient assessed for rehabilitation services?: Yes  Family / Caregiver Present: No  Subjective: Pt in bed, agreed to PT but was adamant she didn't want to walk this date but wanted to get up in W/C and move about [place] in [facility]. She said she has exercises she knows and does at home and I encouraged her to do those and that next session we needed to walk  further. General:  Overall Orientation Status: Within Functional Limits  Follows Commands: Within Functional Limits    Vision: Impaired  Vision Exceptions: Wears glasses at all times    Hearing: Within functional limits         Pain:  Denies. Social/Functional History:    Lives With: Alone  Type of Home: Apartment  Home Layout: One level  Home Access: Elevator  Home Equipment: 4 wheeled walker, Cane, [address] chair          Receives Help From: Personal care attendant  ADL Assistance: Needs assistance  Homemaking Assistance: Needs assistance  Homemaking Responsibilities: No  Ambulation Assistance: Independent  Transfer Assistance: Independent    Active : No     Additional Comments: Pt reports she has an aid 5 X a week for a couple hours for bathing, and anything she needs done around her apt. Pt uses W/C when she leaves apt. She uses a rollator or cane in her apt. She sleeps in her lift chair       Objective:       RLE AROM: WFL         LLE AROM : WFL          Strength RLE: Exception  Comment: grossly 4-/5    Strength LLE: Exception  Comment: grossly 4-/5             Balance  Sitting - Static: Good  Sitting - Dynamic: Good  Standing - Static: Good, -  Standing - Dynamic: Fair, +    Supine to Sit: Supervision  Scooting: Supervision    Transfers  Sit to Stand: Stand by assistance(from bed )  Stand to sit: Stand by assistance(to W/C)       Ambulation 1  Surface: level tile  Device: No Device  Assistance: Contact guard assistance, Stand by assistance  Quality of Gait: slow small steps, no LOB, steady  Distance: 3 feet  Comments: declined to walk further , needs RW for ambulation next visit -pt agreed to  this        Wheelchair Activities  Wheelchair Type: Standard  Wheelchair Cushion: (waffle cushion)  Level of Assistance for pressure relief activities: Modified independent  Propulsion: Yes    Propulsion 1  Propulsion: Manual  Level: Level Tile  Method: MANISH STALLWORTH RLE, SUJEY  Level of Assistance: Modified independent  Description/ Details: pt manuevers W/C slowly, but safe, doesn't run into objects, able to turn around with it  Distance: 150 feet    Exercises:  Comments: Pt declined this date, stating she would do the exercises that she nornally does at home later today       Activity Tolerance:  Activity Tolerance: Patient Tolerated treatment well    Treatment Initiated:  See W/C mobility,     Assessment: Body structures, Functions, Activity limitations: Decreased functional mobility , Decreased endurance, Decreased strength, Decreased balance  Assessment: Pt with generalized weakness from hospital stay,  Pt needs PT while here for strengthening, mobility, and safety with gait in her home  Prognosis: Good    Clinical Presentation: Moderate - Evolving with Changing Characteristics: : Pt with generalized weakness from hospital stay,  Pt needs PT while here for strengthening, mobility, and safety with gait in her home    Decision Making:  Moderate Complexitybased on patient assessment and decision making process of determining plan of care and establishing reasonable expectations for measurable functional outcomes    REQUIRES PT FOLLOW UP: Yes    Discharge Recommendations:  Discharge Recommendations: Continue to assess pending progress, Patient would benefit from continued therapy after discharge    Patient Education:  Patient Education: POC, W/;C     Equipment Recommendations:  Equipment Needed: No    Safety:  Type of devices: Call light within reach, Nurse notified, Left in chair, Gait belt(left pt in W/C which she was manuevering around the unit. RN, approved this and would see to it she get back to her room when lunch arrives)  Restraints  Initially in place: No    Plan:  Times per week: 5 X GM  Times per day: Daily  Specific instructions for Next Treatment: ther ex, gait with walker, mobility, W/C mobility for endurance , balance  Current Treatment Recommendations: Strengthening, Gait Training, Balance Training, Functional Mobility Training, Endurance Training, Transfer Training, Wheelchair Mobility Training, Home Exercise Program    Goals:  Patient goals : Go back to her apt  Short term goals  Time Frame for Short term goals: 1 week  Short term goal 1: supine to sit and return with Mod I to get in and out of bed   Short term goal 2: sit to stand with Mod i to get on and off various surfaces  Short term goal 3: ambulate with RW 50 feet with S/Mod I to be safe in her apt   Short term goal 4: manuever W/C 200 feet with Mod i to imporve endurance which will help with gait   Long term goals  Time Frame for Long term goals : NA due to short ELOS    Evaluation Complexity: Based on the findings of patient history, examination, clinical presentation, and decision making during this evaluation, the evaluation of Eli Serra  is of medium complexity.             AM-PAC Inpatient Mobility without Stair Climbing Raw Score : 17  AM-PAC Inpatient without Stair Climbing T-Scale Score : 48.47  Mobility Inpatient CMS 0-100% Score: 32.72  Mobility Inpatient without Stair CMS G-Code

## 2019-04-06 NOTE — PROGRESS NOTES
Hospitalist Progress Note    Patient:  Cindy Hernandez      Unit/Bed:8B-33/033-A    YOB: 1945    MRN: [id number]       Acct: [de-identified]     PCP: ASHLEY Velasquez CNP    Date of Admission: 4/3/2019    Assessment/Plan:    1. Hyponatremia- chronic problem- Nephrology managing. Should she stay on diuretic? 2. Vomiting , chest pain, resolved        Expected discharge date:  ? Disposition:    [x] Home       [] TCU       [] Rehab       [] Psych       [] SNF       [] [place]       [] Other-    Chief Complaint: emesis, chest pain    Hospital Course: presenting sxs resolved. Now dealing mainly with hyponatremia.   ?how close to normal to get     Subjective (past 24 hours): not getting enough to eat       Medications:  Reviewed    Infusion Medications    dextrose       Scheduled Medications    sodium chloride  2 g Oral TID WC    levothyroxine  125 mcg Oral Daily    furosemide  20 mg Oral Daily    vitamin D  50,000 Units Oral Weekly    aspirin  81 mg Oral Daily    clopidogrel  75 mg Oral Daily    baclofen  10 mg Oral BID    hydrALAZINE  50 mg Oral TID    hydrOXYzine  25 mg Oral TID    isosorbide dinitrate  40 mg Oral TID    losartan  50 mg Oral Daily    metoprolol  100 mg Oral BID    pantoprazole  40 mg Oral QAM AC    OXcarbazepine  150 mg Oral QAM    ranolazine  500 mg Oral BID    risperiDONE  1 mg Oral QAM    rosuvastatin  20 mg Oral Daily    mometasone-formoterol  2 puff Inhalation BID    sodium chloride flush  10 mL Intravenous 2 times per day    enoxaparin  40 mg Subcutaneous Daily    insulin lispro  0-6 Units Subcutaneous TID WC    insulin lispro  0-3 Units Subcutaneous Nightly     PRN Meds: HYDROcodone-acetaminophen, sodium chloride flush, ondansetron, magnesium sulfate, potassium chloride **OR** potassium alternative oral replacement **OR** potassium chloride, acetaminophen, glucose, dextrose, glucagon (rDNA), dextrose      Intake/Output Summary (Last 24 hours) at 4/6/2019 1308  Last data filed at 4/6/2019 1103  Gross per 24 hour   Intake 960 ml   Output 2150 ml   Net -1190 ml       Diet:  DIET DENTAL SOFT; Daily Fluid Restriction: 1500 ml    Exam:  BP (!) 151/59   Pulse 67   Temp 99.1 °F (37.3 °C) (Oral)   Resp 16   Ht 5' 6\" (1.676 m)   Wt 218 lb 12.8 oz (99.2 kg)   LMP 02/12/1973 (Approximate)   SpO2 98%   BMI 35.32 kg/m²     General appearance: No apparent distress, appears stated age and cooperative. HEENT: Pupils equal, round, and reactive to light. Conjunctivae/corneas clear. Neck: Supple, with full range of motion. No jugular venous distention. Trachea midline. Respiratory:  Normal respiratory effort. Clear to auscultation, bilaterally without Rales/Wheezes/Rhonchi. Cardiovascular: Regular rate and rhythm with coarse JEANETTE at URSB    Abdomen: Soft, non-tender, non-distended with normal bowel sounds. Musculoskeletal: passive and active ROM x 4 extremities. Skin: Skin color, texture, turgor normal.  No rashes or lesions. Neurologic:  Neurovascularly intact without any focal sensory/motor deficits. Cranial nerves: II-XII intact, grossly non-focal.  Psychiatric: Alert and oriented, thought content appropriate, normal insight  Capillary Refill: Brisk,< 3 seconds   Peripheral Pulses: +2 palpable, equal bilaterally       Labs:   Recent Labs     04/04/19  0539 04/05/19  0549   WBC 5.1 4.0*   HGB 9.1* 8.7*   HCT 27.5* 26.6*    119*     Recent Labs     04/04/19  0539  04/05/19  0549  04/05/19  1821 04/06/19  0032 04/06/19  0728   *   < > 121*   < > 124* 128* 129*   K 3.1*  --  3.8  --   --   --  4.6   CL 75*  --  79*  --   --   --  86*   CO2 33  --  32  --   --   --  35*   BUN 16  --  18  --   --   --  17   CREATININE 0.9  --  1.0  --   --   --  1.0   CALCIUM 10.3  --  9.6  --   --   --  8.9    < > = values in this interval not displayed. No results for input(s): AST, ALT, BILIDIR, BILITOT, ALKPHOS in the last 72 hours.   No

## 2019-04-06 NOTE — PROGRESS NOTES
Renal Progress Note  [facility]    Patient :  Samira Ann; 75 y.o. MRN# [id number]  Location:  8B-33/033-A  Attending:  Rosalie Damon MD  Admit Date:  4/3/2019   Hospital Day: 2      Subjective:     Nephrology is following the patient for hyponatremia. She states she is hungry. She just ate lunch per RN but requesting more food. She denies any shortness of breath. No chest pain. No other complaints. On 3 L home O2. Outpatient Medications:     Medications Prior to Admission: HYDROcodone-acetaminophen (NORCO) 5-325 MG per tablet, Take 1 tablet by mouth every 6 hours as needed for Pain.   furosemide (LASIX) 40 MG tablet, Take 1 tablet by mouth daily  hydrALAZINE (APRESOLINE) 50 MG tablet, Take 1 tablet by mouth 3 times daily   MG capsule, take 3 capsules every evening  losartan (COZAAR) 25 MG tablet, Take 2 tablets by mouth daily  Multiple Vitamin (MULTIVITAMIN) tablet, Take 1 tablet by mouth daily  cyanocobalamin ([employer] VITAMIN B12) 1000 MCG tablet, Take 1 tablet by mouth daily  ranolazine (RANEXA) 500 MG extended release tablet, take 1 tablet by mouth twice a day  isosorbide dinitrate (ISORDIL) 40 MG tablet, Take 1 tablet by mouth 3 times daily  ferrous sulfate 325 (65 Fe) MG tablet, Take 1 tablet by mouth 2 times daily (Patient taking differently: Take 325 mg by mouth daily (with breakfast) )  metoprolol (LOPRESSOR) 100 MG tablet, Take 1 tablet by mouth 2 times daily  polyethylene glycol (GLYCOLAX) powder, Take 17 g by mouth daily as needed (Constipation)  levothyroxine (SYNTHROID) 100 MCG tablet, take 1 tablet by mouth once daily  Calcium Carbonate-Vitamin D (OYSTER SHELL CALCIUM/D) 500-200 MG-UNIT TABS, take 1 tablet by mouth twice a day  aspirin (ASPIRIN LOW DOSE) 81 MG EC tablet, take 1 tablet by mouth once daily (Patient taking differently: Take 81 mg by mouth daily take 1 tablet by mouth once daily)  rosuvastatin (CRESTOR) 20 MG tablet, take 1 tablet by mouth once daily  albuterol sulfate HFA (VENTOLIN HFA) 108 (90 Base) MCG/ACT inhaler, Inhale 2 puffs into the lungs every 6 hours as needed for Wheezing  clopidogrel (PLAVIX) 75 MG tablet, take 1 tablet by mouth once daily  baclofen (LIORESAL) 10 MG tablet, Take 10 mg by mouth 2 times daily  fluticasone (FLONASE) 50 MCG/ACT nasal spray, 1 spray by Nasal route daily  hydrOXYzine (ATARAX) 25 MG tablet, Take 25 mg by mouth 3 times daily  OXYGEN, Inhale 3 L into the lungs continuous   OXcarbazepine (TRILEPTAL) 150 MG tablet, Take 150 mg by mouth every morning   pantoprazole (PROTONIX) 40 MG tablet, Take 40 mg by mouth every morning (before breakfast)   risperiDONE (RISPERDAL) 1 MG tablet, Take 1 mg by mouth every morning   ondansetron (ZOFRAN) 4 MG tablet, Take 1 tablet by mouth daily as needed for Nausea or Vomiting  Handicap Placard MISC, by Does not apply route Expires 25 MARCH 2014  magnesium oxide (MAG-OX) 400 MG tablet, take 1 tablet by mouth once daily  RA SALINE NASAL SPRAY 0.65 % nasal spray, instill 1 spray into each nostril if needed for congestion  SYMBICORT 160-4.5 MCG/ACT AERO, inhale 2 puffs by mouth twice a day  Lancets MISC, Check blood sugar q daily Dx E11.69  Blood Glucose Monitoring Suppl HARVINDER, Check blood sugar q daily Dx E11.69  ONE TOUCH ULTRASOFT LANCETS MISC, use as directed BEFORE BREAKFAST AND SUPPER    Current Medications:     Scheduled Meds:    sodium chloride  1 g Oral TID     levothyroxine  125 mcg Oral Daily    furosemide  20 mg Oral Daily    vitamin D  50,000 Units Oral Weekly    aspirin  81 mg Oral Daily    clopidogrel  75 mg Oral Daily    baclofen  10 mg Oral BID    hydrALAZINE  50 mg Oral TID    hydrOXYzine  25 mg Oral TID    isosorbide dinitrate  40 mg Oral TID    losartan  50 mg Oral Daily    metoprolol  100 mg Oral BID    pantoprazole  40 mg Oral QAM AC    OXcarbazepine  150 mg Oral QAM    ranolazine  500 mg Oral BID    risperiDONE  1 mg Oral QAM    rosuvastatin  20 mg Oral Daily    mometasone-formoterol  2 puff Inhalation BID    sodium chloride flush  10 mL Intravenous 2 times per day    enoxaparin  40 mg Subcutaneous Daily    insulin lispro  0-6 Units Subcutaneous TID WC    insulin lispro  0-3 Units Subcutaneous Nightly     Continuous Infusions:    dextrose       PRN Meds:  HYDROcodone-acetaminophen, sodium chloride flush, ondansetron, magnesium sulfate, potassium chloride **OR** potassium alternative oral replacement **OR** potassium chloride, acetaminophen, glucose, dextrose, glucagon (rDNA), dextrose    Input/Output:       I/O last 3 completed shifts: In: 9506 [P.O.:1070; I.V.:20]  Out: 2150 [Urine:2150]. Patient Vitals for the past 96 hrs (Last 3 readings):   Weight   19 0316 218 lb 12.8 oz (99.2 kg)   19 1203 225 lb (102.1 kg)       Vital Signs:   Temperature:  Temp: 99 °F (37.2 °C)  TMax:   Temp (24hrs), Av °F (37.2 °C), Min:98.6 °F (37 °C), Max:99.5 °F (37.5 °C)    Respirations:  Resp: 18  Pulse:   Pulse: 77  BP:    BP: (!) 98/39  BP Range: Systolic (34NLB), JCO:951 , Min:98 , LFI:363       Diastolic (99JGA), WYM:99, Min:39, Max:74      Physical Examination:     General:  Awake, alert,   HEENT: NC/AT/ MMM  Chest:               Crackles at b/l bases  Cardiac:  S1 S2 +systolic murmur  Abdomen: Soft, non-tender,  Neuro:  No facial droop, No Asterixis  SKIN:  No rashes, good skin turgor.   Extremities:  No edema, no cyanosis    Labs:       Recent Labs     19  0549   WBC 5.1 4.0*   RBC 3.28* 3.15*   HGB 9.1* 8.7*   HCT 27.5* 26.6*   MCV 83.8 84.4   MCH 27.7 27.6   MCHC 33.1 32.7    119*   MPV 9.3* 9.4      BMP:   Recent Labs     19  0539  19  0549  19  0032 19  0728 19  1229   *   < > 121*   < > 128* 129* 128*   K 3.1*  --  3.8  --   --  4.6  --    CL 75*  --  79*  --   --  86*  --    CO2 33  --  32  --   --  35*  --    BUN 16  --  18  --   --  17  --    CREATININE 0.9  --  1.0  --   --  1.0 --    GLUCOSE 103  --  148*  --   --  97  --    CALCIUM 10.3  --  9.6  --   --  8.9  --     < > = values in this interval not displayed. Phosphorus:   No results for input(s): PHOS in the last 72 hours. Magnesium:    Recent Labs     04/04/19  0539 04/05/19  0549   MG 1.6 1.7     Albumin:  No results for input(s): LABALBU in the last 72 hours. BNP:    No results found for: BNP  VONNIE:    No results found for: VONNIE  SPEP:  Lab Results   Component Value Date    PROT 7.4 04/03/2019    PROT 6.5 11/25/2016     UPEP:   No results found for: LABPE  C3:   No results found for: C3  C4:   No results found for: C4  MPO ANCA:   No results found for: MPO  PR3 ANCA:   No results found for: PR3  Anti-GBM:   No results found for: GBMABIGG  Hep BsAg:       No results found for: HEPBSAG  Hep C AB:        No results found for: HEPCAB    Urinalysis/Chemistries:      Lab Results   Component Value Date    NITRU NEGATIVE 04/03/2019    COLORU YELLOW 04/03/2019    PHUR 8.0 04/03/2019    LABCAST 4-8 HYALINE 02/03/2019    LABCAST NONE SEEN 02/03/2019    WBCUA 5-10 04/03/2019    RBCUA 5-10 04/03/2019    MUCUS NONE SEEN 02/03/2019    YEAST NONE SEEN 04/03/2019    BACTERIA NONE 04/03/2019    CLARITYU clear 07/28/2016    SPECGRAV 1.012 02/03/2019    LEUKOCYTESUR TRACE 04/03/2019    UROBILINOGEN 0.2 04/03/2019    BILIRUBINUR NEGATIVE 04/03/2019    BILIRUBINUR neg 07/28/2016    BLOODU NEGATIVE 04/03/2019    GLUCOSEU NEGATIVE 04/03/2019    KETUA NEGATIVE 04/03/2019    AMORPHOUS DEBRIS 02/03/2019     Urine Sodium:   No results found for: ABHISHEK  Urine Potassium:  No results found for: KUR  Urine Chloride:  No results found for: CLUR  Urine Osmolarity:   Lab Results   Component Value Date    OSMOU 250 03/26/2018     Urine Protein:   No components found for: TOTALPROTEIN, URINE   Urine Creatinine:     Lab Results   Component Value Date    LABCREA 54.4 01/28/2019     Urine Eosinophils:  No components found for: UEOS        Impression and Plan:  1. Hyponatremia, SIADH related. Improved to 128 today, continue with lasix, fluid restriction. Will decrease salt tabs to 1 gram Tid. Check AM BMP. 2. HTN - stable, but had lower BP this afternoon will monitor may need to adjust meds  3. Severe aortic stenosis, possible TAVR in future  4. Bipolar disorder  5. Chest pain, resolved      Please don't hesitate to call with any questions.   Electronically signed by Trace Daniels DO on 4/6/2019 at 2:37 PM

## 2019-04-06 NOTE — CONSULTS
congestive heart failure; COPD; diabetes;  hypertension; hyperlipidemia; severe aortic stenosis; and chronic  hyponatremia, drug-induced, probably antidepressant. PAST SURGICAL HISTORY:  Endoscopy, neck surgery, ear surgery, foot  surgery, eye surgery, cervical fusion, carotid endarterectomy, bladder  suspension, breast biopsy, breast lumpectomy, back surgery, abdominal  surgery. FAMILY HISTORY:  Positive for coronary artery disease. SOCIAL HISTORY:  She is a former smoker, quit in 2007 after 38-pack  years of smoking. No alcohol. No drug use. ALLERGIES:  She is allergic to LIPITOR, CODEINE, and MOTRIN. HOME MEDICATIONS:  Prior to admission include the following:  Aspirin,  baclofen, Plavix, Flonase, used to be on Lasix 40 mg p.o. daily,  hydralazine, Atarax, Cozaar, Lopressor, Protonix, Ranexa, Risperdal,  Crestor. The patient was on Lasix 40 daily and basically has been  stopped on this admission. PHYSICAL EXAMINATION:  GENERAL:  Looks sick, in mild distress. She is on oxygen at home. VITAL SIGNS:  Blood pressure 122/56, heart rate 67, respiratory rate 18,  temperature 98.5. HEENT:  Pale conjunctivae. Anicteric sclerae. Pupils are equal and  reactive bilaterally to light. NECK:  No lymphadenopathy. No goiter. No JVD. CHEST:  Bilateral basal crackles. CARDIOVASCULAR:  Arteries are felt; carotid, femoral.  S1 and S2 well  heard. There is an ejection systolic murmur in the aortic area,  basically grade 5/6. No gallop rhythm. ABDOMEN:  Soft, obese, nontender. Bowel sounds are positive. GENITALIA:  No CVA, flank or suprapubic tenderness. LOCOMOTOR:  No cyanosis, clubbing, muscle or joint tenderness. No leg  edema. SKIN:  No rashes, ulcers, or nodules. CNS:  Alert, awake, and oriented to time, person, and place. Cranial  nerves are intact. Sensation is intact to light touch and pain. Motor:  Normal muscle strength and tone. PSYCH:  Appropriate mood and affect.     WORKUP:  EKG showed sinus rhythm with early repolarization abnormality. No significant acute EKG abnormality noted. Cardiac enzymes:  Troponin less than 0.01 all along since admission. Blood chemistry:  Sodium 124, potassium 3.8, BUN 18, creatinine 1. Hematology:  White blood cells 4, hemoglobin 8.7, and platelets 543. Chest x-ray include bronchovascular marking suggestive for possible  congestion. Echocardiogram 11/2018, severe aortic stenosis. Cardiac catheterization  recently on 03/29/2019, no significant obstructive lesion. ASSESSMENT:  1. Chest pain, atypical in nature, resolved, has been chest pain-free  now. Troponin negative. EKG, no acute EKG abnormality. 2.  Presentation with nausea and vomiting, subsided, controlled. 3.  Severe aortic stenosis, possibly to have TAVR and that is why she  had a cardiac catheterization recently. TAVR to be done soon. 4.  Hyponatremia, chronic, probably medication related. So, she is on  fluid restriction and sodium tablet. 5.  Chronic congestive heart failure, was on diuretics when she came in  and now chest x-ray showed increased bronchovascular marking. I believe  the patient may need to resume her diuretics. So, probably I will start  her on lower dose at 20 daily. 6.  Hyperlipidemia. 7.  History of COPD. Seems to be stable from that aspect. 8.  Coronary artery disease status post previous multiple stent, stable. Recent cardiac catheterization was not revealing. 9.  History of diabetes. PLAN AND RECOMMENDATION:  1. At this level on recent cardiac catheterization, her chest pain is  certainly noncardiac. Recent cardiac catheterization was not revealing. No significant obstructive lesion. 2.  Still the patient has congestive heart failure and she has been on  diuretics, so probably resuming low-dose diuretics will be appropriate;  so instead of 40 that she used to take at home, we will start her on 20  p.o. daily.   3.  With regards to hyponatremia, Nephrology is on board. The patient  is on fluid restriction and sodium tablet, agreed with that. Continue  with that. 4.  With regard to severe aortic stenosis, TAVR to be addressed as  outpatient with Dr. Amber Schulz. 5.  At this level, the patient)s nausea and vomiting have subsided. So  I will leave that at the discretion of the hospitalist to address. So  based on the course, we will gauge further care. I will follow up with  you as needed. Thank you for allowing me to participate in the care of this patient. Donte Roach.  Tasha Rasmussen M.D.    D: 04/05/2019 19:40:53       T: 04/05/2019 23:16:55     SOPHIA/WARREN  Job#: [id number]     Doc#: [id number]    CC:

## 2019-04-06 NOTE — PROGRESS NOTES
[facility] 59  INPATIENT OCCUPATIONAL THERAPY  [facility] 8B  EVALUATION    Time:  Time In: 5763  Time Out: 1455  Timed Code Treatment Minutes: 0 Minutes  Minutes: 8          Date: 2019  Patient Name: Rahul Padron,   Gender: female      MRN: [id number]  : 1945  (75 y.o.)  Referring Practitioner: Genevieve Ortega MD  Diagnosis: Chest Pain  Additional Pertinent Hx:  Pt was having n and v for 2 days prior to admission. Pt with h/o anxiety,hyponatremia, depression, HTN, CHF,COPD     Restrictions/Precautions:  General Precautions, Fall Risk                            Past Medical History:   Diagnosis Date    Anemia     Anxiety     Aortic stenosis     Arthritis     general    AS (aortic stenosis): mild to moderate by echo 2017    ASHD (arteriosclerotic heart disease)     Asthma 2016    INHALER USE DAILY AND AS NEEDED    Bipolar disorder ([address]     Blood circulation, collateral     CAD (coronary artery disease)     MI, 5 STENTS IN HEART NO SURE OF HEART DR NAME [facility]    Cerebral artery occlusion with cerebral infarction ([address]     SILENT-DX ON CT SCAN NO DEFICITS    Chest pain     CHF (congestive heart failure) (HCC)     COPD (chronic obstructive pulmonary disease) ([address] 2014    INHALER USE DAILY AND AS NEEDED    Depression     Disease of blood and blood forming organ     anemia per patient     DM (diabetes mellitus) ([address]     NO MEDS DIET CONTROLED    Dyspnea     FH: CAD (coronary artery disease)     Full dentures     UPPER AND LOWER    GERD (gastroesophageal reflux disease)     Headache     Heart burn     History of blood transfusion     POST OP -PLASMA    History of blood transfusion 2019    1 UNIT LOW HGB    History of tobacco abuse: Quit in      HLD (hyperlipidemia)     ON RX    HTN (hypertension)     ON RX    Irritable bowel syndrome     States has not had problem with this for years    Liver disease     fatty liver  Metabolic syndrome 71/78/5151    [facility]     Nausea & vomiting     Obesity     CHET (obstructive sleep apnea) 2016    NO MACHINE YET    S/P cardiac catheterization: 11/6/2014: 99% in-stent restenosis mid-RCA. LCx moderate LI's. LAD 85% mid-segment lesion. 11/6/2014 11/6/2014: 99% in-stent restenosis mid-RCA. LCx moderate LI's. LAD 85% mid-segment lesion. Dr. Iwona Freeman S/P PTCA:  5/11/2004: Proximal and mid RCA  Taxus 3.5 X 20 mm, and Taxus 3.0 X 32 mm. 10/28/2014    5/11/2004: Proximal and mid RCA  Taxus 3.5 X 20 mm, and Taxus 3.0 X 32 mm. Dr. Roberson Minium Systolic murmur 24/97/6694    Thyroid disease     ON RX    Wears glasses      Past Surgical History:   Procedure Laterality Date    ABDOMEN SURGERY      BACK SURGERY  08/2016        BLADDER SUSPENSION      BREAST BIOPSY  10/10/2017    BREAST LUMPECTOMY      CARDIAC CATHETERIZATION  8-11-06    Mild nonobstructive CAD w/ diffuse 10-20% proximal and mid LAD stenosis and 10-20% proximal/ostial RCA stenosis. No obstructive lesions. RCA stents are patent w/o evidence of restenosis. Normal LV systolic function. EF 65%. Trace MR - catheter induced. Minimally elevated LVEDP - 13mmHg. Mild to moderate aortoiliac PVD w/o obstructive lesions.  CARDIAC CATHETERIZATION  5-11-04    Successful drug-eluting stent x 2 of proximal and mid RCA.  CARDIAC CATHETERIZATION  5-11-04    70-80% proximal RCA stenosis w/ 50-70% mid RCA stenosis. There is 50-60% lesion in mid PDA. Mild proximal and mid LAD disease. Mild circumflex disease. Normal LV size and systolic function. EF 60%.  CARDIOVASCULAR STRESS TEST  5-10-11    No evidence of stress induced ischemia. EF 75%.  CARDIOVASCULAR STRESS TEST  5-10-10    No evidence of stress induced ischemia, infarct or scar. EF 74%.     CAROTID ENDARTERECTOMY      CERVICAL FUSION N/A 11/15/2017    REMOVAL OF PLATE B5-4, ACDF B0-6 W/ATLANTIS CORNERSTONE performed by Diamond Fried MD at [facility]    CHOLECYSTECTOMY      COLONOSCOPY      ENDOSCOPY, COLON, DIAGNOSTIC      EYE SURGERY      cataract 2017    FOOT SURGERY      HEMORRHOID SURGERY  1973    HERNIA REPAIR      HYSTERECTOMY  1973    INNER EAR SURGERY      NECK SURGERY  FEB 2016   [facility] OTHER SURGICAL HISTORY  02/13/2019    Arteriogram for diagnostics    PARTIAL HYSTERECTOMY      UPPER GASTROINTESTINAL ENDOSCOPY      UPPER GASTROINTESTINAL ENDOSCOPY             Subjective  Chart Reviewed: Yes  Patient assessed for rehabilitation services?: Yes    Subjective: Reluctant to engage in session, but initially agreeable to OT session. Once through initial evaluation, pt refusing OOB activity. General:  Overall Orientation Status: Within Functional Limits    Vision: Impaired  Vision Exceptions: Wears glasses at all times    Hearing: Within functional limits         Pain:  Pain Assessment  Patient Currently in Pain: Yes  Pain Assessment: 0-10  Pain Level: 3  Pain Type: Chronic pain  Pain Location: Back;Neck       Social/Functional History:  Lives With: Alone  Type of Home: Apartment  Home Layout: One level  Home Access: Elevator  Home Equipment: 4 wheeled walker, Verlean Raza, Lift chair     Bathroom Shower/Tub: Tub/Shower unit  Bathroom Toilet: Handicap height  Bathroom Equipment: Tub transfer bench, Grab bars in shower, 3-in-1 commode    Receives Help From: Personal care attendant  ADL Assistance: Needs assistance  Homemaking Assistance: Needs assistance  Homemaking Responsibilities: No    Ambulation Assistance: Independent  Transfer Assistance: Independent    Active : No     Additional Comments: Pt reports she has an aid 5 X a week for a couple hours for bathing, and anything she needs done around her apt. Pt uses W/C when she leaves apt. She uses a rollator or cane in her apt. She sleeps in her lift chair     Objective        Overall Cognitive Status: Exceptions  Cognition Comment: decreased insight LUE AROM (degrees)  LUE AROM : WFL          RUE AROM (degrees)  RUE AROM : WFL       LUE Strength  Gross LUE Strength: WFL                RUE Strength  Gross RUE Strength: WFL                                             ADL  Additional Comments: refused          Transfers  Transfer Comments: refused OOB    Balance  Sitting Balance: Unable to assess(comment)  Standing Balance: Unable to assess(comment)              Activity Tolerance:  Activity Tolerance: Patient limited by fatigue  Activity Tolerance: Pt refusing further OT session. Educated on [address] Pt reports she will complete this PM.        Assessment:  Assessment: Pt s/p admission for Chest Pain. Pt exhibiting deficits detailed below, requiring additional OT intervention to restore  independent PLOF. Pt now requiring assist for ADLs, mobility, and t/fs. Pt with significant increase in burden of care. Without skilled OT intervention pt at risk for functional decline, increased falls, and readmission to hospital.    Performance deficits / Impairments: Decreased functional mobility , Decreased strength, Decreased endurance, Decreased ADL status, Decreased balance  Prognosis: Guarded    Clinical Decision Making: Clinical Decision making was of Moderate Complexity as the result of analysis of data from a detailed assessment, a consideration of several treatment options, the presence of comorbidities affecting the plan of care and the need for minimal to moderate modifications or assistance required to complete the evaluation.     Discharge Recommendations:  Discharge Recommendations: Continue to assess pending progress    Patient Education:  Patient Education: OT POC, importance of therapy, HEP    Equipment Recommendations:  Equipment Needed: No    Safety:  Safety Devices in place: Yes  Type of devices: Bed alarm in place, Call light within reach, Gait belt, Left in bed, Patient at risk for falls    Plan:  Times per week: 3-5x    Goals:       Short term goals  Time Frame for Short term goals: 1 week  Short term goal 1: Pt will tolerate OTR mobility/transfer assessment to increase indep with accessing environment. Short term goal 2: Pt will demo Ale with HEP to increase strength required to complete ADLs. Long term goals  Time Frame for Long term goals : No LTG d/t short ELOS. Evaluation Complexity: Based on the findings of patient history, examination, clinical presentation, and decision making during this evaluation, this patient is of medium complexity.

## 2019-04-06 NOTE — PLAN OF CARE
to monitor. Problem: Discharge Planning  Goal: Knowledge of discharge instructions  Description  Knowledge of discharge instructions     4/6/2019 0120 by Saida Sam RN  Outcome: Ongoing  Note:   Patient in 7L19 from home. Plans to go home with home health upon discharge, case management on case. Care plan reviewed with patient. Patient verbalizes understanding of the plan of care and contribute to goal setting.

## 2019-04-07 NOTE — PLAN OF CARE
Problem: Falls - Risk of:  Goal: Will remain free from falls  Description  Will remain free from falls  Outcome: Ongoing  Note:   Patient alert and oriented x4. Bed alarmed armed. Bed wheels locked. Bedside table in reach. Patient up with assistance when ambulating. Patient verbalizes and demonstrates the use of the call light. Hourly rounding being completed. Problem: Risk for Impaired Skin Integrity  Goal: Tissue integrity - skin and mucous membranes  Description  Structural intactness and normal physiological function of skin and  mucous membranes. Outcome: Ongoing  Note:   Patient has redness/excoriation under bilateral breasts and groin area, also has blanchable redness to buttocks. Patient turns self independently. Patient taught to change position frequently to prevent breakdown. Will continue to monitor. Problem: Pain:  Goal: Control of chronic pain  Description  Control of chronic pain  Outcome: Ongoing  Note:   Patient complains of pain in back and neck. Patients current pain level is a 7/10. Patients pain goal is a 5/10. Patient is receiving Norco for pain at this time. Problem: Fluid Volume:  Goal: Ability to achieve a balanced intake and output will improve  Description  Ability to achieve a balanced intake and output will improve  Outcome: Ongoing  Note:   Patient having adequate amounts of intake and output, being recorded every eight hours. Will continue to monitor. Problem: Neurological  Goal: Maximum potential motor/sensory/cognitive function  Outcome: Ongoing  Note:   Patient alert and oriented x4, ambulates with one assist and walker. Will continue to monitor. Problem: Cardiovascular  Goal: Hemodynamic stability  Outcome: Ongoing  Note:   Patient having no complaints of angina this shift, vital signs within normal limits for  patient. Will continue to monitor.   Vitals:    04/06/19 1951   BP: (!) 156/59   Pulse: 80   Resp: 18   Temp: 98.1 °F (36.7 °C)   SpO2: 98% Problem: Discharge Planning  Goal: Knowledge of discharge instructions  Description  Knowledge of discharge instructions     Outcome: Ongoing  Note:   Patient in 8b33 from home. Plans to go [facility] with [facility] when ready for discharge, case management seeing patient. Care plan reviewed with patient. Patient verbalizes understanding of the plan of care and contribute to goal setting.

## 2019-04-07 NOTE — TELEPHONE ENCOUNTER
[facility] 04/07/19, JOSE, DX:Hyponatremia            PT D/C TO HOME, RARS 67%, SCHEDULE WITH [facility] in 2-3 weeks

## 2019-04-07 NOTE — PLAN OF CARE
Problem: RESPIRATORY  Goal: STG - patient can administer MDI's  4/7/2019 0801 by Jojo Alba RCP  Outcome: Ongoing  Note:   Home regimen

## 2019-04-07 NOTE — PROGRESS NOTES
(VENTOLIN HFA) 108 (90 Base) MCG/ACT inhaler, Inhale 2 puffs into the lungs every 6 hours as needed for Wheezing  clopidogrel (PLAVIX) 75 MG tablet, take 1 tablet by mouth once daily  baclofen (LIORESAL) 10 MG tablet, Take 10 mg by mouth 2 times daily  fluticasone (FLONASE) 50 MCG/ACT nasal spray, 1 spray by Nasal route daily  hydrOXYzine (ATARAX) 25 MG tablet, Take 25 mg by mouth 3 times daily  OXYGEN, Inhale 3 L into the lungs continuous   OXcarbazepine (TRILEPTAL) 150 MG tablet, Take 150 mg by mouth every morning   pantoprazole (PROTONIX) 40 MG tablet, Take 40 mg by mouth every morning (before breakfast)   risperiDONE (RISPERDAL) 1 MG tablet, Take 1 mg by mouth every morning   ondansetron (ZOFRAN) 4 MG tablet, Take 1 tablet by mouth daily as needed for Nausea or Vomiting  Handicap Placard MISC, by Does not apply route Expires 25 MARCH 2014  [DISCONTINUED] magnesium oxide (MAG-OX) 400 MG tablet, take 1 tablet by mouth once daily  RA SALINE NASAL SPRAY 0.65 % nasal spray, instill 1 spray into each nostril if needed for congestion  SYMBICORT 160-4.5 MCG/ACT AERO, inhale 2 puffs by mouth twice a day  Lancets MISC, Check blood sugar q daily Dx E11.69  Blood Glucose Monitoring Suppl HARVINDER, Check blood sugar q daily Dx E11.69  ONE TOUCH ULTRASOFT LANCETS MISC, use as directed BEFORE BREAKFAST AND SUPPER    Current Medications:     Scheduled Meds:    sodium chloride  1 g Oral TID     levothyroxine  125 mcg Oral Daily    furosemide  20 mg Oral Daily    vitamin D  50,000 Units Oral Weekly    aspirin  81 mg Oral Daily    clopidogrel  75 mg Oral Daily    baclofen  10 mg Oral BID    hydrALAZINE  50 mg Oral TID    hydrOXYzine  25 mg Oral TID    isosorbide dinitrate  40 mg Oral TID    losartan  50 mg Oral Daily    metoprolol  100 mg Oral BID    pantoprazole  40 mg Oral QAM AC    OXcarbazepine  150 mg Oral QAM    ranolazine  500 mg Oral BID    risperiDONE  1 mg Oral QAM    rosuvastatin  20 mg Oral Daily    hours.  Magnesium:    Recent Labs     04/05/19  0549   MG 1.7     Albumin:  No results for input(s): LABALBU in the last 72 hours. BNP:    No results found for: BNP  VONNIE:    No results found for: VONNIE  SPEP:  Lab Results   Component Value Date    PROT 7.4 04/03/2019    PROT 6.5 11/25/2016     UPEP:   No results found for: LABPE  C3:   No results found for: C3  C4:   No results found for: C4  MPO ANCA:   No results found for: MPO  PR3 ANCA:   No results found for: PR3  Anti-GBM:   No results found for: GBMABIGG  Hep BsAg:       No results found for: HEPBSAG  Hep C AB:        No results found for: HEPCAB    Urinalysis/Chemistries:      Lab Results   Component Value Date    NITRU NEGATIVE 04/03/2019    COLORU YELLOW 04/03/2019    PHUR 8.0 04/03/2019    LABCAST 4-8 HYALINE 02/03/2019    LABCAST NONE SEEN 02/03/2019    WBCUA 5-10 04/03/2019    RBCUA 5-10 04/03/2019    MUCUS NONE SEEN 02/03/2019    YEAST NONE SEEN 04/03/2019    BACTERIA NONE 04/03/2019    CLARITYU clear 07/28/2016    SPECGRAV 1.012 02/03/2019    LEUKOCYTESUR TRACE 04/03/2019    UROBILINOGEN 0.2 04/03/2019    BILIRUBINUR NEGATIVE 04/03/2019    BILIRUBINUR neg 07/28/2016    BLOODU NEGATIVE 04/03/2019    GLUCOSEU NEGATIVE 04/03/2019    KETUA NEGATIVE 04/03/2019    AMORPHOUS DEBRIS 02/03/2019     Urine Sodium:   No results found for: ABHISHEK  Urine Potassium:  No results found for: KUR  Urine Chloride:  No results found for: CLUR  Urine Osmolarity:   Lab Results   Component Value Date    OSMOU 250 03/26/2018     Urine Protein:   No components found for: TOTALPROTEIN, URINE   Urine Creatinine:     Lab Results   Component Value Date    LABCREA 54.4 01/28/2019     Urine Eosinophils:  No components found for: UEOS        Impression and Plan:  1. Hyponatremia, SIADH related. Improved to 135 today. Stable for DC from renal standpoint with lasix 20 mg po daily, salt tabs 1 gram BID, and 1.5 liter fluid restriction.   She voiced concern that she might not be able to follow the fluid restriction, I asked that she try to at least keep fluids under 1.8 liters if possible. Repeat a BMP in a week and f/u with Dr. Linh Fam in 2-3 weeks. 2. HTN - labile, controlled after receiving BP meds. Can consider changing Cozaar to PM to see if it helps. 3. Severe aortic stenosis, possible TAVR in future  4. Bipolar disorder  5. Chest pain, resolved      Please don't hesitate to call with any questions.   Electronically signed by Truong Bravo DO on 4/7/2019 at 12:59 PM

## 2019-04-07 NOTE — DISCHARGE SUMMARY
04/06/19 1951 04/07/19 0455 04/07/19 0758 04/07/19 0830   BP: (!) 156/59 (!) 197/88  (!) 173/72   Pulse: 80 81  72   Resp: 18 17 18   Temp: 98.1 °F (36.7 °C) 97.4 °F (36.3 °C)  98.5 °F (36.9 °C)   TempSrc: Oral Oral  Oral   SpO2: 98% 99% 100% 100%   Weight:       Height:         Weight: Weight: 218 lb 12.8 oz (99.2 kg)     24 hour intake/output:    Intake/Output Summary (Last 24 hours) at 4/7/2019 1128  Last data filed at 4/7/2019 0676  Gross per 24 hour   Intake 470 ml   Output 3100 ml   Net -2630 ml         General appearance:  No apparent distress, appears stated age and cooperative. HEENT:  Normal cephalic, atraumatic without obvious deformity. Pupils equal, round, and reactive to light. Extra ocular muscles intact. Conjunctivae/corneas clear. Neck: Supple, with full range of motion. No jugular venous distention. Trachea midline. Respiratory:  Normal respiratory effort. Clear to auscultation, bilaterally without Rales/Wheezes/Rhonchi. Cardiovascular:  Regular rate and rhythm with coarse JEANETTE at URSB    Abdomen: Soft, non-tender, non-distended with normal bowel sounds. Musculoskeletal:  No clubbing, cyanosis or edema bilaterally. Full range of motion without deformity. Skin: Skin color, texture, turgor normal.  No rashes or lesions. Neurologic:  Neurovascularly intact without any focal sensory/motor deficits. Cranial nerves: II-XII intact, grossly non-focal.  Psychiatric:  Alert and oriented, thought content appropriate, normal insight  Capillary Refill: Brisk,< 3 seconds   Peripheral Pulses: +2 palpable, equal bilaterally       Labs:  For convenience and continuity at follow-up the following most recent labs are provided:      CBC:    Lab Results   Component Value Date    WBC 4.0 04/05/2019    HGB 8.7 04/05/2019    HCT 26.6 04/05/2019     04/05/2019       Renal:    Lab Results   Component Value Date     04/07/2019    K 4.5 04/07/2019    K 3.6 04/03/2019    CL 94 04/07/2019    CO2 30 04/07/2019    BUN 22 04/07/2019    CREATININE 0.9 04/07/2019    CALCIUM 8.4 04/07/2019    PHOS 2.0 12/24/2018         Significant Diagnostic Studies    Radiology:   CT ABDOMEN PELVIS W IV CONTRAST Additional Contrast? None   Final Result   1. Extensive diverticulosis coli. No evidence for diverticulitis. 2. Filter in the IVC. 3. Fibroemphysematous lungs. Small pericardial effusion. Minimal bibasilar atelectatic/fibrotic stranding. 4. Small focus of epiploic appendagitis proximal descending colon. This is a self limiting condition expected to resolve spontaneously. **This report has been created using voice recognition software. It may contain minor errors which are inherent in voice recognition technology. **      Final report electronically signed by Dr. Diamond Granda on 4/3/2019 2:15 PM      XR CHEST PORTABLE   Final Result   1. Mild cardiomegaly. Prior anterior cervical fusion. 2. Otherwise normal chest. No acute findings. No infiltrates or effusions are seen. **This report has been created using voice recognition software. It may contain minor errors which are inherent in voice recognition technology. **      Final report electronically signed by Dr. Diamond Granda on 4/3/2019 12:36 PM             Consults:     [address] [facility]  IP CONSULT TO SOCIAL WORK    Disposition: Home  Condition at Discharge: Stable    Code Status:  Full Code     Patient Instructions:    Discharge lab work: bmp  Activity: activity as tolerated  Diet: DIET DENTAL SOFT; Daily Fluid Restriction: 1500 ml      Follow-up visits:   Mian Marah, APRN - CNP  [place]   [phone]          [facility] [address]             Discharge Medications:      Winston Triplett, [address] Medication Instructions M:[id number]    Printed on:04/07/19 5180   Medication Information albuterol sulfate HFA (VENTOLIN HFA) 108 (90 Base) MCG/ACT inhaler  Inhale 2 puffs into the lungs every 6 hours as needed for Wheezing             aspirin (ASPIRIN LOW DOSE) 81 MG EC tablet  take 1 tablet by mouth once daily             baclofen (LIORESAL) 10 MG tablet  Take 10 mg by mouth 2 times daily             Blood Glucose Monitoring Suppl [facility]  Check blood sugar q daily Dx E11.69             Calcium Carbonate-Vitamin D (OYSTER SHELL CALCIUM/D) 500-200 MG-UNIT TABS  take 1 tablet by mouth twice a day             clopidogrel (PLAVIX) 75 MG tablet  take 1 tablet by mouth once daily             cyanocobalamin ([employer] VITAMIN B12) 1000 MCG tablet  Take 1 tablet by mouth daily              MG capsule  take 3 capsules every evening             ferrous sulfate 325 (65 Fe) MG tablet  Take 1 tablet by mouth 2 times daily             fluticasone (FLONASE) 50 MCG/ACT nasal spray  1 spray by Nasal route daily             furosemide (LASIX) 40 MG tablet  Take 1 tablet by mouth daily             Handicap Placard MISC  by Does not apply route Expires 25 MARCH 2014             hydrALAZINE (APRESOLINE) 50 MG tablet  Take 1 tablet by mouth 3 times daily             HYDROcodone-acetaminophen (NORCO) 5-325 MG per tablet  Take 1 tablet by mouth every 6 hours as needed for Pain.             hydrOXYzine (ATARAX) 25 MG tablet  Take 25 mg by mouth 3 times daily             isosorbide dinitrate (ISORDIL) 40 MG tablet  Take 1 tablet by mouth 3 times daily             Lancets MISC  Check blood sugar q daily Dx E11.69             levothyroxine (SYNTHROID) 100 MCG tablet  take 1 tablet by mouth once daily             losartan (COZAAR) 25 MG tablet  Take 2 tablets by mouth daily             magnesium oxide (MAG-OX) 400 MG tablet  take 1 tablet by mouth once daily             metoprolol (LOPRESSOR) 100 MG tablet  Take 1 tablet by mouth 2 times daily             Multiple Vitamin (MULTIVITAMIN) tablet  Take 1 tablet by mouth daily ondansetron (ZOFRAN) 4 MG tablet  Take 1 tablet by mouth daily as needed for Nausea or Vomiting             ONE TOUCH ULTRASOFT LANCETS MISC  use as directed BEFORE BREAKFAST AND SUPPER             OXcarbazepine (TRILEPTAL) 150 MG tablet  Take 150 mg by mouth every morning              OXYGEN  Inhale 3 L into the lungs continuous              pantoprazole (PROTONIX) 40 MG tablet  Take 40 mg by mouth every morning (before breakfast)              polyethylene glycol (GLYCOLAX) powder  Take 17 g by mouth daily as needed (Constipation)             RA SALINE NASAL SPRAY 0.65 % nasal spray  instill 1 spray into each nostril if needed for congestion             ranolazine (RANEXA) 500 MG extended release tablet  take 1 tablet by mouth twice a day             risperiDONE (RISPERDAL) 1 MG tablet  Take 1 mg by mouth every morning              rosuvastatin (CRESTOR) 20 MG tablet  take 1 tablet by mouth once daily             SYMBICORT 160-4.5 MCG/ACT AERO  inhale 2 puffs by mouth twice a day                 Time Spent on discharge is more than 45 minutes in the examination, evaluation, counseling and review of medications and discharge plan. Signed: Thank you ASHLEY Landaverde - ZIGGY for the opportunity to be involved in this patient's care.     Electronically signed by Kathy Blood MD on 4/7/2019 at 11:28 AM

## 2019-04-07 NOTE — PROGRESS NOTES
Patient transported home with support by LAURYN on stretcher with home wheelchair in stable condition. Rosemary Gould sent [employer] serve message that she only wants patient to go home of salt tabs 1g BID instead of TID. Called patient and notified even though the script was written for TID.

## 2019-04-07 NOTE — PLAN OF CARE
Problem: RESPIRATORY  Goal: STG - patient can administer MDI's  4/6/2019 2053 by Milton Montoya RCP  Outcome: Met This Shift   Patients breath sounds are clear at this time. Continue with maintenance mdi to help with breath sounds.

## 2019-04-08 NOTE — CARE COORDINATION
Sean [facility] Transitions Initial Follow Up Call    Call within 2 business days of discharge: Yes    Patient: Azalee Buerger Patient : 1945   MRN: [id number]  Reason for Admission: chest pain  Discharge Date: 19 RARS: Readmission Risk Score: 79      Last Discharge [address]       Complaint Diagnosis Description Type Department Provider    4/3/19 Chest Pain; Shortness of Breath Hyponatremia ED to Hosp-Admission (Discharged) (ADMITTED) Kevan 77, DO; Randy Mendez. .. Spoke with: [address]: [facility]    Non-face-to-face services provided:  Obtained and reviewed discharge summary and/or continuity of care documents  Establishment or re-establishment of referrals-notified [facility] of discharge to home    Care Transitions 24 Hour Call    Do you have any ongoing symptoms?:  No  Do you have a copy of your discharge instructions?:  Yes  Do you have all of your prescriptions and are they filled?:  Yes  Have you scheduled your follow up appointment?:  Yes  How are you going to get to your appointment?:  Other (Comment: [employer]ctor)  Were you discharged with any Home Care or Post Acute Services:  Yes  Post Acute Services:  Home Health, Transportation Services, Outpatient/Community Services (Comment: [facility], [facility], and [facility].)  Patient DME:  Walker, Straight cane, Wheelchair, Other  Other Patient DME:  ERS Necklace  Patient Home Equipment:  CPAP, Oxygen  Do you have support at home?:  Alone  Do you feel like you have everything you need to keep you well at home?:  Yes  Are you an active caregiver in your home?:  No  Care Transitions Interventions  No Identified Needs     Called pt for the care transition initial follow up call, introduced self to the pt. Presents to the ED via EMS transport for the evaluation of chest pain and shortness of breath, & nausea and vomiting on 4/3/19. Dx with Hyponatremia.   CAT scan of abdomen and pelvis were done in emergency department and it was significant for extensive diverticulosis but without diverticulitis. Pt stated she is feeling better. Pt denied any chest pain, SOB, or nausea and vomiting. Pt stated she is feeling fine today. Pt stated she has new meds, she does not manage her meds, the home health nurse manages the meds. Pt seemed in a hurry to end the call, so call was brief. CTC notified [facility] of discharge    Reminded pt she has PCP appt tomorrow & CHF clinic on 4/11. Pt stated she was unaware of appt, she will call [facility]. Reminded pt the appt are listed on her AVS, pt admits she has the paperwork. Pt denied any needs or concerns. CTC will refer to the [facility].     Follow Up  Future Appointments   Date Time Provider Micheline Conteh   4/9/2019  8:40 AM ASHLEY Velasquez CNP [facility] [place]   4/11/2019  9:45 AM ASHLEY Matias CNP CHF [facility] [place]   4/17/2019 10:00 AM Jerry Walker PA-C Pulm Med [facility] [place]   4/22/2019  9:40 AM Tai Ventura PA-C Oncology [facility] [place]   5/20/2019  1:20 PM ASHLEY Velasquez CNP [facility] [place]   6/10/2019  8:20 AM STR CT IMAGING RM1  [facility] OUT EXP STR Radiolog   6/17/2019 10:30 AM ASHLEY Palmer CNP Pulm Med [facility] [place]   7/15/2019  1:00 PM Suzanne Miranda RD, LD SRPX Physic [facility] [place]   10/1/2019 10:40 AM ASHLEY Duncan CNP KIDNEY [facility] - [place] RN  Care Transition Coordinator  [phone]

## 2019-04-09 NOTE — TELEPHONE ENCOUNTER
updated script for norco sent. It is to be 1/2 tablet bid, can not take out the 1 tablet daily line.  Please let pharmacy know 1/2 tablet bid

## 2019-04-11 NOTE — PROGRESS NOTES
afterload given severe AS. On BB, hydralazine, ARB. Added imdur. Given preserved systolic function evidence for AR antagonist lacking. cardiology consulted by overnight team.    - CHET: on CPAP from home    - HTN: uncontrolled. Improving w/ home meds. Will monitor. - DM II: on SSi. BS in 100s    - chronic ADELFO: on iron    - Hypothyroidism: TSH 9.1. Increased levothyroxine to 125 from 100. Will need repeat TSH in 6 weeks        Expected discharge date:  1-2 days         Disposition:      ? Home                             ? TCU                             ? Rehab                             ? Psych                             ? SNF                             ? [place]                             ? Other-    Chief Complaint:   Chief Complaint   Patient presents with    Shortness of Breath    Chest Pain       Hospital Course: Patient was seen, examined and the medical chart was reviewed thoroughly today. In summary, 75 y. o.female admitted overnight for chest pain query ACS and acute on chronic CHF. Subjective (past 24 hours):   denies CP. SOB improved. Denies syncope/presyncope.  denies non-compliance with meds       Medications:  Reviewed    Infusion Medications    dextrose       Scheduled Medications    aspirin  81 mg Oral Daily    baclofen  10 mg Oral BID    calcium-vitamin D  1 tablet Oral Daily    clopidogrel  75 mg Oral Daily    cyanocobalamin  1,000 mcg Oral Daily    ferrous sulfate  325 mg Oral BID    fluticasone  1 spray Nasal Daily    hydrALAZINE  50 mg Oral TID    hydrOXYzine  25 mg Oral TID    levothyroxine  100 mcg Oral Daily    losartan  50 mg Oral Daily    multivitamin  1 tablet Oral Daily    metoprolol  100 mg Oral BID    OXcarbazepine  150 mg Oral QAM    pantoprazole  40 mg Oral QAM AC    [START ON 4/17/2019] vitamin D  50,000 Units Oral Weekly    sodium chloride  1 g Oral TID WC    rosuvastatin  20 mg Oral Nightly    ranolazine  500 mg Oral BID    reduce radiation dose to as low as reasonably achievable. FINDINGS: There is a 7 x 8 mm pulmonary nodule within the left lower lobe peripherally on axial image 26. This is increased in size from prior lung screening CT dated 4/24/2017. This is compatible with a lung RADS category 4B lesion. The central airways appear patent. There is moderate bilateral centrilobular emphysema. Mild dependent bibasilar ground glass opacities are present and may represent atelectasis or scarring. No pneumothorax is seen. There is mild bronchiectasis noted at the lung bases. No pneumothorax is seen. No other focal consolidation is demonstrated. No pleural effusions are seen. There is a 1.6 x 1.2 cm mediastinal lymph node is located anterior to the trachea on axial image 19. Previously this measured approximately 1.6 x 1.1 cm. This does not appear significantly changed from prior CT dated 4/24/2017. There is a small to moderate pericardial fluid present. Limited evaluation of the lung bases appears unremarkable. No acute osseous findings are demonstrated. 1. There is a 7 x 8 mm pulmonary nodule within the left lower lobe peripherally on axial image 26. This is increased in size from prior lung screening CT dated 4/24/2017. This is compatible with a lung RADS category 4B lesion given its interval increase in size. Consider further characterization with PET/CT. Otherwise, follow-up CT evaluation in 3 months is recommended to document stability. 2. Small to moderate pericardial fluid is present. **This report has been created using voice recognition software. It may contain minor errors which are inherent in voice recognition technology. ** Final report electronically signed by Dr. Cardenas Found on 3/26/2019 4:05 PM    Ct Abdomen Pelvis W Iv Contrast Additional Contrast? None    Result Date: 4/3/2019  CT ABDOMEN AND PELVIS WITH CONTRAST ENHANCEMENT: CLINICAL INFORMATION: ABDOMINAL PAIN, upper & L abd pain, vomiting sbo COMPARISON: 11/27/2018 TECHNIQUE: Multiple axial 5 mm images of the abdomen, pelvis, and lung bases were obtained following the administration of oral and intravenous contrast material (ISOVUE). Computer generated sagittal and coronal images of the abdomen and pelvis were also  reconstructed. ALL CT SCANS AT THIS FACILITY use dose modulation, iterative reconstruction, and/or weight-based dosing when appropriate to reduce radiation dose to as low as reasonably achievable. FINDINGS: Lung bases: Lung bases fibroemphysematous in appearance. Minimal atelectasis/fibrosis both posterior costophrenic angles. Tiny pericardial effusion, not appreciably changed from prior study. Small hiatus hernia. Liver: There is a poorly defined focus of diminished attenuation along the anterior/inferior aspect of liver right side, unchanged from prior study, likely representing a focus of volume averaging. Liver otherwise unremarkable. Prior cholecystectomy. 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 Kidneys otherwise unremarkable. No calculi. No hydronephrosis. Note that there is a filter in inferior vena cava. Densely calcified abdominal aorta. Bowel/Peritoneum: No abnormally dilated bowel loops are seen. There are multiple diverticula in the sigmoid and distal descending colon. No evidence for diverticulitis. Moderate mammography control the colon, consistent with constipation. Note that there  is a small focus of soft tissue stranding emanating from the anterior aspect of the proximal descending colon, not present on prior study and suggestive of a small focus of epiploic appendagitis. No free air. No free fluid in the abdomen. No abnormally enlarged para-aortic lymph nodes are seen. Bones : Prior lower lumbar fusion, A1-2, with metallic hardware in place. Pelvis: Unremarkable. No mass lesion. No free fluid. The urinary bladder is unremarkable. 1. Extensive diverticulosis coli. No evidence for diverticulitis. 2. Filter in the IVC. 3. Fibroemphysematous lungs. Small pericardial effusion. Minimal bibasilar atelectatic/fibrotic stranding. 4. Small focus of epiploic appendagitis proximal descending colon. This is a self limiting condition expected to resolve spontaneously. **This report has been created using voice recognition software. It may contain minor errors which are inherent in voice recognition technology. ** Final report electronically signed by Dr. Jess Aschoff on 4/3/2019 2:15 PM    Xr Chest Portable    Result Date: 4/3/2019  PROCEDURE: XR CHEST PORTABLE CLINICAL INFORMATION: short of breath COMPARISON: 3/21/2019 TECHNIQUE: A single mobile view of the chest was obtained. 1. Mild cardiomegaly. Prior anterior cervical fusion. 2. Otherwise normal chest. No acute findings. No infiltrates or effusions are seen. **This report has been created using voice recognition software. It may contain minor errors which are inherent in voice recognition technology. ** Final report electronically signed by Dr. Jess Aschoff on 4/3/2019 12:36 PM    Xr Chest Portable    Result Date: 3/21/2019  PROCEDURE: XR CHEST PORTABLE CLINICAL INFORMATION: fatigue. COMPARISON: 3/2/2019. TECHNIQUE: AP upright view of the chest. FINDINGS: The lungs appear grossly clear. Pulmonary nodule seen on previous CT is not visualized on this exam. Pulmonary vasculature and cardiac-based also what are within normal limits. Visualized osseous structures appear grossly intact. Stable radiographic appearance of the chest. No evidence of an acute process. **This report has been created using voice recognition software. It may contain minor errors which are inherent in voice recognition technology. ** Final report electronically signed by Dr. Diana Fuentes MD on 3/21/2019 4:43 PM      Diet: DIET CARDIAC; No Added Salt (3-4 GM)    DVT prophylaxis: ? Lovenox

## 2019-04-11 NOTE — ED PROVIDER NOTES
[facility]  eMERGENCY dEPARTMENT eNCOUnter          CHIEF COMPLAINT       Chief Complaint   Patient presents with    Shortness of Breath    Chest Pain       Nurses Notes reviewed and I agreeexcept as noted in the HPI. HISTORY OF PRESENT ILLNESS    Michael Lopes is a 75 y.o. female with a history of sever aortic stenosis, CAD, HTN  and COPD who presents to the Emergency Department for the evaluation of chest pain and shortness of breath. The patient states that her chest pain has been intermittent today. She describes her pain as an intermittent retrosternal pain which she rates as a 6/10 in severity. The patient states that her shortness of breath is consistent with her previous history of COPD but may be slightly worse than her baseline. The patient has access to O2 therapy at home which reportedly did not provide improvement in her symptoms. The patient was unsure if she had any unexpected weight gain. She denies any syncopal episodes, leg swelling, or any other signs or symptoms at this time. The patient was treated with 20 of Lopressor en route. The patient had a cardiac catheterization on 03/29/2019 performed by Dr. Derek Kim, cardiologist which showed the following:    SUMMARY:  1. Elevated right-sided pressures. 2.  Nonobstructive coronary artery disease. Patent stents in the RCA  and LAD with mild in-stent restenosis.     RECOMMENDATIONS:  1. Aggressive risk factor modification. 2.  Lipid-lowering therapy. 3.  Continue with TAVR workup. 4.  Followup in clinic in two to four weeks to evaluate symptoms  further. The HPI was provided by the patient. REVIEW OF SYSTEMS      Review of Systems   Constitutional: Positive for diaphoresis. Negative for activity change, appetite change, fatigue and unexpected weight change. HENT: Negative for congestion, ear discharge, ear pain, hearing loss, rhinorrhea, sinus pressure, sore throat, trouble swallowing and voice change. furosemide (LASIX) 40 MG tablet Take 0.5 tablets by mouth daily  Qty: 90 tablet, Refills: 1      sodium chloride 1 g tablet Take 1 tablet by mouth 3 times daily (with meals)  Qty: 90 tablet, Refills: 3      vitamin D (ERGOCALCIFEROL) 46545 units capsule Take 1 capsule by mouth once a week  Qty: 5 capsule, Refills: 1      cycloSPORINE (RESTASIS) 0.05 % ophthalmic emulsion Place 1 drop into both eyes 2 times daily  Qty: 1 vial, Refills: 5      ondansetron (ZOFRAN) 4 MG tablet Take 1 tablet by mouth daily as needed for Nausea or Vomiting  Qty: 30 tablet, Refills: 0      !! HYDROcodone-acetaminophen (NORCO) 5-325 MG per tablet Take 1 tablet by mouth every 6 hours as needed for Pain.       Handicap Placard MISC by Does not apply route Expires 25 MARCH 2014  Qty: 2 each, Refills: 0    Associated Diagnoses: Chronic neck pain; Chronic low back pain without sciatica, unspecified back pain laterality      hydrALAZINE (APRESOLINE) 50 MG tablet Take 1 tablet by mouth 3 times daily  Qty: 90 tablet, Refills: 3       MG capsule take 3 capsules every evening  Qty: 90 capsule, Refills: 1    Associated Diagnoses: Constipation, unspecified constipation type      losartan (COZAAR) 25 MG tablet Take 2 tablets by mouth daily  Qty: 90 tablet, Refills: 2      Multiple Vitamin (MULTIVITAMIN) tablet Take 1 tablet by mouth daily  Qty: 30 tablet, Refills: 1      RA SALINE NASAL SPRAY 0.65 % nasal spray instill 1 spray into each nostril if needed for congestion  Qty: 45 mL, Refills: 3      cyanocobalamin ([employer] VITAMIN B12) 1000 MCG tablet Take 1 tablet by mouth daily  Qty: 30 tablet, Refills: 3      ranolazine (RANEXA) 500 MG extended release tablet take 1 tablet by mouth twice a day  Qty: 180 tablet, Refills: 3      isosorbide dinitrate (ISORDIL) 40 MG tablet Take 1 tablet by mouth 3 times daily  Qty: 90 tablet, Refills: 3      metoprolol (LOPRESSOR) 100 MG tablet Take 1 tablet by mouth 2 times daily  Qty: 60 tablet, Refills: 0 LANCETS MISC use as directed BEFORE BREAKFAST AND SUPPER  Qty: 100 each, Refills: 11    Comments: Dx code 250.00      pantoprazole (PROTONIX) 40 MG tablet Take 40 mg by mouth every morning (before breakfast)       risperiDONE (RISPERDAL) 1 MG tablet Take 1 mg by mouth every morning        !! - Potential duplicate medications found. Please discuss with provider. ALLERGIES     is allergic to lipitor [atorvastatin]; motrin [ibuprofen micronized]; and codeine. FAMILY HISTORY     indicated that her mother is . She indicated that her father is . She indicated that only one of her two sisters is alive. She indicated that two of her six brothers are alive. She indicated that her maternal grandmother is . She indicated that her maternal grandfather is . She indicated that her paternal grandmother is . She indicated that her paternal grandfather is . She indicated that the status of her child is unknown. She indicated that her other is alive. family history includes Breast Cancer (age of onset: 36) in her child; Cancer in her sister and sister; Heart Attack in her father; Heart Disease in her brother, brother, brother, brother, father, mother, and sister; Kidney Disease in her sister; Other in her brother; Ovarian Cancer (age of onset: 22) in an other family member. SOCIAL HISTORY    reports that she quit smoking about 12 years ago. Her smoking use included cigarettes. She has a 38.00 pack-year smoking history. She has never used smokeless tobacco. She reports that she does not drink alcohol or use drugs. PHYSICAL EXAM     INITIAL VITALS:  height is 5' 6\" (1.676 m) and weight is 227 lb (103 kg). Her oral temperature is 97.9 °F (36.6 °C). Her blood pressure is 145/65 (abnormal) and her pulse is 80. Her respiration is 24 and oxygen saturation is 95%. Physical Exam   Constitutional: She is oriented to person, place, and time.  She appears well-developed and

## 2019-04-11 NOTE — PROGRESS NOTES
[address] [place] [address]  INPATIENT PHYSICAL THERAPY  EVALUATION  [facility] 4A - 4A-06/006-A    Time In: 2823  Time Out: 1154  Timed Code Treatment Minutes: 10 Minutes  Minutes: 25          Date: 2019  Patient Name: Warren Nunez,  Gender:  female        MRN: [id number]  : 1945  (75 y.o.)  Referral Date : 19   Referring Practitioner: Balta Paniagua MD  Diagnosis: Chest pain  Additional Pertinent Hx: 75 y.o. female who presented to [address] [place] [address] with complaints of chest pain and shortness of breath since approximately 9 AM this morning. Patient describes pain is substernal nonradiating and an 8/10 scale. Patient states pain came on at rest. Patient reports that she had pain relief with nitroglycerin. She states that she has had this pain before. She notes history of cardiac stent placement in the past. She reports that she needs a valve replaced and her cardiologist is preparing her for this procedure. PMH CAD, HTN, CHF, COPD, aortic stenosis, ASHD, anemia, hypothyroid, biploar. She states she is oxygen dependent 3L nc at home. She wears CPAP at night for CHET. She denies N/V/D/C. She denies abdominal pain. She lives at home.  Patient to be admitted for atypical chest pain     Past Medical History:   Diagnosis Date    Anemia     Anxiety     Aortic stenosis     Arthritis     general    AS (aortic stenosis): mild to moderate by echo 2017    ASHD (arteriosclerotic heart disease)     Asthma 2016    INHALER USE DAILY AND AS NEEDED    Bipolar disorder ([address]     Blood circulation, collateral     CAD (coronary artery disease)     MI, 5 STENTS IN HEART NO SURE OF HEART DR NAME [facility]S [facility] AT [facility]    Cerebral artery occlusion with cerebral infarction ([address]     SILENT-DX ON CT SCAN NO DEFICITS    Chest pain     CHF (congestive heart failure) (HCC)     COPD (chronic obstructive pulmonary disease) ([address] 2014    INHALER USE DAILY AND AS NEEDED    Depression Education, & procurement, Balance Training, Functional Mobility Training, Endurance Training, Transfer Training, Safety Education & Training    Goals:  Patient goals : Get stronger   Short term goals  Time Frame for Short term goals: 2 weeks  Short term goal 1: Patient will transfer supine <--> sit with supervision in order to get into/out of bed. Short term goal 2: Patient will transfer sit <--> stand with supervision in order to get up to ambulate. Short term goal 3: Patient will ambulate 27' with a RW and supervision to get to/from the bathroom. Long term goals  Time Frame for Long term goals : no LTGs due to short ELOS    Evaluation Complexity: Based on the findings of patient history, examination, clinical presentation, and decision making during this evaluation, the evaluation of Sofia   is of medium complexity.             AM-PAC Inpatient Mobility without Stair Climbing Raw Score : 15  AM-PAC Inpatient without Stair Climbing T-Scale Score : 43.03  Mobility Inpatient CMS 0-100% Score: 47.43  Mobility Inpatient without Stair CMS G-Code Modifier : TATIANA Royal, PT, DPT

## 2019-04-11 NOTE — PROGRESS NOTES
Per case management, patient able to go to [facility] at any time as she has had a previous stay and does not need prior approval from insurance.

## 2019-04-11 NOTE — ED NOTES
Pt. Taken to Xray at this time via radiology tech. Pt. Stable at this time.       Ale Cooper RN  04/10/19 [id number]

## 2019-04-11 NOTE — H&P
meals) 4/7/19  Yes Lucero Joe MD   vitamin D (ERGOCALCIFEROL) 29889 units capsule Take 1 capsule by mouth once a week 4/11/19  Yes Lucero Joe MD   cycloSPORINE (RESTASIS) 0.05 % ophthalmic emulsion Place 1 drop into both eyes 2 times daily 4/7/19  Yes Lucero Joe MD   ondansetron [facility]) 4 MG tablet Take 1 tablet by mouth daily as needed for Nausea or Vomiting 4/3/19  Yes ASHLEY Cooney CNP   HYDROcodone-acetaminophen (NORCO) 5-325 MG per tablet Take 1 tablet by mouth every 6 hours as needed for Pain.    Yes Historical Provider, MD   Handicap Placard [address] [place] [address] by Does not apply route Expires 25 MARCH 2014 3/25/19  Yes Koby Olvera DO   hydrALAZINE (APRESOLINE) 50 MG tablet Take 1 tablet by mouth 3 times daily 3/24/19  Yes Agueda Hansen MD    MG capsule take 3 capsules every evening 3/2/19  Yes ASHLEY Cooney CNP   losartan (COZAAR) 25 MG tablet Take 2 tablets by mouth daily 2/12/19  Yes ASHLEY Prado CNP   Multiple Vitamin (MULTIVITAMIN) tablet Take 1 tablet by mouth daily 2/5/19  Yes Agueda Hansen MD   RA SALINE NASAL SPRAY 0.65 % nasal spray instill 1 spray into each nostril if needed for congestion 1/7/19  Yes ASHLEY Cooney CNP   cyanocobalamin ([employer] VITAMIN B12) 1000 MCG tablet Take 1 tablet by mouth daily 12/29/18  Yes Tommy Ziegler MD   ranolazine (RANEXA) 500 MG extended release tablet take 1 tablet by mouth twice a day 12/18/18  Yes Fiordaliza Hernandez PA-C   isosorbide dinitrate (ISORDIL) 40 MG tablet Take 1 tablet by mouth 3 times daily 12/4/18  Yes ASHLEY Ahmadi CNP   metoprolol (LOPRESSOR) 100 MG tablet Take 1 tablet by mouth 2 times daily 11/6/18  Yes Fernanda Garza MD   polyethylene glycol (GLYCOLAX) powder Take 17 g by mouth daily as needed (Constipation)   Yes Historical Provider, MD   levothyroxine (SYNTHROID) 100 MCG tablet take 1 tablet by mouth once daily 9/19/18  Yes [atorvastatin]; Motrin [ibuprofen micronized]; and Codeine    Social History:      The patient currently lives at home    TOBACCO:   reports that she quit smoking about 12 years ago. Her smoking use included cigarettes. She has a 38.00 pack-year smoking history. She has never used smokeless tobacco.  ETOH:   reports that she does not drink alcohol. Family History:      Reviewed in detail and negative for DM and CVA. Positive as follows:        Problem Relation Age of Onset    Heart Disease Mother         CHF    Heart Disease Father         CAD    Heart Attack Father     Kidney Disease Sister         DIALYSIS    Cancer Sister         RECTUM    Heart Disease Sister     Heart Disease Brother     Heart Disease Brother         CAD    Heart Disease Brother     Heart Disease Brother     Breast Cancer Child 36    Cancer Sister         UTERINE    Ovarian Cancer Other 22    Other Brother         SUICIDE       Diet:  DIET GENERAL; No Added Salt (3-4 GM)    REVIEW OF SYSTEMS:   Pertinent positives as noted in the HPI. All other systems reviewed and negative. PHYSICAL EXAM:    BP (!) 183/84   Pulse 96   Temp 98.7 °F (37.1 °C) (Oral)   Resp 25   Ht 5' 6\" (1.676 m)   Wt 227 lb (103 kg)   LMP 02/12/1973 (Approximate)   SpO2 90%   BMI 36.64 kg/m²     General appearance:  Noted apparent distress, appears stated age and cooperative. HEENT:  Normal cephalic, atraumatic without obvious deformity. Pupils equal, round, and reactive to light. Extra ocular muscles intact. Conjunctivae/corneas clear. Neck: Supple, with full range of motion. No jugular venous distention. Trachea midline. Respiratory:  Increased respiratory effort. Decreased bases bialterally  Cardiovascular:  Regular rate and rhythm with normal S1/S2 without rubs or gallops. Abdomen: Soft, non-tender, non-distended with normal bowel sounds. Musculoskeletal:  No clubbing, cyanosis or edema bilaterally.   Full range of motion without Diagnosis Date Noted    Chest pain [R07.9] 08/26/2018     Priority: High    Acute on chronic diastolic congestive heart failure due to valvular disease ([facility] [I50.33, I38] 03/27/2018     Priority: High    Aortic valve stenosis [I35.0] 09/06/2017     Priority: Medium    COPD without exacerbation ([facility] [J44.9]      Priority: Medium    Type 2 diabetes mellitus with complication, with long-term current use of insulin (HCC) [E11.8, Z79.4]      Priority: Medium    Anemia [D64.9]      Priority: Low    Bilateral carotid artery stenosis [I65.23]      Priority: Low    Normocytic anemia [D64.9]      Priority: Low    CHET (obstructive sleep apnea) [G47.33] 07/12/2017     Priority: Low    Bilateral iliac artery occlusion ([facility] [I74.5] 04/03/2017     Priority: Low    Essential hypertension [I10] 01/29/2015     Priority: Low    S/P cardiac catheterization: 11/6/2014: 99% in-stent restenosis mid-RCA. LCx moderate LI's. LAD 85% mid-segment lesion. [Z98.890] 11/06/2014     Priority: Low    S/P PTCA: 3/2/2017: PTCA RCA ISR. 12/11/2014: LAD Resolute 3.0X26. 11/6/2014: Stenting RCA Ragini Dom 0.6P25 and 3.5X18. 5/11/2004: Proximal & mid RCA Taxus 3.5X20 mm, and Taxus 3.0X32 mm. [Z98.61] 10/28/2014     Priority: Low    Hypothyroid [E03.9] 01/15/2014     Priority: Low    Anxiety [F41.9]      Priority: Low    CAD (coronary artery disease) [I25.10]      Priority: Low    Dyslipidemia [E78.5]      Priority: Low       PLAN:    Admit to Telemetry Unit  Diet cardiac  Diuresis as tolerated  Strict I/Os  Daily weights  Analgesics PRN  Antiemetics PRN  DVT prophylaxis  PT/OT encouraged, if warranted  IS  Troponin overnight  Cardiology consult, apprec chart recs  AM labs  Continue nitroglycerin infusion  CPAP at night  Will need to re-evaluate home medications in morning  Continue to monitor closely          Thank you ASHLEY Mathur CNP for the opportunity to be involved in this patient's care.     Electronically signed by Lucas Beck DO on 4/11/2019 at 1:36 AM

## 2019-04-11 NOTE — CONSULTS
asthma    Patient Status: Routine    Height: 65.35 inches Weight: 229.29 pounds BSA: 2.1 m^2 BMI: 37.74 kg/m^2    BP: 184/85 mmHg    Allergies    - See Epic. Conclusions      Summary   Ejection fraction was estimated at 60-65%. Moderate biatrial dilation. The aortic valve was trileaflet, thickened, calcified, and restricted in   mobility. Transaortic velocity was 3.9 m/s, mean gradient of 33 mmHg, and   estimated BANDAR 0.8 cm2, consistent with severe aortic stenosis. There was no evidence of aortic regurgitation. The mitral valve structure demonstrated predominantly posterior leaflet   calcification. The transmitral velocity was mildly elevated at 2.0 m/s, with a mean   gradient of 8 mmHg, and estimated MVA 2.7 cm2. There was trace mitral   regurgitation. Ascending aorta = 3.4 cm. IVC size is dilated with reduced respiratory phasic changes (CVP~5-10   mmHg). Signature      ----------------------------------------------------------------   Electronically signed by Emily Nichole MD (Interpreting   physician) on 11/05/2018 at 12:35 PM   ----------------------------------------------------------------      Findings      Mitral Valve   The mitral valve structure demonstrated predominantly posterior leaflet   calcification. DOPPLER: The transmitral velocity was mildly elevated at   2.0 m/s, with a mean gradient of 8 mmHg, and estimated MVA 2.7 cm2. There   was trace mitral regurgitation. Aortic Valve   The aortic valve was trileaflet, thickened, calcified, and restricted in   mobility. DOPPLER: Transaortic velocity was 3.9 m/s, mean gradient of 33   mmHg, and estimated BANDAR 0.8 cm2. There was no evidence of aortic   regurgitation. Tricuspid Valve   The tricuspid valve structure was normal with normal leaflet separation. DOPPLER: There was no evidence of tricuspid stenosis. There was no   evidence of tricuspid regurgitation.       Pulmonic Valve   The pulmonic valve leaflets exhibited normal

## 2019-04-11 NOTE — PROGRESS NOTES
INHALER USE DAILY AND AS NEEDED    Depression     Disease of blood and blood forming organ     anemia per patient     DM (diabetes mellitus) ([facility] [address]     NO MEDS DIET CONTROLED    Dyspnea     FH: CAD (coronary artery disease)     Full dentures     UPPER AND LOWER    GERD (gastroesophageal reflux disease)     Headache     Heart burn     History of blood transfusion 1973    POST OP -PLASMA    History of blood transfusion 02/2019    1 UNIT LOW HGB    History of tobacco abuse: Quit in 2007     HLD (hyperlipidemia)     ON RX    HTN (hypertension)     ON RX    Irritable bowel syndrome     States has not had problem with this for years    Liver disease     fatty liver    Metabolic syndrome 11/86/7378    MI (myocardial infarction) ([facility] [address]     Nausea & vomiting     Obesity     CHET (obstructive sleep apnea) 2016    NO MACHINE YET    S/P cardiac catheterization: 11/6/2014: 99% in-stent restenosis mid-RCA. LCx moderate LI's. LAD 85% mid-segment lesion. 11/6/2014 11/6/2014: 99% in-stent restenosis mid-RCA. LCx moderate LI's. LAD 85% mid-segment lesion. Dr. Kalli Rosado S/P PTCA:  5/11/2004: Proximal and mid RCA  Taxus 3.5 X 20 mm, and Taxus 3.0 X 32 mm. 10/28/2014    5/11/2004: Proximal and mid RCA  Taxus 3.5 X 20 mm, and Taxus 3.0 X 32 mm. Dr. Asael Donovan Systolic murmur 81/09/5951    Thyroid disease     ON RX    Wears glasses      Past Surgical History:   Procedure Laterality Date    ABDOMEN SURGERY      BACK SURGERY  08/2016        BLADDER SUSPENSION      BREAST BIOPSY  10/10/2017    BREAST LUMPECTOMY      CARDIAC CATHETERIZATION  8-11-06    Mild nonobstructive CAD w/ diffuse 10-20% proximal and mid LAD stenosis and 10-20% proximal/ostial RCA stenosis. No obstructive lesions. RCA stents are patent w/o evidence of restenosis. Normal LV systolic function. EF 65%. Trace MR - catheter induced. Minimally elevated LVEDP - 13mmHg. Mild to moderate aortoiliac PVD w/o obstructive lesions.    Leo Mano CARDIAC

## 2019-04-12 NOTE — CARE COORDINATION
Osteoarthritis of multiple joints    Class 2 obesity due to excess calories with body mass index (BMI) of 38.0 to 38.9 in adult    Normocytic anemia    Subclavian vein stenosis    Post concussive syndrome    CRF (chronic renal failure), stage 4 (severe) ([facility]    Hypotension    Syncope and collapse    Metabolic encephalopathy    Right-sided epistaxis    Diastolic dysfunction without heart failure    Cervical spine instability    Nonrheumatic aortic valve stenosis    Acute diastolic heart failure (HCC)    Acute kidney injury (HCC)    Hypercalcemia    At risk for polypharmacy    Acute respiratory failure with hypoxia and hypercapnia (HCC)    Acute pulmonary edema ([facility])    Abnormal urinalysis    Irritable bowel syndrome    Anemia requiring transfusions    Stage 3 severe COPD by GOLD classification ([facility])    Bilateral carotid artery stenosis    Iron deficiency anemia due to dietary causes    B12 deficiency due to diet    Acute on chronic diastolic CHF (congestive heart failure), NYHA class 3 (HCC)    Anemia    Morbid obesity with BMI of 40.0-44.9, adult (HCC)    Hyponatremia with decreased serum osmolality    Metabolic alkalosis    Chronic diastolic congestive heart failure [facility])       Inpatient Assessment  Care Transitions Summary    Care Transitions Inpatient Review  Medication Review  Do you have all of your prescriptions and are they filled?:  Yes   Barriers to Medication Adherence:  None  Are you able to afford your medications?:  Yes  How often do you have difficulty taking your medications?:  I always take them as prescribed. Housing Review  Who do you live with?:  Alone  Are you an active caregiver in your home?:  No  Social Support  Do you have a ?:  Yes   Name:  Nenita Hall   Contact Info:  [phone] ext. [phone]  Do you have a [address] [place] [address]?:  Yes  [address] Name:  [address]:   [facility] Tate Amato
refill/ meds to beds program?  No  Type of Home Care Services:  Aide Services, Nursing Services  Patient expects to be discharged to:  Home with [facility]  Expected Discharge date:  04/13/19  Follow Up Appointment: Best Day/ Time: Monday AM    Discharge Plan: Met with Hoda Oleary. She currently lives at home alone. She has a walker and is current with [facility]. She is on home oxygen therapy at 3 liters supplied by [employer]. Plan at discharge is to return home and continue home health services. SW following.      Evaluation: yes
meals from [employer], 2 meals/day 5x per week. Patient is on 3L continuous oxygen through [facility]. Palliative care referral for chronic disease management and recurrent admission. Patient denies additional needs or concerns at this time. Business card with contact information for CTC provided. Patient understands CTC will follow upon discharge. Monica, patient's established ACC, updated on patient admission and POC.      Follow Up  Future Appointments   Date Time Provider Department Center   4/17/2019 10:00 AM Suyapa Vega PA-C Pul Med [address]   4/22/2019  9:40 AM Lalo Hernández PA-C Oncology [facility] [place]   4/30/2019  2:30 PM ASHLEY Howard CNP SRPX CHF P [place]   5/20/2019  1:20 PM ASHLEY Bowman CNP [facility] [place]   6/10/2019  8:20 AM STR CT IMAGING RM1  [facility] STR[facility] OUT EXP STR Radiolog   6/17/2019 10:30 AM ASHLEY Goode CNP Pul Med P - [place]   7/15/2019  1:00 PM Nancy Cullen RD, LD SRPX Physic [facility] [place]   10/1/2019 10:40 AM ASHLEY Nolasco CNP [facility] Maintenance  Health Maintenance Due   Topic Date Due    Breast cancer screen  10/10/2018       Redd Glover, RN  Care Transition Coordinator  [phone]  21

## 2019-04-12 NOTE — PROGRESS NOTES
[address]  INPATIENT OCCUPATIONAL THERAPY  [facility] 4A  DAILY NOTE    Time:  Time In: 5513  Time Out: 1050  Timed Code Treatment Minutes: 10 Minutes  Minutes: 10          Date: 2019  Patient Name: Kaur Cleveland,   Gender: female      Room: [facility]006-A  MRN: [id number]  : 1945  (75 y.o.)  Referring Practitioner: Buffy Emery MD  Diagnosis: Chest Pain  Additional Pertinent Hx: 75 y.o. female who presented to [address] [place] [address] with complaints of chest pain and shortness of breath since approximately 9 AM this morning. Patient describes pain is substernal nonradiating and an 8/10 scale. Patient states pain came on at rest. Patient reports that she had pain relief with nitroglycerin. She states that she has had this pain before. She notes history of cardiac stent placement in the past. She reports that she needs a valve replaced and her cardiologist is preparing her for this procedure. PMH CAD, HTN, CHF, COPD, aortic stenosis, ASHD, anemia, hypothyroid, biploar. She states she is oxygen dependent 3L nc at home. She wears CPAP at night for CHET. She denies N/V/D/C. She denies abdominal pain. She lives at home. Patient to be admitted for atypical chest pain.      Past Medical History:   Diagnosis Date    Anemia     Anxiety     Aortic stenosis     Arthritis     general    AS (aortic stenosis): mild to moderate by echo 2017    ASHD (arteriosclerotic heart disease)     Asthma 2016    INHALER USE DAILY AND AS NEEDED    Bipolar disorder ([address]     Blood circulation, collateral     CAD (coronary artery disease)     MI, 5 STENTS IN HEART NO SURE OF HEART DR [facility]    Cerebral artery occlusion with cerebral infarction ([address]     SILENT-DX ON CT SCAN NO DEFICITS    Chest pain     CHF (congestive heart failure) (HCC)     COPD (chronic obstructive pulmonary disease) ([address] 2014    INHALER USE DAILY AND AS NEEDED    Depression     stent x 2 of proximal and mid RCA.  CARDIAC CATHETERIZATION  5-11-04    70-80% proximal RCA stenosis w/ 50-70% mid RCA stenosis. There is 50-60% lesion in mid PDA. Mild proximal and mid LAD disease. Mild circumflex disease. Normal LV size and systolic function. EF 60%.  CARDIOVASCULAR STRESS TEST  5-10-11    No evidence of stress induced ischemia. EF 75%.  CARDIOVASCULAR STRESS TEST  5-10-10    No evidence of stress induced ischemia, infarct or scar. EF 74%.     CAROTID ENDARTERECTOMY      CERVICAL FUSION N/A 11/15/2017    REMOVAL OF PLATE B0-2, ACDF W7-0 W/ATLANTIS CORNERSTONE performed by Mignon Lambert MD at [address] [place] [address], DIAGNOSTIC      EYE SURGERY      cataract 2017   [place] [address] EAR SURGERY      NECK SURGERY  FEB 2016    OTHER SURGICAL HISTORY  02/13/2019    Arteriogram for diagnostics    PARTIAL HYSTERECTOMY      UPPER GASTROINTESTINAL ENDOSCOPY      UPPER GASTROINTESTINAL ENDOSCOPY         Restrictions/Precautions:  General Precautions, Fall Risk                            Prior Level of Function:  ADL Assistance: Needs assistance  Homemaking Assistance: Needs assistance(pt able to cook and clean with friend's assist, HHA assist with laundry and housekepeing; dtr and friends do grocery shopping)  Ambulation Assistance: Independent(cane/rollator inside home, manual w/c in communiy)  Transfer Assistance: Independent  Additional Comments:      Subjective       Subjective: Pt required encoruagement to work with therapy, reports getting discharged to nursing home this date               Pain:   denies       Objective  Cognition Comment: flat affect                ADL  Additional Comments: declined ADLs even with encouragement           Bed mobility  Supine to Sit: Stand by assistance  Scooting: Stand by assistance    Transfers  Sit to stand: Contact guard assistance  Stand to sit: Contact guard assistance       Balance  Sitting Balance: Supervision  Standing Balance: Contact guard assistance     Time: 30 seconds   Activity: prep for mobility     Functional Mobility  Functional - Mobility Device: Rolling Walker  Activity: Other  Assist Level: Contact guard assistance  Functional Mobility Comments: X 3 feet from EOB to recliner                                      Type of ROM/Therapeutic Exercise: Free weights  Comment: Pt completed BUE strengthening exercises X 10 reps while seated in chair for shoulder flexion, chest press and bicep curl. Good tolerance. completed to improve UB strength needed for ease wtih ADLs and functional trnasfers                    Activity Tolerance:  Activity Tolerance: Patient Tolerated treatment well;Patient limited by fatigue    Assessment:     Performance deficits / Impairments: Decreased functional mobility , Decreased strength, Decreased endurance, Decreased balance, Decreased safe awareness, Decreased ADL status, Decreased high-level IADLs  Prognosis: Good    Discharge Recommendations:  Discharge Recommendations: Continue to assess pending progress, Subacute/Skilled Nursing Facility, Patient would benefit from continued therapy after discharge    Patient Education:  Patient Education: OT role, poc/goals, safety, t/fs, ADLs    Equipment Recommendations:  Equipment Needed: No  Other: defer    Safety:  Safety Devices in place: Yes  Type of devices: All fall risk precautions in place, Call light within reach, Patient at risk for falls, Gait belt, Nurse notified, Left in chair, Chair alarm in place    Plan:  Times per week: 3-5x  Current Treatment Recommendations: Strengthening, Balance Training, Endurance Training, Functional Mobility Training, Patient/Caregiver Education & Training, Safety Education & Training, Self-Care / ADL    Goals:  Patient goals :  To get stronger before surgery    Short term goals  Time Frame for Short term goals:

## 2019-04-12 NOTE — PLAN OF CARE
decrease  Outcome: Ongoing  Note:   Patient able to use 0-10 pain scale. Patient has chronic pain in neck and back. Patient states pain \"8/10\". Patient stated pain goal is \"3/10\". Patient has [employer] Q6 available for any pain. Goal: Control of acute pain  Description  Control of acute pain  Outcome: Ongoing  Note:   Patient able to use 0-10 pain scale. Patient has chronic pain in neck and back. Patient states pain \"8/10\". Patient stated pain goal is \"3/10\". Patient has Norco Q6 available for any pain. Goal: Control of chronic pain  Description  Control of chronic pain  4/11/2019 2156 by Mahad Narvaez RN  Outcome: Ongoing  Note:   Patient able to use 0-10 pain scale. Patient has chronic pain in neck and back. Patient states pain \"8/10\". Patient stated pain goal is \"3/10\". Patient has [employer]co Q6 available for any pain. Problem: DISCHARGE BARRIERS  Goal: Patient's continuum of care needs are met  4/11/2019 2156 by Mahad Narvaez RN  Outcome: Ongoing  Note:   Discharge planning in process and discussed with patient/family. Social work consulted for any additional needs. Care manager aware of discharge needs. Patient is from home alone. Patient needs a TAVR. In order to have TAVR completed patient must stay out of hospital for 2 weeks. Therefore, patient is going to [facility] until TAVR procedure. Possible discharge tomorrow 4/12. Problem: Musculor/Skeletal Functional Status  Goal: Highest potential functional level  4/11/2019 2156 by Mahad Narvaez RN  Outcome: Ongoing  Note:   Patient is experiencing weakness and shortness of breath. Patient is working with PT/OT.      Problem: Fluid Volume:  Goal: Hemodynamic stability will improve  Description  Hemodynamic stability will improve  Outcome: Ongoing  Note:   Vitals:    04/11/19 1535 04/11/19 1630 04/11/19 2003 04/11/19 2145   BP: (!) 174/75 (!) 148/84 (!) 152/77    Pulse: 95 93 95    Resp:  21 22 20   Temp: 99.9 °F (37.7 °C)  98.7 °F (37.1 °C)

## 2019-04-12 NOTE — PROGRESS NOTES
CLINICAL PHARMACY: DISCHARGE MED RECONCILIATION/REVIEW    [facility]) Select Patient?: Yes  Total # of Interventions Recommended: 1 -    -   Total # Interventions Accepted: 1  Intervention Severity:   - Level 1 Intervention Present?: No   - Level 2 #: 0   - Level 3 #: 1   Time Spent (min): 5    Additional Documentation:

## 2019-04-12 NOTE — DISCHARGE INSTR - COC
encephalopathy G93.41    Right-sided epistaxis M64.6    Diastolic dysfunction without heart failure I51.89    Cervical spine instability M53.2X2    Nonrheumatic aortic valve stenosis I35.0    Acute diastolic heart failure (HCC) I50.31    Acute kidney injury ([address] N17.9    Hypercalcemia E83.52    At risk for polypharmacy Z91.89    Acute respiratory failure with hypoxia and hypercapnia ([facility]) J96.01, J96.02    Acute pulmonary edema ([facility]) J81.0    Abnormal urinalysis R82.90    Irritable bowel syndrome K58.9    Anemia requiring transfusions D64.9    Stage 3 severe COPD by GOLD classification ([facility]) J44.9    Bilateral carotid artery stenosis I65.23    Iron deficiency anemia due to dietary causes D50.8    B12 deficiency due to diet E53.8    Acute on chronic diastolic CHF (congestive heart failure), NYHA class 3 ([facility]) I50.33    Anemia D64.9    Morbid obesity with BMI of 40.0-44.9, adult ([facility]) E66.01, Z68.41    Hyponatremia with decreased serum osmolality D17.4    Metabolic alkalosis E61.6    Chronic diastolic congestive heart failure ([facility]) I50.32       Isolation/Infection:   Isolation          No Isolation            Nurse Assessment:  Last Vital Signs: BP (!) 150/96 Comment: manual  Pulse 83   Temp 99.8 °F (37.7 °C) (Oral)   Resp 20   Ht 5' 6\" (1.676 m)   Wt 220 lb 3.2 oz (99.9 kg)   LMP 02/12/1973 (Approximate)   SpO2 92%   BMI 35.54 kg/m²     Last documented pain score (0-10 scale): Pain Level: 8  Last Weight:   Wt Readings from Last 1 Encounters:   04/12/19 220 lb 3.2 oz (99.9 kg)     Mental Status:  oriented, alert and able to concentrate and follow conversation    IV Access:  - None    Nursing Mobility/ADLs:  Walking   Assisted  Transfer  Assisted  Bathing  Assisted  Dressing  Assisted  Toileting  Assisted  Feeding  Independent  Med Admin  Assisted  Med Delivery   whole    Wound Care Documentation and Therapy:  Wound 12/21/18 Coccyx Medial (Active)   Number of days: 111       Wound

## 2019-04-14 PROBLEM — J44.1 ACUTE EXACERBATION OF COPD WITH ASTHMA (HCC): Status: ACTIVE | Noted: 2019-01-01

## 2019-04-14 PROBLEM — J45.901 ACUTE EXACERBATION OF COPD WITH ASTHMA (HCC): Status: ACTIVE | Noted: 2019-01-01

## 2019-04-14 NOTE — FLOWSHEET NOTE
04/14/19 1726   Provider Notification   Reason for Communication Review case   Provider Name Dr. Maribeth Lizarraga   Provider Notification Physician   Method of Communication Secure Message   Response Waiting for response   Notification Time [phone]- New admit of yours, update: rectal temp 102.3 F. I just gave Tylenol. BP is 181/97 (130). Manual check was 170/88.  bpm. Patient also has severe excoriation under her breasts, can we get Desenex? Thanks! Waiting for response. Provider responded and ordered the Desenex. See orders.

## 2019-04-14 NOTE — ED PROVIDER NOTES
[facility]  eMERGENCY dEPARTMENT eNCOUnter          CHIEF COMPLAINT       Chief Complaint   Patient presents with    Shortness of Breath       Nurses Notes reviewed and I agree except as noted in the HPI. HISTORY OF PRESENT ILLNESS    Janice Grier is a 75 y.o. female who presents to the ED via EMS with increased shortness of breath, congestion and cough. The patient was recently admitted on 4/10/19 for similar symptoms. She currently resides in [facility]. Per the note from [facility], the patient was given an albuterol treatment at 7:45 this morning and had her oxygen increased from 3 liters to 4 liters. The patient is on Plavix and 40 mg lasix injection twice a day. The patient denies any other symptoms or relevant history at this time. REVIEW OF SYSTEMS     Constitutional: no fever or chills  Gastrointestinal : no abdominal pain  : no dysuria      Remainder of review of systems is otherwise reviewed as negative.       PAST MEDICAL HISTORY    has a past medical history of Anemia, Anxiety, Aortic stenosis, Arthritis, AS (aortic stenosis): mild to moderate by echo 4/2017, ASHD (arteriosclerotic heart disease), Asthma, Bipolar disorder ([address] Blood circulation, collateral, CAD (coronary artery disease), Cerebral artery occlusion with cerebral infarction [facility]), Chest pain, CHF (congestive heart failure) ([address] COPD (chronic obstructive pulmonary disease) ([address] Depression, Disease of blood and blood forming organ, DM (diabetes mellitus) ([address] Dyspnea, FH: CAD (coronary artery disease), Full dentures, GERD (gastroesophageal reflux disease), Headache, Heart burn, History of blood transfusion, History of blood transfusion, History of tobacco abuse: Quit in 2007, HLD (hyperlipidemia), HTN (hypertension), Irritable bowel syndrome, Liver disease, Metabolic syndrome, MI (myocardial infarction) ([address] Nausea & vomiting, Obesity, CHET (obstructive sleep apnea), S/P cardiac catheterization: 50 MCG/ACT NASAL SPRAY    1 spray by Nasal route daily    FUROSEMIDE (LASIX) 40 MG TABLET    Take 1 tablet by mouth 2 times daily    HANDICAP PLACARD MISC    by Does not apply route Expires 25 MARCH 2014    HYDRALAZINE (APRESOLINE) 50 MG TABLET    Take 2 tablets by mouth 3 times daily    HYDROCODONE-ACETAMINOPHEN (NORCO) 5-325 MG PER TABLET    Take 1 tablet by mouth See Admin Instructions for 30 days. Take 1/2 tablet bid    HYDROCODONE-ACETAMINOPHEN (NORCO) 5-325 MG PER TABLET    Take 1 tablet by mouth every 6 hours as needed for Pain for up to 2 days.     HYDROXYZINE (ATARAX) 25 MG TABLET    Take 25 mg by mouth 3 times daily    ISOSORBIDE MONONITRATE (IMDUR) 30 MG EXTENDED RELEASE TABLET    Take 1 tablet by mouth daily    LANCETS MISC    Check blood sugar q daily Dx E11.69    LEVOTHYROXINE (SYNTHROID) 125 MCG TABLET    Take 1 tablet by mouth Daily    LOSARTAN (COZAAR) 25 MG TABLET    Take 2 tablets by mouth daily    METOPROLOL (LOPRESSOR) 100 MG TABLET    Take 1 tablet by mouth 2 times daily    MULTIPLE VITAMIN (MULTIVITAMIN) TABLET    Take 1 tablet by mouth daily    ONDANSETRON (ZOFRAN) 4 MG TABLET    Take 1 tablet by mouth daily as needed for Nausea or Vomiting    ONE TOUCH ULTRASOFT LANCETS MISC    use as directed BEFORE BREAKFAST AND SUPPER    OXCARBAZEPINE (TRILEPTAL) 150 MG TABLET    Take 150 mg by mouth every morning     OXYGEN    Inhale 3 L into the lungs continuous     PANTOPRAZOLE (PROTONIX) 40 MG TABLET    Take 40 mg by mouth every morning (before breakfast)     POLYETHYLENE GLYCOL (GLYCOLAX) POWDER    Take 17 g by mouth daily as needed (Constipation)    RA SALINE NASAL SPRAY 0.65 % NASAL SPRAY    instill 1 spray into each nostril if needed for congestion    RANOLAZINE (RANEXA) 500 MG EXTENDED RELEASE TABLET    take 1 tablet by mouth twice a day    RISPERIDONE (RISPERDAL) 1 MG TABLET    Take 1 mg by mouth every morning     ROSUVASTATIN (CRESTOR) 20 MG TABLET    take 1 tablet by mouth once daily SYMBICORT 160-4.5 MCG/ACT AERO    inhale 2 puffs by mouth twice a day    VITAMIN D (ERGOCALCIFEROL) 54685 UNITS CAPSULE    Take 1 capsule by mouth once a week       ALLERGIES     is allergic to lipitor [atorvastatin]; motrin [ibuprofen micronized]; and codeine. FAMILY HISTORY     indicated that her mother is . She indicated that her father is . She indicated that only one of her two sisters is alive. She indicated that two of her six brothers are alive. She indicated that her maternal grandmother is . She indicated that her maternal grandfather is . She indicated that her paternal grandmother is . She indicated that her paternal grandfather is . She indicated that the status of her child is unknown. She indicated that her other is alive. family history includes Breast Cancer (age of onset: 36) in her child; Cancer in her sister and sister; Heart Attack in her father; Heart Disease in her brother, brother, brother, brother, father, mother, and sister; Kidney Disease in her sister; Other in her brother; Ovarian Cancer (age of onset: 22) in an other family member. SOCIAL HISTORY      reports that she quit smoking about 12 years ago. Her smoking use included cigarettes. She has a 38.00 pack-year smoking history. She has never used smokeless tobacco. She reports that she does not drink alcohol or use drugs. PHYSICAL EXAM     INITIAL VITALS:  height is 5' 6\" (1.676 m) and weight is 220 lb (99.8 kg). Her oral temperature is 99 °F (37.2 °C). Her blood pressure is 194/85 (abnormal) and her pulse is 123. Her respiration is 25 and oxygen saturation is 94%. Constitutional: ill appearing  Eyes:  Pupils are equal and reactive, extraocular muscles intact   HENT:  Atraumatic appearing  oropharynx moist, no pharyngeal exudates.   Neck- normal range of motion,  supple   Respiratory:  Rales b/l   Cardiovascular: regular, no murmur  GI:  Non tender, no rigidity, rebound or guarding  :  No costovertebral angle tenderness   Integument: diaphoretic  Neurologic:  Alert & oriented x 3  Psychiatric:  Anxious appearing        DIAGNOSTIC RESULTS     EKG: All EKG's are interpreted by the Emergency Department Physician who either signs or Co-signs this chart in the absence of a cardiologist.   EKG interpreted by me showing sinus tachycardia at a rate of 140, QRS of 70, QTc 415, axis of 19. A lot of background noise on this EKG. RADIOLOGY: non-plain film images(s) such as CT, Ultrasound and MRI are read by the radiologist.  I reviewed her chest x-ray and compared it to the previous one does look like it got a little bit worse in terms of the edema and pulmonary vascular congestion.     LABS:   Labs Reviewed   CBC WITH AUTO DIFFERENTIAL - Abnormal; Notable for the following components:       Result Value    RBC 3.61 (*)     Hemoglobin 9.9 (*)     Hematocrit 32.1 (*)     MCHC 30.8 (*)     RDW-SD 46.8 (*)     MPV 9.3 (*)     Lymphocytes # 0.4 (*)     All other components within normal limits   BASIC METABOLIC PANEL - Abnormal; Notable for the following components:    Sodium 134 (*)     Chloride 92 (*)     Glucose 185 (*)     All other components within normal limits   BRAIN NATRIURETIC PEPTIDE - Abnormal; Notable for the following components:    Pro-BNP 5142.0 (*)     All other components within normal limits   PROTIME-INR - Abnormal; Notable for the following components:    INR 1.18 (*)     All other components within normal limits   GLOMERULAR FILTRATION RATE, ESTIMATED - Abnormal; Notable for the following components:    Est, Glom Filt Rate 82 (*)     All other components within normal limits   OSMOLALITY - Abnormal; Notable for the following components:    Osmolality Calc 273.9 (*)     All other components within normal limits   HEPATIC FUNCTION PANEL   TROPONIN   ANION GAP       EMERGENCY DEPARTMENT COURSE:   Vitals:    Vitals:    04/14/19 0823 04/14/19 1047   BP: (!) 186/130 (!) 194/85 Pulse: 137 123   Resp: (!) 31 25   Temp: 99 °F (37.2 °C)    TempSrc: Oral    SpO2: 94% 94%   Weight: 220 lb (99.8 kg)    Height: 5' 6\" (1.676 m)       This patient was recently in the hospital for CHF exacerbation and actually all of her results they look like they're getting worse. She is actually diaphoretic. I think her x-ray is worse. Her beta natruretic peptide is worse. Were just had to bring her back and try to diurese her further. I ordered another dose of Lasix. I discussed case with the hospitalist to arrange for admission. CRITICAL CARE:   5 min    CONSULTS:  Dr Jordan Deleon:  None    FINAL IMPRESSION      1. Acute on chronic congestive heart failure, unspecified heart failure type [facility])          DISPOSITION/PLAN   Admitted    DISCHARGE MEDICATIONS:  New Prescriptions    No medications on file       (Please note that portions of this note were completed with a voice recognition program.  Efforts were made to edit the dictations but occasionally words are mis-transcribed.)    Jessica Florence DO    Scribe:  Ester Duverney 4/14/19 10:29 AM Scribing for and in the presence of Dr. Jessica Florence DO  . Signed by: Ester Duverney, Scribe, 04/14/19 11:11 AM    Provider:  I personally performed the services described in the documentation, reviewed and edited the documentation which was dictated to the scribe in my presence, and it accurately records my words and actions.     Dr. Jessica Florence DO   04/14/19 11:11 AM       Jessica Florence DO  04/14/19 1111

## 2019-04-14 NOTE — H&P
History & Physical        Patient:  Marquis Bender  YOB: 1945    MRN: [id number]     Acct: [de-identified]    PCP: ASHLEY Maurice Ra, CNP    Date of Admission: 4/14/2019    Date of Service: Pt seen/examined on 4/14/2019. and Admitted to Inpatient with expected LOS greater than two midnights due to medical therapy. Chief Complaint:    Chief Complaint   Patient presents with    Shortness of Breath         History Of Present Illness:  Pt last discharged to [facility] on 4/12/2019. Well until yesterday with progressively worsening dry cough and SOB. denies CP. Voice compliance w/ meds and diet. Denies fever/chills/ urinary symptoms or diarrhea. She was given 40 lasix by ED physician w/ no improvement. 75 y.o. female who presented to [address] [place] [address] with [address]     Past Medical History:          Diagnosis Date    Anemia     Anxiety     Aortic stenosis     Arthritis     general    AS (aortic stenosis): mild to moderate by echo 4/2017 9/6/2017    ASHD (arteriosclerotic heart disease)     Asthma 2016    INHALER USE DAILY AND AS NEEDED    Bipolar disorder ([address]     Blood circulation, collateral     CAD (coronary artery disease) 1999    MI, 5 STENTS IN HEART NO SURE OF HEART DR NAME SEE'S [facility] AT [facility]    Cerebral artery occlusion with cerebral infarction ([address] 2018    SILENT-DX ON CT SCAN NO DEFICITS    Chest pain     [facility]     COPD (chronic obstructive pulmonary disease) ([address] 2014    INHALER USE DAILY AND AS NEEDED    Depression     Disease of blood and blood forming organ     anemia per patient     DM (diabetes mellitus) ([address]     NO MEDS DIET CONTROLED    Dyspnea     FH: CAD (coronary artery disease)     Full dentures     UPPER AND LOWER    GERD (gastroesophageal reflux disease)     Headache     Heart burn     History of blood transfusion 1973    POST OP -PLASMA    History of blood transfusion 02/2019    1 UNIT LOW HGB    History of tobacco abuse: Quit in 2007     HLD (hyperlipidemia)     ON RX    HTN (hypertension)     ON RX    Irritable bowel syndrome     States has not had problem with this for years    Liver disease     fatty liver    Metabolic syndrome 82/55/5647    [facility]     Nausea & vomiting     Obesity     CHET (obstructive sleep apnea) 2016    NO MACHINE YET    S/P cardiac catheterization: 11/6/2014: 99% in-stent restenosis mid-RCA. LCx moderate LI's. LAD 85% mid-segment lesion. 11/6/2014 11/6/2014: 99% in-stent restenosis mid-RCA. LCx moderate LI's. LAD 85% mid-segment lesion. Dr. José Miguel Davis S/P PTCA:  5/11/2004: Proximal and mid RCA  Taxus 3.5 X 20 mm, and Taxus 3.0 X 32 mm. 10/28/2014    5/11/2004: Proximal and mid RCA  Taxus 3.5 X 20 mm, and Taxus 3.0 X 32 mm. Dr. Alex Hanson Systolic murmur 48/80/6787    Thyroid disease     ON RX    Wears glasses        Past Surgical History:          Procedure Laterality Date    ABDOMEN SURGERY      BACK SURGERY  08/2016        BLADDER SUSPENSION      BREAST BIOPSY  10/10/2017    BREAST LUMPECTOMY      CARDIAC CATHETERIZATION  8-11-06    Mild nonobstructive CAD w/ diffuse 10-20% proximal and mid LAD stenosis and 10-20% proximal/ostial RCA stenosis. No obstructive lesions. RCA stents are patent w/o evidence of restenosis. Normal LV systolic function. EF 65%. Trace MR - catheter induced. Minimally elevated LVEDP - 13mmHg. Mild to moderate aortoiliac PVD w/o obstructive lesions.  CARDIAC CATHETERIZATION  5-11-04    Successful drug-eluting stent x 2 of proximal and mid RCA.  CARDIAC CATHETERIZATION  5-11-04    70-80% proximal RCA stenosis w/ 50-70% mid RCA stenosis. There is 50-60% lesion in mid PDA. Mild proximal and mid LAD disease. Mild circumflex disease. Normal LV size and systolic function. EF 60%.  CARDIOVASCULAR STRESS TEST  5-10-11    No evidence of stress induced ischemia. EF 75%.     CARDIOVASCULAR STRESS TEST Callum Lopes MD   ondansetron [facility]) 4 MG tablet Take 1 tablet by mouth daily as needed for Nausea or Vomiting 4/3/19  Yes ASHLEY Cruz CNP   Handicap Placard MISC by Does not apply route Expires 25 MARCH 2014 3/25/19  Yes David Mccurdy DO    MG capsule take 3 capsules every evening 3/2/19  Yes ASHLEY Cruz CNP   losartan (COZAAR) 25 MG tablet Take 2 tablets by mouth daily 2/12/19  Yes ASHLEY Prado CNP   Multiple Vitamin (MULTIVITAMIN) tablet Take 1 tablet by mouth daily 2/5/19  Yes [facility] Stefany Hansen MD    SALINE NASAL SPRAY 0.65 % nasal spray instill 1 spray into each nostril if needed for congestion 1/7/19  Yes ASHLEY Cruz CNP   cyanocobalamin ([employer] VITAMIN B12) 1000 MCG tablet Take 1 tablet by mouth daily 12/29/18  Yes Geovanni Bullard MD   ranolazine (RANEXA) 500 MG extended release tablet take 1 tablet by mouth twice a day 12/18/18  Yes Rey Pate PA-C   metoprolol (LOPRESSOR) 100 MG tablet Take 1 tablet by mouth 2 times daily 11/6/18  Yes Pamila Nageotte, MD   polyethylene glycol (GLYCOLAX) powder Take 17 g by mouth daily as needed (Constipation)   Yes Historical Provider, MD   Calcium Carbonate-Vitamin D (OYSTER SHELL CALCIUM/D) 500-200 MG-UNIT TABS take 1 tablet by mouth twice a day 9/19/18  Yes ASHLEY Cruz CNP   aspirin (ASPIRIN LOW DOSE) 81 MG EC tablet take 1 tablet by mouth once daily  Patient taking differently: Take 81 mg by mouth daily take 1 tablet by mouth once daily 9/19/18  Yes ASHLEY Cruz CNP   rosuvastatin (CRESTOR) 20 MG tablet take 1 tablet by mouth once daily 9/18/18  Yes Rey Pate PA-C   albuterol sulfate HFA (VENTOLIN HFA) 108 (90 Base) MCG/ACT inhaler Inhale 2 puffs into the lungs every 6 hours as needed for Wheezing 8/28/18  Yes Gabbi Polka, APRN - CNP   clopidogrel (PLAVIX) 75 MG tablet take 1 tablet by mouth once daily 7/30/18  Yes Diana Hassan PA-C baclofen (LIORESAL) 10 MG tablet Take 10 mg by mouth 2 times daily   Yes Historical Provider, MD   SYMBICORT 160-4.5 MCG/ACT AERO inhale 2 puffs by mouth twice a day 4/21/18  Yes ASHLEY Correa CNP   fluticasone (FLONASE) 50 MCG/ACT nasal spray 1 spray by Nasal route daily 1/23/18  Yes ASHLEY Correa CNP   hydrOXYzine (ATARAX) 25 MG tablet Take 25 mg by mouth 3 times daily   Yes Historical Provider, MD   Lancets MISC Check blood sugar q daily Dx E11.69 11/9/17  Yes Casimiro Thakur, DO   Blood Glucose Monitoring Suppl HARVINDER Check blood sugar q daily Dx E11.69 11/9/17  Yes Demarco Ellison, DO   OXYGEN Inhale 3 L into the lungs continuous    Yes Historical Provider, MD   OXcarbazepine (TRILEPTAL) 150 MG tablet Take 150 mg by mouth every morning    Yes Historical Provider, MD   ONE TOUCH ULTRASOFT LANCETS MISC use as directed BEFORE BREAKFAST AND SUPPER 10/15/14  Yes ASHLEY Fair CNP   pantoprazole (PROTONIX) 40 MG tablet Take 40 mg by mouth every morning (before breakfast)    Yes Historical Provider, MD   risperiDONE (RISPERDAL) 1 MG tablet Take 1 mg by mouth every morning    Yes Historical Provider, MD   HYDROcodone-acetaminophen (NORCO) 5-325 MG per tablet Take 1 tablet by mouth See Admin Instructions for 30 days. Take 1/2 tablet bid 4/9/19 5/9/19  ASHLEY Fair CNP       Allergies:  Lipitor [atorvastatin];  Motrin [ibuprofen micronized]; and Codeine    Social History:      The patient currently lives   Social History     Socioeconomic History    Marital status:      Spouse name: Not on file    Number of children: 11    Years of education: Not on file    Highest education level: Not on file   Occupational History    Not on file   Social Needs    Financial resource strain: Not on file    Food insecurity:     Worry: Not on file     Inability: Not on file    Transportation needs:     Medical: Not on file     Non-medical: Not on file   Tobacco Use    Smoking status: Former Smoker     Packs/day: 1.00     Years: 38.00     Pack years: 38.00     Types: Cigarettes     Last attempt to quit: 2007     Years since quittin.2    Smokeless tobacco: Never Used   Substance and Sexual Activity    Alcohol use: No    Drug use: No    Sexual activity: Never   Lifestyle    Physical activity:     Days per week: Not on file     Minutes per session: Not on file    Stress: Not on file   Relationships    Social connections:     Talks on phone: Not on file     Gets together: Not on file     Attends Anabaptism service: Not on file     Active member of club or organization: Not on file     Attends meetings of clubs or organizations: Not on file     Relationship status: Not on file    Intimate partner violence:     Fear of current or ex partner: Not on file     Emotionally abused: Not on file     Physically abused: Not on file     Forced sexual activity: Not on file   Other Topics Concern    Not on file   Social History Narrative    Not on file       TOBACCO:   reports that she quit smoking about 12 years ago. Her smoking use included cigarettes. She has a 38.00 pack-year smoking history. She has never used smokeless tobacco.  ETOH:   reports that she does not drink alcohol. Family History:       Reviewed in detail and negative for DM, CAD, Cancer, CVA. Positive as follows:        Problem Relation Age of Onset    Heart Disease Mother         CHF    Heart Disease Father         CAD    Heart Attack Father     Kidney Disease Sister         DIALYSIS    Cancer Sister         RECTUM    Heart Disease Sister     Heart Disease Brother     Heart Disease Brother         CAD    Heart Disease Brother     Heart Disease Brother     Breast Cancer Child 36    Cancer Sister         UTERINE    Ovarian Cancer Other 22    Other Brother         SUICIDE       Diet:  No diet orders on file    REVIEW OF SYSTEMS:   Pertinent positives as noted in the HPI.  All other systems reviewed and negative. PHYSICAL EXAM:    BP (!) 165/82   Pulse 118   Temp 99 °F (37.2 °C) (Oral)   Resp 22   Ht 5' 6\" (1.676 m)   Wt 220 lb (99.8 kg)   LMP 02/12/1973 (Approximate)   SpO2 98%   BMI 35.51 kg/m²     General appearance: A&O x3, Not ill or toxic, in no apparent distress  HEENT:  CHING  EOM intact. Neck: Supple, with full range of motion. No jugular venous distention. Trachea midline. Respiratory:   decreased A/E bilat with  scattered wheezing  Cardiovascular:  normal S1/S2 with IV Pastora best heard over aortic area w/ radiation to neck  Abdomen: Soft, non-tender, non-distended, no rigidity or peritoneal signs  Musculoskeletal: NL symmetrical A/PROM bilat U/L extremities   Skin: No rashes. + bilat. pitting edema   Neurologic:  CN II-XII intact. NL symmetrical reflexes. NL gait and stance. NL Cerebellar exam. Power 5/5 all muscle groups U/L extremities. Toes downgoing  Capillary Refill: Brisk,< 3 seconds   Peripheral Pulses: +2 palpable, equal bilater        Labs:     Recent Labs     04/14/19  0942   WBC 6.0   HGB 9.9*   HCT 32.1*        Recent Labs     04/12/19  0926 04/14/19  0942    134*   K 4.7 3.9   CL 96* 92*   CO2 30 27   BUN 15 15   CREATININE 0.8 0.7   CALCIUM 9.1 9.9     Recent Labs     04/14/19  0942   AST 20   ALT 18   BILIDIR <0.2   BILITOT 0.3   ALKPHOS 67     Recent Labs     04/14/19  0942   INR 1.18*     No results for input(s): aCro [place] in the last 72 hours. Urinalysis:      Lab Results   Component Value Date    NITRU NEGATIVE 04/10/2019    WBCUA 2-4 04/10/2019    BACTERIA NONE 04/10/2019    RBCUA 0-2 04/10/2019    BLOODU NEGATIVE 04/10/2019    SPECGRAV 1.013 04/10/2019    GLUCOSEU NEGATIVE 04/03/2019       Intake & Output:  No intake/output data recorded. No intake/output data recorded.       Radiology:     CXR: I have reviewed the CXR with the following interpretation:   EKG:  I have reviewed the EKG with the following interpretation:     XR CHEST PORTABLE Final Result   1. There is persistent pulmonary vascular congestion with left perihilar and right infrahilar infiltrates. There is no pleural effusion. The right clinical setting these findings are consistent with congestive heart failure. Follow-up chest radiograph    recommended to confirm complete resolution. **This report has been created using voice recognition software. It may contain minor errors which are inherent in voice recognition technology. **      Final report electronically signed by Dr. Narda Lange on 4/14/2019 9:24 AM               DVT prophylaxis: [x] Lovenox                                 [] SCDs                                 [] SQ Heparin                                 [] Encourage ambulation           [] Already on Anticoagulation    Code Status: Prior      PT/OT Eval Status: full    Disposition:    [] Home       [] TCU       [] Rehab       [] Psych       [x] SNF       [] [place]       [] Other-    ASSESSMENT:    Active Hospital Problems    Diagnosis Date Noted    Acute exacerbation of COPD with asthma ([facility] [J44.1, W42.705] 04/14/2019       PLAN:     - ACOPDE: likely viral. started on azithro for possible atypical PNA given low reserve that said, pt spiked aTmax 39.1 c. Switched to Ceftriaxone. pancultures already sent. Will also do lactate. Inhalers, prednisone 40 mg PO daily x7 days    - Chronic diastolic CHF:  prn-BNP 9091 today. On lasix 40 mg BID which will continue. Will place on salt and fluid restriction. Cardiology aware she is in hospital. Will involve only if needed     - CHET: on CPAP from home     - HTN: uncontrolled. Missed morning doses. Will resume meds and reassess     - DM II: on SSi. BS in 100s. Will monitor BS     - chronic ADELFO: on iron     - Hypothyroidism: TSH 9.1. Increased levothyroxine to 125 from 100 on last admission.  Will need repeat TSH in 6 weeks           Thank you ASHLEY Forde - ZIGGY for the opportunity to be involved in this patient's care.     Electronically signed by Pavithra Bates MD on 4/14/2019 at 3:10 PM

## 2019-04-14 NOTE — PROGRESS NOTES
Pt arrived in 4K 7 from ED. Complaints: Shortness of breath. IV none infusing into the wrist left, condition patent and no redness at a rate of 0 mls/ hour with about 0 mls remaining in the bag. The best day to schedule a follow up Dr appointment is:  Monday a.m. The patient is interested in [facility]. [facility] meds to [facility] program?:  No      This RN asked patient if her family needed to be notified about her admission. Patient stated that her family already knows and stated they do not need to be called.

## 2019-04-14 NOTE — ED NOTES
Bed: 004A  Expected date: 4/14/19  Expected time: 8:14 AM  Means of arrival: [facility]  Comments:     Wayne Morales RN  04/14/19 486

## 2019-04-14 NOTE — ED NOTES
External catheter became out of place, bedding was then changed and new external catheter placed again. [employer] slider mat placed under patient.       Koby Lemus RN  04/14/19 6653

## 2019-04-14 NOTE — ED NOTES
Patient presents to the emergency dept with increased shortness of breath. Patient was released from the inpatient hospital two days ago, patient is extremely anxious, which I have taken care of the patient in the past and she does have a strong history of significant anxiety. Attempting to  patient to slow her breathing down and patient keeps stating that she has not slept in three days, when distraction methods are being used, patients breathing does slow down, when distraction methods cease, patient begins to become extremely anxious and vital signs increase again.       Lali Rasmussen RN  04/14/19 3279

## 2019-04-15 NOTE — DISCHARGE INSTR - COC
Continuity of Care Form    Patient Name: German Fatima   :  [id number]  MRN:  [id number]    Admit date:  2019  Discharge date:  ***    Code Status Order: Full Code   Advance Directives:   Advance Care Flowsheet Documentation     Date/Time Healthcare Directive Type of Healthcare Directive Copy in [address] Agent's Name Healthcare Agent's Phone Number    [phone]  No, patient does not have an advance directive for healthcare treatment -- -- -- -- --          Admitting Physician:  Andreas Roche MD  PCP: ASHLEY Blanchard - CNP    Discharging Nurse: [facility] Unit/Room#: 4K-07/007-A  Discharging Unit Phone Number: ***    Emergency Contact:   Extended Emergency Contact Information  Primary Emergency Contact: [address] Phone: [phone]  Mobile Phone: [phone]  Relation: Child  Hearing or visual needs: None  Other needs: None  Preferred language: English   needed? No  Secondary Emergency Contact: Eliana Heredia  Address: [address] [place] [address] Phone: [phone]  Relation: Other  Hearing or visual needs: None  Other needs: None  Preferred language: English   needed? No    Past Surgical History:  Past Surgical History:   Procedure Laterality Date    ABDOMEN SURGERY      BACK SURGERY  2016        BLADDER SUSPENSION      BREAST BIOPSY  10/10/2017    BREAST LUMPECTOMY      CARDIAC CATHETERIZATION  06    Mild nonobstructive CAD w/ diffuse 10-20% proximal and mid LAD stenosis and 10-20% proximal/ostial RCA stenosis. No obstructive lesions. RCA stents are patent w/o evidence of restenosis. Normal LV systolic function. EF 65%. Trace MR - catheter induced. Minimally elevated LVEDP - 13mmHg. Mild to moderate aortoiliac PVD w/o obstructive lesions.     CARDIAC CATHETERIZATION  04    Successful drug-eluting stent x 2 of proximal and mid RCA.  CARDIAC CATHETERIZATION  5-11-04    70-80% proximal RCA stenosis w/ 50-70% mid RCA stenosis. There is 50-60% lesion in mid PDA. Mild proximal and mid LAD disease. Mild circumflex disease. Normal LV size and systolic function. EF 60%.  CARDIOVASCULAR STRESS TEST  5-10-11    No evidence of stress induced ischemia. EF 75%.  CARDIOVASCULAR STRESS TEST  5-10-10    No evidence of stress induced ischemia, infarct or scar. EF 74%.     CAROTID ENDARTERECTOMY      CERVICAL FUSION N/A 11/15/2017    REMOVAL OF PLATE H4-2, ACDF L4-8 W/ATLANTIS CORNERSTONE performed by Asiya Kurtz [facility]      EYE SURGERY      cataract 2017   [facility]    HERNIA REPAIR      HYSTERECTOMY  1973    INNER EAR SURGERY      NECK SURGERY  FEB 2016   [facility] OTHER SURGICAL HISTORY  02/13/2019    Arteriogram for diagnostics    PARTIAL HYSTERECTOMY      UPPER GASTROINTESTINAL ENDOSCOPY      UPPER GASTROINTESTINAL ENDOSCOPY         Immunization History:   Immunization History   Administered Date(s) Administered    Influenza Virus Vaccine 10/01/2012, 10/02/2013, 09/16/2014, 09/30/2015    Influenza, High Dose (Fluzone 65 yrs and older) 10/31/2016    Influenza, [place], 3 Years and older, IM (Fluzone 3 yrs and older or Afluria 5 yrs and older) 09/21/2017    Influenza, [place], 6 mo and older, IM, PF (Flulaval, Fluarix) 11/04/2018    Pneumococcal 13-valent Conjugate (Stkifue71) 11/17/2015    Pneumococcal Polysaccharide (Ntfwemmom38) 10/01/2012    Tdap (Boostrix, Adacel) 12/16/2012       Active Problems:  Patient Active Problem List   Diagnosis Code    Anxiety F41.9    Depression F32.9    Type 2 diabetes mellitus with complication, with long-term current use of insulin ([address] E11.8, Z79.4    MI (myocardial infarction) ([address] I21.9    Dyslipidemia E78.5    CAD (coronary artery disease) I25.10    COPD without exacerbation ([facility] J44.9    GERD (gastroesophageal reflux disease) K21.9    Hypothyroid E03.9    History of tobacco abuse: Quit in 2009 Z87.891    S/P PTCA: 3/2/2017: PTCA RCA ISR. 12/11/2014: LAD Resolute 3.0X26. 11/6/2014: Stenting RCA Cesia Simpler 0.3X05 and 3.5X18. 5/11/2004: Proximal & mid RCA Taxus 3.5X20 mm, and Taxus 3.0X32 mm. U65.07    Metabolic syndrome K40.03    S/P cardiac catheterization: 11/6/2014: 99% in-stent restenosis mid-RCA. LCx moderate LI's. LAD 85% mid-segment lesion. Z98.890    POND (nonalcoholic steatohepatitis) K75.81    Hyperlipidemia E78.5    Essential hypertension I10    Obesity (BMI 30-39. 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    CHET (obstructive sleep apnea) G47.33    Aortic valve stenosis I35.0    Nocturnal hypoxemia G47.34    Hyponatremia E87.1    Acute on chronic diastolic congestive heart failure due to valvular disease (HCC) I50.33, I38    Sympathotonic orthostatic hypotension I95.1    E-coli UTI N39.0, B96.20    Anxiety and depression F41.9, F32.9    Dizziness R42    Chest pain R07.9    Acute on chronic respiratory failure with hypoxia and hypercapnia (HCC) J96.21, J96.22    Moderate persistent asthma without complication Q60.29    Pulmonary nodule R91.1    Heart failure with preserved ejection fraction (HCC) I50.30    Osteoarthritis of multiple joints M15.9    Class 2 obesity due to excess calories with body mass index (BMI) of 38.0 to 38.9 in adult E66.09, Z68.38    Normocytic anemia D64.9    Subclavian vein stenosis I87.1    Post concussive syndrome F07.81    CRF (chronic renal failure), stage 4 (severe) ([facility]) N18.4    Hypotension I95.9    Syncope and collapse K17    Metabolic encephalopathy D93.53    Right-sided epistaxis U23.5    Diastolic dysfunction without heart failure I51.89    Cervical spine instability M53.2X2    Nonrheumatic aortic valve stenosis I35.0    Acute diastolic heart failure (HCC) I50.31    Acute kidney injury ([facility]) N17.9    Hypercalcemia E83.52    At risk for polypharmacy Z91.89    Acute respiratory failure with hypoxia and hypercapnia ([facility]) J96.01, J96.02    Acute pulmonary edema ([facility]) J81.0    Abnormal urinalysis R82.90    Irritable bowel syndrome K58.9    Anemia requiring transfusions D64.9    Stage 3 severe COPD by GOLD classification ([facility]) J44.9    Bilateral carotid artery stenosis I65.23    Iron deficiency anemia due to dietary causes D50.8    B12 deficiency due to diet E53.8    Acute on chronic diastolic CHF (congestive heart failure), NYHA class 3 ([facility]) I50.33    Anemia D64.9    Morbid obesity with BMI of 40.0-44.9, adult ([facility]) E66.01, Z68.41    Hyponatremia with decreased serum osmolality N81.7    Metabolic alkalosis J91.7    Chronic diastolic congestive heart failure ([facility]) I50.32    Acute exacerbation of COPD with asthma ([facility]) J44.1, J45.901       Isolation/Infection:   Isolation          No Isolation            Nurse Assessment:  Last Vital Signs: /63   Pulse 100   Temp 97.4 °F (36.3 °C) (Oral)   Resp 24   Ht 5' 6\" (1.676 m)   Wt 209 lb 8 oz (95 kg)   LMP 02/12/1973 (Approximate)   SpO2 97%   BMI 33.81 kg/m²     Last documented pain score (0-10 scale): Pain Level: 9  Last Weight:   Wt Readings from Last 1 Encounters:   04/15/19 209 lb 8 oz (95 kg)     Mental Status:  {IP PT MENTAL STATUS:20030}    IV Access:  {[facility] IV ACCESS:317885661}    Nursing Mobility/ADLs:  Walking   {CHP DME BDHW:274259192}  Transfer  {CHP DME PQPH:416164845}  Bathing  {CHP DME JUGP:761140376}  Dressing  {CHP DME VNGX:619057291}  Toileting  {CHP DME WAUX:279428970}  Feeding  {CHP DME KJHA:048250902}  Med Admin  {CHP DME MGPD:268549466}  Med Delivery   {[facility] MED RDBO:617340963}    Rehab Therapies: {THERAPEUTIC INTERVENTION:9573385347}  Weight Bearing Status/Restrictions: 50[facility] Weight Bearin}  Other Medical Equipment (for information only, NOT a DME order):  {EQUIPMENT:015369417}  Other Treatments: ***    Patient's personal belongings (please select all that are sent with patient):  {CHP DME Belongings:132596716}    RN SIGNATURE:  {Esignature:685693891}    CASE MANAGEMENT/SOCIAL WORK SECTION    Inpatient Status Date:  2019    Readmission Risk Assessment Score:  Readmission Risk              Risk of Unplanned Readmission:        76           Discharging to Facility/ Agency   · Name:  [facility]  · Address[facility]  [address] [place] [address] [facility]  · Phone:  [phone]  · Fax: [phone]    Dialysis Facility (if applicable)   · Name:  · Address:  · Dialysis Schedule:  · Phone:  · Fax:    / signature: Electronically signed by PAKO Martínez on 4/15/19 at 9:59 AM    PHYSICIAN SECTION    Prognosis: {Prognosis:6486868684}    Condition at Discharge: Ella Stevenson Patient Condition:562952726}    Rehab Potential (if transferring to Rehab): {Prognosis:7898386257}    Recommended Labs or Other Treatments After Discharge: ***    Physician Certification: I certify the above information and transfer of Prudence Jackson  is necessary for the continuing treatment of the diagnosis listed and that she requires {Admit to Appropriate Level of Care:48426} for {GREATER/LESS:364978685} 30 days.      Update Admission H&P: {CHP DME Changes in XPSUQ:185574864}    PHYSICIAN SIGNATURE:  {Esignature:466251665}

## 2019-04-15 NOTE — CARE COORDINATION
DISCHARGE BARRIERS  4/15/19, 9:53 AM    Reason for Referral:  \"from [facility]"  Mental Status:  Alert and oriented  Decision Making:  Makes her own decisions  Family/Social/Home Environment:  Patient is a resident of [facility]. She is a Medicaid bed hold but was in her skilled Medicare benefit PTA per Leydi De La Rosa with admissions   Current Services: All provided by [facility]  Current Equipment:  All provided by [facility]  Payment Source:  Medicare and Medicaid  Concerns or Barriers to Discharge:  SW spoke to patient and she intends to return to same [facility]  Collabrative List of ECF/HH were provided: N/A    Teach Back Method used with patient regarding care plan and needs  Patient verbalize understanding of the plan of care and contribute to goal setting. Anticipated Needs/Discharge Plan:  Plan return to same ECF at discharge.     Electronically signed by PAKO Hein on 4/15/2019 at 9:53 AM

## 2019-04-15 NOTE — PLAN OF CARE
Problem: Pain:  Goal: Pain level will decrease  Description  Pain level will decrease  Outcome: Ongoing  Note:   Pain Assessment: 0-10  Pain Level: (fever)   Pain goal:  0   Is pain goal met at this time? Yes     Additional interventions to be implemented: none         Problem: Falls - Risk of:  Goal: Will remain free from falls  Description  Will remain free from falls  Outcome: Ongoing  Note:   No falls this shift. Call light within reach, bed alarm on, frequent rounding. Problem: Risk for Impaired Skin Integrity  Goal: Tissue integrity - skin and mucous membranes  Description  Structural intactness and normal physiological function of skin and  mucous membranes. Outcome: Ongoing  Note:   Turning every two hours. Heels and elbows elevated off bed. Problem: Discharge Planning:  Goal: Discharged to appropriate level of care  Description  Discharged to appropriate level of care  Outcome: Ongoing  Note:   Plan to discharge back to [facility] once stable. Problem: Activity Intolerance:  Goal: Ability to tolerate increased activity will improve  Description  Ability to tolerate increased activity will improve  Outcome: Ongoing  Note:   Has not needed to get up. Still short of breath just laying in bed. Problem: Airway Clearance - Ineffective:  Goal: Ability to maintain a clear airway will improve  Description  Ability to maintain a clear airway will improve  Outcome: Ongoing  Note:   Able to keep airway clear. On BiPap throughout night. Problem: Breathing Pattern - Ineffective:  Goal: Ability to achieve and maintain a regular respiratory rate will improve  Description  Ability to achieve and maintain a regular respiratory rate will improve  Outcome: Ongoing  Note:   Breathing fast and shallow. Placed on bipap throughout the night. Problem: Gas Exchange - Impaired:  Goal: Levels of oxygenation will improve  Description  Levels of oxygenation will improve  Outcome: Ongoing  Note:    On Bipap. Was on 4L NC O2> 90%    Care plan reviewed with patient. Patient verbalize understanding of the plan of care and contribute to goal setting.

## 2019-04-15 NOTE — FLOWSHEET NOTE
04/15/19 0024   Provider Notification   Reason for Communication Evaluate   Provider Name [facility]   Provider Notification Physician Assistant   Method of Communication Secure Message   Response See orders   Notification Time 24 [id number]   Patient admitted with COPD exacerbation. BP have been running high all day. When first checked BP was 179/80. Gave metoprolol 100mg. Pressure came down to 167/78. Would you like anything else given? Also patient has been very sleepy and falling asleep in mid-conversation. Her CO2 on admitted was 64 which is her normal. She was more awake earlier in the day. Do you want ABG rechecked? I know they said she hasn't sleep in several days. Thanks. 2355: Ordered ABG. After they are drawn she will see what to do about the blood pressures. 4011: ABG results: worse pH, Blood Gas: 7.36 PCO2: 69 (H) pO2: 64 (L) HCO3: 39 (H) Base Excess (Calculated): 11.3 (H) O2 Sat: 90 Stephen Test: Positive IFIO2: 4 According to notes she has a home Bipap but the daughter has not bought it in. Unsure of settings    0054: [facility] placed orders for BiPap.

## 2019-04-15 NOTE — PLAN OF CARE
Nutrition Problem: Increased nutrient needs  Intervention: Food and/or Nutrient Delivery: Continue current diet  Nutritional Goals: Patient will consume 75% or more of meals during LOS.     Problem: Nutrition  Goal: Optimal nutrition therapy  Outcome: Ongoing

## 2019-04-15 NOTE — PROGRESS NOTES
Pharmacy Medication History Note      List of current medications patient is taking is complete. Source of information: ECF MAR    Changes made to medication list:  Medications removed (include reason, ex. therapy complete or physician discontinued):  Norco every 6 hr PRN-       Medications added/doses adjusted: Other notes (ex. Recent course of antibiotics, Coumadin dosing):    Denies use of other OTC or herbal medications.       Allergies reviewed      Electronically signed by AKBAR [facility] on 4/15/2019 at 12:20 PM

## 2019-04-15 NOTE — PROGRESS NOTES
hours):   feeling about the same. Breathing slightly improved. Denies CP. Medications:  Reviewed    Infusion Medications   Scheduled Medications    isosorbide mononitrate  60 mg Oral Daily    baclofen  10 mg Oral BID    clopidogrel  75 mg Oral Daily    cyanocobalamin  1,000 mcg Oral Daily    ferrous sulfate  325 mg Oral BID    fluticasone  1 spray Nasal Daily    furosemide  40 mg Oral BID    HYDROcodone-acetaminophen  0.5 tablet Oral BID    hydrOXYzine  25 mg Oral TID    levothyroxine  125 mcg Oral Daily    losartan  50 mg Oral Daily    metoprolol  100 mg Oral BID    multivitamin  1 tablet Oral Daily    OXcarbazepine  150 mg Oral QAM    pantoprazole  40 mg Oral QAM AC    ranolazine  500 mg Oral BID    risperiDONE  1 mg Oral QAM    rosuvastatin  20 mg Oral Nightly    mometasone-formoterol  2 puff Inhalation BID    [START ON 4/20/2019] vitamin D  50,000 Units Oral Weekly    aspirin  81 mg Oral Daily    sodium chloride flush  10 mL Intravenous 2 times per day    enoxaparin  40 mg Subcutaneous Q24H    predniSONE  40 mg Oral Daily    ipratropium-albuterol  1 ampule Inhalation Q4H WA    miconazole   Topical BID    cefTRIAXone (ROCEPHIN) IV  1 g Intravenous Q24H     PRN Meds: albuterol sulfate HFA, HYDROcodone-acetaminophen, ipratropium-albuterol, ondansetron, sodium chloride flush, magnesium hydroxide, ondansetron, acetaminophen, polyethylene glycol      Intake/Output Summary (Last 24 hours) at 4/15/2019 1359  Last data filed at 4/15/2019 1322  Gross per 24 hour   Intake 1270 ml   Output 2600 ml   Net -1330 ml       Diet:  DIET CARDIAC; Low Sodium (2 GM); Daily Fluid Restriction: 2000 ml    Exam:  BP (!) 153/65   Pulse 74   Temp 98.1 °F (36.7 °C) (Oral)   Resp 20   Ht 5' 6\" (1.676 m)   Wt 209 lb 8 oz (95 kg)   LMP 02/12/1973 (Approximate)   SpO2 96%   BMI 33.81 kg/m²     General appearance: A&O x3, Not ill or toxic, in no apparent distress  HEENT:  CHING  EOM intact.    Neck: Supple, with full range of motion. No jugular venous distention. Trachea midline. Respiratory:   decreased A/E bilat with  scattered wheezing improved  Cardiovascular:  normal S1/S2 with IV Pastora best heard over aortic area w/ radiation to neck  Abdomen: Soft, non-tender, non-distended, no rigidity or peritoneal signs  Musculoskeletal: NL symmetrical A/PROM bilat U/L extremities   Skin: No rashes. + bilat. pitting edema   Neurologic:  CN II-XII intact. NL symmetrical reflexes. NL gait and stance. NL Cerebellar exam. Power 5/5 all muscle groups U/L extremities. Toes downgoing  Capillary Refill: Brisk,< 3 seconds   Peripheral Pulses: +2 palpable, equal bilater             Labs:   Recent Labs     04/14/19  0942 04/15/19  0526   WBC 6.0 3.7*   HGB 9.9* 9.5*   HCT 32.1* 31.0*    173     Recent Labs     04/14/19  0942 04/15/19  0526   * 139   K 3.9 4.0   CL 92* 92*   CO2 27 34*   BUN 15 18   CREATININE 0.7 0.9   CALCIUM 9.9 9.8     Recent Labs     04/14/19  0942   AST 20   ALT 18   BILIDIR <0.2   BILITOT 0.3   ALKPHOS 67     Recent Labs     04/14/19  0942   INR 1.18*     No results for input(s): Cody Specking in the last 72 hours. Urinalysis:      Lab Results   Component Value Date    NITRU NEGATIVE 04/10/2019    WBCUA 2-4 04/10/2019    BACTERIA NONE 04/10/2019    RBCUA 0-2 04/10/2019    BLOODU NEGATIVE 04/10/2019    SPECGRAV 1.013 04/10/2019    GLUCOSEU NEGATIVE 04/03/2019       Radiology:  Xr Chest Standard (2 Vw)    Result Date: 4/10/2019  PROCEDURE: XR CHEST (2 VW) CLINICAL INFORMATION: cp sob. COMPARISON: April 3, 2019 TECHNIQUE: PA and lateral views the chest. FINDINGS: There is stable cardiomegaly with redemonstration of diffuse coarse interstitial markings. There is increasing venous congestion with minimal fluid retained within the right major and minor fissure and small bilateral effusions the bases present. There is stable atherosclerosis. The skeleton is negative for active appearing pathology. 1. Cardiomegaly with mild interval increase of pulmonary venous congestion correlate with developing congestive heart failure **This report has been created using voice recognition software. It may contain minor errors which are inherent in voice recognition technology. ** Final report electronically signed by Dr. Yadira Rene on 4/10/2019 11:22 PM    Ct Chest Wo Contrast    Result Date: 3/26/2019  PROCEDURE: CT CHEST WO CONTRAST CLINICAL INFORMATION: Pulmonary nodule COMPARISON: 3/11/2019 TECHNIQUE:  Axial CT images were obtained through the chest without contrast. Coronal and sagittal reformatted images were rendered. All CT scans at this facility use dose modulation, iterative reconstruction, and/or weight-based dosing when appropriate to reduce radiation dose to as low as reasonably achievable. FINDINGS: There is a 7 x 8 mm pulmonary nodule within the left lower lobe peripherally on axial image 26. This is increased in size from prior lung screening CT dated 4/24/2017. This is compatible with a lung RADS category 4B lesion. The central airways appear patent. There is moderate bilateral centrilobular emphysema. Mild dependent bibasilar ground glass opacities are present and may represent atelectasis or scarring. No pneumothorax is seen. There is mild bronchiectasis noted at the lung bases. No pneumothorax is seen. No other focal consolidation is demonstrated. No pleural effusions are seen. There is a 1.6 x 1.2 cm mediastinal lymph node is located anterior to the trachea on axial image 19. Previously this measured approximately 1.6 x 1.1 cm. This does not appear significantly changed from prior CT dated 4/24/2017. There is a small to moderate pericardial fluid present. Limited evaluation of the lung bases appears unremarkable. No acute osseous findings are demonstrated. 1. There is a 7 x 8 mm pulmonary nodule within the left lower lobe peripherally on axial image 26.  This is increased in size from prior Densely calcified abdominal aorta. Bowel/Peritoneum: No abnormally dilated bowel loops are seen. There are multiple diverticula in the sigmoid and distal descending colon. No evidence for diverticulitis. Moderate mammography control the colon, consistent with constipation. Note that there  is a small focus of soft tissue stranding emanating from the anterior aspect of the proximal descending colon, not present on prior study and suggestive of a small focus of epiploic appendagitis. No free air. No free fluid in the abdomen. No abnormally enlarged para-aortic lymph nodes are seen. Bones : Prior lower lumbar fusion, G4-0, with metallic hardware in place. Pelvis: Unremarkable. No mass lesion. No free fluid. The urinary bladder is unremarkable. 1. Extensive diverticulosis coli. No evidence for diverticulitis. 2. Filter in the IVC. 3. Fibroemphysematous lungs. Small pericardial effusion. Minimal bibasilar atelectatic/fibrotic stranding. 4. Small focus of epiploic appendagitis proximal descending colon. This is a self limiting condition expected to resolve spontaneously. **This report has been created using voice recognition software. It may contain minor errors which are inherent in voice recognition technology. ** Final report electronically signed by Dr. Aldo Gama on 4/3/2019 2:15 PM    Xr Chest Portable    Result Date: 4/14/2019  PROCEDURE: XR CHEST PORTABLE CLINICAL INFORMATION: Shortness of breath. COMPARISON: 4/10/2019. TECHNIQUE: AP portable chest radiograph performed. FINDINGS: POSTSURGICAL CHANGES: None. LINES/TUBES/MECHANICAL DEVICES: None. TRACHEA/HEART/MEDIASTINUM/HILUM: 1. There is stable mild enlargement of the cardiac silhouette. 2. Stable atheromatous calcification thoracic aorta. LUNG FIELDS: 1. There is persistent pulmonary vascular congestion with left perihilar and right infrahilar infiltrates. There is no pleural effusion.  The right clinical setting these findings are consistent with congestive heart failure. Follow-up chest radiograph recommended to confirm complete resolution. OTHER: None. PNEUMOTHORAX:  None. OSSEOUS STRUCTURES: 1. No acute osseous abnormality. 1. There is persistent pulmonary vascular congestion with left perihilar and right infrahilar infiltrates. There is no pleural effusion. The right clinical setting these findings are consistent with congestive heart failure. Follow-up chest radiograph recommended to confirm complete resolution. **This report has been created using voice recognition software. It may contain minor errors which are inherent in voice recognition technology. ** Final report electronically signed by Dr. Nandini Noriega on 4/14/2019 9:24 AM    Xr Chest Portable    Result Date: 4/3/2019  PROCEDURE: XR CHEST PORTABLE CLINICAL INFORMATION: short of breath COMPARISON: 3/21/2019 TECHNIQUE: A single mobile view of the chest was obtained. 1. Mild cardiomegaly. Prior anterior cervical fusion. 2. Otherwise normal chest. No acute findings. No infiltrates or effusions are seen. **This report has been created using voice recognition software. It may contain minor errors which are inherent in voice recognition technology. ** Final report electronically signed by Dr. Pam Jones on 4/3/2019 12:36 PM    Xr Chest Portable    Result Date: 3/21/2019  PROCEDURE: XR CHEST PORTABLE CLINICAL INFORMATION: fatigue. COMPARISON: 3/2/2019. TECHNIQUE: AP upright view of the chest. FINDINGS: The lungs appear grossly clear. Pulmonary nodule seen on previous CT is not visualized on this exam. Pulmonary vasculature and cardiac-based also what are within normal limits. Visualized osseous structures appear grossly intact. Stable radiographic appearance of the chest. No evidence of an acute process. **This report has been created using voice recognition software. It may contain minor errors which are inherent in voice recognition technology. ** Final report electronically signed by Dr. Jose Larkin MD on 3/21/2019 4:43 PM      Diet: DIET CARDIAC; Low Sodium (2 GM); Daily Fluid Restriction: 2000 ml    DVT prophylaxis: [] Lovenox                                 [] SCDs                                 [] SQ Heparin                                 [] Encourage ambulation           [] Already on Anticoagulation       Code Status: Full Code      Active Hospital Problems    Diagnosis Date Noted    Acute exacerbation of COPD with asthma ([facility] [J44.1, R55.155] 04/14/2019           Patient was updated about the treatment plan, all the questions and concerns were addressed.         Electronically signed by Mimi Sandoval MD on 4/15/2019 at 1:59 PM

## 2019-04-15 NOTE — CARE COORDINATION
4/15/19, 1:46 PM      Peewee Mcfarland       Admitted from: ED 2019/ [facility] day: 1   [facility] Reason for admit: Acute exacerbation of COPD with asthma ([facility] [J44.1, J45.901] Status: IP  Admit order signed?: yes  PMH:  has a past medical history of Anemia, Anxiety, Aortic stenosis, Arthritis, AS (aortic stenosis): mild to moderate by echo 2017, ASHD (arteriosclerotic heart disease), Asthma, Bipolar disorder ([facility] Blood circulation, collateral, CAD (coronary artery disease), Cerebral artery occlusion with cerebral infarction [facility]), Chest pain, CHF (congestive heart failure) ([facility] COPD (chronic obstructive pulmonary disease) ([facility] Depression, Disease of blood and blood forming organ, DM (diabetes mellitus) ([facility] Dyspnea, FH: CAD (coronary artery disease), Full dentures, GERD (gastroesophageal reflux disease), Headache, Heart burn, History of blood transfusion, History of blood transfusion, History of tobacco abuse: Quit in , HLD (hyperlipidemia), HTN (hypertension), Irritable bowel syndrome, Liver disease, Metabolic syndrome, MI (myocardial infarction) ([facility] Nausea & vomiting, Obesity, CHET (obstructive sleep apnea), S/P cardiac catheterization: 2014: 99% in-stent restenosis mid-RCA. LCx moderate LI's. LAD 85% mid-segment lesion., S/P PTCA:  2004: Proximal and mid RCA  Taxus 3.5 X 20 mm, and Taxus 3.0 X 32 mm., Systolic murmur, Thyroid disease, and Wears glasses. Procedure: none  Pertinent abnormal Imagin/14  There is persistent pulmonary vascular congestion with left perihilar and right infrahilar infiltrates. There is no pleural effusion. The right clinical setting these findings are consistent with congestive heart failure.    Medications:  Scheduled Meds:   isosorbide mononitrate  60 mg Oral Daily    baclofen  10 mg Oral BID    clopidogrel  75 mg Oral Daily    cyanocobalamin  1,000 mcg Oral Daily    ferrous sulfate  325 mg Oral BID    fluticasone  1 spray Nasal Daily    furosemide  40 mg Oral BID    HYDROcodone-acetaminophen  0.5 tablet Oral BID    hydrOXYzine  25 mg Oral TID    levothyroxine  125 mcg Oral Daily    losartan  50 mg Oral Daily    metoprolol  100 mg Oral BID    multivitamin  1 tablet Oral Daily    OXcarbazepine  150 mg Oral QAM    pantoprazole  40 mg Oral QAM AC    ranolazine  500 mg Oral BID    risperiDONE  1 mg Oral QAM    rosuvastatin  20 mg Oral Nightly    mometasone-formoterol  2 puff Inhalation BID    [START ON 4/20/2019] vitamin D  50,000 Units Oral Weekly    aspirin  81 mg Oral Daily    sodium chloride flush  10 mL Intravenous 2 times per day    enoxaparin  40 mg Subcutaneous Q24H    predniSONE  40 mg Oral Daily    ipratropium-albuterol  1 ampule Inhalation Q4H WA    miconazole   Topical BID    cefTRIAXone (ROCEPHIN) IV  1 g Intravenous Q24H     Continuous Infusions:   Pertinent Info/Orders/Treatment Plan:  AB/Oxygen 6L continued. Elevated BNP, CO2 69 (56); monitor  Diet: DIET CARDIAC; Low Sodium (2 GM); Daily Fluid Restriction: 2000 ml   Smoking status:  reports that she quit smoking about 12 years ago. Her smoking use included cigarettes. She has a 38.00 pack-year smoking history. She has never used smokeless tobacco.   PCP: ASHLEY Sanchez - CNP  Readmission: yes  Patient has been readmitted within 2 days. Patient went to f/u appointment? Yes at [facility]  If yes, was it within 7 days? yes  Patient was able to fill prescriptions? yes  Patient is taking medications as prescribed? yes  Cause for readmission?  COPD    Readmission Risk Score: 76%  COPD: Zones education given to JOHANNA Lacy  Discharge Planning  Current Residence:  Nursing Home  Living Arrangements:  Alone   Support Systems:  Children, Family Members  Current Services PTA:     Potential Assistance Needed:  Skilled Nursing Facility  Potential Assistance Purchasing Medications:  No  Does patient want to participate in local refill/ meds to beds program?  No  Type of Home

## 2019-04-15 NOTE — PROGRESS NOTES
Nutrition Assessment    Type and Reason for Visit: Initial, Positive Nutrition Screen(difficulty chewing/ swallowing)    Nutrition Recommendations: Continue current diet and multivitamin. Nutrition Assessment:   Pt. nutritionally compromised AEB wounds. At risk for further nutrition compromise r/t increased nutrient needs to aid in healing, admit with COPD exacerbation, underlying medical conditions (CHF, DM, bipolar disorder). Patient reports improving appetite, had low appetite initially due to breathing difficulty. Encouraged continue good nutrition, lean protein sources to aid in healing and continue to follow carbohydrate controlled, low sodium diet guidelines. Malnutrition Assessment:  · Malnutrition Status: No malnutrition    Nutrition Risk Level: Moderate    Nutrient Needs:  · Estimated Daily Total Kcal: 0178-7375 kcals (16-18 kcal/kgm - 95kgm 4/15)  · Estimated Daily Protein (g): 71-88 grams (1.2-1.5 grams protein/kgm - ideal 59kgm)    Nutrition Diagnosis:   · Problem: Increased nutrient needs  · Etiology: related to Increased demand for energy/nutrients     Signs and symptoms:  as evidenced by Presence of wounds    Objective Information:  · Nutrition-Focused Physical Findings: Difficulty chewing vegetables that are not well cooked; Glucose 133;  On antibiotics, lasix, multivitamin, prednisone  · Wound Type: (stage I on left ear; breast excoriation)  · Current Nutrition Therapies:  · Oral Diet Orders: Cardiac, 2gm Sodium, Fluid Restriction(2000ml)   · Oral Diet intake: %, 51-75%  · Oral Nutrition Supplement (ONS) Orders: None(patient declines)  · Anthropometric Measures:  · Ht: 5' 6\" (167.6 cm)   · Current Body Wt: 209 lb 8 oz (95 kg)(4/15; +1 pitting edema)  · Admission Body Wt: 212 lb (96.2 kg)(bedscale 4/14; +1 pitting edema)  · Usual Body Wt: (difficult to say as weight varies depending on fluid status per patient; per EHR: 1/21/19 214# 9.6oz, 3/13/19 208# 12.8oz)  · % Weight Change: ,  weight fluctuations likely due to fluid/ edema status, history CHF  · Ideal Body Wt: 130 lb (59 kg),   · BMI Classification: BMI 30.0 - 34.9 Obese Class I    Nutrition Interventions:   Continue current diet  Continued Inpatient Monitoring, Coordination of Care, Education Initiated(has followed with outpatient dietitian in the past, encouraged continue to follow low sodium, carbohyrate controlled diet guidelines)    Nutrition Evaluation:   · Evaluation: Goals set   · Goals: Patient will consume 75% or more of meals during LOS.      · Monitoring: Meal Intake, Diet Tolerance, Skin Integrity, Wound Healing, Weight, Pertinent Labs, Monitor Bowel Function      Electronically signed by José Luis Servin RD, LD on 4/15/19 at 3:07 PM    Contact Number: [phone]

## 2019-04-16 NOTE — PLAN OF CARE
Problem: Falls - Risk of:  Goal: Will remain free from falls  Description  Will remain free from falls  Outcome: Met This Shift  Note:   Pt has not had any falls so far this shift. Pt bed alarm on. Problem: Pain:  Goal: Pain level will decrease  Description  Pain level will decrease  Outcome: Ongoing  Note:   Pain Assessment: 0-10  Pain Level: 4   Pain goal:  4  Is pain goal met at this time? Yes     Additional interventions to be implemented: medications Percocet, position change and rest        Problem: Risk for Impaired Skin Integrity  Goal: Tissue integrity - skin and mucous membranes  Description  Structural intactness and normal physiological function of skin and  mucous membranes. Outcome: Ongoing  Note:   Pt repositioned every 2 hours with pillow support. Problem: Discharge Planning:  Goal: Discharged to appropriate level of care  Description  Discharged to appropriate level of care  Outcome: Ongoing  Note:   Pt plans to be discharged back to [facility]. Problem: Activity Intolerance:  Goal: Ability to tolerate increased activity will improve  Description  Ability to tolerate increased activity will improve  Outcome: Ongoing  Note:   Pt 1A up to chair. Problem: Breathing Pattern - Ineffective:  Goal: Ability to achieve and maintain a regular respiratory rate will improve  Description  Ability to achieve and maintain a regular respiratory rate will improve  Outcome: Ongoing  Note:   RR WNL. Problem: Gas Exchange - Impaired:  Goal: Levels of oxygenation will improve  Description  Levels of oxygenation will improve  Outcome: Ongoing  Note:   Pt returned to baseline of 3L NC. Bipap while sleeping. Problem: Nutrition  Goal: Optimal nutrition therapy  4/16/2019 0028 by Dariela Salazar RN  Outcome: Ongoing  Note:   Pt on a cardiac diet and 2000ml fluid restriction. Care plan reviewed with patient and family.   Patient and family verbalize understanding of the plan of care and contribute to goal setting.

## 2019-04-16 NOTE — PROGRESS NOTES
POST OP -PLASMA    History of blood transfusion 02/2019    1 UNIT LOW HGB    History of tobacco abuse: Quit in 2007     HLD (hyperlipidemia)     ON RX    HTN (hypertension)     ON RX    Irritable bowel syndrome     States has not had problem with this for years    Liver disease     fatty liver    Metabolic syndrome 49/86/5013    MI (myocardial infarction) ([facility] [address]     Nausea & vomiting     Obesity     CHET (obstructive sleep apnea) 2016    NO MACHINE YET    S/P cardiac catheterization: 11/6/2014: 99% in-stent restenosis mid-RCA. LCx moderate LI's. LAD 85% mid-segment lesion. 11/6/2014 11/6/2014: 99% in-stent restenosis mid-RCA. LCx moderate LI's. LAD 85% mid-segment lesion. Dr. Rodriguez Null S/P PTCA:  5/11/2004: Proximal and mid RCA  Taxus 3.5 X 20 mm, and Taxus 3.0 X 32 mm. 10/28/2014    5/11/2004: Proximal and mid RCA  Taxus 3.5 X 20 mm, and Taxus 3.0 X 32 mm. Dr. Rodriguez Fleeting Systolic murmur 32/53/3281    Thyroid disease     ON RX    Wears glasses      Past Surgical History:   Procedure Laterality Date    ABDOMEN SURGERY      BACK SURGERY  08/2016        BLADDER SUSPENSION      BREAST BIOPSY  10/10/2017    BREAST LUMPECTOMY      CARDIAC CATHETERIZATION  8-11-06    Mild nonobstructive CAD w/ diffuse 10-20% proximal and mid LAD stenosis and 10-20% proximal/ostial RCA stenosis. No obstructive lesions. RCA stents are patent w/o evidence of restenosis. Normal LV systolic function. EF 65%. Trace MR - catheter induced. Minimally elevated LVEDP - 13mmHg. Mild to moderate aortoiliac PVD w/o obstructive lesions.  CARDIAC CATHETERIZATION  5-11-04    Successful drug-eluting stent x 2 of proximal and mid RCA.  CARDIAC CATHETERIZATION  5-11-04    70-80% proximal RCA stenosis w/ 50-70% mid RCA stenosis. There is 50-60% lesion in mid PDA. Mild proximal and mid LAD disease. Mild circumflex disease. Normal LV size and systolic function. EF 60%.      CARDIOVASCULAR STRESS TEST  5-10-11    No evidence Sensation  Overall Sensation Status: WNL       Supine to Sit: Supervision  Sit to Supine: Supervision    Transfers  Sit to Stand: Stand by assistance  Stand to sit: Supervision       Ambulation 1  Surface: level tile  Device: Rolling Walker  Other Apparatus: O2  Assistance: Supervision  Quality of Gait: appropriate pace, limited foot clearance, no LOB, generally steady  Distance: 120 ft       Exercises:    Pt was guided through completion of LE exercises including: heelslides, hip abd/add, marches, long arc quads, ankle pumps, and quad sets. Each x 10 reps for strengthening to improve functional mobility. Activity Tolerance:  Activity Tolerance: Patient Tolerated treatment well;Patient limited by endurance    Treatment Initiated: See exercises above and mobility noted      Assessment: Body structures, Functions, Activity limitations: Decreased functional mobility , Decreased endurance, Decreased strength  Assessment: Pt is a 75 y.o. female that lives generally alone, but had had a friend staying with her recently before her previous admission and then going to SNF before this admission. Pt is moving with SBA to Supervision and would benefit from continued skilled PT to address endurance, strengthening, and mobility for improved functional mobility. Prognosis: Good    Clinical Presentation: Low - Stable and Uncomplicated:      Decision Making: Moderate Complexity based on patient assessment and decision making process of determining plan of care and establishing reasonable expectations for measurable functional outcomes    REQUIRES PT FOLLOW UP: Yes    Discharge Recommendations:  Discharge Recommendations: Continue to assess pending progress, Home with Home health PT    Patient Education:  Patient Education: plan of care and recommendations    Equipment Recommendations:  Equipment Needed: No    Safety:  Type of devices:  All fall risk precautions in place, Left in bed, Call light within reach, Bed alarm in place, Gait belt, Nurse notified    Plan:  Times per week: 5x GM  Times per day: Daily  Specific instructions for Next Treatment: ther ex, ther act, gait trng, functional mobility  Current Treatment Recommendations: Strengthening, Transfer Training, Endurance Training, Gait Training, Functional Mobility Training, Safety Education & Training, Home Exercise Program, Patient/Caregiver Education & Training    Goals:  Patient goals : go home  Short term goals  Time Frame for Short term goals: 1 week  Short term goal 1: Pt to be Mod I for supine <> sit to get in/out of bed  Short term goal 2: Pt to be Mod I for sit <> stand to get up to ambulate  Short term goal 3: Pt to ambulate > 150 ft with RW with O2 and Mod I for household distances  Long term goals  Time Frame for Long term goals : not set due to short ELOS    Evaluation Complexity: Based on the findings of patient history, examination, clinical presentation, and decision making during this evaluation, the evaluation of Samira Ann is of low complexity. AM-PAC Inpatient Mobility without Stair Climbing Raw Score : 15  AM-PAC Inpatient without Stair Climbing T-Scale Score : 43.03  Mobility Inpatient CMS 0-100% Score: 47.43  Mobility Inpatient without Stair CMS G-Code Modifier : [facility] [place] 8

## 2019-04-16 NOTE — PROGRESS NOTES
Hospitalist Progress Note    Patient:  Barney Carty      Unit/Bed:4K-07/007-A    YOB: 1945    MRN: [id number]       Acct: [de-identified]     PCP: ASHELY Cordero CNP    Date of Admission: 4/14/2019    Active Hospital Problems    Diagnosis Date Noted    Acute exacerbation of COPD with asthma ([facility] [address] [J44.1, B75.752] 04/14/2019       Assessment/Plan:    - ACOPDE: likely viral. started on azithro for possible atypical PNA given low reserve that said, pt spiked aTmax 39.1 c. Switched to Ceftriaxone. pancultures already sent. Will also do lactate. Inhalers, prednisone 40 mg PO daily x7 days    4/15: down to 4 lpm via NC with Sao2 96%. RN aware to wean off aggressively as tolerated. Otherwise remains afebrile. Lactate NL. CCM. 4/16: Improving. On 3 lpm now which her baseline O2 need at home w/ Sao2 95-6%. Reassurance provided. Will step down to Augmentin PO. Will monitor for one more day prior to discharge     - Chronic diastolic CHF:  prn-BNP 8748 today. On lasix 40 mg BID which will continue. Will place on salt and fluid restriction. Cardiology aware she is in hospital. Will involve only if needed    4/15: good U/O. CCM  4/16; CCM     - CHET: on CPAP from home     - HTN: uncontrolled. Missed morning doses. Will resume meds and reassess  4/15: uncontrolled. BP in [id number] range. Increased Imdur to 60 from 30. Will reassess  4/16: BP controlled on current change in regimen. CCM     - DM II: on SSi. BS in 100s. Will monitor BS     - chronic ADELFO: on iron     - Hypothyroidism: TSH 9.1. Increased levothyroxine to 125 from 100 on last admission.  Will need repeat TSH in 6 weeks           Expected discharge date:  TBD         Disposition:      [] Home                             [] TCU                             [] Rehab                             [] Psych                             [] SNF                             [] [place]                             [] Other-    Chief Complaint: Chief Complaint   Patient presents with    Shortness of 11 [facility] Course: Patient was seen, examined and the medical chart was reviewed thoroughly today. In summary, 75 y. o.female admitted overnight for ACPODE. Subjective (past 24 hours):   feeling about the same. Breathing slightly improved. Denies CP.    4/16: feeling better. SOB improved. Medications:  Reviewed    Infusion Medications   Scheduled Medications    isosorbide mononitrate  60 mg Oral Daily    baclofen  10 mg Oral BID    clopidogrel  75 mg Oral Daily    cyanocobalamin  1,000 mcg Oral Daily    ferrous sulfate  325 mg Oral BID    fluticasone  1 spray Nasal Daily    furosemide  40 mg Oral BID    HYDROcodone-acetaminophen  0.5 tablet Oral BID    hydrOXYzine  25 mg Oral TID    levothyroxine  125 mcg Oral Daily    losartan  50 mg Oral Daily    metoprolol  100 mg Oral BID    multivitamin  1 tablet Oral Daily    OXcarbazepine  150 mg Oral QAM    pantoprazole  40 mg Oral QAM AC    ranolazine  500 mg Oral BID    risperiDONE  1 mg Oral QAM    rosuvastatin  20 mg Oral Nightly    mometasone-formoterol  2 puff Inhalation BID    [START ON 4/20/2019] vitamin D  50,000 Units Oral Weekly    aspirin  81 mg Oral Daily    sodium chloride flush  10 mL Intravenous 2 times per day    enoxaparin  40 mg Subcutaneous Q24H    predniSONE  40 mg Oral Daily    ipratropium-albuterol  1 ampule Inhalation Q4H WA    miconazole   Topical BID    cefTRIAXone (ROCEPHIN) IV  1 g Intravenous Q24H     PRN Meds: albuterol sulfate HFA, HYDROcodone-acetaminophen, ipratropium-albuterol, ondansetron, sodium chloride flush, magnesium hydroxide, ondansetron, acetaminophen, polyethylene glycol      Intake/Output Summary (Last 24 hours) at 4/16/2019 0725  Last data filed at 4/16/2019 0450  Gross per 24 hour   Intake 1583.88 ml   Output 1900 ml   Net -316.12 ml       Diet:  DIET CARDIAC; Low Sodium (2 GM);  Daily Fluid Restriction: 2000 ml    Exam:  BP (!) 160/72   Pulse 76   Temp 98.1 °F (36.7 °C) (Oral)   Resp 20   Ht 5' 6\" (1.676 m)   Wt 208 lb 3.2 oz (94.4 kg)   LMP 02/12/1973 (Approximate)   SpO2 95%   BMI 33.60 kg/m²     General appearance: A&O x3, Not ill or toxic, in no apparent distress  HEENT:  CHING  EOM intact. Neck: Supple, with full range of motion. No jugular venous distention. Trachea midline. Respiratory:   decreased A/E bilat with  scattered wheezing improved  Cardiovascular:  normal S1/S2 with IV Pastora best heard over aortic area w/ radiation to neck  Abdomen: Soft, non-tender, non-distended, no rigidity or peritoneal signs  Musculoskeletal: NL symmetrical A/PROM bilat U/L extremities   Skin: No rashes. + bilat. pitting edema   Neurologic:  CN II-XII intact. NL symmetrical reflexes. NL gait and stance. NL Cerebellar exam. Power 5/5 all muscle groups U/L extremities. Toes downgoing  Capillary Refill: Brisk,< 3 seconds   Peripheral Pulses: +2 palpable, equal bilater             Labs:   Recent Labs     04/14/19  0942 04/15/19  0526 04/16/19  0521   WBC 6.0 3.7* 3.5*   HGB 9.9* 9.5* 9.0*   HCT 32.1* 31.0* 29.2*    173 155     Recent Labs     04/14/19  0942 04/15/19  0526 04/16/19  0521   * 139 137   K 3.9 4.0 4.2   CL 92* 92* 93*   CO2 27 34* 33   BUN 15 18 25*   CREATININE 0.7 0.9 0.9   CALCIUM 9.9 9.8 9.7     Recent Labs     04/14/19  0942   AST 20   ALT 18   BILIDIR <0.2   BILITOT 0.3   ALKPHOS 67     Recent Labs     04/14/19  0942   INR 1.18*     No results for input(s): CKTOTAL, TROPONINI in the last 72 hours. Urinalysis:      Lab Results   Component Value Date    NITRU NEGATIVE 04/10/2019    WBCUA 2-4 04/10/2019    BACTERIA NONE 04/10/2019    RBCUA 0-2 04/10/2019    BLOODU NEGATIVE 04/10/2019    SPECGRAV 1.013 04/10/2019    GLUCOSEU NEGATIVE 04/03/2019       Radiology:  Xr Chest Standard (2 Vw)    Result Date: 4/10/2019  PROCEDURE: XR CHEST (2 VW) CLINICAL INFORMATION: cp sob.  COMPARISON: April 3, 2019 TECHNIQUE: PA and lateral views the chest. FINDINGS: There is stable cardiomegaly with redemonstration of diffuse coarse interstitial markings. There is increasing venous congestion with minimal fluid retained within the right major and minor fissure and small bilateral effusions the bases present. There is stable atherosclerosis. The skeleton is negative for active appearing pathology. 1. Cardiomegaly with mild interval increase of pulmonary venous congestion correlate with developing congestive heart failure **This report has been created using voice recognition software. It may contain minor errors which are inherent in voice recognition technology. ** Final report electronically signed by Dr. Jason Bustillo on 4/10/2019 11:22 PM    Ct Chest Wo Contrast    Result Date: 3/26/2019  PROCEDURE: CT CHEST WO CONTRAST CLINICAL INFORMATION: Pulmonary nodule COMPARISON: 3/11/2019 TECHNIQUE:  Axial CT images were obtained through the chest without contrast. Coronal and sagittal reformatted images were rendered. All CT scans at this facility use dose modulation, iterative reconstruction, and/or weight-based dosing when appropriate to reduce radiation dose to as low as reasonably achievable. FINDINGS: There is a 7 x 8 mm pulmonary nodule within the left lower lobe peripherally on axial image 26. This is increased in size from prior lung screening CT dated 4/24/2017. This is compatible with a lung RADS category 4B lesion. The central airways appear patent. There is moderate bilateral centrilobular emphysema. Mild dependent bibasilar ground glass opacities are present and may represent atelectasis or scarring. No pneumothorax is seen. There is mild bronchiectasis noted at the lung bases. No pneumothorax is seen. No other focal consolidation is demonstrated. No pleural effusions are seen. There is a 1.6 x 1.2 cm mediastinal lymph node is located anterior to the trachea on axial image 19.  Previously this measured approximately 1.6 x 1.1 cm. This does not appear significantly changed from prior CT dated 4/24/2017. There is a small to moderate pericardial fluid present. Limited evaluation of the lung bases appears unremarkable. No acute osseous findings are demonstrated. 1. There is a 7 x 8 mm pulmonary nodule within the left lower lobe peripherally on axial image 26. This is increased in size from prior lung screening CT dated 4/24/2017. This is compatible with a lung RADS category 4B lesion given its interval increase in size. Consider further characterization with PET/CT. Otherwise, follow-up CT evaluation in 3 months is recommended to document stability. 2. Small to moderate pericardial fluid is present. **This report has been created using voice recognition software. It may contain minor errors which are inherent in voice recognition technology. ** Final report electronically signed by Dr. Gayathri Purcell on 3/26/2019 4:05 PM    Ct Abdomen Pelvis W Iv Contrast Additional Contrast? None    Result Date: 4/3/2019  CT ABDOMEN AND PELVIS WITH CONTRAST ENHANCEMENT: CLINICAL INFORMATION: ABDOMINAL PAIN, upper & L abd pain, vomiting sbo COMPARISON: 11/27/2018 TECHNIQUE: Multiple axial 5 mm images of the abdomen, pelvis, and lung bases were obtained following the administration of oral and intravenous contrast material (ISOVUE). Computer generated sagittal and coronal images of the abdomen and pelvis were also  reconstructed. ALL CT SCANS AT THIS FACILITY use dose modulation, iterative reconstruction, and/or weight-based dosing when appropriate to reduce radiation dose to as low as reasonably achievable. FINDINGS: Lung bases: Lung bases fibroemphysematous in appearance. Minimal atelectasis/fibrosis both posterior costophrenic angles. Tiny pericardial effusion, not appreciably changed from prior study. Small hiatus hernia. Liver:  There is a poorly defined focus of diminished attenuation along the anterior/inferior aspect of liver right side, unchanged from prior study, likely representing a focus of volume averaging. Liver otherwise unremarkable. Prior cholecystectomy. 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 Kidneys otherwise unremarkable. No calculi. No hydronephrosis. Note that there is a filter in inferior vena cava. Densely calcified abdominal aorta. Bowel/Peritoneum: No abnormally dilated bowel loops are seen. There are multiple diverticula in the sigmoid and distal descending colon. No evidence for diverticulitis. Moderate mammography control the colon, consistent with constipation. Note that there  is a small focus of soft tissue stranding emanating from the anterior aspect of the proximal descending colon, not present on prior study and suggestive of a small focus of epiploic appendagitis. No free air. No free fluid in the abdomen. No abnormally enlarged para-aortic lymph nodes are seen. Bones : Prior lower lumbar fusion, A3-6, with metallic hardware in place. Pelvis: Unremarkable. No mass lesion. No free fluid. The urinary bladder is unremarkable. 1. Extensive diverticulosis coli. No evidence for diverticulitis. 2. Filter in the IVC. 3. Fibroemphysematous lungs. Small pericardial effusion. Minimal bibasilar atelectatic/fibrotic stranding. 4. Small focus of epiploic appendagitis proximal descending colon. This is a self limiting condition expected to resolve spontaneously. **This report has been created using voice recognition software. It may contain minor errors which are inherent in voice recognition technology. ** Final report electronically signed by Dr. Pam Jones on 4/3/2019 2:15 PM    Xr Chest Portable    Result Date: 4/14/2019  PROCEDURE: XR CHEST PORTABLE CLINICAL INFORMATION: Shortness of breath. COMPARISON: 4/10/2019. TECHNIQUE: AP portable chest radiograph performed. FINDINGS: POSTSURGICAL CHANGES: None.  LINES/TUBES/MECHANICAL DEVICES: None. TRACHEA/HEART/MEDIASTINUM/HILUM: 1. There is stable mild enlargement of the cardiac silhouette. 2. Stable atheromatous calcification thoracic aorta. LUNG FIELDS: 1. There is persistent pulmonary vascular congestion with left perihilar and right infrahilar infiltrates. There is no pleural effusion. The right clinical setting these findings are consistent with congestive heart failure. Follow-up chest radiograph recommended to confirm complete resolution. OTHER: None. PNEUMOTHORAX:  None. OSSEOUS STRUCTURES: 1. No acute osseous abnormality. 1. There is persistent pulmonary vascular congestion with left perihilar and right infrahilar infiltrates. There is no pleural effusion. The right clinical setting these findings are consistent with congestive heart failure. Follow-up chest radiograph recommended to confirm complete resolution. **This report has been created using voice recognition software. It may contain minor errors which are inherent in voice recognition technology. ** Final report electronically signed by Dr. Cyndra Simmonds on 4/14/2019 9:24 AM    Xr Chest Portable    Result Date: 4/3/2019  PROCEDURE: XR CHEST PORTABLE CLINICAL INFORMATION: short of breath COMPARISON: 3/21/2019 TECHNIQUE: A single mobile view of the chest was obtained. 1. Mild cardiomegaly. Prior anterior cervical fusion. 2. Otherwise normal chest. No acute findings. No infiltrates or effusions are seen. **This report has been created using voice recognition software. It may contain minor errors which are inherent in voice recognition technology. ** Final report electronically signed by Dr. Kaiden Dailey on 4/3/2019 12:36 PM    Xr Chest Portable    Result Date: 3/21/2019  PROCEDURE: XR CHEST PORTABLE CLINICAL INFORMATION: fatigue. COMPARISON: 3/2/2019. TECHNIQUE: AP upright view of the chest. FINDINGS: The lungs appear grossly clear.  Pulmonary nodule seen on previous CT is not visualized on this exam. Pulmonary vasculature and

## 2019-04-16 NOTE — PROGRESS NOTES
Margaret Leiva 59  INPATIENT OCCUPATIONAL THERAPY  STRZ ICU STEPDOWN TELEMETRY 4K  EVALUATION    Time:  Time In:   Time Out: 1840  Timed Code Treatment Minutes: 15 Minutes  Minutes: 30          Date: 2019  Patient Name: Rupesh Coffey,   Gender: female      MRN: [id number]  : 1945  (75 y.o.)  Referring Practitioner: Dr. Ann Crook MD  Diagnosis: Acute Exacerbation of COPD with asthma  Additional Pertinent Hx: Pt admitted with above dx. Pt has report of progressively worsening dry cough and SOB. She denies CP. Voice compliance w/ meds and diet. She had been discharged to [facility] on 19.       Restrictions/Precautions:  Fall Risk, General Precautions                    Other position/activity restrictions: O2       Past Medical History:   Diagnosis Date    Anemia     Anxiety     Aortic stenosis     Arthritis     general    AS (aortic stenosis): mild to moderate by echo 2017    ASHD (arteriosclerotic heart disease)     Asthma 2016    INHALER USE DAILY AND AS NEEDED    Bipolar disorder ([address]     Blood circulation, collateral     CAD (coronary artery disease)     MI, 5 STENTS IN HEART NO SURE OF HEART DR [facility]    Cerebral artery occlusion with cerebral infarction ([address]     SILENT-DX ON CT SCAN NO DEFICITS    Chest pain     CHF (congestive heart failure) (HCC)     COPD (chronic obstructive pulmonary disease) ([address] 2014    INHALER USE DAILY AND AS NEEDED    Depression     Disease of blood and blood forming organ     anemia per patient     DM (diabetes mellitus) ([address]     NO MEDS DIET CONTROLED    Dyspnea     FH: CAD (coronary artery disease)     Full dentures     UPPER AND LOWER    GERD (gastroesophageal reflux disease)     Headache     Heart burn     History of blood transfusion     POST OP -PLASMA    History of blood transfusion 2019    1 UNIT LOW HGB    History of tobacco abuse: Quit in      HLD (hyperlipidemia)     ON RX    HTN (hypertension)     ON RX    Irritable bowel syndrome     States has not had problem with this for years    Liver disease     fatty liver    Metabolic syndrome 07/51/2268    MI (myocardial infarction) ([address]     Nausea & vomiting     Obesity     CHET (obstructive sleep apnea) 2016    NO MACHINE YET    S/P cardiac catheterization: 11/6/2014: 99% in-stent restenosis mid-RCA. LCx moderate LI's. LAD 85% mid-segment lesion. 11/6/2014 11/6/2014: 99% in-stent restenosis mid-RCA. LCx moderate LI's. LAD 85% mid-segment lesion. Dr. Piper Edwards S/P PTCA:  5/11/2004: Proximal and mid RCA  Taxus 3.5 X 20 mm, and Taxus 3.0 X 32 mm. 10/28/2014    5/11/2004: Proximal and mid RCA  Taxus 3.5 X 20 mm, and Taxus 3.0 X 32 mm. Dr. America Reese Systolic murmur 77/72/1591    Thyroid disease     ON RX    Wears glasses      Past Surgical History:   Procedure Laterality Date    ABDOMEN SURGERY      BACK SURGERY  08/2016        BLADDER SUSPENSION      BREAST BIOPSY  10/10/2017    BREAST LUMPECTOMY      CARDIAC CATHETERIZATION  8-11-06    Mild nonobstructive CAD w/ diffuse 10-20% proximal and mid LAD stenosis and 10-20% proximal/ostial RCA stenosis. No obstructive lesions. RCA stents are patent w/o evidence of restenosis. Normal LV systolic function. EF 65%. Trace MR - catheter induced. Minimally elevated LVEDP - 13mmHg. Mild to moderate aortoiliac PVD w/o obstructive lesions.  CARDIAC CATHETERIZATION  5-11-04    Successful drug-eluting stent x 2 of proximal and mid RCA.  CARDIAC CATHETERIZATION  5-11-04    70-80% proximal RCA stenosis w/ 50-70% mid RCA stenosis. There is 50-60% lesion in mid PDA. Mild proximal and mid LAD disease. Mild circumflex disease. Normal LV size and systolic function. EF 60%.  CARDIOVASCULAR STRESS TEST  5-10-11    No evidence of stress induced ischemia. EF 75%.     CARDIOVASCULAR STRESS TEST  5-10-10    No evidence of stress induced ischemia, infarct or scar. EF 74%.  CAROTID ENDARTERECTOMY      CERVICAL FUSION N/A 11/15/2017    REMOVAL OF PLATE V2-1, ACDF F8-5 W/ATLANTIS CORNERSTONE performed by Kike Lamas MD at [address] [place] [address], DIAGNOSTIC      EYE SURGERY      cataract 2017   [place] [id number] EAR SURGERY      NECK SURGERY  FEB 2016   [facility] OTHER SURGICAL HISTORY  02/13/2019    Arteriogram for diagnostics    PARTIAL HYSTERECTOMY      UPPER GASTROINTESTINAL ENDOSCOPY      UPPER GASTROINTESTINAL ENDOSCOPY             Subjective  Chart Reviewed: Yes(Internal medicine note; PT evaluation; order review)  Patient assessed for rehabilitation services?: Yes  Response to previous treatment: Patient with no complaints from previous session  Family / Caregiver Present: Yes(Daughter present for last part of session)    Subjective: Pleasant and cooperative  Comments: RN approved session. Pt reported severe pain in her neck and back after she had gotten to the bed following the ADL. Her nurse indicated she could have a pain medicine after session.       General:  Overall Orientation Status: Within Normal Limits    Vision: Impaired  Vision Exceptions: Wears glasses at all times    Hearing: Exceptions to [facility]  Hearing Exceptions: Bilateral hearing aid         Pain:  Pain Assessment  Patient Currently in Pain: Yes  Pain Assessment: 0-10  Pain Level: 9  Pain Type: Chronic pain  Pain Location: Back;Neck  Pain Orientation: Posterior  Pain Descriptors: Aching  Pain Frequency: Continuous  Clinical Progression: Not changed  Patient's Stated Pain Goal: 5  Response to Pain Intervention: Patient Satisfied  Multiple Pain Sites: No       Social/Functional History:  Lives With: Alone  Type of Home: Apartment  Home Layout: One level  Home Access: Level entry  Home Equipment: Oxygen, Cane, Wheelchair-manual, 4 wheeled walker, Alert Button, Lift chair     Bathroom Shower/Tub: Tub/Shower unit  Bathroom Toilet: Standard  Bathroom Equipment: Tub transfer bench  Bathroom Accessibility: Accessible    Receives Help From: Family, Home health  ADL Assistance: Needs assistance  Bath: Minimal assistance  Homemaking Assistance: Needs assistance  Meal Prep: Moderate  Laundry: Maximal  Vacuuming: Maximal  Cleaning: Maximal  Gardening: Maximal  Yard Work: Maximal  Driving: Maximal  Shopping: Maximal  Homemaking Responsibilities: No    Ambulation Assistance: Independent  Transfer Assistance: Independent    Active : No  Occupation: Retired  Leisure & Hobbies: Taking care of a dog; reading  Additional Comments: Pt reports that she sleeps in the lift chair. She just has gotten a CPAP machine. Pt reported going out for appointments mostly. Objective  Vision - Basic Assessment  Prior Vision: Wears glasses all the time     Overall Cognitive Status: WFL         Sensation  Overall Sensation Status: WNL    Posture: Fair(rounded shoulders noted)    Observation/Palpation  Posture: Fair(rounded shoulders noted)    Hand Dominance: Right    LUE PROM (degrees)  LUE PROM: WNL       LUE AROM (degrees)  LUE AROM : WNL  Left Hand PROM (degrees)  Left Hand PROM: WNL  Left Hand AROM (degrees)  Left Hand AROM: WNL    RUE PROM (degrees)  RUE PROM: WNL  RUE AROM (degrees)  RUE AROM : WNL  Right Hand PROM (degrees)  Right Hand PROM: WNL  Right Hand AROM (degrees)  Right Hand AROM: WNL    LUE Strength  L Hand Grasp: 4/5  LUE Strength Comment: 3+/5 deltoid; 3+/5 pectoral; 4-/5 biceps/triceps                  RUE Strength  R Hand Grasp: 4/5  RUE Strength Comment: 3+/5 deltoid; 3+/5 pectoral; 4-/5 biceps/triceps                               Movements Are Fluid And Coordinated: Yes     Fine Motor Skills  Fine Motor Comment: No difficulty noted with using B hands for doing self care. ADL  Grooming: Moderate assistance(pt had help with detangling her hair while she was sitting. She also combed it herself when finished. She washed her hands after having voided. )  Toileting: Modified independent (No difficulty noted with wiping herself after having voided)     Bed mobility  Comment: See narrative for details below. Transfers  Sit to stand: Supervision(from the edge of the recliner; SBA for standing from the straight chair while reaching for the sink or the grabbar)  Stand to sit: Supervision(to the straight chair or the edge of bed)  Toilet Transfers  Toilet - Technique: Ambulating  Equipment Used: Grab bars  Toilet Transfer: Supervision    Balance  Sitting Balance: Supervision  Standing Balance: Stand by assistance(doing self care grooming or preparing to walk into the bathroom)     Time: 30 seconds x 2  Activity: Washing her hands at the sink; preparing to walk to the bathroom     Functional Mobility  Functional - Mobility Device: Rolling Walker  Activity: To/from bathroom  Assist Level: Stand by assistance  Functional Mobility Comments: An even gait noted as pt used the walker with help provided to hold her O2 line. No LOB. Activity Tolerance:  Activity Tolerance: Patient limited by fatigue, Patient limited by pain  Activity Tolerance: Pt was using 3L of O2. She had stood for 30 seconds x 2 trials during her trip to the bathroom for doing self care. She returned to the bed following session. She had back pain but indicated that it was getting better once she returned to supine. She had asked for a pain medication also after the activity. Treatment Initiated:  Pt was able to scoot back in the bed and come sit to supine with SBA using the bedrail. She had use of the [place] draw sheet to also get boosted up in the bed. Pt discussed her home routine. She stated she was not getting out very much. She was motivated to be able to go out to eat with a friend.   Encouraged pt to conserve her energy with daily activities and to be able to plan special activities like going out to eat. She verbalized understanding. Assessment:  Assessment: Patient would benefit from continued skilled OT services to address above deficits. She presents with decreased endurance, ADLs and limited functional mobility secondary to COPD exacerbation. She was doing her self care but had help with showering prior to admission. She also walked with a rolling walker and was able to go to appointments occasionally. Pt had help with her housekeeping and cooking. Pt needed help with combing her hair at this time. She tolerates standing for only a few minutes as she has chronic back pain. She walked to/from the bathroom with a rest break taken between while she sat and had help getting tangles out of her hair. She is motivated to work with therapy. She has been sleeping in a lift chair at home for over 1  month secondary to having difficulty breathing when flat in the bed. Performance deficits / Impairments: Decreased ADL status, Decreased endurance, Decreased functional mobility   Prognosis: Good    Clinical Decision Making: Clinical Decision making was of Low Complexity as the result of analysis of data from a problem focused assessment, a consideration of a limited number of treatment options, no significant comorbidities affecting the plan of care and no modification or assistance required to complete the evaluation.     Discharge Recommendations:  Discharge Recommendations: Continue to assess pending progress, Patient would benefit from continued therapy after discharge, Home with Home health OT    Patient Education:  Patient Education: OT POC; pt's goal; energy conservation and back protection techniques; importance of having her legs elevated when she is in the chair at home    Equipment Recommendations:  Equipment Needed: No    Safety:  Safety Devices in place: Yes  Type of devices: Left in bed, Bed alarm in place, Call light

## 2019-04-17 PROBLEM — J96.01 ACUTE RESPIRATORY FAILURE WITH HYPOXIA AND HYPERCAPNIA (HCC): Status: ACTIVE | Noted: 2019-01-01

## 2019-04-17 PROBLEM — J96.02 ACUTE RESPIRATORY FAILURE WITH HYPOXIA AND HYPERCAPNIA (HCC): Status: ACTIVE | Noted: 2019-01-01

## 2019-04-17 NOTE — PROGRESS NOTES
CLINICAL PHARMACY: [address] [facility] [place] Select Patient?: No  Total # of Interventions Recommended: 0   -   Total # Interventions Accepted: 0  Intervention Severity:   - Level 1 Intervention Present?: No   - Level 2 #: 0   - Level 3 #: 0   Time Spent (min): 15    Additional Documentation:    Loan Bean, PharmD, BCPS   4/17/2019  4:56 PM

## 2019-04-17 NOTE — CARE COORDINATION
[address] Transitions Interview     2019    Patient: Samira Never Patient : 1945   MRN: [id number]  Reason for Admission: Acute exacerbation of COPD with asthma ([address] [J44.1, J45.901]  RARS: Readmission Risk Score: 77    Met with: Shannon Saldivar for inpatient Care Transition interview. Identified self/role. Explained CTC process, verbalized understanding and agreeable to one time CTC call before being transferred back to established [facility]. Readmission Risk  Patient Active Problem List   Diagnosis    Anxiety    Depression    Type 2 diabetes mellitus with complication, with long-term current use of insulin ([address]    MI (myocardial infarction) ([address]    Dyslipidemia    CAD (coronary artery disease)    COPD without exacerbation ([address]    GERD (gastroesophageal reflux disease)    Hypothyroid    History of tobacco abuse: Quit in     S/P PTCA: 3/2/2017: PTCA RCA ISR. 2014: LAD [place] 3.0X26. 2014: Stenting RCA [address] 2.8V98 and 3.5X18. 2004: Proximal & mid RCA Taxus 3.5X20 mm, and Taxus 3.0X32 mm.  Metabolic syndrome    S/P cardiac catheterization: 2014: 99% in-stent restenosis mid-RCA. LCx moderate LI's. LAD 85% mid-segment lesion.  POND (nonalcoholic steatohepatitis)    Hyperlipidemia    Essential hypertension    Obesity (BMI 30-39. 9)    Moderate dehydration    ASCVD (arteriosclerotic cardiovascular disease)    Chronic respiratory failure with hypoxia (HCC)    Other hyperlipidemia    Dysmetabolic syndrome    Bilateral iliac artery occlusion (HCC)    CHET (obstructive sleep apnea)    Aortic valve stenosis    Nocturnal hypoxemia    Hyponatremia    Acute on chronic diastolic congestive heart failure due to valvular disease (HCC)    Sympathotonic orthostatic hypotension    E-coli UTI    Anxiety and depression    Dizziness    Chest pain    Acute on chronic respiratory failure with hypoxia and hypercapnia (HCC)    Moderate persistent asthma a Home Health Coordinator?:  Yes  Jerry Castillo Name:  [facility] Contact Info: Mary Randolph cm - [phone]  Durable Medical Equipment  Patient DME:  Stewart Dry cane, Wheelchair, Other  Other Patient DME:  GUERLINE Necklace  Patient Home Equipment:  CPAP, Oxygen, Nebulizer  Functional Review  Ability to seek help/take action for Emergent/Urgent situations i.e. fire, crime, inclement weather or health crisis.:  Needs Assistance  Ability handle personal hygiene needs (bathing/dressing/grooming):  Needs Assistance  Ability to manage medications:  Needs Assistance  Ability to prepare food:  Needs Assistance  Ability to maintain home (clean home, laundry):  Needs Assistance  Ability to drive and/or has transportation:  Dependent  Ability to do shopping:  Needs Assistance  Ability to manage finances: Independent  Is patient able to live independently?:  No  Hearing and Vision  Visual Impairment:  Visual impairment (Glasses/contacts)  Hearing Impairment:  Wears hearing aids  Care Transitions Interventions  No Identified Needs       Patient presented to the ED on 04/14/2019 with report of increased SOB, cough, and congestion. PMH includes CAD, MI, anemia, COPD, CHF, HTN, HLD, and DM. CXR showed persistent pulmonary vascular congestion with left perihilar and right infrahilar infiltrates. Patient admitted for Acute exacerbation of COPD with asthma ([facility] [J44.1, E77.641]. No medical consults. Case management, social work, and PT/OT following. Patient is from [facility]. Patient previously admitted to [facility] with last admission 04/10-04/12/2019. Prior to that, patient resided at home alone. Patient plans to return home due to no one being able to watch her dog. Patient states her friend is currently admitted into the hospital. Discussed boarding options with patient, declined. Patient is adamant about returning home upon discharge.  Patient plans to resume services with [employer] [facility] [employer], and [employer]. Patient uses 3L continuous oxygen via [place] through [address] [place] [address]. Patient has a scale, blood pressure cuff, and glucometer for chronic disease management. Patient denies additional needs or concerns at this time. Business card with contact information for CTC provided. Patient understands CTC will follow upon discharge. Monica, patient's established ACC, updated on patient admission and POC.      Follow Up  Future Appointments   Date Time Provider Micheline Conteh   4/22/2019  9:40 AM Jolane Nageotte, PA-C Oncology [facility]   4/23/2019  2:40 PM ASHLEY Landaverde - [address] [facility]   4/30/2019  2:30 PM ASHLEY Mendoza - CNP SRPX CHF [facility] [place]   5/20/2019  1:20 PM ASHLEY Landaverde - [address] [facility]   6/10/2019  8:20 AM STR CT IMAGING RM1  [facility] STR[facility] OUT EXP STR Radiolog   6/17/2019 10:30 AM Wei Downey [address]   7/15/2019  1:00 PM Khushboo Dalal RD, LD SRPX Physic [facility]   10/1/2019 10:40 AM ASHLEY Reyes - CNP [facility] Maintenance  Health Maintenance Due   Topic Date Due    Breast cancer screen  10/10/2018       Virgil Calhoun, RN  Care Transition Coordinator  [phone]  21

## 2019-04-17 NOTE — DISCHARGE INSTR - DIET
 Good nutrition is important when healing from an illness, injury, or surgery. Follow any nutrition recommendations given to you during your hospital stay.  If you were given an oral nutrition supplement while in the hospital, continue to take this supplement at home. You can take it with meals, in-between meals, and/or before bedtime. These supplements can be purchased at most local grocery stores, pharmacies, and chain super-stores.  If you have any questions about your diet or nutrition, call the hospital and ask for the dietitian. You are being placed on a diabetic carb counting diet. Eating healthy is the first step in controlling diabetes    Here's how to get started. ... Eat 3 meals a day. Eat your meals at the same time each day and do not skip meals. Eat about the same amount of food each day. Limit sugar and sweets. Eat less candy, desserts, pastries and jelly. Limit intake of regular pop, sugary beverages and fruit juice. Drink sugar free beverages such as diet pop, water, Crystal Light, and unsweetened tea instead. Use Equal or Sweet-n-Low in place of sugar. Lose weight if you are overweight. Even a small amount of weight loss may help improve your blood sugar control. To help lose weight, reduce your portion sizes. Control your intake of carbohydrates. Carbohydrate is the main  nutrient that affects blood sugar levels. All the carbohydrate you eat is turned  into sugar by your body. Therefore, it is important to control  the amount  of carbohydrate that you eat a day. You should eat about 60-75  grams of  carbohydrate at each meal.      Common sources of carbohydrates:                       Eat more fiber. Fiber can help slow down the rise in blood sugar following a meal.  To get more fiber in your diet, eat at least 5 servings of fruits and vegetables a day, choose whole grain bread/cereal and eat more beans or legumes. Reduce your intake of high fat foods. Cutting back on your intake of high fat food can help reduce body weight and cholesterol levels. Reduce intake of fried food, shaw, sausage, luncheon meat, gravy, sour cream, cheese, egg yolks and margarine/butter. Limit your intake of alcohol. Drink alcohol only with permission of your doctor. Never drink alcohol on an empty stomach. Be more active. Regular exercise is an important part of your diabetes care as exercise can help lower your blood sugar levels. The type and amount of exercise that is right for you should be discussed with your doctor.           2000 ML fluid restriction

## 2019-04-17 NOTE — PROGRESS NOTES
Discharge teaching and instructions for diagnosis/procedure of COPD completed with patient using teachback method. AVS reviewed. Printed prescriptions given to patient. Patient voiced understanding regarding prescriptions, follow up appointments, and care of self at home.  Discharged in a wheelchair to  home with support per EMS transportation ([address])

## 2019-04-17 NOTE — TELEPHONE ENCOUNTER
.Transition of Care visit scheduled.   4/23/2019  Patient is being discharged to home  Date of discharge 4/17/19  Discharge from facility [facility]  Reason for admission copd exacerbation

## 2019-04-17 NOTE — PROGRESS NOTES
Savannah Leiva 59  OCCUPATIONAL THERAPY MISSED TREATMENT NOTE  STRZ ICU STEPDOWN TELEMETRY 4K  [id number]-A      Date: 2019  Patient Name: Marina Edgar        CSN: [id number]   : 1945  (75 y.o.)  Gender: female   Referring Practitioner: Dr. Huber Mercado MD  Diagnosis: Acute Exacerbation of COPD with asthma         REASON FOR MISSED TREATMENT:  Missed Treat. Attempted to see pt and she was going home soon and declined to part today.   Will attempt again tomorrow if pt stays on caseload and if time allows

## 2019-04-17 NOTE — TELEPHONE ENCOUNTER
Called patient, no answer. Message was left asking the patient to call office and reschedule her no show appt from 04/17/19. A no show letter has also been mailed to patients home address with the contact information included.

## 2019-04-17 NOTE — PLAN OF CARE
Problem: Pain:  Goal: Pain level will decrease  Description  Pain level will decrease  Outcome: Ongoing  Note:   Pain Assessment: 0-10  Pain Level: 8   Pain goal:  5  Is pain goal met at this time? No     Additional interventions to be implemented: medications Norco, position change and rest         Problem: Pain:  Goal: Control of acute pain  Description  Control of acute pain  Outcome: Ongoing     Problem: Pain:  Goal: Control of chronic pain  Description  Control of chronic pain  Outcome: Ongoing     Problem: Falls - Risk of:  Goal: Will remain free from falls  Description  Will remain free from falls  Outcome: Ongoing  Note:   Patient alert and oriented x4. Bed alarmed armed. Bed wheels locked. Bedside table in reach. Patient up with assistance when ambulating. Patient verbalizes and demonstrates the use of the call light. Hourly rounding being completed. Problem: Falls - Risk of:  Goal: Absence of physical injury  Description  Absence of physical injury  Outcome: Ongoing  Note:   Patient free from physical injury at this time. Problem: Risk for Impaired Skin Integrity  Goal: Tissue integrity - skin and mucous membranes  Description  Structural intactness and normal physiological function of skin and  mucous membranes. Outcome: Ongoing  Note:   Patient turns self independently, assistance provided when needed. Patient educated on the importance of turning self frequently to prevent breakdown. No new skin changes noted thus far this shift. Will continue to monitor. Problem: Discharge Planning:  Goal: Discharged to appropriate level of care  Description  Discharged to appropriate level of care  Outcome: Ongoing  Note:   [facility]PE JEFF[facility] at discharge and no needs voiced at this time. Patient working with social work for discharge planning. Problem:  Activity Intolerance:  Goal: Ability to tolerate increased activity will improve  Description  Ability to tolerate increased activity will improve  Outcome: Ongoing  Note:   Patient tolerating increased activity. Educated on the importance of moving. Problem: Airway Clearance - Ineffective:  Goal: Ability to maintain a clear airway will improve  Description  Ability to maintain a clear airway will improve  Outcome: Ongoing  Note:   Patient able to cough and clear airway at this time. Problem: Breathing Pattern - Ineffective:  Goal: Ability to achieve and maintain a regular respiratory rate will improve  Description  Ability to achieve and maintain a regular respiratory rate will improve  Outcome: Ongoing  Note:   Patient has achieved and is maintaining a regular respiratory rate. Problem: Gas Exchange - Impaired:  Goal: Levels of oxygenation will improve  Description  Levels of oxygenation will improve  Outcome: Ongoing  Note:   Patient sating fine on 3 L nasal cannula. Will continue to wean when necessary. Wearing Bipap at night. Problem: DISCHARGE BARRIERS  Goal: Patient's continuum of care needs are met  Outcome: Ongoing  Note:   Patient plans to return to [facility] or go home at discharge and no needs voiced at this time. Patient working with social work for discharge planning. Problem: Nutrition  Goal: Optimal nutrition therapy  Outcome: Ongoing  Note:   Patient tolerating current diet. Reinforced fluid restriction. Care plan reviewed with patient. Patient verbalizew understanding of the plan of care and contributew to goal setting.

## 2019-04-17 NOTE — PLAN OF CARE
Problem: Impaired respiratory status  Goal: Clear lung sounds  4/16/2019 2049 by Silvino Cunningham RCP  Outcome: Ongoing   Breath sounds are clear and diminished at this time. Continue with treatments to help improve breath sounds.

## 2019-04-17 NOTE — DISCHARGE SUMMARY
Hospital Medicine Discharge Summary      Patient Identification:   Cindy Hernandez   :   MRN: [id number]   Account: [de-identified]      Patient's PCP: ASHLEY Velasquez CNP    Admit Date: 2019     Discharge Date: 2019      Admitting Physician: Melanie Warren MD     Discharge Physician: Issa Love MD     Discharge Diagnoses: Active Hospital Problems    Diagnosis Date Noted    Diastolic CHF, acute on chronic (HCC) [I50.33]      Priority: High    Acute respiratory failure with hypoxia and hypercapnia (HCC) [J96.01, J96.02] 2019    Acute exacerbation of COPD with asthma ([facility] [address] [J44.1, J45.901] 2019       The patient was seen and examined on day of discharge and this discharge summary is in conjunction with any daily progress note from day of discharge. Hospital Course:   Cindy Hernandez is a 75 y.o. female admitted to [address] [place] [address] on 2019 for acute sob    This pt has copd, aortic stenosis; she has had multiple admissions within the past month and is at risk for others. She came to ER with another exacerbation of sob and was admitted. . She also had a dry cough. She is scheduled to have a TAVR at a later date. Her admission cxr suggested chf and she was given IV lasix. sh was also treated for copd, as she had acute on chronic respiratory failure with hypoxia and hypercarbia. She gradually improved and is being discharged. Home health services were arranged. Intermittent zaroxolyn was added to try to keep her out of heart failure.                    Exam:     Vitals:  Vitals:    19 0248 19 0515 19 0845 19 1153   BP: (!) 143/63  (!) 176/77 (!) 110/56   Pulse: 78  83 71   Resp: 16  18 18   Temp: 98.3 °F (36.8 °C)  98.8 °F (37.1 °C) 98.4 °F (36.9 °C)   TempSrc: Oral  Oral Oral   SpO2: 94%  99% 96%   Weight:  207 lb 14.4 oz (94.3 kg)     Height:         Weight: Weight: 207 lb 14.4 oz (94.3 kg)     24 hour intake/output:    Intake/Output Summary (Last 24 hours) at 4/17/2019 1543  Last data filed at 4/17/2019 1403  Gross per 24 hour   Intake 1373.94 ml   Output 1750 ml   Net -376.06 ml         General appearance:  No apparent distress, appears stated age and cooperative. Chronically ill appearing    HEENT:  Normal cephalic, atraumatic without obvious deformity. Pupils equal, round, and reactive to light. Extra ocular muscles intact. Conjunctivae/corneas clear. Neck: Supple, with full range of motion. No jugular venous distention. Trachea midline. Respiratory:   rhonchi  Cardiovascular:  Regular rate and rhythm with JEANETTE at [facility]    Abdomen: Soft, non-tender, non-distended with normal bowel sounds. Musculoskeletal:  No clubbing, cyanosis or edema bilaterally. Full range of motion without deformity. Skin: Skin color, texture, turgor normal.  No rashes or lesions. Neurologic:  Neurovascularly intact without any focal sensory/motor deficits. Cranial nerves: II-XII intact, grossly non-focal.  Psychiatric:  Alert and oriented, thought content appropriate, normal insight    Labs: For convenience and continuity at follow-up the following most recent labs are provided:      CBC:    Lab Results   Component Value Date    WBC 4.1 04/17/2019    HGB 8.6 04/17/2019    HCT 28.0 04/17/2019     04/17/2019       Renal:    Lab Results   Component Value Date     04/17/2019    K 3.6 04/17/2019    K 3.6 04/03/2019    CL 91 04/17/2019    CO2 34 04/17/2019    BUN 31 04/17/2019    CREATININE 1.1 04/17/2019    CALCIUM 9.5 04/17/2019    PHOS 2.0 12/24/2018         Significant Diagnostic Studies    Radiology:   XR CHEST PORTABLE   Final Result   1. There is persistent pulmonary vascular congestion with left perihilar and right infrahilar infiltrates. There is no pleural effusion. The right clinical setting these findings are consistent with congestive heart failure.  Follow-up chest radiograph    recommended to confirm complete resolution. **This report has been created using voice recognition software. It may contain minor errors which are inherent in voice recognition technology. **      Final report electronically signed by Dr. Gonzalez Purvis on 4/14/2019 9:24 AM             Consults:     IP CONSULT TO SOCIAL WORK  IP CONSULT TO HOME CARE NEEDS    Disposition: Home  Condition at Discharge: Stable    Code Status:  Full Code     Patient Instructions:    Discharge lab work: Activity: activity as tolerated  Diet: DIET CARDIAC; Low Sodium (2 GM);  Daily Fluid Restriction: 2000 ml      Follow-up visits:   ASHLEY Salguero CNP  [facility]  [address] [facility]  [phone]    On 4/23/2019  bring medications, photo ID, & insurance card, please arrive 15 min prior to appointment time, your appointment time is at 2:40 PM     [address]  [address] [place] [address]  [phone]             Discharge Medications:      Lily Terrance   Home Medication Instructions ALDO:[id number]    Printed on:04/17/19 1543   Medication Information                      albuterol sulfate HFA (VENTOLIN HFA) 108 (90 Base) MCG/ACT inhaler  Inhale 2 puffs into the lungs every 6 hours as needed for Wheezing             aspirin (ASPIRIN LOW DOSE) 81 MG EC tablet  take 1 tablet by mouth once daily             baclofen (LIORESAL) 10 MG tablet  Take 1 tablet by mouth 2 times daily Indications: 0.5 tablet to 1 tablet             Blood Glucose Monitoring Suppl HARVINDER  Check blood sugar q daily Dx E11.69             budesonide-formoterol (SYMBICORT) 160-4.5 MCG/ACT AERO  Inhale 2 puffs into the lungs 2 times daily             Calcium Carbonate-Vitamin D (OYSTER SHELL CALCIUM/D) 500-200 MG-UNIT TABS  take 1 tablet by mouth twice a day             clopidogrel (PLAVIX) 75 MG tablet  Take 1 tablet by mouth daily             cyanocobalamin ([employer] VITAMIN B12) 1000 MCG tablet  Take 1 tablet by mouth daily             cycloSPORINE mg by mouth every morning (before breakfast)              polyethylene glycol (GLYCOLAX) powder  Take 17 g by mouth daily as needed (Constipation)             MICHAEL SALINE NASAL SPRAY 0.65 % nasal spray  instill 1 spray into each nostril if needed for congestion             ranolazine (RANEXA) 500 MG extended release tablet  take 1 tablet by mouth twice a day             risperiDONE (RISPERDAL) 1 MG tablet  Take 1 mg by mouth every morning              rosuvastatin (CRESTOR) 20 MG tablet  take 1 tablet by mouth once daily             vitamin D (ERGOCALCIFEROL) 05585 units capsule  Take 1 capsule by mouth once a week                 Time Spent on discharge is more than 45 minutes in the examination, evaluation, counseling and review of medications and discharge plan. Signed: Thank you ASHLEY Maurice Ra - ZIGGY for the opportunity to be involved in this patient's care.     Electronically signed by Steven Hill MD on 4/17/2019 at 3:43 PM

## 2019-04-17 NOTE — PROGRESS NOTES
CLINICAL PHARMACY NOTE: MEDS TO [address] Select Patient?: Yes  Total # of Prescriptions Filled: 5   The following medications were delivered to the patient:  Total # of Interventions Completed: 3  Time Spent (min): 45    Additional Documentation: None

## 2019-04-17 NOTE — PROGRESS NOTES
Apparatus: O2  Assistance: Supervision;Stand by assistance  Quality of Gait: appropriate pace, mild foot clearance, no LOB, generally steady, slight SOB at end of session  Distance: 125ft         Balance  Comments: Pt completed seated EOB ther ex, Supervision, no SOB noted. Exercises:  Exercises  Comments: Pt completed GIANNI LE ther ex x10-15 reps of seated EOB heel/toe raises, marching, LAQs, and supine hip abd/add, all to increase strength and endurance for improved functional mobility        Activity Tolerance:  Activity Tolerance: Patient Tolerated treatment well;Patient limited by endurance    Assessment: Body structures, Functions, Activity limitations: Decreased functional mobility , Decreased endurance, Decreased strength  Assessment: Pt tolerated session fairly well, mostly steady throughout, continues to have decreased endurance and strength and would benefit from continued skilled PT to address endurance, strengthening, and mobility for improved functional mobility. Prognosis: Good     REQUIRES PT FOLLOW UP: Yes    Discharge Recommendations:  Discharge Recommendations: Continue to assess pending progress, Home with Home health PT, Patient would benefit from continued therapy after discharge    Patient Education:  Patient Education: POC, ther ex, gait    Equipment Recommendations:  Equipment Needed: No    Safety:  Type of devices:  All fall risk precautions in place, Left in bed, Call light within reach, Bed alarm in place, Gait belt, Nurse notified    Plan:  Times per week: 5x GM  Times per day: Daily  Specific instructions for Next Treatment: ther ex, ther act, gait trng, functional mobility  Current Treatment Recommendations: Strengthening, Transfer Training, Endurance Training, Gait Training, Functional Mobility Training, Safety Education & Training, Home Exercise Program, Patient/Caregiver Education & Training    Goals:  Patient goals : go home    Short term goals  Time Frame for Short term goals: 1 week  Short term goal 1: Pt to be Mod I for supine <> sit to get in/out of bed  Short term goal 2: Pt to be Mod I for sit <> stand to get up to ambulate  Short term goal 3: Pt to ambulate > 150 ft with RW with O2 and Mod I for household distances    Long term goals  Time Frame for Long term goals : not set due to short ELOS  If patient is discharged prior to progress note completion, this note is to serve as the discharge note with all goals being unmet unless indicated otherwise.             AM-PAC Inpatient Mobility without Stair Climbing Raw Score : 17  AM-PAC Inpatient without Stair Climbing T-Scale Score : 48.47  Mobility Inpatient CMS 0-100% Score: 32.72  Mobility Inpatient without Stair CMS G-Code Modifier : CJ

## 2019-04-18 NOTE — TELEPHONE ENCOUNTER
Maria Esther Rayo from [facility] calling to see if they can get an order to see pt 2x daily? Dominic Ulloa states the pt is seen in the hospital a lot and they are just trying to prevent that.      Please advise

## 2019-04-18 NOTE — CARE COORDINATION
Sean 45 Transitions Initial Follow Up Call    Call within 2 business days of discharge: Yes    Patient: Azalee Buerger Patient : 1945   MRN: [id number]  Reason for Admission:Acute exacerbation of COPD with asthma    Discharge Date: 19 RARS: Readmission Risk Score: 68      Last Discharge [facility]       Complaint Diagnosis Description Type Department Provider    19 Shortness of Breath Acute on chronic congestive heart failure, unspecified heart failure type ([address] . .. ED to Hosp-Admission (Discharged) (ADMITTED) Magdi Cannon MD; Omi Oliva. .. Spoke with: [address] [facility] [address]: [facility]    Non-face-to-face services provided:  Obtained and reviewed discharge summary and/or continuity of care documents  Communication with home health agencies or other community services the patient is currently using-notitifed [place] [id number] [facility] 365 Transitions 24 Hour Call    Do you have any ongoing symptoms?:  Yes  Patient-reported symptoms:  Shortness of Breath  Do you have a copy of your discharge instructions?:  Yes  Do you have all of your prescriptions and are they filled?:  No  Have you scheduled your follow up appointment?:  Yes  How are you going to get to your appointment?:  Other  Were you discharged with any Home Care or Post Acute Services:  Yes  Post Acute Services:  Home Health, Transportation Services, Outpatient/Community Services (Comment: [facility], [facility], and Passport.)  Patient DME:  Walker, Straight cane, Wheelchair, Other  Other Patient DME:  ERS Necklace  Patient Home Equipment:  CPAP, Oxygen, Nebulizer  Do you feel like you have everything you need to keep you well at home?:  Yes  Are you an active caregiver in your home?:  No  Care Transitions Interventions  No Identified Needs     Called pt for the care transition initial follow up call. Pt was admitted from [facility] for COPD.   Pt is back home today, apparently the pt has problems having her dog cared for at home, the pt wanted to go home. Pt stated she feels better than when she was admitted. Pt stated she is always a little SOB, her breathing is her normal.  Pt stated she feels a little congested, it is better. Pt denied any fever, chills or sweating,  Pt denied any swelling, her weight today 209#. Pt understands she is allowed 2000 cc/day  Pt stated she has an aide 5 days/week, has RN Q day    Did not review meds with the pt, the nurse manages the meds    Pt aware of appt next week, she will call [facility]     Pt denied any needs or concerns.   CTC will [facility] the [facility]    Follow Up  Future Appointments   Date Time Provider Micheline Conteh   4/22/2019  9:40 AM Ann Hu PA-C Oncology [address]   [facility]/23/2019  2:[facility] [address] [address]   4/30/2019  2:30 PM Deena Dixon APRN - CNP SRPX CHF P - [place]   5/20/2019  1:20 PM [facility] [address] [address]   6/10/2019  8:20 AM STR CT IMAGING RM1  [facility] STRZ OUT EXP STR Radiolog   6/17/2019 10:30 AM Sandra Conti, APRN - CNP Pulm Med [address]   7/15/2019  1:00 PM Hannah Jackson, YAMILETH, LD SRPX Physic [address]   10/1/2019 10:40 AM Stone Bekc APRN - CNP [facility] - [place] Weller RN  Care Transition Coordinator  [phone]

## 2019-04-18 NOTE — TELEPHONE ENCOUNTER
Sean 45 Transitions Initial Follow Up Call    Outreach made within 2 business days of discharge: Yes    Patient: Sarah Weiss Patient : 1945   MRN: [id number]  Reason for Admission: There are no discharge diagnoses documented for the most recent discharge. Discharge Date: 19       Spoke with: Bumlaro Myrick    [facility] Patient Contact:  Was patient able to fill all prescriptions: Yes  Was patient instructed to bring all medications to the follow-up visit: Yes  Is patient taking all medications as directed in the discharge summary?  Yes  Does patient understand their discharge instructions: Yes  Does patient have questions or concerns that need addressed prior to 7-14 day follow up office visit: no    Scheduled appointment with PCP within 7-14 days    Follow Up  Future Appointments   Date Time Provider Micheline Conteh   2019  9:40 AM Jefferson Palomino PA-C Oncology [address]   2019  2:40 PM ASHLEY Medina - [address] [address]   2019  2:30 PM ASHLEY Godinez - CNP SRPX CHF [facility] [place]   2019  1:20 PM ASHLEY Medina - [address] [address]   6/10/2019  8:20 AM STR CT IMAGING RM1  [facility] STRZ OUT EXP STR Radiolog   2019 10:30 AM Carlene Gibson, [address]   7/15/2019  1:00 PM Charan Young RD, LD SRPX Physic [facility] [place]   10/1/2019 10:40 AM ASHLEY Mao - CNP [facility] [address], CMA ([address] [place] [address])

## 2019-04-21 NOTE — H&P
Marco Batres MD   Physician   Cardiology   Progress Notes   Signed   Date of Service: 3/29/2019 2:31 PM                Signed      Expand All Collapse All       Show:Clear all   ManualTemplateCopied  Added by:   Marco Batres MD  [facility] for details                                                                                                                               Sedation/Analgesia History & Physical   Pt Name: Rahul Padron   MRN: [id number]   YOB: 1945   Provider Performing Procedure: Marco Batres MD, [place], [place]   Primary Care Physician: Rufina Holbrook, ASHLEY Hemphill CNP   PRE-PROCEDURE   DNR-CCA/DNR-CC Yes No   PLANNED PROCEDURE   Cath PCI Pacemaker/AICD   ALEX Cardioversion Peripheral angiography/PTA   Other:   Consent:   The indication, risks and benefits of the procedure and possible therapeutic consequences and alternatives were discussed with the patient. The patient was given the opportunity to ask questions and to have them answered to his/her satisfaction. Risks of the procedure include but are not limited to the following: Bleeding, hematoma including retroperitoneal hematoma, infection, pain and discomfort, injury to the aorta and other blood vessels, rhythm disturbance, low blood pressure, myocardial infarction, stroke, kidney damage/failure, myocardial perforation, allergic reactions to sedatives/contrast material, loss of pulse/vascular compromise requiring surgery, aneurysm/pseudoaneurysm formation, possible loss of a limb/hand/leg, death. Alternatives to and omission of the suggested procedure were discussed. The patient had no further questions and wished to proceed; the consent form was signed.    Indications for the Procedure:   Pre-TAVR workup   Plan: LHC +/- PCI   MEDICAL HISTORY     Past Medical History

## 2019-04-21 NOTE — H&P
Elida Vila MD   Physician   Cardiology   Progress Notes   Signed   Date of Service: 3/29/2019 2:31 PM                Signed      Expand All Collapse All       Show:Clear all   ManualTemplateCopied  Added by:   Elida Vila MD  [facility] for details                                                                                                                               Sedation/Analgesia History & Physical   Pt Name: Libertad Garner   MRN: [id number]   YOB: 1945   Provider Performing Procedure: Elida Vila MD, [facility], [place]   Primary Care Physician: ASHLEY Mehta CNP   PRE-PROCEDURE   DNR-CCA/DNR-CC Yes No   PLANNED PROCEDURE   Cath PCI Pacemaker/AICD   ALEX Cardioversion Peripheral angiography/PTA   Other:   Consent:   The indication, risks and benefits of the procedure and possible therapeutic consequences and alternatives were discussed with the patient. The patient was given the opportunity to ask questions and to have them answered to his/her satisfaction. Risks of the procedure include but are not limited to the following: Bleeding, hematoma including retroperitoneal hematoma, infection, pain and discomfort, injury to the aorta and other blood vessels, rhythm disturbance, low blood pressure, myocardial infarction, stroke, kidney damage/failure, myocardial perforation, allergic reactions to sedatives/contrast material, loss of pulse/vascular compromise requiring surgery, aneurysm/pseudoaneurysm formation, possible loss of a limb/hand/leg, death. Alternatives to and omission of the suggested procedure were discussed. The patient had no further questions and wished to proceed; the consent form was signed.    Indications for the Procedure:   Pre-TAVR workup   Plan: LHC +/- PCI   MEDICAL HISTORY     Past Medical History

## 2019-04-22 NOTE — PROGRESS NOTES
hemorrhoids. GI note scanned in Media tab reviewed. She had an EGD completed February 2017 which found small hiatal hernia, bumpy appearance in the mucosa of gastric body, small polypoid mucosa with overlying erosion of stomach.  Patient affirms history of peptic ulcer disease with last ulcers over 5 years ago. Akinjeannie English does follow with GI Dr. Nichol Darnell APRN-CNP for liver steatohepatitis     Her past medical history includes CHF, severe aortic stenosis, CAD with history of stenting, carotid artery stenosis - following with [facility] at [address] [place] [address] in [place], COPD and chronic respiratory failure with baseline O2 3L/min NC, HTN, DM, hypothyroidism, hyponatremia followed by Dr. Adam Cowan  The patient denies any history of cancer. Two of her sisters were diagnosed with colon cancer but patient is unsure of what age at time of diagnosis. The patient has a history of tobacco use, smoking 1 pack per day from age 24 to 57. She denies alcohol use. Interval History 4/22/2019:   The patient is here for follow-up evaluation. She has had multiple hospital admissions since last appointment for acute CHF exacerbations, hyponatremia. She was at an [facility] rehab for two days and then required another hospital admission. On discharge she went home with home health PT/OT/Nursing and Aide. She reports she has home health daily. She denies any abnormal bleeding; no epistaxis, hemoptysis, hematemesis, melena, hematochezia, hematuria or vaginal bleeding. The patient continues to follow with GI as an outpatient, most recent progress note reviewed from 3/1/19 and esophagram ordered. She reports she has not had this completed yet. She is on daily ferrous sulfate, vitamin B12 and multivitamin. Her medications are distributed by home health nursing staff.       HPI   Past Medical History:   Diagnosis Date    Anemia     Anxiety     Aortic stenosis     Arthritis     general    AS (aortic stenosis): mild to moderate by echo 4/2017 9/6/2017    ASHD (arteriosclerotic heart disease)     Asthma 2016    INHALER USE DAILY AND AS NEEDED    Bipolar disorder [facility] [address]     Blood circulation, collateral     CAD (coronary artery disease) 1999    MI, 5 STENTS IN HEART NO SURE OF HEART DR ADELA MATA'S [facility] AT [facility]    Cerebral artery occlusion with cerebral infarction ([facility] [address] 2018    SILENT-DX ON CT SCAN NO DEFICITS    Chest pain     CH[facility]congest[facility] failure) ([facility]     COPD (chronic obstructive pulmonary disease) ([facility] [address] 2014    INHALER USE DAILY AND AS NEEDED    Depression     Disease of blood and blood forming organ     anemia per patient     DM (diabetes mellitus) ([facility] [address]     NO MEDS DIET CONTROLED    Dyspnea     FH: CAD (coronary artery disease)     Full dentures     UPPER AND LOWER    GERD (gastroesophageal reflux disease)     Headache     Heart burn     History of blood transfusion 1973    POST OP -PLASMA    History of blood transfusion 02/2019    1 UNIT LOW HGB    History of tobacco abuse: Quit in 2007     HLD (hyperlipidemia)     ON RX    HTN (hypertension)     ON RX    Irritable bowel syndrome     States has not had problem with this for years    Liver disease     fatty liver    Metabolic syndrome 80/05/4570    MI (myocardial infarction) ([facility]     Nausea & vomiting     Obesity     CHET (obstructive sleep apnea) 2016    NO MACHINE YET    S/P cardiac catheterization: 11/6/2014: 99% in-stent restenosis mid-RCA. LCx moderate LI's. LAD 85% mid-segment lesion. 11/6/2014 11/6/2014: 99% in-stent restenosis mid-RCA. LCx moderate LI's. LAD 85% mid-segment lesion. Dr. Katie Contreras S/P PTCA:  5/11/2004: Proximal and mid RCA  Taxus 3.5 X 20 mm, and Taxus 3.0 X 32 mm. 10/28/2014    5/11/2004: Proximal and mid RCA  Taxus 3.5 X 20 mm, and Taxus 3.0 X 32 mm.  Dr. Gerhard Zee Systolic murmur 68/86/2723    Thyroid disease     ON RX    Wears glasses       Past Surgical History:   Procedure Laterality Date    ABDOMEN SURGERY  BACK SURGERY  08/2016        BLADDER SUSPENSION      BREAST BIOPSY  10/10/2017    BREAST LUMPECTOMY      CARDIAC CATHETERIZATION  8-11-06    Mild nonobstructive CAD w/ diffuse 10-20% proximal and mid LAD stenosis and 10-20% proximal/ostial RCA stenosis. No obstructive lesions. RCA stents are patent w/o evidence of restenosis. Normal LV systolic function. EF 65%. Trace MR - catheter induced. Minimally elevated LVEDP - 13mmHg. Mild to moderate aortoiliac PVD w/o obstructive lesions.  CARDIAC CATHETERIZATION  5-11-04    Successful drug-eluting stent x 2 of proximal and mid RCA.  CARDIAC CATHETERIZATION  5-11-04    70-80% proximal RCA stenosis w/ 50-70% mid RCA stenosis. There is 50-60% lesion in mid PDA. Mild proximal and mid LAD disease. Mild circumflex disease. Normal LV size and systolic function. EF 60%.  CARDIOVASCULAR STRESS TEST  5-10-11    No evidence of stress induced ischemia. EF 75%.  CARDIOVASCULAR STRESS TEST  5-10-10    No evidence of stress induced ischemia, infarct or scar. EF 74%.     CAROTID ENDARTERECTOMY      CERVICAL FUSION N/A 11/15/2017    REMOVAL OF PLATE M2-3, ACDF Y5-8 W/ATLANTIS CORNERSTONE performed by Gilbert Alberto MD at [facility], DIAGNOSTIC      EYE SURGERY      cataract 2017   [place] [address] EAR SURGERY      NECK SURGERY  FEB 2016   Gorge Padilla OTHER SURGICAL HISTORY  02/13/2019    Arteriogram for diagnostics    PARTIAL HYSTERECTOMY      UPPER GASTROINTESTINAL ENDOSCOPY      UPPER GASTROINTESTINAL ENDOSCOPY        Family History   Problem Relation Age of Onset    Heart Disease Mother         CHF    Heart Disease Father         CAD    Heart Attack Father     Kidney Disease Sister         DIALYSIS    Cancer Sister         RECTUM    Heart Disease Sister     Heart Disease Brother     Heart Disease Brother CAD    Heart Disease Brother     Heart Disease Brother     Breast Cancer Child 36    Cancer Sister         UTERINE    Ovarian Cancer Other 22    Other Brother         SUICIDE      Social History     Tobacco Use    Smoking status: Former Smoker     Packs/day: 1.00     Years: 38.00     Pack years: 38.00     Types: Cigarettes     Last attempt to quit: 2007     Years since quittin.2    Smokeless tobacco: Never Used   Substance Use Topics    Alcohol use: No      Current Outpatient Medications   Medication Sig Dispense Refill    ferrous sulfate 325 (65 Fe) MG tablet One every other day. Take with vitamin C 500 mg 180 tablet 1    albuterol sulfate HFA (VENTOLIN HFA) 108 (90 Base) MCG/ACT inhaler Inhale 2 puffs into the lungs every 6 hours as needed for Wheezing 1 Inhaler 3    baclofen (LIORESAL) 10 MG tablet Take 1 tablet by mouth 2 times daily Indications: 0.5 tablet to 1 tablet 15 tablet 0    clopidogrel (PLAVIX) 75 MG tablet Take 1 tablet by mouth daily 90 tablet 3    fluticasone (FLONASE) 50 MCG/ACT nasal spray 1 spray by Nasal route daily 1 Bottle 0    furosemide (LASIX) 40 MG tablet Take 1 tablet by mouth 2 times daily 90 tablet 1    hydrALAZINE (APRESOLINE) 50 MG tablet Take 1 tablet by mouth 3 times daily 90 tablet 1    HYDROcodone-acetaminophen (NORCO) 5-325 MG per tablet Take 1 tablet by mouth See Admin Instructions for 30 days.  Take 1/2 tablet bid 10 tablet 0    ipratropium-albuterol (DUONEB) 0.5-2.5 (3) MG/3ML SOLN nebulizer solution Inhale 3 mLs into the lungs every 4 hours as needed for Shortness of Breath 360 mL 1    isosorbide mononitrate (IMDUR) 30 MG extended release tablet Take 2 tablets by mouth daily 30 tablet 1    levothyroxine (SYNTHROID) 125 MCG tablet Take 1 tablet by mouth Daily 30 tablet 1    losartan (COZAAR) 25 MG tablet Take 2 tablets by mouth daily 90 tablet 2    metolazone (ZAROXOLYN) 2.5 MG tablet One tab every Monday and Thursday 10 tablet 3    metoprolol (LOPRESSOR) 100 MG tablet Take 1 tablet by mouth 2 times daily 60 tablet 0    OXcarbazepine (TRILEPTAL) 150 MG tablet Take 1 tablet by mouth every morning 14 tablet 0    budesonide-formoterol (SYMBICORT) 160-4.5 MCG/ACT AERO Inhale 2 puffs into the lungs 2 times daily 10.2 g 11    vitamin D (ERGOCALCIFEROL) 59924 units capsule Take 1 capsule by mouth once a week 5 capsule 1    cycloSPORINE (RESTASIS) 0.05 % ophthalmic emulsion Place 1 drop into both eyes 2 times daily 1 vial 5    ondansetron (ZOFRAN) 4 MG tablet Take 1 tablet by mouth daily as needed for Nausea or Vomiting 30 tablet 0    Handicap Placard MISC by Does not apply route Expires 25 MARCH 2014 2 each 0     MG capsule take 3 capsules every evening 90 capsule 1    Multiple Vitamin (MULTIVITAMIN) tablet Take 1 tablet by mouth daily 30 tablet 1    RA SALINE NASAL SPRAY 0.65 % nasal spray instill 1 spray into each nostril if needed for congestion 45 mL 3    cyanocobalamin ([employer] VITAMIN B12) 1000 MCG tablet Take 1 tablet by mouth daily 30 tablet 3    ranolazine (RANEXA) 500 MG extended release tablet take 1 tablet by mouth twice a day 180 tablet 3    polyethylene glycol (GLYCOLAX) powder Take 17 g by mouth daily as needed (Constipation)      Calcium Carbonate-Vitamin D (OYSTER SHELL CALCIUM/D) 500-200 MG-UNIT TABS take 1 tablet by mouth twice a day 60 tablet 5    aspirin (ASPIRIN LOW DOSE) 81 MG EC tablet take 1 tablet by mouth once daily 90 tablet 1    rosuvastatin (CRESTOR) 20 MG tablet take 1 tablet by mouth once daily 90 tablet 0    hydrOXYzine (ATARAX) 25 MG tablet Take 25 mg by mouth 3 times daily      Lancets MISC Check blood sugar q daily Dx E11.69 100 each 2    Blood Glucose Monitoring Suppl HARVINDER Check blood sugar q daily Dx E11.69 1 Device 0    OXYGEN Inhale 3 L into the lungs continuous       ONE TOUCH ULTRASOFT LANCETS MISC use as directed BEFORE BREAKFAST AND SUPPER 100 each 11    pantoprazole (PROTONIX) 40 MG tablet Take 40 mg by mouth every morning (before breakfast)       risperiDONE (RISPERDAL) 1 MG tablet Take 1 mg by mouth every morning        No current facility-administered medications for this visit. Allergies   Allergen Reactions    Lipitor [Atorvastatin] Diarrhea    Motrin [Ibuprofen Micronized] Nausea Only    Codeine Anxiety      Health Maintenance   Topic Date Due    Hepatitis B Vaccine (1 of 3 - Risk 3-dose series) 10/15/1964    Shingles Vaccine (1 of 2) 10/15/1994    Breast cancer screen  10/10/2018    Diabetic retinal exam  01/24/2019    A1C test (Diabetic or Prediabetic)  10/03/2019    Diabetic foot exam  01/04/2020    Low dose CT lung screening  03/26/2020    Lipid screen  03/29/2020    TSH testing  04/10/2020    Potassium monitoring  04/17/2020    Creatinine monitoring  04/17/2020    Colon cancer screen colonoscopy  05/18/2022    DTaP/Tdap/Td vaccine (2 - Td) 12/16/2022    DEXA (modify frequency per FRAX score)  Completed    Flu vaccine  Completed    Pneumococcal 65+ years Vaccine  Completed    HPV vaccine  Aged Out       Review of Systems:   Review of Systems   Pertinent review of systems noted in HPI, all other ROS negative. Objective:   Physical Exam   Vitals:    04/22/19 1016   BP: (!) 119/56   Site: Right Upper Arm   Position: Sitting   Cuff Size: Large Adult   Pulse: 75   Resp: 16   Temp: 97.9 °F (36.6 °C)   TempSrc: Oral   SpO2: 92%   Weight: 207 lb 6.4 oz (94.1 kg)   Height: 5' 4.02\" (1.626 m)       General appearance: No apparent distress, chronically ill appearing, examined in wheelchair, and cooperative. HEENT: Pupils equal, round, and reactive to light. Conjunctivae/corneas clear. Oral mucosa moist. No sores noted. Dentures in place. Neck: Supple, with full range of motion. Trachea midline. Respiratory:  Normal respiratory effort. Coarse breath sounds bilaterally. On supplemental O2 with 92% O2 sat. Cardiovascular: Regular rate and rhythm with normal S1/S2. follow up visit:               - CBC Auto Differential; Future              - Ferritin; Future              - Iron; Future              - Vitamin B12 & Folate; Future         All patient questions answered. Pt voiced understanding. Patient agreed with treatment plan. Follow up as directed. Patient instructed to call for questions or concerns.      Electronically signed by   Ann Hu PA-C

## 2019-04-22 NOTE — PATIENT INSTRUCTIONS
1. Patient instructed to continue taking ferrous sulfate daily, vitamin B12 daily, vitamin C and multivitamin. 2. She will check with home health nurse and call if refills needed. 3. Return to clinic in three months with Dr. Citlali Mahmood. 4. Labs on RTC: CBC, ferritin, iron, vitamin B12 and folate. 5. Patient instructed to follow up with GI and for esophagram.   6. Please call for questions or concerns.

## 2019-04-23 NOTE — TELEPHONE ENCOUNTER
Orders received by Dr. Susanna Jimenez to schedule TAVR procedure and to hold the follow medications the morning of the TAVR: Isordil, Spirolactone, Lasix, Ranexa, Hydralazine, Losartan and Metoprolol. Patient is scheduled for her TAVR procedure for 4/30/2019 at 1000. Patient, daughter and [facility] called with instructions. Daughter verbalized that there is no one to be with her mother 7 days after the TAVR procedure. Lizzy  verbalized that she had fallen on Saturday in the hallway with only an elbow bruise as the injury. She went on to state that she is interested in going back to [facility] due to her weakness. [facility] states that if she is interested and if someone would be willing to take her dog they would be willing to look into placing her into an ECF. Berenice Corral RN states that she will investigate and get back to me regarding a plan.

## 2019-04-25 NOTE — TELEPHONE ENCOUNTER
I wrote orders today for pt to be admitted to [facility], orders faxed to [facility] who is coordinating this.

## 2019-04-25 NOTE — PROGRESS NOTES
Post-Discharge Transitional Care Management Services or Hospital Follow Up      Nan Mcclelland   YOB: 1945    Date of Office Visit:  4/25/2019  Date of Hospital Admission: 4/14/19  Date of Hospital Discharge: 4/17/19  Risk of hospital readmission (high >=14%. Medium >=10%) :Readmission Risk Score: 77      Care management risk score Rising risk (score 2-5) and Complex Care (Scores >=6): 17     Non face to face  following discharge, date last encounter closed (first attempt may have been earlier): 4/18/2019 11:42 AM    Call initiated 2 business days of discharge: Yes    Patient Active Problem List   Diagnosis    Anxiety    Depression    Type 2 diabetes mellitus with complication, with long-term current use of insulin ([facility]    MI (myocardial infarction) ([facility]    Dyslipidemia    CAD (coronary artery disease)    COPD without exacerbation ([facility]    GERD (gastroesophageal reflux disease)    Hypothyroid    History of tobacco abuse: Quit in 2009    S/P PTCA: 3/2/2017: PTCA RCA ISR. 12/11/2014: LAD Resolute 3.0X26. 11/6/2014: Stenting RCA Silvina Frandy 0.2N09 and 3.5X18. 5/11/2004: Proximal & mid RCA Taxus 3.5X20 mm, and Taxus 3.0X32 mm.  Metabolic syndrome    S/P cardiac catheterization: 11/6/2014: 99% in-stent restenosis mid-RCA. LCx moderate LI's. LAD 85% mid-segment lesion.  POND (nonalcoholic steatohepatitis)    Hyperlipidemia    Essential hypertension    Obesity (BMI 30-39. 9)    Moderate dehydration    ASCVD (arteriosclerotic cardiovascular disease)    Chronic respiratory failure with hypoxia (HCC)    Other hyperlipidemia    Dysmetabolic syndrome    Bilateral iliac artery occlusion (HCC)    CHET (obstructive sleep apnea)    Aortic valve stenosis    Nocturnal hypoxemia    Hyponatremia    Acute on chronic diastolic congestive heart failure due to valvular disease (HCC)    Sympathotonic orthostatic hypotension    E-coli UTI    Anxiety and depression    Dizziness    Chest pain  Acute on chronic respiratory failure with hypoxia and hypercapnia (HCC)    Moderate persistent asthma without complication    Pulmonary nodule    Heart failure with preserved ejection fraction (HCC)    Osteoarthritis of multiple joints    Class 2 obesity due to excess calories with body mass index (BMI) of 38.0 to 38.9 in adult    Normocytic anemia    Subclavian vein stenosis    Post concussive syndrome    CRF (chronic renal failure), stage 4 (severe) ([facility])    Hypotension    Syncope and collapse    Metabolic encephalopathy    Right-sided epistaxis    Diastolic dysfunction without heart failure    Cervical spine instability    Nonrheumatic aortic valve stenosis    Acute diastolic heart failure (HCC)    Acute kidney injury (HCC)    Hypercalcemia    At risk for polypharmacy    Acute respiratory failure with hypoxia and hypercapnia (HCC)    Acute pulmonary edema (HCC)    Abnormal urinalysis    Irritable bowel syndrome    Anemia requiring transfusions    Stage 3 severe COPD by GOLD classification ([facility])    Bilateral carotid artery stenosis    Iron deficiency anemia due to dietary causes    B12 deficiency due to diet    Diastolic CHF, acute on chronic (HCC)    Anemia    Morbid obesity with BMI of 40.0-44.9, adult (HCC)    Hyponatremia with decreased serum osmolality    Metabolic alkalosis    Chronic diastolic congestive heart failure (HCC)    Acute exacerbation of COPD with asthma (HCC)    Acute respiratory failure with hypoxia and hypercapnia (HCC)       Allergies   Allergen Reactions    Lipitor [Atorvastatin] Diarrhea    Motrin [Ibuprofen Micronized] Nausea Only    Codeine Anxiety       Medications listed as ordered at the time of discharge from hospital   [facility]   Home Medication Instructions FALGUNI:    Printed on:04/25/19 3238   Medication Information                      albuterol sulfate HFA (VENTOLIN HFA) 108 (90 Base) MCG/ACT inhaler  Inhale 2 puffs into the lungs every 6 hours as needed for Wheezing             aspirin (ASPIRIN LOW DOSE) 81 MG EC tablet  take 1 tablet by mouth once daily             baclofen (LIORESAL) 10 MG tablet  Take 1 tablet by mouth 2 times daily Indications: 0.5 tablet to 1 tablet             Blood Glucose Monitoring Suppl HARVINDER  Check blood sugar q daily Dx E11.69             budesonide-formoterol (SYMBICORT) 160-4.5 MCG/ACT AERO  Inhale 2 puffs into the lungs 2 times daily             Calcium Carbonate-Vitamin D (OYSTER SHELL CALCIUM/D) 500-200 MG-UNIT TABS  take 1 tablet by mouth twice a day             clopidogrel (PLAVIX) 75 MG tablet  Take 1 tablet by mouth daily             cyanocobalamin ([employer] VITAMIN B12) 1000 MCG tablet  Take 1 tablet by mouth daily             cycloSPORINE (RESTASIS) 0.05 % ophthalmic emulsion  Place 1 drop into both eyes 2 times daily              MG capsule  take 3 capsules every evening             doxepin (SINEQUAN) 50 MG capsule  Take 1-2 capsules by mouth nightly for sleep             escitalopram (LEXAPRO) 20 MG tablet  Take 20 mg by mouth every morning             ferrous sulfate 325 (65 Fe) MG tablet  One every other day. Take with vitamin C 500 mg             fluticasone (FLONASE) 50 MCG/ACT nasal spray  1 spray by Nasal route daily             furosemide (LASIX) 40 MG tablet  Take 1 tablet by mouth 2 times daily             guaiFENesin (MUCINEX) 600 MG extended release tablet  Take 1 tablet by mouth 2 times daily for 15 days             Handicap Placard MISC  by Does not apply route Expires 25 MARCH 2014             hydrALAZINE (APRESOLINE) 50 MG tablet  Take 1 tablet by mouth 3 times daily             HYDROcodone-acetaminophen (NORCO) 5-325 MG per tablet  Take 1 tablet by mouth See Admin Instructions for 30 days.  Take 1/2 tablet bid             hydrOXYzine (ATARAX) 25 MG tablet  Take 25 mg by mouth 3 times daily             ipratropium-albuterol (DUONEB) 0.5-2.5 (3) MG/3ML SOLN nebulizer solution  Inhale 3 marked \"taking\" at this time  Outpatient Medications Marked as Taking for the 4/25/19 encounter (Office Visit) with ASHLEY Cruz - CNP   Medication Sig Dispense Refill    guaiFENesin (MUCINEX) 600 MG extended release tablet Take 1 tablet by mouth 2 times daily for 15 days 30 tablet 0    doxepin (SINEQUAN) 50 MG capsule Take 1-2 capsules by mouth nightly for sleep  0    escitalopram (LEXAPRO) 20 MG tablet Take 20 mg by mouth every morning  0    ISORDIL TITRADOSE 40 MG tablet Take 40 mg by mouth daily  0    potassium chloride (KLOR-CON M) 20 MEQ extended release tablet Take 20 mEq by mouth daily  0    spironolactone (ALDACTONE) 25 MG tablet Take 25 mg by mouth daily  0    ferrous sulfate 325 (65 Fe) MG tablet One every other day. Take with vitamin C 500 mg 180 tablet 1    albuterol sulfate HFA (VENTOLIN HFA) 108 (90 Base) MCG/ACT inhaler Inhale 2 puffs into the lungs every 6 hours as needed for Wheezing 1 Inhaler 3    baclofen (LIORESAL) 10 MG tablet Take 1 tablet by mouth 2 times daily Indications: 0.5 tablet to 1 tablet 15 tablet 0    clopidogrel (PLAVIX) 75 MG tablet Take 1 tablet by mouth daily 90 tablet 3    fluticasone (FLONASE) 50 MCG/ACT nasal spray 1 spray by Nasal route daily 1 Bottle 0    furosemide (LASIX) 40 MG tablet Take 1 tablet by mouth 2 times daily 90 tablet 1    hydrALAZINE (APRESOLINE) 50 MG tablet Take 1 tablet by mouth 3 times daily 90 tablet 1    HYDROcodone-acetaminophen (NORCO) 5-325 MG per tablet Take 1 tablet by mouth See Admin Instructions for 30 days.  Take 1/2 tablet bid 10 tablet 0    ipratropium-albuterol (DUONEB) 0.5-2.5 (3) MG/3ML SOLN nebulizer solution Inhale 3 mLs into the lungs every 4 hours as needed for Shortness of Breath 360 mL 1    isosorbide mononitrate (IMDUR) 30 MG extended release tablet Take 2 tablets by mouth daily 30 tablet 1    levothyroxine (SYNTHROID) 125 MCG tablet Take 1 tablet by mouth Daily 30 tablet 1    losartan (COZAAR) 25 MG tablet Take 2 tablets by mouth daily 90 tablet 2    metolazone (ZAROXOLYN) 2.5 MG tablet One tab every Monday and Thursday 10 tablet 3    metoprolol (LOPRESSOR) 100 MG tablet Take 1 tablet by mouth 2 times daily 60 tablet 0    OXcarbazepine (TRILEPTAL) 150 MG tablet Take 1 tablet by mouth every morning 14 tablet 0    budesonide-formoterol (SYMBICORT) 160-4.5 MCG/ACT AERO Inhale 2 puffs into the lungs 2 times daily 10.2 g 11    vitamin D (ERGOCALCIFEROL) 37320 units capsule Take 1 capsule by mouth once a week 5 capsule 1    cycloSPORINE (RESTASIS) 0.05 % ophthalmic emulsion Place 1 drop into both eyes 2 times daily 1 vial 5    ondansetron (ZOFRAN) 4 MG tablet Take 1 tablet by mouth daily as needed for Nausea or Vomiting 30 tablet 0    Handicap Placard MISC by Does not apply route Expires 25 MARCH 2014 2 each 0     MG capsule take 3 capsules every evening 90 capsule 1    Multiple Vitamin (MULTIVITAMIN) tablet Take 1 tablet by mouth daily 30 tablet 1    RA SALINE NASAL SPRAY 0.65 % nasal spray instill 1 spray into each nostril if needed for congestion 45 mL 3    cyanocobalamin ([employer]S VITAMIN B12) 1000 MCG tablet Take 1 tablet by mouth daily 30 tablet 3    ranolazine (RANEXA) 500 MG extended release tablet take 1 tablet by mouth twice a day 180 tablet 3    polyethylene glycol (GLYCOLAX) powder Take 17 g by mouth daily as needed (Constipation)      Calcium Carbonate-Vitamin D (OYSTER SHELL CALCIUM/D) 500-200 MG-UNIT TABS take 1 tablet by mouth twice a day 60 tablet 5    aspirin (ASPIRIN LOW DOSE) 81 MG EC tablet take 1 tablet by mouth once daily 90 tablet 1    rosuvastatin (CRESTOR) 20 MG tablet take 1 tablet by mouth once daily 90 tablet 0    hydrOXYzine (ATARAX) 25 MG tablet Take 25 mg by mouth 3 times daily      Lancets MISC Check blood sugar q daily Dx E11.69 100 each 2    Blood Glucose Monitoring Suppl HARVINDER Check blood sugar q daily Dx E11.69 1 Device 0    OXYGEN Inhale 3 L into the lungs continuous       ONE TOUCH ULTRASOFT LANCETS MISC use as directed BEFORE BREAKFAST AND SUPPER 100 each 11    pantoprazole (PROTONIX) 40 MG tablet Take 40 mg by mouth every morning (before breakfast)       risperiDONE (RISPERDAL) 1 MG tablet Take 1 mg by mouth every morning           Medications patient taking as of now reconciled against medications ordered at time of hospital discharge: Yes Pt synthroid dose was increased to 125 mcg daily from 100 mcg daily. Pt will need repeat TSH and Free T4 in 6 weeks. Pt also had an increase in imdur form 30 mg daily to 60 mg daily, lasix increased from 20 mg bid to 40 mg bid, and zaroxolyn was added on Monday and Thursday at 2.5 mg. Pt states she has been feeling better since discharge. It Is noted she had a wet loose cough. Octavio wheezing or a fever. She is scheduled to have her TAVR on Tuesday April 30th, 2019. Chief Complaint   Patient presents with    Follow-Up from Hospital     needs nursning home placement    Cough     moist cough       History of Present illness - Follow up of Hospital diagnosis(es): Acute on chronic CHF, CHF exacerbation, hypothyroid, moderate persistent asthma, COPD    Inpatient course: Discharge summary reviewed- see chart. Addy Aguero is a 75 y.o. female admitted to [address] [place] [address] on 4/14/2019 for acute sob     This pt has copd, aortic stenosis; she has had multiple admissions within the past month and is at risk for others. She has HTN, has had a history of hyponatremia. She also has been diagnosed with bilateral iliac artery occlusion which Cardiology is aware and decides to perform TAVR prior to addressing the bilateral iliac artery occlusion.      She came to ER with another exacerbation of sob and was admitted. . She also had a dry cough.     She is scheduled to have a TAVR at a later date.       Her admission cxr suggested chf and she was given IV lasix.   she was also treated for copd, as she had acute on chronic will fax to [facility]   - [place] DISCHARGE MEDS RECONCILED W/ CURRENT OUTPATIENT MED LIST    2. Acute on chronic congestive heart failure, unspecified heart failure type ([facility] [address]  Continue current meds, med list updated with [facility]  - [place] DISCHARGE MEDS RECONCILED W/ CURRENT OUTPATIENT MED LIST    3. Cough  Mucinex, monitor  - Pulse Oximetry, Spot-93%    4. Contusion of left side of back, initial encounter    Monitor  Ice if needed for comfort  5. Fall, initial encounter      6. Other specified hypothyroidism  Synthroid increased to 125 mcg this last hospital admission  - TSH; Future  - T4, Free; Future  Needs drawn in 6 weeks  7. Medication monitoring encounter    - Electrolyte Panel; Future  In 4 days  Zaroxolyn added to her  Med regimen, lasix increased to 40 mg bid, pt on spironolactone.    Has history of hyponatremia        Medical Decision Making: high complexity

## 2019-04-25 NOTE — TELEPHONE ENCOUNTER
Spoke with Vtrim  @ [facility] she  States  Thank you Rian . She states  [facility]  will coordinate.

## 2019-04-29 NOTE — TELEPHONE ENCOUNTER
Patient was admitted to [facility]. [facility] called regarding review of TAVR instructions. Patient's TAVR scheduled changed to 4/30/2019 at 1300. Patient to arrive at the facility at 1030. [facility] notified and agreed.

## 2019-04-29 NOTE — TELEPHONE ENCOUNTER
hospital? (i.e. Meal preparation, assistance getting up out of chair)   Yes  No    [x] 3   [] 0    Total Score 6     Key: Destination at discharge from acute care predicted by score. Score < 6  SNF  Score 6-9 Additional intervention to discharge directly home (i.e. Home Care)  Score> 9 Directly home                [address])    The following questions refer to your heart failure and how it may affect your life. Please read and complete the following questions. There is no right or wrong answers. Please gregg the answer that best applies to you. 1. Heart failure affects different people in different ways. Some feel shortness of breath while others feel fatigue. Please indicate how much you are limited by heart failure (shortness of breath or fatigue) in your ability to do the following activities over the past 2 weeks. Extremely        Quite a bit   Moderately           Slightly     Not at all Other or did not     Limited         Limited      Limited              Limited      Limited   Perform activity  Activity__________________________________________________________________________________     a. Shower/bathing [x]               []           []      []          []                         []     b. Walk 1 block       [x]              []          []     []          []                         []  on level ground       c. Hurrying or         []              []          []     []          []                         [x]  jogging (as if to  catch a bus)       1               2                       3     4            5                         6  ____________________________________________________________________________________________    2. Over the past 2 weeks, how many times did you have swelling in your feet, ankles or legs when       you woke up in the morning?             Every Morning   3 or more times           1-2 times per        Less than once    Never, pillows to prop you up because of shortness of breath? Every Morning       3 or more times   1-2 times per Less than once    Never, over                            per week/not QD     week                 per week    past 2 weeks                 [x]                 []         []          []              []      1   2          3            4                5  ________________________________________________________________  6. Over the past 2 weeks, how much has your heart failure limited your enjoyment of life? It has extremely        It has limited my   It has moderately  It has slightly      It has  limited my enjoyment       enjoyment                   enjoyment quite         limited my enjo        of life   a bit                  of life             enjoyment of life                        []                []   []        []           [x]                       1                  2    3         4                              5     _____________________________________________________________________    7. If you had to spend the rest of your life with your heart failure the way it is right now, how would you feel about this? Not at all             Mostly           Somewhat              Mostly            Completely           satisfied       dissatisfied              satisfied             satisfied              satisfied         [x]    []                            []                         []                        []                     1                     2                              3                           4                         5  ___________________________________________________________________    8. How much does your heart failure affect your lifestyle?  Please indicate how your heart failure may have limited your participation in the following activities over the past 2 weeks                Severely        Quite a bit  Moderately        Slightly     Did not limit Doesn't apply                  Limited       Limited    Limited            Limited         at all          or did not do for                           other reasons  Activity__________________________________________________________________________________   a. Hobbies or           []            []                    []                 [x]                 []                          []   recreational   activities    b.  Working or   doing household       []            []                   []                 []         []                          [x]   chores    c.  Visiting family     or friends out of        []            []                   []                  []                 []                          [x]       your home                                                      1                    2               3                   4                   5                             6  _______________________________________________________________

## 2019-04-30 PROBLEM — I35.0 SEVERE AORTIC STENOSIS: Status: ACTIVE | Noted: 2019-01-01

## 2019-04-30 PROBLEM — Z95.2 S/P TAVR (TRANSCATHETER AORTIC VALVE REPLACEMENT): Status: ACTIVE | Noted: 2019-01-01

## 2019-04-30 NOTE — PROGRESS NOTES
Patient to ICU room 5 from [facility] post TAVR. Vital signs stable. Shadowing marked on groin sites and pacer site. No complaints of pain at this time. Patient on 3 L nasal canula (home O2 dose). Assessment completed.

## 2019-04-30 NOTE — PLAN OF CARE
Problem: Preoperative Routine:  Goal: Risk for injury before, during, or after surgery or procedure will decrease  Description  Risk for injury before, during, or after surgery or procedure will decrease  Outcome: Met This Shift  Note:   TAVR sites monitored every 15 minutes and prn. Problem: Falls - Risk of:  Goal: Will remain free from falls  Description  Will remain free from falls  Outcome: Met This Shift  Note:   Hourly rounding completed and bed alarm on. Problem: Falls - Risk of:  Goal: Absence of physical injury  Description  Absence of physical injury  Outcome: Met This Shift  Note:   She is free of physical injury at this time. Problem: Aspiration:  Goal: Absence of aspiration  Description  Absence of aspiration  Outcome: Met This Shift  Note:   Patient is supine in reverse trendelenburg post TAVR since fem stop is in place. Problem: Tissue Perfusion, Altered:  Goal: Circulatory function within specified parameters  Description  Circulatory function within specified parameters  Outcome: Met This Shift  Note:   Pulses palpable. Problem: Discharge Planning:  Goal: Discharged to appropriate level of care  Description  Discharged to appropriate level of care  Outcome: Ongoing  Note:   She remains in ICU at this time. Care plan reviewed with patient. Patient verbalizes understanding of the care plan and contributed to goal setting.

## 2019-04-30 NOTE — ANESTHESIA PRE PROCEDURE
Department of Anesthesiology  Preprocedure Note       Name:  Janice Grier   Age:  75 y.o.  :  1945                                          MRN:  [id number]         Date:  2019      Surgeon: * Surgery not found *    Procedure: STR TAVR W/ ANESTHESIA    Medications prior to admission:   Prior to Admission medications    Medication Sig Start Date End Date Taking? Authorizing Provider   losartan (COZAAR) 25 MG tablet Take 25 mg by mouth 2 times daily   Yes Historical Provider, MD   metoprolol (LOPRESSOR) 100 MG tablet Take 1 tablet by mouth 2 times daily 19  Yes Della Becerra MD   guaiFENesin (MUCINEX) 600 MG extended release tablet Take 1 tablet by mouth 2 times daily for 15 days 4/25/19 5/10/19 Yes ASHLEY Carrera CNP    MG capsule take 3 capsules by mouth every evening 19  Yes ASHLEY Carrera CNP   doxepin (SINEQUAN) 50 MG capsule Take 1-2 capsules by mouth nightly for sleep 4/3/19  Yes Historical Provider, MD   escitalopram (LEXAPRO) 20 MG tablet Take 20 mg by mouth every morning 4/3/19  Yes Historical Provider, MD   potassium chloride (KLOR-CON M) 20 MEQ extended release tablet Take 20 mEq by mouth daily 4/3/19  Yes Historical Provider, MD   spironolactone (ALDACTONE) 25 MG tablet Take 25 mg by mouth daily 4/10/19  Yes Historical Provider, MD   ferrous sulfate 325 (65 Fe) MG tablet One every other day.   Take with vitamin C 500 mg 19  Yes Roger Jennings MD   albuterol sulfate HFA (VENTOLIN HFA) 108 (90 Base) MCG/ACT inhaler Inhale 2 puffs into the lungs every 6 hours as needed for Wheezing 19  Yes Roger Jennings MD   fluticasone [facility]) 50 MCG/ACT nasal spray 1 spray by Nasal route daily 19  Yes Roger Jennings MD   furosemide (LASIX) 40 MG tablet Take 1 tablet by mouth 2 times daily 19  Yes Roger Jennings MD   hydrALAZINE (APRESOLINE) 50 MG tablet Take 1 tablet by mouth 3 times daily 19  Yes Roger Jennings MD   HYDROcodone-acetaminophen (NORCO) 5-325 MG per tablet Take 1 tablet by mouth See Admin Instructions for 30 days.  Take 1/2 tablet bid 4/17/19 5/17/19 Yes Genesis So MD   isosorbide mononitrate (IMDUR) 30 MG extended release tablet Take 2 tablets by mouth daily 4/17/19  Yes Genesis oS MD   levothyroxine (SYNTHROID) 125 MCG tablet Take 1 tablet by mouth Daily 4/17/19  Yes Genesis So MD   metolazone (ZAROXOLYN) 2.5 MG tablet One tab every Monday and Thursday 4/17/19  Yes Genesis So MD   OXcarbazepine (TRILEPTAL) 150 MG tablet Take 1 tablet by mouth every morning 4/17/19  Yes Genesis So MD   budesonide-formoterol [facility]) 160-4.5 MCG/ACT AERO Inhale 2 puffs into the lungs 2 times daily 4/17/19  Yes Genesis So MD   cycloSPORINE (RESTASIS) 0.05 % ophthalmic emulsion Place 1 drop into both eyes 2 times daily 4/7/19  Yes Genesis So MD   Handicap Placard MISC by Does not apply route Expires 25 MARCH 2014 3/25/19  Yes Linwood King, DO   Multiple Vitamin (MULTIVITAMIN) tablet Take 1 tablet by mouth daily 2/5/19  Yes Ric Virgen MD   cyanocobalamin ([facility] VITAMIN B12) 1000 MCG tablet Take 1 tablet by mouth daily 12/29/18  Yes Francine Vang MD   ranolazine (RANEXA) 500 MG extended release tablet take 1 tablet by mouth twice a day 12/18/18  Yes Marquita Vinson PA-C   Calcium Carbonate-Vitamin D (OYSTER SHELL CALCIUM/D) 500-200 MG-UNIT TABS take 1 tablet by mouth twice a day 9/19/18  Yes ASHLEY Lemus - CNP   rosuvastatin (CRESTOR) 20 MG tablet take 1 tablet by mouth once daily 9/18/18  Yes Marquita Vinson PA-C   hydrOXYzine (ATARAX) 25 MG tablet Take 25 mg by mouth 3 times daily   Yes Historical Provider, MD   Lancets MISC Check blood sugar q daily Dx E11.69 11/9/17  Yes Theodore Maldonado, DO   Blood Glucose Monitoring Suppl HARVINDER Check blood sugar q daily Dx E11.69 11/9/17  Yes Theodore Erica, DO   ONE TOUCH ULTRASOFT LANCETS MISC use as directed BEFORE BREAKFAST AND SUPPER 10/15/14 Yes ASHLEY Mehta CNP   baclofen (LIORESAL) 10 MG tablet Take 1 tablet by mouth 2 times daily Indications: 0.5 tablet to 1 tablet 4/17/19   Prudence MD Oniel   clopidogrel (PLAVIX) 75 MG tablet Take 1 tablet by mouth daily 4/17/19   Prudence MD Oniel   ipratropium-albuterol (DUONEB) 0.5-2.5 (3) MG/3ML SOLN nebulizer solution Inhale 3 mLs into the lungs every 4 hours as needed for Shortness of Breath 4/17/19   Prudence MD Oniel   vitamin D (ERGOCALCIFEROL) 50019 units capsule Take 1 capsule by mouth once a week 4/11/19   Jasmina Engle MD   ondansetron [facility]) 4 MG tablet Take 1 tablet by mouth daily as needed for Nausea or Vomiting 4/3/19   ASHLEY Mehta CNP   RA SALINE NASAL SPRAY 0.65 % nasal spray instill 1 spray into each nostril if needed for congestion 1/7/19   ASHLEY Mehta CNP   polyethylene glycol (GLYCOLAX) powder Take 17 g by mouth daily as needed (Constipation)    Historical Provider, MD   aspirin (ASPIRIN LOW DOSE) 81 MG EC tablet take 1 tablet by mouth once daily 9/19/18   ASHLEY Mehta CNP   OXYGEN Inhale 3 L into the lungs continuous     Historical Provider, MD   pantoprazole (PROTONIX) 40 MG tablet Take 40 mg by mouth every morning (before breakfast)     Historical Provider, MD   risperiDONE (RISPERDAL) 1 MG tablet Take 1 mg by mouth every morning     Historical Provider, MD       Current medications:    Current Facility-Administered Medications   Medication Dose Route Frequency Provider Last Rate Last Dose    0.9 % sodium chloride infusion   Intravenous Continuous ASHLEY Howell CNP        ceFAZolin (ANCEF) 2 g in dextrose 5 % 50 mL IVPB  2 g Intravenous On Call to 3060 ASHLEY Rucker CNP        chlorhexidine (HIBICLENS) 4 % liquid   Topical Once ASHLEY Howell CNP        diphenhydrAMINE (BENADRYL) injection 50 mg  50 mg Intravenous Once ASHLEY Howell CNP        famotidine (PEPCID) injection 20 mg  20 mg Intravenous Once Montoya Kil, APRN - CNP        hydrocortisone sodium succinate PF (SOLU-CORTEF) injection 200 mg  200 mg Intravenous Once Montoya Kil, APRN - CNP        sodium chloride flush 0.9 % injection 10 mL  10 mL Intravenous 2 times per day Montoya Kil, APRN - CNP        sodium chloride flush 0.9 % injection 10 mL  10 mL Intravenous PRN Montoya Kil, APRN - CNP        0.9 % sodium chloride infusion 250 mL  250 mL Intravenous Once Montoya Kil, APRN - CNP        dexmedetomidine (PRECEDEX) 400 mcg in sodium chloride 0.9 % 100 mL infusion  0.2 mcg/kg/hr Intravenous Continuous Rodrigo Boudreaux MD           Allergies: Allergies   Allergen Reactions    Lipitor [Atorvastatin] Diarrhea    Motrin [Ibuprofen Micronized] Nausea Only    Codeine Anxiety       Problem List:    Patient Active Problem List   Diagnosis Code    Anxiety F41.9    Depression F32.9    Type 2 diabetes mellitus with complication, with long-term current use of insulin (HCC) E11.8, Z79.4    MI (myocardial infarction) ([facility] [address] I21.9    Dyslipidemia E78.5    CAD (coronary artery disease) I25.10    COPD without exacerbation ([facility] [address] J44.9    GERD (gastroesophageal reflux disease) K21.9    Hypothyroid E03.9    History of tobacco abuse: Quit in 2009 Z87.891    S/P PTCA: 3/2/2017: PTCA RCA ISR. 12/11/2014: LAD Resolute 3.0X26. 11/6/2014: Stenting RCA Brenda Aw 5.1V82 and 3.5X18. 5/11/2004: Proximal & mid RCA Taxus 3.5X20 mm, and Taxus 3.0X32 mm. D35.60    Metabolic syndrome T80.50    S/P cardiac catheterization: 11/6/2014: 99% in-stent restenosis mid-RCA. LCx moderate LI's. LAD 85% mid-segment lesion. Z98.890    POND (nonalcoholic steatohepatitis) K75.81    Hyperlipidemia E78.5    Essential hypertension I10    Obesity (BMI 30-39. 9) E66.9    Moderate dehydration E86.0    ASCVD (arteriosclerotic cardiovascular disease) I25.10    Chronic respiratory failure with hypoxia (HCC) J96.11    Other hyperlipidemia G65.24    Dysmetabolic syndrome E88.81    Bilateral iliac artery occlusion ([facility]) I74.5    CHET (obstructive sleep apnea) G47.33    Aortic valve stenosis I35.0    Nocturnal hypoxemia G47.34    Hyponatremia E87.1    Acute on chronic diastolic congestive heart failure due to valvular disease ([facility]) I50.33, I38    Sympathotonic orthostatic hypotension I95.1    E-coli UTI N39.0, B96.20    Anxiety and depression F41.9, F32.9    Dizziness R42    Chest pain R07.9    Acute on chronic respiratory failure with hypoxia and hypercapnia ([facility]) J96.21, J96.22    Moderate persistent asthma without complication X16.13    Pulmonary nodule R91.1    Heart failure with preserved ejection fraction ([facility]) I50.30    Osteoarthritis of multiple joints M15.9    Class 2 obesity due to excess calories with body mass index (BMI) of 38.0 to 38.9 in adult E66.09, Z68.38    Normocytic anemia D64.9    Subclavian vein stenosis I87.1    Post concussive syndrome F07.81    CRF (chronic renal failure), stage 4 (severe) ([facility]) N18.4    Hypotension I95.9    Syncope and collapse Y36    Metabolic encephalopathy V94.88    Right-sided epistaxis A76.2    Diastolic dysfunction without heart failure I51.89    Cervical spine instability M53.2X2    Nonrheumatic aortic valve stenosis I35.0    Acute diastolic heart failure (HCC) I50.31    Acute kidney injury ([facility]) N17.9    Hypercalcemia E83.52    At risk for polypharmacy Z91.89    Acute respiratory failure with hypoxia and hypercapnia ([facility]) J96.01, J96.02    Acute pulmonary edema ([facility]) J81.0    Abnormal urinalysis R82.90    Irritable bowel syndrome K58.9    Anemia requiring transfusions D64.9    Stage 3 severe COPD by GOLD classification ([facility]) J44.9    Bilateral carotid artery stenosis I65.23    Iron deficiency anemia due to dietary causes D50.8    B12 deficiency due to diet V45.9    Diastolic CHF, acute on chronic ([facility]) I50.33    Anemia D64.9    Morbid obesity with BMI of 40.0-44.9, adult ([facility]) E66.01, Z68.41    Hyponatremia with decreased serum osmolality H84.3    Metabolic alkalosis A39.3    Chronic diastolic congestive heart failure (HCC) I50.32    Acute exacerbation of COPD with asthma ([facility]) J44.1, J45.901    Acute respiratory failure with hypoxia and hypercapnia ([facility]) J96.01, J96.02    Severe aortic stenosis I35.0       Past Medical History:        Diagnosis Date    Anemia     Anxiety     Aortic stenosis     Arthritis     general    AS (aortic stenosis): mild to moderate by echo 4/2017 9/6/2017    ASHD (arteriosclerotic heart disease)     Asthma 2016    INHALER USE DAILY AND AS NEEDED    Bipolar disorder ([address]     Blood circulation, collateral     CAD (coronary artery disease) 1999    MI, 5 STENTS IN HEART NO SURE OF HEART DR NAME [facility]    Cerebral artery occlusion with cerebral infarction ([address] 2018    SILENT-DX ON CT SCAN NO DEFICITS    Chest pain     CHF (congestive heart failure) ([facility])     COPD (chronic obstructive pulmonary disease) ([address] 2014    INHALER USE DAILY AND AS NEEDED    Depression     Disease of blood and blood forming organ     anemia per patient     DM (diabetes mellitus) ([address]     NO MEDS DIET CONTROLED    Dyspnea     FH: CAD (coronary artery disease)     Full dentures     UPPER AND LOWER    GERD (gastroesophageal reflux disease)     Headache     Heart burn     History of blood transfusion 1973    POST OP -PLASMA    History of blood transfusion 02/2019    1 UNIT LOW HGB    History of tobacco abuse: Quit in 2007     HLD (hyperlipidemia)     ON RX    HTN (hypertension)     ON RX    Irritable bowel syndrome     States has not had problem with this for years    Liver disease     fatty liver    Metabolic syndrome 92/65/2930    MI (myocardial infarction) ([facility])     Nausea & vomiting     Obesity     CHET (obstructive sleep apnea) 2016    NO MACHINE YET    S/P cardiac catheterization: 11/6/2014: 99% in-stent restenosis mid-RCA.  LCx moderate LI's. LAD 85% mid-segment lesion. 11/6/2014 11/6/2014: 99% in-stent restenosis mid-RCA. LCx moderate LI's. LAD 85% mid-segment lesion. Dr. Isaura Golden S/P PTCA:  5/11/2004: Proximal and mid RCA  Taxus 3.5 X 20 mm, and Taxus 3.0 X 32 mm. 10/28/2014    5/11/2004: Proximal and mid RCA  Taxus 3.5 X 20 mm, and Taxus 3.0 X 32 mm. Dr. Thee Montoya Systolic murmur 51/82/0170    Thyroid disease     ON RX    Wears glasses        Past Surgical History:        Procedure Laterality Date    ABDOMEN SURGERY      BACK SURGERY  08/2016        BLADDER SUSPENSION      BREAST BIOPSY  10/10/2017    BREAST LUMPECTOMY      CARDIAC CATHETERIZATION  8-11-06    Mild nonobstructive CAD w/ diffuse 10-20% proximal and mid LAD stenosis and 10-20% proximal/ostial RCA stenosis. No obstructive lesions. RCA stents are patent w/o evidence of restenosis. Normal LV systolic function. EF 65%. Trace MR - catheter induced. Minimally elevated LVEDP - 13mmHg. Mild to moderate aortoiliac PVD w/o obstructive lesions.  CARDIAC CATHETERIZATION  5-11-04    Successful drug-eluting stent x 2 of proximal and mid RCA.  CARDIAC CATHETERIZATION  5-11-04    70-80% proximal RCA stenosis w/ 50-70% mid RCA stenosis. There is 50-60% lesion in mid PDA. Mild proximal and mid LAD disease. Mild circumflex disease. Normal LV size and systolic function. EF 60%.  CARDIOVASCULAR STRESS TEST  5-10-11    No evidence of stress induced ischemia. EF 75%.  CARDIOVASCULAR STRESS TEST  5-10-10    No evidence of stress induced ischemia, infarct or scar. EF 74%.     CAROTID ENDARTERECTOMY      CERVICAL FUSION N/A 11/15/2017    REMOVAL OF PLATE V3-3, ACDF I3-9 W/ATLANTIS CORNERSTONE performed by Jc Rodriguez MD at [facility] COLONOSCOPY      ENDOSCOPY, COLON, DIAGNOSTIC      EYE SURGERY      cataract 2017   [facility] results for input(s): POCGLU, POCNA, POCK, POCCL, POCBUN, POCHEMO, POCHCT in the last 72 hours. Coags:   Lab Results   Component Value Date    PROTIME 13.8 02/13/2019    INR 1.12 04/30/2019    APTT 37.7 04/30/2019       HCG (If Applicable): No results found for: PREGTESTUR, PREGSERUM, HCG, HCGQUANT     ABGs: No results found for: PHART, PO2ART, WPN5AGI, ISK2TGT, BEART, J3AFDRCY     Type & Screen (If Applicable):  Lab Results   Component Value Date    LABRH POS 03/29/2019       Anesthesia Evaluation    Airway: Mallampati: III  TM distance: >3 FB   Neck ROM: full  Mouth opening: > = 3 FB Dental:          Pulmonary:   (+) COPD:  sleep apnea:  decreased breath sounds,  asthma:                            Cardiovascular:    (+) hypertension:, valvular problems/murmurs: AS, CAD:, CHF:,         Rhythm: regular                      Neuro/Psych:   (+) CVA:, psychiatric history:            GI/Hepatic/Renal:   (+) GERD:,           Endo/Other:    (+) Diabetes, hypothyroidism::., .                 Abdominal:   (+) obese,         Vascular:                                        Anesthesia Plan      MAC     ASA 4     (POSSIBLE GA  LINES PER DR OSORIO  POSSIBLE ALEX DISCUSSED CONSENTED)  Induction: intravenous. arterial line and central line  MIPS: Postoperative opioids intended and Prophylactic antiemetics administered. Anesthetic plan and risks discussed with patient and child/children. Use of blood products discussed with patient and child/children whom consented to blood products. Plan discussed with CRNA.                   Vu Dickerson MD   4/30/2019

## 2019-04-30 NOTE — PROGRESS NOTES
Mrs Henrique Westbrook and her daughter request that a salvage surgical attempt be undertaken if it would become necessary to try to save her life during the transcutaneous aortic valve replacement procedure.

## 2019-04-30 NOTE — PROGRESS NOTES
Patient admitted to 2E15  Via wheelchair with oxygen for TAVR. Patient NPO. Patient accompanied by family. Vital signs obtained. Assessment and data collection intiated. Oriented to room. Policies and procedures for 2E explained. All questions answered with no further questions at this time. Fall prevention and safety precautions discussed with patient.

## 2019-04-30 NOTE — PRE SEDATION
[address] [address]  Sedation/Analgesia History & Physical    Pt Name: Marina Edgar  Account number: [de-identified]  MRN: [id number]  YOB: 1945  Provider Performing Procedure: Anny Ocasio MD  Referring Provider: Anny Ocasio MD   Primary Care Physician: ASHLEY Landaverde - ZIGGY  Date: 4/30/2019    PRE-PROCEDURE    Code Status: FULL CODE  Brief History/Pre-Procedure Diagnosis:   Severe AS    Consent: : I have discussed with the patient risks, benefits, and alternatives (and relevant risks, benefits, and side effects related to alternatives or not receiving care), and likelihood of the success. The patient and/or representative appear to understand and agree to proceed. The discussion encompasses risks, benefits, and side effects related to the alternatives and the risks related to not receiving the proposed care, treatment, and services. The indication, risks and benefits of the procedure and possible therapeutic consequences and alternatives were discussed with the patient. The patient was given the opportunity to ask questions and to have them answered to his/her satisfaction. Risks of the procedure include but are not limited to the following: Bleeding, hematoma including retroperitoneal hemmorhage, infection, pain and discomfort, injury to the aorta and other blood vessels, rhythm disturbance, low blood pressure, myocardial infarction, stroke, kidney damage/failure, myocardial perforation, allergic reactions to sedatives/contrast material, loss of pulse/vascular compromise requiring surgery, aneurysm/pseudoaneurysm formation, possible loss of a limb/hand/leg, needing blood transfusion, requiring emergent open heart surgery or emergent coronary intervention, the need for intubation/respiratory support, the requirement for defibrillation/cardioversion, and death. Alternatives to and omission of the suggested procedure were discussed.  The patient had no further questions and wished to proceed; the consent form was signed. MEDICAL HISTORY  [x]ASHD/ANGINA/MI/CHF   [x]Hypertension  [x]Diabetes  [x]Hyperlipidemia  []Smoking  []Family Hx of ASHD  [x]Additional information:       has a past medical history of Anemia, Anxiety, Aortic stenosis, Arthritis, AS (aortic stenosis): mild to moderate by echo 4/2017, ASHD (arteriosclerotic heart disease), Asthma, Bipolar disorder ([facility] [address] Blood circulation, collateral, CAD (coronary artery disease), Cerebral artery occlusion with cerebral infarction [facility]), Chest pain, CHF (congestive heart failure) ([facility] [address] COPD (chronic obstructive pulmonary disease) ([facility] [address] Depression, Disease of blood and blood forming organ, DM (diabetes mellitus) ([facility] [address] Dyspnea, FH: CAD (coronary artery disease), Full dentures, GERD (gastroesophageal reflux disease), Headache, Heart burn, History of blood transfusion, History of blood transfusion, History of tobacco abuse: Quit in 2007, HLD (hyperlipidemia), HTN (hypertension), Irritable bowel syndrome, Liver disease, Metabolic syndrome, MI (myocardial infarction) ([address] Nausea & vomiting, Obesity, CHET (obstructive sleep apnea), S/P cardiac catheterization: 11/6/2014: 99% in-stent restenosis mid-RCA. LCx moderate LI's. LAD 85% mid-segment lesion., S/P PTCA:  5/11/2004: Proximal and mid RCA  Taxus 3.5 X 20 mm, and Taxus 3.0 X 32 mm., Systolic murmur, Thyroid disease, and Wears glasses. SURGICAL HISTORY   has a past surgical history that includes cardiovascular stress test (5-10-11); cardiovascular stress test (5-10-10); Cardiac catheterization (8-11-06); Cardiac catheterization (5-11-04); Cardiac catheterization (5-11-04); bladder suspension; hernia repair; Breast lumpectomy; Foot surgery; Cholecystectomy; Colonoscopy; Upper gastrointestinal endoscopy; Endoscopy, colon, diagnostic; partial hysterectomy (cervix not removed); Upper gastrointestinal endoscopy; Neck surgery (FEB 2016); back surgery (08/2016);  Breast biopsy (10/10/2017); cervical fusion (N/A, 11/15/2017); eye surgery; Abdomen surgery; Hysterectomy (1973); Hemorrhoid surgery (1973); other surgical history (02/13/2019); Carotid endarterectomy; and Inner ear surgery. Additional information:       ALLERGIES   Allergies as of 04/30/2019 - Review Complete 04/30/2019   Allergen Reaction Noted    Lipitor [atorvastatin] Diarrhea 02/19/2014    Motrin [ibuprofen micronized] Nausea Only 09/10/2012    Codeine Anxiety      Additional information:       MEDICATIONS   Aspirin  [x] 81 mg  [] 325 mg  [] None  Antiplatelet drug therapy use last 5 days  []No [x]Yes  Coumadin Use Last 5 Days [x]No []Yes  Other anticoagulant use last 5 days  [x]No []Yes    Current Facility-Administered Medications:     0.9 % sodium chloride infusion, , Intravenous, Continuous, Clois Cost, APRN - CNP, Last Rate: 75 mL/hr at 04/30/19 1228    ceFAZolin (ANCEF) 2 g in dextrose 5 % 50 mL IVPB, 2 g, Intravenous, On Call to OR, Clois Cost, APRN - CNP    chlorhexidine (HIBICLENS) 4 % liquid, , Topical, Once, Clois Cost, APRN - CNP    diphenhydrAMINE (BENADRYL) injection 50 mg, 50 mg, Intravenous, Once, Clois Cost, APRN - CNP    famotidine (PEPCID) injection 20 mg, 20 mg, Intravenous, Once, Clois Cost, APRN - CNP    hydrocortisone sodium succinate PF (SOLU-CORTEF) injection 200 mg, 200 mg, Intravenous, Once, Clois Cost, APRN - CNP    sodium chloride flush 0.9 % injection 10 mL, 10 mL, Intravenous, 2 times per day, Clois Cost, APRN - CNP    sodium chloride flush 0.9 % injection 10 mL, 10 mL, Intravenous, PRN, Lakeshia Walter, APRN - CNP    0.9 % sodium chloride infusion 250 mL, 250 mL, Intravenous, Once, Clois Cost, APRN - CNP    dexmedetomidine (PRECEDEX) 400 mcg in sodium chloride 0.9 % 100 mL infusion, 0.2 mcg/kg/hr, Intravenous, Continuous, Shirlene Seo MD  Prior to Admission medications    Medication Sig Start Date End Date Taking?  Authorizing Provider losartan (COZAAR) 25 MG tablet Take 25 mg by mouth 2 times daily   Yes Historical Provider, MD   metoprolol (LOPRESSOR) 100 MG tablet Take 1 tablet by mouth 2 times daily 4/29/19  Yes Vladislav Abebe MD   guaiFENesin (MUCINEX) 600 MG extended release tablet Take 1 tablet by mouth 2 times daily for 15 days 4/25/19 5/10/19 Yes ASHLEY Fair CNP    MG capsule take 3 capsules by mouth every evening 4/25/19  Yes ASHLEY Fair CNP   doxepin (SINEQUAN) 50 MG capsule Take 1-2 capsules by mouth nightly for sleep 4/3/19  Yes Historical Provider, MD   escitalopram (LEXAPRO) 20 MG tablet Take 20 mg by mouth every morning 4/3/19  Yes Historical Provider, MD   potassium chloride (KLOR-CON M) 20 MEQ extended release tablet Take 20 mEq by mouth daily 4/3/19  Yes Historical Provider, MD   spironolactone (ALDACTONE) 25 MG tablet Take 25 mg by mouth daily 4/10/19  Yes Historical Provider, MD   ferrous sulfate 325 (65 Fe) MG tablet One every other day. Take with vitamin C 500 mg 4/17/19  Yes Araceli Andrea MD   albuterol sulfate HFA (VENTOLIN HFA) 108 (90 Base) MCG/ACT inhaler Inhale 2 puffs into the lungs every 6 hours as needed for Wheezing 4/17/19  Yes Araceli Andrea MD   baclofen (LIORESAL) 10 MG tablet Take 1 tablet by mouth 2 times daily Indications: 0.5 tablet to 1 tablet 4/17/19  Yes Araceli Andrea MD   fluticasone Latonia Callas) 50 MCG/ACT nasal spray 1 spray by Nasal route daily 4/17/19  Yes Araceli Andrea MD   furosemide (LASIX) 40 MG tablet Take 1 tablet by mouth 2 times daily 4/17/19  Yes Araceli Andrea MD   hydrALAZINE (APRESOLINE) 50 MG tablet Take 1 tablet by mouth 3 times daily 4/17/19  Yes Araceli Andrea MD   HYDROcodone-acetaminophen (NORCO) 5-325 MG per tablet Take 1 tablet by mouth See Admin Instructions for 30 days.  Take 1/2 tablet bid 4/17/19 5/17/19 Yes Araceli Andrea MD   ipratropium-albuterol (DUONEB) 0.5-2.5 (3) MG/3ML SOLN nebulizer solution Inhale 3 mLs into the lungs every 4 hours as needed for Shortness of Breath 4/17/19  Yes Lm Mock MD   isosorbide mononitrate (IMDUR) 30 MG extended release tablet Take 2 tablets by mouth daily 4/17/19  Yes Lm Mock MD   levothyroxine (SYNTHROID) 125 MCG tablet Take 1 tablet by mouth Daily 4/17/19  Yes Lm Mock MD   metolazone (ZAROXOLYN) 2.5 MG tablet One tab every Monday and Thursday 4/17/19  Yes Lm Mock MD   OXcarbazepine (TRILEPTAL) 150 MG tablet Take 1 tablet by mouth every morning 4/17/19  Yes Lm Mock MD   budesonide-formoterol [facility]) 160-4.5 MCG/ACT AERO Inhale 2 puffs into the lungs 2 times daily 4/17/19  Yes Lm Mock MD   vitamin D (ERGOCALCIFEROL) 15934 units capsule Take 1 capsule by mouth once a week 4/11/19  Yes Lm Mock MD   cycloSPORINE (RESTASIS) 0.05 % ophthalmic emulsion Place 1 drop into both eyes 2 times daily 4/7/19  Yes MD Rhys Manrique MISC by Does not apply route Expires 25 MARCH 2014 3/25/19  Yes Bri Freeman DO   Multiple Vitamin (MULTIVITAMIN) tablet Take 1 tablet by mouth daily 2/5/19  Yes [facility] Stefany Hansen MD   cyanocobalamin ([facility] VITAMIN B12) 1000 MCG tablet Take 1 tablet by mouth daily 12/29/18  Yes Radha Lopes MD   ranolazine (RANEXA) 500 MG extended release tablet take 1 tablet by mouth twice a day 12/18/18  Yes Rosenda Woodard PA-C   Calcium Carbonate-Vitamin D (OYSTER SHELL CALCIUM/D) 500-200 MG-UNIT TABS take 1 tablet by mouth twice a day 9/19/18  Yes Verner Few, APRN - CNP   aspirin (ASPIRIN LOW DOSE) 81 MG EC tablet take 1 tablet by mouth once daily 9/19/18  Yes Verner Few, APRN - CNP   rosuvastatin (CRESTOR) 20 MG tablet take 1 tablet by mouth once daily 9/18/18  Yes Rosenda Woodard PA-C   hydrOXYzine (ATARAX) 25 MG tablet Take 25 mg by mouth 3 times daily   Yes Historical Provider, MD   Lancets MISC Check blood sugar q daily Dx E11.69 11/9/17  Yes Honor Fast, DO   Blood Glucose Monitoring Suppl HARVINDER Check blood sugar q daily Dx E11.69 11/9/17  Yes Timothy Quan, DO   OXYGEN Inhale 3 L into the lungs continuous    Yes Historical Provider, MD   ONE TOUCH ULTRASOFT LANCETS MISC use as directed BEFORE BREAKFAST AND SUPPER 10/15/14  Yes ASHLEY Blanchard CNP   pantoprazole (PROTONIX) 40 MG tablet Take 40 mg by mouth every morning (before breakfast)    Yes Historical Provider, MD   risperiDONE (RISPERDAL) 1 MG tablet Take 1 mg by mouth every morning    Yes Historical Provider, MD   clopidogrel (PLAVIX) 75 MG tablet Take 1 tablet by mouth daily 4/17/19   Clarita Espinoza MD   ondansetron [facility]) 4 MG tablet Take 1 tablet by mouth daily as needed for Nausea or Vomiting 4/3/19   ASHLEY Blanchard CNP   RA SALINE NASAL SPRAY 0.65 % nasal spray instill 1 spray into each nostril if needed for congestion 1/7/19   ASHLEY Blanchard CNP   polyethylene glycol (GLYCOLAX) powder Take 17 g by mouth daily as needed (Constipation)    Historical Provider, MD     Additional information:       VITAL SIGNS   Vitals:    04/30/19 1225   BP:    Pulse:    Resp: 20   Temp:    SpO2: 97%       PHYSICAL:   General: No acute distress  HEENT:  Unremarkable for age  Neck: without increased JVD, carotid pulses 2+ bilaterally without bruits  Heart: RRR, S1 & S2 WNL, S4 gallop, without murmurs or rubs   NYHA: 3  Lungs: Clear to auscultation    Abdomen: BS present, without HSM, masses, or tenderness    Extremities: without C,C,E.  Pulses 2+ bilaterally  Mental Status: Alert & Oriented        PLANNED PROCEDURE   []Cath  []PCI                []Pacemaker/AICD  []ALEX             []Cardioversion []Peripheral angiography/PTA  [x]Other: TAVR     SEDATION  Planned agent:[]Midazolam []Meperidine []Sublimaze []Morphine  []Diazepam  [x]Other: Anesthesia EMR    ASA Classification:  []1 []2 [x]3 []4 []5  Class 1: A normal healthy patient  Class 2: Pt with mild to moderate systemic disease  Class 3: Severe systemic disease or disturbance  Class 4: Severe

## 2019-04-30 NOTE — BRIEF OP NOTE
[address] [address]  Sedation/Analgesia Post Sedation Record    Pt Name: Sweetie Webb  Account number: [de-identified]  MRN: [id number]  YOB: 1945  Procedure Performed By: Justin Delgado, [address]  Primary Care Physician: Rosita Mann APRN - CNP  Date: 4/30/2019    POST-PROCEDURE    Physicians/Assistants: ARMANDO Garcia MD    Procedure Performed:TAVR      Sedation/Anesthesia: Versed/ Fentanyl and 2% xylocaine local anesthesia. Estimated Blood Loss: < 50 ml. Specimens Removed: None         Disposition of Specimen: N/A        Complications: No Immediate Complications.        Post-procedure Diagnosis/Findings:     [id number] TAVR via [facility] ARMANDO Redd  Electronically signed 4/30/2019 at 2:55 PM  Interventional Cardiology

## 2019-05-01 NOTE — ANESTHESIA POSTPROCEDURE EVALUATION
Department of Anesthesiology  Postprocedure Note    Patient: Gary Narayan  MRN: [id number]  YOB: 1945  Date of evaluation: 5/1/2019  Time:  8:00 AM     Procedure Summary     Date:  04/30/19 Room / Location:  [address] [place] [address] [employer] CATH LAB    Anesthesia Start:  1627 Anesthesia Stop:  97 70 84    Procedure:  STR TAVR W/ ANESTHESIA Diagnosis:      Scheduled Providers:  Josefina Larkin MD Responsible Provider:  Josefina Larkin MD    Anesthesia Type:  MAC ASA Status:  4          Anesthesia Type: MAC    Tiera Phase I:      Tiera Phase II:      Last vitals: Reviewed and per EMR flowsheets.        Anesthesia Post Evaluation    Patient location during evaluation: ICU  Patient participation: complete - patient participated  Level of consciousness: awake and alert  Airway patency: patent  Nausea & Vomiting: no nausea and no vomiting  Cardiovascular status: hemodynamically stable  Respiratory status: acceptable, nasal cannula and spontaneous ventilation  Hydration status: euvolemic

## 2019-05-01 NOTE — PLAN OF CARE
Problem: Preoperative Routine:  Goal: Risk for injury before, during, or after surgery or procedure will decrease  Description  Risk for injury before, during, or after surgery or procedure will decrease  4/30/2019 2149 by Madison Cordova RN  Outcome: Met This Shift  Note:   Pt has remained stable throughout the perioperative phase. Groin sites remain free from bleeding, VSS, and no s/s infection. Problem: Discharge Planning:  Goal: Discharged to appropriate level of care  Description  Discharged to appropriate level of care  4/30/2019 2149 by Madison Cordova RN  Outcome: Met This Shift  Note:   Pt to go to rehab center at time of discharge. Problem: Falls - Risk of:  Goal: Will remain free from falls  Description  Will remain free from falls  4/30/2019 2149 by Madison Cordova RN  Outcome: Met This Shift  Note:   No falls this admission. Fall risk assessment completed and fall prevention interventions in place to prevent fall. Problem: Falls - Risk of:  Goal: Absence of physical injury  Description  Absence of physical injury  4/30/2019 2149 by Madison Cordova RN  Outcome: Met This Shift  Note:   No injuries present as related to falls. Problem: Falls - Risk of:  Goal: Absence of physical injury  Description  Absence of physical injury  4/30/2019 2149 by Madison Cordova RN  Outcome: Met This Shift  Note:   No injuries present as related to falls. Problem: Aspiration:  Goal: Absence of aspiration  Description  Absence of aspiration  4/30/2019 2149 by Madison Cordova RN  Outcome: Met This Shift  Note:   Pt has shown no s/s aspiration. Problem: Tissue Perfusion, Altered:  Goal: Circulatory function within specified parameters  Description  Circulatory function within specified parameters  4/30/2019 2149 by Madison Cordova RN  Outcome: Met This Shift  Note:   Pt has s/s adequate tissue perfusion. No bleeding to groin sites. VSS.      Problem: Skin Integrity:  Goal: Will show no infection signs and symptoms  Description  Will show no infection signs and symptoms  Outcome: Met This Shift  Note:   No s/s infection present. Problem: Skin Integrity:  Goal: Absence of new skin breakdown  Description  Absence of new skin breakdown  Outcome: Met This Shift  Note:   No new skin breakdown this admission. Arpan scale assessed and skin protective measures in place to prevent skin breakdown. Care plan reviewed with patient. Patient verbalizes understanding of the care plan and contributed to goal setting.

## 2019-05-01 NOTE — PROGRESS NOTES
MACHINE YET    S/P cardiac catheterization: 11/6/2014: 99% in-stent restenosis mid-RCA. LCx moderate LI's. LAD 85% mid-segment lesion. 11/6/2014 11/6/2014: 99% in-stent restenosis mid-RCA. LCx moderate LI's. LAD 85% mid-segment lesion. Dr. José Miguel Davis S/P PTCA:  5/11/2004: Proximal and mid RCA  Taxus 3.5 X 20 mm, and Taxus 3.0 X 32 mm. 10/28/2014    5/11/2004: Proximal and mid RCA  Taxus 3.5 X 20 mm, and Taxus 3.0 X 32 mm. Dr. Alex Hanson Systolic murmur 54/03/0671    Thyroid disease     ON RX    Wears glasses      Past Surgical History:   Procedure Laterality Date    ABDOMEN SURGERY      BACK SURGERY  08/2016        BLADDER SUSPENSION      BREAST BIOPSY  10/10/2017    BREAST LUMPECTOMY      CARDIAC CATHETERIZATION  8-11-06    Mild nonobstructive CAD w/ diffuse 10-20% proximal and mid LAD stenosis and 10-20% proximal/ostial RCA stenosis. No obstructive lesions. RCA stents are patent w/o evidence of restenosis. Normal LV systolic function. EF 65%. Trace MR - catheter induced. Minimally elevated LVEDP - 13mmHg. Mild to moderate aortoiliac PVD w/o obstructive lesions.  CARDIAC CATHETERIZATION  5-11-04    Successful drug-eluting stent x 2 of proximal and mid RCA.  CARDIAC CATHETERIZATION  5-11-04    70-80% proximal RCA stenosis w/ 50-70% mid RCA stenosis. There is 50-60% lesion in mid PDA. Mild proximal and mid LAD disease. Mild circumflex disease. Normal LV size and systolic function. EF 60%.  CARDIOVASCULAR STRESS TEST  5-10-11    No evidence of stress induced ischemia. EF 75%.  CARDIOVASCULAR STRESS TEST  5-10-10    No evidence of stress induced ischemia, infarct or scar. EF 74%.     CAROTID ENDARTERECTOMY      CERVICAL FUSION N/A 11/15/2017    REMOVAL OF PLATE U3-8, ACDF F7-9 W/ATLANTIS CORNERSTONE performed by Luis Jaime MD at [facility] COLONOSCOPY      ENDOSCOPY, COLON, DIAGNOSTIC      EYE SURGERY      cataract 2017    FOOT SURGERY      HEMORRHOID (degrees)  RUE AROM : WFL       LUE Strength  Gross LUE Strength: (4/5 grossly)                RUE Strength  Gross RUE Strength: (4/5 grossly)         ADL  LE Dressing: Dependent/Total(for adjusting slipper socks)     Bed mobility  Supine to Sit: Minimal assistance    Transfers  Sit to stand: Minimal assistance  Stand to sit: Contact guard assistance    Balance  Sitting Balance: Contact guard assistance  Standing Balance: Contact guard assistance           Functional Mobility  Functional - Mobility Device: Rolling Walker  Activity: Other(bedside chair)  Assist Level: Minimal assistance     Activity Tolerance:  Activity Tolerance: Patient Tolerated treatment well    Treatment Initiated:  Pt was educated on UE placement for t/fs & walker safety. Pt somewhat reluctant to suggestions & admits to \"furniture\" walking at home. Pt was s/u with breakfast at end of session while in recliner. Assessment:  Assessment: Pt demo decreased balance, strength, & safety awareness for ADLs at [facility] s/p TAVR on 4/30/19. Continued OT recommended to educate Pt on safety & compensatory strategies for increased safety upon returning to ADLs at home. Performance deficits / Impairments: Decreased functional mobility , Decreased ADL status, Decreased balance, Decreased safe awareness  Prognosis: Fair    Clinical Decision Making: Clinical Decision making was of Moderate Complexity as the result of analysis of data from a detailed assessment, a consideration of several treatment options, the presence of comorbidities affecting the plan of care and the need for minimal to moderate modifications or assistance required to complete the evaluation. Discharge Recommendations:  Discharge Recommendations: [address]    Patient Education:  Patient Education: OT role, POC, safety with mobility  Barriers to Learning: none    Equipment Recommendations:   Other: Monitor pending progress    Safety:  Safety Devices in place: Yes  Type of devices: All fall risk precautions in place, Call light within reach, Gait belt       Plan:  Times per week: 6x  Current Treatment Recommendations: Self-Care / ADL, Functional Mobility Training, Safety Education & Training, Balance Training    Goals:  Patient goals : get stronger & go home    Short term goals  Time Frame for Short term goals: 2 weeks  Short term goal 1: Complete various t/fs including toilet with S & 0 vcs for safety  Short term goal 2: Complete 2 min standing ADL with S for increased indep with sinkside grooming  Short term goal 3: Complete mobility to/from bathroom with S, RW, & 0-2 vcs for safety  Short term goal 4: Complete LE dressing with  min A & LH AE prn  Long term goals  Time Frame for Long term goals : No LTG set d/t short ELOS    Evaluation Complexity: Based on the findings of patient history, examination, clinical presentation, and decision making during this evaluation, this patient is of medium complexity.

## 2019-05-01 NOTE — DISCHARGE INSTR - COC
Continuity of Care Form    Patient Name: Sarah Ross   :  [birth year]  MRN:  [id number]    Admit date:  2019  Discharge date:  19    Code Status Order: Full Code   Advance Directives:   [facility] Documentation     Date/Time Healthcare Directive Type of Healthcare Directive Copy in [facility] [address] Agent's Name Healthcare Agent's Phone Number    19 1112  No, patient does not have an advance directive for healthcare treatment -- -- -- -- --          Admitting Physician:  Prerna Hanley MD  PCP: ASHLEY Sanchez - CNP    Discharging Nurse: [facility] Unit/Room#: 3B-22/022-A  Discharging Unit Phone Number: [phone]    Emergency Contact:   Extended Emergency Contact Information  Primary Emergency Contact: Ann-Marie Page [address] Phone: [phone]  Mobile Phone: [phone]  Relation: Child  Hearing or visual needs: None  Other needs: None  Preferred language: English   needed? No  Secondary Emergency Contact: Eliana Heredia  Address: [address] [place] [address] Phone: [phone]  Relation: Other  Hearing or visual needs: None  Other needs: None  Preferred language: English   needed? No    Past Surgical History:  Past Surgical History:   Procedure Laterality Date    ABDOMEN SURGERY      BACK SURGERY  2016        BLADDER SUSPENSION      BREAST BIOPSY  10/10/2017    BREAST LUMPECTOMY      CARDIAC CATHETERIZATION  06    Mild nonobstructive CAD w/ diffuse 10-20% proximal and mid LAD stenosis and 10-20% proximal/ostial RCA stenosis. No obstructive lesions. RCA stents are patent w/o evidence of restenosis. Normal LV systolic function. EF 65%. Trace MR - catheter induced. Minimally elevated LVEDP - 13mmHg. Mild to moderate aortoiliac PVD w/o obstructive lesions.     CARDIAC CATHETERIZATION  04    Successful drug-eluting stent x 2 of proximal and mid RCA.  CARDIAC CATHETERIZATION  5-11-04    70-80% proximal RCA stenosis w/ 50-70% mid RCA stenosis. There is 50-60% lesion in mid PDA. Mild proximal and mid LAD disease. Mild circumflex disease. Normal LV size and systolic function. EF 60%.  CARDIOVASCULAR STRESS TEST  5-10-11    No evidence of stress induced ischemia. EF 75%.  CARDIOVASCULAR STRESS TEST  5-10-10    No evidence of stress induced ischemia, infarct or scar. EF 74%.     CAROTID ENDARTERECTOMY      CERVICAL FUSION N/A 11/15/2017    REMOVAL OF PLATE V4-1, ACDF P9-8 W/ATLANTIS CORNERSTONE performed by Loretta Bullock MD at [address] [place] [address], [facility]      EYE SURGERY      cataract 2017   [address] [place] [address]    HERNIA REPAIR      HYSTERECTOMY  1973    INNER EAR SURGERY      NECK SURGERY  FEB 2016   Maral Livingston OTHER SURGICAL HISTORY  02/13/2019    Arteriogram for diagnostics    PARTIAL HYSTERECTOMY      UPPER GASTROINTESTINAL ENDOSCOPY      UPPER GASTROINTESTINAL ENDOSCOPY         Immunization History:   Immunization History   Administered Date(s) Administered    Influenza Virus Vaccine 10/01/2012, 10/02/2013, 09/16/2014, 09/30/2015    Influenza, High Dose (Fluzone 65 yrs and older) 10/31/2016    Influenza, Genetta Kiang, 3 Years and older, IM (Fluzone 3 yrs and older or Afluria 5 yrs and older) 09/21/2017    Influenza, Genetta Kiang, 6 mo and older, IM, PF (Flulaval, Fluarix) 11/04/2018    Pneumococcal 13-valent Conjugate ([id number]) 11/17/2015    Pneumococcal Polysaccharide ([id number]) 10/01/2012    Tdap (Boostrix, Adacel) 12/16/2012       Active Problems:  Patient Active Problem List   Diagnosis Code    Anxiety F41.9    Depression F32.9    Type 2 diabetes mellitus with complication, with long-term current use of insulin ([address] E11.8, Z79.4    MI (myocardial infarction) ([address] I21.9    Dyslipidemia E78.5    CAD (coronary artery disease) I25.10    COPD without exacerbation ([facility] J44.9    GERD (gastroesophageal reflux disease) K21.9    Hypothyroid E03.9    History of tobacco abuse: Quit in 2009 Z87.891    S/P PTCA: 3/2/2017: PTCA RCA ISR. 12/11/2014: LAD Resolute 3.0X26. 11/6/2014: Stenting RCA Rexann Bee 0.7X07 and 3.5X18. 5/11/2004: Proximal & mid RCA Taxus 3.5X20 mm, and Taxus 3.0X32 mm. T02.68    Metabolic syndrome X38.66    S/P cardiac catheterization: 11/6/2014: 99% in-stent restenosis mid-RCA. LCx moderate LI's. LAD 85% mid-segment lesion. Z98.890    POND (nonalcoholic steatohepatitis) K75.81    Hyperlipidemia E78.5    Essential hypertension I10    Obesity (BMI 30-39. 9) E66.9    Moderate dehydration E86.0    ASCVD (arteriosclerotic cardiovascular disease) I25.10    Chronic respiratory failure with hypoxia (HCC) J96.11    Other hyperlipidemia F21.18    Dysmetabolic syndrome D16.09    Bilateral iliac artery occlusion (HCC) I74.5    CHET (obstructive sleep apnea) G47.33    Aortic valve stenosis I35.0    Nocturnal hypoxemia G47.34    Hyponatremia E87.1    Acute on chronic diastolic congestive heart failure due to valvular disease (HCC) I50.33, I38    Sympathotonic orthostatic hypotension I95.1    E-coli UTI N39.0, B96.20    Anxiety and depression F41.9, F32.9    Dizziness R42    Chest pain R07.9    Acute on chronic respiratory failure with hypoxia and hypercapnia (HCC) J96.21, J96.22    Moderate persistent asthma without complication R04.68    Pulmonary nodule R91.1    Heart failure with preserved ejection fraction (HCC) I50.30    Osteoarthritis of multiple joints M15.9    Class 2 obesity due to excess calories with body mass index (BMI) of 38.0 to 38.9 in adult E66.09, Z68.38    Normocytic anemia D64.9    Subclavian vein stenosis I87.1    Post concussive syndrome F07.81    CRF (chronic renal failure), stage 4 (severe) ([facility]) N18.4    Hypotension I95.9    Syncope and collapse I66    Metabolic encephalopathy B50.27  Right-sided epistaxis K05.5    Diastolic dysfunction without heart failure I51.89    Cervical spine instability M53.2X2    Nonrheumatic aortic valve stenosis I35.0    Acute diastolic heart failure (HCC) I50.31    Acute kidney injury ([facility]) N17.9    Hypercalcemia E83.52    At risk for polypharmacy Z91.89    Acute respiratory failure with hypoxia and hypercapnia ([facility]) J96.01, J96.02    Acute pulmonary edema ([facility]) J81.0    Abnormal urinalysis R82.90    Irritable bowel syndrome K58.9    Anemia requiring transfusions D64.9    Stage 3 severe COPD by GOLD classification ([facility]) J44.9    Bilateral carotid artery stenosis I65.23    Iron deficiency anemia due to dietary causes D50.8    B12 deficiency due to diet I54.7    Diastolic CHF, acute on chronic ([facility]) I50.33    Anemia D64.9    Morbid obesity with BMI of 40.0-44.9, adult ([facility]) E66.01, Z68.41    Hyponatremia with decreased serum osmolality K56.3    Metabolic alkalosis G14.7    Chronic diastolic congestive heart failure ([facility]) I50.32    Acute exacerbation of COPD with asthma ([facility]) J44.1, J45.901    Acute respiratory failure with hypoxia and hypercapnia ([facility]) J96.01, J96.02    Severe aortic stenosis I35.0    S/P TAVR (transcatheter aortic valve replacement) Z95.2       Isolation/Infection:   Isolation          Contact        Patient Infection Status     Infection Encounter Level?  Onset Date Added Added By Resolved Resolved By Review Date    VRE No 05/01/19 05/01/19 VRE Screen by PCR       Feces Daniel Stacy A 5/2019          Nurse Assessment:  Last Vital Signs: /60   Pulse 75   Temp 98.7 °F (37.1 °C) (Oral)   Resp 24   Ht 5' 5\" (1.651 m)   Wt 208 lb 5.4 oz (94.5 kg)   LMP 02/12/1973 (Approximate)   SpO2 98%   BMI 34.67 kg/m²     Last documented pain score (0-10 scale): Pain Level: 9  Last Weight:   Wt Readings from Last 1 Encounters:   05/01/19 208 lb 5.4 oz (94.5 kg)     Mental Status:  oriented and alert    IV Access:  - None    Nursing Mobility/ADLs:  Walking   Assisted  Transfer  Assisted  Bathing  Assisted  Dressing  Assisted  Toileting  Assisted  Feeding  Independent  Med Admin  Assisted  Med Delivery   whole    Wound Care Documentation and Therapy:  Wound 02/03/19 Buttocks Mid;Lower; Inner; Outer;Right;Left red non blanchable (Active)   Number of days: 86       Wound 04/14/19 Ear Right;Left Stage I (Active)   Wound Assessment Red 4/30/2019 10:45 AM   Drainage Amount None 4/30/2019 10:45 AM   Paulina-wound Assessment Blanchable erythema 4/30/2019 10:45 AM   Number of days: 16        Elimination:  Continence:   · Bowel: Yes  · Bladder: Yes  Urinary Catheter: None   Colostomy/Ileostomy/Ileal Conduit: No       Date of Last BM: 5-2-19    Intake/Output Summary (Last 24 hours) at 5/1/2019 1339  Last data filed at 5/1/2019 0532  Gross per 24 hour   Intake 2312 ml   Output 750 ml   Net 1562 ml     I/O last 3 completed shifts: In: 2312 [P.O.:370; I.V.:1942]  Out: 750 [Urine:750]    Safety Concerns: At Risk for Falls    Impairments/Disabilities:      None    Nutrition Therapy:  Current Nutrition Therapy:   - Oral Diet:  Cardiac    Routes of Feeding: Oral  Liquids: Thin Liquids  Daily Fluid Restriction: no  Last Modified Barium Swallow with Video (Video Swallowing Test): not done    Treatments at the Time of Hospital Discharge:   Respiratory Treatments: see mar  Oxygen Therapy:  is on oxygen at 3 L/min per nasal cannula.   Ventilator:    - No ventilator support    Rehab Therapies: Physical Therapy and Occupational Therapy  Weight Bearing Status/Restrictions: No weight bearing restirctions  Other Medical Equipment (for information only, NOT a DME order):  walker  Other Treatments:     Patient's personal belongings (please select all that are sent with patient):  Glasses    RN SIGNATURE:  Electronically signed by Beka Li RN on 5/2/19 at 9:44 AM    CASE MANAGEMENT/SOCIAL WORK SECTION    Inpatient Status Date: 4/30/19    Readmission Risk Assessment Score:  Readmission Risk              Risk of Unplanned Readmission:        75           Discharging to Facility/ Agency   · Name: [facility]  · Address: [address] [place] [address]  · Phone: [phone]  · Fax: [phone]    Dialysis Facility (if applicable)   · Name:  · Address:  · Dialysis Schedule:  · Phone:  · Fax:    / signature: Electronically signed by PAKO Hu on 5/1/19 at 1:40 PM    PHYSICIAN SECTION    Prognosis: Good    Condition at Discharge: Stable    Rehab Potential (if transferring to Rehab): Good    Recommended Labs or Other Treatments After Discharge: ***    Physician Certification: I certify the above information and transfer of Eliana Jones  is necessary for the continuing treatment of the diagnosis listed and that she requires [place] for greater 30 days.      Update Admission H&P: No change in H&P    PHYSICIAN SIGNATURE:  Electronically signed by Pastor Licona MD on 5/2/19 at 12:43 PM

## 2019-05-01 NOTE — FLOWSHEET NOTE
05/01/19 1455   Encounter Summary   Services provided to: Patient   Referral/Consult From: Rounding;Nurse   Continue Visiting Yes  (5/1 AD info)   Complexity of Encounter Moderate   Length of Encounter 15 minutes   Routine   Type Initial   Advance Directives (For Healthcare)   Healthcare Directive No, patient does not have an advance directive for healthcare treatment   Information on Healthcare Directives Requested No   Patient Requests Assistance Yes, referral made to    Advance Directives Documents given;Documents explained;Pt. not interested at this time; Unable to complete   Advance Directive Consult: Advance Directive materials were provided to patient and explained. Patient is not ready to complete at this time, gave information for Spiritual Care to be contacted if further assistance is needed. Pt has a wrong idea stating, \"I don't want a living will, because no one will do anything if I have one. \"  She wanted to wait until her daughter is here to fill out the Power of  for health care.

## 2019-05-01 NOTE — PROCEDURES
[address] [place] [address]                            CARDIAC CATHETERIZATION    PATIENT NAME: Paco Toscano                     :        1945  MED REC NO:   [id number]                           ROOM:       0005  ACCOUNT NO:   [de-identified]                           ADMIT DATE: 2019  PROVIDER:     Ran Douglas MD    DATE OF PROCEDURE:  2019    CARDIAC CATHETERIZATION    PROCEDURE:  Transcatheter aortic valve replacement. INDICATION:  Severe, symptomatic aortic stenosis, not a candidate for SAVR. :  Ran Douglas MD    SECONDARY :  Louise Savage MD    ANESTHESIA:  Le Yusuf MD    PROCEDURE:  After informed consent was obtained from the patient, she  was brought to the hybrid operating room and was prepped in sterile  fashion. Salvage discussion was done by CT surgery and documented in the EMR. I anesthetized  the right neck with 2% lidocaine using micropuncture, modified Seldinger  technique, fluoroscopic guidance, and ultrasound guidance, I was able to  insert a 6-Prydeinig sheath into the right internal jugular vein. I then  floated a 5-Prydeinig balloon-tipped pacemaker into the right ventricular  apex. Pacing thresholds were checked. The pacing lasted less than 1 mA  output. Pacing backup was set at 50 paces per minute at 10 mA output. I then turned my attention to the left femoral artery. After  infiltration of the left inguinal region with 2% lidocaine using  micropuncture, modified Seldinger technique, and fluoroscopic guidance,  I was able to insert a short 6-Prydeinig sheath into the left femoral  artery. I then used a angled Glidewire to traverse the severe left  common iliac artery stenosis and then exchanged out for a 6 x 45 cm  sheath that was placed into the aorta. Once this was completed, I then  turned my attention to the right femoral artery.   I anesthetized

## 2019-05-01 NOTE — CARE COORDINATION
5/1/19, 1:34 PM    DISCHARGE BARRIERS    Full assessment deferred as patient is from [facility]. Patient plans to return to [facility] skilled to finish her Medicare days. Patient stated that she has about 7 days left of rehab. Spoke with Neda at facility patient is skilled under her Medicare and can return skilled.

## 2019-05-01 NOTE — PROGRESS NOTES
[address] [place] [address]  INPATIENT PHYSICAL THERAPY  EVALUATION  [facility]-22[facility]    Time In: 5079  Time Out: 1027  Timed Code Treatment Minutes: 8 Minutes  Minutes: 19          Date: 2019  Patient Name: Nan Mcclelland,  Gender:  female        MRN: [id number]  : 1945  (75 y.o.)      Referring Practitioner: STEPHANIE Jane  Diagnosis: severe aortic stenosis   Additional Pertinent Hx: S/p scheduled TAVR on 19     Past Medical History:   Diagnosis Date    Anemia     Anxiety     Arthritis     general    AS (aortic stenosis): mild to moderate by echo 2017    ASHD (arteriosclerotic heart disease)     Asthma 2016    INHALER USE DAILY AND AS NEEDED    Bipolar disorder ([address]     Blood circulation, collateral     CAD (coronary artery disease)     MI, 5 STENTS IN HEART NO SURE OF HEART DR [facility]    Cerebral artery occlusion with cerebral infarction ([address] 2018    SILENT-DX ON CT SCAN NO DEFICITS    CHF (congestive heart failure) ([address]     COPD (chronic obstructive pulmonary disease) ([address] 2014    INHALER USE DAILY AND AS NEEDED    Depression     Disease of blood and blood forming organ     anemia per patient     DM (diabetes mellitus) ([address]     NO MEDS DIET CONTROLED    FH: CAD (coronary artery disease)     GERD (gastroesophageal reflux disease)     History of blood transfusion     POST OP -PLASMA    History of blood transfusion 2019    1 UNIT LOW HGB    History of tobacco abuse: Quit in      HLD (hyperlipidemia)     ON RX    HTN (hypertension)     ON RX    Irritable bowel syndrome     States has not had problem with this for years    Liver disease     fatty liver    Metabolic syndrome     MI (myocardial infarction) ([address]     Nausea & vomiting     Obesity     CHET (obstructive sleep apnea) 2016    NO MACHINE YET    S/P cardiac catheterization: 2014: 99% in-stent restenosis mid-RCA. LCx moderate LI's.  LAD 85% mid-segment lesion. 11/6/2014 11/6/2014: 99% in-stent restenosis mid-RCA. LCx moderate LI's. LAD 85% mid-segment lesion. Dr. Caryle Beams S/P PTCA:  5/11/2004: Proximal and mid RCA  Taxus 3.5 X 20 mm, and Taxus 3.0 X 32 mm. 10/28/2014    5/11/2004: Proximal and mid RCA  Taxus 3.5 X 20 mm, and Taxus 3.0 X 32 mm. Dr. Tray Freeman Systolic murmur 66/28/8838    Thyroid disease     ON RX    Wears glasses      Past Surgical History:   Procedure Laterality Date    ABDOMEN SURGERY      BACK SURGERY  08/2016        BLADDER SUSPENSION      BREAST BIOPSY  10/10/2017    BREAST LUMPECTOMY      CARDIAC CATHETERIZATION  8-11-06    Mild nonobstructive CAD w/ diffuse 10-20% proximal and mid LAD stenosis and 10-20% proximal/ostial RCA stenosis. No obstructive lesions. RCA stents are patent w/o evidence of restenosis. Normal LV systolic function. EF 65%. Trace MR - catheter induced. Minimally elevated LVEDP - 13mmHg. Mild to moderate aortoiliac PVD w/o obstructive lesions.  CARDIAC CATHETERIZATION  5-11-04    Successful drug-eluting stent x 2 of proximal and mid RCA.  CARDIAC CATHETERIZATION  5-11-04    70-80% proximal RCA stenosis w/ 50-70% mid RCA stenosis. There is 50-60% lesion in mid PDA. Mild proximal and mid LAD disease. Mild circumflex disease. Normal LV size and systolic function. EF 60%.  CARDIOVASCULAR STRESS TEST  5-10-11    No evidence of stress induced ischemia. EF 75%.  CARDIOVASCULAR STRESS TEST  5-10-10    No evidence of stress induced ischemia, infarct or scar. EF 74%.     CAROTID ENDARTERECTOMY      CERVICAL FUSION N/A 11/15/2017    REMOVAL OF PLATE E5-3, ACDF U9-1 W/ATLANTIS CORNERSTONE performed by Savannah Webb MD at [address] [place] [address], DIAGNOSTIC      EYE SURGERY      cataract 2017   [address]    INNER EAR SURGERY      NECK SURGERY  FEB 2016   Michelle Bazzi OTHER SURGICAL HISTORY  02/13/2019    Arteriogram for diagnostics    PARTIAL HYSTERECTOMY      UPPER GASTROINTESTINAL ENDOSCOPY      UPPER GASTROINTESTINAL ENDOSCOPY         Restrictions/Precautions:  Fall Risk, Contact Precautions     Other position/activity restrictions: s/p TAVR on 4/30/19, 3L O2       Subjective:  Chart Reviewed: Yes  Patient assessed for rehabilitation services?: Yes  Family / Caregiver Present: No  Subjective: Pt received in bedside chair and was agreeable to PT interventions. General:  Overall Orientation Status: Within Functional Limits  Follows Commands: Within Functional Limits    Vision: Impaired  Vision Exceptions: Wears glasses at all times    Hearing: Exceptions to [facility]  Hearing Exceptions: Bilateral hearing aid, Hard of hearing/hearing concerns         Pain:  (no pain expressed throughout session). Social/Functional History:    Lives With: Alone  Type of Home: Apartment  Home Layout: One level  Home Access: Level entry  Home Equipment: 4 wheeled walker, Cane, Lift chair, [address] Shower/Tub: Tub/Shower unit  Bathroom Toilet: Handicap height  Bathroom Equipment: Grab bars in shower, Tub transfer bench  Bathroom Accessibility: Walker accessible    [address] Help From: Family, Personal care attendant(Aide coming out [address] [place] [address] with bathing, dressing, laundry, cooking (meals on wheels 2x/day))  ADL Assistance: Needs assistance  Homemaking Assistance: Needs assistance  Ambulation Assistance: Needs assistance(pt reports furniture walking PTA with using 4WW intermittently)  Transfer Assistance: Independent          Additional Comments: Post op plans to go to [facility] for 7 days, recieves little help from family members but has aides come in and help from interim (dressing, bathe, cook).  Pt reports she has meals brought in only meal she usually cooks is breakfast.      Objective:       RLE AROM: WFL         LLE AROM : WFL       Strength RLE: WFL  Comment: grossly: 4/5    Strength LLE: WFL  Comment: grossly: 4/5       Sensation  Overall Sensation Status: WFL       Balance  Sitting - Static: Good  Sitting - Dynamic: Good  Standing - Static: Good, -  Standing - Dynamic: Fair, +    Sit to Supine: Contact guard assistance(HOB elevated, use of bed rail )  Scooting: Contact guard assistance    Transfers  Sit to Stand: Stand by assistance  Stand to sit: Stand by assistance  Comment: cueing for hand placement       Ambulation 1  Surface: level tile  Device: Rolling Walker  Assistance: Contact guard assistance  Quality of Gait: slow gait, short step lengths   Distance: 3ft         Exercises:  Comments: Pt instructed in TAVR exercise protocol + LE seated exericses 1x10. Activity Tolerance:  Activity Tolerance: Patient Tolerated treatment well    Treatment Initiated: Pt instructed in bilat UE/LE exercises - per protocol. Assessment: Body structures, Functions, Activity limitations: Decreased functional mobility , Decreased endurance, Decreased strength, Decreased balance  Assessment: Pt demonstrates a decrease in baseline by way of functional mobility- pt will benefit from skilled PT services during admission and post hospital discharge for improvements in functional I. Prognosis: Good    Clinical Presentation: Moderate - Evolving with Changing Characteristics:   Pt demonstrates a decrease in baseline by way of functional mobility- pt will benefit from skilled PT services during admission and post hospital discharge for improvements in functional I. Pt was I prior to admission with intermittent use of 4WW. Pt lives home alone at this time and reports that she has little support. Pt using home health aide several times a week to assist her with IADLs and ADLs. Decision Making:  Moderate Complexitybased on patient assessment and decision making process of determining plan of care and establishing reasonable expectations for measurable functional outcomes    REQUIRES PT FOLLOW UP: Yes    Discharge Recommendations:  Discharge Recommendations: Continue to assess pending progress, Patient would benefit from continued therapy after discharge    Patient Education:  Patient Education: role of PT, plan of care    Equipment Recommendations:  Equipment Needed: No  Other: if d/c home- pt may benefit from 2WW as pt noted she fell when using her 4WW     Safety:  Type of devices: All fall risk precautions in place, Bed alarm in place, Call light within reach, Nurse notified, Gait belt, Left in bed    Plan:  Times per week: 6x TAVR  Times per day: Daily  Specific instructions for Next Treatment: advance as tolerated   Current Treatment Recommendations: Strengthening, Gait Training, ROM, Stair training, Balance Training, Functional Mobility Training, Endurance Training, Home Exercise Program, Transfer Training    Goals:  Patient goals : return home  Short term goals  Time Frame for Short term goals: 2 weeks  Short term goal 1: bed mobility with mod I for ease of getting in/out of bed   Short term goal 2: sit <> stand with mod I to prepare for independent functional mobility and return home  Short term goal 3: ambulate 175ft with use of least restrictive device and stand-by assist for ease of IADLs  Short term goal 4: negotiate a curb step with use of least restrictive device and stand-by assist for ease of IADLs  Long term goals  Time Frame for Long term goals : N/A secondary to short ELOS    Evaluation Complexity: Based on the findings of patient history, examination, clinical presentation, and decision making during this evaluation, the evaluation of Eliana Jones  is of medium complexity.        AM-PAC Inpatient Mobility without Stair Climbing Raw Score : 15  AM-PAC Inpatient without Stair Climbing T-Scale Score : 43.03  Mobility Inpatient CMS 0-100% Score: 47.43  Mobility Inpatient without Stair CMS G-Code Modifier : CK

## 2019-05-01 NOTE — CARE COORDINATION
5/1/19, 7:39 AM      Eliana Jones       Admitted from: Surgery 4/30/2019/ Δηληγιάννη 283 day: 1   Location: [facility]05/[facility]- Reason for admit: Severe aortic stenosis [I35.0]  S/P TAVR (transcatheter aortic valve replacement) [Z95.2] Status: IP  Admit order signed?: yes  PMH:  has a past medical history of Anemia, Anxiety, Arthritis, AS (aortic stenosis): mild to moderate by echo 4/2017, ASHD (arteriosclerotic heart disease), Asthma, Bipolar disorder ([address] Blood circulation, collateral, CAD (coronary artery disease), Cerebral artery occlusion with cerebral infarction ([address] CHF (congestive heart failure) ([address] COPD (chronic obstructive pulmonary disease) ([address] Depression, Disease of blood and blood forming organ, DM (diabetes mellitus) ([address] FH: CAD (coronary artery disease), GERD (gastroesophageal reflux disease), History of blood transfusion, History of blood transfusion, History of tobacco abuse: Quit in 2007, HLD (hyperlipidemia), HTN (hypertension), Irritable bowel syndrome, Liver disease, Metabolic syndrome, MI (myocardial infarction) ([address] Nausea & vomiting, Obesity, CHET (obstructive sleep apnea), S/P cardiac catheterization: 11/6/2014: 99% in-stent restenosis mid-RCA. LCx moderate LI's. LAD 85% mid-segment lesion., S/P PTCA:  5/11/2004: Proximal and mid RCA  Taxus 3.5 X 20 mm, and Taxus 3.0 X 32 mm., Systolic murmur, Thyroid disease, and Wears glasses.   Procedure: 4/30 TAVR  Pertinent abnormal Imaging:none  Medications:  Scheduled Meds:   diphenhydrAMINE  50 mg Intravenous Once    hydrocortisone sodium succinate PF  200 mg Intravenous Once    sodium chloride flush  10 mL Intravenous 2 times per day    ceFAZolin (ANCEF) IVPB  2 g Intravenous Q8H    docusate sodium  100 mg Oral BID    aspirin  81 mg Oral Daily    clopidogrel  75 mg Oral Daily    risperiDONE  1 mg Oral QAM    pantoprazole  40 mg Oral QAM AC    hydrOXYzine  25 mg Oral TID    rosuvastatin  20 mg Oral Nightly    ranolazine  500 mg Oral BID    cyanocobalamin  1,000 mcg Oral Daily    multivitamin  1 tablet Oral Daily    [START ON 5/6/2019] ergocalciferol  50,000 Units Oral Weekly    baclofen  10 mg Oral BID    fluticasone  1 spray Nasal Daily    furosemide  40 mg Oral BID    hydrALAZINE  50 mg Oral TID    HYDROcodone-acetaminophen  1 tablet Oral See Admin Instructions    isosorbide mononitrate  60 mg Oral Daily    levothyroxine  125 mcg Oral Daily    OXcarbazepine  150 mg Oral QAM    mometasone-formoterol  2 puff Inhalation BID    escitalopram  20 mg Oral QAM    potassium chloride  20 mEq Oral Daily    spironolactone  25 mg Oral Daily    guaiFENesin  600 mg Oral BID    metoprolol  100 mg Oral BID    losartan  25 mg Oral BID     Continuous Infusions:   dexmedetomidine Stopped (04/30/19 1428)    sodium chloride 50 mL/hr at 04/30/19 1620      Pertinent Info/Orders/Treatment Plan:  Cardiology following. PT/OT. Cardiac rehab consult. Daily wts. I/O. Telemetry. IVF. IV ancef. Precedex weaned off. Diet: DIET CARDIAC;   Smoking status:  reports that she quit smoking about 12 years ago. Her smoking use included cigarettes. She has a 38.00 pack-year smoking history. She has never used smokeless tobacco.   PCP: ASHLEY Ramon CNP  Readmission: no  Readmission Risk Score: 75%    Discharge Planning  Current Residence:  Long-Term Acute Care  Living Arrangements:  Alone   Support Systems:  Children, Family Members  Current Services PTA:     Potential Assistance Needed:  N/A  Potential Assistance Purchasing Medications:  No  Does patient want to participate in local refill/ meds to beds program?  No  Type of Home Care Services:  Nursing Services, Skilled Therapy  Patient expects to be discharged to:  nursing home  Expected Discharge date: Follow Up Appointment: Best Day/ Time:      Discharge Plan: Spoke with patient, she is from [facility] having 7 skilled days left, SW following. Will return home after ECF.  Plan to transfer

## 2019-05-01 NOTE — PROGRESS NOTES
Structural Heart/Cardiology Progress Note    2019 7:28 AM    Procedure:  TAVR      POD #:  1    Subjective: The pt is resting comfortably in bed, alert, and in no acute distress. Hemodynamically stable. NSR. The pt denies chest pressure, SOB, palpitations, fever, chills, N/V/D.      Vital Signs: BP (!) 143/44   Pulse 82   Temp 98.5 °F (36.9 °C) (Oral)   Resp 25   Ht 5' 5\" (1.651 m)   Wt 208 lb 5.4 oz (94.5 kg)   LMP 1973 (Approximate)   SpO2 98%   BMI 34.67 kg/m²    Temp (24hrs), Av.4 °F (36.9 °C), Min:97.8 °F (36.6 °C), Max:98.8 °F (37.1 °C)      Weight: 208 lb 5.4 oz (94.5 kg)       PULSE OXIMETRY RANGE: SpO2  Av.2 %  Min: 83 %  Max: 100 %  SUPPLEMENTAL O2: O2 Flow Rate (L/min): 3 L/min     Labs:   CBC:  Recent Labs     19   WBC 4.0* 6.4   HGB 9.2* 8.4*   HCT 27.9* 26.4*   MCV 81.8 84.6    160     BMP:  Recent Labs     19   * 136   K 3.8 4.0   CL 88* 94*   CO2 29 27   BUN 15 15   CREATININE 1.0 1.0     Last HgA1C:    Lab Results   Component Value Date    LABA1C 5.9 2019     PT/INR:   Recent Labs     19  034   INR 1.12 1.11     APTT:   Recent Labs     19  1102 19  034   APTT 37.7 35.5       Intake/Output Summary (Last 24 hours) at 2019 0728  Last data filed at 2019 0532  Gross per 24 hour   Intake 2312 ml   Output 750 ml   Net 1562 ml     Scheduled Meds:    diphenhydrAMINE  50 mg Intravenous Once    famotidine  20 mg Intravenous Once    hydrocortisone sodium succinate PF  200 mg Intravenous Once    sodium chloride flush  10 mL Intravenous 2 times per day    ceFAZolin (ANCEF) IVPB  2 g Intravenous Q8H    docusate sodium  100 mg Oral BID    aspirin  81 mg Oral Daily    clopidogrel  75 mg Oral Daily    risperiDONE  1 mg Oral QAM    pantoprazole  40 mg Oral QAM AC    hydrOXYzine  25 mg Oral TID    rosuvastatin  20 mg Oral Nightly    ranolazine  500 mg Oral BID    cyanocobalamin  1,000 mcg Oral Daily    multivitamin  1 tablet Oral Daily    [START ON 5/6/2019] ergocalciferol  50,000 Units Oral Weekly    baclofen  10 mg Oral BID    fluticasone  1 spray Nasal Daily    furosemide  40 mg Oral BID    hydrALAZINE  50 mg Oral TID    HYDROcodone-acetaminophen  1 tablet Oral See Admin Instructions    isosorbide mononitrate  60 mg Oral Daily    levothyroxine  125 mcg Oral Daily    OXcarbazepine  150 mg Oral QAM    mometasone-formoterol  2 puff Inhalation BID    escitalopram  20 mg Oral QAM    potassium chloride  20 mEq Oral Daily    spironolactone  25 mg Oral Daily    guaiFENesin  600 mg Oral BID    metoprolol  100 mg Oral BID    losartan  25 mg Oral BID       ROS: All neg unless specifically mentioned in subjective section. Exam:   General Appearance: alert ,conversing, in no acute distress  Cardiovascular: normalrate, regular rhythm, normal S1 and S2, Grade I/VI JEANETTE LSB  Pulmonary/Chest: clear to auscultation bilaterally- no wheezes, rales or rhonchi, normal air movement, no respiratory distress  Abdomen:soft, non-tender, non-distended, normal bowel sounds, no bruits,   Extremities: no cyanosis, clubbing or edema. Pulses: Bilateral radial, femoral, DP, and PT pulses intact. No femoral bruits noted. Skin: warm and dry, no rash or erythema  Head: normocephalic and atraumatic  Neck: supple and non-tender without mass, no thyromegaly, no JVD   Musculoskeletal: normal range of motion, no joint swelling, deformity or tenderness. Neurological: alert, oriented, normal speech, no focal findings or movement disorder noted  Groin: Wounds healing appropriately. No signs of infection or hematoma.      Assessment:   Patient Active Problem List   Diagnosis    Anxiety    Depression    Type 2 diabetes mellitus with complication, with long-term current use of insulin ([address]    MI (myocardial infarction) ([address]    Dyslipidemia    CAD (coronary artery disease)    COPD and hypercapnia (HCC)    Acute pulmonary edema (HCC)    Abnormal urinalysis    Irritable bowel syndrome    Anemia requiring transfusions    Stage 3 severe COPD by GOLD classification (HCC)    Bilateral carotid artery stenosis    Iron deficiency anemia due to dietary causes    B12 deficiency due to diet    Diastolic CHF, acute on chronic (HCC)    Anemia    Morbid obesity with BMI of 40.0-44.9, adult (HCC)    Hyponatremia with decreased serum osmolality    Metabolic alkalosis    Chronic diastolic congestive heart failure (HCC)    Acute exacerbation of COPD with asthma (HCC)    Acute respiratory failure with hypoxia and hypercapnia (HCC)    Severe aortic stenosis    S/P TAVR (transcatheter aortic valve replacement)     Plan:   1. Continue DAPT  2. Echo tomorrow morning  3. Restart home medications  4.  Transfer floors    The plan of care was discussed in detail with Dr. Kieran Lam and Dr. Fabián Mills

## 2019-05-01 NOTE — PROGRESS NOTES
PHASE 1 CARDIAC REHABILITATION   CABG, AVR, MVR, TVR, AMI, PCI's  Exercise Physiologists      Treatment Length (l: long, n: normal, s: short): n  Treatment Length Note:   Vitals Stable?: Yes. If No:     Any ECG-Rhythm Issues?: No.  If Yes:   Transfers (bc: Bed to Chair, cb: Chair to Bed):   Strengthening/ROM Treatment Exercises: Step 2    FIDM:     Frequency: 2 x per day   Intensity: <15 RPE, <120bpm   Time: >30-60 seconds longer or >20% increase in distance each walk   Type: Bed/Chair Exercises/stationary marching or ambulation    Aerobic treatment: Ambulated pt 61' with RW        Gait (i: Independent, s: Steady, u: Unsteady): s  Assists: 1  Patient tolerated treatment (w: well, f: fair, p: poor): w  Goals:    Short term:  Progression on each aerobic treatment. Long term: Independent ambulation and discharge. Ricarda Rubio is to follow sternal precautions. Will learn techniques for getting in and out of bed/chairs/car without using the arms. Using stairs will be addressed if applicable. Home activity instructions (Frequency, Intensity, Time, Type), and progression will be addressed prior to discharge (unless Ricarda Rubio goes to TCU or an ECF where they will follow PT/OT protocols). Notes: Call light left within reach.     Education given:   Phase II Information (Given upon Discharge):

## 2019-05-02 NOTE — PROGRESS NOTES
Patient assess and lung sounds clear and remains on O2 at 3L via NC along with no edema noted. Bilateral groins and RIJ assessed. Sites clean dry and intact. Slight bruising noted to the RFA site but no hematoma or oozing was noted. Distal pulses are all intact. Echo tech in the room.

## 2019-05-02 NOTE — CARE COORDINATION
5/2/19, 9:43 AM    Discharge plan discussed by  and . Discharge plan reviewed with patient/ family. Patient/ family verbalize understanding of discharge plan and are in agreement with plan. Understanding was demonstrated using the teach back method. Services After Discharge  Services At/After Discharge: In [facility], Nursing Services, Skilled Therapy, Aide Services([facility]/ [facility])   IMM Letter  IMM Letter given to Patient/Family/Significant other/Guardian/POA/by[de-identified] CM  IMM Letter date given[de-identified] 05/01/19  IMM Letter time given[de-identified] 1049  Shannon Saldivar will be discharged to [facility] today. She will be discharged under her Medicare benefit. She will be transported by [facility]. Discharge instructions and transport forms are on Arline's blue discharge packed. She is aware of discharge. SULY called Erica Dial in admissions for [facility] and made her aware.

## 2019-05-02 NOTE — DISCHARGE SUMMARY
Structural Heart/Cardiology Discharge Summary       Name:  Maximus Merrill  YOB: 1945  Medical Record Number:  [id number]    Date of Admission:  4/30/2019  Date of Discharge:  5/2/2019    Admitting physician: Deidre Uribe MD  Discharge to: Skilled Facility  Condition at d/c: [facility] Problem List:  Patient Active Problem List   Diagnosis    Anxiety    Depression    Type 2 diabetes mellitus with complication, with long-term current use of insulin ([facility]    MI (myocardial infarction) ([facility]    Dyslipidemia    CAD (coronary artery disease)    COPD without exacerbation ([facility]    GERD (gastroesophageal reflux disease)    Hypothyroid    History of tobacco abuse: Quit in 2009    S/P PTCA: 3/2/2017: PTCA RCA ISR. 12/11/2014: LAD Resolute 3.0X26. 11/6/2014: Stenting RCA Henry Sudanese 9.3A62 and 3.5X18. 5/11/2004: Proximal & mid RCA Taxus 3.5X20 mm, and Taxus 3.0X32 mm.  Metabolic syndrome    S/P cardiac catheterization: 11/6/2014: 99% in-stent restenosis mid-RCA. LCx moderate LI's. LAD 85% mid-segment lesion.  POND (nonalcoholic steatohepatitis)    Hyperlipidemia    Essential hypertension    Obesity (BMI 30-39. 9)    Moderate dehydration    ASCVD (arteriosclerotic cardiovascular disease)    Chronic respiratory failure with hypoxia (HCC)    Other hyperlipidemia    Dysmetabolic syndrome    Bilateral iliac artery occlusion (HCC)    CHET (obstructive sleep apnea)    Aortic valve stenosis    Nocturnal hypoxemia    Hyponatremia    Acute on chronic diastolic congestive heart failure due to valvular disease (HCC)    Sympathotonic orthostatic hypotension    E-coli UTI    Anxiety and depression    Dizziness    Chest pain    Acute on chronic respiratory failure with hypoxia and hypercapnia (HCC)    Moderate persistent asthma without complication    Pulmonary nodule    Heart failure with preserved ejection fraction (HCC)    Osteoarthritis of multiple joints    Class 2 obesity findings or movement disorder noted. Pulses: Bilateral radial, femoral, DP, and PT pulses noted  Lower Extremity: No edema noted. Groin incisions healing appropriately-No signs of infection or hematoma. Follow Up Plan  1. Follow up with Cardiology in 1 week for incision check and post TAVR f/u.   2. Follow up with primary care provider in 1 week  3. Continue DAPT   4. Pt is fully agreeable to discharge plans. All questions were thoroughly answered.      Electronically signed by Inder Pastor PA-C on 5/2/2019 at 8:15 AM

## 2019-05-02 NOTE — CARE COORDINATION
5/2/19, 9:27 AM      Migel Pollack day: 2  Location: [address] [place] [address] Reason for admit: Severe aortic stenosis [I35.0]  S/P TAVR (transcatheter aortic valve replacement) [Z95.2]   Procedure: 4/30 TAVR  Treatment Plan of Care: [facility] following. Orders received to discharge today after Echo. O2 at 3L/NC  PCP: ASHLEY Salinas CNP  Readmission Risk Score: 75%  Discharge Plan: Pt is planning to be discharged today to [facility]. SULY Sandoval aware.

## 2019-05-02 NOTE — PROGRESS NOTES
Patient being discharged to [facility] today. Will wait for follow up with Dr. Elana Colon and then contact to schedule.

## 2019-05-02 NOTE — PROGRESS NOTES
[address] [place] [address]  INPATIENT PHYSICAL THERAPY  DAILYNOTE  RICKY CCU-STEPDOWN 3B - 3B-22/022-A    Time In: 1100  Time Out: 1139  Timed Code Treatment Minutes: 44 Minutes  Minutes: 39          Date: 2019  Patient Name: Sarah Weiss,  Gender:  female        MRN: [id number]  : 1945  (75 y.o.)     Referring Practitioner: STEPHANIE Norris  Diagnosis: severe aortic stenosis   Additional Pertinent Hx: S/p scheduled TAVR on 19     Past Medical History:   Diagnosis Date    Anemia     Anxiety     Arthritis     general    AS (aortic stenosis): mild to moderate by echo 2017    ASHD (arteriosclerotic heart disease)     Asthma 2016    INHALER USE DAILY AND AS NEEDED    Bipolar disorder ([address]     Blood circulation, collateral     CAD (coronary artery disease)     MI, 5 STENTS IN HEART NO SURE OF HEART DR NAME SEE'S [facility]    Cerebral artery occlusion with cerebral infarction ([address]     SILENT-DX ON CT SCAN NO DEFICITS    CHF (congestive heart failure) ([address]     COPD (chronic obstructive pulmonary disease) ([address] 2014    INHALER USE DAILY AND AS NEEDED    Depression     Disease of blood and blood forming organ     anemia per patient     DM (diabetes mellitus) ([address]     NO MEDS DIET CONTROLED    FH: CAD (coronary artery disease)     GERD (gastroesophageal reflux disease)     History of blood transfusion     POST OP -PLASMA    History of blood transfusion 2019    1 UNIT LOW HGB    History of tobacco abuse: Quit in      HLD (hyperlipidemia)     ON RX    HTN (hypertension)     ON RX    Irritable bowel syndrome     States has not had problem with this for years    Liver disease     fatty liver    Metabolic syndrome     MI (myocardial infarction) ([address]     Nausea & vomiting     Obesity     CHET (obstructive sleep apnea) 2016    NO MACHINE YET    S/P cardiac catheterization: 2014: 99% in-stent restenosis mid-RCA. LCx moderate LI's.  LAD OTHER SURGICAL HISTORY  02/13/2019    Arteriogram for diagnostics    PARTIAL HYSTERECTOMY      UPPER GASTROINTESTINAL ENDOSCOPY      UPPER GASTROINTESTINAL ENDOSCOPY         Restrictions/Precautions:  Fall Risk, Contact Precautions                    Other position/activity restrictions: s/p TAVR on 4/30/19, 3L O2       Prior Level of Function:  ADL Assistance: Needs assistance  Homemaking Assistance: Needs assistance  Ambulation Assistance: Needs assistance(pt reports furniture walking PTA with using 4WW intermittently)  Transfer Assistance: Independent  Additional Comments: Post op plans to go to [facility] for 7 days, recieves little help from family members but has aides come in and help from interim (dressing, bathe, cook).  Pt reports she has meals brought in only meal she usually cooks is breakfast.    Subjective:     Subjective: nurusing ok'd therapy reported that her discharge was pushed back, she asked me to assist her getting dressed pt also needed assist to order her lunch due to change discharge time    Pain:   .  Pain Assessment  Pain Level: 0       Social/Functional:  Lives With: Alone  Type of Home: Apartment  Home Layout: One level  Home Access: Level entry  Home Equipment: 4 wheeled walker, Cane, Lift chair, Wheelchair-manual     Objective:  Supine to Sit: Minimal assistance(with rail )    Transfers  Sit to Stand: Stand by assistance(from bed and bedside chair)  Stand to sit: Stand by assistance       Ambulation 1  Device: [address]: Stand by assistance  Quality of Gait: noted slight forward flexed posture, noted decreased step length but good heelstrike, pt was a little SOB took a sitting rest break between walks while lab taking pts blood   Distance: 40x2  Comments: pt on 3L O2         Balance  Comments: standing balance in bathroom one to no UE at support with SBA noted wide base of support          Exercises:  Exercises  Comments: pt completed ex for increased strength pt completed ankle pumps, glut sets, qaud sets, heelslides, hip abd/add, straight leg raises, seated upper trunk rotations, shoulder rolls, and shoulder horz abd/add x 10 reps each              Activity Tolerance:  Activity Tolerance: Patient Tolerated treatment well    Assessment: Body structures, Functions, Activity limitations: Decreased functional mobility , Decreased endurance, Decreased strength, Decreased balance  Assessment: pt tolerated session fair she cont to be on O2 and got SOB but she was able to walk increased distances, pt would benefit from cont skilled therapy and is to be discharged to [facility] later today  Prognosis: Good     REQUIRES PT FOLLOW UP: Yes    Discharge Recommendations:  Discharge Recommendations: [address]    Patient Education:  Patient Education: plan of care     Equipment Recommendations:  Equipment Needed: No  Other: if d/c home- pt may benefit from 2WW as pt noted she fell when using her 4WW     Safety:  Type of devices:  All fall risk precautions in place, Bed alarm in place, Call light within reach, Nurse notified, Gait belt, Left in bed    Plan:  Times per week: 6x TAVR  Times per day: Daily  Specific instructions for Next Treatment: advance as tolerated   Current Treatment Recommendations: Strengthening, Gait Training, ROM, Stair training, Balance Training, Functional Mobility Training, Endurance Training, Home Exercise Program, Transfer Training    Goals:  Patient goals : return home    Short term goals  Time Frame for Short term goals: 2 weeks  Short term goal 1: bed mobility with mod I for ease of getting in/out of bed   Short term goal 2: sit <> stand with mod I to prepare for independent functional mobility and return home  Short term goal 3: ambulate 175ft with use of least restrictive device and stand-by assist for ease of IADLs  Short term goal 4: negotiate a curb step with use of least restrictive device and stand-by assist for ease of IADLs    Long term goals  Time Frame for Long term goals : N/A secondary to short ELOS  If patient is discharged prior to progress note completion, this note is to serve as the discharge note with all goals being unmet unless indicated otherwise.

## 2019-05-02 NOTE — PROGRESS NOTES
Called [facility] to give report. Report given to nurse shanna. Patients belongings gathered. Copy of medical appointments given to patient. Pt has no further questions at this time. [facility] coming at 3:30 for transportation.

## 2019-05-02 NOTE — PLAN OF CARE
Problem: Falls - Risk of:  Goal: Absence of physical injury  Description  Absence of physical injury  Outcome: Met This Shift     Problem: Aspiration:  Goal: Absence of aspiration  Description  Absence of aspiration  Outcome: Met This Shift     Problem: Discharge Planning:  Goal: Discharged to appropriate level of care  Description  Discharged to appropriate level of care  Outcome: Ongoing  Note:   Plan discharge to [facility] tomorrow if stable.  to assist with discharge plans     Problem: Falls - Risk of:  Goal: Will remain free from falls  Description  Will remain free from falls  Outcome: Ongoing  Note:   Pt is fairly steady with walker, gait belt and one assist. Frequent checks and hourly rounds to assess needs. Bed alarm     Problem: Tissue Perfusion, Altered:  Goal: Circulatory function within specified parameters  Description  Circulatory function within specified parameters  Outcome: Ongoing  Note:   No chest pain or rhythm disturbances. No bleeding at groin sites. Feet warm with pulses palpable     Problem: Skin Integrity:  Goal: Will show no infection signs and symptoms  Description  Will show no infection signs and symptoms  Outcome: Ongoing  Note:   No temp. No purulent drainage at groin sites, but area very moist. Continue to monitor     Problem: Skin Integrity:  Goal: Absence of new skin breakdown  Description  Absence of new skin breakdown  Outcome: Ongoing  Note:   Early mobility as tolerates. Assist with repositioning as needed. Up to chair and bathroom frequently today.  Watch for pressure from medical devices     Problem: Pain:  Goal: Pain level will decrease  Description  Pain level will decrease  Outcome: Ongoing  Note:   Pt always has chronic neck and back pain helped somewhat by norco.     Problem: Pain:  Goal: Control of acute pain  Description  Control of acute pain  Outcome: Ongoing     Problem: Pain:  Goal: Control of chronic pain  Description  Control of chronic pain  Outcome: Ongoing     Problem: DISCHARGE BARRIERS  Goal: Patient's continuum of care needs are met  5/1/2019 2022 by Amada Rodriguez RN  Outcome: Ongoing  5/1/2019 1342 by PAKO Sinclair  Outcome: Ongoing     Problem: Venous Thromboembolism:  Goal: Will show no signs or symptoms of venous thromboembolism  Description  Will show no signs or symptoms of venous thromboembolism  Outcome: Ongoing  Note:   No sign of DVT. Continue SCD's when in bed and early mobility     Problem: Venous Thromboembolism:  Goal: Absence of signs or symptoms of impaired coagulation  Description  Absence of signs or symptoms of impaired coagulation  Outcome: Ongoing     Care plan reviewed with patient and family. Patient and family verbalize understanding of the plan of care and contribute to goal setting.

## 2019-05-02 NOTE — CARE COORDINATION
Osteoarthritis of multiple joints    Class 2 obesity due to excess calories with body mass index (BMI) of 38.0 to 38.9 in adult    Normocytic anemia    Subclavian vein stenosis    Post concussive syndrome    CRF (chronic renal failure), stage 4 (severe) ([facility])    Hypotension    Syncope and collapse    Metabolic encephalopathy    Right-sided epistaxis    Diastolic dysfunction without heart failure    Cervical spine instability    Nonrheumatic aortic valve stenosis    Acute diastolic heart failure (HCC)    Acute kidney injury (HCC)    Hypercalcemia    At risk for polypharmacy    Acute respiratory failure with hypoxia and hypercapnia (HCC)    Acute pulmonary edema ([facility])    Abnormal urinalysis    Irritable bowel syndrome    Anemia requiring transfusions    Stage 3 severe COPD by GOLD classification ([facility])    Bilateral carotid artery stenosis    Iron deficiency anemia due to dietary causes    B12 deficiency due to diet    Diastolic CHF, acute on chronic ([facility])    Anemia    Morbid obesity with BMI of 40.0-44.9, adult ([facility])    Hyponatremia with decreased serum osmolality    Metabolic alkalosis    Chronic diastolic congestive heart failure (HCC)    Acute exacerbation of COPD with asthma (HCC)    Acute respiratory failure with hypoxia and hypercapnia ([facility])    Severe aortic stenosis    S/P TAVR (transcatheter aortic valve replacement)       Inpatient Assessment  Care Transitions Summary    Care Transitions Inpatient Review  Medication Review  Do you have all of your prescriptions and are they filled?:  No   Barriers to Medication Adherence:  None  Are you able to afford your medications?:  Yes  How often do you have difficulty taking your medications?:  I always take them as prescribed.   Housing Review  Are you an active caregiver in your home?:  No  Social Support  Durable Medical Equipment  Patient DME:  Jennifer Yee cane, Wheelchair, Other  Other Patient DME:  GUERLINE Necklace  Patient Home

## 2019-05-02 NOTE — PROGRESS NOTES
[address]  Occupational Therapy  Daily Note  Time:  Time In: 0802  Time Out: 1360  Timed Code Treatment Minutes: 31 Minutes  Minutes: 31          Date: 2019  Patient Name: Eliana Jones,   Gender: female      Room: [facility]/022-A  MRN: [id number]  : 1945  (75 y.o.)  Referring Practitioner: Dr. Sharla Resendez  Diagnosis: Severe aortic stenosis  Additional Pertinent Hx: s/p scheduled TAVR on 19    Restrictions/Precautions: Other position/activity restrictions: s/p TAVR on 19, 3L O2    SUBJECTIVE: Pt sitting up in recliner when arrived and agreeable to OT with encouragement. PAIN: 0/10: pt stated has no pain this am     COGNITION: Pt states she cant do certain things at home but is by herself and asked how is she going to do it at home and she states she will have some help but not all the time. Safety Awareness: fair, unsteady at times during mobility decreased insight into her needs and limitations       ADL:     Grooming: Contact Guard Assist. Standing at sink to wash hands     Lower Extremity Dressing:Contact Guard Assist. Pt able to sit in recliner and don and doff slipper socks by crossing legs   Toileting: Minimal Assist. To wipe bottom after BM  She states she cant reach and has someone do for her. Toilet Transfer: Contact Guard Assist.       BALANCE:  Static Sitting: Stand by Assist.   Dynamic Sitting: Contact Guard Assist.   Static Standing: Contact Guard Assist.   Dynamic Standing: Contact Guard Assist.         TRANSFERS:  Sit to Stand: Contact Guard Assist.   Stand to Sit: Contact Guard Assist.     AMBULATION:  Assistive Device: Rolling Walker  Assist Level: Contact Guard Assist.  Pt ambulated into hallway approx 15 feet then had to go back into room due to having to go to BR. Pt stated her knees were weak as well. Pt had no LOB but unsteady throughout.   Pt states I dont have to walk far at home     EXERCISES:   Pt part in UE exercises x10 reps x1 set in all planes and joints sitting in recliner. Pt compeleted to increase strength and endurance needed for ADL and tranfsers     ASSESSMENT:  Activity Tolerance:  Patient tolerance of  treatment: fair. Limited by diarrhea       Discharge Recommendations: nursing home for futhur ADLS and strength   Plan: Continue treatment per established plan of care. Patient Education  New Education Provided:HEP, adaptive toliet tongs for use at home for wiping self.     Goals  Short term goals  Time Frame for Short term goals: 2 weeks  Short term goal 1: Complete various t/fs including toilet with S & 0 vcs for safety  Short term goal 2: Complete 2 min standing ADL with S for increased indep with sinkside grooming  Short term goal 3: Complete mobility to/from bathroom with S, RW, & 0-2 vcs for safety  Short term goal 4: Complete LE dressing with  min A & LH AE prn  Long term goals  Time Frame for Long term goals : No LTG set d/t short ELOS

## 2019-05-08 NOTE — TELEPHONE ENCOUNTER
.Transition of Care visit scheduled. 5/20/2019  Patient is being discharged to home  Date of discharge 5/8  Discharge from facility [address]  Reason for admission nursing home    Patient already had this appt scheduled for 3 mo f/u. Added nellie.

## 2019-05-08 NOTE — TELEPHONE ENCOUNTER
Orders received from Dr. Stewart Gresham to obtain a CBC, BMP and an ECHO prior to the patients 30 day post TAVR appointment. Orders placed and appointment scheduled for 6/5/2019.

## 2019-05-09 NOTE — PROGRESS NOTES
states she has a pill box set up through [facility]. She denies pedal edema, or chest pain. Pt denies any pain today. Current Outpatient Medications   Medication Sig Dispense Refill    aspirin (SM ASPIRIN ADULT LOW STRENGTH) 81 MG EC tablet take 1 tablet by mouth once daily 90 tablet 3    potassium chloride (KLOR-CON M) 20 MEQ extended release tablet take 2 tablets by mouth once daily 60 tablet 5    losartan (COZAAR) 25 MG tablet Take 25 mg by mouth 2 times daily      metoprolol (LOPRESSOR) 100 MG tablet Take 1 tablet by mouth 2 times daily 60 tablet 0    guaiFENesin (MUCINEX) 600 MG extended release tablet Take 1 tablet by mouth 2 times daily for 15 days 30 tablet 0     MG capsule take 3 capsules by mouth every evening 90 capsule 3    doxepin (SINEQUAN) 50 MG capsule Take 1-2 capsules by mouth nightly for sleep  0    escitalopram (LEXAPRO) 20 MG tablet Take 20 mg by mouth every morning  0    spironolactone (ALDACTONE) 25 MG tablet Take 25 mg by mouth daily  0    ferrous sulfate 325 (65 Fe) MG tablet One every other day. Take with vitamin C 500 mg 180 tablet 1    albuterol sulfate HFA (VENTOLIN HFA) 108 (90 Base) MCG/ACT inhaler Inhale 2 puffs into the lungs every 6 hours as needed for Wheezing 1 Inhaler 3    baclofen (LIORESAL) 10 MG tablet Take 1 tablet by mouth 2 times daily Indications: 0.5 tablet to 1 tablet 15 tablet 0    clopidogrel (PLAVIX) 75 MG tablet Take 1 tablet by mouth daily 90 tablet 3    fluticasone (FLONASE) 50 MCG/ACT nasal spray 1 spray by Nasal route daily 1 Bottle 0    furosemide (LASIX) 40 MG tablet Take 1 tablet by mouth 2 times daily 90 tablet 1    hydrALAZINE (APRESOLINE) 50 MG tablet Take 1 tablet by mouth 3 times daily 90 tablet 1    HYDROcodone-acetaminophen (NORCO) 5-325 MG per tablet Take 1 tablet by mouth See Admin Instructions for 30 days.  Take 1/2 tablet bid 10 tablet 0    ipratropium-albuterol (DUONEB) 0.5-2.5 (3) MG/3ML SOLN nebulizer  ONE TOUCH ULTRASOFT LANCETS MISC use as directed BEFORE BREAKFAST AND SUPPER 100 each 11    pantoprazole (PROTONIX) 40 MG tablet Take 40 mg by mouth every morning (before breakfast)       risperiDONE (RISPERDAL) 1 MG tablet Take 1 mg by mouth every morning        No current facility-administered medications for this visit. Past Medical History:   Diagnosis Date    Anemia     Anxiety     Arthritis     general    AS (aortic stenosis): mild to moderate by echo 4/2017 9/6/2017    ASHD (arteriosclerotic heart disease)     Asthma 2016    INHALER USE DAILY AND AS NEEDED    Bipolar disorder ([address]     Blood circulation, collateral     CAD (coronary artery disease) 1999    MI, 5 STENTS IN HEART NO SURE OF HEART DR NAME [facility]    Cerebral artery occlusion with cerebral infarction ([address] 2018    SILENT-DX ON CT SCAN NO DEFICITS    CHF (congestive heart failure) (HCC)     COPD (chronic obstructive pulmonary disease) ([address] 2014    INHALER USE DAILY AND AS NEEDED    Depression     Disease of blood and blood forming organ     anemia per patient     DM (diabetes mellitus) ([address]     NO MEDS DIET CONTROLED    FH: CAD (coronary artery disease)     GERD (gastroesophageal reflux disease)     History of blood transfusion 1973    POST OP -PLASMA    History of blood transfusion 02/2019    1 UNIT LOW HGB    History of tobacco abuse: Quit in 2007     HLD (hyperlipidemia)     ON RX    HTN (hypertension)     ON RX    Irritable bowel syndrome     States has not had problem with this for years    Liver disease     fatty liver    Metabolic syndrome 30/58/5692    MI [facility]myocardial infarction) ([facility]     Nausea & vomiting     Obesity     CHET (obstructive sleep apnea) 2016    NO MACHINE YET    S/P cardiac catheterization: 11/6/2014: 99% in-stent restenosis mid-RCA. LCx moderate LI's. LAD 85% mid-segment lesion. 11/6/2014 11/6/2014: 99% in-stent restenosis mid-RCA. LCx moderate LI's.  LAD 85% mid-segment lesion. Dr. Fartun Ball S/P PTCA:  5/11/2004: Proximal and mid RCA  Taxus 3.5 X 20 mm, and Taxus 3.0 X 32 mm. 10/28/2014    5/11/2004: Proximal and mid RCA  Taxus 3.5 X 20 mm, and Taxus 3.0 X 32 mm. Dr. Mily Frost Systolic murmur 75/21/8236    Thyroid disease     ON RX    Wears glasses       Past Surgical History:   Procedure Laterality Date    ABDOMEN SURGERY      AORTIC VALVE SURGERY  04/30/2019    TAVR    BACK SURGERY  08/2016        BLADDER SUSPENSION      BREAST BIOPSY  10/10/2017    BREAST LUMPECTOMY      CARDIAC CATHETERIZATION  8-11-06    Mild nonobstructive CAD w/ diffuse 10-20% proximal and mid LAD stenosis and 10-20% proximal/ostial RCA stenosis. No obstructive lesions. RCA stents are patent w/o evidence of restenosis. Normal LV systolic function. EF 65%. Trace MR - catheter induced. Minimally elevated LVEDP - 13mmHg. Mild to moderate aortoiliac PVD w/o obstructive lesions.  CARDIAC CATHETERIZATION  5-11-04    Successful drug-eluting stent x 2 of proximal and mid RCA.  CARDIAC CATHETERIZATION  5-11-04    70-80% proximal RCA stenosis w/ 50-70% mid RCA stenosis. There is 50-60% lesion in mid PDA. Mild proximal and mid LAD disease. Mild circumflex disease. Normal LV size and systolic function. EF 60%.  CARDIOVASCULAR STRESS TEST  5-10-11    No evidence of stress induced ischemia. EF 75%.  CARDIOVASCULAR STRESS TEST  5-10-10    No evidence of stress induced ischemia, infarct or scar. EF 74%.     CAROTID ENDARTERECTOMY      CERVICAL FUSION N/A 11/15/2017    REMOVAL OF PLATE A4-0, ACDF Y2-0 W/ATLANTIS CORNERSTONE performed by Juli Patel MD at [address] [place] [address], DIAGNOSTIC      EYE SURGERY      cataract 2017   [place] [address] EAR SURGERY      NECK SURGERY  FEB 2016    OTHER SURGICAL HISTORY  02/13/2019    Arteriogram for and sinus pain. Respiratory: Negative. Cardiovascular: Negative for chest pain, palpitations and leg swelling. Gastrointestinal: Negative for abdominal distention, abdominal pain, diarrhea and vomiting. Genitourinary: Negative for difficulty urinating. Musculoskeletal: Negative for arthralgias and myalgias. Skin: Negative. Neurological: Positive for weakness. Negative for dizziness and headaches. Psychiatric/Behavioral: Positive for decreased concentration. Negative for agitation, behavioral problems, self-injury, sleep disturbance and suicidal ideas. Objective:      BP (!) 94/48   Pulse 72   Temp 98.3 °F (36.8 °C) (Oral)   Resp 16   Ht 5' 4\" (1.626 m)   Wt 206 lb (93.4 kg)   LMP 02/12/1973 (Approximate)   BMI 35.36 kg/m²      Physical Exam   Constitutional: She is oriented to person, place, and time. She appears well-developed and well-nourished. No distress. Cardiovascular: Regular rhythm and normal pulses. PMI is not displaced. Murmur heard. Pulses:       Dorsalis pedis pulses are 2+ on the right side, and 2+ on the left side. Posterior tibial pulses are 2+ on the right side, and 2+ on the left side. No pedal edema, no erythema of LE. Pulmonary/Chest: Effort normal and breath sounds normal. No stridor. No respiratory distress. Oxygen in place at 3 liters per nasal canula   Abdominal: Soft. Bowel sounds are normal. She exhibits no distension. There is no tenderness. Musculoskeletal:   Pt in a wheelchair    Neurological: She is alert and oriented to person, place, and time. Skin: Skin is warm and dry. Capillary refill takes less than 2 seconds. No erythema. Psychiatric: She has a normal mood and affect. Her behavior is normal. Judgment and thought content normal.   Nursing note and vitals reviewed. Assessment/Plan:           1. Hospital discharge follow-up  Pt does have all appropriate discharge appt scheduled.     2. S/P TAVR (transcatheter aortic valve replacement)      3. Congestive heart failure, unspecified HF chronicity, unspecified heart failure type ([facility]    Continue current meds and f/u with CHF clinic  4. Chronic obstructive pulmonary disease, unspecified COPD type ([facility]  Continue current inhalers  - Pulse Oximetry, Reji Garner was evaluated today and a DME order was entered for a nebulizer compressor in order to administer duoneb due to the diagnosis of COPD. The need for this equipment and treatment was discussed with the patient and she understands and is in agreement. Will contact [address] [place] [address] to see if they will take over care of pt. Libertad Garner will require the following home care treatments or therapies: PT/OT. Home care will be necessary because of pt deconditioned state, chronic multiple disease processes. Medication managment. The patient is in agreement to receiving home care. Return in about 3 months (around 8/9/2019) for follow up. Reccommended tobaccocessation options including pharmacologic methods, counseled great than 3 minutesduring this visit:  Yes[]  No  []       Patient given educational materials -see patient instructions. Discussed use, benefit, and side effects of prescribedmedications. All patient questions answered. Pt voiced understanding. Reviewedhealth maintenance. Instructed to continue current medications, diet and exercise. Patient agreed with treatment plan. Follow up as directed.        Electronicallysigned by ASHLEY Mehta CNP on 5/9/2019 at 2:22 PM

## 2019-05-09 NOTE — TELEPHONE ENCOUNTER
Pt has appt today with Oliva Cota at 1:20. Rian requested that I call and remind pt of appt. Pt states she will be coming to appt today if she can find a ride. She will call our office if she is unable to get a ride for the appt. Rian informed.

## 2019-05-09 NOTE — TELEPHONE ENCOUNTER
Pt scheduled to see Josr alexis at 1:20 pm. JOSE questions will be asked during office visit patrick Logan

## 2019-05-09 NOTE — PROGRESS NOTES
[facility] Transitions Initial Follow Up Call    Outreach made within 2 business days of discharge: yes    Patient: Nura Lorenzo Patient : 1945   MRN: [id number]  Reason for Admission: There are no discharge diagnoses documented for the most recent discharge. Discharge Date: 19       Spoke with: Lizzy Moura    Discharge department/facility: [facility]     TCM Interactive Patient Contact:  Was patient able to fill all prescriptions: None given   Was patient instructed to bring all medications to the follow-up visit: Yes  Is patient taking all medications as directed in the discharge summary?  No  Does patient understand their discharge instructions: Yes  Does patient have questions or concerns that need addressed prior to 7-14 day follow up office visit: no    Scheduled appointment with PCP within 7-14 days  Pt present for appt today 19 with Halima Enriquez     Follow Up  Future Appointments   Date Time Provider Micheline Conteh   5/10/2019 10:00 AM Lori Verdugo PA-C SRPX CT/CV [address] [place] [address]   5/15/2019 10:00 AM Astrid Rios PA-C Oncology [facility]   2019 10:15 AM STR ECHO RM1 STRZ ECHO None   2019 11:30 AM Deena Dixon APRN - CNP SRPX CHF [facility] [place]   2019  1:30 PM Óscar Jimenes MD SRPX Heart [facility]   6/10/2019  8:20 AM STR CT IMAGING RM1  OP [facility] STRZ OUT EXP STR Radiolog   2019 10:30 AM ASHLEY Carranza - [address] [place] [address]   7/15/2019  1:00 PM Chanel James RD, LD SRPX Physic [facility]   2019 10:15 AM Zulma Modi MD Oncology [facility]   2019  2:30 PM Lana Garnett PA-C Pulm Med [facility]   10/1/2019 10:40 AM Douglas Miller APRN - CNP [facility] LPN

## 2019-05-10 NOTE — PROGRESS NOTES
Structural Heart/Cardiology Follow up    5/10/2019 10:37 AM    [facility]: [facility] [birth year] (75 y.o.)    PCP: Keren Kearney, APRN - CNP      Date: May 10, 2019     HPI  We had the pleasure of seeing [facility] in the office today, as you know this is a very pleasant 75y.o. year old female with a history of aortic stenosis who underwent a successful TAVR on  04/30/19. S323 TAVR via RFA. The pt tells me she feels better since the procedure. The pt denies chest pressure, palpitations, SOB, fever, chills, N/V/D. PastMedical History:  Oziel Menjivar  has a past medical history of Anemia, Anxiety, Arthritis, AS (aortic stenosis): mild to moderate by echo 4/2017, ASHD (arteriosclerotic heart disease), Asthma, Bipolar disorder ([address] Blood circulation, collateral, CAD (coronary artery disease), Cerebral artery occlusion with cerebral infarction ([address] CHF (congestive heart failure) ([address] COPD (chronic obstructive pulmonary disease) ([address] Depression, Disease of blood and blood forming organ, DM (diabetes mellitus) ([address] FH: CAD (coronary artery disease), GERD (gastroesophageal reflux disease), History of blood transfusion, History of blood transfusion, History of tobacco abuse: Quit in 2007, HLD (hyperlipidemia), HTN (hypertension), Irritable bowel syndrome, Liver disease, Metabolic syndrome, MI (myocardial infarction) ([address] Nausea & vomiting, Obesity, CHET (obstructive sleep apnea), S/P cardiac catheterization: 11/6/2014: 99% in-stent restenosis mid-RCA. LCx moderate LI's. LAD 85% mid-segment lesion., S/P PTCA:  5/11/2004: Proximal and mid RCA  Taxus 3.5 X 20 mm, and Taxus 3.0 X 32 mm., Systolic murmur, Thyroid disease, and Wears glasses. Past Surgical History:  The patient  has a past surgical history that includes cardiovascular stress test (5-10-11); cardiovascular stress test (5-10-10); Cardiac catheterization (8-11-06); Cardiac catheterization (5-11-04);  Cardiac catheterization (5-11-04); bladder suspension; hernia repair; Breast lumpectomy; Foot surgery; Cholecystectomy; Colonoscopy; Upper gastrointestinal endoscopy; Endoscopy, colon, diagnostic; partial hysterectomy (cervix not removed); Upper gastrointestinal endoscopy; Neck surgery (FEB 2016); back surgery (08/2016); Breast biopsy (10/10/2017); cervical fusion (N/A, 11/15/2017); eye surgery; Abdomen surgery; Hysterectomy (1973); Hemorrhoid surgery (1973); other surgical history (02/13/2019); Carotid endarterectomy; Inner ear surgery; and Aortic valve surgery (04/30/2019). Allergies: The patient is allergic to lipitor [atorvastatin]; motrin [ibuprofen micronized]; and codeine. Medications:    Current Outpatient Medications:     aspirin (SM ASPIRIN ADULT LOW STRENGTH) 81 MG EC tablet, take 1 tablet by mouth once daily, Disp: 90 tablet, Rfl: 3    potassium chloride (KLOR-CON M) 20 MEQ extended release tablet, take 2 tablets by mouth once daily, Disp: 60 tablet, Rfl: 5    losartan (COZAAR) 25 MG tablet, Take 25 mg by mouth 2 times daily, Disp: , Rfl:     metoprolol (LOPRESSOR) 100 MG tablet, Take 1 tablet by mouth 2 times daily, Disp: 60 tablet, Rfl: 0    guaiFENesin (MUCINEX) 600 MG extended release tablet, Take 1 tablet by mouth 2 times daily for 15 days, Disp: 30 tablet, Rfl: 0     MG capsule, take 3 capsules by mouth every evening, Disp: 90 capsule, Rfl: 3    doxepin (SINEQUAN) 50 MG capsule, Take 1-2 capsules by mouth nightly for sleep, Disp: , Rfl: 0    escitalopram (LEXAPRO) 20 MG tablet, Take 20 mg by mouth every morning, Disp: , Rfl: 0    spironolactone (ALDACTONE) 25 MG tablet, Take 25 mg by mouth daily, Disp: , Rfl: 0    ferrous sulfate 325 (65 Fe) MG tablet, One every other day.   Take with vitamin C 500 mg, Disp: 180 tablet, Rfl: 1    albuterol sulfate HFA (VENTOLIN HFA) 108 (90 Base) MCG/ACT inhaler, Inhale 2 puffs into the lungs every 6 hours as needed for Wheezing, Disp: 1 Inhaler, Rfl: 3   baclofen (LIORESAL) 10 MG tablet, Take 1 tablet by mouth 2 times daily Indications: 0.5 tablet to 1 tablet, Disp: 15 tablet, Rfl: 0    clopidogrel (PLAVIX) 75 MG tablet, Take 1 tablet by mouth daily, Disp: 90 tablet, Rfl: 3    fluticasone (FLONASE) 50 MCG/ACT nasal spray, 1 spray by Nasal route daily, Disp: 1 Bottle, Rfl: 0    furosemide (LASIX) 40 MG tablet, Take 1 tablet by mouth 2 times daily, Disp: 90 tablet, Rfl: 1    hydrALAZINE (APRESOLINE) 50 MG tablet, Take 1 tablet by mouth 3 times daily, Disp: 90 tablet, Rfl: 1    HYDROcodone-acetaminophen (NORCO) 5-325 MG per tablet, Take 1 tablet by mouth See Admin Instructions for 30 days.  Take 1/2 tablet bid, Disp: 10 tablet, Rfl: 0    ipratropium-albuterol (DUONEB) 0.5-2.5 (3) MG/3ML SOLN nebulizer solution, Inhale 3 mLs into the lungs every 4 hours as needed for Shortness of Breath, Disp: 360 mL, Rfl: 1    isosorbide mononitrate (IMDUR) 30 MG extended release tablet, Take 2 tablets by mouth daily, Disp: 30 tablet, Rfl: 1    levothyroxine (SYNTHROID) 125 MCG tablet, Take 1 tablet by mouth Daily, Disp: 30 tablet, Rfl: 1    metolazone (ZAROXOLYN) 2.5 MG tablet, One tab every Monday and Thursday, Disp: 10 tablet, Rfl: 3    OXcarbazepine (TRILEPTAL) 150 MG tablet, Take 1 tablet by mouth every morning, Disp: 14 tablet, Rfl: 0    budesonide-formoterol (SYMBICORT) 160-4.5 MCG/ACT AERO, Inhale 2 puffs into the lungs 2 times daily, Disp: 10.2 g, Rfl: 11    vitamin D (ERGOCALCIFEROL) 74765 units capsule, Take 1 capsule by mouth once a week, Disp: 5 capsule, Rfl: 1    cycloSPORINE (RESTASIS) 0.05 % ophthalmic emulsion, Place 1 drop into both eyes 2 times daily, Disp: 1 vial, Rfl: 5    ondansetron (ZOFRAN) 4 MG tablet, Take 1 tablet by mouth daily as needed for Nausea or Vomiting, Disp: 30 tablet, Rfl: 0    Handicap Placard MISC, by Does not apply route Expires 25 MARCH 2014, Disp: 2 each, Rfl: 0    Multiple Vitamin (MULTIVITAMIN) tablet, Take 1 tablet by mouth daily, Disp: 30 tablet, Rfl: 1    RA SALINE NASAL SPRAY 0.65 % nasal spray, instill 1 spray into each nostril if needed for congestion, Disp: 45 mL, Rfl: 3    cyanocobalamin ([employer]S VITAMIN B12) 1000 MCG tablet, Take 1 tablet by mouth daily, Disp: 30 tablet, Rfl: 3    ranolazine (RANEXA) 500 MG extended release tablet, take 1 tablet by mouth twice a day, Disp: 180 tablet, Rfl: 3    polyethylene glycol (GLYCOLAX) powder, Take 17 g by mouth daily as needed (Constipation), Disp: , Rfl:     Calcium Carbonate-Vitamin D (OYSTER SHELL CALCIUM/D) 500-200 MG-UNIT TABS, take 1 tablet by mouth twice a day, Disp: 60 tablet, Rfl: 5    rosuvastatin (CRESTOR) 20 MG tablet, take 1 tablet by mouth once daily, Disp: 90 tablet, Rfl: 0    hydrOXYzine (ATARAX) 25 MG tablet, Take 25 mg by mouth 3 times daily, Disp: , Rfl:     Lancets MISC, Check blood sugar q daily Dx E11.69, Disp: 100 each, Rfl: 2    Blood Glucose Monitoring Suppl HARVINDER, Check blood sugar q daily Dx E11.69, Disp: 1 Device, Rfl: 0    OXYGEN, Inhale 3 L into the lungs continuous , Disp: , Rfl:     ONE TOUCH ULTRASOFT LANCETS MISC, use as directed BEFORE BREAKFAST AND SUPPER, Disp: 100 each, Rfl: 11    pantoprazole (PROTONIX) 40 MG tablet, Take 40 mg by mouth every morning (before breakfast) , Disp: , Rfl:     risperiDONE (RISPERDAL) 1 MG tablet, Take 1 mg by mouth every morning , Disp: , Rfl:     Family History: This patient's family history includes Breast Cancer (age of onset: 36) in her child; Cancer in her sister and sister; Heart Attack in her father; Heart Disease in her brother, brother, brother, brother, father, mother, and sister; Kidney Disease in her sister; Other in her brother; Ovarian Cancer (age of onset: 22) in an other family member. Social History:  Opal Elizabeth  reports that she quit smoking about 12 years ago. Her smoking use included cigarettes. She has a 38.00 pack-year smoking history.  She has never used smokeless tobacco. She reports that she does not drink alcohol or use drugs. ROS:   Constitutional: Negative for activity change, chills, fatigue, fever and unexpected weight change. Respiratory: Negative for apnea, shortness of breath, wheezing and stridor. Cardiovascular: Negative for chest pain, palpitations and leg swelling. Gastrointestinal: Negative for hematochezia, melana, constipation, and N/V/D. Musculoskeletal: Negative for myalgias  Skin: Negative for color change, rash and wound. Neurological: Negative for dizziness or syncope. Vitals:    05/10/19 0952 05/10/19 0955   BP: (!) 91/43 (!) 87/41   Site: Right Upper Arm Right Upper Arm   Position: Sitting    Cuff Size: Large Adult    Pulse: 67    Resp: 18    SpO2: 95%    Weight: 205 lb 14.6 oz (93.4 kg)    Height: 5' 5\" (1.651 m)        Physical Exam:   General Appearance: alert ,conversing, in no acute distress  Cardiovascular: normal rate, regular rhythm, normal S1 and S2  Pulmonary/Chest: clear to auscultation bilaterally- no wheezes, rales or rhonchi, normal air movement, no respiratory distress  Abdomen:soft, non-tender, non-distended, normal bowel sounds, no bruits,   Extremities: no cyanosis, clubbing or edema. Pulses: Bilateral radial, femoral, DP, and PT pulses intact. No femoral bruits noted. Skin: warm and dry, no rash or erythema  Head: normocephalic and atraumatic  Neck: supple and non-tender without mass, no thyromegaly, no JVD   Musculoskeletal: normal range of motion, no joint swelling, deformity or tenderness. Neurological: alert, oriented, normal speech, no focal findings or movement disorder noted. Groin: Wounds healing appropriately. No signs of infection or hematoma. Assessment:   TAVR F/u office appointment. Plan:   1. Follow up with Dr. Elana Colon in 1 month with a CBC, BMP, and Echo. 2. Pt will call us next week with BP readings. The plan of care was discussed in detail with Dr. Elana Colon and Dr. Anthony Almanzar.

## 2019-05-13 NOTE — CARE COORDINATION
Care Transition Discharge from [facility] Follow Up     Call within 2 business days of discharge: No    Discharged From: [facility]  Date of discharge: 5/8/2019    1st Attempt to contact patient for [facility] follow up. Unable to reach patient at [phone]. Left message on answering machine with contact information and request for return phone call. Attempted to contact patient at [phone]. Phone number for [facility]. Spoke with female who states Rebecca Murphy was discharged from the facility last week.         Follow up appointments:    Future Appointments   Date Time Provider Micheline Conteh   5/15/2019 10:00 AM Marcella Lopez PA-C Oncology [facility]   5/20/2019 10:15 AM STR ECHO RM1 STRZ ECHO None   5/21/2019 11:30 AM ASHLEY Prado CNP SRPX CHF [facility] [place]   6/5/2019  1:30 PM Marleni Mitchell MD SRPX Heart [facility]   6/10/2019  8:20 AM STR CT IMAGING RM1  [facility] STR[facility] OUT EXP STR Radiolog   6/17/2019 10:30 AM ASHLEY Tran CNP Pulm Med [facility]   7/15/2019  1:00 PM Megha Tillman RD, LD SRPX Physic [facility]   7/23/2019 10:15 AM Iker Fonseca MD Oncology [facility]   7/23/2019  2:30 PM Sylvia Wynn PA-C Pulm Med [facility]   8/8/2019  9:40 AM ASHLEY Bergman CNP Fam Med [address] [facility]   10/1/2019 10:40 AM ASHLEY Pastrana - CNP [facility] - Semaj Conti, JOHANNA

## 2019-05-14 NOTE — CARE COORDINATION
Care Transition Discharge from [place] [facility] Follow Up     Call within 2 business days of discharge: No      Spoke with Pedro Westbrook, patient  Discharged From: [facility]  Date of discharge: 5/8/2019    Spoke with patient. Patient states she is doing very well and progressing along as expected. Denies having any chest pain/discomfort. She does become short of breath but at baseline currently. She uses 3 liters of oxygen continuously. Patient states she has neck and back pain. Reports pain today is okay and tolerable. Patient receiving home health services. She states nurse makes daily visits and sets up her medications. Reviewed medication list.  1111F completed. Patient had follow up appointment with NP on 5/9/19 and cardiothoracic surgeon on 5/10/19. She is aware to contact MD with any changes or concerns. No needs or concerns at this time. CTC signing off. 1. How have you been feeling since you were discharged from the post acute facility? She states she is doing very well, progressing along as expected. Any intervention needed? None at this time. 2. Do you have all of your medications? Yes    3. Do you have any questions about your medicatons? No    4. Have you scheduled your follow up appointment? Yes       5. Is Home Health involved: Yes   [address]: [facility]      Has someone from home health been in contact with you? Yes        Who else is helping you at home? Has home health aide for 2 hours/5 times a week. Does anyone in your home depend on you for help? No    6. Do you feel like you have everything you need to keep you well at home?   Yes                 Follow up appointments:    Future Appointments   Date Time Provider Micheline Coneth   5/15/2019  9:45 AM STR OUT PT ONC CMA STRZ OP ONC None   5/15/2019 10:00 AM Fouzia Miller PA-C Oncology [facility]   5/20/2019 10:15 AM STR ECHO RM1 STRZ ECHO None   5/21/2019 11:30 AM ASHLEY Prado - CNP SRPX CHF [facility] [place]   6/5/2019  1:30 PM Claudia Melgar MD SRPX Heart [address]   6/10/2019  8:20 AM STR CT IMAGING RM1  [facility] STRZ OUT EXP STR Radiolog   6/17/2019 10:30 AM ASHLEY Palmer - CNP Pulm Med [address]   7/15/2019  1:00 PM Suzanne Miranda RD, LD SRPX Physic [address]   7/23/2019 10:15 AM Maura Oates MD Oncology [address]   7/23/2019  2:30 PM Jerry Walker PA-C Pul[address]   8/8/2019  9:40 AM ASHLEY Velasquez CNP [address]   10/1/2019 10:40 AM ASHLEY Duncan CNP [facility] - [place] RN

## 2019-05-14 NOTE — TELEPHONE ENCOUNTER
Received call from [facility] pt wt up to 208.8 was 199.6 on 5-9  No SOB no KIMBERLY no orthopnea  Patient is being non compliant with diet  bp's  5/10 125/52  5/11 147/64  5/12 130/56  5/13 140/56  5/14 135/47    Discussed with Deena Dixon CNP  Verbal order received  Take Metolazone 5 mg toady   Double lasix to 40 mg bid x3 days  Take extra potassium 20 mEq x3   Check BMP on Monday 5-20    [address] notified and verbalized understanding with read back

## 2019-05-15 NOTE — PATIENT INSTRUCTIONS
1. Follow up as scheduled with Dr. Marylen Bonnet on 7/23/19 at 10:15 AM.  2. Labs as ordered: CBC, ferritin, iron, TIBC, folate and B12.  3. Please call for questions or concerns.

## 2019-05-15 NOTE — TELEPHONE ENCOUNTER
Patient stated the script for nebulizer solution needs to go to [facility] on [facility] not [address]

## 2019-05-21 NOTE — PROGRESS NOTES
[address]       Visit Date: 5/21/2019  Cardiologist:  Dr. Edilma Loza  Primary Care Physician: Dr. Rafy Cook, APRN - CNP    Xavi Pretty is a 75 y.o. female who presents today for:  Chief Complaint   Patient presents with    Congestive Heart Failure       HPI:   Xavi Pretty is a 75 y.o. female who presents to the office for a follow up visit in the heart failure clinic. Hx CAD PCI stent, HTN, DM, COPD on home Oxygen 3LNC, HFpEF 60-65%, PE IVF filter, fall Nov 2018, TAVR 4/30/19, hyponatremia. Home with [facility] but this will cease on June 6. She would like to go back to the [facility] full time (was there for rehab post TAVR). She felt like she was well taken care of, takes her medicine on time, \"exercises\". She denies any worsening SOB. She sleeps in a chair. No LE edema today. She does not monitor what she eats.      Patient has:  Last hospital admission related to Heart Failure:  Apr 2019  Chest Pain: no  Worsening SOB/orthopnea/PND: not worse  Edema: no  Any extra diuretic use: no  Weight gain: no  Compliant checking home weight: yes  Fatigue: no  Abdominal bloating: some  Appetite: good  Difficulty sleeping: CHET not wearing CPAP  Cough: no  Compliant checking blood pressure: yes with HH  Any refills on CHF medications needed at this time: no    Past Medical History:   Diagnosis Date    Anemia     Anxiety     Arthritis     general    AS (aortic stenosis): mild to moderate by echo 4/2017 9/6/2017    ASHD (arteriosclerotic heart disease)     Asthma 2016    INHALER USE DAILY AND AS NEEDED    Bipolar disorder ([address]     Blood circulation, collateral     CAD (coronary artery disease) 1999    MI, 5 STENTS IN HEART NO SURE OF HEART DR NAME SEE'S [facility]    Cerebral artery occlusion with cerebral infarction ([address] 2018    SILENT-DX ON CT SCAN NO DEFICITS    CHF (congestive heart failure) (HCC)     COPD (chronic obstructive pulmonary disease) ([address] 2014    INHALER USE DAILY AND x 2 of proximal and mid RCA.  CARDIAC CATHETERIZATION  04    70-80% proximal RCA stenosis w/ 50-70% mid RCA stenosis. There is 50-60% lesion in mid PDA. Mild proximal and mid LAD disease. Mild circumflex disease. Normal LV size and systolic function. EF 60%.  CARDIOVASCULAR STRESS TEST  5-10-11    No evidence of stress induced ischemia. EF 75%.  CARDIOVASCULAR STRESS TEST  5-10-10    No evidence of stress induced ischemia, infarct or scar. EF 74%.     CAROTID ENDARTERECTOMY      CERVICAL FUSION N/A 11/15/2017    REMOVAL OF PLATE F3-2, ACDF Z1-0 W/ATLANTIS CORNERSTONE performed by Stewart Starks MD at [address] [place] [address], DIAGNOSTIC      EYE SURGERY      cataract    [place] [id number] EAR SURGERY      NECK SURGERY  2016    OTHER SURGICAL HISTORY  2019    Arteriogram for diagnostics    PARTIAL HYSTERECTOMY      UPPER GASTROINTESTINAL ENDOSCOPY      UPPER GASTROINTESTINAL ENDOSCOPY       Family History   Problem Relation Age of Onset    Heart Disease Mother         CHF    Heart Disease Father         CAD    Heart Attack Father     Kidney Disease Sister         DIALYSIS    Cancer Sister         RECTUM    Heart Disease Sister     Heart Disease Brother     Heart Disease Brother         CAD    Heart Disease Brother     Heart Disease Brother     Breast Cancer Child 36    Cancer Sister         UTERINE    Ovarian Cancer Other 22    Other Brother         SUICIDE     Social History     Tobacco Use    Smoking status: Former Smoker     Packs/day: 1.00     Years: 38.00     Pack years: 38.00     Types: Cigarettes     Last attempt to quit: 2007     Years since quittin.3    Smokeless tobacco: Never Used   Substance Use Topics    Alcohol use: No     Current Outpatient Medications   Medication Sig Dispense Refill    potassium chloride (KLOR-CON M) 20 MEQ extended release tablet Take 0.5 tablets by mouth daily 60 tablet 5    losartan (COZAAR) 25 MG tablet Take 1 tablet by mouth daily 30 tablet 3    ipratropium-albuterol (DUONEB) 0.5-2.5 (3) MG/3ML SOLN nebulizer solution Inhale 3 mLs into the lungs every 4 hours as needed for Shortness of Breath 360 mL 1    aspirin (SM ASPIRIN ADULT LOW STRENGTH) 81 MG EC tablet take 1 tablet by mouth once daily 90 tablet 3    metoprolol (LOPRESSOR) 100 MG tablet Take 1 tablet by mouth 2 times daily 60 tablet 0     MG capsule take 3 capsules by mouth every evening 90 capsule 3    doxepin (SINEQUAN) 50 MG capsule Take 1-2 capsules by mouth nightly for sleep  0    escitalopram (LEXAPRO) 20 MG tablet Take 20 mg by mouth every morning  0    spironolactone (ALDACTONE) 25 MG tablet Take 25 mg by mouth daily  0    ferrous sulfate 325 (65 Fe) MG tablet One every other day.   Take with vitamin C 500 mg 180 tablet 1    albuterol sulfate HFA (VENTOLIN HFA) 108 (90 Base) MCG/ACT inhaler Inhale 2 puffs into the lungs every 6 hours as needed for Wheezing 1 Inhaler 3    baclofen (LIORESAL) 10 MG tablet Take 1 tablet by mouth 2 times daily Indications: 0.5 tablet to 1 tablet 15 tablet 0    clopidogrel (PLAVIX) 75 MG tablet Take 1 tablet by mouth daily 90 tablet 3    fluticasone (FLONASE) 50 MCG/ACT nasal spray 1 spray by Nasal route daily 1 Bottle 0    furosemide (LASIX) 40 MG tablet Take 1 tablet by mouth 2 times daily (Patient taking differently: Take 20 mg by mouth 2 times daily ) 90 tablet 1    hydrALAZINE (APRESOLINE) 50 MG tablet Take 1 tablet by mouth 3 times daily 90 tablet 1    isosorbide mononitrate (IMDUR) 30 MG extended release tablet Take 2 tablets by mouth daily (Patient taking differently: Take 30 mg by mouth daily ) 30 tablet 1    levothyroxine (SYNTHROID) 125 MCG tablet Take 1 tablet by mouth Daily 30 tablet 1    metolazone (ZAROXOLYN) 2.5 MG tablet One tab every Monday and Thursday 10 tablet 3    OXcarbazepine (TRILEPTAL) 150 MG tablet Take 1 tablet by mouth every morning 14 tablet 0    budesonide-formoterol (SYMBICORT) 160-4.5 MCG/ACT AERO Inhale 2 puffs into the lungs 2 times daily 10.2 g 11    vitamin D (ERGOCALCIFEROL) 94755 units capsule Take 1 capsule by mouth once a week 5 capsule 1    cycloSPORINE (RESTASIS) 0.05 % ophthalmic emulsion Place 1 drop into both eyes 2 times daily 1 vial 5    ondansetron (ZOFRAN) 4 MG tablet Take 1 tablet by mouth daily as needed for Nausea or Vomiting 30 tablet 0    Handicap Placard MISC by Does not apply route Expires 25 MARCH 2014 2 each 0    Multiple Vitamin (MULTIVITAMIN) tablet Take 1 tablet by mouth daily 30 tablet 1    RA SALINE NASAL SPRAY 0.65 % nasal spray instill 1 spray into each nostril if needed for congestion 45 mL 3    cyanocobalamin (CVS VITAMIN B12) 1000 MCG tablet Take 1 tablet by mouth daily 30 tablet 3    ranolazine (RANEXA) 500 MG extended release tablet take 1 tablet by mouth twice a day 180 tablet 3    polyethylene glycol (GLYCOLAX) powder Take 17 g by mouth daily as needed (Constipation)      Calcium Carbonate-Vitamin D (OYSTER SHELL CALCIUM/D) 500-200 MG-UNIT TABS take 1 tablet by mouth twice a day 60 tablet 5    rosuvastatin (CRESTOR) 20 MG tablet take 1 tablet by mouth once daily 90 tablet 0    hydrOXYzine (ATARAX) 25 MG tablet Take 25 mg by mouth 3 times daily      Lancets MISC Check blood sugar q daily Dx E11.69 100 each 2    Blood Glucose Monitoring Suppl HARVINDER Check blood sugar q daily Dx E11.69 1 Device 0    OXYGEN Inhale 3 L into the lungs continuous       ONE TOUCH ULTRASOFT LANCETS MISC use as directed BEFORE BREAKFAST AND SUPPER 100 each 11    pantoprazole (PROTONIX) 40 MG tablet Take 40 mg by mouth every morning (before breakfast)       risperiDONE (RISPERDAL) 1 MG tablet Take 1 mg by mouth every morning        No current facility-administered medications for this visit.       Allergies   Allergen Reactions    Lipitor [Atorvastatin] Diarrhea    Motrin [Ibuprofen Micronized] Nausea Only    Codeine Anxiety       SUBJECTIVE:   Review of Systems   Constitutional: Negative for activity change, appetite change, fatigue and fever. HENT: Negative for congestion. Respiratory: Positive for shortness of breath. Negative for apnea, cough, chest tightness and wheezing. Cardiovascular: Negative for chest pain, palpitations and leg swelling. Gastrointestinal: Negative for abdominal distention, abdominal pain and nausea. Genitourinary: Negative for difficulty urinating and dysuria. Musculoskeletal: Positive for gait problem (WC or walker). Negative for arthralgias. Skin: Negative for color change. Neurological: Negative for dizziness, numbness and headaches. Psychiatric/Behavioral: Negative for agitation, confusion and sleep disturbance. The patient is not nervous/anxious. OBJECTIVE:   Today's Vitals:  BP (!) 108/58   Pulse 66   Ht 5' 5\" (1.651 m)   Wt 209 lb 9.6 oz (95.1 kg)   LMP 02/12/1973 (Approximate)   SpO2 96%   BMI 34.88 kg/m²     Physical Exam   Constitutional: She is oriented to person, place, and time. She appears well-developed and well-nourished. HENT:   Head: Normocephalic and atraumatic. Eyes: Pupils are equal, round, and reactive to light. Neck: Normal range of motion. No JVD present. Cardiovascular: Normal rate and normal heart sounds. No murmur heard. Pulmonary/Chest: Effort normal. No respiratory distress. She has no rales. Abdominal: Soft. She exhibits no distension. There is no tenderness. Musculoskeletal: She exhibits no edema or tenderness. Neurological: She is alert and oriented to person, place, and time. Skin: Skin is warm and dry. Psychiatric: She has a normal mood and affect. Her behavior is normal.   Vitals reviewed.       Wt Readings from Last 3 Encounters:   05/21/19 209 lb 9.6 oz (95.1 kg)   05/15/19 210 lb (95.3 kg)   05/10/19 205 lb 14.6 oz (93.4 kg) BP Readings from Last 3 Encounters:   05/21/19 (!) 108/58   05/15/19 (!) 108/51   05/10/19 (!) 87/41     Pulse Readings from Last 3 Encounters:   05/21/19 66   05/15/19 68   05/10/19 67     Body mass index is 34.88 kg/m². ECHO:   5/20/19  Summary   Ejection fraction was estimated at 55-60%.   Left atrial size was moderately dilated.   S/p TAVR.   Transaortic velocity was 2.6-3.4 m/s (mean gradient 17-23 mmHg, EOA   1.2-1.5 cm2).   There was mild paravalvular aortic regurgitation.   The pericardium demonstrated a small posterior effusion that was   previously seen and remains non-hemodynamically significant.   Ascending aorta = 3.5 cm. Vertie Rocker size is within normal limits with normal respiratory phasic changes.      Signature      ----------------------------------------------------------------   Electronically signed by Demarcus Esteban MD (Interpreting   [email or web address]) on 05/20/2019 at 05:50 PM   ----------------------------------------------------------------      Findings      Mitral Valve   The mitral valve structure was normal with normal leaflet separation.   DOPPLER: The transmitral velocity was within the normal range with no   evidence for mitral stenosis. There was no evidence of mitral   regurgitation.      Aortic Valve   S/p TAVR. DOPPLER: Transaortic velocity was 2.6-3.4 m/s (mean gradient   17-23 mmHg, EOA 1.2-1.5 cm2). There was mild paravalvular aortic   regurgitation.      Tricuspid Valve   The tricuspid valve structure was normal with normal leaflet separation.   DOPPLER: There was no evidence of tricuspid stenosis. There was no   evidence of tricuspid regurgitation.      Pulmonic Valve   The pulmonic valve leaflets were not well seen.  DOPPLER: The transpulmonic   velocity was within the normal range with no evidence for regurgitation.      Left Atrium   Left atrial size was moderately dilated.      Left Ventricle   Normal left ventricular size and systolic function.   There were no regional wall motion abnormalities.   Wall thickness was within normal limits.   Ejection fraction was estimated at 55-60%.     Right Atrium   Right atrial size was normal.      Right Ventricle   The right ventricular size was normal with normal systolic function and   wall thickness.      Pericardial Effusion   The pericardium demonstrated a small posterior effusion that was   previously seen and remains non-hemodynamically significant.      Pleural Effusion   No evidence of pleural effusion.      Aorta / Great Vessels   -Ascending aorta = 3.5 cm.   -IVC size is within normal limits with normal respiratory phasic changes.       Results reviewed:  BNP:   Lab Results   Component Value Date    PROBNP 826.2 04/30/2019     CBC:   Lab Results   Component Value Date    WBC 4.5 05/20/2019    RBC 3.16 05/20/2019    RBC 3.82 08/21/2018    HGB 8.5 05/20/2019    HCT 27.7 05/20/2019     05/20/2019     CMP:    Lab Results   Component Value Date     05/20/2019    K 5.1 05/20/2019    K 3.7 05/02/2019    CL 97 05/20/2019    CO2 25 05/20/2019    BUN 36 05/20/2019    CREATININE 1.1 05/20/2019    LABGLOM 48 05/20/2019    GLUCOSE 133 05/20/2019    GLUCOSE 101 07/25/2016    CALCIUM 11.1 05/20/2019     Hepatic Function Panel:    Lab Results   Component Value Date    ALKPHOS 55 04/30/2019    ALT 24 04/30/2019    AST 26 04/30/2019    PROT 7.0 04/30/2019    PROT 6.5 11/25/2016    BILITOT 0.2 04/30/2019    BILIDIR <0.2 04/14/2019    LABALBU 4.1 04/30/2019     Magnesium:    Lab Results   Component Value Date    MG 1.7 04/12/2019     PT/INR:    Lab Results   Component Value Date    PROTIME 13.8 02/13/2019    INR 1.11 05/01/2019     Lipids:    Lab Results   Component Value Date    TRIG 256 03/29/2019    HDL 42 03/29/2019    LDLCALC 61 03/29/2019    LABVLDL 69 11/25/2016       ASSESSMENT AND PLAN:   The patient's condition/symptoms are Stable: No clinical evidence of fluid overload today.  Continue current medical regimen without changes at

## 2019-05-21 NOTE — PATIENT INSTRUCTIONS
Continue:  · Continue current medications  · Daily weights and record  · Fluid restriction of 2 Liters per day  · Limit sodium in diet to around 2457-0287 mg/day  · Monitor BP  · Activity as tolerated     Call the Heart Failure Clinic for any of the following symptoms: [phone]  Weight gain of 3 pounds in 1 day or 5 pounds in 1 week  Increased shortness of breath  Shortness of breath while laying down  Cough  Chest pain  Swelling in feet, ankles or legs  Tenderness or bloating in the abdomen  Fatigue   Decreased appetite or nausea   Confusion

## 2019-05-22 NOTE — CARE COORDINATION
I spoke with pt on phone today. Introduced myself and explained my role. I informed her I received a referral from [id number] at Rian Martinez's office. Pt voiced understanding. I asked if she was \"free to talk. \" She stated that her \"roommate was in the bathroom. \" I informed pt that [facility] [employer] is working to get her back into [facility] for a few weeks. I asked if she had notified her family of this and she said she \"hasn't talked to anyone yet. \"  I encouraged her to call them and let them know. I asked if she was wanting to have her roommate move out and she said \"yes but I don't want to hurt her feelings, I would like her to move about 4 blocks over with her sister but she has our dog. \" I asked if I could come talk to them and pt said that would be fine. Schedule appt for 1:00 on 5/23. Active listening and empathy provided.

## 2019-05-22 NOTE — CARE COORDINATION
tamie does the patient now understand their health and well-being (symptoms, signs or risk factors) and what they need to do to manage their health?:  Reasonable to good understanding but do not feel able to engage with advice at this time   How well do you think your patient can engage in healthcare discussions? (Barriers include language, deafness, aphasia, alcohol or drug problems, learning difficulties, concentration):  Clear and open communication, no identified barriers   Do other services need to be involved to help this patient?:  Other care/services in place and adequate   Are current services involved with this patient well-coordinated? (Include coordination with other services you are now recommendation): All required care/services in place and well-coordinated   Suggested Interventions and [address]:  Completed   Other Services or Interventions:  [facility], [facility]   Occupational Therapy: In Process   Physical Therapy: In Process   Social Work:  In Process   Zone Management Tools:  Completed                  Prior to Admission medications    Medication Sig Start Date End Date Taking?  Authorizing Provider   busPIRone (BUSPAR) 7.5 MG tablet Take 1 tablet by mouth 3 times daily 5/22/19 8/20/19  ASHLEY Cordero CNP   potassium chloride (KLOR-CON M) 20 MEQ extended release tablet Take 0.5 tablets by mouth daily 5/21/19   ASHLEY Prado CNP   losartan (COZAAR) 25 MG tablet Take 1 tablet by mouth daily 5/21/19   ASHLEY Prado CNP   ipratropium-albuterol (DUONEB) 0.5-2.5 (3) MG/3ML SOLN nebulizer solution Inhale 3 mLs into the lungs every 4 hours as needed for Shortness of Breath 5/16/19   ASHLEY Cordero CNP   aspirin (SM ASPIRIN ADULT LOW STRENGTH) 81 MG EC tablet take 1 tablet by mouth once daily 5/8/19   ASHLEY Cordero CNP   metoprolol (LOPRESSOR) 100 MG tablet Take 1 tablet by mouth 2 times daily 4/29/19   Terrence Horton MD    MG capsule take 3 capsules by mouth every evening 4/25/19   ASHLEY Blanchard - CNP   doxepin (SINEQUAN) 50 MG capsule Take 1-2 capsules by mouth nightly for sleep 4/3/19   Historical Provider, MD   escitalopram (LEXAPRO) 20 MG tablet Take 20 mg by mouth every morning 4/3/19   Historical Provider, MD   spironolactone (ALDACTONE) 25 MG tablet Take 25 mg by mouth daily 4/10/19   Historical Provider, MD   ferrous sulfate 325 (65 Fe) MG tablet One every other day.   Take with vitamin C 500 mg 4/17/19   Clarita Espinoza MD   albuterol sulfate HFA (VENTOLIN HFA) 108 (90 Base) MCG/ACT inhaler Inhale 2 puffs into the lungs every 6 hours as needed for Wheezing 4/17/19   Clarita Espinoza MD   baclofen (LIORESAL) 10 MG tablet Take 1 tablet by mouth 2 times daily Indications: 0.5 tablet to 1 tablet 4/17/19   Clarita Espinoza MD   clopidogrel (PLAVIX) 75 MG tablet Take 1 tablet by mouth daily 4/17/19   Clarita Espinoza MD   fluticasone [facility]) 50 MCG/ACT nasal spray 1 spray by Nasal route daily 4/17/19   Clarita Espinoza MD   furosemide (LASIX) 40 MG tablet Take 1 tablet by mouth 2 times daily  Patient taking differently: Take 20 mg by mouth 2 times daily  4/17/19   Clarita Espinoza MD   hydrALAZINE (APRESOLINE) 50 MG tablet Take 1 tablet by mouth 3 times daily 4/17/19   Clarita Espinoza MD   isosorbide mononitrate (IMDUR) 30 MG extended release tablet Take 2 tablets by mouth daily  Patient taking differently: Take 30 mg by mouth daily  4/17/19   Clarita Espinoza MD   levothyroxine (SYNTHROID) 125 MCG tablet Take 1 tablet by mouth Daily 4/17/19   Clarita Espinoza MD   metolazone (ZAROXOLYN) 2.5 MG tablet One tab every Monday and Thursday 4/17/19   Clarita Espinoza MD   OXcarbazepine (TRILEPTAL) 150 MG tablet Take 1 tablet by mouth every morning 4/17/19   Clarita Espinoza MD   budesonide-formoterol [facility]) 160-4.5 MCG/ACT AERO Inhale 2 puffs into the lungs 2 times daily 4/17/19   Clarita Espinoza MD   vitamin D (ERGOCALCIFEROL) 09153 units capsule Take 1 capsule by mouth once a week 19   Steven Hill MD   cycloSPORINE (RESTASIS) 0.05 % ophthalmic emulsion Place 1 drop into both eyes 2 times daily 19   Steven Hill MD   ondansetron [facility]) 4 MG tablet Take 1 tablet by mouth daily as needed for Nausea or Vomiting 4/3/19   ASHLEY Maurice Ra, CNP   Handicap Placard MISC by Does not apply route Expires 2014 3/25/19   Roman Apgar, DO   Multiple Vitamin (MULTIVITAMIN) tablet Take 1 tablet by mouth daily 19   Aurelia Reese MD RA SALINE NASAL SPRAY 0.65 % nasal spray instill 1 spray into each nostril if needed for congestion 19   ASHLEY Maurice Ra, CNP   cyanocobalamin ([employer]S VITAMIN B12) 1000 MCG tablet Take 1 tablet by mouth daily 18   Astrid Mixon MD   ranolazine (RANEXA) 500 MG extended release tablet take 1 tablet by mouth twice a day 18   Renata Coker PA-C   polyethylene glycol (GLYCOLAX) powder Take 17 g by mouth daily as needed (Constipation)    Historical Provider, MD   Calcium Carbonate-Vitamin D (OYSTER SHELL CALCIUM/D) 500-200 MG-UNIT TABS take 1 tablet by mouth twice a day 18   ASHLEY Maurice Ra, CNP   rosuvastatin (CRESTOR) 20 MG tablet take 1 tablet by mouth once daily 18   Renata Coker PA-C   hydrOXYzine (ATARAX) 25 MG tablet Take 25 mg by mouth 3 times daily    Historical Provider, MD   Lancets MISC Check blood sugar q daily Dx E11.69 17   Memphis Hodgkin, DO   Blood Glucose Monitoring Suppl HARVINDER Check blood sugar q daily Dx E11.69 17   Adames Hodgkin, DO   OXYGEN Inhale 3 L into the lungs continuous     Historical Provider, MD   ONE TOUCH ULTRASOFT LANCETS MISC use as directed BEFORE BREAKFAST AND SUPPER 10/15/14   ASHLEY Maurice Ra, CNP   pantoprazole (PROTONIX) 40 MG tablet Take 40 mg by mouth every morning (before breakfast)     Historical Provider, MD   risperiDONE (RISPERDAL) 1 MG tablet Take 1 mg by mouth every morning     Historical Provider, MD       Future Appointments   Date Time Provider Micheline Conteh   6/5/2019  1:30 PM Daryn Kelley MD SRPX Heart [facility]   6/10/2019  8:20 AM STR CT IMAGING RM1  [facility] OUT EXP STR Radiolog   6/17/2019 10:30 AM ASHLEY Montejo CNP Pulm Med [facility]   7/15/2019  1:00 PM Wyatt Mcnair RD, LD SRPX Physic [facility]   7/23/2019 10:15 AM Johanna Galeano MD Oncology [facility]   7/23/2019  2:30 PM Steve Meyers PA-C Pulm Med [facility]   7/24/2019 10:15 AM ASHLEY Ramos CNP SRPX CHF [facility]   8/8/2019  9:40 AM ASHLEY Burgess CNP Fam Med [facility]   10/1/2019 10:40 AM ASHLEY Sandoval CNP [facility]

## 2019-05-22 NOTE — PROGRESS NOTES
Visit Information    Have you changed or started any medications since your last visit including any over-the-counter medicines, vitamins, or herbal medicines? no   Are you having any side effects from any of your medications? -  no  Have you stopped taking any of your medications? Is so, why? -  no    Have you seen any other physician or provider since your last visit? Yes - Records Obtained Deena Dixon  Have you had any other diagnostic tests since your last visit? No  Have you been seen in the emergency room and/or had an admission to a hospital since we last saw you? No  Have you had your routine dental cleaning in the past 6 months? no    Have you activated your [employer] account? If not, what are your barriers?  Yes     Patient Care Team:  ASHLEY Sanchez CNP as PCP - General  ASHLEY Sanchez CNP as PCP - [facility] Attributed Provider  Jagruti Farnsworth MD as Physician (Interventional Cardiology)  Glory Wang LPN as Care Transition    Medical History Review  Deferred to PCP    Health Maintenance   Topic Date Due    Hepatitis B Vaccine (1 of 3 - Risk 3-dose series) 10[birth year]15/1963    Shingles Vaccine (1 of 2) 10/15/1994    Breast cancer screen  10/10/2018    Diabetic retinal exam  01/24/2019    A1C test (Diabetic or Prediabetic)  10/03/2019    Diabetic foot exam  01/04/2020    Low dose CT lung screening  03/26/2020    Lipid screen  03/29/2020    TSH testing  04/10/2020    Potassium monitoring  05/20/2020    Creatinine monitoring  05/20/2020    Colon cancer screen colonoscopy  05/18/2022    DTaP/Tdap/Td vaccine (2 - Td) 12/16/2022    DEXA (modify frequency per FRAX score)  Completed    Flu vaccine  Completed    Pneumococcal 65+ years Vaccine  Completed    HPV vaccine  Aged Out

## 2019-05-22 NOTE — PROGRESS NOTES
Maggie Narayanan [id number]  Dept: [phone]  Dept Fax: [phone]  Loc: [phone]    Samira Ann is a 23[address] [facility] y.o. Magnolia Byrd presents today for her medical conditions/complaints as noted below. Arline Bell c/o of Anxiety (pt states she would like to go back to [facility] for 2 weeks,her theresa has being staying with her day and night, causing her alot of anxiety) and Health Maintenance (mammogram)      HPI:      Pt here to discuss her anxiety. She states her neighbor has been living with her and it is making her anxious and nervous and she can not ask the friend to leave. She would like to go back to [facility] to get away from her friend. She is current with [facility] services who is treating her depression and bipolar issues. Will add buspar for now. Advised pt to contact [facility] services to see if they can see her sooner. [facility] home health was ordered for her at her recent discharge for PT/OT and social work services. According to her they did contact her but she asked them to wait to start until [facility] runs out. She has not had PT or OT since her hospital discharge. Pt states she has not spoken to her children about this arrangement with her nieghbor. She denies chest pain or sob or pedal edema.       Current Outpatient Medications   Medication Sig Dispense Refill    busPIRone (BUSPAR) 7.5 MG tablet Take 1 tablet by mouth 3 times daily 90 tablet 2    potassium chloride (KLOR-CON M) 20 MEQ extended release tablet Take 0.5 tablets by mouth daily 60 tablet 5    losartan (COZAAR) 25 MG tablet Take 1 tablet by mouth daily 30 tablet 3    ipratropium-albuterol (DUONEB) 0.5-2.5 (3) MG/3ML SOLN nebulizer solution Inhale 3 mLs into the lungs every 4 hours as needed for Shortness of Breath 360 mL 1    aspirin (SM ASPIRIN ADULT LOW STRENGTH) 81 MG EC tablet take 1 tablet by mouth once daily 90 tablet 3    metoprolol (LOPRESSOR) 100 MG tablet Take 1 tablet by mouth 2 times daily 60 tablet 0     MG capsule take 3 capsules by mouth every evening 90 capsule 3    doxepin (SINEQUAN) 50 MG capsule Take 1-2 capsules by mouth nightly for sleep  0    escitalopram (LEXAPRO) 20 MG tablet Take 20 mg by mouth every morning  0    spironolactone (ALDACTONE) 25 MG tablet Take 25 mg by mouth daily  0    ferrous sulfate 325 (65 Fe) MG tablet One every other day.   Take with vitamin C 500 mg 180 tablet 1    albuterol sulfate HFA (VENTOLIN HFA) 108 (90 Base) MCG/ACT inhaler Inhale 2 puffs into the lungs every 6 hours as needed for Wheezing 1 Inhaler 3    baclofen (LIORESAL) 10 MG tablet Take 1 tablet by mouth 2 times daily Indications: 0.5 tablet to 1 tablet 15 tablet 0    clopidogrel (PLAVIX) 75 MG tablet Take 1 tablet by mouth daily 90 tablet 3    fluticasone (FLONASE) 50 MCG/ACT nasal spray 1 spray by Nasal route daily 1 Bottle 0    furosemide (LASIX) 40 MG tablet Take 1 tablet by mouth 2 times daily (Patient taking differently: Take 20 mg by mouth 2 times daily ) 90 tablet 1    hydrALAZINE (APRESOLINE) 50 MG tablet Take 1 tablet by mouth 3 times daily 90 tablet 1    isosorbide mononitrate (IMDUR) 30 MG extended release tablet Take 2 tablets by mouth daily (Patient taking differently: Take 30 mg by mouth daily ) 30 tablet 1    levothyroxine (SYNTHROID) 125 MCG tablet Take 1 tablet by mouth Daily 30 tablet 1    metolazone (ZAROXOLYN) 2.5 MG tablet One tab every Monday and Thursday 10 tablet 3    OXcarbazepine (TRILEPTAL) 150 MG tablet Take 1 tablet by mouth every morning 14 tablet 0    budesonide-formoterol (SYMBICORT) 160-4.5 MCG/ACT AERO Inhale 2 puffs into the lungs 2 times daily 10.2 g 11    vitamin D (ERGOCALCIFEROL) 73839 units capsule Take 1 capsule by mouth once a week 5 capsule 1    cycloSPORINE (RESTASIS) 0.05 % ophthalmic emulsion Place 1 drop into both eyes 2 times daily 1 vial 5    ondansetron EF 65%. Trace MR - catheter induced. Minimally elevated LVEDP - 13mmHg. Mild to moderate aortoiliac PVD w/o obstructive lesions.  CARDIAC CATHETERIZATION  5-11-04    Successful drug-eluting stent x 2 of proximal and mid RCA.  CARDIAC CATHETERIZATION  5-11-04    70-80% proximal RCA stenosis w/ 50-70% mid RCA stenosis. There is 50-60% lesion in mid PDA. Mild proximal and mid LAD disease. Mild circumflex disease. Normal LV size and systolic function. EF 60%.  CARDIOVASCULAR STRESS TEST  5-10-11    No evidence of stress induced ischemia. EF 75%.  CARDIOVASCULAR STRESS TEST  5-10-10    No evidence of stress induced ischemia, infarct or scar. EF 74%.     CAROTID ENDARTERECTOMY      CERVICAL FUSION N/A 11/15/2017    REMOVAL OF PLATE L6-1, ACDF R2-0 W/ATLANTIS CORNERSTONE performed by Jeff Carvajal MD at [address] [place] [address], DIAGNOSTIC      EYE SURGERY      cataract 2017   [place] [id number] EAR SURGERY      NECK SURGERY  FEB 2016    OTHER SURGICAL HISTORY  02/13/2019    Arteriogram for diagnostics    PARTIAL HYSTERECTOMY      UPPER GASTROINTESTINAL ENDOSCOPY      UPPER GASTROINTESTINAL ENDOSCOPY       Family History   Problem Relation Age of Onset    Heart Disease Mother         CHF    Heart Disease Father         CAD    Heart Attack Father     Kidney Disease Sister         DIALYSIS    Cancer Sister         RECTUM    Heart Disease Sister     Heart Disease Brother     Heart Disease Brother         CAD    Heart Disease Brother     Heart Disease Brother     Breast Cancer Child 36    Cancer Sister         UTERINE    Ovarian Cancer Other 22    Other Brother         SUICIDE     Social History     Tobacco Use    Smoking status: Former Smoker     Packs/day: 1.00     Years: 38.00     Pack years: 38.00     Types: Cigarettes     Last attempt to quit: 1/31/2007     Years since quittin.3    Smokeless tobacco: Never Used   Substance Use Topics    Alcohol use: No        Allergies   Allergen Reactions    Lipitor [Atorvastatin] Diarrhea    Motrin [Ibuprofen Micronized] Nausea Only    Codeine Anxiety       Health Maintenance   Topic Date Due    Hepatitis B Vaccine (1 of 3 - Risk 3-dose series) 10/15/1963    Shingles Vaccine (1 of 2) 10/15/1994    Breast cancer screen  10/10/2018    Diabetic retinal exam  2019    A1C test (Diabetic or Prediabetic)  10/03/2019    Diabetic foot exam  2020    Low dose CT lung screening  2020    Lipid screen  2020    TSH testing  04/10/2020    Potassium monitoring  2020    Creatinine monitoring  2020    Colon cancer screen colonoscopy  2022    DTaP/Tdap/Td vaccine (2 - Td) 2022    DEXA (modify frequency per FRAX score)  Completed    Flu vaccine  Completed    Pneumococcal 65+ years Vaccine  Completed    HPV vaccine  Aged Out       Subjective:      Review of Systems   Respiratory: Negative. Cardiovascular: Negative. Skin: Negative. Psychiatric/Behavioral: Negative for self-injury, sleep disturbance and suicidal ideas. The patient is nervous/anxious. Objective:      /66   Pulse 80   Temp 98 °F (36.7 °C) (Oral)   Resp 16   Ht 5' 1.97\" (1.574 m)   Wt 214 lb 3.2 oz (97.2 kg)   LMP 1973 (Approximate)   BMI 39.22 kg/m²      Physical Exam   Constitutional: She appears well-developed and well-nourished. No distress. Cardiovascular: Normal rate, regular rhythm, normal heart sounds and intact distal pulses. No murmur heard. Psychiatric: Her behavior is normal. Her mood appears not anxious. Her affect is labile. Cognition and memory are not impaired. She expresses no suicidal plans and no homicidal plans. Nursing note and vitals reviewed. no pedal edema noted     Assessment/Plan:           1. Anxiety  Start buspar, continue all other meds.   Contact [facility] for a sooner visit. I did consult with Yaritza Morales for her to review the patient's current condition to see if ECF placement is available for the patient. We also contacted [facility] home health to see if they can start services for her if she is to remain at home. 2. S/P TAVR (transcatheter aortic valve replacement)  Continue all meds as ordered  Continue cardiology follow up      Return if symptoms worsen or fail to improve. Reccommended tobaccocessation options including pharmacologic methods, counseled great than 3 minutesduring this visit:  Yes[]  No  []       Patient given educational materials -see patient instructions. Discussed use, benefit, and side effects of prescribedmedications. All patient questions answered. Pt voiced understanding. Reviewedhealth maintenance. Instructed to continue current medications, diet and exercise. Patient agreed with treatment plan. Follow up as directed.        Electronicallysigned by ASHLEY Forde CNP on 5/22/2019 at 11:03 AM

## 2019-05-22 NOTE — TELEPHONE ENCOUNTER
Hiwot Duarte with [facility] called and left msg on our nurse/ma answering mach  stating they recd referral today for PT/OT for pt. She states they had recd the previous referral, and reached out to pt on 5/16/19. Pt refused PT/OT, and stated she only needed someone for light house cleaning. Hiwot Duarte was calling today to verify that they are to revisit pt for the OT/PT from the referral today?

## 2019-05-22 NOTE — TELEPHONE ENCOUNTER
Verbal order from Rey Batista CNP. iRan is wanting to know if pt refused PT/OT on 5/16/19, why was she not notified of this earlier. She states pt is a fresh post op pt, and she should have been notified of this. Rian states she did not refer pt for light house keeping.

## 2019-05-23 NOTE — CARE COORDINATION
I met pt and her \"roommate\" in pt apartment today. I introduced myself, gave her my card, and explained my role. I informed pt that [facility] has accepted her back and she can go after lunch tomorrow. I asked pt if she had transportation and she stated she Merril Dines goes by EMS due to oxygen. \" I called Jay Barrera at [facility] and left vm informing him of this. I asked pt if she had told her family yet that she wanted to go back and she said she told her daughter last night. I asked if I could call her daughter Mimi Mishra and inform her of the new admit status and pt stated I could. I spoke with Mimi Mishra (daughter) and informed her that her mom can go after lunch tomorrow. Daughter voiced understanding but is unable to transport. Informed daughter that I had left a msg for Jay Barrera at [facility] and asked if she would follow up with that. She stated she would do so in am. I tried to get information re: roommate and pt situation, but neither were forthcoming. Roommate said she has [place] apt next door. \" But later stated, \"I stay here. \" When I asked roommate if she could possibly go stay somewhere else while pt is at [facility], pt replied, Sandra Bryant can't leave she has to take care of our dog. \" I asked \"whose dog is it\" and pt stated, \"both of ours. \" I said to pt, \"according to our phone conversation yesterday you thought it would be best if roommate went to her sisters while you are gone. \" Pt then replied, \"well her sister has no way to bring her back and forth to take care of the dog, so she has to stay here. \" I said, \"So I guess you have worked this out and they both said, \"Yes. \" So if pt wants help getting roommate to move out like she told me yesterday, she will need to be honest with me. Encouraged pt to call me with any needs or concerns. Active listening and encouragement were provided.

## 2019-05-23 NOTE — TELEPHONE ENCOUNTER
Oscar Garcia @ [facility] requesting and order for admission to return to [facility]. She spoke with them and they just need an order.

## 2019-05-25 NOTE — PROGRESS NOTES
H&P (Admission H&P at [facility])      NAME: Janie Mckenna: 18  ROOM #: 29-2  CODE STATUS: FULL CODE  REASON FOR ADMISSION: Needs therapy  : 1945  ADMISSION DATE: 2019  SKILLED PATIENT: Yes    History obtained from chart review, the patient and nursing staff. SUBJECTIVE:  HPI: Marina Edgar is a 75 y.o. female. Pt seen and examined at bedside. Pt admitted from home. Pt has been in and out of the hospital and ECF's for the better part of 2 months. She left [facility] earlier than she should have a few wks ago, s/p TAVR, to get home and tend to her dog. She, however, could not make a full go of it at home, so has subsequently been admitted back to [facility] for rehab. This is my 1st time seeing the pt. Since admission has done well. She is making progress with PT. In wheelchair most of the time, but getting stronger. No falls. She is happy with progress. AS s/p TAVR: underwent TAVR earlier this month at [facility]. So elizabeth has done well. Has cardio f/u. Denies CP, SOB, orthopnea or PND. CAD/HF with preserved EF: on asa, plavix, lopressor, lorsartan, lasix, metolozone, isosorbide and ranexa. Has cardio f/u. Denies CP, SOB, orthopnea or PND. HTN/CKD: on lopressor, lozartan, hydralazine. BP's since admission have been <140/90. Denies CP/SOB    HLD: on crestor. Tolerating well. Denies side effects. Bipolar etc: on lexapro, risperdal and trileptal. On prn atarax. Follows with Jennifer. Moods stable since admission. Tolerating meds. Denies SI/HI    Hypothyroidism: on synthroid. Denies wt changes or temp intolerance    COPD: on symbicort and prn alb. Denies sig SOB. No wheezing. On O2. Anemia: follows with heme at [facility]. On b12 and iron. Heme recommended GI eval, not clear if this has been done yet. Pt cannot recall. Denies bleeding, melena or hematochezia. Vit D def: on supplementation. Tolerating well. Chronic pain: on norco and baclofen from PCP.  For back and joint pain. Works well for her    GERD: on pantoprazole. Symptoms controlled. No dysphagia or bowel habit changes. Hypercalcemia: noted on recent labs as outpatient. No prior hx of this that I can find. Allergies and Medications were reviewed through the [facility] EMR. All medications reviewed and reconciled, including OTC and herbal medications. Past Medical Hx:  -01. Severe AS, s/p TAVR MAY 2019  -02. CAD  -03. HF with preserved EF  -04. HTN  -05. CKD3  -07. HLD  -08. Bipolar d/o  -09. Depression  -10. Anxiety  -11. CHET  -12. Chronic anemia, following with heme at [facility]  -13. Hx of CVA  -14. COPD with chronic hypoxic respiratory failure, on O2  -15. IBS  -16. NAFLD  -17. Hypothyroidism  -18. Iron deficiency  -19. B12 deficiency  -20. GERD  -21. Chronic pain syndrome  -22. Vit d def      Past Surgical History:   Procedure Laterality Date    ABDOMEN SURGERY      AORTIC VALVE SURGERY  04/30/2019    TAVR    BACK SURGERY  08/2016        BLADDER SUSPENSION      BREAST BIOPSY  10/10/2017    BREAST LUMPECTOMY      CARDIAC CATHETERIZATION  8-11-06    Mild nonobstructive CAD w/ diffuse 10-20% proximal and mid LAD stenosis and 10-20% proximal/ostial RCA stenosis. No obstructive lesions. RCA stents are patent w/o evidence of restenosis. Normal LV systolic function. EF 65%. Trace MR - catheter induced. Minimally elevated LVEDP - 13mmHg. Mild to moderate aortoiliac PVD w/o obstructive lesions.  CARDIAC CATHETERIZATION  5-11-04    Successful drug-eluting stent x 2 of proximal and mid RCA.  CARDIAC CATHETERIZATION  5-11-04    70-80% proximal RCA stenosis w/ 50-70% mid RCA stenosis. There is 50-60% lesion in mid PDA. Mild proximal and mid LAD disease. Mild circumflex disease. Normal LV size and systolic function. EF 60%.  CARDIOVASCULAR STRESS TEST  5-10-11    No evidence of stress induced ischemia. EF 75%.     CARDIOVASCULAR STRESS TEST  5-10-10    No evidence of stress induced ischemia, infarct or scar. EF 74%.  CAROTID ENDARTERECTOMY      CERVICAL FUSION N/A 11/15/2017    REMOVAL OF PLATE U5-8, ACDF G0-1 W/ATLANTIS CORNERSTONE performed by Erna Rehman MD at [address] [place] [address], DIAGNOSTIC      EYE SURGERY      cataract 2017   [place] [id number] EAR SURGERY      IVC FILTER INSERTION      NECK SURGERY  2016    OTHER SURGICAL HISTORY  2019    Arteriogram for diagnostics    PARTIAL HYSTERECTOMY      UPPER GASTROINTESTINAL ENDOSCOPY      UPPER GASTROINTESTINAL ENDOSCOPY         Allergies   Allergen Reactions    Lipitor [Atorvastatin] Diarrhea    Motrin [Ibuprofen Micronized] Nausea Only    Codeine Anxiety       Social History     Tobacco Use    Smoking status: Former Smoker     Packs/day: 1.00     Years: 38.00     Pack years: 38.00     Types: Cigarettes     Last attempt to quit: 2007     Years since quittin.3    Smokeless tobacco: Never Used   Substance Use Topics    Alcohol use: No        Family History   Problem Relation Age of Onset    Heart Disease Mother         CHF    Heart Disease Father         CAD    Heart Attack Father     Kidney Disease Sister         DIALYSIS    Cancer Sister         RECTUM    Heart Disease Sister     Heart Disease Brother     Heart Disease Brother         CAD    Heart Disease Brother     Heart Disease Brother     Breast Cancer Child 36    Cancer Sister         UTERINE    Ovarian Cancer Other 22    Other Brother         SUICIDE         I have reviewed the patient's past medical history, past surgical history, allergies, medications, social and family history and I have made updates where appropriate.       Review of Systems  Positive responses are highlighted in bold    Constitutional:  Fever, Chills, Night Sweats, Fatigue, Unexpected changes in weight  Eyes:  Eye discharge, Eye pain, Eye redness, Visual disturbances   HENT:  Ear pain, Tinnitus, Nosebleeds, Trouble swallowing, Hearing loss, Sore throat  Cardiovascular:  Chest Pain, Palpitations, Orthopnea, Paroxysmal Nocturnal Dyspnea  Respiratory:  Cough, Wheezing, Shortness of breath, Chest tightness, Apnea  Gastrointestinal:  Nausea, Vomiting, Diarrhea, Constipation, Heartburn, Blood in stool  Genitourinary:  Difficulty or painful urination, Flank pain, Change in frequency, Urgency  Skin:  Color change, Rash, Itching, Wound  Psychiatric:  Hallucinations, Anxiety, Depression, Suicidal ideation  Hematological:  Enlarged glands, Easy bleeding, Easily bruising  Musculoskeletal:  Joint pain, Back pain, Gait problems, Joint swelling, Myalgias  Neurological:  Dizziness, Headaches, Presyncope, Numbness, Seizures, Tremors  Allergy:  Environmental allergies, Food allergies  Endocrine:  Heat Intolerance, Cold Intolerance, Polydipsia, Polyphagia, Polyuria      PHYSICAL EXAM:  Vitals:    05/30/19 0530   BP: 125/78   Pulse: 79   Resp: 16   Temp: 98.2 °F (36.8 °C)   Weight: 209 lb (94.8 kg)   Height: 5' 6\" (1.676 m)     Body mass index is 33.73 kg/m².   Pain: 3 (back/joints)    VS Reviewed  General Appearance: well developed and well- nourished, in no acute distress  Head: normocephalic and atraumatic  Eyes: pupils equal, round, and reactive to light, conjunctivae and eye lids without erythema  ENT: external ear and ear canal normal bilaterally, nose without deformity, nasal mucosa and turbinates normal without polyps, oropharynx normal, dentition is normal for age, no lip or gum lesions noted  Neck: supple and non-tender without mass, no thyromegaly or thyroid nodules, no cervical lymphadenopathy  Pulmonary/Chest: distant but clear to auscultation bilaterally- no wheezes, rales or rhonchi, normal air movement, no respiratory distress or retractions  Cardiovascular: distant with normal rate, regular rhythm, normal S1 and S2, no murmurs, rubs, clicks, or gallops appreciate

## 2019-05-29 NOTE — TELEPHONE ENCOUNTER
Marielle Tristanttnet 25 states. FYI Pt  Refused HH twice and  Pt hung on them when [facility] tried calling her yesterday .

## 2019-05-31 NOTE — CARE COORDINATION
Moies Jerez has been admitted to [facility] with the goal of receiving rehab services for up to 4 weeks as tolerated. Plan is to go back to apartment once therapy is completed.

## 2019-06-09 NOTE — PROGRESS NOTES
[address] [facility] CARDIOLOGY  [facility] [address]  [place] [address]  Dept: [phone]  Dept Fax: [phone]  Loc: [phone]    Visit Date: 6/5/2019    Ms. Markel Eid is a 75 y.o. female  who presented for:  Chief Complaint   Patient presents with    Follow-up     30 day TAVR    Other     EKG done today    Shortness of Breath    Chest Pain       HPI:   HPI   74 yo F s/p TAVR - 30 day follow-up. Her swelling has improved as has her breathing, she still has chronic SHAH. Her oxygen is being titrated down. She is tolerating DAPT. Her TTE post TAVR is acceptable. She has no major bleeding. ECG shows NSR. Current Outpatient Medications:     busPIRone (BUSPAR) 7.5 MG tablet, Take 1 tablet by mouth 3 times daily, Disp: 90 tablet, Rfl: 2    potassium chloride (KLOR-CON M) 20 MEQ extended release tablet, Take 0.5 tablets by mouth daily, Disp: 60 tablet, Rfl: 5    losartan (COZAAR) 25 MG tablet, Take 1 tablet by mouth daily, Disp: 30 tablet, Rfl: 3    ipratropium-albuterol (DUONEB) 0.5-2.5 (3) MG/3ML SOLN nebulizer solution, Inhale 3 mLs into the lungs every 4 hours as needed for Shortness of Breath, Disp: 360 mL, Rfl: 1    aspirin (SM ASPIRIN ADULT LOW STRENGTH) 81 MG EC tablet, take 1 tablet by mouth once daily, Disp: 90 tablet, Rfl: 3    metoprolol (LOPRESSOR) 100 MG tablet, Take 1 tablet by mouth 2 times daily, Disp: 60 tablet, Rfl: 0     MG capsule, take 3 capsules by mouth every evening, Disp: 90 capsule, Rfl: 3    doxepin (SINEQUAN) 50 MG capsule, Take 1-2 capsules by mouth nightly for sleep, Disp: , Rfl: 0    escitalopram (LEXAPRO) 20 MG tablet, Take 20 mg by mouth every morning, Disp: , Rfl: 0    spironolactone (ALDACTONE) 25 MG tablet, Take 25 mg by mouth daily, Disp: , Rfl: 0    ferrous sulfate 325 (65 Fe) MG tablet, One every other day.   Take with vitamin C 500 mg, Disp: 180 tablet, Rfl: 1    albuterol sulfate HFA (VENTOLIN HFA) 108 (90 Base) MCG/ACT inhaler, Inhale 2 puffs into the lungs every 6 hours as needed for Wheezing, Disp: 1 Inhaler, Rfl: 3    baclofen (LIORESAL) 10 MG tablet, Take 1 tablet by mouth 2 times daily Indications: 0.5 tablet to 1 tablet, Disp: 15 tablet, Rfl: 0    clopidogrel (PLAVIX) 75 MG tablet, Take 1 tablet by mouth daily, Disp: 90 tablet, Rfl: 3    fluticasone (FLONASE) 50 MCG/ACT nasal spray, 1 spray by Nasal route daily, Disp: 1 Bottle, Rfl: 0    furosemide (LASIX) 40 MG tablet, Take 1 tablet by mouth 2 times daily (Patient taking differently: Take 20 mg by mouth 2 times daily ), Disp: 90 tablet, Rfl: 1    hydrALAZINE (APRESOLINE) 50 MG tablet, Take 1 tablet by mouth 3 times daily, Disp: 90 tablet, Rfl: 1    isosorbide mononitrate (IMDUR) 30 MG extended release tablet, Take 2 tablets by mouth daily (Patient taking differently: Take 30 mg by mouth daily ), Disp: 30 tablet, Rfl: 1    levothyroxine (SYNTHROID) 125 MCG tablet, Take 1 tablet by mouth Daily, Disp: 30 tablet, Rfl: 1    metolazone (ZAROXOLYN) 2.5 MG tablet, One tab every Monday and Thursday, Disp: 10 tablet, Rfl: 3    OXcarbazepine (TRILEPTAL) 150 MG tablet, Take 1 tablet by mouth every morning, Disp: 14 tablet, Rfl: 0    budesonide-formoterol (SYMBICORT) 160-4.5 MCG/ACT AERO, Inhale 2 puffs into the lungs 2 times daily, Disp: 10.2 g, Rfl: 11    vitamin D (ERGOCALCIFEROL) 60280 units capsule, Take 1 capsule by mouth once a week, Disp: 5 capsule, Rfl: 1    cycloSPORINE (RESTASIS) 0.05 % ophthalmic emulsion, Place 1 drop into both eyes 2 times daily, Disp: 1 vial, Rfl: 5    ondansetron (ZOFRAN) 4 MG tablet, Take 1 tablet by mouth daily as needed for Nausea or Vomiting, Disp: 30 tablet, Rfl: 0    Handicap Placard MISC, by Does not apply route Expires 25 MARCH 2014, Disp: 2 each, Rfl: 0    Multiple Vitamin (MULTIVITAMIN) tablet, Take 1 tablet by mouth daily, Disp: 30 tablet, Rfl: 1    RA SALINE NASAL SPRAY 0.65 % nasal spray, instill 1 spray into each nostril if needed for congestion, Disp: 45 mL, Rfl: 3    cyanocobalamin ([employer] VITAMIN B12) 1000 MCG tablet, Take 1 tablet by mouth daily, Disp: 30 tablet, Rfl: 3    ranolazine (RANEXA) 500 MG extended release tablet, take 1 tablet by mouth twice a day, Disp: 180 tablet, Rfl: 3    polyethylene glycol (GLYCOLAX) powder, Take 17 g by mouth daily as needed (Constipation), Disp: , Rfl:     Calcium Carbonate-Vitamin D (OYSTER SHELL CALCIUM/D) 500-200 MG-UNIT TABS, take 1 tablet by mouth twice a day, Disp: 60 tablet, Rfl: 5    rosuvastatin (CRESTOR) 20 MG tablet, take 1 tablet by mouth once daily, Disp: 90 tablet, Rfl: 0    hydrOXYzine (ATARAX) 25 MG tablet, Take 25 mg by mouth 3 times daily, Disp: , Rfl:     Lancets MISC, Check blood sugar q daily Dx E11.69, Disp: 100 each, Rfl: 2    Blood Glucose Monitoring Suppl HARVINDER, Check blood sugar q daily Dx E11.69, Disp: 1 Device, Rfl: 0    OXYGEN, Inhale 3 L into the lungs continuous , Disp: , Rfl:     ONE TOUCH ULTRASOFT LANCETS MISC, use as directed BEFORE BREAKFAST AND SUPPER, Disp: 100 each, Rfl: 11    pantoprazole (PROTONIX) 40 MG tablet, Take 40 mg by mouth every morning (before breakfast) , Disp: , Rfl:     risperiDONE (RISPERDAL) 1 MG tablet, Take 1 mg by mouth every morning , Disp: , Rfl:     Past Medical History  Dann Serrato  has a past medical history of Anemia, Anxiety, Arthritis, AS (aortic stenosis): mild to moderate by echo 4/2017, ASHD (arteriosclerotic heart disease), Asthma, Bipolar disorder ([facility]), Blood circulation, collateral, CAD (coronary artery disease), Cerebral artery occlusion with cerebral infarction ([facility] [address] CHF (congestive heart failure) ([facility]), COPD (chronic obstructive pulmonary disease) ([facility] [address] Depression, Disease of blood and blood forming organ, DM (diabetes mellitus) ([facility] [address] FH: CAD (coronary artery disease), GERD (gastroesophageal reflux disease), History of blood transfusion, History of blood transfusion, History of tobacco abuse: and mid RCA.  CARDIAC CATHETERIZATION  5-11-04    70-80% proximal RCA stenosis w/ 50-70% mid RCA stenosis. There is 50-60% lesion in mid PDA. Mild proximal and mid LAD disease. Mild circumflex disease. Normal LV size and systolic function. EF 60%.  CARDIOVASCULAR STRESS TEST  5-10-11    No evidence of stress induced ischemia. EF 75%.  CARDIOVASCULAR STRESS TEST  5-10-10    No evidence of stress induced ischemia, infarct or scar. EF 74%.  CAROTID ENDARTERECTOMY      CERVICAL FUSION N/A 11/15/2017    REMOVAL OF PLATE V4-2, ACDF A8-4 W/ATLANTIS CORNERSTONE performed by Luis Jaime MD at [address] [place] [address], DIAGNOSTIC      EYE SURGERY      cataract 2017   Apte[address]i 1    INNER EAR SURGERY      IVC FILTER INSERTION      NECK SURGERY  FEB 2016   [place] OTHER SURGICAL HISTORY  02/13/2019    Arteriogram for diagnostics    PARTIAL HYSTERECTOMY      UPPER GASTROINTESTINAL ENDOSCOPY      UPPER GASTROINTESTINAL ENDOSCOPY         Review of Systems   Constitutional: Negative for chills and fever  HENT: Negative for congestion, sinus pressure, sneezing and sore throat. Eyes: Negative for pain, discharge, redness and itching. Respiratory: Negative for apnea, cough  Gastrointestinal: Negative for blood in stool, constipation, diarrhea   Endocrine: Negative for cold intolerance, heat intolerance, polydipsia. Genitourinary: Negative for dysuria, enuresis, flank pain and hematuria. Musculoskeletal: Negative for arthralgias, joint swelling and neck pain. Neurological: Negative for numbness and headaches. Psychiatric/Behavioral: Negative for agitation, confusion, decreased concentration and dysphoric mood.      Objective:     /72   Pulse 73   Ht 5' 5.5\" (1.664 m)   Wt 212 lb 9.6 oz (96.4 kg)   LMP 02/12/1973 (Approximate)   BMI 34.84 kg/m²     Wt Readings from Last 3 Encounters:   06/05/19 212 lb 9.6 oz (96.4 kg)   05/22/19 214 lb 3.2 oz (97.2 kg)   05/21/19 209 lb 9.6 oz (95.1 kg)     BP Readings from Last 3 Encounters:   06/05/19 120/72   05/22/19 126/66   05/21/19 (!) 108/58       Nursing note and vitals reviewed. Physical Exam   Constitutional: Oriented to person, place, and time. Appears well-developed and well-nourished. HENT:   Head: Normocephalic and atraumatic. Eyes: EOM are normal. Pupils are equal, round, and reactive to light. Neck: Normal range of motion. Neck supple. No JVD present. Cardiovascular: Normal rate, regular rhythm, normal heart sounds and intact distal pulses. No murmur heard. Pulmonary/Chest: Effort normal and breath sounds normal. No respiratory distress. No wheezes. No rales. Abdominal: Soft. Bowel sounds are normal. No distension. There is no tenderness. Musculoskeletal: Normal range of motion. No edema. Neurological: Alert and oriented to person, place, and time. No cranial nerve deficit. Coordination normal.   Skin: Skin is warm and dry. Psychiatric: Normal mood and affect.        Lab Results   Component Value Date    CKTOTAL 144 11/27/2018    CKTOTAL 82 05/10/2013       Lab Results   Component Value Date    WBC 4.5 05/20/2019    RBC 3.16 05/20/2019    RBC 3.82 08/21/2018    HGB 8.5 05/20/2019    HCT 27.7 05/20/2019    MCV 87.7 05/20/2019    MCH 26.9 05/20/2019    MCHC 30.7 05/20/2019    RDW 13.5 05/15/2019     05/20/2019    MPV 10.6 05/20/2019       Lab Results   Component Value Date     05/20/2019    K 5.1 05/20/2019    K 3.7 05/02/2019    CL 97 05/20/2019    CO2 25 05/20/2019    BUN 36 05/20/2019    LABALBU 4.1 04/30/2019    CREATININE 1.1 05/20/2019    CALCIUM 11.1 05/20/2019    LABGLOM 48 05/20/2019    GLUCOSE 133 05/20/2019    GLUCOSE 101 07/25/2016       Lab Results   Component Value Date    ALKPHOS 55 04/30/2019    ALT 24 04/30/2019    AST 26 04/30/2019    PROT 7.0 04/30/2019    PROT 6.5 11/25/2016 Status: Routine    Height: 65 inches Weight: 210 pounds BSA: 2.02 m^2 BMI: 34.95 kg/m^2    BP: 108/51 mmHg    Allergies    - See Epic. - Lipitor.      - Ibuprofen/Motrin.      - Other allergy:(Lipitor, codeine and Motrin). Conclusions      Summary   Ejection fraction was estimated at 55-60%. Left atrial size was moderately dilated. S/p TAVR. Transaortic velocity was 2.6-3.4 m/s (mean gradient 17-23 mmHg, EOA   1.2-1.5 cm2). There was mild paravalvular aortic regurgitation. The pericardium demonstrated a small posterior effusion that was   previously seen and remains non-hemodynamically significant. Ascending aorta = 3.5 cm. IVC size is within normal limits with normal respiratory phasic changes. Signature      ----------------------------------------------------------------   Electronically signed by Desi Castanon MD (Interpreting   physician) on 05/20/2019 at 05:50 PM   ----------------------------------------------------------------      Findings      Mitral Valve   The mitral valve structure was normal with normal leaflet separation. DOPPLER: The transmitral velocity was within the normal range with no   evidence for mitral stenosis. There was no evidence of mitral   regurgitation. Aortic Valve   S/p TAVR. DOPPLER: Transaortic velocity was 2.6-3.4 m/s (mean gradient   17-23 mmHg, EOA 1.2-1.5 cm2). There was mild paravalvular aortic   regurgitation. Tricuspid Valve   The tricuspid valve structure was normal with normal leaflet separation. DOPPLER: There was no evidence of tricuspid stenosis. There was no   evidence of tricuspid regurgitation. Pulmonic Valve   The pulmonic valve leaflets were not well seen. DOPPLER: The transpulmonic   velocity was within the normal range with no evidence for regurgitation. Left Atrium   Left atrial size was moderately dilated. Left Ventricle   Normal left ventricular size and systolic function.    There were no regional wall motion abnormalities. Wall thickness was within normal limits. Ejection fraction was estimated at 55-60%. Right Atrium   Right atrial size was normal.      Right Ventricle   The right ventricular size was normal with normal systolic function and   wall thickness. Pericardial Effusion   The pericardium demonstrated a small posterior effusion that was   previously seen and remains non-hemodynamically significant. Pleural Effusion   No evidence of pleural effusion. Aorta / Great Vessels   -Ascending aorta = 3.5 cm. -IVC size is within normal limits with normal respiratory phasic changes.      M-Mode/2D Measurements & Calculations      LV Diastolic    LV Systolic Dimension: 3.5   LA Dimension: 4.8 cmAO Root   Dimension: 5 cm cm                           Dimension: 2.6 cmLA Area:   LV FS:30 %      LV Volume Diastolic: 774 ml  84.8 cm^2   LV PW           LV Volume Systolic: 08.2 ml   Diastolic: 1.2  LV EDV/LV EDV Index: 125   cm              ml/62 m^2LV ESV/LV ESV   Septum          Index: 42.9 ml/21 m^2        RV Diastolic Dimension: 3.5   Diastolic: 1 cm EF Calculated: 65.7 %        cm                                                   LA/Aorta: 1.85                      LVOT: 2 cm                   LA volume/Index: 84.6 ml                                                /42m^2     Doppler Measurements & Calculations      MV Peak E-Wave: 144  AV Peak Velocity: 268    LVOT Peak Velocity: 119 cm/s   cm/s                 cm/s                     LVOT Mean Velocity: 87.8   MV Peak A-Wave: 140  AV Peak Gradient: 28.73  cm/s   cm/s                 mmHg                     LVOT Peak Gradient: 6   MV E/A Ratio: 1.03   AV Mean Velocity: 183    mmHgLVOT Mean Gradient: 3   MV Peak Gradient:    cm/s                     mmHg   8.29 mmHg            AV Mean Gradient: 17   MV Mean Gradient: 6  mmHg   mmHg                 AV VTI: 66.7 cm   MV Mean Velocity:    AV Area                  TR Velocity:220 cm/s   111 cm/s             (Continuity):1.45 cm^2   TR Gradient:19.36 mmHg   MV Deceleration                               PV Peak Velocity: 99.4 cm/s   Time: 282 msec       LVOT VTI: 30.9 cm        PV Peak Gradient: 3.95 mmHg   MV P1/2t: 98 msec    AV P1/2t: 512 msec   MVA by PHT:2.24 cm^2   MV Area   (continuity): 1.63   cm^2                 AV DVI (VTI): 0.46AV DVI   MV E' Septal         (Vmax):0.44   Velocity: 4.7 cm/s   MV A' Septal   Velocity: 6.7 cm/s   MV E' Lateral   Velocity: 5.8 cm/s   MV A' Lateral   Velocity: 8.7 cm/s   E/E' septal: 30.64   E/E' lateral: 24.83   MR Velocity: 463   cm/s     [id number]        Assessment/Plan   S/p TAVR (S323) - 30 day follow-up  Preserved EF, NYHA II  Mild PVL  Doing well, TTE acceptable, continue DAPT. Continue all other medical therapy. Cardiac rehab as tolerated. Continue to titrate off oxygen. Discussed diet/exercise/BP/weight loss/health lifestyle choices/lipids; the patient understands the goals and will try to comply.     Disposition:  1 year - post TAVR         Electronically signed by Jose Enrique Nieves MD   6/9/2019 at 1:25 PM

## 2019-06-11 PROBLEM — E87.8 ELECTROLYTE IMBALANCE: Status: ACTIVE | Noted: 2019-01-01

## 2019-06-11 PROBLEM — R74.01 ELEVATED TRANSAMINASE LEVEL: Status: ACTIVE | Noted: 2019-01-01

## 2019-06-11 PROBLEM — R77.8 ELEVATED TROPONIN: Status: ACTIVE | Noted: 2019-01-01

## 2019-06-11 PROBLEM — A41.9 SEPSIS (HCC): Status: ACTIVE | Noted: 2019-01-01

## 2019-06-11 PROBLEM — R79.89 ELEVATED TROPONIN: Status: ACTIVE | Noted: 2019-01-01

## 2019-06-11 NOTE — ED NOTES
Patient resting in bed with eyes closed. Respirations labored at 32. Call light within reach. Antibiotics received from pharmacy and started. Will monitor.       Natalio Mar RN  06/11/19 [id number]

## 2019-06-11 NOTE — ED NOTES
Patient now oriented to person and place. Respirations labored with accessory muscle use. Patient c/o being hot. Follows commands. Will monitor. Call light in reach.       Jailene Casper RN  06/11/19 [id number]

## 2019-06-11 NOTE — ED NOTES
Bed: 003A  Expected date: 6/11/19  Expected time: 11:13 AM  Means of arrival: [facility]  Comments:     Kaitlin Ross RN  06/11/19 [id number]

## 2019-06-11 NOTE — ED PROVIDER NOTES
[facility], [place]  eMERGENCY dEPARTMENT eNCOUnter          CHIEF COMPLAINT       Chief Complaint   Patient presents with    Altered Mental Status       Nurses Notes reviewed and I agree except as noted in the HPI. HISTORY OF PRESENT ILLNESS    Maximus Merrill is a 75 y.o. female who presents to the Emergency Department for the evaluation of confusion and fever onset today. Per the nurses, the patient had a fever of 103°F at her nursing home but was 105°F rectal on arrival. She was not given any Tylenol a the nursing home. The patient has also had increased confusion and at baseline alert and oriented x4 but is only oriented to self currently. The patient is usually on 2L oxygen at home but was placed of 6L at the initial encounter because of low spO2 stats. Per the nurses, the patient has not had any nausea, vomiting or diarrhea. The patient has a documented history of MI, CAD, anemia, IBS, aortic stenosis, CHF, GERD, asthma, COPD, diabetes, hypertension, hyperlipidemia, thyroid diease and bipolar disorder. No other history is available at this time. The HPI was provided by the nurses. REVIEW OF SYSTEMS     Review of Systems   Constitutional: Positive for fever (105). Negative for appetite change, chills and fatigue. HENT: Negative for congestion, ear pain, rhinorrhea and sore throat. Eyes: Negative for pain, discharge and visual disturbance. Respiratory: Negative for cough, shortness of breath and wheezing. Cardiovascular: Negative for chest pain, palpitations and leg swelling. Gastrointestinal: Negative for abdominal pain, diarrhea, nausea and vomiting. Genitourinary: Negative for difficulty urinating, dysuria, hematuria and vaginal discharge. Musculoskeletal: Negative for arthralgias, back pain, joint swelling and neck pain. Skin: Negative for pallor and rash. Neurological: Negative for dizziness, syncope, weakness, light-headedness, numbness and headaches. Hematological: Negative for adenopathy. Psychiatric/Behavioral: Positive for confusion. Negative for suicidal ideas. The patient is not nervous/anxious. PAST MEDICAL HISTORY    has a past medical history of Anemia, Anxiety, Arthritis, AS (aortic stenosis): mild to moderate by echo 4/2017, ASHD (arteriosclerotic heart disease), Asthma, Bipolar disorder ([address] Blood circulation, collateral, CAD (coronary artery disease), Cerebral artery occlusion with cerebral infarction ([address] CHF (congestive heart failure) ([address] COPD (chronic obstructive pulmonary disease) ([address] Depression, Disease of blood and blood forming organ, DM (diabetes mellitus) ([address] FH: CAD (coronary artery disease), GERD (gastroesophageal reflux disease), History of blood transfusion, History of blood transfusion, History of tobacco abuse: Quit in 2007, HLD (hyperlipidemia), HTN (hypertension), Irritable bowel syndrome, Liver disease, Metabolic syndrome, MI (myocardial infarction) ([address] Nausea & vomiting, Obesity, CHET (obstructive sleep apnea), S/P cardiac catheterization: 11/6/2014: 99% in-stent restenosis mid-RCA. LCx moderate LI's. LAD 85% mid-segment lesion., S/P PTCA:  5/11/2004: Proximal and mid RCA  Taxus 3.5 X 20 mm, and Taxus 3.0 X 32 mm., Systolic murmur, Thyroid disease, and Wears glasses. SURGICAL HISTORY    has a past surgical history that includes cardiovascular stress test (5-10-11); cardiovascular stress test (5-10-10); Cardiac catheterization (8-11-06); Cardiac catheterization (5-11-04); Cardiac catheterization (5-11-04); bladder suspension; hernia repair; Breast lumpectomy; Foot surgery; Cholecystectomy; Colonoscopy; Upper gastrointestinal endoscopy; Endoscopy, colon, diagnostic; partial hysterectomy (cervix not removed); Upper gastrointestinal endoscopy; Neck surgery (FEB 2016); back surgery (08/2016); Breast biopsy (10/10/2017); cervical fusion (N/A, 11/15/2017); eye surgery; Abdomen surgery;  Hysterectomy (1973); SOLN NEBULIZER SOLUTION    Inhale 3 mLs into the lungs every 4 hours as needed for Shortness of Breath    ISOSORBIDE MONONITRATE (IMDUR) 30 MG EXTENDED RELEASE TABLET    Take 2 tablets by mouth daily    LANCETS MISC    Check blood sugar q daily Dx E11.69    LEVOTHYROXINE (SYNTHROID) 125 MCG TABLET    Take 1 tablet by mouth Daily    LOSARTAN (COZAAR) 25 MG TABLET    Take 1 tablet by mouth daily    METOLAZONE (ZAROXOLYN) 2.5 MG TABLET    One tab every Monday and Thursday    METOPROLOL (LOPRESSOR) 100 MG TABLET    Take 1 tablet by mouth 2 times daily    MULTIPLE VITAMIN (MULTIVITAMIN) TABLET    Take 1 tablet by mouth daily    ONDANSETRON (ZOFRAN) 4 MG TABLET    Take 1 tablet by mouth daily as needed for Nausea or Vomiting    ONE TOUCH ULTRASOFT LANCETS MISC    use as directed BEFORE BREAKFAST AND SUPPER    OXCARBAZEPINE (TRILEPTAL) 150 MG TABLET    Take 1 tablet by mouth every morning    OXYGEN    Inhale 3 L into the lungs continuous     PANTOPRAZOLE (PROTONIX) 40 MG TABLET    Take 40 mg by mouth every morning (before breakfast)     POLYETHYLENE GLYCOL (GLYCOLAX) POWDER    Take 17 g by mouth daily as needed (Constipation)    POTASSIUM CHLORIDE (KLOR-CON M) 20 MEQ EXTENDED RELEASE TABLET    Take 0.5 tablets by mouth daily    RA SALINE NASAL SPRAY 0.65 % NASAL SPRAY    instill 1 spray into each nostril if needed for congestion    RANOLAZINE (RANEXA) 500 MG EXTENDED RELEASE TABLET    take 1 tablet by mouth twice a day    RISPERIDONE (RISPERDAL) 1 MG TABLET    Take 1 mg by mouth every morning     ROSUVASTATIN (CRESTOR) 20 MG TABLET    take 1 tablet by mouth once daily    SPIRONOLACTONE (ALDACTONE) 25 MG TABLET    Take 25 mg by mouth daily    VITAMIN D (ERGOCALCIFEROL) 05891 UNITS CAPSULE    Take 1 capsule by mouth once a week       ALLERGIES     is allergic to lipitor [atorvastatin]; motrin [ibuprofen micronized]; and codeine. FAMILY HISTORY     indicated that her mother is .  She indicated that her father is . She indicated that only one of her two sisters is alive. She indicated that two of her six brothers are alive. She indicated that her maternal grandmother is . She indicated that her maternal grandfather is . She indicated that her paternal grandmother is . She indicated that her paternal grandfather is . She indicated that the status of her child is unknown. She indicated that her other is alive. family history includes Breast Cancer (age of onset: 36) in her child; Cancer in her sister and sister; Heart Attack in her father; Heart Disease in her brother, brother, brother, brother, father, mother, and sister; Kidney Disease in her sister; Other in her brother; Ovarian Cancer (age of onset: 22) in an other family member. SOCIAL HISTORY      reports that she quit smoking about 12 years ago. Her smoking use included cigarettes. She has a 38.00 pack-year smoking history. She has never used smokeless tobacco. She reports that she does not drink alcohol or use drugs. PHYSICAL EXAM     INITIAL VITALS:  weight is 212 lb (96.2 kg). Her rectal temperature is 103.6 °F (39.8 °C). Her blood pressure is 98/44 (abnormal) and her pulse is 89. Her respiration is 32 (abnormal) and oxygen saturation is 95%. Physical Exam   Constitutional: She is oriented to person, place, and time. She appears well-developed and well-nourished. Non-toxic appearance. HENT:   Head: Normocephalic and atraumatic. Right Ear: Tympanic membrane and external ear normal.   Left Ear: Tympanic membrane and external ear normal.   Nose: Nose normal.   Mouth/Throat: Oropharynx is clear and moist and mucous membranes are normal. No oropharyngeal exudate, posterior oropharyngeal edema or posterior oropharyngeal erythema. Eyes: Conjunctivae and EOM are normal.   Neck: Normal range of motion. Neck supple. No JVD present.    Cardiovascular: Normal rate, regular rhythm, normal heart sounds, intact distal pulses and normal pulses. Exam reveals no gallop and no friction rub. No murmur heard. Pulmonary/Chest: Effort normal and breath sounds normal. No respiratory distress. She has no decreased breath sounds. She has no wheezes. She has no rhonchi. She has no rales. The patient was coughing at the initial encounter. Abdominal: Soft. Bowel sounds are normal. She exhibits distension. There is no tenderness. There is no rebound, no guarding and no CVA tenderness. Musculoskeletal: Normal range of motion. She exhibits no edema. Neurological: She is alert and oriented to person, place, and time. She exhibits normal muscle tone. Coordination normal.   Skin: Skin is warm and dry. No rash noted. She is not diaphoretic. Nursing note and vitals reviewed. DIAGNOSTIC RESULTS     EKG: All EKG's are interpreted by the Emergency Department Physician who either signs or Co-signs this chart in the absence of a cardiologist.  EKG interpreted by Devaughn Veliz MD:    Sinus tachycardia possible left atrial enlargement  Ventricular rate 104 VT interval 166 ms  QRS duration 80 ms  QTc interval 478 ms    RADIOLOGY: non-plain film images(s) such as CT, Ultrasound and MRI are read by the radiologist.    XR CHEST PORTABLE   Final Result   No gross acute infiltrate. PA and lateral radiographs could be performed if indicated clinically. **This report has been created using voice recognition software. It may contain minor errors which are inherent in voice recognition technology. **      Final report electronically signed by Dr. Melissa Abdi on 6/11/2019 12:06 PM      CT Head WO Contrast    (Results Pending)        LABS:   Labs Reviewed   CBC WITH AUTO DIFFERENTIAL - Abnormal; Notable for the following components:       Result Value    RBC 2.99 (*)     Hemoglobin 8.1 (*)     Hematocrit 25.7 (*)     MCHC 31.5 (*)     RDW-CV 14.8 (*)     RDW-SD 46.7 (*)     Platelets 84 (*)     Lymphocytes # 0.3 (*)     All other components within normal limits   BRAIN NATRIURETIC PEPTIDE - Abnormal; Notable for the following components:    Pro-BNP 21145.0 (*)     All other components within normal limits   PROTIME-INR - Abnormal; Notable for the following components:    INR 1.38 (*)     All other components within normal limits   LACTATE, SEPSIS - Abnormal; Notable for the following components:    Lactic Acid, Sepsis 3.2 (*)     All other components within normal limits   BASIC METABOLIC PANEL - Abnormal; Notable for the following components:    Sodium 133 (*)     Potassium 3.0 (*)     Chloride 88 (*)     Glucose 227 (*)     BUN 28 (*)     All other components within normal limits   HEPATIC FUNCTION PANEL - Abnormal; Notable for the following components:    Alkaline Phosphatase 34 (*)     AST 44 (*)     All other components within normal limits   BLOOD GAS, ARTERIAL - Abnormal; Notable for the following components:    pH, Blood Gas 7.48 (*)     HCO3 31 (*)     Base Excess (Calculated) 6.9 (*)     All other components within normal limits   TROPONIN - Abnormal; Notable for the following components:    Troponin T 0.163 (*)     All other components within normal limits   MAGNESIUM - Abnormal; Notable for the following components:    Magnesium 1.1 (*)     All other components within normal limits   ANION GAP - Abnormal; Notable for the following components:    Anion Gap 17.0 (*)     All other components within normal limits   GLOMERULAR FILTRATION RATE, ESTIMATED - Abnormal; Notable for the following components:    Est, Glom Filt Rate 44 (*)     All other components within normal limits   URINE WITH REFLEXED MICRO - Abnormal; Notable for the following components:    Protein,  (*)     Character, Urine CLOUDY (*)     All other components within normal limits   RAPID INFLUENZA A/B ANTIGENS   CULTURE BLOOD #2   CULTURE BLOOD #1   CARBOXYHEMOGLOBIN   LIPASE   OSMOLALITY   SCAN OF BLOOD SMEAR   LACTATE, SEPSIS       EMERGENCY DEPARTMENT COURSE:   Vitals: Vitals:    06/11/19 1153 06/11/19 1200 06/11/19 1244 06/11/19 1254   BP:  (!) 135/59 (!) 113/52 (!) 98/44   Pulse:  97 89 89   Resp: (!) 38 (!) 38 (!) 34 (!) 32   Temp:  104.6 °F (40.3 °C) 103.7 °F (39.8 °C) 103.6 °F (39.8 °C)   TempSrc:  Rectal Rectal Rectal   SpO2: 94% 94% 94% 95%   Weight:           11:34 AM: The patient was seen and evaluated. MDM:  Patient was started on IV fluids, was given Tylenol suppository and was also started on antibiotics after drawing her cultures. Patient's white count is 5000 with hemoglobin of 8.1. Patient's proBNP was 30,976. At that point patient's fluid was changed from 30 cc/kg to a liter. Patient lactic sepsis is 3.2. Patient will be admitted by Dr. Deb Lopez for further work-up and management. All the patient's labs and testing were reviewed patient's troponin was also elevated    CRITICAL CARE:   35 minutes of critical care time was spent on this patient because of sepsis  CONSULTS:  Dr. Elias Rico:     FINAL IMPRESSION      1. Septicemia ([facility] [address]    2. Elevated troponin    3. Hypokalemia          DISPOSITION/PLAN   Admit  PATIENT REFERRED TO:  ASHLEY Maurice Ra  ZIG[address]  [address]  [phone]            DISCHARGE MEDICATIONS:  New Prescriptions    No medications on file       (Please note that portions of this note were completed with a voice recognition program.  Efforts were made to edit the dictations but occasionally words are mis-transcribed.)    Scribe:  Lloyd Moon 6/11/19 11:34 AM Scribing for and in the presence of Anna Mina MD.    Signed by: Ina Kumar, 06/11/19 1:09 PM    Provider:  I personally performed the services described in the documentation, reviewed and edited the documentation which was dictated to the scribe in my presence, and it accurately records my words and actions.     Anna Mina MD 6/11/19 1:09 PM       Anna Mina MD  06/11/19 1306       Anna Mina MD  06/11/19 [id number]       Bernardino Corey Puckett MD  06/11/19 0593

## 2019-06-11 NOTE — H&P
History & Physical        Patient:  Janice Grier  YOB: 1945    MRN: [id number]     Acct: [de-identified]    PCP: ASHLEY Carrera CNP    Date of Admission: 6/11/2019    Date of Service: Pt seen/examined on 6/11/2019   and Admitted to Inpatient with expected LOS greater than two midnights due to medical therapy. Chief Complaint:   Chief Complaint   Patient presents with    Altered Mental Status       History Of Present Illness:      75 y.o. female who presented to [facility] with complaints of altered mentation. Patient presented from [facility]. It is reported the patient was altered at the facility with fevers. Fever reported to be 103°F at [facility]. Patient denies pain, otherwise patient is somnolent upon examination. Patient currently on 6 L nasal cannula, 2 L baseline. Fever upon arrival to emergency department is 105°F.  Past medical history includes CAD, anemia, IBS, aortic stenosis, CHF, hyperlipidemia, hypertension, GERD, asthma, diabetes, COPD, hypothyroidism bipolar disorder. Patient hemodynamically stable in the emergency department. Patient febrile at 105.0°F.  Respirations 30. Pulse 101 with a /64. Upon my examination BP was 98/44. O2 saturations on 6 L are 93%. Patient to be admitted this time for sepsis.       Past Medical History:          Diagnosis Date    Anemia     Anxiety     Arthritis     general    AS (aortic stenosis): mild to moderate by echo 4/2017 9/6/2017    ASHD (arteriosclerotic heart disease)     Asthma 2016    INHALER USE DAILY AND AS NEEDED    Bipolar disorder ([address]     Blood circulation, collateral     CAD (coronary artery disease) 1999    MI, 5 STENTS IN HEART NO SURE OF HEART DR NAME SEE'S [facility]    Cerebral artery occlusion with cerebral infarction ([address] 2018    SILENT-DX ON CT SCAN NO DEFICITS    CHF (congestive heart failure) (HCC)     COPD (chronic obstructive pulmonary disease) ([address] 2014    INHALER Successful drug-eluting stent x 2 of proximal and mid RCA.  CARDIAC CATHETERIZATION  5-11-04    70-80% proximal RCA stenosis w/ 50-70% mid RCA stenosis. There is 50-60% lesion in mid PDA. Mild proximal and mid LAD disease. Mild circumflex disease. Normal LV size and systolic function. EF 60%.  CARDIOVASCULAR STRESS TEST  5-10-11    No evidence of stress induced ischemia. EF 75%.  CARDIOVASCULAR STRESS TEST  5-10-10    No evidence of stress induced ischemia, infarct or scar. EF 74%.  CAROTID ENDARTERECTOMY      CERVICAL FUSION N/A 11/15/2017    REMOVAL OF PLATE N2-4, ACDF L6-0 W/ATLANTIS CORNERSTONE performed by Melly Saucedo MD at [facility], DIAGNOSTIC      EYE SURGERY      cataract 2017   [address]    INNER EAR SURGERY      IVC FILTER INSERTION      NECK SURGERY  FEB 2016   Candice Khan OTHER SURGICAL HISTORY  02/13/2019    Arteriogram for diagnostics    PARTIAL HYSTERECTOMY      UPPER GASTROINTESTINAL ENDOSCOPY      UPPER GASTROINTESTINAL ENDOSCOPY         Medications Prior to Admission:      Prior to Admission medications    Medication Sig Start Date End Date Taking?  Authorizing Provider   busPIRone (BUSPAR) 7.5 MG tablet Take 1 tablet by mouth 3 times daily 5/22/19 8/20/19  Santo Festus, APRN - CNP   potassium chloride (KLOR-CON M) 20 MEQ extended release tablet Take 0.5 tablets by mouth daily 5/21/19   Deena Dixon, APRN - CNP   losartan (COZAAR) 25 MG tablet Take 1 tablet by mouth daily 5/21/19   Deena Dixon, APRN - CNP   ipratropium-albuterol (DUONEB) 0.5-2.5 (3) MG/3ML SOLN nebulizer solution Inhale 3 mLs into the lungs every 4 hours as needed for Shortness of Breath 5/16/19   Blake Mortensen, APRN - CNP   aspirin (SM ASPIRIN ADULT LOW STRENGTH) 81 MG EC tablet take 1 tablet by mouth once daily 5/8/19   Blake Mortensen, APRN - CNP   metoprolol (LOPRESSOR) 100 MG tablet Take 1 tablet by mouth 2 times daily 4/29/19   Daune Pallas, MD    MG capsule take 3 capsules by mouth every evening 4/25/19   ASHLEY Cruz CNP   doxepin (SINEQUAN) 50 MG capsule Take 1-2 capsules by mouth nightly for sleep 4/3/19   Historical Provider, MD   escitalopram (LEXAPRO) 20 MG tablet Take 20 mg by mouth every morning 4/3/19   Historical Provider, MD   spironolactone (ALDACTONE) 25 MG tablet Take 25 mg by mouth daily 4/10/19   Historical Provider, MD   ferrous sulfate 325 (65 Fe) MG tablet One every other day.   Take with vitamin C 500 mg 4/17/19   Callum Lopes MD   albuterol sulfate HFA (VENTOLIN HFA) 108 (90 Base) MCG/ACT inhaler Inhale 2 puffs into the lungs every 6 hours as needed for Wheezing 4/17/19   Callum Lopes MD   baclofen (LIORESAL) 10 MG tablet Take 1 tablet by mouth 2 times daily Indications: 0.5 tablet to 1 tablet 4/17/19   Callum Lopes MD   clopidogrel (PLAVIX) 75 MG tablet Take 1 tablet by mouth daily 4/17/19   Callum Lopes MD   fluticasone [facility]) 50 MCG/ACT nasal spray 1 spray by Nasal route daily 4/17/19   Callum Lopes MD   furosemide (LASIX) 40 MG tablet Take 1 tablet by mouth 2 times daily  Patient taking differently: Take 20 mg by mouth 2 times daily  4/17/19   Callum Lopes MD   hydrALAZINE (APRESOLINE) 50 MG tablet Take 1 tablet by mouth 3 times daily 4/17/19   Callum Lopes MD   isosorbide mononitrate (IMDUR) 30 MG extended release tablet Take 2 tablets by mouth daily  Patient taking differently: Take 30 mg by mouth daily  4/17/19   Callum Lopes MD   levothyroxine (SYNTHROID) 125 MCG tablet Take 1 tablet by mouth Daily 4/17/19   Callum Lopes MD   metolazone (ZAROXOLYN) 2.5 MG tablet One tab every Monday and Thursday 4/17/19   Callum Lopes MD   OXcarbazepine (TRILEPTAL) 150 MG tablet Take 1 tablet by mouth every morning 4/17/19   Callum Lopes MD   budesonide-formoterol (SYMBICORT) 160-4.5 MCG/ACT AERO Inhale 2 puffs into the lungs 2 times daily 4/17/19   Modena Cockayne, MD   vitamin D (ERGOCALCIFEROL) 91732 units capsule Take 1 capsule by mouth once a week 4/11/19   Modena Cockayne, MD   cycloSPORINE (RESTASIS) 0.05 % ophthalmic emulsion Place 1 drop into both eyes 2 times daily 4/7/19   Modena Cockayne, MD   ondansetron [facility]) 4 MG tablet Take 1 tablet by mouth daily as needed for Nausea or Vomiting 4/3/19   ASHLEY Ramon CNP   Handicap Placard MISC by Does not apply route Expires 25 MARCH 2014 3/25/19   Cristiane Oglesby,    Multiple Vitamin (MULTIVITAMIN) tablet Take 1 tablet by mouth daily 2/5/19   [address] [place], [place] [address], MD    SALINE NASAL SPRAY 0.65 % nasal spray instill 1 spray into each nostril if needed for congestion 1/7/19   ASHLEY Ramon CNP   cyanocobalamin ([facility] VITAMIN B12) 1000 MCG tablet Take 1 tablet by mouth daily 12/29/18   Astrid Gomez MD   ranolazine (RANEXA) 500 MG extended release tablet take 1 tablet by mouth twice a day 12/18/18   Cami Holland PA-C   polyethylene glycol (GLYCOLAX) powder Take 17 g by mouth daily as needed (Constipation)    Historical Provider, MD   Calcium Carbonate-Vitamin D (OYSTER SHELL CALCIUM/D) 500-200 MG-UNIT TABS take 1 tablet by mouth twice a day 9/19/18   ASHLEY Ramon CNP   rosuvastatin (CRESTOR) 20 MG tablet take 1 tablet by mouth once daily 9/18/18   Cami Holland PA-C   hydrOXYzine (ATARAX) 25 MG tablet Take 25 mg by mouth 3 times daily    Historical Provider, MD   Lancets MISC Check blood sugar q daily Dx E11.69 11/9/17   Daryle Lites, DO   Blood Glucose Monitoring Suppl [place] Check blood sugar q daily Dx E11.69 11/9/17   Daryle Lites, DO   OXYGEN Inhale 3 L into the lungs continuous     Historical Provider, MD   ONE TOUCH ULTRASOFT LANCETS MISC use as directed BEFORE BREAKFAST AND SUPPER 10/15/14   Oliva Cota, APRN - CNP   pantoprazole (PROTONIX) 40 MG tablet Take 40 mg by mouth every morning (before breakfast) Historical Provider, MD   risperiDONE (RISPERDAL) 1 MG tablet Take 1 mg by mouth every morning     Historical Provider, MD       Allergies:  Lipitor [atorvastatin]; Motrin [ibuprofen micronized]; and Codeine    Social History:      The patient [facility]:   reports that she quit smoking about 12 years ago. Her smoking use included cigarettes. She has a 38.00 pack-year smoking history. She has never used smokeless tobacco.  ETOH:   reports that she does not drink alcohol. Family History:      Reviewed in detail and negative for DM and CVA. Positive as follows:        Problem Relation Age of Onset    Heart Disease Mother         CHF    Heart Disease Father         CAD    Heart Attack Father     Kidney Disease Sister         DIALYSIS    Cancer Sister         RECTUM    Heart Disease Sister     Heart Disease Brother     Heart Disease Brother         CAD    Heart Disease Brother     Heart Disease Brother     Breast Cancer Child 36    Cancer Sister         UTERINE    Ovarian Cancer Other 22    Other Brother         SUICIDE       Diet:  No diet orders on file    REVIEW OF SYSTEMS:   Pertinent positives as noted in the HPI. All other systems reviewed and negative. PHYSICAL EXAM:    BP (!) 98/44   Pulse 89   Temp 103.6 °F (39.8 °C) (Rectal)   Resp (!) 32   Wt 212 lb (96.2 kg)   LMP 02/12/1973 (Approximate)   SpO2 95%   BMI 34.74 kg/m²     General appearance:  Noted apparent distress, appears stated age and cooperative. HEENT:  Normal cephalic, atraumatic without obvious deformity. Pupils equal, round, and reactive to light. Extra ocular muscles intact. Conjunctivae/corneas clear. Neck: Supple, with full range of motion. Trachea midline. Respiratory:  Increased respiratory effort. Decreased bases bilaterally. Cardiovascular:  Regular rate and rhythm with normal S1/S2 without, rubs or gallops.   Abdomen: Soft, non-tender, non-distended with normal bowel Other-    ASSESSMENT:    Active Hospital Problems    Diagnosis Date Noted    Sepsis ([place] [address] [A41.9] 06/11/2019     Priority: High    Acute on chronic diastolic congestive heart failure due to valvular disease ([place] [address] [I50.33, I38] 03/27/2018     Priority: High    Type 2 diabetes mellitus with complication, with long-term current use of insulin (HCC) [E11.8, Z79.4]      Priority: Medium    Anemia [D64.9]      Priority: Low    Essential hypertension [I10] 01/29/2015     Priority: Low    Hypothyroid [E03.9] 01/15/2014     Priority: Low    CAD (coronary artery disease) [I25.10]      Priority: Low    Dyslipidemia [E78.5]      Priority: Low    Elevated troponin [R74.8] 06/11/2019    Acute exacerbation of COPD with asthma ([place] [address] [J44.1, J45.901] 04/14/2019    Morbid obesity with BMI of 40.0-44.9, adult ([place] [address] [E66.01, Z68.41] 01/24/2019    B12 deficiency due to diet [E53.8]     At risk for polypharmacy [Z91.89] 12/21/2018    CRF (chronic renal failure), stage 4 (severe) ([place] [address] [N18.4] 11/27/2018    Acute on chronic respiratory failure with hypoxia and hypercapnia (HCC) [U98.88, J96.22] 08/27/2018    Anxiety and depression [F41.9, F32.9] 04/17/2018    ASCVD (arteriosclerotic cardiovascular disease) [I25.10]     Hyperlipidemia [E78.5] 11/18/2014    POND (nonalcoholic steatohepatitis) [K75.81] 11/18/2014    History of tobacco abuse: Quit in 2009 [Z87.891] 10/28/2014    GERD (gastroesophageal reflux disease) [K21.9]        PLAN:    Admit to Telemetry Unit  Diet NPO  IVF hydration as needed, ultimately patient will need diuresed  Electrolyte replacement  Analgesics PRN  Antiemetics PRN  DVT prophylaxis  PT/OT encouraged, if warranted  IS  Duonebs scheduled Q4  Cultures pending  Empiric antibiotics  Troponin trend  EKG in AM  BiPAP  Will need to re-evaluate home medications in morning  Continue to monitor closely               Thank you ASHLEY Mead CNP for the opportunity to be involved in this patient's care.    Electronically signed by Maggie Hernandez DO on 6/11/2019 at 1:20 PM

## 2019-06-11 NOTE — PROGRESS NOTES
mcg/mL    Assessment/Plan:  Patient received 1 g vancomycin in ER this afternoon; will add another 500 mg vancomycin x11 followed by 1500 mg IV every 24 hours. Timing of trough level will be determined based on culture results, renal function, and clinical response. Thank you for the consult. Will continue to follow.

## 2019-06-12 NOTE — CARE COORDINATION
19, 7:55 AM      Neal Cast       Admitted from: ED 2019/ [address] day: 1   Location: --A Reason for admit: Sepsis [facility]) [A41.9] Status: IP  Admit order signed?: yes  PMH:  has a past medical history of Anemia, Anxiety, Arthritis, AS (aortic stenosis): mild to moderate by echo 2017, ASHD (arteriosclerotic heart disease), Asthma, Bipolar disorder ([address] Blood circulation, collateral, CAD (coronary artery disease), Cerebral artery occlusion with cerebral infarction ([address] CHF (congestive heart failure) ([address] COPD (chronic obstructive pulmonary disease) ([address] Depression, Disease of blood and blood forming organ, DM (diabetes mellitus) ([address] FH: CAD (coronary artery disease), GERD (gastroesophageal reflux disease), History of blood transfusion, History of blood transfusion, History of tobacco abuse: Quit in , HLD (hyperlipidemia), HTN (hypertension), Irritable bowel syndrome, Liver disease, Metabolic syndrome, MI (myocardial infarction) ([address] Nausea & vomiting, Obesity, CHET (obstructive sleep apnea), S/P cardiac catheterization: 2014: 99% in-stent restenosis mid-RCA. LCx moderate LI's. LAD 85% mid-segment lesion., S/P PTCA:  2004: Proximal and mid RCA  Taxus 3.5 X 20 mm, and Taxus 3.0 X 32 mm., Systolic murmur, Thyroid disease, and Wears glasses.   Procedure: none  Pertinent abnormal Imagin/12  Small lacunar infarct in the head of caudate nucleus in the right new since the prior exam    There are mild bilateral perihilar infiltrates  Medications:  Scheduled Meds:   ipratropium-albuterol  1 ampule Inhalation Q4H    aspirin  81 mg Oral Daily    mometasone-formoterol  2 puff Inhalation BID    calcium-vitamin D  1 tablet Oral Daily    clopidogrel  75 mg Oral Daily    cyanocobalamin  1,000 mcg Oral Daily    escitalopram  20 mg Oral QAM    fluticasone  1 spray Nasal Daily    hydrOXYzine  25 mg Oral TID    hydrALAZINE  50 mg Oral TID    levothyroxine  125 mcg Oral Daily    [START ON 6/13/2019] metolazone  2.5 mg Oral Once per day on Mon Thu    metoprolol  100 mg Oral BID    multivitamin  1 tablet Oral Daily    OXcarbazepine  150 mg Oral QAM    pantoprazole  40 mg Oral QAM AC    potassium chloride  10 mEq Oral Daily    risperiDONE  1 mg Oral QAM    spironolactone  25 mg Oral Daily    rosuvastatin  20 mg Oral Nightly    ranolazine  500 mg Oral BID    furosemide  40 mg Intravenous BID    sodium chloride flush  10 mL Intravenous 2 times per day    enoxaparin  40 mg Subcutaneous Daily    potassium replacement protocol   Other RX Placeholder    insulin lispro  0-6 Units Subcutaneous TID WC    insulin lispro  0-3 Units Subcutaneous Nightly    busPIRone  7.5 mg Oral TID    glycerin-hypromellose-  1 drop Both Eyes BID    vancomycin (VANCOCIN) intermittent dosing (placeholder)   Other RX Placeholder    vancomycin  1,500 mg Intravenous Q24H    piperacillin-tazobactam  3.375 g Intravenous Q8H    ferrous sulfate  325 mg Oral Every Other Day    isosorbide mononitrate  30 mg Oral Daily    [START ON 6/13/2019] vitamin D  50,000 Units Oral Once per day on Mon Thu     Continuous Infusions:   DOBUTamine      dextrose        Pertinent Info/Orders/Treatment Plan: Hypotension improved with IVF per report, AB//IVF continued. (+) Blood Culture: Gram Positive Cocci, (+) Blood Film Array: Staphylococcus, K+ 3, elevated Troponin, H 8.1, elevated BNP; monitor. Neurology consulted for Expressive Aphasia, Repetitive Speech, possible acute stroke, ID consulted for Staph Sepsis, recent TAVR, Cardiology following for CHF, Elevated Troponin  Diet: Diet NPO Effective Now   Smoking status:  reports that she quit smoking about 12 years ago. Her smoking use included cigarettes. She has a 38.00 pack-year smoking history.  She has never used smokeless tobacco.   PCP: Tyler Higgins, APRN - CNP  Readmission: none  Readmission Risk Score: 78%    Discharge Planning  Current Residence:  Nursing Home  Living Arrangements:  Other (Comment)   Support Systems:  Children  Current Services PTA:     Potential Assistance Needed:  N/A  Potential Assistance Purchasing Medications:  No  Does patient want to participate in local refill/ meds to beds program?  No  Type of Home Care Services:  None  Patient expects to be discharged to:  [facility]  Expected Discharge date:  06/14/19  Follow Up Appointment: Best Day/ Time: Monday AM    Discharge Plan: plans return [address] [facility] [place],has oxygen 3L ATC, PEG TF, nebulizer; therapies consulted     Evaluation: yes

## 2019-06-12 NOTE — PLAN OF CARE
Problem: Impaired respiratory status  Goal: Patient will achieve/maintain normal respiratory rate/effort  6/11/2019 2049 by Yari De La Torre RCP  Outcome: Ongoing   Pt remains on bipap at this time.

## 2019-06-12 NOTE — PROGRESS NOTES
1515: Patient came back from MRI, respirations noted to be 35 per minute using accessory muscles , BP 76/54, rectal temp 101, patient very lethargic, awakens to painful stimuli. Patient placed on bipap. Heart rate 195-210 stat EKG called. EKG showing afib RVR poor data quality. 1518: Rapid response called. Blood glucose 254.   1519; , RR 24, BP 60/47 MAP 54  1520- Crash cart in room. Dr. Joanna Miranda called. 1522-Dr. Marshall Stage in room. 1 L normal saline bolus started. Digoxin 250 mcg         given. 1535: ABG's attempted-unable to get. 1539: Patient transferred to ICU room 9. Edna Martinez, daughter called and updated. Report called to Blake Yang RN.

## 2019-06-12 NOTE — DISCHARGE INSTR - COC
Continuity of Care Form    Patient Name: Sarah Ross   :    MRN:  [id number]    Admit date:  2019  Discharge date:  19    Code Status Order: Full Code   Advance Directives:   Advance Care Flowsheet Documentation     Date/Time Healthcare Directive Type of Healthcare Directive Copy in [address] Agent's Name Healthcare Agent's Phone Number    19 [phone]  No, patient does not have an advance directive for healthcare treatment -- -- -- -- --          Admitting Physician:  Alysia Denson DO  PCP: ASHLEY Sanchez - CNP    Discharging Nurse: Jose Manuel Gallegos Unit/Room#: 3B-35/035-A  Discharging Unit Phone Number: [phone]    Emergency Contact:   Extended Emergency Contact Information  Primary Emergency Contact: Ann-Marie Page [address] Phone: [phone]  Mobile Phone: [phone]  Relation: Child  Hearing or visual needs: None  Other needs: None  Preferred language: English   needed? No  Secondary Emergency Contact: Eliana Heredia  Address: [address] [place] [address] Phone: [phone]  Relation: Other  Hearing or visual needs: None  Other needs: None  Preferred language: English   needed? No    Past Surgical History:  Past Surgical History:   Procedure Laterality Date    ABDOMEN SURGERY      AORTIC VALVE SURGERY  2019    TAVR    BACK SURGERY  2016        BLADDER SUSPENSION      BREAST BIOPSY  10/10/2017    BREAST LUMPECTOMY      CARDIAC CATHETERIZATION  06    Mild nonobstructive CAD w/ diffuse 10-20% proximal and mid LAD stenosis and 10-20% proximal/ostial RCA stenosis. No obstructive lesions. RCA stents are patent w/o evidence of restenosis. Normal LV systolic function. EF 65%. Trace MR - catheter induced. Minimally elevated LVEDP - 13mmHg. Mild to moderate aortoiliac PVD w/o obstructive lesions.     CARDIAC CATHETERIZATION  5-11-04    Successful drug-eluting stent x 2 of proximal and mid RCA.  CARDIAC CATHETERIZATION  5-11-04    70-80% proximal RCA stenosis w/ 50-70% mid RCA stenosis. There is 50-60% lesion in mid PDA. Mild proximal and mid LAD disease. Mild circumflex disease. Normal LV size and systolic function. EF 60%.  CARDIOVASCULAR STRESS TEST  5-10-11    No evidence of stress induced ischemia. EF 75%.  CARDIOVASCULAR STRESS TEST  5-10-10    No evidence of stress induced ischemia, infarct or scar. EF 74%.     CAROTID ENDARTERECTOMY      CERVICAL FUSION N/A 11/15/2017    REMOVAL OF PLATE Y9-3, ACDF G0-0 W/ATLANTIS CORNERSTONE performed by Melly Saucedo MD at [address] [place] [address], [facility]      EYE SURGERY      cataract 2017   Apteegi 1    INNER EAR SURGERY      IVC FILTER INSERTION      NECK SURGERY  FEB 2016   Candice Khan OTHER SURGICAL HISTORY  02/13/2019    Arteriogram for diagnostics    PARTIAL HYSTERECTOMY      UPPER GASTROINTESTINAL ENDOSCOPY      UPPER GASTROINTESTINAL ENDOSCOPY         Immunization History:   Immunization History   Administered Date(s) Administered    Influenza Virus Vaccine 10/01/2012, 10/02/2013, 09/16/2014, 09/30/2015    Influenza, High Dose (Fluzone 65 yrs and older) 10/31/2016    Influenza, Milderd Mons, 3 Years and older, IM (Fluzone 3 yrs and older or Afluria 5 yrs and older) 09/21/2017    Influenza, Milderd Mons, 6 mo and older, IM, PF (Flulaval, Fluarix) 11/04/2018    Pneumococcal 13-valent Conjugate ([id number]) 11/17/2015    Pneumococcal Polysaccharide ([id number]) 10/01/2012    Tdap (Boostrix, Adacel) 12/16/2012       Active Problems:  Patient Active Problem List   Diagnosis Code    Anxiety F41.9    Depression F32.9    Type 2 diabetes mellitus with complication, with long-term current use of insulin ([facility] [address] E11.8, Z79.4    MI (myocardial infarction) ([facility] [address] I21.9    Dyslipidemia E78.5    CAD (coronary artery disease) I25.10    COPD without exacerbation ([facility]) J44.9    GERD (gastroesophageal reflux disease) K21.9    Hypothyroid E03.9    History of tobacco abuse: Quit in 2009 Z87.891    S/P PTCA: 3/2/2017: PTCA RCA ISR. 12/11/2014: LAD Resolute 3.0X26. 11/6/2014: Stenting RCA Blanca Thakkar 8.2O56 and 3.5X18. 5/11/2004: Proximal & mid RCA Taxus 3.5X20 mm, and Taxus 3.0X32 mm. Z51.69    Metabolic syndrome R65.98    S/P cardiac catheterization: 11/6/2014: 99% in-stent restenosis mid-RCA. LCx moderate LI's. LAD 85% mid-segment lesion. Z98.890    POND (nonalcoholic steatohepatitis) K75.81    Hyperlipidemia E78.5    Essential hypertension I10    Obesity (BMI 30-39. 9) E66.9    Moderate dehydration E86.0    ASCVD (arteriosclerotic cardiovascular disease) I25.10    Chronic respiratory failure with hypoxia ([facility]) J96.11    Other hyperlipidemia E95.17    Dysmetabolic syndrome R54.65    Bilateral iliac artery occlusion ([facility]) I74.5    CHET (obstructive sleep apnea) G47.33    Aortic valve stenosis I35.0    Nocturnal hypoxemia G47.34    Hyponatremia E87.1    Acute on chronic diastolic congestive heart failure due to valvular disease ([facility]) I50.33, I38    Sympathotonic orthostatic hypotension I95.1    E-coli UTI N39.0, B96.20    Anxiety and depression F41.9, F32.9    Dizziness R42    Chest pain R07.9    Acute on chronic respiratory failure with hypoxia and hypercapnia ([facility]) J96.21, J96.22    Moderate persistent asthma without complication A95.26    Pulmonary nodule R91.1    Heart failure with preserved ejection fraction ([facility]) I50.30    Osteoarthritis of multiple joints M15.9    Class 2 obesity due to excess calories with body mass index (BMI) of 38.0 to 38.9 in adult E66.09, Z68.38    Acute on chronic diastolic CHF (congestive heart failure) ([facility]) I50.33    Normocytic anemia D64.9    Subclavian vein stenosis I87.1    Post concussive syndrome F07.81    CRF (chronic renal failure), stage 4 (severe) ([facility]) N18.4    Hypotension I95.9    Syncope and collapse M09    Metabolic encephalopathy M27.18    Right-sided epistaxis T18.6    Diastolic dysfunction without heart failure I51.89    Cervical spine instability M53.2X2    Nonrheumatic aortic valve stenosis I35.0    Acute diastolic heart failure ([facility]) I50.31    Acute kidney injury ([facility]) N17.9    Hypercalcemia E83.52    At risk for polypharmacy Z91.89    Acute respiratory failure with hypoxia and hypercapnia ([facility]) J96.01, J96.02    Acute pulmonary edema ([facility]) J81.0    Abnormal urinalysis R82.90    Irritable bowel syndrome K58.9    Anemia requiring transfusions D64.9    Stage 3 severe COPD by GOLD classification ([facility]) J44.9    Bilateral carotid artery stenosis I65.23    Iron deficiency anemia due to dietary causes D50.8    B12 deficiency due to diet K76.4    Diastolic CHF, acute on chronic ([facility]) I50.33    Anemia D64.9    Morbid obesity with BMI of 40.0-44.9, adult ([facility]) E66.01, Z68.41    Hyponatremia with decreased serum osmolality I63.7    Metabolic alkalosis T48.0    Chronic diastolic congestive heart failure ([facility]) I50.32    Acute exacerbation of COPD with asthma ([facility]) J44.1, J45.901    Acute respiratory failure with hypoxia and hypercapnia ([facility]) J96.01, J96.02    Severe aortic stenosis I35.0    S/P TAVR (transcatheter aortic valve replacement) Z95.2    Sepsis ([facility]) A41.9    Elevated transaminase level R74.0    Electrolyte imbalance E87.8    Septicemia ([facility]) A41.9    Elevated troponin R74.8    Acute ischemic stroke ([facility]) I63.9    Sepsis due to Staphylococcus aureus ([facility]) A41.01    Atrial fibrillation with rapid ventricular response ([facility]) I48.91    Complex cyst of left ovary N83.292    Complex cyst of right ovary N83.291       Isolation/Infection:   Isolation          Contact        Patient Infection Status     Infection Encounter Level?  Onset Date Added Added By Resolved Resolved By Review Date    MRSA No 05/01/19 05/05/19 MRSA Screening Culture Only       Rectal 5/2019    VRE No 05/01/19 05/01/19 VRE Screen by PCR       Feces Yosi Chelle A 5/2019          Nurse Assessment:  Last Vital Signs: /61   Pulse 62   Temp 98.1 °F (36.7 °C) (Oral)   Resp 24   Ht 5' 5\" (1.651 m)   Wt 221 lb 9 oz (100.5 kg)   LMP 02/12/1973 (Approximate)   SpO2 97%   BMI 36.87 kg/m²     Last documented pain score (0-10 scale): Pain Level: 3  Last Weight:   Wt Readings from Last 1 Encounters:   06/17/19 221 lb 9 oz (100.5 kg)     Mental Status:  oriented, alert, coherent, logical, thought processes intact and able to concentrate and follow conversation    IV Access:  - PICC - site  R Upper Arm, insertion date: 6/18-19    Nursing Mobility/ADLs:  Walking   Dependent  Transfer  [address]  Dependent  Med Delivery   whole    Wound Care Documentation and Therapy:  Wound 02/03/19 Buttocks Mid;Lower; Inner; Outer;Right;Left red non blanchable (Active)   Number of days: 134       Wound 04/14/19 Ear Right;Left Stage I (Active)   Number of days: 64       Wound 06/11/19 Coccyx Stage I coccyx (Active)   Wound Pressure Stage  1 6/18/2019  9:27 AM   Dressing Status Clean;Dry; Intact 6/18/2019 11:37 AM   Dressing/Treatment Open to air;Protective barrier 6/18/2019 11:37 AM   Wound Cleansed Other (Comment) 6/14/2019 12:30 PM   Wound Assessment Pink;Red;Fragile 6/18/2019 11:37 AM   Drainage Amount None 6/18/2019 11:37 AM   Odor None 6/17/2019 11:46 AM   Paulina-wound Assessment Intact; Red 6/18/2019 11:37 AM   Number of days: 6       Wound 06/13/19 Face Right;Left (Active)   Wound Pressure Stage  2 6/18/2019 11:37 AM   Dressing/Treatment Open to air 6/18/2019 11:37 AM   Wound Assessment Red 6/18/2019 11:37 AM   Drainage Amount None 6/18/2019 11:37 AM   Odor None 6/17/2019 11:46 AM   Paulina-wound Assessment Clean;Dry; Intact 6/18/2019 11:37 AM   Non-staged Wound Description Partial thickness 6/18/2019 11:37 AM   Culture Taken No 6/13/2019  1:35 PM   Number of days: 5        Elimination:  Continence:   · Bowel: Yes  · Bladder: Yes  Urinary Catheter: None   Colostomy/Ileostomy/Ileal Conduit: No  [REMOVED] Rectal Tube With balloon-Stool Appearance: Watery  [REMOVED] Rectal Tube With balloon-Stool Color: Brown  [REMOVED] Rectal Tube With balloon-Stool Amount: Small    Date of Last BM: 6/18/19    Intake/Output Summary (Last 24 hours) at 6/18/2019 1355  Last data filed at 6/18/2019 1133  Gross per 24 hour   Intake 1151 ml   Output 3225 ml   Net -2074 ml     I/O last 3 completed shifts: In: 8917 [P.O.:390; I.V.:1001]  Out: 1875 [Urine:1650; Stool:225]    Safety Concerns: At Risk for Falls    Impairments/Disabilities:      None    Nutrition Therapy:  Current Nutrition Therapy:   - Oral Diet:  Cardiac    Routes of Feeding: Oral  Liquids: No Restrictions  Daily Fluid Restriction: no  Last Modified Barium Swallow with Video (Video Swallowing Test): not done    Treatments at the Time of Hospital Discharge:   Respiratory Treatments: duonebs prn  Oxygen Therapy:  is on oxygen at 4 L/min per nasal cannula.   Ventilator:    - BiPAP   IPAP: 14, EPAP: 6 cmH2O only when sleeping    Rehab Therapies: Physical Therapy and Occupational Therapy  Weight Bearing Status/Restrictions: No weight bearing restirctions  Other Medical Equipment (for information only, NOT a DME order):  walker, bedside commode and hospital bed  Other Treatments: n/a    Patient's personal belongings (please select all that are sent with patient):  Glasses, Dentures lower    RN SIGNATURE:  Electronically signed by Amanda Parkinson RN on 6/18/19 at 3:35 PM    CASE MANAGEMENT/SOCIAL WORK SECTION    Inpatient Status Date:  06/11/2019    Readmission Risk Assessment Score:  Readmission Risk              Risk of Unplanned Readmission:        76           Discharging to Facility/ Agency   · Name:  [facility]  · Address:  [address] [place] [address] [address]  · Phone:  [phone]  · Fax:  [phone]    Dialysis Facility (if applicable)   · Name:  · Address:  · Dialysis Schedule:  · Phone:  · Fax:    / signature: Electronically signed by PAKO Melchor on 6/12/19 at 7:29 AM    PHYSICIAN SECTION    Prognosis: Fair    Condition at Discharge: Stable    Rehab Potential (if transferring to Rehab): Good    Recommended Labs or Other Treatments After Discharge:  Renal function and CBC    Physician Certification: I certify the above information and transfer of Eli Serra  is necessary for the continuing treatment of the diagnosis listed and that she requires LTAC for greater 30 days.      Update Admission H&P: No change in H&P    PHYSICIAN SIGNATURE:  Electronically signed by Sharon Nunn MD on 6/18/19 at 3:46 PM

## 2019-06-12 NOTE — PROGRESS NOTES
Patient came back from the MRI around 3:05 PM and subsequently patient was tachycardic with heart rate at 170s and blood pressure trended down to the 60s. Rapid response called, and was given 1 L bolus as the patient was suspected to have A. fib with RVR contributing to hypotension, along with digoxin 250 MCG IV. Endorsed to MICU attending Dr. Christi Mitchell.   Patient cardioverted while in MICU and converted to sinus tachycardia, 100 J given approximately around 3:50 PM.  Plan to start anticoagulation with heparin

## 2019-06-12 NOTE — PROCEDURES
Central Line Placement:  After informed consent was obtained, patient was placed in the attention position. Patient was placed in deep Trendelenburg position. Patient was prepped using Chlorhexidene prep. Patient was draped utilizing sterile gloves, hair net, mask, sterile gown and sterile OR towels. Maximum barrier precautions were utilized. Utilizing the left subclavian approach, patient received 5 cc of 1% lidocaine. Utilizing an introducer needle, it was placed subcutaneously advanced under the clavicle and leveled flat. It was subsequently advanced toward the thoracic inlet, until venous return was obtained. A guide wire was placed through the introducer needle. The introducer needle was subsequently withdrawn leaving the guide wire in place. Utilizing a scalpel, a small incision was made in the skin. A punch dilator was placed over the guide wire and subsequently withdrawn leaving the guide wire in place. A triple-lumen catheter was subsequently placed over the guide wire. The guide wire was subsequently withdrawn leaving this catheter in place. All ports had good venous return and were subsequently flushed with saline. The catheter was secured using 2.0 silk. Secondary cleansing with chlorhexidine prep was subsequently placed. Tegaderm with bio- patch dressing was utilized for secondary securing and protection. There were no complications. EBL:   Less than 5 mL      Indication: Cardiogenic shock    Electronically signed by Liliana Mendez.  Zelda Forrester MD

## 2019-06-12 NOTE — PROGRESS NOTES
2235Kathylenorris BROWN, 9L20: Admitted today for sepsis. Blood cultures from IV start in ED showed gram + cocci in cluster. Patient is on vancomycin and zosyn currently. If you would like anything done please let me know thanks  ---- No new orders    2345: Message sent to [facility], Update: Patient's last BP was 80/59 MAP of 66. She has no fluids running d/t her BNP on admission being 50697. Has been running in the 90s - lower 100s since admission. Temperature was still 102. 1. Has come down from 104.7. Chest xray in ED showed no gross acute infiltrate  ---- She placed orders for 500ml bolus    0052: Message sent to [facility]: Currently blood pressure 67/51 bolus is still running. Map is 57    0103: Mike Espinoza called back. She wanted another 500ml bolus given. Orders place. 3816Joenathan Dago of BP dropping to 67/51 MAP of 53.   --- She ordered for dobutamine to be started at 0.5. Also was ok with having a Cheetah done.

## 2019-06-12 NOTE — CONSULTS
[address] [place] [address]                                  CONSULTATION    PATIENT NAME: Ruchi Martinez                     :        1945  MED REC NO:   [id number]                           ROOM:       0002  ACCOUNT NO:   [de-identified]                           ADMIT DATE: 2019  PROVIDER:     JOS Rhodes Signs:  2019    CARDIOLOGY CONSULT    REASON FOR THE CONSULTATION:  Stress test in a patient with recent  aortic valve surgery with TAVR and element of congestive heart failure. REQUESTING PROVIDER:  Hospitalist Service. HISTORY OF PRESENT ILLNESS:  This is a pleasant 59-year-old lady who  apparently had a recent transcatheter valve replacement and has done  well since then with some intermittent congestive heart failure. She  has a history of underlying coronary artery disease, hypertension,  hyperlipidemia. She is residing at a nursing home, recovering from her  TAVR and has some underlying musculoskeletal limitation, comes in with  complete mental status change, unresponsiveness and is being treated for  what looks like a sepsis infection. When I came to evaluate, the  patient's family were not in the room. I was unable to get much of a  history and I tried to obtain the information from the record as well as  from the staff and the ER admission. REVIEW OF SYSTEMS:  As above, was unable to obtain much of a history. History was obtained mainly from reviewing the chart. The patient has  been back and forth for some CHF symptoms. She has been at the nursing  home. She has quite a bit of limitation and some chronic symptoms  mainly in the cardiovascular department as well as arthritis symptoms. PAST MEDICAL HISTORY:  1. Coronary artery disease. 2.  Aortic valve replacement with transcatheter valve replacement. 3.  Hypertension. 4.  Hyperlipidemia. 5.  Obesity.   6. Depression. ALLERGIES:  LIPITOR, MOTRIN. MEDICATIONS:  Aspirin 81 a day, Plavix 75 a day, Lexapro, Crestor 20 a  day, insulin, Imdur 30 a day, metoprolol 100 twice a day, Zaroxolyn 2.5  a day, Ranexa 500 twice a day, spirolactone. SOCIAL HISTORY:  No tobacco.  No drugs or alcohol. FAMILY HISTORY:  Noncontributory. PHYSICAL EXAMINATION:  VITAL SIGNS:  Blood pressure of 130/80, heart rate of 90. GENERAL APPEARANCE:  The patient is confused, obtunded, not responding. EYES AND EARS:  No discharge. NECK:  Moderate JVD. LUNGS:  Decreased air entry with rhonchi. HEART:  Normal S1 and S2. Systolic murmur grade 2/6. ABDOMEN:  Soft and nontender. EXTREMITIES:  Moderate edema. NEUROLOGIC:  Mentioned as above. The patient is not responsive. She  does answer the question about her name, but otherwise seems to be quite  confused. PSYCH:  No obvious acute psychosis. LABORATORY DATA:  Showed sodium 133, potassium 3, BUN 8, creatinine 1.2. White count 5, hemoglobin 8.1, hematocrit 25.7, platelets 84. IMPRESSION:  This is a patient who comes in with mental status change  with what seems to be a possible infectious process, being treated  accordingly. She does have some CHF, which she has had intermittently. RECOMMENDATION:  As follows,  1. Treat the primary problem. 2.  Monitor the patient's response. 3.  Consider further cardiac testing accordingly. We will keep an eye  on the patient's electrolytes and repeat her electrolytes accordingly. My team will be following tomorrow. Thank you for allowing me to participate in the care of this patient.         Gita Mcfarland M.D.    D: 06/11/2019 19:15:10       T: 06/11/2019 22:42:49     VINCENT/[email or web address]  Job#: [id number]     Doc#: [id number]    CC:

## 2019-06-12 NOTE — TELEPHONE ENCOUNTER
[address] ([address])    The following questions refer to your heart failure and how it may affect your life. Please read and complete the following questions. There is no right or wrong answers. Please gregg the answer that best applies to you. 1. Heart failure affects different people in different ways. Some feel shortness of breath while others feel fatigue. Please indicate how much you are limited by heart failure (shortness of breath or fatigue) in your ability to do the following activities over the past 2 weeks. Activity Extremely Limited Quite a bit limited Moderatly Limited Slightly Limited Not at all Limited Limited for other reasons/ did not do the activity   Showering/ Bathing x             Walking 1 block on Level ground x        Hurrying or jogging (as if to catch a bus) x             1         2       3  4       5   6     2. Over the past 2 weeks, how many times did you have swelling in your feet, ankles or legs when you woke up in the morning? Every morning 3 or more times per week, but not every day 1-2 times per week Less than once a week Never over the past 2 weeks         x   `        1          2   3       4                     5           3. Over the past 2 weeks, on average, how many times has fatigue limited your ability to do what you wanted? All of the time Several times per day At least once a day 3 or more times per week 1-2 times per week Less than once a week Never over the past 2 weeks           x            1        2     3  4        5       6  7        4. Over the past 2 weeks, on average, how many times has shortness of breath limited your ability to do what you    wanted? All of the time Several times per day At least once a day 3 or more times per week 1-2 times per week Less than once a week Never over the past 2 weeks         x              1        2     3  4         5       6  7     5.  Over the past 2 weeks, on average, how many times have you been forced to sleep sitting up in a chair or with at least 3 pillows to prop you up because of shortness of breath? Every night 3 or more times per week, but not every day 1-2 times per week Less than once a week Never over the past 2 weeks         x   `        1          2   3       4                     5        6.  Over the past 2 weeks, how much has your heart failure limited your enjoyment of life? It has extremely limited my enjoyment of life It has limited my enjoyment of life quite a bit It has moderately limited my enjoyment of life It has slightly limited my enjoyment of life It has not limited my enjoyment of life         x         1          2   3       4           5     7.  If you had to spend the rest of your life with your heart failure the way it is right now, how would you feel about this? Not at all satisfied Mostly dissatisfied Somewhat satisfied Mostly satisfied Completely satisfied        x             8.  How much does your heart failure affect your lifestyle?  Please indicate how your heart failure may have limited your participation in the following activities over the past 2 weeks    Activity Severely limited Limited quite a bit Moderately limited Slightly limited Did not limit at all Does not apply or did not do this activity   Hobbies, recreational activities     x    Working or doing household chores      x   Visiting family or friends out of your homex   x                  1      2   3         4       5            6             Meter Walk Test  Unable to complete due to instability without walker  Time 1:seconds  Time 2 seconds  Time 3:  seconds

## 2019-06-12 NOTE — PLAN OF CARE
Problem: Impaired respiratory status  Goal: Clear lung sounds  Outcome: Ongoing   Breath sounds are clear and diminished at this time. Continue with treatments to help improve breath sounds.

## 2019-06-12 NOTE — PROGRESS NOTES
[facility]  [facility] ICU STEPDOWN TELEMETRY 4K      Date: 2019  Patient Name: Priscila Gan        MRN: [id number]    : 1945  (75 y.o.)    REASON FOR MISSED TREATMENT:  Attempted to see pt at  for completion of BSE. JOHANNA Maldonado with request to hold services at date due to pt recently returning from MRI with \"elevated heart rate\". Will attempt to assess swallow function on , pending appropriateness.     Fernanda Page M.A., [address] [place] [address]

## 2019-06-12 NOTE — CONSULTS
NEUROLOGY CONSULT NOTE      Requesting Physician: Chito Webb MD    Reason for Consult:  Evaluate for stroke    History of Present Illness:  Gary Narayan is a 75 y.o. female admitted to [facility] on 6/11/2019.        75year old woman p/w acute encephalopathy, found to be septic and bacteremic    CT head showed a small right internal capsule old infarct, but didn't exist on prior CT scans. Reports no chest pain. No shortness of breath with exertion. Reports no neck pain. No vision changes. No dysphagia. No fever. No rash. No weight loss.     Past Medical History:        Diagnosis Date    Anemia     Anxiety     Arthritis     general    AS (aortic stenosis): mild to moderate by echo 4/2017 9/6/2017    ASHD (arteriosclerotic heart disease)     Asthma 2016    INHALER USE DAILY AND AS NEEDED    Bipolar disorder ([address]     Blood circulation, collateral     CAD (coronary artery disease) 1999    MI, 5 STENTS IN HEART NO SURE OF HEART DR NAME [facility]'S [facility]    Cerebral artery occlusion with cerebral infarction ([address] 2018    SILENT-DX ON CT SCAN NO DEFICITS    CHF (congestive heart failure) (HCC)     COPD (chronic obstructive pulmonary disease) ([address] 2014    INHALER USE DAILY AND AS NEEDED    Depression     Disease of blood and blood forming organ     anemia per patient     DM (diabetes mellitus) ([address]     NO MEDS DIET CONTROLED    FH: CAD (coronary artery disease)     GERD (gastroesophageal reflux disease)     History of blood transfusion 1973    POST OP -PLASMA    History of blood transfusion 02/2019    1 UNIT LOW HGB    History of tobacco abuse: Quit in 2007     HLD (hyperlipidemia)     ON RX    HTN (hypertension)     ON RX    Irritable bowel syndrome     States has not had problem with this for years    Liver disease     fatty liver    Metabolic syndrome 21/27/9494    MI (myocardial infarction) (HCC)     Nausea & vomiting     Obesity     CHET (obstructive sleep apnea) 2016    NO MACHINE YET    S/P cardiac catheterization: 11/6/2014: 99% in-stent restenosis mid-RCA. LCx moderate LI's. LAD 85% mid-segment lesion. 11/6/2014 11/6/2014: 99% in-stent restenosis mid-RCA. LCx moderate LI's. LAD 85% mid-segment lesion. Dr. Fartun Ball S/P PTCA:  5/11/2004: Proximal and mid RCA  Taxus 3.5 X 20 mm, and Taxus 3.0 X 32 mm. 10/28/2014    5/11/2004: Proximal and mid RCA  Taxus 3.5 X 20 mm, and Taxus 3.0 X 32 mm. Dr. Mily Frost Systolic murmur 40/10/7056    Thyroid disease     ON RX    Wears glasses            Procedure Laterality Date    ABDOMEN SURGERY      AORTIC VALVE SURGERY  04/30/2019    TAVR    BACK SURGERY  08/2016        BLADDER SUSPENSION      BREAST BIOPSY  10/10/2017    BREAST LUMPECTOMY      CARDIAC CATHETERIZATION  8-11-06    Mild nonobstructive CAD w/ diffuse 10-20% proximal and mid LAD stenosis and 10-20% proximal/ostial RCA stenosis. No obstructive lesions. RCA stents are patent w/o evidence of restenosis. Normal LV systolic function. EF 65%. Trace MR - catheter induced. Minimally elevated LVEDP - 13mmHg. Mild to moderate aortoiliac PVD w/o obstructive lesions.  CARDIAC CATHETERIZATION  5-11-04    Successful drug-eluting stent x 2 of proximal and mid RCA.  CARDIAC CATHETERIZATION  5-11-04    70-80% proximal RCA stenosis w/ 50-70% mid RCA stenosis. There is 50-60% lesion in mid PDA. Mild proximal and mid LAD disease. Mild circumflex disease. Normal LV size and systolic function. EF 60%.  CARDIOVASCULAR STRESS TEST  5-10-11    No evidence of stress induced ischemia. EF 75%.  CARDIOVASCULAR STRESS TEST  5-10-10    No evidence of stress induced ischemia, infarct or scar. EF 74%.     CAROTID ENDARTERECTOMY      CERVICAL FUSION N/A 11/15/2017    REMOVAL OF PLATE Y0-7, ACDF V3-0 W/ATLANTIS CORNERSTONE performed by Juli Patel MD at [facility] COLONOSCOPY      ENDOSCOPY, COLON, DIAGNOSTIC      EYE SURGERY cataract 2017    FOOT SURGERY      HEMORRHOID SURGERY  1973    HERNIA REPAIR      HYSTERECTOMY  1973    INNER EAR SURGERY      IVC FILTER INSERTION      NECK SURGERY  FEB 2016    OTHER SURGICAL HISTORY  02/13/2019    Arteriogram for diagnostics    PARTIAL HYSTERECTOMY      UPPER GASTROINTESTINAL ENDOSCOPY      UPPER GASTROINTESTINAL ENDOSCOPY         Allergies:     Allergies   Allergen Reactions    Lipitor [Atorvastatin] Diarrhea    Motrin [Ibuprofen Micronized] Nausea Only    Codeine Anxiety        Current Medications:     0.9% NaCl with KCl 20 mEq infusion Continuous   metoprolol (LOPRESSOR) injection 5 mg Q8H   methylPREDNISolone sodium (SOLU-MEDROL) injection 40 mg Q12H   ceFAZolin (ANCEF) 2 g in dextrose 5 % 50 mL IVPB Q12H   potassium chloride 10 mEq/100 mL IVPB (Peripheral Line) Q2H   ipratropium-albuterol (DUONEB) nebulizer solution 1 ampule Q4H   acetaminophen (TYLENOL) tablet 650 mg Q4H PRN   aspirin EC tablet 81 mg Daily   mometasone-formoterol (DULERA) 100-5 MCG/ACT inhaler 2 puff BID   calcium-vitamin D (OSCAL-500) 500-200 MG-UNIT per tablet 1 tablet Daily   clopidogrel (PLAVIX) tablet 75 mg Daily   vitamin B-12 (CYANOCOBALAMIN) tablet 1,000 mcg Daily   docusate sodium (COLACE) capsule 100 mg BID PRN   escitalopram (LEXAPRO) tablet 20 mg QAM   fluticasone (FLONASE) 50 MCG/ACT nasal spray 1 spray Daily   hydrOXYzine (ATARAX) tablet 25 mg TID   levothyroxine (SYNTHROID) tablet 125 mcg Daily   multivitamin 1 tablet Daily   OXcarbazepine (TRILEPTAL) tablet 150 mg QAM   pantoprazole (PROTONIX) tablet 40 mg QAM AC   polyethylene glycol (GLYCOLAX) powder 17 g Daily PRN   sodium chloride (OCEAN, BABY AYR) 0.65 % nasal spray 1 spray PRN   risperiDONE (RISPERDAL) tablet 1 mg QAM   rosuvastatin (CRESTOR) tablet 20 mg Nightly   ranolazine (RANEXA) extended release tablet 500 mg BID   sodium chloride flush 0.9 % injection 10 mL 2 times per day   sodium chloride flush 0.9 % injection 10 mL PRN enoxaparin (LOVENOX) injection 40 mg Daily   potassium replacement protocol RX Placeholder   insulin lispro (HUMALOG) injection vial 0-6 Units TID WC   insulin lispro (HUMALOG) injection vial 0-3 Units Nightly   glucose (GLUTOSE) 40 % oral gel 15 g PRN   dextrose 50 % IV solution PRN   glucagon (rDNA) injection 1 mg PRN   dextrose 5 % solution PRN   busPIRone (BUSPAR) tablet 7.5 mg TID   glycerin-hypromellose- 0.2-0.2-1 % opthalmic solution 1 drop BID   vancomycin (VANCOCIN) intermittent dosing (placeholder) RX Placeholder   vancomycin (VANCOCIN) 1,500 mg in dextrose 5 % 500 mL IVPB Q24H   acetaminophen (TYLENOL) suppository 650 mg Q4H PRN   ferrous sulfate tablet 325 mg Every Other Day   [START ON 2019] vitamin D (ERGOCALCIFEROL) capsule 50,000 Units Once per day on Mon Thu        Social History:  Social History     Tobacco Use   Smoking Status Former Smoker    Packs/day: 1.00    Years: 38.00    Pack years: 38.00    Types: Cigarettes    Last attempt to quit: 2007    Years since quittin.3   Smokeless Tobacco Never Used     Social History     Substance and Sexual Activity   Alcohol Use No     Social History     Substance and Sexual Activity   Drug Use No         Family History:       Problem Relation Age of Onset    Heart Disease Mother         CHF    Heart Disease Father         CAD    Heart Attack Father     Kidney Disease Sister         DIALYSIS    Cancer Sister         RECTUM    Heart Disease Sister     Heart Disease Brother     Heart Disease Brother         CAD    Heart Disease Brother     Heart Disease Brother     Breast Cancer Child 36    Cancer Sister         UTERINE    Ovarian Cancer Other 22    Other Brother         SUICIDE       Review of Systems:  All systems reviewed are negative except what is mentioned in history of present illness.      Physical Exam:  /74   Pulse 122   Temp 101.3 °F (38.5 °C) (Rectal)   Resp 22   Ht 5' 5\" (1.651 m)   Wt 3. 0* 3.0*   CL 88* 94*   CO2 28 28   BUN 28* 36*   CREATININE 1.2 1.3*   LABGLOM 44* 40*   GLUCOSE 227* 163*   CALCIUM 9.6 8.8     Liver: Recent Labs     06/11/19  1125   AST 44*   ALT 31   ALKPHOS 34*   PROT 6.9   LABALBU 4.1   BILITOT 0.5     MRI BRAIN:  No results found for this or any previous visit. No results found for this or any previous visit. Results for orders placed during the hospital encounter of 11/15/18   CTA HEAD W WO CONTRAST    Narrative PROCEDURE: CTA HEAD W WO CONTRAST, CTA NECK W WO CONTRAST    CLINICAL INFORMATION: Chronic diastolic heart failure ([address] Possible heart surgery. Lower blood pressure reading from her left arm. COMPARISON: No prior study. TECHNIQUE: 1 mm axial images were obtained through the head and neck after the fast bolus administration of contrast. A noncontrast localizer was obtained. 3-D reconstructions were performed on a dedicated 3-D workstation. These include multiplanar MPR   images and multiplanar MIP images. Centerline reconstructions were obtained of the carotid systems. Isovue intravenous contrast was given. All CT scans at this facility use dose modulation, iterative reconstruction, and/or weight-based dosing when appropriate to reduce radiation dose to as low as reasonably achievable. FINDINGS:      CTA NECK:    Aortic arch and branches: There is atherosclerosis of the aortic arch. There is mild stenosis at the origin of innominate artery and left common carotid artery. There is occlusion of the proximal left subclavian artery due to atherosclerosis. There is mild stenosis at the origin the right subclavian artery. Right common carotid artery/ICA: There is scattered calcified atheromatosis of the right common carotid artery. There is no stenosis. There is heavy calcified atherosclerosis of the right carotid bulb. Using NASCET criteria and the distal right internal   carotid artery as the reference, the stenosis is 65-70%. . There is no stenosis of the distal right internal carotid artery. Left common carotid artery/ICA: There is some mild scattered calcified atherosclerosis of the left common carotid artery. There is no stenosis. There is heavy calcified atherosclerosis of the left carotid bulb. Using NASCET criteria and the distal left   internal carotid artery as the reference, there is an 80% stenosis at the origin the left internal carotid artery. There is no distal stenosis. Vertebral arteries: The vertebral arteries are codominant. There is decreased attenuation of the contrast in the left vertebral artery compared to the right. The flow in the left vertebral artery is most likely retrograde. The proximal contrast is less   intense. There is no stenosis of either vertebral artery. CTA HEAD:     Internal carotid arteries: There is calcified atherosclerosis of the cavernous segments of the internal carotid arteries bilaterally. There is no stenosis. The ophthalmic artery origins are normal.    Middle cerebral arteries: Normal. The proximal branches are also normal.    Anterior cerebral arteries: Normal. The proximal branches are normal. There is a normal small anterior communicating artery. Vertebral arteries: The vertebral arteries are normal.    Basilar artery: Normal.    Superior cerebellar arteries: Normal.    Posterior cerebral arteries: Normal. The proximal branches are also normal.        No aneurysms, stenoses or occlusions are noted. The superior sagittal sinus, vein of Yasmani, internal cerebral veins, straight sinus, transverse sinuses and sigmoid sinuses are patent. Axial source data: There are no suspicious findings in the lung apices. There is no cervical adenopathy. There are no gross abnormalities in the brain parenchyma. The paranasal sinuses are normally aerated. Impression    1. Occluded proximal left subclavian artery. 2. Presumed retrograde flow in the left vertebral artery.  This is likely supplying the left subclavian artery. 3. 80% stenosis at the origin the left internal carotid artery. 4. 65-70% stenosis at the origin of the right internal carotid artery. **This report has been created using voice recognition software. It may contain minor errors which are inherent in voice recognition technology. **    Final report electronically signed by Dr. Genia Palacio on 11/15/2018 1:36 PM     Results for orders placed during the hospital encounter of 11/15/18   CTA NECK W WO CONTRAST    Narrative PROCEDURE: CTA HEAD W [address] INFORMATION: Chronic diastolic heart failure ([address] Possible heart surgery. Lower blood pressure reading from her left arm. COMPARISON: No prior study. TECHNIQUE: 1 mm axial images were obtained through the head and neck after the fast bolus administration of contrast. A noncontrast localizer was obtained. 3-D reconstructions were performed on a dedicated 3-D workstation. These include multiplanar MPR   images and multiplanar MIP images. Centerline reconstructions were obtained of the carotid systems. Isovue intravenous contrast was given. All CT scans at this facility use dose modulation, iterative reconstruction, and/or weight-based dosing when appropriate to reduce radiation dose to as low as reasonably achievable. FINDINGS:      CTA NECK:    Aortic arch and branches: There is atherosclerosis of the aortic arch. There is mild stenosis at the origin of innominate artery and left common carotid artery. There is occlusion of the proximal left subclavian artery due to atherosclerosis. There is mild stenosis at the origin the right subclavian artery. Right common carotid artery/ICA: There is scattered calcified atheromatosis of the right common carotid artery. There is no stenosis. There is heavy calcified atherosclerosis of the right carotid bulb.  Using NASCET criteria and the distal right internal   carotid artery as the reference, the stenosis is 65-70%. . There is no stenosis of the distal right internal carotid artery. Left common carotid artery/ICA: There is some mild scattered calcified atherosclerosis of the left common carotid artery. There is no stenosis. There is heavy calcified atherosclerosis of the left carotid bulb. Using NASCET criteria and the distal left   internal carotid artery as the reference, there is an 80% stenosis at the origin the left internal carotid artery. There is no distal stenosis. Vertebral arteries: The vertebral arteries are codominant. There is decreased attenuation of the contrast in the left vertebral artery compared to the right. The flow in the left vertebral artery is most likely retrograde. The proximal contrast is less   intense. There is no stenosis of either vertebral artery. CTA HEAD:     Internal carotid arteries: There is calcified atherosclerosis of the cavernous segments of the internal carotid arteries bilaterally. There is no stenosis. The ophthalmic artery origins are normal.    Middle cerebral arteries: Normal. The proximal branches are also normal.    Anterior cerebral arteries: Normal. The proximal branches are normal. There is a normal small anterior communicating artery. Vertebral arteries: The vertebral arteries are normal.    Basilar artery: Normal.    Superior cerebellar arteries: Normal.    Posterior cerebral arteries: Normal. The proximal branches are also normal.        No aneurysms, stenoses or occlusions are noted. The superior sagittal sinus, vein of Yasmani, internal cerebral veins, straight sinus, transverse sinuses and sigmoid sinuses are patent. Axial source data: There are no suspicious findings in the lung apices. There is no cervical adenopathy. There are no gross abnormalities in the brain parenchyma. The paranasal sinuses are normally aerated. Impression    1. Occluded proximal left subclavian artery.   2. Presumed retrograde orbits demonstrate absence of the native lenses bilaterally. The visualized temporal bone structures are unremarkable. There is mild mucosal thickening within the bilateral maxillary sinuses. Impression  No acute intracranial abnormality is identified. Senescent changes are present including moderate sequela of chronic microvascular ischemic angiopathy. There is also evidence of a small, remote infarct within the left cerebellar hemisphere. **This report has been created using voice recognition software. It may contain minor errors which are inherent in voice recognition technology. **    Final report electronically signed by Dr. Nichol Walker on 11/28/2018 7:11 AM     No results found for this or any previous visit. No results found for this or any previous visit. Results for orders placed during the hospital encounter of 06/11/19   CT Head WO Contrast    Narrative PROCEDURE: CT HEAD WO CONTRAST    CLINICAL INFORMATION: Altered mental status . COMPARISON: 2/3/2019    TECHNIQUE: 2-D multiplanar noncontrast images of the brain  All CT scans at this facility use dose modulation, iterative reconstruction, and/or weight-based dosing when appropriate to reduce radiation dose to as low as reasonably achievable. FINDINGS: There is a lacunar infarction involving the head of caudate nucleus on the right. This is new since the prior exam but is still not acute with CSF attenuation currently. This measures 3.7 x 6.8 mm. No other infarction is identified. There is no   hemorrhage. There is no extra-axial collection. There is mild scattered areas of subcortical white matter diminished attenuation compatible with mild chronic small vessel ischemic disease. . Calcification seen in the right frontal sulcus was not present on the prior exam. This may represent a small dural calcification. There is a larger focal calcific density in the right temporal lobe image 12.  This also was not seen on the prior exam density favors calcification. There is no midline shift or mass effect. Ventricles are normal.  Visualized paranasal sinuses and mastoid air cells are clear. Bilateral lens replacements. Calvarium is intact. Impression 1. Small lacunar infarct in the head of caudate nucleus in the right new since the prior exam.  2. Focal calcific density in the right temporal lobe new since the prior exam. Consider MRI as a follow-up examination if clinically indicated. **This report has been created using voice recognition software. It may contain minor errors which are inherent in voice recognition technology. **    Final report electronically signed by Dr. Grace Dawn on 6/12/2019 10:50 AM         We reviewed the patient records and available information in the EHR       Impression:    Principal Problem:    Sepsis ([address]  Active Problems:    Anemia    Septicemia ([address]    Elevated troponin    Type 2 diabetes mellitus with complication, with long-term current use of insulin ([address]    Essential hypertension    Hypothyroid    Dyslipidemia    CAD (coronary artery disease)    GERD (gastroesophageal reflux disease)    History of tobacco abuse: Quit in 2009    POND (nonalcoholic steatohepatitis)    Hyperlipidemia    ASCVD (arteriosclerotic cardiovascular disease)    Acute on chronic diastolic congestive heart failure due to valvular disease ([address]    Anxiety and depression    Acute on chronic respiratory failure with hypoxia and hypercapnia (HCC)    CRF (chronic renal failure), stage 4 (severe) (HCC)    At risk for polypharmacy    B12 deficiency due to diet    Morbid obesity with BMI of 40.0-44.9, adult (HCC)    Acute exacerbation of COPD with asthma ([address]    Elevated transaminase level    Electrolyte imbalance  Resolved Problems:    * No resolved hospital problems. *      67 yo woman with acute encephalopathy with bacteremia.  Incidentally found to have a small right internal capsule stroke in the last few months. Recommendations:  - stroke looks chronic, unclear when it happened  - will get MRI brain w/o contrast, MRA head and neck w/o contrast to work it up  - con't aspirin 81mg qdaily  - her encephalopathy likely due to bacteremia, should get better once her infection clear up  - will f/u                                            1. Case was discussed with primary service. 2. All questions were answered. It was my pleasure to evaluate Greer Shane today. Please call with questions.       Electronically signed by Nicole Villa MD on 6/12/2019 at 1:15 PM      Angelia Lopez MD  Attending Neurologist/Neurointensivist

## 2019-06-12 NOTE — CARE COORDINATION
DISCHARGE BARRIERS  6/12/19, 2:57 PM    Reason for Referral:  \"from [facility]\"  Mental Status:  Alert but confused  Decision Making:  Makes decisions with her family  Family/Social/Home Environment:  Patient was recently discharged to [facility] May 2, went home but was readmitted to [facility] again at the end of May. SW spoke to Retie with ECF who states she is a Medicaid bed hold and can return skilled if meets criteria. Current Services: All provided by [facility]  Current Equipment:  All provided by [facility]  Payment Source:  Medicare and Medicaid  Concerns or Barriers to Discharge:  SULY spoke to her daughter Obie Clemente by phone. She intends for her mother to return to same ECF, likely will be long term  Collabrative List of ECF/HH were provided: N/A    Teach Back Method used with ECF staff and daughter, Obie Clemente regarding care plan and needs  Patient's daughter, Obie Clemente, verbalize understanding of the plan of care and contribute to goal setting.        Anticipated Needs/Discharge Plan:  Plan return to same [facility]    Electronically signed by PAKO Briseno on 6/12/2019 at 2:57 PM

## 2019-06-12 NOTE — PROGRESS NOTES
Hospitalist Progress Note    Patient:  Sarah Ross      Unit/Bed:4K-02/002-A    YOB: 1945    MRN: [id number]       Acct: [de-identified]     PCP: ASHLEY Sanchez CNP    Date of Admission: 6/11/2019    Assessment/Plan:    Sepsis secondary to Staphylococcus:  Not sure about the source of infection, both blood cultures positive, stop Zosyn, continue vancomycin  Infectious disease consulted, will probably need a ALEX, cardiology on board  -Blood pressure little low, stop Cozaar, metoprolol 100 mg twice a day, Aldactone, Lasix, hydralazine, Imdur, Zaroxolyn  -1.5 L given last night, will give additional 1.5 L, dobutamine never started, discontinued  -History of MRSA and the rapid by a fire suggested the current infection is not MRSA, we will wait until the final sensitivities before changing vancomycin    Hypotension secondary to sepsis, blood pressure medications held, improved with IV fluids    COPD exacerbation : Has cough, wheezing, start steroids, continue bronchodilators, was placed on BiPAP overnight     Encephalopathy, acute: Patient confused, has repeated his speech, will need to rule out stroke, will get CT of the head first and if still not improving will consider neurology consultation    Acute on Chronic respiratory failure, hypoxia and hypercapnia uses oxygen at home, continue oxygen supplementation    Elevated troponin unclear clinical significance    Hypokalemia replace potassium IV, currently unable to take by mouth, get swallowing evaluation    Hypomagnesemia replaced 2 g IV      Essential hypertension hold all blood pressure medications    Diabetes mellitus type 2, diet controlled as per patient  Currently on sliding scale insulin    Chronic diastolic congestive heart failure with preserved ejection fraction  Hold all medications, no definitive evidence of volume overload even though has elevated BNP      Normocytic anemia- close to her baseline,  History of iron deficiency and B12 deficiency    Chronic thrombocytopenia    Nonalcoholic steatohepatitis     Coronary artery disease status post stents without angina    Aortic stenosis status post TAVR on 4/30/19    Acquired hypothyroidism on levothyroxine    Obstructive sleep apnea- not using CPAP    Hyperlipidemia  Gastroesophageal reflux disease    Bipolar disorder- on BuSpar currently    History of stroke  Bilateral carotid artery stenosis  Cervical degenerative disc disease status post surgery, history of C5 broken screw- on chronic opiates    History of smoking    Expected discharge date:  4-5 days    Disposition:    [x] Home       [] TCU       [] Rehab       [] Psych       [] SNF       [] [facility]       [] Other-    Chief Complaint: Altered mental status     Patient was transferred from [facility] for further evaluation as the patient was having altered mental status and was having fever apparently 103 at the facility. Patient also needing more than usual oxygen and was placed on 6 L and upon arrival to the emergency department she had fever of 105 and was tachycardic and tachypneic and blood pressure was little borderline around 98/44. Patient unable to give any meaningful history and labs showed elevated BNP and troponin of 30,976 and 0.163     Hospital Course: Patient suspected to have sepsis and was started on broad-spectrum antibiotics initially vancomycin and cefepime and one dose was given and subsequently changed to Zosyn . Both blood cultures positive for Staphylococcus. Cardiologist was also consulted for the elevated troponin and  recommended treating the primary etiology most likely sepsis and patient not in acute CHF or not having an MI .     Will consult infectious disease, overnight blood pressure dropped to the 60s  Her blood pressure medications, Cozaar, hydralazine, metoprolol, Imdur, Aldactone, diuretics Lasix and metolazone were discontinued and received 1.5 L fluids and blood pressure improved 06/12/19  0522   WBC 5.0 4.9   HGB 8.1* 7.5*   HCT 25.7* 23.9*   PLT 84* 60*     Recent Labs     06/11/19  1125 06/12/19  0522   * 139   K 3.0* 3.0*   CL 88* 94*   CO2 28 28   BUN 28* 36*   CREATININE 1.2 1.3*   CALCIUM 9.6 8.8   PHOS  --  3.6     Recent Labs     06/11/19  1125   AST 44*   ALT 31   BILIDIR <0.2   BILITOT 0.5   ALKPHOS 34*     Recent Labs     06/11/19  1145 06/12/19  0522   INR 1.38* 1.41*     No results for input(s): Vamsi Guerrero in the last 72 hours. Microbiology:      Urinalysis:      Lab Results   Component Value Date    NITRU NEGATIVE 06/11/2019    WBCUA 5-10 06/11/2019    BACTERIA NONE 06/11/2019    RBCUA 0-2 06/11/2019    BLOODU NEGATIVE 06/11/2019    SPECGRAV 1.013 04/10/2019    GLUCOSEU NEGATIVE 06/11/2019       Radiology:  XR CHEST PORTABLE   Final Result   No gross acute infiltrate. PA and lateral radiographs could be performed if indicated clinically. **This report has been created using voice recognition software. It may contain minor errors which are inherent in voice recognition technology. **      Final report electronically signed by Dr. Rebecca Carvajal on 6/11/2019 12:06 PM      CT Head WO Contrast    (Results Pending)   XR CHEST PORTABLE    (Results Pending)       DVT prophylaxis: [] Lovenox                                 [x] SCDs                                 [] SQ Heparin                                 [] Encourage ambulation           [] Already on Anticoagulation     Code Status: Full Code    PT/OT Eval Status: once more stable    Tele:   [x] yes             [] no    Active Hospital Problems    Diagnosis Date Noted    Septicemia ([facility] [A41.9]      Priority: High    Anemia [D64.9]      Priority: High    Essential hypertension [I10] 01/29/2015     Priority: Medium    Type 2 diabetes mellitus with complication, with long-term current use of insulin ([facility] [E11.8, Z79.4]      Priority: Medium    Hypothyroid [E03.9] 01/15/2014     Priority: Low    Sepsis ([facility] [A41.9] 06/11/2019    Elevated transaminase level [R74.0] 06/11/2019    Electrolyte imbalance [E87.8] 06/11/2019    Acute exacerbation of COPD with asthma ([facility] [J44.1, J45.901] 04/14/2019    Morbid obesity with BMI of 40.0-44.9, adult ([facility] [E66.01, Z68.41] 01/24/2019    B12 deficiency due to diet [E53.8]     At risk for polypharmacy [Z91.89] 12/21/2018    CRF (chronic renal failure), stage 4 (severe) ([facility] [N18.4] 11/27/2018    Acute on chronic respiratory failure with hypoxia and hypercapnia (HCC) [O76.27, J96.22] 08/27/2018    Anxiety and depression [F41.9, F32.9] 04/17/2018    Acute on chronic diastolic congestive heart failure due to valvular disease ([facility] [I50.33, I38] 03/27/2018    ASCVD (arteriosclerotic cardiovascular disease) [I25.10]     Hyperlipidemia [E78.5] 11/18/2014    POND (nonalcoholic steatohepatitis) [K75.81] 11/18/2014    History of tobacco abuse: Quit in 2009 [Z87.891] 10/28/2014    GERD (gastroesophageal reflux disease) [K21.9]     CAD (coronary artery disease) [I25.10]     Dyslipidemia [E78.5]        Electronically signed by Cottie Councilman, MD on 6/12/2019 at 10:00 AM

## 2019-06-12 NOTE — CONSULTS
Assessment and Plan:        1. Septic Shock: 2/2 Blood cultures are growing Gram Positive Cocci in Clusters (Staph Aureus). Pt was started on Vanco (6/11) and Zosyn (6/11). Zosyn was stopped on 6/12 due to culture results. Pt has had episodes of hypotension throughout her admission and has been given 3.5 L. Temperature on arrival was 105 rectally. Source of infection is unknown at this point: Echo pending. CXR does not shows pneumonia. UA does not support infection. 2. Atrial Fibrillation with RVR s/p Cardioversion: Pt went into RVR with HRs in the 200s. The patient began to be tachycardic, diaphoretic and hypotensive. Pt was given one dose of Digoxin during Rapid Response. Pt was cardioverted upon arrival to the ICU. The patient is in NSR. 3. Acute on Chronic Respiratory Failure: Pt uses 3L NC at home. Pt is currently requiring BiPAP. 4. Acute on Chronic HFpEF: BNP 31K. Echo (5/20/2019) EF 55%. The patient's CXR does not show pleural effusion. Will continue to monitor. 5. Acute Metabolic Encephalopathy: Probably 2/2 infection, Ammonia level tomorrow AM.   6. Hypokalemia: Potassium on arrival 3.0, recheck was 3.0. IVF + K was started. Will recheck electrolytes. 7. Hypomagnesia: 1.1 on arrival, 1.4, 2.4 after replacement. 8. CAD s/p PCI: Right & Left Heart Cath (3/29/2019): Nonobstructive CAD, patent stents in the RCA and LAD with mild in-stent restenosis. Pt has been on DAPT due to TAVR. 9. Type 2 DM: Per patient, diabetes is diet controlled. Pt is on Low dose SSI. Pt was placed on steroids due to COPD exacerbation. 10. Normocytic Anemia, chronic: Hgb 8.1 on arrival, 7.5 this AM. This seems to be her baseline. Will continue to monitor. 11. Elevated Troponin: 0.163, 0.205, 0.186, 0.322 this is probably 2/2 sepsis and RVR. No ST acute changes. 12. S/P TAVR: Echo pending. 13. Bilateral Carotid Stenosis: Aware. 14. HLD: Aware.    13. History of CVA: Neurology consulted, they believe that this CT head showed chronic stroke. They recommended the ASA daily     16. CHET: Noncompliant with CPAP    17. Hypothyroidism: Aware      CC:  Atrial Fibrillation with RVR   HPI:   This is a acutely ill appearing 79year old white female with an extensive PMH COPD, Aortic Stenosis s/p TAVR, Type 2 DM, anemia, CAD s/p PCI, Essential HTN, CVA, HLD, hypothyroidism. The patient has been in the hospital seven times in the last six months, mainly for heart failure exacerbation that was caused by her severe aortic stenosis. The patient underwent TAVR on 4/30, tolerated it well. Pt was recently seen by Dr. Elana Colon on 6/5 and patient was continued to improve. The patient has not been in the hospital since the TAVR. Pt presented to [facility] ED from [facility] on 6/11 due to altered mental status. The patient usually uses 2L NC and at this time in the ED, she needed 6L NC. Now, in the ICU she was on full face mask BiPAP. The patient was also found to have a fever of 105 on admission. Vanco & Zosyn were started. Blood cultures found Staph Aureus, Zosyn & Vanco were subsequently stopped. Naficillin was started on 6/12. ALEX was ordered to rule out endocarditis. The patient also went into symptomatic Atrial Fibrillation with RVR on 6/12 which required emergent cardioversion. The patient converted into NSR with tachycardia.      ROS:   Review of Systems   Unable to perform ROS: Severe respiratory distress     PMH:  Per HPI  SHX:  Positive for smoking and denies ETOH use   FHX: Positive for Heart Failure, Cancer, Heart Disease   Allergies: Lipitor, Motrin, Codeine    Medications:     0.9% NaCl with KCl 20 mEq 75 mL/hr at 06/12/19 0940    dextrose        metoprolol  5 mg Intravenous Q8H    methylPREDNISolone  40 mg Intravenous Q12H    ceFAZolin  2 g Intravenous Q12H    potassium chloride  10 mEq Intravenous Q2H    sodium chloride  1,000 mL Intravenous Once    diltiazem        ipratropium-albuterol  1 ampule Inhalation Q4H    aspirin  81 mg Oral Daily    mometasone-formoterol  2 puff Inhalation BID    calcium-vitamin D  1 tablet Oral Daily    clopidogrel  75 mg Oral Daily    cyanocobalamin  1,000 mcg Oral Daily    escitalopram  20 mg Oral QAM    fluticasone  1 spray Nasal Daily    hydrOXYzine  25 mg Oral TID    levothyroxine  125 mcg Oral Daily    multivitamin  1 tablet Oral Daily    OXcarbazepine  150 mg Oral QAM    pantoprazole  40 mg Oral QAM AC    risperiDONE  1 mg Oral QAM    rosuvastatin  20 mg Oral Nightly    ranolazine  500 mg Oral BID    sodium chloride flush  10 mL Intravenous 2 times per day    enoxaparin  40 mg Subcutaneous Daily    potassium replacement protocol   Other RX Placeholder    insulin lispro  0-6 Units Subcutaneous TID WC    insulin lispro  0-3 Units Subcutaneous Nightly    busPIRone  7.5 mg Oral TID    glycerin-hypromellose-  1 drop Both Eyes BID    vancomycin (VANCOCIN) intermittent dosing (placeholder)   Other RX Placeholder    vancomycin  1,500 mg Intravenous Q24H    ferrous sulfate  325 mg Oral Every Other Day    [START ON 6/13/2019] vitamin D  50,000 Units Oral Once per day on Mon Thu       acetaminophen 650 mg Q4H PRN   docusate sodium 100 mg BID PRN   polyethylene glycol 17 g Daily PRN   sodium chloride 1 spray PRN   sodium chloride flush 10 mL PRN   glucose 15 g PRN   dextrose 12.5 g PRN   glucagon (rDNA) 1 mg PRN   dextrose 100 mL/hr PRN   acetaminophen 650 mg Q4H PRN     Vital Signs: T: 100 P: 129 RR: 33 B/P: 163/64: O2 Sat:96%: I/O: +500 cc since admission   CAM-ICU:   Negative   General:   Acutely ill appearing white female   HEENT:  normocephalic and atraumatic. No scleral icterus. Neck: supple. No JVD. Lungs: Wheezes bilaterally   Cardiac: RRR, sinus tachycardia   Abdomen: soft. Nontender. Extremities:  No clubbing, cyanosis, or edema x 4. Vasculature: capillary refill < 3 seconds. Skin:  warm and dry.   Psych:  Anxious, mildly confused   Lymph:  No supraclavicular adenopathy. Neurologic:  No focal deficit. Data: (All radiographs, tracings, PFTs, and imaging are personally viewed and interpreted unless otherwise noted).  Sodium 141. Potassium 3.0. Chloride 97. CO2 26. BUN 35. Creatinine 1.3. AG 18.0. BNP 31K. Troponin 0.163, 0.186, 0.322. H&H 7.5/23. 9. Plts 60.  Albumin 4.1. Alk Phos 34. ALT 31. AST 44. Bilirubin 0.5. Lipase 7.8.  Blood cultures 2/2: Staph Aureus    UA: Negative Leukocyte Esterase, Nitrite, WBC, Bacteria     CXR (6/12): No acute infection process appreciated.  Case discussed with Dr. Job Florian     Electronically signed by Stephanie Mcneill PA-C              Patient seen by me. Case reviewed with Dr. Lavenia Councilman with concerns for microaneurysm on MRI. ALEX shows no endocarditis. No petechial hemorrhages. Palpable DP pulses. No hepatomegaly. Respirations unlabored. Electronically signed by Nelson Mcwilliams MD.

## 2019-06-12 NOTE — PROGRESS NOTES
[address]al Notified Prudence Porter about patient's condition and transporting her to 4D.   She is on her way to hospital.

## 2019-06-12 NOTE — PROGRESS NOTES
Cardiology Progress Note      Patient:  Priscila Gan  YOB: 1945  MRN: [id number]   Acct: [de-identified]  [facility] Date:  6/11/2019  Primary Cardiologist: Dr. Elana Colon, seen by Dr. Priscella Ahumada    Per Dr. Chino Fuornier consult note:  [id number]:  Stress test in a patient with recent  aortic valve surgery with TAVR and element of congestive heart failure.     REQUESTING PROVIDER:  Hospitalist Service.     HISTORY OF PRESENT ILLNESS:  This is a pleasant 30-year-old lady who  apparently had a recent transcatheter valve replacement and has done  well since then with some intermittent congestive heart failure. She  has a history of underlying coronary artery disease, hypertension,  hyperlipidemia. She is residing at a nursing home, recovering from her  TAVR and has some underlying musculoskeletal limitation, comes in with  complete mental status change, unresponsiveness and is being treated for  what looks like a sepsis infection. When I came to evaluate, the  patient's family were not in the room. I was unable to get much of a  history and I tried to obtain the information from the record as well as  from the staff and the ER admission. Subjective (Events in last 24 hours): Alert, oriented to name. States her birthdate. Disoriented to tme and place. Has O2 at 4L. Repeating words at times.     Objective:   /74   Pulse 122   Temp 101.3 °F (38.5 °C) (Rectal)   Resp 22   Ht 5' 5\" (1.651 m)   Wt 221 lb 4.8 oz (100.4 kg)   LMP 02/12/1973 (Approximate)   SpO2 92%   BMI 36.83 kg/m²        TELEMETRY: 's-120's    Physical Exam:  General Appearance: alert and oriented to person only, in mild distress  Cardiovascular: normal rate, regular rhythm, normal S1 and S2, no murmurs, rubs, clicks, or gallops, distal pulses intact, no carotid bruits, no JVD  Pulmonary/Chest: Scattered wheezes, no rales or rhonchi, no respiratory distress  Abdomen: soft, non-tender, non-distended, normal bowel sounds, no docusate sodium 100 mg BID PRN   polyethylene glycol 17 g Daily PRN   sodium chloride 1 spray PRN   sodium chloride flush 10 mL PRN   glucose 15 g PRN   dextrose 12.5 g PRN   glucagon (rDNA) 1 mg PRN   dextrose 100 mL/hr PRN   acetaminophen 650 mg Q4H PRN       Diagnostics:  EK19  Sinus tachycardia with Premature atrial complexes  Otherwise normal ECG  When compared with ECG of 2019 11:21,  Premature atrial complexes are now Present  Echo:   Summary   Ejection fraction was estimated at 55-60%.   Left atrial size was moderately dilated.   S/p TAVR.   Transaortic velocity was 2.6-3.4 m/s (mean gradient 17-23 mmHg, EOA   1.2-1.5 cm2).   There was mild paravalvular aortic regurgitation.   The pericardium demonstrated a small posterior effusion that was   previously seen and remains non-hemodynamically significant.   Ascending aorta = 3.5 cm. Deborra Bruna size is within normal limits with normal respiratory phasic changes.      Signature      ----------------------------------------------------------------   Electronically signed by Jc Raymond MD           Left Heart Cath:   RIGHT HEART CATHETERIZATION:  1.  RA is 21 mmHg. 2.  RV is 68/15, 23.  3.  PA is 65/19, 44.  4.  PCW was 44/36, 34 mmHg. 5.  Tee cardiac output was 5.71, cardiac index was 2.7.     LEFT HEART CATHETERIZATION:  1. RCA is the dominant vessel. There is a prior interventional site in  the proximal, mid, and distal segments. There is mild luminal  irregularities and mild in-stent restenosis in the proximal RCA. Mid  RCA has a patent stent with 20% to 30% in-stent restenosis. The distal  segment has mild luminal irregularities. There is a moderate-sized PL  and PDA branches; both have mild luminal irregularities. 2.  Left main artery is patent. There is 10% to 20% distal left main  disease and it gives to LAD and left circumflex. 3.  Left circumflex is patent in proximal, mid, and distal segment.    There is mild luminal irregularities in the proximal area. Mid and  distal segments have moderately diffuse disease. There is a large size  OM1 branch with mild luminal irregularities. 4.  Left anterior descending artery has a proximal stent patent. Mid  and distal segments have mild luminal irregularities.  SUMMARY:  1. Elevated right-sided pressures. 2.  Nonobstructive coronary artery disease. Patent stents in the RCA  and LAD with mild in-stent restenosis.     Yahaira Simmons MD            Lab Data:    Cardiac Enzymes:  No results for input(s): CKTOTAL, CKMB, CKMBINDEX, TROPONINI in the last 72 hours. CBC:   Lab Results   Component Value Date    WBC 4.9 06/12/2019    RBC 2.73 06/12/2019    RBC 3.82 08/21/2018    HGB 7.5 06/12/2019    HCT 23.9 06/12/2019    PLT 60 06/12/2019       CMP:  Lab Results   Component Value Date     06/12/2019    K 3.0 06/12/2019    K 3.7 05/02/2019    CL 94 06/12/2019    CO2 28 06/12/2019    BUN 36 06/12/2019    CREATININE 1.3 06/12/2019    LABGLOM 40 06/12/2019    GLUCOSE 163 06/12/2019    GLUCOSE 101 07/25/2016    CALCIUM 8.8 06/12/2019       Hepatic Function Panel:  Lab Results   Component Value Date    ALKPHOS 34 06/11/2019    ALT 31 06/11/2019    AST 44 06/11/2019    PROT 6.9 06/11/2019    PROT 6.5 11/25/2016    BILITOT 0.5 06/11/2019    BILIDIR <0.2 06/11/2019    LABALBU 4.1 06/11/2019       Magnesium:    Lab Results   Component Value Date    MG 1.4 06/12/2019       PT/INR:    Lab Results   Component Value Date    PROTIME 13.8 02/13/2019    INR 1.41 06/12/2019       HgBA1c:    Lab Results   Component Value Date    LABA1C 5.9 04/03/2019       FLP:  Lab Results   Component Value Date    TRIG 256 03/29/2019    HDL 42 03/29/2019    LDLCALC 61 03/29/2019    LABVLDL 69 11/25/2016       TSH:    Lab Results   Component Value Date    TSH 9.110 04/10/2019         Assessment:  · Sepsis: BC + staphylococcus: ID consulted, On ATB- ? Needs ALEX to assess valve vegetation  · Encephalopathy, acute: ? Infectious process, ?  CVA- primary following  · Acute on chronic resp failure with hypoxia an hypercapnia  · Hypokalemia  · Hypomagnesemia  · Chronic diastolic CHF with EF 60-24 per echo 5/20/19  · Elevated troponin: trending down, likely r/t sepsis, chf   EKG with nonspecific ST abnormality  · CAD: H/o PCI/stenting  · H/o TAVR 4/30/19  · H/o HTN: now hypotension  · HLD  · Type 2 DM  · CHET on CPAP  · H/o CVA  · Chronic Thrombocytopenia  · Chronic normocytic anemia        Plan:  · Continue current medical therapy  · OK for MRA with TAVR  · Monitor and replace potassium, magnesium  · Will follow         Electronically signed by ASHLEY Khalil CNP on 6/12/2019 at 11:58 AM

## 2019-06-13 NOTE — PROGRESS NOTES
[address] [facility] PHYSICAL THERAPY  EVALUATION  [facility] ICU 4D - 4D-09/009-A    Time In: 0818  Time Out: 9997  Timed Code Treatment Minutes: 11 Minutes  Minutes: 25          Date: 2019  Patient Name: Cindy Hernandez,  Gender:  female        MRN: [id number]  : 1945  (75 y.o.)      Referring Practitioner: Dr. Becky Childress  Diagnosis: sepsis  Additional Pertinent Hx: admit with above diagnosis, acute metabolic encephalopathy, hx CVA     Past Medical History:   Diagnosis Date    Anemia     Anxiety     Arthritis     general    AS (aortic stenosis): mild to moderate by echo 2017    ASHD (arteriosclerotic heart disease)     Asthma 2016    INHALER USE DAILY AND AS NEEDED    Bipolar disorder ([address]     Blood circulation, collateral     CAD (coronary artery disease)     MI, 5 STENTS IN HEART NO SURE OF HEART DR NAME SEE'S [facility] AT [facility]    Cerebral artery occlusion with cerebral infarction ([address]     SILENT-DX ON CT SCAN NO DEFICITS    CHF (congestive heart failure) ([address]     COPD (chronic obstructive pulmonary disease) ([address] 2014    INHALER USE DAILY AND AS NEEDED    Depression     Disease of blood and blood forming organ     anemia per patient     DM (diabetes mellitus) ([address]     NO MEDS DIET CONTROLED    FH: CAD (coronary artery disease)     GERD (gastroesophageal reflux disease)     History of blood transfusion     POST OP -PLASMA    History of blood transfusion 2019    1 UNIT LOW HGB    History of tobacco abuse: Quit in      HLD (hyperlipidemia)     ON RX    HTN (hypertension)     ON RX    Irritable bowel syndrome     States has not had problem with this for years    Liver disease     fatty liver    Metabolic syndrome     MI (myocardial infarction) ([address]     Nausea & vomiting     Obesity     CHET (obstructive sleep apnea)     NO MACHINE YET    S/P cardiac catheterization: 2014: 99% in-stent restenosis mid-RCA.  LCx moderate LI's. LAD 85% mid-segment lesion. 11/6/2014 11/6/2014: 99% in-stent restenosis mid-RCA. LCx moderate LI's. LAD 85% mid-segment lesion. Dr. Noa Larkin S/P PTCA:  5/11/2004: Proximal and mid RCA  Taxus 3.5 X 20 mm, and Taxus 3.0 X 32 mm. 10/28/2014    5/11/2004: Proximal and mid RCA  Taxus 3.5 X 20 mm, and Taxus 3.0 X 32 mm. Dr. Barber [place] Systolic murmur 40/49/1413    Thyroid disease     ON RX    Wears glasses      Past Surgical History:   Procedure Laterality Date    ABDOMEN SURGERY      AORTIC VALVE SURGERY  04/30/2019    TAVR    BACK SURGERY  08/2016        BLADDER SUSPENSION      BREAST BIOPSY  10/10/2017    BREAST LUMPECTOMY      CARDIAC CATHETERIZATION  8-11-06    Mild nonobstructive CAD w/ diffuse 10-20% proximal and mid LAD stenosis and 10-20% proximal/ostial RCA stenosis. No obstructive lesions. RCA stents are patent w/o evidence of restenosis. Normal LV systolic function. EF 65%. Trace MR - catheter induced. Minimally elevated LVEDP - 13mmHg. Mild to moderate aortoiliac PVD w/o obstructive lesions.  CARDIAC CATHETERIZATION  5-11-04    Successful drug-eluting stent x 2 of proximal and mid RCA.  CARDIAC CATHETERIZATION  5-11-04    70-80% proximal RCA stenosis w/ 50-70% mid RCA stenosis. There is 50-60% lesion in mid PDA. Mild proximal and mid LAD disease. Mild circumflex disease. Normal LV size and systolic function. EF 60%.  CARDIOVASCULAR STRESS TEST  5-10-11    No evidence of stress induced ischemia. EF 75%.  CARDIOVASCULAR STRESS TEST  5-10-10    No evidence of stress induced ischemia, infarct or scar. EF 74%.     CAROTID ENDARTERECTOMY      CERVICAL FUSION N/A 11/15/2017    REMOVAL OF PLATE P2-6, ACDF X0-4 W/ATLANTIS CORNERSTONE performed by Stewart Starks MD at [facility] COLONOSCOPY      ENDOSCOPY, COLON, DIAGNOSTIC      EYE SURGERY      cataract 2017   [address] fatigue    Treatment Initiated: pt sat edge of bed with reaching only in ANGIE and CGA to SBA. Assessment: Body structures, Functions, Activity limitations: Decreased functional mobility , Decreased endurance, Decreased strength, Decreased balance  Assessment: pt tolerated fair, generalized weakness and deconditioning, dec balance, ICU monitors, IV lines, face mask for BiPAP, use of RW and inc assist for safe mobility, recommend cont PT to inc pt functional mobility  Prognosis: Good    Clinical Presentation: High - Unstable with Unpredictable Characteristics: see assessment:    Decision Making: High Complexitybased on patient assessment and decision making process of determining plan of care and establishing reasonable expectations for measurable functional outcomes    REQUIRES PT FOLLOW UP: Yes    Discharge Recommendations:  Discharge Recommendations: Continue to assess pending progress, Subacute/Skilled Nursing Facility, Patient would benefit from continued therapy after discharge    Patient Education:  Patient Education: POC    Equipment Recommendations:  Equipment Needed: No  Other: cont to assess needs    Safety:  Type of devices:  All fall risk precautions in place, Call light within reach, Left in chair, Chair alarm in place, Nurse notified, Gait belt, Patient at risk for falls    Plan:  Times per week: 3-5X GM  Times per day: Daily  Specific instructions for Next Treatment: therex and mobility    Goals:  Patient goals : not stated  Short term goals  Time Frame for Short term goals: by discharge  Short term goal 1: bed mobility with S  Short term goal 2: transfer with SBA  Short term goal 3: amb >50'x1 with AD and SBA to walk safely in room  Long term goals  Time Frame for Long term goals : no LTGs set secondary to short ELOS    Evaluation Complexity: Based on the findings of patient history, examination, clinical presentation, and decision making during this evaluation, the evaluation of Xavi smith of high complexity.             AM-PAC Inpatient Mobility without Stair Climbing Raw Score : 13  AM-PAC Inpatient without Stair Climbing T-Scale Score : 38.96  Mobility Inpatient CMS 0-100% Score: 58.44  Mobility Inpatient without Stair CMS G-Code Modifier : CK

## 2019-06-13 NOTE — CONSULTS
[address] [id number]                                  CONSULTATION    PATIENT NAME: Atul Alexandra                     :        1945  MED REC NO:   [id number]                           ROOM:       0009  ACCOUNT NO:   [de-identified]                           ADMIT DATE: 2019  PROVIDER:     JOS Hyman [place]:  2019    REASON FOR CONSULTATION:  She is a 21-year-old female patient admitted  to the hospital on 2019 due to change in mental state. I was  asked to see this patient because she had methicillin-sensitive Staph  aureus bacteremia. HISTORY OF PRESENT ILLNESS:  She is a 21-year-old female patient who was  brought to the hospital due to change in mental state. She came from  [facility]. It was reported that the patient had fever and change in  mental state. Her blood culture is growing methicillin-sensitive to  Staph aureus. She has a temperature as high as 103 and after she came  to the hospital, she was in atrial fibrillation with rapid ventricular  response. The patient had to be transferred to ICU. She had  cardioversion. I was asked to see this patient to assist with her  treatment. This patient had history of aortic stenosis and she had TAVR  recently. Her past history also include coronary artery disease,  congestive heart failure. She was very lethargic, could not give me  much history. I spoke with her daughter, who was at the bedside. The  patient is currently in ICU, room 9. I also spoke with ICU attending,  Dr. Shi Bell and the team.    PAST MEDICAL HISTORY:  Significant for anemia, anxiety, osteoarthritis,  aortic stenosis, coronary artery disease, asthma, history of bipolar  disorder, had cardiac stent, stroke, CHF, COPD, depression, diabetes,  gastroesophageal reflux disease, hyperlipidemia, and irritable bowel  syndrome.     PAST SURGICAL HISTORY:  Includes abdominal surgery; aortic valve  surgery, TAVR on 04/30/2019; back surgery in 2016; bladder suspension;  breast biopsy; lumpectomy of the breast; cardiac catheterization;  carotid endarterectomy; cervical fusion; hernia repair; hemorrhoid  surgery; hysterectomy; ear surgery; IVC filter and endoscopy. ALLERGIES:  Include LIPITOR, CODEINE, and MOTRIN. REVIEW OF SYSTEMS:  Could not be obtained due to the patient's  condition. PHYSICAL EXAMINATION:  GENERAL:  On exam, she looks very sick. She was on a BiPAP. Mga Crocker VITAL SIGNS:  Temperature max was 101.4, respirations 36, pulse rate  128, blood pressure 163/64. HEENT:  She has slightly pale conjunctivae. Anicteric sclerae. She is  on BiPAP. CHEST:  She has diminished breath sounds bilaterally. She was  tachypneic. CARDIOVASCULAR SYSTEM:  She was very tachycardic. ABDOMEN:  Soft. EXTREMITIES:  She has bruising. No cyanosis or clubbing. CNS:  She is very lethargic. DIAGNOSTICS DATA:  Sodium 141, potassium 3, chloride 97, bicarb 26, BUN  35, creatinine 1.3. WBC of 4.9, hemoglobin 7.5, hematocrit 23.9, and  platelets of 60. Blood culture as noted above. IMPRESSION:  A 48-year-old female patient admitted with change in metal  state. She has sepsis due to methicillin-sensitive to Staph aureus,  concern for endovascular infection due to her history of transaortic  valve replacement. RECOMMENDATION:  She will be placed on nafcillin. We spoke with ICU and  we agreed to put her on continuous drip of nafcillin. We will arrange  for ALEX once her condition permits. I discussed the treatment plan with  her daughter who was at bedside.         David Samuel M.D.    D: 06/12/2019 18:34:02       T: 06/12/2019 21:01:16     [email or web address]  Job#: [id number]     Doc#: [id number]    CC:

## 2019-06-13 NOTE — PROGRESS NOTES
[facility] THERAPY MISSED TREATMENT NOTE  [facility] ICU 4D      Date: 2019  Patient Name: Maximus Merrill        MRN: [id number]    : 1945  (75 y.o.)    REASON FOR MISSED TREATMENT:  Attempted to see pt at 36 for completion of BSE. Pt on continuous bipap with RN Erich reporting anticipated procedure \"this afternoon\", maintaining need for NPO diet level. Will attempt to assess swallow function on , as appropriate.     Elizabeth Palmer M.A., [address] [place] [address]

## 2019-06-13 NOTE — PLAN OF CARE
Problem: Impaired respiratory status  Goal: Clear lung sounds  Outcome: Ongoing  Note:   Continue with aerosols to aid in bronchodilation. Problem: Impaired respiratory status  Goal: Patient will achieve/maintain normal respiratory rate/effort  Outcome: Ongoing  Note:   BiPAP PRN for dyspnea. Alternate masks q4hr to prevent breakdown. No sting application. When not on BiPAP pt is on 4L nc.

## 2019-06-13 NOTE — FLOWSHEET NOTE
Pt was alone at the time of the visit. She extremely weak and could not talk much. She was blessed. 06/13/19 1603   Encounter Summary   Services provided to: Patient   Referral/Consult From: Johanny   Continue Visiting Yes  (6/13 )   Complexity of Encounter Low   Length of Encounter 15 minutes   Routine   Type Initial   Assessment Approachable;Calm   Intervention [place];Prayer;Nurtured hope   Outcome Acceptance;Expressed gratitude;Encouraged; Hopeful

## 2019-06-13 NOTE — PROGRESS NOTES
Progress note: Infectious diseases    Patient - Peewee Mcfarland,  Age - 75 y.o.    - 1945      Room Number - 4D-09/009-A   N -  [id number]   Acct # - [de-identified]  Date of Admission -  2019 11:17 AM    SUBJECTIVE:   She is more awake. She had ALEX, valve intact and no vegetation noted. OBJECTIVE   VITALS    height is 5' 5\" (1.651 m) and weight is 216 lb 0.8 oz (98 kg). Her oral temperature is 99.3 °F (37.4 °C). Her blood pressure is 158/61 (abnormal) and her pulse is 100. Her respiration is 23 and oxygen saturation is 96%. Wt Readings from Last 3 Encounters:   19 216 lb 0.8 oz (98 kg)   19 212 lb 9.6 oz (96.4 kg)   19 214 lb 3.2 oz (97.2 kg)       I/O (24 Hours)    Intake/Output Summary (Last 24 hours) at 2019 1827  Last data filed at 2019 1803  Gross per 24 hour   Intake 5634.2 ml   Output 750 ml   Net 4884.2 ml       General Appearance  Awake, alert,chronically sick looking, on nasal oxygen  HEENT - normocephalic, atraumatic, pink conjunctiva,  anicteric sclera  Neck - Supple, no mass.   Lungs -  Bilateral  air entry, no rhonchi, no wheeze  Cardiovascular - Heart sounds are normal.     Abdomen - soft, not distended, nontender,   Neurologic -oriented  Skin - No bruising or bleeding  Extremities - + edema, no cyanosis, clubbing     MEDICATIONS:      insulin lispro  0-12 Units Subcutaneous TID     insulin lispro  0-6 Units Subcutaneous Nightly    modafinil  200 mg Oral Daily    metoprolol  5 mg Intravenous Q8H    nafcillin 12 g continuous infusion  12 g Intravenous Q24H    ipratropium-albuterol  1 ampule Inhalation Q4H    [Held by provider] aspirin  81 mg Oral Daily    mometasone-formoterol  2 puff Inhalation BID    calcium-vitamin D  1 tablet Oral Daily    [Held by provider] clopidogrel  75 mg Oral Daily    cyanocobalamin  1,000 mcg Oral Daily    escitalopram  20 mg Oral QAM    fluticasone  1 spray Nasal Daily    hydrOXYzine  25 mg Oral TID    levothyroxine  125 mcg Oral Daily    multivitamin  1 tablet Oral Daily    OXcarbazepine  150 mg Oral QAM    pantoprazole  40 mg Oral QAM AC    risperiDONE  1 mg Oral QAM    rosuvastatin  20 mg Oral Nightly    ranolazine  500 mg Oral BID    sodium chloride flush  10 mL Intravenous 2 times per day    potassium replacement protocol   Other RX Placeholder    busPIRone  7.5 mg Oral TID    glycerin-hypromellose-  1 drop Both Eyes BID    vancomycin (VANCOCIN) intermittent dosing (placeholder)   Other RX Placeholder    ferrous sulfate  325 mg Oral Every Other Day    vitamin D  50,000 Units Oral Once per day on Mon Thu      amiodarone 0.5 mg/min (06/13/19 0806)    dextrose       hydrALAZINE, acetaminophen, docusate sodium, polyethylene glycol, sodium chloride, sodium chloride flush, glucose, dextrose, glucagon (rDNA), dextrose, acetaminophen      LABS:     CBC:   Recent Labs     06/11/19  1125 06/12/19  0522 06/13/19  1118   WBC 5.0 4.9 4.5*   HGB 8.1* 7.5* 7.3*   PLT 84* 60* 53*     BMP:    Recent Labs     06/12/19  0522 06/12/19  1529 06/13/19  0211 06/13/19  0515 06/13/19  1040    141  --  139  --    K 3.0* 3.0* 3.4* 3.2* 3.4*   CL 94* 97*  --  100  --    CO2 28 26  --  27  --    BUN 36* 35*  --  39*  --    CREATININE 1.3* 1.3*  --  1.2  --    GLUCOSE 163* 260*  --  219*  --      Calcium:  Recent Labs     06/13/19  0515   CALCIUM 8.8     Ionized Calcium:No results for input(s): IONCA in the last 72 hours. Magnesium:  Recent Labs     06/13/19  0515   MG 2.4     Phosphorus:  Recent Labs     06/13/19  0515   PHOS 2.5     BNP:No results for input(s): BNP in the last 72 hours. Glucose:  Recent Labs     06/13/19  0844 06/13/19  1220 06/13/19  1631   POCGLU 199* 187* 178*     HgbA1C: No results for input(s): LABA1C in the last 72 hours.   INR:   Recent Labs     06/11/19  1145 06/12/19  0522   INR 1.38* 1.41* Hepatic:   Recent Labs     06/11/19  1125   ALKPHOS 34*   ALT 31   AST 44*   PROT 6.9   BILITOT 0.5   BILIDIR <0.2   LABALBU 4.1     Amylase and Lipase:  Recent Labs     06/12/19  1042   LACTA 1.8     Lactic Acid:   Recent Labs     06/12/19  1042   LACTA 1.8     Troponin: No results for input(s): CKTOTAL, CKMB, TROPONINI in the last 72 hours. BNP: No results for input(s): BNP in the last 72 hours. CULTURES:   UA:   Recent Labs     06/11/19  1125   PHUR 5.5   COLORU YELLOW   PROTEINU 300*   BLOODU NEGATIVE   RBCUA 0-2   WBCUA 5-10   BACTERIA NONE   NITRU NEGATIVE   GLUCOSEU NEGATIVE   BILIRUBINUR NEGATIVE   UROBILINOGEN 0.2   KETUA NEGATIVE     Micro:   Lab Results   Component Value Date    BC No growth-preliminary 06/13/2019         IMAGING:         Problem list of patient:     Patient Active Problem List   Diagnosis Code    Anxiety F41.9    Depression F32.9    Type 2 diabetes mellitus with complication, with long-term current use of insulin (HCC) E11.8, Z79.4    MI (myocardial infarction) ([facility] I21.9    Dyslipidemia E78.5    CAD (coronary artery disease) I25.10    COPD without exacerbation ([facility] J44.9    GERD (gastroesophageal reflux disease) K21.9    Hypothyroid E03.9    History of tobacco abuse: Quit in 2009 Z87.891    S/P PTCA: 3/2/2017: PTCA RCA ISR. 12/11/2014: LAD Resolute 3.0X26. 11/6/2014: Stenting RCA Mozelle Japanese 0.3B63 and 3.5X18. 5/11/2004: Proximal & mid RCA Taxus 3.5X20 mm, and Taxus 3.0X32 mm. M84.89    Metabolic syndrome Q73.85    S/P cardiac catheterization: 11/6/2014: 99% in-stent restenosis mid-RCA. LCx moderate LI's. LAD 85% mid-segment lesion. Z98.890    POND (nonalcoholic steatohepatitis) K75.81    Hyperlipidemia E78.5    Essential hypertension I10    Obesity (BMI 30-39. 9) E66.9    Moderate dehydration E86.0    ASCVD (arteriosclerotic cardiovascular disease) I25.10    Chronic respiratory failure with hypoxia (HCC) J96.11    Other hyperlipidemia Z90.75    Dysmetabolic E66.01, Z68.41    Hyponatremia with decreased serum osmolality I57.5    Metabolic alkalosis S06.9    Chronic diastolic congestive heart failure (HCC) I50.32    Acute exacerbation of COPD with asthma ([facility]) J44.1, J45.901    Acute respiratory failure with hypoxia and hypercapnia ([facility]) J96.01, J96.02    Severe aortic stenosis I35.0    S/P TAVR (transcatheter aortic valve replacement) Z95.2    Sepsis ([facility]) A41.9    Elevated transaminase level R74.0    Electrolyte imbalance E87.8    Septicemia ([facility]) A41.9    Elevated troponin R74.8    Cerebrovascular accident (CVA) ([facility] I63.9         ASSESSMENT/PLAN   Sepsis due to MSSA   Hx of TAVR: ALEX no vegetation  Continue nafcillin  Discussed with her daughter and nursing staff      Cielo Johnson MD, [address] 6/13/2019 6:27 PM

## 2019-06-13 NOTE — CARE COORDINATION
Update: client moved to ICU; updated [facility] ICU CM  Electronically signed by Reji Lara RN on 6/13/2019 at 6:55 AM

## 2019-06-13 NOTE — CARE COORDINATION
6/13/19, 1:54 PM      Steven Adjutant day: 2  Location: 4D-09/009-A Reason for admit: Sepsis [facility]) [A41.9]   Procedure:   6/12 MRI Brain:   1. Somewhat unusual foci of abnormal signal are present in the brain. These are in the right occipital lobe, left occipital lobe, head of the caudate nucleus on the right and in the left frontoparietal junction. There is possible edema. 2 of these areas    have susceptibility artifact. Postcontrast imaging is recommended for further evaluation. The differential diagnosis includes small hemorrhagic metastases, small sites of infection and small age indeterminate infarcts.       2. There are foci of susceptibility artifact in the brain. These may represent old microhemorrhages. These are indeterminate.       3. Mild severity chronic small vessel ischemic changes. 6/12 CXR:   1. Moderate cardiomegaly. Prior anterior cervical fusion. A left subclavian line has been inserted with its tip in the supraclavicular superior vena cava. An aortic valve/stent is present. 2. No acute infiltrates or effusions are seen. No pneumothorax is seen     6/12 MRA Head/Neck: Essentially Normal  6/12 Cardioversion: successful    Treatment Plan of Care: Transferred from  to ICU d/t afib RVR at rate 215, SBP 60's, RR 30-40's. Trials of digoxin and cardizem were unsuccessful and was successfully cardioverted to . Placed on bipap; now bipap PRN. In NSR 90's-100. ALEX today. Intensivist, ID, Cardiology and Neurology following. Per Neurology, stroke looks chronic, encephalopathy likely d/t bacteremia. Blood cultures +MSSA - started on continuous drip of nafcillin. SLP/PT/OT. Tmax 101. Sats 98% on 4L O2. Ox4. Follows commands. External urinary catheter. Amio @ 0.5 mg/min, IV nafcillin - continuous, buspar, lovenox, SSI ACHS, nebs, synthroid, lopressor IV, provigil, trileptal, protonix, ranexa, crestor, IV vancomycin, Electrolyte replacement protocols.  K+ 3.4, triglycerides 378, wbc

## 2019-06-13 NOTE — CARE COORDINATION
6/13/19, 7:36 AM    DISCHARGE BARRIERS        Patient transferred to  9. Report given to unit Sunday Esther, regarding discharge plan for this patient.

## 2019-06-13 NOTE — PROGRESS NOTES
[address] ([place] [address]) THERAPY  [facility] ICU 4D  EVALUATION    Time:    Time In: 5117  Time Out: 1021  Timed Code Treatment Minutes: 15 Minutes  Minutes: 30          Date: 2019  Patient Name: Adelaida Schaumann,   Gender: female      MRN: [id number]  : 1945  (75 y.o.)  Referring Practitioner: Dr. Tyrone Bautista  Diagnosis: sepsis  Additional Pertinent Hx: Per ER note on 19: 75 y.o. female who presents to the Emergency Department for the evaluation of confusion and fever onset today. Per the nurses, the patient had a fever of 103°F at her nursing home but was 105°F rectal on arrival. She was not given any Tylenol a the nursing home. The patient has also had increased confusion and at baseline alert and oriented x4 but is only oriented to self currently. The patient is usually on 2L oxygen at home but was placed of 6L at the initial encounter because of low spO2 stats. Restrictions/Precautions:  Restrictions/Precautions: General Precautions, Fall Risk  Position Activity Restriction  Other position/activity restrictions: continous bi-pap    Subjective  Patient assessed for rehabilitation services?: Yes  Family / Caregiver Present: No    Subjective: Pt was drowsy in chair upon arrival of therapist. Nurse requesting that therapist get her back to bed    Pain:  Pain Assessment  Patient Currently in Pain: No(no c/o pain during session)    Social/Functional History:  Lives With: Alone  Type of Home: Facility  Home Layout: One level  Home Access: Level entry  Home Equipment: Rolling walker           ADL Assistance: Needs assistance  Ambulation Assistance: Needs assistance  Transfer Assistance: Needs assistance          Additional Comments: Per PT note: pt admit from SNF for rehab, [facility], on 3L home O2 and using RW. Pt too drowsy to give PLOF info during OT session.      Cognition/Orientation:     Overall Cognitive Status: Exceptions(short attention span)    ADL;s:  LE Dressing: Dependent/Total  Toileting: (incontinent of BM & urine upon standing)       Functional Mobility:  Bed mobility  Rolling to Left: Minimal assistance  Supine to Sit: Minimal assistance  Sit to Supine: Maximum assistance(x 2)  Scooting: Contact guard assistance  Comment: multiple lines, HOB up 20 degrees and use BR    Functional Mobility  Functional - Mobility Device: No device  Activity: Other(3 steps to EOB from recliner)  Assist Level: Moderate assistance  Functional Mobility Comments: unsteadiness noted, repetition for following simple commands     Balance:  Balance  Sitting Balance: Contact guard assistance(on edge of chair)  Standing Balance: Minimal assistance(x 1-static)    Transfers:  Sit to stand: Minimal assistance(from recliner)  Stand to sit: Minimal assistance(onto EOB)       Upper Extremity Assessment:   LUE AROM : WFL  RUE AROM : WFL    LUE Strength  Gross LUE Strength: (appears 4-/5)  RUE Strength  Gross RUE Strength: (appears 4-/5)    Sensation  Overall Sensation Status: WFL       Activity Tolerance: Patient limited by fatigue, Treatment limited secondary to decreased cognition       Assessment:  Assessment: Pt demo decreased ADL & functional mobility status over PLOF s/p admission with sepsis. Continued OT recommended to educate Pt on safety & compensatory strategies for increased safety within discharge environment. Performance deficits / Impairments: Decreased functional mobility , Decreased ADL status, Decreased balance, Decreased safe awareness, Decreased endurance, Decreased cognition  Prognosis: Fair  REQUIRES OT FOLLOW UP: Yes  Decision Making: Medium Complexity  Safety Devices in place: Yes  Type of devices: All fall risk precautions in place, Call light within reach, Gait belt, Bed alarm in place, Nurse notified    Treatment Initiated:     Education on safety & compensatory strategies was initiated after assessment was completed, see details above.         Discharge

## 2019-06-13 NOTE — CARE COORDINATION
6/13/19, 7:03 AM    DISCHARGE BARRIERS        Patient transferred to  9. [facility] this [facility].

## 2019-06-13 NOTE — PROGRESS NOTES
Assessment and Plan:        1. Septic Shock: 2/2 Blood cultures are growing Staph Aureus, Coag positive. Pt was started on Vanco (6/11) and Zosyn (6/11). Zosyn was stopped on 6/12 due to culture results. Continuous infusion of Nafcillin started on 6/12. Pt has had episodes of hypotension throughout her admission and has been given 3.5 L. Temperature on arrival was 105 rectally. Source of infection is unknown at this point: ALEX scheduled for 6/13. CXR does not shows pneumonia. UA does not support infection. 2. Atrial Fibrillation with RVR s/p Cardioversion: Pt went into RVR with HRs in the 200s. The patient began to be tachycardic, diaphoretic and hypotensive. Pt was given one dose of Digoxin during Rapid Response. Pt was cardioverted upon arrival to the ICU. The patient is in NSR. Through the night of 6/12, the patient went into Atrial Fibrillation with RVR. Pt was placed on Amio drip. 3. Acute on Chronic Respiratory Failure: Pt uses 3L NC at home. Pt is currently requiring BiPAP. 4. Acute on Chronic HFpEF: BNP 31K. Echo (5/20/2019) EF 55%. The patient's CXR does not show pleural effusion. Will continue to monitor. 5. Acute Metabolic Encephalopathy: Probably 2/2 infection, Ammonia 45. Neurology believes that this could be microaneurysms. DAPT has been stopped due to fear of microaneurysms. 6. Hypokalemia: Potassium on arrival 3.0, recheck was 3.0. Potassium (6/13) 3.4. IVF + K was started. Will recheck electrolytes. 7. Hypomagnesia: 1.1 on arrival, 1.4, 2.4 after replacement. 8. CAD s/p PCI: Right & Left Heart Cath (3/29/2019): Nonobstructive CAD, patent stents in the RCA and LAD with mild in-stent restenosis. Pt has been on DAPT due to TAVR. 9. Type 2 DM: Per patient, diabetes is diet controlled. Pt is on Low dose SSI. Pt was placed on steroids due to COPD exacerbation. 10. Normocytic Anemia, chronic: Hgb 8.1 on arrival, 7.5 this AM. Hgb (6/13) 7.3. This seems to be her baseline.  Will continue to monitor. 11. Elevated Troponin: 0.163, 0.205, 0.186, 0.322 this is probably 2/2 sepsis and RVR. No ST acute changes. 12. S/P TAVR: Echo pending. 13. Bilateral Carotid Stenosis: Aware. 14. HLD: Aware. 13. History of CVA: Neurology consulted. 16. CHET: Noncompliant with CPAP    17. Hypothyroidism: Aware      CC:  Atrial Fibrillation with RVR   HPI:   This is a acutely ill appearing 79year old white female with an extensive PMH COPD, Aortic Stenosis s/p TAVR, Type 2 DM, anemia, CAD s/p PCI, Essential HTN, CVA, HLD, hypothyroidism. The patient has been in the hospital seven times in the last six months, mainly for heart failure exacerbation that was caused by her severe aortic stenosis. The patient underwent TAVR on 4/30, tolerated it well. Pt was recently seen by Dr. Chadd Espana on 6/5 and patient was continued to improve. The patient has not been in the hospital since the TAVR. Pt presented to [facility] ED from [facility] on 6/11 due to altered mental status. The patient usually uses 2L NC and at this time in the ED, she needed 6L NC. Now, in the ICU she was on full face mask BiPAP. The patient was also found to have a fever of 105 on admission. Vanco & Zosyn were started. Blood cultures found Staph Aureus, Zosyn & Vanco were subsequently stopped. Naficillin was started on 6/12. ALEX was ordered to rule out endocarditis. The patient also went into symptomatic Atrial Fibrillation with RVR on 6/12 which required emergent cardioversion. The patient converted into NSR with tachycardia. Through the night of 6/12, the patient went back into atrial fibrillation with RVR. Amiodarone was started, patient converted back to NSR.    ROS:   Review of Systems   Constitutional: Positive for fatigue and fever. Negative for diaphoresis. Respiratory: Positive for shortness of breath. Negative for cough and chest tightness. Cardiovascular: Negative for chest pain, palpitations and leg swelling.    Gastrointestinal: Negative for abdominal pain and nausea. Neurological: Positive for tremors. Negative for numbness and headaches. Psychiatric/Behavioral: Negative for agitation, behavioral problems and confusion.      PMH:  Per HPI  SHX:  Positive for smoking and denies ETOH use   FHX: Positive for Heart Failure, Cancer, Heart Disease   Allergies: Lipitor, Motrin, Codeine   Medications:     amiodarone 0.5 mg/min (06/13/19 0806)    dextrose        insulin lispro  0-12 Units Subcutaneous TID WC    insulin lispro  0-6 Units Subcutaneous Nightly    modafinil  200 mg Oral Daily    potassium bicarb-citric acid  40 mEq Oral Once    metoprolol  5 mg Intravenous Q8H    nafcillin 12 g continuous infusion  12 g Intravenous Q24H    ipratropium-albuterol  1 ampule Inhalation Q4H    [Held by provider] aspirin  81 mg Oral Daily    mometasone-formoterol  2 puff Inhalation BID    calcium-vitamin D  1 tablet Oral Daily    [Held by provider] clopidogrel  75 mg Oral Daily    cyanocobalamin  1,000 mcg Oral Daily    escitalopram  20 mg Oral QAM    fluticasone  1 spray Nasal Daily    hydrOXYzine  25 mg Oral TID    levothyroxine  125 mcg Oral Daily    multivitamin  1 tablet Oral Daily    OXcarbazepine  150 mg Oral QAM    pantoprazole  40 mg Oral QAM AC    risperiDONE  1 mg Oral QAM    rosuvastatin  20 mg Oral Nightly    ranolazine  500 mg Oral BID    sodium chloride flush  10 mL Intravenous 2 times per day    enoxaparin  40 mg Subcutaneous Daily    potassium replacement protocol   Other RX Placeholder    busPIRone  7.5 mg Oral TID    glycerin-hypromellose-  1 drop Both Eyes BID    vancomycin (VANCOCIN) intermittent dosing (placeholder)   Other RX Placeholder    ferrous sulfate  325 mg Oral Every Other Day    vitamin D  50,000 Units Oral Once per day on Mon Thu       hydrALAZINE 10 mg Q4H PRN   acetaminophen 650 mg Q4H PRN   docusate sodium 100 mg BID PRN   polyethylene glycol 17 g Daily PRN   sodium chloride 1 spray PRN sodium chloride flush 10 mL PRN   glucose 15 g PRN   dextrose 12.5 g PRN   glucagon (rDNA) 1 mg PRN   dextrose 100 mL/hr PRN   acetaminophen 650 mg Q4H PRN     Vital Signs: T: 100 P: 100 RR: 27 B/P: 135/52: FiO2: 4L NC: O2 Sat:98%: I/O: +3500 since admission   CAM-ICU:   Negative   General:   Acutely ill appearing white female   HEENT:  normocephalic and atraumatic. No scleral icterus. Neck: supple. No JVD. Lungs: clear to auscultation. Mild diminished breath sounds   Cardiac: Sinus Tachycardia, no m/g/r   Abdomen: soft. Nontender. Extremities:  No clubbing, cyanosis, or edema x 4. Vasculature: capillary refill < 3 seconds. Skin:  warm and dry. Psych:  Alert and oriented x3. Mildly confused   Lymph:  No supraclavicular adenopathy. Neurologic:  No focal deficit. Data: (All radiographs, tracings, PFTs, and imaging are personally viewed and interpreted unless otherwise noted). · Sodium 139. Potassium 3.4. Chloride 100. CO2 27. BUN 39. Creatinine 1.2. AG 12.0. BNP 31K. Troponin 0.163, 0.186, 0.322. H&H 7.3/24. 2. Plts 53. · Albumin 4.1. Alk Phos 34. ALT 31. AST 44. Bilirubin 0.5. Lipase 7.8. · Blood cultures 2/2: Staph Aureus   · UA: Negative Leukocyte Esterase, Nitrite, WBC, Bacteria    · CXR (6/12): No acute infection process appreciated. · Case discussed with Dr. Marie Love     Electronically signed by Tammy Flores PA-C               Case discussed with Dr. Mary Love. Patient seen by me. No petechial hemorrhaging. No hepatomegaly. No thyromegaly. Respirations are nonlabored. Transesophageal echocardiogram is negative for endocarditis. Electronically signed by Matthew Palacios MD.

## 2019-06-13 NOTE — PLAN OF CARE
Problem: Falls - Risk of:  Goal: Will remain free from falls  Description  Will remain free from falls  6/13/2019 0747 by Isela Hernandez RN  Note:   Falling star program. Bed alarm on zone 2. Call light within reach. Side rails up x4. Pt remained free from injury this shift     Problem: Skin Integrity:  Goal: Skin integrity will stabilize  Description  Skin integrity will stabilize  Outcome: Ongoing  Note:   No new skin breakdown noted. See skin assessment. Repositioning patient q2h and prn. Pt arms on pillows heels floated. Problem: Discharge Planning:  Goal: Patients continuum of care needs are met  Description  Patients continuum of care needs are met  Outcome: Ongoing  Note:   Pt is from [facility]. Discharge instructions will be given at appropiate time. Problem: Confusion - Acute:  Goal: Absence of continued neurological deterioration signs and symptoms  Description  Absence of continued neurological deterioration signs and symptoms  Outcome: Ongoing  Note:   Pt alert and oriented x 4. Problem: Injury - Risk of, Physical Injury:  Goal: Will remain free from falls  Description  Will remain free from falls  6/13/2019 0747 by Iesla Hernandez RN  Note:   Falling star program. Bed alarm on zone 2. Call light within reach. Side rails up x4. Pt remained free from injury this shift  Care plan reviewed with patient.  Will review and discuss care plan with family when available

## 2019-06-13 NOTE — PROGRESS NOTES
NEUROLOGY INPATIENT PROGRESS NOTE    Patient- Eli Serra    MRN -  [id number]   Acct # - [de-identified]      - 1945    75 y.o. Subjective: The patient is seen as follow up for stroke and acute encephalopathy Patient is lethargic at this time. Patient went into afib RVR yesterday, was cardioverted, stable now, on bipap. Objective:   BP (!) 137/50   Pulse 96   Temp 99.3 °F (37.4 °C) (Axillary)   Resp 19   Ht 5' 5\" (1.651 m)   Wt 216 lb 0.8 oz (98 kg)   LMP 1973 (Approximate)   SpO2 100%   BMI 35.95 kg/m²       Intake/Output Summary (Last 24 hours) at 2019 1305  Last data filed at 2019 0346  Gross per 24 hour   Intake 3583.2 ml   Output 600 ml   Net 2983.2 ml       Physical Exam:  General:  Lethargic, not in distress. Language is slurred. Oriented to self and place, but not time. HEENT: pink conjunctiva, unicteric sclera, moist oral mucosa. EOMI,   Neck: There is no carotid bruits. The Neck is supple. Neuro: CN 2-12 grossly intact with no focal deficits. Power 4/5 Throughout symmetric (pt is diffusely weak), . Long tracts are intact. Cerebellar exam is Intact. Sensory exam is intact to light touch. Gait is not tested. No abnormal movement. Osteo: There is no LROM of any of the 4 extremity joints, no joint tenderness. +1 edema    ROS:    Cardiac: no chest pain. No palpitations.   Renal : no flank pain, no hematuria  Skin: no rash    Medications:     insulin lispro  0-12 Units Subcutaneous TID WC    insulin lispro  0-6 Units Subcutaneous Nightly    modafinil  200 mg Oral Daily    potassium bicarb-citric acid  40 mEq Oral Once    metoprolol  5 mg Intravenous Q8H    nafcillin 12 g continuous infusion  12 g Intravenous Q24H    ipratropium-albuterol  1 ampule Inhalation Q4H    [Held by provider] aspirin  81 mg Oral Daily    mometasone-formoterol  2 puff Inhalation BID    calcium-vitamin D  1 tablet Oral Daily    [Held by provider] There are normal superior cerebellar arteries and posterior cerebral arteries. The internal carotid arteries are patent and grossly normal. The anterior cerebral arteries are normal. 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 The middle cerebral arteries and their proximal branches are normal. There is a normal anterior   communicating artery. There are normal bilateral posterior communicating arteries. No aneurysms, stenoses or occlusions are noted. Impression  Grossly normal MRA of the brain. **This report has been created using voice recognition software. It may contain minor errors which are inherent in voice recognition technology. **    Final report electronically signed by Dr. Albert Chen on 6/12/2019 3:28 PM     Results for orders placed during the hospital encounter of 11/15/18   CTA HEAD W WO CONTRAST    Narrative PROCEDURE: CTA HEAD W [address] INFORMATION: Chronic diastolic heart failure ([address] Possible heart surgery. Lower blood pressure reading from her left arm. COMPARISON: No prior study. TECHNIQUE: 1 mm axial images were obtained through the head and neck after the fast bolus administration of contrast. A noncontrast localizer was obtained. 3-D reconstructions were performed on a dedicated 3-D workstation. These include multiplanar MPR   images and multiplanar MIP images. Centerline reconstructions were obtained of the carotid systems. Isovue intravenous contrast was given. All CT scans at this facility use dose modulation, iterative reconstruction, and/or weight-based dosing when appropriate to reduce radiation dose to as low as reasonably achievable. FINDINGS:      CTA NECK:    Aortic arch and branches: There is atherosclerosis of the aortic arch. There is mild stenosis at the origin of innominate artery and left common carotid artery. There is occlusion of the proximal left subclavian artery due to atherosclerosis. There is mild stenosis at the origin the right subclavian artery. Right common carotid artery/ICA: There is scattered calcified atheromatosis of the right common carotid artery. There is no stenosis. There is heavy calcified atherosclerosis of the right carotid bulb. Using NASCET criteria and the distal right internal   carotid artery as the reference, the stenosis is 65-70%. . There is no stenosis of the distal right internal carotid artery. Left common carotid artery/ICA: There is some mild scattered calcified atherosclerosis of the left common carotid artery. There is no stenosis. There is heavy calcified atherosclerosis of the left carotid bulb. Using NASCET criteria and the distal left   internal carotid artery as the reference, there is an 80% stenosis at the origin the left internal carotid artery. There is no distal stenosis. Vertebral arteries: The vertebral arteries are codominant. There is decreased attenuation of the contrast in the left vertebral artery compared to the right. The flow in the left vertebral artery is most likely retrograde. The proximal contrast is less   intense. There is no stenosis of either vertebral artery. CTA HEAD:     Internal carotid arteries: There is calcified atherosclerosis of the cavernous segments of the internal carotid arteries bilaterally. There is no stenosis. The ophthalmic artery origins are normal.    Middle cerebral arteries: Normal. The proximal branches are also normal.    Anterior cerebral arteries: Normal. The proximal branches are normal. There is a normal small anterior communicating artery. Vertebral arteries: The vertebral arteries are normal.    Basilar artery: Normal.    Superior cerebellar arteries: Normal.    Posterior cerebral arteries: Normal. The proximal branches are also normal.        No aneurysms, stenoses or occlusions are noted.     The superior sagittal sinus, vein of Yasmani, internal cerebral veins, straight sinus, transverse sinuses and sigmoid sinuses are patent. Axial source data: There are no suspicious findings in the lung apices. There is no cervical adenopathy. There are no gross abnormalities in the brain parenchyma. The paranasal sinuses are normally aerated. Impression    1. Occluded proximal left subclavian artery. 2. Presumed retrograde flow in the left vertebral artery. This is likely supplying the left subclavian artery. 3. 80% stenosis at the origin the left internal carotid artery. 4. 65-70% stenosis at the origin of the right internal carotid artery. **This report has been created using voice recognition software. It may contain minor errors which are inherent in voice recognition technology. **    Final report electronically signed by Dr. Lady Penn on 11/15/2018 1:36 PM     Results for orders placed during the hospital encounter of 11/15/18   CTA NECK W WO CONTRAST    Narrative PROCEDURE: CTA HEAD W [address] INFORMATION: Chronic diastolic heart failure ([address] Possible heart surgery. Lower blood pressure reading from her left arm. COMPARISON: No prior study. TECHNIQUE: 1 mm axial images were obtained through the head and neck after the fast bolus administration of contrast. A noncontrast localizer was obtained. 3-D reconstructions were performed on a dedicated 3-D workstation. These include multiplanar MPR   images and multiplanar MIP images. Centerline reconstructions were obtained of the carotid systems. Isovue intravenous contrast was given. All CT scans at this facility use dose modulation, iterative reconstruction, and/or weight-based dosing when appropriate to reduce radiation dose to as low as reasonably achievable. FINDINGS:      CTA NECK:    Aortic arch and branches: There is atherosclerosis of the aortic arch. There is mild stenosis at the origin of innominate artery and left common carotid artery.     There is occlusion of the proximal left subclavian artery due to atherosclerosis. There is mild stenosis at the origin the right subclavian artery. Right common carotid artery/ICA: There is scattered calcified atheromatosis of the right common carotid artery. There is no stenosis. There is heavy calcified atherosclerosis of the right carotid bulb. Using NASCET criteria and the distal right internal   carotid artery as the reference, the stenosis is 65-70%. . There is no stenosis of the distal right internal carotid artery. Left common carotid artery/ICA: There is some mild scattered calcified atherosclerosis of the left common carotid artery. There is no stenosis. There is heavy calcified atherosclerosis of the left carotid bulb. Using NASCET criteria and the distal left   internal carotid artery as the reference, there is an 80% stenosis at the origin the left internal carotid artery. There is no distal stenosis. Vertebral arteries: The vertebral arteries are codominant. There is decreased attenuation of the contrast in the left vertebral artery compared to the right. The flow in the left vertebral artery is most likely retrograde. The proximal contrast is less   intense. There is no stenosis of either vertebral artery. CTA HEAD:     Internal carotid arteries: There is calcified atherosclerosis of the cavernous segments of the internal carotid arteries bilaterally. There is no stenosis. The ophthalmic artery origins are normal.    Middle cerebral arteries: Normal. The proximal branches are also normal.    Anterior cerebral arteries: Normal. The proximal branches are normal. There is a normal small anterior communicating artery. Vertebral arteries: The vertebral arteries are normal.    Basilar artery: Normal.    Superior cerebellar arteries: Normal.    Posterior cerebral arteries: Normal. The proximal branches are also normal.        No aneurysms, stenoses or occlusions are noted.     The superior sagittal sinus, vein of Yasmani, The brain volume is reduced. There is a mild amount of signal hyperintensity on the FLAIR and T2-weighted sequences in the white matter of the brain. This is consistent with mild severity chronic small vessel ischemic changes. A small area of abnormal   signal in the head of the caudate nucleus on the right could represent an old infarct. This could also represent a small area of edema based on the signal characteristics on the FLAIR sequence. There is a small old infarct in the left cerebellum. There are no intra-or extra-axial collections. There is no hydrocephalus, midline shift or mass effect. There is a punctate foci do so susceptibility artifact in the posterior medial left frontal lobe. There is also a punctate focus in the lateral right temporal lobe. There are 2 foci in the left cerebellum. The major intracranial vascular flow voids are   present. The midline craniocervical junction structures are normal.  The pituitary gland and brainstem are normal.            Impression 1. Somewhat unusual foci of abnormal signal are present in the brain. These are in the right occipital lobe, left occipital lobe, head of the caudate nucleus on the right and in the left frontoparietal junction. There is possible edema. 2 of these areas   have susceptibility artifact. Postcontrast imaging is recommended for further evaluation. The differential diagnosis includes small hemorrhagic metastases, small sites of infection and small age indeterminate infarcts. 2. There are foci of susceptibility artifact in the brain. These may represent old microhemorrhages. These are indeterminate. 3. Mild severity chronic small vessel ischemic changes. **This report has been created using voice recognition software. It may contain minor errors which are inherent in voice recognition technology. **    Final report electronically signed by Dr. Lani Hua on 6/12/2019 3:51 PM     No results found for this or any previous visit. No results found for this or any previous visit. Results for orders placed during the hospital encounter of 06/11/19   CT Head WO Contrast    Narrative PROCEDURE: CT HEAD WO CONTRAST    CLINICAL INFORMATION: Altered mental status . COMPARISON: 2/3/2019    TECHNIQUE: 2-D multiplanar noncontrast images of the brain  All CT scans at this facility use dose modulation, iterative reconstruction, and/or weight-based dosing when appropriate to reduce radiation dose to as low as reasonably achievable. FINDINGS: There is a lacunar infarction involving the head of caudate nucleus on the right. This is new since the prior exam but is still not acute with CSF attenuation currently. This measures 3.7 x 6.8 mm. No other infarction is identified. There is no   hemorrhage. There is no extra-axial collection. There is mild scattered areas of subcortical white matter diminished attenuation compatible with mild chronic small vessel ischemic disease. . Calcification seen in the right frontal sulcus was not present on the prior exam. This may represent a small dural calcification. There is a larger focal calcific density in the right temporal lobe image 12. This also was not seen on the prior exam density favors calcification. There is no midline shift or mass effect. Ventricles are normal.  Visualized paranasal sinuses and mastoid air cells are clear. Bilateral lens replacements. Calvarium is intact. Impression 1. Small lacunar infarct in the head of caudate nucleus in the right new since the prior exam.  2. Focal calcific density in the right temporal lobe new since the prior exam. Consider MRI as a follow-up examination if clinically indicated. **This report has been created using voice recognition software. It may contain minor errors which are inherent in voice recognition technology. **    Final report electronically signed by Dr. Riddhi Alcala on 6/12/2019 10:50 AM

## 2019-06-13 NOTE — PROGRESS NOTES
Call placed to patient's daughter Jacob Manzo to obtain consent for ALEX. ALEX information risks and benefits gone over with Claudene Hawk. Questions answered. Claudene Hawk provides consent via phone. Verified by Ramesh Meyer. Claudene Hawk notified of time of procedure.

## 2019-06-14 NOTE — PROGRESS NOTES
Assessment and Plan:        1. Septic Shock: 2/2 Blood cultures are growing Staph Aureus, Coag positive. Pt was started on Vanco (6/11) and Zosyn (6/11). Zosyn was stopped on 6/12 due to culture results. Continuous infusion of Nafcillin started on 6/12. Pt has had episodes of hypotension throughout her admission and has been given 3.5 L. Temperature on arrival was 105 rectally. Source of infection is unknown at this point: ALEX showed no vegetations. ID believes this could be just from the skin. CXR, on admission, does not shows pneumonia. CXR (6/14) shows suspicious pneumonia. UA does not support infection. 2. Atrial Fibrillation with RVR s/p Cardioversion: Pt went into RVR with HRs in the 200s. The patient began to be tachycardic, diaphoretic and hypotensive. Pt was given one dose of Digoxin during Rapid Response. Pt was cardioverted upon arrival to the ICU. The patient is in NSR. Through the night of 6/12, the patient went into Atrial Fibrillation with RVR. Pt was placed on Amio drip. Through the night of 6/13, the patient went into Atrial Fibrillation with RVR. Pt's Amio drip was increased and the patient converted back to NSR. As a precaution, Hydralazine was stopped due to a tachy response. 3. Acute on Chronic Respiratory Failure: Pt uses 3L NC at home. During her episode of A Fib with RVR, the patient required BiPAP. The patient is currently on nasal cannula. 4. Acute on Chronic HFpEF: BNP 31K. Echo (5/20/2019) EF 55%. The patient's CXR does not show pleural effusion. Will continue to monitor. CXR on 6/14 does not show pleural effusion. BNP is down from 31K to 14K.   5. Acute Metabolic Encephalopathy: Probably 2/2 infection, Ammonia 45. Neurology believes that this could be microaneurysms. DAPT has been stopped due to fear of microaneurysms; since ALEX was normal DAPT was restarted. 6. Hypokalemia: Potassium on arrival 3.0, recheck was 3.0. Potassium (6/13) 3.4. Potassium (6/14) 3.3.  IVF + K was started on 6/11, stopped on 6/13. Kathlen Khat was started on 6/14. Will recheck electrolytes. 7. Hypomagnesia: 1.1 on arrival, 1.4, 2.4 after replacement.    8. CAD s/p PCI: Right & Left Heart Cath (3/29/2019): Nonobstructive CAD, patent stents in the RCA and LAD with mild in-stent restenosis. Pt has been on DAPT due to TAVR. 9. Type 2 DM: Per patient, diabetes is diet controlled. Pt is on Low dose SSI. Pt was placed on steroids due to COPD exacerbation.     10. Normocytic Anemia, chronic: Hgb 8.1 on arrival, 7.5 this AM. Hgb (6/13) 7.3. This seems to be her baseline. Will continue to monitor. 11. Elevated Troponin: 0.163, 0.205, 0.186, 0.322 this is probably 2/2 sepsis and RVR. No ST acute changes. Pt had   12. S/P TAVR: Echo pending.     13. Bilateral Carotid Stenosis: Aware. 14. HLD: Aware. 13. History of CVA: Neurology consulted. 16. CHET: Noncompliant with CPAP    17. Hypothyroidism: Aware      CC: Atrial Fibrillation with RVR  HPI:   This is a acutely ill appearing 79year old white female with an extensive PMH COPD, Aortic Stenosis s/p TAVR, Type 2 DM, anemia, CAD s/p PCI, Essential HTN, CVA, HLD, hypothyroidism. The patient has been in the hospital seven times in the last six months, mainly for heart failure exacerbation that was caused by her severe aortic stenosis. The patient underwent TAVR on 4/30, tolerated it well. Pt was recently seen by Dr. Holley Phelan on 6/5 and patient was continued to improve. The patient has not been in the hospital since the TAVR. Pt presented to [facility] ED from [facility] on 6/11 due to altered mental status. The patient usually uses 2L NC and at this time in the ED, she needed 6L NC. Now, in the ICU she was on full face mask BiPAP. The patient was also found to have a fever of 105 on admission. Vanco & Zosyn were started. Blood cultures found Staph Aureus, Zosyn & Vanco were subsequently stopped. Naficillin was started on 6/12. ALEX was ordered to rule out endocarditis.    The patient Placeholder    busPIRone  7.5 mg Oral TID    glycerin-hypromellose-  1 drop Both Eyes BID    ferrous sulfate  325 mg Oral Every Other Day    vitamin D  50,000 Units Oral Once per day on Mon Thu       acetaminophen 650 mg Q4H PRN   docusate sodium 100 mg BID PRN   polyethylene glycol 17 g Daily PRN   sodium chloride 1 spray PRN   sodium chloride flush 10 mL PRN   glucose 15 g PRN   dextrose 12.5 g PRN   glucagon (rDNA) 1 mg PRN   dextrose 100 mL/hr PRN   acetaminophen 650 mg Q4H PRN     Vital Signs: T: 98.6 P: 108 RR: 25 B/P: 117/62: FiO2: 3L NC: O2 Sat:95%: I/O: +7L since admission   CAM-ICU:   Negative   General:   Acutely ill appearing white female   HEENT:  normocephalic and atraumatic. No scleral icterus. Neck: supple. No JVD. Lungs: clear to auscultation. No retractions. Mild diminished breath sounds. Cardiac: Sinus Tachycardia. No m/g/r   Abdomen: soft. Nontender. Extremities:  No clubbing, cyanosis, or edema x 4. Vasculature: capillary refill < 3 seconds. Skin:  warm and dry. Psych:  Alert and oriented x3. Affect appropriate  Lymph:  No supraclavicular adenopathy. Neurologic:  No focal deficit. Data: (All radiographs, tracings, PFTs, and imaging are personally viewed and interpreted unless otherwise noted). · Sodium 136. Potassium 3.3. Chloride 95. CO2 24. BUN 48. Creatinine 1.9. AG 17.0. BNP 14K. Troponin 0.163, 0.186, 0.322, 0.322. H&H 7.3/24. 2. Plts 53. · Albumin 4.1. Alk Phos 34. ALT 31. AST 44. Bilirubin 0.5. Lipase 7.8. · Blood cultures 2/2: Staph Aureus   · UA: Negative Leukocyte Esterase, Nitrite, WBC, Bacteria    · CXR (6/12): No acute infection process appreciated. CXR (6/14): Inconclusive infiltrate process on LLL. · Case discussed with Dr. Agata Gray     Electronically signed by Althia Robes PA-C     Patient seen by me. Case discussed with Dr. Marcell Gray. Patient on nafcillin for MSSA septicemia. Chest x-ray is showing a left-sided infiltrate. Neurologic strength has improved without seizure activity. However, EKG is showing more frequent PVCs. Likely, patient is more short of breath and having increasing heart rate. She does have palpable dorsalis pedis pulses. There is no thyromegaly. He has positive bowel sounds. Pupils are equal and round. Initiating metoprolol IV. Trending cardiac enzymes. Electronically signed by Severiano App Onita Loud MD.

## 2019-06-14 NOTE — FLOWSHEET NOTE
06/14/19  7:49 AM Respiratory to bedside to evaluate for elevated resp rate 30s. Bipap placed. 0800 AM Work of breathing decreased. 8:19 AM Pt c/o headache pain 9/10. States she feels like she will vomit.  Bipap mask removed and placed back on 3L NC.

## 2019-06-14 NOTE — PLAN OF CARE
Problem: Falls - Risk of:  Goal: Will remain free from falls  Description  Will remain free from falls  Outcome: Ongoing  Note:   Call light within reach. Side rails up x2. Bed alarm on zone 2. Non skid slippers available. Problem: Skin Integrity:  Goal: Skin integrity will stabilize  Description  Skin integrity will stabilize  Outcome: Ongoing  Note:   No new skin breakdown noted. See skin assessment. Repositioning patient q2h and prn      Problem: Discharge Planning:  Goal: Patients continuum of care needs are met  Description  Patients continuum of care needs are met  Outcome: Ongoing  Note:   Pt is from [address] to return when medically stable     Problem: Injury - Risk of, Physical Injury:  Goal: Will remain free from falls  Description  Will remain free from falls  Outcome: Ongoing  Note:   Call light within reach. Side rails up x2. Bed alarm on zone 2. Non skid slippers available. Care plan reviewed with patient.  Will review and discuss care plan with family when available

## 2019-06-14 NOTE — TELEPHONE ENCOUNTER
Patient was scheduled for an appointment on 6/17/19.  Patient is in-house [facility], so I called and advised them that I was cancelling appt and to have patient callback after discharge to r/s

## 2019-06-14 NOTE — PROGRESS NOTES
1350 Johnathon Grayson updated on pts heart rate maintaining in the 150-160s. Ordered 6mg Adenosine and to increase Cardizem to 20mg/hr. Dr. Narinder Stoddard and Piotr Bullock CNP at bedside who said to hold off on the Adenosine at this time and to obtain ABGs. BP 76/48    1820 Dr. Carolin Grayson on phone to check on patient. Dr. Bob Hikes of orders to hold off on Adenosine and to obtain ABGs per Dr. Narinder Stoddard. Dr. Carolin Grayson requested to give Adenosine with HR continuing to be in the in the 160s. 1830 HR now ranging between 100-110 with drips remaining at: Amio 0.5mg and Cardizem at 20mg/hr. Will hold off on Adenosine.  SBP 90

## 2019-06-14 NOTE — PROGRESS NOTES
NEUROLOGY INPATIENT PROGRESS NOTE    Patient- Gary Narayan    MRN -  [id number]   Acct # - [de-identified]      - 1945    75 y.o. Subjective: The patient is seen as follow up for stroke and acute encephalopathy Patient is awake at this time. Seems to improve neurologically, but seems a little more SOB compare to yesterday evening. Objective:   /69   Pulse 94   Temp 98.5 °F (36.9 °C) (Oral)   Resp 24   Ht 5' 5\" (1.651 m)   Wt 219 lb 2.2 oz (99.4 kg)   LMP 1973 (Approximate)   SpO2 100%   BMI 36.47 kg/m²       Intake/Output Summary (Last 24 hours) at 2019 1409  Last data filed at 2019 1229  Gross per 24 hour   Intake 4762.04 ml   Output 250 ml   Net 4512.04 ml       Physical Exam:  General:  Lethargic, not in distress. Language is slurred. Oriented to self and place and time. HEENT: pink conjunctiva, unicteric sclera, moist oral mucosa. EOMI,   Neck: There is no carotid bruits. The Neck is supple. Neuro: CN 2-12 grossly intact with no focal deficits. Power 4+/5 Throughout symmetric (pt is diffusely weak), . Long tracts are intact. Cerebellar exam is Intact. Sensory exam is intact to light touch. Gait is not tested. No abnormal movement. Osteo: There is no LROM of any of the 4 extremity joints, no joint tenderness. +1 edema    ROS:    Cardiac: no chest pain. No palpitations.   Renal : no flank pain, no hematuria  Skin: no rash    Medications:     spironolactone  25 mg Oral Daily    aspirin  81 mg Oral Daily    clopidogrel  75 mg Oral Daily    metoprolol  5 mg Intravenous Q6H    sodium chloride  500 mL Intravenous Once    insulin lispro  0-12 Units Subcutaneous TID WC    insulin lispro  0-6 Units Subcutaneous Nightly    modafinil  200 mg Oral Daily    nafcillin 12 g continuous infusion  12 g Intravenous Q24H    ipratropium-albuterol  1 ampule Inhalation Q4H    mometasone-formoterol  2 puff Inhalation BID    calcium-vitamin D  1 tablet Oral Daily    cyanocobalamin  1,000 mcg Oral Daily    escitalopram  20 mg Oral QAM    fluticasone  1 spray Nasal Daily    levothyroxine  125 mcg Oral Daily    multivitamin  1 tablet Oral Daily    OXcarbazepine  150 mg Oral QAM    pantoprazole  40 mg Oral QAM AC    risperiDONE  1 mg Oral QAM    rosuvastatin  20 mg Oral Nightly    ranolazine  500 mg Oral BID    sodium chloride flush  10 mL Intravenous 2 times per day    potassium replacement protocol   Other RX Placeholder    busPIRone  7.5 mg Oral TID    glycerin-hypromellose-  1 drop Both Eyes BID    ferrous sulfate  325 mg Oral Every Other Day    vitamin D  50,000 Units Oral Once per day on Mon Thu       Data:   CBC:   Recent Labs     06/12/19  0522 06/13/19  1118   WBC 4.9 4.5*   HGB 7.5* 7.3*   PLT 60* 53*     BMP:    Recent Labs     06/12/19  1529  06/13/19  0515 06/13/19  1040 06/14/19  0515     --  139  --  136   K 3.0*   < > 3.2* 3.4* 3.3*   CL 97*  --  100  --  95*   CO2 26  --  27  --  24   BUN 35*  --  39*  --  48*   CREATININE 1.3*  --  1.2  --  1.9*   GLUCOSE 260*  --  219*  --  179*    < > = values in this interval not displayed. No results found for this or any previous visit. Results for orders placed during the hospital encounter of 06/11/19   MRA HEAD WO CONTRAST    Narrative PROCEDURE: MRA HEAD WO CONTRAST    CLINICAL INFORMATION: STROKE. Altered mental status. Abnormal CT.    COMPARISON: Brain MRI 6/12/2019. TECHNIQUE: 3-D time-of-flight images were obtained through the brain. Multiplanar reconstructions were obtained. FINDINGS:    Motion artifact does degrade the quality of some of these images. These are the best images possible at this time. The distal vertebral arteries are grossly normal.  The basilar artery is grossly normal. There are normal superior cerebellar arteries and posterior cerebral arteries.     The internal carotid arteries are patent and grossly normal. The anterior common carotid artery. There is no stenosis. There is heavy calcified atherosclerosis of the right carotid bulb. Using NASCET criteria and the distal right internal   carotid artery as the reference, the stenosis is 65-70%. . There is no stenosis of the distal right internal carotid artery. Left common carotid artery/ICA: There is some mild scattered calcified atherosclerosis of the left common carotid artery. There is no stenosis. There is heavy calcified atherosclerosis of the left carotid bulb. Using NASCET criteria and the distal left   internal carotid artery as the reference, there is an 80% stenosis at the origin the left internal carotid artery. There is no distal stenosis. Vertebral arteries: The vertebral arteries are codominant. There is decreased attenuation of the contrast in the left vertebral artery compared to the right. The flow in the left vertebral artery is most likely retrograde. The proximal contrast is less   intense. There is no stenosis of either vertebral artery. CTA HEAD:     Internal carotid arteries: There is calcified atherosclerosis of the cavernous segments of the internal carotid arteries bilaterally. There is no stenosis. The ophthalmic artery origins are normal.    Middle cerebral arteries: Normal. The proximal branches are also normal.    Anterior cerebral arteries: Normal. The proximal branches are normal. There is a normal small anterior communicating artery. Vertebral arteries: The vertebral arteries are normal.    Basilar artery: Normal.    Superior cerebellar arteries: Normal.    Posterior cerebral arteries: Normal. The proximal branches are also normal.        No aneurysms, stenoses or occlusions are noted. The superior sagittal sinus, vein of Yasmani, internal cerebral veins, straight sinus, transverse sinuses and sigmoid sinuses are patent. Axial source data: There are no suspicious findings in the lung apices. There is no cervical adenopathy.  There are no gross abnormalities in the brain parenchyma. The paranasal sinuses are normally aerated. Impression    1. Occluded proximal left subclavian artery. 2. Presumed retrograde flow in the left vertebral artery. This is likely supplying the left subclavian artery. 3. 80% stenosis at the origin the left internal carotid artery. 4. 65-70% stenosis at the origin of the right internal carotid artery. **This report has been created using voice recognition software. It may contain minor errors which are inherent in voice recognition technology. **    Final report electronically signed by Dr. Ar Escobar on 11/15/2018 1:36 PM     Results for orders placed during the hospital encounter of 11/15/18   CTA NECK W WO CONTRAST    Narrative PROCEDURE: CTA HEAD W [address] INFORMATION: Chronic diastolic heart failure ([address] Possible heart surgery. Lower blood pressure reading from her left arm. COMPARISON: No prior study. TECHNIQUE: 1 mm axial images were obtained through the head and neck after the fast bolus administration of contrast. A noncontrast localizer was obtained. 3-D reconstructions were performed on a dedicated 3-D workstation. These include multiplanar MPR   images and multiplanar MIP images. Centerline reconstructions were obtained of the carotid systems. Isovue intravenous contrast was given. All CT scans at this facility use dose modulation, iterative reconstruction, and/or weight-based dosing when appropriate to reduce radiation dose to as low as reasonably achievable. FINDINGS:      CTA NECK:    Aortic arch and branches: There is atherosclerosis of the aortic arch. There is mild stenosis at the origin of innominate artery and left common carotid artery. There is occlusion of the proximal left subclavian artery due to atherosclerosis. There is mild stenosis at the origin the right subclavian artery.     Right common carotid artery/ICA: There is scattered calcified atheromatosis of the right common carotid artery. There is no stenosis. There is heavy calcified atherosclerosis of the right carotid bulb. Using NASCET criteria and the distal right internal   carotid artery as the reference, the stenosis is 65-70%. . There is no stenosis of the distal right internal carotid artery. Left common carotid artery/ICA: There is some mild scattered calcified atherosclerosis of the left common carotid artery. There is no stenosis. There is heavy calcified atherosclerosis of the left carotid bulb. Using NASCET criteria and the distal left   internal carotid artery as the reference, there is an 80% stenosis at the origin the left internal carotid artery. There is no distal stenosis. Vertebral arteries: The vertebral arteries are codominant. There is decreased attenuation of the contrast in the left vertebral artery compared to the right. The flow in the left vertebral artery is most likely retrograde. The proximal contrast is less   intense. There is no stenosis of either vertebral artery. CTA HEAD:     Internal carotid arteries: There is calcified atherosclerosis of the cavernous segments of the internal carotid arteries bilaterally. There is no stenosis. The ophthalmic artery origins are normal.    Middle cerebral arteries: Normal. The proximal branches are also normal.    Anterior cerebral arteries: Normal. The proximal branches are normal. There is a normal small anterior communicating artery. Vertebral arteries: The vertebral arteries are normal.    Basilar artery: Normal.    Superior cerebellar arteries: Normal.    Posterior cerebral arteries: Normal. The proximal branches are also normal.        No aneurysms, stenoses or occlusions are noted. The superior sagittal sinus, vein of Yasmani, internal cerebral veins, straight sinus, transverse sinuses and sigmoid sinuses are patent. Axial source data:  There are no suspicious findings in the lung consistent with mild severity chronic small vessel ischemic changes. A small area of abnormal   signal in the head of the caudate nucleus on the right could represent an old infarct. This could also represent a small area of edema based on the signal characteristics on the FLAIR sequence. There is a small old infarct in the left cerebellum. There are no intra-or extra-axial collections. There is no hydrocephalus, midline shift or mass effect. There is a punctate foci do so susceptibility artifact in the posterior medial left frontal lobe. There is also a punctate focus in the lateral right temporal lobe. There are 2 foci in the left cerebellum. The major intracranial vascular flow voids are   present. The midline craniocervical junction structures are normal.  The pituitary gland and brainstem are normal.            Impression 1. Somewhat unusual foci of abnormal signal are present in the brain. These are in the right occipital lobe, left occipital lobe, head of the caudate nucleus on the right and in the left frontoparietal junction. There is possible edema. 2 of these areas   have susceptibility artifact. Postcontrast imaging is recommended for further evaluation. The differential diagnosis includes small hemorrhagic metastases, small sites of infection and small age indeterminate infarcts. 2. There are foci of susceptibility artifact in the brain. These may represent old microhemorrhages. These are indeterminate. 3. Mild severity chronic small vessel ischemic changes. **This report has been created using voice recognition software. It may contain minor errors which are inherent in voice recognition technology. **    Final report electronically signed by Dr. Gumaro Glass on 6/12/2019 3:51 PM     No results found for this or any previous visit. No results found for this or any previous visit.   Results for orders placed during the hospital encounter of 06/11/19   CT Head WO Contrast Narrative PROCEDURE: CT HEAD WO CONTRAST    CLINICAL INFORMATION: Altered mental status . COMPARISON: 2/3/2019    TECHNIQUE: 2-D multiplanar noncontrast images of the brain  All CT scans at this facility use dose modulation, iterative reconstruction, and/or weight-based dosing when appropriate to reduce radiation dose to as low as reasonably achievable. FINDINGS: There is a lacunar infarction involving the head of caudate nucleus on the right. This is new since the prior exam but is still not acute with CSF attenuation currently. This measures 3.7 x 6.8 mm. No other infarction is identified. There is no   hemorrhage. There is no extra-axial collection. There is mild scattered areas of subcortical white matter diminished attenuation compatible with mild chronic small vessel ischemic disease. . Calcification seen in the right frontal sulcus was not present on the prior exam. This may represent a small dural calcification. There is a larger focal calcific density in the right temporal lobe image 12. This also was not seen on the prior exam density favors calcification. There is no midline shift or mass effect. Ventricles are normal.  Visualized paranasal sinuses and mastoid air cells are clear. Bilateral lens replacements. Calvarium is intact. Impression 1. Small lacunar infarct in the head of caudate nucleus in the right new since the prior exam.  2. Focal calcific density in the right temporal lobe new since the prior exam. Consider MRI as a follow-up examination if clinically indicated. **This report has been created using voice recognition software. It may contain minor errors which are inherent in voice recognition technology. **    Final report electronically signed by Dr. Aguila Quarles on 6/12/2019 10:50 AM         Assessment:  Principal Problem:    Sepsis ([facility] [address]  Active Problems:    Anemia    Septicemia ([facility] [address]    Elevated troponin    Type 2 diabetes mellitus with complication, with long-term

## 2019-06-14 NOTE — CARE COORDINATION
6/14/19, 2:39 PM      Homer Ghotra day: 3  Location: [facility]09/009-A Reason for admit: Sepsis [facility]) [A41.9]   Procedure:   6/13 ALEX: Neg for endocarditis  6/14 CXR: Possible pneumonia on the left  Treatment Plan of Care: Unable to do MRI brain w/contrast at this time d/t elevated creatinine. ALEX neg for endocarditis - IV vancomycin stopped. Intensivist, ID, Cardiology and Neurology following. Blood cultures +MSSA. SLP/PT/OT. Tmax 100. Sats 99% on 3L O2. Bipap prn. Ox4. Follows commands. Flexiseal. External urinary catheter. Amio @ 0.5 mg/min, IV nafcillin - continuous, asa, buspar, plaivx, SSI ACHS, nebs, synthroid, lopressor IV, provigil, inhlaer, trileptal, protonix, ranexa, crestor, aldactone, Electrolyte replacement protocols. K+ 3.3, BUN 48, Creat up to 1.9, AG 17, pro-bnp 19855, trop T 0.322. PCP: ASHLEY Cordero CNP  Readmission Risk Score: 78%  Discharge Plan: Return to [facility]. No precert required. SW on case.

## 2019-06-14 NOTE — PROGRESS NOTES
1715 HR still in the 160s. BPs in the 90-100s. Pt awakens easily, answers questions appropriately but slow. Dr. Zelda Forrester paged, received orders for 10mg Cardizem bolus and drip 10-20mg/hr HR .  Hold scheduled Lopressor    1730 Pharmacy called for Cardizem

## 2019-06-14 NOTE — PROGRESS NOTES
Nausea & vomiting     Obesity     CHET (obstructive sleep apnea) 2016    NO MACHINE YET    S/P cardiac catheterization: 11/6/2014: 99% in-stent restenosis mid-RCA. LCx moderate LI's. LAD 85% mid-segment lesion. 11/6/2014 11/6/2014: 99% in-stent restenosis mid-RCA. LCx moderate LI's. LAD 85% mid-segment lesion. Dr. Ledy Schulz S/P PTCA:  5/11/2004: Proximal and mid RCA  Taxus 3.5 X 20 mm, and Taxus 3.0 X 32 mm. 10/28/2014    5/11/2004: Proximal and mid RCA  Taxus 3.5 X 20 mm, and Taxus 3.0 X 32 mm. Dr. Cassnadra Smith Systolic murmur 15/37/8897    Thyroid disease     ON RX    Wears glasses        Pain:  0/10    Current Diet: Regular with thin liquids    Respiratory Status: [] Independent [x] Nasal Cannula [] Oxygen Mask      [] Tracheostomy [] Other:     [] Ventilator/Settings:    Behavioral Observation: [x] Alert [x] Oriented [] Confused [] Lethargic      [] Dysarthric [] Limited Direction Following [] Agitated      [] Other:    ORAL MECHANISM EVALUATION:         Comments:  Facial / Labial []WFL [x] Impaired []DNT Generalized weakness   Lingual [x]WFL [] Impaired []DNT    Dentition []WFL [x] Impaired []DNT Upper denture, no lower denture at facility    Velum []WFL [] Impaired [x]DNT    Vocal Quality [x]WFL [] Impaired []DNT    Sensation []WFL [] Impaired [x]DNT    Cough [x]WFL [] Impaired []DNT        PATIENT WAS EVALUATED USING:  Thin soda via straw; soft and hard/coarse solids    ORAL PHASE: [] WFL  [x] Impaired   [] Loss of bolus from lips [] Impaired AP movement [] Pocketing Left   [] Pocketing Right  [] Lingual Pumping  [x] Impaired Mastication   [] Reduced Bolus Formation [] Impaired Oral Initiation    [] OTHER:    PHARYNGEAL PHASE: [] WFL: Pharyngeal phase appears WFLs, but cannot completely rule out pharyngeal phase deficits from a bedside swallow evaluation alone.    [x] Impaired   [] Delayed Swallow  [x] Decreased Hyolaryngeal Elevation [] Audible Swallow   [] Suspected Pharyngeal Residue due to spontaneous multiple swallow. [] OTHER:    SIGNS AND SYMPTOMS OF LARYNGEAL PENETRATION / ASPIRATION:  [x] NO sign/symptoms of aspiration evident with this evaluation, but cannot rule out silent aspiration. IMPRESSIONS: Pt presents with mild oropharyngeal dysphagia based on findings outlined above. Given lack of dentition, oral phase compounded for effective mastication with hard/coarse solids with pt NOT able to safely form cohesive bolus with expectoration of consistency required. Within consumption of softer solids, oral phase significantly improved with additional time required to ensure adequate manipulation and coordination for AP transit. Swallow initiation appeared timely with min oral residuals to follow, cleared given liquid assist. Thin soda via straw (per pt preference) x5 trials consumed withOUT difficulty. NO s/s aspiration exhibited, however, silent aspiration unable to be r/o at bedside alone. Recommend diet downgrade to dysphagia II solids with continuation for thin liquids. Will plan to target advanced texture trials within ST sessions pending placement of full dentures to ascertain potential to upgrade diet level per PLOF. RECOMMENDATIONS:     Modified Barium Swallow: [] Is indicated to further assess    [x] Is NOT indicated at this time; Will recommend as        appropriate. DIET RECOMMENDATIONS:  Dysphagia II with thin liquids    STRATEGIES: [] Strategies pending MBS results.   [x] Full upright position  [x] Small bite/sip [] No Straw [] Multiple Swallow  [] Chin tuck [] Head turn [x] Pulmonary monitoring [] Oral care after all meals  [] Supervision  [] Medication in applesauce []Direct 1:1 Supervision  [] Spoon all liquids [x] Alternate solid / liquid [] Limit distractions [x] Monitor for fatigue  [] PMV in place for all po [] OTHER:      EDUCATION:   Learner: [x]Patient [] Significant other [] Son/Daughter [] Parent     [] Other:   Education: [x] Reviewed results and recommendations of this evaluation     [x] Reviewed diet and strategies     [] Reviewed signs, symptoms and risk of aspiration     [] Demonstrated how to thick liquid appropriately. [x] Reviewed goals and Plan of Care     [] OTHER:   Method: [x] Discussion [] Demonstration [] Hand-out     [] OTHER:   Evaluation of Education:     [x] Verbalizes understanding [] Demonstrates with assistance     [] Demonstrates without assistance [x]Needs further instruction     [] No evidence of learning  [x] Family not present    PATIENT GOALS: [] Pt did not state. Will further assess during treatment. [] Return to the least restricted diet possible     [] Return to previous level of function     [] OTHER:    PLAN / TREATMENT RECOMMENDATIONS:  [x] Skilled SLP intervention on acute care 3-5 x per week or until goals met and/or pt plateaus in function. Specific interventions for next session may include: diet and pulmonary monitoring, advanced texture trials as appropriate    SHORT TERM GOALS:  Short-term Goals  Timeframe for Short-term Goals: 2 weeks  Goal 1: Pt will safely tolerate dysphagia II diet with thin liquids without s/s aspiration to ensure adequate nutrition/hydration measures. Goal 2: Pt will safely tolerate advanced texture trials WITH placement of full dentures 10/10 opportunities to permit potential diet advancement. Goal 3: Monitor pulmonary status; complete formal instrumentation should adverse changes be documented.        LONG TERM GOALS:  No LTG established given short CELINE Voss M.A., [address] [facility]

## 2019-06-14 NOTE — PROGRESS NOTES
1134 - pt. Monitor alarming - HR in the 170's; quickly drops to 150's and is sustained - monitor shows sinus tachycardia - pt. States she feels fine. Vagal manuvers requested and done, HR continues 150's. BP stable in the 110's. Dr. Ingrid Farnsworth notified. Back to room and pt. Continues to feel fine. RT, Chevy Tamez in room  - states he found her with nasal canula off and sats low, he has put it back on and she is recovering on 3L to the mid 90's (her O2 was on and sats mid 90's when I initially left her room). RT requests pt. To allow BIPAP to be placed, pt. Is adamant that she does not want it on. Teaching provided and pt. Continues to refuse BIPAP. HR continues in the high 140's, occasionally up to 170. Primary RN, Rachel Lainez notified.

## 2019-06-14 NOTE — CARE COORDINATION
[address] Transitions Interview     2019    Patient: Sarah Weiss Patient : 1945   MRN: [id number]  Reason for Admission:   RARS: Readmission Risk Score: 78         Reviewed nH Predict Tool with patient and SW/CM. nH Predict Tool uploaded in the media tab. Recommendation is for SNF and discharge plan is for [facility]      Readmission Risk  Patient Active Problem List   Diagnosis    Anxiety    Depression    Type 2 diabetes mellitus with complication, with long-term current use of insulin ([facility]    MI (myocardial infarction) ([address]    Dyslipidemia    CAD (coronary artery disease)    COPD without exacerbation ([address]    GERD (gastroesophageal reflux disease)    Hypothyroid    History of tobacco abuse: Quit in     S/P PTCA: 3/2/2017: PTCA RCA ISR. 2014: LAD Resolute 3.0X26. 2014: Stenting RCA Simi Grist 7.7A53 and 3.5X18. 2004: Proximal & mid RCA Taxus 3.5X20 mm, and Taxus 3.0X32 mm.  Metabolic syndrome    S/P cardiac catheterization: 2014: 99% in-stent restenosis mid-RCA. LCx moderate LI's. LAD 85% mid-segment lesion.  POND (nonalcoholic steatohepatitis)    Hyperlipidemia    Essential hypertension    Obesity (BMI 30-39. 9)    Moderate dehydration    ASCVD (arteriosclerotic cardiovascular disease)    Chronic respiratory failure with hypoxia (HCC)    Other hyperlipidemia    Dysmetabolic syndrome    Bilateral iliac artery occlusion (HCC)    CHET (obstructive sleep apnea)    Aortic valve stenosis    Nocturnal hypoxemia    Hyponatremia    Acute on chronic diastolic congestive heart failure due to valvular disease (HCC)    Sympathotonic orthostatic hypotension    E-coli UTI    Anxiety and depression    Dizziness    Chest pain    Acute on chronic respiratory failure with hypoxia and hypercapnia (HCC)    Moderate persistent asthma without complication    Pulmonary nodule    Heart failure with preserved ejection fraction ([address]    Osteoarthritis of multiple joints    Class 2 obesity due to excess calories with body mass index (BMI) of 38.0 to 38.9 in adult    Normocytic anemia    Subclavian vein stenosis    Post concussive syndrome    CRF (chronic renal failure), stage 4 (severe) ([facility])    Hypotension    Syncope and collapse    Metabolic encephalopathy    Right-sided epistaxis    Diastolic dysfunction without heart failure    Cervical spine instability    Nonrheumatic aortic valve stenosis    Acute diastolic heart failure (HCC)    Acute kidney injury (HCC)    Hypercalcemia    At risk for polypharmacy    Acute respiratory failure with hypoxia and hypercapnia (HCC)    Acute pulmonary edema (HCC)    Abnormal urinalysis    Irritable bowel syndrome    Anemia requiring transfusions    Stage 3 severe COPD by GOLD classification ([facility])    Bilateral carotid artery stenosis    Iron deficiency anemia due to dietary causes    B12 deficiency due to diet    Diastolic CHF, acute on chronic (HCC)    Anemia    Morbid obesity with BMI of 40.0-44.9, adult ([facility])    Hyponatremia with decreased serum osmolality    Metabolic alkalosis    Chronic diastolic congestive heart failure (HCC)    Acute exacerbation of COPD with asthma (HCC)    Acute respiratory failure with hypoxia and hypercapnia (HCC)    Severe aortic stenosis    S/P TAVR (transcatheter aortic valve replacement)    Sepsis ([facility])    Elevated transaminase level    Electrolyte imbalance    Septicemia ([facility])    Elevated troponin    Acute ischemic stroke [facility])       Inpatient Assessment  Care Transitions Summary    Care Transitions Inpatient Review  Medication Review  Do you have all of your prescriptions and are they filled?:  No   Barriers to Medication Adherence:  None  Are you able to afford your medications?:  Yes  How often do you have difficulty taking your medications?:  I always take them as prescribed.   Housing Review  Are you an active caregiver in your home?: No  Social Support  Durable Medical Equipment  Patient DME:  Ulisses Dillon cane, Wheelchair, Other  Other Patient DME:  ERS Necklace  Patient Home Equipment:  CPAP, Oxygen, Nebulizer  Functional Review  Ability to seek help/take action for Emergent/Urgent situations i.e. fire, crime, inclement weather or health crisis.:  Needs Assistance  Ability handle personal hygiene needs (bathing/dressing/grooming):  Needs Assistance  Ability to manage medications:  Needs Assistance  Ability to prepare food:  Needs Assistance  Ability to maintain home (clean home, laundry):  Needs Assistance  Ability to drive and/or has transportation:  Dependent  Ability to do shopping:  Needs Assistance  Ability to manage finances:   Independent  Is patient able to live independently?:  No  Hearing and Vision  Visual Impairment:  Visual impairment (Glasses/contacts)  Hearing Impairment:  Wears hearing aids  Care Transitions Interventions         Follow Up  Future Appointments   Date Time Provider Micheline Conteh   7/15/2019  1:00 PM Amanda Vasques RD, LD SRPX Physic [facility] [place]   7/23/2019 10:15 AM Andrés River MD Oncology [facility] [place]   7/23/2019  2:30 PM Linnea Farias PA-C Pulm Med [facility] [place]   7/24/2019 10:30 AM ASHLEY Figueroa - CNP SRPX CHF [facility] [place]   8/8/2019  9:40 AM ASHLEY Mehta CNP [facility] [place]   10/1/2019 10:40 AM Roman Pallas, APRN - CNP [facility] Maintenance  Health Maintenance Due   Topic Date Due    Breast cancer screen  10/10/2018       Gavi Cardenas RN

## 2019-06-14 NOTE — FLOWSHEET NOTE
[address] THERAPY MISSED TREATMENT NOTE  [facility] ICU 4D  [id number]      Date: 2019  Patient Name: Addy Aguero        CSN: [id number]   : 1945  (75 y.o.)  Gender: female   Referring Practitioner: Dr. Genesis Maza  Diagnosis: sepsis         REASON FOR MISSED TREATMENT:  Missed Treat. Attempt in am, nurse requested check back later after meds given. Attempt x 2, Pt having arrhythmia, nurse recommends hold today.

## 2019-06-15 NOTE — PROGRESS NOTES
Intensive Care Unit  Medical Intensive Care Unit  Attending Progress Note      Patient was seen and evaluated with sanaz Orozco        Team members present on rounds: ICU nursing    ICU guidelines/prophylaxis active for the following:    head above bed above 30 degrees, insulin guidelines, DVT prophylaxis, ulcer prophylaxis and analgesia/sedation guidelines    Patient Examined? yes    Additions/differences/highlights to the resident's/fellow's exam:  VITALS:  BP (!) 111/43   Pulse 104   Temp 100.4 °F (38 °C) (Core)   Resp 27   Ht 5' 5\" (1.651 m)   Wt 218 lb 7.6 oz (99.1 kg)   LMP 02/12/1973 (Approximate)   SpO2 96%   BMI 36.36 kg/m²   VENT SETTINGS:    Vent Information  Skin Assessment: Redness (see comment/note)(small amount of redness on left side of nose)  Equipment ID: [id number]  Equipment Changed: Mask  FiO2 : 35 %  Additional Respiratory  Assessments  Pulse: 104  Resp: 27  SpO2: 96 %  Oral Care: Mouth swabbed    CONSTITUTIONAL:  awake, alert, cooperative, somnolent, mild distress and morbidly obese  EYES:  lids and lashes normal, pupils equal, round, and reactive to light, extra-ocular muscles intact, sclera clear, conjunctiva normal  HEENT:  Normocephalic, without obvious abnormality, atramatic, sinuses nontender on palpation, external ears without lesions, oral pharynx with moist mucus membranes, tonsils without erythema or exudates, gums normal and good dentition.   NECK:  Supple, symmetrical, trachea midline, no adenopathy, thyroid symmetric, not enlarged and no tenderness  HEMATOLOGIC/LYMPHATICS:  Cervical lymph nodes normal, supraclavicular lymph nodes normal  BACK:  Unable to assess  LUNGS:  good air exchange, clear to auscultation, no crackles or wheezing, tachypneic and moderate resp distress, paradox breathing  CARDIOVASCULAR:  Normal apical impulse, regular rate and rhythm, normal S1 and S2, no S3 or S4, and no murmur noted  ABDOMEN:  no scars,  absent bowel sounds, distended and firm , no tenderness, watery stool  CHEST/BREASTS:  Rash over sternal notch( probable reaction to face mask )  GENITAL/URINARY:  deferred  MUSCULOSKELETAL:  No cyanosis, clubbing, firm lower ext edema  NEUROLOGIC:  Awake, alert, oriented to name, place and time. Cranial nerves II-XII are grossly intact. Motor is 5 out of 5 bilaterally. Cerebellar finger to nose, heel to shin intact. Sensory is intact. Babinski down going, Romberg negative, and gait is normal.  SKIN:  No bruising or bleeding  Normal skin color, texture, turgor, mild rash over sternal notch . DATA:  CBC:   Lab Results   Component Value Date    WBC 5.9 06/15/2019    RBC 2.49 06/15/2019    RBC 3.82 08/21/2018    HGB 6.7 06/15/2019    HCT 21.5 06/15/2019    MCV 86.3 06/15/2019    RDW 13.5 05/15/2019    PLT 91 06/15/2019     CMP:    Lab Results   Component Value Date     06/15/2019    K 3.2 06/15/2019    K 3.7 05/02/2019    CL 95 06/15/2019    CO2 24 06/15/2019    BUN 42 06/15/2019    CREATININE 1.6 06/15/2019    LABGLOM 31 06/15/2019    GLUCOSE 161 06/15/2019    GLUCOSE 101 07/25/2016    PROT 5.8 06/15/2019    PROT 6.5 11/25/2016    CALCIUM 8.4 06/15/2019    BILITOT 0.4 06/15/2019    ALKPHOS 42 06/15/2019    AST 16 06/15/2019    ALT 15 06/15/2019   MSSA blood, 6.11.2019  CVP Mean:  9 mmHg  Radiology Review: Worsening pulmonary edema.             KEY ISSUES/FINDINGS/ASSESSMENT AND PLAN:    This is a acutely ill appearing 79year old white female with an extensive PMH COPD, Aortic Stenosis s/p TAVR, Type 2 DM, anemia, CAD s/p PCI, Essential HTN, CVA, HLD, hypothyroidism. The patient has been in the hospital seven times in the last six months, mainly for heart failure exacerbation that was caused by her severe aortic stenosis. The patient underwent TAVR on 4/30, tolerated it well. Pt was recently seen by Dr. Adeel Aranda on 6/5 and patient was continued to improve. The patient has not been in the hospital since the TAVR.    Pt presented to [facility] ED from [address] [facility] [place] on 6/11 due to altered mental status. The patient usually uses 2L NC and at this time in the ED, she needed 6L NC. Now, in the ICU she was on full face mask BiPAP. The patient was also found to have a fever of 105 on admission. Vanco & Zosyn were started. Blood cultures found Staph Aureus, Zosyn & Vanco were subsequently stopped. Naficillin was started on 6/12. ALEX was ordered to rule out endocarditis. The patient also went into symptomatic Atrial Fibrillation with RVR on 6/12 which required emergent cardioversion. The patient converted into NSR with tachycardia. Through the night of 6/12, the patient went back into atrial fibrillation with RVR. Amiodarone was started, patient converted back to NSR. Assessment and Plan:        1. Septic Shock: 2/2 Blood cultures are growing Staph Aureus, Coag positive, MSSA. Pt was started on Vanco (6/11) and Zosyn (6/11). Zosyn was stopped on 6/12 due to culture results. Continuous infusion of Nafcillin started on 6/12. Temperature on arrival was 105 rectally. ALEX showed no vegetations. ID believes this could be just from the skin. CXR, on admission, does not shows pneumonia. UA on admission: neg. But yesterday it did show high wbc, pending culture, noted to have low grade fever last night  2. Atrial Fibrillation with RVR s/p Cardioversion: Pt went into RVR with HRs in the 200s. The patient began to be tachycardic, diaphoretic and hypotensive. Pt was given one dose of Digoxin during Rapid Response. Pt was cardioverted upon arrival to the ICU. The patient is in NSR. Through the night of 6/12, the patient went into Atrial Fibrillation with RVR. Pt was placed on Amio drip. Through the night of 6/13, the patient went into Atrial Fibrillation with RVR. Pt's Amio drip was increased and the patient converted back to NSR. 6.14: recurrent episodes of at.fib with  RVR, pending cardiology input.   3.   Acute on Chronic Respiratory Failure( obesity hypoventilation syndrome/obstructive sleep apnea): Pt uses 3L NC at home. get CPAP  at home but she did not use it. 4. Acute on Chronic HFpEF: BNP 31K. Echo (5/20/2019) EF 55%. The patient's CXR does not show pleural effusion. Will continue to monitor. CXR on 6/14 does not show pleural effusion. BNP is down from 31K to 14K. chest xray on 6.15 showed pul edema: plan to give lasix post transfusion  5. Acute Metabolic Encephalopathy:improving,  Probably 2/2 infection, Ammonia 45. Neurology believes that this could be microaneurysms. DAPT has been stopped due to fear of microaneurysms; since ALEX was normal DAPT was restarted. toxoplasma titer ordered because of multiple calcified lesions in brain with decreasing WBC. 6. Hypokalemia: replace prn.  7. Hypomagnesia: replace prn    8. CAD s/p PCI: Right & Left Heart Cath (3/29/2019): Nonobstructive CAD, patent stents in the RCA and LAD with mild in-stent restenosis. Pt has been on DAPT due to TAVR. 9. Type 2 DM: Per patient, diabetes is diet controlled. Pt is on Low dose SSI. Pt was placed on steroids due to COPD exacerbation.     10. Pancytopenia: monitor, ? Infectious process, will monitor, HIT ordered, PRBC one unit ordered. 11. Elevated Troponin: supply demand ischemia. monirtor  12. S/P TAVR: Echo: adequate position/function  aortic valve  13. Bilateral Carotid Stenosis:old,. Aware. 14. HLD: Aware. 13. History of CVA: Neurology following  16. Yonathan/ckd: sepsis/hypotension, diabetes. Urine for eosinophils neg,  FE urea equal 34.9 which is the level between prerenal/intrinsic renal. Will continue to optimize hemodynamics/ avoid nephrotoxin, control BP/glucose level, treat infection, adjust medications to GFR.   17. Hypothyroidism: Aware       Critical condition, guarded prognosis    Cc time 55 minutes apart from priocedures

## 2019-06-15 NOTE — PLAN OF CARE
Problem: Falls - Risk of:  Goal: Will remain free from falls  Description  Will remain free from falls  Outcome: Ongoing  Note:   Remains free from falls. Fall precautions in place. Goal: Absence of physical injury  Description  Absence of physical injury  Outcome: Ongoing  Note:   Remains free from injury     Problem: Skin Integrity:  Goal: Skin integrity will stabilize  Description  Skin integrity will stabilize  Outcome: Ongoing  Note:   Skin assessment completed and documented. Skin care completed. Problem: Discharge Planning:  Goal: Patients continuum of care needs are met  Description  Patients continuum of care needs are met  Outcome: Ongoing  Note:   Patient remains in ICU, continue to follow. Problem: Confusion - Acute:  Goal: Absence of continued neurological deterioration signs and symptoms  Description  Absence of continued neurological deterioration signs and symptoms  Outcome: Ongoing  Note:   Patient alert and oriented x4 this shift. Continue to monitor. Goal: Mental status will be restored to baseline  Description  Mental status will be restored to baseline  Outcome: Ongoing  Note:   Patient alert and oriented - continue to monitor. Problem: Discharge Planning:  Goal: Ability to perform activities of daily living will improve  Description  Ability to perform activities of daily living will improve  Outcome: Ongoing  Note:   Patient encouraged activity as tolerated. Goal: Participates in care planning  Description  Participates in care planning  Outcome: Ongoing  Note:   Patient up to date and agreeable to plan of care. Problem: Injury - Risk of, Physical Injury:  Goal: Will remain free from falls  Description  Will remain free from falls  Outcome: Ongoing  Note:   Remains free from falls. Fall precautions in place.   Goal: Absence of physical injury  Description  Absence of physical injury  Outcome: Ongoing  Note:   Remains free from injury     Problem: Mood - Altered:  Goal: Mood stable  Description  Mood stable  Outcome: Ongoing  Note:   Patient remains alert and oriented and calm this shift. Problem: Sleep Pattern Disturbance:  Goal: Appears well-rested  Description  Appears well-rested  Outcome: Ongoing  Note:   Periods of rest encouraged. Normal sleep/wake cycle encouraged. Problem: Risk for Impaired Skin Integrity  Goal: Tissue integrity - skin and mucous membranes  Description  Structural intactness and normal physiological function of skin and  mucous membranes. Outcome: Ongoing  Note:   Skin care completed. Problem: Impaired respiratory status  Goal: Clear lung sounds  Outcome: Ongoing  Note:   Patient with intermittent expiratory wheezes, otherwise clear/diminished lung sounds. Continue to monitor. Problem: Musculor/Skeletal Functional Status  Goal: Highest potential functional level  Outcome: Ongoing  Note:   Patient activity encouraged as tolerated. Care plan reviewed with patient. Patient verbalizes understanding of the plan of care and contribute to goal setting.

## 2019-06-16 NOTE — PLAN OF CARE
Problem: Falls - Risk of:  Goal: Will remain free from falls  Description  Will remain free from falls  Outcome: Ongoing  Note:   Remains free from falls. Remains in bed this shift. Goal: Absence of physical injury  Description  Absence of physical injury  Outcome: Ongoing  Note:   Remains free from injury     Problem: Skin Integrity:  Goal: Skin integrity will stabilize  Description  Skin integrity will stabilize  Outcome: Ongoing  Note:   No new skin breakdown noted      Problem: Confusion - Acute:  Goal: Mental status will be restored to baseline  Description  Mental status will be restored to baseline  Outcome: Ongoing  Note:   Remains alert and oriented     Problem: Discharge Planning:  Goal: Participates in care planning  Description  Participates in care planning  Outcome: Ongoing  Note:   Up to date on plan of care     Problem: Injury - Risk of, Physical Injury:  Goal: Will remain free from falls  Description  Will remain free from falls  Outcome: Ongoing  Note:   Remains free from falls. Remains in bed this shift. Goal: Absence of physical injury  Description  Absence of physical injury  Outcome: Ongoing  Note:   Remains free from injury     Problem: Sleep Pattern Disturbance:  Goal: Appears well-rested  Description  Appears well-rested  Outcome: Ongoing  Note:   Encouraged sleep/wake cycle     Care plan reviewed with patient. Patient verbalize understanding of the plan of care and contribute to goal setting.

## 2019-06-16 NOTE — PROGRESS NOTES
wheezes  Abdomen: soft, non-tender, non-distended, normal bowel sounds, no masses Extremities: trace edema, pulse   Skin: warm and dry, no rash or erythema  Head: normocephalic and atraumatic  Eyes: pupils equal, round, and reactive to light  Neck: supple and non-tender without mass, no thyromegaly   Musculoskeletal: normal range of motion, no joint swelling, deformity or tenderness  Neurological: alert, oriented, normal speech, no focal findings or movement disorder noted    Medications:    potassium chloride  20 mEq Intravenous Q1H    acetylcysteine  600 mg Oral BID    spironolactone  25 mg Oral Daily    metoprolol  5 mg Intravenous Q6H    insulin lispro  0-12 Units Subcutaneous TID WC    insulin lispro  0-6 Units Subcutaneous Nightly    nafcillin 12 g continuous infusion  12 g Intravenous Q24H    mometasone-formoterol  2 puff Inhalation BID    calcium-vitamin D  1 tablet Oral Daily    cyanocobalamin  1,000 mcg Oral Daily    escitalopram  20 mg Oral QAM    fluticasone  1 spray Nasal Daily    levothyroxine  125 mcg Oral Daily    multivitamin  1 tablet Oral Daily    OXcarbazepine  150 mg Oral QAM    pantoprazole  40 mg Oral QAM AC    risperiDONE  1 mg Oral QAM    rosuvastatin  20 mg Oral Nightly    ranolazine  500 mg Oral BID    sodium chloride flush  10 mL Intravenous 2 times per day    potassium replacement protocol   Other RX Placeholder    busPIRone  7.5 mg Oral TID    glycerin-hypromellose-  1 drop Both Eyes BID    ferrous sulfate  325 mg Oral Every Other Day    vitamin D  50,000 Units Oral Once per day on Mon Thu      sodium chloride 100 mL/hr at 06/16/19 0911    diltiazem (CARDIZEM) 125 mg in dextrose 5% 125 mL infusion 20 mg/hr (06/16/19 0901)    phenylephrine (BENI-SYNEPHRINE) 50mg/250mL infusion 15 mcg/min (06/16/19 0531)    amiodarone 0.5 mg/min (06/16/19 0134)    dextrose         melatonin 3 mg Nightly PRN   ipratropium-albuterol 1 ampule Q4H PRN   acetaminophen 650 mg Q4H PRN   docusate sodium 100 mg BID PRN   polyethylene glycol 17 g Daily PRN   sodium chloride 1 spray PRN   sodium chloride flush 10 mL PRN   glucose 15 g PRN   dextrose 12.5 g PRN   glucagon (rDNA) 1 mg PRN   dextrose 100 mL/hr PRN   acetaminophen 650 mg Q4H PRN       Diagnostics:  ALEX 6/134/19  Summary   Technically limited study.   Ejection fraction is visually estimated at 55%.   Overall left ventricular function is normal.   Normally functioning bioprosthetic TAVR valve in aortic position.   no vegetation seen   no para valvular leak      Signature      ----------------------------------------------------------------   Electronically signed by Adalgisa Otto MD (Interpreting   physician) on 06/13/2019 at 04:22 PM    Lab Data:    Cardiac Enzymes:  Recent Labs     06/14/19  1904   CKTOTAL 53       CBC:   Lab Results   Component Value Date    WBC 6.1 06/16/2019    RBC 2.73 06/16/2019    RBC 3.82 08/21/2018    HGB 7.5 06/16/2019    HCT 23.2 06/16/2019     06/16/2019       CMP:  Lab Results   Component Value Date     06/16/2019    K 3.0 06/16/2019    K 3.7 05/02/2019    CL 95 06/16/2019    CO2 25 06/16/2019    BUN 28 06/16/2019    CREATININE 1.4 06/16/2019    LABGLOM 37 06/16/2019    GLUCOSE 164 06/16/2019    GLUCOSE 101 07/25/2016    CALCIUM 8.4 06/16/2019       Hepatic Function Panel:  Lab Results   Component Value Date    ALKPHOS 42 06/15/2019    ALT 15 06/15/2019    AST 16 06/15/2019    PROT 5.8 06/15/2019    PROT 6.5 11/25/2016    BILITOT 0.4 06/15/2019    BILIDIR <0.2 06/11/2019    LABALBU 3.0 06/15/2019       Magnesium:    Lab Results   Component Value Date    MG 1.8 06/16/2019       PT/INR:    Lab Results   Component Value Date    PROTIME 13.8 02/13/2019    INR 1.41 06/12/2019       HgBA1c:    Lab Results   Component Value Date    LABA1C 5.9 04/03/2019       FLP:  Lab Results   Component Value Date    TRIG 378 06/13/2019    HDL 20 06/13/2019    LDLCALC 31 06/13/2019    LABVLDL 69 11/25/2016 TSH:    Lab Results   Component Value Date    TSH 1.720 06/15/2019         Assessment:    Sepsis with hypotension - on aaliyah gtt  Bacteremia/MSSA - ID following  Paroxysmal afib rvr - s/p cardioversion - then back to afib rvr  Pancytopenia  MASON/CKD  Ef 55 per ALEX 6/13/19, normally functioning bioprosthethic TAVR, no vegetation  Hx AS - s/p TAVR 4/30/19 by dr Stella Sanches  Hx CAD and stents - s/p cath 3/29/19 - nonobstructive CAD with patent stents RCA and LAD with mild instent restenosis   Hx HTN  DM  multiple ischemic/hemorrhagic small strokes - neurology following  Possible ovarian mass - attending following  Hx COPD - on home O2    Plan:  · Keep mag >2 and K >4   · Cont cdz and amio gtt for rate control  · Wean aaliyah gtt as ordered  · Prn dig if needed for rate control - need to be cautious with CKD  · ivf per attending  · No anticoagulation due to anemia/low PLT  · Restart asa/plavix when ok with attending         Electronically signed by Lance Atkinson PA-C on 6/16/2019 at 9:19 AM

## 2019-06-16 NOTE — PROGRESS NOTES
Intensive Care Unit  Medical Intensive Care Unit  Attending Progress Note      Patient was seen and evaluated with sanaz Jimenez. Team members present on rounds:  Nursing and Patient    ICU guidelines/prophylaxis active for the following:    head above bed above 30 degrees, insulin guidelines, DVT prophylaxis, ulcer prophylaxis and analgesia/sedation guidelines    Patient Examined? yes    Additions/differences/highlights to the resident's/fellow's exam:  VITALS:  BP 85/71   Pulse 86   Temp 100 °F (37.8 °C) (Core)   Resp 28   Ht 5' 5\" (1.651 m)   Wt 220 lb 10.9 oz (100.1 kg)   LMP 02/12/1973 (Approximate)   SpO2 97%   BMI 36.72 kg/m²   CHEST/BREASTS: no local tenderness. CONSTITUTIONAL:  awake, alert, cooperative, somnolent, mild distress and morbidly obese  EYES:  lids and lashes normal, pupils equal, round, and reactive to light, extra-ocular muscles intact, sclera clear, conjunctiva normal  HEENT:  Normocephalic, without obvious abnormality, atramatic, sinuses nontender on palpation, external ears without lesions, oral pharynx with moist mucus membranes, tonsils without erythema or exudates, gums normal and good dentition.   NECK:  Supple, symmetrical, trachea midline, no adenopathy, thyroid symmetric, not enlarged and no tenderness  HEMATOLOGIC/LYMPHATICS:  Cervical lymph nodes normal, supraclavicular lymph nodes normal  BACK:  Unable to assess  LUNGS:  good air exchange, clear to auscultation, no crackles or wheezing, tachypneic and moderate resp distress, paradox breathing  CARDIOVASCULAR:  Normal apical impulse, regular rate and rhythm, normal S1 and S2, no S3 or S4, and no murmur noted  ABDOMEN:  no scars,  absent bowel sounds, distended and firm , no tenderness, watery stool  GENITAL/URINARY:  deferred  MUSCULOSKELETAL:  No cyanosis, clubbing, firm lower ext edema, pulses nl right upper ext, much decreased left upper ext( old finding secondary to left  proximal subclavian artery in the ICU she was on full face mask BiPAP. The patient was also found to have a fever of 105 on admission. Vanco & Zosyn were started. Blood cultures found Staph Aureus, Zosyn & Vanco were subsequently stopped. Naficillin was started on . ALEX was ordered to rule out endocarditis. The patient also went into symptomatic Atrial Fibrillation with RVR on  which required emergent cardioversion. The patient converted into NSR with tachycardia. Through the night of , the patient went back into atrial fibrillation with RVR. Amiodarone was started, patient converted back to NSR. With recurrent episodes of at.fib that converted spontaneously    Ct abd/pelvis: ( done because of severe anemia): did show no extravasation but did show questionable infected hematoma in left pelvis and left adnexal  Mass ( questionable neoplastic). Hgb stable today.        Assessment and Plan:        1. Septic Shock: resolvin/ Blood cultures are growing Staph Aureus, Coag positive, MSSA.source is left pelvic infected  hematoma ;continue  Continuous infusion of Nafcillin started on ( pro calcitonin decreasing) repeat ct pelvis after few days for evaluation of infected hematoma for IR drainage. 2. Atrial Fibrillation with RVR s/p Cardioversion: Pt went into RVR with HRs in the 200s. The patient began to be tachycardic, diaphoretic and hypotensive. Pt was given one dose of Digoxin during Rapid Response. Pt was cardioverted upon arrival to the ICU.  Through the night of , the patient went into Atrial Fibrillation with RVR. Pt was placed on Amio drip. Through the night of , the patient went into Atrial Fibrillation with RVR. Pt's Amio drip was increased and the patient converted back to NSR. 6.14: recurrent episodes of at.fib with  RVR, d/w cardiology with digoxin iv prn ordered and EP/ablation later.   3.   Acute on Chronic Respiratory Failure( obesity hypoventilation syndrome/obstructive sleep apnea): resp distress is gynecology consulted, patient informed.   23. 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     Critical condition, guarded prognosis   discussed with the patient in details all of her condition, new findings and all questions answered     Cc time 77 minutes apart from procedures

## 2019-06-16 NOTE — FLOWSHEET NOTE
patietn asleep ; offered prayer at bedside.        06/16/19 1948   Encounter Summary   Continue Visiting Yes  (6/16 nr)

## 2019-06-16 NOTE — PLAN OF CARE
Problem: Falls - Risk of:  Goal: Will remain free from falls  Description  Will remain free from falls  6/15/2019 2353 by Nikita Dempsey, RN  Outcome: Ongoing  Note:   No falls this shift. Problem: Falls - Risk of:  Goal: Absence of physical injury  Description  Absence of physical injury  6/15/2019 2353 by Nikita Dempsey, RN  Outcome: Ongoing  Note:   No physical injury this shift. Problem: Skin Integrity:  Goal: Skin integrity will stabilize  Description  Skin integrity will stabilize  6/15/2019 2353 by Nikita Dempsey RN  Outcome: Ongoing  Note:   No new areas of skin breakdown this shift. Problem: Discharge Planning:  Goal: Patients continuum of care needs are met  Description  Patients continuum of care needs are met  6/15/2019 2353 by Nikita Dempsey RN  Outcome: Ongoing  Note:   Discharge planning in process and discussed with patient/family. Social work consulted for any additional needs. Care manager aware of discharge needs. Problem: Confusion - Acute:  Goal: Absence of continued neurological deterioration signs and symptoms  Description  Absence of continued neurological deterioration signs and symptoms  6/15/2019 2353 by Nikita Dempsey RN  Outcome: Ongoing  Note:   No confusion this shift. Problem: Confusion - Acute:  Goal: Mental status will be restored to baseline  Description  Mental status will be restored to baseline  6/15/2019 2353 by Nikita Dempsey RN  Outcome: Ongoing  Note:   Patient appears to be at baseline mental status this shift. Problem: Discharge Planning:  Goal: Ability to perform activities of daily living will improve  Description  Ability to perform activities of daily living will improve  6/15/2019 2353 by Nikita Dempsey RN  Outcome: Ongoing  Note:   Discharge planning in process and discussed with patient/family. Social work consulted for any additional needs. Care manager aware of discharge needs. Care plan reviewed with patient. Patient verbalizes understanding of the plan of care and contributes to goal setting.

## 2019-06-16 NOTE — PLAN OF CARE
Maricruz Zayas RN   Registered Nurse      Plan of Care   Signed   Date of Service:  6/15/2019 11:54 PM               Signed                   Show:Clear all  [x]Manual[x]Template[]Copied    Added by:  [x]Clive Mckeon RN      []Ann for details         Problem: Falls - Risk of:  Goal: Will remain free from falls  Description  Will remain free from falls  6/16/2019 1930 by Maricruz Zayas RN  Outcome: Ongoing  Note:   No falls this shift. Problem: Falls - Risk of:  Goal: Absence of physical injury  Description  Absence of physical injury  6/16/2019 1930 by Maricruz Zayas RN  Outcome: Ongoing  Note:   No physical injury this shift. Problem: Skin Integrity:  Goal: Skin integrity will stabilize  Description  Skin integrity will stabilize  6/16/2019 1930 by Maricruz Zayas RN  Outcome: Ongoing  Note:   No new areas of skin breakdown this shift. Problem: Discharge Planning:  Goal: Patients continuum of care needs are met  Description  Patients continuum of care needs are met  6/16/2019 1930 by Maricruz Zayas RN  Outcome: Ongoing  Note:   Discharge planning in process and discussed with patient/family. Social work consulted for any additional needs. Care manager aware of discharge needs. Problem: Confusion - Acute:  Goal: Absence of continued neurological deterioration signs and symptoms  Description  Absence of continued neurological deterioration signs and symptoms  6/16/2019 1930 by Maricruz Zayas RN  Outcome: Ongoing  Note:   No confusion this shift. Problem: Confusion - Acute:  Goal: Mental status will be restored to baseline  Description  Mental status will be restored to baseline  6/16/2019 1930 by Maricruz Zayas RN  Outcome: Ongoing  Note:   Patient appears to be at baseline mental status this shift.       Problem: Discharge Planning:  Goal: Ability to perform activities of daily living will improve  Description  Ability to perform activities of daily living will improve  6/16/2019 1930 by Bethany Moore RN  Outcome: Ongoing  Note:   Discharge planning in process and discussed with patient/family. Social work consulted for any additional needs. Care manager aware of discharge needs. Care plan reviewed with patient. Patient verbalizes understanding of the plan of care and contributes to goal setting.

## 2019-06-17 NOTE — CARE COORDINATION
6/17/19, 7:30 AM    DISCHARGE BARRIERS        Patient transferred to [facility]. Report given to unit SW, Lori DIAZ, regarding discharge plan for this patient.

## 2019-06-17 NOTE — PROGRESS NOTES
Inhalation BID    calcium-vitamin D  1 tablet Oral Daily    cyanocobalamin  1,000 mcg Oral Daily    escitalopram  20 mg Oral QAM    fluticasone  1 spray Nasal Daily    levothyroxine  125 mcg Oral Daily    multivitamin  1 tablet Oral Daily    OXcarbazepine  150 mg Oral QAM    pantoprazole  40 mg Oral QAM AC    risperiDONE  1 mg Oral QAM    rosuvastatin  20 mg Oral Nightly    ranolazine  500 mg Oral BID    sodium chloride flush  10 mL Intravenous 2 times per day    potassium replacement protocol   Other RX Placeholder    busPIRone  7.5 mg Oral TID    glycerin-hypromellose-  1 drop Both Eyes BID    ferrous sulfate  325 mg Oral Every Other Day    vitamin D  50,000 Units Oral Once per day on Mon Thu      diltiazem (CARDIZEM) 125 mg in dextrose 5% 125 mL infusion 10 mg/hr (06/17/19 1240)    dextrose       hydrOXYzine, melatonin, diphenhydrAMINE, ipratropium-albuterol, acetaminophen, docusate sodium, polyethylene glycol, sodium chloride, sodium chloride flush, glucose, dextrose, glucagon (rDNA), dextrose, acetaminophen      LABS:     CBC:   Recent Labs     06/15/19  1530 06/16/19  0534 06/17/19  0402   WBC 5.6 6.1 5.8   HGB 7.4* 7.5* 7.5*   PLT 87* 100* 136     BMP:    Recent Labs     06/15/19  0400  06/16/19  0534 06/16/19  1300 06/17/19  0402   *  --  131*  --  133*   K 3.2*   < > 3.0* 3.7 3.2*   CL 95*  --  95*  --  92*   CO2 24  --  25  --  26   BUN 42*  --  28*  --  22   CREATININE 1.6*  --  1.4*  --  1.1   GLUCOSE 161*  --  164*  --  154*    < > = values in this interval not displayed. Calcium:  Recent Labs     06/17/19  0402   CALCIUM 8.7     Ionized Calcium:No results for input(s): IONCA in the last 72 hours. Magnesium:  Recent Labs     06/16/19  0534   MG 1.8     Phosphorus:  Recent Labs     06/15/19  0539   PHOS 2.6     BNP:No results for input(s): BNP in the last 72 hours.   Glucose:  Recent Labs     06/16/19  2103 06/17/19  0631 06/17/19  1138   POCGLU 146* 169* 132* HgbA1C: No results for input(s): LABA1C in the last 72 hours. INR:   No results for input(s): INR in the last 72 hours. Hepatic:   Recent Labs     06/14/19 1904 06/15/19  0400   ALKPHOS 38 42   ALT 15 15   AST 18 16   PROT 5.6* 5.8*   BILITOT 0.4 0.4   LABALBU 2.9* 3.0*     Amylase and Lipase:  Recent Labs     06/14/19 1904   LACTA 0.8     Lactic Acid:   Recent Labs     06/14/19 1904   LACTA 0.8     Troponin:   Recent Labs     06/14/19 1904   CKTOTAL 53     BNP: No results for input(s): BNP in the last 72 hours. CULTURES:   UA:   Recent Labs     06/14/19 1907   SPECGRAV 1.009   PHUR 6.0   COLORU YELLOW   PROTEINU TRACE*   BLOODU MODERATE*   RBCUA 3-5   WBCUA > 200   BACTERIA FEW   NITRU NEGATIVE   BILIRUBINUR NEGATIVE   UROBILINOGEN 0.2   KETUA NEGATIVE   LABCAST NONE SEEN  NONE SEEN     Micro:   Lab Results   Component Value Date    BC No growth-preliminary 06/13/2019         IMAGING:         Problem list of patient:     Patient Active Problem List   Diagnosis Code    Anxiety F41.9    Depression F32.9    Type 2 diabetes mellitus with complication, with long-term current use of insulin (HCC) E11.8, Z79.4    MI (myocardial infarction) ([facility] [place] [address] I21.9    Dyslipidemia E78.5    CAD (coronary artery disease) I25.10    COPD without exacerbation ([facility] [place] [address] J44.9    GERD (gastroesophageal reflux disease) K21.9    Hypothyroid E03.9    History of tobacco abuse: Quit in 2009 Z87.891    S/P PTCA: 3/2/2017: PTCA RCA ISR. 12/11/2014: LAD Resolute 3.0X26. 11/6/2014: Stenting RCA Parveen [place]irts 0.5T78 and 3.5X18. 5/11/2004: Proximal & mid RCA Taxus 3.5X20 mm, and Taxus 3.0X32 mm. K76.67    Metabolic syndrome N61.36    S/P cardiac catheterization: 11/6/2014: 99% in-stent restenosis mid-RCA. LCx moderate LI's. LAD 85% mid-segment lesion. Z98.890    POND (nonalcoholic steatohepatitis) K75.81    Hyperlipidemia E78.5    Essential hypertension I10    Obesity (BMI 30-39. 9) E66.9    Moderate dehydration E86.0    ASCVD (arteriosclerotic cardiovascular disease) I25.10    Chronic respiratory failure with hypoxia ([facility]) J96.11    Other hyperlipidemia O49.14    Dysmetabolic syndrome G26.38    Bilateral iliac artery occlusion ([facility]) I74.5    CHET (obstructive sleep apnea) G47.33    Aortic valve stenosis I35.0    Nocturnal hypoxemia G47.34    Hyponatremia E87.1    Acute on chronic diastolic congestive heart failure due to valvular disease ([facility]) I50.33, I38    Sympathotonic orthostatic hypotension I95.1    E-coli UTI N39.0, B96.20    Anxiety and depression F41.9, F32.9    Dizziness R42    Chest pain R07.9    Acute on chronic respiratory failure with hypoxia and hypercapnia ([facility]) J96.21, J96.22    Moderate persistent asthma without complication M77.33    Pulmonary nodule R91.1    Heart failure with preserved ejection fraction ([facility]) I50.30    Osteoarthritis of multiple joints M15.9    Class 2 obesity due to excess calories with body mass index (BMI) of 38.0 to 38.9 in adult E66.09, Z68.38    Acute on chronic diastolic CHF (congestive heart failure) ([facility]) I50.33    Normocytic anemia D64.9    Subclavian vein stenosis I87.1    Post concussive syndrome F07.81    CRF (chronic renal failure), stage 4 (severe) ([facility]) N18.4    Hypotension I95.9    Syncope and collapse Y09    Metabolic encephalopathy W28.44    Right-sided epistaxis B57.1    Diastolic dysfunction without heart failure I51.89    Cervical spine instability M53.2X2    Nonrheumatic aortic valve stenosis I35.0    Acute diastolic heart failure ([facility]) I50.31    Acute kidney injury ([facility] [address] N17.9    Hypercalcemia E83.52    At risk for polypharmacy Z91.89    Acute respiratory failure with hypoxia and hypercapnia ([facility]) J96.01, J96.02    Acute pulmonary edema ([facility]) J81.0    Abnormal urinalysis R82.90    Irritable bowel syndrome K58.9    Anemia requiring transfusions D64.9    Stage 3 severe COPD by GOLD classification ([facility]) J44.9    Bilateral carotid artery stenosis I65.23    Iron deficiency anemia due to dietary causes D50.8    B12 deficiency due to diet F62.8    Diastolic CHF, acute on chronic ([facility]) I50.33    Anemia D64.9    Morbid obesity with BMI of 40.0-44.9, adult ([facility]) E66.01, Z68.41    Hyponatremia with decreased serum osmolality X13.9    Metabolic alkalosis B76.3    Chronic diastolic congestive heart failure ([facility]) I50.32    Acute exacerbation of COPD with asthma ([facility]) J44.1, J45.901    Acute respiratory failure with hypoxia and hypercapnia ([facility]) J96.01, J96.02    Severe aortic stenosis I35.0    S/P TAVR (transcatheter aortic valve replacement) Z95.2    Sepsis ([facility]) A41.9    Elevated transaminase level R74.0    Electrolyte imbalance E87.8    Septicemia ([facility]) A41.9    Elevated troponin R74.8    Acute ischemic stroke ([facility]) I63.9    Sepsis due to Staphylococcus aureus ([facility]) A41.01    Atrial fibrillation with rapid ventricular response ([facility]) I48.91    Complex cyst of left ovary N83.292    Complex cyst of right ovary N83.291         ASSESSMENT/PLAN   Sepsis due to MSSA   Hx of TAVR: ALEX no vegetation  Continue nafcillin: will treat total of 4 wks  Evaluate for aaron or ECF that can handled nafcillin  Tushar Horvath MD, FACP 6/17/2019 3:48 PM

## 2019-06-17 NOTE — FLOWSHEET NOTE
[facility]  PHYSICAL THERAPY MISSED TREATMENT NOTE  ACUTE CARE  [facility] CCU-STEPDOWN 3B              Missed Treatment  RN approved session, pt in bed upon arrival, refusing PT stating she is tired and not feeling well. Educated pt on importance of mobility, pt cont to refuse. Wrote therex on board for pt to complete independently. Will try back per POC.

## 2019-06-17 NOTE — PROGRESS NOTES
Dr. Katty Youssef consulted for patient's lesion in her ovaries. He told the nurse he would follow as outpatient and to schedule an appointment within a week after discharge.

## 2019-06-17 NOTE — PROGRESS NOTES
Patient's heart rate 120-150s afib. Karolina Anne, cardiology, called regarding heart rate. She told the nurse the patient sometimes does this and comes back down. She told the nurse to monitor and if it sustains to call her back. Cardizem increased to 15mg/hr. 1411: Patient converted to normal sinus on her own. Patient stable. Will continue to monitor.

## 2019-06-17 NOTE — PROGRESS NOTES
Hospitalist Progress Note    Patient:  Azalee Buerger      Unit/Bed:3B-35/035-A    YOB: 1945    MRN: [id number]       Acct: [de-identified]     PCP: ASHLEY Fair CNP    Date of Admission: 6/11/2019    Assessment/Plan:      Septic shock secondary to MSSA -not sure about the source of infection,  -Home blood pressure medications- heldCozaar, metoprolol 100 mg twice a day, Aldactone, Lasix, hydralazine, Imdur, Zaroxolyn  -Initially suspected if there is hematoma in the left pelvis but repeat CT scan with contrast  suggests more of an adnexal mass, with free fluid noted in the pelvi adjacent to the sigmoid colon and urinary bladder   -Repeat blood cultures done on 6/13-  ALEX negative for infective endocarditis on 6/13  -Blood pressure improved with vasopressors and aggressive IV fluids,   -Continue nafcillin    Atrial fibrillation with rapid ventricular response-newly diagnosed, as patient was hemodynamically unstable, cardioversion was done on 6/12 after being transferred to MICU, was in sinus tachycardia after an overnight again went back into A. fib with RVR and started on amiodarone drip on 6/13 and subsequently continued until 6/17 and changed to oral amiodarone, intermittently still tachycardic  -Also on Cardizem drip since 6/14, unable to be weaned off, was getting metoprolol 5 mg IV every 6 hourly, change to oral metoprolol 100 mg twice daily on 6/17  -No bleeding noted, hemoglobin improving along with platelets after treatment of sepsis, start anticoagulation with Lovenox therapeutic    Acute metabolic encephalopathy-no obvious evidence of stroke, most likely secondary to infection, neurology on board, MRI was little abnormal but suggestive of old changes mostly    Acute on chronic respiratory failure, hypoxia and hypercapnia, on home oxygen, intermittently treated with BiPAP, currently on oxygen segmentation    Acute on Chronic diastolic congestive heart failure with preserved ejection fraction-   Has pleural effusions, edema in the legs, needed a lot of IV hydration from shock, will diurese gently   On Lasix since the 6/14, resume Bumex 1 mg twice daily s    Acute kidney injury on chronic kidney disease stage III-creatinine peaked at 1.9, improving, most likely secondary to sepsis and hypotension    Elevated troponin unclear clinical significance, most likely from CHF sepsis      Diarrhea, rule out C. difficile versus antibiotic associated diarrhea, has  Rectal tube  -  Bilateral pleural effusions-gentle diuretics as needed and start Bumex 1 mg twice daily as blood pressure is better and monitor      Pancytopenia most likely secondary to sepsis,-HIT panel ordered, platelets improving along with hemoglobin, monitor  1 unit PRBC transfused on 6/15-     possible multiple ischemia / hemorrhagic strokes suspected on MRI, recommended to repeat with contrast  - Neurology on board    Complex Ovarian cystic masses- bilaterally-  left adnexa measuring approximately 3.4 x 2.9 x 2.8 cm and right Right Ovary - 2.77 x 2.14 x 2.44 cm   incidentally seen on CT abdomen  OBGYN , consulted,     Hypokalemia, hypomagnesemia replaced    Essential hypertension-initially on admission blood pressure medications held, resume gradually, started with metoprolol      Coronary artery disease status post stents without angina: Resume aspirin, statin and beta-blocker      Aortic stenosis status post TAVR on 4/30/19     Acquired hypothyroidism on levothyroxine  Diet-controlled diabetes mellitus type 2      Obstructive sleep apnea- not using CPAP     Hyperlipidemia  Gastroesophageal reflux disease     Bipolar disorder- on BuSpar currently     History of stroke  Bilateral carotid artery stenosis  Cervical degenerative disc disease status post surgery, history of C5 broken screw- on chronic opiates     History of smoking        Chronic thrombocytopenia-during sepsis had significant drop and is improving again to the transferred to MICU and had cardioversion done by the intensivist at bedside with sedation. Patient converted to sinus rhythm, sinus tachycardia. Overnight patient went back into A. fib with RVR and was started on amiodarone. .   Because of patient's or infiltrative speech and confusion, CT scan of the head was done and subsequently MRI and MRA was done and there was suggestion that the patient had unusual small foci in the right occipital and left occipital and head of caudate nucleus on the right and left frontoparietal junction with possible edema versus artifacts and the possibility of small sites of infection cannot be ruled out. Neurology was consulted. 6/12- A. Fib with RVR , hypotensive, transferred to MICU, s/p cardioversion , in sinus tacy and overnight went into A. Fib and amiodarone was started. Subsequently repeat blood cultures were done on 6/13 and cardiology ALEX and there was no infective endocarditis. patient continued to have A. fib and became tachycardic again on 6/14 and had to be started on Cardizem drip and the blood pressure was low and having fevers and was treated with phenylephrine from 6/14-6/16 and was weaned off. Patient's antibiotics were changed to nafcillin as the cultures were MSSA and repeat blood cultures were negative. Once ALEX was negative, there was no concern for any infection in the brain and neurology okayed for resuming antiplatelet agents. Antiplatelet agents were held because of anemia and thrombocytopenia. Patient's hemoglobin was gradually dropping to least of 6.7 and 1 unit PRBC was given and on 6/15 CT of the abdomen was done to rule out intra-abdominal bleeding or retroperitoneal hematoma. There was no obvious bleeding but incidentally there well bilateral adnexal masses and fluid in the pelvis and radiologist suggested possible hematoma.   Subsequently repeat CT scan of the abdomen and pelvis with contrast was done and was determined to be fluid rather than hematoma in the adnexal masses were confirmed and had a transvaginal pelvic ultrasound and gynecologist was consulted because of the bilateral adnexal masses suspicion for ovarian cancer. Patient has worsening renal failure with creatinine peaking at 1.9 and has improved after the blood pressure has improved with treatment of the antibiotics and pressors and IV hydration. Patient had a rectal tube placed because of diarrhea and electrolytes were replaced and the patient's blood pressure continued to be on the low side until the 16s and needed several boluses. Patient was getting volume overloaded and intermittent Lasix was being given. Continued on all her psychiatric medications - BuSpar, Celexa e, Risperdal xcept doxepin and patient is not getting enough sleep. Patient transferred out of MICU on 6/16/19. Has rectal tube and Son and was on amiodarone drip low dose since the 13th  And on Cardizem drip and metoprolol 5 mg IV q 6hrly.   -Discontinued IV metoprolol and started metoprolol 100 mg every 12 hourly, patient intermittently converting to sinus rhythm and going back to A. fib with RVR. Discontinue amiodarone drip as she has been on it for almost 4 days, started oral amiodarone 200 mg twice daily. Start Bumex 1 mg twice daily. Occult blood was negative and hemoglobin is stable and platelets have improved to 136 and started on anticoagulation because of persistent atrial fibrillation. Will check for C. Difficile. Subjective (past 24 hours):   Has sweating and feeling weak, denies any CP or SOB, or abdominal pain, wants to get up and sit tin the chair, but feeling very weak and falling back on to the bed, towards the left side while sitting, bed elevated on the leg side          Medications:  Reviewed    Infusion Medications    diltiazem (CARDIZEM) 125 mg in dextrose 5% 125 mL infusion 10 mg/hr (06/17/19 1240)    dextrose       Scheduled Medications    amiodarone  200 mg Oral BID    metoprolol tartrate  100 mg Oral BID    enoxaparin  1 mg/kg Subcutaneous BID    potassium chloride  40 mEq Oral Once    doxepin  50 mg Oral Nightly    bumetanide  1 mg Oral BID    acetylcysteine  600 mg Oral BID    spironolactone  25 mg Oral Daily    insulin lispro  0-12 Units Subcutaneous TID WC    insulin lispro  0-6 Units Subcutaneous Nightly    nafcillin 12 g continuous infusion  12 g Intravenous Q24H    mometasone-formoterol  2 puff Inhalation BID    calcium-vitamin D  1 tablet Oral Daily    cyanocobalamin  1,000 mcg Oral Daily    escitalopram  20 mg Oral QAM    fluticasone  1 spray Nasal Daily    levothyroxine  125 mcg Oral Daily    multivitamin  1 tablet Oral Daily    OXcarbazepine  150 mg Oral QAM    pantoprazole  40 mg Oral QAM AC    risperiDONE  1 mg Oral QAM    rosuvastatin  20 mg Oral Nightly    ranolazine  500 mg Oral BID    sodium chloride flush  10 mL Intravenous 2 times per day    potassium replacement protocol   Other RX Placeholder    busPIRone  7.5 mg Oral TID    glycerin-hypromellose-  1 drop Both Eyes BID    ferrous sulfate  325 mg Oral Every Other Day    vitamin D  50,000 Units Oral Once per day on Mon Thu     PRN Meds: hydrOXYzine, melatonin, diphenhydrAMINE, ipratropium-albuterol, acetaminophen, docusate sodium, polyethylene glycol, sodium chloride, sodium chloride flush, glucose, dextrose, glucagon (rDNA), dextrose, acetaminophen      Intake/Output Summary (Last 24 hours) at 6/17/2019 1439  Last data filed at 6/17/2019 1404  Gross per 24 hour   Intake 2350 ml   Output 3150 ml   Net -800 ml       Diet:  DIET CARDIAC;  Dysphagia II Mechanically Altered    Exam:  BP (!) 154/60   Pulse 77   Temp 98 °F (36.7 °C) (Oral)   Resp 22   Ht 5' 5\" (1.651 m)   Wt 221 lb 9 oz (100.5 kg)   LMP 02/12/1973 (Approximate)   SpO2 93%   BMI 36.87 kg/m²     Physical Examination:   General appearance - alert, awake  and in  Mild discomfort at rest, on oxygen, sweating+ , obese  HEENT: Normocephalic, Atraumatic, pupils reactive, mucous membranes moist  Chest - moving equally to respiration,symmetric air entry, rales present bilateral at bases  Heart - tachycardiac, irregular rhythm, normal S1, S2, aortic murmur+  Abdomen - soft, nontender, distended, no masses or organomegaly, BS+  Neurological - alert, oriented  To anem and place and partital to time- 2019,thinks it is July, slow, normal speech, sensations intact and able to move all extremities , left lower leg 4/5  Extremities - peripheral pulses normal, 1+ on right lower and 2+, more on left pedal edema,  Capillary refill less than 3 sec  Skin - normal coloration and turgor, no rashes   Rectal tube+, FOLEYS+      Labs:   Recent Labs     06/15/19  1530 06/16/19  0534 06/17/19  0402   WBC 5.6 6.1 5.8   HGB 7.4* 7.5* 7.5*   HCT 23.1* 23.2* 23.5*   PLT 87* 100* 136     Recent Labs     06/15/19  0400 06/15/19  0539  06/16/19  0534 06/16/19  1300 06/17/19  0402   *  --   --  131*  --  133*   K 3.2*  --    < > 3.0* 3.7 3.2*   CL 95*  --   --  95*  --  92*   CO2 24  --   --  25  --  26   BUN 42*  --   --  28*  --  22   CREATININE 1.6*  --   --  1.4*  --  1.1   CALCIUM 8.4*  --   --  8.4*  --  8.7   PHOS  --  2.6  --   --   --   --     < > = values in this interval not displayed. Recent Labs     06/14/19  1904 06/15/19  0400   AST 18 16   ALT 15 15   BILITOT 0.4 0.4   ALKPHOS 38 42     No results for input(s): INR in the last 72 hours. Recent Labs     06/14/19  1904   CKTOTAL 48       Microbiology:      Urinalysis:      Lab Results   Component Value Date    NITRU NEGATIVE 06/14/2019    WBCUA > 200 06/14/2019    BACTERIA FEW 06/14/2019    RBCUA 3-5 06/14/2019    BLOODU MODERATE 06/14/2019    SPECGRAV 1.009 06/14/2019    GLUCOSEU NEGATIVE 06/11/2019       Radiology:  US PELVIS COMPLETE   Final Result   1. There is a complex cystic-appearing mass demonstrated within the left adnexa measuring approximately 3.4 x 2.9 x 2.8 cm. pelvis. **This report has been created using voice recognition software. It may contain minor errors which are inherent in voice recognition technology. **      Final report electronically signed by Dr. Marya Erwin on 6/15/2019 6:36 PM      XR CHEST PORTABLE   Final Result      Worsening pulmonary edema. **This report has been created using voice recognition software. It may contain minor errors which are inherent in voice recognition technology. **      Final report electronically signed by Dr. Charlee Cobos on 6/15/2019 6:47 AM      VL DUP LOWER EXTREMITY VENOUS BILATERAL   Final Result   No evidence of acute bilateral lower extremity DVT. **This report has been created using voice recognition software. It may contain minor errors which are inherent in voice recognition technology. **       Final report electronically signed by Dr. Charlee Cobos on 6/15/2019 12:43 AM      XR CHEST PORTABLE   Final Result   Possible pneumonia on the left. PA and lateral radiographs could be performed if indicated clinically. **This report has been created using voice recognition software. It may contain minor errors which are inherent in voice recognition technology. **      Final report electronically signed by Dr. Waylan Angelucci on 6/14/2019 9:29 AM      XR CHEST PORTABLE   Final Result   1. Moderate cardiomegaly. Prior anterior cervical fusion. A left subclavian line has been inserted with its tip in the supraclavicular superior vena cava. An aortic valve/stent is present. 2. No acute infiltrates or effusions are seen. No pneumothorax is seen. **This report has been created using voice recognition software. It may contain minor errors which are inherent in voice recognition technology. **      Final report electronically signed by Dr. Pamela Goncalves on 6/12/2019 4:40 PM      MRA NECK WO CONTRAST   Final Result       1.  Motion degraded examination demonstrating no gross stenosis of either carotid bulb. 2. No antegrade flow is noted in the left vertebral artery. This can indicate a diminutive vessel. Reversal of flow is not excluded. There is a normal dominant appearing right vertebral artery. **This report has been created using voice recognition software. It may contain minor errors which are inherent in voice recognition technology. **      Final report electronically signed by Dr. Kal Bailey on 6/12/2019 3:31 PM      MRA HEAD WO CONTRAST   Final Result    Grossly normal MRA of the brain. **This report has been created using voice recognition software. It may contain minor errors which are inherent in voice recognition technology. **      Final report electronically signed by Dr. Kal Bailey on 6/12/2019 3:28 PM      MRI BRAIN WO CONTRAST   Final Result         1. Somewhat unusual foci of abnormal signal are present in the brain. These are in the right occipital lobe, left occipital lobe, head of the caudate nucleus on the right and in the left frontoparietal junction. There is possible edema. 2 of these areas    have susceptibility artifact. Postcontrast imaging is recommended for further evaluation. The differential diagnosis includes small hemorrhagic metastases, small sites of infection and small age indeterminate infarcts. 2. There are foci of susceptibility artifact in the brain. These may represent old microhemorrhages. These are indeterminate. 3. Mild severity chronic small vessel ischemic changes. **This report has been created using voice recognition software. It may contain minor errors which are inherent in voice recognition technology. **      Final report electronically signed by Dr. Kal Bailey on 6/12/2019 3:51 PM      CT Head WO Contrast   Final Result   1.  Small lacunar infarct in the head of caudate nucleus in the right new since the prior exam.   2. Focal calcific density in the right temporal lobe new since the prior exam. Consider MRI as a follow-up examination if clinically indicated. **This report has been created using voice recognition software. It may contain minor errors which are inherent in voice recognition technology. **      Final report electronically signed by Dr. Lavelle Corona on 6/12/2019 10:50 AM      XR CHEST PORTABLE   Final Result   1. There are mild bilateral perihilar infiltrates. There is no pleural effusion. Follow-up chest radiographs are recommended to confirm complete resolution. **This report has been created using voice recognition software. It may contain minor errors which are inherent in voice recognition technology. **      Final report electronically signed by Dr. Geri Saba on 6/12/2019 10:01 AM      XR CHEST PORTABLE   Final Result   No gross acute infiltrate. PA and lateral radiographs could be performed if indicated clinically. **This report has been created using voice recognition software. It may contain minor errors which are inherent in voice recognition technology. **      Final report electronically signed by Dr. Melissa Abdi on 6/11/2019 12:06 PM      XR CHEST STANDARD (2 VW)    (Results Pending)       DVT prophylaxis: [] Lovenox                                 [] SCDs                                 [] SQ Heparin                                 [] Encourage ambulation           [x] Already on Anticoagulation started on 6/17     Code Status: Full Code     PT/OT Eval Status: ordered    Tele:   [x] yes             [] no    Active Hospital Problems    Diagnosis Date Noted    Elevated troponin [R74.8]      Priority: High    Septicemia ([address] [A41.9]      Priority: High    Anemia [D64.9]      Priority: High    Essential hypertension [I10] 01/29/2015     Priority: Medium    Type 2 diabetes mellitus with complication, with long-term current use of insulin ([address] [E11.8, Z79.4]      Priority: Medium    Hypothyroid [E03.9] 01/15/2014     Priority: Low    Acute ischemic stroke ([facility] [I63.9]     Sepsis ([place] [address] [A41.9] 06/11/2019    Elevated transaminase level [R74.0] 06/11/2019    Electrolyte imbalance [E87.8] 06/11/2019    Acute exacerbation of COPD with asthma ([facility] [J44.1, J45.901] 04/14/2019    Morbid obesity with BMI of 40.0-44.9, adult ([place] [address] [E66.01, Z68.41] 01/24/2019    B12 deficiency due to diet [E53.8]     At risk for polypharmacy [Z91.89] 12/21/2018    CRF (chronic renal failure), stage 4 (severe) ([place] [address] [N18.4] 11/27/2018    Acute on chronic respiratory failure with hypoxia and hypercapnia (HCC) [T28.99, J96.22] 08/27/2018    Anxiety and depression [F41.9, F32.9] 04/17/2018    Acute on chronic diastolic congestive heart failure due to valvular disease ([facility] [I50.33, I38] 03/27/2018    ASCVD (arteriosclerotic cardiovascular disease) [I25.10]     Hyperlipidemia [E78.5] 11/18/2014    POND (nonalcoholic steatohepatitis) [K75.81] 11/18/2014    History of tobacco abuse: Quit in 2009 [Z87.891] 10/28/2014    GERD (gastroesophageal reflux disease) [K21.9]     CAD (coronary artery disease) [I25.10]     Dyslipidemia [E78.5]        Electronically signed by Claudene Cowing, MD on 6/17/2019 at 2:39 PM

## 2019-06-17 NOTE — PROGRESS NOTES
bowel sounds, no masses Extremities: trace edema, pulse   Skin: warm and dry, no rash or erythema  Head: normocephalic and atraumatic  Eyes: pupils equal, round, and reactive to light  Neck: supple and non-tender without mass, no thyromegaly   Musculoskeletal: normal range of motion, no joint swelling, deformity or tenderness  Neurological: alert, oriented, normal speech, no focal findings or movement disorder noted    Medications:    amiodarone  200 mg Oral BID    metoprolol tartrate  100 mg Oral BID    enoxaparin  1 mg/kg Subcutaneous BID    acetylcysteine  600 mg Oral BID    spironolactone  25 mg Oral Daily    insulin lispro  0-12 Units Subcutaneous TID WC    insulin lispro  0-6 Units Subcutaneous Nightly    nafcillin 12 g continuous infusion  12 g Intravenous Q24H    mometasone-formoterol  2 puff Inhalation BID    calcium-vitamin D  1 tablet Oral Daily    cyanocobalamin  1,000 mcg Oral Daily    escitalopram  20 mg Oral QAM    fluticasone  1 spray Nasal Daily    levothyroxine  125 mcg Oral Daily    multivitamin  1 tablet Oral Daily    OXcarbazepine  150 mg Oral QAM    pantoprazole  40 mg Oral QAM AC    risperiDONE  1 mg Oral QAM    rosuvastatin  20 mg Oral Nightly    ranolazine  500 mg Oral BID    sodium chloride flush  10 mL Intravenous 2 times per day    potassium replacement protocol   Other RX Placeholder    busPIRone  7.5 mg Oral TID    glycerin-hypromellose-  1 drop Both Eyes BID    ferrous sulfate  325 mg Oral Every Other Day    vitamin D  50,000 Units Oral Once per day on Mon Thu      diltiazem (CARDIZEM) 125 mg in dextrose 5% 125 mL infusion 12.5 mg/hr (06/17/19 0508)    dextrose         hydrOXYzine 25 mg TID PRN   melatonin 3 mg Nightly PRN   diphenhydrAMINE 25 mg Nightly PRN   ipratropium-albuterol 1 ampule Q4H PRN   acetaminophen 650 mg Q4H PRN   docusate sodium 100 mg BID PRN   polyethylene glycol 17 g Daily PRN   sodium chloride 1 spray PRN   sodium chloride flush 10 mL PRN   glucose 15 g PRN   dextrose 12.5 g PRN   glucagon (rDNA) 1 mg PRN   dextrose 100 mL/hr PRN   acetaminophen 650 mg Q4H PRN       Diagnostics:  ALEX 6/134/19  Summary   Technically limited study.   Ejection fraction is visually estimated at 55%.   Overall left ventricular function is normal.   Normally functioning bioprosthetic TAVR valve in aortic position.   no vegetation seen   no para valvular leak      Signature      ----------------------------------------------------------------   Electronically signed by Clarita Frost MD (Interpreting   physician) on 06/13/2019 at 04:22 PM    Lab Data:    Cardiac Enzymes:  Recent Labs     06/14/19  1904   CKTOTAL 53       CBC:   Lab Results   Component Value Date    WBC 5.8 06/17/2019    RBC 2.76 06/17/2019    RBC 3.82 08/21/2018    HGB 7.5 06/17/2019    HCT 23.5 06/17/2019     06/17/2019       CMP:    Lab Results   Component Value Date     06/17/2019    K 3.2 06/17/2019    K 3.7 05/02/2019    CL 92 06/17/2019    CO2 26 06/17/2019    BUN 22 06/17/2019    CREATININE 1.1 06/17/2019    LABGLOM 48 06/17/2019    GLUCOSE 154 06/17/2019    GLUCOSE 101 07/25/2016    CALCIUM 8.7 06/17/2019       Hepatic Function Panel:    Lab Results   Component Value Date    ALKPHOS 42 06/15/2019    ALT 15 06/15/2019    AST 16 06/15/2019    PROT 5.8 06/15/2019    PROT 6.5 11/25/2016    BILITOT 0.4 06/15/2019    BILIDIR <0.2 06/11/2019    LABALBU 3.0 06/15/2019       Magnesium:    Lab Results   Component Value Date    MG 1.8 06/16/2019       PT/INR:    Lab Results   Component Value Date    PROTIME 13.8 02/13/2019    INR 1.41 06/12/2019       HgBA1c:    Lab Results   Component Value Date    LABA1C 5.9 04/03/2019       FLP:    Lab Results   Component Value Date    TRIG 378 06/13/2019    HDL 20 06/13/2019    LDLCALC 31 06/13/2019    LABVLDL 69 11/25/2016       TSH:    Lab Results   Component Value Date    TSH 1.720 06/15/2019         Assessment:    Acute on chronic hypoxic resp failure  Sepsis with hypotension - improved - off aaliyah gtt  Bacteremia/MSSA - ID following  Paroxysmal afib rvr - s/p cardioversion - then back to PAF rvr - currently nsr  Pancytopenia  MASON/CKD - improving  Ef 55 per ALEX 6/13/19, normally functioning bioprosthethic TAVR, no vegetation  Hx AS - s/p TAVR 4/30/19 by dr Peola Paget  Hx CAD and stents - s/p cath 3/29/19 - nonobstructive CAD with patent stents RCA and LAD with mild instent restenosis   HTN  DM  multiple ischemic/hemorrhagic small strokes - neurology following  Possible ovarian mass - attending following  Hx COPD - on home O2    Plan:  · Keep mag >2 and K >4   · Cont amio/BB/aldactone/ranexa/statin  · Wean cdz gtt off to keep HR <100  · No anticoagulation due to anemia/low PLT  · Restart asa/plavix when ok with attending  · Repeat ekg in am to monitor qtc  · Lasix 40mg iv x1  · Daily I/o and weights   · 2 liter fluid restriction and 2 gm sodium diet  · Cxr, bnp, bmp in am         Electronically signed by Renata Coker PA-C on 6/17/2019 at 10:13 AM

## 2019-06-18 NOTE — DISCHARGE SUMMARY
Hospital Medicine Discharge Summary      Patient Identification:   Savita Rojas   :   MRN: [id number]   Account: [de-identified]      Patient's PCP: ASHLEY Barrow CNP    Admit Date: 2019     Discharge Date:   2019     Admitting Physician: Stacey Mock DO     Discharge Physician: Elie Garcia MD     Discharge Diagnoses:    Sepsis with septic shock secondary to MSSA-not sure about the source of infection  New onset atrial fibrillation with rapid ventricular response  Acute metabolic encephalopathy  Acute on chronic respiratory failure, hypoxic and hypercapnic  Acute on chronic diastolic congestive heart failure, preserved ejection fraction  Acute kidney injury on chronic kidney disease stage III  Elevated troponin secondary to CHF and sepsis  Antibiotic associated diarrhea ruled out C. difficile  Bilateral pleural effusions  Pancytopenia secondary to sepsis  No evidence of's new stroke, history of stroke  Complex ovarian cystic masses suspicion for ovarian cancer, bilateral  Hypokalemia, hypomagnesemia  Essential hypertension  Coronary artery disease status post stents without angina  Aortic stenosis status post TAVR on 19  Acquired hypothyroidism  Diet-controlled diabetes mellitus type 2  Obstructive sleep apnea- not using CPAP   Hyperlipidemia  Gastroesophageal reflux disease   Bipolar disorder   History of stroke  Bilateral carotid artery stenosis  Cervical degenerative disc disease status post surgery, history of C5 broken screw- on chronic opiates  Chronic thrombocytopenia   Nonalcoholic fatty liver/steatohepatitis is   Sigmoid diverticulosis    History of smoking  Obesity Body mass index is 36.87 kg/m². The patient was seen and examined on day of discharge and this discharge summary is in conjunction with any daily progress note from day of discharge.     Hospital Course:   Savita Rojas is a 75 y.o. female admitted to [address] [place] [address] on 2019 for Altered mental status      Patient was transferred from [facility] for further evaluation as the patient was having altered mental status and was having fever apparently 103 at the facility.  Patient also needing more than usual oxygen and was placed on 6 L and upon arrival to the emergency department she had fever of 105 and was tachycardic and tachypneic and blood pressure was little borderline around 98/44. Patient unable to give any meaningful history and labs showed elevated BNP and troponin of 30,976 and 0.163     Patient suspected to have sepsis and was started on broad-spectrum antibiotics initially vancomycin and cefepime and one dose was given and subsequently changed to Zosyn .  Both blood cultures positive for Staphylococcus.  Cardiologist was also consulted for the elevated troponin and  recommended treating the primary etiology most likely sepsis and patient not in acute CHF or not having an MI patient continued to have fever of 103.9 in hospital        Infectious disease was consulted for evaluation as the source of sepsis was not clear. Overnight on 6/11, patient's blood pressure dropped to the 60s and was given aggressive IV fluids and blood pressure initially improved but on 6/12 patient's blood pressure again was dropping. Patient was also having some cough and wheezing overnight and initially suspected to have COPD exacerbation and empirically started on steroids. For the change in mental status a CT scan of the head was done as the patient was having repetitive speech without any focal weakness.     On 612-Patient had A. fib with RVR with heart rate trending to 170s and was becoming hypotensive and was transferred to MICU and had cardioversion done by the intensivist at bedside with sedation. Patient converted to sinus rhythm, sinus tachycardia. Overnight patient went back into A. fib with RVR and was started on amiodarone. .   Because of patient's repetetive speech and confusion, CT electrolytes were replaced and the patient's blood pressure continued to be on the low side until the 16s and needed several boluses. Patient was getting volume overloaded and intermittent Lasix was being given. Continued on all her psychiatric medications - BuSpar, Celexa e, Risperdal xcept doxepin and patient is not getting enough sleep.       Patient transferred out of MICU on 6/16/19. Has rectal tube and Son and was on amiodarone drip low dose since the 13th  And on Cardizem drip and metoprolol 5 mg IV q 6hrly.   -Discontinued IV metoprolol and started metoprolol 100 mg every 12 hourly, patient intermittently converting to sinus rhythm and going back to A. fib with RVR. Discontinue amiodarone drip as she has been on it for almost 4 days, started oral amiodarone 200 mg twice daily. Start Bumex 1 mg twice daily. Occult blood was negative and hemoglobin is stable and platelets have improved to 136 and started on anticoagulation because of persistent atrial fibrillation. C. difficile was negative and was started on Imodium and was felt to have antibiotic associated diarrhea. Patient tolerated metoprolol well and continue to maintain sinus rhythm and Cardizem drip was discontinued on 6/18 and changed to oral Cardizem 120 mg daily. Patient's QT interval was prolonged and 520s milliseconds and amiodarone was decreased to 200 mg daily. She has been on psych medications chronically and tolerated well and amiodarone was the only new medication. Hemoglobin is stable and platelets improved and is tolerating Lovenox well and changed to long-term anticoagulation with Eliquis and continue aspirin. PICC line has been placed and central line was removed and the rectal tube is being removed along with the Son and patient is diuresing well and is being transferred to [place] to complete the course of antibiotics for total of 4 weeks and has 3 more weeks left. Needs physical therapy 2.   Follow with gynecology as This free fluid is of indeterminate etiology/sterility. This does not appear well encapsulated at this time and    is not amenable to percutaneous drainage at this time due to surrounding structures. However, recommend continued follow-up. 2.There is evidence of prior hysterectomy. There are ovoid structures within the bilateral adnexa. The ovoid structure within the left hemipelvis measures approximately 5.7 x 3.6 cm on axial image 130 of the postcontrast images. Previously this measured    approximately 5.2 x 3.8 cm cm on 4/18/2015. This is indeterminate in etiology. Cannot exclude a possible neoplastic ovarian lesion. Recommend further characterization with pelvic ultrasound. The right adnexal ovoid structure measures approximately 3.2 x    2.1 cm. This may represent the right ovary. 3.There is strandy opacity demonstrated within the soft tissues anterior to the pubic region extending inferiorly. This is seen on axial image 154. This may represent panniculitis. There is also soft tissue skin thickening demonstrated which may    represent edema or cellulitis of the lower abdominal pannus. This is seen on axial image 165.      4. Sigmoid diverticulosis without evidence of diverticulitis. 5. Small bilateral right greater than the left pleural effusions with mild adjacent passive atelectasis or pneumonia is noted. 6. There are is incompletely visualized pericardial fluid present of indeterminate etiology. **This report has been created using voice recognition software. It may contain minor errors which are inherent in voice recognition technology. **      Final report electronically signed by Dr. Alexandra Hayward on 6/15/2019 9:17 PM      CT ABDOMEN PELVIS WO CONTRAST Additional Contrast? None   Final Result   Mixed attenuation fluid in the pelvis suspicious for intrapelvic of blood. Hounsfield units range from 15-33. Possibly a liter of fluid within the pelvis.             **This report has been created using voice recognition software. It may contain minor errors which are inherent in voice recognition technology. **      Final report electronically signed by Dr. Nita Horton on 6/15/2019 6:36 PM      XR CHEST PORTABLE   Final Result      Worsening pulmonary edema. **This report has been created using voice recognition software. It may contain minor errors which are inherent in voice recognition technology. **      Final report electronically signed by Dr. Tamiko Puckett on 6/15/2019 6:47 AM      VL DUP LOWER EXTREMITY VENOUS BILATERAL   Final Result   No evidence of acute bilateral lower extremity DVT. **This report has been created using voice recognition software. It may contain minor errors which are inherent in voice recognition technology. **       Final report electronically signed by Dr. Tamiko Puckett on 6/15/2019 12:43 AM      XR CHEST PORTABLE   Final Result   Possible pneumonia on the left. PA and lateral radiographs could be performed if indicated clinically. **This report has been created using voice recognition software. It may contain minor errors which are inherent in voice recognition technology. **      Final report electronically signed by Dr. Samuel Hernández on 6/14/2019 9:29 AM      XR CHEST PORTABLE   Final Result   1. Moderate cardiomegaly. Prior anterior cervical fusion. A left subclavian line has been inserted with its tip in the supraclavicular superior vena cava. An aortic valve/stent is present. 2. No acute infiltrates or effusions are seen. No pneumothorax is seen. **This report has been created using voice recognition software. It may contain minor errors which are inherent in voice recognition technology. **      Final report electronically signed by Dr. Jefry Vasquez on 6/12/2019 4:40 PM      MRA NECK WO CONTRAST   Final Result       1. Motion degraded examination demonstrating no gross stenosis of either carotid bulb.    2. No antegrade flow is noted in the left vertebral artery. This can indicate a diminutive vessel. Reversal of flow is not excluded. There is a normal dominant appearing right vertebral artery. **This report has been created using voice recognition software. It may contain minor errors which are inherent in voice recognition technology. **      Final report electronically signed by Dr. Ellie Kearney on 6/12/2019 3:31 PM      MRA HEAD WO CONTRAST   Final Result    Grossly normal MRA of the brain. **This report has been created using voice recognition software. It may contain minor errors which are inherent in voice recognition technology. **      Final report electronically signed by Dr. Ellie Kearney on 6/12/2019 3:28 PM      MRI BRAIN WO CONTRAST   Final Result         1. Somewhat unusual foci of abnormal signal are present in the brain. These are in the right occipital lobe, left occipital lobe, head of the caudate nucleus on the right and in the left frontoparietal junction. There is possible edema. 2 of these areas    have susceptibility artifact. Postcontrast imaging is recommended for further evaluation. The differential diagnosis includes small hemorrhagic metastases, small sites of infection and small age indeterminate infarcts. 2. There are foci of susceptibility artifact in the brain. These may represent old microhemorrhages. These are indeterminate. 3. Mild severity chronic small vessel ischemic changes. **This report has been created using voice recognition software. It may contain minor errors which are inherent in voice recognition technology. **      Final report electronically signed by Dr. Ellie Kearney on 6/12/2019 3:51 PM      CT Head WO Contrast   Final Result   1.  Small lacunar infarct in the head of caudate nucleus in the right new since the prior exam.   2. Focal calcific density in the right temporal lobe new since the prior exam. Consider MRI as a follow-up Medication Information                      albuterol sulfate HFA (VENTOLIN HFA) 108 (90 Base) MCG/ACT inhaler  Inhale 2 puffs into the lungs every 6 hours as needed for Wheezing             amiodarone (CORDARONE) 200 MG tablet  Take 1 tablet by mouth daily             apixaban (ELIQUIS) 5 MG TABS tablet  Take 1 tablet by mouth 2 times daily             aspirin (SM ASPIRIN ADULT LOW STRENGTH) 81 MG EC tablet  take 1 tablet by mouth once daily             Blood Glucose Monitoring Suppl HARVINDER  Check blood sugar q daily Dx E11.69             budesonide-formoterol (SYMBICORT) 160-4.5 MCG/ACT AERO  Inhale 2 puffs into the lungs 2 times daily             bumetanide (BUMEX) 1 MG tablet  Take 1 tablet by mouth 2 times daily             busPIRone (BUSPAR) 7.5 MG tablet  Take 1 tablet by mouth 3 times daily             Calcium Carbonate-Vitamin D (OYSTER SHELL CALCIUM/D) 500-200 MG-UNIT TABS  take 1 tablet by mouth twice a day             cyanocobalamin ([employer] VITAMIN B12) 1000 MCG tablet  Take 1 tablet by mouth daily             cycloSPORINE (RESTASIS) 0.05 % ophthalmic emulsion  Place 1 drop into both eyes 2 times daily             diltiazem (CARDIZEM CD) 120 MG extended release capsule  Take 1 capsule by mouth daily             doxepin (SINEQUAN) 50 MG capsule  Take 1 capsule by mouth nightly             escitalopram (LEXAPRO) 20 MG tablet  Take 20 mg by mouth every morning             ferrous sulfate 325 (65 Fe) MG tablet  One every other day.   Take with vitamin C 500 mg             fluticasone (FLONASE) 50 MCG/ACT nasal spray  1 spray by Nasal route daily             Handicap Placard MISC  by Does not apply route Expires 25 MARCH 2014             HYDROcodone-acetaminophen (NORCO) 5-325 MG per tablet  Take 1 tablet by mouth every 6 hours as needed for Pain.             hydrOXYzine (ATARAX) 25 MG tablet  Take 25 mg by mouth 3 times daily             insulin lispro (HUMALOG) 100 UNIT/ML injection vial  Inject 0-12 Units into the skin 3 times daily (with meals)             ipratropium-albuterol (DUONEB) 0.5-2.5 (3) MG/3ML SOLN nebulizer solution  Inhale 3 mLs into the lungs every 4 hours as needed for Shortness of Breath             Lancets MISC  Check blood sugar q daily Dx E11.69             levothyroxine (SYNTHROID) 125 MCG tablet  Take 1 tablet by mouth Daily             loperamide (IMODIUM) 2 MG capsule  Take 1 capsule by mouth 4 times daily as needed for Diarrhea             melatonin 3 MG TABS tablet  Take 1 tablet by mouth nightly as needed (insomnia)             metoprolol (LOPRESSOR) 100 MG tablet  Take 1 tablet by mouth 2 times daily             miconazole (MICOTIN) 2 % powder  Apply topically 2 times daily Apply topically 2 times daily.              Multiple Vitamin (MULTIVITAMIN) tablet  Take 1 tablet by mouth daily             nafcillin infusion  Infuse 500 mg intravenously every 24 hours for 21 days Compound per protocol             ondansetron (ZOFRAN) 4 MG tablet  Take 1 tablet by mouth daily as needed for Nausea or Vomiting             ONE TOUCH ULTRASOFT LANCETS MISC  use as directed BEFORE BREAKFAST AND SUPPER             OXcarbazepine (TRILEPTAL) 150 MG tablet  Take 1 tablet by mouth every morning             OXYGEN  Inhale 3 L into the lungs continuous              pantoprazole (PROTONIX) 40 MG tablet  Take 40 mg by mouth every morning (before breakfast)              potassium chloride (KLOR-CON M) 20 MEQ extended release tablet  Take 0.5 tablets by mouth daily             RA SALINE NASAL SPRAY 0.65 % nasal spray  instill 1 spray into each nostril if needed for congestion             ranolazine (RANEXA) 500 MG extended release tablet  take 1 tablet by mouth twice a day             risperiDONE (RISPERDAL) 1 MG tablet  Take 1 mg by mouth every morning              rosuvastatin (CRESTOR) 20 MG tablet  take 1 tablet by mouth once daily             vitamin D (ERGOCALCIFEROL) 18551 units capsule  Take 1 capsule by mouth once a week                 Time Spent on discharge is more than 38 min in the examination, evaluation, counseling and review of medications and discharge plan. Signed: Thank you ASHLEY Forde CNP for the opportunity to be involved in this patient's care.     Electronically signed by Portia Page MD on 6/18/2019 at 1:43 PM

## 2019-06-18 NOTE — CARE COORDINATION
6/18/19, 1:36 PM    Discharge plan discussed by  and . Discharge plan reviewed with patient/ family. Patient/ family verbalize understanding of discharge plan and are in agreement with plan. Understanding was demonstrated using the teach back method. IMM Letter  IMM Letter given to Patient/Family/Significant other/Guardian/POA/by[de-identified] Staff  IMM Letter date given[de-identified] 06/12/19  IMM Letter time given[de-identified] 1006       Discussed with Dr. Marshall Stage this am, explained that [facility] was completed and pt is a good candidate. He agreed and gave order for full [facility] eval. Autumn made aware. Plan is for pt to go to [facility] today. Pt is aware and agrees.    Electronically signed by Noemí Marshall RN on 6/18/2019 at 1:36 PM

## 2019-06-18 NOTE — PROGRESS NOTES
Cardiology Progress Note      Patient:  Deny Nagel  YOB: 1945  MRN: [id number]   Acct: [de-identified]  Admit Date:  6/11/2019  Primary Cardiologist: Royal Penelope BOGGS  Seen by dr Ariana Julian    Reason for consult - chf, elevated troponin  hpi per dr Nevin Brenner:  Stress test in a patient with recent  aortic valve surgery with TAVR and element of congestive heart failure.     REQUESTING PROVIDER:  Hospitalist Service.     HISTORY OF PRESENT ILLNESS:  This is a pleasant 65-year-old lady who  apparently had a recent transcatheter valve replacement and has done  well since then with some intermittent congestive heart failure. She  has a history of underlying coronary artery disease, hypertension,  hyperlipidemia. She is residing at a nursing home, recovering from her  TAVR and has some underlying musculoskeletal limitation, comes in with  complete mental status change, unresponsiveness and is being treated for  what looks like a sepsis infection. When I came to evaluate, the  patient's family were not in the room. I was unable to get much of a  history and I tried to obtain the information from the record as well as  from the staff and the ER admission. \"    Subjective (Events in last 24 hours):   Pt awake and alert. NAD. No cp.   +SHAH  On cdz gtt at 5    Net I/o +8 L    Objective:   BP (!) 172/76   Pulse 75   Temp 98.2 °F (36.8 °C) (Oral)   Resp 18   Ht 5' 5\" (1.651 m)   Wt 221 lb 9 oz (100.5 kg)   LMP 02/12/1973 (Approximate)   SpO2 97%   BMI 36.87 kg/m²        TELEMETRY: nsr 65    Physical Exam:  General Appearance: alert and oriented to person, place and time, in no acute distress  Cardiovascular: normal rate, regular rhythm, normal S1 and S2, no murmurs, rubs, clicks, or gallops, distal pulses intact, no carotid bruits, no JVD  Pulmonary/Chest: bibasilar crackles  Abdomen: soft, non-tender, non-distended, normal bowel sounds, no masses Extremities: no edema, pulse   Skin: warm and dry, no rash or erythema  Head: normocephalic and atraumatic  Eyes: pupils equal, round, and reactive to light  Neck: supple and non-tender without mass, no thyromegaly   Musculoskeletal: normal range of motion, no joint swelling, deformity or tenderness  Neurological: alert, oriented, normal speech, no focal findings or movement disorder noted    Medications:    magnesium sulfate  2 g Intravenous Once    diltiazem  120 mg Oral Daily    amiodarone  200 mg Oral BID    metoprolol tartrate  100 mg Oral BID    enoxaparin  1 mg/kg Subcutaneous BID    doxepin  50 mg Oral Nightly    bumetanide  1 mg Oral BID    potassium chloride  20 mEq Oral BID WC    spironolactone  25 mg Oral Daily    insulin lispro  0-12 Units Subcutaneous TID WC    insulin lispro  0-6 Units Subcutaneous Nightly    nafcillin 12 g continuous infusion  12 g Intravenous Q24H    mometasone-formoterol  2 puff Inhalation BID    calcium-vitamin D  1 tablet Oral Daily    cyanocobalamin  1,000 mcg Oral Daily    escitalopram  20 mg Oral QAM    fluticasone  1 spray Nasal Daily    levothyroxine  125 mcg Oral Daily    multivitamin  1 tablet Oral Daily    OXcarbazepine  150 mg Oral QAM    pantoprazole  40 mg Oral QAM AC    risperiDONE  1 mg Oral QAM    rosuvastatin  20 mg Oral Nightly    ranolazine  500 mg Oral BID    sodium chloride flush  10 mL Intravenous 2 times per day    potassium replacement protocol   Other RX Placeholder    busPIRone  7.5 mg Oral TID    glycerin-hypromellose-  1 drop Both Eyes BID    ferrous sulfate  325 mg Oral Every Other Day    vitamin D  50,000 Units Oral Once per day on Mon Thu      dextrose         loperamide 2 mg 4x Daily PRN   hydrOXYzine 25 mg TID PRN   melatonin 3 mg Nightly PRN   diphenhydrAMINE 25 mg Nightly PRN   ipratropium-albuterol 1 ampule Q4H PRN   acetaminophen 650 mg Q4H PRN   docusate sodium 100 mg BID PRN   polyethylene glycol 17 g Daily PRN   sodium chloride 1 spray PRN sodium chloride flush 10 mL PRN   glucose 15 g PRN   dextrose 12.5 g PRN   glucagon (rDNA) 1 mg PRN   dextrose 100 mL/hr PRN   acetaminophen 650 mg Q4H PRN       Diagnostics:  ALEX 6/134/19  Summary   Technically limited study.   Ejection fraction is visually estimated at 55%.   Overall left ventricular function is normal.   Normally functioning bioprosthetic TAVR valve in aortic position.   no vegetation seen   no para valvular leak      Signature      ----------------------------------------------------------------   Electronically signed by David Morejon MD (Interpreting   physician) on 06/13/2019 at 04:22 PM    Lab Data:    Cardiac Enzymes:  No results for input(s): CKTOTAL, CKMB, CKMBINDEX, TROPONINI in the last 72 hours.     CBC:   Lab Results   Component Value Date    WBC 5.7 06/18/2019    RBC 2.73 06/18/2019    RBC 3.82 08/21/2018    HGB 7.4 06/18/2019    HCT 23.7 06/18/2019     06/18/2019       CMP:    Lab Results   Component Value Date     06/18/2019    K 3.8 06/18/2019    K 3.7 05/02/2019    CL 94 06/18/2019    CO2 27 06/18/2019    BUN 22 06/18/2019    CREATININE 1.1 06/18/2019    LABGLOM 48 06/18/2019    GLUCOSE 129 06/18/2019    GLUCOSE 101 07/25/2016    CALCIUM 8.9 06/18/2019       Hepatic Function Panel:    Lab Results   Component Value Date    ALKPHOS 58 06/18/2019    ALT 12 06/18/2019    AST 17 06/18/2019    PROT 6.5 06/18/2019    PROT 6.5 11/25/2016    BILITOT 0.4 06/18/2019    BILIDIR <0.2 06/18/2019    LABALBU 3.1 06/18/2019       Magnesium:    Lab Results   Component Value Date    MG 1.5 06/18/2019       PT/INR:    Lab Results   Component Value Date    PROTIME 13.8 02/13/2019    INR 1.41 06/12/2019       HgBA1c:    Lab Results   Component Value Date    LABA1C 5.9 04/03/2019       FLP:    Lab Results   Component Value Date    TRIG 378 06/13/2019    HDL 20 06/13/2019    LDLCALC 31 06/13/2019    LABVLDL 69 11/25/2016       TSH:    Lab Results   Component Value Date    TSH 1.720 06/15/2019         Assessment:    Acute on chronic hypoxic resp failure  Sepsis with hypotension - improved - off aaliyah gtt  Bacteremia/MSSA - ID following  New Paroxysmal afib rvr - s/p cardioversion 6/12 - then back to PAF rvr - currently nsr  Pancytopenia  MASON/CKD - improving  Acute on chronic diastolic CHF  Ef 55 per ALEX 6/13/19, normally functioning bioprosthethic TAVR, no vegetation  Hx AS - s/p TAVR 4/30/19 by dr Rosales [facility]  Hx CAD and stents - s/p cath 3/29/19 - nonobstructive CAD with patent stents RCA and LAD with mild instent restenosis   HTN  DM  multiple ischemic/hemorrhagic small strokes - neurology following  Possible ovarian mass - attending following  Hx COPD - on home O2    Plan:  · Keep mag >2 and K >4   · Cont amio/BB/aldactone/ranexa/statin/bumex  · Wean cdz gtt off to keep HR <100, add cardizem 120 daily  · No anticoagulation due to anemia  · Restart asa/plavix when ok with attending  · Daily I/o and weights   · 2 liter fluid restriction and 2 gm sodium diet  · Ask pharmacy to review meds and identify any that can cause prolonged qtc     Electronically signed by Jevon Moseley PA-C on 6/18/2019 at 11:14 AM

## 2019-06-18 NOTE — PROGRESS NOTES
CVC removed from left upper chest.  Patient in bed. CHG dressing removed, noted no skin breakdown or irritation, sutures removed, area cleaned with chloraprep, bio patch placed, sterile gauze placed over CVC, CVC removed with green tip intact, patient is actively bleeding pressure held for 5 minutes. Transparent dressing applied. Patient continues to bleed pressure held for additional 15 minutes. Dressing change done. Patient in bed for 1 hour, dressing stays on for 24 hours.

## 2019-06-18 NOTE — PROGRESS NOTES
[address] [address]  INPATIENT PHYSICAL THERAPY  DAILY NOTE  [facility]-STEPDOWN 3B - 3B-35/035-A    Time In: 1100  Time Out: 1138  Timed Code Treatment Minutes: 28 Minutes  Minutes: 38          Date: 2019  Patient Name: Samira Ann,  Gender:  female        MRN: [id number]  : 1945  (75 y.o.)     Referring Practitioner: Dr. Oskar Brothers  Diagnosis: sepsis  Additional Pertinent Hx: admit with above diagnosis, acute metabolic encephalopathy, hx CVA     Prior Level of Function:  Type of Home: Facility  Home Layout: One level  Home Access: Level entry  Home Equipment: Rolling walker    Restrictions/Precautions:  Restrictions/Precautions: General Precautions, Fall Risk  Position Activity Restriction  Other position/activity restrictions: continous bi-pap    SUBJECTIVE: RN requested assist to get pt to chair. Pt pleasant and agreeable to session. PAIN: 0/10:       OBJECTIVE:  Bed Mobility:  Rolling to Left: Minimal Assistance   Rolling to Right: Minimal Assistance   Supine to Sit: Moderate Assistance  Scooting: Moderate Assistance, X 2    Transfers:  Sit to Stand: Contact Guard Assistance, Minimal Assistance, X 2  Stand to [facility] 68, X 2    Ambulation:  Contact Guard Assistance, Minimal Assistance, X 2  Distance: 4ftx1   Surface: Level Tile  Device:Rolling Walker  Gait Deviations: Forward Flexed Posture, Slow Elli, Decreased Step Length Bilaterally, Decreased Gait Speed, Decreased Heel Strike Bilaterally, Unsteady Gait and therapist moving walker and pt able to follow walker    Functional Outcome Measures: Completed  AM-PAC Inpatient Mobility without Stair Climbing Raw Score : 14  AM-PAC Inpatient without Stair Climbing T-Scale Score : 40.85    ASSESSMENT:  Activity Tolerance:  Patient tolerance of  treatment: good. Pt with increase SOB during amb. Pt states it felt good to get up to chair.      Equipment Recommendations:Equipment Needed: No  Other: cont to assess needs  Discharge Recommendations:  Discharge Recommendations: Continue to assess pending progress, Subacute/Skilled Nursing Facility, Patient would benefit from continued therapy after discharge    Plan: Times per week: 3-5X GM  Times per day: Daily  Specific instructions for Next Treatment: therex and mobility    Patient Education  Patient Education: Plan of Care, Gait    Goals:  Patient goals : not stated  Short term goals  Time Frame for Short term goals: by discharge  Short term goal 1: bed mobility with S  Short term goal 2: transfer with SBA  Short term goal 3: amb >50'x1 with AD and SBA to walk safely in room  Long term goals  Time Frame for Long term goals : no LTGs set secondary to short ELOS    Following session, patient left in safe position with all fall risk precautions in place.

## 2019-06-18 NOTE — PLAN OF CARE
Problem: Falls - Risk of:  Goal: Will remain free from falls  Description  Will remain free from falls  Outcome: Ongoing  Note:   No falls so far this shift. Fall precautions in place, call light within reach, bed in lowest position, side rails up x2, bed alarm on, non-skid socks. Hourly rounding in place. Problem: Skin Integrity:  Goal: Skin integrity will stabilize  Description  Skin integrity will stabilize  Outcome: Ongoing  Note:   Turn pt q2h and PRN, pillows used for support. No skin breakdown noted. Problem: Discharge Planning:  Goal: Patients continuum of care needs are met  Description  Patients continuum of care needs are met  Outcome: Ongoing  Note:   Patient on 3b for care. Discharge planning in process. [facility]. Megan vs [employer] at discharge. Problem: Injury - Risk of, Physical Injury:  Goal: Will remain free from falls  Description  Will remain free from falls  Outcome: Ongoing  Note:   No falls so far this shift. Fall precautions in place, call light within reach, bed in lowest position, side rails up x2, bed alarm on, non-skid socks. Hourly rounding in place. Problem: Mood - Altered:  Goal: Mood stable  Description  Mood stable  Outcome: Ongoing  Note:   Patient alert and calm. Problem: Pain:  Goal: Pain level will decrease  Description  Pain level will decrease  Outcome: Ongoing  Note:   Tylenol given for neck and back pain, effective. Pain goal \"3\". Care plan reviewed with patient. Patient verbalized understanding of the plan of care and contribute to goal setting.

## 2019-06-18 NOTE — PROGRESS NOTES
Hospitalist Progress Note    Patient:  June Jaeger      Unit/Bed:3B-35/035-A    YOB: 1945    MRN: [id number]       Acct: [de-identified]     PCP: ASHLEY Walker CNP    Date of Admission: 6/11/2019    Assessment/Plan:      Septic shock secondary to MSSA -not sure about the source of infection,  -Home blood pressure medications- heldCozaar, metoprolol 100 mg twice a day, Aldactone, Lasix, hydralazine, Imdur, Zaroxolyn  -Initially suspected if there is hematoma in the left pelvis but repeat CT scan with contrast  suggests more of an adnexal mass, with free fluid noted in the pelvi adjacent to the sigmoid colon and urinary bladder   -Repeat blood cultures done on 6/13-  ALEX negative for infective endocarditis on 6/13  -Blood pressure improved with vasopressors and aggressive IV fluids,   -Continue nafcillin for total of 4 weeks- 3 more weeks remianing     Atrial fibrillation with rapid ventricular response-newly diagnosed, as patient was hemodynamically unstable, cardioversion was done on 6/12 after being transferred to MICU, was in sinus tachycardia after an overnight again went back into A. fib with RVR and started on amiodarone drip on 6/13 and subsequently continued until 6/17 and changed to oral amiodarone, intermittently still tachycardic  -Also on Cardizem drip since 6/14, unable to be weaned off, was getting metoprolol 5 mg IV every 6 hourly, change to oral metoprolol 100 mg twice daily on 6/17  -No bleeding noted, hemoglobin improving along with platelets after treatment of sepsis, start anticoagulation with Lovenox therapeutic  6/18- change lovenox to Eliquis    Acute metabolic encephalopathy-no obvious evidence of stroke, most likely secondary to infection, neurology on board, MRI was little abnormal but suggestive of old changes mostly    Acute on chronic respiratory failure, hypoxia and hypercapnia, on home oxygen, intermittently treated with BiPAP, currently on oxygen thrombocytopenia-during sepsis had significant drop and is improving again to the 293H    Nonalcoholic fatty liver/steatohepatitis is      Sigmoid diverticulosis    Expected discharge date: Today- [facility]    Disposition:    [] Home       [] TCU       [] Rehab       [] Psych       [x] SNF       [] [facility]       [] Other-    Chief Complaint: Altered mental status      Patient was transferred from [facility] for further evaluation as the patient was having altered mental status and was having fever apparently 103 at the facility. Patient also needing more than usual oxygen and was placed on 6 L and upon arrival to the emergency department she had fever of 105 and was tachycardic and tachypneic and blood pressure was little borderline around 98/44. Patient unable to give any meaningful history and labs showed elevated BNP and troponin of 30,976 and 0.163         Hospital Course: Patient suspected to have sepsis and was started on broad-spectrum antibiotics initially vancomycin and cefepime and one dose was given and subsequently changed to Zosyn . Both blood cultures positive for Staphylococcus. Cardiologist was also consulted for the elevated troponin and  recommended treating the primary etiology most likely sepsis and patient not in acute CHF or not having an MI patient continued to have fever of 103.9 in hospital      Infectious disease was consulted for evaluation as the source of sepsis was not clear. Overnight on 6/11, patient's blood pressure dropped to the 60s and was given aggressive IV fluids and blood pressure initially improved but on 6/12 patient's blood pressure again was dropping. Patient was also having some cough and wheezing overnight and initially suspected to have COPD exacerbation and empirically started on steroids. For the change in mental status a CT scan of the head was done as the patient was having repetitive speech without any focal weakness. On 612-Patient had A. fib with RVR with heart rate trending to 170s and was becoming hypotensive and was transferred to MICU and had cardioversion done by the intensivist at bedside with sedation. Patient converted to sinus rhythm, sinus tachycardia. Overnight patient went back into A. fib with RVR and was started on amiodarone. .   Because of patient's or infiltrative speech and confusion, CT scan of the head was done and subsequently MRI and MRA was done and there was suggestion that the patient had unusual small foci in the right occipital and left occipital and head of caudate nucleus on the right and left frontoparietal junction with possible edema versus artifacts and the possibility of small sites of infection cannot be ruled out. Neurology was consulted. 6/12- A. Fib with RVR , hypotensive, transferred to MICU, s/p cardioversion , in sinus tacy and overnight went into A. Fib and amiodarone was started. Subsequently repeat blood cultures were done on 6/13 and cardiology ALEX and there was no infective endocarditis. patient continued to have A. fib and became tachycardic again on 6/14 and had to be started on Cardizem drip and the blood pressure was low and having fevers and was treated with phenylephrine from 6/14-6/16 and was weaned off. Patient's antibiotics were changed to nafcillin as the cultures were MSSA and repeat blood cultures were negative. Once ALEX was negative, there was no concern for any infection in the brain and neurology okayed for resuming antiplatelet agents. Antiplatelet agents were held because of anemia and thrombocytopenia. Patient's hemoglobin was gradually dropping to least of 6.7 and 1 unit PRBC was given and on 6/15 CT of the abdomen was done to rule out intra-abdominal bleeding or retroperitoneal hematoma. There was no obvious bleeding but incidentally there well bilateral adnexal masses and fluid in the pelvis and radiologist suggested possible hematoma.   Subsequently repeat CT scan of the abdomen and pelvis with contrast was done and was determined to be fluid rather than hematoma in the adnexal masses were confirmed and had a transvaginal pelvic ultrasound and gynecologist was consulted because of the bilateral adnexal masses suspicion for ovarian cancer. Patient has worsening renal failure with creatinine peaking at 1.9 and has improved after the blood pressure has improved with treatment of the antibiotics and pressors and IV hydration. Patient had a rectal tube placed because of diarrhea and electrolytes were replaced and the patient's blood pressure continued to be on the low side until the 16s and needed several boluses. Patient was getting volume overloaded and intermittent Lasix was being given. Continued on all her psychiatric medications - BuSpar, Celexa e, Risperdal xcept doxepin and patient is not getting enough sleep. Patient transferred out of MICU on 6/16/19. Has rectal tube and Son and was on amiodarone drip low dose since the 13th  And on Cardizem drip and metoprolol 5 mg IV q 6hrly.   -Discontinued IV metoprolol and started metoprolol 100 mg every 12 hourly, patient intermittently converting to sinus rhythm and going back to A. fib with RVR. Discontinue amiodarone drip as she has been on it for almost 4 days, started oral amiodarone 200 mg twice daily. Start Bumex 1 mg twice daily. Occult blood was negative and hemoglobin is stable and platelets have improved to 136 and started on anticoagulation because of persistent atrial fibrillation. Will check for C. Difficile.       6/18-no C. difficile, start Imodium, remove rectal tube,  remove Son, blood pressure little better, monitor closely, Cardizem drip discontinued and started on oral Cardizem 120 mg daily, patient is feeling a lot better, still on oxygen which is her baseline,   -Needs 3 more weeks of antibiotics, physical therapy, discharge to [place]  Decrease amiodarone to 200 mg daily as QT is little prolonged in the 520s,-patient has been on her psychiatric medications for a long time, will try to avoid discontinuing them as she tolerated them fairly well  Change Lovenox therapeutic dose to Eliquis, continue aspirin and hold Plavix because of risk of bleeding especially with anemia, platelets improved well  Converted to sinus rhythm-    Subjective (past 24 hours):   Does not have any shortness of breath or chest pain, feeling a lot better, tolerated food, slept little, still having little loose stools, no pain in the belly, looking forward to work with therapy      Medications:  Reviewed    Infusion Medications    dextrose       Scheduled Medications    magnesium sulfate  2 g Intravenous Once    diltiazem  120 mg Oral Daily    amiodarone  200 mg Oral BID    metoprolol tartrate  100 mg Oral BID    enoxaparin  1 mg/kg Subcutaneous BID    doxepin  50 mg Oral Nightly    bumetanide  1 mg Oral BID    potassium chloride  20 mEq Oral BID WC    spironolactone  25 mg Oral Daily    insulin lispro  0-12 Units Subcutaneous TID WC    insulin lispro  0-6 Units Subcutaneous Nightly    nafcillin 12 g continuous infusion  12 g Intravenous Q24H    mometasone-formoterol  2 puff Inhalation BID    calcium-vitamin D  1 tablet Oral Daily    cyanocobalamin  1,000 mcg Oral Daily    escitalopram  20 mg Oral QAM    fluticasone  1 spray Nasal Daily    levothyroxine  125 mcg Oral Daily    multivitamin  1 tablet Oral Daily    OXcarbazepine  150 mg Oral QAM    pantoprazole  40 mg Oral QAM AC    risperiDONE  1 mg Oral QAM    rosuvastatin  20 mg Oral Nightly    ranolazine  500 mg Oral BID    sodium chloride flush  10 mL Intravenous 2 times per day    potassium replacement protocol   Other RX Placeholder    busPIRone  7.5 mg Oral TID    glycerin-hypromellose-  1 drop Both Eyes BID    ferrous sulfate  325 mg Oral Every Other Day    vitamin D  50,000 Units Oral Once per day on Mon Thu     PRN Meds: loperamide, hydrOXYzine, melatonin, diphenhydrAMINE, ipratropium-albuterol, acetaminophen, docusate sodium, polyethylene glycol, sodium chloride, sodium chloride flush, glucose, dextrose, glucagon (rDNA), dextrose, acetaminophen      Intake/Output Summary (Last 24 hours) at 6/18/2019 1020  Last data filed at 6/18/2019 0524  Gross per 24 hour   Intake 1151 ml   Output 1875 ml   Net -724 ml       Diet:  DIET CARDIAC;  Dysphagia II Mechanically Altered    Exam:  BP (!) 172/76   Pulse 75   Temp 98.2 °F (36.8 °C) (Oral)   Resp 18   Ht 5' 5\" (1.651 m)   Wt 221 lb 9 oz (100.5 kg)   LMP 02/12/1973 (Approximate)   SpO2 97%   BMI 36.87 kg/m²     Physical Examination:   General appearance - alert, awake  and in  M no distress at rest, obese, and oxygen  HEENT: Normocephalic, Atraumatic, pupils reactive, mucous membranes moist  Chest - moving equally to respiration,symmetric air entry, very fine rales at bases still present  Heart -normal rate, regular rhythm, normal S1, S2, aortic murmur+  Abdomen - soft, nontender, distended, no masses or organomegaly, BS+  Neurological - alert, oriented fairly well today, slow, normal speech, sensations intact and able to move all extremities , left lower leg 4/5  Extremities - peripheral pulses normal, pedal edema improving+1,  Capillary refill less than 3 sec  Skin - normal coloration and turgor, no rashes   Rectal tube+, FOLEYS+      Labs:   Recent Labs     06/16/19  0534 06/17/19  0402 06/18/19  0629   WBC 6.1 5.8 5.7   HGB 7.5* 7.5* 7.4*   HCT 23.2* 23.5* 23.7*   * 136 204     Recent Labs     06/16/19  0534 06/16/19  1300 06/17/19  0402 06/18/19  0629   *  --  133* 134*   K 3.0* 3.7 3.2* 3.8   CL 95*  --  92* 94*   CO2 25  --  26 27   BUN 28*  --  22 22   CREATININE 1.4*  --  1.1 1.1   CALCIUM 8.4*  --  8.7 8.9   PHOS  --   --   --  4.5     Recent Labs     06/18/19  0629   AST 17   ALT 12   BILIDIR <0.2   BILITOT 0.4   ALKPHOS 58     No results for input(s): INR in the last 72 hours. No results for input(s): Reid Gleason in the last 72 hours. Microbiology:      Urinalysis:      Lab Results   Component Value Date    NITRU NEGATIVE 06/14/2019    WBCUA > 200 06/14/2019    BACTERIA FEW 06/14/2019    RBCUA 3-5 06/14/2019    BLOODU MODERATE 06/14/2019    SPECGRAV 1.009 06/14/2019    GLUCOSEU NEGATIVE 06/11/2019       Radiology:  XR CHEST PORTABLE   Final Result   1. Stable cardiac enlargement. 2. diffuse coarse interstitial markings. Superimposed bronchitis or pulmonary edema is again considered. 3. Small bilateral effusions suspected with basilar atelectasis increasing left lower lobe. **This report has been created using voice recognition software. It may contain minor errors which are inherent in voice recognition technology. **      Final report electronically signed by Dr. Baldo Christine on 6/18/2019 5:37 AM      US PELVIS COMPLETE   Final Result   1. There is a complex cystic-appearing mass demonstrated within the left adnexa measuring approximately 3.4 x 2.9 x 2.8 cm. Arterial and venous waveforms are noted within the peripheral ovarian stroma. This is concerning for possible cystic ovarian    neoplasm given its complex appearance. 2. There is also a small cystic lesion within the right ovary measuring 1.6 x 1.7 x 1.5 cm. Arterial and venous waveforms are noted within the right ovary. This is indeterminate. Recommend continued follow-up. 3. Small to moderate amount of free fluid within the pelvis. **This report has been created using voice recognition software. It may contain minor errors which are inherent in voice recognition technology. **      Final report electronically signed by Dr. Gayathri Purcell on 6/16/2019 9:32 AM      US NON OB TRANSVAGINAL   Final Result   1. There is a complex cystic-appearing mass demonstrated within the left adnexa measuring approximately 3.4 x 2.9 x 2.8 cm.  Arterial and venous waveforms are noted within the peripheral anterior to the pubic region extending inferiorly. This is seen on axial image 154. This may represent panniculitis. There is also soft tissue skin thickening demonstrated which may    represent edema or cellulitis of the lower abdominal pannus. This is seen on axial image 165.      4. Sigmoid diverticulosis without evidence of diverticulitis. 5. Small bilateral right greater than the left pleural effusions with mild adjacent passive atelectasis or pneumonia is noted. 6. There are is incompletely visualized pericardial fluid present of indeterminate etiology. **This report has been created using voice recognition software. It may contain minor errors which are inherent in voice recognition technology. **      Final report electronically signed by Dr. Rhonda Levi on 6/15/2019 9:17 PM      CT ABDOMEN PELVIS WO CONTRAST Additional Contrast? None   Final Result   Mixed attenuation fluid in the pelvis suspicious for intrapelvic of blood. Hounsfield units range from 15-33. Possibly a liter of fluid within the pelvis. **This report has been created using voice recognition software. It may contain minor errors which are inherent in voice recognition technology. **      Final report electronically signed by Dr. Lavelle Corona on 6/15/2019 6:36 PM      XR CHEST PORTABLE   Final Result      Worsening pulmonary edema. **This report has been created using voice recognition software. It may contain minor errors which are inherent in voice recognition technology. **      Final report electronically signed by Dr. Manuel Currie on 6/15/2019 6:47 AM      VL DUP LOWER EXTREMITY VENOUS BILATERAL   Final Result   No evidence of acute bilateral lower extremity DVT. **This report has been created using voice recognition software. It may contain minor errors which are inherent in voice recognition technology. **       Final report electronically signed by Dr. Manuel Currie on 6/15/2019 12:43 AM XR CHEST PORTABLE   Final Result   Possible pneumonia on the left. PA and lateral radiographs could be performed if indicated clinically. **This report has been created using voice recognition software. It may contain minor errors which are inherent in voice recognition technology. **      Final report electronically signed by Dr. Jania Fonseca on 6/14/2019 9:29 AM      XR CHEST PORTABLE   Final Result   1. Moderate cardiomegaly. Prior anterior cervical fusion. A left subclavian line has been inserted with its tip in the supraclavicular superior vena cava. An aortic valve/stent is present. 2. No acute infiltrates or effusions are seen. No pneumothorax is seen. **This report has been created using voice recognition software. It may contain minor errors which are inherent in voice recognition technology. **      Final report electronically signed by Dr. Bahman Humphrey on 6/12/2019 4:40 PM      MRA NECK WO CONTRAST   Final Result       1. Motion degraded examination demonstrating no gross stenosis of either carotid bulb. 2. No antegrade flow is noted in the left vertebral artery. This can indicate a diminutive vessel. Reversal of flow is not excluded. There is a normal dominant appearing right vertebral artery. **This report has been created using voice recognition software. It may contain minor errors which are inherent in voice recognition technology. **      Final report electronically signed by Dr. Vicky Giles on 6/12/2019 3:31 PM      MRA HEAD WO CONTRAST   Final Result    Grossly normal MRA of the brain. **This report has been created using voice recognition software. It may contain minor errors which are inherent in voice recognition technology. **      Final report electronically signed by Dr. Vicky Giles on 6/12/2019 3:28 PM      MRI BRAIN WO CONTRAST   Final Result         1. Somewhat unusual foci of abnormal signal are present in the brain.  These are report has been created using voice recognition software. It may contain minor errors which are inherent in voice recognition technology. **      Final report electronically signed by Dr. Toi Martins on 6/11/2019 12:06 PM          DVT prophylaxis: [] Lovenox                                 [] SCDs                                 [] SQ Heparin                                 [] Encourage ambulation           [x] Already on Anticoagulation started on 6/17     Code Status: Full Code     PT/OT Eval Status: ordered    Tele:   [x] yes             [] no    Active Hospital Problems    Diagnosis Date Noted    Elevated troponin [R74.8]      Priority: High    Septicemia ([facility] [address] [A41.9]      Priority: High    Anemia [D64.9]      Priority: High    Essential hypertension [I10] 01/29/2015     Priority: Medium    Type 2 diabetes mellitus with complication, with long-term current use of insulin ([facility] [address] [E11.8, Z79.4]      Priority: Medium    Hypothyroid [E03.9] 01/15/2014     Priority: Low    Sepsis due to Staphylococcus aureus ([facility] [address] [A41.01]     Atrial fibrillation with rapid ventricular response ([facility] [address] [I48.91]     Complex cyst of left ovary [N83.292]     Complex cyst of right ovary [N83.291]     Acute ischemic stroke ([facility] [address] [I63.9]     Sepsis ([facility] [address] [A41.9] 06/11/2019    Elevated transaminase level [R74.0] 06/11/2019    Electrolyte imbalance [E87.8] 06/11/2019    Acute exacerbation of COPD with asthma ([facility] [address] [J44.1, J45.901] 04/14/2019    Morbid obesity with BMI of 40.0-44.9, adult ([facility] [address] [E66.01, Z68.41] 01/24/2019    B12 deficiency due to diet [E53.8]     At risk for polypharmacy [Z91.89] 12/21/2018    CRF (chronic renal failure), stage 4 (severe) ([facility] [address] [N18.4] 11/27/2018    Acute on chronic diastolic CHF (congestive heart failure) (HCC) [I50.33]     Acute on chronic respiratory failure with hypoxia and hypercapnia (HCC) [W11.47, J96.22] 08/27/2018    Anxiety and depression [F41.9, F32.9] 04/17/2018    Acute on chronic diastolic congestive heart failure due to valvular disease ([facility] [address] [I50.33, I38] 03/27/2018    ASCVD (arteriosclerotic cardiovascular disease) [I25.10]     Hyperlipidemia [E78.5] 11/18/2014    POND (nonalcoholic steatohepatitis) [K75.81] 11/18/2014    History of tobacco abuse: Quit in 2009 [Z87.891] 10/28/2014    GERD (gastroesophageal reflux disease) [K21.9]     CAD (coronary artery disease) [I25.10]     Dyslipidemia [E78.5]        Electronically signed by Radha Gongora MD on 6/18/2019 at 10:20 AM

## 2019-06-18 NOTE — PROGRESS NOTES
mometasone-formoterol  2 puff Inhalation BID    calcium-vitamin D  1 tablet Oral Daily    cyanocobalamin  1,000 mcg Oral Daily    escitalopram  20 mg Oral QAM    fluticasone  1 spray Nasal Daily    levothyroxine  125 mcg Oral Daily    multivitamin  1 tablet Oral Daily    OXcarbazepine  150 mg Oral QAM    pantoprazole  40 mg Oral QAM AC    risperiDONE  1 mg Oral QAM    rosuvastatin  20 mg Oral Nightly    ranolazine  500 mg Oral BID    sodium chloride flush  10 mL Intravenous 2 times per day    potassium replacement protocol   Other RX Placeholder    busPIRone  7.5 mg Oral TID    glycerin-hypromellose-  1 drop Both Eyes BID    ferrous sulfate  325 mg Oral Every Other Day    vitamin D  50,000 Units Oral Once per day on Mon Thu      diltiazem (CARDIZEM) 125 mg in dextrose 5% 125 mL infusion 5 mg/hr (06/18/19 0625)    dextrose       loperamide, hydrOXYzine, melatonin, diphenhydrAMINE, ipratropium-albuterol, acetaminophen, docusate sodium, polyethylene glycol, sodium chloride, sodium chloride flush, glucose, dextrose, glucagon (rDNA), dextrose, acetaminophen      LABS:     CBC:   Recent Labs     06/16/19  0534 06/17/19  0402 06/18/19  0629   WBC 6.1 5.8 5.7   HGB 7.5* 7.5* 7.4*   * 136 204     BMP:    Recent Labs     06/16/19  0534 06/16/19  1300 06/17/19  0402 06/18/19  0629   *  --  133* 134*   K 3.0* 3.7 3.2* 3.8   CL 95*  --  92* 94*   CO2 25  --  26 27   BUN 28*  --  22 22   CREATININE 1.4*  --  1.1 1.1   GLUCOSE 164*  --  154* 129*     Calcium:  Recent Labs     06/18/19  0629   CALCIUM 8.9     Ionized Calcium:No results for input(s): IONCA in the last 72 hours. Magnesium:  Recent Labs     06/18/19  0629   MG 1.5*     Phosphorus:  Recent Labs     06/18/19  0629   PHOS 4.5     BNP:No results for input(s): BNP in the last 72 hours.   Glucose:  Recent Labs     06/17/19  1629 06/17/19 2034 06/18/19  0654   POCGLU 167* 135* 126*     HgbA1C: No results for input(s): LABA1C in the last 72 hours. INR:   No results for input(s): INR in the last 72 hours. Hepatic:   Recent Labs     06/18/19  0629   ALKPHOS 58   ALT 12   AST 17   PROT 6.5   BILITOT 0.4   BILIDIR <0.2   LABALBU 3.1*         Problem list of patient:     Patient Active Problem List   Diagnosis Code    Anxiety F41.9    Depression F32.9    Type 2 diabetes mellitus with complication, with long-term current use of insulin ([facility]) E11.8, Z79.4    MI (myocardial infarction) ([facility]) I21.9    Dyslipidemia E78.5    CAD (coronary artery disease) I25.10    COPD without exacerbation ([facility]) J44.9    GERD (gastroesophageal reflux disease) K21.9    Hypothyroid E03.9    History of tobacco abuse: Quit in 2009 Z87.891    S/P PTCA: 3/2/2017: PTCA RCA ISR. 12/11/2014: LAD Resolute 3.0X26. 11/6/2014: Stenting RCA Olman Serra 7.6A65 and 3.5X18. 5/11/2004: Proximal & mid RCA Taxus 3.5X20 mm, and Taxus 3.0X32 mm. F63.85    Metabolic syndrome J88.44    S/P cardiac catheterization: 11/6/2014: 99% in-stent restenosis mid-RCA. LCx moderate LI's. LAD 85% mid-segment lesion. Z98.890    POND (nonalcoholic steatohepatitis) K75.81    Hyperlipidemia E78.5    Essential hypertension I10    Obesity (BMI 30-39. 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    CHET (obstructive sleep apnea) G47.33    Aortic valve stenosis I35.0    Nocturnal hypoxemia G47.34    Hyponatremia E87.1    Acute on chronic diastolic congestive heart failure due to valvular disease ([facility]) I50.33, I38    Sympathotonic orthostatic hypotension I95.1    E-coli UTI N39.0, B96.20    Anxiety and depression F41.9, F32.9    Dizziness R42    Chest pain R07.9    Acute on chronic respiratory failure with hypoxia and hypercapnia ([facility]) J96.21, J96.22    Moderate persistent asthma without complication C05.08    Pulmonary nodule R91.1    Heart failure with preserved ejection fraction ([facility]) I50.30    Osteoarthritis of multiple joints M15.9    Class 2 obesity due to excess calories with body mass index (BMI) of 38.0 to 38.9 in adult E66.09, Z68.38    Acute on chronic diastolic CHF (congestive heart failure) ([facility]) I50.33    Normocytic anemia D64.9    Subclavian vein stenosis I87.1    Post concussive syndrome F07.81    CRF (chronic renal failure), stage 4 (severe) ([facility]) N18.4    Hypotension I95.9    Syncope and collapse G26    Metabolic encephalopathy L04.10    Right-sided epistaxis P01.5    Diastolic dysfunction without heart failure I51.89    Cervical spine instability M53.2X2    Nonrheumatic aortic valve stenosis I35.0    Acute diastolic heart failure ([facility]) I50.31    Acute kidney injury ([facility]) N17.9    Hypercalcemia E83.52    At risk for polypharmacy Z91.89    Acute respiratory failure with hypoxia and hypercapnia ([facility]) J96.01, J96.02    Acute pulmonary edema ([facility]) J81.0    Abnormal urinalysis R82.90    Irritable bowel syndrome K58.9    Anemia requiring transfusions D64.9    Stage 3 severe COPD by GOLD classification ([facility]) J44.9    Bilateral carotid artery stenosis I65.23    Iron deficiency anemia due to dietary causes D50.8    B12 deficiency due to diet I14.7    Diastolic CHF, acute on chronic ([facility]) I50.33    Anemia D64.9    Morbid obesity with BMI of 40.0-44.9, adult ([facility]) E66.01, Z68.41    Hyponatremia with decreased serum osmolality O98.3    Metabolic alkalosis F52.0    Chronic diastolic congestive heart failure ([facility]) I50.32    Acute exacerbation of COPD with asthma ([facility]) J44.1, J45.901    Acute respiratory failure with hypoxia and hypercapnia ([facility]) J96.01, J96.02    Severe aortic stenosis I35.0    S/P TAVR (transcatheter aortic valve replacement) Z95.2    Sepsis ([facility]) A41.9    Elevated transaminase level R74.0    Electrolyte

## 2019-06-18 NOTE — PROGRESS NOTES
PICC Procedure Note    Rupesh Coffey   Admitted- 6/11/2019 11:17 AM  Admission diagnosis- Sepsis ([address] [A41.9]      Attending Physician- Geovanni Bullard MD  Ordering Physician-Dr Fernando Collins MD  Indication for Insertion: Antibiotic Therapy    Catheter Insertion Date- 6/18/2019   Lot Number- [id number]   Gauge-5  Lumen-dual    Insertion Site- LEFTY Basilic  Vein Diameter- 3 mm  Catheter Length- 39 cm  Internal Length- 39 cm  Exposed Catheter Length- 0cm   Upper Arm Circumference- 36cm  Tip Confirmation System Bundle met- No: chest xray  If X-ray required, Tip Location- SVC  Radiologist- Dr Candice Rivera    PICC insertion successful- Yes  Ultrasound- yes    Okay To Use PICC- Yes    Electronically signed by Dimitry Quinn RN on 6/18/2019 at 3:44 PM

## 2020-02-06 NOTE — CARE COORDINATION
6/17/19, 2:45 PM      Bernard Gavin day: 6  Location: -/035-A Reason for admit: Sepsis [facility]) [A41.9]   Procedure: 6/13 ALEX: Neg for endocarditis  6/14 CXR: Possible pneumonia on the left  6/15 CXR - worsening pulmonary edema  6/15 CTA abdomen - mass seen, fluid in abdomen  6/16 US pelvis - mass seen, questionable neoplasm. Treatment Plan of Care: Hospitalist, ID, Neurology, Cardiology following. Pt transferred to  over the weekend from ICU. Consulting OB/GYN today for possible ovarian mass. PT/OT working with pt. Hgb 7.5. Changing meds to po today. Continues with iv Nafcillin. Lovenox. PCP: ASHLEY Becerril CNP  Readmission Risk Score: 75%  Discharge Plan: Pt is from [facility],  following. Left detailed message. [facility]

## 2022-04-04 NOTE — DISCHARGE SUMMARY
Benzoyl Peroxide Pregnancy And Lactation Text: This medication is Pregnancy Category C. It is unknown if benzoyl peroxide is excreted in breast milk. Moisturizer Recommendations: Oil free moisturizer like Cetaphil and CeraVe Minocycline Pregnancy And Lactation Text: This medication is Pregnancy Category D and not consider safe during pregnancy. It is also excreted in breast milk. Bactrim Counseling:  I discussed with the patient the risks of sulfa antibiotics including but not limited to GI upset, allergic reaction, drug rash, diarrhea, dizziness, photosensitivity, and yeast infections.  Rarely, more serious reactions can occur including but not limited to aplastic anemia, agranulocytosis, methemoglobinemia, blood dyscrasias, liver or kidney failure, lung infiltrates or desquamative/blistering drug rashes. IMPRESSION:   No acute lumbar spine fracture or subluxation. **This report has been created using voice recognition software. It may contain minor errors which are inherent in voice recognition technology. **      Final report electronically signed by Dr. Garth Morejon on 11/27/2018 3:45 PM      CT LUMBAR RECONSTRUCTION WO POST PROCESS   Final Result      1. Moderate stool in the colon with diverticulosis. No evidence for diverticulitis at this time. Correlate for constipation. 2. 1 cm left lower lobe pulmonary nodule which could represent a poorly calcified granuloma. Follow-up in    3 months time is advised. 3. Dominant left adnexa measuring 4.8 cm. Correlate with surgical history and pelvic ultrasound is clinically warranted. 4. No acute findings in the abdomen or pelvis. IMPRESSION:   No acute lumbar spine fracture or subluxation. **This report has been created using voice recognition software. It may contain minor errors which are inherent in voice recognition technology. **      Final report electronically signed by Dr. Garth Morejon on 11/27/2018 3:45 PM      CT THORACIC RECONSTRUCTION WO POST PROCESS   Final Result    No acute intrathoracic findings. Cardiomegaly. Small pericardial effusion. IMPRESSION:   No acute fracture or subluxation thoracic spine         **This report has been created using voice recognition software. It may contain minor errors which are inherent in voice recognition technology. **      Final report electronically signed by Dr. Garth Morejon on 11/27/2018 3:36 PM      CT Chest W Contrast   Final Result    No acute intrathoracic findings. Cardiomegaly. Small pericardial effusion. IMPRESSION:   No acute fracture or subluxation thoracic spine         **This report has been created using voice recognition software. It may contain minor errors which are inherent in voice recognition technology. **      Final report electronically signed by Dr. Garth Morejon on Isotretinoin Counseling: Patient should get monthly blood tests, not donate blood, not drive at night if vision affected, not share medication, and not undergo elective surgery for 6 months after tx completed. Side effects reviewed, pt to contact office should one occur. Azithromycin Pregnancy And Lactation Text: This medication is considered safe during pregnancy and is also secreted in breast milk. MG-UNIT TABS  take 1 tablet by mouth twice a day             clopidogrel (PLAVIX) 75 MG tablet  take 1 tablet by mouth once daily             clotrimazole-betamethasone (LOTRISONE) 1-0.05 % cream  Apply topically 3 times daily. cycloSPORINE (RESTASIS) 0.05 % ophthalmic emulsion  Place 1 drop into both eyes 2 times daily              MG capsule  take 3 capsules by mouth every evening             doxepin (SINEQUAN) 150 MG capsule  Take 150 mg by mouth nightly             escitalopram (LEXAPRO) 10 MG tablet  Take 1 tablet by mouth every morning             ferrous sulfate 325 (65 Fe) MG tablet  Take 1 tablet by mouth 2 times daily             fluticasone (FLONASE) 50 MCG/ACT nasal spray  1 spray by Nasal route daily             furosemide (LASIX) 20 MG tablet  Take 40 mg (2 tabs) am and 40 mg (2 tab) pm             glucose blood VI test strips (GLUCOSE METER TEST) strip  1 each by In Vitro route daily Check blood sugar q daily Dx E11.69             hydrALAZINE (APRESOLINE) 100 MG tablet  Take 1 tablet by mouth 3 times daily             HYDROcodone-acetaminophen (NORCO) 5-325 MG per tablet  Take 1 tablet by mouth every 4 hours as needed for Pain for up to 7 days. .             hydrOXYzine (ATARAX) 25 MG tablet  Take 25 mg by mouth 3 times daily             isosorbide dinitrate (ISORDIL) 40 MG tablet  Take 1 tablet by mouth 3 times daily             Lancets MISC  Check blood sugar q daily Dx E11.69             levothyroxine (SYNTHROID) 100 MCG tablet  take 1 tablet by mouth once daily             metolazone (ZAROXOLYN) 5 MG tablet  Take 1 tablet by mouth three times a week             metoprolol (LOPRESSOR) 100 MG tablet  Take 1 tablet by mouth 2 times daily             Misc.  Devices MISC  Pt needs an oxygen tank carrier for her wheelchair             Multiple Vitamins-Minerals (MULTIVITAMIN-MINERALS) TABS tablet  take 1 tablet by mouth once daily             nystatin (MYCOSTATIN) 909179 UNIT/GM Minocycline Counseling: Patient advised regarding possible photosensitivity and discoloration of the teeth, skin, lips, tongue and gums.  Patient instructed to avoid sunlight, if possible.  When exposed to sunlight, patients should wear protective clothing, sunglasses, and sunscreen.  The patient was instructed to call the office immediately if the following severe adverse effects occur:  hearing changes, easy bruising/bleeding, severe headache, or vision changes.  The patient verbalized understanding of the proper use and possible adverse effects of minocycline.  All of the patient's questions and concerns were addressed. Winlevi Counseling:  I discussed with the patient the risks of topical clascoterone including but not limited to erythema, scaling, itching, and stinging. Patient voiced their understanding. Doxycycline Counseling:  Patient counseled regarding possible photosensitivity and increased risk for sunburn.  Patient instructed to avoid sunlight, if possible.  When exposed to sunlight, patients should wear protective clothing, sunglasses, and sunscreen.  The patient was instructed to call the office immediately if the following severe adverse effects occur:  hearing changes, easy bruising/bleeding, severe headache, or vision changes.  The patient verbalized understanding of the proper use and possible adverse effects of doxycycline.  All of the patient's questions and concerns were addressed. Tazorac Pregnancy And Lactation Text: This medication is not safe during pregnancy. It is unknown if this medication is excreted in breast milk. Topical Retinoid counseling:  Patient advised to apply a pea-sized amount only at bedtime and wait 30 minutes after washing their face before applying.  If too drying, patient may add a non-comedogenic moisturizer. The patient verbalized understanding of the proper use and possible adverse effects of retinoids.  All of the patient's questions and concerns were addressed. Topical Clindamycin Counseling: Patient counseled that this medication may cause skin irritation or allergic reactions.  In the event of skin irritation, the patient was advised to reduce the amount of the drug applied or use it less frequently.   The patient verbalized understanding of the proper use and possible adverse effects of clindamycin.  All of the patient's questions and concerns were addressed. Tetracycline Counseling: Patient counseled regarding possible photosensitivity and increased risk for sunburn.  Patient instructed to avoid sunlight, if possible.  When exposed to sunlight, patients should wear protective clothing, sunglasses, and sunscreen.  The patient was instructed to call the office immediately if the following severe adverse effects occur:  hearing changes, easy bruising/bleeding, severe headache, or vision changes.  The patient verbalized understanding of the proper use and possible adverse effects of tetracycline.  All of the patient's questions and concerns were addressed. Patient understands to avoid pregnancy while on therapy due to potential birth defects. Sarecycline Counseling: Patient advised regarding possible photosensitivity and discoloration of the teeth, skin, lips, tongue and gums.  Patient instructed to avoid sunlight, if possible.  When exposed to sunlight, patients should wear protective clothing, sunglasses, and sunscreen.  The patient was instructed to call the office immediately if the following severe adverse effects occur:  hearing changes, easy bruising/bleeding, severe headache, or vision changes.  The patient verbalized understanding of the proper use and possible adverse effects of sarecycline.  All of the patient's questions and concerns were addressed. Dapsone Counseling: I discussed with the patient the risks of dapsone including but not limited to hemolytic anemia, agranulocytosis, rashes, methemoglobinemia, kidney failure, peripheral neuropathy, headaches, GI upset, and liver toxicity.  Patients who start dapsone require monitoring including baseline LFTs and weekly CBCs for the first month, then every month thereafter.  The patient verbalized understanding of the proper use and possible adverse effects of dapsone.  All of the patient's questions and concerns were addressed. Birth Control Pills Pregnancy And Lactation Text: This medication should be avoided if pregnant and for the first 30 days post-partum. Spironolactone Pregnancy And Lactation Text: This medication can cause feminization of the male fetus and should be avoided during pregnancy. The active metabolite is also found in breast milk. Winlevi Pregnancy And Lactation Text: This medication is considered safe during pregnancy and breastfeeding. Doxycycline Pregnancy And Lactation Text: This medication is Pregnancy Category D and not consider safe during pregnancy. It is also excreted in breast milk but is considered safe for shorter treatment courses. Isotretinoin Pregnancy And Lactation Text: This medication is Pregnancy Category X and is considered extremely dangerous during pregnancy. It is unknown if it is excreted in breast milk. Topical Sulfur Applications Pregnancy And Lactation Text: This medication is Pregnancy Category C and has an unknown safety profile during pregnancy. It is unknown if this topical medication is excreted in breast milk. Detail Level: Zone Topical Retinoid Pregnancy And Lactation Text: This medication is Pregnancy Category C. It is unknown if this medication is excreted in breast milk. High Dose Vitamin A Pregnancy And Lactation Text: High dose vitamin A therapy is contraindicated during pregnancy and breast feeding. Birth Control Pills Counseling: Birth Control Pill Counseling: I discussed with the patient the potential side effects of OCPs including but not limited to increased risk of stroke, heart attack, thrombophlebitis, deep venous thrombosis, hepatic adenomas, breast changes, GI upset, headaches, and depression.  The patient verbalized understanding of the proper use and possible adverse effects of OCPs. All of the patient's questions and concerns were addressed. High Dose Vitamin A Counseling: Side effects reviewed, pt to contact office should one occur. Bactrim Pregnancy And Lactation Text: This medication is Pregnancy Category D and is known to cause fetal risk.  It is also excreted in breast milk. Azelaic Acid Counseling: Patient counseled that medicine may cause skin irritation and to avoid applying near the eyes.  In the event of skin irritation, the patient was advised to reduce the amount of the drug applied or use it less frequently.   The patient verbalized understanding of the proper use and possible adverse effects of azelaic acid.  All of the patient's questions and concerns were addressed. Erythromycin Counseling:  I discussed with the patient the risks of erythromycin including but not limited to GI upset, allergic reaction, drug rash, diarrhea, increase in liver enzymes, and yeast infections. Topical Clindamycin Pregnancy And Lactation Text: This medication is Pregnancy Category B and is considered safe during pregnancy. It is unknown if it is excreted in breast milk. Tazorac Counseling:  Patient advised that medication is irritating and drying.  Patient may need to apply sparingly and wash off after an hour before eventually leaving it on overnight.  The patient verbalized understanding of the proper use and possible adverse effects of tazorac.  All of the patient's questions and concerns were addressed. Use Enhanced Medication Counseling?: No Spironolactone Counseling: Patient advised regarding risks of diarrhea, abdominal pain, hyperkalemia, birth defects (for female patients), liver toxicity and renal toxicity. The patient may need blood work to monitor liver and kidney function and potassium levels while on therapy. The patient verbalized understanding of the proper use and possible adverse effects of spironolactone.  All of the patient's questions and concerns were addressed. Benzoyl Peroxide Counseling: Patient counseled that medicine may cause skin irritation and bleach clothing.  In the event of skin irritation, the patient was advised to reduce the amount of the drug applied or use it less frequently.   The patient verbalized understanding of the proper use and possible adverse effects of benzoyl peroxide.  All of the patient's questions and concerns were addressed. Cleanser Recommendations: Gentle non soap cleansers Bpo Recommendations: Benzoyl peroxide wash 1-2 times daily in the shower Azelaic Acid Pregnancy And Lactation Text: This medication is considered safe during pregnancy and breast feeding. Erythromycin Pregnancy And Lactation Text: This medication is Pregnancy Category B and is considered safe during pregnancy. It is also excreted in breast milk. Azithromycin Counseling:  I discussed with the patient the risks of azithromycin including but not limited to GI upset, allergic reaction, drug rash, diarrhea, and yeast infections. Topical Sulfur Applications Counseling: Topical Sulfur Counseling: Patient counseled that this medication may cause skin irritation or allergic reactions.  In the event of skin irritation, the patient was advised to reduce the amount of the drug applied or use it less frequently.   The patient verbalized understanding of the proper use and possible adverse effects of topical sulfur application.  All of the patient's questions and concerns were addressed. Dapsone Pregnancy And Lactation Text: This medication is Pregnancy Category C and is not considered safe during pregnancy or breast feeding.

## 2022-07-07 NOTE — CONSULTS
[address] [address]  General Surgery Consult Note  Leyda Quarles MD     Pt Name: Rupesh Coffey  MRN: [id number]  YOB: 1945  Date of evaluation: 7/7/2018  Primary Care Physician: ASHLEY Cruz - CNP  Patient evaluated at the request of  ASHLEY Grimes  Reason for evaluation: Abdominal pain  IMPRESSIONS:   1. Partial SBO  2. Coronary artery disease  3. Aortic stenosis  4. Diabetes mellitus  5. Obesity  6. Chronic pain  PLANS:   1. NG to low intermittent suction  2. Bowel rest  3. IV hydration  4. Parenteral narcotics as needed for pain  5. Recommend holding antiplatelet agents should surgical intervention be warranted. 6. Patient at higher than average risk for surgical intervention. Will try conservative management with NG tube decompression. However if fails to resolve surgical intervention would be warranted. This was discussed with the patient will her questions were answered. SUBJECTIVE:   Chief complaint: Abdominal pain    History of present illness: Boone Lambert is a 76-year-old female with multiple medical issues. She describes acute onset of abdominal pain last evening. She stated she is passing flatus. She had a normal bowel movement 2 days ago. She describes the pain as cramping in nature. She's had non-bloody emesis as well. She has no radiation of her pain. It is described as sharp and cramping. She denies any urinary symptoms. She denies any other constitutional symptoms. No history of recent travel. She reports her prior abdominal surgeries as a laparoscopic cholecystectomy. She has also had a vaginal hysterectomy. She has a small umbilical hernia that contains no incarcerated bowel and is easily reducible. CT scan of the abdomen and pelvis performed in the emergency department revealed marked distention of the stomach and proximal small bowel. Transition point was difficult to identify. She has a history of multiple cardiac stents as well as aortic stenosis.   She lesions. RCA stents are patent w/o evidence of restenosis. Normal LV systolic function. EF 65%. Trace MR - catheter induced. Minimally elevated LVEDP - 13mmHg. Mild to moderate aortoiliac PVD w/o obstructive lesions.  CARDIAC CATHETERIZATION  5-11-04    Successful drug-eluting stent x 2 of proximal and mid RCA.  CARDIAC CATHETERIZATION  5-11-04    70-80% proximal RCA stenosis w/ 50-70% mid RCA stenosis. There is 50-60% lesion in mid PDA. Mild proximal and mid LAD disease. Mild circumflex disease. Normal LV size and systolic function. EF 60%.  CARDIOVASCULAR STRESS TEST  5-10-11    No evidence of stress induced ischemia. EF 75%.  CARDIOVASCULAR STRESS TEST  5-10-10    No evidence of stress induced ischemia, infarct or scar. EF 74%.  CERVICAL FUSION N/A 11/15/2017    REMOVAL OF PLATE I6-0, ACDF I6-1 W/ATLANTIS CORNERSTONE performed by Eusebio Grove MD at [facility] COLONOSCOPY      ENDOSCOPY, COLON, DIAGNOSTIC      FOOT SURGERY      HERNIA REPAIR      HYSTERECTOMY      NECK SURGERY  FEB 2016    PARTIAL HYSTERECTOMY      UPPER GASTROINTESTINAL ENDOSCOPY      UPPER GASTROINTESTINAL ENDOSCOPY       Medications:  Prior to Admission medications    Medication Sig Start Date End Date Taking? Authorizing Provider   levothyroxine (SYNTHROID) 100 MCG tablet take 1 tablet by mouth once daily 7/3/18   ASHLEY Taveras CNP   ferrous sulfate (FE TABS) 325 (65 Fe) MG EC tablet Take 1 tablet by mouth 3 times daily (with meals) 6/22/18   ASHLEY Taveras CNP   levothyroxine (SYNTHROID) 125 MCG tablet take 1 tablet by mouth once daily 6/22/18   ASHLEY Taveras CNP   Misc.  Devices MISC Pt needs an oxygen tank carrier for her wheelchair 6/20/18   ASHLEY Taveras CNP   sodium chloride 1 g tablet Take 1 tablet by mouth 3 times daily for 5 days 6/17/18 6/22/18  Maite Agosto PA-C   Calcium Carbonate-Vitamin D (OYSTER SHELL CALCIUM/D) 500-200 MG-UNIT TABS take 1 tablet by mouth twice a day 6/11/18   ASHLEY Salinas CNP   RA COL-RITE 100 MG capsule take 3 capsules by mouth every evening 5/29/18   ASHLEY Salinas CNP   HYDROcodone-acetaminophen (NORCO) 5-325 MG per tablet Take 1 tablet by mouth every 6 hours as needed for Pain. Darin Andrew Historical Provider, MD   hydrALAZINE (APRESOLINE) 50 MG tablet Take 1.5 tablets by mouth 3 times daily take 1.5 tablets by mouth three times a day 5/16/18   ASHLEY Prado CNP   escitalopram (LEXAPRO) 10 MG tablet Take 10 mg by mouth every morning    Historical Provider, MD   furosemide (LASIX) 20 MG tablet Take 1 tablet by mouth 2 times daily 4/26/18   ASHLEY Prado CNP   potassium chloride (KLOR-CON 10) 10 MEQ extended release tablet Take 1 tablet by mouth daily 4/26/18   ASHLEY Prado CNP   SYMBICORT 160-4.5 MCG/ACT AERO inhale 2 puffs by mouth twice a day 4/21/18   ASHLEY Choi CNP   metoprolol tartrate (LOPRESSOR) 50 MG tablet Take 1 tablet by mouth 2 times daily 4/18/18 5/18/18  Colt Thao MD   traMADol (ULTRAM) 50 MG tablet Take 50 mg by mouth every 6 hours as needed for Pain. Darin Andrew     Historical Provider, MD   acetaminophen (TYLENOL) 500 MG tablet Take 500 mg by mouth every 6 hours as needed for Pain    Historical Provider, MD   isosorbide mononitrate (IMDUR) 60 MG extended release tablet Take 1 tablet by mouth 2 times daily 3/15/18   Aruna Neves MD   aspirin (ASPIRIN LOW DOSE) 81 MG EC tablet take 1 tablet by mouth once daily 3/7/18   Aruna Neves MD   ranolazine (RANEXA) 500 MG extended release tablet take 1 tablet by mouth twice a day 3/7/18   Aruna Neves MD   valsartan (DIOVAN) 320 MG tablet Take 1 tablet by mouth daily 3/7/18   Aruna Neves MD   albuterol sulfate HFA (VENTOLIN HFA) 108 (90 Base) MCG/ACT inhaler Inhale 2 puffs into the lungs every 6 hours as needed for Wheezing 2/21/18   ASHLEY Correa - CNP   fluticasone (FLONASE) 50 MCG/ACT nasal spray 1 spray by Nasal route daily 1/23/18   ASHLEY Mcginnis CNP   guaiFENesin (MUCINEX) 600 MG extended release tablet Take 1 tablet by mouth 2 times daily 12/13/17   ASHLEY Velasquez CNP   sodium chloride (ALTAMIST SPRAY) 0.65 % nasal spray 1 spray by Nasal route as needed for Congestion 11/29/17   ASHLEY Velasquez CNP   rosuvastatin (CRESTOR) 20 MG tablet Take 20 mg by mouth daily    Historical Provider, MD   polyvinyl alcohol (LIQUIFILM TEARS) 1.4 % ophthalmic solution Place 1 drop into both eyes as needed 2-4 times daily    Historical Provider, MD   cycloSPORINE (RESTASIS) 0.05 % ophthalmic emulsion Place 1 drop into both eyes 2 times daily    Historical Provider, MD   hydrOXYzine (ATARAX) 25 MG tablet Take 25 mg by mouth 3 times daily    Historical Provider, MD   glucose blood VI test strips (GLUCOSE METER TEST) strip 1 each by In Vitro route daily Check blood sugar q daily Dx E11.69 11/9/17   Laney Wells DO   Lancets MISC Check blood sugar q daily Dx E11.69 11/9/17   Laney Wells DO   Blood Glucose Monitoring Suppl HARVINDER Check blood sugar q daily Dx E11.69 11/9/17   Laney Wells DO   Multiple Vitamins-Minerals (MULTIVITAMIN-MINERALS) TABS tablet take 1 tablet by mouth once daily 9/27/17   ASHLEY Velasquez CNP   clopidogrel (PLAVIX) 75 MG tablet take 1 tablet by mouth once daily 9/6/17   Arik Man MD   nystatin (MYCOSTATIN) 488112 UNIT/GM cream Apply topically 2 times daily. 8/17/17   ASHLEY Velasquez CNP   benzocaine (ANBESOL) 10 % mucosal gel Take by mouth as needed.  7/18/17   ASHLEY Velasquez CNP   SINGULAIR 10 MG tablet Take 1 tablet by mouth nightly 5/1/17   Erwin Chauhan MD   OXYGEN Inhale 2 L into the lungs continuous     Historical Provider, MD   doxepin (SINEQUAN) 150 MG capsule Take 150 mg by mouth nightly    Historical Provider, MD   OXcarbazepine (TRILEPTAL) 150 MG tablet Take 150 mg by mouth 2 times daily One tab q am. And two tabs q pm    Historical Provider, MD   polyethylene glycol (GLYCOLAX) powder take 17GM (DISSOLVED IN WATER) by mouth once daily 8/29/16   ASHLEY Becerril CNP   ONE TOUCH ULTRASOFT LANCETS MISC use as directed BEFORE BREAKFAST AND SUPPER 10/15/14   ASHLEY Becerril CNP   pantoprazole (PROTONIX) 40 MG tablet Take 20 mg by mouth every morning (before breakfast)     Historical Provider, MD   risperiDONE (RISPERDAL) 1 MG tablet Take 1 mg by mouth 2 times daily Take 1 tab every morning and 1.5 tabs every night    Historical Provider, MD    Scheduled Meds:  Continuous Infusions:  PRN Meds:. Allergies:  is allergic to codeine; lipitor [atorvastatin]; and motrin [ibuprofen micronized]. Family History:  family history includes Breast Cancer (age of onset: 36) in her child; Cancer in her sister; Cancer (age of onset: 36) in her sister; Heart Disease in her brother, brother, brother, father, and mother; Kidney Disease in her sister; Ovarian Cancer (age of onset: 22) in an other family member. Social History:   reports that she quit smoking about 11 years ago. Her smoking use included Cigarettes. She has a 38.00 pack-year smoking history. She has never used smokeless tobacco. She reports that she does not drink alcohol or use drugs. Review of Systems:  Review of Systems   Constitutional: Positive for activity change and appetite change. Negative for chills, diaphoresis, fever and unexpected weight change. HENT: Positive for hearing loss. Negative for congestion, sinus pain, sinus pressure and trouble swallowing. Eyes: Negative for pain and redness. Respiratory: Positive for shortness of breath. Negative for cough, choking and chest tightness. Cardiovascular: Negative for chest pain and leg swelling. Gastrointestinal: Positive for abdominal distention, abdominal pain, nausea and vomiting. Negative for blood in stool and constipation. Endocrine: Negative for polydipsia.    Genitourinary: Positive for frequency and urgency. Negative for dysuria and hematuria. Musculoskeletal: Positive for arthralgias and back pain. Negative for joint swelling, neck pain and neck stiffness. Skin: Negative for color change, pallor and wound. Allergic/Immunologic: Negative for immunocompromised state. Neurological: Positive for weakness. Negative for dizziness, light-headedness and headaches. Hematological: Negative for adenopathy. Bruises/bleeds easily. Psychiatric/Behavioral: Negative for agitation and confusion. OBJECTIVE:   CURRENT VITALS:  height is 5' 6\" (1.676 m) and weight is 230 lb (104.3 kg). Her oral temperature is 98.1 °F (36.7 °C). Her blood pressure is 134/71 and her pulse is 105. Her respiration is 17 and oxygen saturation is 97%. Temperature Range (24h):Temp: 98.1 °F (36.7 °C) Temp  Av.1 °F (36.7 °C)  Min: 98.1 °F (36.7 °C)  Max: 98.1 °F (36.7 °C)  BP Range (35Y): Systolic (30ASA), ZRH:789 , Min:115 , DBC:063     Diastolic (62WSP), HOP:60, Min:56, Max:137    Pulse Range (24h): Pulse  Av.6  Min: 94  Max: 105  Respiration Range (24h): Resp  Av.7  Min: 15  Max: 19  Current Pulse Ox (24h):  SpO2: 97 %  Pulse Ox Range (24h):  SpO2  Av.2 %  Min: 95 %  Max: 97 %  Oxygen Amount and Delivery:    Physical Exam   Constitutional: She is oriented to person, place, and time. She appears well-nourished. No distress. HENT:   Head: Normocephalic and atraumatic. Eyes: EOM are normal. Right eye exhibits no discharge. Left eye exhibits no discharge. No scleral icterus. Neck: Neck supple. No JVD present. Cardiovascular: Normal rate. Murmur heard. Pulmonary/Chest: Effort normal and breath sounds normal. No respiratory distress. She has no wheezes. Abdominal: She exhibits distension. She exhibits no mass. There is tenderness. There is no rebound and no guarding. Bowel sounds very hypoactive   Musculoskeletal: She exhibits no edema, tenderness or deformity.    Lymphadenopathy:     She has no cervical adenopathy. Neurological: She is alert and oriented to person, place, and time. No cranial nerve deficit. Skin: Skin is warm and dry. No rash noted. She is not diaphoretic. No erythema. Small Atul Mouse tattoo right lateral calf   Psychiatric: She has a normal mood and affect. Her behavior is normal.   Nursing note and vitals reviewed. LABS:     Recent Labs      07/07/18   0607  07/07/18   0908   WBC  9.6   --    HGB  11.5*   --    HCT  35.7*   --    PLT  201   --    NA  122*   --    K  4.5   --    CL  81*   --    CO2  25   --    BUN  12   --    CREATININE  0.7   --    CALCIUM  10.3   --    AST  39   --    ALT  46   --    BILITOT  0.3   --    LIPASE  34.7   --    NITRU   --   NEGATIVE   COLORU   --   YELLOW   BACTERIA   --   NONE     CMP:    Lab Results   Component Value Date     07/07/2018    K 4.5 07/07/2018    K 4.0 04/17/2018    CL 81 07/07/2018    CO2 25 07/07/2018    BUN 12 07/07/2018    CREATININE 0.7 07/07/2018    LABGLOM 82 07/07/2018    GLUCOSE 168 07/07/2018    GLUCOSE 101 07/25/2016    PROT 7.7 07/07/2018    PROT 6.5 11/25/2016    LABALBU 4.9 07/07/2018    CALCIUM 10.3 07/07/2018    BILITOT 0.3 07/07/2018    ALKPHOS 57 07/07/2018    AST 39 07/07/2018    ALT 46 07/07/2018     Hepatic Function Panel:    Lab Results   Component Value Date    ALKPHOS 57 07/07/2018    ALT 46 07/07/2018    AST 39 07/07/2018    PROT 7.7 07/07/2018    PROT 6.5 11/25/2016    BILITOT 0.3 07/07/2018    BILIDIR <0.2 03/26/2018    LABALBU 4.9 07/07/2018     PT/INR:    Lab Results   Component Value Date    PROTIME 1.04 08/13/2011    INR 1.11 04/16/2018       RADIOLOGY:   I have personally reviewed the following films:    this patient   Narrative   PROCEDURE: CT ABDOMEN PELVIS W IV CONTRAST       CLINICAL INFORMATION: lower abdominal pain . Nausea and vomiting.       COMPARISON: CT abdomen and pelvis 4/18/2015.  Chest CT 4/24/2017.       TECHNIQUE: Axial 5 mm CT images were obtained through the abdomen and pelvis after the administration of intravenous contrast. Coronal and sagittal reconstructions were obtained.       All CT scans at this facility use dose modulation, iterative reconstruction, and/or weight-based dosing when appropriate to reduce radiation dose to as low as reasonably achievable.       FINDINGS:           There is some mild atelectasis and fibrotic changes in the lung apices. There is a stable 5 mm left lower lobe pulmonary nodule. The heart size is normal. There is a small pericardial effusion. There are coronary artery calcifications.           There is no gross abnormality of the liver. The spleen is normal.        The stomach is markedly distended with fluid. The duodenum is distended. There are distended fluid-filled proximal small bowel loops. There is no free air.       The adrenal glands are within appropriate limits. The pancreas is normal. The patient has had a cholecystectomy. Reda Montoya is no hydronephrosis or stones of either kidney. No renal masses are noted.           An IVC filter is noted. There is atherosclerosis of aorta.  The IVC and aorta are of normal caliber. There is no adenopathy.       The urinary bladder is normal. There is no pelvic free fluid.  There is mild diverticulosis of the colon. There is no evidence of diverticulitis. There is a normal appendix. The adnexa are stable in appearance. The left ovary measures 5.3 cm. This is    stable. There has been prior lumbar fusion.                   Impression       1. Small bowel obstruction. The stomach and proximal small bowel loops are distended with gas. The transition point is difficult to identify. 2. Diverticulosis. 3. Small pericardial effusion. 4. Stable enlargement of the left ovary.                   **This report has been created using voice recognition software. It may contain minor errors which are inherent in voice recognition technology. **       Final report electronically signed by Dr. Jaun Lanier Sherron Pollack on 7/7/2018 10:38 AM           Electronically signed by Demarcus Farrell MD on 7/7/2018 at 11:37 AM Enbrel Pregnancy And Lactation Text: This medication is Pregnancy Category B and is considered safe during pregnancy. It is unknown if this medication is excreted in breast milk.

## 2022-10-04 NOTE — PROGRESS NOTES
[address] 59  INPATIENT OCCUPATIONAL THERAPY  [facility] ORTHOPEDICS 7K  DAILY NOTE    Time:  Time In: 1430  Time Out: 1459  Timed Code Treatment Minutes: 29 Minutes  Minutes: 29          Date: 2017  Patient Name: Gris España,   Gender: female      Room: [facility]24/024-A  MRN: [id number]  : 1945  (67 y.o.)  Referring Practitioner: Dr. Pj Majano  Diagnosis: cervical spinal stenosis  Additional Pertinent Hx: 67 yr old female admitted 11/15 s/p REMOVAL OF PLATE B6-3, ACDF I4-3 W/ATLANTIS CORNERSTONE. Symptoms of neck pain, loss of upper extremity strength,  radicular symptoms, & dull pain. Restrictions/Precautions:  Restrictions/Precautions: General Precautions, Fall Risk, Surgical Protocols            Position Activity Restriction  Other position/activity restrictions: cervical precautions    Required Braces or Orthoses  Cervical: soft    Past Medical History:   Diagnosis Date    Anemia     Anxiety     Arthritis     general    AS (aortic stenosis): mild to moderate by echo 2017    ASHD (arteriosclerotic heart disease)     CAD (coronary artery disease)     Chest pain     COPD (chronic obstructive pulmonary disease) [address]     Depression     DM (diabetes mellitus) ([address]     Dyspnea     FH: CAD (coronary artery disease)     GERD (gastroesophageal reflux disease)     Heart burn     History of tobacco abuse: Quit in 2009 10/28/2014    HLD (hyperlipidemia)     HTN (hypertension)     Irritable bowel syndrome     States has not had problem with this for years    Liver disease     fatty liver    Metabolic syndrome 10/48/0148    MI (myocardial infarction)     Nausea & vomiting     Obesity     CHET (obstructive sleep apnea)     S/P cardiac catheterization: 2014: 99% in-stent restenosis mid-RCA. LCx moderate LI's. LAD 85% mid-segment lesion. 2014: 99% in-stent restenosis mid-RCA. LCx moderate LI's. LAD 85% mid-segment lesion.  Dr. Jessi Lindquist S/P PTCA: 5/11/2004: Proximal and mid RCA  Taxus 3.5 X 20 mm, and Taxus 3.0 X 32 mm. 10/28/2014    5/11/2004: Proximal and mid RCA  Taxus 3.5 X 20 mm, and Taxus 3.0 X 32 mm. Dr. Frances Sam Systolic murmur 52/47/5894    Thyroid disease      Past Surgical History:   Procedure Laterality Date    BACK SURGERY  08/2016        BLADDER SUSPENSION      BREAST BIOPSY  10/10/2017    BREAST LUMPECTOMY      CARDIAC CATHETERIZATION  8-11-06    Mild nonobstructive CAD w/ diffuse 10-20% proximal and mid LAD stenosis and 10-20% proximal/ostial RCA stenosis. No obstructive lesions. RCA stents are patent w/o evidence of restenosis. Normal LV systolic function. EF 65%. Trace MR - catheter induced. Minimally elevated LVEDP - 13mmHg. Mild to moderate aortoiliac PVD w/o obstructive lesions.  CARDIAC CATHETERIZATION  5-11-04    Successful drug-eluting stent x 2 of proximal and mid RCA.  CARDIAC CATHETERIZATION  5-11-04    70-80% proximal RCA stenosis w/ 50-70% mid RCA stenosis. There is 50-60% lesion in mid PDA. Mild proximal and mid LAD disease. Mild circumflex disease. Normal LV size and systolic function. EF 60%.  CARDIOVASCULAR STRESS TEST  5-10-11    No evidence of stress induced ischemia. EF 75%.  CARDIOVASCULAR STRESS TEST  5-10-10    No evidence of stress induced ischemia, infarct or scar. EF 74%.  CERVICAL FUSION N/A 11/15/2017    REMOVAL OF PLATE B3-1, ACDF O2-2 W/ATLANTIS CORNERSTONE performed by Humaira Craft MD at [address] [place] [address], DIAGNOSTIC      FOOT SURGERY      HERNIA REPAIR      NECK SURGERY  FEB 2016    PARTIAL HYSTERECTOMY      UPPER GASTROINTESTINAL ENDOSCOPY      UPPER GASTROINTESTINAL ENDOSCOPY             Subjective       Subjective: Pleasant and cooperative, particular with routine.     Overall Orientation Status: Within Normal Limits         Pain:  Pain Assessment  Patient Currently in Pain: Denies       Objective  Overall Cognitive Status: WNL            ADL  Grooming: Stand by assistance (standing sinkside for oral care x 4 min)  Toileting: Moderate assistance          Bed mobility  Supine to Sit: Stand by assistance  Sit to Supine: Stand by assistance  Scooting: Stand by assistance    Transfers  Sit to stand: Stand by assistance  Stand to sit: Stand by assistance  Toilet Transfers  Toilet - Technique: Ambulating  Equipment Used: Standard toilet  Toilet Transfer: Stand by assistance    Balance  Sitting Balance: Supervision  Standing Balance: Stand by assistance     Time: x 4 min standing sinkside     Functional Mobility  Functional - Mobility Device: Rolling Walker  Activity: To/from bathroom  Assist Level: Stand by assistance  Functional Mobility Comments: SOB noted with mobility, pulse ox unable to read intially, after 2 min pt seated RB, pt reading at 90%, increased to 92%        Activity Tolerance:  Activity Tolerance: Patient limited by fatigue (& SOB)    Assessment:     Performance deficits / Impairments: Decreased ADL status, Decreased functional mobility , Decreased safe awareness, Decreased endurance  Prognosis: Fair  Discharge Recommendations: Home with Home health OT, 24 hour supervision or assist    Patient Education:  Patient Education: safety with mobility, deep breathing tech  Barriers to Learning: none    Equipment Recommendations:  Equipment Needed: No    Safety:  Safety Devices in place: Yes  Type of devices:  All fall risk precautions in place, Call light within reach, Patient at risk for falls, Left in bed    Plan:  Times per week: 6x  Current Treatment Recommendations: Balance Training, Functional Mobility Training, Self-Care / ADL, Safety Education & Training    Goals:  Patient goals : go home    Short term goals  Time Frame for Short term goals: 2 weeks  Short term goal 1: Complete 2-3 min standing ADL with S for increased indep with sinkside grooming  Short term goal 2: Complete mobility to/from bathroom & in hallway with S & 0 vcs for walker safety  Short term goal 3: Complete UB dressing with min A & min vcs for adaptative strategies  Short term goal 4: Complete various t/fs including toilet with S & 0 vcs for safety  Long term goals  Time Frame for Long term goals : No LTG set d/t short ELOS Destruction After The Procedure: No

## 2024-09-10 NOTE — TELEPHONE ENCOUNTER
2nd attempt to contact the pt re:overdue labs JS ordered on 11/22/17. HIPAA form is up to date, orders mailed.
If you are a smoker, it is important for your health to stop smoking. Please be aware that second hand smoke is also harmful.

## (undated) DEVICE — DRAPE MICSCP W132XL406CM LENS DIA68MM W VARI LENS2 FOR LEICA

## (undated) DEVICE — SOLUTION IV IRRIG WATER 1000ML POUR BRL 2F7114

## (undated) DEVICE — Device: Brand: FABCO

## (undated) DEVICE — RED RUBBER ROBINSON URETHRAL CATHETER, RADIOPAQUE E, SMOOTH ROUNDED TIP, 12 FR (4.0 MM): Brand: DOVER

## (undated) DEVICE — SUTURE VCRL SZ 3-0 L18IN ABSRB VLT SUTUPAK PRECUT W/O NDL J104T

## (undated) DEVICE — 3M™ TEGADERM™ TRANSPARENT FILM DRESSING FRAME STYLE, 1626W, 4 IN X 4-3/4 IN (10 CM X 12 CM), 50/CT 4CT/CASE: Brand: 3M™ TEGADERM™

## (undated) DEVICE — Device

## (undated) DEVICE — GLOVE ORANGE PI 7 1/2   MSG9075

## (undated) DEVICE — 4-PORT MANIFOLD: Brand: NEPTUNE 2

## (undated) DEVICE — ULTRACLEAN ACCESSORY ELECTRODE 6" (15.24 CM) COATED BLADE: Brand: ULTRACLEAN

## (undated) DEVICE — BUR CUT RND FLUT AGRSV 4.0MM

## (undated) DEVICE — CATH URETH 8FR RUBBER ULTMR 12IN STER

## (undated) DEVICE — 2000CC GUARDIAN II: Brand: GUARDIAN

## (undated) DEVICE — TRAY CATH 16FR F INCLUDE LUB DRNGE BG STATLOK STBL DEV

## (undated) DEVICE — MEDI-VAC YANKAUER SUCTION HANDLE W/BULBOUS TIP: Brand: CARDINAL HEALTH

## (undated) DEVICE — SUTURE VCRL SZ 3-0 L27IN ABSRB UD FS-2 L19MM 1/2 CIR J423H

## (undated) DEVICE — 3M™ STERI-STRIP™ COMPOUND BENZOIN TINCTURE 40 BAGS/CARTON 4 CARTONS/CASE C1544: Brand: 3M™ STERI-STRIP™

## (undated) DEVICE — PAD OP RM W20XL72XH2IN PRECIS CUT FLAT EC BIOCLINIC

## (undated) DEVICE — GLOVE SURG SZ 9 THK91MIL LTX FREE SYN POLYISOPRENE ANTI

## (undated) DEVICE — SUTURE MCRYL SZ 4-0 L27IN ABSRB UD L19MM PS-2 1/2 CIR PRIM Y426H

## (undated) DEVICE — CODMAN® SURGICAL PATTIES 1/2" X 1/2" (1.27CM X 1.27CM): Brand: CODMAN®

## (undated) DEVICE — 3M™ STERI-STRIP™ BLEND TONE SKIN CLOSURES, B1557, TAN, 1/2 IN X 4 IN (12MM X 100MM), 6 STRIPS/ENVELOPE: Brand: 3M™ STERI-STRIP™

## (undated) DEVICE — BASIC SINGLE BASIN BTC-LF: Brand: MEDLINE INDUSTRIES, INC.

## (undated) DEVICE — GOWN,SIRUS,NONRNF,SETINSLV,XL,20/CS: Brand: MEDLINE

## (undated) DEVICE — GLOVE ORANGE PI 8   MSG9080

## (undated) DEVICE — SOLUTION IV IRRIG POUR BRL 0.9% SODIUM CHL 2F7124

## (undated) DEVICE — MEDI-VAC NON-CONDUCTIVE SUCTION TUBING 6MM X 6.1M (20 FT.) L: Brand: CARDINAL HEALTH

## (undated) DEVICE — CATH URETH 10 FR RED RUBBER ALL PURPOSE

## (undated) DEVICE — DRAIN SURG W7MMXL20CM SIL FULL PERF HUBLESS FLAT RADPQ STRP

## (undated) DEVICE — SUTURE VCRL SZ 2-0 L27IN ABSRB UD L36MM CP-1 1/2 CIR REV J266H

## (undated) DEVICE — 1010 S-DRAPE TOWEL DRAPE 10/BX: Brand: STERI-DRAPE™

## (undated) DEVICE — GOWN,SIRUS,NON REINFRCD,LARGE,SET IN SL: Brand: MEDLINE

## (undated) DEVICE — SUTURE ETHLN SZ 3-0 L18IN NONABSORBABLE BLK FS-1 L24MM 3/8 663H

## (undated) DEVICE — GLOVE SURG SZ 75 L12IN FNGR THK94MIL TRNSLUC YEL LTX

## (undated) DEVICE — STRIP,CLOSURE,WOUND,MEDI-STRIP,1/2X4: Brand: MEDLINE

## (undated) DEVICE — 3M™ BAIR HUGGER® MULTI ACCESS BLANKET, PEDIATRIC, FULL BODY, 10 PER CASE 31000: Brand: BAIR HUGGER™